# Patient Record
Sex: MALE | Race: WHITE | Employment: UNEMPLOYED | URBAN - METROPOLITAN AREA
[De-identification: names, ages, dates, MRNs, and addresses within clinical notes are randomized per-mention and may not be internally consistent; named-entity substitution may affect disease eponyms.]

---

## 2018-11-29 ENCOUNTER — APPOINTMENT (OUTPATIENT)
Dept: GENERAL RADIOLOGY | Age: 53
End: 2018-11-29
Payer: COMMERCIAL

## 2018-11-29 ENCOUNTER — HOSPITAL ENCOUNTER (EMERGENCY)
Age: 53
Discharge: HOME | End: 2018-11-29
Attending: EMERGENCY MEDICINE
Payer: COMMERCIAL

## 2018-11-29 VITALS
OXYGEN SATURATION: 97 % | WEIGHT: 175 LBS | DIASTOLIC BLOOD PRESSURE: 79 MMHG | BODY MASS INDEX: 27.47 KG/M2 | TEMPERATURE: 98 F | SYSTOLIC BLOOD PRESSURE: 108 MMHG | HEIGHT: 67 IN | HEART RATE: 59 BPM | RESPIRATION RATE: 18 BRPM

## 2018-11-29 DIAGNOSIS — L03.114 LEFT ARM CELLULITIS: Primary | ICD-10-CM

## 2018-11-29 LAB
A/G RATIO: 1.3 (ref 1.1–2.2)
ALBUMIN SERPL-MCNC: 4.2 G/DL (ref 3.4–5)
ALP BLD-CCNC: 78 U/L (ref 40–129)
ALT SERPL-CCNC: 98 U/L (ref 10–40)
ANION GAP SERPL CALCULATED.3IONS-SCNC: 12 MMOL/L (ref 3–16)
AST SERPL-CCNC: 60 U/L (ref 15–37)
BASOPHILS ABSOLUTE: 0.1 K/UL (ref 0–0.2)
BASOPHILS RELATIVE PERCENT: 1.2 %
BILIRUB SERPL-MCNC: 0.6 MG/DL (ref 0–1)
BUN BLDV-MCNC: 20 MG/DL (ref 7–20)
CALCIUM SERPL-MCNC: 9.4 MG/DL (ref 8.3–10.6)
CHLORIDE BLD-SCNC: 97 MMOL/L (ref 99–110)
CO2: 28 MMOL/L (ref 21–32)
CREAT SERPL-MCNC: 1.5 MG/DL (ref 0.9–1.3)
EOSINOPHILS ABSOLUTE: 0.5 K/UL (ref 0–0.6)
EOSINOPHILS RELATIVE PERCENT: 5 %
GFR AFRICAN AMERICAN: 60
GFR NON-AFRICAN AMERICAN: 49
GLOBULIN: 3.2 G/DL
GLUCOSE BLD-MCNC: 118 MG/DL (ref 70–99)
HCT VFR BLD CALC: 40 % (ref 40.5–52.5)
HEMOGLOBIN: 13.8 G/DL (ref 13.5–17.5)
LYMPHOCYTES ABSOLUTE: 2.5 K/UL (ref 1–5.1)
LYMPHOCYTES RELATIVE PERCENT: 24.7 %
MCH RBC QN AUTO: 33.3 PG (ref 26–34)
MCHC RBC AUTO-ENTMCNC: 34.6 G/DL (ref 31–36)
MCV RBC AUTO: 96.3 FL (ref 80–100)
MONOCYTES ABSOLUTE: 0.8 K/UL (ref 0–1.3)
MONOCYTES RELATIVE PERCENT: 7.7 %
NEUTROPHILS ABSOLUTE: 6.3 K/UL (ref 1.7–7.7)
NEUTROPHILS RELATIVE PERCENT: 61.4 %
PDW BLD-RTO: 14.8 % (ref 12.4–15.4)
PLATELET # BLD: 335 K/UL (ref 135–450)
PMV BLD AUTO: 7.9 FL (ref 5–10.5)
POTASSIUM SERPL-SCNC: 4.1 MMOL/L (ref 3.5–5.1)
RBC # BLD: 4.15 M/UL (ref 4.2–5.9)
SODIUM BLD-SCNC: 137 MMOL/L (ref 136–145)
TOTAL PROTEIN: 7.4 G/DL (ref 6.4–8.2)
WBC # BLD: 10.2 K/UL (ref 4–11)

## 2018-11-29 PROCEDURE — 73080 X-RAY EXAM OF ELBOW: CPT

## 2018-11-29 PROCEDURE — 6370000000 HC RX 637 (ALT 250 FOR IP): Performed by: NURSE PRACTITIONER

## 2018-11-29 PROCEDURE — 80053 COMPREHEN METABOLIC PANEL: CPT

## 2018-11-29 PROCEDURE — 73090 X-RAY EXAM OF FOREARM: CPT

## 2018-11-29 PROCEDURE — 85025 COMPLETE CBC W/AUTO DIFF WBC: CPT

## 2018-11-29 PROCEDURE — 99283 EMERGENCY DEPT VISIT LOW MDM: CPT

## 2018-11-29 RX ORDER — TRAZODONE HYDROCHLORIDE 50 MG/1
1-3 TABLET ORAL NIGHTLY PRN
COMMUNITY
Start: 2018-11-06

## 2018-11-29 RX ORDER — CLONAZEPAM 0.5 MG/1
1 TABLET ORAL PRN
COMMUNITY
Start: 2018-11-06

## 2018-11-29 RX ORDER — RANOLAZINE 500 MG/1
1 TABLET, FILM COATED, EXTENDED RELEASE ORAL 2 TIMES DAILY
COMMUNITY
Start: 2018-11-14

## 2018-11-29 RX ORDER — SERTRALINE HYDROCHLORIDE 100 MG/1
1 TABLET, FILM COATED ORAL 2 TIMES DAILY PRN
COMMUNITY
Start: 2018-11-06

## 2018-11-29 RX ORDER — PRASUGREL 10 MG/1
10 TABLET, FILM COATED ORAL DAILY
COMMUNITY
Start: 2018-07-20

## 2018-11-29 RX ORDER — ATORVASTATIN CALCIUM 80 MG/1
40 TABLET, FILM COATED ORAL DAILY
COMMUNITY
Start: 2018-09-21

## 2018-11-29 RX ORDER — CEPHALEXIN 250 MG/1
500 CAPSULE ORAL ONCE
Status: COMPLETED | OUTPATIENT
Start: 2018-11-29 | End: 2018-11-29

## 2018-11-29 RX ORDER — CEPHALEXIN 500 MG/1
500 CAPSULE ORAL 3 TIMES DAILY
Qty: 30 CAPSULE | Refills: 0 | Status: SHIPPED | OUTPATIENT
Start: 2018-11-29 | End: 2018-12-09

## 2018-11-29 RX ORDER — SACUBITRIL AND VALSARTAN 24; 26 MG/1; MG/1
1 TABLET, FILM COATED ORAL 2 TIMES DAILY
COMMUNITY
Start: 2018-11-19

## 2018-11-29 RX ORDER — BISOPROLOL FUMARATE 5 MG/1
2.5 TABLET ORAL DAILY
COMMUNITY
Start: 2018-11-14

## 2018-11-29 RX ORDER — TORSEMIDE 20 MG/1
1 TABLET ORAL DAILY
COMMUNITY
Start: 2018-11-09

## 2018-11-29 RX ORDER — NITROGLYCERIN 0.4 MG/1
0.4 TABLET SUBLINGUAL
COMMUNITY
Start: 2018-07-20

## 2018-11-29 RX ORDER — EZETIMIBE 10 MG/1
1 TABLET ORAL DAILY
COMMUNITY
Start: 2018-11-14

## 2018-11-29 RX ORDER — SULFAMETHOXAZOLE AND TRIMETHOPRIM 800; 160 MG/1; MG/1
1 TABLET ORAL ONCE
Status: COMPLETED | OUTPATIENT
Start: 2018-11-29 | End: 2018-11-29

## 2018-11-29 RX ORDER — HYDROCODONE BITARTRATE AND ACETAMINOPHEN 5; 325 MG/1; MG/1
2 TABLET ORAL ONCE
Status: COMPLETED | OUTPATIENT
Start: 2018-11-29 | End: 2018-11-29

## 2018-11-29 RX ORDER — HYDROCODONE BITARTRATE AND ACETAMINOPHEN 5; 325 MG/1; MG/1
1 TABLET ORAL EVERY 6 HOURS PRN
Qty: 15 TABLET | Refills: 0 | Status: SHIPPED | OUTPATIENT
Start: 2018-11-29 | End: 2018-12-06

## 2018-11-29 RX ORDER — SULFAMETHOXAZOLE AND TRIMETHOPRIM 800; 160 MG/1; MG/1
1 TABLET ORAL 2 TIMES DAILY
Qty: 20 TABLET | Refills: 0 | Status: SHIPPED | OUTPATIENT
Start: 2018-11-29 | End: 2018-12-09

## 2018-11-29 RX ORDER — AMIODARONE HYDROCHLORIDE 200 MG/1
300 TABLET ORAL DAILY
COMMUNITY
Start: 2018-11-06

## 2018-11-29 RX ADMIN — CEPHALEXIN 500 MG: 250 CAPSULE ORAL at 19:24

## 2018-11-29 RX ADMIN — HYDROCODONE BITARTRATE AND ACETAMINOPHEN 2 TABLET: 5; 325 TABLET ORAL at 19:24

## 2018-11-29 RX ADMIN — SULFAMETHOXAZOLE AND TRIMETHOPRIM 1 TABLET: 800; 160 TABLET ORAL at 19:24

## 2018-11-29 ASSESSMENT — PAIN DESCRIPTION - PAIN TYPE: TYPE: ACUTE PAIN

## 2018-11-29 ASSESSMENT — PAIN SCALES - GENERAL
PAINLEVEL_OUTOF10: 9
PAINLEVEL_OUTOF10: 9

## 2018-11-29 ASSESSMENT — PAIN DESCRIPTION - ORIENTATION: ORIENTATION: LEFT

## 2018-11-29 ASSESSMENT — PAIN DESCRIPTION - LOCATION: LOCATION: ARM

## 2018-11-30 NOTE — ED PROVIDER NOTES
MG TABLET    Take 300 mg by mouth daily    ATORVASTATIN (LIPITOR) 80 MG TABLET    Take 40 mg by mouth daily    BISOPROLOL (ZEBETA) 5 MG TABLET    Take 2.5 mg by mouth daily    CLONAZEPAM (KLONOPIN) 0.5 MG TABLET    Take 1 tablet by mouth as needed. Laurena Rad COENZYME Q10 (CO Q 10) 100 MG CAPS    Take 100 mg by mouth daily    ENTRESTO 24-26 MG PER TABLET    Take 1 tablet by mouth 2 times daily    EZETIMIBE (ZETIA) 10 MG TABLET    Take 1 tablet by mouth daily    NITROGLYCERIN (NITROSTAT) 0.4 MG SL TABLET    Place 0.4 mg under the tongue    PRASUGREL (EFFIENT) 10 MG TABS    Take 10 mg by mouth daily    RANEXA 500 MG EXTENDED RELEASE TABLET    Take 1 tablet by mouth 2 times daily    SERTRALINE (ZOLOFT) 100 MG TABLET    Take 1 tablet by mouth 2 times daily as needed    TORSEMIDE (DEMADEX) 20 MG TABLET    Take 1 tablet by mouth daily    TRAZODONE (DESYREL) 50 MG TABLET    Take 1-3 tablets by mouth nightly as needed         ALLERGIES:    Patient has no known allergies. FAMILY HISTORY:     History reviewed. No pertinent family history. SOCIAL HISTORY:       Social History     Social History    Marital status:      Spouse name: N/A    Number of children: N/A    Years of education: N/A     Social History Main Topics    Smoking status: Former Smoker     Types: Cigarettes     Start date: 8/1/2018    Smokeless tobacco: Never Used    Alcohol use No    Drug use: No    Sexual activity: Not Asked     Other Topics Concern    None     Social History Narrative    None       SCREENINGS:    @FLOW([id number])@        PHYSICAL EXAM:       ED Triage Vitals [11/29/18 1700]   BP Temp Temp Source Pulse Resp SpO2 Height Weight   105/65 98 °F (36.7 °C) Oral 63 17 99 % 5' 7\" (1.702 m) 175 lb (79.4 kg)       Physical Exam    CONSTITUTIONAL: Awake and alert. Cooperative. Well-developed. Well-nourished. Non-toxic.    Vitals:    11/29/18 1700   BP: 105/65   Pulse: 63   Resp: 17   Temp: 98 °F (36.7 °C)   TempSrc: Oral   SpO2: 99% Weight: 175 lb (79.4 kg)   Height: 5' 7\" (1.702 m)     HENT: Normocephalic. Atraumatic. External ears normal, without discharge. TMs clear bilaterally. No nasal discharge. Oropharynx clear, no erythema. Mucous membranes moist.  EYES: Conjunctiva non-injected, no lid abnormalities noted. No scleral icterus. PERRL. EOM's grossly intact. Anterior chambers clear. NECK: Supple. Normal ROM. No meningismus. No thyroid tenderness or swelling noted. CARDIOVASCULAR: RRR. No Murmer. Intact distal pulses with no edema. No carotid bruits. PULMONARY/CHEST WALL: Effort normal. No tachypnea. Lungs clear to ausculation. ABDOMEN: Normal BS. Soft. Nondistended. No tenderness to palpate. No guarding. No hernias noted. No splenomegaly. Back: Spine is midline. No ecchymosis. No crepitus on palpation. No obvious subluxation of vertebral column. No saddle anesthesia or evidence of cauda equina. /ANORECTAL: Not assessed  MUSKULOSKELETAL: Limited range of motion of left arm secondary to pain. Patient has no significant tenderness around the elbow although he is having pain with flexion and extension. Patient pain is in the mid forearm with some surrounding erythema. It is not circumferential.  There is no vesicular lesions. It is blanchable. Other extremities are atraumatic. SKIN: Warm and dry. Area of erythema and warmth of the left forearm. There is no induration or fluctuance. NEUROLOGICAL:  GCS 15. CN II-XII grossly intact. Strength is 5/5 in allextremities and sensation is intact. PSYCHIATRIC: Normal affect, normal insight and judgement. Alert andoriented x 3.         DIAGNOSTIC RESULTS:     LABS:    Results for orders placed or performed during the hospital encounter of 11/29/18   CBC Auto Differential   Result Value Ref Range    WBC 10.2 4.0 - 11.0 K/uL    RBC 4.15 (L) 4.20 - 5.90 M/uL    Hemoglobin 13.8 13.5 - 17.5 g/dL    Hematocrit 40.0 (L) 40.5 - 52.5 %    MCV 96.3 80.0 - 100.0 fL    MCH 33.3 26.0 - 34.0 pg

## 2019-04-10 ENCOUNTER — HOSPITAL ENCOUNTER (EMERGENCY)
Age: 54
Discharge: HOME | End: 2019-04-10
Payer: MEDICARE

## 2019-04-10 VITALS
TEMPERATURE: 98.4 F | HEART RATE: 60 BPM | DIASTOLIC BLOOD PRESSURE: 68 MMHG | OXYGEN SATURATION: 98 % | SYSTOLIC BLOOD PRESSURE: 98 MMHG | BODY MASS INDEX: 28.41 KG/M2 | WEIGHT: 181 LBS | RESPIRATION RATE: 16 BRPM | HEIGHT: 67 IN

## 2019-04-10 DIAGNOSIS — T82.9XXA DISORDER OF CARDIAC PACEMAKER SYSTEM, INITIAL ENCOUNTER: Primary | ICD-10-CM

## 2019-04-10 PROCEDURE — 93005 ELECTROCARDIOGRAM TRACING: CPT | Performed by: EMERGENCY MEDICINE

## 2019-04-10 PROCEDURE — 99283 EMERGENCY DEPT VISIT LOW MDM: CPT

## 2019-04-10 NOTE — ED PROVIDER NOTES
Inability: Not on file    Transportation needs:     Medical: Not on file     Non-medical: Not on file   Tobacco Use    Smoking status: Former Smoker     Types: Cigarettes     Start date: 8/1/2018    Smokeless tobacco: Never Used   Substance and Sexual Activity    Alcohol use: No    Drug use: No    Sexual activity: Not on file   Lifestyle    Physical activity:     Days per week: Not on file     Minutes per session: Not on file    Stress: Not on file   Relationships    Social connections:     Talks on phone: Not on file     Gets together: Not on file     Attends [employer] service: Not on file     Active member of club or organization: Not on file     Attends meetings of clubs or organizations: Not on file     Relationship status: Not on file    Intimate partner violence:     Fear of current or ex partner: Not on file     Emotionally abused: Not on file     Physically abused: Not on file     Forced sexual activity: Not on file   Other Topics Concern    Not on file   Social History Narrative    Not on file     No current facility-administered medications for this encounter. Current Outpatient Medications   Medication Sig Dispense Refill    amiodarone (CORDARONE) 200 MG tablet Take 300 mg by mouth daily      bisoprolol (ZEBETA) 5 MG tablet Take 2.5 mg by mouth daily      clonazePAM (KLONOPIN) 0.5 MG tablet Take 1 tablet by mouth as needed. Shannan [place] ezetimibe (ZETIA) 10 MG tablet Take 1 tablet by mouth daily      RANEXA 500 MG extended release tablet Take 1 tablet by mouth 2 times daily      ENTRESTO 24-26 MG per tablet Take 1 tablet by mouth 2 times daily      sertraline (ZOLOFT) 100 MG tablet Take 1 tablet by mouth 2 times daily as needed      torsemide (DEMADEX) 20 MG tablet Take 1 tablet by mouth daily      traZODone (DESYREL) 50 MG tablet Take 1-3 tablets by mouth nightly as needed      atorvastatin (LIPITOR) 80 MG tablet Take 40 mg by mouth daily      prasugrel (EFFIENT) 10 MG TABS Take 10 mg by mouth daily      Coenzyme Q10 (CO Q 10) 100 MG CAPS Take 100 mg by mouth daily      nitroGLYCERIN (NITROSTAT) 0.4 MG SL tablet Place 0.4 mg under the tongue       No Known Allergies    REVIEW OF SYSTEMS  10 systems reviewed, pertinent positives per HPI otherwise noted to be negative    PHYSICAL EXAM  /75   Pulse 60   Temp 98.4 °F (36.9 °C) (Oral)   Resp 17   Ht 5' 7\" (1.702 m)   Wt 181 lb (82.1 kg)   SpO2 96%   BMI 28.35 kg/m²   GENERAL APPEARANCE: Awake and alert. Cooperative. No acute distress. HEAD: Normocephalic. Atraumatic. EYES: PERRL. EOM's grossly intact. ENT: Mucous membranes are moist.   NECK: Supple. HEART: RRR. No murmurs. LUNGS: Respirations unlabored. CTAB. Good air exchange. Speaking comfortably in full sentences. ABDOMEN: Soft. Non-distended. Non-tender. No guarding or rebound. EXTREMITIES: No peripheral edema. Moves all extremities equally. All extremities neurovascularly intact. SKIN: Warm and dry. No acute rashes. NEUROLOGICAL: Alert and oriented. CN's 2-12 intact. No gross facial drooping. Strength 5/5, sensation intact. PSYCHIATRIC: Normal mood and affect. ED COURSE   I have evaluated this patient. Attending physician was available for consultation. Pain control was not required here in the emergency department. Triage vital stable. Defibrillator/pacemaker was interrogated by representative from [facility]. There were no issues detected. Patient will be discharged home with return precautions and recommendations for follow-up. He is in agreement and comfortable with discharge. A discussion was had with Lyudmila Rozina Bourne regarding concerns with defibrillator/pacemaker, ED findings, recommendations for follow-up. All questions were answered. Patient will follow up with  cardiology within 2-3 days as needed for further evaluation/treatment. Patient will return to ED for new/worsening symptoms.     Patient was informed to continue home medications

## 2019-04-11 LAB
EKG ATRIAL RATE: 60 BPM
EKG DIAGNOSIS: NORMAL
EKG P AXIS: 31 DEGREES
EKG P-R INTERVAL: 162 MS
EKG Q-T INTERVAL: 490 MS
EKG QRS DURATION: 164 MS
EKG QTC CALCULATION (BAZETT): 490 MS
EKG R AXIS: 79 DEGREES
EKG T AXIS: 76 DEGREES
EKG VENTRICULAR RATE: 60 BPM

## 2019-04-11 PROCEDURE — 93010 ELECTROCARDIOGRAM REPORT: CPT | Performed by: INTERNAL MEDICINE

## 2019-04-11 NOTE — ED NOTES
--Patient provided with discharge instructions. --Instructions and follow-up appointments reviewed with patient/family. No further questions or needs at this time. --Vital signs and patient stable upon discharge. --Patient ambulatory to [facility].         Ben Nino RN  04/10/19 1206

## 2024-05-22 ENCOUNTER — APPOINTMENT (OUTPATIENT)
Dept: CARDIOLOGY | Facility: HOSPITAL | Age: 59
DRG: 286 | End: 2024-05-22
Payer: COMMERCIAL

## 2024-05-22 ENCOUNTER — HOSPITAL ENCOUNTER (INPATIENT)
Facility: HOSPITAL | Age: 59
LOS: 5 days | Discharge: ANOTHER HOSPITAL | DRG: 286 | End: 2024-05-28
Attending: STUDENT IN AN ORGANIZED HEALTH CARE EDUCATION/TRAINING PROGRAM | Admitting: HOSPITALIST
Payer: COMMERCIAL

## 2024-05-22 ENCOUNTER — APPOINTMENT (OUTPATIENT)
Dept: RADIOLOGY | Facility: HOSPITAL | Age: 59
DRG: 286 | End: 2024-05-22
Payer: COMMERCIAL

## 2024-05-22 DIAGNOSIS — I50.23 ACUTE ON CHRONIC SYSTOLIC CONGESTIVE HEART FAILURE (MULTI): ICD-10-CM

## 2024-05-22 DIAGNOSIS — R00.0 TACHYCARDIA: ICD-10-CM

## 2024-05-22 DIAGNOSIS — Z95.810 PRESENCE OF AUTOMATIC (IMPLANTABLE) CARDIAC DEFIBRILLATOR: ICD-10-CM

## 2024-05-22 DIAGNOSIS — I50.9 CONGESTIVE HEART FAILURE, UNSPECIFIED HF CHRONICITY, UNSPECIFIED HEART FAILURE TYPE (MULTI): ICD-10-CM

## 2024-05-22 DIAGNOSIS — I47.20 VENTRICULAR TACHYCARDIA (MULTI): ICD-10-CM

## 2024-05-22 DIAGNOSIS — Z45.02 AICD DISCHARGE: Primary | ICD-10-CM

## 2024-05-22 DIAGNOSIS — I25.5 ISCHEMIC CARDIOMYOPATHY: ICD-10-CM

## 2024-05-22 DIAGNOSIS — I50.20 HFREF (HEART FAILURE WITH REDUCED EJECTION FRACTION) (MULTI): ICD-10-CM

## 2024-05-22 DIAGNOSIS — I50.22 CHRONIC SYSTOLIC (CONGESTIVE) HEART FAILURE (MULTI): ICD-10-CM

## 2024-05-22 DIAGNOSIS — R79.89 ELEVATED TROPONIN: ICD-10-CM

## 2024-05-22 DIAGNOSIS — I50.82 BIVENTRICULAR CONGESTIVE HEART FAILURE (MULTI): ICD-10-CM

## 2024-05-22 LAB
ALBUMIN SERPL BCP-MCNC: 4.2 G/DL (ref 3.4–5)
ALP SERPL-CCNC: 72 U/L (ref 33–120)
ALT SERPL W P-5'-P-CCNC: 26 U/L (ref 10–52)
ANION GAP SERPL CALC-SCNC: 15 MMOL/L (ref 10–20)
APPEARANCE UR: CLEAR
APTT PPP: 26 SECONDS (ref 27–38)
AST SERPL W P-5'-P-CCNC: 15 U/L (ref 9–39)
BASOPHILS # BLD AUTO: 0.16 X10*3/UL (ref 0–0.1)
BASOPHILS NFR BLD AUTO: 0.9 %
BILIRUB SERPL-MCNC: 1.1 MG/DL (ref 0–1.2)
BILIRUB UR STRIP.AUTO-MCNC: NEGATIVE MG/DL
BNP SERPL-MCNC: 2144 PG/ML (ref 0–99)
BUN SERPL-MCNC: 17 MG/DL (ref 6–23)
CALCIUM SERPL-MCNC: 9 MG/DL (ref 8.6–10.3)
CARDIAC TROPONIN I PNL SERPL HS: 31 NG/L (ref 0–20)
CHLORIDE SERPL-SCNC: 101 MMOL/L (ref 98–107)
CO2 SERPL-SCNC: 25 MMOL/L (ref 21–32)
COLOR UR: ABNORMAL
CREAT SERPL-MCNC: 1.29 MG/DL (ref 0.5–1.3)
EGFRCR SERPLBLD CKD-EPI 2021: 65 ML/MIN/1.73M*2
EOSINOPHIL # BLD AUTO: 0.3 X10*3/UL (ref 0–0.7)
EOSINOPHIL NFR BLD AUTO: 1.7 %
ERYTHROCYTE [DISTWIDTH] IN BLOOD BY AUTOMATED COUNT: 13.5 % (ref 11.5–14.5)
GLUCOSE SERPL-MCNC: 127 MG/DL (ref 74–99)
GLUCOSE UR STRIP.AUTO-MCNC: ABNORMAL MG/DL
HCT VFR BLD AUTO: 40.8 % (ref 41–52)
HGB BLD-MCNC: 14.7 G/DL (ref 13.5–17.5)
HYALINE CASTS #/AREA URNS AUTO: ABNORMAL /LPF
IMM GRANULOCYTES # BLD AUTO: 0.16 X10*3/UL (ref 0–0.7)
IMM GRANULOCYTES NFR BLD AUTO: 0.9 % (ref 0–0.9)
INR PPP: 1.1 (ref 0.9–1.1)
KETONES UR STRIP.AUTO-MCNC: NEGATIVE MG/DL
LACTATE SERPL-SCNC: 1.2 MMOL/L (ref 0.4–2)
LACTATE SERPL-SCNC: 2.5 MMOL/L (ref 0.4–2)
LEUKOCYTE ESTERASE UR QL STRIP.AUTO: ABNORMAL
LIPASE SERPL-CCNC: 19 U/L (ref 9–82)
LYMPHOCYTES # BLD AUTO: 4.46 X10*3/UL (ref 1.2–4.8)
LYMPHOCYTES NFR BLD AUTO: 24.9 %
MAGNESIUM SERPL-MCNC: 1.87 MG/DL (ref 1.6–2.4)
MCH RBC QN AUTO: 33.7 PG (ref 26–34)
MCHC RBC AUTO-ENTMCNC: 36 G/DL (ref 32–36)
MCV RBC AUTO: 94 FL (ref 80–100)
MONOCYTES # BLD AUTO: 1.33 X10*3/UL (ref 0.1–1)
MONOCYTES NFR BLD AUTO: 7.4 %
MUCOUS THREADS #/AREA URNS AUTO: ABNORMAL /LPF
NEUTROPHILS # BLD AUTO: 11.47 X10*3/UL (ref 1.2–7.7)
NEUTROPHILS NFR BLD AUTO: 64.2 %
NITRITE UR QL STRIP.AUTO: NEGATIVE
NRBC BLD-RTO: 0 /100 WBCS (ref 0–0)
PH UR STRIP.AUTO: 7 [PH]
PLATELET # BLD AUTO: 256 X10*3/UL (ref 150–450)
POTASSIUM SERPL-SCNC: 3.3 MMOL/L (ref 3.5–5.3)
PROT SERPL-MCNC: 6.6 G/DL (ref 6.4–8.2)
PROT UR STRIP.AUTO-MCNC: NEGATIVE MG/DL
PROTHROMBIN TIME: 12.4 SECONDS (ref 9.8–12.8)
RBC # BLD AUTO: 4.36 X10*6/UL (ref 4.5–5.9)
RBC # UR STRIP.AUTO: ABNORMAL /UL
RBC #/AREA URNS AUTO: >20 /HPF
SARS-COV-2 RNA RESP QL NAA+PROBE: NOT DETECTED
SODIUM SERPL-SCNC: 138 MMOL/L (ref 136–145)
SP GR UR STRIP.AUTO: 1
UROBILINOGEN UR STRIP.AUTO-MCNC: <2 MG/DL
WBC # BLD AUTO: 17.9 X10*3/UL (ref 4.4–11.3)
WBC #/AREA URNS AUTO: ABNORMAL /HPF

## 2024-05-22 PROCEDURE — 96361 HYDRATE IV INFUSION ADD-ON: CPT

## 2024-05-22 PROCEDURE — 99291 CRITICAL CARE FIRST HOUR: CPT | Mod: CS | Performed by: STUDENT IN AN ORGANIZED HEALTH CARE EDUCATION/TRAINING PROGRAM

## 2024-05-22 PROCEDURE — 85025 COMPLETE CBC W/AUTO DIFF WBC: CPT | Performed by: STUDENT IN AN ORGANIZED HEALTH CARE EDUCATION/TRAINING PROGRAM

## 2024-05-22 PROCEDURE — 84484 ASSAY OF TROPONIN QUANT: CPT | Performed by: STUDENT IN AN ORGANIZED HEALTH CARE EDUCATION/TRAINING PROGRAM

## 2024-05-22 PROCEDURE — 81001 URINALYSIS AUTO W/SCOPE: CPT | Performed by: STUDENT IN AN ORGANIZED HEALTH CARE EDUCATION/TRAINING PROGRAM

## 2024-05-22 PROCEDURE — 83880 ASSAY OF NATRIURETIC PEPTIDE: CPT | Performed by: STUDENT IN AN ORGANIZED HEALTH CARE EDUCATION/TRAINING PROGRAM

## 2024-05-22 PROCEDURE — 80307 DRUG TEST PRSMV CHEM ANLYZR: CPT | Performed by: HOSPITALIST

## 2024-05-22 PROCEDURE — 85730 THROMBOPLASTIN TIME PARTIAL: CPT | Performed by: STUDENT IN AN ORGANIZED HEALTH CARE EDUCATION/TRAINING PROGRAM

## 2024-05-22 PROCEDURE — 96366 THER/PROPH/DIAG IV INF ADDON: CPT

## 2024-05-22 PROCEDURE — 71045 X-RAY EXAM CHEST 1 VIEW: CPT | Mod: FOREIGN READ | Performed by: RADIOLOGY

## 2024-05-22 PROCEDURE — 2500000001 HC RX 250 WO HCPCS SELF ADMINISTERED DRUGS (ALT 637 FOR MEDICARE OP): Performed by: STUDENT IN AN ORGANIZED HEALTH CARE EDUCATION/TRAINING PROGRAM

## 2024-05-22 PROCEDURE — 96365 THER/PROPH/DIAG IV INF INIT: CPT

## 2024-05-22 PROCEDURE — 36415 COLL VENOUS BLD VENIPUNCTURE: CPT | Performed by: STUDENT IN AN ORGANIZED HEALTH CARE EDUCATION/TRAINING PROGRAM

## 2024-05-22 PROCEDURE — 84075 ASSAY ALKALINE PHOSPHATASE: CPT | Performed by: STUDENT IN AN ORGANIZED HEALTH CARE EDUCATION/TRAINING PROGRAM

## 2024-05-22 PROCEDURE — 83735 ASSAY OF MAGNESIUM: CPT | Performed by: STUDENT IN AN ORGANIZED HEALTH CARE EDUCATION/TRAINING PROGRAM

## 2024-05-22 PROCEDURE — 2500000004 HC RX 250 GENERAL PHARMACY W/ HCPCS (ALT 636 FOR OP/ED): Performed by: STUDENT IN AN ORGANIZED HEALTH CARE EDUCATION/TRAINING PROGRAM

## 2024-05-22 PROCEDURE — 87635 SARS-COV-2 COVID-19 AMP PRB: CPT | Performed by: STUDENT IN AN ORGANIZED HEALTH CARE EDUCATION/TRAINING PROGRAM

## 2024-05-22 PROCEDURE — 96367 TX/PROPH/DG ADDL SEQ IV INF: CPT

## 2024-05-22 PROCEDURE — 83605 ASSAY OF LACTIC ACID: CPT | Mod: 91 | Performed by: STUDENT IN AN ORGANIZED HEALTH CARE EDUCATION/TRAINING PROGRAM

## 2024-05-22 PROCEDURE — 83690 ASSAY OF LIPASE: CPT | Performed by: STUDENT IN AN ORGANIZED HEALTH CARE EDUCATION/TRAINING PROGRAM

## 2024-05-22 PROCEDURE — 93005 ELECTROCARDIOGRAM TRACING: CPT

## 2024-05-22 PROCEDURE — 96375 TX/PRO/DX INJ NEW DRUG ADDON: CPT

## 2024-05-22 PROCEDURE — 71045 X-RAY EXAM CHEST 1 VIEW: CPT

## 2024-05-22 PROCEDURE — 85610 PROTHROMBIN TIME: CPT | Performed by: STUDENT IN AN ORGANIZED HEALTH CARE EDUCATION/TRAINING PROGRAM

## 2024-05-22 PROCEDURE — 87086 URINE CULTURE/COLONY COUNT: CPT | Mod: ELYLAB | Performed by: STUDENT IN AN ORGANIZED HEALTH CARE EDUCATION/TRAINING PROGRAM

## 2024-05-22 RX ORDER — ASPIRIN 325 MG
325 TABLET ORAL ONCE
Status: COMPLETED | OUTPATIENT
Start: 2024-05-22 | End: 2024-05-22

## 2024-05-22 RX ORDER — POTASSIUM CHLORIDE 14.9 MG/ML
20 INJECTION INTRAVENOUS
Status: COMPLETED | OUTPATIENT
Start: 2024-05-22 | End: 2024-05-23

## 2024-05-22 RX ORDER — HYDROMORPHONE HYDROCHLORIDE 1 MG/ML
1 INJECTION, SOLUTION INTRAMUSCULAR; INTRAVENOUS; SUBCUTANEOUS ONCE
Status: COMPLETED | OUTPATIENT
Start: 2024-05-22 | End: 2024-05-22

## 2024-05-22 RX ORDER — KETOROLAC TROMETHAMINE 30 MG/ML
15 INJECTION, SOLUTION INTRAMUSCULAR; INTRAVENOUS ONCE
Status: COMPLETED | OUTPATIENT
Start: 2024-05-22 | End: 2024-05-22

## 2024-05-22 RX ORDER — MAGNESIUM SULFATE 1 G/100ML
1 INJECTION INTRAVENOUS ONCE
Status: COMPLETED | OUTPATIENT
Start: 2024-05-22 | End: 2024-05-23

## 2024-05-22 RX ADMIN — HYDROMORPHONE HYDROCHLORIDE 1 MG: 1 INJECTION, SOLUTION INTRAMUSCULAR; INTRAVENOUS; SUBCUTANEOUS at 21:38

## 2024-05-22 RX ADMIN — ASPIRIN 325 MG: 325 TABLET ORAL at 22:59

## 2024-05-22 RX ADMIN — KETOROLAC TROMETHAMINE 15 MG: 30 INJECTION, SOLUTION INTRAMUSCULAR at 22:59

## 2024-05-22 RX ADMIN — SODIUM CHLORIDE 1000 ML: 9 INJECTION, SOLUTION INTRAVENOUS at 21:33

## 2024-05-22 RX ADMIN — POTASSIUM CHLORIDE 20 MEQ: 14.9 INJECTION, SOLUTION INTRAVENOUS at 22:48

## 2024-05-22 ASSESSMENT — PAIN SCALES - GENERAL: PAINLEVEL_OUTOF10: 10 - WORST POSSIBLE PAIN

## 2024-05-22 ASSESSMENT — COLUMBIA-SUICIDE SEVERITY RATING SCALE - C-SSRS
1. IN THE PAST MONTH, HAVE YOU WISHED YOU WERE DEAD OR WISHED YOU COULD GO TO SLEEP AND NOT WAKE UP?: NO
6. HAVE YOU EVER DONE ANYTHING, STARTED TO DO ANYTHING, OR PREPARED TO DO ANYTHING TO END YOUR LIFE?: NO
2. HAVE YOU ACTUALLY HAD ANY THOUGHTS OF KILLING YOURSELF?: NO

## 2024-05-22 ASSESSMENT — LIFESTYLE VARIABLES
EVER FELT BAD OR GUILTY ABOUT YOUR DRINKING: NO
TOTAL SCORE: 0
EVER HAD A DRINK FIRST THING IN THE MORNING TO STEADY YOUR NERVES TO GET RID OF A HANGOVER: NO
HAVE PEOPLE ANNOYED YOU BY CRITICIZING YOUR DRINKING: NO
HAVE YOU EVER FELT YOU SHOULD CUT DOWN ON YOUR DRINKING: NO

## 2024-05-22 ASSESSMENT — PAIN - FUNCTIONAL ASSESSMENT: PAIN_FUNCTIONAL_ASSESSMENT: 0-10

## 2024-05-22 ASSESSMENT — PAIN DESCRIPTION - LOCATION: LOCATION: CHEST

## 2024-05-23 ENCOUNTER — APPOINTMENT (OUTPATIENT)
Dept: CARDIOLOGY | Facility: HOSPITAL | Age: 59
DRG: 286 | End: 2024-05-23
Payer: COMMERCIAL

## 2024-05-23 ENCOUNTER — ANESTHESIA (OUTPATIENT)
Dept: CARDIOLOGY | Facility: HOSPITAL | Age: 59
DRG: 286 | End: 2024-05-23
Payer: COMMERCIAL

## 2024-05-23 ENCOUNTER — HOSPITAL ENCOUNTER (INPATIENT)
Dept: CARDIOLOGY | Facility: HOSPITAL | Age: 59
Discharge: HOME | DRG: 286 | End: 2024-05-23
Payer: COMMERCIAL

## 2024-05-23 ENCOUNTER — ANESTHESIA EVENT (OUTPATIENT)
Dept: CARDIOLOGY | Facility: HOSPITAL | Age: 59
DRG: 286 | End: 2024-05-23
Payer: COMMERCIAL

## 2024-05-23 PROBLEM — I25.5 ISCHEMIC CARDIOMYOPATHY: Status: ACTIVE | Noted: 2024-05-22

## 2024-05-23 PROBLEM — R79.89 ELEVATED TROPONIN: Status: ACTIVE | Noted: 2024-05-22

## 2024-05-23 PROBLEM — Z45.02 AICD DISCHARGE: Status: ACTIVE | Noted: 2024-05-23

## 2024-05-23 PROBLEM — I47.20 VENTRICULAR TACHYCARDIA (MULTI): Status: ACTIVE | Noted: 2024-05-22

## 2024-05-23 PROBLEM — I50.9 CHF (CONGESTIVE HEART FAILURE) (MULTI): Status: ACTIVE | Noted: 2024-05-23

## 2024-05-23 PROBLEM — I50.20 HFREF (HEART FAILURE WITH REDUCED EJECTION FRACTION) (MULTI): Status: ACTIVE | Noted: 2024-05-22

## 2024-05-23 LAB
AMPHETAMINES UR QL SCN: NORMAL
ANION GAP SERPL CALC-SCNC: 12 MMOL/L (ref 10–20)
AORTIC VALVE MEAN GRADIENT: 2 MMHG
AORTIC VALVE PEAK VELOCITY: 0.95 M/S
ATRIAL RATE: 126 BPM
AV PEAK GRADIENT: 3.6 MMHG
AVA (PEAK VEL): 2.16 CM2
AVA (VTI): 2.06 CM2
BARBITURATES UR QL SCN: NORMAL
BASOPHILS # BLD AUTO: 0.1 X10*3/UL (ref 0–0.1)
BASOPHILS NFR BLD AUTO: 0.8 %
BENZODIAZ UR QL SCN: NORMAL
BODY SURFACE AREA: 1.93 M2
BUN SERPL-MCNC: 19 MG/DL (ref 6–23)
BZE UR QL SCN: NORMAL
CALCIUM SERPL-MCNC: 8 MG/DL (ref 8.6–10.3)
CANNABINOIDS UR QL SCN: NORMAL
CARDIAC TROPONIN I PNL SERPL HS: 160 NG/L (ref 0–20)
CARDIAC TROPONIN I PNL SERPL HS: 305 NG/L (ref 0–20)
CARDIAC TROPONIN I PNL SERPL HS: 342 NG/L (ref 0–20)
CHLORIDE SERPL-SCNC: 106 MMOL/L (ref 98–107)
CO2 SERPL-SCNC: 23 MMOL/L (ref 21–32)
CREAT SERPL-MCNC: 1.3 MG/DL (ref 0.5–1.3)
EGFRCR SERPLBLD CKD-EPI 2021: 64 ML/MIN/1.73M*2
EJECTION FRACTION APICAL 4 CHAMBER: 8.5
EOSINOPHIL # BLD AUTO: 0.25 X10*3/UL (ref 0–0.7)
EOSINOPHIL NFR BLD AUTO: 2.1 %
ERYTHROCYTE [DISTWIDTH] IN BLOOD BY AUTOMATED COUNT: 13.9 % (ref 11.5–14.5)
FENTANYL+NORFENTANYL UR QL SCN: NORMAL
GLUCOSE SERPL-MCNC: 129 MG/DL (ref 74–99)
HCT VFR BLD AUTO: 37.7 % (ref 41–52)
HGB BLD-MCNC: 13.2 G/DL (ref 13.5–17.5)
HOLD SPECIMEN: NORMAL
IMM GRANULOCYTES # BLD AUTO: 0.09 X10*3/UL (ref 0–0.7)
IMM GRANULOCYTES NFR BLD AUTO: 0.8 % (ref 0–0.9)
LEFT ATRIUM VOLUME AREA LENGTH INDEX BSA: 43.5 ML/M2
LEFT VENTRICLE INTERNAL DIMENSION DIASTOLE: 6.55 CM (ref 3.5–6)
LEFT VENTRICULAR OUTFLOW TRACT DIAMETER: 2.06 CM
LV EJECTION FRACTION BIPLANE: 9 %
LYMPHOCYTES # BLD AUTO: 2.66 X10*3/UL (ref 1.2–4.8)
LYMPHOCYTES NFR BLD AUTO: 22.4 %
MAGNESIUM SERPL-MCNC: 2.7 MG/DL (ref 1.6–2.4)
MCH RBC QN AUTO: 33.2 PG (ref 26–34)
MCHC RBC AUTO-ENTMCNC: 35 G/DL (ref 32–36)
MCV RBC AUTO: 95 FL (ref 80–100)
METHADONE UR QL SCN: NORMAL
MONOCYTES # BLD AUTO: 1 X10*3/UL (ref 0.1–1)
MONOCYTES NFR BLD AUTO: 8.4 %
NEUTROPHILS # BLD AUTO: 7.8 X10*3/UL (ref 1.2–7.7)
NEUTROPHILS NFR BLD AUTO: 65.5 %
NRBC BLD-RTO: 0 /100 WBCS (ref 0–0)
OPIATES UR QL SCN: NORMAL
OXYCODONE+OXYMORPHONE UR QL SCN: NORMAL
PCP UR QL SCN: NORMAL
PHOSPHATE SERPL-MCNC: 3.8 MG/DL (ref 2.5–4.9)
PLATELET # BLD AUTO: 175 X10*3/UL (ref 150–450)
POTASSIUM SERPL-SCNC: 4.2 MMOL/L (ref 3.5–5.3)
PROCALCITONIN SERPL-MCNC: 0.06 NG/ML
Q ONSET: 210 MS
QRS COUNT: 21 BEATS
QRS DURATION: 180 MS
QT INTERVAL: 410 MS
QTC CALCULATION(BAZETT): 593 MS
QTC FREDERICIA: 525 MS
R AXIS: 250 DEGREES
RBC # BLD AUTO: 3.98 X10*6/UL (ref 4.5–5.9)
RIGHT VENTRICLE FREE WALL PEAK S': 10.8 CM/S
RIGHT VENTRICLE PEAK SYSTOLIC PRESSURE: 51.7 MMHG
SODIUM SERPL-SCNC: 137 MMOL/L (ref 136–145)
T AXIS: -31 DEGREES
T OFFSET: 415 MS
T4 FREE SERPL-MCNC: 0.94 NG/DL (ref 0.61–1.12)
TRICUSPID ANNULAR PLANE SYSTOLIC EXCURSION: 1.5 CM
TSH SERPL-ACNC: 5.55 MIU/L (ref 0.44–3.98)
VENTRICULAR RATE: 126 BPM
WBC # BLD AUTO: 11.9 X10*3/UL (ref 4.4–11.3)

## 2024-05-23 PROCEDURE — 2500000002 HC RX 250 W HCPCS SELF ADMINISTERED DRUGS (ALT 637 FOR MEDICARE OP, ALT 636 FOR OP/ED)

## 2024-05-23 PROCEDURE — 93284 PRGRMG EVAL IMPLANTABLE DFB: CPT | Performed by: INTERNAL MEDICINE

## 2024-05-23 PROCEDURE — 96367 TX/PROPH/DG ADDL SEQ IV INF: CPT | Mod: 59

## 2024-05-23 PROCEDURE — 93287 PERI-PX DEVICE EVAL & PRGR: CPT | Performed by: INTERNAL MEDICINE

## 2024-05-23 PROCEDURE — 96365 THER/PROPH/DIAG IV INF INIT: CPT | Mod: 59

## 2024-05-23 PROCEDURE — 2500000005 HC RX 250 GENERAL PHARMACY W/O HCPCS

## 2024-05-23 PROCEDURE — 92960 CARDIOVERSION ELECTRIC EXT: CPT | Performed by: INTERNAL MEDICINE

## 2024-05-23 PROCEDURE — 2500000001 HC RX 250 WO HCPCS SELF ADMINISTERED DRUGS (ALT 637 FOR MEDICARE OP)

## 2024-05-23 PROCEDURE — 99291 CRITICAL CARE FIRST HOUR: CPT | Performed by: HOSPITALIST

## 2024-05-23 PROCEDURE — 2500000002 HC RX 250 W HCPCS SELF ADMINISTERED DRUGS (ALT 637 FOR MEDICARE OP, ALT 636 FOR OP/ED): Mod: MUE | Performed by: HOSPITALIST

## 2024-05-23 PROCEDURE — 84484 ASSAY OF TROPONIN QUANT: CPT | Performed by: STUDENT IN AN ORGANIZED HEALTH CARE EDUCATION/TRAINING PROGRAM

## 2024-05-23 PROCEDURE — 84145 PROCALCITONIN (PCT): CPT | Mod: ELYLAB | Performed by: HOSPITALIST

## 2024-05-23 PROCEDURE — 94640 AIRWAY INHALATION TREATMENT: CPT

## 2024-05-23 PROCEDURE — 96375 TX/PRO/DX INJ NEW DRUG ADDON: CPT | Mod: 59

## 2024-05-23 PROCEDURE — 93010 ELECTROCARDIOGRAM REPORT: CPT | Performed by: INTERNAL MEDICINE

## 2024-05-23 PROCEDURE — 36415 COLL VENOUS BLD VENIPUNCTURE: CPT | Performed by: STUDENT IN AN ORGANIZED HEALTH CARE EDUCATION/TRAINING PROGRAM

## 2024-05-23 PROCEDURE — C1760 CLOSURE DEV, VASC: HCPCS | Performed by: INTERNAL MEDICINE

## 2024-05-23 PROCEDURE — 4A023N7 MEASUREMENT OF CARDIAC SAMPLING AND PRESSURE, LEFT HEART, PERCUTANEOUS APPROACH: ICD-10-PCS | Performed by: INTERNAL MEDICINE

## 2024-05-23 PROCEDURE — 87040 BLOOD CULTURE FOR BACTERIA: CPT | Mod: 91,ELYLAB | Performed by: STUDENT IN AN ORGANIZED HEALTH CARE EDUCATION/TRAINING PROGRAM

## 2024-05-23 PROCEDURE — 2500000005 HC RX 250 GENERAL PHARMACY W/O HCPCS: Performed by: INTERNAL MEDICINE

## 2024-05-23 PROCEDURE — 96368 THER/DIAG CONCURRENT INF: CPT | Mod: 59

## 2024-05-23 PROCEDURE — 99152 MOD SED SAME PHYS/QHP 5/>YRS: CPT | Performed by: INTERNAL MEDICINE

## 2024-05-23 PROCEDURE — 99153 MOD SED SAME PHYS/QHP EA: CPT | Performed by: INTERNAL MEDICINE

## 2024-05-23 PROCEDURE — 93306 TTE W/DOPPLER COMPLETE: CPT | Performed by: INTERNAL MEDICINE

## 2024-05-23 PROCEDURE — 93005 ELECTROCARDIOGRAM TRACING: CPT

## 2024-05-23 PROCEDURE — 93284 PRGRMG EVAL IMPLANTABLE DFB: CPT

## 2024-05-23 PROCEDURE — 84443 ASSAY THYROID STIM HORMONE: CPT | Performed by: HOSPITALIST

## 2024-05-23 PROCEDURE — 93454 CORONARY ARTERY ANGIO S&I: CPT | Performed by: INTERNAL MEDICINE

## 2024-05-23 PROCEDURE — 93290 INTERROG DEV EVAL ICPMS IP: CPT | Performed by: INTERNAL MEDICINE

## 2024-05-23 PROCEDURE — 83735 ASSAY OF MAGNESIUM: CPT | Performed by: HOSPITALIST

## 2024-05-23 PROCEDURE — 3700000001 HC GENERAL ANESTHESIA TIME - INITIAL BASE CHARGE: Performed by: INTERNAL MEDICINE

## 2024-05-23 PROCEDURE — 2500000004 HC RX 250 GENERAL PHARMACY W/ HCPCS (ALT 636 FOR OP/ED): Performed by: STUDENT IN AN ORGANIZED HEALTH CARE EDUCATION/TRAINING PROGRAM

## 2024-05-23 PROCEDURE — 82565 ASSAY OF CREATININE: CPT | Performed by: HOSPITALIST

## 2024-05-23 PROCEDURE — G0269 OCCLUSIVE DEVICE IN VEIN ART: HCPCS | Mod: TC,59 | Performed by: INTERNAL MEDICINE

## 2024-05-23 PROCEDURE — B2111ZZ FLUOROSCOPY OF MULTIPLE CORONARY ARTERIES USING LOW OSMOLAR CONTRAST: ICD-10-PCS | Performed by: INTERNAL MEDICINE

## 2024-05-23 PROCEDURE — S4991 NICOTINE PATCH NONLEGEND: HCPCS

## 2024-05-23 PROCEDURE — 2500000001 HC RX 250 WO HCPCS SELF ADMINISTERED DRUGS (ALT 637 FOR MEDICARE OP): Performed by: HOSPITALIST

## 2024-05-23 PROCEDURE — 5A2204Z RESTORATION OF CARDIAC RHYTHM, SINGLE: ICD-10-PCS | Performed by: INTERNAL MEDICINE

## 2024-05-23 PROCEDURE — 84100 ASSAY OF PHOSPHORUS: CPT | Performed by: HOSPITALIST

## 2024-05-23 PROCEDURE — 84439 ASSAY OF FREE THYROXINE: CPT | Performed by: HOSPITALIST

## 2024-05-23 PROCEDURE — 2550000001 HC RX 255 CONTRASTS: Performed by: INTERNAL MEDICINE

## 2024-05-23 PROCEDURE — 99223 1ST HOSP IP/OBS HIGH 75: CPT | Performed by: INTERNAL MEDICINE

## 2024-05-23 PROCEDURE — 3700000002 HC GENERAL ANESTHESIA TIME - EACH INCREMENTAL 1 MINUTE: Performed by: INTERNAL MEDICINE

## 2024-05-23 PROCEDURE — 2780000003 HC OR 278 NO HCPCS: Performed by: INTERNAL MEDICINE

## 2024-05-23 PROCEDURE — 93306 TTE W/DOPPLER COMPLETE: CPT

## 2024-05-23 PROCEDURE — 2500000006 HC RX 250 W HCPCS SELF ADMINISTERED DRUGS (ALT 637 FOR ALL PAYERS): Performed by: HOSPITALIST

## 2024-05-23 PROCEDURE — 2500000004 HC RX 250 GENERAL PHARMACY W/ HCPCS (ALT 636 FOR OP/ED): Performed by: INTERNAL MEDICINE

## 2024-05-23 PROCEDURE — 2500000004 HC RX 250 GENERAL PHARMACY W/ HCPCS (ALT 636 FOR OP/ED): Performed by: HOSPITALIST

## 2024-05-23 PROCEDURE — 2500000004 HC RX 250 GENERAL PHARMACY W/ HCPCS (ALT 636 FOR OP/ED)

## 2024-05-23 PROCEDURE — 2020000001 HC ICU ROOM DAILY

## 2024-05-23 PROCEDURE — 84484 ASSAY OF TROPONIN QUANT: CPT | Mod: 91 | Performed by: HOSPITALIST

## 2024-05-23 PROCEDURE — 96366 THER/PROPH/DIAG IV INF ADDON: CPT | Mod: 59

## 2024-05-23 PROCEDURE — 85025 COMPLETE CBC W/AUTO DIFF WBC: CPT | Performed by: HOSPITALIST

## 2024-05-23 RX ORDER — ACETAMINOPHEN 500 MG
5000 TABLET ORAL
Status: ON HOLD | COMMUNITY

## 2024-05-23 RX ORDER — ENOXAPARIN SODIUM 100 MG/ML
40 INJECTION SUBCUTANEOUS EVERY 24 HOURS
Status: DISCONTINUED | OUTPATIENT
Start: 2024-05-23 | End: 2024-05-28 | Stop reason: HOSPADM

## 2024-05-23 RX ORDER — LEVALBUTEROL INHALATION SOLUTION 0.63 MG/3ML
0.63 SOLUTION RESPIRATORY (INHALATION) EVERY 4 HOURS PRN
Status: DISCONTINUED | OUTPATIENT
Start: 2024-05-23 | End: 2024-05-28 | Stop reason: HOSPADM

## 2024-05-23 RX ORDER — LIDOCAINE 560 MG/1
2 PATCH PERCUTANEOUS; TOPICAL; TRANSDERMAL DAILY
Status: DISCONTINUED | OUTPATIENT
Start: 2024-05-23 | End: 2024-05-28 | Stop reason: HOSPADM

## 2024-05-23 RX ORDER — PAROXETINE HYDROCHLORIDE 20 MG/1
40 TABLET, FILM COATED ORAL EVERY MORNING
Status: DISCONTINUED | OUTPATIENT
Start: 2024-05-24 | End: 2024-05-23

## 2024-05-23 RX ORDER — LORAZEPAM 0.5 MG/1
0.5 TABLET ORAL NIGHTLY
Status: DISCONTINUED | OUTPATIENT
Start: 2024-05-23 | End: 2024-05-24

## 2024-05-23 RX ORDER — IBUPROFEN 200 MG
1 TABLET ORAL EVERY 24 HOURS
Status: DISCONTINUED | OUTPATIENT
Start: 2024-05-23 | End: 2024-05-28 | Stop reason: HOSPADM

## 2024-05-23 RX ORDER — ASPIRIN 325 MG
50 TABLET, DELAYED RELEASE (ENTERIC COATED) ORAL
Status: DISCONTINUED | OUTPATIENT
Start: 2024-05-26 | End: 2024-05-28 | Stop reason: HOSPADM

## 2024-05-23 RX ORDER — ASPIRIN 325 MG
50 TABLET, DELAYED RELEASE (ENTERIC COATED) ORAL
Status: ON HOLD | COMMUNITY

## 2024-05-23 RX ORDER — SACUBITRIL AND VALSARTAN 24; 26 MG/1; MG/1
1 TABLET, FILM COATED ORAL 2 TIMES DAILY
Status: ON HOLD | COMMUNITY

## 2024-05-23 RX ORDER — FENTANYL CITRATE 50 UG/ML
50 INJECTION, SOLUTION INTRAMUSCULAR; INTRAVENOUS ONCE
Status: DISCONTINUED | OUTPATIENT
Start: 2024-05-23 | End: 2024-05-23

## 2024-05-23 RX ORDER — EZETIMIBE 10 MG/1
10 TABLET ORAL DAILY
COMMUNITY
End: 2024-05-28 | Stop reason: HOSPADM

## 2024-05-23 RX ORDER — TRAZODONE HYDROCHLORIDE 50 MG/1
50 TABLET ORAL NIGHTLY
Status: ON HOLD | COMMUNITY

## 2024-05-23 RX ORDER — LANOLIN ALCOHOL/MO/W.PET/CERES
1000 CREAM (GRAM) TOPICAL DAILY
Status: ON HOLD | COMMUNITY

## 2024-05-23 RX ORDER — NAPROXEN SODIUM 220 MG/1
81 TABLET, FILM COATED ORAL DAILY
Status: DISCONTINUED | OUTPATIENT
Start: 2024-05-23 | End: 2024-05-28 | Stop reason: HOSPADM

## 2024-05-23 RX ORDER — BISOPROLOL FUMARATE 5 MG/1
TABLET, FILM COATED ORAL 2 TIMES DAILY
COMMUNITY
End: 2024-05-28 | Stop reason: HOSPADM

## 2024-05-23 RX ORDER — LORAZEPAM 2 MG/ML
1 INJECTION INTRAMUSCULAR ONCE
Status: DISCONTINUED | OUTPATIENT
Start: 2024-05-23 | End: 2024-05-23

## 2024-05-23 RX ORDER — ALIROCUMAB 75 MG/ML
75 INJECTION, SOLUTION SUBCUTANEOUS
COMMUNITY
End: 2024-05-28 | Stop reason: HOSPADM

## 2024-05-23 RX ORDER — POTASSIUM CHLORIDE 1.5 G/1.58G
20 POWDER, FOR SOLUTION ORAL DAILY
Status: ON HOLD | COMMUNITY

## 2024-05-23 RX ORDER — PRASUGREL 10 MG/1
10 TABLET, FILM COATED ORAL DAILY
Status: ON HOLD | COMMUNITY

## 2024-05-23 RX ORDER — MIDAZOLAM HYDROCHLORIDE 1 MG/ML
INJECTION, SOLUTION INTRAMUSCULAR; INTRAVENOUS AS NEEDED
Status: DISCONTINUED | OUTPATIENT
Start: 2024-05-23 | End: 2024-05-23 | Stop reason: HOSPADM

## 2024-05-23 RX ORDER — ACETAMINOPHEN 500 MG
650 TABLET ORAL EVERY 6 HOURS PRN
Status: ON HOLD | COMMUNITY

## 2024-05-23 RX ORDER — MAGNESIUM SULFATE HEPTAHYDRATE 40 MG/ML
2 INJECTION, SOLUTION INTRAVENOUS ONCE
Status: COMPLETED | OUTPATIENT
Start: 2024-05-23 | End: 2024-05-23

## 2024-05-23 RX ORDER — LANOLIN ALCOHOL/MO/W.PET/CERES
400 CREAM (GRAM) TOPICAL DAILY
Status: DISCONTINUED | OUTPATIENT
Start: 2024-05-23 | End: 2024-05-28 | Stop reason: HOSPADM

## 2024-05-23 RX ORDER — ACETAMINOPHEN 325 MG/1
650 TABLET ORAL EVERY 4 HOURS PRN
Status: DISCONTINUED | OUTPATIENT
Start: 2024-05-23 | End: 2024-05-28 | Stop reason: HOSPADM

## 2024-05-23 RX ORDER — CEFTRIAXONE 1 G/50ML
1 INJECTION, SOLUTION INTRAVENOUS ONCE
Status: COMPLETED | OUTPATIENT
Start: 2024-05-23 | End: 2024-05-23

## 2024-05-23 RX ORDER — ALBUTEROL SULFATE 90 UG/1
2 AEROSOL, METERED RESPIRATORY (INHALATION) EVERY 4 HOURS PRN
Status: DISCONTINUED | OUTPATIENT
Start: 2024-05-23 | End: 2024-05-28 | Stop reason: HOSPADM

## 2024-05-23 RX ORDER — TAMSULOSIN HYDROCHLORIDE 0.4 MG/1
0.4 CAPSULE ORAL DAILY
Status: DISCONTINUED | OUTPATIENT
Start: 2024-05-23 | End: 2024-05-28 | Stop reason: HOSPADM

## 2024-05-23 RX ORDER — LORAZEPAM 0.5 MG/1
0.5 TABLET ORAL NIGHTLY
Status: ON HOLD | COMMUNITY

## 2024-05-23 RX ORDER — LIDOCAINE 560 MG/1
1 PATCH PERCUTANEOUS; TOPICAL; TRANSDERMAL DAILY
Status: DISCONTINUED | OUTPATIENT
Start: 2024-05-23 | End: 2024-05-23

## 2024-05-23 RX ORDER — RANOLAZINE 500 MG/1
500 TABLET, EXTENDED RELEASE ORAL 2 TIMES DAILY
Status: ON HOLD | COMMUNITY

## 2024-05-23 RX ORDER — PAROXETINE HYDROCHLORIDE 40 MG/1
40 TABLET, FILM COATED ORAL EVERY MORNING
Status: ON HOLD | COMMUNITY

## 2024-05-23 RX ORDER — FENTANYL CITRATE 50 UG/ML
INJECTION, SOLUTION INTRAMUSCULAR; INTRAVENOUS AS NEEDED
Status: DISCONTINUED | OUTPATIENT
Start: 2024-05-23 | End: 2024-05-23 | Stop reason: HOSPADM

## 2024-05-23 RX ORDER — TORSEMIDE 20 MG/1
20 TABLET ORAL 2 TIMES DAILY
Status: ON HOLD | COMMUNITY

## 2024-05-23 RX ORDER — MIDAZOLAM HYDROCHLORIDE 1 MG/ML
INJECTION, SOLUTION INTRAMUSCULAR; INTRAVENOUS
Status: DISCONTINUED
Start: 2024-05-23 | End: 2024-05-23 | Stop reason: WASHOUT

## 2024-05-23 RX ORDER — ASPIRIN 81 MG/1
81 TABLET ORAL DAILY
Status: ON HOLD | COMMUNITY

## 2024-05-23 RX ORDER — UBIDECARENONE 75 MG
1000 CAPSULE ORAL DAILY
Status: DISCONTINUED | OUTPATIENT
Start: 2024-05-23 | End: 2024-05-28 | Stop reason: HOSPADM

## 2024-05-23 RX ORDER — RANOLAZINE 500 MG/1
500 TABLET, EXTENDED RELEASE ORAL 2 TIMES DAILY
Status: DISCONTINUED | OUTPATIENT
Start: 2024-05-23 | End: 2024-05-28 | Stop reason: HOSPADM

## 2024-05-23 RX ORDER — POTASSIUM CHLORIDE 1.5 G/1.58G
20 POWDER, FOR SOLUTION ORAL DAILY
Status: DISCONTINUED | OUTPATIENT
Start: 2024-05-23 | End: 2024-05-28 | Stop reason: HOSPADM

## 2024-05-23 RX ORDER — MAGNESIUM GLUCONATE 30 MG(550)
30 TABLET ORAL DAILY
COMMUNITY
End: 2024-05-28 | Stop reason: HOSPADM

## 2024-05-23 RX ORDER — PROPOFOL 10 MG/ML
INJECTION, EMULSION INTRAVENOUS
Status: DISCONTINUED
Start: 2024-05-23 | End: 2024-05-23 | Stop reason: WASHOUT

## 2024-05-23 RX ORDER — ROSUVASTATIN CALCIUM 20 MG/1
20 TABLET, COATED ORAL NIGHTLY
Status: DISCONTINUED | OUTPATIENT
Start: 2024-05-23 | End: 2024-05-28 | Stop reason: HOSPADM

## 2024-05-23 RX ORDER — TAMSULOSIN HYDROCHLORIDE 0.4 MG/1
0.4 CAPSULE ORAL DAILY
Status: ON HOLD | COMMUNITY

## 2024-05-23 RX ORDER — CHOLECALCIFEROL (VITAMIN D3) 25 MCG
5000 TABLET ORAL
Status: DISCONTINUED | OUTPATIENT
Start: 2024-05-23 | End: 2024-05-28 | Stop reason: HOSPADM

## 2024-05-23 RX ORDER — SIMETHICONE 80 MG
80 TABLET,CHEWABLE ORAL 3 TIMES DAILY PRN
Status: DISCONTINUED | OUTPATIENT
Start: 2024-05-23 | End: 2024-05-28 | Stop reason: HOSPADM

## 2024-05-23 RX ORDER — METOPROLOL TARTRATE 1 MG/ML
5 INJECTION, SOLUTION INTRAVENOUS
Status: DISCONTINUED | OUTPATIENT
Start: 2024-05-23 | End: 2024-05-23

## 2024-05-23 RX ORDER — ROSUVASTATIN CALCIUM 20 MG/1
20 TABLET, COATED ORAL NIGHTLY
Status: ON HOLD | COMMUNITY

## 2024-05-23 RX ORDER — ONDANSETRON HYDROCHLORIDE 2 MG/ML
4 INJECTION, SOLUTION INTRAVENOUS EVERY 8 HOURS PRN
Status: DISCONTINUED | OUTPATIENT
Start: 2024-05-23 | End: 2024-05-28 | Stop reason: HOSPADM

## 2024-05-23 RX ORDER — NOREPINEPHRINE BITARTRATE/D5W 8 MG/250ML
0-.2 PLASTIC BAG, INJECTION (ML) INTRAVENOUS CONTINUOUS
Status: DISCONTINUED | OUTPATIENT
Start: 2024-05-23 | End: 2024-05-23

## 2024-05-23 RX ORDER — FENTANYL CITRATE 50 UG/ML
INJECTION, SOLUTION INTRAMUSCULAR; INTRAVENOUS
Status: DISCONTINUED
Start: 2024-05-23 | End: 2024-05-23 | Stop reason: WASHOUT

## 2024-05-23 RX ORDER — NITROGLYCERIN 0.4 MG/1
0.4 TABLET SUBLINGUAL EVERY 5 MIN PRN
COMMUNITY
End: 2024-05-28 | Stop reason: HOSPADM

## 2024-05-23 RX ORDER — MAGNESIUM GLUCONATE 30 MG(550)
30 TABLET ORAL DAILY
Status: DISCONTINUED | OUTPATIENT
Start: 2024-05-23 | End: 2024-05-23 | Stop reason: ALTCHOICE

## 2024-05-23 RX ORDER — PROPOFOL 10 MG/ML
INJECTION, EMULSION INTRAVENOUS AS NEEDED
Status: DISCONTINUED | OUTPATIENT
Start: 2024-05-23 | End: 2024-05-23

## 2024-05-23 RX ORDER — LIDOCAINE HYDROCHLORIDE 20 MG/ML
INJECTION, SOLUTION INFILTRATION; PERINEURAL AS NEEDED
Status: DISCONTINUED | OUTPATIENT
Start: 2024-05-23 | End: 2024-05-23 | Stop reason: HOSPADM

## 2024-05-23 RX ORDER — MIDAZOLAM HYDROCHLORIDE 1 MG/ML
2 INJECTION, SOLUTION INTRAMUSCULAR; INTRAVENOUS ONCE
Status: DISCONTINUED | OUTPATIENT
Start: 2024-05-23 | End: 2024-05-23

## 2024-05-23 RX ORDER — PAROXETINE HYDROCHLORIDE 20 MG/1
40 TABLET, FILM COATED ORAL NIGHTLY
Status: DISCONTINUED | OUTPATIENT
Start: 2024-05-23 | End: 2024-05-28 | Stop reason: HOSPADM

## 2024-05-23 RX ORDER — TORSEMIDE 20 MG/1
20 TABLET ORAL
Status: DISCONTINUED | OUTPATIENT
Start: 2024-05-23 | End: 2024-05-25

## 2024-05-23 RX ORDER — POTASSIUM CHLORIDE 20 MEQ/1
40 TABLET, EXTENDED RELEASE ORAL ONCE
Status: COMPLETED | OUTPATIENT
Start: 2024-05-23 | End: 2024-05-23

## 2024-05-23 RX ORDER — METHOCARBAMOL 500 MG/1
500 TABLET, FILM COATED ORAL EVERY 6 HOURS
Status: DISCONTINUED | OUTPATIENT
Start: 2024-05-23 | End: 2024-05-24

## 2024-05-23 RX ORDER — SODIUM CHLORIDE, SODIUM LACTATE, POTASSIUM CHLORIDE, CALCIUM CHLORIDE 600; 310; 30; 20 MG/100ML; MG/100ML; MG/100ML; MG/100ML
INJECTION, SOLUTION INTRAVENOUS CONTINUOUS PRN
Status: DISCONTINUED | OUTPATIENT
Start: 2024-05-23 | End: 2024-05-23

## 2024-05-23 RX ORDER — TRAZODONE HYDROCHLORIDE 50 MG/1
50 TABLET ORAL NIGHTLY
Status: DISCONTINUED | OUTPATIENT
Start: 2024-05-23 | End: 2024-05-28 | Stop reason: HOSPADM

## 2024-05-23 RX ORDER — IBUPROFEN 200 MG
1 TABLET ORAL EVERY 24 HOURS
Status: ON HOLD | COMMUNITY

## 2024-05-23 RX ORDER — METHOCARBAMOL 500 MG/1
500 TABLET, FILM COATED ORAL EVERY 6 HOURS PRN
Status: DISCONTINUED | OUTPATIENT
Start: 2024-05-23 | End: 2024-05-28 | Stop reason: HOSPADM

## 2024-05-23 RX ADMIN — AMIODARONE HYDROCHLORIDE 150 MG: 1.5 INJECTION, SOLUTION INTRAVENOUS at 02:50

## 2024-05-23 RX ADMIN — CEFTRIAXONE SODIUM 1 G: 1 INJECTION, SOLUTION INTRAVENOUS at 00:18

## 2024-05-23 RX ADMIN — MAGNESIUM SULFATE HEPTAHYDRATE 2 G: 40 INJECTION, SOLUTION INTRAVENOUS at 02:44

## 2024-05-23 RX ADMIN — Medication 5000 UNITS: at 21:06

## 2024-05-23 RX ADMIN — AMIODARONE HYDROCHLORIDE 1 MG/MIN: 1.8 INJECTION, SOLUTION INTRAVENOUS at 14:40

## 2024-05-23 RX ADMIN — MAGNESIUM OXIDE 400 MG (241.3 MG MAGNESIUM) TABLET 400 MG: TABLET at 21:08

## 2024-05-23 RX ADMIN — POTASSIUM CHLORIDE 40 MEQ: 1500 TABLET, EXTENDED RELEASE ORAL at 02:47

## 2024-05-23 RX ADMIN — PROPOFOL 50 MG: 10 INJECTION, EMULSION INTRAVENOUS at 14:25

## 2024-05-23 RX ADMIN — AMIODARONE HYDROCHLORIDE 1 MG/MIN: 1.8 INJECTION, SOLUTION INTRAVENOUS at 04:40

## 2024-05-23 RX ADMIN — RANOLAZINE 500 MG: 500 TABLET, FILM COATED, EXTENDED RELEASE ORAL at 21:08

## 2024-05-23 RX ADMIN — TAMSULOSIN HYDROCHLORIDE 0.4 MG: 0.4 CAPSULE ORAL at 21:07

## 2024-05-23 RX ADMIN — CEFTRIAXONE SODIUM 1 G: 1 INJECTION, SOLUTION INTRAVENOUS at 02:04

## 2024-05-23 RX ADMIN — POTASSIUM CHLORIDE 20 MEQ: 1.5 POWDER, FOR SOLUTION ORAL at 21:08

## 2024-05-23 RX ADMIN — MAGNESIUM SULFATE HEPTAHYDRATE 1 G: 1 INJECTION, SOLUTION INTRAVENOUS at 00:07

## 2024-05-23 RX ADMIN — LIDOCAINE 2 PATCH: 4 PATCH TOPICAL at 18:53

## 2024-05-23 RX ADMIN — CYANOCOBALAMIN TAB 500 MCG 1000 MCG: 500 TAB at 21:07

## 2024-05-23 RX ADMIN — AMIODARONE HYDROCHLORIDE 1 MG/MIN: 1.8 INJECTION, SOLUTION INTRAVENOUS at 21:09

## 2024-05-23 RX ADMIN — POTASSIUM CHLORIDE 20 MEQ: 14.9 INJECTION, SOLUTION INTRAVENOUS at 02:04

## 2024-05-23 RX ADMIN — ROSUVASTATIN CALCIUM 20 MG: 20 TABLET, FILM COATED ORAL at 21:07

## 2024-05-23 RX ADMIN — AMIODARONE HYDROCHLORIDE 1 MG/MIN: 1.8 INJECTION, SOLUTION INTRAVENOUS at 03:25

## 2024-05-23 RX ADMIN — NICOTINE 1 PATCH: 21 PATCH, EXTENDED RELEASE TRANSDERMAL at 21:08

## 2024-05-23 RX ADMIN — SIMETHICONE 80 MG: 80 TABLET, CHEWABLE ORAL at 21:08

## 2024-05-23 RX ADMIN — TRAZODONE HYDROCHLORIDE 50 MG: 50 TABLET ORAL at 21:07

## 2024-05-23 RX ADMIN — TORSEMIDE 20 MG: 20 TABLET ORAL at 21:08

## 2024-05-23 RX ADMIN — SODIUM CHLORIDE, POTASSIUM CHLORIDE, SODIUM LACTATE AND CALCIUM CHLORIDE: 600; 310; 30; 20 INJECTION, SOLUTION INTRAVENOUS at 14:24

## 2024-05-23 RX ADMIN — AMIODARONE HYDROCHLORIDE 1 MG/MIN: 1.8 INJECTION, SOLUTION INTRAVENOUS at 10:30

## 2024-05-23 RX ADMIN — LORAZEPAM 0.5 MG: 0.5 TABLET ORAL at 21:08

## 2024-05-23 RX ADMIN — LEVALBUTEROL HYDROCHLORIDE 0.63 MG: 0.63 SOLUTION RESPIRATORY (INHALATION) at 21:08

## 2024-05-23 RX ADMIN — SODIUM CHLORIDE 1000 ML: 9 INJECTION, SOLUTION INTRAVENOUS at 02:07

## 2024-05-23 RX ADMIN — PAROXETINE HYDROCHLORIDE 40 MG: 20 TABLET, FILM COATED ORAL at 21:07

## 2024-05-23 RX ADMIN — METHOCARBAMOL TABLETS 500 MG: 500 TABLET, COATED ORAL at 18:53

## 2024-05-23 SDOH — ECONOMIC STABILITY: HOUSING INSECURITY
IN THE LAST 12 MONTHS, WAS THERE A TIME WHEN YOU DID NOT HAVE A STEADY PLACE TO SLEEP OR SLEPT IN A SHELTER (INCLUDING NOW)?: NO

## 2024-05-23 SDOH — SOCIAL STABILITY: SOCIAL INSECURITY: ARE YOU OR HAVE YOU BEEN THREATENED OR ABUSED PHYSICALLY, EMOTIONALLY, OR SEXUALLY BY ANYONE?: NO

## 2024-05-23 SDOH — ECONOMIC STABILITY: INCOME INSECURITY: IN THE LAST 12 MONTHS, WAS THERE A TIME WHEN YOU WERE NOT ABLE TO PAY THE MORTGAGE OR RENT ON TIME?: NO

## 2024-05-23 SDOH — SOCIAL STABILITY: SOCIAL INSECURITY: DO YOU FEEL ANYONE HAS EXPLOITED OR TAKEN ADVANTAGE OF YOU FINANCIALLY OR OF YOUR PERSONAL PROPERTY?: NO

## 2024-05-23 SDOH — SOCIAL STABILITY: SOCIAL INSECURITY: DO YOU FEEL UNSAFE GOING BACK TO THE PLACE WHERE YOU ARE LIVING?: NO

## 2024-05-23 SDOH — ECONOMIC STABILITY: INCOME INSECURITY: HOW HARD IS IT FOR YOU TO PAY FOR THE VERY BASICS LIKE FOOD, HOUSING, MEDICAL CARE, AND HEATING?: NOT HARD AT ALL

## 2024-05-23 SDOH — SOCIAL STABILITY: SOCIAL INSECURITY: ABUSE: ADULT

## 2024-05-23 SDOH — ECONOMIC STABILITY: HOUSING INSECURITY: IN THE LAST 12 MONTHS, HOW MANY PLACES HAVE YOU LIVED?: 1

## 2024-05-23 SDOH — ECONOMIC STABILITY: TRANSPORTATION INSECURITY
IN THE PAST 12 MONTHS, HAS LACK OF TRANSPORTATION KEPT YOU FROM MEETINGS, WORK, OR FROM GETTING THINGS NEEDED FOR DAILY LIVING?: NO

## 2024-05-23 SDOH — SOCIAL STABILITY: SOCIAL INSECURITY: HAVE YOU HAD THOUGHTS OF HARMING ANYONE ELSE?: NO

## 2024-05-23 SDOH — SOCIAL STABILITY: SOCIAL INSECURITY: DOES ANYONE TRY TO KEEP YOU FROM HAVING/CONTACTING OTHER FRIENDS OR DOING THINGS OUTSIDE YOUR HOME?: NO

## 2024-05-23 SDOH — SOCIAL STABILITY: SOCIAL INSECURITY: HAS ANYONE EVER THREATENED TO HURT YOUR FAMILY OR YOUR PETS?: NO

## 2024-05-23 SDOH — SOCIAL STABILITY: SOCIAL INSECURITY: ARE THERE ANY APPARENT SIGNS OF INJURIES/BEHAVIORS THAT COULD BE RELATED TO ABUSE/NEGLECT?: NO

## 2024-05-23 SDOH — SOCIAL STABILITY: SOCIAL INSECURITY: WERE YOU ABLE TO COMPLETE ALL THE BEHAVIORAL HEALTH SCREENINGS?: YES

## 2024-05-23 SDOH — SOCIAL STABILITY: SOCIAL INSECURITY: HAVE YOU HAD ANY THOUGHTS OF HARMING ANYONE ELSE?: NO

## 2024-05-23 SDOH — ECONOMIC STABILITY: TRANSPORTATION INSECURITY
IN THE PAST 12 MONTHS, HAS THE LACK OF TRANSPORTATION KEPT YOU FROM MEDICAL APPOINTMENTS OR FROM GETTING MEDICATIONS?: NO

## 2024-05-23 ASSESSMENT — COGNITIVE AND FUNCTIONAL STATUS - GENERAL
PATIENT BASELINE BEDBOUND: NO
MOBILITY SCORE: 24
DAILY ACTIVITIY SCORE: 24

## 2024-05-23 ASSESSMENT — LIFESTYLE VARIABLES
HOW OFTEN DO YOU HAVE A DRINK CONTAINING ALCOHOL: NEVER
HOW OFTEN DO YOU HAVE 6 OR MORE DRINKS ON ONE OCCASION: NEVER
SKIP TO QUESTIONS 9-10: 1
AUDIT-C TOTAL SCORE: 0
HOW MANY STANDARD DRINKS CONTAINING ALCOHOL DO YOU HAVE ON A TYPICAL DAY: PATIENT DOES NOT DRINK
AUDIT-C TOTAL SCORE: 0

## 2024-05-23 ASSESSMENT — ACTIVITIES OF DAILY LIVING (ADL)
ADEQUATE_TO_COMPLETE_ADL: YES
JUDGMENT_ADEQUATE_SAFELY_COMPLETE_DAILY_ACTIVITIES: YES
PATIENT'S MEMORY ADEQUATE TO SAFELY COMPLETE DAILY ACTIVITIES?: YES
HEARING - RIGHT EAR: FUNCTIONAL
ASSISTIVE_DEVICE: DENTURES LOWER;DENTURES UPPER
DRESSING YOURSELF: INDEPENDENT
GROOMING: INDEPENDENT
BATHING: INDEPENDENT
FEEDING YOURSELF: INDEPENDENT
HEARING - LEFT EAR: FUNCTIONAL
WALKS IN HOME: INDEPENDENT
TOILETING: INDEPENDENT

## 2024-05-23 ASSESSMENT — PATIENT HEALTH QUESTIONNAIRE - PHQ9
2. FEELING DOWN, DEPRESSED OR HOPELESS: NOT AT ALL
1. LITTLE INTEREST OR PLEASURE IN DOING THINGS: NOT AT ALL
SUM OF ALL RESPONSES TO PHQ9 QUESTIONS 1 & 2: 0

## 2024-05-23 ASSESSMENT — ENCOUNTER SYMPTOMS
WEAKNESS: 1
GASTROINTESTINAL NEGATIVE: 1
EYES NEGATIVE: 1
CHEST TIGHTNESS: 0
DIZZINESS: 1
PALPITATIONS: 1
SHORTNESS OF BREATH: 1
LIGHT-HEADEDNESS: 0
FATIGUE: 1
PSYCHIATRIC NEGATIVE: 1
ALLERGIC/IMMUNOLOGIC NEGATIVE: 1
ENDOCRINE NEGATIVE: 1

## 2024-05-23 ASSESSMENT — PAIN SCALES - GENERAL
PAIN_LEVEL: 0
PAINLEVEL_OUTOF10: 7
PAINLEVEL_OUTOF10: 9

## 2024-05-23 ASSESSMENT — PAIN - FUNCTIONAL ASSESSMENT: PAIN_FUNCTIONAL_ASSESSMENT: 0-10

## 2024-05-23 NOTE — CARE PLAN
The patient's goals for the shift include  maintain being  hemodynamically stable  The clinical goals for the shift include  decreasing heartrate

## 2024-05-23 NOTE — H&P (VIEW-ONLY)
Inpatient consult to Cardiology  Consult performed by: CLAUDIA Luu  Consult ordered by: CLAUDIA Chaudhry  Reason for consult: V-Tach/ICD Shocks  Assessment/Recommendations: Patient was seen, chart reviewed.    Patient was admitted for ICD firing due to ventricular tachycardia.  Device interrogation on arrival shows ventricular tachycardia rates between 125-130 bpm.  Some of the rate during ventricular tachycardia under detection.  Patient has been complaining of tiredness and fatigue lately.  Looking at his record he was evaluated in April 29, 2024 at [facility].  At time patient was complaining of back pain and he was found to be in wide-complex rhythm.  He received ATP therapy with successful restoration to sinus rhythm  Since then patient has not had any follow-ups with cardiology service.  Long conversation with patient and family members about plan to follow.  Cardiac catheterization performed during this admission showed severe triple-vessel coronary disease.  Right coronary artery and circumflex artery occluded and right coronary fills via collaterals.  LAD with 10 to 30% stenosis.  No lesion amenable for revascularization.    Patient underwent noninvasive primary stimulation and also cardioversion during this evaluation with successful restoration to sinus rhythm.  Device interrogation was performed with adequate sensing, capture and impedances of all leads.  Patient has a biventricular ICD system ([employer]).  The device was reprogrammed to DDD R  bpm.  AV delay was decreased to 150 ms.  Current rhythm a paced-ventricular paced rhythm.    Continue with amiodarone 1 mg/min until a.m.    Rest of plan per general cardiology service and primary team    EKG daily    Telemetry    Risk factor modification and lifestyle modification discussed with patient. Diet , exercise and hydration discussed with patient.    Please excuse any errors in grammar or  translation related to this dictation.  Voice recognition software was utilized to prepare this document.        Electrophysiology Consult Note  Patient: Cristian Sapp  Unit/Bed: 11/11-A  YOB: 1965  MRN: [id number]  Acct: [id number]   Admitting Diagnosis: Ischemic cardiomyopathy [I25.5]  AICD discharge [Z45.02]  HFrEF (heart failure with reduced ejection fraction) (Multi) [I50.20]  Date:  5/22/2024  Hospital Day: 0  Attending: Rio Hays MD    Rounding Cardiologist:  YARITZA Luu-KHURRAM, Sandeep Sparks MD     Complaint:  Chief Complaint   Patient presents with    Rapid Heart Rate     Defibrillator went off about 10-15 times, -200BPM. 50mcg fent in squad. Pt currently at 125BPM      History of Present Illness:  Patient is a 58-year-old male with past medical history significant for myocardial infarction (first in 2003), CAD, s/p remote multivessel PCI's, ischemic cardiomyopathy with an LVEF reported at 35% back in 2006 and again in 2008, most recent echocardiogram August, 2022 with LVEF 25 to 30%, chronic systolic heart failure, s/p biventricular ICD (11/6/2019), initial ICD implant in 2014, abdominal aortic aneurysm without rupture, HTN, HLD, PTSD, tobacco use, renal stones, s/p recent cystoscopy with urethral stent placement who presented to [facility] emergency department last evening after his ICD fired approximately 10 times around 8 PM.  The patient was sitting talking with his mother when this occurred.  Patient developed chest pain and shortness of breath at that time.  Patient has not felt well for several weeks with complaints of fatigue, dizziness, dyspnea.    Work-up in the ED revealed in ECH showing a WCT with RBBB at a rate of 125, Mg+ 1.87, K+ 3.3, Lactate 2.5, BNP 2144, troponins 31, 160, 305, 342, WBC 17.9.  Potassium and magnesium were repleted and EP service was contacted.  Dr. Sparks recommended initiating patient on an IV Amiodarone gtt.  He requested  LHC and echocardiogram be performed today with further recommendations to follow.    Allergies:  Allergies   Allergen Reactions    Chantix [Varenicline] Unknown      PMHx:  Past Medical History:   Diagnosis Date    Atherosclerosis of native artery of both lower extremities with intermittent claudication (CMS-HCC)     CHB (complete heart block) (Multi)     per device check    Chronic systolic CHF (congestive heart failure), NYHA class 3 (Multi)     COPD (chronic obstructive pulmonary disease) (Multi)     Coronary artery disease     History of tobacco abuse     HLD (hyperlipidemia)     Hypertension     Infrarenal abdominal aortic aneurysm (AAA) without rupture (CMS-[facility])     Ischemic cardiomyopathy with implantable cardioverter-defibrillator (ICD)     MI (myocardial infarction) (Multi)     multiple    Nephrolithiasis     PTSD (post-traumatic stress disorder)      PSHx:  Past Surgical History:   Procedure Laterality Date    CARDIAC DEFIBRILLATOR PLACEMENT      CORONARY ANGIOPLASTY WITH STENT PLACEMENT  2006    JIM to LAD    CORONARY ANGIOPLASTY WITH STENT PLACEMENT  04/2003    CORONARY ANGIOPLASTY WITH STENT PLACEMENT  06/2008    CYSTOSCOPY W/ URETERAL STENT PLACEMENT  04/01/2024    CYSTOSCOPY W/ URETERAL STENT PLACEMENT  04/30/2024    FEMORAL BYPASS      femoral artery    ILIAC ARTERY STENT Right     KNEE SURGERY       Social Hx:  Tobacco Use  Denies alcohol use  Denies drug use    Family Hx:  Family History   Problem Relation Name Age of Onset    Hypertension Mother      Diabetes Father      Hypertension Sister      Diabetes Sister      Colon cancer Maternal Grandmother      Hypertension Maternal Grandmother      Heart disease Maternal Grandfather      Hypertension Maternal Grandfather      Cancer Paternal Grandfather      Hypertension Paternal Grandfather       Review of Systems:   Review of Systems   Constitutional:  Positive for fatigue.   HENT: Negative.     Eyes: Negative.    Respiratory:  Positive for shortness  of breath. Negative for chest tightness.    Cardiovascular:  Positive for palpitations and leg swelling.   Gastrointestinal: Negative.    Endocrine: Negative.    Genitourinary: Negative.    Skin: Negative.    Allergic/Immunologic: Negative.    Neurological:  Positive for dizziness and weakness. Negative for light-headedness.   Psychiatric/Behavioral: Negative.     All other systems reviewed and are negative.    Physical Examination:    Visit Vitals  /83   Pulse (!) 120   Temp 35.7 °C (96.3 °F) (Temporal)   Resp 13      Physical Exam  Vitals reviewed.   Constitutional:       Appearance: Normal appearance.   HENT:      Head: Normocephalic and atraumatic.      Nose: Nose normal.      Mouth/Throat:      Mouth: Mucous membranes are moist.      Pharynx: Oropharynx is clear.   Eyes:      Pupils: Pupils are equal, round, and reactive to light.   Cardiovascular:      Rate and Rhythm: Regular rhythm. Tachycardia present.      Pulses: Normal pulses.      Heart sounds: Normal heart sounds.   Pulmonary:      Effort: Pulmonary effort is normal.      Breath sounds: Normal breath sounds.   Abdominal:      General: Bowel sounds are normal.      Palpations: Abdomen is soft.   Musculoskeletal:         General: Normal range of motion.      Cervical back: Normal range of motion and neck supple.   Skin:     General: Skin is warm and dry.   Neurological:      General: No focal deficit present.      Mental Status: He is alert and oriented to person, place, and time.   Psychiatric:         Mood and Affect: Mood normal.         Behavior: Behavior normal.       LABS:  CBC:   Results from last 7 days   Lab Units 05/23/24  0711 05/22/24  2128   WBC AUTO x10*3/uL 11.9* 17.9*   RBC AUTO x10*6/uL 3.98* 4.36*   HEMOGLOBIN g/dL 13.2* 14.7   HEMATOCRIT % 37.7* 40.8*   MCV fL 95 94   MCH pg 33.2 33.7   MCHC g/dL 35.0 36.0   RDW % 13.9 13.5   PLATELETS AUTO x10*3/uL 175 256     CMP:    Results from last 7 days   Lab Units 05/23/24  0711  05/22/24 2128   SODIUM mmol/L 137 138   POTASSIUM mmol/L 4.2 3.3*   CHLORIDE mmol/L 106 101   CO2 mmol/L 23 25   BUN mg/dL 19 17   CREATININE mg/dL 1.30 1.29   GLUCOSE mg/dL 129* 127*   PROTEIN TOTAL g/dL  --  6.6   CALCIUM mg/dL 8.0* 9.0   BILIRUBIN TOTAL mg/dL  --  1.1   ALK PHOS U/L  --  72   AST U/L  --  15   ALT U/L  --  26     Magnesium:  Results from last 7 days   Lab Units 05/23/24  0711 05/22/24 2128   MAGNESIUM mg/dL 2.70* 1.87     Troponin:    Results from last 7 days   Lab Units 05/23/24  0711 05/23/24  0139 05/22/24  2336   TROPHS ng/L 342* 305* 160*     BNP:   Results from last 7 days   Lab Units 05/22/24 2128   BNP pg/mL 2,144*     Current Medications:    Current Facility-Administered Medications:     acetaminophen (Tylenol) tablet 650 mg, 650 mg, oral, q4h PRN, CLAUDIA Francois    amiodarone (Nexterone) 360 mg in dextrose,iso-osm 200 mL (1.8 mg/mL) infusion (premix), 1 mg/min, intravenous, Continuous, CLAUDIA Chaudhry, Last Rate: 33.3 mL/hr at 05/23/24 0440, 1 mg/min at 05/23/24 0440    aspirin chewable tablet 81 mg, 81 mg, oral, Daily, CLAUDIA Chaudhry    [Held by provider] enoxaparin (Lovenox) syringe 40 mg, 40 mg, subcutaneous, q24h, CLAUDIA Chaudhry    ondansetron (Zofran) injection 4 mg, 4 mg, intravenous, q8h PRN, CLAUDIA Francois    perflutren lipid microspheres (Definity) injection 0.5-10 mL of dilution, 0.5-10 mL of dilution, intravenous, Once in imaging, CLAUDIA Chaudhry    Cardiac Catheterization Procedure  Result Date: 5/23/2024  [facility], Cath Lab        [address] [place] [address] Cardiovascular Catheterization Report CONCLUSIONS:  1. Distal Left Main: 10-30% stenosis.  2. Proximal and mid LAD Lesion: The percent stenosis is 10-30%.  3. Proximal CX Lesion: The percent stenosis is 100%.  4. Right Coronary Artery: fills via collaterals.  5. Proximal RCA Lesion: The percent stenosis is 100%.  6. The Left  Ventricular Ejection Fraction is 10%,by echo. ICD 10 Codes: Ventricular tachycardia, unspecified-I47.20  CPT Codes: Coronary Angiography S&I only ([facility])([facility])-34689; Moderate Sedation Services initial 15 minutes patient >5 years-[id number]  [id number] Babatunde Menon MD Performing Physician Electronically signed by [id number] Babatunde Menon MD on 5/23/2024 at 10:42:57 AM  ** Final (Updated) **     ECG 12 Lead  Result Date: 5/23/2024  Wide QRS tachycardia - possibly ventricular tachycardia Abnormal right superior axis deviation Abnormal ECG When compared with ECG of 22-MAY-2024 23:59, (unconfirmed) No significant change was found Confirmed by Clayton Angulo ([id number]) on 5/23/2024 10:40:02 AM    Transthoracic Echo (TTE) Complete  Result Date: 5/23/2024  [place] [id number]  Tel [phone] Fax [phone] TRANSTHORACIC ECHOCARDIOGRAM REPORT CONCLUSIONS:  1. Left ventricular systolic function is severely decreased with a 10% estimated ejection fraction.  2. There is no evidence of mitral valve stenosis.  3. Moderate to severe mitral valve regurgitation.  4. Moderate tricuspid regurgitation.  5. Aortic valve stenosis is not present.  6. Left ventricular cavity size is moderately dilated.  7. Moderate to severely elevated pulmonary artery pressure.  8. Pulmonary hypertension is present.  9. The inferior vena cava appears moderately dilated. 10. There is global hypokinesis of the left ventricle with minor regional variations. RECOMMENDATIONS: Technically suboptimal and limited study, therefore accuracy of above interpretation could be substantially diminished. Clinical correlation is advised. Consider additional imaging modalities if clinically indicated.      ECG 12 lead  Result Date: 5/23/2024  Sinus tachycardia Left axis deviation Right bundle branch block Inferior infarct (cited on or before 21-JAN-2002) T wave abnormality, consider lateral ischemia Abnormal ECG When compared with  ECG of 22-MAY-2024 21:02, (unconfirmed) Sinus rhythm has replaced Electronic atrial pacemaker See ED provider note for full interpretation and clinical correlation     Encounter Date: 05/22/24   ECG 12 Lead   Result Value    Ventricular Rate 126    Atrial Rate 126    QRS Duration 180    QT Interval 410    QTC Calculation(Bazett) 593    R Axis 250    T Axis -31    QRS Count 21    Q Onset 210    T Offset 415    QTC Fredericia 525    Narrative    Wide QRS tachycardia - possibly ventricular tachycardia  Abnormal right superior axis deviation  Abnormal ECG  When compared with ECG of 22-MAY-2024 23:59, (unconfirmed)  No significant change was found      Confirmed by Clayton Angulo ([id number]) on 5/23/2024 10:40:02 AM      Tele monitoring: Wide Complex Tachycardia/Slow VT with rates in 120's    Assessment/Plan:   1.    Wide-complex tachycardia/ventricular tachycardia/Slow VT/AICD shocks   -Device check ([employer] BiV ICD) revealed 15 ATP's and 14 high-voltage therapies delivered on 5/22/2024.  EGM showed VT with rates in the 200's with therapies occasionally slowing below the detection rate to the 120s.   -Patient is currently maintaining a slow VT with rates in the 120s   -Continue IV amiodarone drip   -LHC today revealed  of the proximal circumflex artery,  of the proximal RCA with distal collateral filling and mild disease of the left main and LAD.  Medical management is advised.   -Cardioversion today  2.    Ischemic cardiomyopathy/acute on chronic systolic heart failure   -LVEF 10% on echocardiogram 5/23/2024   -Moderately dilated left ventricle  3.    Valvular heart disease   -Moderate to severe MR/moderate TR   -Moderate to severely elevated pulmonary artery pressures  4.    Coronary artery disease/multiple remote myocardial infarctions   -Remote multivessel PCI's (last in 2008)   -See report above for LHC performed today  5.    Benign essential hypertension   -Currently hypotensive  6.     Hyperlipidemia   -On statin therapy  7.    Current tobacco use    Further recommendations pending clinical course.      Electronically signed by CLAUDIA Luu on 5/23/2024 at 11:31 AM

## 2024-05-23 NOTE — PROGRESS NOTES
[facility] Critical Care Medicine       Date:  5/23/2024  Patient:  Cristian Sapp  YOB: 1965  MRN:  [id number]   Admit Date:  5/22/2024  ========================================================================================================    Chief Complaint   Patient presents with    Rapid Heart Rate     Defibrillator went off about 10-15 times, -200BPM. 50mcg fent in squad. Pt currently at 125BPM         History of Present Illness:  Cristian Sapp is a 58 y.o. year old male patient with Past Medical History of  listed below including multiple prior MI's, CAD, multiple JIM's, ICM, HFrEF, infrarenal AAA s/p stent right iliac, nephrolithiasis w/ recent cystoscopy with ureteral stent placement, presented to ED via EMS for ICD firing. He states it fired 10-15 times.  When ICD firing occurred, patient was not doing anything strenuous. He developed anterior non-radiating chest pain from ICD firing. No true SOB or palpitations.   Patient denies cardiac symptoms prior to ICD firing.  He was following a cardiologist in [place] where he previously resided. He started following with [facility] cardiology in April. Last Echo on file 08/2022 showing EF 25-30%.     ICD interrogated in ED, evaluated by cardiology along with ECG, appeared to be a slow v-tach, ECG looks like wide complex tachycardia with RBBB. ICD did not fire in ED.  His K+ was 3.3, Mag was < 2, he was given some K+ and Mag.  Dr. Sparks from EP contacted, he agreed with amiodarone, and said possible heart cath in AM so NPO except sips with meds.     He had lactic elevation 2.5 which normalized, likely reactive to ICD firing. ED thought maybe UTI given recent cysto however UA not reflect UTI. He was given IV Ceftriaxone. CXR shows no acute process.  Patient never required cardioversion.  His blood pressures have been soft however he runs low according to records, MAP consistently > 60 mmHg, no evidence of hypoperfusion on exam.  Advised to hold off  on Vasopressor/Inotropic support.  No evidence of acute heart failure causing volume overload.        Interval ICU Events:  Admitted to ICU on IV amiodarone infusion, SBP 90's, patient normally runs on lower end, MAP consistently > 60 mmHg, no evidence of hypoperfusion. Lactic acidosis resolved, likely occurred insetting of ICD firing. A/O, only c/o anterior chest discomfort from ICD firing, chest pain is reproducible.     This morning patient was taken to heart cath, no interventions, EF 10%  EP cardioverted patient at bedside today, pacemaker settings changed per EP, now AV paced at 80.   Continue on amio drip.  Echo pending.      Medical History:  Past Medical History:   Diagnosis Date    Atherosclerosis of native artery of both lower extremities with intermittent claudication (CMS-[facility])     CHB (complete heart block) (Multi)     per device check    Chronic systolic CHF (congestive heart failure), NYHA class 3 (Multi)     COPD (chronic obstructive pulmonary disease) (Multi)     Coronary artery disease     History of tobacco abuse     HLD (hyperlipidemia)     Hypertension     Infrarenal abdominal aortic aneurysm (AAA) without rupture (CMS-[facility])     Ischemic cardiomyopathy with implantable cardioverter-defibrillator (ICD)     MI (myocardial infarction) (Multi)     multiple    Nephrolithiasis     PTSD (post-traumatic stress disorder)      Past Surgical History:   Procedure Laterality Date    CARDIAC DEFIBRILLATOR PLACEMENT      CORONARY ANGIOPLASTY WITH STENT PLACEMENT  2006    JIM to LAD    CORONARY ANGIOPLASTY WITH STENT PLACEMENT  04/2003    CORONARY ANGIOPLASTY WITH STENT PLACEMENT  06/2008    CYSTOSCOPY W/ URETERAL STENT PLACEMENT  04/01/2024    CYSTOSCOPY W/ URETERAL STENT PLACEMENT  04/30/2024    FEMORAL BYPASS      femoral artery    ILIAC ARTERY STENT Right     KNEE SURGERY       Medications Prior to Admission   Medication Sig Dispense Refill Last Dose    acetaminophen (Tylenol) 500 mg tablet Take 650 mg by  mouth every 6 hours if needed for mild pain (1 - 3).       albuterol sulfate (Proair Digihaler) 90 mcg/actuation aero powdr breath act w/sensor inhaler Inhale 2 puffs every 4 hours if needed for wheezing.       alirocumab (Praluent Pen) 75 mg/mL pen injector Inject 75 mg under the skin every 14 (fourteen) days.       aspirin 81 mg EC tablet Take 1 tablet (81 mg) by mouth once daily.       bisoprolol (Zebeta) 5 mg tablet Take by mouth 2 times a day.       cholecalciferol (Vitamin D-3) 5,000 Units tablet Take 1 tablet (5,000 Units) by mouth every 3 days.       co-enzyme Q-10 50 mg capsule Take 1 capsule (50 mg) by mouth 1 (one) time per week.       cyanocobalamin (Vitamin B-12) 1,000 mcg tablet Take 1 tablet (1,000 mcg) by mouth once daily.       ezetimibe (Zetia) 10 mg tablet Take 1 tablet (10 mg) by mouth once daily.       LORazepam (Ativan) 0.5 mg tablet Take 1 tablet (0.5 mg) by mouth once daily at bedtime.       magnesium gluconate 30 mg (550 mg) tablet Take 1 tablet (30 mg) by mouth once daily.       nicotine (Nicoderm CQ) 21 mg/24 hr patch Place 1 patch on the skin once every 24 hours.       nitroglycerin (Nitrostat) 0.4 mg SL tablet Place 1 tablet (0.4 mg) under the tongue every 5 minutes if needed for chest pain.       PARoxetine (Paxil) 40 mg tablet Take 1 tablet (40 mg) by mouth once daily in the morning.       potassium chloride (Klor-Con) 20 mEq packet Take 20 mEq by mouth once daily.       prasugrel (Effient) 10 mg tablet Take 1 tablet (10 mg) by mouth once daily.       ranolazine (Ranexa) 500 mg 12 hr tablet Take 1 tablet (500 mg) by mouth 2 times a day. Do not crush, chew, or split.       rosuvastatin (Crestor) 20 mg tablet Take 1 tablet (20 mg) by mouth once daily at bedtime.       sacubitriL-valsartan (Entresto) 24-26 mg tablet Take 1 tablet by mouth 2 times a day.       tamsulosin (Flomax) 0.4 mg 24 hr capsule Take 1 capsule (0.4 mg) by mouth once daily.       traZODone (Desyrel) 50 mg tablet Take 1  "tablet (50 mg) by mouth once daily at bedtime.        Chantix [varenicline]  Social History     Tobacco Use    Smoking status: Former     Types: Cigarettes   Vaping Use    Vaping status: Never Used   Substance Use Topics    Alcohol use: Yes     Comment: social    Drug use: Defer     Family History   Problem Relation Name Age of Onset    Hypertension Mother      Diabetes Father      Hypertension Sister      Diabetes Sister      Colon cancer Maternal Grandmother      Hypertension Maternal Grandmother      Heart disease Maternal Grandfather      Hypertension Maternal Grandfather      Cancer Paternal Grandfather      Hypertension Paternal Grandfather         Review of Systems:  14 point review of systems was completed and negative except for those specially mention in my HPI    Physical Exam:    Heart Rate:  []   Temp:  [35.7 °C (96.3 °F)-36.9 °C (98.4 °F)]   Resp:  [12-22]   BP: ()/(44-83)   Height:  [170.2 cm (5' 7\")]   Weight:  [77.1 kg (170 lb)-78.8 kg (173 lb 11.6 oz)]   SpO2:  [92 %-98 %]     Physical Exam  Vitals reviewed.   Constitutional:       General: He is not in acute distress.     Appearance: Normal appearance. He is normal weight. He is not ill-appearing, toxic-appearing or diaphoretic.   HENT:      Head: Normocephalic.      Mouth/Throat:      Mouth: Mucous membranes are moist.      Pharynx: Oropharynx is clear.   Eyes:      Extraocular Movements: Extraocular movements intact.      Conjunctiva/sclera: Conjunctivae normal.      Pupils: Pupils are equal, round, and reactive to light.   Cardiovascular:      Rate and Rhythm: Normal rate and regular rhythm.      Pulses: Normal pulses.      Heart sounds: Normal heart sounds.      Comments: paced  Pulmonary:      Effort: Pulmonary effort is normal.      Breath sounds: Normal breath sounds.   Abdominal:      General: Abdomen is flat. Bowel sounds are normal.      Palpations: Abdomen is soft.   Musculoskeletal:         General: Normal range of motion. "      Right lower leg: No edema.      Left lower leg: No edema.   Skin:     General: Skin is warm and dry.      Capillary Refill: Capillary refill takes less than 2 seconds.   Neurological:      General: No focal deficit present.      Mental Status: He is alert and oriented to person, place, and time. Mental status is at baseline.   Psychiatric:         Mood and Affect: Mood normal.         Behavior: Behavior normal.         Thought Content: Thought content normal.         Judgment: Judgment normal.         Objective:    I have reviewed all medications, laboratory results, and imaging pertinent for today's encounter    Assessment/Plan:    I am currently managing this critically ill patient for the following problems:    Chest pain  ICD Discharges  Slow ventricular tachycardia  Elevated troponin  ICM  Chronic Systolic CHF NYHA class III  CAD  Hx HTN  Hypokalemia  Leukocytosis  COPD        Neuro/Psych/Pain Ctrl/Sedation:  -Tylenol as needed for pain     Respiratory/ENT:  COPD  -Supplemental oxygen as needed to maintain SpO2 greater than 92%  -DuoNebs if patient develops wheezing     Cardiovascular:  Chest pain  ICD Discharges  Slow ventricular tachycardia  Elevated troponin  ICM  Chronic Systolic CHF NYHA class III  CAD  Hx HTN  Hypokalemia  -Keep K>4, Mg>2  -Cards and EP following, s/p LHC and Pacer interrigation with reprograming  -Previous echo August 2022 EF 25 to 30%,   EF 10% today in LHC  -Daily EKG  -Continue Amio gtt at 1mg  -Echo pending  -Blood pressure soft but MAP consistently > 60 mmHg, asymptomatic  -Continue aspirin and statin, hold antihypertensives for now until blood pressure improves  -Hold diuretics for now, will reevaluate     GI:  -Okay for PPI if patient request  -Cardiac diet after procedure     Renal/Volume Status (Intra & Extravascular):  -Strict intake and output  -monitor renal function and electrolytes    Endocrine  -TSH normal     Infectious Disease:  Leukocytosis  -Cystoscopy with  ureteral stent placement was 1 month ago, UA negative for infection  -Suspect leukocytosis is reactive from ICD firing  -Hold off on further antibiotics, check procalcitonin  -Monitor CBC/D     Heme/Onc:  -Monitor CBC     OBGYN/MSK:  -Activity as tolerated     Ethics/Code Status:  Full code     :  DVT Prophylaxis: Lovenox  GI Prophylaxis: N/A  Bowel Regimen: N/A  Diet: N.p.o. except sips with meds  CVC: N/A  Larissa: N/A  Wen: N/A  Restraints: N/A  Dispo: ICU    Critical Care Time:  30 minutes  Discussed with Dr. Lis Sam, APRN-CNP

## 2024-05-23 NOTE — POST-PROCEDURE NOTE
Physician Transition of Care Summary  Invasive Cardiovascular Lab    Procedure Date: 5/23/2024  Attending:    * Babatunde Tan - Primary  Resident/Fellow/Other Assistant: Surgeons and Role:  * No surgeons found with a matching role *    Pre Procedure Diagnosis:   CAD, remote myocardial infarction's, remote PCI, severe LV dysfunction, ventricular tachycardia     Post Procedure Diagnosis:   Double vessel CAD, 100% proximal circumflex coronary, 100% proximal right coronary with distal collateral filling, mild disease of LAD, mild disease of left main, medical management, severe left ventricular dysfunction, left ventricular ejection fraction 10% by echocardiogram    Complications:   None    Stents/Implants:   None    Anticoagulation/Antiplatelet Plan:   Aspirin 81 mg a day, no heparin    Estimated Blood Loss:   0 mL    Electronically signed by: Babatunde Tan MD, 5/23/2024 10:36 AM    Anesthesia: Moderate                            anesthesia Staff: None

## 2024-05-23 NOTE — ANESTHESIA PREPROCEDURE EVALUATION
Patient: Cristian Sapp    Procedure Information       Anesthesia Start Date/Time: 05/23/24 1424    Procedure: Cardioversion    Location: [facility] / [facility]    Providers: Zachary pSarks MD            Relevant Problems   Cardiac   (+) CHF (congestive heart failure) (Multi)   (+) Ventricular tachycardia (Multi)       Clinical information reviewed:   Tobacco  Allergies  Meds   Med Hx  Surg Hx   Fam Hx  Soc Hx        NPO Detail:  No data recorded     Physical Exam    Airway  Mallampati: II     Cardiovascular   Rhythm: regular  Rate: normal     Dental    Pulmonary - normal exam     Abdominal - normal exam             Anesthesia Plan    History of general anesthesia?: yes  History of complications of general anesthesia?: no    ASA 4     MAC     intravenous induction   Postoperative administration of opioids is intended.  Anesthetic plan and risks discussed with patient.

## 2024-05-23 NOTE — ED PROVIDER NOTES
HPI   Chief Complaint   Patient presents with    Rapid Heart Rate     Defibrillator went off about 10-15 times, -200BPM. 50mcg fent in squad. Pt currently at 125BPM       Patient is a 58-year-old male presenting to the emergency department complaints of chest pain.  Patient states that his defibrillator went off approximately 10-15 times this evening.  Patient states he was just sitting and talking to his mother when this happened.  He subsequently developed chest pain afterwards.  Patient states this did happen to him 1 time before approximately 10 years ago but has not had any incidences since.  Patient was recently treated for kidney stone and states he has not been generally feeling well but had no specific complaints throughout the day.  Patient currently denies any fevers, chills, difficulty breathing, productive cough, abdominal pain, nausea or vomiting.      History provided by:  Patient and medical records                      Berne Coma Scale Score: 15                     Patient History   Past Medical History:   Diagnosis Date    Atherosclerosis of native artery of both lower extremities with intermittent claudication (CMS-HCC)     CHB (complete heart block) (Multi)     per device check    Chronic systolic CHF (congestive heart failure), NYHA class 3 (Multi)     COPD (chronic obstructive pulmonary disease) (Multi)     Coronary artery disease     History of tobacco abuse     HLD (hyperlipidemia)     Hypertension     Infrarenal abdominal aortic aneurysm (AAA) without rupture (CMS-HCC)     Ischemic cardiomyopathy with implantable cardioverter-defibrillator (ICD)     MI (myocardial infarction) (Multi)     multiple    Nephrolithiasis     PTSD (post-traumatic stress disorder)      Past Surgical History:   Procedure Laterality Date    CARDIAC CATHETERIZATION N/A 5/23/2024    Procedure: Left Heart Cath;  Surgeon: Babatunde Tan MD;  Location: [facility];  Service: Cardiovascular;   Laterality: N/A;    CARDIAC DEFIBRILLATOR PLACEMENT      CARDIAC ELECTROPHYSIOLOGY PROCEDURE N/A 5/23/2024    Procedure: Cardioversion;  Surgeon: Zachary Sparks MD;  Location: [facility];  Service: Electrophysiology;  Laterality: N/A;    CORONARY ANGIOPLASTY WITH STENT PLACEMENT  2006    JIM to LAD    CORONARY ANGIOPLASTY WITH STENT PLACEMENT  04/2003    CORONARY ANGIOPLASTY WITH STENT PLACEMENT  06/2008    CYSTOSCOPY W/ URETERAL STENT PLACEMENT  04/01/2024    CYSTOSCOPY W/ URETERAL STENT PLACEMENT  04/30/2024    FEMORAL BYPASS      femoral artery    ILIAC ARTERY STENT Right     KNEE SURGERY       Family History   Problem Relation Name Age of Onset    Hypertension Mother      Diabetes Father      Hypertension Sister      Diabetes Sister      Colon cancer Maternal Grandmother      Hypertension Maternal Grandmother      Heart disease Maternal Grandfather      Hypertension Maternal Grandfather      Cancer Paternal Grandfather      Hypertension Paternal Grandfather       Social History     Tobacco Use    Smoking status: Former     Types: Cigarettes    Smokeless tobacco: Not on file   Vaping Use    Vaping status: Never Used   Substance Use Topics    Alcohol use: Yes     Comment: social    Drug use: Defer       Physical Exam   ED Triage Vitals [05/22/24 2104]   Temperature Heart Rate Respirations BP   36.9 °C (98.4 °F) (!) 125 20 98/73      Pulse Ox Temp Source Heart Rate Source Patient Position   98 % Temporal Monitor Lying      BP Location FiO2 (%)     Right arm --       Physical Exam  Vitals and nursing note reviewed.   Constitutional:       General: He is not in acute distress.     Appearance: Normal appearance. He is not ill-appearing, toxic-appearing or diaphoretic.   HENT:      Head: Normocephalic and atraumatic.      Nose: Nose normal.      Mouth/Throat:      Mouth: Mucous membranes are moist.      Pharynx: No oropharyngeal exudate or posterior oropharyngeal erythema.   Eyes:      General: No scleral  icterus.     Extraocular Movements: Extraocular movements intact.      Pupils: Pupils are equal, round, and reactive to light.   Cardiovascular:      Rate and Rhythm: Regular rhythm. Tachycardia present.      Pulses: Normal pulses.      Heart sounds: Normal heart sounds. No murmur heard.     No friction rub. No gallop.   Pulmonary:      Effort: Pulmonary effort is normal. No respiratory distress.      Breath sounds: Normal breath sounds. No stridor. No wheezing, rhonchi or rales.   Chest:      Chest wall: No tenderness.   Abdominal:      General: Abdomen is flat. There is no distension.      Palpations: Abdomen is soft. There is no mass.      Tenderness: There is no abdominal tenderness. There is no guarding.      Hernia: No hernia is present.   Musculoskeletal:         General: No swelling, tenderness, deformity or signs of injury. Normal range of motion.      Cervical back: Normal range of motion and neck supple. No rigidity.   Skin:     General: Skin is warm and dry.      Capillary Refill: Capillary refill takes less than 2 seconds.      Coloration: Skin is not jaundiced or pale.      Findings: No bruising, erythema, lesion or rash.   Neurological:      General: No focal deficit present.      Mental Status: He is alert and oriented to person, place, and time. Mental status is at baseline.   Psychiatric:         Mood and Affect: Mood normal.         Behavior: Behavior normal.         ED Course & MDM   ED Course as of 05/24/24 2133   Thu May 23, 2024   0130 BP: 98/73 [NW]      ED Course User Index  [NW] Joby Palacios DO         Diagnoses as of 05/24/24 2133   AICD discharge   Tachycardia   Elevated troponin       Medical Decision Making  Patient 58-year-old male presents emergency department complaints of chest pain having his defibrillator fire.  Patient been present in the ER for approximately 20 minutes and has not had any additional discharges of his defibrillator.  Cardiac evaluation was ordered.   Interrogation was ordered.  Patient will be given some IV fluids and Dilaudid to address his tachycardia/pain.    CBC returned with leukocytosis of 17.9.  No anemia or thrombocytopenia noted.  Given the patient's recent urological procedure with the leukocytosis and tachycardia infectious etiology was entertained and Rocephin was was ordered.  Metabolic panel had hypokalemia 3.3 and replacement was ordered.  Magnesium level was 1.8 given the patient's cardiac history magnesium was ordered.  Lactate level was up at 2.5 and IV fluids were ordered.  Patient's initial troponin was 31 up trended to 160.  Believe this is secondary to the tachycardia and AICD firing.  Patient's BNP was elevated at 2144.  No previous for comparison.  Patient does not appear to be active decompensated heart failure at this time.  Initial EKG was a wide-complex rhythm with atrial paced complexes.  With right bundle branch block and a rate of 125 to be wide complex sinus tachycardia with a right bundle branch block and underlying causes such as electrolyte abnormalities infectious etiology or investigated being treated.  Aspirin ordered for the elevated troponin. Repeat EKG showed sinus tachycardia with right bundle branch block with widened QRS.  Urinalysis with small amount of leukocyte esterase blood without any bacteria not particularly convincing for UTI. Patient's blood pressure did begin to get soft and vasopressors were ordered and ICU was contacted.  ICU came down and evaluated the patient and accepted the patient for admission.  They discontinued vasopressor therapy as the patient's blood pressure remained stable and maps greater than 65. Dr. Tan was contacted and the case discussed EKGs were reviewed.  He recommended continued supportive care and consultation with EP as well as following standard ACLS for any decompensation.  Call was placed for EP I spoke to Dr. Sparks who had recommended ensuring patient's electrolytes  were maximized, starting amiodarone drip, keep the patient n.p.o. for possible Cardioversion in the morning and to follow ACLS for any decompensation.  Specialist recommendations were passed on to the ICU with patient was admitted for further care.    Amount and/or Complexity of Data Reviewed  Labs: ordered. Decision-making details documented in ED Course.  Radiology: ordered. Decision-making details documented in ED Course.  ECG/medicine tests: independent interpretation performed.     Details: 2102 hrs.: Wide-complex rhythm with a ventricular rate of 125 bpm.  QRS in 182 ms.  QTc 560 ms.  Right bundle branch block present.  Left axis deviation noted.  Atrial paced complexes noted.  2359 hrs.: Wide-complex tachycardia with a ventricular rate of 141 bpm.  QRS is 170 ms.  QTc 484 ms.  Right bundle branch block pattern noted.  Left axis deviation present.      Labs Reviewed   CBC WITH AUTO DIFFERENTIAL - Abnormal       Result Value    WBC 17.9 (*)     nRBC 0.0      RBC 4.36 (*)     Hemoglobin 14.7      Hematocrit 40.8 (*)     MCV 94      MCH 33.7      MCHC 36.0      RDW 13.5      Platelets 256      Neutrophils % 64.2      Immature Granulocytes %, Automated 0.9      Lymphocytes % 24.9      Monocytes % 7.4      Eosinophils % 1.7      Basophils % 0.9      Neutrophils Absolute 11.47 (*)     Immature Granulocytes Absolute, Automated 0.16      Lymphocytes Absolute 4.46      Monocytes Absolute 1.33 (*)     Eosinophils Absolute 0.30      Basophils Absolute 0.16 (*)    COMPREHENSIVE METABOLIC PANEL - Abnormal    Glucose 127 (*)     Sodium 138      Potassium 3.3 (*)     Chloride 101      Bicarbonate 25      Anion Gap 15      Urea Nitrogen 17      Creatinine 1.29      eGFR 65      Calcium 9.0      Albumin 4.2      Alkaline Phosphatase 72      Total Protein 6.6      AST 15      Bilirubin, Total 1.1      ALT 26     LACTATE - Abnormal    Lactate 2.5 (*)     Narrative:     Venipuncture immediately after or during the administration  of Metamizole may lead to falsely low results. Testing should be performed immediately  prior to Metamizole dosing.   APTT - Abnormal    aPTT 26 (*)     Narrative:     The APTT is no longer used for monitoring Unfractionated Heparin Therapy. For monitoring Heparin Therapy, use the Heparin Assay.   B-TYPE NATRIURETIC PEPTIDE - Abnormal    BNP 2,144 (*)     Narrative:        <100 pg/mL - Heart failure unlikely  100-299 pg/mL - Intermediate probability of acute heart                  failure exacerbation. Correlate with clinical                  context and patient history.    >=300 pg/mL - Heart Failure likely. Correlate with clinical                  context and patient history.    BNP testing is performed using different testing methodology at [facility] than at other [facility]. Direct result comparisons should only be made within the same method.      SERIAL TROPONIN-INITIAL - Abnormal    Troponin I, High Sensitivity 31 (*)     Narrative:     Less than 99th percentile of normal range cutoff-  Female and children under 18 years old <14 ng/L; Male <21 ng/L: Negative  Repeat testing should be performed if clinically indicated.     Female and children under 18 years old 14-50 ng/L; Male 21-50 ng/L:  Consistent with possible cardiac damage and possible increased clinical   risk. Serial measurements may help to assess extent of myocardial damage.     >50 ng/L: Consistent with cardiac damage, increased clinical risk and  myocardial infarction. Serial measurements may help assess extent of   myocardial damage.      NOTE: Children less than 1 year old may have higher baseline troponin   levels and results should be interpreted in conjunction with the overall   clinical context.     NOTE: Troponin I testing is performed using a different   testing methodology at [facility] than at other   [facility]. Direct result comparisons should only   be made within the same method.   URINALYSIS  WITH REFLEX CULTURE AND MICROSCOPIC - Abnormal    Color, Urine Straw      Appearance, Urine Clear      Specific Gravity, Urine 1.003 (*)     pH, Urine 7.0      Protein, Urine NEGATIVE      Glucose, Urine >=500 (3+) (*)     Blood, Urine LARGE (3+) (*)     Ketones, Urine NEGATIVE      Bilirubin, Urine NEGATIVE      Urobilinogen, Urine <2.0      Nitrite, Urine NEGATIVE      Leukocyte Esterase, Urine SMALL (1+) (*)    SERIAL TROPONIN, 1 HOUR - Abnormal    Troponin I, High Sensitivity 160 (*)     Narrative:     Less than 99th percentile of normal range cutoff-  Female and children under 18 years old <14 ng/L; Male <21 ng/L: Negative  Repeat testing should be performed if clinically indicated.     Female and children under 18 years old 14-50 ng/L; Male 21-50 ng/L:  Consistent with possible cardiac damage and possible increased clinical   risk. Serial measurements may help to assess extent of myocardial damage.     >50 ng/L: Consistent with cardiac damage, increased clinical risk and  myocardial infarction. Serial measurements may help assess extent of   myocardial damage.      NOTE: Children less than 1 year old may have higher baseline troponin   levels and results should be interpreted in conjunction with the overall   clinical context.     NOTE: Troponin I testing is performed using a different   testing methodology at [facility] than at other   [facility]. Direct result comparisons should only   be made within the same method.   MICROSCOPIC ONLY, URINE - Abnormal    WBC, Urine 6-10 (*)     RBC, Urine >20 (*)     Mucus, Urine 1+      Hyaline Casts, Urine OCCASIONAL (*)    TROPONIN I, HIGH SENSITIVITY - Abnormal    Troponin I, High Sensitivity 305 (*)     Narrative:     Less than 99th percentile of normal range cutoff-  Female and children under 18 years old <14 ng/L; Male <21 ng/L: Negative  Repeat testing should be performed if clinically indicated.     Female and children under 18 years old 14-50  ng/L; Male 21-50 ng/L:  Consistent with possible cardiac damage and possible increased clinical   risk. Serial measurements may help to assess extent of myocardial damage.     >50 ng/L: Consistent with cardiac damage, increased clinical risk and  myocardial infarction. Serial measurements may help assess extent of   myocardial damage.      NOTE: Children less than 1 year old may have higher baseline troponin   levels and results should be interpreted in conjunction with the overall   clinical context.     NOTE: Troponin I testing is performed using a different   testing methodology at [facility] than at other   [facility]. Direct result comparisons should only   be made within the same method.   CBC WITH AUTO DIFFERENTIAL - Abnormal    WBC 11.9 (*)     nRBC 0.0      RBC 3.98 (*)     Hemoglobin 13.2 (*)     Hematocrit 37.7 (*)     MCV 95      MCH 33.2      MCHC 35.0      RDW 13.9      Platelets 175      Neutrophils % 65.5      Immature Granulocytes %, Automated 0.8      Lymphocytes % 22.4      Monocytes % 8.4      Eosinophils % 2.1      Basophils % 0.8      Neutrophils Absolute 7.80 (*)     Immature Granulocytes Absolute, Automated 0.09      Lymphocytes Absolute 2.66      Monocytes Absolute 1.00      Eosinophils Absolute 0.25      Basophils Absolute 0.10     MAGNESIUM - Abnormal    Magnesium 2.70 (*)    BASIC METABOLIC PANEL - Abnormal    Glucose 129 (*)     Sodium 137      Potassium 4.2      Chloride 106      Bicarbonate 23      Anion Gap 12      Urea Nitrogen 19      Creatinine 1.30      eGFR 64      Calcium 8.0 (*)    TROPONIN I, HIGH SENSITIVITY - Abnormal    Troponin I, High Sensitivity 342 (*)     Narrative:     Less than 99th percentile of normal range cutoff-  Female and children under 18 years old <14 ng/L; Male <21 ng/L: Negative  Repeat testing should be performed if clinically indicated.     Female and children under 18 years old 14-50 ng/L; Male 21-50 ng/L:  Consistent with possible  cardiac damage and possible increased clinical   risk. Serial measurements may help to assess extent of myocardial damage.     >50 ng/L: Consistent with cardiac damage, increased clinical risk and  myocardial infarction. Serial measurements may help assess extent of   myocardial damage.      NOTE: Children less than 1 year old may have higher baseline troponin   levels and results should be interpreted in conjunction with the overall   clinical context.     NOTE: Troponin I testing is performed using a different   testing methodology at [facility] than at other   [facility]. Direct result comparisons should only   be made within the same method.   TSH WITH REFLEX TO FREE T4 IF ABNORMAL - Abnormal    Thyroid Stimulating Hormone 5.55 (*)     Narrative:     TSH testing is performed using different testing methodology at [facility] than at other [facility]. Direct result comparisons should only be made within the same method.     CBC WITH AUTO DIFFERENTIAL - Abnormal    WBC 11.9 (*)     nRBC 0.2 (*)     RBC 4.35 (*)     Hemoglobin 14.5      Hematocrit 41.3      MCV 95      MCH 33.3      MCHC 35.1      RDW 13.8      Platelets 180      Neutrophils % 70.2      Immature Granulocytes %, Automated 0.8      Lymphocytes % 17.8      Monocytes % 7.2      Eosinophils % 3.2      Basophils % 0.8      Neutrophils Absolute 8.34 (*)     Immature Granulocytes Absolute, Automated 0.09      Lymphocytes Absolute 2.12      Monocytes Absolute 0.86      Eosinophils Absolute 0.38      Basophils Absolute 0.09     BASIC METABOLIC PANEL - Abnormal    Glucose 121 (*)     Sodium 140      Potassium 3.7      Chloride 104      Bicarbonate 25      Anion Gap 15      Urea Nitrogen 17      Creatinine 1.11      eGFR 78      Calcium 8.8     URINE CULTURE - Normal    Urine Culture No growth     BLOOD CULTURE - Normal    Blood Culture No growth at 1 day     BLOOD CULTURE - Normal    Blood Culture No growth at 1 day      MAGNESIUM - Normal    Magnesium 1.87     LIPASE - Normal    Lipase 19      Narrative:     Venipuncture immediately after or during the administration of Metamizole may lead to falsely low results. Testing should be performed immediately prior to Metamizole dosing.   PROTIME-INR - Normal    Protime 12.4      INR 1.1     SARS-COV-2 PCR - Normal    Coronavirus 2019, PCR Not Detected      Narrative:     This assay has received FDA Emergency Use Authorization (EUA) and is only authorized for the duration of time that circumstances exist to justify the authorization of the emergency use of in vitro diagnostic tests for the detection of SARS-CoV-2 virus and/or diagnosis of COVID-19 infection under section 564(b)(1) of the Act, 21 U.S.C. 360bbb-3(b)(1). This assay is an in vitro diagnostic nucleic acid amplification test for the qualitative detection of SARS-CoV-2 from nasopharyngeal specimens and has been validated for use at [facility]. Negative results do not preclude COVID-19 infections and should not be used as the sole basis for diagnosis, treatment, or other management decisions.     LACTATE - Normal    Lactate 1.2      Narrative:     Venipuncture immediately after or during the administration of Metamizole may lead to falsely low results. Testing should be performed immediately  prior to Metamizole dosing.   PROCALCITONIN - Normal    Procalcitonin 0.06      Narrative:     Procalcitonin (PCT) results measured serially can  aid in decision-making for antibiotic discontinuation in  patients with suspected or confirmed sepsis in conjunction  with additional clinical information. Antibiotic  discontinuation may be considered with a change in PCT of  >80% from the peak result or when PCT falls below 0.50 ng/mL.    Procalcitonin results should not be used in isolation but  should be interpreted in conjunction with additional clinical  and laboratory findings. Procalcitonin results should not  be  used to guide the initiation of antibiotic therapy.    Falsely low PCT values in the presence of bacterial infection  may occur in early infection, with atypical pathogens,  localized infections, and subacute infectious endocarditis.    Falsely elevated results outside of severe bacterial  infection/sepsis may be seen in patients with renal failure  or insufficiency, severe trauma or burns, recent major  abdominal/cardiac surgery, acute multi-organ failure, rarely  in patients with medullary thyroid carcinoma and rare  neuroendocrine tumors, and non-specific interfering antibodies  (heterophile antibodies, rheumatoid factor, human anti-mouse  antibodies (HAMA), etc).    Performance of the PCT test in pediatric patients (<19yo),  pregnant women, immunocompromised patients, and patients on  immunomodulatory medications has not been evaluated.   PHOSPHORUS - Normal    Phosphorus 3.8     DRUG SCREEN,URINE - Normal    Amphetamine Screen, Urine Presumptive Negative      Barbiturate Screen, Urine Presumptive Negative      Benzodiazepines Screen, Urine Presumptive Negative      Cannabinoid Screen, Urine Presumptive Negative      Cocaine Metabolite Screen, Urine Presumptive Negative      Fentanyl Screen, Urine Presumptive Negative      Opiate Screen, Urine Presumptive Negative      Oxycodone Screen, Urine Presumptive Negative      PCP Screen, Urine Presumptive Negative      Methadone Screen, Urine Presumptive Negative      Narrative:     Drug screen results are presumptive and should not be used to assess   compliance with prescribed medication. Contact the performing [facility] laboratory   to add-on definitive confirmatory testing if clinically indicated.    Toxicology screening results are reported qualitatively. The concentration must   be greater than or equal to the cutoff to be reported as positive. The concentration   at which the screening test can detect an individual drug or metabolite varies.   The absence of  expected drug(s) and/or drug metabolite(s) may indicate non-compliance,   inappropriate timing of specimen collection relative to drug administration, poor drug   absorption, diluted/adulterated urine, or limitations of testing. For medical purposes   only; not valid for forensic use.    Interpretive questions should be directed to the laboratory medical directors.   THYROXINE, FREE - Normal    Thyroxine, Free 0.94      Narrative:     Thyroxine Free testing is performed using different testing methodology at [facility] than at other [facility]. Direct result comparisons should only be made within the same method.    Biotin can cause falsely elevated free T4 results. Patients taking a Biotin dose of up to 10 mg/day should refrain from taking Biotin for 24 hours before sample collection. Patient taking a Biotin dose of >10 mg/day should consult with their physician or the laboratory before the blood draw.   MAGNESIUM - Normal    Magnesium 2.00     TROPONIN SERIES- (INITIAL, 1 HR)    Narrative:     The following orders were created for panel order Troponin I Series, High Sensitivity (0, 1 HR).  Procedure                               Abnormality         Status                     ---------                               -----------         ------                     Troponin I, High Sensiti...[[id number]]  Abnormal            Final result               Troponin, High Sensitivi...[[id number]]  Abnormal            Final result                 Please view results for these tests on the individual orders.   URINALYSIS WITH REFLEX CULTURE AND MICROSCOPIC    Narrative:     The following orders were created for panel order Urinalysis with Reflex Culture and Microscopic.  Procedure                               Abnormality         Status                     ---------                               -----------         ------                     Urinalysis with Reflex C...[[id number]]  Abnormal            Final  result               Extra Urine Gray Tube[[id number]]                            Final result                 Please view results for these tests on the individual orders.   EXTRA URINE GRAY TUBE    Extra Tube Hold for add-ons.     BASIC METABOLIC PANEL   CBC WITH AUTO DIFFERENTIAL   MAGNESIUM     XR abdomen 1 view   Final Result   No prior imaging of the urinary tract in the local PACS archive        Questionable tiny stones or stone fragments x2 adjacent to the   proximal pigtail coil of the ureteral stent in the left renal pelvis        No stone or stone fragment projects over stented left ureter        Tiny urinary bladder stone versus phlebolith projecting near the   distal end of the stent        Probability of right nephrolithiasis as detailed above        MACRO:   None        Signed by: Gage Lopez 5/24/2024 2:54 PM   Dictation workstation:   [id number]      Electrophysiology procedure   Final Result      Cardiac Catheterization Procedure   Final Result      Transthoracic Echo (TTE) Complete   Final Result      Cardiac device check - Inpatient   Final Result      XR chest 1 view   Final Result   No acute cardiopulmonary disease.   Signed by Rio Painting DO      CT abdomen pelvis wo IV contrast    (Results Pending)         Procedure  Critical Care    Performed by: Joby Palacios DO  Authorized by: Joby Palacios DO    Critical care provider statement:     Critical care time (minutes):  45    Critical care was necessary to treat or prevent imminent or life-threatening deterioration of the following conditions: Arrhythmia.    Critical care was time spent personally by me on the following activities:  Development of treatment plan with patient or surrogate, discussions with consultants, evaluation of patient's response to treatment, examination of patient, re-evaluation of patient's condition, pulse oximetry, ordering and review of radiographic studies and ordering and review of laboratory  studies    Care discussed with: admitting provider         Joby Palacios DO  05/23/24 2208       Joby Palacios DO  05/24/24 2790

## 2024-05-23 NOTE — H&P
[facility] Critical Care Medicine       Date:  5/23/2024  Patient:  Cristian Sapp  YOB: 1965  MRN:  [id number]   Admit Date:  5/22/2024  ========================================================================================================    Chief Complaint   Patient presents with    Rapid Heart Rate     Defibrillator went off about 10-15 times, -200BPM. 50mcg fent in squad. Pt currently at 125BPM         History of Present Illness:  Cristian Sapp is a 58 y.o. year old male patient with significant PMH listed below including multiple prior MI's, CAD, multiple JIM's, ICM, HFrEF, infrarenal AAA s/p stent right iliac, nephrolithiasis w/ recent cystoscopy with ureteral stent placement, presented to ED via EMS for ICD firing. He states it fired 10-15 times.  When ICD firing occurred, patient was not doing anything strenuous. He developed anterior non-radiating chest pain from ICD firing. No true SOB or palpitations.   Patient denies cardiac symptoms prior to ICD firing.  He was following a cardiologist in [place] where he previously resided. He started following with [facility] cardiology in April. Last Echo on file 08/2022 showing EF 25-30%.    ICD interrogated in ED, evaluated by cardiology along with ECG, appeared to be a slow v-tach, ECG looks like wide complex tachycardia with RBBB. ICD did not fire in ED.  His K+ was 3.3, Mag was < 2, he was given some K+ and Mag.  Dr. Sparks from EP contacted, he agreed with amiodarone, and said possible heart cath in AM so NPO except sips with meds.    He had lactic elevation 2.5 which normalized, likely reactive to ICD firing. ED thought maybe UTI given recent cysto however UA not reflect UTI. He was given IV Ceftriaxone. CXR shows no acute process.  Patient never required cardioversion.  His blood pressures have been soft however he runs low according to records, MAP consistently > 60 mmHg, no evidence of hypoperfusion on exam.  Advised to hold off on  Vasopressor/Inotropic support.  No evidence of acute heart failure causing volume overload.      Interval ICU Events:  Admitted to ICU on IV amiodarone infusion, SBP 90's, patient normally runs on lower end, MAP consistently > 60 mmHg, no evidence of hypoperfusion. Lactic acidosis resolved, likely occurred insetting of ICD firing. A/O, only c/o anterior chest discomfort from ICD firing, chest pain is reproducible.    Medical History:  Past Medical History:   Diagnosis Date    Atherosclerosis of native artery of both lower extremities with intermittent claudication (CMS-HCC)     CHB (complete heart block) (Multi)     per device check    Chronic systolic CHF (congestive heart failure), NYHA class 3 (Multi)     COPD (chronic obstructive pulmonary disease) (Multi)     Coronary artery disease     History of tobacco abuse     HLD (hyperlipidemia)     Hypertension     Infrarenal abdominal aortic aneurysm (AAA) without rupture (CMS-HCC)     Ischemic cardiomyopathy with implantable cardioverter-defibrillator (ICD)     MI (myocardial infarction) (Multi)     multiple    Nephrolithiasis     PTSD (post-traumatic stress disorder)      Past Surgical History:   Procedure Laterality Date    CARDIAC DEFIBRILLATOR PLACEMENT      CORONARY ANGIOPLASTY WITH STENT PLACEMENT  2006    JIM to LAD    CORONARY ANGIOPLASTY WITH STENT PLACEMENT  04/2003    CORONARY ANGIOPLASTY WITH STENT PLACEMENT  06/2008    CYSTOSCOPY W/ URETERAL STENT PLACEMENT  04/01/2024    CYSTOSCOPY W/ URETERAL STENT PLACEMENT  04/30/2024    FEMORAL BYPASS      femoral artery    ILIAC ARTERY STENT Right     KNEE SURGERY       (Not in a hospital admission)    Chantix [varenicline]  Social History     Tobacco Use    Smoking status: Former     Types: Cigarettes   Vaping Use    Vaping status: Never Used   Substance Use Topics    Alcohol use: Yes     Comment: social    Drug use: Defer     Family History   Problem Relation Name Age of Onset    Hypertension Mother      Diabetes  "Father      Hypertension Sister      Diabetes Sister      Colon cancer Maternal Grandmother      Hypertension Maternal Grandmother      Heart disease Maternal Grandfather      Hypertension Maternal Grandfather      Cancer Paternal Grandfather      Hypertension Paternal Grandfather         Hospital Medications:    amiodarone, 1 mg/min          Current Facility-Administered Medications:     amiodarone (Nexterone) 360 mg in dextrose,iso-osm 200 mL (1.8 mg/mL) infusion (premix), 1 mg/min, intravenous, Continuous, Joby Palacios DO    magnesium sulfate IV 2 g, 2 g, intravenous, Once, Etta E Melanyito, APRN-CNP, Last Rate: 25 mL/hr at 05/23/24 0244, 2 g at 05/23/24 0244    perflutren lipid microspheres (Definity) injection 0.5-10 mL of dilution, 0.5-10 mL of dilution, intravenous, Once in imaging, Etta E Pentito, APRN-CNP    potassium chloride 20 mEq in 100 mL IV premix, 20 mEq, intravenous, q2h, Joby Palacios DO, Last Rate: 50 mL/hr at 05/23/24 0204, 20 mEq at 05/23/24 0204  No current outpatient medications on file.    Review of Systems:  14 point review of systems was completed and negative except for those specially mention in my HPI    Physical Exam:    Heart Rate:  [125-140]   Temperature:  [36.9 °C (98.4 °F)]   Respirations:  [18-20]   BP: ()/(44-76)   Height:  [170.2 cm (5' 7\")]   Weight:  [77.1 kg (170 lb)]   Pulse Ox:  [94 %-98 %]     Physical Exam  Constitutional:       Appearance: He is obese.   HENT:      Head: Normocephalic.      Right Ear: External ear normal.      Left Ear: External ear normal.      Nose: Nose normal.      Mouth/Throat:      Mouth: Mucous membranes are dry.      Pharynx: Oropharynx is clear.   Eyes:      Pupils: Pupils are equal, round, and reactive to light.      Comments: Periorbital erythema and mild edema, no sign of infection   Cardiovascular:      Rate and Rhythm: Tachycardia present. Rhythm irregular.   Pulmonary:      Effort: Pulmonary effort is normal.      Breath " sounds: Normal breath sounds.   Abdominal:      General: Bowel sounds are normal.      Palpations: Abdomen is soft.   Musculoskeletal:         General: Normal range of motion.      Right lower leg: No edema.      Left lower leg: No edema.   Skin:     General: Skin is warm and dry.      Capillary Refill: Capillary refill takes less than 2 seconds.   Neurological:      General: No focal deficit present.      Mental Status: He is alert.   Psychiatric:         Thought Content: Thought content normal.         Judgment: Judgment normal.      Comments: Appears stressed         Objective:    I have reviewed all medications, laboratory results, and imaging pertinent for today's encounter.    Results for orders placed or performed during the hospital encounter of 05/22/24 (from the past 24 hour(s))   ECG 12 lead   Result Value Ref Range    Ventricular Rate 125 BPM    Atrial Rate 125 BPM    QRS Duration 182 ms    QT Interval 388 ms    QTC Calculation(Bazett) 560 ms    R Axis -80 degrees    T Axis -10 degrees    QRS Count 20 beats    Q Onset 217 ms    T Offset 411 ms    QTC Fredericia 495 ms   CBC and Auto Differential   Result Value Ref Range    WBC 17.9 (H) 4.4 - 11.3 x10*3/uL    nRBC 0.0 0.0 - 0.0 /100 WBCs    RBC 4.36 (L) 4.50 - 5.90 x10*6/uL    Hemoglobin 14.7 13.5 - 17.5 g/dL    Hematocrit 40.8 (L) 41.0 - 52.0 %    MCV 94 80 - 100 fL    MCH 33.7 26.0 - 34.0 pg    MCHC 36.0 32.0 - 36.0 g/dL    RDW 13.5 11.5 - 14.5 %    Platelets 256 150 - 450 x10*3/uL    Neutrophils % 64.2 40.0 - 80.0 %    Immature Granulocytes %, Automated 0.9 0.0 - 0.9 %    Lymphocytes % 24.9 13.0 - 44.0 %    Monocytes % 7.4 2.0 - 10.0 %    Eosinophils % 1.7 0.0 - 6.0 %    Basophils % 0.9 0.0 - 2.0 %    Neutrophils Absolute 11.47 (H) 1.20 - 7.70 x10*3/uL    Immature Granulocytes Absolute, Automated 0.16 0.00 - 0.70 x10*3/uL    Lymphocytes Absolute 4.46 1.20 - 4.80 x10*3/uL    Monocytes Absolute 1.33 (H) 0.10 - 1.00 x10*3/uL    Eosinophils Absolute 0.30  0.00 - 0.70 x10*3/uL    Basophils Absolute 0.16 (H) 0.00 - 0.10 x10*3/uL   Comprehensive Metabolic Panel   Result Value Ref Range    Glucose 127 (H) 74 - 99 mg/dL    Sodium 138 136 - 145 mmol/L    Potassium 3.3 (L) 3.5 - 5.3 mmol/L    Chloride 101 98 - 107 mmol/L    Bicarbonate 25 21 - 32 mmol/L    Anion Gap 15 10 - 20 mmol/L    Urea Nitrogen 17 6 - 23 mg/dL    Creatinine 1.29 0.50 - 1.30 mg/dL    eGFR 65 >60 mL/min/1.73m*2    Calcium 9.0 8.6 - 10.3 mg/dL    Albumin 4.2 3.4 - 5.0 g/dL    Alkaline Phosphatase 72 33 - 120 U/L    Total Protein 6.6 6.4 - 8.2 g/dL    AST 15 9 - 39 U/L    Bilirubin, Total 1.1 0.0 - 1.2 mg/dL    ALT 26 10 - 52 U/L   Magnesium   Result Value Ref Range    Magnesium 1.87 1.60 - 2.40 mg/dL   Lactate   Result Value Ref Range    Lactate 2.5 (H) 0.4 - 2.0 mmol/L   Lipase   Result Value Ref Range    Lipase 19 9 - 82 U/L   Protime-INR   Result Value Ref Range    Protime 12.4 9.8 - 12.8 seconds    INR 1.1 0.9 - 1.1   aPTT   Result Value Ref Range    aPTT 26 (L) 27 - 38 seconds   B-Type Natriuretic Peptide   Result Value Ref Range    BNP 2,144 (H) 0 - 99 pg/mL   Troponin I, High Sensitivity, Initial   Result Value Ref Range    Troponin I, High Sensitivity 31 (H) 0 - 20 ng/L   Sars-CoV-2 PCR   Result Value Ref Range    Coronavirus 2019, PCR Not Detected Not Detected   Urinalysis with Reflex Culture and Microscopic   Result Value Ref Range    Color, Urine Straw Straw, Yellow    Appearance, Urine Clear Clear    Specific Gravity, Urine 1.003 (N) 1.005 - 1.035    pH, Urine 7.0 5.0, 5.5, 6.0, 6.5, 7.0, 7.5, 8.0    Protein, Urine NEGATIVE NEGATIVE mg/dL    Glucose, Urine >=500 (3+) (A) NEGATIVE mg/dL    Blood, Urine LARGE (3+) (A) NEGATIVE    Ketones, Urine NEGATIVE NEGATIVE mg/dL    Bilirubin, Urine NEGATIVE NEGATIVE    Urobilinogen, Urine <2.0 <2.0 mg/dL    Nitrite, Urine NEGATIVE NEGATIVE    Leukocyte Esterase, Urine SMALL (1+) (A) NEGATIVE   Microscopic Only, Urine   Result Value Ref Range    WBC, Urine  6-10 (A) 1-5, NONE /HPF    RBC, Urine >20 (A) NONE, 1-2, 3-5 /HPF    Mucus, Urine 1+ Reference range not established. /LPF    Hyaline Casts, Urine OCCASIONAL (A) NONE /LPF   Drug Screen, Urine   Result Value Ref Range    Amphetamine Screen, Urine Presumptive Negative Presumptive Negative    Barbiturate Screen, Urine Presumptive Negative Presumptive Negative    Benzodiazepines Screen, Urine Presumptive Negative Presumptive Negative    Cannabinoid Screen, Urine Presumptive Negative Presumptive Negative    Cocaine Metabolite Screen, Urine Presumptive Negative Presumptive Negative    Fentanyl Screen, Urine Presumptive Negative Presumptive Negative    Opiate Screen, Urine Presumptive Negative Presumptive Negative    Oxycodone Screen, Urine Presumptive Negative Presumptive Negative    PCP Screen, Urine Presumptive Negative Presumptive Negative    Methadone Screen, Urine Presumptive Negative Presumptive Negative   Troponin, High Sensitivity, 1 Hour   Result Value Ref Range    Troponin I, High Sensitivity 160 (HH) 0 - 20 ng/L   Lactate   Result Value Ref Range    Lactate 1.2 0.4 - 2.0 mmol/L   ECG 12 lead   Result Value Ref Range    Ventricular Rate 141 BPM    Atrial Rate 141 BPM    MO Interval 126 ms    QRS Duration 170 ms    QT Interval 316 ms    QTC Calculation(Bazett) 484 ms    R Axis -79 degrees    T Axis -28 degrees    QRS Count 23 beats    Q Onset 212 ms    P Onset 149 ms    P Offset 204 ms    T Offset 370 ms    QTC Fredericia 420 ms   Troponin I, High Sensitivity   Result Value Ref Range    Troponin I, High Sensitivity 305 (HH) 0 - 20 ng/L      ECG 12 lead  Wide QRS tachycardia with frequent atrial-paced complexes  Left axis deviation  Right bundle branch block  Inferior infarct (cited on or before 21-JAN-2002)  Abnormal ECG  When compared with ECG of 09-JUL-2008 06:28,  Electronic atrial pacemaker has replaced Sinus rhythm  Vent. rate has increased BY  72 BPM  Right bundle branch block is now Present    See ED  provider note for full interpretation and clinical correlation  ECG 12 lead  Sinus tachycardia  Left axis deviation  Right bundle branch block  Inferior infarct (cited on or before 21-JAN-2002)  T wave abnormality, consider lateral ischemia  Abnormal ECG  When compared with ECG of 22-MAY-2024 21:02, (unconfirmed)  Sinus rhythm has replaced Electronic atrial pacemaker    See ED provider note for full interpretation and clinical correlation       No intake or output data in the 24 hours ending 05/23/24 5503      Assessment/Plan:    I am currently managing this critically ill patient for the following problems:    Chest pain  ICD Discharges  Slow ventricular tachycardia  Elevated troponin  ICM  Chronic Systolic CHF NYHA class III  CAD  Hx HTN  Hypokalemia  Leukocytosis  COPD      Neuro/Psych/Pain Ctrl/Sedation:  -Tylenol as needed for pain    Respiratory/ENT:  COPD  -No evidence of COPD exacerbation, currently on room air, history of tobacco use  -Supplemental oxygen as needed to maintain SpO2 greater than 92%  -DuoNebs if patient develops wheezing    Cardiovascular:  Chest pain  ICD Discharges  Slow ventricular tachycardia  Elevated troponin  ICM  Chronic Systolic CHF NYHA class III  CAD  Hx HTN  Hypokalemia  -Potassium low at 3.3, mag was less than 2  -Was following cardiology through [place], recently established at [facility] in April  -Last echocardiogram August 2022 EF 25 to 30%  -Multiple prior PCI with angioplasty and JIM  -Chest pain appears to be in setting of ICD firing  -EKG evaluated by cardiology and EP ena Johnson v-tach  -Cardiology consulted, possibly going to Cath Lab today  -EP consulted, received amiodarone bolus followed by infusion  -Electrolytes replaced, keep decigram for magnesium greater than 2  -Continuous cardiac monitoring in ICU  -Repeat troponin in a.m.  -ECG and echo today  -Blood pressure soft but MAP consistently > 60 mmHg, asymptomatic  -Continue aspirin and statin, hold  antihypertensives for now until blood pressure improves  -Hold diuretics for now, will reevaluate    GI:  -Okay for PPI patient request    Renal/Volume Status (Intra & Extravascular):  -Normal renal function, patient does not appear volume depleted  -Strict intake and output, monitor renal function and electrolytes    Endocrine  -Check TSH with reflex    Infectious Disease:  Leukocytosis  -Cystoscopy with ureteral stent placement was 1 month ago, UA negative for infection  -Chest x-ray negative for acute process  -Suspect leukocytosis is reactive from ICD firing  -Given ceftriaxone in ED  -Hold off on further antibiotics, check procalcitonin  -Monitor CBC/D  -Start broad-spectrum antibiotics if procalcitonin elevated    Heme/Onc:  -Monitor CBC    OBGYN/MSK:  -Activity as tolerated    Ethics/Code Status:  Full code    :  DVT Prophylaxis: Lovenox  GI Prophylaxis: N/A  Bowel Regimen: N/A  Diet: N.p.o. except sips with meds  CVC: N/A  Larissa: N/A  Wen: N/A  Restraints: N/A  Dispo: ICU    Critical Care Time:  40 minutes spent in preparing to see patient (I.e.labs,imaging, etc.), documentation, discussion plan of care with patient/family/caregiver, and/ or coordination of care with multidisciplinary team including the attending. Time does not include completion of procedure time.     Etta Carballo, YARITZA-CNP

## 2024-05-23 NOTE — CONSULTS
Inpatient consult to Cardiology  Consult performed by: Kd Rodrigues DO  Consult ordered by: YARITZA Chaudhry-CNP  Reason for consult: chest pain, cad , aicd shock                            Date:   5/23/2024  Patient name:  Cristian Sapp  Date of admission:  5/22/2024  8:55 PM  MRN:   [id number]  YOB: 1965  Time of Consult:  10:04 AM    Consulting Cardiologist: YARITZA Sweet, CNP  Primary Cardiologist:  [facility]  Referring Provider: Dr. Hays      Admission Diagnosis:     AICD discharge      History of Present Illness:      58-year-old male with past medical history of hyperlipidemia, ischemic cardiomyopathy, infrarenal AAA abdominal aortic aneurysm without rupture, PAD, hypertension, PTSD, tobacco abuse disorder, biventricular AICD, CAD status post percutaneous coronary angioplasty (Postsurgical percutaneous transluminal coronary angioplasty status 06/30/2008 , RCA: BMS at time of MI 2002; 2 JIM due to angina 4/2003 w/brachytherapy; 3 JIM 11/06 CX: JIM 4/2003 LAD: JIM 2006Cypher JIM to mid circumflex Cath w/35% EF, LM-10%; LAD-25%; RCA-100% w/excellent collaterals; CX-80%, Postsurgical percutaneous transluminal coronary angioplasty status 07/08/2008 , Cypher JIM to mid circumflex Cath w/35% EF, LM-10%; LAD-25%; RCA-100% w/excellent collaterals; CX-80%) , chronic systolic congestive heart failure with EF of 25 to 30% NYHA class III and recent cystoscopy with urethral stent placement who presented to [facility] emergency department after stating his AICD fired roughly 10 times last evening around 8 PM.  The patient was relaxing reading scripture when he noticed his defibrillator started firing.  He reports that for the past few months he has had some midsternal chest pressure however did not think much of it.  He denies any shortness of breath.  He does complain of some mild dizziness in the past.  He previously saw [facility]  cardiologist 10+ years ago and was recently following with [place] [facility] and transferred to [facility] cardiology in April of this year.  In the emergency department he was hypomagnesemic and hypokalemic.  Mag and potassium was replaced.  His device did not fire in the emergency department.  EP was contacted from emergency department and they started him on amiodarone infusion.  Other significant lab work showed a BNP of 2100, lactate 2.5 but has improved to 1.2.  Trivial troponin elevation with a max of 342.  Chest x-ray showed no acute process.  Initial EKG showed a wide QRS tachycardia with frequent atrial paced complexes and right bundle branch block.  The patient was admitted to the ICU and made NPO.  General cardiology and EP cardiology consulted for strong history of CAD, chest pain and AICD shocks.    Patient has extensive history of coronary artery disease with prior inferior wall myocardial infarction.  He had undergone a multilevel angioplasty at the border total of 13 stents.  In July 2008 he had angioplasty and drug-eluting stents of the mid circumflex.  He is known to have chronic total occlusion of the right coronary artery with collateral flow from the LAD.  Ejection fraction in the past was around 35%.  He did have a perfusion study in October 2008 that showed ejection fraction of 40%.  He underwent a cardiac MRI that showed a calculated ejection fraction of 43% again with extensive inferior inferolateral hypokinesis.  Last echocardiogram in April 2022 showed EF to be around 20 to 25%.  Moderate MR.     Allergies:     Allergies   Allergen Reactions    Chantix [Varenicline] Unknown         Past Medical History:     Past Medical History:   Diagnosis Date    Atherosclerosis of native artery of both lower extremities with intermittent claudication (CMS-[facility])     CHB (complete heart block) (Multi)     per device check    Chronic systolic CHF (congestive heart failure), NYHA class 3 (Multi)      COPD (chronic obstructive pulmonary disease) (Multi)     Coronary artery disease     History of tobacco abuse     HLD (hyperlipidemia)     Hypertension     Infrarenal abdominal aortic aneurysm (AAA) without rupture (CMS-HCC)     Ischemic cardiomyopathy with implantable cardioverter-defibrillator (ICD)     MI (myocardial infarction) (Multi)     multiple    Nephrolithiasis     PTSD (post-traumatic stress disorder)        Past Surgical History:     Past Surgical History:   Procedure Laterality Date    CARDIAC DEFIBRILLATOR PLACEMENT      CORONARY ANGIOPLASTY WITH STENT PLACEMENT  2006    JIM to LAD    CORONARY ANGIOPLASTY WITH STENT PLACEMENT  04/2003    CORONARY ANGIOPLASTY WITH STENT PLACEMENT  06/2008    CYSTOSCOPY W/ URETERAL STENT PLACEMENT  04/01/2024    CYSTOSCOPY W/ URETERAL STENT PLACEMENT  04/30/2024    FEMORAL BYPASS      femoral artery    ILIAC ARTERY STENT Right     KNEE SURGERY         Family History:     Family History   Problem Relation Name Age of Onset    Hypertension Mother      Diabetes Father      Hypertension Sister      Diabetes Sister      Colon cancer Maternal Grandmother      Hypertension Maternal Grandmother      Heart disease Maternal Grandfather      Hypertension Maternal Grandfather      Cancer Paternal Grandfather      Hypertension Paternal Grandfather         Social History:     Social History     Tobacco Use    Smoking status: Former     Types: Cigarettes   Vaping Use    Vaping status: Never Used   Substance Use Topics    Alcohol use: Yes     Comment: social    Drug use: Defer       CURRENT INPATIENT MEDICATIONS    aspirin, 81 mg, oral, Daily  [Held by provider] enoxaparin, 40 mg, subcutaneous, q24h  perflutren lipid microspheres, 0.5-10 mL of dilution, intravenous, Once in imaging      amiodarone, 1 mg/min, Last Rate: 1 mg/min (05/23/24 6810)      Current Outpatient Medications   Medication Instructions    albuterol sulfate (Proair Digihaler) 90 mcg/actuation aero powdr breath act  w/sensor inhaler 2 puffs, inhalation, Every 4 hours PRN    aspirin 81 mg, oral, Daily    bisoprolol (Zebeta) 5 mg tablet oral, 2 times daily    cholecalciferol (VITAMIN D-3) 5,000 Units, oral, Once Weekly    LORazepam (ATIVAN) 0.5 mg, oral, Nightly    tamsulosin (FLOMAX) 0.4 mg, oral, Daily        Review of Systems:      12 point review of systems was obtained in detail and is negative other than that detailed above.    Vital Signs:     Vitals:    05/23/24 0700 05/23/24 0800 05/23/24 0928 05/23/24 0929   BP: 78/64 109/62 112/83    BP Location:       Patient Position:       Pulse: (!) 136 (!) 124 (!) 120    Resp: 15 15 13    Temp:       TempSrc:       SpO2: 94% 94% 98% 98%   Weight:       Height:         No intake or output data in the 24 hours ending 05/23/24 1004    Wt Readings from Last 4 Encounters:   05/23/24 78.8 kg (173 lb 11.6 oz)       Physical Examination:     Physical Exam  Vitals and nursing note reviewed.   HENT:      Head: Normocephalic.      Mouth/Throat:      Mouth: Mucous membranes are moist.   Eyes:      Pupils: Pupils are equal, round, and reactive to light.   Cardiovascular:      Rate and Rhythm: Tachycardia present. Rhythm irregular.      Heart sounds: Normal heart sounds.   Pulmonary:      Effort: Pulmonary effort is normal.      Breath sounds: Decreased air movement present. Wheezing present.   Abdominal:      Palpations: Abdomen is soft.   Musculoskeletal:      Right lower leg: No edema.      Left lower leg: No edema.   Skin:     General: Skin is warm and dry.      Capillary Refill: Capillary refill takes less than 2 seconds.   Neurological:      General: No focal deficit present.      Mental Status: He is alert.   Psychiatric:         Mood and Affect: Mood is anxious.         Behavior: Behavior is cooperative.           Lab:     CBC:   Results from last 7 days   Lab Units 05/23/24  0711 05/22/24  2128   WBC AUTO x10*3/uL 11.9* 17.9*   RBC AUTO x10*6/uL 3.98* 4.36*   HEMOGLOBIN g/dL 13.2* 14.7    HEMATOCRIT % 37.7* 40.8*   MCV fL 95 94   MCH pg 33.2 33.7   MCHC g/dL 35.0 36.0   RDW % 13.9 13.5   PLATELETS AUTO x10*3/uL 175 256     CMP:    Results from last 7 days   Lab Units 05/23/24  0711 05/22/24 2128   SODIUM mmol/L 137 138   POTASSIUM mmol/L 4.2 3.3*   CHLORIDE mmol/L 106 101   CO2 mmol/L 23 25   BUN mg/dL 19 17   CREATININE mg/dL 1.30 1.29   GLUCOSE mg/dL 129* 127*   PROTEIN TOTAL g/dL  --  6.6   CALCIUM mg/dL 8.0* 9.0   BILIRUBIN TOTAL mg/dL  --  1.1   ALK PHOS U/L  --  72   AST U/L  --  15   ALT U/L  --  26     BMP:    Results from last 7 days   Lab Units 05/23/24  0711 05/22/24 2128   SODIUM mmol/L 137 138   POTASSIUM mmol/L 4.2 3.3*   CHLORIDE mmol/L 106 101   CO2 mmol/L 23 25   BUN mg/dL 19 17   CREATININE mg/dL 1.30 1.29   CALCIUM mg/dL 8.0* 9.0   GLUCOSE mg/dL 129* 127*     Magnesium:  Results from last 7 days   Lab Units 05/23/24  0711 05/22/24 2128   MAGNESIUM mg/dL 2.70* 1.87     Troponin:    Results from last 7 days   Lab Units 05/23/24  0711 05/23/24  0139 05/22/24  2336   TROPHS ng/L 342* 305* 160*     BNP:   Results from last 7 days   Lab Units 05/22/24 2128   BNP pg/mL 2,144*     Lipid Panel:         Diagnostic Studies:     8/2022  Echocardiogram:    Study Conclusions     - Left ventricle: The cavity size is moderately dilated. Wall   thickness is normal. Systolic function was severely reduced. The   calculated ejection fraction was in the range of 25% to 30%.   Severe diffuse hypokinesis with regional variations. Akinesis of   the anterolateral, inferolateral, and inferior myocardium.   Doppler parameters are consistent with abnormal left ventricular   relaxation (grade 1 diastolic dysfunction). The stroke volume is   50ml. The stroke index is 26ml/m^2. The global longitudinal   strain was -10%.   - Aortic valve: The peak systolic gradient is 6mm Hg.   - Mitral valve: There was moderate functional regurgitation,   directed eccentrically and posteriorly.   - Left atrium: The atrium  is mildly dilated.   - Right ventricle: Pacer wire noted in right ventricle.   - Inferior vena cava: The vessel was normal in size; the   respirophasic diameter changes were in the normal range (&gt;= 50%);   findings are consistent with normal central venous pressure.         ECG 12 Lead    Result Date: 5/23/2024  Wide QRS tachycardia Left axis deviation Right bundle branch block Inferior infarct , age undetermined Abnormal ECG When compared with ECG of 22-MAY-2024 23:59, (unconfirmed) Wide QRS tachycardia has replaced Sinus rhythm    ECG 12 lead    Result Date: 5/23/2024  Sinus tachycardia Left axis deviation Right bundle branch block Inferior infarct (cited on or before 21-JAN-2002) T wave abnormality, consider lateral ischemia Abnormal ECG When compared with ECG of 22-MAY-2024 21:02, (unconfirmed) Sinus rhythm has replaced Electronic atrial pacemaker See ED provider note for full interpretation and clinical correlation               [place] [id number]   Tel [phone] Fax [phone]     TRANSTHORACIC ECHOCARDIOGRAM REPORT        Patient Name:      FRANCIA Schultz Physician:    [id number] Kd Rodrigues DO  Study Date:        5/23/2024             Ordering Provider:    [id number] GLORIA SHIN  MRN/PID:           [id number]              Fellow:  Accession#:        [id number]          Nurse:  Date of Birth/Age: 1965 / 58 years Sonographer:          Cathy Sanders RDCS  Gender:            M                     Additional Staff:  Height:            170.18 cm             Admit Date:  Weight:            78.47 kg              Admission Status:     Inpatient -                                                                 Routine  BSA / BMI:         1.90 m2 / 27.10 kg/m2  Department Location:  [facility]  Blood Pressure: 97 /71 mmHg     Study Type:    TRANSTHORACIC ECHO (TTE) COMPLETE  Diagnosis/ICD: Presence of automatic (implantable) cardiac                 defibrillator-Z95.810; Chronic systolic (congestive) heart                 failure (CHF)-I50.22; Ischemic cardiomyopathy-I25.5  Indication:    Congestive Heart Failure, ICM, ICD Firing (multiple shocks)  CPT Codes:     Echo Complete w Full Doppler-26184     Patient History:  MI Location/Type:  Unknown MI  Pacer/Defib:       AICD  Pertinent History: CAD, CHF, Cardiomyopathy, HTN and ICD Firing.     Study Detail: The following Echo studies were performed: 2D, M-Mode, Doppler and                color flow. Technically challenging study due to patient lying in                supine position, prominent lung artifact and poor acoustic                windows. Patient has a defibrillator. The patient was asleep.        PHYSICIAN INTERPRETATION:  Left Ventricle: Left ventricular systolic function is severely decreased, with an estimated ejection fraction of 10%. There is global hypokinesis of the left ventricle with minor regional variations. The left ventricular cavity size is moderately dilated. Left ventricular diastolic filling was not assessed.  Left Atrium: The left atrium is mildly dilated.  Right Ventricle: The right ventricle is normal in size. There is normal right ventricular global systolic function. A device is visualized in the right ventricle.  Right Atrium: The right atrium is normal in size. There is a device visualized in the right atrium.  Aortic Valve: The aortic valve appears structurally normal. The aortic valve appears tricuspid. There is mild aortic valve cusp calcification. There is no evidence of aortic valve stenosis.  There is no evidence of aortic valve regurgitation. The peak instantaneous gradient of the aortic valve is 3.6 mmHg. The mean gradient of the aortic valve is 2.0 mmHg.  Mitral Valve: The mitral  valve is normal in structure. There is mild thickening of the anterior and posterior mitral valve leaflets. There is no evidence of mitral valve stenosis. There is normal mitral valve leaflet mobility. There is mild mitral annular calcification. There is moderate to severe mitral valve regurgitation.  Tricuspid Valve: The tricuspid valve is structurally normal. There is normal tricuspid valve leaflet mobility. There is moderate tricuspid regurgitation.  Pulmonic Valve: The pulmonic valve is structurally normal. There is trace pulmonic valve regurgitation.  Pericardium: There is no pericardial effusion noted.  Aorta: The aortic root is normal.  Pulmonary Artery: Pulmonary hypertension is present. The tricuspid regurgitant velocity is 3.03 m/s, and with an estimated right atrial pressure of 15 mmHg, the estimated pulmonary artery pressure is moderate to severely elevated with the RVSP at 51.7 mmHg.  Systemic Veins: The inferior vena cava appears moderately dilated.  In comparison to the previous echocardiogram(s): There are no prior studies on this patient for comparison purposes.        CONCLUSIONS:   1. Left ventricular systolic function is severely decreased with a 10% estimated ejection fraction.   2. There is no evidence of mitral valve stenosis.   3. Moderate to severe mitral valve regurgitation.   4. Moderate tricuspid regurgitation.   5. Aortic valve stenosis is not present.   6. Left ventricular cavity size is moderately dilated.   7. Moderate to severely elevated pulmonary artery pressure.   8. Pulmonary hypertension is present.   9. The inferior vena cava appears moderately dilated.  10. There is global hypokinesis of the left ventricle with minor regional variations.     RECOMMENDATIONS:  Technically suboptimal and limited study, therefore accuracy of above interpretation could be substantially diminished. Clinical correlation is advised. Consider additional imaging modalities if clinically  indicated.  Radiology:     Transthoracic Echo (TTE) Complete   Final Result      Cardiac device check - Inpatient         XR chest 1 view   Final Result   No acute cardiopulmonary disease.   Signed by Rio Painting DO      Cardiac Catheterization Procedure    (Results Pending)   Electrophysiology procedure    (Results Pending)       Problem List:     Patient Active Problem List   Diagnosis    AICD discharge    HFrEF (heart failure with reduced ejection fraction) (Multi)    Ischemic cardiomyopathy    Elevated troponin    Ventricular tachycardia (Multi)       Assessment:   58-year-old gentleman seen evaluate the bedside in the medical intensive care unit in conjunction with Samm Espinoza arm, CNP.    Chart reviewed in detail including lab, EKG, x-rays, echocardiogram and of that available discussed the patient staff and family.    Bedside examination evaluation and assessment were performed by me.    Impression:  AICD shock, multiple episodes, likely potential V. tach storm as he is experienced in the past  Ventricular tachycardia  Chest pain, chronic with some acute exacerbation  CAD functional class III, history of multivessel angioplasty and stenting  Past medical history of myocardial infarction  Chronic systolic congestive heart failure, NYHA class III with a EF of 25 to 30%  Ischemic cardiomyopathy, echo currently shows 10% ejection fraction with significant valve regurgitation  Hyperlipidemia  Hypertension  Former nicotine abuse  Cystoscopy with urethral stent placement      Plan:   Admitted to ICU.  Remains in wide-complex rhythm.  Rates 120-130.  No obvious ST wave abnormality.  Continue amiodarone.  Appreciate EP recommendations.  Presdo momentum X4 for CRT-D  [id number].  Device check shows at 2017 last evening patient had a ventricular fibrillation at 220 bpm, ATP x 1.  At 2027 last evening patient had ventricular tachycardia 214 bpm.  ATP x 1, 2-26J shocks, and then 41Jx 4.   Monitor  electrolytes, keep potassium greater than 4 and magnesium greater than 2  Trivial troponin elevation expected after AICD shock.  Keep n.p.o.  LHC today to evaluate for any ischemia.  Discussed with patient and family at bedside.  Agreeable to proceed.  Device adjustments per EP  Will slowly introduce GDMT therapy as BP and heart rates allow.  Will need to get home med list updated  Further recommendations post LHC, echocardiogram and EP recommendations  Will continue to follow along    Discussion:  This is a gentleman with end-stage cardiomyopathy due to ischemic heart disease.  Patient had what may have been of ventricular tachycardia storm type situation which he has experienced in the past.  According to his wife and himself he was shocked 10-12 times.  He is currently stable.  He is awaiting heart catheterization to see if there is any significant CAD that would be intervenable.  He had prior stents and infarction before.  Bedside echo today shows further reduction of ejection fraction down to about 10%.  He also has significant valve regurgitation.  Ultimately plans would be to maximize medical therapy.  Correct any ischemic substrate that can be fixed at this time.  EP will adjust medications and Can device accordingly as well.  Future plans would include advanced heart failure and structural heart failure assessment in terms of device upgrade is possible even LVAD and of course if necessary mitral valve clip.  Further testing will be done today.  Disposition will follow.  Long-term prognosis would have to be considered poor based on past and current findings and current events.    Kd Rodrigues DO,FACC       Samm Espinoza [facility]  Adult Gerontology Acute Care Nurse Practitioner  [facility] [place]   [facility]  [phone]      Of note, this documentation is completed using the Dragon Dictation system (voice recognition software). There may be  spelling and/or grammatical errors that were not corrected prior to final submission.      Electronically signed by Kd Rodrigues DO on 5/23/2024 at 10:04 AM

## 2024-05-23 NOTE — ANESTHESIA POSTPROCEDURE EVALUATION
Patient: Cristian Sapp    Procedure Summary       Date: 05/23/24 Room / Location: [facility] / Virtual [facility]    Anesthesia Start: 1424 Anesthesia Stop: 1434    Procedure: Cardioversion Diagnosis:       Ventricular tachycardia (Multi)      (Ventricular tachycardia (Multi) [I47.20])    Providers: Zachary Sparks MD Responsible Provider: Kd Marcelino DO    Anesthesia Type: MAC ASA Status: 4            Anesthesia Type: No value filed.    Vitals Value Taken Time   BP 96/58 05/23/24 1430   Pulse 82 05/23/24 1433   Resp 15 05/23/24 1433   SpO2 95 % 05/23/24 1433   Vitals shown include unfiled device data.    Anesthesia Post Evaluation    Patient location during evaluation: bedside  Patient participation: complete - patient participated  Level of consciousness: awake and alert  Pain score: 0  Pain management: adequate  Airway patency: patent  Cardiovascular status: acceptable  Respiratory status: acceptable  Hydration status: acceptable  Postoperative Nausea and Vomiting: none        There were no known notable events for this encounter.

## 2024-05-23 NOTE — PROGRESS NOTES
Patient is stable status post LHC under the care of Dr. Tan.  Discussed results of procedure with patient and multiple family members.  Pictures provided.  Findings of the LHC revealed 100% chronic total occlusion of the proximal circumflex artery, 100% chronic total occlusion of the proximal RCA with distal collateral filling, mild disease of the left main and LAD and severe left ventricular dysfunction with an LVEF of 10% per echocardiogram.  Aggressive medical management is advised.  Patient remains in a slow ventricular tachycardia.  Electrophysiology service on consult.  Continue to monitor closely in the MICU.  Further recommendations pending clinical course.  Multiple questions answered.  All verbalized understanding.

## 2024-05-23 NOTE — CONSULTS
Inpatient consult to Cardiology  Consult performed by: CLAUDIA Luu  Consult ordered by: CLAUDIA Chaudhry  Reason for consult: V-Tach/ICD Shocks  Assessment/Recommendations: Patient was seen, chart reviewed.    Patient was admitted for ICD firing due to ventricular tachycardia.  Device interrogation on arrival shows ventricular tachycardia rates between 125-130 bpm.  Some of the rate during ventricular tachycardia under detection.  Patient has been complaining of tiredness and fatigue lately.  Looking at his record he was evaluated in April 29, 2024 at [facility].  At time patient was complaining of back pain and he was found to be in wide-complex rhythm.  He received ATP therapy with successful restoration to sinus rhythm  Since then patient has not had any follow-ups with cardiology service.  Long conversation with patient and family members about plan to follow.  Cardiac catheterization performed during this admission showed severe triple-vessel coronary disease.  Right coronary artery and circumflex artery occluded and right coronary fills via collaterals.  LAD with 10 to 30% stenosis.  No lesion amenable for revascularization.    Patient underwent noninvasive primary stimulation and also cardioversion during this evaluation with successful restoration to sinus rhythm.  Device interrogation was performed with adequate sensing, capture and impedances of all leads.  Patient has a biventricular ICD system ([employer]).  The device was reprogrammed to DDD R  bpm.  AV delay was decreased to 150 ms.  Current rhythm a paced-ventricular paced rhythm.    Continue with amiodarone 1 mg/min until a.m.    Rest of plan per general cardiology service and primary team    EKG daily    Telemetry    Risk factor modification and lifestyle modification discussed with patient. Diet , exercise and hydration discussed with patient.    Please excuse any errors in grammar or  translation related to this dictation.  Voice recognition software was utilized to prepare this document.        Electrophysiology Consult Note  Patient: Cristian Sapp  Unit/Bed: 11/11-A  YOB: 1965  MRN: [id number]  Acct: [id number]   Admitting Diagnosis: Ischemic cardiomyopathy [I25.5]  AICD discharge [Z45.02]  HFrEF (heart failure with reduced ejection fraction) (Multi) [I50.20]  Date:  5/22/2024  Hospital Day: 0  Attending: Rio Hays MD    Rounding Cardiologist:  YARITZA Luu-KHURRAM, Sandeep Sparks MD     Complaint:  Chief Complaint   Patient presents with    Rapid Heart Rate     Defibrillator went off about 10-15 times, -200BPM. 50mcg fent in squad. Pt currently at 125BPM      History of Present Illness:  Patient is a 58-year-old male with past medical history significant for myocardial infarction (first in 2003), CAD, s/p remote multivessel PCI's, ischemic cardiomyopathy with an LVEF reported at 35% back in 2006 and again in 2008, most recent echocardiogram August, 2022 with LVEF 25 to 30%, chronic systolic heart failure, s/p biventricular ICD (11/6/2019), initial ICD implant in 2014, abdominal aortic aneurysm without rupture, HTN, HLD, PTSD, tobacco use, renal stones, s/p recent cystoscopy with urethral stent placement who presented to [facility]'s emergency department last evening after his ICD fired approximately 10 times around 8 PM.  The patient was sitting talking with his mother when this occurred.  Patient developed chest pain and shortness of breath at that time.  Patient has not felt well for several weeks with complaints of fatigue, dizziness, dyspnea.    Work-up in the ED revealed in ECH showing a WCT with RBBB at a rate of 125, Mg+ 1.87, K+ 3.3, Lactate 2.5, BNP 2144, troponins 31, 160, 305, 342, WBC 17.9.  Potassium and magnesium were repleted and EP service was contacted.  Dr. Sparks recommended initiating patient on an IV Amiodarone gtt.  He requested  LHC and echocardiogram be performed today with further recommendations to follow.    Allergies:  Allergies   Allergen Reactions    Chantix [Varenicline] Unknown      PMHx:  Past Medical History:   Diagnosis Date    Atherosclerosis of native artery of both lower extremities with intermittent claudication (CMS-HCC)     CHB (complete heart block) (Multi)     per device check    Chronic systolic CHF (congestive heart failure), NYHA class 3 (Multi)     COPD (chronic obstructive pulmonary disease) (Multi)     Coronary artery disease     History of tobacco abuse     HLD (hyperlipidemia)     Hypertension     Infrarenal abdominal aortic aneurysm (AAA) without rupture (CMS-[facility])     Ischemic cardiomyopathy with implantable cardioverter-defibrillator (ICD)     MI (myocardial infarction) (Multi)     multiple    Nephrolithiasis     PTSD (post-traumatic stress disorder)      PSHx:  Past Surgical History:   Procedure Laterality Date    CARDIAC DEFIBRILLATOR PLACEMENT      CORONARY ANGIOPLASTY WITH STENT PLACEMENT  2006    JIM to LAD    CORONARY ANGIOPLASTY WITH STENT PLACEMENT  04/2003    CORONARY ANGIOPLASTY WITH STENT PLACEMENT  06/2008    CYSTOSCOPY W/ URETERAL STENT PLACEMENT  04/01/2024    CYSTOSCOPY W/ URETERAL STENT PLACEMENT  04/30/2024    FEMORAL BYPASS      femoral artery    ILIAC ARTERY STENT Right     KNEE SURGERY       Social Hx:  Tobacco Use  Denies alcohol use  Denies drug use    Family Hx:  Family History   Problem Relation Name Age of Onset    Hypertension Mother      Diabetes Father      Hypertension Sister      Diabetes Sister      Colon cancer Maternal Grandmother      Hypertension Maternal Grandmother      Heart disease Maternal Grandfather      Hypertension Maternal Grandfather      Cancer Paternal Grandfather      Hypertension Paternal Grandfather       Review of Systems:   Review of Systems   Constitutional:  Positive for fatigue.   HENT: Negative.     Eyes: Negative.    Respiratory:  Positive for shortness  of breath. Negative for chest tightness.    Cardiovascular:  Positive for palpitations and leg swelling.   Gastrointestinal: Negative.    Endocrine: Negative.    Genitourinary: Negative.    Skin: Negative.    Allergic/Immunologic: Negative.    Neurological:  Positive for dizziness and weakness. Negative for light-headedness.   Psychiatric/Behavioral: Negative.     All other systems reviewed and are negative.    Physical Examination:    Visit Vitals  /83   Pulse (!) 120   Temp 35.7 °C (96.3 °F) (Temporal)   Resp 13      Physical Exam  Vitals reviewed.   Constitutional:       Appearance: Normal appearance.   HENT:      Head: Normocephalic and atraumatic.      Nose: Nose normal.      Mouth/Throat:      Mouth: Mucous membranes are moist.      Pharynx: Oropharynx is clear.   Eyes:      Pupils: Pupils are equal, round, and reactive to light.   Cardiovascular:      Rate and Rhythm: Regular rhythm. Tachycardia present.      Pulses: Normal pulses.      Heart sounds: Normal heart sounds.   Pulmonary:      Effort: Pulmonary effort is normal.      Breath sounds: Normal breath sounds.   Abdominal:      General: Bowel sounds are normal.      Palpations: Abdomen is soft.   Musculoskeletal:         General: Normal range of motion.      Cervical back: Normal range of motion and neck supple.   Skin:     General: Skin is warm and dry.   Neurological:      General: No focal deficit present.      Mental Status: He is alert and oriented to person, place, and time.   Psychiatric:         Mood and Affect: Mood normal.         Behavior: Behavior normal.       LABS:  CBC:   Results from last 7 days   Lab Units 05/23/24  0711 05/22/24  2128   WBC AUTO x10*3/uL 11.9* 17.9*   RBC AUTO x10*6/uL 3.98* 4.36*   HEMOGLOBIN g/dL 13.2* 14.7   HEMATOCRIT % 37.7* 40.8*   MCV fL 95 94   MCH pg 33.2 33.7   MCHC g/dL 35.0 36.0   RDW % 13.9 13.5   PLATELETS AUTO x10*3/uL 175 256     CMP:    Results from last 7 days   Lab Units 05/23/24  0711  05/22/24 2128   SODIUM mmol/L 137 138   POTASSIUM mmol/L 4.2 3.3*   CHLORIDE mmol/L 106 101   CO2 mmol/L 23 25   BUN mg/dL 19 17   CREATININE mg/dL 1.30 1.29   GLUCOSE mg/dL 129* 127*   PROTEIN TOTAL g/dL  --  6.6   CALCIUM mg/dL 8.0* 9.0   BILIRUBIN TOTAL mg/dL  --  1.1   ALK PHOS U/L  --  72   AST U/L  --  15   ALT U/L  --  26     Magnesium:  Results from last 7 days   Lab Units 05/23/24  0711 05/22/24 2128   MAGNESIUM mg/dL 2.70* 1.87     Troponin:    Results from last 7 days   Lab Units 05/23/24  0711 05/23/24  0139 05/22/24  2336   TROPHS ng/L 342* 305* 160*     BNP:   Results from last 7 days   Lab Units 05/22/24 2128   BNP pg/mL 2,144*     Current Medications:    Current Facility-Administered Medications:     acetaminophen (Tylenol) tablet 650 mg, 650 mg, oral, q4h PRN, CLAUDIA Francois    amiodarone (Nexterone) 360 mg in dextrose,iso-osm 200 mL (1.8 mg/mL) infusion (premix), 1 mg/min, intravenous, Continuous, CLAUDIA Chaudhry, Last Rate: 33.3 mL/hr at 05/23/24 0440, 1 mg/min at 05/23/24 0440    aspirin chewable tablet 81 mg, 81 mg, oral, Daily, CLAUDIA Chaudhry    [Held by provider] enoxaparin (Lovenox) syringe 40 mg, 40 mg, subcutaneous, q24h, CLAUDIA Chaudhry    ondansetron (Zofran) injection 4 mg, 4 mg, intravenous, q8h PRN, CLAUDIA Francois    perflutren lipid microspheres (Definity) injection 0.5-10 mL of dilution, 0.5-10 mL of dilution, intravenous, Once in imaging, CLAUDIA Chaudhry    Cardiac Catheterization Procedure  Result Date: 5/23/2024  [facility], Cath Lab        [address] [place] [address] Cardiovascular Catheterization Report CONCLUSIONS:  1. Distal Left Main: 10-30% stenosis.  2. Proximal and mid LAD Lesion: The percent stenosis is 10-30%.  3. Proximal CX Lesion: The percent stenosis is 100%.  4. Right Coronary Artery: fills via collaterals.  5. Proximal RCA Lesion: The percent stenosis is 100%.  6. The Left  Ventricular Ejection Fraction is 10%,by echo. ICD 10 Codes: Ventricular tachycardia, unspecified-I47.20  CPT Codes: Coronary Angiography S&I only ([facility])([facility])-18918; Moderate Sedation Services initial 15 minutes patient >5 years-70992  [id number] Babatunde Menon MD Performing Physician Electronically signed by [id number] Babatunde Menon MD on 5/23/2024 at 10:42:57 AM  ** Final (Updated) **     ECG 12 Lead  Result Date: 5/23/2024  Wide QRS tachycardia - possibly ventricular tachycardia Abnormal right superior axis deviation Abnormal ECG When compared with ECG of 22-MAY-2024 23:59, (unconfirmed) No significant change was found Confirmed by Clayton Angulo ([id number]) on 5/23/2024 10:40:02 AM    Transthoracic Echo (TTE) Complete  Result Date: 5/23/2024  [place] [id number]  Tel [phone] Fax [phone] TRANSTHORACIC ECHOCARDIOGRAM REPORT CONCLUSIONS:  1. Left ventricular systolic function is severely decreased with a 10% estimated ejection fraction.  2. There is no evidence of mitral valve stenosis.  3. Moderate to severe mitral valve regurgitation.  4. Moderate tricuspid regurgitation.  5. Aortic valve stenosis is not present.  6. Left ventricular cavity size is moderately dilated.  7. Moderate to severely elevated pulmonary artery pressure.  8. Pulmonary hypertension is present.  9. The inferior vena cava appears moderately dilated. 10. There is global hypokinesis of the left ventricle with minor regional variations. RECOMMENDATIONS: Technically suboptimal and limited study, therefore accuracy of above interpretation could be substantially diminished. Clinical correlation is advised. Consider additional imaging modalities if clinically indicated.      ECG 12 lead  Result Date: 5/23/2024  Sinus tachycardia Left axis deviation Right bundle branch block Inferior infarct (cited on or before 21-JAN-2002) T wave abnormality, consider lateral ischemia Abnormal ECG When compared with  ECG of 22-MAY-2024 21:02, (unconfirmed) Sinus rhythm has replaced Electronic atrial pacemaker See ED provider note for full interpretation and clinical correlation     Encounter Date: 05/22/24   ECG 12 Lead   Result Value    Ventricular Rate 126    Atrial Rate 126    QRS Duration 180    QT Interval 410    QTC Calculation(Bazett) 593    R Axis 250    T Axis -31    QRS Count 21    Q Onset 210    T Offset 415    QTC Fredericia 525    Narrative    Wide QRS tachycardia - possibly ventricular tachycardia  Abnormal right superior axis deviation  Abnormal ECG  When compared with ECG of 22-MAY-2024 23:59, (unconfirmed)  No significant change was found      Confirmed by Clayton Angulo ([id number]) on 5/23/2024 10:40:02 AM      Tele monitoring: Wide Complex Tachycardia/Slow VT with rates in 120's    Assessment/Plan:   1.    Wide-complex tachycardia/ventricular tachycardia/Slow VT/AICD shocks   -Device check ([employer] BiV ICD) revealed 15 ATP's and 14 high-voltage therapies delivered on 5/22/2024.  EGM showed VT with rates in the 200's with therapies occasionally slowing below the detection rate to the 120s.   -Patient is currently maintaining a slow VT with rates in the 120s   -Continue IV amiodarone drip   -LHC today revealed  of the proximal circumflex artery,  of the proximal RCA with distal collateral filling and mild disease of the left main and LAD.  Medical management is advised.   -Cardioversion today  2.    Ischemic cardiomyopathy/acute on chronic systolic heart failure   -LVEF 10% on echocardiogram 5/23/2024   -Moderately dilated left ventricle  3.    Valvular heart disease   -Moderate to severe MR/moderate TR   -Moderate to severely elevated pulmonary artery pressures  4.    Coronary artery disease/multiple remote myocardial infarctions   -Remote multivessel PCI's (last in 2008)   -See report above for LHC performed today  5.    Benign essential hypertension   -Currently hypotensive  6.     Hyperlipidemia   -On statin therapy  7.    Current tobacco use    Further recommendations pending clinical course.      Electronically signed by CLAUDIA Luu on 5/23/2024 at 11:31 AM

## 2024-05-24 ENCOUNTER — APPOINTMENT (OUTPATIENT)
Dept: RADIOLOGY | Facility: HOSPITAL | Age: 59
DRG: 286 | End: 2024-05-24
Payer: COMMERCIAL

## 2024-05-24 ENCOUNTER — APPOINTMENT (OUTPATIENT)
Dept: CARDIOLOGY | Facility: HOSPITAL | Age: 59
DRG: 286 | End: 2024-05-24
Payer: COMMERCIAL

## 2024-05-24 LAB
ANION GAP SERPL CALC-SCNC: 15 MMOL/L (ref 10–20)
ATRIAL RATE: 125 BPM
ATRIAL RATE: 141 BPM
ATRIAL RATE: 80 BPM
BACTERIA UR CULT: NO GROWTH
BASOPHILS # BLD AUTO: 0.09 X10*3/UL (ref 0–0.1)
BASOPHILS NFR BLD AUTO: 0.8 %
BUN SERPL-MCNC: 17 MG/DL (ref 6–23)
CALCIUM SERPL-MCNC: 8.8 MG/DL (ref 8.6–10.3)
CHLORIDE SERPL-SCNC: 104 MMOL/L (ref 98–107)
CO2 SERPL-SCNC: 25 MMOL/L (ref 21–32)
CREAT SERPL-MCNC: 1.11 MG/DL (ref 0.5–1.3)
EGFRCR SERPLBLD CKD-EPI 2021: 78 ML/MIN/1.73M*2
EOSINOPHIL # BLD AUTO: 0.38 X10*3/UL (ref 0–0.7)
EOSINOPHIL NFR BLD AUTO: 3.2 %
ERYTHROCYTE [DISTWIDTH] IN BLOOD BY AUTOMATED COUNT: 13.8 % (ref 11.5–14.5)
GLUCOSE SERPL-MCNC: 121 MG/DL (ref 74–99)
HCT VFR BLD AUTO: 41.3 % (ref 41–52)
HGB BLD-MCNC: 14.5 G/DL (ref 13.5–17.5)
IMM GRANULOCYTES # BLD AUTO: 0.09 X10*3/UL (ref 0–0.7)
IMM GRANULOCYTES NFR BLD AUTO: 0.8 % (ref 0–0.9)
LYMPHOCYTES # BLD AUTO: 2.12 X10*3/UL (ref 1.2–4.8)
LYMPHOCYTES NFR BLD AUTO: 17.8 %
MAGNESIUM SERPL-MCNC: 2 MG/DL (ref 1.6–2.4)
MCH RBC QN AUTO: 33.3 PG (ref 26–34)
MCHC RBC AUTO-ENTMCNC: 35.1 G/DL (ref 32–36)
MCV RBC AUTO: 95 FL (ref 80–100)
MONOCYTES # BLD AUTO: 0.86 X10*3/UL (ref 0.1–1)
MONOCYTES NFR BLD AUTO: 7.2 %
NEUTROPHILS # BLD AUTO: 8.34 X10*3/UL (ref 1.2–7.7)
NEUTROPHILS NFR BLD AUTO: 70.2 %
NRBC BLD-RTO: 0.2 /100 WBCS (ref 0–0)
P AXIS: 45 DEGREES
P OFFSET: 178 MS
P OFFSET: 204 MS
P ONSET: 140 MS
P ONSET: 149 MS
PLATELET # BLD AUTO: 180 X10*3/UL (ref 150–450)
POTASSIUM SERPL-SCNC: 3.7 MMOL/L (ref 3.5–5.3)
PR INTERVAL: 126 MS
PR INTERVAL: 156 MS
Q ONSET: 180 MS
Q ONSET: 212 MS
Q ONSET: 217 MS
QRS COUNT: 14 BEATS
QRS COUNT: 20 BEATS
QRS COUNT: 23 BEATS
QRS DURATION: 150 MS
QRS DURATION: 170 MS
QRS DURATION: 182 MS
QT INTERVAL: 316 MS
QT INTERVAL: 388 MS
QT INTERVAL: 480 MS
QTC CALCULATION(BAZETT): 484 MS
QTC CALCULATION(BAZETT): 553 MS
QTC CALCULATION(BAZETT): 560 MS
QTC FREDERICIA: 420 MS
QTC FREDERICIA: 495 MS
QTC FREDERICIA: 528 MS
R AXIS: -57 DEGREES
R AXIS: -79 DEGREES
R AXIS: -80 DEGREES
RBC # BLD AUTO: 4.35 X10*6/UL (ref 4.5–5.9)
SODIUM SERPL-SCNC: 140 MMOL/L (ref 136–145)
T AXIS: -10 DEGREES
T AXIS: -28 DEGREES
T AXIS: 100 DEGREES
T OFFSET: 370 MS
T OFFSET: 411 MS
T OFFSET: 420 MS
VENTRICULAR RATE: 125 BPM
VENTRICULAR RATE: 141 BPM
VENTRICULAR RATE: 80 BPM
WBC # BLD AUTO: 11.9 X10*3/UL (ref 4.4–11.3)

## 2024-05-24 PROCEDURE — 2500000001 HC RX 250 WO HCPCS SELF ADMINISTERED DRUGS (ALT 637 FOR MEDICARE OP): Performed by: HOSPITALIST

## 2024-05-24 PROCEDURE — 99291 CRITICAL CARE FIRST HOUR: CPT

## 2024-05-24 PROCEDURE — 93010 ELECTROCARDIOGRAM REPORT: CPT | Performed by: INTERNAL MEDICINE

## 2024-05-24 PROCEDURE — S4991 NICOTINE PATCH NONLEGEND: HCPCS

## 2024-05-24 PROCEDURE — 2500000002 HC RX 250 W HCPCS SELF ADMINISTERED DRUGS (ALT 637 FOR MEDICARE OP, ALT 636 FOR OP/ED): Mod: MUE | Performed by: HOSPITALIST

## 2024-05-24 PROCEDURE — 93005 ELECTROCARDIOGRAM TRACING: CPT

## 2024-05-24 PROCEDURE — 83735 ASSAY OF MAGNESIUM: CPT | Performed by: HOSPITALIST

## 2024-05-24 PROCEDURE — 99233 SBSQ HOSP IP/OBS HIGH 50: CPT | Performed by: INTERNAL MEDICINE

## 2024-05-24 PROCEDURE — 2500000004 HC RX 250 GENERAL PHARMACY W/ HCPCS (ALT 636 FOR OP/ED)

## 2024-05-24 PROCEDURE — 74176 CT ABD & PELVIS W/O CONTRAST: CPT

## 2024-05-24 PROCEDURE — 99233 SBSQ HOSP IP/OBS HIGH 50: CPT | Performed by: NURSE PRACTITIONER

## 2024-05-24 PROCEDURE — 2500000001 HC RX 250 WO HCPCS SELF ADMINISTERED DRUGS (ALT 637 FOR MEDICARE OP)

## 2024-05-24 PROCEDURE — 94640 AIRWAY INHALATION TREATMENT: CPT

## 2024-05-24 PROCEDURE — 36415 COLL VENOUS BLD VENIPUNCTURE: CPT | Performed by: HOSPITALIST

## 2024-05-24 PROCEDURE — 2500000005 HC RX 250 GENERAL PHARMACY W/O HCPCS

## 2024-05-24 PROCEDURE — 2500000002 HC RX 250 W HCPCS SELF ADMINISTERED DRUGS (ALT 637 FOR MEDICARE OP, ALT 636 FOR OP/ED)

## 2024-05-24 PROCEDURE — 82374 ASSAY BLOOD CARBON DIOXIDE: CPT | Performed by: HOSPITALIST

## 2024-05-24 PROCEDURE — 2500000004 HC RX 250 GENERAL PHARMACY W/ HCPCS (ALT 636 FOR OP/ED): Performed by: HOSPITALIST

## 2024-05-24 PROCEDURE — 2020000001 HC ICU ROOM DAILY

## 2024-05-24 PROCEDURE — 74018 RADEX ABDOMEN 1 VIEW: CPT

## 2024-05-24 PROCEDURE — 2500000004 HC RX 250 GENERAL PHARMACY W/ HCPCS (ALT 636 FOR OP/ED): Performed by: NURSE PRACTITIONER

## 2024-05-24 PROCEDURE — 85025 COMPLETE CBC W/AUTO DIFF WBC: CPT | Performed by: HOSPITALIST

## 2024-05-24 PROCEDURE — 74176 CT ABD & PELVIS W/O CONTRAST: CPT | Performed by: RADIOLOGY

## 2024-05-24 PROCEDURE — 74018 RADEX ABDOMEN 1 VIEW: CPT | Performed by: RADIOLOGY

## 2024-05-24 RX ORDER — MAGNESIUM SULFATE HEPTAHYDRATE 40 MG/ML
4 INJECTION, SOLUTION INTRAVENOUS ONCE
Status: COMPLETED | OUTPATIENT
Start: 2024-05-24 | End: 2024-05-24

## 2024-05-24 RX ORDER — DOCUSATE SODIUM 100 MG/1
100 CAPSULE, LIQUID FILLED ORAL 2 TIMES DAILY
Status: DISCONTINUED | OUTPATIENT
Start: 2024-05-24 | End: 2024-05-28 | Stop reason: HOSPADM

## 2024-05-24 RX ORDER — POTASSIUM CHLORIDE 14.9 MG/ML
20 INJECTION INTRAVENOUS ONCE
Status: COMPLETED | OUTPATIENT
Start: 2024-05-24 | End: 2024-05-24

## 2024-05-24 RX ORDER — LORAZEPAM 0.5 MG/1
0.5 TABLET ORAL DAILY PRN
Status: DISCONTINUED | OUTPATIENT
Start: 2024-05-24 | End: 2024-05-27

## 2024-05-24 RX ORDER — PRASUGREL 10 MG/1
10 TABLET, FILM COATED ORAL DAILY
Status: DISCONTINUED | OUTPATIENT
Start: 2024-05-24 | End: 2024-05-28 | Stop reason: HOSPADM

## 2024-05-24 RX ADMIN — AMIODARONE HYDROCHLORIDE 1 MG/MIN: 1.8 INJECTION, SOLUTION INTRAVENOUS at 16:23

## 2024-05-24 RX ADMIN — METHOCARBAMOL TABLETS 500 MG: 500 TABLET, COATED ORAL at 00:09

## 2024-05-24 RX ADMIN — METHOCARBAMOL TABLETS 500 MG: 500 TABLET, COATED ORAL at 09:50

## 2024-05-24 RX ADMIN — ONDANSETRON 4 MG: 2 INJECTION INTRAMUSCULAR; INTRAVENOUS at 04:11

## 2024-05-24 RX ADMIN — PRASUGREL 10 MG: 10 TABLET, FILM COATED ORAL at 13:01

## 2024-05-24 RX ADMIN — TRAZODONE HYDROCHLORIDE 50 MG: 50 TABLET ORAL at 21:46

## 2024-05-24 RX ADMIN — MAGNESIUM SULFATE HEPTAHYDRATE 4 G: 40 INJECTION, SOLUTION INTRAVENOUS at 04:11

## 2024-05-24 RX ADMIN — POTASSIUM CHLORIDE 20 MEQ: 14.9 INJECTION, SOLUTION INTRAVENOUS at 04:11

## 2024-05-24 RX ADMIN — RANOLAZINE 500 MG: 500 TABLET, FILM COATED, EXTENDED RELEASE ORAL at 09:48

## 2024-05-24 RX ADMIN — ENOXAPARIN SODIUM 40 MG: 100 INJECTION SUBCUTANEOUS at 09:47

## 2024-05-24 RX ADMIN — AMIODARONE HYDROCHLORIDE 1 MG/MIN: 1.8 INJECTION, SOLUTION INTRAVENOUS at 02:56

## 2024-05-24 RX ADMIN — ROSUVASTATIN CALCIUM 20 MG: 20 TABLET, FILM COATED ORAL at 21:46

## 2024-05-24 RX ADMIN — NICOTINE 1 PATCH: 21 PATCH, EXTENDED RELEASE TRANSDERMAL at 21:46

## 2024-05-24 RX ADMIN — LORAZEPAM 0.5 MG: 0.5 TABLET ORAL at 16:25

## 2024-05-24 RX ADMIN — TAMSULOSIN HYDROCHLORIDE 0.4 MG: 0.4 CAPSULE ORAL at 09:53

## 2024-05-24 RX ADMIN — AMIODARONE HYDROCHLORIDE 1 MG/MIN: 1.8 INJECTION, SOLUTION INTRAVENOUS at 22:04

## 2024-05-24 RX ADMIN — POTASSIUM CHLORIDE 20 MEQ: 1.5 POWDER, FOR SOLUTION ORAL at 09:48

## 2024-05-24 RX ADMIN — AMIODARONE HYDROCHLORIDE 1 MG/MIN: 1.8 INJECTION, SOLUTION INTRAVENOUS at 09:47

## 2024-05-24 RX ADMIN — ASPIRIN 81 MG: 81 TABLET, CHEWABLE ORAL at 09:48

## 2024-05-24 RX ADMIN — DOCUSATE SODIUM 100 MG: 100 CAPSULE, LIQUID FILLED ORAL at 21:46

## 2024-05-24 RX ADMIN — PAROXETINE HYDROCHLORIDE 40 MG: 20 TABLET, FILM COATED ORAL at 21:46

## 2024-05-24 RX ADMIN — CYANOCOBALAMIN TAB 500 MCG 1000 MCG: 500 TAB at 09:48

## 2024-05-24 RX ADMIN — LEVALBUTEROL HYDROCHLORIDE 0.63 MG: 0.63 SOLUTION RESPIRATORY (INHALATION) at 23:33

## 2024-05-24 RX ADMIN — LIDOCAINE 2 PATCH: 4 PATCH TOPICAL at 09:49

## 2024-05-24 RX ADMIN — RANOLAZINE 500 MG: 500 TABLET, FILM COATED, EXTENDED RELEASE ORAL at 21:46

## 2024-05-24 RX ADMIN — MAGNESIUM OXIDE 400 MG (241.3 MG MAGNESIUM) TABLET 400 MG: TABLET at 09:52

## 2024-05-24 ASSESSMENT — PAIN SCALES - GENERAL: PAINLEVEL_OUTOF10: 0 - NO PAIN

## 2024-05-24 NOTE — PROGRESS NOTES
05/24/24 1527   Discharge Planning   Living Arrangements Parent   Support Systems Family members   Type of Residence Private residence   Who is requesting discharge planning? Provider   Patient expects to be discharged to: Home with family   Does the patient need discharge transport arranged? No     Discussed pt on critical care rounds. Pt admitted after AICD discharged.Cardiology on consult. Met with pt who reports he has been living with his parents in [place] , from [place]. Has had AICD since 2014. Recent kidney stone & stent placement. Pt states he plans to FU with  physicians on discharge & cont to stay with his parents.

## 2024-05-24 NOTE — CONSULTS
UROLOGY CONSULT NOTE FOR FRIDAY, 5/24/2024,      SUBJECTIVE: Pleasant 58-year-old white male currently being referred urologically for follow-up of recent ureteroscopic lithotripsy procedures with ureteral double-J stent placements at [facility]  Patient was recently admitted 2 days ago through  our emergency room for incidental complaints of his defibrillating firing several times generally feeling malaise weakness and some chest pain.  Admitted to ICU with cardiology evaluation as well  Recently in the past 1 to 2 months patient underwent ureteroscopic lithotripsy of right-sided ureteral stones followed by similar procedures on the left side and currently he reports that he has an indwelling double-J stent in the left ureter and was scheduled to see urology at [facility] next week for cystoscopy with stent removal.  Currently has no flank pain voiding well urine clear no dysuria no hematuria.  Additional medical and cardiac comorbidities as listed and reviewed     OBJECTIVE:   On today's visit the patient is sitting at the bedside comfortable abdomen soft no flank tenderness  Serum creatinine normal at 1.11, hemoglobin 14.5 with slight leukocytosis at 11,900, was 17,800 on admission  Urinalysis positive for blood not suggestive of infection which would be consistent with indwelling double-J stent and stone treatment  Not on antibiotics currently, on Flomax 0.4 mg and recommend continuing    ASSESSMENT/PLAN  Urologic history of bilateral ureteral stones and ureteroscopy with lithotripsy currently reportedly with indwelling left double-J stent  Clinically stable and doing well  Patient scheduled for follow-up stent removal at [facility] next week  Here we would recommend a KUB and CT no contrast to check for position of stent and any residual stones and then advise further regarding follow-up    Thank you for allowing us to participate in the patient's care and management and we will  follow along urologically here    Additional medical and historical objective data reviewed and listed below      Current Facility-Administered Medications:     acetaminophen (Tylenol) tablet 650 mg, 650 mg, oral, q4h PRN, CLAUDIA Francois    albuterol 90 mcg/actuation inhaler 2 puff, 2 puff, inhalation, q4h PRN, CLAUDIA Menjivar    amiodarone (Nexterone) 360 mg in dextrose,iso-osm 200 mL (1.8 mg/mL) infusion (premix), 1 mg/min, intravenous, Continuous, CLAUDIA Chaudhry, Last Rate: 33.3 mL/hr at 05/24/24 0947, 1 mg/min at 05/24/24 0947    aspirin chewable tablet 81 mg, 81 mg, oral, Daily, CLAUDIA Chaudhry, 81 mg at 05/24/24 0948    cholecalciferol (Vitamin D-3) tablet 5,000 Units, 5,000 Units, oral, q3 days, CLAUDIA Menjivar, 5,000 Units at 05/23/24 2106    [Held by provider] co-enzyme Q-10 capsule 50 mg, 50 mg, oral, Every Sunday, CLAUDIA Menjivar    cyanocobalamin (Vitamin B-12) tablet 1,000 mcg, 1,000 mcg, oral, Daily, CLAUDIA Menjivar, 1,000 mcg at 05/24/24 0948    enoxaparin (Lovenox) syringe 40 mg, 40 mg, subcutaneous, q24h, CLAUDIA Menjivar, 40 mg at 05/24/24 0947    levalbuterol (Xopenex) 0.63 mg/3 mL nebulizer solution 0.63 mg, 0.63 mg, nebulization, q4h PRN, CLAUDIA Chaudhry, 0.63 mg at 05/23/24 2108    lidocaine 4 % patch 2 patch, 2 patch, transdermal, Daily, CLAUDIA Menjivar, 2 patch at 05/24/24 0949    LORazepam (Ativan) tablet 0.5 mg, 0.5 mg, oral, Nightly, CLAUDIA Menjivar, 0.5 mg at 05/23/24 2108    magnesium oxide (Mag-Ox) tablet 400 mg, 400 mg, oral, Daily, CLAUDIA Menjivar, 400 mg at 05/24/24 0952    methocarbamol (Robaxin) tablet 500 mg, 500 mg, oral, q6h PRN, CLAUDIA Menjivar, 500 mg at 05/24/24 0950    nicotine (Nicoderm CQ) 21 mg/24 hr patch 1 patch, 1 patch, transdermal, q24h, CLAUDIA Menjivar, 1 patch at 05/23/24 2108    ondansetron (Zofran)  injection 4 mg, 4 mg, intravenous, q8h PRN, Michelle Hendricks, YARITZA-CNP, 4 mg at 05/24/24 0411    PARoxetine (Paxil) tablet 40 mg, 40 mg, oral, Nightly, YARITZA Chaudhry-CNP, 40 mg at 05/23/24 2107    perflutren lipid microspheres (Definity) injection 0.5-10 mL of dilution, 0.5-10 mL of dilution, intravenous, Once in imaging, CLAUDIA Chaudhry    potassium chloride (Klor-Con) packet 20 mEq, 20 mEq, oral, Daily, YARITZA Menjivar-CNP, 20 mEq at 05/24/24 0948    prasugrel (Effient) tablet 10 mg, 10 mg, oral, Daily, YARITZA Menjivar-CNP, 10 mg at 05/24/24 1301    ranolazine (Ranexa) 12 hr tablet 500 mg, 500 mg, oral, BID, CLAUDIA Menjivar, 500 mg at 05/24/24 0948    rosuvastatin (Crestor) tablet 20 mg, 20 mg, oral, Nightly, YARITZA Menjivar-CNP, 20 mg at 05/23/24 2107    [Held by provider] sacubitriL-valsartan (Entresto) 24-26 mg per tablet 1 tablet, 1 tablet, oral, BID, CLAUDIA Menjivar    simethicone (Mylicon) chewable tablet 80 mg, 80 mg, oral, TID PRN, YARITZA Chaudhry-CNP, 80 mg at 05/23/24 2108    sodium chloride (Ocean) 0.65 % nasal spray 1 spray, 1 spray, Each Nostril, 4x daily PRN, CLAUDIA Chaudhry    tamsulosin (Flomax) 24 hr capsule 0.4 mg, 0.4 mg, oral, Daily, YARITZA Menjivar-CNP, 0.4 mg at 05/24/24 0953    [Held by provider] torsemide (Demadex) tablet 20 mg, 20 mg, oral, BID, YARITZA Chaudhry-CNP, 20 mg at 05/23/24 2108    traZODone (Desyrel) tablet 50 mg, 50 mg, oral, Nightly, YARITZA Menjivar-CNP, 50 mg at 05/23/24 2107   Results for orders placed or performed during the hospital encounter of 05/22/24 (from the past 96 hour(s))   ECG 12 lead   Result Value Ref Range    Ventricular Rate 125 BPM    Atrial Rate 125 BPM    QRS Duration 182 ms    QT Interval 388 ms    QTC Calculation(Bazett) 560 ms    R Axis -80 degrees    T Axis -10 degrees    QRS Count 20 beats    Q Onset 217 ms    T Offset 411 ms    QTC Fredericia 495 ms    CBC and Auto Differential   Result Value Ref Range    WBC 17.9 (H) 4.4 - 11.3 x10*3/uL    nRBC 0.0 0.0 - 0.0 /100 WBCs    RBC 4.36 (L) 4.50 - 5.90 x10*6/uL    Hemoglobin 14.7 13.5 - 17.5 g/dL    Hematocrit 40.8 (L) 41.0 - 52.0 %    MCV 94 80 - 100 fL    MCH 33.7 26.0 - 34.0 pg    MCHC 36.0 32.0 - 36.0 g/dL    RDW 13.5 11.5 - 14.5 %    Platelets 256 150 - 450 x10*3/uL    Neutrophils % 64.2 40.0 - 80.0 %    Immature Granulocytes %, Automated 0.9 0.0 - 0.9 %    Lymphocytes % 24.9 13.0 - 44.0 %    Monocytes % 7.4 2.0 - 10.0 %    Eosinophils % 1.7 0.0 - 6.0 %    Basophils % 0.9 0.0 - 2.0 %    Neutrophils Absolute 11.47 (H) 1.20 - 7.70 x10*3/uL    Immature Granulocytes Absolute, Automated 0.16 0.00 - 0.70 x10*3/uL    Lymphocytes Absolute 4.46 1.20 - 4.80 x10*3/uL    Monocytes Absolute 1.33 (H) 0.10 - 1.00 x10*3/uL    Eosinophils Absolute 0.30 0.00 - 0.70 x10*3/uL    Basophils Absolute 0.16 (H) 0.00 - 0.10 x10*3/uL   Comprehensive Metabolic Panel   Result Value Ref Range    Glucose 127 (H) 74 - 99 mg/dL    Sodium 138 136 - 145 mmol/L    Potassium 3.3 (L) 3.5 - 5.3 mmol/L    Chloride 101 98 - 107 mmol/L    Bicarbonate 25 21 - 32 mmol/L    Anion Gap 15 10 - 20 mmol/L    Urea Nitrogen 17 6 - 23 mg/dL    Creatinine 1.29 0.50 - 1.30 mg/dL    eGFR 65 >60 mL/min/1.73m*2    Calcium 9.0 8.6 - 10.3 mg/dL    Albumin 4.2 3.4 - 5.0 g/dL    Alkaline Phosphatase 72 33 - 120 U/L    Total Protein 6.6 6.4 - 8.2 g/dL    AST 15 9 - 39 U/L    Bilirubin, Total 1.1 0.0 - 1.2 mg/dL    ALT 26 10 - 52 U/L   Magnesium   Result Value Ref Range    Magnesium 1.87 1.60 - 2.40 mg/dL   Lactate   Result Value Ref Range    Lactate 2.5 (H) 0.4 - 2.0 mmol/L   Lipase   Result Value Ref Range    Lipase 19 9 - 82 U/L   Protime-INR   Result Value Ref Range    Protime 12.4 9.8 - 12.8 seconds    INR 1.1 0.9 - 1.1   aPTT   Result Value Ref Range    aPTT 26 (L) 27 - 38 seconds   B-Type Natriuretic Peptide   Result Value Ref Range    BNP 2,144 (H) 0 - 99 pg/mL    Troponin I, High Sensitivity, Initial   Result Value Ref Range    Troponin I, High Sensitivity 31 (H) 0 - 20 ng/L   Sars-CoV-2 PCR   Result Value Ref Range    Coronavirus 2019, PCR Not Detected Not Detected   Urinalysis with Reflex Culture and Microscopic   Result Value Ref Range    Color, Urine Straw Straw, Yellow    Appearance, Urine Clear Clear    Specific Gravity, Urine 1.003 (N) 1.005 - 1.035    pH, Urine 7.0 5.0, 5.5, 6.0, 6.5, 7.0, 7.5, 8.0    Protein, Urine NEGATIVE NEGATIVE mg/dL    Glucose, Urine >=500 (3+) (A) NEGATIVE mg/dL    Blood, Urine LARGE (3+) (A) NEGATIVE    Ketones, Urine NEGATIVE NEGATIVE mg/dL    Bilirubin, Urine NEGATIVE NEGATIVE    Urobilinogen, Urine <2.0 <2.0 mg/dL    Nitrite, Urine NEGATIVE NEGATIVE    Leukocyte Esterase, Urine SMALL (1+) (A) NEGATIVE   Extra Urine Gray Tube   Result Value Ref Range    Extra Tube Hold for add-ons.    Microscopic Only, Urine   Result Value Ref Range    WBC, Urine 6-10 (A) 1-5, NONE /HPF    RBC, Urine >20 (A) NONE, 1-2, 3-5 /HPF    Mucus, Urine 1+ Reference range not established. /LPF    Hyaline Casts, Urine OCCASIONAL (A) NONE /LPF   Urine Culture    Specimen: Clean Catch/Voided; Urine   Result Value Ref Range    Urine Culture No growth    Drug Screen, Urine   Result Value Ref Range    Amphetamine Screen, Urine Presumptive Negative Presumptive Negative    Barbiturate Screen, Urine Presumptive Negative Presumptive Negative    Benzodiazepines Screen, Urine Presumptive Negative Presumptive Negative    Cannabinoid Screen, Urine Presumptive Negative Presumptive Negative    Cocaine Metabolite Screen, Urine Presumptive Negative Presumptive Negative    Fentanyl Screen, Urine Presumptive Negative Presumptive Negative    Opiate Screen, Urine Presumptive Negative Presumptive Negative    Oxycodone Screen, Urine Presumptive Negative Presumptive Negative    PCP Screen, Urine Presumptive Negative Presumptive Negative    Methadone Screen, Urine Presumptive Negative  Presumptive Negative   Troponin, High Sensitivity, 1 Hour   Result Value Ref Range    Troponin I, High Sensitivity 160 (HH) 0 - 20 ng/L   Lactate   Result Value Ref Range    Lactate 1.2 0.4 - 2.0 mmol/L   ECG 12 lead   Result Value Ref Range    Ventricular Rate 141 BPM    Atrial Rate 141 BPM    SC Interval 126 ms    QRS Duration 170 ms    QT Interval 316 ms    QTC Calculation(Bazett) 484 ms    R Axis -79 degrees    T Axis -28 degrees    QRS Count 23 beats    Q Onset 212 ms    P Onset 149 ms    P Offset 204 ms    T Offset 370 ms    QTC Fredericia 420 ms   Blood Culture    Specimen: Peripheral Venipuncture; Blood culture   Result Value Ref Range    Blood Culture No growth at 1 day    Blood Culture    Specimen: Peripheral Venipuncture; Blood culture   Result Value Ref Range    Blood Culture No growth at 1 day    Troponin I, High Sensitivity   Result Value Ref Range    Troponin I, High Sensitivity 305 (HH) 0 - 20 ng/L   Procalcitonin   Result Value Ref Range    Procalcitonin 0.06 <=0.07 ng/mL   CBC and Auto Differential   Result Value Ref Range    WBC 11.9 (H) 4.4 - 11.3 x10*3/uL    nRBC 0.0 0.0 - 0.0 /100 WBCs    RBC 3.98 (L) 4.50 - 5.90 x10*6/uL    Hemoglobin 13.2 (L) 13.5 - 17.5 g/dL    Hematocrit 37.7 (L) 41.0 - 52.0 %    MCV 95 80 - 100 fL    MCH 33.2 26.0 - 34.0 pg    MCHC 35.0 32.0 - 36.0 g/dL    RDW 13.9 11.5 - 14.5 %    Platelets 175 150 - 450 x10*3/uL    Neutrophils % 65.5 40.0 - 80.0 %    Immature Granulocytes %, Automated 0.8 0.0 - 0.9 %    Lymphocytes % 22.4 13.0 - 44.0 %    Monocytes % 8.4 2.0 - 10.0 %    Eosinophils % 2.1 0.0 - 6.0 %    Basophils % 0.8 0.0 - 2.0 %    Neutrophils Absolute 7.80 (H) 1.20 - 7.70 x10*3/uL    Immature Granulocytes Absolute, Automated 0.09 0.00 - 0.70 x10*3/uL    Lymphocytes Absolute 2.66 1.20 - 4.80 x10*3/uL    Monocytes Absolute 1.00 0.10 - 1.00 x10*3/uL    Eosinophils Absolute 0.25 0.00 - 0.70 x10*3/uL    Basophils Absolute 0.10 0.00 - 0.10 x10*3/uL   Magnesium   Result Value  Ref Range    Magnesium 2.70 (H) 1.60 - 2.40 mg/dL   Phosphorus   Result Value Ref Range    Phosphorus 3.8 2.5 - 4.9 mg/dL   Basic metabolic panel   Result Value Ref Range    Glucose 129 (H) 74 - 99 mg/dL    Sodium 137 136 - 145 mmol/L    Potassium 4.2 3.5 - 5.3 mmol/L    Chloride 106 98 - 107 mmol/L    Bicarbonate 23 21 - 32 mmol/L    Anion Gap 12 10 - 20 mmol/L    Urea Nitrogen 19 6 - 23 mg/dL    Creatinine 1.30 0.50 - 1.30 mg/dL    eGFR 64 >60 mL/min/1.73m*2    Calcium 8.0 (L) 8.6 - 10.3 mg/dL   Troponin I, High Sensitivity   Result Value Ref Range    Troponin I, High Sensitivity 342 (HH) 0 - 20 ng/L   TSH with reflex to Free T4 if abnormal   Result Value Ref Range    Thyroid Stimulating Hormone 5.55 (H) 0.44 - 3.98 mIU/L   Thyroxine, Free   Result Value Ref Range    Thyroxine, Free 0.94 0.61 - 1.12 ng/dL   ECG 12 Lead   Result Value Ref Range    Ventricular Rate 126 BPM    Atrial Rate 126 BPM    QRS Duration 180 ms    QT Interval 410 ms    QTC Calculation(Bazett) 593 ms    R Axis 250 degrees    T Axis -31 degrees    QRS Count 21 beats    Q Onset 210 ms    T Offset 415 ms    QTC Fredericia 525 ms   Transthoracic Echo (TTE) Complete   Result Value Ref Range    LA vol index A/L 43.5 ml/m2    AV pk bong 0.95 m/s    AV mn grad 2.0 mmHg    LVOT diam 2.06 cm    LV Biplane EF 9 %    Tricuspid annular plane systolic excursion 1.5 cm    RV free wall pk S' 10.80 cm/s    RVSP 51.7 mmHg    LVIDd 6.55 cm    Aortic Valve Area by Continuity of VTI 2.06 cm2    Aortic Valve Area by Continuity of Peak Velocity 2.16 cm2    AV pk grad 3.6 mmHg    LV A4C EF 8.5     BSA 1.93 m2   CBC and Auto Differential   Result Value Ref Range    WBC 11.9 (H) 4.4 - 11.3 x10*3/uL    nRBC 0.2 (H) 0.0 - 0.0 /100 WBCs    RBC 4.35 (L) 4.50 - 5.90 x10*6/uL    Hemoglobin 14.5 13.5 - 17.5 g/dL    Hematocrit 41.3 41.0 - 52.0 %    MCV 95 80 - 100 fL    MCH 33.3 26.0 - 34.0 pg    MCHC 35.1 32.0 - 36.0 g/dL    RDW 13.8 11.5 - 14.5 %    Platelets 180 150 - 450  x10*3/uL    Neutrophils % 70.2 40.0 - 80.0 %    Immature Granulocytes %, Automated 0.8 0.0 - 0.9 %    Lymphocytes % 17.8 13.0 - 44.0 %    Monocytes % 7.2 2.0 - 10.0 %    Eosinophils % 3.2 0.0 - 6.0 %    Basophils % 0.8 0.0 - 2.0 %    Neutrophils Absolute 8.34 (H) 1.20 - 7.70 x10*3/uL    Immature Granulocytes Absolute, Automated 0.09 0.00 - 0.70 x10*3/uL    Lymphocytes Absolute 2.12 1.20 - 4.80 x10*3/uL    Monocytes Absolute 0.86 0.10 - 1.00 x10*3/uL    Eosinophils Absolute 0.38 0.00 - 0.70 x10*3/uL    Basophils Absolute 0.09 0.00 - 0.10 x10*3/uL   Magnesium   Result Value Ref Range    Magnesium 2.00 1.60 - 2.40 mg/dL   Basic metabolic panel   Result Value Ref Range    Glucose 121 (H) 74 - 99 mg/dL    Sodium 140 136 - 145 mmol/L    Potassium 3.7 3.5 - 5.3 mmol/L    Chloride 104 98 - 107 mmol/L    Bicarbonate 25 21 - 32 mmol/L    Anion Gap 15 10 - 20 mmol/L    Urea Nitrogen 17 6 - 23 mg/dL    Creatinine 1.11 0.50 - 1.30 mg/dL    eGFR 78 >60 mL/min/1.73m*2    Calcium 8.8 8.6 - 10.3 mg/dL   ECG 12 Lead   Result Value Ref Range    Ventricular Rate 80 BPM    Atrial Rate 80 BPM    IN Interval 156 ms    QRS Duration 150 ms    QT Interval 480 ms    QTC Calculation(Bazett) 553 ms    P Axis 45 degrees    R Axis -57 degrees    T Axis 100 degrees    QRS Count 14 beats    Q Onset 180 ms    P Onset 140 ms    P Offset 178 ms    T Offset 420 ms    QTC Fredericia 528 ms     ECG 12 Lead    Result Date: 5/24/2024  AV dual-paced rhythm Abnormal ECG When compared with ECG of 23-MAY-2024 08:05, Electronic ventricular pacemaker has replaced Wide QRS tachycardia Vent. rate has decreased BY  46 BPM Confirmed by Zachary Sparks ([id number]) on 5/24/2024 8:56:30 AM    ECG 12 lead    Result Date: 5/24/2024  Ventricular tachycardia Left axis deviation Right bundle branch block Inferior infarct (cited on or before 21-JAN-2002) T wave abnormality, consider lateral ischemia Abnormal ECG See ED provider note for full interpretation and clinical correlation  Confirmed by Zachary Sparks ([id number]) on 5/24/2024 8:56:24 AM    ECG 12 lead    Result Date: 5/24/2024  Wide QRS tachycardia with frequent atrial-paced complexes Left axis deviation Right bundle branch block Inferior infarct (cited on or before 21-JAN-2002) Abnormal ECG When compared with ECG of 09-JUL-2008 06:28, Electronic atrial pacemaker has replaced Sinus rhythm Vent. rate has increased BY  72 BPM Right bundle branch block is now Present See ED provider note for full interpretation and clinical correlation Confirmed by Zachary Sparks ([id number]) on 5/24/2024 8:55:57 AM    Electrophysiology procedure    Result Date: 5/23/2024  Table formatting from the original result was not included. Procedure Details: Procedure Details:  Cardioversion Summary: ·   External electric cardioversion of ventricular tachycardia to normal sinus rhythm was achieved using 200 J synchronized biphasic Antitachycardia pacing was delivered through the device at 450 ms, 300 ms, 250 ms with no termination of ventricular arrhythmias. . Recommendations: 1. A 12 lead ECG should be performed prior to discharge from the hospital. 2. The patient should continue with the present medications. Discharge: 1. The patient recovered uneventfully from the effects of conscious sedation. The patient left the EP laboratory hemodynamically stable and without neurological deficits. Follow up: 1. The patient will stay on telemetry intensive care unit for drug load with high risk medication, following bed rest and subsequent ambulation, provided the recovery parameters are appropriate. The patient should call the electrophysiologist immediately if symptoms recur, or for any problems. The patient has been instructed accordingly. ________________________________________ Procedures: Cardioversion.  Noninvasive primary stimulation through the device (biventricular ICD) ________________________________________ Patient history: Please refer to the detailed history and physical  on the patient's medical chart.  History of ventricular tachycardia status post ICD shocks.  Patient is scheduled for antitachycardia pacing-NIPS and cardioversion ________________________________________ Procedure narrative: The device was interrogated and reprogrammed prior to the procedure.  The risks, benefits, and alternatives to the procedure and sedation were explained to the patient, and informed consent was obtained. The patient was in the fasting state. A grounding pad was placed. Self-adhesive anterior-posterior defibrillation pads were applied. A ZOLL defibrillator was used for monitoring and the defibrillator waveform was set to biphasic. The patient was set up for continuous monitoring of surface 12 lead ECG and pulse oximetry. Blood pressure was monitored with automatic cuff measurements. The procedure was performed under IV conscious sedation performed by anesthesiology service. 1. External electric cardioversion of ventricular tachycardia to normal sinus rhythm was achieved using 200 J synchronized biphasic Antitachycardia pacing was delivered through the device at 450 ms, 300 ms, 250 ms with no termination of ventricular arrhythmias. The device was reprogrammed to DDDR  bpm.  AV delay was decreased to 150 ms. ________________________________________ Complications: The patient tolerated the procedure without any complications or incident. ________________________________________ Prepared and signed by Tolerance: good Complications: None     Cardiac Catheterization Procedure    Result Date: 5/23/2024   [facility], Cath Lab        [address] [place] [address] Cardiovascular Catheterization Report Patient Name:      FRANCIA PAULINO          Performing Physician:  [id number] Babatunde Menon MD Study Date:        5/23/2024             Verifying Physician:   Lakia Fine                                                                  Lynsey BROWN MRN/PID:           [id number]              Cardiologist/Co-Scrub: Accession#:        [id number]          Ordering Provider:     [id number] EVONNE WALTERS Date of Birth/Age: 1965 / 58 years Cardiologist: Gender:            M                     Fellow: Encounter#:        [id number]            Surgeon:  Study: Left Heart Cath  Indications: FRANCIA PAULINO is a 59 year old male who presents with dyslipidemia, hypertension, chronic pulmonary disease, prior myocardial infarction, prior percutaneous coronary intervention, smoker chf, coronary artery disease and an asymptomatic chest pain assessment. Left ventricular dysfunction, cardiomyopathy and cardiac arrhythmia.  Procedure Description: After infiltration with 2% Lidocaine, the right femoral artery was cannulated with a modified Seldinger technique. Subsequently a 5 Divehi sheath was placed in the right femoral artery. Selective coronary catheterization was performed using a 5 Fr catheter(s) exchanged over a guide wire to cannulate the coronary arteries. A JL 4 tip catheter was used for left coronary injections. A 3drc tip catheter was used for right coronary injections. Multiple injections of contrast were made into the left and right coronary arteries with angiograms recorded in multiple projections. After completion of the procedure, femoral artery angiography was performed. This demonstrated a common femoral artery puncture appropriate for closure. A Vascade 5F vascular closure Device was placed per protocol.  Coronary Angiography: The coronary circulation is right dominant.  Left Main Coronary Artery: There is 10-30% stenosis in the distal left main coronary artery.  Left Anterior Descending Coronary Artery Distribution: There is 10-30% stenosis in the proximal and mid left anterior descending artery.  Circumflex Coronary Artery Distribution: There is 100% stenosis  in the the proximal Circumflex artery.  Right Coronary Artery Distribution: Fills via collaterals. There is 100% stenosis in the the proximal Right Coronary Artery.  Left Ventriculography: The estimated left ventricular ejection fraction is abnormal at 10%.  Hemo Personnel: +---------------------------+---------+ Name                       Duty      +---------------------------+---------+ Babatunde Tan MD 1 +---------------------------+---------+  Hemodynamic Pressures:  +----+---------------------+----------+-------------+--------------+---------+ Site      Date Time      Phase NameSystolic mmHgDiastolic mmHgMean mmHg +----+---------------------+----------+-------------+--------------+---------+   AO5/23/2024 10:02:22 AM  AIR REST           70            57       64 +----+---------------------+----------+-------------+--------------+---------+   AO5/23/2024 10:03:03 AM  AIR REST           87            56       75 +----+---------------------+----------+-------------+--------------+---------+   AO5/23/2024 10:08:12 AM  AIR REST           98            59       73 +----+---------------------+----------+-------------+--------------+---------+  Cardiac Cath Post Procedure Notes: Post Procedure Diagnosis: Double vessel disease. Blood Loss:               Estimated blood loss during the procedure was 0 mls. Specimens Removed:        Number of specimen(s) removed: none. ____________________________________________________________________________________ CONCLUSIONS:  1. Distal Left Main: 10-30% stenosis.  2. Proximal and mid LAD Lesion: The percent stenosis is 10-30%.  3. Proximal CX Lesion: The percent stenosis is 100%.  4. Right Coronary Artery: fills via collaterals.  5. Proximal RCA Lesion: The percent stenosis is 100%.  6. The Left Ventricular Ejection Fraction is 10%,by echo. ICD 10 Codes: Ventricular tachycardia, unspecified-I47.20  CPT Codes: Coronary Angiography  S&I only ([facility])([facility])-[id number]; Moderate Sedation Services initial 15 minutes patient >5 years-[id number]  [id number] Babatunde Menon MD Performing Physician Electronically signed by [id number] Babatunde eMnon MD on 5/23/2024 at 10:42:57 AM  ** Final (Updated) **     ECG 12 Lead    Result Date: 5/23/2024  Wide QRS tachycardia - possibly ventricular tachycardia Abnormal right superior axis deviation Abnormal ECG When compared with ECG of 22-MAY-2024 23:59, (unconfirmed) No significant change was found Confirmed by Clayton Angulo ([id number]) on 5/23/2024 10:40:02 AM    Transthoracic Echo (TTE) Complete    Result Date: 5/23/2024          [place] [address]  Tel [phone] Fax [phone] TRANSTHORACIC ECHOCARDIOGRAM REPORT  Patient Name:      FRANCIA Schultz Physician:    [id number] Kd Rodrigues DO Study Date:        5/23/2024             Ordering Provider:    [id number] SHIN MRN/PID:           [id number]              Fellow: Accession#:        [id number]          Nurse: Date of Birth/Age: 1965 / 58 years Sonographer:          Cathy Sanders RDCS Gender:            M                     Additional Staff: Height:            170.18 cm             Admit Date: Weight:            78.47 kg              Admission Status:     Inpatient -                                                                Routine BSA / BMI:         1.90 m2 / 27.10 kg/m2 Department Location:  [facility] Blood Pressure: 97 /71 mmHg Study Type:    TRANSTHORACIC ECHO (TTE) COMPLETE Diagnosis/ICD: Presence of automatic (implantable) cardiac                defibrillator-Z95.810; Chronic systolic (congestive) heart                failure (CHF)-I50.22; Ischemic cardiomyopathy-I25.5 Indication:    Congestive Heart  Failure, ICM, ICD Firing (multiple shocks) CPT Codes:     Echo Complete w Full Doppler-03375 Patient History: MI Location/Type:  Unknown MI Pacer/Defib:       AICD Pertinent History: CAD, CHF, Cardiomyopathy, HTN and ICD Firing. Study Detail: The following Echo studies were performed: 2D, M-Mode, Doppler and               color flow. Technically challenging study due to patient lying in               supine position, prominent lung artifact and poor acoustic               windows. Patient has a defibrillator. The patient was asleep.  PHYSICIAN INTERPRETATION: Left Ventricle: Left ventricular systolic function is severely decreased, with an estimated ejection fraction of 10%. There is global hypokinesis of the left ventricle with minor regional variations. The left ventricular cavity size is moderately dilated. Left ventricular diastolic filling was not assessed. Left Atrium: The left atrium is mildly dilated. Right Ventricle: The right ventricle is normal in size. There is normal right ventricular global systolic function. A device is visualized in the right ventricle. Right Atrium: The right atrium is normal in size. There is a device visualized in the right atrium. Aortic Valve: The aortic valve appears structurally normal. The aortic valve appears tricuspid. There is mild aortic valve cusp calcification. There is no evidence of aortic valve stenosis. There is no evidence of aortic valve regurgitation. The peak instantaneous gradient of the aortic valve is 3.6 mmHg. The mean gradient of the aortic valve is 2.0 mmHg. Mitral Valve: The mitral valve is normal in structure. There is mild thickening of the anterior and posterior mitral valve leaflets. There is no evidence of mitral valve stenosis. There is normal mitral valve leaflet mobility. There is mild mitral annular calcification. There is moderate to severe mitral valve regurgitation. Tricuspid Valve: The tricuspid valve is structurally normal. There is normal  tricuspid valve leaflet mobility. There is moderate tricuspid regurgitation. Pulmonic Valve: The pulmonic valve is structurally normal. There is trace pulmonic valve regurgitation. Pericardium: There is no pericardial effusion noted. Aorta: The aortic root is normal. Pulmonary Artery: Pulmonary hypertension is present. The tricuspid regurgitant velocity is 3.03 m/s, and with an estimated right atrial pressure of 15 mmHg, the estimated pulmonary artery pressure is moderate to severely elevated with the RVSP at 51.7 mmHg. Systemic Veins: The inferior vena cava appears moderately dilated. In comparison to the previous echocardiogram(s): There are no prior studies on this patient for comparison purposes.  CONCLUSIONS:  1. Left ventricular systolic function is severely decreased with a 10% estimated ejection fraction.  2. There is no evidence of mitral valve stenosis.  3. Moderate to severe mitral valve regurgitation.  4. Moderate tricuspid regurgitation.  5. Aortic valve stenosis is not present.  6. Left ventricular cavity size is moderately dilated.  7. Moderate to severely elevated pulmonary artery pressure.  8. Pulmonary hypertension is present.  9. The inferior vena cava appears moderately dilated. 10. There is global hypokinesis of the left ventricle with minor regional variations. RECOMMENDATIONS: Technically suboptimal and limited study, therefore accuracy of above interpretation could be substantially diminished. Clinical correlation is advised. Consider additional imaging modalities if clinically indicated.  QUANTITATIVE DATA SUMMARY: 2D MEASUREMENTS:                           Normal Ranges: Ao Root d:     2.93 cm    (2.0-3.7cm) LAs:           4.06 cm    (2.7-4.0cm) IVSd:          1.07 cm    (0.6-1.1cm) LVPWd:         0.53 cm    (0.6-1.1cm) LVIDd:         6.55 cm    (3.9-5.9cm) LVIDs:         6.27 cm LV Mass Index: 114.6 g/m2 LV % FS        4.3 % LA VOLUME:                               Normal Ranges: LA Vol  A4C:        85.1 ml    (22+/-6mL/m2) LA Vol A2C:        79.1 ml LA Vol BP:         82.7 ml LA Vol Index A4C:  44.7ml/m2 LA Vol Index A2C:  41.6 ml/m2 LA Vol Index BP:   43.5 ml/m2 LA Area A4C:       25.7 cm2 LA Area A2C:       24.6 cm2 LA Major Axis A4C: 6.6 cm LA Major Axis A2C: 6.5 cm LA Volume Index:   41.2 ml/m2 LA Vol A4C:        80.4 ml LA Vol A2C:        75.8 ml RA VOLUME BY A/L METHOD:                               Normal Ranges: RA Vol A4C:        56.3 ml    (8.3-19.5ml) RA Vol Index A4C:  29.6 ml/m2 RA Area A4C:       18.2 cm2 RA Major Axis A4C: 5.0 cm LV SYSTOLIC FUNCTION BY 2D PLANIMETRY (MOD):                    Normal Ranges: EF-A4C View: 8.5 % (>=55%) EF-A2C View: 8.9 % EF-Biplane:  9.3 % MITRAL INSUFFICIENCY:                           Normal Ranges: PISA Radius:  1.1 cm MR VTI:       106.00 cm MR Vmax:      424.00 cm/s MR Alias Dayne: 40.9 cm/s MR Volume:    73.55 ml MR Flow Rt:   294.22 ml/s MR EROA:      0.69 cm2 AORTIC VALVE:                                   Normal Ranges: AoV Vmax:                0.95 m/s (<=1.7m/s) AoV Peak PG:             3.6 mmHg (<20mmHg) AoV Mean P.0 mmHg (1.7-11.5mmHg) LVOT Max Dayne:            0.62 m/s (<=1.1m/s) AoV VTI:                 12.90 cm (18-25cm) LVOT VTI:                7.99 cm LVOT Diameter:           2.06 cm  (1.8-2.4cm) AoV Area, VTI:           2.06 cm2 (2.5-5.5cm2) AoV Area,Vmax:           2.16 cm2 (2.5-4.5cm2) AoV Dimensionless Index: 0.62  RIGHT VENTRICLE: TAPSE: 15.3 mm RV s'  0.11 m/s TRICUSPID VALVE/RVSP:                             Normal Ranges: Peak TR Velocity: 3.03 m/s RV Syst Pressure: 51.7 mmHg (< 30mmHg) IVC Diam:         2.43 cm PULMONIC VALVE:                         Normal Ranges: PV Accel Time: 79 msec  (>120ms) PV Max Dayne:    0.7 m/s  (0.6-0.9m/s) PV Max P.9 mmHg PV Mean P.0 mmHg PV VTI:        10.90 cm  [id number] Kd Rodrigues DO Electronically signed on 2024 at 9:42:06 AM  ** Final **     XR chest 1  view    Result Date: 5/22/2024  STUDY: Chest Radiograph;  5/22/2024 9:52 PM INDICATION: Chest pain. COMPARISON: None Available ACCESSION NUMBER(S): [id number] ORDERING CLINICIAN: ALLI DENNISON TECHNIQUE:  Frontal chest was obtained at 21:52 hours. FINDINGS: CARDIOMEDIASTINAL SILHOUETTE: Cardiomediastinal silhouette is normal in size and configuration. Right subclavian pacer/ICD device is demonstrated.  LUNGS: Lungs are clear.  There is no pneumothorax or pleural effusion.  ABDOMEN: No remarkable upper abdominal findings.  BONES: No acute osseous changes.    No acute cardiopulmonary disease. Signed by Rio Painting, DO    CT chest wo IV contrast    Result Date: 5/6/2024  * * *Final Report* * * DATE OF EXAM: May  3 2024  2:48PM   [facility]   0541  -  CT CHEST WO IVCON  / ACCESSION #  [id number] PROCEDURE REASON: Lung nodules      * * * * Physician Interpretation * * * *  EXAMINATION:  CHEST CT WITHOUT CONTRAST CLINICAL HISTORY: Lung nodules Technique:  Spiral CT acquisition of the chest from the thoracic inlet to the upper abdomen without contrast. MQ:  [id number] CT Radiation dose: Integrated Dose-length product (DLP) for this visit =   278 mGy*cm CT Dose Reduction Employed: mAs-kVp adjusted based on patient size-age Comparison: Chest radiograph 04/14/2024 RESULT: Limitations:  None. Lines, tubes, and devices:  A right chest wall pacemaker/ICD is in place. Lung parenchyma and airways: There is unchanged bandlike scarring within the right lower lobe.  There is mild bibasilar juxtapleural subsegmental atelectasis.  No dense airspace consolidation.  The central tracheobronchial tree is patent.  There is upper lobe predominant centrilobular emphysema.  There is diffuse bronchial wall thickening, compatible with bronchitis. There are a few nonspecific noncalcified pulmonary nodules, with representative larger examples detailed as follows on series 6: -Image 110, right lower lobe, 0.4 cm -Image 118, left lower lobe  juxtapleural, 0.5 cm -Image 145, left lower lobe, 0.4 cm Pleural space:  No pleural effusion.  No pleural thickening. Lower neck, lymph nodes, and mediastinum:  The imaged thyroid is unremarkable.  No supraclavicular, axillary or hilar lymphadenopathy.   There is a borderline enlarged left lateral paratracheal lymph node measuring 1.1 cm in short axis (series 5, image 86). Heart, pericardium, and thoracic vessels:  The thoracic aorta and main pulmonary artery are normal in caliber. The cardiac chambers are normal in size.  Coronary artery atherosclerotic calcifications are noted, although the study is not optimized for coronary assessment. No pericardial effusion or thickening. Bones and soft tissues:  No destructive lytic or blastic osseous abnormality. Upper abdomen:  No abnormality in the imaged upper abdomen. Localizer images: No additional findings.    IMPRESSION: 1.  Bandlike right basilar scarring/atelectasis, corresponding to the queried observation from the chest radiograph of 04/14/2024. 2.  Nonspecific noncalcified pulmonary nodules measuring up to 0.5 cm.   Consider follow-up chest CT in approximately 1 year, to ensure stability. 3.  Upper lobe predominant centrilobular emphysema. Incidental Finding:  Follow-up Acuity: Incidental Finding: Solid: <6 mm (solitary or multiple) Routing Code:  N/A Recommendation: No imaging follow-up is recommended Time Frame:  N/A Comments:  If there are risk factors for lung malignancy, a follow-up chest CT exam could be obtained in 12 months --END OF FINDING-- : FAISAL   Transcribe Date/Time: May  6 2024 10:46A Dictated by : ALLIE CHENEY MD This examination was interpreted and the report reviewed and electronically signed by: ALLIE CHENEY MD on May  6 2024 11:14AM  EST

## 2024-05-24 NOTE — CONSULTS
Nutrition Diet Education:   Provider consult order     Education Documentation  Nutrition Care Manual, taught by Erin Willoughby RD, LD at 5/24/2024  3:05 PM.  Learner: Family, Patient  Readiness: Acceptance  Method: Explanation, Handout  Response: Verbalizes Understanding  Comment: Pt requesting RDN consult. States he has been making dietary changes, requesting further information. Provided and reviewed [facility] MNT handout. Pt interested in outpatient education, referral requested from NP. RDN contact info provided.        Time Spent/Follow-up Reminder:   Time Spent (min): 30 minutes  Last Date of Nutrition Visit: 05/24/24  Nutrition Follow-Up Needed?: Dietitian to reassess per policy  Follow up Comment: cardiac edu 5/24

## 2024-05-24 NOTE — PROGRESS NOTES
Electrophysiology Progress Note  Patient: Cristian Sapp  Unit/Bed: 11/11-A  YOB: 1965  MRN: [id number]  Acct: [id number]   Admitting Diagnosis: Ischemic cardiomyopathy [I25.5]  AICD discharge [Z45.02]  HFrEF (heart failure with reduced ejection fraction) (Multi) [I50.20]  Date:  5/22/2024  Hospital Day: 1  Attending: Rio Hays MD   Rounding TREY/Cardiologist:  YARITZA Luu-CNP    Complaint:  Chief Complaint   Patient presents with    Rapid Heart Rate     Defibrillator went off about 10-15 times, -200BPM. 50mcg fent in squad. Pt currently at 125BPM        SUBJECTIVE    Patient is resting comfortably in bed with no current complaints.  He denies complaints of lightheadedness, dizziness, chest pain, shortness of breath or palpitations.  He is anxious about his current medical status.  He is currently in an AV paced rhythm at a rate of 80 with occasional PVCs/couplets.  He is maintained on an IV amiodarone drip.  No further sustained VT noted overnight    VITALS   Visit Vitals  BP 87/61 (BP Location: Right arm, Patient Position: Lying)   Pulse 80   Temp 36.3 °C (97.3 °F) (Temporal)   Resp 17      I/O:    Intake/Output Summary (Last 24 hours) at 5/24/2024 1150  Last data filed at 5/24/2024 0600  Gross per 24 hour   Intake 2122 ml   Output 4650 ml   Net -2528 ml      Allergies:  Allergies   Allergen Reactions    Chantix [Varenicline] Unknown      PHYSICAL EXAM   Physical Exam  Vitals reviewed.   Constitutional:       Appearance: Normal appearance.   HENT:      Head: Normocephalic and atraumatic.      Nose: Nose normal.      Mouth/Throat:      Mouth: Mucous membranes are moist.      Pharynx: Oropharynx is clear.   Eyes:      Pupils: Pupils are equal, round, and reactive to light.   Cardiovascular:      Rate and Rhythm: Normal rate and regular rhythm.      Pulses: Normal pulses.      Heart sounds: Normal heart sounds.   Pulmonary:      Effort: Pulmonary effort is normal.      Breath  sounds: Normal breath sounds.   Abdominal:      General: Bowel sounds are normal.      Palpations: Abdomen is soft.   Musculoskeletal:         General: Normal range of motion.      Cervical back: Normal range of motion and neck supple.   Skin:     General: Skin is warm and dry.   Neurological:      General: No focal deficit present.      Mental Status: He is alert and oriented to person, place, and time.   Psychiatric:         Mood and Affect: Mood normal.         Behavior: Behavior normal.       LABS   CBC:   Results from last 7 days   Lab Units 05/24/24  0353 05/23/24  0711 05/22/24 2128   WBC AUTO x10*3/uL 11.9* 11.9* 17.9*   RBC AUTO x10*6/uL 4.35* 3.98* 4.36*   HEMOGLOBIN g/dL 14.5 13.2* 14.7   HEMATOCRIT % 41.3 37.7* 40.8*   MCV fL 95 95 94   MCH pg 33.3 33.2 33.7   MCHC g/dL 35.1 35.0 36.0   RDW % 13.8 13.9 13.5   PLATELETS AUTO x10*3/uL 180 175 256     CMP:    Results from last 7 days   Lab Units 05/24/24  0353 05/23/24  0711 05/22/24 2128   SODIUM mmol/L 140 137 138   POTASSIUM mmol/L 3.7 4.2 3.3*   CHLORIDE mmol/L 104 106 101   CO2 mmol/L 25 23 25   BUN mg/dL 17 19 17   CREATININE mg/dL 1.11 1.30 1.29   GLUCOSE mg/dL 121* 129* 127*   PROTEIN TOTAL g/dL  --   --  6.6   CALCIUM mg/dL 8.8 8.0* 9.0   BILIRUBIN TOTAL mg/dL  --   --  1.1   ALK PHOS U/L  --   --  72   AST U/L  --   --  15   ALT U/L  --   --  26     Magnesium:  Results from last 7 days   Lab Units 05/24/24  0353 05/23/24  0711 05/22/24 2128   MAGNESIUM mg/dL 2.00 2.70* 1.87     Troponin:    Results from last 7 days   Lab Units 05/23/24  0711 05/23/24  0139 05/22/24  2336   TROPHS ng/L 342* 305* 160*     BNP:   Results from last 7 days   Lab Units 05/22/24  2128   BNP pg/mL 2,144*       Current Facility-Administered Medications:     acetaminophen (Tylenol) tablet 650 mg, 650 mg, oral, q4h PRN, Michelle Hendricks, APRN-CNP    albuterol 90 mcg/actuation inhaler 2 puff, 2 puff, inhalation, q4h PRN, Bladimir Sam, APRN-CNP    amiodarone (Nexterone) 360  mg in dextrose,iso-osm 200 mL (1.8 mg/mL) infusion (premix), 1 mg/min, intravenous, Continuous, YARITZA Chaudhry-CNP, Last Rate: 33.3 mL/hr at 05/24/24 0947, 1 mg/min at 05/24/24 0947    aspirin chewable tablet 81 mg, 81 mg, oral, Daily, Etta Carballo APRN-CNP, 81 mg at 05/24/24 0948    cholecalciferol (Vitamin D-3) tablet 5,000 Units, 5,000 Units, oral, q3 days, YARITZA Menjivar-CNP, 5,000 Units at 05/23/24 2106    [Held by provider] co-enzyme Q-10 capsule 50 mg, 50 mg, oral, Every Sunday, CLAUDIA Menjivar    cyanocobalamin (Vitamin B-12) tablet 1,000 mcg, 1,000 mcg, oral, Daily, YARITZA Menjivar-CNP, 1,000 mcg at 05/24/24 0948    enoxaparin (Lovenox) syringe 40 mg, 40 mg, subcutaneous, q24h, YARITZA Menjivar-CNP, 40 mg at 05/24/24 0947    levalbuterol (Xopenex) 0.63 mg/3 mL nebulizer solution 0.63 mg, 0.63 mg, nebulization, q4h PRN, YARITZA Chaudhry-CNP, 0.63 mg at 05/23/24 2108    lidocaine 4 % patch 2 patch, 2 patch, transdermal, Daily, YARITZA Menjivar-CNP, 2 patch at 05/24/24 0949    LORazepam (Ativan) tablet 0.5 mg, 0.5 mg, oral, Nightly, YARITZA Menjivar-CNP, 0.5 mg at 05/23/24 2108    magnesium oxide (Mag-Ox) tablet 400 mg, 400 mg, oral, Daily, YARITZA Menjivar-CNP, 400 mg at 05/24/24 0952    methocarbamol (Robaxin) tablet 500 mg, 500 mg, oral, q6h PRN, YARITZA Menjivar-CNP, 500 mg at 05/24/24 0950    nicotine (Nicoderm CQ) 21 mg/24 hr patch 1 patch, 1 patch, transdermal, q24h, Bladimir Sam, APRN-CNP, 1 patch at 05/23/24 2108    ondansetron (Zofran) injection 4 mg, 4 mg, intravenous, q8h PRN, Michelle Hendricks, APRN-CNP, 4 mg at 05/24/24 0411    PARoxetine (Paxil) tablet 40 mg, 40 mg, oral, Nightly, YARITZA Chaudhry-CNP, 40 mg at 05/23/24 2107    perflutren lipid microspheres (Definity) injection 0.5-10 mL of dilution, 0.5-10 mL of dilution, intravenous, Once in imaging, YARITZA Chaudhry-CNP    potassium chloride  (Klor-Con) packet 20 mEq, 20 mEq, oral, Daily, CLAUDIA Menjivar, 20 mEq at 05/24/24 0948    prasugrel (Effient) tablet 10 mg, 10 mg, oral, Daily, CLAUDIA Menjivar    ranolazine (Ranexa) 12 hr tablet 500 mg, 500 mg, oral, BID, YARITZA Menjivar-CNP, 500 mg at 05/24/24 0948    rosuvastatin (Crestor) tablet 20 mg, 20 mg, oral, Nightly, YARITZA Menjivar-CNP, 20 mg at 05/23/24 2107    [Held by provider] sacubitriL-valsartan (Entresto) 24-26 mg per tablet 1 tablet, 1 tablet, oral, BID, CLAUDIA Menjivar    simethicone (Mylicon) chewable tablet 80 mg, 80 mg, oral, TID PRN, YARITZA Chaudhry-CNP, 80 mg at 05/23/24 2108    sodium chloride (Ocean) 0.65 % nasal spray 1 spray, 1 spray, Each Nostril, 4x daily PRN, YARITZA Chaudhry-CNP    tamsulosin (Flomax) 24 hr capsule 0.4 mg, 0.4 mg, oral, Daily, YARITZA Menjivar-CNP, 0.4 mg at 05/24/24 0953    [Held by provider] torsemide (Demadex) tablet 20 mg, 20 mg, oral, BID, YARITZA Chaudhry-CNP, 20 mg at 05/23/24 2108    traZODone (Desyrel) tablet 50 mg, 50 mg, oral, Nightly, YARITZA Menjivar-CNP, 50 mg at 05/23/24 2107    ECG 12 Lead  Result Date: 5/24/2024  AV dual-paced rhythm Abnormal ECG When compared with ECG of 23-MAY-2024 08:05, Electronic ventricular pacemaker has replaced Wide QRS tachycardia Vent. rate has decreased BY  46 BPM Confirmed by Zachary Sparks ([id number]) on 5/24/2024 8:56:30 AM    ECG 12 lead  Result Date: 5/24/2024  Ventricular tachycardia Left axis deviation Right bundle branch block Inferior infarct (cited on or before 21-JAN-2002) T wave abnormality, consider lateral ischemia Abnormal ECG See ED provider note for full interpretation and clinical correlation Confirmed by Zachary Sparks ([id number]) on 5/24/2024 8:56:24 AM    Cardiac Catheterization Procedure  Result Date: 5/23/2024  [facility], Cath Lab        [address] [place] [address] Cardiovascular Catheterization  Report CONCLUSIONS:  1. Distal Left Main: 10-30% stenosis.  2. Proximal and mid LAD Lesion: The percent stenosis is 10-30%.  3. Proximal CX Lesion: The percent stenosis is 100%.  4. Right Coronary Artery: fills via collaterals.  5. Proximal RCA Lesion: The percent stenosis is 100%.  6. The Left Ventricular Ejection Fraction is 10%,by echo. ICD 10 Codes: Ventricular tachycardia, unspecified-I47.20  CPT Codes: Coronary Angiography S&I only ([facility])([facility])-10534; Moderate Sedation Services initial 15 minutes patient >5 years-68400  [id number] Babatunde Menon MD Performing Physician Electronically signed by [id number] Babatunde Menon MD on 5/23/2024 at 10:42:57 AM  ** Final (Updated) **     ECG 12 Lead  Result Date: 5/23/2024  Wide QRS tachycardia - possibly ventricular tachycardia Abnormal right superior axis deviation Abnormal ECG When compared with ECG of 22-MAY-2024 23:59, (unconfirmed) No significant change was found Confirmed by Clayton Angulo ([id number]) on 5/23/2024 10:40:02 AM    Transthoracic Echo (TTE) Complete  Result Date: 5/23/2024  [address] [address]  Tel [phone] Fax [phone] TRANSTHORACIC ECHOCARDIOGRAM REPORT CONCLUSIONS:  1. Left ventricular systolic function is severely decreased with a 10% estimated ejection fraction.  2. There is no evidence of mitral valve stenosis.  3. Moderate to severe mitral valve regurgitation.  4. Moderate tricuspid regurgitation.  5. Aortic valve stenosis is not present.  6. Left ventricular cavity size is moderately dilated.  7. Moderate to severely elevated pulmonary artery pressure.  8. Pulmonary hypertension is present.  9. The inferior vena cava appears moderately dilated. 10. There is global hypokinesis of the left ventricle with minor regional variations. RECOMMENDATIONS: Technically suboptimal and limited study, therefore accuracy of above interpretation could be substantially diminished. Clinical correlation is advised.  Consider additional imaging modalities if clinically indicated.      Tele Monitoring: AV-Paced 80 (Occasional PVC's/couplets)    Assessment/Plan:   1.    Wide-complex tachycardia/ventricular tachycardia/Slow VT/AICD shocks             -Patient is currently maintaining an AV paced rhythm at a rate of 80 with occasional PVCs/couplets.             -Continue IV amiodarone drip at current dose.             -[facility] 5/23 revealed  of the proximal circumflex artery,  of the proximal RCA with distal collateral filling and mild disease of the left main and LAD.  Medical management is advised.  2.    Ischemic cardiomyopathy/acute on chronic systolic heart failure             -LVEF 10% on echocardiogram 5/23/2024             -Moderately dilated left ventricle   -Cardiology plans to repeat echocardiogram early next week  3.    Valvular heart disease             -Moderate to severe MR/moderate TR             -Moderate to severely elevated pulmonary artery pressures  4.    Coronary artery disease/multiple remote myocardial infarctions             -Remote multivessel PCI's (last in 2008)             -See report above for [facility] 5/23  5.    Benign essential hypertension             -Stable  6.    Hyperlipidemia             -On statin therapy  7.    Current tobacco use   -Smoking cessation strongly advised     Further recommendations pending clinical course.       Electronically signed by CLAUDIA Luu on 5/24/2024 at 11:50 AM    Patient was seen, chart reviewed.    Patient is doing well.  Had a brief burst of ventricular tachycardia in the last 24 hours.  Currently a paced V paced.    Long conversation with patient and family members about plan to follow.  Will continue with amiodarone therapy.    Looking at his records and also talking to patient he recalls had a VT storm back in 2014 for which she was evaluated at [facility].  Records are not available during this evaluation.  He was placed on amiodarone.  Apparently he  was taken to the EP laboratory to do a ventricular tachycardia ablation.  He also had discontinuation of amiodarone due to this medication was not working in the past (?).  Will try to obtain records from [facility]admission from 2014.    Meantime we will continue with amiodarone 1 mg/min.    We will try to discontinue IV amiodarone and changing to oral amiodarone in a.m. tomorrow.    Zachary Sparks MD

## 2024-05-24 NOTE — PROGRESS NOTES
"                                                                                     [facility] Progress Note      Cardiologist:  Kd Rodrigues DO MD   Date:  5/24/2024  Patient:  Cristian Sapp  YOB: 1965  MRN:  [id number]   Admit Date:  5/22/2024      Subjective:  He denies any chest pain, shortness of breath, palpitations, or chest pain  No major overnight issues.  Currently in sinus rhythm.      Objective:   BP 87/61 (BP Location: Right arm, Patient Position: Lying)   Pulse 80   Temp 36.3 °C (97.3 °F) (Temporal)   Resp 17   Ht 1.702 m (5' 7\")   Wt 77.7 kg (171 lb 4.8 oz)   SpO2 96%   BMI 26.83 kg/m²      @Vitalsmultipe@    Intake/Output Summary (Last 24 hours) at 5/24/2024 1007  Last data filed at 5/24/2024 0600  Gross per 24 hour   Intake 2122 ml   Output 5300 ml   Net -3178 ml       [unfilled]    Physical Exam:  GENERAL:  Well developed, well nourished, in no acute distress.  CHEST:  Symmetric and non-tender.  NEURO/PSYCH:  Alert and oriented times three with appropriate behavior and responses.  NECK:  Supple, no JVD, no bruit.  LUNGS:  Clear to auscultation bilaterally, normal respiratory effort.  HEART:  Rate and rhythm regular with no evident murmur, no gallop appreciated.        There are no rubs, clicks or heaves.  EXTREMITIES:  Warm with good color, no clubbing or cyanosis.  There is no edema noted.  PERIPHERAL VASCULAR:  Pulses present and equally palpable; 2+ throughout.      Medications:   aspirin, 81 mg, oral, Daily  cholecalciferol, 5,000 Units, oral, q3 days  [Held by provider] co-enzyme Q-10, 50 mg, oral, Every Sunday  cyanocobalamin, 1,000 mcg, oral, Daily  enoxaparin, 40 mg, subcutaneous, q24h  lidocaine, 2 patch, transdermal, Daily  LORazepam, 0.5 mg, oral, Nightly  magnesium oxide, 400 mg, oral, Daily  methocarbamol, 500 mg, oral, q6h  nicotine, 1 patch, transdermal, q24h  PARoxetine, 40 mg, oral, Nightly  perflutren lipid microspheres, 0.5-10 mL of dilution, intravenous, " Once in imaging  potassium chloride, 20 mEq, oral, Daily  prasugrel, 10 mg, oral, Daily  ranolazine, 500 mg, oral, BID  rosuvastatin, 20 mg, oral, Nightly  [Held by provider] sacubitriL-valsartan, 1 tablet, oral, BID  tamsulosin, 0.4 mg, oral, Daily  [Held by provider] torsemide, 20 mg, oral, BID  traZODone, 50 mg, oral, Nightly      amiodarone, 1 mg/min, Last Rate: 1 mg/min (05/24/24 0947)        Medications:     Current Facility-Administered Medications   Medication Dose Route Frequency Provider Last Rate Last Admin    acetaminophen (Tylenol) tablet 650 mg  650 mg oral q4h PRN CLAUDIA Francois        albuterol 90 mcg/actuation inhaler 2 puff  2 puff inhalation q4h PRN CLAUDIA Menjivar        amiodarone (Nexterone) 360 mg in dextrose,iso-osm 200 mL (1.8 mg/mL) infusion (premix)  1 mg/min intravenous Continuous CLAUDIA Chaudhry 33.3 mL/hr at 05/24/24 0947 1 mg/min at 05/24/24 0947    aspirin chewable tablet 81 mg  81 mg oral Daily CLAUDIA Chaudhry   81 mg at 05/24/24 0948    cholecalciferol (Vitamin D-3) tablet 5,000 Units  5,000 Units oral q3 days CLAUDIA Menjivar   5,000 Units at 05/23/24 2106    [Held by provider] co-enzyme Q-10 capsule 50 mg  50 mg oral Every Sunday CLAUDIA Menjivar        cyanocobalamin (Vitamin B-12) tablet 1,000 mcg  1,000 mcg oral Daily CLAUDIA Menjivar   1,000 mcg at 05/24/24 0948    enoxaparin (Lovenox) syringe 40 mg  40 mg subcutaneous q24h CLAUDIA Menjivar   40 mg at 05/24/24 0947    levalbuterol (Xopenex) 0.63 mg/3 mL nebulizer solution 0.63 mg  0.63 mg nebulization q4h PRN CLAUDIA Chaudhry   0.63 mg at 05/23/24 2108    lidocaine 4 % patch 2 patch  2 patch transdermal Daily CLAUDIA Menjivar   2 patch at 05/24/24 0949    LORazepam (Ativan) tablet 0.5 mg  0.5 mg oral Nightly NICCI MenjivarCNP   0.5 mg at 05/23/24 2108    magnesium oxide (Mag-Ox) tablet 400 mg  400 mg oral Daily  CLAUDIA Menjivar   400 mg at 05/24/24 0952    methocarbamol (Robaxin) tablet 500 mg  500 mg oral q6h PRN CLAUDIA Menjivar   500 mg at 05/24/24 0950    methocarbamol (Robaxin) tablet 500 mg  500 mg oral q6h CLAUDIA Menjivar   500 mg at 05/24/24 0009    nicotine (Nicoderm CQ) 21 mg/24 hr patch 1 patch  1 patch transdermal q24h CLAUDIA Menjivar   1 patch at 05/23/24 2108    ondansetron (Zofran) injection 4 mg  4 mg intravenous q8h PRN CLAUDIA Francois   4 mg at 05/24/24 0411    PARoxetine (Paxil) tablet 40 mg  40 mg oral Nightly CLAUDIA Chaudhry   40 mg at 05/23/24 2107    perflutren lipid microspheres (Definity) injection 0.5-10 mL of dilution  0.5-10 mL of dilution intravenous Once in imaging CLAUDIA Chaudhry        potassium chloride (Klor-Con) packet 20 mEq  20 mEq oral Daily CLAUDIA Menjivar   20 mEq at 05/24/24 0948    prasugrel (Effient) tablet 10 mg  10 mg oral Daily CLAUDIA Menjivar        ranolazine (Ranexa) 12 hr tablet 500 mg  500 mg oral BID CLAUDIA Menjivar   500 mg at 05/24/24 0948    rosuvastatin (Crestor) tablet 20 mg  20 mg oral Nightly CLAUDIA Menjivar   20 mg at 05/23/24 2107    [Held by provider] sacubitriL-valsartan (Entresto) 24-26 mg per tablet 1 tablet  1 tablet oral BID CLAUDIA Menjivar        simethicone (Mylicon) chewable tablet 80 mg  80 mg oral TID PRN NICCI ChaudhryCNP   80 mg at 05/23/24 2108    sodium chloride (Ocean) 0.65 % nasal spray 1 spray  1 spray Each Nostril 4x daily PRN CLAUDIA Chaudhry        tamsulosin (Flomax) 24 hr capsule 0.4 mg  0.4 mg oral Daily CLAUDIA Menjivar   0.4 mg at 05/24/24 0953    [Held by provider] torsemide (Demadex) tablet 20 mg  20 mg oral BID NICCI ChaudhryCNP   20 mg at 05/23/24 2108    traZODone (Desyrel) tablet 50 mg  50 mg oral Nightly CLAUDIA Menjivar   50 mg at 05/23/24 2107        Outpatient Medications:     Current Outpatient Medications   Medication Instructions    acetaminophen (TYLENOL) 650 mg, oral, Every 6 hours PRN    albuterol sulfate (Proair Digihaler) 90 mcg/actuation aero powdr breath act w/sensor inhaler 2 puffs, inhalation, Every 4 hours PRN    aspirin 81 mg, oral, Daily    bisoprolol (Zebeta) 5 mg tablet oral, 2 times daily    cholecalciferol (VITAMIN D-3) 5,000 Units, oral, Every 3 days    co-enzyme Q-10 50 mg, oral, Once Weekly    cyanocobalamin (VITAMIN B-12) 1,000 mcg, oral, Daily    DAPAGLIFLOZIN PROPANEDIOL ORAL 10 mg, oral, Daily    ezetimibe (ZETIA) 10 mg, oral, Daily    LORazepam (ATIVAN) 0.5 mg, oral, Nightly    magnesium gluconate 30 mg, oral, Daily    nicotine (Nicoderm CQ) 21 mg/24 hr patch 1 patch, transdermal, Every 24 hours    nitroglycerin (NITROSTAT) 0.4 mg, sublingual, Every 5 min PRN    PARoxetine (PAXIL) 40 mg, oral, Every morning    potassium chloride (Klor-Con) 20 mEq packet 20 mEq, oral, Daily    Praluent Pen 75 mg, subcutaneous, Every 14 days    prasugrel (EFFIENT) 10 mg, oral, Daily    ranolazine (RANEXA) 500 mg, oral, 2 times daily, Do not crush, chew, or split.    rosuvastatin (CRESTOR) 20 mg, oral, Nightly    sacubitriL-valsartan (Entresto) 24-26 mg tablet 1 tablet, oral, 2 times daily    tamsulosin (FLOMAX) 0.4 mg, oral, Daily    torsemide (DEMADEX) 20 mg, oral, 2 times daily    traZODone (DESYREL) 50 mg, oral, Nightly        Diagnostics:    ECG 12 Lead    Result Date: 5/24/2024  AV dual-paced rhythm Abnormal ECG When compared with ECG of 23-MAY-2024 08:05, Electronic ventricular pacemaker has replaced Wide QRS tachycardia Vent. rate has decreased BY  46 BPM Confirmed by Zachary Sparks ([id number]) on 5/24/2024 8:56:30 AM    ECG 12 lead    Result Date: 5/24/2024  Ventricular tachycardia Left axis deviation Right bundle branch block Inferior infarct (cited on or before 21-JAN-2002) T wave abnormality, consider lateral ischemia Abnormal ECG See ED  provider note for full interpretation and clinical correlation Confirmed by Zachary Sparks ([id number]) on 5/24/2024 8:56:24 AM    Procedure Details:      Procedure Details:    Cardioversion  Summary:  ·            External electric cardioversion of ventricular tachycardia to normal sinus rhythm was achieved using 200 J synchronized biphasic  Antitachycardia pacing was delivered through the device at 450 ms, 300 ms, 250 ms with no termination of ventricular arrhythmias.  .   Recommendations:  1.          A 12 lead ECG should be performed prior to discharge from the hospital.   2.          The patient should continue with the present medications.   Discharge:   1.          The patient recovered uneventfully from the effects of conscious sedation. The patient left the EP laboratory hemodynamically stable and without neurological deficits.   Follow up:   1.          The patient will stay on telemetry intensive care unit for drug load with high risk medication, following bed rest and subsequent ambulation, provided the recovery parameters are appropriate. The patient should call the electrophysiologist immediately if symptoms recur, or for any problems. The patient has been instructed accordingly.   ________________________________________  Procedures:  Cardioversion.  Noninvasive primary stimulation through the device (biventricular ICD)  ________________________________________  Patient history:  Please refer to the detailed history and physical on the patient's medical chart.  History of ventricular tachycardia status post ICD shocks.  Patient is scheduled for antitachycardia pacing-NIPS and cardioversion  ________________________________________  Procedure narrative:  The device was interrogated and reprogrammed prior to the procedure.  The risks, benefits, and alternatives to the procedure and sedation were explained to the patient, and informed consent was obtained. The patient was in the fasting state. A grounding pad  was placed. Self-adhesive anterior-posterior defibrillation pads were applied. A ZOLL defibrillator was used for monitoring and the defibrillator waveform was set to biphasic. The patient was set up for continuous monitoring of surface 12 lead ECG and pulse oximetry. Blood pressure was monitored with automatic cuff measurements. The procedure was performed under IV conscious sedation performed by anesthesiology service.   1.          External electric cardioversion of ventricular tachycardia to normal sinus rhythm was achieved using 200 J synchronized biphasic  Antitachycardia pacing was delivered through the device at 450 ms, 300 ms, 250 ms with no termination of ventricular arrhythmias.     The device was reprogrammed to DDDR  bpm.  AV delay was decreased to 150 ms.  ________________________________________  Complications:  The patient tolerated the procedure without any complications or incident.   ________________________________________  Prepared and signed by     Tolerance: good   Complications: None           ECG 12 Lead    Result Date: 5/23/2024  Wide QRS tachycardia - possibly ventricular tachycardia Abnormal right superior axis deviation Abnormal ECG When compared with ECG of 22-MAY-2024 23:59, (unconfirmed) No significant change was found Confirmed by Clayton Angulo ([id number]) on 5/23/2024 10:40:02 AM             [place] [address]   Tel [phone] Fax [phone]     TRANSTHORACIC ECHOCARDIOGRAM REPORT        Patient Name:      FRANCIA Schultz Physician:    [id number] dK Rodrigues DO  Study Date:        5/23/2024             Ordering Provider:    [id number] GLORIA SHIN  MRN/PID:           [id number]              Fellow:  Accession#:        [id number]          Nurse:  Date of Birth/Age: 1965 / 58 years  Sonographer:          Cathy Sanders                                                                 CS  Gender:            M                     Additional Staff:  Height:            170.18 cm             Admit Date:  Weight:            78.47 kg              Admission Status:     Inpatient -                                                                 Routine  BSA / BMI:         1.90 m2 / 27.10 kg/m2 Department Location:  [facility]  Blood Pressure: 97 /71 mmHg     Study Type:    TRANSTHORACIC ECHO (TTE) COMPLETE  Diagnosis/ICD: Presence of automatic (implantable) cardiac                 defibrillator-Z95.810; Chronic systolic (congestive) heart                 failure (CHF)-I50.22; Ischemic cardiomyopathy-I25.5  Indication:    Congestive Heart Failure, ICM, ICD Firing (multiple shocks)  CPT Codes:     Echo Complete w Full Doppler-27910     Patient History:  MI Location/Type:  Unknown MI  Pacer/Defib:       AICD  Pertinent History: CAD, CHF, Cardiomyopathy, HTN and ICD Firing.     Study Detail: The following Echo studies were performed: 2D, M-Mode, Doppler and                color flow. Technically challenging study due to patient lying in                supine position, prominent lung artifact and poor acoustic                windows. Patient has a defibrillator. The patient was asleep.        PHYSICIAN INTERPRETATION:  Left Ventricle: Left ventricular systolic function is severely decreased, with an estimated ejection fraction of 10%. There is global hypokinesis of the left ventricle with minor regional variations. The left ventricular cavity size is moderately dilated. Left ventricular diastolic filling was not assessed.  Left Atrium: The left atrium is mildly dilated.  Right Ventricle: The right ventricle is normal in size. There is normal right ventricular global systolic function. A device is visualized in the right ventricle.  Right Atrium: The right atrium is normal in size. There is a device  visualized in the right atrium.  Aortic Valve: The aortic valve appears structurally normal. The aortic valve appears tricuspid. There is mild aortic valve cusp calcification. There is no evidence of aortic valve stenosis.  There is no evidence of aortic valve regurgitation. The peak instantaneous gradient of the aortic valve is 3.6 mmHg. The mean gradient of the aortic valve is 2.0 mmHg.  Mitral Valve: The mitral valve is normal in structure. There is mild thickening of the anterior and posterior mitral valve leaflets. There is no evidence of mitral valve stenosis. There is normal mitral valve leaflet mobility. There is mild mitral annular calcification. There is moderate to severe mitral valve regurgitation.  Tricuspid Valve: The tricuspid valve is structurally normal. There is normal tricuspid valve leaflet mobility. There is moderate tricuspid regurgitation.  Pulmonic Valve: The pulmonic valve is structurally normal. There is trace pulmonic valve regurgitation.  Pericardium: There is no pericardial effusion noted.  Aorta: The aortic root is normal.  Pulmonary Artery: Pulmonary hypertension is present. The tricuspid regurgitant velocity is 3.03 m/s, and with an estimated right atrial pressure of 15 mmHg, the estimated pulmonary artery pressure is moderate to severely elevated with the RVSP at 51.7 mmHg.  Systemic Veins: The inferior vena cava appears moderately dilated.  In comparison to the previous echocardiogram(s): There are no prior studies on this patient for comparison purposes.        CONCLUSIONS:   1. Left ventricular systolic function is severely decreased with a 10% estimated ejection fraction.   2. There is no evidence of mitral valve stenosis.   3. Moderate to severe mitral valve regurgitation.   4. Moderate tricuspid regurgitation.   5. Aortic valve stenosis is not present.   6. Left ventricular cavity size is moderately dilated.   7. Moderate to severely elevated pulmonary artery pressure.   8.  Pulmonary hypertension is present.   9. The inferior vena cava appears moderately dilated.  10. There is global hypokinesis of the left ventricle with minor regional variations.     RECOMMENDATIONS:  Technically suboptimal and limited study, therefore accuracy of above interpretation could be substantially diminished. Clinical correlation is advised. Consider additional imaging modalities if clinically indicated.       Cardiac Cath Post Procedure Notes:  Post Procedure Diagnosis: Double vessel disease.  Blood Loss:               Estimated blood loss during the procedure was 0 mls.  Specimens Removed:        Number of specimen(s) removed: none.     ____________________________________________________________________________________  CONCLUSIONS:   1. Distal Left Main: 10-30% stenosis.   2. Proximal and mid LAD Lesion: The percent stenosis is 10-30%.   3. Proximal CX Lesion: The percent stenosis is 100%.   4. Right Coronary Artery: fills via collaterals.   5. Proximal RCA Lesion: The percent stenosis is 100%.   6. The Left Ventricular Ejection Fraction is 10%,by echo.     ICD 10 Codes:  Ventricular tachycardia, unspecified-I47.20     CPT Codes:  Coronary Angiography S&I only ([facility])([facility])-08533; Moderate Sedation Services initial 15 minutes patient >5 years-36072     [id number] Babatunde Menon MD  Performing Physician  Electronically signed by [id number] Babatunde Menon MD on 5/23/2024 at 10:42:57  AM         EKG:   Encounter Date: 05/22/24   ECG 12 Lead   Result Value    Ventricular Rate 80    Atrial Rate 80    SC Interval 156    QRS Duration 150    QT Interval 480    QTC Calculation(Bazett) 553    P Axis 45    R Axis -57    T Axis 100    QRS Count 14    Q Onset 180    P Onset 140    P Offset 178    T Offset 420    QTC Fredericia 528    Narrative    AV dual-paced rhythm  Abnormal ECG  When compared with ECG of 23-MAY-2024 08:05,  Electronic ventricular pacemaker has replaced Wide QRS tachycardia  Vent. rate has  "decreased BY  46 BPM  Confirmed by Zachary Sparks ([id number]) on 5/24/2024 8:56:30 AM                            Lab Data:    BMP:  Lab Results   Component Value Date     05/24/2024     05/23/2024     05/22/2024    K 3.7 05/24/2024    K 4.2 05/23/2024    K 3.3 (L) 05/22/2024     05/24/2024     05/23/2024     05/22/2024    CO2 25 05/24/2024    CO2 23 05/23/2024    CO2 25 05/22/2024    BUN 17 05/24/2024    BUN 19 05/23/2024    BUN 17 05/22/2024    CREATININE 1.11 05/24/2024    CREATININE 1.30 05/23/2024    CREATININE 1.29 05/22/2024           TSH:  Lab Results   Component Value Date    TSH 5.55 (H) 05/23/2024       Lipid Profile:  No results found for: \"CHLPL\", \"TRIG\", \"HDL\", \"LDLCALC\", \"LDLDIRECT\"    HgbA1C:  No components found for: \"LABA1C\"          CBC:   Lab Results   Component Value Date    WBC 11.9 (H) 05/24/2024    RBC 4.35 (L) 05/24/2024    HGB 14.5 05/24/2024    HCT 41.3 05/24/2024     05/24/2024       CMP:    Lab Results   Component Value Date     05/24/2024    K 3.7 05/24/2024     05/24/2024    CO2 25 05/24/2024    BUN 17 05/24/2024    CREATININE 1.11 05/24/2024    GLUCOSE 121 (H) 05/24/2024    CALCIUM 8.8 05/24/2024         RADIOLOGY:   Electrophysiology procedure   Final Result      Cardiac Catheterization Procedure   Final Result      Transthoracic Echo (TTE) Complete   Final Result      Cardiac device check - Inpatient   Final Result      XR chest 1 view   Final Result   No acute cardiopulmonary disease.   Signed by Rio Painting DO          [unfilled]        Assessment:  Principal Problem:    AICD discharge  Active Problems:    CHF (congestive heart failure) (Multi)    HFrEF (heart failure with reduced ejection fraction) (Multi)    Ischemic cardiomyopathy    Elevated troponin    Ventricular tachycardia (Multi)        Patient Active Problem List   Diagnosis    AICD discharge    HFrEF (heart failure with reduced ejection fraction) (Multi)    Ischemic " cardiomyopathy    Elevated troponin    Ventricular tachycardia (Multi)    CHF (congestive heart failure) (Multi)       Plan:      Please do not hesitate to call with questions.   Electronically signed by Kd Rodrigues [facility] on 5/24/2024 at 10:07 AM    Assessment/Plan:         Patient Active Problem List   Diagnosis    AICD discharge    HFrEF (heart failure with reduced ejection fraction) (Multi)    Ischemic cardiomyopathy    Elevated troponin    Ventricular tachycardia (Multi)    CHF (congestive heart failure) (Multi)       ASSESSMENT   58-year-old gentleman seen evaluate the bedside in the medical intensive care unit.    Bedside examination evaluation interview were performed by me.    Chart was reviewed in detail discussed the patient including catheterization results, electrophysiology and cardioversion results and all other data and in great detail and also discussed with the intensive care staff.    Impression:  Principal Problem:    AICD discharge  Active Problems:    CHF (congestive heart failure) (Multi)    HFrEF (heart failure with reduced ejection fraction) (Multi)    Ischemic cardiomyopathy    Elevated troponin    Ventricular tachycardia (Multi)      PLAN   Recommendation:  Patient will have aggressive treatment with medical therapy for his cardiomyopathy and coronary disease  EP will follow for arrhythmia management  Will recheck echo in 2 to 3 days to see if initial presentation of 10% ejection fraction was related to multiple cardioversions by his ICD causing stunned myocardium versus further deterioration of overall LV function  Eventual referral to the advanced heart failure clinic for potential additional device therapy such as LVAD or even consideration for transplant assessment  Maintain proper electrolyte balance and fluid balance  Remain in the ICU over the next 2 to 3 days  Will continue to follow

## 2024-05-24 NOTE — NURSING NOTE
CHF Clinical Nurse Navigator Documentation  Congestive Heart Failure disease education was performed by the Clinical Nurse Navigator with a good understanding: yes  CHF signs and symptoms discussed and when to call cardiologist?  yes  Living With Heart Failure Education booklet?  no  Controlling Heart Failure at Home Education? yes  Home medication usage?  yes  Nutrition Education? Yes-low sodium   Fluid Restriction Education? yes  Daily Weight Education? Yes-daily weight log provided   Follow-up with Cardiologist after discharge education? yes  Comments: Met with with patient and his parents at bedside for heart failure education. All verbalized understanding. Patient is currently living in a home with his parents. He just moved up here from [place] about a month ago. While in [place] patient said he did see a heart failure doctor. He has been in the process of getting set up with doctors here. He said he was getting set up with doctors at [facility] but will more than likely follow up with the cardiologists here. Patient is a smoker. He smokes 1-2 ppd. Advised smoking cessation. Patient requesting to speak with a dietician. Referral placed. Spoke with patient regarding Healthy at Home program. Patient is agreeable. Referral placed. Answered all questions. Provided my contact information should any other questions or concerns arise.

## 2024-05-24 NOTE — PROGRESS NOTES
[facility] Critical Care Medicine       Date:  5/24/2024  Patient:  Cristian Sapp  YOB: 1965  MRN:  [id number]   Admit Date:  5/22/2024  ========================================================================================================    Chief Complaint   Patient presents with    Rapid Heart Rate     Defibrillator went off about 10-15 times, -200BPM. 50mcg fent in squad. Pt currently at 125BPM         History of Present Illness:  Cristian Sapp is a 58 y.o. year old male patient with Past Medical History of  listed below including multiple prior MI's, CAD, multiple JIM's, ICM, HFrEF, infrarenal AAA s/p stent right iliac, nephrolithiasis w/ recent cystoscopy with ureteral stent placement, presented to ED via EMS for ICD firing. He states it fired 10-15 times.  When ICD firing occurred, patient was not doing anything strenuous. He developed anterior non-radiating chest pain from ICD firing. No true SOB or palpitations.   Patient denies cardiac symptoms prior to ICD firing.  He was following a cardiologist in [place] where he previously resided. He started following with [facility] cardiology in April. Last Echo on file 08/2022 showing EF 25-30%.     ICD interrogated in ED, evaluated by cardiology along with ECG, appeared to be a slow v-tach, ECG looks like wide complex tachycardia with RBBB. ICD did not fire in ED.  His K+ was 3.3, Mag was < 2, he was given some K+ and Mag.  Dr. Sparks from EP contacted, he agreed with amiodarone, and said possible heart cath in AM so NPO except sips with meds.     He had lactic elevation 2.5 which normalized, likely reactive to ICD firing. ED thought maybe UTI given recent cysto however UA not reflect UTI. He was given IV Ceftriaxone. CXR shows no acute process.  Patient never required cardioversion.  His blood pressures have been soft however he runs low according to records, MAP consistently > 60 mmHg, no evidence of hypoperfusion on exam.  Advised to hold off  on Vasopressor/Inotropic support.  No evidence of acute heart failure causing volume overload.        Interval ICU Events:  Admitted to ICU on IV amiodarone infusion, SBP 90's, patient normally runs on lower end, MAP consistently > 60 mmHg, no evidence of hypoperfusion. Lactic acidosis resolved, likely occurred insetting of ICD firing. A/O, only c/o anterior chest discomfort from ICD firing, chest pain is reproducible.     This morning patient was taken to heart cath, no interventions, EF 10%  EP cardioverted patient at bedside today, pacemaker settings changed per EP, now AV paced at 80.   Continue on amio drip.  Echo pending.    5/24: Overnight received duonebs and torsemide. Great UOP -5.3L net -3.2L.  Continued on amnio drip at 1 Mg today per EP.  AV paced throughout the night and today.  Restart cardiac meds per cardiology.  Continue on dual antiplatelet.  Plan for echo in 2 to 3 days.         Medical History:  Past Medical History:   Diagnosis Date    Atherosclerosis of native artery of both lower extremities with intermittent claudication (CMS-[facility])     CHB (complete heart block) (Multi)     per device check    Chronic systolic CHF (congestive heart failure), NYHA class 3 (Multi)     COPD (chronic obstructive pulmonary disease) (Multi)     Coronary artery disease     History of tobacco abuse     HLD (hyperlipidemia)     Hypertension     Infrarenal abdominal aortic aneurysm (AAA) without rupture (CMS-[facility])     Ischemic cardiomyopathy with implantable cardioverter-defibrillator (ICD)     MI (myocardial infarction) (Multi)     multiple    Nephrolithiasis     PTSD (post-traumatic stress disorder)      Past Surgical History:   Procedure Laterality Date    CARDIAC DEFIBRILLATOR PLACEMENT      CORONARY ANGIOPLASTY WITH STENT PLACEMENT  2006    JIM to LAD    CORONARY ANGIOPLASTY WITH STENT PLACEMENT  04/2003    CORONARY ANGIOPLASTY WITH STENT PLACEMENT  06/2008    CYSTOSCOPY W/ URETERAL STENT PLACEMENT  04/01/2024     CYSTOSCOPY W/ URETERAL STENT PLACEMENT  04/30/2024    FEMORAL BYPASS      femoral artery    ILIAC ARTERY STENT Right     KNEE SURGERY       Medications Prior to Admission   Medication Sig Dispense Refill Last Dose    albuterol sulfate (Proair Digihaler) 90 mcg/actuation aero powdr breath act w/sensor inhaler Inhale 2 puffs every 4 hours if needed for wheezing.   5/21/2024    aspirin 81 mg EC tablet Take 1 tablet (81 mg) by mouth once daily.   5/22/2024    bisoprolol (Zebeta) 5 mg tablet Take by mouth 2 times a day.   5/22/2024 at 0800    cholecalciferol (Vitamin D-3) 5,000 Units tablet Take 1 tablet (5,000 Units) by mouth every 3 days.   Past Week    co-enzyme Q-10 50 mg capsule Take 1 capsule (50 mg) by mouth 1 (one) time per week.   Past Week    cyanocobalamin (Vitamin B-12) 1,000 mcg tablet Take 1 tablet (1,000 mcg) by mouth once daily.   Past Week    ezetimibe (Zetia) 10 mg tablet Take 1 tablet (10 mg) by mouth once daily.   5/22/2024 at 0800    LORazepam (Ativan) 0.5 mg tablet Take 1 tablet (0.5 mg) by mouth once daily at bedtime.   Past Week at 2200    magnesium gluconate 30 mg (550 mg) tablet Take 1 tablet (30 mg) by mouth once daily.   5/22/2024 at 0800    nicotine (Nicoderm CQ) 21 mg/24 hr patch Place 1 patch on the skin once every 24 hours.   5/22/2024 at 0800    PARoxetine (Paxil) 40 mg tablet Take 1 tablet (40 mg) by mouth once daily in the morning.   5/22/2024 at 0800    potassium chloride (Klor-Con) 20 mEq packet Take 20 mEq by mouth once daily.   5/22/2024 at 0800    prasugrel (Effient) 10 mg tablet Take 1 tablet (10 mg) by mouth once daily.   5/22/2024 at 0800    ranolazine (Ranexa) 500 mg 12 hr tablet Take 1 tablet (500 mg) by mouth 2 times a day. Do not crush, chew, or split.   5/22/2024 at 0800    rosuvastatin (Crestor) 20 mg tablet Take 1 tablet (20 mg) by mouth once daily at bedtime.   Past Week at 2200    sacubitriL-valsartan (Entresto) 24-26 mg tablet Take 1 tablet by mouth 2 times a day.    5/22/2024 at 0800    tamsulosin (Flomax) 0.4 mg 24 hr capsule Take 1 capsule (0.4 mg) by mouth once daily.   5/22/2024 at 0800    traZODone (Desyrel) 50 mg tablet Take 1 tablet (50 mg) by mouth once daily at bedtime.   Past Week at 2000    acetaminophen (Tylenol) 500 mg tablet Take 650 mg by mouth every 6 hours if needed for mild pain (1 - 3).   More than a month    alirocumab (Praluent Pen) 75 mg/mL pen injector Inject 75 mg under the skin every 14 (fourteen) days.   More than a month    DAPAGLIFLOZIN PROPANEDIOL ORAL Take 10 mg by mouth once daily.       nitroglycerin (Nitrostat) 0.4 mg SL tablet Place 1 tablet (0.4 mg) under the tongue every 5 minutes if needed for chest pain.   More than a month    torsemide (Demadex) 20 mg tablet Take 1 tablet (20 mg) by mouth 2 times a day.        Chantix [varenicline]  Social History     Tobacco Use    Smoking status: Former     Types: Cigarettes   Vaping Use    Vaping status: Never Used   Substance Use Topics    Alcohol use: Yes     Comment: social    Drug use: Defer     Family History   Problem Relation Name Age of Onset    Hypertension Mother      Diabetes Father      Hypertension Sister      Diabetes Sister      Colon cancer Maternal Grandmother      Hypertension Maternal Grandmother      Heart disease Maternal Grandfather      Hypertension Maternal Grandfather      Cancer Paternal Grandfather      Hypertension Paternal Grandfather         Review of Systems:  14 point review of systems was completed and negative except for those specially mention in my HPI    Physical Exam:    Heart Rate:  []   Temp:  [36.3 °C (97.3 °F)-36.5 °C (97.7 °F)]   Resp:  [12-26]   BP: ()/(61-83)   Weight:  [77.7 kg (171 lb 4.8 oz)]   SpO2:  [89 %-98 %]     Physical Exam  Vitals reviewed.   Constitutional:       General: He is not in acute distress.     Appearance: Normal appearance. He is normal weight. He is not ill-appearing, toxic-appearing or diaphoretic.   HENT:      Head:  Normocephalic.      Mouth/Throat:      Mouth: Mucous membranes are moist.      Pharynx: Oropharynx is clear.   Eyes:      Extraocular Movements: Extraocular movements intact.      Conjunctiva/sclera: Conjunctivae normal.      Pupils: Pupils are equal, round, and reactive to light.   Cardiovascular:      Rate and Rhythm: Normal rate and regular rhythm.      Pulses: Normal pulses.      Heart sounds: Normal heart sounds.      Comments: paced  Pulmonary:      Effort: Pulmonary effort is normal.      Breath sounds: Normal breath sounds.   Abdominal:      General: Abdomen is flat. Bowel sounds are normal.      Palpations: Abdomen is soft.   Musculoskeletal:         General: Normal range of motion.      Right lower leg: No edema.      Left lower leg: No edema.   Skin:     General: Skin is warm and dry.      Capillary Refill: Capillary refill takes less than 2 seconds.   Neurological:      General: No focal deficit present.      Mental Status: He is alert and oriented to person, place, and time. Mental status is at baseline.   Psychiatric:         Mood and Affect: Mood normal.         Behavior: Behavior normal.         Thought Content: Thought content normal.         Judgment: Judgment normal.         Objective:    I have reviewed all medications, laboratory results, and imaging pertinent for today's encounter    Assessment/Plan:    I am currently managing this critically ill patient for the following problems:    Chest pain  ICD Discharges  Slow ventricular tachycardia  Elevated troponin  ICM  Chronic Systolic CHF NYHA class III  CAD  Hx HTN  Hypokalemia  Leukocytosis  COPD        Neuro/Psych/Pain Ctrl/Sedation:  # Anxiety  -Pain: Tylenol, lido patch and robaxin for sternal pain  [facility]     Respiratory/ENT:  COPD  Current tobacco use  -Supplemental oxygen as needed to maintain SpO2 greater than 92%, on room air  -DuoNebs as needed  -Nicotine patch     Cardiovascular:  Chest pain  ICD Discharges with Slow ventricular  tachycardia s/p cardioversion  Elevated troponin  Acute on chronic HFrEF  hx ICM, CAD, HTN, MI, moderate to severe MR, mod TR, moderate to severe PAP previous echo August 2022 EF 25 to 30%, EF 10% 5/23 [facility]  Hypokalemia  -Keep K>4, Mg>2  -Cards and EP following  --Repeat echo in 2 to 3 days  --Keep amnio drip at 1 mg  -Daily EKG  -Blood pressure soft but MAP consistently > 60 mmHg, asymptomatic  -Continue aspirin and statin, torsemid, prasugrel  -holding antihypertensives for now until blood pressure improves    GI:  -Cardiac diet      Renal/Volume Status (Intra & Extravascular):  #s/p recent cystoscopy with ureteral stent placement was 1 month ago  -Urology consult  -Strict intake and output  -monitor renal function and electrolytes    Endocrine  -TSH normal     Infectious Disease:  Leukocytosis, Suspect reactive from ICD firing  -Monitor off antibiotics, procalcitonin negative  -Monitor CBC/D     Heme/Onc:  -Monitor CBC     OBGYN/MSK:  -Activity as tolerated     Ethics/Code Status:  Full code     :  DVT Prophylaxis: Lovenox  GI Prophylaxis: N/A  Bowel Regimen: Colace  Diet: Cardiac diet  CVC: N/A  [place]: N/A  Wen: N/A  Restraints: N/A  Dispo: ICU    Critical Care Time:  45 minutes  Discussed with Dr. Lis Sam, APRN-CNP

## 2024-05-25 ENCOUNTER — APPOINTMENT (OUTPATIENT)
Dept: CARDIOLOGY | Facility: HOSPITAL | Age: 59
DRG: 286 | End: 2024-05-25
Payer: COMMERCIAL

## 2024-05-25 LAB
ANION GAP SERPL CALC-SCNC: 13 MMOL/L (ref 10–20)
ATRIAL RATE: 81 BPM
BASOPHILS # BLD AUTO: 0.07 X10*3/UL (ref 0–0.1)
BASOPHILS NFR BLD AUTO: 0.7 %
BUN SERPL-MCNC: 16 MG/DL (ref 6–23)
CALCIUM SERPL-MCNC: 8.4 MG/DL (ref 8.6–10.3)
CHLORIDE SERPL-SCNC: 104 MMOL/L (ref 98–107)
CO2 SERPL-SCNC: 25 MMOL/L (ref 21–32)
CREAT SERPL-MCNC: 0.96 MG/DL (ref 0.5–1.3)
EGFRCR SERPLBLD CKD-EPI 2021: >90 ML/MIN/1.73M*2
EOSINOPHIL # BLD AUTO: 0.31 X10*3/UL (ref 0–0.7)
EOSINOPHIL NFR BLD AUTO: 2.9 %
ERYTHROCYTE [DISTWIDTH] IN BLOOD BY AUTOMATED COUNT: 13.9 % (ref 11.5–14.5)
GLUCOSE SERPL-MCNC: 113 MG/DL (ref 74–99)
HCT VFR BLD AUTO: 36.4 % (ref 41–52)
HGB BLD-MCNC: 12.9 G/DL (ref 13.5–17.5)
IMM GRANULOCYTES # BLD AUTO: 0.08 X10*3/UL (ref 0–0.7)
IMM GRANULOCYTES NFR BLD AUTO: 0.8 % (ref 0–0.9)
LYMPHOCYTES # BLD AUTO: 1.42 X10*3/UL (ref 1.2–4.8)
LYMPHOCYTES NFR BLD AUTO: 13.4 %
MAGNESIUM SERPL-MCNC: 2.16 MG/DL (ref 1.6–2.4)
MCH RBC QN AUTO: 33.2 PG (ref 26–34)
MCHC RBC AUTO-ENTMCNC: 35.4 G/DL (ref 32–36)
MCV RBC AUTO: 94 FL (ref 80–100)
MONOCYTES # BLD AUTO: 0.99 X10*3/UL (ref 0.1–1)
MONOCYTES NFR BLD AUTO: 9.4 %
NEUTROPHILS # BLD AUTO: 7.7 X10*3/UL (ref 1.2–7.7)
NEUTROPHILS NFR BLD AUTO: 72.8 %
NRBC BLD-RTO: 0 /100 WBCS (ref 0–0)
P AXIS: 80 DEGREES
P OFFSET: 155 MS
P ONSET: 123 MS
PLATELET # BLD AUTO: 171 X10*3/UL (ref 150–450)
POTASSIUM SERPL-SCNC: 4 MMOL/L (ref 3.5–5.3)
PR INTERVAL: 158 MS
Q ONSET: 163 MS
QRS COUNT: 14 BEATS
QRS DURATION: 172 MS
QT INTERVAL: 476 MS
QTC CALCULATION(BAZETT): 552 MS
QTC FREDERICIA: 525 MS
R AXIS: -57 DEGREES
RBC # BLD AUTO: 3.88 X10*6/UL (ref 4.5–5.9)
SODIUM SERPL-SCNC: 138 MMOL/L (ref 136–145)
T AXIS: 56 DEGREES
T OFFSET: 401 MS
VENTRICULAR RATE: 81 BPM
WBC # BLD AUTO: 10.6 X10*3/UL (ref 4.4–11.3)

## 2024-05-25 PROCEDURE — 2500000005 HC RX 250 GENERAL PHARMACY W/O HCPCS

## 2024-05-25 PROCEDURE — 2500000002 HC RX 250 W HCPCS SELF ADMINISTERED DRUGS (ALT 637 FOR MEDICARE OP, ALT 636 FOR OP/ED)

## 2024-05-25 PROCEDURE — 2500000004 HC RX 250 GENERAL PHARMACY W/ HCPCS (ALT 636 FOR OP/ED): Performed by: HOSPITALIST

## 2024-05-25 PROCEDURE — 2500000001 HC RX 250 WO HCPCS SELF ADMINISTERED DRUGS (ALT 637 FOR MEDICARE OP): Performed by: HOSPITALIST

## 2024-05-25 PROCEDURE — 2500000001 HC RX 250 WO HCPCS SELF ADMINISTERED DRUGS (ALT 637 FOR MEDICARE OP)

## 2024-05-25 PROCEDURE — 85025 COMPLETE CBC W/AUTO DIFF WBC: CPT | Performed by: SURGERY

## 2024-05-25 PROCEDURE — 93005 ELECTROCARDIOGRAM TRACING: CPT

## 2024-05-25 PROCEDURE — 99291 CRITICAL CARE FIRST HOUR: CPT

## 2024-05-25 PROCEDURE — 2020000001 HC ICU ROOM DAILY

## 2024-05-25 PROCEDURE — 36415 COLL VENOUS BLD VENIPUNCTURE: CPT | Performed by: SURGERY

## 2024-05-25 PROCEDURE — 99233 SBSQ HOSP IP/OBS HIGH 50: CPT | Performed by: INTERNAL MEDICINE

## 2024-05-25 PROCEDURE — 2500000004 HC RX 250 GENERAL PHARMACY W/ HCPCS (ALT 636 FOR OP/ED)

## 2024-05-25 PROCEDURE — 83735 ASSAY OF MAGNESIUM: CPT | Performed by: SURGERY

## 2024-05-25 PROCEDURE — S4991 NICOTINE PATCH NONLEGEND: HCPCS

## 2024-05-25 PROCEDURE — 2500000002 HC RX 250 W HCPCS SELF ADMINISTERED DRUGS (ALT 637 FOR MEDICARE OP, ALT 636 FOR OP/ED): Performed by: HOSPITALIST

## 2024-05-25 PROCEDURE — 93010 ELECTROCARDIOGRAM REPORT: CPT | Performed by: INTERNAL MEDICINE

## 2024-05-25 PROCEDURE — 2500000001 HC RX 250 WO HCPCS SELF ADMINISTERED DRUGS (ALT 637 FOR MEDICARE OP): Performed by: INTERNAL MEDICINE

## 2024-05-25 PROCEDURE — 80048 BASIC METABOLIC PNL TOTAL CA: CPT | Performed by: SURGERY

## 2024-05-25 RX ORDER — TORSEMIDE 20 MG/1
20 TABLET ORAL ONCE
Status: COMPLETED | OUTPATIENT
Start: 2024-05-25 | End: 2024-05-25

## 2024-05-25 RX ORDER — TORSEMIDE 20 MG/1
20 TABLET ORAL DAILY
Status: DISCONTINUED | OUTPATIENT
Start: 2024-05-26 | End: 2024-05-26

## 2024-05-25 RX ADMIN — SACUBITRIL AND VALSARTAN 1 TABLET: 24; 26 TABLET, FILM COATED ORAL at 20:06

## 2024-05-25 RX ADMIN — LIDOCAINE 2 PATCH: 4 PATCH TOPICAL at 10:54

## 2024-05-25 RX ADMIN — TRAZODONE HYDROCHLORIDE 50 MG: 50 TABLET ORAL at 20:06

## 2024-05-25 RX ADMIN — RANOLAZINE 500 MG: 500 TABLET, FILM COATED, EXTENDED RELEASE ORAL at 10:54

## 2024-05-25 RX ADMIN — ENOXAPARIN SODIUM 40 MG: 100 INJECTION SUBCUTANEOUS at 10:55

## 2024-05-25 RX ADMIN — METHOCARBAMOL TABLETS 500 MG: 500 TABLET, COATED ORAL at 20:08

## 2024-05-25 RX ADMIN — MAGNESIUM OXIDE 400 MG (241.3 MG MAGNESIUM) TABLET 400 MG: TABLET at 10:52

## 2024-05-25 RX ADMIN — RANOLAZINE 500 MG: 500 TABLET, FILM COATED, EXTENDED RELEASE ORAL at 20:06

## 2024-05-25 RX ADMIN — AMIODARONE HYDROCHLORIDE 1 MG/MIN: 1.8 INJECTION, SOLUTION INTRAVENOUS at 17:12

## 2024-05-25 RX ADMIN — DOCUSATE SODIUM 100 MG: 100 CAPSULE, LIQUID FILLED ORAL at 20:06

## 2024-05-25 RX ADMIN — AMIODARONE HYDROCHLORIDE 1 MG/MIN: 1.8 INJECTION, SOLUTION INTRAVENOUS at 04:51

## 2024-05-25 RX ADMIN — CYANOCOBALAMIN TAB 500 MCG 1000 MCG: 500 TAB at 10:52

## 2024-05-25 RX ADMIN — SACUBITRIL AND VALSARTAN 1 TABLET: 24; 26 TABLET, FILM COATED ORAL at 13:59

## 2024-05-25 RX ADMIN — LORAZEPAM 0.5 MG: 0.5 TABLET ORAL at 23:57

## 2024-05-25 RX ADMIN — DOCUSATE SODIUM 100 MG: 100 CAPSULE, LIQUID FILLED ORAL at 10:54

## 2024-05-25 RX ADMIN — TAMSULOSIN HYDROCHLORIDE 0.4 MG: 0.4 CAPSULE ORAL at 10:53

## 2024-05-25 RX ADMIN — ASPIRIN 81 MG: 81 TABLET, CHEWABLE ORAL at 10:54

## 2024-05-25 RX ADMIN — TORSEMIDE 20 MG: 20 TABLET ORAL at 10:53

## 2024-05-25 RX ADMIN — NICOTINE 1 PATCH: 21 PATCH, EXTENDED RELEASE TRANSDERMAL at 17:14

## 2024-05-25 RX ADMIN — PRASUGREL 10 MG: 10 TABLET, FILM COATED ORAL at 10:46

## 2024-05-25 RX ADMIN — PAROXETINE HYDROCHLORIDE 40 MG: 20 TABLET, FILM COATED ORAL at 20:06

## 2024-05-25 RX ADMIN — ROSUVASTATIN CALCIUM 20 MG: 20 TABLET, FILM COATED ORAL at 20:06

## 2024-05-25 RX ADMIN — AMIODARONE HYDROCHLORIDE 1 MG/MIN: 1.8 INJECTION, SOLUTION INTRAVENOUS at 10:50

## 2024-05-25 RX ADMIN — POTASSIUM CHLORIDE 20 MEQ: 1.5 POWDER, FOR SOLUTION ORAL at 10:53

## 2024-05-25 RX ADMIN — AMIODARONE HYDROCHLORIDE 1 MG/MIN: 1.8 INJECTION, SOLUTION INTRAVENOUS at 23:57

## 2024-05-25 ASSESSMENT — PAIN SCALES - GENERAL
PAINLEVEL_OUTOF10: 0 - NO PAIN
PAINLEVEL_OUTOF10: 0 - NO PAIN

## 2024-05-25 ASSESSMENT — PAIN - FUNCTIONAL ASSESSMENT: PAIN_FUNCTIONAL_ASSESSMENT: 0-10

## 2024-05-25 NOTE — PROGRESS NOTES
[facility] Critical Care Medicine       Date:  5/25/2024  Patient:  Cristian Sapp  YOB: 1965  MRN:  [id number]   Admit Date:  5/22/2024  ========================================================================================================    Chief Complaint   Patient presents with    Rapid Heart Rate     Defibrillator went off about 10-15 times, -200BPM. 50mcg fent in squad. Pt currently at 125BPM         History of Present Illness:  Cristian Sapp is a 58 y.o. year old male patient with Past Medical History of  listed below including multiple prior MI's, CAD, multiple JIM's, ICM, HFrEF, infrarenal AAA s/p stent right iliac, nephrolithiasis w/ recent cystoscopy with ureteral stent placement, presented to ED via EMS for ICD firing. He states it fired 10-15 times.  When ICD firing occurred, patient was not doing anything strenuous. He developed anterior non-radiating chest pain from ICD firing. No true SOB or palpitations.   Patient denies cardiac symptoms prior to ICD firing.  He was following a cardiologist in [place] where he previously resided. He started following with [facility] cardiology in April. Last Echo on file 08/2022 showing EF 25-30%.     ICD interrogated in ED, evaluated by cardiology along with ECG, appeared to be a slow v-tach, ECG looks like wide complex tachycardia with RBBB. ICD did not fire in ED.  His K+ was 3.3, Mag was < 2, he was given some K+ and Mag.  Dr. Sparks from EP contacted, he agreed with amiodarone, and said possible heart cath in AM so NPO except sips with meds.     He had lactic elevation 2.5 which normalized, likely reactive to ICD firing. ED thought maybe UTI given recent cysto however UA not reflect UTI. He was given IV Ceftriaxone. CXR shows no acute process.  Patient never required cardioversion.  His blood pressures have been soft however he runs low according to records, MAP consistently > 60 mmHg, no evidence of hypoperfusion on exam.  Advised to hold off  on Vasopressor/Inotropic support.  No evidence of acute heart failure causing volume overload.        Interval ICU Events:  Admitted to ICU on IV amiodarone infusion, SBP 90's, patient normally runs on lower end, MAP consistently > 60 mmHg, no evidence of hypoperfusion. Lactic acidosis resolved, likely occurred insetting of ICD firing. A/O, only c/o anterior chest discomfort from ICD firing, chest pain is reproducible.     This morning patient was taken to heart cath, no interventions, EF 10%  EP cardioverted patient at bedside today, pacemaker settings changed per EP, now AV paced at 80.   Continue on amio drip.  Echo pending.    5/24: Overnight received duonebs and torsemide. Great UOP -5.3L net -3.2L.  Continued on amnio drip at 1 Mg today per EP.  AV paced throughout the night and today.  Restart cardiac meds per cardiology.  Continue on dual antiplatelet.  Plan for echo in 2 to 3 days.     5/25: Continued on amnio drip at 1 mg.   Remains paced overnight.  Restart torsemide today.       Medical History:  Past Medical History:   Diagnosis Date    Atherosclerosis of native artery of both lower extremities with intermittent claudication (CMS-HCC)     CHB (complete heart block) (Multi)     per device check    Chronic systolic CHF (congestive heart failure), NYHA class 3 (Multi)     COPD (chronic obstructive pulmonary disease) (Multi)     Coronary artery disease     History of tobacco abuse     HLD (hyperlipidemia)     Hypertension     Infrarenal abdominal aortic aneurysm (AAA) without rupture (CMS-HCC)     Ischemic cardiomyopathy with implantable cardioverter-defibrillator (ICD)     MI (myocardial infarction) (Multi)     multiple    Nephrolithiasis     PTSD (post-traumatic stress disorder)      Past Surgical History:   Procedure Laterality Date    CARDIAC CATHETERIZATION N/A 5/23/2024    Procedure: Left Heart Cath;  Surgeon: Babatunde Tan MD;  Location: [facility];  Service: Cardiovascular;   Laterality: N/A;    CARDIAC DEFIBRILLATOR PLACEMENT      CARDIAC ELECTROPHYSIOLOGY PROCEDURE N/A 5/23/2024    Procedure: Cardioversion;  Surgeon: Zachary Sparks MD;  Location: [facility];  Service: Electrophysiology;  Laterality: N/A;    CORONARY ANGIOPLASTY WITH STENT PLACEMENT  2006    JIM to LAD    CORONARY ANGIOPLASTY WITH STENT PLACEMENT  04/2003    CORONARY ANGIOPLASTY WITH STENT PLACEMENT  06/2008    CYSTOSCOPY W/ URETERAL STENT PLACEMENT  04/01/2024    CYSTOSCOPY W/ URETERAL STENT PLACEMENT  04/30/2024    FEMORAL BYPASS      femoral artery    ILIAC ARTERY STENT Right     KNEE SURGERY       Medications Prior to Admission   Medication Sig Dispense Refill Last Dose    albuterol sulfate (Proair Digihaler) 90 mcg/actuation aero powdr breath act w/sensor inhaler Inhale 2 puffs every 4 hours if needed for wheezing.   5/21/2024    aspirin 81 mg EC tablet Take 1 tablet (81 mg) by mouth once daily.   5/22/2024    bisoprolol (Zebeta) 5 mg tablet Take by mouth 2 times a day.   5/22/2024 at 0800    cholecalciferol (Vitamin D-3) 5,000 Units tablet Take 1 tablet (5,000 Units) by mouth every 3 days.   Past Week    co-enzyme Q-10 50 mg capsule Take 1 capsule (50 mg) by mouth 1 (one) time per week.   Past Week    cyanocobalamin (Vitamin B-12) 1,000 mcg tablet Take 1 tablet (1,000 mcg) by mouth once daily.   Past Week    ezetimibe (Zetia) 10 mg tablet Take 1 tablet (10 mg) by mouth once daily.   5/22/2024 at 0800    LORazepam (Ativan) 0.5 mg tablet Take 1 tablet (0.5 mg) by mouth once daily at bedtime.   Past Week at 2200    magnesium gluconate 30 mg (550 mg) tablet Take 1 tablet (30 mg) by mouth once daily.   5/22/2024 at 0800    nicotine (Nicoderm CQ) 21 mg/24 hr patch Place 1 patch on the skin once every 24 hours.   5/22/2024 at 0800    PARoxetine (Paxil) 40 mg tablet Take 1 tablet (40 mg) by mouth once daily in the morning.   5/22/2024 at 0800    potassium chloride (Klor-Con) 20 mEq packet Take 20 mEq by mouth  once daily.   5/22/2024 at 0800    prasugrel (Effient) 10 mg tablet Take 1 tablet (10 mg) by mouth once daily.   5/22/2024 at 0800    ranolazine (Ranexa) 500 mg 12 hr tablet Take 1 tablet (500 mg) by mouth 2 times a day. Do not crush, chew, or split.   5/22/2024 at 0800    rosuvastatin (Crestor) 20 mg tablet Take 1 tablet (20 mg) by mouth once daily at bedtime.   Past Week at 2200    sacubitriL-valsartan (Entresto) 24-26 mg tablet Take 1 tablet by mouth 2 times a day.   5/22/2024 at 0800    tamsulosin (Flomax) 0.4 mg 24 hr capsule Take 1 capsule (0.4 mg) by mouth once daily.   5/22/2024 at 0800    traZODone (Desyrel) 50 mg tablet Take 1 tablet (50 mg) by mouth once daily at bedtime.   Past Week at 2000    acetaminophen (Tylenol) 500 mg tablet Take 650 mg by mouth every 6 hours if needed for mild pain (1 - 3).   More than a month    alirocumab (Praluent Pen) 75 mg/mL pen injector Inject 75 mg under the skin every 14 (fourteen) days.   More than a month    DAPAGLIFLOZIN PROPANEDIOL ORAL Take 10 mg by mouth once daily.       nitroglycerin (Nitrostat) 0.4 mg SL tablet Place 1 tablet (0.4 mg) under the tongue every 5 minutes if needed for chest pain.   More than a month    torsemide (Demadex) 20 mg tablet Take 1 tablet (20 mg) by mouth 2 times a day.        Chantix [varenicline]  Social History     Tobacco Use    Smoking status: Former     Types: Cigarettes   Vaping Use    Vaping status: Never Used   Substance Use Topics    Alcohol use: Yes     Comment: social    Drug use: Defer     Family History   Problem Relation Name Age of Onset    Hypertension Mother      Diabetes Father      Hypertension Sister      Diabetes Sister      Colon cancer Maternal Grandmother      Hypertension Maternal Grandmother      Heart disease Maternal Grandfather      Hypertension Maternal Grandfather      Cancer Paternal Grandfather      Hypertension Paternal Grandfather         Review of Systems:  14 point review of systems was completed and  negative except for those specially mention in my HPI    Physical Exam:    Heart Rate:  [80-92]   Temp:  [36.3 °C (97.3 °F)-36.5 °C (97.7 °F)]   Resp:  [17-36]   BP: ()/(57-82)   Weight:  [82.1 kg (181 lb)]   SpO2:  [87 %-96 %]     Physical Exam  Vitals reviewed.   Constitutional:       General: He is not in acute distress.     Appearance: Normal appearance. He is normal weight. He is not ill-appearing, toxic-appearing or diaphoretic.   HENT:      Head: Normocephalic.      Mouth/Throat:      Mouth: Mucous membranes are moist.      Pharynx: Oropharynx is clear.   Eyes:      Extraocular Movements: Extraocular movements intact.      Conjunctiva/sclera: Conjunctivae normal.      Pupils: Pupils are equal, round, and reactive to light.   Cardiovascular:      Rate and Rhythm: Normal rate and regular rhythm.      Pulses: Normal pulses.      Heart sounds: Normal heart sounds.      Comments: paced  Pulmonary:      Effort: Pulmonary effort is normal.      Breath sounds: Normal breath sounds.   Abdominal:      General: Abdomen is flat. Bowel sounds are normal.      Palpations: Abdomen is soft.   Musculoskeletal:         General: Normal range of motion.      Right lower leg: No edema.      Left lower leg: No edema.   Skin:     General: Skin is warm and dry.      Capillary Refill: Capillary refill takes less than 2 seconds.   Neurological:      General: No focal deficit present.      Mental Status: He is alert and oriented to person, place, and time. Mental status is at baseline.   Psychiatric:         Mood and Affect: Mood normal.         Behavior: Behavior normal.         Thought Content: Thought content normal.         Judgment: Judgment normal.         Objective:    I have reviewed all medications, laboratory results, and imaging pertinent for today's encounter    Assessment/Plan:    I am currently managing this critically ill patient for the following problems:    Chest pain  ICD Discharges  Slow ventricular  tachycardia  Elevated troponin  ICM  Chronic Systolic CHF NYHA class III  CAD  Hx HTN  Hypokalemia  Leukocytosis  COPD        Neuro/Psych/Pain Ctrl/Sedation:  # Anxiety  -Pain: Tylenol, lido patch and robaxin for sternal pain  -home ativan  -Trazodone at at bedtime     Respiratory/ENT:  COPD  Current tobacco use  -Supplemental oxygen as needed to maintain SpO2 greater than 92%  -4 LNC this morning  -DuoNebs as needed  -Nicotine patch     Cardiovascular:  Chest pain  ICD Discharges with Slow ventricular tachycardia s/p cardioversion  Elevated troponin  Acute on chronic HFrEF  hx ICM, CAD, HTN, MI, moderate to severe MR, mod TR, moderate to severe PAP previous echo August 2022 EF 25 to 30%, EF 10% 5/23 during [facility]  Hypokalemia  -Keep K>4, Mg>2  -Cards and EP following  --Repeat echo in 2 to 3 days  --Amio gtt and transition to PO  -Daily EKG  -entresto- holding for soft BP  -start torsemide daily  -Continue aspirin and statin, torsemid, prasugrel  -holding antihypertensives for now until blood pressure improves    GI:  -Cardiac diet      Renal/Volume Status (Intra & Extravascular):  #s/p recent cystoscopy with ureteral stent placement was 1 month ago  -Urology consult, CT abdomen pelvis without contrast pending  -Continue Flomax  -Strict intake and output  -monitor renal function and electrolytes    Endocrine  -TSH normal     Infectious Disease:  Leukocytosis, Suspect reactive from ICD firing  -Monitor off antibiotics, procalcitonin negative  -Monitor CBC/D     Heme/Onc:  -Monitor CBC     OBGYN/MSK:  -Activity as tolerated     Ethics/Code Status:  Full code  -Heart failure navigator consult, nutrition consult for outpatient follow-up placed     :  DVT Prophylaxis: Lovenox  GI Prophylaxis: N/A  Bowel Regimen: Colace  Diet: Cardiac diet  CVC: N/A  [place]: N/A  Wen: N/A  Restraints: N/A  Dispo: ICU    Critical Care Time:  45 minutes  Discussed with Dr. Lis Sam, APRN-CNP

## 2024-05-25 NOTE — PROGRESS NOTES
Urology follow-up progress note for Saturday, 5/25/2024  Sitting at the bedside, comfortable  Medical and cardiology issues noted  KUB and CT abdomen pelvis reviewed with the patient, left double-J stent in place, no evidence of any ureteral calculi or fragments  However on the CT scan he does have 2 or 3 small stones bilaterally, probably in the range of 2 or 3 mm no obstruction, these are not visible on the KUB film therefore would probably not yet be amenable to external shockwave lithotripsy  Reviewed with patient  As soon as discharge she will call [facility] where he had his ureteral lithotripsy and have his left stent removed outpatient  Reviewed with patient and family and they are agreeable  Otherwise continue supportive care    Additional medical and historical objective data reviewed and listed below    No current facility-administered medications on file prior to encounter.     Current Outpatient Medications on File Prior to Encounter   Medication Sig Dispense Refill    albuterol sulfate (Proair Digihaler) 90 mcg/actuation aero powdr breath act w/sensor inhaler Inhale 2 puffs every 4 hours if needed for wheezing.      aspirin 81 mg EC tablet Take 1 tablet (81 mg) by mouth once daily.      bisoprolol (Zebeta) 5 mg tablet Take by mouth 2 times a day.      cholecalciferol (Vitamin D-3) 5,000 Units tablet Take 1 tablet (5,000 Units) by mouth every 3 days.      co-enzyme Q-10 50 mg capsule Take 1 capsule (50 mg) by mouth 1 (one) time per week.      cyanocobalamin (Vitamin B-12) 1,000 mcg tablet Take 1 tablet (1,000 mcg) by mouth once daily.      ezetimibe (Zetia) 10 mg tablet Take 1 tablet (10 mg) by mouth once daily.      LORazepam (Ativan) 0.5 mg tablet Take 1 tablet (0.5 mg) by mouth once daily at bedtime.      magnesium gluconate 30 mg (550 mg) tablet Take 1 tablet (30 mg) by mouth once daily.      nicotine (Nicoderm CQ) 21 mg/24 hr patch Place 1 patch on the skin once every 24 hours.       PARoxetine (Paxil) 40 mg tablet Take 1 tablet (40 mg) by mouth once daily in the morning.      potassium chloride (Klor-Con) 20 mEq packet Take 20 mEq by mouth once daily.      prasugrel (Effient) 10 mg tablet Take 1 tablet (10 mg) by mouth once daily.      ranolazine (Ranexa) 500 mg 12 hr tablet Take 1 tablet (500 mg) by mouth 2 times a day. Do not crush, chew, or split.      rosuvastatin (Crestor) 20 mg tablet Take 1 tablet (20 mg) by mouth once daily at bedtime.      sacubitriL-valsartan (Entresto) 24-26 mg tablet Take 1 tablet by mouth 2 times a day.      tamsulosin (Flomax) 0.4 mg 24 hr capsule Take 1 capsule (0.4 mg) by mouth once daily.      traZODone (Desyrel) 50 mg tablet Take 1 tablet (50 mg) by mouth once daily at bedtime.      acetaminophen (Tylenol) 500 mg tablet Take 650 mg by mouth every 6 hours if needed for mild pain (1 - 3).      alirocumab (Praluent Pen) 75 mg/mL pen injector Inject 75 mg under the skin every 14 (fourteen) days.      DAPAGLIFLOZIN PROPANEDIOL ORAL Take 10 mg by mouth once daily.      nitroglycerin (Nitrostat) 0.4 mg SL tablet Place 1 tablet (0.4 mg) under the tongue every 5 minutes if needed for chest pain.      torsemide (Demadex) 20 mg tablet Take 1 tablet (20 mg) by mouth 2 times a day.       Results for orders placed or performed during the hospital encounter of 05/22/24 (from the past 96 hour(s))   ECG 12 lead   Result Value Ref Range    Ventricular Rate 125 BPM    Atrial Rate 125 BPM    QRS Duration 182 ms    QT Interval 388 ms    QTC Calculation(Bazett) 560 ms    R Axis -80 degrees    T Axis -10 degrees    QRS Count 20 beats    Q Onset 217 ms    T Offset 411 ms    QTC Fredericia 495 ms   CBC and Auto Differential   Result Value Ref Range    WBC 17.9 (H) 4.4 - 11.3 x10*3/uL    nRBC 0.0 0.0 - 0.0 /100 WBCs    RBC 4.36 (L) 4.50 - 5.90 x10*6/uL    Hemoglobin 14.7 13.5 - 17.5 g/dL    Hematocrit 40.8 (L) 41.0 - 52.0 %    MCV 94 80 - 100 fL    MCH 33.7 26.0 - 34.0 pg    MCHC 36.0  32.0 - 36.0 g/dL    RDW 13.5 11.5 - 14.5 %    Platelets 256 150 - 450 x10*3/uL    Neutrophils % 64.2 40.0 - 80.0 %    Immature Granulocytes %, Automated 0.9 0.0 - 0.9 %    Lymphocytes % 24.9 13.0 - 44.0 %    Monocytes % 7.4 2.0 - 10.0 %    Eosinophils % 1.7 0.0 - 6.0 %    Basophils % 0.9 0.0 - 2.0 %    Neutrophils Absolute 11.47 (H) 1.20 - 7.70 x10*3/uL    Immature Granulocytes Absolute, Automated 0.16 0.00 - 0.70 x10*3/uL    Lymphocytes Absolute 4.46 1.20 - 4.80 x10*3/uL    Monocytes Absolute 1.33 (H) 0.10 - 1.00 x10*3/uL    Eosinophils Absolute 0.30 0.00 - 0.70 x10*3/uL    Basophils Absolute 0.16 (H) 0.00 - 0.10 x10*3/uL   Comprehensive Metabolic Panel   Result Value Ref Range    Glucose 127 (H) 74 - 99 mg/dL    Sodium 138 136 - 145 mmol/L    Potassium 3.3 (L) 3.5 - 5.3 mmol/L    Chloride 101 98 - 107 mmol/L    Bicarbonate 25 21 - 32 mmol/L    Anion Gap 15 10 - 20 mmol/L    Urea Nitrogen 17 6 - 23 mg/dL    Creatinine 1.29 0.50 - 1.30 mg/dL    eGFR 65 >60 mL/min/1.73m*2    Calcium 9.0 8.6 - 10.3 mg/dL    Albumin 4.2 3.4 - 5.0 g/dL    Alkaline Phosphatase 72 33 - 120 U/L    Total Protein 6.6 6.4 - 8.2 g/dL    AST 15 9 - 39 U/L    Bilirubin, Total 1.1 0.0 - 1.2 mg/dL    ALT 26 10 - 52 U/L   Magnesium   Result Value Ref Range    Magnesium 1.87 1.60 - 2.40 mg/dL   Lactate   Result Value Ref Range    Lactate 2.5 (H) 0.4 - 2.0 mmol/L   Lipase   Result Value Ref Range    Lipase 19 9 - 82 U/L   Protime-INR   Result Value Ref Range    Protime 12.4 9.8 - 12.8 seconds    INR 1.1 0.9 - 1.1   aPTT   Result Value Ref Range    aPTT 26 (L) 27 - 38 seconds   B-Type Natriuretic Peptide   Result Value Ref Range    BNP 2,144 (H) 0 - 99 pg/mL   Troponin I, High Sensitivity, Initial   Result Value Ref Range    Troponin I, High Sensitivity 31 (H) 0 - 20 ng/L   Sars-CoV-2 PCR   Result Value Ref Range    Coronavirus 2019, PCR Not Detected Not Detected   Urinalysis with Reflex Culture and Microscopic   Result Value Ref Range    Color, Urine  Straw Straw, Yellow    Appearance, Urine Clear Clear    Specific Gravity, Urine 1.003 (N) 1.005 - 1.035    pH, Urine 7.0 5.0, 5.5, 6.0, 6.5, 7.0, 7.5, 8.0    Protein, Urine NEGATIVE NEGATIVE mg/dL    Glucose, Urine >=500 (3+) (A) NEGATIVE mg/dL    Blood, Urine LARGE (3+) (A) NEGATIVE    Ketones, Urine NEGATIVE NEGATIVE mg/dL    Bilirubin, Urine NEGATIVE NEGATIVE    Urobilinogen, Urine <2.0 <2.0 mg/dL    Nitrite, Urine NEGATIVE NEGATIVE    Leukocyte Esterase, Urine SMALL (1+) (A) NEGATIVE   Extra Urine Gray Tube   Result Value Ref Range    Extra Tube Hold for add-ons.    Microscopic Only, Urine   Result Value Ref Range    WBC, Urine 6-10 (A) 1-5, NONE /HPF    RBC, Urine >20 (A) NONE, 1-2, 3-5 /HPF    Mucus, Urine 1+ Reference range not established. /LPF    Hyaline Casts, Urine OCCASIONAL (A) NONE /LPF   Urine Culture    Specimen: Clean Catch/Voided; Urine   Result Value Ref Range    Urine Culture No growth    Drug Screen, Urine   Result Value Ref Range    Amphetamine Screen, Urine Presumptive Negative Presumptive Negative    Barbiturate Screen, Urine Presumptive Negative Presumptive Negative    Benzodiazepines Screen, Urine Presumptive Negative Presumptive Negative    Cannabinoid Screen, Urine Presumptive Negative Presumptive Negative    Cocaine Metabolite Screen, Urine Presumptive Negative Presumptive Negative    Fentanyl Screen, Urine Presumptive Negative Presumptive Negative    Opiate Screen, Urine Presumptive Negative Presumptive Negative    Oxycodone Screen, Urine Presumptive Negative Presumptive Negative    PCP Screen, Urine Presumptive Negative Presumptive Negative    Methadone Screen, Urine Presumptive Negative Presumptive Negative   Troponin, High Sensitivity, 1 Hour   Result Value Ref Range    Troponin I, High Sensitivity 160 (HH) 0 - 20 ng/L   Lactate   Result Value Ref Range    Lactate 1.2 0.4 - 2.0 mmol/L   ECG 12 lead   Result Value Ref Range    Ventricular Rate 141 BPM    Atrial Rate 141 BPM    WV  Interval 126 ms    QRS Duration 170 ms    QT Interval 316 ms    QTC Calculation(Bazett) 484 ms    R Axis -79 degrees    T Axis -28 degrees    QRS Count 23 beats    Q Onset 212 ms    P Onset 149 ms    P Offset 204 ms    T Offset 370 ms    QTC Fredericia 420 ms   Blood Culture    Specimen: Peripheral Venipuncture; Blood culture   Result Value Ref Range    Blood Culture No growth at 2 days    Blood Culture    Specimen: Peripheral Venipuncture; Blood culture   Result Value Ref Range    Blood Culture No growth at 2 days    Troponin I, High Sensitivity   Result Value Ref Range    Troponin I, High Sensitivity 305 (HH) 0 - 20 ng/L   Procalcitonin   Result Value Ref Range    Procalcitonin 0.06 <=0.07 ng/mL   CBC and Auto Differential   Result Value Ref Range    WBC 11.9 (H) 4.4 - 11.3 x10*3/uL    nRBC 0.0 0.0 - 0.0 /100 WBCs    RBC 3.98 (L) 4.50 - 5.90 x10*6/uL    Hemoglobin 13.2 (L) 13.5 - 17.5 g/dL    Hematocrit 37.7 (L) 41.0 - 52.0 %    MCV 95 80 - 100 fL    MCH 33.2 26.0 - 34.0 pg    MCHC 35.0 32.0 - 36.0 g/dL    RDW 13.9 11.5 - 14.5 %    Platelets 175 150 - 450 x10*3/uL    Neutrophils % 65.5 40.0 - 80.0 %    Immature Granulocytes %, Automated 0.8 0.0 - 0.9 %    Lymphocytes % 22.4 13.0 - 44.0 %    Monocytes % 8.4 2.0 - 10.0 %    Eosinophils % 2.1 0.0 - 6.0 %    Basophils % 0.8 0.0 - 2.0 %    Neutrophils Absolute 7.80 (H) 1.20 - 7.70 x10*3/uL    Immature Granulocytes Absolute, Automated 0.09 0.00 - 0.70 x10*3/uL    Lymphocytes Absolute 2.66 1.20 - 4.80 x10*3/uL    Monocytes Absolute 1.00 0.10 - 1.00 x10*3/uL    Eosinophils Absolute 0.25 0.00 - 0.70 x10*3/uL    Basophils Absolute 0.10 0.00 - 0.10 x10*3/uL   Magnesium   Result Value Ref Range    Magnesium 2.70 (H) 1.60 - 2.40 mg/dL   Phosphorus   Result Value Ref Range    Phosphorus 3.8 2.5 - 4.9 mg/dL   Basic metabolic panel   Result Value Ref Range    Glucose 129 (H) 74 - 99 mg/dL    Sodium 137 136 - 145 mmol/L    Potassium 4.2 3.5 - 5.3 mmol/L    Chloride 106 98 - 107  mmol/L    Bicarbonate 23 21 - 32 mmol/L    Anion Gap 12 10 - 20 mmol/L    Urea Nitrogen 19 6 - 23 mg/dL    Creatinine 1.30 0.50 - 1.30 mg/dL    eGFR 64 >60 mL/min/1.73m*2    Calcium 8.0 (L) 8.6 - 10.3 mg/dL   Troponin I, High Sensitivity   Result Value Ref Range    Troponin I, High Sensitivity 342 (HH) 0 - 20 ng/L   TSH with reflex to Free T4 if abnormal   Result Value Ref Range    Thyroid Stimulating Hormone 5.55 (H) 0.44 - 3.98 mIU/L   Thyroxine, Free   Result Value Ref Range    Thyroxine, Free 0.94 0.61 - 1.12 ng/dL   ECG 12 Lead   Result Value Ref Range    Ventricular Rate 126 BPM    Atrial Rate 126 BPM    QRS Duration 180 ms    QT Interval 410 ms    QTC Calculation(Bazett) 593 ms    R Axis 250 degrees    T Axis -31 degrees    QRS Count 21 beats    Q Onset 210 ms    T Offset 415 ms    QTC Fredericia 525 ms   Transthoracic Echo (TTE) Complete   Result Value Ref Range    LA vol index A/L 43.5 ml/m2    AV pk bong 0.95 m/s    AV mn grad 2.0 mmHg    LVOT diam 2.06 cm    LV Biplane EF 9 %    Tricuspid annular plane systolic excursion 1.5 cm    RV free wall pk S' 10.80 cm/s    RVSP 51.7 mmHg    LVIDd 6.55 cm    Aortic Valve Area by Continuity of VTI 2.06 cm2    Aortic Valve Area by Continuity of Peak Velocity 2.16 cm2    AV pk grad 3.6 mmHg    LV A4C EF 8.5     BSA 1.93 m2   CBC and Auto Differential   Result Value Ref Range    WBC 11.9 (H) 4.4 - 11.3 x10*3/uL    nRBC 0.2 (H) 0.0 - 0.0 /100 WBCs    RBC 4.35 (L) 4.50 - 5.90 x10*6/uL    Hemoglobin 14.5 13.5 - 17.5 g/dL    Hematocrit 41.3 41.0 - 52.0 %    MCV 95 80 - 100 fL    MCH 33.3 26.0 - 34.0 pg    MCHC 35.1 32.0 - 36.0 g/dL    RDW 13.8 11.5 - 14.5 %    Platelets 180 150 - 450 x10*3/uL    Neutrophils % 70.2 40.0 - 80.0 %    Immature Granulocytes %, Automated 0.8 0.0 - 0.9 %    Lymphocytes % 17.8 13.0 - 44.0 %    Monocytes % 7.2 2.0 - 10.0 %    Eosinophils % 3.2 0.0 - 6.0 %    Basophils % 0.8 0.0 - 2.0 %    Neutrophils Absolute 8.34 (H) 1.20 - 7.70 x10*3/uL    Immature  Granulocytes Absolute, Automated 0.09 0.00 - 0.70 x10*3/uL    Lymphocytes Absolute 2.12 1.20 - 4.80 x10*3/uL    Monocytes Absolute 0.86 0.10 - 1.00 x10*3/uL    Eosinophils Absolute 0.38 0.00 - 0.70 x10*3/uL    Basophils Absolute 0.09 0.00 - 0.10 x10*3/uL   Magnesium   Result Value Ref Range    Magnesium 2.00 1.60 - 2.40 mg/dL   Basic metabolic panel   Result Value Ref Range    Glucose 121 (H) 74 - 99 mg/dL    Sodium 140 136 - 145 mmol/L    Potassium 3.7 3.5 - 5.3 mmol/L    Chloride 104 98 - 107 mmol/L    Bicarbonate 25 21 - 32 mmol/L    Anion Gap 15 10 - 20 mmol/L    Urea Nitrogen 17 6 - 23 mg/dL    Creatinine 1.11 0.50 - 1.30 mg/dL    eGFR 78 >60 mL/min/1.73m*2    Calcium 8.8 8.6 - 10.3 mg/dL   ECG 12 Lead   Result Value Ref Range    Ventricular Rate 80 BPM    Atrial Rate 80 BPM    VT Interval 156 ms    QRS Duration 150 ms    QT Interval 480 ms    QTC Calculation(Bazett) 553 ms    P Axis 45 degrees    R Axis -57 degrees    T Axis 100 degrees    QRS Count 14 beats    Q Onset 180 ms    P Onset 140 ms    P Offset 178 ms    T Offset 420 ms    QTC Fredericia 528 ms   Basic metabolic panel   Result Value Ref Range    Glucose 113 (H) 74 - 99 mg/dL    Sodium 138 136 - 145 mmol/L    Potassium 4.0 3.5 - 5.3 mmol/L    Chloride 104 98 - 107 mmol/L    Bicarbonate 25 21 - 32 mmol/L    Anion Gap 13 10 - 20 mmol/L    Urea Nitrogen 16 6 - 23 mg/dL    Creatinine 0.96 0.50 - 1.30 mg/dL    eGFR >90 >60 mL/min/1.73m*2    Calcium 8.4 (L) 8.6 - 10.3 mg/dL   CBC and Auto Differential   Result Value Ref Range    WBC 10.6 4.4 - 11.3 x10*3/uL    nRBC 0.0 0.0 - 0.0 /100 WBCs    RBC 3.88 (L) 4.50 - 5.90 x10*6/uL    Hemoglobin 12.9 (L) 13.5 - 17.5 g/dL    Hematocrit 36.4 (L) 41.0 - 52.0 %    MCV 94 80 - 100 fL    MCH 33.2 26.0 - 34.0 pg    MCHC 35.4 32.0 - 36.0 g/dL    RDW 13.9 11.5 - 14.5 %    Platelets 171 150 - 450 x10*3/uL    Neutrophils % 72.8 40.0 - 80.0 %    Immature Granulocytes %, Automated 0.8 0.0 - 0.9 %    Lymphocytes % 13.4 13.0 -  44.0 %    Monocytes % 9.4 2.0 - 10.0 %    Eosinophils % 2.9 0.0 - 6.0 %    Basophils % 0.7 0.0 - 2.0 %    Neutrophils Absolute 7.70 1.20 - 7.70 x10*3/uL    Immature Granulocytes Absolute, Automated 0.08 0.00 - 0.70 x10*3/uL    Lymphocytes Absolute 1.42 1.20 - 4.80 x10*3/uL    Monocytes Absolute 0.99 0.10 - 1.00 x10*3/uL    Eosinophils Absolute 0.31 0.00 - 0.70 x10*3/uL    Basophils Absolute 0.07 0.00 - 0.10 x10*3/uL   Magnesium   Result Value Ref Range    Magnesium 2.16 1.60 - 2.40 mg/dL   ECG 12 Lead   Result Value Ref Range    Ventricular Rate 81 BPM    Atrial Rate 81 BPM    TN Interval 158 ms    QRS Duration 172 ms    QT Interval 476 ms    QTC Calculation(Bazett) 552 ms    P Axis 80 degrees    R Axis -57 degrees    T Axis 56 degrees    QRS Count 14 beats    Q Onset 163 ms    P Onset 123 ms    P Offset 155 ms    T Offset 401 ms    QTC Fredericia 525 ms     ECG 12 Lead    Result Date: 5/25/2024  AV dual-paced rhythm Abnormal ECG When compared with ECG of 24-MAY-2024 07:05, No significant change was found Confirmed by Zachary Sparks ([id number]) on 5/25/2024 10:19:40 AM    XR abdomen 1 view    Result Date: 5/24/2024  Interpreted By:  Gage Lopez, STUDY: XR ABDOMEN 1 VIEW;  5/24/2024 2:47 pm   INDICATION: Signs/Symptoms:Follow-up of bilateral ureteral lithotripsy with indwelling double-J stent on the left.   COMPARISON: Portable chest 22 May 2024   No imaging of the kidneys or other portions of the urinary tract are in the local PACS archive   ACCESSION NUMBER(S): [id number]   ORDERING CLINICIAN: LOUIS D'AMICO   TECHNIQUE: Frontal supine view of the abdomen   FINDINGS: A left ureteral stent is present   Urinary bladder stone versus a phlebolith projects just superior to the distal pigtail end of the stent   Projecting within the proximal pigtail end and just inferior to the pigtail end, question of two tiny stones or stone fragments in the left renal pelvis   No stone or stone fragment projects over the stented left  ureter   Few right upper quadrant calcifications of which some all may be right renal including one as large as 6 mm but which is thin and linear   No dilated gas-filled small bowel   Stool is in the right colon and gas in the left. Nondilated   The liver may be mildly enlarged       No prior imaging of the urinary tract in the local PACS archive   Questionable tiny stones or stone fragments x2 adjacent to the proximal pigtail coil of the ureteral stent in the left renal pelvis   No stone or stone fragment projects over stented left ureter   Tiny urinary bladder stone versus phlebolith projecting near the distal end of the stent   Probability of right nephrolithiasis as detailed above   MACRO: None   Signed by: Gage Lopez 5/24/2024 2:54 PM Dictation workstation:   [id number]    ECG 12 Lead    Result Date: 5/24/2024  AV dual-paced rhythm Abnormal ECG When compared with ECG of 23-MAY-2024 08:05, Electronic ventricular pacemaker has replaced Wide QRS tachycardia Vent. rate has decreased BY  46 BPM Confirmed by Zachary Sparks ([id number]) on 5/24/2024 8:56:30 AM    ECG 12 lead    Result Date: 5/24/2024  Ventricular tachycardia Left axis deviation Right bundle branch block Inferior infarct (cited on or before 21-JAN-2002) T wave abnormality, consider lateral ischemia Abnormal ECG See ED provider note for full interpretation and clinical correlation Confirmed by Zachary Sparks ([id number]) on 5/24/2024 8:56:24 AM    ECG 12 lead    Result Date: 5/24/2024  Wide QRS tachycardia with frequent atrial-paced complexes Left axis deviation Right bundle branch block Inferior infarct (cited on or before 21-JAN-2002) Abnormal ECG When compared with ECG of 09-JUL-2008 06:28, Electronic atrial pacemaker has replaced Sinus rhythm Vent. rate has increased BY  72 BPM Right bundle branch block is now Present See ED provider note for full interpretation and clinical correlation Confirmed by Zachary Sparks ([id number]) on 5/24/2024 8:55:57  AM    Electrophysiology procedure    Result Date: 5/23/2024  Table formatting from the original result was not included. Procedure Details: Procedure Details:  Cardioversion Summary: ·   External electric cardioversion of ventricular tachycardia to normal sinus rhythm was achieved using 200 J synchronized biphasic Antitachycardia pacing was delivered through the device at 450 ms, 300 ms, 250 ms with no termination of ventricular arrhythmias. . Recommendations: 1. A 12 lead ECG should be performed prior to discharge from the hospital. 2. The patient should continue with the present medications. Discharge: 1. The patient recovered uneventfully from the effects of conscious sedation. The patient left the EP laboratory hemodynamically stable and without neurological deficits. Follow up: 1. The patient will stay on telemetry intensive care unit for drug load with high risk medication, following bed rest and subsequent ambulation, provided the recovery parameters are appropriate. The patient should call the electrophysiologist immediately if symptoms recur, or for any problems. The patient has been instructed accordingly. ________________________________________ Procedures: Cardioversion.  Noninvasive primary stimulation through the device (biventricular ICD) ________________________________________ Patient history: Please refer to the detailed history and physical on the patient's medical chart.  History of ventricular tachycardia status post ICD shocks.  Patient is scheduled for antitachycardia pacing-NIPS and cardioversion ________________________________________ Procedure narrative: The device was interrogated and reprogrammed prior to the procedure.  The risks, benefits, and alternatives to the procedure and sedation were explained to the patient, and informed consent was obtained. The patient was in the fasting state. A grounding pad was placed. Self-adhesive anterior-posterior defibrillation pads were applied. A  ZOLL defibrillator was used for monitoring and the defibrillator waveform was set to biphasic. The patient was set up for continuous monitoring of surface 12 lead ECG and pulse oximetry. Blood pressure was monitored with automatic cuff measurements. The procedure was performed under IV conscious sedation performed by anesthesiology service. 1. External electric cardioversion of ventricular tachycardia to normal sinus rhythm was achieved using 200 J synchronized biphasic Antitachycardia pacing was delivered through the device at 450 ms, 300 ms, 250 ms with no termination of ventricular arrhythmias. The device was reprogrammed to DDDR  bpm.  AV delay was decreased to 150 ms. ________________________________________ Complications: The patient tolerated the procedure without any complications or incident. ________________________________________ Prepared and signed by Tolerance: good Complications: None     Cardiac Catheterization Procedure    Result Date: 5/23/2024   [facility], Cath Lab        [address] [place] [address] Cardiovascular Catheterization Report Patient Name:      FRANCIA PAULINO          Performing Physician:  [id number] Menon MD Study Date:        5/23/2024             Verifying Physician:   Lakia Menon MD MRN/PID:           [id number]              Cardiologist/Co-Scrub: Accession#:        [id number]          Ordering Provider:     [id number] EVONNE WALTERS Date of Birth/Age: 1965 / 58 years Cardiologist: Gender:            M                     Fellow: Encounter#:        [id number]            Surgeon:  Study: Left Heart Cath  Indications: FRANCIA PAULINO is a 59 year old male who presents with dyslipidemia, hypertension, chronic pulmonary disease, prior  myocardial infarction, prior percutaneous coronary intervention, smoker chf, coronary artery disease and an asymptomatic chest pain assessment. Left ventricular dysfunction, cardiomyopathy and cardiac arrhythmia.  Procedure Description: After infiltration with 2% Lidocaine, the right femoral artery was cannulated with a modified Seldinger technique. Subsequently a 5 English sheath was placed in the right femoral artery. Selective coronary catheterization was performed using a 5 Fr catheter(s) exchanged over a guide wire to cannulate the coronary arteries. A JL 4 tip catheter was used for left coronary injections. A 3drc tip catheter was used for right coronary injections. Multiple injections of contrast were made into the left and right coronary arteries with angiograms recorded in multiple projections. After completion of the procedure, femoral artery angiography was performed. This demonstrated a common femoral artery puncture appropriate for closure. A Vascade 5F vascular closure Device was placed per protocol.  Coronary Angiography: The coronary circulation is right dominant.  Left Main Coronary Artery: There is 10-30% stenosis in the distal left main coronary artery.  Left Anterior Descending Coronary Artery Distribution: There is 10-30% stenosis in the proximal and mid left anterior descending artery.  Circumflex Coronary Artery Distribution: There is 100% stenosis in the the proximal Circumflex artery.  Right Coronary Artery Distribution: Fills via collaterals. There is 100% stenosis in the the proximal Right Coronary Artery.  Left Ventriculography: The estimated left ventricular ejection fraction is abnormal at 10%.  Hemo Personnel: +---------------------------+---------+ Name                       Duty      +---------------------------+---------+ Babatunde Tan MD 1 +---------------------------+---------+  Hemodynamic Pressures:   +----+---------------------+----------+-------------+--------------+---------+ Site      Date Time      Phase NameSystolic mmHgDiastolic mmHgMean mmHg +----+---------------------+----------+-------------+--------------+---------+   AO5/23/2024 10:02:22 AM  AIR REST           70            57       64 +----+---------------------+----------+-------------+--------------+---------+   AO5/23/2024 10:03:03 AM  AIR REST           87            56       75 +----+---------------------+----------+-------------+--------------+---------+   AO5/23/2024 10:08:12 AM  AIR REST           98            59       73 +----+---------------------+----------+-------------+--------------+---------+  Cardiac Cath Post Procedure Notes: Post Procedure Diagnosis: Double vessel disease. Blood Loss:               Estimated blood loss during the procedure was 0 mls. Specimens Removed:        Number of specimen(s) removed: none. ____________________________________________________________________________________ CONCLUSIONS:  1. Distal Left Main: 10-30% stenosis.  2. Proximal and mid LAD Lesion: The percent stenosis is 10-30%.  3. Proximal CX Lesion: The percent stenosis is 100%.  4. Right Coronary Artery: fills via collaterals.  5. Proximal RCA Lesion: The percent stenosis is 100%.  6. The Left Ventricular Ejection Fraction is 10%,by echo. ICD 10 Codes: Ventricular tachycardia, unspecified-I47.20  CPT Codes: Coronary Angiography S&I only ([facility])([facility])-72511; Moderate Sedation Services initial 15 minutes patient >5 years-[id number]  [id number] Babatunde Menon MD Performing Physician Electronically signed by [id number] Babatunde Menon MD on 5/23/2024 at 10:42:57 AM  ** Final (Updated) **     ECG 12 Lead    Result Date: 5/23/2024  Wide QRS tachycardia - possibly ventricular tachycardia Abnormal right superior axis deviation Abnormal ECG When compared with ECG of 22-MAY-2024 23:59, (unconfirmed) No significant change was found  Confirmed by Clayton Angulo ([id number]) on 5/23/2024 10:40:02 AM    Transthoracic Echo (TTE) Complete    Result Date: 5/23/2024          [place] [id number]  Tel [phone] Fax [phone] TRANSTHORACIC ECHOCARDIOGRAM REPORT  Patient Name:      FRANCIA PAULINO          Antoine Physician:    [id number] Kd Rodrigues DO Study Date:        5/23/2024             Ordering Provider:    [id number] SHIN MRN/PID:           [id number]              Fellow: Accession#:        [id number]          Nurse: Date of Birth/Age: 1965 / 58 years Sonographer:          Cathy Sanders RDCS Gender:            M                     Additional Staff: Height:            170.18 cm             Admit Date: Weight:            78.47 kg              Admission Status:     Inpatient -                                                                Routine BSA / BMI:         1.90 m2 / 27.10 kg/m2 Department Location:  [facility] Blood Pressure: 97 /71 mmHg Study Type:    TRANSTHORACIC ECHO (TTE) COMPLETE Diagnosis/ICD: Presence of automatic (implantable) cardiac                defibrillator-Z95.810; Chronic systolic (congestive) heart                failure (CHF)-I50.22; Ischemic cardiomyopathy-I25.5 Indication:    Congestive Heart Failure, ICM, ICD Firing (multiple shocks) CPT Codes:     Echo Complete w Full Doppler-[id number] Patient History: MI Location/Type:  Unknown MI Pacer/Defib:       AICD Pertinent History: CAD, CHF, Cardiomyopathy, HTN and ICD Firing. Study Detail: The following Echo studies were performed: 2D, M-Mode, Doppler and               color flow. Technically challenging study due to patient lying in               supine position, prominent lung artifact and poor acoustic               windows. Patient has a  defibrillator. The patient was asleep.  PHYSICIAN INTERPRETATION: Left Ventricle: Left ventricular systolic function is severely decreased, with an estimated ejection fraction of 10%. There is global hypokinesis of the left ventricle with minor regional variations. The left ventricular cavity size is moderately dilated. Left ventricular diastolic filling was not assessed. Left Atrium: The left atrium is mildly dilated. Right Ventricle: The right ventricle is normal in size. There is normal right ventricular global systolic function. A device is visualized in the right ventricle. Right Atrium: The right atrium is normal in size. There is a device visualized in the right atrium. Aortic Valve: The aortic valve appears structurally normal. The aortic valve appears tricuspid. There is mild aortic valve cusp calcification. There is no evidence of aortic valve stenosis. There is no evidence of aortic valve regurgitation. The peak instantaneous gradient of the aortic valve is 3.6 mmHg. The mean gradient of the aortic valve is 2.0 mmHg. Mitral Valve: The mitral valve is normal in structure. There is mild thickening of the anterior and posterior mitral valve leaflets. There is no evidence of mitral valve stenosis. There is normal mitral valve leaflet mobility. There is mild mitral annular calcification. There is moderate to severe mitral valve regurgitation. Tricuspid Valve: The tricuspid valve is structurally normal. There is normal tricuspid valve leaflet mobility. There is moderate tricuspid regurgitation. Pulmonic Valve: The pulmonic valve is structurally normal. There is trace pulmonic valve regurgitation. Pericardium: There is no pericardial effusion noted. Aorta: The aortic root is normal. Pulmonary Artery: Pulmonary hypertension is present. The tricuspid regurgitant velocity is 3.03 m/s, and with an estimated right atrial pressure of 15 mmHg, the estimated pulmonary artery pressure is moderate to severely elevated  with the RVSP at 51.7 mmHg. Systemic Veins: The inferior vena cava appears moderately dilated. In comparison to the previous echocardiogram(s): There are no prior studies on this patient for comparison purposes.  CONCLUSIONS:  1. Left ventricular systolic function is severely decreased with a 10% estimated ejection fraction.  2. There is no evidence of mitral valve stenosis.  3. Moderate to severe mitral valve regurgitation.  4. Moderate tricuspid regurgitation.  5. Aortic valve stenosis is not present.  6. Left ventricular cavity size is moderately dilated.  7. Moderate to severely elevated pulmonary artery pressure.  8. Pulmonary hypertension is present.  9. The inferior vena cava appears moderately dilated. 10. There is global hypokinesis of the left ventricle with minor regional variations. RECOMMENDATIONS: Technically suboptimal and limited study, therefore accuracy of above interpretation could be substantially diminished. Clinical correlation is advised. Consider additional imaging modalities if clinically indicated.  QUANTITATIVE DATA SUMMARY: 2D MEASUREMENTS:                           Normal Ranges: Ao Root d:     2.93 cm    (2.0-3.7cm) LAs:           4.06 cm    (2.7-4.0cm) IVSd:          1.07 cm    (0.6-1.1cm) LVPWd:         0.53 cm    (0.6-1.1cm) LVIDd:         6.55 cm    (3.9-5.9cm) LVIDs:         6.27 cm LV Mass Index: 114.6 g/m2 LV % FS        4.3 % LA VOLUME:                               Normal Ranges: LA Vol A4C:        85.1 ml    (22+/-6mL/m2) LA Vol A2C:        79.1 ml LA Vol BP:         82.7 ml LA Vol Index A4C:  44.7ml/m2 LA Vol Index A2C:  41.6 ml/m2 LA Vol Index BP:   43.5 ml/m2 LA Area A4C:       25.7 cm2 LA Area A2C:       24.6 cm2 LA Major Axis A4C: 6.6 cm LA Major Axis A2C: 6.5 cm LA Volume Index:   41.2 ml/m2 LA Vol A4C:        80.4 ml LA Vol A2C:        75.8 ml RA VOLUME BY A/L METHOD:                               Normal Ranges: RA Vol A4C:        56.3 ml    (8.3-19.5ml) RA Vol Index  A4C:  29.6 ml/m2 RA Area A4C:       18.2 cm2 RA Major Axis A4C: 5.0 cm LV SYSTOLIC FUNCTION BY 2D PLANIMETRY (MOD):                    Normal Ranges: EF-A4C View: 8.5 % (>=55%) EF-A2C View: 8.9 % EF-Biplane:  9.3 % MITRAL INSUFFICIENCY:                           Normal Ranges: PISA Radius:  1.1 cm MR VTI:       106.00 cm MR Vmax:      424.00 cm/s MR Alias Dayne: 40.9 cm/s MR Volume:    73.55 ml MR Flow Rt:   294.22 ml/s MR EROA:      0.69 cm2 AORTIC VALVE:                                   Normal Ranges: AoV Vmax:                0.95 m/s (<=1.7m/s) AoV Peak PG:             3.6 mmHg (<20mmHg) AoV Mean P.0 mmHg (1.7-11.5mmHg) LVOT Max Dayne:            0.62 m/s (<=1.1m/s) AoV VTI:                 12.90 cm (18-25cm) LVOT VTI:                7.99 cm LVOT Diameter:           2.06 cm  (1.8-2.4cm) AoV Area, VTI:           2.06 cm2 (2.5-5.5cm2) AoV Area,Vmax:           2.16 cm2 (2.5-4.5cm2) AoV Dimensionless Index: 0.62  RIGHT VENTRICLE: TAPSE: 15.3 mm RV s'  0.11 m/s TRICUSPID VALVE/RVSP:                             Normal Ranges: Peak TR Velocity: 3.03 m/s RV Syst Pressure: 51.7 mmHg (< 30mmHg) IVC Diam:         2.43 cm PULMONIC VALVE:                         Normal Ranges: PV Accel Time: 79 msec  (>120ms) PV Max Dayne:    0.7 m/s  (0.6-0.9m/s) PV Max P.9 mmHg PV Mean P.0 mmHg PV VTI:        10.90 cm  [id number] Kd Rodrigues DO Electronically signed on 2024 at 9:42:06 AM  ** Final **     XR chest 1 view    Result Date: 2024  STUDY: Chest Radiograph;  2024 9:52 PM INDICATION: Chest pain. COMPARISON: None Available ACCESSION NUMBER(S): [id number] ORDERING CLINICIAN: ALLI DENNISON TECHNIQUE:  Frontal chest was obtained at 21:52 hours. FINDINGS: CARDIOMEDIASTINAL SILHOUETTE: Cardiomediastinal silhouette is normal in size and configuration. Right subclavian pacer/ICD device is demonstrated.  LUNGS: Lungs are clear.  There is no pneumothorax or pleural effusion.  ABDOMEN: No  remarkable upper abdominal findings.  BONES: No acute osseous changes.    No acute cardiopulmonary disease. Signed by Rio Painting, DO    CT chest wo IV contrast    Result Date: 5/6/2024  * * *Final Report* * * DATE OF EXAM: May  3 2024  2:48PM   [facility]   [id number]  -  CT CHEST WO IVCON  / ACCESSION #  [id number] PROCEDURE REASON: Lung nodules      * * * * Physician Interpretation * * * *  EXAMINATION:  CHEST CT WITHOUT CONTRAST CLINICAL HISTORY: Lung nodules Technique:  Spiral CT acquisition of the chest from the thoracic inlet to the upper abdomen without contrast. MQ:  [id number] CT Radiation dose: Integrated Dose-length product (DLP) for this visit =   278 mGy*cm CT Dose Reduction Employed: mAs-kVp adjusted based on patient size-age Comparison: Chest radiograph 04/14/2024 RESULT: Limitations:  None. Lines, tubes, and devices:  A right chest wall pacemaker/ICD is in place. Lung parenchyma and airways: There is unchanged bandlike scarring within the right lower lobe.  There is mild bibasilar juxtapleural subsegmental atelectasis.  No dense airspace consolidation.  The central tracheobronchial tree is patent.  There is upper lobe predominant centrilobular emphysema.  There is diffuse bronchial wall thickening, compatible with bronchitis. There are a few nonspecific noncalcified pulmonary nodules, with representative larger examples detailed as follows on series 6: -Image 110, right lower lobe, 0.4 cm -Image 118, left lower lobe juxtapleural, 0.5 cm -Image 145, left lower lobe, 0.4 cm Pleural space:  No pleural effusion.  No pleural thickening. Lower neck, lymph nodes, and mediastinum:  The imaged thyroid is unremarkable.  No supraclavicular, axillary or hilar lymphadenopathy.   There is a borderline enlarged left lateral paratracheal lymph node measuring 1.1 cm in short axis (series 5, image 86). Heart, pericardium, and thoracic vessels:  The thoracic aorta and main pulmonary artery are normal in caliber. The cardiac  chambers are normal in size.  Coronary artery atherosclerotic calcifications are noted, although the study is not optimized for coronary assessment. No pericardial effusion or thickening. Bones and soft tissues:  No destructive lytic or blastic osseous abnormality. Upper abdomen:  No abnormality in the imaged upper abdomen. Localizer images: No additional findings.    IMPRESSION: 1.  Bandlike right basilar scarring/atelectasis, corresponding to the queried observation from the chest radiograph of 04/14/2024. 2.  Nonspecific noncalcified pulmonary nodules measuring up to 0.5 cm.   Consider follow-up chest CT in approximately 1 year, to ensure stability. 3.  Upper lobe predominant centrilobular emphysema. Incidental Finding:  Follow-up Acuity: Incidental Finding: Solid: <6 mm (solitary or multiple) Routing Code:  N/A Recommendation: No imaging follow-up is recommended Time Frame:  N/A Comments:  If there are risk factors for lung malignancy, a follow-up chest CT exam could be obtained in 12 months --END OF FINDING-- : FAISAL   Transcribe Date/Time: May  6 2024 10:46A Dictated by : ALLIE CHENEY MD This examination was interpreted and the report reviewed and electronically signed by: ALLIE CHENEY MD on May  6 2024 11:14AM  EST

## 2024-05-25 NOTE — PROGRESS NOTES
"                                                                                     [facility] Progress Note      Cardiologist:  Kd Rodrigues DO MD   Date:  5/25/2024  Patient:  Cristian Sapp  YOB: 1965  MRN:  [id number]   Admit Date:  5/22/2024      Subjective:  He denies any chest pain, shortness of breath, palpitations, or chest pain  No major overnight issues.  Currently in sinus rhythm.      Objective:   /72   Pulse 80   Temp 36.5 °C (97.7 °F) (Temporal)   Resp 24   Ht 1.702 m (5' 7\")   Wt 82.1 kg (181 lb)   SpO2 92%   BMI 28.35 kg/m²      @Vitalsmultipe@    Intake/Output Summary (Last 24 hours) at 5/25/2024 1024  Last data filed at 5/25/2024 0600  Gross per 24 hour   Intake 1582 ml   Output 2050 ml   Net -468 ml       [unfilled]    Physical Exam:  GENERAL:  Well developed, well nourished, in no acute distress.  CHEST:  Symmetric and non-tender.  NEURO/PSYCH:  Alert and oriented times three with appropriate behavior and responses.  NECK:  Supple, no JVD, no bruit.  LUNGS: Occasional scattered rhonchi, normal respiratory effort, no wheezing, patient is a bit short of breath  HEART: Regular, S1 and S2 muffled, PMI laterally displaced, soft systolic murmur at the left sternal border and apex, no diastolic murmurs clearly audible today  EXTREMITIES:  Warm with good color, no clubbing or cyanosis.  There is no edema noted.  PERIPHERAL VASCULAR:  Pulses present and equally palpable; 2+ throughout.      Medications:   aspirin, 81 mg, oral, Daily  cholecalciferol, 5,000 Units, oral, q3 days  [Held by provider] co-enzyme Q-10, 50 mg, oral, Every Sunday  cyanocobalamin, 1,000 mcg, oral, Daily  docusate sodium, 100 mg, oral, BID  enoxaparin, 40 mg, subcutaneous, q24h  lidocaine, 2 patch, transdermal, Daily  magnesium oxide, 400 mg, oral, Daily  nicotine, 1 patch, transdermal, q24h  PARoxetine, 40 mg, oral, Nightly  perflutren lipid microspheres, 0.5-10 mL of dilution, intravenous, Once in " imaging  potassium chloride, 20 mEq, oral, Daily  prasugrel, 10 mg, oral, Daily  ranolazine, 500 mg, oral, BID  rosuvastatin, 20 mg, oral, Nightly  [Held by provider] sacubitriL-valsartan, 1 tablet, oral, BID  tamsulosin, 0.4 mg, oral, Daily  [Held by provider] torsemide, 20 mg, oral, BID  traZODone, 50 mg, oral, Nightly      amiodarone, 1 mg/min, Last Rate: 1 mg/min (05/25/24 0600)        Medications:     Current Facility-Administered Medications   Medication Dose Route Frequency Provider Last Rate Last Admin    acetaminophen (Tylenol) tablet 650 mg  650 mg oral q4h PRN CLAUDIA Francois        albuterol 90 mcg/actuation inhaler 2 puff  2 puff inhalation q4h PRN CLAUDIA Menjivar        amiodarone (Nexterone) 360 mg in dextrose,iso-osm 200 mL (1.8 mg/mL) infusion (premix)  1 mg/min intravenous Continuous EttaCLAUDIA Elam 33.3 mL/hr at 05/25/24 0600 1 mg/min at 05/25/24 0600    aspirin chewable tablet 81 mg  81 mg oral Daily EttaCLAUDIA Elam   81 mg at 05/24/24 0948    cholecalciferol (Vitamin D-3) tablet 5,000 Units  5,000 Units oral q3 days CLAUDIA Menjivar   5,000 Units at 05/23/24 2106    [Held by provider] co-enzyme Q-10 capsule 50 mg  50 mg oral Every Sunday CLAUDIA Menjivar        cyanocobalamin (Vitamin B-12) tablet 1,000 mcg  1,000 mcg oral Daily CLAUDIA Menjivar   1,000 mcg at 05/24/24 0948    docusate sodium (Colace) capsule 100 mg  100 mg oral BID CLAUDIA Menjivar   100 mg at 05/24/24 2146    enoxaparin (Lovenox) syringe 40 mg  40 mg subcutaneous q24h CLAUDIA Menjivar   40 mg at 05/24/24 0947    levalbuterol (Xopenex) 0.63 mg/3 mL nebulizer solution 0.63 mg  0.63 mg nebulization q4h PRN CLAUDIA Chaudhry   0.63 mg at 05/24/24 2333    lidocaine 4 % patch 2 patch  2 patch transdermal Daily CLAUDIA Menjivar   2 patch at 05/24/24 0984    LORazepam (Ativan) tablet 0.5 mg  0.5 mg oral Daily PRN Bladimir  D Winner, APRN-CNP   0.5 mg at 05/24/24 1625    magnesium oxide (Mag-Ox) tablet 400 mg  400 mg oral Daily CLAUDIA Menjivar   400 mg at 05/24/24 0952    methocarbamol (Robaxin) tablet 500 mg  500 mg oral q6h PRN CLAUDIA Menjivar   500 mg at 05/24/24 0950    nicotine (Nicoderm CQ) 21 mg/24 hr patch 1 patch  1 patch transdermal q24h CLAUDIA Menjivar   1 patch at 05/24/24 2146    ondansetron (Zofran) injection 4 mg  4 mg intravenous q8h PRN CLAUDIA Francois   4 mg at 05/24/24 0411    PARoxetine (Paxil) tablet 40 mg  40 mg oral Nightly CLAUDIA Chaudhry   40 mg at 05/24/24 2146    perflutren lipid microspheres (Definity) injection 0.5-10 mL of dilution  0.5-10 mL of dilution intravenous Once in imaging CLAUDIA Chaudhry        potassium chloride (Klor-Con) packet 20 mEq  20 mEq oral Daily CLAUDIA Menjivar   20 mEq at 05/24/24 0948    prasugrel (Effient) tablet 10 mg  10 mg oral Daily CLAUDIA Menjivar   10 mg at 05/24/24 1301    ranolazine (Ranexa) 12 hr tablet 500 mg  500 mg oral BID CLAUDIA Menjivar   500 mg at 05/24/24 2146    rosuvastatin (Crestor) tablet 20 mg  20 mg oral Nightly CLAUDIA Menjivar   20 mg at 05/24/24 2146    [Held by provider] sacubitriL-valsartan (Entresto) 24-26 mg per tablet 1 tablet  1 tablet oral BID CLAUDIA Menjivar        simethicone (Mylicon) chewable tablet 80 mg  80 mg oral TID PRN NICCI ChaudhryCNP   80 mg at 05/23/24 2108    sodium chloride (Ocean) 0.65 % nasal spray 1 spray  1 spray Each Nostril 4x daily PRN Etta E Pentito, APRN-CNP        tamsulosin (Flomax) 24 hr capsule 0.4 mg  0.4 mg oral Daily YARITZA Menjivar-CNP   0.4 mg at 05/24/24 0953    [Held by provider] torsemide (Demadex) tablet 20 mg  20 mg oral BID Etta Carballo APRN-CNP   20 mg at 05/23/24 2108    traZODone (Desyrel) tablet 50 mg  50 mg oral Nightly YARITZA Menjivar-CNP   50 mg at  05/24/24 2026       Outpatient Medications:     Current Outpatient Medications   Medication Instructions    acetaminophen (TYLENOL) 650 mg, oral, Every 6 hours PRN    albuterol sulfate (Proair Digihaler) 90 mcg/actuation aero powdr breath act w/sensor inhaler 2 puffs, inhalation, Every 4 hours PRN    aspirin 81 mg, oral, Daily    bisoprolol (Zebeta) 5 mg tablet oral, 2 times daily    cholecalciferol (VITAMIN D-3) 5,000 Units, oral, Every 3 days    co-enzyme Q-10 50 mg, oral, Once Weekly    cyanocobalamin (VITAMIN B-12) 1,000 mcg, oral, Daily    DAPAGLIFLOZIN PROPANEDIOL ORAL 10 mg, oral, Daily    ezetimibe (ZETIA) 10 mg, oral, Daily    LORazepam (ATIVAN) 0.5 mg, oral, Nightly    magnesium gluconate 30 mg, oral, Daily    nicotine (Nicoderm CQ) 21 mg/24 hr patch 1 patch, transdermal, Every 24 hours    nitroglycerin (NITROSTAT) 0.4 mg, sublingual, Every 5 min PRN    PARoxetine (PAXIL) 40 mg, oral, Every morning    potassium chloride (Klor-Con) 20 mEq packet 20 mEq, oral, Daily    Praluent Pen 75 mg, subcutaneous, Every 14 days    prasugrel (EFFIENT) 10 mg, oral, Daily    ranolazine (RANEXA) 500 mg, oral, 2 times daily, Do not crush, chew, or split.    rosuvastatin (CRESTOR) 20 mg, oral, Nightly    sacubitriL-valsartan (Entresto) 24-26 mg tablet 1 tablet, oral, 2 times daily    tamsulosin (FLOMAX) 0.4 mg, oral, Daily    torsemide (DEMADEX) 20 mg, oral, 2 times daily    traZODone (DESYREL) 50 mg, oral, Nightly        Diagnostics:    ECG 12 Lead    Result Date: 5/24/2024  AV dual-paced rhythm Abnormal ECG When compared with ECG of 23-MAY-2024 08:05, Electronic ventricular pacemaker has replaced Wide QRS tachycardia Vent. rate has decreased BY  46 BPM Confirmed by Zachary Sparks ([id number]) on 5/24/2024 8:56:30 AM    ECG 12 lead    Result Date: 5/24/2024  Ventricular tachycardia Left axis deviation Right bundle branch block Inferior infarct (cited on or before 21-JAN-2002) T wave abnormality, consider lateral ischemia Abnormal  ECG See ED provider note for full interpretation and clinical correlation Confirmed by Zachary Sparks ([id number]) on 5/24/2024 8:56:24 AM    Procedure Details:      Procedure Details:    Cardioversion  Summary:  ·            External electric cardioversion of ventricular tachycardia to normal sinus rhythm was achieved using 200 J synchronized biphasic  Antitachycardia pacing was delivered through the device at 450 ms, 300 ms, 250 ms with no termination of ventricular arrhythmias.  .   Recommendations:  1.          A 12 lead ECG should be performed prior to discharge from the hospital.   2.          The patient should continue with the present medications.   Discharge:   1.          The patient recovered uneventfully from the effects of conscious sedation. The patient left the EP laboratory hemodynamically stable and without neurological deficits.   Follow up:   1.          The patient will stay on telemetry intensive care unit for drug load with high risk medication, following bed rest and subsequent ambulation, provided the recovery parameters are appropriate. The patient should call the electrophysiologist immediately if symptoms recur, or for any problems. The patient has been instructed accordingly.   ________________________________________  Procedures:  Cardioversion.  Noninvasive primary stimulation through the device (biventricular ICD)  ________________________________________  Patient history:  Please refer to the detailed history and physical on the patient's medical chart.  History of ventricular tachycardia status post ICD shocks.  Patient is scheduled for antitachycardia pacing-NIPS and cardioversion  ________________________________________  Procedure narrative:  The device was interrogated and reprogrammed prior to the procedure.  The risks, benefits, and alternatives to the procedure and sedation were explained to the patient, and informed consent was obtained. The patient was in the fasting state. A  grounding pad was placed. Self-adhesive anterior-posterior defibrillation pads were applied. A ZOLL defibrillator was used for monitoring and the defibrillator waveform was set to biphasic. The patient was set up for continuous monitoring of surface 12 lead ECG and pulse oximetry. Blood pressure was monitored with automatic cuff measurements. The procedure was performed under IV conscious sedation performed by anesthesiology service.   1.          External electric cardioversion of ventricular tachycardia to normal sinus rhythm was achieved using 200 J synchronized biphasic  Antitachycardia pacing was delivered through the device at 450 ms, 300 ms, 250 ms with no termination of ventricular arrhythmias.     The device was reprogrammed to DDDR  bpm.  AV delay was decreased to 150 ms.  ________________________________________  Complications:  The patient tolerated the procedure without any complications or incident.   ________________________________________  Prepared and signed by     Tolerance: good   Complications: None           ECG 12 Lead    Result Date: 5/23/2024  Wide QRS tachycardia - possibly ventricular tachycardia Abnormal right superior axis deviation Abnormal ECG When compared with ECG of 22-MAY-2024 23:59, (unconfirmed) No significant change was found Confirmed by Clayton Angulo ([id number]) on 5/23/2024 10:40:02 AM             [place] [id number]   Tel [phone] Fax [phone]     TRANSTHORACIC ECHOCARDIOGRAM REPORT        Patient Name:      FRANCIA Schultz Physician:    [id number] Kd Rodrigues DO  Study Date:        5/23/2024             Ordering Provider:    [id number] GLORIA SHIN  MRN/PID:           [id number]              Fellow:  Accession#:        [id number]          Nurse:  Date of Birth/Age: 1965 / 58  years Sonographer:          Cathy Sanders                                                                 [facility]  Gender:            M                     Additional Staff:  Height:            170.18 cm             Admit Date:  Weight:            78.47 kg              Admission Status:     Inpatient -                                                                 Routine  BSA / BMI:         1.90 m2 / 27.10 kg/m2 Department Location:  [facility]  Blood Pressure: 97 /71 mmHg     Study Type:    TRANSTHORACIC ECHO (TTE) COMPLETE  Diagnosis/ICD: Presence of automatic (implantable) cardiac                 defibrillator-Z95.810; Chronic systolic (congestive) heart                 failure (CHF)-I50.22; Ischemic cardiomyopathy-I25.5  Indication:    Congestive Heart Failure, ICM, ICD Firing (multiple shocks)  CPT Codes:     Echo Complete w Full Doppler-83803     Patient History:  MI Location/Type:  Unknown MI  Pacer/Defib:       AICD  Pertinent History: CAD, CHF, Cardiomyopathy, HTN and ICD Firing.     Study Detail: The following Echo studies were performed: 2D, M-Mode, Doppler and                color flow. Technically challenging study due to patient lying in                supine position, prominent lung artifact and poor acoustic                windows. Patient has a defibrillator. The patient was asleep.        PHYSICIAN INTERPRETATION:  Left Ventricle: Left ventricular systolic function is severely decreased, with an estimated ejection fraction of 10%. There is global hypokinesis of the left ventricle with minor regional variations. The left ventricular cavity size is moderately dilated. Left ventricular diastolic filling was not assessed.  Left Atrium: The left atrium is mildly dilated.  Right Ventricle: The right ventricle is normal in size. There is normal right ventricular global systolic function. A device is visualized in the right ventricle.  Right Atrium: The right atrium is normal in size. There is a device  visualized in the right atrium.  Aortic Valve: The aortic valve appears structurally normal. The aortic valve appears tricuspid. There is mild aortic valve cusp calcification. There is no evidence of aortic valve stenosis.  There is no evidence of aortic valve regurgitation. The peak instantaneous gradient of the aortic valve is 3.6 mmHg. The mean gradient of the aortic valve is 2.0 mmHg.  Mitral Valve: The mitral valve is normal in structure. There is mild thickening of the anterior and posterior mitral valve leaflets. There is no evidence of mitral valve stenosis. There is normal mitral valve leaflet mobility. There is mild mitral annular calcification. There is moderate to severe mitral valve regurgitation.  Tricuspid Valve: The tricuspid valve is structurally normal. There is normal tricuspid valve leaflet mobility. There is moderate tricuspid regurgitation.  Pulmonic Valve: The pulmonic valve is structurally normal. There is trace pulmonic valve regurgitation.  Pericardium: There is no pericardial effusion noted.  Aorta: The aortic root is normal.  Pulmonary Artery: Pulmonary hypertension is present. The tricuspid regurgitant velocity is 3.03 m/s, and with an estimated right atrial pressure of 15 mmHg, the estimated pulmonary artery pressure is moderate to severely elevated with the RVSP at 51.7 mmHg.  Systemic Veins: The inferior vena cava appears moderately dilated.  In comparison to the previous echocardiogram(s): There are no prior studies on this patient for comparison purposes.        CONCLUSIONS:   1. Left ventricular systolic function is severely decreased with a 10% estimated ejection fraction.   2. There is no evidence of mitral valve stenosis.   3. Moderate to severe mitral valve regurgitation.   4. Moderate tricuspid regurgitation.   5. Aortic valve stenosis is not present.   6. Left ventricular cavity size is moderately dilated.   7. Moderate to severely elevated pulmonary artery pressure.   8.  Pulmonary hypertension is present.   9. The inferior vena cava appears moderately dilated.  10. There is global hypokinesis of the left ventricle with minor regional variations.     RECOMMENDATIONS:  Technically suboptimal and limited study, therefore accuracy of above interpretation could be substantially diminished. Clinical correlation is advised. Consider additional imaging modalities if clinically indicated.       Cardiac Cath Post Procedure Notes:  Post Procedure Diagnosis: Double vessel disease.  Blood Loss:               Estimated blood loss during the procedure was 0 mls.  Specimens Removed:        Number of specimen(s) removed: none.     ____________________________________________________________________________________  CONCLUSIONS:   1. Distal Left Main: 10-30% stenosis.   2. Proximal and mid LAD Lesion: The percent stenosis is 10-30%.   3. Proximal CX Lesion: The percent stenosis is 100%.   4. Right Coronary Artery: fills via collaterals.   5. Proximal RCA Lesion: The percent stenosis is 100%.   6. The Left Ventricular Ejection Fraction is 10%,by echo.     ICD 10 Codes:  Ventricular tachycardia, unspecified-I47.20     CPT Codes:  Coronary Angiography S&I only ([facility])([facility])-60660; Moderate Sedation Services initial 15 minutes patient >5 years-69140     [id number] Babatunde Menon MD  Performing Physician  Electronically signed by [id number] Babatunde Menon MD on 5/23/2024 at 10:42:57  AM         EKG:   Encounter Date: 05/22/24   ECG 12 Lead   Result Value    Ventricular Rate 81    Atrial Rate 81    NJ Interval 158    QRS Duration 172    QT Interval 476    QTC Calculation(Bazett) 552    P Axis 80    R Axis -57    T Axis 56    QRS Count 14    Q Onset 163    P Onset 123    P Offset 155    T Offset 401    QTC Fredericia 525    Narrative    AV dual-paced rhythm  Abnormal ECG  When compared with ECG of 24-MAY-2024 07:05,  No significant change was found  Confirmed by Zachary Sparks ([id number]) on 5/25/2024 10:19:40 AM  "                           Lab Data:    BMP:  Lab Results   Component Value Date     05/25/2024     05/24/2024     05/23/2024    K 4.0 05/25/2024    K 3.7 05/24/2024    K 4.2 05/23/2024     05/25/2024     05/24/2024     05/23/2024    CO2 25 05/25/2024    CO2 25 05/24/2024    CO2 23 05/23/2024    BUN 16 05/25/2024    BUN 17 05/24/2024    BUN 19 05/23/2024    CREATININE 0.96 05/25/2024    CREATININE 1.11 05/24/2024    CREATININE 1.30 05/23/2024           TSH:  Lab Results   Component Value Date    TSH 5.55 (H) 05/23/2024       Lipid Profile:  No results found for: \"CHLPL\", \"TRIG\", \"HDL\", \"LDLCALC\", \"LDLDIRECT\"    HgbA1C:  No components found for: \"LABA1C\"          CBC:   Lab Results   Component Value Date    WBC 10.6 05/25/2024    RBC 3.88 (L) 05/25/2024    HGB 12.9 (L) 05/25/2024    HCT 36.4 (L) 05/25/2024     05/25/2024       CMP:    Lab Results   Component Value Date     05/25/2024    K 4.0 05/25/2024     05/25/2024    CO2 25 05/25/2024    BUN 16 05/25/2024    CREATININE 0.96 05/25/2024    GLUCOSE 113 (H) 05/25/2024    CALCIUM 8.4 (L) 05/25/2024         RADIOLOGY:   XR abdomen 1 view   Final Result   No prior imaging of the urinary tract in the local PACS archive        Questionable tiny stones or stone fragments x2 adjacent to the   proximal pigtail coil of the ureteral stent in the left renal pelvis        No stone or stone fragment projects over stented left ureter        Tiny urinary bladder stone versus phlebolith projecting near the   distal end of the stent        Probability of right nephrolithiasis as detailed above        MACRO:   None        Signed by: Gage Lopez 5/24/2024 2:54 PM   Dictation workstation:   [id number]      Electrophysiology procedure   Final Result      Cardiac Catheterization Procedure   Final Result      Transthoracic Echo (TTE) Complete   Final Result      Cardiac device check - Inpatient   Final Result      XR chest 1 view "   Final Result   No acute cardiopulmonary disease.   Signed by Rio Painting DO      CT abdomen pelvis wo IV contrast    (Results Pending)       [unfilled]        Assessment:  Principal Problem:    AICD discharge  Active Problems:    CHF (congestive heart failure) (Multi)    HFrEF (heart failure with reduced ejection fraction) (Multi)    Ischemic cardiomyopathy    Elevated troponin    Ventricular tachycardia (Multi)        Patient Active Problem List   Diagnosis    AICD discharge    HFrEF (heart failure with reduced ejection fraction) (Multi)    Ischemic cardiomyopathy    Elevated troponin    Ventricular tachycardia (Multi)    CHF (congestive heart failure) (Multi)       Plan:      Please do not hesitate to call with questions.   Electronically signed by Kd Rodrigues DO, [facility] on 5/25/2024 at 10:24 AM    Assessment/Plan:         Patient Active Problem List   Diagnosis    AICD discharge    HFrEF (heart failure with reduced ejection fraction) (Multi)    Ischemic cardiomyopathy    Elevated troponin    Ventricular tachycardia (Multi)    CHF (congestive heart failure) (Multi)       ASSESSMENT   58-year-old gentleman seen evaluate the bedside in the medical intensive care unit.    Bedside examination evaluation interview were performed by me.    Chart was reviewed in detail discussed the patient including catheterization results, electrophysiology and cardioversion results and all other data and in great detail and also discussed with the intensive care staff.    Impression:  Principal Problem:    AICD discharge  Active Problems:    CHF (congestive heart failure) (Multi)    HFrEF (heart failure with reduced ejection fraction) (Multi)    Ischemic cardiomyopathy    Elevated troponin    Ventricular tachycardia (Multi)      PLAN   Recommendation:  Patient will have aggressive treatment with medical therapy for his cardiomyopathy and coronary disease, resume Entresto and diuretic  EP will follow for arrhythmia  management  Will recheck echo in 2 to 3 days to see if initial presentation of 10% ejection fraction was related to multiple cardioversions by his ICD causing stunned myocardium versus further deterioration of overall LV function  Eventual referral to the advanced heart failure clinic for potential additional device therapy such as LVAD or even consideration for transplant assessment  Maintain proper electrolyte balance and fluid balance  Remain in the ICU over the next 2 to 3 days  Will continue to follow  Discussed with staff and Dr. Pino

## 2024-05-25 NOTE — PROGRESS NOTES
[facility] Emergency Room Progress Note               Rounding Cardiologist:  Zachary Sparks MD, MD   Primary Cardiologist: Dr. Zachary Sparks    Date:  5/25/2024  Patient:  Cristian Sapp  YOB: 1965  MRN:  [id number]   Admit Date:  5/22/2024      SUBJECTIVE    Cristian Sapp was seen and examined today at bedside.  Patient is doing well  He has been diuresed today  Telemetry shows atrial-ventricular paced rhythm  On amiodarone therapy    EKG performed today shows atrial ventricular paced rhythm at a rate of 81 bpm QRS ration 172 ms QT corrected 550 ms.  Rhythm strip shows the same pattern.    Laboratory today shows sodium 138 potassium 4.0 BUN 16 creatinine 0.96 GFR more than 90 WBC 10.6 hemoglobin 12.9 hematocrit 36.4 platelet count 171.    .      VITALS     Vitals:    05/25/24 0500 05/25/24 0600 05/25/24 0700 05/25/24 0800   BP: 114/75 98/67 101/73 103/72   BP Location:  Right leg     Patient Position:  Lying     Pulse: 80 80 80 80   Resp:       Temp:  36.5 °C (97.7 °F)     TempSrc:  Temporal     SpO2: (!) 87% 94% 94% 92%   Weight: 82.1 kg (181 lb)      Height:           Intake/Output Summary (Last 24 hours) at 5/25/2024 1018  Last data filed at 5/25/2024 0600  Gross per 24 hour   Intake 1582 ml   Output 2050 ml   Net -468 ml       [unfilled]    PHYSICAL EXAM   Physical Exam  Vitals reviewed.   Constitutional:       Appearance: Normal appearance.   HENT:      Head: Normocephalic and atraumatic.      Nose: Nose normal.      Mouth/Throat:      Mouth: Mucous membranes are moist.      Pharynx: Oropharynx is clear.   Eyes:      Pupils: Pupils are equal, round, and reactive to light.   Cardiovascular:      Rate and Rhythm: Normal rate and regular rhythm.      Pulses: Normal pulses.      Heart sounds: Normal heart sounds.   Pulmonary:      Effort: Pulmonary effort is normal.      Breath sounds: Normal breath sounds.   Abdominal:      General: Bowel sounds are normal.      Palpations: Abdomen is soft.   Musculoskeletal:        "  General: Normal range of motion.      Cervical back: Normal range of motion and neck supple.   Skin:     General: Skin is warm and dry.   Neurological:      General: No focal deficit present.      Mental Status: He is alert and oriented to person, place, and time.   Psychiatric:         Mood and Affect: Mood normal.         Behavior: Behavior normal.       DIAGNOSTIC RESULTS   EKG:     Telemetry: Atrial and ventricular paced rhythm.      LAB DATA   BMP:  @LABRCNT(Na:3,K:3,CL:3,CO2:3,Bun:3,Creatinine:3,Glu:3, CA:3,LABGLOM)@    Cardiac Enzymes:  @LABRCNT(CKTOTAL:3,CKMB:3,CKMBINDEX:3,TROPONINI:3)@    CBC:   Lab Results   Component Value Date    WBC 10.6 05/25/2024    RBC 3.88 (L) 05/25/2024    HGB 12.9 (L) 05/25/2024    HCT 36.4 (L) 05/25/2024     05/25/2024       CMP:    Lab Results   Component Value Date     05/25/2024    K 4.0 05/25/2024     05/25/2024    CO2 25 05/25/2024    BUN 16 05/25/2024    CREATININE 0.96 05/25/2024    GLUCOSE 113 (H) 05/25/2024    CALCIUM 8.4 (L) 05/25/2024       Hepatic Function Panel:    Lab Results   Component Value Date    ALKPHOS 72 05/22/2024    ALT 26 05/22/2024    AST 15 05/22/2024    PROT 6.6 05/22/2024    BILITOT 1.1 05/22/2024       Magnesium:    Lab Results   Component Value Date    MG 2.16 05/25/2024       PT/INR:    Lab Results   Component Value Date    PROTIME 12.4 05/22/2024    INR 1.1 05/22/2024     @LABRCNT(inr:3)@     HgBA1c:  No components found for: \"LABA1C\"    Lipid Profile:  No results found for: \"CHLPL\", \"TRIG\", \"HDL\", \"LDLCALC\", \"LDLDIRECT\"    TSH:    Lab Results   Component Value Date    TSH 5.55 (H) 05/23/2024       ABG:  No results found for: \"PH\"    PRO-BNP: No results found for: \"PROBNP\"       RADIOLOGY     XR abdomen 1 view   Final Result   No prior imaging of the urinary tract in the local PACS archive        Questionable tiny stones or stone fragments x2 adjacent to the   proximal pigtail coil of the ureteral stent in the left renal " pelvis        No stone or stone fragment projects over stented left ureter        Tiny urinary bladder stone versus phlebolith projecting near the   distal end of the stent        Probability of right nephrolithiasis as detailed above        MACRO:   None        Signed by: Gage Lopez 5/24/2024 2:54 PM   Dictation workstation:   [id number]      Electrophysiology procedure   Final Result      Cardiac Catheterization Procedure   Final Result      Transthoracic Echo (TTE) Complete   Final Result      Cardiac device check - Inpatient   Final Result      XR chest 1 view   Final Result   No acute cardiopulmonary disease.   Signed by [employer],       CT abdomen pelvis wo IV contrast    (Results Pending)       [unfilled]      CURRENT MEDICATIONS    aspirin, 81 mg, oral, Daily  cholecalciferol, 5,000 Units, oral, q3 days  [Held by provider] co-enzyme Q-10, 50 mg, oral, Every Sunday  cyanocobalamin, 1,000 mcg, oral, Daily  docusate sodium, 100 mg, oral, BID  enoxaparin, 40 mg, subcutaneous, q24h  lidocaine, 2 patch, transdermal, Daily  magnesium oxide, 400 mg, oral, Daily  nicotine, 1 patch, transdermal, q24h  PARoxetine, 40 mg, oral, Nightly  perflutren lipid microspheres, 0.5-10 mL of dilution, intravenous, Once in imaging  potassium chloride, 20 mEq, oral, Daily  prasugrel, 10 mg, oral, Daily  ranolazine, 500 mg, oral, BID  rosuvastatin, 20 mg, oral, Nightly  [Held by provider] sacubitriL-valsartan, 1 tablet, oral, BID  tamsulosin, 0.4 mg, oral, Daily  [Held by provider] torsemide, 20 mg, oral, BID  traZODone, 50 mg, oral, Nightly      amiodarone, 1 mg/min, Last Rate: 1 mg/min (05/25/24 0600)          ASSESSMENT   Principal Problem:    AICD discharge  Active Problems:    CHF (congestive heart failure) (Multi)    HFrEF (heart failure with reduced ejection fraction) (Multi)    Ischemic cardiomyopathy    Elevated troponin    Ventricular tachycardia (Multi)        Patient Active Problem List   Diagnosis    AICD  discharge    HFrEF (heart failure with reduced ejection fraction) (Multi)    Ischemic cardiomyopathy    Elevated troponin    Ventricular tachycardia (Multi)    CHF (congestive heart failure) (Multi)   1.    Wide-complex tachycardia/ventricular tachycardia/Slow VT/AICD shocks             -Patient is currently maintaining an AV paced rhythm at a rate of 80 with occasional PVCs/couplets.             -Continue IV amiodarone drip at current dose.             -[facility] 5/23 revealed  of the proximal circumflex artery,  of the proximal RCA with distal collateral filling and mild disease of the left main and LAD.  Medical management is advised.  2.    Ischemic cardiomyopathy/acute on chronic systolic heart failure             -LVEF 10% on echocardiogram 5/23/2024             -Moderately dilated left ventricle              -Cardiology plans to repeat echocardiogram early next week  3.    Valvular heart disease             -Moderate to severe MR/moderate TR             -Moderate to severely elevated pulmonary artery pressures  4.    Coronary artery disease/multiple remote myocardial infarctions             -Remote multivessel PCI's (last in 2008)             -See report above for [facility] 5/23  5.    Benign essential hypertension             -Stable  6.    Hyperlipidemia             -On statin therapy  7.    Current tobacco use              -Smoking cessation strongly advised      PLAN     Continue with amiodarone IV.  We will change him to oral amiodarone once patient is moved out of the unit  Diuresis per cardiology team  We will keep same rates of the device as it is.  Device will be read primary at the time of the discharge from the hospital  Keep potassium equal more than 4.0, magnesium equal more than 2.0.    Zachary Sparks MD        Please do not hesitate to call with questions.  Electronically signed by Zachary Sparks MD, [facility] on 5/25/2024 at 10:18 AM

## 2024-05-25 NOTE — CARE PLAN
The patient's goals for the shift include      The clinical goals for the shift include Patient will be hemodynamically stable throughout the shift    Over the shift, the patient did not make progress toward the following goals. Barriers to progression include respiratory status due to fluid overload. Recommendations to address these barriers include addressing restarting diuretics .

## 2024-05-26 ENCOUNTER — APPOINTMENT (OUTPATIENT)
Dept: CARDIOLOGY | Facility: HOSPITAL | Age: 59
DRG: 286 | End: 2024-05-26
Payer: COMMERCIAL

## 2024-05-26 LAB
ANION GAP SERPL CALC-SCNC: 13 MMOL/L (ref 10–20)
ANION GAP SERPL CALC-SCNC: 9 MMOL/L (ref 10–20)
BASOPHILS # BLD AUTO: 0.07 X10*3/UL (ref 0–0.1)
BASOPHILS NFR BLD AUTO: 0.8 %
BUN SERPL-MCNC: 14 MG/DL (ref 6–23)
BUN SERPL-MCNC: 16 MG/DL (ref 6–23)
CALCIUM SERPL-MCNC: 8.7 MG/DL (ref 8.6–10.3)
CALCIUM SERPL-MCNC: 9.3 MG/DL (ref 8.6–10.3)
CHLORIDE SERPL-SCNC: 103 MMOL/L (ref 98–107)
CHLORIDE SERPL-SCNC: 99 MMOL/L (ref 98–107)
CO2 SERPL-SCNC: 28 MMOL/L (ref 21–32)
CO2 SERPL-SCNC: 30 MMOL/L (ref 21–32)
CREAT SERPL-MCNC: 1.12 MG/DL (ref 0.5–1.3)
CREAT SERPL-MCNC: 1.24 MG/DL (ref 0.5–1.3)
EGFRCR SERPLBLD CKD-EPI 2021: 68 ML/MIN/1.73M*2
EGFRCR SERPLBLD CKD-EPI 2021: 77 ML/MIN/1.73M*2
EOSINOPHIL # BLD AUTO: 0.28 X10*3/UL (ref 0–0.7)
EOSINOPHIL NFR BLD AUTO: 3.1 %
ERYTHROCYTE [DISTWIDTH] IN BLOOD BY AUTOMATED COUNT: 13.8 % (ref 11.5–14.5)
GLUCOSE SERPL-MCNC: 118 MG/DL (ref 74–99)
GLUCOSE SERPL-MCNC: 99 MG/DL (ref 74–99)
HCT VFR BLD AUTO: 37.3 % (ref 41–52)
HGB BLD-MCNC: 13.2 G/DL (ref 13.5–17.5)
IMM GRANULOCYTES # BLD AUTO: 0.06 X10*3/UL (ref 0–0.7)
IMM GRANULOCYTES NFR BLD AUTO: 0.7 % (ref 0–0.9)
LYMPHOCYTES # BLD AUTO: 1.82 X10*3/UL (ref 1.2–4.8)
LYMPHOCYTES NFR BLD AUTO: 20.4 %
MAGNESIUM SERPL-MCNC: 2.01 MG/DL (ref 1.6–2.4)
MCH RBC QN AUTO: 33.4 PG (ref 26–34)
MCHC RBC AUTO-ENTMCNC: 35.4 G/DL (ref 32–36)
MCV RBC AUTO: 94 FL (ref 80–100)
MONOCYTES # BLD AUTO: 0.96 X10*3/UL (ref 0.1–1)
MONOCYTES NFR BLD AUTO: 10.7 %
NEUTROPHILS # BLD AUTO: 5.75 X10*3/UL (ref 1.2–7.7)
NEUTROPHILS NFR BLD AUTO: 64.3 %
NRBC BLD-RTO: 0 /100 WBCS (ref 0–0)
PLATELET # BLD AUTO: 174 X10*3/UL (ref 150–450)
POTASSIUM SERPL-SCNC: 3.6 MMOL/L (ref 3.5–5.3)
POTASSIUM SERPL-SCNC: 4 MMOL/L (ref 3.5–5.3)
PSA SERPL-MCNC: 0.45 NG/ML
RBC # BLD AUTO: 3.95 X10*6/UL (ref 4.5–5.9)
SODIUM SERPL-SCNC: 136 MMOL/L (ref 136–145)
SODIUM SERPL-SCNC: 138 MMOL/L (ref 136–145)
WBC # BLD AUTO: 8.9 X10*3/UL (ref 4.4–11.3)

## 2024-05-26 PROCEDURE — 2020000001 HC ICU ROOM DAILY

## 2024-05-26 PROCEDURE — 36415 COLL VENOUS BLD VENIPUNCTURE: CPT | Performed by: SURGERY

## 2024-05-26 PROCEDURE — 80048 BASIC METABOLIC PNL TOTAL CA: CPT | Mod: 91 | Performed by: NURSE PRACTITIONER

## 2024-05-26 PROCEDURE — 93005 ELECTROCARDIOGRAM TRACING: CPT

## 2024-05-26 PROCEDURE — 2500000002 HC RX 250 W HCPCS SELF ADMINISTERED DRUGS (ALT 637 FOR MEDICARE OP, ALT 636 FOR OP/ED)

## 2024-05-26 PROCEDURE — 85025 COMPLETE CBC W/AUTO DIFF WBC: CPT | Performed by: SURGERY

## 2024-05-26 PROCEDURE — 2500000004 HC RX 250 GENERAL PHARMACY W/ HCPCS (ALT 636 FOR OP/ED): Performed by: NURSE PRACTITIONER

## 2024-05-26 PROCEDURE — 2500000001 HC RX 250 WO HCPCS SELF ADMINISTERED DRUGS (ALT 637 FOR MEDICARE OP)

## 2024-05-26 PROCEDURE — 2500000002 HC RX 250 W HCPCS SELF ADMINISTERED DRUGS (ALT 637 FOR MEDICARE OP, ALT 636 FOR OP/ED): Performed by: HOSPITALIST

## 2024-05-26 PROCEDURE — 83735 ASSAY OF MAGNESIUM: CPT | Performed by: SURGERY

## 2024-05-26 PROCEDURE — 83735 ASSAY OF MAGNESIUM: CPT | Mod: 91 | Performed by: NURSE PRACTITIONER

## 2024-05-26 PROCEDURE — 2500000004 HC RX 250 GENERAL PHARMACY W/ HCPCS (ALT 636 FOR OP/ED)

## 2024-05-26 PROCEDURE — S4991 NICOTINE PATCH NONLEGEND: HCPCS

## 2024-05-26 PROCEDURE — 80048 BASIC METABOLIC PNL TOTAL CA: CPT | Performed by: SURGERY

## 2024-05-26 PROCEDURE — 2500000004 HC RX 250 GENERAL PHARMACY W/ HCPCS (ALT 636 FOR OP/ED): Performed by: HOSPITALIST

## 2024-05-26 PROCEDURE — 99233 SBSQ HOSP IP/OBS HIGH 50: CPT | Performed by: INTERNAL MEDICINE

## 2024-05-26 PROCEDURE — 99291 CRITICAL CARE FIRST HOUR: CPT

## 2024-05-26 PROCEDURE — 2500000002 HC RX 250 W HCPCS SELF ADMINISTERED DRUGS (ALT 637 FOR MEDICARE OP, ALT 636 FOR OP/ED): Performed by: NURSE PRACTITIONER

## 2024-05-26 PROCEDURE — 93010 ELECTROCARDIOGRAM REPORT: CPT | Performed by: INTERNAL MEDICINE

## 2024-05-26 PROCEDURE — 2500000001 HC RX 250 WO HCPCS SELF ADMINISTERED DRUGS (ALT 637 FOR MEDICARE OP): Performed by: INTERNAL MEDICINE

## 2024-05-26 PROCEDURE — 84153 ASSAY OF PSA TOTAL: CPT | Performed by: UROLOGY

## 2024-05-26 PROCEDURE — 2500000001 HC RX 250 WO HCPCS SELF ADMINISTERED DRUGS (ALT 637 FOR MEDICARE OP): Performed by: HOSPITALIST

## 2024-05-26 PROCEDURE — 36415 COLL VENOUS BLD VENIPUNCTURE: CPT | Performed by: UROLOGY

## 2024-05-26 RX ORDER — SENNOSIDES 8.6 MG/1
1 TABLET ORAL NIGHTLY
Status: DISCONTINUED | OUTPATIENT
Start: 2024-05-26 | End: 2024-05-28 | Stop reason: HOSPADM

## 2024-05-26 RX ORDER — POTASSIUM CHLORIDE 20 MEQ/1
40 TABLET, EXTENDED RELEASE ORAL ONCE
Status: COMPLETED | OUTPATIENT
Start: 2024-05-26 | End: 2024-05-26

## 2024-05-26 RX ORDER — TORSEMIDE 20 MG/1
20 TABLET ORAL DAILY
Status: DISCONTINUED | OUTPATIENT
Start: 2024-05-26 | End: 2024-05-28 | Stop reason: HOSPADM

## 2024-05-26 RX ORDER — TORSEMIDE 20 MG/1
20 TABLET ORAL DAILY
Status: DISCONTINUED | OUTPATIENT
Start: 2024-05-26 | End: 2024-05-26

## 2024-05-26 RX ORDER — POLYETHYLENE GLYCOL 3350 17 G/17G
17 POWDER, FOR SOLUTION ORAL DAILY PRN
Status: DISCONTINUED | OUTPATIENT
Start: 2024-05-26 | End: 2024-05-28 | Stop reason: HOSPADM

## 2024-05-26 RX ORDER — MAGNESIUM SULFATE HEPTAHYDRATE 40 MG/ML
2 INJECTION, SOLUTION INTRAVENOUS ONCE
Status: COMPLETED | OUTPATIENT
Start: 2024-05-26 | End: 2024-05-26

## 2024-05-26 RX ADMIN — AMIODARONE HYDROCHLORIDE 1 MG/MIN: 1.8 INJECTION, SOLUTION INTRAVENOUS at 06:11

## 2024-05-26 RX ADMIN — STANDARDIZED SENNA CONCENTRATE 8.6 MG: 8.6 TABLET ORAL at 20:47

## 2024-05-26 RX ADMIN — TAMSULOSIN HYDROCHLORIDE 0.4 MG: 0.4 CAPSULE ORAL at 09:18

## 2024-05-26 RX ADMIN — Medication 5000 UNITS: at 20:47

## 2024-05-26 RX ADMIN — RANOLAZINE 500 MG: 500 TABLET, FILM COATED, EXTENDED RELEASE ORAL at 20:47

## 2024-05-26 RX ADMIN — ENOXAPARIN SODIUM 40 MG: 100 INJECTION SUBCUTANEOUS at 09:18

## 2024-05-26 RX ADMIN — ASPIRIN 81 MG: 81 TABLET, CHEWABLE ORAL at 09:18

## 2024-05-26 RX ADMIN — AMIODARONE HYDROCHLORIDE 1 MG/MIN: 1.8 INJECTION, SOLUTION INTRAVENOUS at 12:05

## 2024-05-26 RX ADMIN — DOCUSATE SODIUM 100 MG: 100 CAPSULE, LIQUID FILLED ORAL at 20:48

## 2024-05-26 RX ADMIN — MAGNESIUM OXIDE 400 MG (241.3 MG MAGNESIUM) TABLET 400 MG: TABLET at 09:19

## 2024-05-26 RX ADMIN — POTASSIUM CHLORIDE 20 MEQ: 1.5 POWDER, FOR SOLUTION ORAL at 09:19

## 2024-05-26 RX ADMIN — TORSEMIDE 20 MG: 20 TABLET ORAL at 17:10

## 2024-05-26 RX ADMIN — SACUBITRIL AND VALSARTAN 1 TABLET: 24; 26 TABLET, FILM COATED ORAL at 12:08

## 2024-05-26 RX ADMIN — ROSUVASTATIN CALCIUM 20 MG: 20 TABLET, FILM COATED ORAL at 20:47

## 2024-05-26 RX ADMIN — TRAZODONE HYDROCHLORIDE 50 MG: 50 TABLET ORAL at 20:47

## 2024-05-26 RX ADMIN — POTASSIUM CHLORIDE 40 MEQ: 1500 TABLET, EXTENDED RELEASE ORAL at 09:19

## 2024-05-26 RX ADMIN — AMIODARONE HYDROCHLORIDE 1 MG/MIN: 1.8 INJECTION, SOLUTION INTRAVENOUS at 17:20

## 2024-05-26 RX ADMIN — PAROXETINE HYDROCHLORIDE 40 MG: 20 TABLET, FILM COATED ORAL at 20:47

## 2024-05-26 RX ADMIN — MAGNESIUM SULFATE HEPTAHYDRATE 2 G: 40 INJECTION, SOLUTION INTRAVENOUS at 20:58

## 2024-05-26 RX ADMIN — SACUBITRIL AND VALSARTAN 1 TABLET: 24; 26 TABLET, FILM COATED ORAL at 20:47

## 2024-05-26 RX ADMIN — METHOCARBAMOL TABLETS 500 MG: 500 TABLET, COATED ORAL at 20:47

## 2024-05-26 RX ADMIN — DOCUSATE SODIUM 100 MG: 100 CAPSULE, LIQUID FILLED ORAL at 09:19

## 2024-05-26 RX ADMIN — NICOTINE 1 PATCH: 21 PATCH, EXTENDED RELEASE TRANSDERMAL at 17:09

## 2024-05-26 RX ADMIN — CYANOCOBALAMIN TAB 500 MCG 1000 MCG: 500 TAB at 09:19

## 2024-05-26 RX ADMIN — RANOLAZINE 500 MG: 500 TABLET, FILM COATED, EXTENDED RELEASE ORAL at 09:19

## 2024-05-26 RX ADMIN — LORAZEPAM 0.5 MG: 0.5 TABLET ORAL at 20:45

## 2024-05-26 ASSESSMENT — COGNITIVE AND FUNCTIONAL STATUS - GENERAL
MOBILITY SCORE: 24
DAILY ACTIVITIY SCORE: 24

## 2024-05-26 ASSESSMENT — PAIN SCALES - GENERAL
PAINLEVEL_OUTOF10: 0 - NO PAIN
PAINLEVEL_OUTOF10: 0 - NO PAIN

## 2024-05-26 ASSESSMENT — PAIN - FUNCTIONAL ASSESSMENT
PAIN_FUNCTIONAL_ASSESSMENT: 0-10
PAIN_FUNCTIONAL_ASSESSMENT: 0-10

## 2024-05-26 NOTE — PROGRESS NOTES
[facility] Progress Note               Rounding Cardiologist:  Zachary Sparks MD, MD   Primary Cardiologist: Dr. Zachary Sparks    Date:  5/26/2024  Patient:  Cristian Sapp  YOB: 1965  MRN:  [id number]   Admit Date:  5/22/2024      SUBJECTIVE    Cristian Sapp was seen and examined today at bedside.  No new events  Telemetry shows atrial paced-ventricular paced rhythm    Laboratory today shows sodium 130 potassium 2.6 chloride 103 BUN 14 creatinine 1.12 magnesium 2.0 WBC 8.9 hemoglobin 13.3 hematocrit 37.3 platelet count 174      VITALS     Vitals:    05/26/24 0100 05/26/24 0200 05/26/24 0300 05/26/24 0400   BP: 94/60 85/57 78/60 84/56   Pulse: 80 80 80 80   Resp:       Temp:       TempSrc:       SpO2: 92% 95% 93% 93%   Weight:       Height:           Intake/Output Summary (Last 24 hours) at 5/26/2024 0534  Last data filed at 5/26/2024 0400  Gross per 24 hour   Intake 2901.72 ml   Output 2875 ml   Net 26.72 ml       [unfilled]    PHYSICAL EXAM   PHYSICAL EXAMINATION:  GENERAL:  Well developed, well nourished, in no acute distress.  CHEST:  Symmetric and nontender.  NEURO/PSYCH:  Alert and oriented times three with approppriate behavior and responses.  NECK:  Supple, no JVD, no bruit.  LUNGS:  Clear to auscultation bilaterally, normal respiratory effort.  HEART:  Rate and rhythm regular with no evident murmur, no gallop appreciated.        There are no rubs, clicks or heaves.  EXTREMITIES:  Warm with good color, no clubbing or cyanosis.  There is no edema noted.  PERIPHERAL VASCULAR:  Pulses present and equally palpable; 2+ throughout.      DIAGNOSTIC RESULTS   EKG:     Telemetry: Atrial paced-ventricular paced rhythm.      LAB DATA   BMP:  @LABRCNT(Na:3,K:3,CL:3,CO2:3,Bun:3,Creatinine:3,Glu:3, CA:3,LABGLOM)@    Cardiac Enzymes:  @LABRCNT(CKTOTAL:3,CKMB:3,CKMBINDEX:3,TROPONINI:3)@    CBC:   Lab Results   Component Value Date    WBC 8.9 05/26/2024    RBC 3.95 (L) 05/26/2024    HGB 13.2 (L) 05/26/2024    HCT  "37.3 (L) 05/26/2024     05/26/2024       CMP:    Lab Results   Component Value Date     05/26/2024    K 3.6 05/26/2024     05/26/2024    CO2 30 05/26/2024    BUN 14 05/26/2024    CREATININE 1.12 05/26/2024    GLUCOSE 118 (H) 05/26/2024    CALCIUM 8.7 05/26/2024       Hepatic Function Panel:  No results found for: \"ALKPHOS\", \"ALT\", \"AST\", \"PROT\", \"BILITOT\", \"BILIDIR\"    Magnesium:    Lab Results   Component Value Date    MG 2.01 05/26/2024       PT/INR:  No results found for: \"PROTIME\", \"INR\"  @LABRCNT(inr:3)@     HgBA1c:  No components found for: \"LABA1C\"    Lipid Profile:  No results found for: \"CHLPL\", \"TRIG\", \"HDL\", \"LDLCALC\", \"LDLDIRECT\"    TSH:    Lab Results   Component Value Date    TSH 5.55 (H) 05/23/2024       ABG:  No results found for: \"PH\"    PRO-BNP: No results found for: \"PROBNP\"       RADIOLOGY     XR abdomen 1 view   Final Result   No prior imaging of the urinary tract in the local PACS archive        Questionable tiny stones or stone fragments x2 adjacent to the   proximal pigtail coil of the ureteral stent in the left renal pelvis        No stone or stone fragment projects over stented left ureter        Tiny urinary bladder stone versus phlebolith projecting near the   distal end of the stent        Probability of right nephrolithiasis as detailed above        MACRO:   None        Signed by: Gage Lopez 5/24/2024 2:54 PM   Dictation workstation:   [id number]      Electrophysiology procedure   Final Result      Cardiac Catheterization Procedure   Final Result      Transthoracic Echo (TTE) Complete   Final Result      Cardiac device check - Inpatient   Final Result      XR chest 1 view   Final Result   No acute cardiopulmonary disease.   Signed by [employer],       CT abdomen pelvis wo IV contrast    (Results Pending)       [unfilled]      CURRENT MEDICATIONS    aspirin, 81 mg, oral, Daily  cholecalciferol, 5,000 Units, oral, q3 days  [Held by provider] co-enzyme Q-10, " 50 mg, oral, Every Sunday  cyanocobalamin, 1,000 mcg, oral, Daily  docusate sodium, 100 mg, oral, BID  enoxaparin, 40 mg, subcutaneous, q24h  lidocaine, 2 patch, transdermal, Daily  magnesium oxide, 400 mg, oral, Daily  nicotine, 1 patch, transdermal, q24h  PARoxetine, 40 mg, oral, Nightly  perflutren lipid microspheres, 0.5-10 mL of dilution, intravenous, Once in imaging  potassium chloride, 20 mEq, oral, Daily  prasugrel, 10 mg, oral, Daily  ranolazine, 500 mg, oral, BID  rosuvastatin, 20 mg, oral, Nightly  sacubitriL-valsartan, 1 tablet, oral, BID  tamsulosin, 0.4 mg, oral, Daily  [Held by provider] torsemide, 20 mg, oral, Daily  traZODone, 50 mg, oral, Nightly      amiodarone, 1 mg/min, Last Rate: 1 mg/min (05/26/24 0400)          ASSESSMENT   Principal Problem:    AICD discharge  Active Problems:    CHF (congestive heart failure) (Multi)    HFrEF (heart failure with reduced ejection fraction) (Multi)    Ischemic cardiomyopathy    Elevated troponin    Ventricular tachycardia (Multi)        Patient Active Problem List   Diagnosis    AICD discharge    HFrEF (heart failure with reduced ejection fraction) (Multi)    Ischemic cardiomyopathy    Elevated troponin    Ventricular tachycardia (Multi)    CHF (congestive heart failure) (Multi)     1.    Wide-complex tachycardia/ventricular tachycardia/Slow VT/AICD shocks             -Patient is currently maintaining an AV paced rhythm at a rate of 80 with occasional PVCs/couplets.             -Continue IV amiodarone drip at current dose.             -[facility] 5/23 revealed  of the proximal circumflex artery,  of the proximal RCA with distal collateral filling and mild disease of the left main and LAD.  Medical management is advised.  2.    Ischemic cardiomyopathy/acute on chronic systolic heart failure             -LVEF 10% on echocardiogram 5/23/2024             -Moderately dilated left ventricle              -  3.    Valvular heart disease             -Moderate to severe  MR/moderate TR             -Moderate to severely elevated pulmonary artery pressures  4.    Coronary artery disease/multiple remote myocardial infarctions             -Remote multivessel PCI's (last in 2008)             -See report above for [facility] 5/23  5.    Benign essential hypertension             -Stable  6.    Hyperlipidemia             -On statin therapy  7.    Current tobacco use              -Smoking cessation strongly advised    PLAN     Continue with IV amiodarone.  Transition to oral amiodarone 200 mg twice a day when he is moved out of the intensive care unit.  Continue telemetry  EKG daily  Diuresis per cardiology team    Zachary Sparks MD      Please do not hesitate to call with questions.  Electronically signed by Zachary Sparks MD, [facility] on 5/26/2024 at 5:34 AM

## 2024-05-26 NOTE — PROGRESS NOTES
[facility] Critical Care Medicine       Date:  5/26/2024  Patient:  Cristian Sapp  YOB: 1965  MRN:  [id number]   Admit Date:  5/22/2024  ========================================================================================================    Chief Complaint   Patient presents with    Rapid Heart Rate     Defibrillator went off about 10-15 times, -200BPM. 50mcg fent in squad. Pt currently at 125BPM         History of Present Illness:  Cristian Sapp is a 58 y.o. year old male patient with Past Medical History of  listed below including multiple prior MI's, CAD, multiple JIM's, ICM, HFrEF, infrarenal AAA s/p stent right iliac, nephrolithiasis w/ recent cystoscopy with ureteral stent placement, presented to ED via EMS for ICD firing. He states it fired 10-15 times.  When ICD firing occurred, patient was not doing anything strenuous. He developed anterior non-radiating chest pain from ICD firing. No true SOB or palpitations.   Patient denies cardiac symptoms prior to ICD firing.  He was following a cardiologist in [place] where he previously resided. He started following with [facility] cardiology in April. Last Echo on file 08/2022 showing EF 25-30%.     ICD interrogated in ED, evaluated by cardiology along with ECG, appeared to be a slow v-tach, ECG looks like wide complex tachycardia with RBBB. ICD did not fire in ED.  His K+ was 3.3, Mag was < 2, he was given some K+ and Mag.  Dr. Sparks from EP contacted, he agreed with amiodarone, and said possible heart cath in AM so NPO except sips with meds.     He had lactic elevation 2.5 which normalized, likely reactive to ICD firing. ED thought maybe UTI given recent cysto however UA not reflect UTI. He was given IV Ceftriaxone. CXR shows no acute process.  Patient never required cardioversion.  His blood pressures have been soft however he runs low according to records, MAP consistently > 60 mmHg, no evidence of hypoperfusion on exam.  Advised to hold off  on Vasopressor/Inotropic support.  No evidence of acute heart failure causing volume overload.        Interval ICU Events:  Admitted to ICU on IV amiodarone infusion, SBP 90's, patient normally runs on lower end, MAP consistently > 60 mmHg, no evidence of hypoperfusion. Lactic acidosis resolved, likely occurred insetting of ICD firing. A/O, only c/o anterior chest discomfort from ICD firing, chest pain is reproducible.     This morning patient was taken to heart cath, no interventions, EF 10%  EP cardioverted patient at bedside today, pacemaker settings changed per EP, now AV paced at 80.   Continue on amio drip.  Echo pending.    5/24: Overnight received duonebs and torsemide. Great UOP -5.3L net -3.2L.  Continued on amnio drip at 1 Mg today per EP.  AV paced throughout the night and today.  Restart cardiac meds per cardiology.  Continue on dual antiplatelet.  Plan for echo in 2 to 3 days.     5/25: Continued on amnio drip at 1 mg.   Remains paced overnight.  Restart torsemide today.     5/26: Continue amio and torsemide. Increased bowel reg.  On RA this afternoon.       Medical History:  Past Medical History:   Diagnosis Date    Atherosclerosis of native artery of both lower extremities with intermittent claudication (CMS-HCC)     CHB (complete heart block) (Multi)     per device check    Chronic systolic CHF (congestive heart failure), NYHA class 3 (Multi)     COPD (chronic obstructive pulmonary disease) (Multi)     Coronary artery disease     History of tobacco abuse     HLD (hyperlipidemia)     Hypertension     Infrarenal abdominal aortic aneurysm (AAA) without rupture (CMS-HCC)     Ischemic cardiomyopathy with implantable cardioverter-defibrillator (ICD)     MI (myocardial infarction) (Multi)     multiple    Nephrolithiasis     PTSD (post-traumatic stress disorder)      Past Surgical History:   Procedure Laterality Date    CARDIAC CATHETERIZATION N/A 5/23/2024    Procedure: Left Heart Cath;  Surgeon: Babatunde CHAMBERLAIN  MD Britney;  Location: [facility];  Service: Cardiovascular;  Laterality: N/A;    CARDIAC DEFIBRILLATOR PLACEMENT      CARDIAC ELECTROPHYSIOLOGY PROCEDURE N/A 5/23/2024    Procedure: Cardioversion;  Surgeon: Zachary Sparks MD;  Location: [facility];  Service: Electrophysiology;  Laterality: N/A;    CORONARY ANGIOPLASTY WITH STENT PLACEMENT  2006    JIM to LAD    CORONARY ANGIOPLASTY WITH STENT PLACEMENT  04/2003    CORONARY ANGIOPLASTY WITH STENT PLACEMENT  06/2008    CYSTOSCOPY W/ URETERAL STENT PLACEMENT  04/01/2024    CYSTOSCOPY W/ URETERAL STENT PLACEMENT  04/30/2024    FEMORAL BYPASS      femoral artery    ILIAC ARTERY STENT Right     KNEE SURGERY       Medications Prior to Admission   Medication Sig Dispense Refill Last Dose    albuterol sulfate (Proair Digihaler) 90 mcg/actuation aero powdr breath act w/sensor inhaler Inhale 2 puffs every 4 hours if needed for wheezing.   5/21/2024    aspirin 81 mg EC tablet Take 1 tablet (81 mg) by mouth once daily.   5/22/2024    bisoprolol (Zebeta) 5 mg tablet Take by mouth 2 times a day.   5/22/2024 at 0800    cholecalciferol (Vitamin D-3) 5,000 Units tablet Take 1 tablet (5,000 Units) by mouth every 3 days.   Past Week    co-enzyme Q-10 50 mg capsule Take 1 capsule (50 mg) by mouth 1 (one) time per week.   Past Week    cyanocobalamin (Vitamin B-12) 1,000 mcg tablet Take 1 tablet (1,000 mcg) by mouth once daily.   Past Week    ezetimibe (Zetia) 10 mg tablet Take 1 tablet (10 mg) by mouth once daily.   5/22/2024 at 0800    LORazepam (Ativan) 0.5 mg tablet Take 1 tablet (0.5 mg) by mouth once daily at bedtime.   Past Week at 2200    magnesium gluconate 30 mg (550 mg) tablet Take 1 tablet (30 mg) by mouth once daily.   5/22/2024 at 0800    nicotine (Nicoderm CQ) 21 mg/24 hr patch Place 1 patch on the skin once every 24 hours.   5/22/2024 at 0800    PARoxetine (Paxil) 40 mg tablet Take 1 tablet (40 mg) by mouth once daily in the morning.    5/22/2024 at 0800    potassium chloride (Klor-Con) 20 mEq packet Take 20 mEq by mouth once daily.   5/22/2024 at 0800    prasugrel (Effient) 10 mg tablet Take 1 tablet (10 mg) by mouth once daily.   5/22/2024 at 0800    ranolazine (Ranexa) 500 mg 12 hr tablet Take 1 tablet (500 mg) by mouth 2 times a day. Do not crush, chew, or split.   5/22/2024 at 0800    rosuvastatin (Crestor) 20 mg tablet Take 1 tablet (20 mg) by mouth once daily at bedtime.   Past Week at 2200    sacubitriL-valsartan (Entresto) 24-26 mg tablet Take 1 tablet by mouth 2 times a day.   5/22/2024 at 0800    tamsulosin (Flomax) 0.4 mg 24 hr capsule Take 1 capsule (0.4 mg) by mouth once daily.   5/22/2024 at 0800    traZODone (Desyrel) 50 mg tablet Take 1 tablet (50 mg) by mouth once daily at bedtime.   Past Week at 2000    acetaminophen (Tylenol) 500 mg tablet Take 650 mg by mouth every 6 hours if needed for mild pain (1 - 3).   More than a month    alirocumab (Praluent Pen) 75 mg/mL pen injector Inject 75 mg under the skin every 14 (fourteen) days.   More than a month    DAPAGLIFLOZIN PROPANEDIOL ORAL Take 10 mg by mouth once daily.       nitroglycerin (Nitrostat) 0.4 mg SL tablet Place 1 tablet (0.4 mg) under the tongue every 5 minutes if needed for chest pain.   More than a month    torsemide (Demadex) 20 mg tablet Take 1 tablet (20 mg) by mouth 2 times a day.        Chantix [varenicline]  Social History     Tobacco Use    Smoking status: Former     Types: Cigarettes   Vaping Use    Vaping status: Never Used   Substance Use Topics    Alcohol use: Yes     Comment: social    Drug use: Defer     Family History   Problem Relation Name Age of Onset    Hypertension Mother      Diabetes Father      Hypertension Sister      Diabetes Sister      Colon cancer Maternal Grandmother      Hypertension Maternal Grandmother      Heart disease Maternal Grandfather      Hypertension Maternal Grandfather      Cancer Paternal Grandfather      Hypertension Paternal  Grandfather         Review of Systems:  14 point review of systems was completed and negative except for those specially mention in my HPI    Physical Exam:    Heart Rate:  [80-88]   Temp:  [36.5 °C (97.7 °F)-36.6 °C (97.9 °F)]   BP: ()/(56-79)   Weight:  [82.3 kg (181 lb 7 oz)]   SpO2:  [91 %-95 %]     Physical Exam  Vitals reviewed.   Constitutional:       General: He is not in acute distress.     Appearance: Normal appearance. He is normal weight. He is not ill-appearing, toxic-appearing or diaphoretic.   HENT:      Head: Normocephalic.      Mouth/Throat:      Mouth: Mucous membranes are moist.      Pharynx: Oropharynx is clear.   Eyes:      Extraocular Movements: Extraocular movements intact.      Conjunctiva/sclera: Conjunctivae normal.      Pupils: Pupils are equal, round, and reactive to light.   Cardiovascular:      Rate and Rhythm: Normal rate and regular rhythm.      Pulses: Normal pulses.      Heart sounds: Normal heart sounds.      Comments: paced  Pulmonary:      Effort: Pulmonary effort is normal.      Comments: Diminished  Abdominal:      General: Abdomen is flat. Bowel sounds are normal.      Palpations: Abdomen is soft.   Musculoskeletal:         General: Normal range of motion.      Right lower leg: No edema.      Left lower leg: No edema.   Skin:     General: Skin is warm and dry.      Capillary Refill: Capillary refill takes less than 2 seconds.   Neurological:      General: No focal deficit present.      Mental Status: He is alert and oriented to person, place, and time. Mental status is at baseline.   Psychiatric:         Mood and Affect: Mood normal.         Behavior: Behavior normal.         Thought Content: Thought content normal.         Judgment: Judgment normal.         Objective:    I have reviewed all medications, laboratory results, and imaging pertinent for today's encounter    Assessment/Plan:    I am currently managing this critically ill patient for the following  problems:    Chest pain  ICD Discharges  Slow ventricular tachycardia  Elevated troponin  ICM  Chronic Systolic CHF NYHA class III  CAD  Hx HTN  Hypokalemia  Leukocytosis  COPD        Neuro/Psych/Pain Ctrl/Sedation:  # Anxiety  -Pain: Tylenol, lido patch and robaxin for sternal pain  -home ativan  -Trazodone at bedtime     Respiratory/ENT:  COPD  Current tobacco use  -Supplemental oxygen as needed to maintain SpO2 greater than 92%  -RA this morning  -DuoNebs as needed  -Nicotine patch     Cardiovascular:  Chest pain  ICD Discharges with Slow ventricular tachycardia s/p cardioversion  Elevated troponin  Acute on chronic HFrEF  hx ICM, CAD, HTN, MI, moderate to severe MR, mod TR, moderate to severe PAP previous echo August 2022 EF 25 to 30%, EF 10% 5/23 during [facility]  Hypokalemia  -Keep K>4, Mg>2  -Cards and EP following  --Repeat echo in 2 to 3 days  --Amio gtt and transition to PO  -Daily EKG  -Continue aspirin and statin, torsemide, prasugrel, entresto  -holding antihypertensives for now until blood pressure improves    GI:  -Cardiac diet      Renal/Volume Status (Intra & Extravascular):  #s/p recent cystoscopy with ureteral stent placement was 1 month ago  -Urology consult, CT abdomen pelvis without contrast pending  -Continue Flomax  -Strict intake and output  -monitor renal function and electrolytes    Endocrine  -TSH normal     Infectious Disease:  Leukocytosis, improved, Suspect reactive from ICD firing  -Monitor off antibiotics, procalcitonin negative  -Monitor CBC/D     Heme/Onc:  -Monitor CBC     OBGYN/MSK:  -Activity as tolerated     Ethics/Code Status:  Full code  -Heart failure navigator consult, nutrition consult for outpatient follow-up placed     :  DVT Prophylaxis: Lovenox  GI Prophylaxis: N/A  Bowel Regimen: Colace, senna, miralax  Diet: Cardiac diet  CVC: N/A  [place]: N/A  [place]: N/A  Restraints: N/A  Dispo: ICU    Critical Care Time:  45 minutes  Discussed with Dr. iLs CHAMBERLAIN  Cecy, YARITZA-CNP

## 2024-05-26 NOTE — CARE PLAN
The patient's goals for the shift include      The clinical goals for the shift include Patient will remain hemodynamically stable througout shift

## 2024-05-26 NOTE — PROGRESS NOTES
"                                                                                     [facility] Progress Note      Cardiologist:  Kd Rodrigues DO MD   Date:  5/26/2024  Patient:  Cristian Sapp  YOB: 1965  MRN:  [id number]   Admit Date:  5/22/2024      Subjective:  He denies any chest pain, shortness of breath, palpitations, or chest pain  No major overnight issues.  Currently AV paced      Objective:   BP 91/56   Pulse 80   Temp 36.6 °C (97.9 °F) (Temporal)   Resp 24   Ht 1.702 m (5' 7\")   Wt 82.3 kg (181 lb 7 oz)   SpO2 92%   BMI 28.42 kg/m²      @Vitalsmultipe@    Intake/Output Summary (Last 24 hours) at 5/26/2024 1010  Last data filed at 5/26/2024 0800  Gross per 24 hour   Intake 2457.32 ml   Output 3475 ml   Net -1017.68 ml       [unfilled]    Physical Exam:  GENERAL:  Well developed, well nourished, in no acute distress.  CHEST:  Symmetric and non-tender.  NEURO/PSYCH:  Alert and oriented times three with appropriate behavior and responses.  NECK:  Supple, no JVD, no bruit.  LUNGS: Occasional scattered rhonchi, normal respiratory effort, no wheezing, patient is a bit short of breath  HEART: Regular, S1 and S2 muffled, PMI laterally displaced, soft systolic murmur at the left sternal border and apex, no diastolic murmurs clearly audible today  EXTREMITIES:  Warm with good color, no clubbing or cyanosis.  There is no edema noted.  PERIPHERAL VASCULAR:  Pulses present and equally palpable; 2+ throughout.      Medications:   aspirin, 81 mg, oral, Daily  cholecalciferol, 5,000 Units, oral, q3 days  [Held by provider] co-enzyme Q-10, 50 mg, oral, Every Sunday  cyanocobalamin, 1,000 mcg, oral, Daily  docusate sodium, 100 mg, oral, BID  enoxaparin, 40 mg, subcutaneous, q24h  lidocaine, 2 patch, transdermal, Daily  magnesium oxide, 400 mg, oral, Daily  nicotine, 1 patch, transdermal, q24h  PARoxetine, 40 mg, oral, Nightly  perflutren lipid microspheres, 0.5-10 mL of dilution, intravenous, Once in " imaging  potassium chloride, 20 mEq, oral, Daily  prasugrel, 10 mg, oral, Daily  ranolazine, 500 mg, oral, BID  rosuvastatin, 20 mg, oral, Nightly  sacubitriL-valsartan, 1 tablet, oral, BID  tamsulosin, 0.4 mg, oral, Daily  [Held by provider] torsemide, 20 mg, oral, Daily  traZODone, 50 mg, oral, Nightly      amiodarone, 1 mg/min, Last Rate: 1 mg/min (05/26/24 0611)        Medications:     Current Facility-Administered Medications   Medication Dose Route Frequency Provider Last Rate Last Admin    acetaminophen (Tylenol) tablet 650 mg  650 mg oral q4h PRN CLAUDIA Francois        albuterol 90 mcg/actuation inhaler 2 puff  2 puff inhalation q4h PRN CLAUDIA Menjivar        amiodarone (Nexterone) 360 mg in dextrose,iso-osm 200 mL (1.8 mg/mL) infusion (premix)  1 mg/min intravenous Continuous EttaCLAUDIA Elam 33.3 mL/hr at 05/26/24 0611 1 mg/min at 05/26/24 0611    aspirin chewable tablet 81 mg  81 mg oral Daily EttaCLAUDIA Elam   81 mg at 05/26/24 0918    cholecalciferol (Vitamin D-3) tablet 5,000 Units  5,000 Units oral q3 days CLAUDIA Menjivar   5,000 Units at 05/23/24 2106    [Held by provider] co-enzyme Q-10 capsule 50 mg  50 mg oral Every Sunday CLAUDIA Menjivar        cyanocobalamin (Vitamin B-12) tablet 1,000 mcg  1,000 mcg oral Daily CLAUDIA Menjivar   1,000 mcg at 05/26/24 0919    docusate sodium (Colace) capsule 100 mg  100 mg oral BID CLAUDIA Menjivar   100 mg at 05/26/24 0919    enoxaparin (Lovenox) syringe 40 mg  40 mg subcutaneous q24h CLAUDIA Menjivar   40 mg at 05/26/24 0918    levalbuterol (Xopenex) 0.63 mg/3 mL nebulizer solution 0.63 mg  0.63 mg nebulization q4h PRN CLAUDIA Chaudhry   0.63 mg at 05/24/24 3583    lidocaine 4 % patch 2 patch  2 patch transdermal Daily CLAUDIA Menjivar   2 patch at 05/25/24 1054    LORazepam (Ativan) tablet 0.5 mg  0.5 mg oral Daily PRN Bladimir D Winner,  YARITZA-CNP   0.5 mg at 05/25/24 2357    magnesium oxide (Mag-Ox) tablet 400 mg  400 mg oral Daily CLAUDIA Menjivar   400 mg at 05/26/24 0919    methocarbamol (Robaxin) tablet 500 mg  500 mg oral q6h PRN YARITZA Menjivar-CNP   500 mg at 05/25/24 2008    nicotine (Nicoderm CQ) 21 mg/24 hr patch 1 patch  1 patch transdermal q24h CLAUDIA Menjivar   1 patch at 05/25/24 1714    ondansetron (Zofran) injection 4 mg  4 mg intravenous q8h PRN YARITZA Francois-CNP   4 mg at 05/24/24 0411    PARoxetine (Paxil) tablet 40 mg  40 mg oral Nightly NICCI ChaudhryCNP   40 mg at 05/25/24 2006    perflutren lipid microspheres (Definity) injection 0.5-10 mL of dilution  0.5-10 mL of dilution intravenous Once in imaging CLAUDIA Chaudhry        potassium chloride (Klor-Con) packet 20 mEq  20 mEq oral Daily CLAUDIA Menjivar   20 mEq at 05/26/24 0919    prasugrel (Effient) tablet 10 mg  10 mg oral Daily CLAUDIA Menjivar   10 mg at 05/25/24 1046    ranolazine (Ranexa) 12 hr tablet 500 mg  500 mg oral BID CLAUDIA Menjivar   500 mg at 05/26/24 0919    rosuvastatin (Crestor) tablet 20 mg  20 mg oral Nightly CLAUDIA Menjivar   20 mg at 05/25/24 2006    sacubitriL-valsartan (Entresto) 24-26 mg per tablet 1 tablet  1 tablet oral BID Kd Rodrigues DO   1 tablet at 05/25/24 2006    simethicone (Mylicon) chewable tablet 80 mg  80 mg oral TID PRN NICCI ChaudhryCNP   80 mg at 05/23/24 2108    sodium chloride (Ocean) 0.65 % nasal spray 1 spray  1 spray Each Nostril 4x daily PRN YARITZA Chaudhry-CNP        tamsulosin (Flomax) 24 hr capsule 0.4 mg  0.4 mg oral Daily CLAUDIA Menjivar   0.4 mg at 05/26/24 0918    [Held by provider] torsemide (Demadex) tablet 20 mg  20 mg oral Daily Kd Rodrigues DO        traZODone (Desyrel) tablet 50 mg  50 mg oral Nightly CLAUDIA Menjivar   50 mg at 05/25/24 2006       Outpatient Medications:      Current Outpatient Medications   Medication Instructions    acetaminophen (TYLENOL) 650 mg, oral, Every 6 hours PRN    albuterol sulfate (Proair Digihaler) 90 mcg/actuation aero powdr breath act w/sensor inhaler 2 puffs, inhalation, Every 4 hours PRN    aspirin 81 mg, oral, Daily    bisoprolol (Zebeta) 5 mg tablet oral, 2 times daily    cholecalciferol (VITAMIN D-3) 5,000 Units, oral, Every 3 days    co-enzyme Q-10 50 mg, oral, Once Weekly    cyanocobalamin (VITAMIN B-12) 1,000 mcg, oral, Daily    DAPAGLIFLOZIN PROPANEDIOL ORAL 10 mg, oral, Daily    ezetimibe (ZETIA) 10 mg, oral, Daily    LORazepam (ATIVAN) 0.5 mg, oral, Nightly    magnesium gluconate 30 mg, oral, Daily    nicotine (Nicoderm CQ) 21 mg/24 hr patch 1 patch, transdermal, Every 24 hours    nitroglycerin (NITROSTAT) 0.4 mg, sublingual, Every 5 min PRN    PARoxetine (PAXIL) 40 mg, oral, Every morning    potassium chloride (Klor-Con) 20 mEq packet 20 mEq, oral, Daily    Praluent Pen 75 mg, subcutaneous, Every 14 days    prasugrel (EFFIENT) 10 mg, oral, Daily    ranolazine (RANEXA) 500 mg, oral, 2 times daily, Do not crush, chew, or split.    rosuvastatin (CRESTOR) 20 mg, oral, Nightly    sacubitriL-valsartan (Entresto) 24-26 mg tablet 1 tablet, oral, 2 times daily    tamsulosin (FLOMAX) 0.4 mg, oral, Daily    torsemide (DEMADEX) 20 mg, oral, 2 times daily    traZODone (DESYREL) 50 mg, oral, Nightly        Diagnostics:    ECG 12 Lead    Result Date: 5/24/2024  AV dual-paced rhythm Abnormal ECG When compared with ECG of 23-MAY-2024 08:05, Electronic ventricular pacemaker has replaced Wide QRS tachycardia Vent. rate has decreased BY  46 BPM Confirmed by Zachary Sparks ([id number]) on 5/24/2024 8:56:30 AM    ECG 12 lead    Result Date: 5/24/2024  Ventricular tachycardia Left axis deviation Right bundle branch block Inferior infarct (cited on or before 21-FATUMA-2002) T wave abnormality, consider lateral ischemia Abnormal ECG See ED provider note for full  interpretation and clinical correlation Confirmed by Zachary Sparks ([id number]) on 5/24/2024 8:56:24 AM    Procedure Details:      Procedure Details:    Cardioversion  Summary:  ·            External electric cardioversion of ventricular tachycardia to normal sinus rhythm was achieved using 200 J synchronized biphasic  Antitachycardia pacing was delivered through the device at 450 ms, 300 ms, 250 ms with no termination of ventricular arrhythmias.  .   Recommendations:  1.          A 12 lead ECG should be performed prior to discharge from the hospital.   2.          The patient should continue with the present medications.   Discharge:   1.          The patient recovered uneventfully from the effects of conscious sedation. The patient left the EP laboratory hemodynamically stable and without neurological deficits.   Follow up:   1.          The patient will stay on telemetry intensive care unit for drug load with high risk medication, following bed rest and subsequent ambulation, provided the recovery parameters are appropriate. The patient should call the electrophysiologist immediately if symptoms recur, or for any problems. The patient has been instructed accordingly.   ________________________________________  Procedures:  Cardioversion.  Noninvasive primary stimulation through the device (biventricular ICD)  ________________________________________  Patient history:  Please refer to the detailed history and physical on the patient's medical chart.  History of ventricular tachycardia status post ICD shocks.  Patient is scheduled for antitachycardia pacing-NIPS and cardioversion  ________________________________________  Procedure narrative:  The device was interrogated and reprogrammed prior to the procedure.  The risks, benefits, and alternatives to the procedure and sedation were explained to the patient, and informed consent was obtained. The patient was in the fasting state. A grounding pad was placed.  Self-adhesive anterior-posterior defibrillation pads were applied. A ZOLL defibrillator was used for monitoring and the defibrillator waveform was set to biphasic. The patient was set up for continuous monitoring of surface 12 lead ECG and pulse oximetry. Blood pressure was monitored with automatic cuff measurements. The procedure was performed under IV conscious sedation performed by anesthesiology service.   1.          External electric cardioversion of ventricular tachycardia to normal sinus rhythm was achieved using 200 J synchronized biphasic  Antitachycardia pacing was delivered through the device at 450 ms, 300 ms, 250 ms with no termination of ventricular arrhythmias.     The device was reprogrammed to DDDR  bpm.  AV delay was decreased to 150 ms.  ________________________________________  Complications:  The patient tolerated the procedure without any complications or incident.   ________________________________________  Prepared and signed by     Tolerance: good   Complications: None           ECG 12 Lead    Result Date: 5/23/2024  Wide QRS tachycardia - possibly ventricular tachycardia Abnormal right superior axis deviation Abnormal ECG When compared with ECG of 22-MAY-2024 23:59, (unconfirmed) No significant change was found Confirmed by Clatyon Angulo ([id number]) on 5/23/2024 10:40:02 AM             [place] [address]   Tel [phone] Fax [phone]     TRANSTHORACIC ECHOCARDIOGRAM REPORT        Patient Name:      FRANCIA Schultz Physician:    [id number] Kd Rodrigues DO  Study Date:        5/23/2024             Ordering Provider:    [id number] GLORIA SHIN  MRN/PID:           [id number]              Fellow:  Accession#:        [id number]          Nurse:  Date of Birth/Age: 1965 / 58 years Sonographer:           Cathy Dziak                                                                 [facility]  Gender:            M                     Additional Staff:  Height:            170.18 cm             Admit Date:  Weight:            78.47 kg              Admission Status:     Inpatient -                                                                 Routine  BSA / BMI:         1.90 m2 / 27.10 kg/m2 Department Location:  [facility]  Blood Pressure: 97 /71 mmHg     Study Type:    TRANSTHORACIC ECHO (TTE) COMPLETE  Diagnosis/ICD: Presence of automatic (implantable) cardiac                 defibrillator-Z95.810; Chronic systolic (congestive) heart                 failure (CHF)-I50.22; Ischemic cardiomyopathy-I25.5  Indication:    Congestive Heart Failure, ICM, ICD Firing (multiple shocks)  CPT Codes:     Echo Complete w Full Doppler-85978     Patient History:  MI Location/Type:  Unknown MI  Pacer/Defib:       AICD  Pertinent History: CAD, CHF, Cardiomyopathy, HTN and ICD Firing.     Study Detail: The following Echo studies were performed: 2D, M-Mode, Doppler and                color flow. Technically challenging study due to patient lying in                supine position, prominent lung artifact and poor acoustic                windows. Patient has a defibrillator. The patient was asleep.        PHYSICIAN INTERPRETATION:  Left Ventricle: Left ventricular systolic function is severely decreased, with an estimated ejection fraction of 10%. There is global hypokinesis of the left ventricle with minor regional variations. The left ventricular cavity size is moderately dilated. Left ventricular diastolic filling was not assessed.  Left Atrium: The left atrium is mildly dilated.  Right Ventricle: The right ventricle is normal in size. There is normal right ventricular global systolic function. A device is visualized in the right ventricle.  Right Atrium: The right atrium is normal in size. There is a device visualized in the right  atrium.  Aortic Valve: The aortic valve appears structurally normal. The aortic valve appears tricuspid. There is mild aortic valve cusp calcification. There is no evidence of aortic valve stenosis.  There is no evidence of aortic valve regurgitation. The peak instantaneous gradient of the aortic valve is 3.6 mmHg. The mean gradient of the aortic valve is 2.0 mmHg.  Mitral Valve: The mitral valve is normal in structure. There is mild thickening of the anterior and posterior mitral valve leaflets. There is no evidence of mitral valve stenosis. There is normal mitral valve leaflet mobility. There is mild mitral annular calcification. There is moderate to severe mitral valve regurgitation.  Tricuspid Valve: The tricuspid valve is structurally normal. There is normal tricuspid valve leaflet mobility. There is moderate tricuspid regurgitation.  Pulmonic Valve: The pulmonic valve is structurally normal. There is trace pulmonic valve regurgitation.  Pericardium: There is no pericardial effusion noted.  Aorta: The aortic root is normal.  Pulmonary Artery: Pulmonary hypertension is present. The tricuspid regurgitant velocity is 3.03 m/s, and with an estimated right atrial pressure of 15 mmHg, the estimated pulmonary artery pressure is moderate to severely elevated with the RVSP at 51.7 mmHg.  Systemic Veins: The inferior vena cava appears moderately dilated.  In comparison to the previous echocardiogram(s): There are no prior studies on this patient for comparison purposes.        CONCLUSIONS:   1. Left ventricular systolic function is severely decreased with a 10% estimated ejection fraction.   2. There is no evidence of mitral valve stenosis.   3. Moderate to severe mitral valve regurgitation.   4. Moderate tricuspid regurgitation.   5. Aortic valve stenosis is not present.   6. Left ventricular cavity size is moderately dilated.   7. Moderate to severely elevated pulmonary artery pressure.   8. Pulmonary hypertension is  present.   9. The inferior vena cava appears moderately dilated.  10. There is global hypokinesis of the left ventricle with minor regional variations.     RECOMMENDATIONS:  Technically suboptimal and limited study, therefore accuracy of above interpretation could be substantially diminished. Clinical correlation is advised. Consider additional imaging modalities if clinically indicated.       Cardiac Cath Post Procedure Notes:  Post Procedure Diagnosis: Double vessel disease.  Blood Loss:               Estimated blood loss during the procedure was 0 mls.  Specimens Removed:        Number of specimen(s) removed: none.     ____________________________________________________________________________________  CONCLUSIONS:   1. Distal Left Main: 10-30% stenosis.   2. Proximal and mid LAD Lesion: The percent stenosis is 10-30%.   3. Proximal CX Lesion: The percent stenosis is 100%.   4. Right Coronary Artery: fills via collaterals.   5. Proximal RCA Lesion: The percent stenosis is 100%.   6. The Left Ventricular Ejection Fraction is 10%,by echo.     ICD 10 Codes:  Ventricular tachycardia, unspecified-I47.20     CPT Codes:  Coronary Angiography S&I only ([facility])([facility])-04975; Moderate Sedation Services initial 15 minutes patient >5 years-[id number]     [id number] Babatunde Menon MD  Performing Physician  Electronically signed by [id number] Babatunde Menon MD on 5/23/2024 at 10:42:57  AM         EKG:   Encounter Date: 05/22/24   ECG 12 Lead   Result Value    Ventricular Rate 80    Atrial Rate 80    MA Interval 152    QRS Duration 172    QT Interval 492    QTC Calculation(Bazett) 567    P Axis 85    R Axis -71    T Axis 161    QRS Count 13    Q Onset 195    P Onset 155    P Offset 193    T Offset 441    QTC Fredericia 542    Narrative    AV dual-paced rhythm  Abnormal ECG  When compared with ECG of 25-MAY-2024 08:21,  No significant change was found                            Lab Data:    BMP:  Lab Results   Component Value Date     " 05/26/2024     05/25/2024     05/24/2024    K 3.6 05/26/2024    K 4.0 05/25/2024    K 3.7 05/24/2024     05/26/2024     05/25/2024     05/24/2024    CO2 30 05/26/2024    CO2 25 05/25/2024    CO2 25 05/24/2024    BUN 14 05/26/2024    BUN 16 05/25/2024    BUN 17 05/24/2024    CREATININE 1.12 05/26/2024    CREATININE 0.96 05/25/2024    CREATININE 1.11 05/24/2024           TSH:  No results found for: \"TSH\"      Lipid Profile:  No results found for: \"CHLPL\", \"TRIG\", \"HDL\", \"LDLCALC\", \"LDLDIRECT\"    HgbA1C:  No components found for: \"LABA1C\"          CBC:   Lab Results   Component Value Date    WBC 8.9 05/26/2024    RBC 3.95 (L) 05/26/2024    HGB 13.2 (L) 05/26/2024    HCT 37.3 (L) 05/26/2024     05/26/2024       CMP:    Lab Results   Component Value Date     05/26/2024    K 3.6 05/26/2024     05/26/2024    CO2 30 05/26/2024    BUN 14 05/26/2024    CREATININE 1.12 05/26/2024    GLUCOSE 118 (H) 05/26/2024    CALCIUM 8.7 05/26/2024         RADIOLOGY:   XR abdomen 1 view   Final Result   No prior imaging of the urinary tract in the local PACS archive        Questionable tiny stones or stone fragments x2 adjacent to the   proximal pigtail coil of the ureteral stent in the left renal pelvis        No stone or stone fragment projects over stented left ureter        Tiny urinary bladder stone versus phlebolith projecting near the   distal end of the stent        Probability of right nephrolithiasis as detailed above        MACRO:   None        Signed by: Gage Lopez 5/24/2024 2:54 PM   Dictation workstation:   [id number]      Electrophysiology procedure   Final Result      Cardiac Catheterization Procedure   Final Result      Transthoracic Echo (TTE) Complete   Final Result      Cardiac device check - Inpatient   Final Result      XR chest 1 view   Final Result   No acute cardiopulmonary disease.   Signed by Rio Painting, DO      CT abdomen pelvis wo IV contrast    " (Results Pending)       [unfilled]        Assessment:  Principal Problem:    AICD discharge  Active Problems:    CHF (congestive heart failure) (Multi)    HFrEF (heart failure with reduced ejection fraction) (Multi)    Ischemic cardiomyopathy    Elevated troponin    Ventricular tachycardia (Multi)        Patient Active Problem List   Diagnosis    AICD discharge    HFrEF (heart failure with reduced ejection fraction) (Multi)    Ischemic cardiomyopathy    Elevated troponin    Ventricular tachycardia (Multi)    CHF (congestive heart failure) (Multi)       Plan:      Please do not hesitate to call with questions.   Electronically signed by Kd Rodrigues DO [facility] on 5/26/2024 at 10:10 AM    Assessment/Plan:         Patient Active Problem List   Diagnosis    AICD discharge    HFrEF (heart failure with reduced ejection fraction) (Multi)    Ischemic cardiomyopathy    Elevated troponin    Ventricular tachycardia (Multi)    CHF (congestive heart failure) (Multi)       ASSESSMENT   58-year-old gentleman seen evaluate the bedside in the medical intensive care unit.    Bedside examination evaluation interview were performed by me.    Chart was reviewed in detail discussed the patient including catheterization results, electrophysiology and cardioversion results and all other data and in great detail and also discussed with the intensive care staff.    Impression:  Principal Problem:    AICD discharge  Active Problems:    CHF (congestive heart failure) (Multi)    HFrEF (heart failure with reduced ejection fraction) (Multi)    Ischemic cardiomyopathy    Elevated troponin    Ventricular tachycardia (Multi)      PLAN   Recommendation:  Patient will have aggressive treatment with medical therapy for his cardiomyopathy and coronary disease, resume Entresto and diuretic  EP will follow for arrhythmia management  Will recheck echo in 2 to 3 days to see if initial presentation of 10% ejection fraction was related to multiple  cardioversions by his ICD causing stunned myocardium versus further deterioration of overall LV function  Eventual referral to the advanced heart failure clinic for potential additional device therapy such as LVAD or even consideration for transplant assessment  Maintain proper electrolyte balance and fluid balance  Remain in the ICU over the next 2 to 3 days  Will continue to follow  Discussed with staff and Dr. Pino    Discussion:  Patient is clinically stable.  Patient does run low blood pressure but this is an acceptable feature of the patient's severe cardiomyopathy ejection fraction 10% low cardiac reserve and output.  As long as he is not severely symptomatic in any way we will continue low-dose diuretic and maintain GDMT.  Plans are for advanced heart failure evaluation and possible future device therapy once stabilized.      Kd RodriguesDO,FACC

## 2024-05-27 ENCOUNTER — APPOINTMENT (OUTPATIENT)
Dept: CARDIOLOGY | Facility: HOSPITAL | Age: 59
DRG: 286 | End: 2024-05-27
Payer: COMMERCIAL

## 2024-05-27 LAB
ANION GAP SERPL CALC-SCNC: 13 MMOL/L (ref 10–20)
ATRIAL RATE: 59 BPM
ATRIAL RATE: 80 BPM
BACTERIA BLD CULT: NORMAL
BACTERIA BLD CULT: NORMAL
BASOPHILS # BLD AUTO: 0.07 X10*3/UL (ref 0–0.1)
BASOPHILS NFR BLD AUTO: 0.7 %
BUN SERPL-MCNC: 13 MG/DL (ref 6–23)
CALCIUM SERPL-MCNC: 8.9 MG/DL (ref 8.6–10.3)
CHLORIDE SERPL-SCNC: 100 MMOL/L (ref 98–107)
CO2 SERPL-SCNC: 28 MMOL/L (ref 21–32)
CREAT SERPL-MCNC: 1.08 MG/DL (ref 0.5–1.3)
EGFRCR SERPLBLD CKD-EPI 2021: 81 ML/MIN/1.73M*2
EOSINOPHIL # BLD AUTO: 0.31 X10*3/UL (ref 0–0.7)
EOSINOPHIL NFR BLD AUTO: 3.2 %
ERYTHROCYTE [DISTWIDTH] IN BLOOD BY AUTOMATED COUNT: 13.5 % (ref 11.5–14.5)
GLUCOSE SERPL-MCNC: 125 MG/DL (ref 74–99)
HCT VFR BLD AUTO: 38.3 % (ref 41–52)
HGB BLD-MCNC: 13.6 G/DL (ref 13.5–17.5)
IMM GRANULOCYTES # BLD AUTO: 0.08 X10*3/UL (ref 0–0.7)
IMM GRANULOCYTES NFR BLD AUTO: 0.8 % (ref 0–0.9)
LYMPHOCYTES # BLD AUTO: 1.46 X10*3/UL (ref 1.2–4.8)
LYMPHOCYTES NFR BLD AUTO: 14.9 %
MAGNESIUM SERPL-MCNC: 1.97 MG/DL (ref 1.6–2.4)
MAGNESIUM SERPL-MCNC: 2.22 MG/DL (ref 1.6–2.4)
MCH RBC QN AUTO: 33.2 PG (ref 26–34)
MCHC RBC AUTO-ENTMCNC: 35.5 G/DL (ref 32–36)
MCV RBC AUTO: 93 FL (ref 80–100)
MONOCYTES # BLD AUTO: 1.21 X10*3/UL (ref 0.1–1)
MONOCYTES NFR BLD AUTO: 12.3 %
NEUTROPHILS # BLD AUTO: 6.69 X10*3/UL (ref 1.2–7.7)
NEUTROPHILS NFR BLD AUTO: 68.1 %
NRBC BLD-RTO: 0 /100 WBCS (ref 0–0)
P AXIS: 85 DEGREES
P OFFSET: 193 MS
P ONSET: 155 MS
PLATELET # BLD AUTO: 193 X10*3/UL (ref 150–450)
POTASSIUM SERPL-SCNC: 3.6 MMOL/L (ref 3.5–5.3)
PR INTERVAL: 152 MS
Q ONSET: 195 MS
Q ONSET: 202 MS
QRS COUNT: 13 BEATS
QRS COUNT: 17 BEATS
QRS DURATION: 172 MS
QRS DURATION: 178 MS
QT INTERVAL: 454 MS
QT INTERVAL: 492 MS
QTC CALCULATION(BAZETT): 567 MS
QTC CALCULATION(BAZETT): 591 MS
QTC FREDERICIA: 541 MS
QTC FREDERICIA: 542 MS
R AXIS: -71 DEGREES
R AXIS: -84 DEGREES
RBC # BLD AUTO: 4.1 X10*6/UL (ref 4.5–5.9)
SODIUM SERPL-SCNC: 137 MMOL/L (ref 136–145)
T AXIS: 161 DEGREES
T AXIS: 68 DEGREES
T OFFSET: 429 MS
T OFFSET: 441 MS
VENTRICULAR RATE: 102 BPM
VENTRICULAR RATE: 80 BPM
WBC # BLD AUTO: 9.8 X10*3/UL (ref 4.4–11.3)

## 2024-05-27 PROCEDURE — 80048 BASIC METABOLIC PNL TOTAL CA: CPT | Performed by: NURSE PRACTITIONER

## 2024-05-27 PROCEDURE — 2500000001 HC RX 250 WO HCPCS SELF ADMINISTERED DRUGS (ALT 637 FOR MEDICARE OP): Performed by: HOSPITALIST

## 2024-05-27 PROCEDURE — 2020000001 HC ICU ROOM DAILY

## 2024-05-27 PROCEDURE — 2500000002 HC RX 250 W HCPCS SELF ADMINISTERED DRUGS (ALT 637 FOR MEDICARE OP, ALT 636 FOR OP/ED): Performed by: NURSE PRACTITIONER

## 2024-05-27 PROCEDURE — 99233 SBSQ HOSP IP/OBS HIGH 50: CPT | Performed by: INTERNAL MEDICINE

## 2024-05-27 PROCEDURE — 2500000004 HC RX 250 GENERAL PHARMACY W/ HCPCS (ALT 636 FOR OP/ED)

## 2024-05-27 PROCEDURE — 93010 ELECTROCARDIOGRAM REPORT: CPT | Performed by: INTERNAL MEDICINE

## 2024-05-27 PROCEDURE — 93284 PRGRMG EVAL IMPLANTABLE DFB: CPT | Performed by: INTERNAL MEDICINE

## 2024-05-27 PROCEDURE — 99291 CRITICAL CARE FIRST HOUR: CPT | Performed by: STUDENT IN AN ORGANIZED HEALTH CARE EDUCATION/TRAINING PROGRAM

## 2024-05-27 PROCEDURE — S4991 NICOTINE PATCH NONLEGEND: HCPCS

## 2024-05-27 PROCEDURE — 2500000002 HC RX 250 W HCPCS SELF ADMINISTERED DRUGS (ALT 637 FOR MEDICARE OP, ALT 636 FOR OP/ED)

## 2024-05-27 PROCEDURE — 85025 COMPLETE CBC W/AUTO DIFF WBC: CPT | Performed by: NURSE PRACTITIONER

## 2024-05-27 PROCEDURE — 2500000004 HC RX 250 GENERAL PHARMACY W/ HCPCS (ALT 636 FOR OP/ED): Performed by: NURSE PRACTITIONER

## 2024-05-27 PROCEDURE — 36415 COLL VENOUS BLD VENIPUNCTURE: CPT | Performed by: NURSE PRACTITIONER

## 2024-05-27 PROCEDURE — 93005 ELECTROCARDIOGRAM TRACING: CPT

## 2024-05-27 PROCEDURE — 2500000002 HC RX 250 W HCPCS SELF ADMINISTERED DRUGS (ALT 637 FOR MEDICARE OP, ALT 636 FOR OP/ED): Performed by: HOSPITALIST

## 2024-05-27 PROCEDURE — 2500000001 HC RX 250 WO HCPCS SELF ADMINISTERED DRUGS (ALT 637 FOR MEDICARE OP): Performed by: NURSE PRACTITIONER

## 2024-05-27 PROCEDURE — 2500000001 HC RX 250 WO HCPCS SELF ADMINISTERED DRUGS (ALT 637 FOR MEDICARE OP)

## 2024-05-27 PROCEDURE — 83735 ASSAY OF MAGNESIUM: CPT | Performed by: NURSE PRACTITIONER

## 2024-05-27 PROCEDURE — 2500000001 HC RX 250 WO HCPCS SELF ADMINISTERED DRUGS (ALT 637 FOR MEDICARE OP): Performed by: INTERNAL MEDICINE

## 2024-05-27 PROCEDURE — 2500000001 HC RX 250 WO HCPCS SELF ADMINISTERED DRUGS (ALT 637 FOR MEDICARE OP): Performed by: STUDENT IN AN ORGANIZED HEALTH CARE EDUCATION/TRAINING PROGRAM

## 2024-05-27 PROCEDURE — 2500000004 HC RX 250 GENERAL PHARMACY W/ HCPCS (ALT 636 FOR OP/ED): Performed by: HOSPITALIST

## 2024-05-27 PROCEDURE — 2500000002 HC RX 250 W HCPCS SELF ADMINISTERED DRUGS (ALT 637 FOR MEDICARE OP, ALT 636 FOR OP/ED): Mod: MUE | Performed by: INTERNAL MEDICINE

## 2024-05-27 RX ORDER — AMIODARONE HYDROCHLORIDE 200 MG/1
400 TABLET ORAL 3 TIMES DAILY
Status: DISCONTINUED | OUTPATIENT
Start: 2024-05-27 | End: 2024-05-28 | Stop reason: HOSPADM

## 2024-05-27 RX ORDER — LORAZEPAM 0.5 MG/1
0.5 TABLET ORAL EVERY 12 HOURS PRN
Status: DISCONTINUED | OUTPATIENT
Start: 2024-05-27 | End: 2024-05-28 | Stop reason: HOSPADM

## 2024-05-27 RX ORDER — AMIODARONE HYDROCHLORIDE 200 MG/1
400 TABLET ORAL DAILY
Status: DISCONTINUED | OUTPATIENT
Start: 2024-05-27 | End: 2024-05-27

## 2024-05-27 RX ORDER — POTASSIUM CHLORIDE 1.5 G/1.58G
20 POWDER, FOR SOLUTION ORAL ONCE
Status: COMPLETED | OUTPATIENT
Start: 2024-05-27 | End: 2024-05-27

## 2024-05-27 RX ORDER — ALPRAZOLAM 0.5 MG/1
0.5 TABLET ORAL ONCE
Status: COMPLETED | OUTPATIENT
Start: 2024-05-27 | End: 2024-05-27

## 2024-05-27 RX ADMIN — LORAZEPAM 0.5 MG: 0.5 TABLET ORAL at 10:01

## 2024-05-27 RX ADMIN — CYANOCOBALAMIN TAB 500 MCG 1000 MCG: 500 TAB at 09:35

## 2024-05-27 RX ADMIN — ASPIRIN 81 MG: 81 TABLET, CHEWABLE ORAL at 09:34

## 2024-05-27 RX ADMIN — POTASSIUM CHLORIDE 20 MEQ: 1.5 POWDER, FOR SOLUTION ORAL at 09:32

## 2024-05-27 RX ADMIN — SACUBITRIL AND VALSARTAN 1 TABLET: 24; 26 TABLET, FILM COATED ORAL at 20:53

## 2024-05-27 RX ADMIN — TAMSULOSIN HYDROCHLORIDE 0.4 MG: 0.4 CAPSULE ORAL at 09:35

## 2024-05-27 RX ADMIN — PRASUGREL 10 MG: 10 TABLET, FILM COATED ORAL at 09:35

## 2024-05-27 RX ADMIN — STANDARDIZED SENNA CONCENTRATE 8.6 MG: 8.6 TABLET ORAL at 20:53

## 2024-05-27 RX ADMIN — ONDANSETRON 4 MG: 2 INJECTION INTRAMUSCULAR; INTRAVENOUS at 03:11

## 2024-05-27 RX ADMIN — PAROXETINE HYDROCHLORIDE 40 MG: 20 TABLET, FILM COATED ORAL at 20:53

## 2024-05-27 RX ADMIN — NICOTINE 1 PATCH: 21 PATCH, EXTENDED RELEASE TRANSDERMAL at 17:24

## 2024-05-27 RX ADMIN — ROSUVASTATIN CALCIUM 20 MG: 20 TABLET, FILM COATED ORAL at 20:53

## 2024-05-27 RX ADMIN — POLYETHYLENE GLYCOL 3350 17 G: 17 POWDER, FOR SOLUTION ORAL at 10:02

## 2024-05-27 RX ADMIN — AMIODARONE HYDROCHLORIDE 1 MG/MIN: 1.8 INJECTION, SOLUTION INTRAVENOUS at 01:00

## 2024-05-27 RX ADMIN — DOCUSATE SODIUM 100 MG: 100 CAPSULE, LIQUID FILLED ORAL at 20:53

## 2024-05-27 RX ADMIN — RANOLAZINE 500 MG: 500 TABLET, FILM COATED, EXTENDED RELEASE ORAL at 20:53

## 2024-05-27 RX ADMIN — LORAZEPAM 0.5 MG: 0.5 TABLET ORAL at 19:46

## 2024-05-27 RX ADMIN — DOCUSATE SODIUM 100 MG: 100 CAPSULE, LIQUID FILLED ORAL at 09:35

## 2024-05-27 RX ADMIN — RANOLAZINE 500 MG: 500 TABLET, FILM COATED, EXTENDED RELEASE ORAL at 09:35

## 2024-05-27 RX ADMIN — TORSEMIDE 20 MG: 20 TABLET ORAL at 09:35

## 2024-05-27 RX ADMIN — MAGNESIUM OXIDE 400 MG (241.3 MG MAGNESIUM) TABLET 400 MG: TABLET at 09:35

## 2024-05-27 RX ADMIN — AMIODARONE HYDROCHLORIDE 1 MG/MIN: 1.8 INJECTION, SOLUTION INTRAVENOUS at 07:04

## 2024-05-27 RX ADMIN — AMIODARONE HYDROCHLORIDE 400 MG: 200 TABLET ORAL at 09:34

## 2024-05-27 RX ADMIN — ENOXAPARIN SODIUM 40 MG: 100 INJECTION SUBCUTANEOUS at 09:00

## 2024-05-27 RX ADMIN — TRAZODONE HYDROCHLORIDE 50 MG: 50 TABLET ORAL at 20:53

## 2024-05-27 RX ADMIN — ALPRAZOLAM 0.5 MG: 0.5 TABLET ORAL at 03:35

## 2024-05-27 RX ADMIN — AMIODARONE HYDROCHLORIDE 400 MG: 200 TABLET ORAL at 20:53

## 2024-05-27 ASSESSMENT — PAIN SCALES - GENERAL
PAINLEVEL_OUTOF10: 0 - NO PAIN

## 2024-05-27 ASSESSMENT — PAIN - FUNCTIONAL ASSESSMENT
PAIN_FUNCTIONAL_ASSESSMENT: 0-10

## 2024-05-27 NOTE — PROGRESS NOTES
"                                                                                     [facility] Progress Note      Cardiologist:  Kd Rodrigues DO MD   Date:  5/27/2024  Patient:  Cristian Sapp  YOB: 1965  MRN:  [id number]   Admit Date:  5/22/2024      Subjective:  He denies any chest pain, shortness of breath,   Some variations in arrhythmia overnight  Currently may be in a accelerated idioventricular rhythm versus sinus rhythm with first-degree AV block heart rate about 102      Objective:   BP 75/64   Pulse 100   Temp 36 °C (96.8 °F) (Temporal)   Resp 18   Ht 1.702 m (5' 7\")   Wt 78.8 kg (173 lb 11.6 oz)   SpO2 92%   BMI 27.21 kg/m²      @Vitalsmultipe@    Intake/Output Summary (Last 24 hours) at 5/27/2024 1007  Last data filed at 5/27/2024 0600  Gross per 24 hour   Intake 1929.2 ml   Output 3625 ml   Net -1695.8 ml       [unfilled]    Physical Exam:  GENERAL:  Well developed, well nourished, in no acute distress.  CHEST:  Symmetric and non-tender.  NEURO/PSYCH:  Alert and oriented times three with appropriate behavior and responses.  NECK:  Supple, no JVD, no bruit.  LUNGS: Occasional scattered rhonchi, normal respiratory effort, no wheezing, patient is a bit short of breath  HEART: Regular, S1 and S2 muffled, PMI laterally displaced, soft systolic murmur at the left sternal border and apex, no diastolic murmurs clearly audible today  EXTREMITIES:  Warm with good color, no clubbing or cyanosis.  There is no edema noted.  PERIPHERAL VASCULAR:  Pulses present and equally palpable; 2+ throughout.      Medications:   amiodarone, 400 mg, oral, Daily  aspirin, 81 mg, oral, Daily  cholecalciferol, 5,000 Units, oral, q3 days  [Held by provider] co-enzyme Q-10, 50 mg, oral, Every Sunday  cyanocobalamin, 1,000 mcg, oral, Daily  docusate sodium, 100 mg, oral, BID  enoxaparin, 40 mg, subcutaneous, q24h  lidocaine, 2 patch, transdermal, Daily  magnesium oxide, 400 mg, oral, Daily  nicotine, 1 patch, " transdermal, q24h  PARoxetine, 40 mg, oral, Nightly  perflutren lipid microspheres, 0.5-10 mL of dilution, intravenous, Once in imaging  potassium chloride, 20 mEq, oral, Daily  prasugrel, 10 mg, oral, Daily  ranolazine, 500 mg, oral, BID  rosuvastatin, 20 mg, oral, Nightly  sacubitriL-valsartan, 1 tablet, oral, BID  sennosides, 1 tablet, oral, Nightly  tamsulosin, 0.4 mg, oral, Daily  torsemide, 20 mg, oral, Daily  traZODone, 50 mg, oral, Nightly      amiodarone, 1 mg/min, Last Rate: 1 mg/min (05/27/24 0704)        Medications:     Current Facility-Administered Medications   Medication Dose Route Frequency Provider Last Rate Last Admin    acetaminophen (Tylenol) tablet 650 mg  650 mg oral q4h PRN CLAUDIA Francois        albuterol 90 mcg/actuation inhaler 2 puff  2 puff inhalation q4h PRN CLAUDIA Menjivar        amiodarone (Nexterone) 360 mg in dextrose,iso-osm 200 mL (1.8 mg/mL) infusion (premix)  1 mg/min intravenous Continuous CLAUDIA Esteban 33.3 mL/hr at 05/27/24 0704 1 mg/min at 05/27/24 0704    amiodarone (Pacerone) tablet 400 mg  400 mg oral Daily CLAUDIA Esteban   400 mg at 05/27/24 0934    aspirin chewable tablet 81 mg  81 mg oral Daily CLAUDIA Chaudhry   81 mg at 05/27/24 0934    cholecalciferol (Vitamin D-3) tablet 5,000 Units  5,000 Units oral q3 days CLAUDIA Menjivar   5,000 Units at 05/26/24 2047    [Held by provider] co-enzyme Q-10 capsule 50 mg  50 mg oral Every Sunday CLAUDIA Menjivar        cyanocobalamin (Vitamin B-12) tablet 1,000 mcg  1,000 mcg oral Daily CLAUDIA Menjivar   1,000 mcg at 05/27/24 0935    docusate sodium (Colace) capsule 100 mg  100 mg oral BID CLAUDIA Menjivar   100 mg at 05/27/24 0935    enoxaparin (Lovenox) syringe 40 mg  40 mg subcutaneous q24h CLAUDIA Menjivar   40 mg at 05/26/24 0918    levalbuterol (Xopenex) 0.63 mg/3 mL nebulizer solution 0.63 mg  0.63 mg  nebulization q4h PRN YARITZA Chaudhry-CNP   0.63 mg at 05/24/24 2333    lidocaine 4 % patch 2 patch  2 patch transdermal Daily YARITZA Menjivar-CNP   2 patch at 05/25/24 1054    LORazepam (Ativan) tablet 0.5 mg  0.5 mg oral Daily PRN YARITZA Menjivar-CNP   0.5 mg at 05/26/24 2045    magnesium oxide (Mag-Ox) tablet 400 mg  400 mg oral Daily YARITZA Menjivar-CNP   400 mg at 05/27/24 0935    methocarbamol (Robaxin) tablet 500 mg  500 mg oral q6h PRN YARITZA Menjivar-CNP   500 mg at 05/26/24 2047    nicotine (Nicoderm CQ) 21 mg/24 hr patch 1 patch  1 patch transdermal q24h NICCI MenjivarCNP   1 patch at 05/26/24 1709    ondansetron (Zofran) injection 4 mg  4 mg intravenous q8h PRN YARITZA Francois-CNP   4 mg at 05/27/24 0311    PARoxetine (Paxil) tablet 40 mg  40 mg oral Nightly YARITZA Chaudhry-CNP   40 mg at 05/26/24 2047    perflutren lipid microspheres (Definity) injection 0.5-10 mL of dilution  0.5-10 mL of dilution intravenous Once in imaging YARITZA Esteban-CNP        polyethylene glycol (Glycolax, Miralax) packet 17 g  17 g oral Daily PRN Bladimir Sam APRN-CNP        potassium chloride (Klor-Con) packet 20 mEq  20 mEq oral Daily YARITZA Menjivar-CNP   20 mEq at 05/27/24 0932    prasugrel (Effient) tablet 10 mg  10 mg oral Daily YARITZA Menjivar-CNP   10 mg at 05/27/24 0935    ranolazine (Ranexa) 12 hr tablet 500 mg  500 mg oral BID YARITZA Menjivar-CNP   500 mg at 05/27/24 0935    rosuvastatin (Crestor) tablet 20 mg  20 mg oral Nightly YARITZA Menjivar-CNP   20 mg at 05/26/24 2047    sacubitriL-valsartan (Entresto) 24-26 mg per tablet 1 tablet  1 tablet oral BID Kd Rodrigues DO   1 tablet at 05/26/24 2047    sennosides (Senokot) tablet 8.6 mg  1 tablet oral Nightly YARITZA Menjivar-CNP   8.6 mg at 05/26/24 2047    simethicone (Mylicon) chewable tablet 80 mg  80 mg oral TID PRN Etta Carballo, APRN-CNP   80  mg at 05/23/24 2108    sodium chloride (Ocean) 0.65 % nasal spray 1 spray  1 spray Each Nostril 4x daily PRN YARITZA Chaudhry-CNP        tamsulosin (Flomax) 24 hr capsule 0.4 mg  0.4 mg oral Daily NICCI MenjivarCNP   0.4 mg at 05/27/24 0935    torsemide (Demadex) tablet 20 mg  20 mg oral Daily NICCI MenjivarCNP   20 mg at 05/27/24 0935    traZODone (Desyrel) tablet 50 mg  50 mg oral Nightly NICCI MenjivarCNP   50 mg at 05/26/24 2047       Outpatient Medications:     Current Outpatient Medications   Medication Instructions    acetaminophen (TYLENOL) 650 mg, oral, Every 6 hours PRN    albuterol sulfate (Proair Digihaler) 90 mcg/actuation aero powdr breath act w/sensor inhaler 2 puffs, inhalation, Every 4 hours PRN    aspirin 81 mg, oral, Daily    bisoprolol (Zebeta) 5 mg tablet oral, 2 times daily    cholecalciferol (VITAMIN D-3) 5,000 Units, oral, Every 3 days    co-enzyme Q-10 50 mg, oral, Once Weekly    cyanocobalamin (VITAMIN B-12) 1,000 mcg, oral, Daily    DAPAGLIFLOZIN PROPANEDIOL ORAL 10 mg, oral, Daily    ezetimibe (ZETIA) 10 mg, oral, Daily    LORazepam (ATIVAN) 0.5 mg, oral, Nightly    magnesium gluconate 30 mg, oral, Daily    nicotine (Nicoderm CQ) 21 mg/24 hr patch 1 patch, transdermal, Every 24 hours    nitroglycerin (NITROSTAT) 0.4 mg, sublingual, Every 5 min PRN    PARoxetine (PAXIL) 40 mg, oral, Every morning    potassium chloride (Klor-Con) 20 mEq packet 20 mEq, oral, Daily    Praluent Pen 75 mg, subcutaneous, Every 14 days    prasugrel (EFFIENT) 10 mg, oral, Daily    ranolazine (RANEXA) 500 mg, oral, 2 times daily, Do not crush, chew, or split.    rosuvastatin (CRESTOR) 20 mg, oral, Nightly    sacubitriL-valsartan (Entresto) 24-26 mg tablet 1 tablet, oral, 2 times daily    tamsulosin (FLOMAX) 0.4 mg, oral, Daily    torsemide (DEMADEX) 20 mg, oral, 2 times daily    traZODone (DESYREL) 50 mg, oral, Nightly        Diagnostics:    ECG 12 Lead    Result Date:  5/24/2024  AV dual-paced rhythm Abnormal ECG When compared with ECG of 23-MAY-2024 08:05, Electronic ventricular pacemaker has replaced Wide QRS tachycardia Vent. rate has decreased BY  46 BPM Confirmed by Zachary Sparks ([id number]) on 5/24/2024 8:56:30 AM    ECG 12 lead    Result Date: 5/24/2024  Ventricular tachycardia Left axis deviation Right bundle branch block Inferior infarct (cited on or before 21-JAN-2002) T wave abnormality, consider lateral ischemia Abnormal ECG See ED provider note for full interpretation and clinical correlation Confirmed by Zachary Sparks ([id number]) on 5/24/2024 8:56:24 AM    Procedure Details:      Procedure Details:    Cardioversion  Summary:  ·            External electric cardioversion of ventricular tachycardia to normal sinus rhythm was achieved using 200 J synchronized biphasic  Antitachycardia pacing was delivered through the device at 450 ms, 300 ms, 250 ms with no termination of ventricular arrhythmias.  .   Recommendations:  1.          A 12 lead ECG should be performed prior to discharge from the hospital.   2.          The patient should continue with the present medications.   Discharge:   1.          The patient recovered uneventfully from the effects of conscious sedation. The patient left the EP laboratory hemodynamically stable and without neurological deficits.   Follow up:   1.          The patient will stay on telemetry intensive care unit for drug load with high risk medication, following bed rest and subsequent ambulation, provided the recovery parameters are appropriate. The patient should call the electrophysiologist immediately if symptoms recur, or for any problems. The patient has been instructed accordingly.   ________________________________________  Procedures:  Cardioversion.  Noninvasive primary stimulation through the device (biventricular ICD)  ________________________________________  Patient history:  Please refer to the detailed history and physical on the  patient's medical chart.  History of ventricular tachycardia status post ICD shocks.  Patient is scheduled for antitachycardia pacing-NIPS and cardioversion  ________________________________________  Procedure narrative:  The device was interrogated and reprogrammed prior to the procedure.  The risks, benefits, and alternatives to the procedure and sedation were explained to the patient, and informed consent was obtained. The patient was in the fasting state. A grounding pad was placed. Self-adhesive anterior-posterior defibrillation pads were applied. A ZOLL defibrillator was used for monitoring and the defibrillator waveform was set to biphasic. The patient was set up for continuous monitoring of surface 12 lead ECG and pulse oximetry. Blood pressure was monitored with automatic cuff measurements. The procedure was performed under IV conscious sedation performed by anesthesiology service.   1.          External electric cardioversion of ventricular tachycardia to normal sinus rhythm was achieved using 200 J synchronized biphasic  Antitachycardia pacing was delivered through the device at 450 ms, 300 ms, 250 ms with no termination of ventricular arrhythmias.     The device was reprogrammed to DDDR  bpm.  AV delay was decreased to 150 ms.  ________________________________________  Complications:  The patient tolerated the procedure without any complications or incident.   ________________________________________  Prepared and signed by     Tolerance: good   Complications: None           ECG 12 Lead    Result Date: 5/23/2024  Wide QRS tachycardia - possibly ventricular tachycardia Abnormal right superior axis deviation Abnormal ECG When compared with ECG of 22-MAY-2024 23:59, (unconfirmed) No significant change was found Confirmed by Clayton Angulo ([id number]) on 5/23/2024 10:40:02 AM             [place] [address]   Tel [phone] Fax [phone]     TRANSTHORACIC  ECHOCARDIOGRAM REPORT        Patient Name:      FRANCIA PAULINO          Antoine Physician:    [id number] Kd Rodrigues DO  Study Date:        5/23/2024             Ordering Provider:    [id number] GLORIA SHIN  MRN/PID:           [id number]              Fellow:  Accession#:        [id number]          Nurse:  Date of Birth/Age: 1965 / 58 years Sonographer:          Cathy Sanders RDCS  Gender:            M                     Additional Staff:  Height:            170.18 cm             Admit Date:  Weight:            78.47 kg              Admission Status:     Inpatient -                                                                 Routine  BSA / BMI:         1.90 m2 / 27.10 kg/m2 Department Location:  [facility]  Blood Pressure: 97 /71 mmHg     Study Type:    TRANSTHORACIC ECHO (TTE) COMPLETE  Diagnosis/ICD: Presence of automatic (implantable) cardiac                 defibrillator-Z95.810; Chronic systolic (congestive) heart                 failure (CHF)-I50.22; Ischemic cardiomyopathy-I25.5  Indication:    Congestive Heart Failure, ICM, ICD Firing (multiple shocks)  CPT Codes:     Echo Complete w Full Doppler-10954     Patient History:  MI Location/Type:  Unknown MI  Pacer/Defib:       AICD  Pertinent History: CAD, CHF, Cardiomyopathy, HTN and ICD Firing.     Study Detail: The following Echo studies were performed: 2D, M-Mode, Doppler and                color flow. Technically challenging study due to patient lying in                supine position, prominent lung artifact and poor acoustic                windows. Patient has a defibrillator. The patient was asleep.        PHYSICIAN INTERPRETATION:  Left Ventricle: Left ventricular systolic function is severely decreased, with an estimated ejection fraction of 10%. There is  global hypokinesis of the left ventricle with minor regional variations. The left ventricular cavity size is moderately dilated. Left ventricular diastolic filling was not assessed.  Left Atrium: The left atrium is mildly dilated.  Right Ventricle: The right ventricle is normal in size. There is normal right ventricular global systolic function. A device is visualized in the right ventricle.  Right Atrium: The right atrium is normal in size. There is a device visualized in the right atrium.  Aortic Valve: The aortic valve appears structurally normal. The aortic valve appears tricuspid. There is mild aortic valve cusp calcification. There is no evidence of aortic valve stenosis.  There is no evidence of aortic valve regurgitation. The peak instantaneous gradient of the aortic valve is 3.6 mmHg. The mean gradient of the aortic valve is 2.0 mmHg.  Mitral Valve: The mitral valve is normal in structure. There is mild thickening of the anterior and posterior mitral valve leaflets. There is no evidence of mitral valve stenosis. There is normal mitral valve leaflet mobility. There is mild mitral annular calcification. There is moderate to severe mitral valve regurgitation.  Tricuspid Valve: The tricuspid valve is structurally normal. There is normal tricuspid valve leaflet mobility. There is moderate tricuspid regurgitation.  Pulmonic Valve: The pulmonic valve is structurally normal. There is trace pulmonic valve regurgitation.  Pericardium: There is no pericardial effusion noted.  Aorta: The aortic root is normal.  Pulmonary Artery: Pulmonary hypertension is present. The tricuspid regurgitant velocity is 3.03 m/s, and with an estimated right atrial pressure of 15 mmHg, the estimated pulmonary artery pressure is moderate to severely elevated with the RVSP at 51.7 mmHg.  Systemic Veins: The inferior vena cava appears moderately dilated.  In comparison to the previous echocardiogram(s): There are no prior studies on this  patient for comparison purposes.        CONCLUSIONS:   1. Left ventricular systolic function is severely decreased with a 10% estimated ejection fraction.   2. There is no evidence of mitral valve stenosis.   3. Moderate to severe mitral valve regurgitation.   4. Moderate tricuspid regurgitation.   5. Aortic valve stenosis is not present.   6. Left ventricular cavity size is moderately dilated.   7. Moderate to severely elevated pulmonary artery pressure.   8. Pulmonary hypertension is present.   9. The inferior vena cava appears moderately dilated.  10. There is global hypokinesis of the left ventricle with minor regional variations.     RECOMMENDATIONS:  Technically suboptimal and limited study, therefore accuracy of above interpretation could be substantially diminished. Clinical correlation is advised. Consider additional imaging modalities if clinically indicated.       Cardiac Cath Post Procedure Notes:  Post Procedure Diagnosis: Double vessel disease.  Blood Loss:               Estimated blood loss during the procedure was 0 mls.  Specimens Removed:        Number of specimen(s) removed: none.     ____________________________________________________________________________________  CONCLUSIONS:   1. Distal Left Main: 10-30% stenosis.   2. Proximal and mid LAD Lesion: The percent stenosis is 10-30%.   3. Proximal CX Lesion: The percent stenosis is 100%.   4. Right Coronary Artery: fills via collaterals.   5. Proximal RCA Lesion: The percent stenosis is 100%.   6. The Left Ventricular Ejection Fraction is 10%,by echo.     ICD 10 Codes:  Ventricular tachycardia, unspecified-I47.20     CPT Codes:  Coronary Angiography S&I only ([facility])([facility])-06773; Moderate Sedation Services initial 15 minutes patient >5 years-81776     [id number] Babatunde Menon MD  Performing Physician  Electronically signed by [id number] Babatunde Menon MD on 5/23/2024 at 10:42:57  AM         BMP:  Lab Results   Component Value Date      "05/27/2024     05/26/2024     05/26/2024    K 3.6 05/27/2024    K 4.0 05/26/2024    K 3.6 05/26/2024     05/27/2024    CL 99 05/26/2024     05/26/2024    CO2 28 05/27/2024    CO2 28 05/26/2024    CO2 30 05/26/2024    BUN 13 05/27/2024    BUN 16 05/26/2024    BUN 14 05/26/2024    CREATININE 1.08 05/27/2024    CREATININE 1.24 05/26/2024    CREATININE 1.12 05/26/2024           TSH:  No results found for: \"TSH\"      Lipid Profile:  No results found for: \"CHLPL\", \"TRIG\", \"HDL\", \"LDLCALC\", \"LDLDIRECT\"    HgbA1C:  No components found for: \"LABA1C\"          CBC:   Lab Results   Component Value Date    WBC 9.8 05/27/2024    RBC 4.10 (L) 05/27/2024    HGB 13.6 05/27/2024    HCT 38.3 (L) 05/27/2024     05/27/2024       CMP:    Lab Results   Component Value Date     05/27/2024    K 3.6 05/27/2024     05/27/2024    CO2 28 05/27/2024    BUN 13 05/27/2024    CREATININE 1.08 05/27/2024    GLUCOSE 125 (H) 05/27/2024    CALCIUM 8.9 05/27/2024         RADIOLOGY:   XR abdomen 1 view   Final Result   No prior imaging of the urinary tract in the local PACS archive        Questionable tiny stones or stone fragments x2 adjacent to the   proximal pigtail coil of the ureteral stent in the left renal pelvis        No stone or stone fragment projects over stented left ureter        Tiny urinary bladder stone versus phlebolith projecting near the   distal end of the stent        Probability of right nephrolithiasis as detailed above        MACRO:   None        Signed by: Gage Lopez 5/24/2024 2:54 PM   Dictation workstation:   [id number]      Electrophysiology procedure   Final Result      Cardiac Catheterization Procedure   Final Result      Transthoracic Echo (TTE) Complete   Final Result      Cardiac device check - Inpatient   Final Result      XR chest 1 view   Final Result   No acute cardiopulmonary disease.   Signed by [employer], DO      CT abdomen pelvis wo IV contrast    (Results " Pending)   Cardiac device check - Inpatient    (Results Pending)   Transthoracic Echo (TTE) Limited    (Results Pending)       [unfilled]        Assessment:  Principal Problem:    AICD discharge  Active Problems:    CHF (congestive heart failure) (Multi)    HFrEF (heart failure with reduced ejection fraction) (Multi)    Ischemic cardiomyopathy    Elevated troponin    Ventricular tachycardia (Multi)        Patient Active Problem List   Diagnosis    AICD discharge    HFrEF (heart failure with reduced ejection fraction) (Multi)    Ischemic cardiomyopathy    Elevated troponin    Ventricular tachycardia (Multi)    CHF (congestive heart failure) (Multi)       Plan:      Please do not hesitate to call with questions.   Electronically signed by Kd Rodrigues DO [facility] on 5/27/2024 at 10:07 AM    Assessment/Plan:         Patient Active Problem List   Diagnosis    AICD discharge    HFrEF (heart failure with reduced ejection fraction) (Multi)    Ischemic cardiomyopathy    Elevated troponin    Ventricular tachycardia (Multi)    CHF (congestive heart failure) (Multi)       ASSESSMENT   58-year-old gentleman seen evaluate the bedside in the medical intensive care unit.    Bedside examination evaluation interview were performed by me.    Chart was reviewed in detail discussed the patient including catheterization results, electrophysiology and cardioversion results and all other data and in great detail and also discussed with the intensive care staff.    Impression:  Principal Problem:    AICD discharge  Active Problems:    CHF (congestive heart failure) (Multi)    HFrEF (heart failure with reduced ejection fraction) (Multi)    Ischemic cardiomyopathy    Elevated troponin    Ventricular tachycardia (Multi)      PLAN   Recommendation:  Patient will have aggressive treatment with medical therapy for his cardiomyopathy and coronary disease, resume Entresto and diuretic  EP will follow for arrhythmia management  Will recheck echo  tomorrow to see if initial presentation of 10% ejection fraction was related to multiple cardioversions by his ICD causing stunned myocardium versus further deterioration of overall LV function  Eventual referral to the advanced heart failure clinic for potential additional device therapy such as LVAD or even consideration for transplant assessment  Maintain proper electrolyte balance and fluid balance  Remain in the ICU over the next 2 to 3 days  Will continue to follow  Discussed with EP as well as intensive care team    Difficult to distinguish on EKG whether this is a accelerated idioventricular rhythm versus sinus with first degree AV block with P waves buried in the end of the T wave.  Surprisingly patient is maintaining reasonably adequate blood pressure in the 90s at the present time.  Discussed with Dr. Sparks.  Potentially will recheck device to see if intracardiac electrograms are available to determine rhythm.  Have started oral amiodarone and will discontinue IV later.  Patient to remain in the ICU.  Repeat echo is pending tomorrow.          Kd Rodrigues DO,FACC     No pertinent history of history

## 2024-05-27 NOTE — PROGRESS NOTES
[facility] Progress Note               Rounding Cardiologist:  Zachary Sparks MD, MD   Primary Cardiologist: Dr. Zachary Sparks    Date:  5/27/2024  Patient:  Cristian Sapp  YOB: 1965  MRN:  [id number]   Admit Date:  5/22/2024      SUBJECTIVE    Cristian Sapp was seen and examined today at bedside.  Patient went back into ventricular tachycardia around 2 in the morning.  Rates during  bpm.  Device interrogated.  Also requiring pressors.  Blood pressure systolic in the morning between 70 to 90 mmHg.    EKG performed today shows wide-complex rhythm rate of 102 bpm QRS ration 170 ms QT corrected 590 ms.  Laboratory data today shows sodium 137 potassium 3.6 chloride 100 BUN 13 creatinine 1.08 magnesium 2.2 WBC 9.8 hemoglobin 13.6 hematocrit 30.3 platelet count 193.  VITALS     Vitals:    05/27/24 0500 05/27/24 0600 05/27/24 0700 05/27/24 1000   BP: 78/65 86/57 75/64    BP Location:       Patient Position:       Pulse: 100 100 100    Resp: 18 19 18    Temp:  36 °C (96.8 °F)  36 °C (96.8 °F)   TempSrc:  Temporal  Temporal   SpO2: 92% 93% 92%    Weight:  78.8 kg (173 lb 11.6 oz)     Height:           Intake/Output Summary (Last 24 hours) at 5/27/2024 1555  Last data filed at 5/27/2024 0600  Gross per 24 hour   Intake 942.8 ml   Output 3625 ml   Net -2682.2 ml       [unfilled]    PHYSICAL EXAM   Constitutional:       General: He is not in acute distress.     Appearance: Normal appearance. He is not toxic-appearing.   HENT:      Mouth/Throat:      Mouth: Mucous membranes are moist.   Eyes:      General: No scleral icterus.     Extraocular Movements: Extraocular movements intact.      Pupils: Pupils are equal, round, and reactive to light.   Cardiovascular:      Rate and Rhythm: Regular rhythm. Tachycardia present.      Heart sounds: No murmur heard.  Pulmonary:      Effort: Pulmonary effort is normal. No respiratory distress.      Breath sounds: Normal breath sounds. No wheezing or rales.   Abdominal:       "General: Abdomen is flat. Bowel sounds are normal. There is no distension.      Palpations: Abdomen is soft.      Tenderness: There is no abdominal tenderness.   Musculoskeletal:      Right lower leg: No edema.      Left lower leg: No edema.   Skin:     General: Skin is warm and dry.      Capillary Refill: Capillary refill takes less than 2 seconds.      Findings: No rash.   Neurological:      General: No focal deficit present.      Mental Status: He is alert and oriented to person, place, and time.      Cranial Nerves: No cranial nerve deficit.      Motor: No weakness.   Psychiatric:         Mood and Affect: Mood normal.         Behavior: Behavior normal.        DIAGNOSTIC RESULTS   EKG:     Telemetry: Ventricular tachycardia since 2 AM.  Rate of 100 bpm.      LAB DATA   BMP:  @LABRCNT(Na:3,K:3,CL:3,CO2:3,Bun:3,Creatinine:3,Glu:3, CA:3,LABGLOM)@    Cardiac Enzymes:  @LABRCNT(CKTOTAL:3,CKMB:3,CKMBINDEX:3,TROPONINI:3)@    CBC:   Lab Results   Component Value Date    WBC 9.8 05/27/2024    RBC 4.10 (L) 05/27/2024    HGB 13.6 05/27/2024    HCT 38.3 (L) 05/27/2024     05/27/2024       CMP:    Lab Results   Component Value Date     05/27/2024    K 3.6 05/27/2024     05/27/2024    CO2 28 05/27/2024    BUN 13 05/27/2024    CREATININE 1.08 05/27/2024    GLUCOSE 125 (H) 05/27/2024    CALCIUM 8.9 05/27/2024       Hepatic Function Panel:  No results found for: \"ALKPHOS\", \"ALT\", \"AST\", \"PROT\", \"BILITOT\", \"BILIDIR\"    Magnesium:    Lab Results   Component Value Date    MG 2.22 05/27/2024       PT/INR:  No results found for: \"PROTIME\", \"INR\"  @LABRCNT(inr:3)@     HgBA1c:  No components found for: \"LABA1C\"    Lipid Profile:  No results found for: \"CHLPL\", \"TRIG\", \"HDL\", \"LDLCALC\", \"LDLDIRECT\"    TSH:  No results found for: \"TSH\"    ABG:  No results found for: \"PH\"    PRO-BNP: No results found for: \"PROBNP\"       RADIOLOGY     XR abdomen 1 view   Final Result   No prior imaging of the urinary tract in the local " PACS archive        Questionable tiny stones or stone fragments x2 adjacent to the   proximal pigtail coil of the ureteral stent in the left renal pelvis        No stone or stone fragment projects over stented left ureter        Tiny urinary bladder stone versus phlebolith projecting near the   distal end of the stent        Probability of right nephrolithiasis as detailed above        MACRO:   None        Signed by: Gage Lopez 5/24/2024 2:54 PM   Dictation workstation:   [id number]      Electrophysiology procedure   Final Result      Cardiac Catheterization Procedure   Final Result      Transthoracic Echo (TTE) Complete   Final Result      Cardiac device check - Inpatient   Final Result      XR chest 1 view   Final Result   No acute cardiopulmonary disease.   Signed by [employer],       CT abdomen pelvis wo IV contrast    (Results Pending)   Cardiac device check - Inpatient    (Results Pending)   Transthoracic Echo (TTE) Complete    (Results Pending)       [unfilled]      CURRENT MEDICATIONS    amiodarone, 400 mg, oral, Daily  aspirin, 81 mg, oral, Daily  cholecalciferol, 5,000 Units, oral, q3 days  [Held by provider] co-enzyme Q-10, 50 mg, oral, Every Sunday  cyanocobalamin, 1,000 mcg, oral, Daily  docusate sodium, 100 mg, oral, BID  enoxaparin, 40 mg, subcutaneous, q24h  lidocaine, 2 patch, transdermal, Daily  magnesium oxide, 400 mg, oral, Daily  nicotine, 1 patch, transdermal, q24h  PARoxetine, 40 mg, oral, Nightly  perflutren lipid microspheres, 0.5-10 mL of dilution, intravenous, Once in imaging  potassium chloride, 20 mEq, oral, Daily  prasugrel, 10 mg, oral, Daily  ranolazine, 500 mg, oral, BID  rosuvastatin, 20 mg, oral, Nightly  sacubitriL-valsartan, 1 tablet, oral, BID  sennosides, 1 tablet, oral, Nightly  tamsulosin, 0.4 mg, oral, Daily  torsemide, 20 mg, oral, Daily  traZODone, 50 mg, oral, Nightly      amiodarone, 1 mg/min, Last Rate: 1 mg/min (05/27/24 0704)          ASSESSMENT   Principal  Problem:    AICD discharge  Active Problems:    CHF (congestive heart failure) (Multi)    HFrEF (heart failure with reduced ejection fraction) (Multi)    Ischemic cardiomyopathy    Elevated troponin    Ventricular tachycardia (Multi)        Patient Active Problem List   Diagnosis    AICD discharge    HFrEF (heart failure with reduced ejection fraction) (Multi)    Ischemic cardiomyopathy    Elevated troponin    Ventricular tachycardia (Multi)    CHF (congestive heart failure) (Multi)       1.    Wide-complex tachycardia/ventricular tachycardia/Slow VT/AICD shocks             -Patient is currently maintaining an AV paced rhythm at a rate of 80 with occasional PVCs/couplets.             -Continue IV amiodarone drip at current dose.             -[facility] 5/23 revealed  of the proximal circumflex artery,  of the proximal RCA with distal collateral filling and mild disease of the left main and LAD.  Medical management is advised.  2.    Ischemic cardiomyopathy/acute on chronic systolic heart failure             -LVEF 10% on echocardiogram 5/23/2024             -Moderately dilated left ventricle              -  3.    Valvular heart disease             -Moderate to severe MR/moderate TR             -Moderate to severely elevated pulmonary artery pressures  4.    Coronary artery disease/multiple remote myocardial infarctions             -Remote multivessel PCI's (last in 2008)             -See report above for [facility] 5/23  5.    Benign essential hypertension             -Stable  6.    Hyperlipidemia             -On statin therapy  7.    Current tobacco use              -Smoking cessation strongly advised    PLAN     I had a lengthy discussion with patient and family members regarding plan to follow for management of ventricular tachycardia.  We will discontinue IV amiodarone.  Patient is hypotensive.  We will start loading with oral 400 mg amiodarone 3 times a day  N.p.o. after midnight  Patient is in ventricular tachycardia  currently by device interrogation  We will try to terminate the rhythm with ATP-cardioversion in a.m.  Possibility of adding mexiletine.  Also discussed the option of VT ablation.    Zachary Sparks MD      Please do not hesitate to call with questions.  Electronically signed by Zachary Sparks MD, [facility] on 5/27/2024 at 3:55 PM

## 2024-05-27 NOTE — NURSING NOTE
[employer] called and info given to interrogate pacer, they will contact the rep.

## 2024-05-27 NOTE — PROGRESS NOTES
[facility] Critical Care Medicine Progress Noted      Date:  5/27/2024  Patient:  Cristian Sapp  YOB: 1965  MRN:  [id number]   Admit Date:  5/22/2024  ========================================================================================================    Chief Complaint   Patient presents with    Rapid Heart Rate     Defibrillator went off about 10-15 times, -200BPM. 50mcg fent in squad. Pt currently at 125BPM     Interval ICU Events:  Pt over night was afebrile and with low blood pressures overnight while sleeping but without confusion or evidence of end organ damage. Pt woek up at 2-3am and was anxious and felt like he had the feeling like when his AICD was going to fire. Pt was tachycardic and was noted on tele and 12 lead to have intermittent pacer spikes that did not seem to capture and were after QRS sporadically after QRS at few times (caught on 12 lead). Pt with good UOP of 4.1L in 24 hours and has been net negative 1.8L in 24 hours and net negative 6.9L since admission with a Cr of 1.08 which has been stable. All other electrolytes and labs stable at this time.    Medical History:  Past Medical History:   Diagnosis Date    Atherosclerosis of native artery of both lower extremities with intermittent claudication (CMS-HCC)     CHB (complete heart block) (Multi)     per device check    Chronic systolic CHF (congestive heart failure), NYHA class 3 (Multi)     COPD (chronic obstructive pulmonary disease) (Multi)     Coronary artery disease     History of tobacco abuse     HLD (hyperlipidemia)     Hypertension     Infrarenal abdominal aortic aneurysm (AAA) without rupture (CMS-HCC)     Ischemic cardiomyopathy with implantable cardioverter-defibrillator (ICD)     MI (myocardial infarction) (Multi)     multiple    Nephrolithiasis     PTSD (post-traumatic stress disorder)      Past Surgical History:   Procedure Laterality Date    CARDIAC CATHETERIZATION N/A 5/23/2024    Procedure: Left Heart  Cath;  Surgeon: Babatunde Tan MD;  Location: [facility];  Service: Cardiovascular;  Laterality: N/A;    CARDIAC DEFIBRILLATOR PLACEMENT      CARDIAC ELECTROPHYSIOLOGY PROCEDURE N/A 5/23/2024    Procedure: Cardioversion;  Surgeon: Zachary Sparks MD;  Location: [facility];  Service: Electrophysiology;  Laterality: N/A;    CORONARY ANGIOPLASTY WITH STENT PLACEMENT  2006    JIM to LAD    CORONARY ANGIOPLASTY WITH STENT PLACEMENT  04/2003    CORONARY ANGIOPLASTY WITH STENT PLACEMENT  06/2008    CYSTOSCOPY W/ URETERAL STENT PLACEMENT  04/01/2024    CYSTOSCOPY W/ URETERAL STENT PLACEMENT  04/30/2024    FEMORAL BYPASS      femoral artery    ILIAC ARTERY STENT Right     KNEE SURGERY       Medications Prior to Admission   Medication Sig Dispense Refill Last Dose    albuterol sulfate (Proair Digihaler) 90 mcg/actuation aero powdr breath act w/sensor inhaler Inhale 2 puffs every 4 hours if needed for wheezing.   5/21/2024    aspirin 81 mg EC tablet Take 1 tablet (81 mg) by mouth once daily.   5/22/2024    bisoprolol (Zebeta) 5 mg tablet Take by mouth 2 times a day.   5/22/2024 at 0800    cholecalciferol (Vitamin D-3) 5,000 Units tablet Take 1 tablet (5,000 Units) by mouth every 3 days.   Past Week    co-enzyme Q-10 50 mg capsule Take 1 capsule (50 mg) by mouth 1 (one) time per week.   Past Week    cyanocobalamin (Vitamin B-12) 1,000 mcg tablet Take 1 tablet (1,000 mcg) by mouth once daily.   Past Week    ezetimibe (Zetia) 10 mg tablet Take 1 tablet (10 mg) by mouth once daily.   5/22/2024 at 0800    LORazepam (Ativan) 0.5 mg tablet Take 1 tablet (0.5 mg) by mouth once daily at bedtime.   Past Week at 2200    magnesium gluconate 30 mg (550 mg) tablet Take 1 tablet (30 mg) by mouth once daily.   5/22/2024 at 0800    nicotine (Nicoderm CQ) 21 mg/24 hr patch Place 1 patch on the skin once every 24 hours.   5/22/2024 at 0800    PARoxetine (Paxil) 40 mg tablet Take 1 tablet (40 mg) by mouth once daily  in the morning.   5/22/2024 at 0800    potassium chloride (Klor-Con) 20 mEq packet Take 20 mEq by mouth once daily.   5/22/2024 at 0800    prasugrel (Effient) 10 mg tablet Take 1 tablet (10 mg) by mouth once daily.   5/22/2024 at 0800    ranolazine (Ranexa) 500 mg 12 hr tablet Take 1 tablet (500 mg) by mouth 2 times a day. Do not crush, chew, or split.   5/22/2024 at 0800    rosuvastatin (Crestor) 20 mg tablet Take 1 tablet (20 mg) by mouth once daily at bedtime.   Past Week at 2200    sacubitriL-valsartan (Entresto) 24-26 mg tablet Take 1 tablet by mouth 2 times a day.   5/22/2024 at 0800    tamsulosin (Flomax) 0.4 mg 24 hr capsule Take 1 capsule (0.4 mg) by mouth once daily.   5/22/2024 at 0800    traZODone (Desyrel) 50 mg tablet Take 1 tablet (50 mg) by mouth once daily at bedtime.   Past Week at 2000    acetaminophen (Tylenol) 500 mg tablet Take 650 mg by mouth every 6 hours if needed for mild pain (1 - 3).   More than a month    alirocumab (Praluent Pen) 75 mg/mL pen injector Inject 75 mg under the skin every 14 (fourteen) days.   More than a month    DAPAGLIFLOZIN PROPANEDIOL ORAL Take 10 mg by mouth once daily.       nitroglycerin (Nitrostat) 0.4 mg SL tablet Place 1 tablet (0.4 mg) under the tongue every 5 minutes if needed for chest pain.   More than a month    torsemide (Demadex) 20 mg tablet Take 1 tablet (20 mg) by mouth 2 times a day.        Chantix [varenicline]  Social History     Tobacco Use    Smoking status: Former     Types: Cigarettes   Vaping Use    Vaping status: Never Used   Substance Use Topics    Alcohol use: Yes     Comment: social    Drug use: Defer     Family History   Problem Relation Name Age of Onset    Hypertension Mother      Diabetes Father      Hypertension Sister      Diabetes Sister      Colon cancer Maternal Grandmother      Hypertension Maternal Grandmother      Heart disease Maternal Grandfather      Hypertension Maternal Grandfather      Cancer Paternal Grandfather       Hypertension Paternal Grandfather         Review of Systems:  14 point review of systems was completed and negative except for those specially mention in my HPI    Physical Exam:    Heart Rate:  []   Temp:  [35.9 °C (96.6 °F)-36.5 °C (97.7 °F)]   Resp:  [12-25]   BP: ()/(57-73)   Weight:  [78.8 kg (173 lb 11.6 oz)]   SpO2:  [90 %-96 %]     Physical Exam  Constitutional:       General: He is not in acute distress.     Appearance: Normal appearance. He is not toxic-appearing.   HENT:      Mouth/Throat:      Mouth: Mucous membranes are moist.   Eyes:      General: No scleral icterus.     Extraocular Movements: Extraocular movements intact.      Pupils: Pupils are equal, round, and reactive to light.   Cardiovascular:      Rate and Rhythm: Regular rhythm. Tachycardia present.      Heart sounds: No murmur heard.  Pulmonary:      Effort: Pulmonary effort is normal. No respiratory distress.      Breath sounds: Normal breath sounds. No wheezing or rales.   Abdominal:      General: Abdomen is flat. Bowel sounds are normal. There is no distension.      Palpations: Abdomen is soft.      Tenderness: There is no abdominal tenderness.   Musculoskeletal:      Right lower leg: No edema.      Left lower leg: No edema.   Skin:     General: Skin is warm and dry.      Capillary Refill: Capillary refill takes less than 2 seconds.      Findings: No rash.   Neurological:      General: No focal deficit present.      Mental Status: He is alert and oriented to person, place, and time.      Cranial Nerves: No cranial nerve deficit.      Motor: No weakness.   Psychiatric:         Mood and Affect: Mood normal.         Behavior: Behavior normal.         Objective:    I have reviewed all medications, laboratory results, and imaging pertinent for today's encounter    Assessment/Plan:  Pt is a 59 y/o M w/ a PMHx of multiple prior MI's, CAD, multiple JIM's, ICM, HFrEF, infrarenal AAA s/p stent right iliac, nephrolithiasis w/ recent  cystoscopy with ureteral stent placement who presented s/p AICD firing found to have acute decompensated HFrEF with worsened EF to 10% currently s/p diuresis and doing well but with possible inappropriate capture of PPM spikes today.      Neuro/Psych/Pain Ctrl/Sedation:  - No current active issues  - Pain control as needed with PRN Tylenol and PRN opioids as needed  - CAM-ICU & Delirium Precautions  - PT/OT as tolerated    Respiratory/ENT:  -No current active issues  -Supplemental Oxygen as needed to maintain an SpO2>90%    Cardiovascular:  #HFrEF with worsening EF  #Ventricular Tachycardia  #S/P AICD discharge  #CAD s/p stents  #History of Myocardial Infarctions  - Will continue with amio gtt this AM and convert to PO today per cards  - C/W diuresis as tolerated  - C/W GDMT with ASA, Statin, Entresto, Ranolazine, BB  - EP to eval and if needed will get device interrogation today  - Will get TTE tomorrow per cards and if EF not improved with optimization of GDMT will consider transfer to [facility] for LVAD eval    Renal/Volume Status (Intra & Extravascular):  #History of ureteral stenting  - No current active issues  - Will follow-up official CT read and will need out pt urology follow-up  - C/W Torsemide 20mg daily now with goal net even to negative in 24 hours  - Monitor UOP Q1H and Cr daily  - Avoid nephrotoxic drugs and renally dose all medications    GI:  - No active issues  - Cardiac Diet  - No indication for PPI or GI Ppx at this time  - C/W bowel regimen    Infectious Disease:  - No current active infectious issues  - Will continue to monitor off Abx at this time and culture and start broad spectrum as clinically indicated    Heme/Onc:  - No current active issues  - Monitor Hgb Daily and transfuse for Hgb<7 or bleeding with hemodynamic instability  - Lovenox for VTE PPx    Endocrine  - No current active issues  - Check Finger sticks AC/at bedtime  - Add ISS or dextrose in fluids as needed  - Finger sticks goals  100-180    Ethics/Code Status:  - Full code    :  DVT Prophylaxis: Lovenox  GI Prophylaxis: None indicated  Bowel Regimen: Yes  Diet: Cardiac  CVC: None  Larissa: None  Wen: None  Restraints: None  Dispo: ICU    Critical Care Time:  35 minutes  Critical Care Activities: Management and titration of antiarrythmic drips, coordination of consultation services involved within the patient's care, and time spent with patient explaining current diagnosis and treatment plan and answering any questions.    Giorgi Heaton MD

## 2024-05-27 NOTE — CARE PLAN
The patient's goals for the shift include      The clinical goals for the shift include Patient will remain hemodynamically stable througout shift  .

## 2024-05-28 ENCOUNTER — APPOINTMENT (OUTPATIENT)
Dept: CARDIOLOGY | Facility: HOSPITAL | Age: 59
DRG: 270 | End: 2024-05-28
Payer: MEDICARE

## 2024-05-28 ENCOUNTER — HOSPITAL ENCOUNTER (INPATIENT)
Facility: HOSPITAL | Age: 59
DRG: 270 | End: 2024-05-28
Attending: SPECIALIST | Admitting: SPECIALIST
Payer: MEDICARE

## 2024-05-28 ENCOUNTER — APPOINTMENT (OUTPATIENT)
Dept: CARDIOLOGY | Facility: HOSPITAL | Age: 59
DRG: 286 | End: 2024-05-28
Payer: COMMERCIAL

## 2024-05-28 ENCOUNTER — ANESTHESIA EVENT (OUTPATIENT)
Dept: CARDIOLOGY | Facility: HOSPITAL | Age: 59
DRG: 286 | End: 2024-05-28
Payer: COMMERCIAL

## 2024-05-28 ENCOUNTER — APPOINTMENT (OUTPATIENT)
Dept: RADIOLOGY | Facility: HOSPITAL | Age: 59
DRG: 270 | End: 2024-05-28
Payer: MEDICARE

## 2024-05-28 ENCOUNTER — ANESTHESIA (OUTPATIENT)
Dept: CARDIOLOGY | Facility: HOSPITAL | Age: 59
DRG: 286 | End: 2024-05-28
Payer: COMMERCIAL

## 2024-05-28 VITALS
HEART RATE: 100 BPM | SYSTOLIC BLOOD PRESSURE: 86 MMHG | OXYGEN SATURATION: 94 % | TEMPERATURE: 97.7 F | HEIGHT: 67 IN | RESPIRATION RATE: 29 BRPM | DIASTOLIC BLOOD PRESSURE: 52 MMHG | WEIGHT: 173.72 LBS | BODY MASS INDEX: 27.27 KG/M2

## 2024-05-28 DIAGNOSIS — Z45.02 AICD DISCHARGE: ICD-10-CM

## 2024-05-28 DIAGNOSIS — Z95.810 AICD (AUTOMATIC CARDIOVERTER/DEFIBRILLATOR) PRESENT: ICD-10-CM

## 2024-05-28 DIAGNOSIS — I50.20 HFREF (HEART FAILURE WITH REDUCED EJECTION FRACTION) (MULTI): ICD-10-CM

## 2024-05-28 DIAGNOSIS — I50.9 ACUTE HEART FAILURE, UNSPECIFIED HEART FAILURE TYPE (MULTI): Primary | ICD-10-CM

## 2024-05-28 DIAGNOSIS — I79.8 OTHER DISORDERS OF ARTERIES, ARTERIOLES AND CAPILLARIES IN DISEASES CLASSIFIED ELSEWHERE (CMS-HCC): ICD-10-CM

## 2024-05-28 DIAGNOSIS — I47.20 VENTRICULAR TACHYCARDIA (MULTI): ICD-10-CM

## 2024-05-28 DIAGNOSIS — I25.5 ISCHEMIC CARDIOMYOPATHY: ICD-10-CM

## 2024-05-28 DIAGNOSIS — N39.9 URINARY TRACT DISEASE: ICD-10-CM

## 2024-05-28 DIAGNOSIS — I10 ESSENTIAL (PRIMARY) HYPERTENSION: ICD-10-CM

## 2024-05-28 LAB
ALBUMIN SERPL BCP-MCNC: 3.7 G/DL (ref 3.4–5)
ALBUMIN SERPL BCP-MCNC: 3.8 G/DL (ref 3.4–5)
ALP SERPL-CCNC: 72 U/L (ref 33–120)
ALT SERPL W P-5'-P-CCNC: 14 U/L (ref 10–52)
ANION GAP BLDV CALCULATED.4IONS-SCNC: 9 MMOL/L (ref 10–25)
ANION GAP SERPL CALC-SCNC: 14 MMOL/L (ref 10–20)
ANION GAP SERPL CALC-SCNC: 20 MMOL/L (ref 10–20)
ANION GAP SERPL CALC-SCNC: 9 MMOL/L (ref 10–20)
APTT PPP: 30 SECONDS (ref 27–38)
AST SERPL W P-5'-P-CCNC: 19 U/L (ref 9–39)
ATRIAL RATE: 60 BPM
ATRIAL RATE: 81 BPM
BASE EXCESS BLDV CALC-SCNC: 5.3 MMOL/L (ref -2–3)
BASOPHILS # BLD AUTO: 0.07 X10*3/UL (ref 0–0.1)
BASOPHILS # BLD AUTO: 0.07 X10*3/UL (ref 0–0.1)
BASOPHILS NFR BLD AUTO: 0.7 %
BASOPHILS NFR BLD AUTO: 0.7 %
BILIRUB SERPL-MCNC: 1.3 MG/DL (ref 0–1.2)
BNP SERPL-MCNC: 320 PG/ML (ref 0–99)
BODY TEMPERATURE: 37 DEGREES CELSIUS
BUN SERPL-MCNC: 15 MG/DL (ref 6–23)
BUN SERPL-MCNC: 15 MG/DL (ref 6–23)
BUN SERPL-MCNC: 16 MG/DL (ref 6–23)
CA-I BLDV-SCNC: 1.11 MMOL/L (ref 1.1–1.33)
CALCIUM SERPL-MCNC: 8.7 MG/DL (ref 8.6–10.3)
CALCIUM SERPL-MCNC: 9.1 MG/DL (ref 8.6–10.6)
CALCIUM SERPL-MCNC: 9.2 MG/DL (ref 8.6–10.6)
CHLORIDE BLDV-SCNC: 96 MMOL/L (ref 98–107)
CHLORIDE SERPL-SCNC: 100 MMOL/L (ref 98–107)
CHLORIDE SERPL-SCNC: 101 MMOL/L (ref 98–107)
CHLORIDE SERPL-SCNC: 99 MMOL/L (ref 98–107)
CO2 SERPL-SCNC: 25 MMOL/L (ref 21–32)
CO2 SERPL-SCNC: 29 MMOL/L (ref 21–32)
CO2 SERPL-SCNC: 30 MMOL/L (ref 21–32)
CREAT SERPL-MCNC: 1.2 MG/DL (ref 0.5–1.3)
CREAT SERPL-MCNC: 1.24 MG/DL (ref 0.5–1.3)
CREAT SERPL-MCNC: 1.25 MG/DL (ref 0.5–1.3)
EGFRCR SERPLBLD CKD-EPI 2021: 68 ML/MIN/1.73M*2
EGFRCR SERPLBLD CKD-EPI 2021: 68 ML/MIN/1.73M*2
EGFRCR SERPLBLD CKD-EPI 2021: 71 ML/MIN/1.73M*2
EJECTION FRACTION APICAL 4 CHAMBER: 24
EOSINOPHIL # BLD AUTO: 0.38 X10*3/UL (ref 0–0.7)
EOSINOPHIL # BLD AUTO: 0.44 X10*3/UL (ref 0–0.7)
EOSINOPHIL NFR BLD AUTO: 3.6 %
EOSINOPHIL NFR BLD AUTO: 4.4 %
ERYTHROCYTE [DISTWIDTH] IN BLOOD BY AUTOMATED COUNT: 13.7 % (ref 11.5–14.5)
ERYTHROCYTE [DISTWIDTH] IN BLOOD BY AUTOMATED COUNT: 13.8 % (ref 11.5–14.5)
GLUCOSE BLDV-MCNC: 210 MG/DL (ref 74–99)
GLUCOSE SERPL-MCNC: 142 MG/DL (ref 74–99)
GLUCOSE SERPL-MCNC: 95 MG/DL (ref 74–99)
GLUCOSE SERPL-MCNC: 96 MG/DL (ref 74–99)
HCO3 BLDV-SCNC: 30.5 MMOL/L (ref 22–26)
HCT VFR BLD AUTO: 38.2 % (ref 41–52)
HCT VFR BLD AUTO: 38.7 % (ref 41–52)
HCT VFR BLD EST: 39 % (ref 41–52)
HGB BLD-MCNC: 13.3 G/DL (ref 13.5–17.5)
HGB BLD-MCNC: 13.8 G/DL (ref 13.5–17.5)
HGB BLDV-MCNC: 13 G/DL (ref 13.5–17.5)
IMM GRANULOCYTES # BLD AUTO: 0.07 X10*3/UL (ref 0–0.7)
IMM GRANULOCYTES # BLD AUTO: 0.07 X10*3/UL (ref 0–0.7)
IMM GRANULOCYTES NFR BLD AUTO: 0.7 % (ref 0–0.9)
IMM GRANULOCYTES NFR BLD AUTO: 0.7 % (ref 0–0.9)
INHALED O2 CONCENTRATION: 8 %
INR PPP: 1.2 (ref 0.9–1.1)
LACTATE BLDV-SCNC: 0.7 MMOL/L (ref 0.4–2)
LEFT VENTRICLE INTERNAL DIMENSION DIASTOLE: 6.32 CM (ref 3.5–6)
LEFT VENTRICULAR OUTFLOW TRACT DIAMETER: 2.1 CM
LV EJECTION FRACTION BIPLANE: 20 %
LYMPHOCYTES # BLD AUTO: 1.49 X10*3/UL (ref 1.2–4.8)
LYMPHOCYTES # BLD AUTO: 1.5 X10*3/UL (ref 1.2–4.8)
LYMPHOCYTES NFR BLD AUTO: 14.1 %
LYMPHOCYTES NFR BLD AUTO: 15.1 %
MAGNESIUM SERPL-MCNC: 2.19 MG/DL (ref 1.6–2.4)
MAGNESIUM SERPL-MCNC: 2.33 MG/DL (ref 1.6–2.4)
MCH RBC QN AUTO: 32.7 PG (ref 26–34)
MCH RBC QN AUTO: 32.9 PG (ref 26–34)
MCHC RBC AUTO-ENTMCNC: 34.8 G/DL (ref 32–36)
MCHC RBC AUTO-ENTMCNC: 35.7 G/DL (ref 32–36)
MCV RBC AUTO: 92 FL (ref 80–100)
MCV RBC AUTO: 95 FL (ref 80–100)
MONOCYTES # BLD AUTO: 1.29 X10*3/UL (ref 0.1–1)
MONOCYTES # BLD AUTO: 1.38 X10*3/UL (ref 0.1–1)
MONOCYTES NFR BLD AUTO: 13 %
MONOCYTES NFR BLD AUTO: 13.1 %
NEUTROPHILS # BLD AUTO: 6.55 X10*3/UL (ref 1.2–7.7)
NEUTROPHILS # BLD AUTO: 7.16 X10*3/UL (ref 1.2–7.7)
NEUTROPHILS NFR BLD AUTO: 66.1 %
NEUTROPHILS NFR BLD AUTO: 67.8 %
NRBC BLD-RTO: 0 /100 WBCS (ref 0–0)
NRBC BLD-RTO: 0 /100 WBCS (ref 0–0)
OXYHGB MFR BLDV: 75.1 % (ref 45–75)
P AXIS: 39 DEGREES
P OFFSET: 199 MS
P ONSET: 161 MS
PCO2 BLDV: 46 MM HG (ref 41–51)
PH BLDV: 7.43 PH (ref 7.33–7.43)
PHOSPHATE SERPL-MCNC: 3.6 MG/DL (ref 2.5–4.9)
PLATELET # BLD AUTO: 197 X10*3/UL (ref 150–450)
PLATELET # BLD AUTO: 234 X10*3/UL (ref 150–450)
PO2 BLDV: 47 MM HG (ref 35–45)
POTASSIUM BLDV-SCNC: 3.7 MMOL/L (ref 3.5–5.3)
POTASSIUM SERPL-SCNC: 3.6 MMOL/L (ref 3.5–5.3)
POTASSIUM SERPL-SCNC: 5 MMOL/L (ref 3.5–5.3)
POTASSIUM SERPL-SCNC: 5.1 MMOL/L (ref 3.5–5.3)
PR INTERVAL: 156 MS
PROT SERPL-MCNC: 6.3 G/DL (ref 6.4–8.2)
PROTHROMBIN TIME: 13.6 SECONDS (ref 9.8–12.8)
Q ONSET: 201 MS
Q ONSET: 201 MS
QRS COUNT: 13 BEATS
QRS COUNT: 16 BEATS
QRS DURATION: 156 MS
QRS DURATION: 178 MS
QT INTERVAL: 458 MS
QT INTERVAL: 458 MS
QTC CALCULATION(BAZETT): 532 MS
QTC CALCULATION(BAZETT): 590 MS
QTC FREDERICIA: 506 MS
QTC FREDERICIA: 543 MS
R AXIS: -56 DEGREES
R AXIS: -86 DEGREES
RBC # BLD AUTO: 4.04 X10*6/UL (ref 4.5–5.9)
RBC # BLD AUTO: 4.22 X10*6/UL (ref 4.5–5.9)
RIGHT VENTRICLE PEAK SYSTOLIC PRESSURE: 32.2 MMHG
SAO2 % BLDV: 77 % (ref 45–75)
SODIUM BLDV-SCNC: 132 MMOL/L (ref 136–145)
SODIUM SERPL-SCNC: 136 MMOL/L (ref 136–145)
SODIUM SERPL-SCNC: 138 MMOL/L (ref 136–145)
SODIUM SERPL-SCNC: 139 MMOL/L (ref 136–145)
T AXIS: 100 DEGREES
T AXIS: 73 DEGREES
T OFFSET: 430 MS
T OFFSET: 430 MS
VENTRICULAR RATE: 100 BPM
VENTRICULAR RATE: 81 BPM
WBC # BLD AUTO: 10.6 X10*3/UL (ref 4.4–11.3)
WBC # BLD AUTO: 9.9 X10*3/UL (ref 4.4–11.3)

## 2024-05-28 PROCEDURE — 2500000004 HC RX 250 GENERAL PHARMACY W/ HCPCS (ALT 636 FOR OP/ED): Performed by: INTERNAL MEDICINE

## 2024-05-28 PROCEDURE — 71045 X-RAY EXAM CHEST 1 VIEW: CPT | Performed by: RADIOLOGY

## 2024-05-28 PROCEDURE — 85025 COMPLETE CBC W/AUTO DIFF WBC: CPT | Mod: 91

## 2024-05-28 PROCEDURE — 2500000001 HC RX 250 WO HCPCS SELF ADMINISTERED DRUGS (ALT 637 FOR MEDICARE OP): Performed by: HOSPITALIST

## 2024-05-28 PROCEDURE — 2500000004 HC RX 250 GENERAL PHARMACY W/ HCPCS (ALT 636 FOR OP/ED)

## 2024-05-28 PROCEDURE — 85025 COMPLETE CBC W/AUTO DIFF WBC: CPT | Performed by: NURSE PRACTITIONER

## 2024-05-28 PROCEDURE — 93005 ELECTROCARDIOGRAM TRACING: CPT

## 2024-05-28 PROCEDURE — 3700000001 HC GENERAL ANESTHESIA TIME - INITIAL BASE CHARGE: Performed by: INTERNAL MEDICINE

## 2024-05-28 PROCEDURE — 93642 EP EVL 1/2CHMB TRNSVNS CVDFB: CPT | Performed by: INTERNAL MEDICINE

## 2024-05-28 PROCEDURE — 36415 COLL VENOUS BLD VENIPUNCTURE: CPT

## 2024-05-28 PROCEDURE — 83735 ASSAY OF MAGNESIUM: CPT | Mod: 91

## 2024-05-28 PROCEDURE — 93308 TTE F-UP OR LMTD: CPT

## 2024-05-28 PROCEDURE — 1100000001 HC PRIVATE ROOM DAILY

## 2024-05-28 PROCEDURE — 93010 ELECTROCARDIOGRAM REPORT: CPT | Performed by: INTERNAL MEDICINE

## 2024-05-28 PROCEDURE — 83880 ASSAY OF NATRIURETIC PEPTIDE: CPT

## 2024-05-28 PROCEDURE — 99239 HOSP IP/OBS DSCHRG MGMT >30: CPT

## 2024-05-28 PROCEDURE — 84132 ASSAY OF SERUM POTASSIUM: CPT | Mod: 91 | Performed by: STUDENT IN AN ORGANIZED HEALTH CARE EDUCATION/TRAINING PROGRAM

## 2024-05-28 PROCEDURE — 80069 RENAL FUNCTION PANEL: CPT

## 2024-05-28 PROCEDURE — 2500000004 HC RX 250 GENERAL PHARMACY W/ HCPCS (ALT 636 FOR OP/ED): Performed by: NURSE PRACTITIONER

## 2024-05-28 PROCEDURE — 83735 ASSAY OF MAGNESIUM: CPT | Performed by: NURSE PRACTITIONER

## 2024-05-28 PROCEDURE — 2500000005 HC RX 250 GENERAL PHARMACY W/O HCPCS: Performed by: NURSE ANESTHETIST, CERTIFIED REGISTERED

## 2024-05-28 PROCEDURE — 93325 DOPPLER ECHO COLOR FLOW MAPG: CPT | Performed by: INTERNAL MEDICINE

## 2024-05-28 PROCEDURE — 3700000002 HC GENERAL ANESTHESIA TIME - EACH INCREMENTAL 1 MINUTE: Performed by: INTERNAL MEDICINE

## 2024-05-28 PROCEDURE — 71045 X-RAY EXAM CHEST 1 VIEW: CPT

## 2024-05-28 PROCEDURE — 2500000001 HC RX 250 WO HCPCS SELF ADMINISTERED DRUGS (ALT 637 FOR MEDICARE OP)

## 2024-05-28 PROCEDURE — 93308 TTE F-UP OR LMTD: CPT | Performed by: INTERNAL MEDICINE

## 2024-05-28 PROCEDURE — 99223 1ST HOSP IP/OBS HIGH 75: CPT

## 2024-05-28 PROCEDURE — 93284 PRGRMG EVAL IMPLANTABLE DFB: CPT | Performed by: STUDENT IN AN ORGANIZED HEALTH CARE EDUCATION/TRAINING PROGRAM

## 2024-05-28 PROCEDURE — C9113 INJ PANTOPRAZOLE SODIUM, VIA: HCPCS

## 2024-05-28 PROCEDURE — 99291 CRITICAL CARE FIRST HOUR: CPT | Performed by: STUDENT IN AN ORGANIZED HEALTH CARE EDUCATION/TRAINING PROGRAM

## 2024-05-28 PROCEDURE — 2500000001 HC RX 250 WO HCPCS SELF ADMINISTERED DRUGS (ALT 637 FOR MEDICARE OP): Performed by: INTERNAL MEDICINE

## 2024-05-28 PROCEDURE — 85610 PROTHROMBIN TIME: CPT

## 2024-05-28 PROCEDURE — 99291 CRITICAL CARE FIRST HOUR: CPT

## 2024-05-28 PROCEDURE — 2500000004 HC RX 250 GENERAL PHARMACY W/ HCPCS (ALT 636 FOR OP/ED): Performed by: HOSPITALIST

## 2024-05-28 PROCEDURE — 93287 PERI-PX DEVICE EVAL & PRGR: CPT | Performed by: INTERNAL MEDICINE

## 2024-05-28 PROCEDURE — 93290 INTERROG DEV EVAL ICPMS IP: CPT | Performed by: INTERNAL MEDICINE

## 2024-05-28 PROCEDURE — 93321 DOPPLER ECHO F-UP/LMTD STD: CPT | Performed by: INTERNAL MEDICINE

## 2024-05-28 PROCEDURE — 93284 PRGRMG EVAL IMPLANTABLE DFB: CPT

## 2024-05-28 PROCEDURE — 99233 SBSQ HOSP IP/OBS HIGH 50: CPT | Performed by: INTERNAL MEDICINE

## 2024-05-28 PROCEDURE — 2500000002 HC RX 250 W HCPCS SELF ADMINISTERED DRUGS (ALT 637 FOR MEDICARE OP, ALT 636 FOR OP/ED): Mod: MUE | Performed by: INTERNAL MEDICINE

## 2024-05-28 PROCEDURE — 36415 COLL VENOUS BLD VENIPUNCTURE: CPT | Performed by: NURSE PRACTITIONER

## 2024-05-28 PROCEDURE — 2500000002 HC RX 250 W HCPCS SELF ADMINISTERED DRUGS (ALT 637 FOR MEDICARE OP, ALT 636 FOR OP/ED)

## 2024-05-28 PROCEDURE — 80069 RENAL FUNCTION PANEL: CPT | Performed by: NURSE PRACTITIONER

## 2024-05-28 PROCEDURE — 93290 INTERROG DEV EVAL ICPMS IP: CPT | Performed by: STUDENT IN AN ORGANIZED HEALTH CARE EDUCATION/TRAINING PROGRAM

## 2024-05-28 PROCEDURE — 93284 PRGRMG EVAL IMPLANTABLE DFB: CPT | Performed by: INTERNAL MEDICINE

## 2024-05-28 RX ORDER — LORAZEPAM 2 MG/ML
INJECTION INTRAMUSCULAR
Status: COMPLETED
Start: 2024-05-28 | End: 2024-05-28

## 2024-05-28 RX ORDER — BUMETANIDE 0.25 MG/ML
2 INJECTION INTRAMUSCULAR; INTRAVENOUS ONCE
Status: COMPLETED | OUTPATIENT
Start: 2024-05-28 | End: 2024-05-28

## 2024-05-28 RX ORDER — LORAZEPAM 2 MG/ML
0.5 INJECTION INTRAMUSCULAR EVERY 8 HOURS PRN
Status: DISCONTINUED | OUTPATIENT
Start: 2024-05-28 | End: 2024-05-29

## 2024-05-28 RX ORDER — POTASSIUM CHLORIDE 14.9 MG/ML
20 INJECTION INTRAVENOUS
Status: COMPLETED | OUTPATIENT
Start: 2024-05-28 | End: 2024-05-28

## 2024-05-28 RX ORDER — LORAZEPAM 2 MG/ML
0.5 INJECTION INTRAMUSCULAR ONCE
Status: COMPLETED | OUTPATIENT
Start: 2024-05-28 | End: 2024-05-28

## 2024-05-28 RX ORDER — PANTOPRAZOLE SODIUM 40 MG/10ML
40 INJECTION, POWDER, LYOPHILIZED, FOR SOLUTION INTRAVENOUS DAILY
Status: DISCONTINUED | OUTPATIENT
Start: 2024-05-28 | End: 2024-05-30

## 2024-05-28 RX ORDER — TRAZODONE HYDROCHLORIDE 50 MG/1
50 TABLET ORAL NIGHTLY
Status: DISCONTINUED | OUTPATIENT
Start: 2024-05-28 | End: 2024-06-06

## 2024-05-28 RX ORDER — RANOLAZINE 500 MG/1
500 TABLET, EXTENDED RELEASE ORAL 2 TIMES DAILY
Status: DISCONTINUED | OUTPATIENT
Start: 2024-05-28 | End: 2024-06-19 | Stop reason: HOSPADM

## 2024-05-28 RX ORDER — MAGNESIUM SULFATE HEPTAHYDRATE 40 MG/ML
2 INJECTION, SOLUTION INTRAVENOUS ONCE
Status: COMPLETED | OUTPATIENT
Start: 2024-05-28 | End: 2024-05-29

## 2024-05-28 RX ORDER — ATROPINE SULFATE 0.1 MG/ML
INJECTION INTRAVENOUS
Status: DISCONTINUED
Start: 2024-05-28 | End: 2024-05-29 | Stop reason: WASHOUT

## 2024-05-28 RX ORDER — LIDOCAINE 560 MG/1
2 PATCH PERCUTANEOUS; TOPICAL; TRANSDERMAL DAILY
Status: DISPENSED | OUTPATIENT
Start: 2024-05-29 | End: 2024-06-04

## 2024-05-28 RX ORDER — LIDOCAINE HYDROCHLORIDE ANHYDROUS AND DEXTROSE MONOHYDRATE .8; 5 G/100ML; G/100ML
1 INJECTION, SOLUTION INTRAVENOUS CONTINUOUS
Status: DISCONTINUED | OUTPATIENT
Start: 2024-05-28 | End: 2024-05-30

## 2024-05-28 RX ORDER — MAGNESIUM SULFATE HEPTAHYDRATE 40 MG/ML
INJECTION, SOLUTION INTRAVENOUS
Status: COMPLETED
Start: 2024-05-28 | End: 2024-05-29

## 2024-05-28 RX ORDER — ROSUVASTATIN CALCIUM 20 MG/1
20 TABLET, COATED ORAL NIGHTLY
Status: DISCONTINUED | OUTPATIENT
Start: 2024-05-28 | End: 2024-06-06

## 2024-05-28 RX ORDER — PRASUGREL 10 MG/1
10 TABLET, FILM COATED ORAL DAILY
Status: DISCONTINUED | OUTPATIENT
Start: 2024-05-29 | End: 2024-06-03

## 2024-05-28 RX ORDER — ETOMIDATE 2 MG/ML
INJECTION INTRAVENOUS AS NEEDED
Status: DISCONTINUED | OUTPATIENT
Start: 2024-05-28 | End: 2024-05-28

## 2024-05-28 RX ORDER — SODIUM CHLORIDE 9 MG/ML
10 INJECTION, SOLUTION INTRAVENOUS CONTINUOUS
Status: DISCONTINUED | OUTPATIENT
Start: 2024-05-28 | End: 2024-05-28 | Stop reason: HOSPADM

## 2024-05-28 RX ORDER — PANTOPRAZOLE SODIUM 40 MG/10ML
INJECTION, POWDER, LYOPHILIZED, FOR SOLUTION INTRAVENOUS
Status: COMPLETED
Start: 2024-05-28 | End: 2024-05-28

## 2024-05-28 RX ORDER — NAPROXEN SODIUM 220 MG/1
81 TABLET, FILM COATED ORAL DAILY
Status: DISCONTINUED | OUTPATIENT
Start: 2024-05-29 | End: 2024-06-06

## 2024-05-28 RX ADMIN — LIDOCAINE HYDROCHLORIDE 1 MG/MIN: 8 INJECTION, SOLUTION INTRAVENOUS at 20:45

## 2024-05-28 RX ADMIN — POTASSIUM CHLORIDE 20 MEQ: 14.9 INJECTION, SOLUTION INTRAVENOUS at 08:27

## 2024-05-28 RX ADMIN — AMIODARONE HYDROCHLORIDE 1 MG/MIN: 1.8 INJECTION, SOLUTION INTRAVENOUS at 20:52

## 2024-05-28 RX ADMIN — LORAZEPAM 0.5 MG: 2 INJECTION INTRAMUSCULAR at 16:17

## 2024-05-28 RX ADMIN — POTASSIUM CHLORIDE 20 MEQ: 1.5 POWDER, FOR SOLUTION ORAL at 08:26

## 2024-05-28 RX ADMIN — AMIODARONE HYDROCHLORIDE 1 MG/MIN: 1.8 INJECTION, SOLUTION INTRAVENOUS at 16:23

## 2024-05-28 RX ADMIN — ROSUVASTATIN 20 MG: 20 TABLET, FILM COATED ORAL at 20:46

## 2024-05-28 RX ADMIN — MAGNESIUM SULFATE HEPTAHYDRATE 2 G: 40 INJECTION, SOLUTION INTRAVENOUS at 22:14

## 2024-05-28 RX ADMIN — LORAZEPAM 0.5 MG: 2 INJECTION INTRAMUSCULAR; INTRAVENOUS at 17:20

## 2024-05-28 RX ADMIN — BUMETANIDE 2 MG: 0.25 INJECTION INTRAMUSCULAR; INTRAVENOUS at 18:24

## 2024-05-28 RX ADMIN — LORAZEPAM 0.5 MG: 2 INJECTION INTRAMUSCULAR; INTRAVENOUS at 16:17

## 2024-05-28 RX ADMIN — AMIODARONE HYDROCHLORIDE 400 MG: 200 TABLET ORAL at 08:26

## 2024-05-28 RX ADMIN — SACUBITRIL AND VALSARTAN 1 TABLET: 24; 26 TABLET, FILM COATED ORAL at 10:22

## 2024-05-28 RX ADMIN — ENOXAPARIN SODIUM 40 MG: 100 INJECTION SUBCUTANEOUS at 08:28

## 2024-05-28 RX ADMIN — SODIUM CHLORIDE: 9 INJECTION, SOLUTION INTRAVENOUS at 09:51

## 2024-05-28 RX ADMIN — TAMSULOSIN HYDROCHLORIDE 0.4 MG: 0.4 CAPSULE ORAL at 08:26

## 2024-05-28 RX ADMIN — MAGNESIUM OXIDE 400 MG (241.3 MG MAGNESIUM) TABLET 400 MG: TABLET at 08:26

## 2024-05-28 RX ADMIN — ASPIRIN 81 MG: 81 TABLET, CHEWABLE ORAL at 08:26

## 2024-05-28 RX ADMIN — ETOMIDATE 4 MG: 2 INJECTION, SOLUTION INTRAVENOUS at 09:59

## 2024-05-28 RX ADMIN — DOCUSATE SODIUM 100 MG: 100 CAPSULE, LIQUID FILLED ORAL at 08:26

## 2024-05-28 RX ADMIN — RANOLAZINE 500 MG: 500 TABLET, FILM COATED, EXTENDED RELEASE ORAL at 08:26

## 2024-05-28 RX ADMIN — TRAZODONE HYDROCHLORIDE 50 MG: 50 TABLET ORAL at 20:46

## 2024-05-28 RX ADMIN — PERFLUTREN 2 ML OF DILUTION: 6.52 INJECTION, SUSPENSION INTRAVENOUS at 09:23

## 2024-05-28 RX ADMIN — AMIODARONE HYDROCHLORIDE 1 MG/MIN: 1.8 INJECTION, SOLUTION INTRAVENOUS at 22:15

## 2024-05-28 RX ADMIN — PANTOPRAZOLE SODIUM 40 MG: 40 INJECTION, POWDER, FOR SOLUTION INTRAVENOUS at 18:27

## 2024-05-28 RX ADMIN — TORSEMIDE 20 MG: 20 TABLET ORAL at 10:22

## 2024-05-28 RX ADMIN — RANOLAZINE 500 MG: 500 TABLET, EXTENDED RELEASE ORAL at 20:47

## 2024-05-28 RX ADMIN — POTASSIUM CHLORIDE 20 MEQ: 14.9 INJECTION, SOLUTION INTRAVENOUS at 10:24

## 2024-05-28 RX ADMIN — CYANOCOBALAMIN TAB 500 MCG 1000 MCG: 500 TAB at 08:26

## 2024-05-28 SDOH — SOCIAL STABILITY: SOCIAL INSECURITY: WERE YOU ABLE TO COMPLETE ALL THE BEHAVIORAL HEALTH SCREENINGS?: YES

## 2024-05-28 SDOH — SOCIAL STABILITY: SOCIAL INSECURITY: HAVE YOU HAD THOUGHTS OF HARMING ANYONE ELSE?: NO

## 2024-05-28 SDOH — SOCIAL STABILITY: SOCIAL INSECURITY: DO YOU FEEL ANYONE HAS EXPLOITED OR TAKEN ADVANTAGE OF YOU FINANCIALLY OR OF YOUR PERSONAL PROPERTY?: NO

## 2024-05-28 SDOH — SOCIAL STABILITY: SOCIAL INSECURITY: ARE YOU OR HAVE YOU BEEN THREATENED OR ABUSED PHYSICALLY, EMOTIONALLY, OR SEXUALLY BY ANYONE?: NO

## 2024-05-28 SDOH — ECONOMIC STABILITY: INCOME INSECURITY: HOW HARD IS IT FOR YOU TO PAY FOR THE VERY BASICS LIKE FOOD, HOUSING, MEDICAL CARE, AND HEATING?: SOMEWHAT HARD

## 2024-05-28 SDOH — SOCIAL STABILITY: SOCIAL INSECURITY: DOES ANYONE TRY TO KEEP YOU FROM HAVING/CONTACTING OTHER FRIENDS OR DOING THINGS OUTSIDE YOUR HOME?: NO

## 2024-05-28 SDOH — SOCIAL STABILITY: SOCIAL INSECURITY: ARE THERE ANY APPARENT SIGNS OF INJURIES/BEHAVIORS THAT COULD BE RELATED TO ABUSE/NEGLECT?: NO

## 2024-05-28 SDOH — ECONOMIC STABILITY: INCOME INSECURITY: IN THE LAST 12 MONTHS, WAS THERE A TIME WHEN YOU WERE NOT ABLE TO PAY THE MORTGAGE OR RENT ON TIME?: NO

## 2024-05-28 SDOH — HEALTH STABILITY: MENTAL HEALTH: CURRENT SMOKER: 0

## 2024-05-28 SDOH — ECONOMIC STABILITY: HOUSING INSECURITY: IN THE LAST 12 MONTHS, HOW MANY PLACES HAVE YOU LIVED?: 1

## 2024-05-28 SDOH — SOCIAL STABILITY: SOCIAL INSECURITY: HAVE YOU HAD ANY THOUGHTS OF HARMING ANYONE ELSE?: NO

## 2024-05-28 SDOH — SOCIAL STABILITY: SOCIAL INSECURITY: DO YOU FEEL UNSAFE GOING BACK TO THE PLACE WHERE YOU ARE LIVING?: NO

## 2024-05-28 SDOH — SOCIAL STABILITY: SOCIAL INSECURITY: ABUSE: ADULT

## 2024-05-28 SDOH — SOCIAL STABILITY: SOCIAL INSECURITY: HAS ANYONE EVER THREATENED TO HURT YOUR FAMILY OR YOUR PETS?: NO

## 2024-05-28 ASSESSMENT — PAIN DESCRIPTION - DESCRIPTORS: DESCRIPTORS: TENDER

## 2024-05-28 ASSESSMENT — PATIENT HEALTH QUESTIONNAIRE - PHQ9
SUM OF ALL RESPONSES TO PHQ9 QUESTIONS 1 & 2: 1
1. LITTLE INTEREST OR PLEASURE IN DOING THINGS: NOT AT ALL
2. FEELING DOWN, DEPRESSED OR HOPELESS: SEVERAL DAYS

## 2024-05-28 ASSESSMENT — ACTIVITIES OF DAILY LIVING (ADL)
DRESSING YOURSELF: INDEPENDENT
HEARING - LEFT EAR: FUNCTIONAL
ADEQUATE_TO_COMPLETE_ADL: YES
DRESSING YOURSELF: INDEPENDENT
BATHING: INDEPENDENT
HEARING - RIGHT EAR: FUNCTIONAL
TOILETING: INDEPENDENT
WALKS IN HOME: INDEPENDENT
FEEDING YOURSELF: INDEPENDENT
GROOMING: INDEPENDENT
JUDGMENT_ADEQUATE_SAFELY_COMPLETE_DAILY_ACTIVITIES: YES
GROOMING: INDEPENDENT
TOILETING: INDEPENDENT
WALKS IN HOME: INDEPENDENT
ADEQUATE_TO_COMPLETE_ADL: YES
HEARING - LEFT EAR: FUNCTIONAL
FEEDING YOURSELF: INDEPENDENT
JUDGMENT_ADEQUATE_SAFELY_COMPLETE_DAILY_ACTIVITIES: YES
HEARING - RIGHT EAR: FUNCTIONAL
PATIENT'S MEMORY ADEQUATE TO SAFELY COMPLETE DAILY ACTIVITIES?: YES
PATIENT'S MEMORY ADEQUATE TO SAFELY COMPLETE DAILY ACTIVITIES?: YES

## 2024-05-28 ASSESSMENT — COGNITIVE AND FUNCTIONAL STATUS - GENERAL
PATIENT BASELINE BEDBOUND: NO
MOBILITY SCORE: 22
CLIMB 3 TO 5 STEPS WITH RAILING: A LITTLE
WALKING IN HOSPITAL ROOM: A LITTLE
DAILY ACTIVITIY SCORE: 24
MOBILITY SCORE: 24
DRESSING REGULAR LOWER BODY CLOTHING: A LITTLE
DAILY ACTIVITIY SCORE: 23

## 2024-05-28 ASSESSMENT — PAIN SCALES - GENERAL
PAINLEVEL_OUTOF10: 0 - NO PAIN
PAINLEVEL_OUTOF10: 8
PAINLEVEL_OUTOF10: 0 - NO PAIN
PAINLEVEL_OUTOF10: 8
PAINLEVEL_OUTOF10: 2
PAINLEVEL_OUTOF10: 0 - NO PAIN

## 2024-05-28 ASSESSMENT — PAIN - FUNCTIONAL ASSESSMENT
PAIN_FUNCTIONAL_ASSESSMENT: 0-10

## 2024-05-28 ASSESSMENT — ENCOUNTER SYMPTOMS
ACTIVITY CHANGE: 0
PALPITATIONS: 1
APNEA: 0
DIFFICULTY URINATING: 0
COUGH: 0
SHORTNESS OF BREATH: 0
DYSURIA: 0
APPETITE CHANGE: 0
CHEST TIGHTNESS: 0
EYE ITCHING: 0
EYE DISCHARGE: 0
CHOKING: 0

## 2024-05-28 ASSESSMENT — LIFESTYLE VARIABLES
AUDIT-C TOTAL SCORE: 0
HOW OFTEN DO YOU HAVE A DRINK CONTAINING ALCOHOL: NEVER
SKIP TO QUESTIONS 9-10: 1
HOW OFTEN DO YOU HAVE 6 OR MORE DRINKS ON ONE OCCASION: NEVER
AUDIT-C TOTAL SCORE: 0
HOW MANY STANDARD DRINKS CONTAINING ALCOHOL DO YOU HAVE ON A TYPICAL DAY: PATIENT DOES NOT DRINK

## 2024-05-28 ASSESSMENT — COLUMBIA-SUICIDE SEVERITY RATING SCALE - C-SSRS
6. HAVE YOU EVER DONE ANYTHING, STARTED TO DO ANYTHING, OR PREPARED TO DO ANYTHING TO END YOUR LIFE?: NO
2. HAVE YOU ACTUALLY HAD ANY THOUGHTS OF KILLING YOURSELF?: NO
1. IN THE PAST MONTH, HAVE YOU WISHED YOU WERE DEAD OR WISHED YOU COULD GO TO SLEEP AND NOT WAKE UP?: NO

## 2024-05-28 NOTE — NURSING NOTE
"Dr Rodrigues and ENRICO Lane NP at bedside to assess patient and discharge patient's rhythm. Assessed EKG. Reviewed meds. Orders received. Dr Rodrigues verbalized \"ok to give po amiodarone now and continue the rest of IV amiodarone. Once the bag is done discontinue'.   "

## 2024-05-28 NOTE — ANESTHESIA POSTPROCEDURE EVALUATION
Patient: Cristian Sapp    Procedure Summary       Date: 05/28/24 Room / Location: [facility] / [facility]    Anesthesia Start: 0956 Anesthesia Stop: 1006    Procedure: NIPS (NONINVASIVE PROGRAMMED STIMULATION AND DEFIBRILLATOR THRESHOLD TESTING) Diagnosis:       Ventricular tachycardia (Multi)      (Ventricular tachycardia (Multi) [I47.20])    Providers: Zachary Sparks MD Responsible Provider: Brandon Carter MD    Anesthesia Type: MAC ASA Status: 4            Anesthesia Type: MAC      Anesthesia Post Evaluation    Patient location during evaluation: bedside  Patient participation: complete - patient participated  Level of consciousness: awake and alert  Pain management: adequate  Airway patency: patent  Cardiovascular status: acceptable  Respiratory status: acceptable  Hydration status: acceptable  Postoperative Nausea and Vomiting: none      No notable events documented.

## 2024-05-28 NOTE — DISCHARGE SUMMARY
Discharge Diagnosis  AICD discharge    Issues Requiring Follow-Up  [place] [facility] [place] work up and urology follow-up.    Test Results Pending At Discharge  Pending Labs       No current pending labs.            Hospital Course   Cristian Sapp is a 58 y.o. year old male patient with Past Medical History of  listed below including multiple prior MI's, CAD, multiple JIM's, ICM, HFrEF, infrarenal AAA s/p stent right iliac, nephrolithiasis w/ recent cystoscopy with ureteral stent placement, presented to ED via EMS for ICD firing. He states it fired 10-15 times.  When ICD firing occurred, patient was not doing anything strenuous. He developed anterior non-radiating chest pain from ICD firing. No true SOB or palpitations.   Patient denies cardiac symptoms prior to ICD firing.  He was following a cardiologist in [place] where he previously resided. He started following with [facility] cardiology in April. Last Echo on file 08/2022 showing EF 25-30%.     ICD interrogated in ED, evaluated by cardiology along with ECG, appeared to be a slow v-tach, ECG looks like wide complex tachycardia with RBBB. ICD did not fire in ED.  His K+ was 3.3, Mag was < 2, he was given some K+ and Mag.  Dr. Sparks from EP contacted, he agreed with amiodarone, and said possible heart cath in AM so NPO except sips with meds.     He had lactic elevation 2.5 which normalized, likely reactive to ICD firing. ED thought maybe UTI given recent cysto however UA not reflect UTI. He was given IV Ceftriaxone. CXR shows no acute process.  Patient never required cardioversion.  His blood pressures have been soft however he runs low according to records, MAP consistently > 60 mmHg, no evidence of hypoperfusion on exam.  Advised to hold off on Vasopressor/Inotropic support.  No evidence of acute heart failure causing volume overload.        Interval ICU Events:  Admitted to ICU on IV amiodarone infusion, SBP 90's, patient normally runs on lower  end, MAP consistently > 60 mmHg, no evidence of hypoperfusion. Lactic acidosis resolved, likely occurred insetting of ICD firing. A/O, only c/o anterior chest discomfort from ICD firing, chest pain is reproducible.     This morning patient was taken to heart cath, no interventions, EF 10%  EP cardioverted patient at bedside today, pacemaker settings changed per EP, now AV paced at 80.   Continue on amio drip.  Echo pending.     5/24: Overnight received duonebs and torsemide. Great UOP -5.3L net -3.2L.  Continued on amnio drip at 1 Mg today per EP.  AV paced throughout the night and today.  Restart cardiac meds per cardiology.  Continue on dual antiplatelet.  Plan for echo in 2 to 3 days.      5/25: Continued on amnio drip at 1 mg.   Remains paced overnight.  Restart torsemide today.      5/26: Continue amio and torsemide. Increased bowel reg.  On RA this afternoon.      5/27:  Went back in to Vtach around 2am with rates around 100. Device interrogates. On Levo.      5/28:  NPO at [facility]. Plan for cardioversion vs ablation today.  Cards discussed [place] [facility] [place]idate, patient will be transferred and admitted under Dr. Brown and consult with Dr. Law. Patient will also need urology consult for CT results showing right renal intraparenchymal hemorrhage.            Neuro/Psych/Pain Ctrl/Sedation:  # Anxiety  -Pain: Tylenol, lido patch and robaxin for sternal pain  -ativan BID PRN  -Trazodone at bedtime     Respiratory/ENT:  COPD  Current tobacco use  -Supplemental oxygen as needed to maintain SpO2 greater than 92%  -DuoNebs as needed  -Nicotine patch     Cardiovascular:  Chest pain  ICD Discharges with Slow ventricular tachycardia s/p cardioversion  Elevated troponin  Acute on chronic HFrEF  hx ICM, CAD, HTN, MI, moderate to severe MR, mod TR, moderate to severe PAP previous echo August 2022 EF 25 to 30%, EF 10% 5/23 during [facility]  Hypokalemia, improved  -Keep K>4, Mg>2  -Cards and EP  following  --successful termination of Vtach using ATP from [employer] today  --Echo today- 15% EF  --[place] [facility] [place] work up  --Amio PO TID  -Daily EKG  -Continue aspirin and statin, torsemide, prasugrel, entresto  -holding ATC in the setting of renal bleed     GI:  -Cardiac diet      Renal/Volume Status (Intra & Extravascular):  #s/p recent cystoscopy with ureteral stent placement was 1 month ago  -Urology consult, CT abdomen pelvis-> right renal intraparenchymal hemorrhage  ----[place] c[place] [facility]   -Continue Flomax  -Strict intake and output  -monitor renal function and electrolytes    Endocrine  -monitor for s/s hypo and hyperglycemia     Infectious Disease:  Leukocytosis, improved, Suspect reactive from ICD firing  -Monitor CBC/D     Heme/Onc:  -Monitor CBC     OBGYN/MSK:  -Activity as tolerated     Ethics/Code Status:  Full code  -Heart failure navigator consult, nutrition consult for outpatient follow-up placed     :  DVT Prophylaxis: Lovenox-holding  GI Prophylaxis: N/A  Bowel Regimen: Colace, senna, miralax  Diet: Cardiac diet  CVC: N/A  Larissa: N/A  Wen: N/A  Restraints: N/A  Dispo: ICU, [place] [facility]          Pertinent Physical Exam At Time of Discharge  Physical Exam  Vitals reviewed.   Constitutional:       General: He is not in acute distress.     Appearance: Normal appearance. He is normal weight. He is not ill-appearing, toxic-appearing or diaphoretic.   HENT:      Head: Normocephalic.      Mouth/Throat:      Mouth: Mucous membranes are moist.      Pharynx: Oropharynx is clear.   Eyes:      Extraocular Movements: Extraocular movements intact.      Conjunctiva/sclera: Conjunctivae normal.      Pupils: Pupils are equal, round, and reactive to light.   Cardiovascular:      Rate and Rhythm: Normal rate and regular rhythm.      Pulses: Normal pulses.      Heart sounds: Normal heart sounds.      Comments: paced  Pulmonary:      Effort: Pulmonary effort is  normal.      Breath sounds: Normal breath sounds.      Comments: Diminished bases  Abdominal:      General: Abdomen is flat. Bowel sounds are normal.      Palpations: Abdomen is soft.   Musculoskeletal:         General: No swelling. Normal range of motion.   Skin:     General: Skin is warm and dry.      Capillary Refill: Capillary refill takes less than 2 seconds.   Neurological:      General: No focal deficit present.      Mental Status: He is alert and oriented to person, place, and time. Mental status is at baseline.   Psychiatric:         Mood and Affect: Mood normal.         Behavior: Behavior normal.         Thought Content: Thought content normal.         Judgment: Judgment normal.         Home Medications     Medication List      STOP taking these medications     bisoprolol 5 mg tablet; Commonly known as: Zebeta   DAPAGLIFLOZIN PROPANEDIOL ORAL   ezetimibe 10 mg tablet; Commonly known as: Zetia   magnesium gluconate 30 mg (550 mg) tablet   nitroglycerin 0.4 mg SL tablet; Commonly known as: Nitrostat   Praluent Pen 75 mg/mL pen injector; Generic drug: alirocumab     ASK your doctor about these medications     acetaminophen 500 mg tablet; Commonly known as: Tylenol   albuterol sulfate 90 mcg/actuation aero powdr breath act w/sensor   inhaler; Commonly known as: Proair Digihaler   aspirin 81 mg EC tablet   cholecalciferol 5,000 Units tablet; Commonly known as: Vitamin D-3   co-enzyme Q-10 50 mg capsule   cyanocobalamin 1,000 mcg tablet; Commonly known as: Vitamin B-12   Entresto 24-26 mg tablet; Generic drug: sacubitriL-valsartan   LORazepam 0.5 mg tablet; Commonly known as: Ativan   nicotine 21 mg/24 hr patch; Commonly known as: Nicoderm CQ   PARoxetine 40 mg tablet; Commonly known as: Paxil   potassium chloride 20 mEq packet; Commonly known as: Klor-Con   prasugrel 10 mg tablet; Commonly known as: Effient   ranolazine 500 mg 12 hr tablet; Commonly known as: Ranexa   rosuvastatin 20 mg tablet; Commonly known  as: Crestor   tamsulosin 0.4 mg 24 hr capsule; Commonly known as: Flomax   torsemide 20 mg tablet; Commonly known as: Demadex   traZODone 50 mg tablet; Commonly known as: Desyrel       Outpatient Follow-Up  Future Appointments   Date Time Provider Department Center   6/14/2024  9:00 AM Zachary Sparks MD [id number]     Discussed with Dr. Lis Sam, APRN-CNP

## 2024-05-28 NOTE — ANESTHESIA PREPROCEDURE EVALUATION
Patient: Cristian Sapp    Procedure Information       Anesthesia Start Date/Time: 05/28/24 0956    Procedure: NIPS (NONINVASIVE PROGRAMMED STIMULATION AND DEFIBRILLATOR THRESHOLD TESTING)    Location: [facility]    Providers: Zachary Sparks MD            Relevant Problems   Cardiac   (+) CHF (congestive heart failure) (Multi)   (+) Ventricular tachycardia (Multi)       Clinical information reviewed:   Tobacco  Allergies  Meds   Med Hx  Surg Hx   Fam Hx  Soc Hx        NPO Detail:  No data recorded     Physical Exam    Airway  Mallampati: II  TM distance: >3 FB     Cardiovascular    Dental    Pulmonary    Abdominal        Anesthesia Plan    History of general anesthesia?: yes  History of complications of general anesthesia?: no    ASA 4     MAC     The patient is not a current smoker.    intravenous induction   Anesthetic plan and risks discussed with patient.    Plan discussed with CRNA.

## 2024-05-28 NOTE — PROGRESS NOTES
"                                                                                     [facility] Progress Note      Cardiologist:  YARITZA Ibrahim-KHURRAM /Kd Rodrigues MD  Date:  5/28/2024  Patient:  Cristian Sapp  YOB: 1965  MRN:  [id number]   Admit Date:  5/22/2024      Subjective:  He denies any chest pain, shortness of breath,   Some variations in arrhythmia overnight  Currently may be in a accelerated idioventricular rhythm versus sinus rhythm with first-degree AV block heart rate about 102  EP following      Objective:   BP 79/66   Pulse 102   Temp 36.4 °C (97.5 °F)   Resp 20   Ht 1.702 m (5' 7\")   Wt 78.8 kg (173 lb 11.6 oz)   SpO2 94%   BMI 27.21 kg/m²          Intake/Output Summary (Last 24 hours) at 5/28/2024 0943  Last data filed at 5/28/2024 0600  Gross per 24 hour   Intake 720 ml   Output 3400 ml   Net -2680 ml       [unfilled]    Physical Exam:  GENERAL:  Well developed, well nourished, in no acute distress.  CHEST:  Symmetric and non-tender.  NEURO/PSYCH:  Alert and oriented times three with appropriate behavior and responses.  NECK:  Supple, no JVD, no bruit.  LUNGS: Occasional scattered rhonchi, normal respiratory effort, no wheezing, patient is a bit short of breath  HEART: Regular, S1 and S2 muffled, PMI laterally displaced, soft systolic murmur at the left sternal border and apex, no diastolic murmurs clearly audible today  EXTREMITIES:  Warm with good color, no clubbing or cyanosis.  There is no edema noted.  PERIPHERAL VASCULAR:  Pulses present and equally palpable; 2+ throughout.      Medications:   amiodarone, 400 mg, oral, TID  aspirin, 81 mg, oral, Daily  cholecalciferol, 5,000 Units, oral, q3 days  [Held by provider] co-enzyme Q-10, 50 mg, oral, Every Sunday  cyanocobalamin, 1,000 mcg, oral, Daily  docusate sodium, 100 mg, oral, BID  enoxaparin, 40 mg, subcutaneous, q24h  lidocaine, 2 patch, transdermal, Daily  magnesium oxide, 400 mg, oral, Daily  nicotine, 1 " patch, transdermal, q24h  PARoxetine, 40 mg, oral, Nightly  perflutren lipid microspheres, 0.5-10 mL of dilution, intravenous, Once in imaging  perflutren protein A microsphere, 0.5 mL, intravenous, Once in imaging  potassium chloride, 20 mEq, oral, Daily  potassium chloride, 20 mEq, intravenous, q2h  prasugrel, 10 mg, oral, Daily  ranolazine, 500 mg, oral, BID  rosuvastatin, 20 mg, oral, Nightly  sacubitriL-valsartan, 1 tablet, oral, BID  sennosides, 1 tablet, oral, Nightly  tamsulosin, 0.4 mg, oral, Daily  torsemide, 20 mg, oral, Daily  traZODone, 50 mg, oral, Nightly      sodium chloride 0.9%, 10 mL/hr        Medications:     Current Facility-Administered Medications   Medication Dose Route Frequency Provider Last Rate Last Admin    acetaminophen (Tylenol) tablet 650 mg  650 mg oral q4h PRN CLAUDIA Francois        albuterol 90 mcg/actuation inhaler 2 puff  2 puff inhalation q4h PRN CLAUDIA Menjivar        amiodarone (Pacerone) tablet 400 mg  400 mg oral TID Zachary Sparks MD   400 mg at 05/28/24 0826    aspirin chewable tablet 81 mg  81 mg oral Daily CLAUDIA Chaudhry   81 mg at 05/28/24 0826    cholecalciferol (Vitamin D-3) tablet 5,000 Units  5,000 Units oral q3 days CLAUDIA Menjivar   5,000 Units at 05/26/24 2047    [Held by provider] co-enzyme Q-10 capsule 50 mg  50 mg oral Every Sunday CLAUDIA Menjivar        cyanocobalamin (Vitamin B-12) tablet 1,000 mcg  1,000 mcg oral Daily CLAUDIA Menjivar   1,000 mcg at 05/28/24 0826    docusate sodium (Colace) capsule 100 mg  100 mg oral BID CLAUDIA Menjivar   100 mg at 05/28/24 0826    enoxaparin (Lovenox) syringe 40 mg  40 mg subcutaneous q24h CLAUDIA Menjivar   40 mg at 05/28/24 0828    levalbuterol (Xopenex) 0.63 mg/3 mL nebulizer solution 0.63 mg  0.63 mg nebulization q4h PRN CLAUDIA Chaudhry   0.63 mg at 05/24/24 4575    lidocaine 4 % patch 2 patch  2 patch transdermal Daily  CLAUDIA Menjivar   2 patch at 05/25/24 1054    LORazepam (Ativan) tablet 0.5 mg  0.5 mg oral q12h PRN Giorgi Heaton MD   0.5 mg at 05/27/24 1946    magnesium oxide (Mag-Ox) tablet 400 mg  400 mg oral Daily CLAUDIA Menjivar   400 mg at 05/28/24 0826    methocarbamol (Robaxin) tablet 500 mg  500 mg oral q6h PRN CLAUDIA Menjivar   500 mg at 05/26/24 2047    nicotine (Nicoderm CQ) 21 mg/24 hr patch 1 patch  1 patch transdermal q24h CLAUDIA Menjivar   1 patch at 05/27/24 1724    ondansetron (Zofran) injection 4 mg  4 mg intravenous q8h PRN CLAUDIA Francois   4 mg at 05/27/24 0311    PARoxetine (Paxil) tablet 40 mg  40 mg oral Nightly NICCI ChaudhryCNP   40 mg at 05/27/24 2053    perflutren lipid microspheres (Definity) injection 0.5-10 mL of dilution  0.5-10 mL of dilution intravenous Once in imaging CLAUDIA Esteban        perflutren protein A microsphere (Optison) injection 0.5 mL  0.5 mL intravenous Once in imaging Kd Rodrigues DO        polyethylene glycol (Glycolax, Miralax) packet 17 g  17 g oral Daily PRN CLAUDIA Menjivar   17 g at 05/27/24 1002    potassium chloride (Klor-Con) packet 20 mEq  20 mEq oral Daily CLAUDIA Menjivar   20 mEq at 05/28/24 0826    potassium chloride 20 mEq in 100 mL IV premix  20 mEq intravenous q2h NICCI ChaudhryCNP 50 mL/hr at 05/28/24 0827 20 mEq at 05/28/24 0827    prasugrel (Effient) tablet 10 mg  10 mg oral Daily CLAUDIA Menjivar   10 mg at 05/27/24 0935    ranolazine (Ranexa) 12 hr tablet 500 mg  500 mg oral BID CLAUDIA Menjivar   500 mg at 05/28/24 0826    rosuvastatin (Crestor) tablet 20 mg  20 mg oral Nightly YARITZA Menjivar-CNP   20 mg at 05/27/24 2053    sacubitriL-valsartan (Entresto) 24-26 mg per tablet 1 tablet  1 tablet oral BID Kd Rodrigues DO   1 tablet at 05/27/24 2053    sennosides (Senokot) tablet 8.6 mg  1 tablet oral  Nightly YARITZA Menjivar-CNP   8.6 mg at 05/27/24 2053    simethicone (Mylicon) chewable tablet 80 mg  80 mg oral TID PRN YARITZA Chaudhry-CNP   80 mg at 05/23/24 2108    sodium chloride (Ocean) 0.65 % nasal spray 1 spray  1 spray Each Nostril 4x daily PRN YARITZA Chaudhry-CNP        sodium chloride 0.9% infusion  10 mL/hr intravenous Continuous CLAUDIA Francois        tamsulosin (Flomax) 24 hr capsule 0.4 mg  0.4 mg oral Daily YARITZA Menjivar-CNP   0.4 mg at 05/28/24 0826    torsemide (Demadex) tablet 20 mg  20 mg oral Daily YARITZA Menjivar-CNP   20 mg at 05/27/24 0935    traZODone (Desyrel) tablet 50 mg  50 mg oral Nightly YARITZA Menjivar-CNP   50 mg at 05/27/24 2053       Outpatient Medications:     Current Outpatient Medications   Medication Instructions    acetaminophen (TYLENOL) 650 mg, oral, Every 6 hours PRN    albuterol sulfate (Proair Digihaler) 90 mcg/actuation aero powdr breath act w/sensor inhaler 2 puffs, inhalation, Every 4 hours PRN    aspirin 81 mg, oral, Daily    bisoprolol (Zebeta) 5 mg tablet oral, 2 times daily    cholecalciferol (VITAMIN D-3) 5,000 Units, oral, Every 3 days    co-enzyme Q-10 50 mg, oral, Once Weekly    cyanocobalamin (VITAMIN B-12) 1,000 mcg, oral, Daily    DAPAGLIFLOZIN PROPANEDIOL ORAL 10 mg, oral, Daily    ezetimibe (ZETIA) 10 mg, oral, Daily    LORazepam (ATIVAN) 0.5 mg, oral, Nightly    magnesium gluconate 30 mg, oral, Daily    nicotine (Nicoderm CQ) 21 mg/24 hr patch 1 patch, transdermal, Every 24 hours    nitroglycerin (NITROSTAT) 0.4 mg, sublingual, Every 5 min PRN    PARoxetine (PAXIL) 40 mg, oral, Every morning    potassium chloride (Klor-Con) 20 mEq packet 20 mEq, oral, Daily    Praluent Pen 75 mg, subcutaneous, Every 14 days    prasugrel (EFFIENT) 10 mg, oral, Daily    ranolazine (RANEXA) 500 mg, oral, 2 times daily, Do not crush, chew, or split.    rosuvastatin (CRESTOR) 20 mg, oral, Nightly    sacubitriL-valsartan  (Entresto) 24-26 mg tablet 1 tablet, oral, 2 times daily    tamsulosin (FLOMAX) 0.4 mg, oral, Daily    torsemide (DEMADEX) 20 mg, oral, 2 times daily    traZODone (DESYREL) 50 mg, oral, Nightly        Diagnostics:    ECG 12 Lead    Result Date: 5/24/2024  AV dual-paced rhythm Abnormal ECG When compared with ECG of 23-MAY-2024 08:05, Electronic ventricular pacemaker has replaced Wide QRS tachycardia Vent. rate has decreased BY  46 BPM Confirmed by Zachary Sparks ([id number]) on 5/24/2024 8:56:30 AM    ECG 12 lead    Result Date: 5/24/2024  Ventricular tachycardia Left axis deviation Right bundle branch block Inferior infarct (cited on or before 21-JAN-2002) T wave abnormality, consider lateral ischemia Abnormal ECG See ED provider note for full interpretation and clinical correlation Confirmed by Zachary Sparks ([id number]) on 5/24/2024 8:56:24 AM    Procedure Details:      Procedure Details:    Cardioversion  Summary:  ·            External electric cardioversion of ventricular tachycardia to normal sinus rhythm was achieved using 200 J synchronized biphasic  Antitachycardia pacing was delivered through the device at 450 ms, 300 ms, 250 ms with no termination of ventricular arrhythmias.  .   Recommendations:  1.          A 12 lead ECG should be performed prior to discharge from the hospital.   2.          The patient should continue with the present medications.   Discharge:   1.          The patient recovered uneventfully from the effects of conscious sedation. The patient left the EP laboratory hemodynamically stable and without neurological deficits.   Follow up:   1.          The patient will stay on telemetry intensive care unit for drug load with high risk medication, following bed rest and subsequent ambulation, provided the recovery parameters are appropriate. The patient should call the electrophysiologist immediately if symptoms recur, or for any problems. The patient has been instructed accordingly.    ________________________________________  Procedures:  Cardioversion.  Noninvasive primary stimulation through the device (biventricular ICD)  ________________________________________  Patient history:  Please refer to the detailed history and physical on the patient's medical chart.  History of ventricular tachycardia status post ICD shocks.  Patient is scheduled for antitachycardia pacing-NIPS and cardioversion  ________________________________________  Procedure narrative:  The device was interrogated and reprogrammed prior to the procedure.  The risks, benefits, and alternatives to the procedure and sedation were explained to the patient, and informed consent was obtained. The patient was in the fasting state. A grounding pad was placed. Self-adhesive anterior-posterior defibrillation pads were applied. A ZOLL defibrillator was used for monitoring and the defibrillator waveform was set to biphasic. The patient was set up for continuous monitoring of surface 12 lead ECG and pulse oximetry. Blood pressure was monitored with automatic cuff measurements. The procedure was performed under IV conscious sedation performed by anesthesiology service.   1.          External electric cardioversion of ventricular tachycardia to normal sinus rhythm was achieved using 200 J synchronized biphasic  Antitachycardia pacing was delivered through the device at 450 ms, 300 ms, 250 ms with no termination of ventricular arrhythmias.     The device was reprogrammed to DDDR  bpm.  AV delay was decreased to 150 ms.  ________________________________________  Complications:  The patient tolerated the procedure without any complications or incident.   ________________________________________  Prepared and signed by     Tolerance: good   Complications: None           ECG 12 Lead    Result Date: 5/23/2024  Wide QRS tachycardia - possibly ventricular tachycardia Abnormal right superior axis deviation Abnormal ECG When compared with ECG  of 22-MAY-2024 23:59, (unconfirmed) No significant change was found Confirmed by Clayton Angulo ([id number]) on 5/23/2024 10:40:02 AM             [place] [address]   Tel [phone] Fax [phone]     TRANSTHORACIC ECHOCARDIOGRAM REPORT        Patient Name:      FRANCIA PAULINO          Antoine Physician:    [id number] Kd Rodrigues DO  Study Date:        5/23/2024             Ordering Provider:    [id number] SHIN  MRN/PID:           [id number]              Fellow:  Accession#:        [id number]          Nurse:  Date of Birth/Age: 1965 / 58 years Sonographer:          Cathy Sanders RDCS  Gender:            M                     Additional Staff:  Height:            170.18 cm             Admit Date:  Weight:            78.47 kg              Admission Status:     Inpatient -                                                                 Routine  BSA / BMI:         1.90 m2 / 27.10 kg/m2 Department Location:  [facility]  Blood Pressure: 97 /71 mmHg     Study Type:    TRANSTHORACIC ECHO (TTE) COMPLETE  Diagnosis/ICD: Presence of automatic (implantable) cardiac                 defibrillator-Z95.810; Chronic systolic (congestive) heart                 failure (CHF)-I50.22; Ischemic cardiomyopathy-I25.5  Indication:    Congestive Heart Failure, ICM, ICD Firing (multiple shocks)  CPT Codes:     Echo Complete w Full Doppler-73179     Patient History:  MI Location/Type:  Unknown MI  Pacer/Defib:       AICD  Pertinent History: CAD, CHF, Cardiomyopathy, HTN and ICD Firing.     Study Detail: The following Echo studies were performed: 2D, M-Mode, Doppler and                color flow. Technically challenging study due to patient lying in                supine position, prominent lung artifact  and poor acoustic                windows. Patient has a defibrillator. The patient was asleep.        PHYSICIAN INTERPRETATION:  Left Ventricle: Left ventricular systolic function is severely decreased, with an estimated ejection fraction of 10%. There is global hypokinesis of the left ventricle with minor regional variations. The left ventricular cavity size is moderately dilated. Left ventricular diastolic filling was not assessed.  Left Atrium: The left atrium is mildly dilated.  Right Ventricle: The right ventricle is normal in size. There is normal right ventricular global systolic function. A device is visualized in the right ventricle.  Right Atrium: The right atrium is normal in size. There is a device visualized in the right atrium.  Aortic Valve: The aortic valve appears structurally normal. The aortic valve appears tricuspid. There is mild aortic valve cusp calcification. There is no evidence of aortic valve stenosis.  There is no evidence of aortic valve regurgitation. The peak instantaneous gradient of the aortic valve is 3.6 mmHg. The mean gradient of the aortic valve is 2.0 mmHg.  Mitral Valve: The mitral valve is normal in structure. There is mild thickening of the anterior and posterior mitral valve leaflets. There is no evidence of mitral valve stenosis. There is normal mitral valve leaflet mobility. There is mild mitral annular calcification. There is moderate to severe mitral valve regurgitation.  Tricuspid Valve: The tricuspid valve is structurally normal. There is normal tricuspid valve leaflet mobility. There is moderate tricuspid regurgitation.  Pulmonic Valve: The pulmonic valve is structurally normal. There is trace pulmonic valve regurgitation.  Pericardium: There is no pericardial effusion noted.  Aorta: The aortic root is normal.  Pulmonary Artery: Pulmonary hypertension is present. The tricuspid regurgitant velocity is 3.03 m/s, and with an estimated right atrial pressure of 15 mmHg,  the estimated pulmonary artery pressure is moderate to severely elevated with the RVSP at 51.7 mmHg.  Systemic Veins: The inferior vena cava appears moderately dilated.  In comparison to the previous echocardiogram(s): There are no prior studies on this patient for comparison purposes.        CONCLUSIONS:   1. Left ventricular systolic function is severely decreased with a 10% estimated ejection fraction.   2. There is no evidence of mitral valve stenosis.   3. Moderate to severe mitral valve regurgitation.   4. Moderate tricuspid regurgitation.   5. Aortic valve stenosis is not present.   6. Left ventricular cavity size is moderately dilated.   7. Moderate to severely elevated pulmonary artery pressure.   8. Pulmonary hypertension is present.   9. The inferior vena cava appears moderately dilated.  10. There is global hypokinesis of the left ventricle with minor regional variations.     RECOMMENDATIONS:  Technically suboptimal and limited study, therefore accuracy of above interpretation could be substantially diminished. Clinical correlation is advised. Consider additional imaging modalities if clinically indicated.       Cardiac Cath Post Procedure Notes:  Post Procedure Diagnosis: Double vessel disease.  Blood Loss:               Estimated blood loss during the procedure was 0 mls.  Specimens Removed:        Number of specimen(s) removed: none.     ____________________________________________________________________________________  CONCLUSIONS:   1. Distal Left Main: 10-30% stenosis.   2. Proximal and mid LAD Lesion: The percent stenosis is 10-30%.   3. Proximal CX Lesion: The percent stenosis is 100%.   4. Right Coronary Artery: fills via collaterals.   5. Proximal RCA Lesion: The percent stenosis is 100%.   6. The Left Ventricular Ejection Fraction is 10%,by echo.     ICD 10 Codes:  Ventricular tachycardia, unspecified-I47.20     CPT Codes:  Coronary Angiography S&I only ([facility])([facility])-31758; Moderate Sedation  "Services initial 15 minutes patient >5 years-[id number]     [id number] Babatunde Menon MD  Performing Physician  Electronically signed by [id number] Babatunde Menon MD on 5/23/2024 at 10:42:57  AM         BMP:  Lab Results   Component Value Date     05/28/2024     05/27/2024     05/26/2024    K 3.6 05/28/2024    K 3.6 05/27/2024    K 4.0 05/26/2024     05/28/2024     05/27/2024    CL 99 05/26/2024    CO2 30 05/28/2024    CO2 28 05/27/2024    CO2 28 05/26/2024    BUN 16 05/28/2024    BUN 13 05/27/2024    BUN 16 05/26/2024    CREATININE 1.20 05/28/2024    CREATININE 1.08 05/27/2024    CREATININE 1.24 05/26/2024           TSH:  No results found for: \"TSH\"      Lipid Profile:  No results found for: \"CHLPL\", \"TRIG\", \"HDL\", \"LDLCALC\", \"LDLDIRECT\"    HgbA1C:  No components found for: \"LABA1C\"          CBC:   Lab Results   Component Value Date    WBC 9.9 05/28/2024    RBC 4.04 (L) 05/28/2024    HGB 13.3 (L) 05/28/2024    HCT 38.2 (L) 05/28/2024     05/28/2024       CMP:    Lab Results   Component Value Date     05/28/2024    K 3.6 05/28/2024     05/28/2024    CO2 30 05/28/2024    BUN 16 05/28/2024    CREATININE 1.20 05/28/2024    GLUCOSE 142 (H) 05/28/2024    CALCIUM 8.7 05/28/2024         RADIOLOGY:   Cardiac device check - Inpatient         XR abdomen 1 view   Final Result   No prior imaging of the urinary tract in the local PACS archive        Questionable tiny stones or stone fragments x2 adjacent to the   proximal pigtail coil of the ureteral stent in the left renal pelvis        No stone or stone fragment projects over stented left ureter        Tiny urinary bladder stone versus phlebolith projecting near the   distal end of the stent        Probability of right nephrolithiasis as detailed above        MACRO:   None        Signed by: Gage Lopez 5/24/2024 2:54 PM   Dictation workstation:   [id number]      Electrophysiology procedure   Final Result      Cardiac " Catheterization Procedure   Final Result      Transthoracic Echo (TTE) Complete   Final Result      Cardiac device check - Inpatient   Final Result      XR chest 1 view   Final Result   No acute cardiopulmonary disease.   Signed by Rio Painting DO      CT abdomen pelvis wo IV contrast    (Results Pending)   Transthoracic Echo (TTE) Complete    (Results Pending)   Electrophysiology procedure    (Results Pending)       [unfilled]        Assessment:  Principal Problem:    AICD discharge  Active Problems:    HFrEF (heart failure with reduced ejection fraction) (Multi)    Ischemic cardiomyopathy    Elevated troponin    CHF (congestive heart failure) (Multi)    Ventricular tachycardia (Multi)        Patient Active Problem List   Diagnosis    AICD discharge    HFrEF (heart failure with reduced ejection fraction) (Multi)    Ischemic cardiomyopathy    Elevated troponin    Ventricular tachycardia (Multi)    CHF (congestive heart failure) (Multi)       Plan:      Please do not hesitate to call with questions.   Electronically signed by YARITZA Ibrahim-KHURRAM, [facility] on 5/28/2024 at 9:43 AM    Assessment/Plan:         Patient Active Problem List   Diagnosis    AICD discharge    HFrEF (heart failure with reduced ejection fraction) (Multi)    Ischemic cardiomyopathy    Elevated troponin    Ventricular tachycardia (Multi)    CHF (congestive heart failure) (Multi)       ASSESSMENT   58-year-old gentleman seen evaluate the bedside in the medical intensive care unit.    Bedside examination evaluation interview were performed by me.    Chart was reviewed in detail discussed the patient including catheterization results, electrophysiology and cardioversion results and all other data and in great detail and also discussed with the intensive care staff.    Impression:  Principal Problem:    AICD discharge  Active Problems:    HFrEF (heart failure with reduced ejection fraction) (Multi)    Ischemic cardiomyopathy    Elevated  troponin    CHF (congestive heart failure) (Multi)    Ventricular tachycardia (Multi)      PLAN   Recommendation:  Patient will have aggressive treatment with medical therapy for his cardiomyopathy and coronary disease, resume Entresto and diuretic  EP will follow for arrhythmia management  Echo  repeated today to see if initial presentation of 10% ejection fraction was related to multiple cardioversions by his ICD causing stunned myocardium versus further deterioration of overall LV function, basically is unchanged  Eventual referral to the advanced heart failure clinic for potential additional device therapy such as LVAD or even consideration for transplant assessment, discussed with Dr. Kd Law)  Monitor electrolytes.  Keep potassium greater than 4 and magnesium greater than 2  Strict I's and O's and daily weights  Keep in ICU over the next day or 2  Will continue to follow  Discussed with EP as well as intensive care team    Discussion:  Patient underwent his cardioversion back to intrinsic rhythm which currently is paced.  Repeat echo did show ejection fraction of 10 to 15%.  Patient best suited for transfer to [facility].  I discussed this with Dr. Law who will be the accepting physician.  Assessment there will be for potential VT ablation along with advanced heart failure treatments and devices.  Patient will also need urology follow-up since he is having some bleeding in the site of his kidney stones and kidney stent which was placed a week or 2 ago.  Reviewed this with the patient and the staff in the ICU.  All are agreeable to proceed in this fashion.      Kd Rodrigues DO,FACC      [facility]  Adult Gerontology Acute Care Nurse Practitioner  [facility] [place]   [facility]  [phone]

## 2024-05-28 NOTE — H&P (VIEW-ONLY)
Consults  Inpatient Electrophysiology Consult Note  Reason for consult: VT     HPI:   Cristian Sapp 58M PMHx CAD s/p multiple PCI, ICM/HFrEF (EF 15% 5/24) s/p CRT-D, VT storm s/p VT ablation, infrarenal AAA s/p R iliac stent, nephrolithiasis s/p recent ureteral stent. Presented to OSH with multiple ICD shocks for VT. Now in slow VT for which EP is consulted.     Pt presented with VT, started on IV then PO amio, required DCCV and then got cor angio showing lcx/rca  which is known from prior, TTE showing severely reduced EF (known), mod-severe MR. He is continued on medical management for the CAD and remained stable however went back into VT again, this time at a slower rate - he remained HDS but was transferred to [facility] for further eval/workup.     All system reviewed and negative unless mentioned above.     Cardiac studies:   EKG 5/28/24 - VT (slow), RBBB morphology.     Device: [employer] CRT-D  Implanting MD: Dr. Gaston ([facility])  Last Device Interrogation: 5/24    TTE 5/27/24:   1. Left ventricular systolic function is severely decreased with a 15% estimated ejection fraction.   2. There is no evidence of mitral valve stenosis.   3. Moderate to severe mitral valve regurgitation.   4. Mild tricuspid regurgitation is visualized.   5. Aortic valve stenosis is not present.   6. Left ventricular cavity size is moderately dilated.   7. The pulmonary artery is not well visualized.   8. Current study appears to be remarkably similar to the May 23, 2024 study done here.   9. There is global hypokinesis of the left ventricle with minor regional variations.    Cor Angio 5/23/24:   1. Distal Left Main: 10-30% stenosis.   2. Proximal and mid LAD Lesion: The percent stenosis is 10-30%.   3. Proximal CX Lesion: The percent stenosis is 100%.   4. Right Coronary Artery: fills via collaterals.   5. Proximal RCA Lesion: The percent stenosis is 100%.   6. The Left Ventricular Ejection Fraction is 10%,by  echo.      Current Facility-Administered Medications:     amiodarone (Nexterone) 150 mg in dextrose,iso-osm 100 mL (1.5 mg/mL) IV (premix), 150 mg, intravenous, Once, Sujata Nunez MD, Restarted at 05/28/24 1630    amiodarone (Nexterone) 360 mg in dextrose,iso-osm 200 mL (1.8 mg/mL) infusion (premix), 0.5-1 mg/min, intravenous, Continuous, Sujata Nunez MD, Last Rate: 33.3 mL/hr at 05/28/24 1721, 1 mg/min at 05/28/24 1721    LORazepam (Ativan) injection 0.5 mg, 0.5 mg, intravenous, q8h PRN, Sujata Nunez MD    Objective:    Vitals:    05/28/24 1630   BP: 117/84   Pulse: 91   Resp: 16   Temp:    SpO2: 96%       Exam:  General - well appearing   Neck -  JVD   Chest - CTAB  Cardiac - S1, S2, no murmur heard   Lower ext - No LE edema     Labs/Imaging:     Results from last 72 hours   Lab Units 05/28/24  1503   SODIUM mmol/L 138   POTASSIUM mmol/L 5.0   CO2 mmol/L 29   BUN mg/dL 15   CREATININE mg/dL 1.25   MAGNESIUM mg/dL 2.33   PHOSPHORUS mg/dL 3.6      Results from last 72 hours   Lab Units 05/28/24  1503   WBC AUTO x10*3/uL 10.6   HEMOGLOBIN g/dL 13.8   PLATELETS AUTO x10*3/uL 234        Assessment and Plan:   58M PMHx CAD s/p multiple PCI, ICM/HFrEF (EF 15% 5/24) s/p CRT-D, VT storm s/p VT ablation, infrarenal AAA s/p R iliac stent, nephrolithiasis s/p recent ureteral stent. Presented to OSH with multiple ICD shocks for VT. Now in slow VT for which EP is consulted.     #ICM/HFrEF (EF 15%) s/p CRT-D  #Prior VT storm s/p VT ablation  #Incessant VT s/p ICD shock  Patient admitted with VT (RBBB with transition at V3, RS in lead I and superior axis highly suggestive of Basal Inferior/Inferior septal Exit) Remains in slow VT but hemodynamically stable, likely scar mediated iso known ischemic heart disease and low EF. Device programming reviewed and pt's VT zones were set to 115bpm since VT in 90-100s. ATP sequences briefly terminated the VT however VT re-initiates, highly suggestive of slow stable re-entrant VT  related to scar (from ICM). Ablation of the exit is very feasible and pt is relatively stable for procedure however there are a few concerns. 1. Pt has non-revascularized CAD and since no intervention is possible, if pt has faster arrhythmias during ablation higher chance of hemodynamic instability.  2. Even with good ablation outcomes pt has CM with no reversible etiology (non revascularize CM, on GDMT of HF) thus in long term higher risk of recurrence of VT (different scar etc..) and HF decompensation. Since pt is young it would reasonable to consider if pt is candidate for advanced HF therapies. This was discussed with HF and CICU attending Dr. Brown who agrees for evaluation pt for advanced therapies. Pt is currently hemodynamically stable, with no signs of end-organ damage from his incessant VT. If pt not a candidate for advanced therapies vs shows sings of hemodynamic decline vs change/increase in VT rate and therapy burden then plan for VT RF ablation    - Increase VT zone to 142bpm, with aggressive ATP, no ICD shocks. VF zone with ICD therapy - to avoid multiple shocks. External pads in place.   - Start Lidocaine and c/w IV amiodarone to keep pt stable   - Re-program pacing to  to override VT  - No plan for VT tomorrow, but please keep pt NPO in case VT worsens or pt need more ICD shocks overnight.   - Please obtain CXR PA/lateral   - Please keep NPO at midnight and hold all AC and DVT ppx tonight  - Please ensure pt has active type and screen  - Please draw CBC, BMP, PT/INR with AM labs   - Please attempt to find records of prior ablation done by Dr. Gaston in [place] and place these in media tab     Discussed with Dr. Flores. EP will follow.     Melissa Gutierrez  Cardiology Fellow   PGY4    I saw and evaluated the patient. I personally obtained the key and critical portions of the history and physical exam or was physically present for key and critical portions performed by the resident/fellow. I  reviewed the resident/fellow's documentation and discussed the patient with the resident/fellow. I agree with the resident/fellow's medical decision making as documented in the note, and my edits (bold and italic) have been made to the document as needed.     I attest that this critical care service meets the definition of critical care as supported by my documentation. The reason for critical care for this patient is electrical storm/incessant VT, and we are treating with medications, diagnostic testing and planning for procedures. Please see above delineated assessment and plan for details. My full attention was spent providing critical care to this patient for 57 minutes. The critical care service I provided was above and beyond any resident/fellow teaching and any separately billable procedures are not included into the time calculation.    Sonny Florse MD [facility]  Cardiac Electrophysiology    **Disclaimer: This note was dictated by speech recognition, and every effort has been made to prevent any error in transcription, however minor errors may be present**

## 2024-05-28 NOTE — PROGRESS NOTES
[facility] Critical Care Medicine       Date:  5/28/2024  Patient:  Cristian Sapp  YOB: 1965  MRN:  [id number]   Admit Date:  5/22/2024  ========================================================================================================    Chief Complaint   Patient presents with    Rapid Heart Rate     Defibrillator went off about 10-15 times, -200BPM. 50mcg fent in squad. Pt currently at 125BPM         History of Present Illness:  Cristian Sapp is a 58 y.o. year old male patient with Past Medical History of  listed below including multiple prior MI's, CAD, multiple JIM's, ICM, HFrEF, infrarenal AAA s/p stent right iliac, nephrolithiasis w/ recent cystoscopy with ureteral stent placement, presented to ED via EMS for ICD firing. He states it fired 10-15 times.  When ICD firing occurred, patient was not doing anything strenuous. He developed anterior non-radiating chest pain from ICD firing. No true SOB or palpitations.   Patient denies cardiac symptoms prior to ICD firing.  He was following a cardiologist in [place] where he previously resided. He started following with [facility] cardiology in April. Last Echo on file 08/2022 showing EF 25-30%.     ICD interrogated in ED, evaluated by cardiology along with ECG, appeared to be a slow v-tach, ECG looks like wide complex tachycardia with RBBB. ICD did not fire in ED.  His K+ was 3.3, Mag was < 2, he was given some K+ and Mag.  Dr. Sparks from EP contacted, he agreed with amiodarone, and said possible heart cath in AM so NPO except sips with meds.     He had lactic elevation 2.5 which normalized, likely reactive to ICD firing. ED thought maybe UTI given recent cysto however UA not reflect UTI. He was given IV Ceftriaxone. CXR shows no acute process.  Patient never required cardioversion.  His blood pressures have been soft however he runs low according to records, MAP consistently > 60 mmHg, no evidence of hypoperfusion on exam.  Advised to hold off  on Vasopressor/Inotropic support.  No evidence of acute heart failure causing volume overload.        Interval ICU Events:  Admitted to ICU on IV amiodarone infusion, SBP 90's, patient normally runs on lower end, MAP consistently > 60 mmHg, no evidence of hypoperfusion. Lactic acidosis resolved, likely occurred insetting of ICD firing. A/O, only c/o anterior chest discomfort from ICD firing, chest pain is reproducible.     This morning patient was taken to heart cath, no interventions, EF 10%  EP cardioverted patient at bedside today, pacemaker settings changed per EP, now AV paced at 80.   Continue on amio drip.  Echo pending.    5/24: Overnight received duonebs and torsemide. Great UOP -5.3L net -3.2L.  Continued on amnio drip at 1 Mg today per EP.  AV paced throughout the night and today.  Restart cardiac meds per cardiology.  Continue on dual antiplatelet.  Plan for echo in 2 to 3 days.     5/25: Continued on amnio drip at 1 mg.   Remains paced overnight.  Restart torsemide today.     5/26: Continue amio and torsemide. Increased bowel reg.  On RA this afternoon.     5/27:  Went back in to Vtach around 2am with rates around 100. Device interrogates. On Levo.     5/28:  NPO at MN. Plan for cardioversion vs ablation today.  Cards discussed case with [facility] for possible LVAD canidate, patient will be transferred and admitted under Dr. Brown and consult with Dr. Law. Patient will also need urology consult for CT results showing right renal intraparenchymal hemorrhage.          Medical History:  Past Medical History:   Diagnosis Date    Atherosclerosis of native artery of both lower extremities with intermittent claudication (CMS-HCC)     CHB (complete heart block) (Multi)     per device check    Chronic systolic CHF (congestive heart failure), NYHA class 3 (Multi)     COPD (chronic obstructive pulmonary disease) (Multi)     Coronary artery disease     History of tobacco abuse     HLD (hyperlipidemia)      Hypertension     Infrarenal abdominal aortic aneurysm (AAA) without rupture (CMS-HCC)     Ischemic cardiomyopathy with implantable cardioverter-defibrillator (ICD)     MI (myocardial infarction) (Multi)     multiple    Nephrolithiasis     PTSD (post-traumatic stress disorder)      Past Surgical History:   Procedure Laterality Date    CARDIAC CATHETERIZATION N/A 5/23/2024    Procedure: Left Heart Cath;  Surgeon: Babatunde Tan MD;  Location: [facility] Cardiac [facility];  Service: Cardiovascular;  Laterality: N/A;    CARDIAC DEFIBRILLATOR PLACEMENT      CARDIAC ELECTROPHYSIOLOGY PROCEDURE N/A 5/23/2024    Procedure: Cardioversion;  Surgeon: Zachary Sparks MD;  Location: [facility]iac [facility];  Service: Electrophysiology;  Laterality: N/A;    CORONARY ANGIOPLASTY WITH STENT PLACEMENT  2006    JIM to LAD    CORONARY ANGIOPLASTY WITH STENT PLACEMENT  04/2003    CORONARY ANGIOPLASTY WITH STENT PLACEMENT  06/2008    CYSTOSCOPY W/ URETERAL STENT PLACEMENT  04/01/2024    CYSTOSCOPY W/ URETERAL STENT PLACEMENT  04/30/2024    FEMORAL BYPASS      femoral artery    ILIAC ARTERY STENT Right     KNEE SURGERY       Medications Prior to Admission   Medication Sig Dispense Refill Last Dose    albuterol sulfate (Proair Digihaler) 90 mcg/actuation aero powdr breath act w/sensor inhaler Inhale 2 puffs every 4 hours if needed for wheezing.   5/21/2024    aspirin 81 mg EC tablet Take 1 tablet (81 mg) by mouth once daily.   5/22/2024    bisoprolol (Zebeta) 5 mg tablet Take by mouth 2 times a day.   5/22/2024 at 0800    cholecalciferol (Vitamin D-3) 5,000 Units tablet Take 1 tablet (5,000 Units) by mouth every 3 days.   Past Week    co-enzyme Q-10 50 mg capsule Take 1 capsule (50 mg) by mouth 1 (one) time per week.   Past Week    cyanocobalamin (Vitamin B-12) 1,000 mcg tablet Take 1 tablet (1,000 mcg) by mouth once daily.   Past Week    ezetimibe (Zetia) 10 mg tablet Take 1 tablet (10 mg) by mouth once daily.   5/22/2024 at 0800     LORazepam (Ativan) 0.5 mg tablet Take 1 tablet (0.5 mg) by mouth once daily at bedtime.   Past Week at 2200    magnesium gluconate 30 mg (550 mg) tablet Take 1 tablet (30 mg) by mouth once daily.   5/22/2024 at 0800    nicotine (Nicoderm CQ) 21 mg/24 hr patch Place 1 patch on the skin once every 24 hours.   5/22/2024 at 0800    PARoxetine (Paxil) 40 mg tablet Take 1 tablet (40 mg) by mouth once daily in the morning.   5/22/2024 at 0800    potassium chloride (Klor-Con) 20 mEq packet Take 20 mEq by mouth once daily.   5/22/2024 at 0800    prasugrel (Effient) 10 mg tablet Take 1 tablet (10 mg) by mouth once daily.   5/22/2024 at 0800    ranolazine (Ranexa) 500 mg 12 hr tablet Take 1 tablet (500 mg) by mouth 2 times a day. Do not crush, chew, or split.   5/22/2024 at 0800    rosuvastatin (Crestor) 20 mg tablet Take 1 tablet (20 mg) by mouth once daily at bedtime.   Past Week at 2200    sacubitriL-valsartan (Entresto) 24-26 mg tablet Take 1 tablet by mouth 2 times a day.   5/22/2024 at 0800    tamsulosin (Flomax) 0.4 mg 24 hr capsule Take 1 capsule (0.4 mg) by mouth once daily.   5/22/2024 at 0800    traZODone (Desyrel) 50 mg tablet Take 1 tablet (50 mg) by mouth once daily at bedtime.   Past Week at 2000    acetaminophen (Tylenol) 500 mg tablet Take 650 mg by mouth every 6 hours if needed for mild pain (1 - 3).   More than a month    alirocumab (Praluent Pen) 75 mg/mL pen injector Inject 75 mg under the skin every 14 (fourteen) days.   More than a month    DAPAGLIFLOZIN PROPANEDIOL ORAL Take 10 mg by mouth once daily.       nitroglycerin (Nitrostat) 0.4 mg SL tablet Place 1 tablet (0.4 mg) under the tongue every 5 minutes if needed for chest pain.   More than a month    torsemide (Demadex) 20 mg tablet Take 1 tablet (20 mg) by mouth 2 times a day.        Chantix [varenicline]  Social History     Tobacco Use    Smoking status: Former     Types: Cigarettes   Vaping Use    Vaping status: Never Used   Substance Use Topics     Alcohol use: Yes     Comment: social    Drug use: Defer     Family History   Problem Relation Name Age of Onset    Hypertension Mother      Diabetes Father      Hypertension Sister      Diabetes Sister      Colon cancer Maternal Grandmother      Hypertension Maternal Grandmother      Heart disease Maternal Grandfather      Hypertension Maternal Grandfather      Cancer Paternal Grandfather      Hypertension Paternal Grandfather         Review of Systems:  14 point review of systems was completed and negative except for those specially mention in my HPI    Physical Exam:    Heart Rate:  []   Temp:  [36 °C (96.8 °F)-36.4 °C (97.5 °F)]   Resp:  [14-36]   BP: (61-97)/(39-71)   SpO2:  [90 %-96 %]     Physical Exam  Vitals reviewed.   Constitutional:       General: He is not in acute distress.     Appearance: Normal appearance. He is normal weight. He is not ill-appearing, toxic-appearing or diaphoretic.   HENT:      Head: Normocephalic.      Mouth/Throat:      Mouth: Mucous membranes are moist.      Pharynx: Oropharynx is clear.   Eyes:      Extraocular Movements: Extraocular movements intact.      Conjunctiva/sclera: Conjunctivae normal.      Pupils: Pupils are equal, round, and reactive to light.   Cardiovascular:      Rate and Rhythm: Normal rate and regular rhythm.      Pulses: Normal pulses.      Heart sounds: Normal heart sounds.      Comments: paced  Pulmonary:      Effort: Pulmonary effort is normal.      Comments: Diminished  Abdominal:      General: Abdomen is flat. Bowel sounds are normal.      Palpations: Abdomen is soft.   Musculoskeletal:         General: Normal range of motion.      Right lower leg: No edema.      Left lower leg: No edema.   Skin:     General: Skin is warm and dry.      Capillary Refill: Capillary refill takes less than 2 seconds.   Neurological:      General: No focal deficit present.      Mental Status: He is alert and oriented to person, place, and time. Mental status is at  baseline.   Psychiatric:         Mood and Affect: Mood normal.         Behavior: Behavior normal.         Thought Content: Thought content normal.         Judgment: Judgment normal.         Objective:    I have reviewed all medications, laboratory results, and imaging pertinent for today's encounter    Assessment/Plan:    I am currently managing this critically ill patient for the following problems:    Chest pain  ICD Discharges  Slow ventricular tachycardia  Elevated troponin  ICM  Chronic Systolic CHF NYHA class III  CAD  Hx HTN  Hypokalemia  Leukocytosis  COPD        Neuro/Psych/Pain Ctrl/Sedation:  # Anxiety  -Pain: Tylenol, lido patch and robaxin for sternal pain  -ativan BID PRN  -Trazodone at bedtime     Respiratory/ENT:  COPD  Current tobacco use  -Supplemental oxygen as needed to maintain SpO2 greater than 92%  -DuoNebs as needed  -Nicotine patch     Cardiovascular:  Chest pain  ICD Discharges with Slow ventricular tachycardia s/p cardioversion  Elevated troponin  Acute on chronic HFrEF  hx ICM, CAD, HTN, MI, moderate to severe MR, mod TR, moderate to severe PAP previous echo August 2022 EF 25 to 30%, EF 10% 5/23 during [facility]  Hypokalemia, improved  -Keep K>4, Mg>2  -Cards and EP following  --successful termination of Vtach using ATP from [employer] today  --Echo today- 15% EF  --Transfer to [facility] for LVAD and/or VT ablation work up  --Amio PO TID  -Daily EKG  -Continue aspirin and statin, torsemide, prasugrel, entresto  -holding ATC in the setting of renal bleed    GI:  -Cardiac diet      Renal/Volume Status (Intra & Extravascular):  #s/p recent cystoscopy with ureteral stent placement was 1 month ago  -Urology consult, CT abdomen pelvis-> right renal intraparenchymal hemorrhage  ----Will need urology c/s at [facility]   -Continue Flomax  -Strict intake and output  -monitor renal function and electrolytes    Endocrine  -monitor for s/s hypo and hyperglycemia     Infectious Disease:  Leukocytosis, improved, Suspect  reactive from ICD firing  -Monitor CBC/D     Heme/Onc:  -Monitor CBC     OBGYN/MSK:  -Activity as tolerated     Ethics/Code Status:  Full code  -Heart failure navigator consult, nutrition consult for outpatient follow-up placed     :  DVT Prophylaxis: Lovenox-holding  GI Prophylaxis: N/A  Bowel Regimen: Colace, senna, miralax  Diet: Cardiac diet  CVC: N/A  [place]: N/A  Wen: N/A  Restraints: N/A  Dispo: ICU, transfer to [facility] [place]    Critical Care Time:  45 minutes  Discussed with Dr. Lis Sam, APRN-CNP

## 2024-05-28 NOTE — H&P
History Of Present Illness  Cristian Sapp is a 58 y.o. male with PMHx CAD s/p multiple PCI (2002, 2003, 2004, 2005, 2006, 2007, 2008), ICM/HFrEF (EF 15% 5/24) s/p CRT-D, VT storm s/p VT ablation in [place], infrarenal AAA s/p R iliac stent, nephrolithiasis s/p recent ureteral stent. Presented to OSH with multiple ICD shocks for VT. He was started on IV then PO amio, required DCCV and then got cor angio showing lcx/rca , TTE showing severely reduced EF (known), mod-severe MR. States that he has not been feeling well lately and he is super anxious. Had no chest pains or SOB. While being evaluated in the CICU had a ICD shock for VT, device interrogation ordered. EP consulted as patient has been in slow VT and was bloused with amio and started on amio drip.  Cardiac studies:     EKG 5/28/24 - VT (slow), RBBB morphology.      Device: [employer] CRT-D  Implanting MD: Dr. Gaston ([facility])  Last Device Interrogation: 5/24     TTE 5/27/24:   1. Left ventricular systolic function is severely decreased with a 15% estimated ejection fraction.   2. There is no evidence of mitral valve stenosis.   3. Moderate to severe mitral valve regurgitation.   4. Mild tricuspid regurgitation is visualized.   5. Aortic valve stenosis is not present.   6. Left ventricular cavity size is moderately dilated.   7. The pulmonary artery is not well visualized.   8. Current study appears to be remarkably similar to the May 23, 2024 study done here.   9. There is global hypokinesis of the left ventricle with minor regional variations.   --------------------------------------------------------------------  Cor Angio 5/23/24:   1. Distal Left Main: 10-30% stenosis.   2. Proximal and mid LAD Lesion: The percent stenosis is 10-30%.   3. Proximal CX Lesion: The percent stenosis is 100%.   4. Right Coronary Artery: fills via collaterals.   5. Proximal RCA Lesion: The percent stenosis is 100%.   6. The Left Ventricular Ejection Fraction is  10%,by echo.  ---------------------------------------------------------------------  Cath 2022:  Left Circumflex  Prox Cx to Mid Cx lesion is 70% stenosed. The lesion is chronically occluded. The lesion was previouslytreatedusing a stent of unknown type. Ultrasound (IVUS) was performed. Second Obtuse Marginal Branch  2nd Mrg lesion is 100% stenosed. The lesion is chronically occluded. The lesion was previously treatedusinga stent of unknown type. Ultrasound (IVUS) was performed  Prox Cx to Mid Cx lesion  Angioplasty  The balloon used was a BLLN TAKERU 1.5X15 MM. Initial inflation pressure: 12 roberto. 2nd inflationpressure: 12atm. 3rd inflation pressure: 12 roberto. 4th inflation pressure: 12 roberto. 5th inflation pressure: 12 roberto. 6thinflationpressure: 12 roberto. 7th inflation pressure: 12 roberto. Supplies used: BLLN TAKERU 1.5X15 MM  Angioplasty  The balloon used was a BLLN TAKERU 1.5X15 MM. Initial inflation pressure: 12 roberto. Supplies used: BLLN TAKERU 1.5X15 MM  Angioplasty  The balloon used was a BLLN APEX PUSH MONO 1.5 X 8. Initial inflation pressure: 10 roberto. 2nd inflationpressure: 12 roberto. 3rd inflation pressure: 12 roberto. Supplies used: BLLN APEX PUSH MONO 1.5 X 8  Atherectomy  The lesion was treated with laser atherectomy. Supplies used: ATHRCTMY ELCA VITESSE 0.9  Angioplasty  The lesion was treated with angioplasty prior to stent deployment using a standard balloon. The balloonusedwasa BLLN NC QUANTUM 2.5X30. Initial inflation pressure: 18 roberto. 2nd inflation pressure: 12 roberto. Supplies used: BLLN NC QUANTUM 2.5X30  Angioplasty  The lesion was treated with angioplasty prior to stent deployment using a standard balloon. The balloonusedwasa BLLN NC QUANTUM 4X20.. Initial inflation pressure: 12 roberto. 2nd inflation pressure: 16 roberto. 3rdinflationpressure: 16 roberto. 4th inflation pressure: 16 roberto. 5th inflation pressure: 18 roberto. Supplies used: BLLN NC QUANTUM 4X20. Atherectomy  The lesion was treated with laser atherectomy. Laser  fluency (1st rep): 80. Laser fluency (2nd rep): 80Laserfluency (3rd rep): 80  Supplies used: ATHRCTMY ELCA VITESSE 0.9  Angioplasty  The lesion was treated with angioplasty prior to stent deployment using a standard balloon. The balloonusedwasa BLLN NC QUANTUM 4X20.. Initial inflation pressure: 18 roberto. 2nd inflation pressure: 18 roberto. Supplies used: BLLN NC QUANTUM 4X20. Angioplasty  The lesion was treated with angioplasty prior to stent deployment using a standard balloon. The balloonusedwasa CATH DEV TRAPPER. Supplies used: CATH DEV TRAPPER  Angioplasty  The lesion was treated with angioplasty prior to stent deployment using a standard balloon. The balloonusedwasa BLLN APEX MONO 2.0 X 20.. Initial inflation pressure: 4 roberto. 2nd inflation pressure: 4 roberto. Supplies used: BLLN APEX MONO 2.0 X 20. Angioplasty  The lesion was treated with angioplasty using a drug-coated balloon. The balloon used was a BLLNRNGRDCBOTW 4.0X40 135C. Initial inflation pressure: 4 roberto. 2nd inflation pressure: 4 roberto. Supplies used: BLLN RNGR DCB OTW 4.0X40 135C  Post-Intervention Lesion Assessment  There is a 30% residual stenosis post intervention. 2nd Mrg lesion  Angioplasty  The lesion was treated with angioplasty prior to stent deployment using a standard balloon. The balloonusedwasa BLLN APEX MONO 2.50 X 20.. Initial inflation pressure: 6 roberto. 2nd inflation pressure: 8 roberto. Supplies used: BLLN APEX MONO 2.50 X 20. Angioplasty  The lesion was treated with angioplasty prior to stent deployment using a standard balloon. The balloonusedwasa BLLN APEX MONO 3.50 X 20.. Initial inflation pressure: 8 roberto. Supplies used: BLLN APEX MONO 3.50 X 20. Angioplasty  The lesion was treated with angioplasty prior to stent deployment using a standard balloon. The balloonusedwasa CATH DEV TRAPPER. Supplies used: CATH DEV TRAPPER  Post-Intervention Lesion Assessment  There is a 0% residual stenosis post intervention  ------------------------  echo  8/22  Cardiac Anatomy  Left ventricle:  - The cavity size is moderately dilated. Wall thickness is normal.  Systolic function was severely reduced. The calculated ejection  fraction was in the range of 25% to 30%. Severe diffuse  hypokinesis with regional variations. The global longitudinal  strain was -10%.  Regional wall motion abnormalities:  - Akinesis of the anterolateral, inferolateral, and inferior  myocardium.  - Doppler parameters are consistent with abnormal left ventricular  relaxation (grade 1 diastolic dysfunction).  Aortic valve:  - The valve was structurally normal. Cusp separation was normal.  Doppler:  - Transvalvular velocity is within the normal range. There is no  stenosis. There was no significant regurgitation.  - The ratio of LVOT to aortic valve peak velocity is 0.79. The  valve area by the peak velocity method is 2.5cm^2. The valve area  index by the peak velocity method is 1.29cm^2/m^2.  - The peak systolic gradient is 6mm Hg.  AORTA: Aortic root: The aortic root is not dilated.  Mitral valve:  - The leaflets are mildly thickened. Leaflet separation was normal.  Doppler:  - Transvalvular velocity is within the normal range. There is no  evidence for stenosis. There was moderate functional  regurgitation, directed eccentrically and posteriorly.  Left atrium:  - The atrium is mildly dilated.  Right ventricle:  - The cavity size is normal. Wall thickness is normal. Pacer wire  noted in right ventricle. Systolic function is normal.  Pulmonic valve:  - The valve was structurally normal.  Doppler:  - Transvalvular velocity is within the normal range. There was  trivial regurgitation.  Tricuspid valve:  - The valve was structurally normal. Leaflet separation was normal.  Doppler:  - Transvalvular velocity is within the normal range. There is no  evidence for stenosis. There was mild regurgitation.  Pulmonary artery:  - Not well visualized. Systolic pressure was within the normal  range.  Right  atrium:  - The atrium is normal in size.  Pericardium:  - The pericardium was normal in appearance. There is no pericardial  effusion.  Systemic veins:  Inferior vena cava:  - The vessel was normal in size; the respirophasic diameter changes  were in the normal range (&gt;= 50%); findings are consistent with  normal central venous pressure.  - The internal diameter is 1.7cm.  ----------------------------------------------  Cath 5/2020  Two vessel coronary artery disease with known chronic total occlusion of  the right coronary artery supplied by the LAD, and successful percutaneous  coronary invention of the culprit OM2 in stent occlusion with balloon  angioplasty 2.0 mm up to 3.0 mm balloons, and balloon angioplasty of the  terminal branches and OM1.  · LV diastolic pressure 17 mm Hg  · No aortic valve gradient  · 1st Mrg reduced to 10% stenosed.  · The lesion was treated with a standard balloon.  · 2nd Mrg reduced to 10% stenosed.  · The lesion was treated with a standard balloon.  · The lesion was treated with a standard balloon.  · The lesion was treated with a standard balloon.  · Lat 2nd Mrg reduced to 10% stenosed.  · The lesion was treated with a standard balloon.    The mechanism of the stent occlusion is likely related to InStent  restenosis. This is proving to be a difficult lesion to treat having been  treated previously with multiple stents and brachytherapy. Further  management may include adjustment of anti-platelet therapy with or without  the addition of anticoagulation. He does have a history of right lower  extremity bypass graft occlusion requiring catheter directed thrombolysis.  This may informed decision making about his long-term management.  Two vessel coronary artery disease with known chronic total occlusion of the right coronary artery supplied by the LAD, and successful percutaneous coronary invention of the culprit OM2 in stent occlusion with balloon angioplasty 2.0 mm up to 3.0 mm  balloons, and balloon angioplasty of the terminal branches and OM1.  LV diastolic pressure 17 mm Hg  No aortic valve gradient     The mechanism of the stent occlusion is likely related to InStent restenosis.  This is proving to be a difficult lesion to treat having been treated previously with multiple stents and brachytherapy.  Further management may include adjustment of anti-platelet therapy with or without the addition of anticoagulation or cilostazol.  He reports that in the past he had recurrent ischemic events on clopidogrel and so had been changed to prasugrel many years ago.  ----------------------------------------------------------------------------  Past Medical History  Past Medical History:   Diagnosis Date    Atherosclerosis of native artery of both lower extremities with intermittent claudication (CMS-HCC)     CHB (complete heart block) (Multi)     per device check    Chronic systolic CHF (congestive heart failure), NYHA class 3 (Multi)     COPD (chronic obstructive pulmonary disease) (Multi)     Coronary artery disease     History of tobacco abuse     HLD (hyperlipidemia)     Hypertension     Infrarenal abdominal aortic aneurysm (AAA) without rupture (CMS-HCC)     Ischemic cardiomyopathy with implantable cardioverter-defibrillator (ICD)     MI (myocardial infarction) (Multi)     multiple    Nephrolithiasis     PTSD (post-traumatic stress disorder)        Surgical History  Past Surgical History:   Procedure Laterality Date    CARDIAC CATHETERIZATION N/A 5/23/2024    Procedure: Left Heart Cath;  Surgeon: Babatunde Tan MD;  Location: [facility];  Service: Cardiovascular;  Laterality: N/A;    CARDIAC DEFIBRILLATOR PLACEMENT      CARDIAC ELECTROPHYSIOLOGY PROCEDURE N/A 5/23/2024    Procedure: Cardioversion;  Surgeon: Zachary Sparks MD;  Location: [facility];  Service: Electrophysiology;  Laterality: N/A;    CORONARY ANGIOPLASTY WITH STENT PLACEMENT  2006    JIM to LAD     CORONARY ANGIOPLASTY WITH STENT PLACEMENT  04/2003    CORONARY ANGIOPLASTY WITH STENT PLACEMENT  06/2008    CYSTOSCOPY W/ URETERAL STENT PLACEMENT  04/01/2024    CYSTOSCOPY W/ URETERAL STENT PLACEMENT  04/30/2024    FEMORAL BYPASS      femoral artery    ILIAC ARTERY STENT Right     KNEE SURGERY          Social History  He reports that he has quit smoking. His smoking use included cigarettes. He does not have any smokeless tobacco history on file. He reports current alcohol use. Drug use questions deferred to the physician.    Family History  Family History   Problem Relation Name Age of Onset    Hypertension Mother      Diabetes Father      Hypertension Sister      Diabetes Sister      Colon cancer Maternal Grandmother      Hypertension Maternal Grandmother      Heart disease Maternal Grandfather      Hypertension Maternal Grandfather      Cancer Paternal Grandfather      Hypertension Paternal Grandfather          Allergies  Chantix [varenicline]    Review of Systems   Constitutional:  Negative for activity change and appetite change.   Eyes:  Negative for discharge and itching.   Respiratory:  Negative for apnea, cough, choking, chest tightness and shortness of breath.    Cardiovascular:  Positive for palpitations and leg swelling. Negative for chest pain.   Endocrine: Positive for heat intolerance. Negative for cold intolerance.   Genitourinary:  Negative for difficulty urinating and dysuria.        Physical Exam  Constitutional:       General: He is not in acute distress.     Appearance: Normal appearance. He is not ill-appearing.   HENT:      Head: Normocephalic and atraumatic.   Cardiovascular:      Rate and Rhythm: Normal rate. Rhythm irregular.      Heart sounds: No murmur heard.     No gallop.   Pulmonary:      Effort: No respiratory distress.      Breath sounds: No stridor. No wheezing.   Musculoskeletal:         General: Swelling present. No tenderness.   Skin:     Coloration: Skin is not jaundiced or  "pale.   Neurological:      Mental Status: He is alert.          Last Recorded Vitals  Blood pressure 81/63, pulse 92, temperature 36.4 °C (97.5 °F), temperature source Temporal, resp. rate 25, height 1.702 m (5' 7\"), weight 79.9 kg (176 lb 2.4 oz), SpO2 97%.    Current Facility-Administered Medications on File Prior to Encounter   Medication Dose Route Frequency Provider Last Rate Last Admin    [COMPLETED] perflutren lipid microspheres (Definity) injection 0.5-10 mL of dilution  0.5-10 mL of dilution intravenous Once in imaging Kd Rodrigues, DO   2 mL of dilution at 05/28/24 0923    [COMPLETED] potassium chloride 20 mEq in 100 mL IV premix  20 mEq intravenous q2h CLAUDIA Chaudhry   Stopped at 05/28/24 1224    [DISCONTINUED] acetaminophen (Tylenol) tablet 650 mg  650 mg oral q4h PRN CLAUDIA Francois        [DISCONTINUED] albuterol 90 mcg/actuation inhaler 2 puff  2 puff inhalation q4h PRN CLAUDIA Menjivar        [DISCONTINUED] amiodarone (Pacerone) tablet 400 mg  400 mg oral TID Zachary Sparks MD   400 mg at 05/28/24 0826    [DISCONTINUED] aspirin chewable tablet 81 mg  81 mg oral Daily CLAUDIA Chaudhry   81 mg at 05/28/24 0826    [DISCONTINUED] cholecalciferol (Vitamin D-3) tablet 5,000 Units  5,000 Units oral q3 days CLAUDIA Menjivar   5,000 Units at 05/26/24 2047    [DISCONTINUED] co-enzyme Q-10 capsule 50 mg  50 mg oral Every Sunday CLAUDIA Menjivar        [DISCONTINUED] cyanocobalamin (Vitamin B-12) tablet 1,000 mcg  1,000 mcg oral Daily CLAUDIA Menjivar   1,000 mcg at 05/28/24 0826    [DISCONTINUED] docusate sodium (Colace) capsule 100 mg  100 mg oral BID CLAUDIA Menjivar   100 mg at 05/28/24 0826    [DISCONTINUED] enoxaparin (Lovenox) syringe 40 mg  40 mg subcutaneous q24h CLAUDIA Menjivar   40 mg at 05/28/24 0828    [DISCONTINUED] etomidate (Amidate) injection   intravenous PRN Karlos Wheeler, YESENIA   4 mg " at 05/28/24 0959    [DISCONTINUED] levalbuterol (Xopenex) 0.63 mg/3 mL nebulizer solution 0.63 mg  0.63 mg nebulization q4h PRN NICCI ChaudhryCNP   0.63 mg at 05/24/24 2333    [DISCONTINUED] lidocaine 4 % patch 2 patch  2 patch transdermal Daily CLAUDIA Menjivar   2 patch at 05/25/24 1054    [DISCONTINUED] LORazepam (Ativan) tablet 0.5 mg  0.5 mg oral q12h PRN Giorgi Heaton MD   0.5 mg at 05/27/24 1946    [DISCONTINUED] magnesium oxide (Mag-Ox) tablet 400 mg  400 mg oral Daily CLAUDIA Menjivar   400 mg at 05/28/24 0826    [DISCONTINUED] methocarbamol (Robaxin) tablet 500 mg  500 mg oral q6h PRN CLAUDIA Menjivar   500 mg at 05/26/24 2047    [DISCONTINUED] nicotine (Nicoderm CQ) 21 mg/24 hr patch 1 patch  1 patch transdermal q24h CLAUDIA Menjivar   1 patch at 05/27/24 1724    [DISCONTINUED] ondansetron (Zofran) injection 4 mg  4 mg intravenous q8h PRN CLAUDIA Francois   4 mg at 05/27/24 0311    [DISCONTINUED] PARoxetine (Paxil) tablet 40 mg  40 mg oral Nightly YARITZA Chaudhry-CNP   40 mg at 05/27/24 2053    [DISCONTINUED] perflutren lipid microspheres (Definity) injection 0.5-10 mL of dilution  0.5-10 mL of dilution intravenous Once in imaging CLAUDIA Esteban        [DISCONTINUED] perflutren protein A microsphere (Optison) injection 0.5 mL  0.5 mL intravenous Once in imaging Kd Rodirgues DO        [DISCONTINUED] polyethylene glycol (Glycolax, Miralax) packet 17 g  17 g oral Daily PRN CLAUDIA Menjivar   17 g at 05/27/24 1002    [DISCONTINUED] potassium chloride (Klor-Con) packet 20 mEq  20 mEq oral Daily CLAUDIA Menjivar   20 mEq at 05/28/24 0826    [DISCONTINUED] prasugrel (Effient) tablet 10 mg  10 mg oral Daily CLAUDIA Menjivar   10 mg at 05/27/24 0935    [DISCONTINUED] ranolazine (Ranexa) 12 hr tablet 500 mg  500 mg oral BID CLAUDIA Menjivar   500 mg at 05/28/24 0862     [DISCONTINUED] rosuvastatin (Crestor) tablet 20 mg  20 mg oral Nightly CLAUDIA Menjivar   20 mg at 05/27/24 2053    [DISCONTINUED] sacubitriL-valsartan (Entresto) 24-26 mg per tablet 1 tablet  1 tablet oral BID Kd Rodrigues    1 tablet at 05/28/24 1022    [DISCONTINUED] sennosides (Senokot) tablet 8.6 mg  1 tablet oral Nightly CLAUDIA Menjivar   8.6 mg at 05/27/24 2053    [DISCONTINUED] simethicone (Mylicon) chewable tablet 80 mg  80 mg oral TID PRN CLAUDIA Chaudhry   80 mg at 05/23/24 2108    [DISCONTINUED] sodium chloride (Ocean) 0.65 % nasal spray 1 spray  1 spray Each Nostril 4x daily PRN YARITZA Chaudhry-CNP        [DISCONTINUED] sodium chloride 0.9% infusion  10 mL/hr intravenous Continuous NICCI FrancoisCNP 50 mL/hr at 05/28/24 1256 50 mL/hr at 05/28/24 1256    [DISCONTINUED] tamsulosin (Flomax) 24 hr capsule 0.4 mg  0.4 mg oral Daily CLAUDIA Menjivar   0.4 mg at 05/28/24 0826    [DISCONTINUED] torsemide (Demadex) tablet 20 mg  20 mg oral Daily CLAUDIA Menjivar   20 mg at 05/28/24 1022    [DISCONTINUED] traZODone (Desyrel) tablet 50 mg  50 mg oral Nightly CLAUDIA Menjivar   50 mg at 05/27/24 2053     Current Outpatient Medications on File Prior to Encounter   Medication Sig Dispense Refill    acetaminophen (Tylenol) 500 mg tablet Take 650 mg by mouth every 6 hours if needed for mild pain (1 - 3).      albuterol sulfate (Proair Digihaler) 90 mcg/actuation aero powdr breath act w/sensor inhaler Inhale 2 puffs every 4 hours if needed for wheezing.      aspirin 81 mg EC tablet Take 1 tablet (81 mg) by mouth once daily.      cholecalciferol (Vitamin D-3) 5,000 Units tablet Take 1 tablet (5,000 Units) by mouth every 3 days.      co-enzyme Q-10 50 mg capsule Take 1 capsule (50 mg) by mouth 1 (one) time per week.      cyanocobalamin (Vitamin B-12) 1,000 mcg tablet Take 1 tablet (1,000 mcg) by mouth once daily.      LORazepam (Ativan) 0.5  mg tablet Take 1 tablet (0.5 mg) by mouth once daily at bedtime.      nicotine (Nicoderm CQ) 21 mg/24 hr patch Place 1 patch on the skin once every 24 hours.      PARoxetine (Paxil) 40 mg tablet Take 1 tablet (40 mg) by mouth once daily in the morning.      potassium chloride (Klor-Con) 20 mEq packet Take 20 mEq by mouth once daily.      prasugrel (Effient) 10 mg tablet Take 1 tablet (10 mg) by mouth once daily.      ranolazine (Ranexa) 500 mg 12 hr tablet Take 1 tablet (500 mg) by mouth 2 times a day. Do not crush, chew, or split.      rosuvastatin (Crestor) 20 mg tablet Take 1 tablet (20 mg) by mouth once daily at bedtime.      sacubitriL-valsartan (Entresto) 24-26 mg tablet Take 1 tablet by mouth 2 times a day.      tamsulosin (Flomax) 0.4 mg 24 hr capsule Take 1 capsule (0.4 mg) by mouth once daily.      torsemide (Demadex) 20 mg tablet Take 1 tablet (20 mg) by mouth 2 times a day.      traZODone (Desyrel) 50 mg tablet Take 1 tablet (50 mg) by mouth once daily at bedtime.      [DISCONTINUED] alirocumab (Praluent Pen) 75 mg/mL pen injector Inject 75 mg under the skin every 14 (fourteen) days.      [DISCONTINUED] bisoprolol (Zebeta) 5 mg tablet Take by mouth 2 times a day.      [DISCONTINUED] DAPAGLIFLOZIN PROPANEDIOL ORAL Take 10 mg by mouth once daily.      [DISCONTINUED] ezetimibe (Zetia) 10 mg tablet Take 1 tablet (10 mg) by mouth once daily.      [DISCONTINUED] magnesium gluconate 30 mg (550 mg) tablet Take 1 tablet (30 mg) by mouth once daily.      [DISCONTINUED] nitroglycerin (Nitrostat) 0.4 mg SL tablet Place 1 tablet (0.4 mg) under the tongue every 5 minutes if needed for chest pain.          Assessment/Plan   Principal Problem:    Acute heart failure, unspecified heart failure type (Multi)    Patient is a 58/ male, with recurrent ICD shocks 2/2 VT episodes, presenting from OSH, was shocked in the CICU bu his ICD, on amio drip and is in slow VT. Patient is planned for a second ablation tomorrow. Diuresing  for volume overload as needed.     Neuro  -Anxiety due to ICD firing, on Ativan PRN and home   -Holding Paroxetine for MDD given arrhythmias     Cardio  #ICM/HFrEF (EF 15%) s/p CRT-D  #Prior VT storm s/p VT ablation  #VT s/p ICD shock  #VT with slow rate now   -Bolused and continued on an amio drip  -Plan for ablation tomorrow, NPO after MN  -Ordered device interrogation  -Was being loaded on Amiodarone 400mg TID po- held as he is on a drip  -Diuresing with one dose bumex now  -C/W ASA, prasugrel   -C/W Ranolazine   -C/W Rosuvastatin   -Holding Entresto   -Holding Torsemide, diuresing with PRN meds    Renal   -No active issues     GI  -PPI for ppx    Endo  -No active issues     Heme  -No active issues     ID  -No active issues     DVT ppx: on hold as he is planned for a procedure tomorrow   Full code confirmed   Decision maker: mother Michelle [phone]     Sujata Nunez MD

## 2024-05-28 NOTE — PROGRESS NOTES
Per review the plan is for  Transfer to [facility] for LVAD and/or VT ablation work up and urology follow-up.   Care transitions team will continue to follow upon transfer to tertiary care center for dc planning needs.     Araceli Reyes RN

## 2024-05-28 NOTE — CONSULTS
Consults  Inpatient Electrophysiology Consult Note  Reason for consult: VT     HPI:   Cristian Sapp 58M PMHx CAD s/p multiple PCI, ICM/HFrEF (EF 15% 5/24) s/p CRT-D, VT storm s/p VT ablation, infrarenal AAA s/p R iliac stent, nephrolithiasis s/p recent ureteral stent. Presented to OSH with multiple ICD shocks for VT. Now in slow VT for which EP is consulted.     Pt presented with VT, started on IV then PO amio, required DCCV and then got cor angio showing lcx/rca  which is known from prior, TTE showing severely reduced EF (known), mod-severe MR. He is continued on medical management for the CAD and remained stable however went back into VT again, this time at a slower rate - he remained HDS but was transferred to [facility] for further eval/workup.     All system reviewed and negative unless mentioned above.     Cardiac studies:   EKG 5/28/24 - VT (slow), RBBB morphology.     Device: [employer] CRT-D  Implanting MD: Dr. Gaston ([facility])  Last Device Interrogation: 5/24    TTE 5/27/24:   1. Left ventricular systolic function is severely decreased with a 15% estimated ejection fraction.   2. There is no evidence of mitral valve stenosis.   3. Moderate to severe mitral valve regurgitation.   4. Mild tricuspid regurgitation is visualized.   5. Aortic valve stenosis is not present.   6. Left ventricular cavity size is moderately dilated.   7. The pulmonary artery is not well visualized.   8. Current study appears to be remarkably similar to the May 23, 2024 study done here.   9. There is global hypokinesis of the left ventricle with minor regional variations.    Cor Angio 5/23/24:   1. Distal Left Main: 10-30% stenosis.   2. Proximal and mid LAD Lesion: The percent stenosis is 10-30%.   3. Proximal CX Lesion: The percent stenosis is 100%.   4. Right Coronary Artery: fills via collaterals.   5. Proximal RCA Lesion: The percent stenosis is 100%.   6. The Left Ventricular Ejection Fraction is 10%,by  echo.      Current Facility-Administered Medications:     amiodarone (Nexterone) 150 mg in dextrose,iso-osm 100 mL (1.5 mg/mL) IV (premix), 150 mg, intravenous, Once, Sujata Nunez MD, Restarted at 05/28/24 1630    amiodarone (Nexterone) 360 mg in dextrose,iso-osm 200 mL (1.8 mg/mL) infusion (premix), 0.5-1 mg/min, intravenous, Continuous, Sujata Nunez MD, Last Rate: 33.3 mL/hr at 05/28/24 1721, 1 mg/min at 05/28/24 1721    LORazepam (Ativan) injection 0.5 mg, 0.5 mg, intravenous, q8h PRN, Sujata Nunez MD    Objective:    Vitals:    05/28/24 1630   BP: 117/84   Pulse: 91   Resp: 16   Temp:    SpO2: 96%       Exam:  General - well appearing   Neck -  JVD   Chest - CTAB  Cardiac - S1, S2, no murmur heard   Lower ext - No LE edema     Labs/Imaging:     Results from last 72 hours   Lab Units 05/28/24  1503   SODIUM mmol/L 138   POTASSIUM mmol/L 5.0   CO2 mmol/L 29   BUN mg/dL 15   CREATININE mg/dL 1.25   MAGNESIUM mg/dL 2.33   PHOSPHORUS mg/dL 3.6      Results from last 72 hours   Lab Units 05/28/24  1503   WBC AUTO x10*3/uL 10.6   HEMOGLOBIN g/dL 13.8   PLATELETS AUTO x10*3/uL 234        Assessment and Plan:   58M PMHx CAD s/p multiple PCI, ICM/HFrEF (EF 15% 5/24) s/p CRT-D, VT storm s/p VT ablation, infrarenal AAA s/p R iliac stent, nephrolithiasis s/p recent ureteral stent. Presented to OSH with multiple ICD shocks for VT. Now in slow VT for which EP is consulted.     #ICM/HFrEF (EF 15%) s/p CRT-D  #Prior VT storm s/p VT ablation  #Incessant VT s/p ICD shock  Patient admitted with VT (RBBB with transition at V3, RS in lead I and superior axis highly suggestive of Basal Inferior/Inferior septal Exit) Remains in slow VT but hemodynamically stable, likely scar mediated iso known ischemic heart disease and low EF. Device programming reviewed and pt's VT zones were set to 115bpm since VT in 90-100s. ATP sequences briefly terminated the VT however VT re-initiates, highly suggestive of slow stable re-entrant VT  related to scar (from ICM). Ablation of the exit is very feasible and pt is relatively stable for procedure however there are a few concerns. 1. Pt has non-revascularized CAD and since no intervention is possible, if pt has faster arrhythmias during ablation higher chance of hemodynamic instability.  2. Even with good ablation outcomes pt has CM with no reversible etiology (non revascularize CM, on GDMT of HF) thus in long term higher risk of recurrence of VT (different scar etc..) and HF decompensation. Since pt is young it would reasonable to consider if pt is candidate for advanced HF therapies. This was discussed with HF and CICU attending Dr. Brown who agrees for evaluation pt for advanced therapies. Pt is currently hemodynamically stable, with no signs of end-organ damage from his incessant VT. If pt not a candidate for advanced therapies vs shows sings of hemodynamic decline vs change/increase in VT rate and therapy burden then plan for VT RF ablation    - Increase VT zone to 142bpm, with aggressive ATP, no ICD shocks. VF zone with ICD therapy - to avoid multiple shocks. External pads in place.   - Start Lidocaine and c/w IV amiodarone to keep pt stable   - Re-program pacing to  to override VT  - No plan for VT tomorrow, but please keep pt NPO in case VT worsens or pt need more ICD shocks overnight.   - Please obtain CXR PA/lateral   - Please keep NPO at midnight and hold all AC and DVT ppx tonight  - Please ensure pt has active type and screen  - Please draw CBC, BMP, PT/INR with AM labs   - Please attempt to find records of prior ablation done by Dr. Gaston in [place] and place these in media tab     Discussed with Dr. Flores. EP will follow.     Melissa Gutierrez  Cardiology Fellow   PGY4    I saw and evaluated the patient. I personally obtained the key and critical portions of the history and physical exam or was physically present for key and critical portions performed by the resident/fellow. I  reviewed the resident/fellow's documentation and discussed the patient with the resident/fellow. I agree with the resident/fellow's medical decision making as documented in the note, and my edits (bold and italic) have been made to the document as needed.     I attest that this critical care service meets the definition of critical care as supported by my documentation. The reason for critical care for this patient is electrical storm/incessant VT, and we are treating with medications, diagnostic testing and planning for procedures. Please see above delineated assessment and plan for details. My full attention was spent providing critical care to this patient for 57 minutes. The critical care service I provided was above and beyond any resident/fellow teaching and any separately billable procedures are not included into the time calculation.    Sonny Flores MD [facility]  Cardiac Electrophysiology    **Disclaimer: This note was dictated by speech recognition, and every effort has been made to prevent any error in transcription, however minor errors may be present**

## 2024-05-29 ENCOUNTER — APPOINTMENT (OUTPATIENT)
Dept: CARDIOLOGY | Facility: HOSPITAL | Age: 59
DRG: 270 | End: 2024-05-29
Payer: MEDICARE

## 2024-05-29 LAB
ABO GROUP (TYPE) IN BLOOD: NORMAL
ALBUMIN SERPL BCP-MCNC: 3.5 G/DL (ref 3.4–5)
ALP SERPL-CCNC: 67 U/L (ref 33–120)
ALT SERPL W P-5'-P-CCNC: 13 U/L (ref 10–52)
ANION GAP SERPL CALC-SCNC: 13 MMOL/L (ref 10–20)
ANTIBODY SCREEN: NORMAL
APTT PPP: 29 SECONDS (ref 27–38)
AST SERPL W P-5'-P-CCNC: 9 U/L (ref 9–39)
BASOPHILS # BLD AUTO: 0.08 X10*3/UL (ref 0–0.1)
BASOPHILS NFR BLD AUTO: 0.8 %
BILIRUB SERPL-MCNC: 0.6 MG/DL (ref 0–1.2)
BUN SERPL-MCNC: 14 MG/DL (ref 6–23)
CALCIUM SERPL-MCNC: 8.8 MG/DL (ref 8.6–10.6)
CHLORIDE SERPL-SCNC: 99 MMOL/L (ref 98–107)
CO2 SERPL-SCNC: 30 MMOL/L (ref 21–32)
CREAT SERPL-MCNC: 1.13 MG/DL (ref 0.5–1.3)
EGFRCR SERPLBLD CKD-EPI 2021: 76 ML/MIN/1.73M*2
EOSINOPHIL # BLD AUTO: 0.44 X10*3/UL (ref 0–0.7)
EOSINOPHIL NFR BLD AUTO: 4.4 %
ERYTHROCYTE [DISTWIDTH] IN BLOOD BY AUTOMATED COUNT: 13.3 % (ref 11.5–14.5)
GLUCOSE SERPL-MCNC: 116 MG/DL (ref 74–99)
HCT VFR BLD AUTO: 37.5 % (ref 41–52)
HGB BLD-MCNC: 13.4 G/DL (ref 13.5–17.5)
IMM GRANULOCYTES # BLD AUTO: 0.07 X10*3/UL (ref 0–0.7)
IMM GRANULOCYTES NFR BLD AUTO: 0.7 % (ref 0–0.9)
INR PPP: 1.1 (ref 0.9–1.1)
LIDOCAIN SERPL-MCNC: 3.8 UG/ML (ref 1–5)
LIDOCAIN SERPL-MCNC: 8.4 UG/ML (ref 1–5)
LYMPHOCYTES # BLD AUTO: 2.08 X10*3/UL (ref 1.2–4.8)
LYMPHOCYTES NFR BLD AUTO: 20.9 %
MAGNESIUM SERPL-MCNC: 2.49 MG/DL (ref 1.6–2.4)
MCH RBC QN AUTO: 32.5 PG (ref 26–34)
MCHC RBC AUTO-ENTMCNC: 35.7 G/DL (ref 32–36)
MCV RBC AUTO: 91 FL (ref 80–100)
MONOCYTES # BLD AUTO: 1.34 X10*3/UL (ref 0.1–1)
MONOCYTES NFR BLD AUTO: 13.5 %
NEUTROPHILS # BLD AUTO: 5.95 X10*3/UL (ref 1.2–7.7)
NEUTROPHILS NFR BLD AUTO: 59.7 %
NRBC BLD-RTO: 0 /100 WBCS (ref 0–0)
PLATELET # BLD AUTO: 214 X10*3/UL (ref 150–450)
POTASSIUM SERPL-SCNC: 4.3 MMOL/L (ref 3.5–5.3)
PROT SERPL-MCNC: 5.9 G/DL (ref 6.4–8.2)
PROTHROMBIN TIME: 12.8 SECONDS (ref 9.8–12.8)
RBC # BLD AUTO: 4.12 X10*6/UL (ref 4.5–5.9)
RH FACTOR (ANTIGEN D): NORMAL
SODIUM SERPL-SCNC: 138 MMOL/L (ref 136–145)
WBC # BLD AUTO: 10 X10*3/UL (ref 4.4–11.3)

## 2024-05-29 PROCEDURE — 2500000001 HC RX 250 WO HCPCS SELF ADMINISTERED DRUGS (ALT 637 FOR MEDICARE OP)

## 2024-05-29 PROCEDURE — 36415 COLL VENOUS BLD VENIPUNCTURE: CPT

## 2024-05-29 PROCEDURE — 2500000004 HC RX 250 GENERAL PHARMACY W/ HCPCS (ALT 636 FOR OP/ED): Performed by: STUDENT IN AN ORGANIZED HEALTH CARE EDUCATION/TRAINING PROGRAM

## 2024-05-29 PROCEDURE — 86901 BLOOD TYPING SEROLOGIC RH(D): CPT

## 2024-05-29 PROCEDURE — 80176 ASSAY OF LIDOCAINE: CPT

## 2024-05-29 PROCEDURE — 93005 ELECTROCARDIOGRAM TRACING: CPT

## 2024-05-29 PROCEDURE — 2500000001 HC RX 250 WO HCPCS SELF ADMINISTERED DRUGS (ALT 637 FOR MEDICARE OP): Performed by: STUDENT IN AN ORGANIZED HEALTH CARE EDUCATION/TRAINING PROGRAM

## 2024-05-29 PROCEDURE — 99291 CRITICAL CARE FIRST HOUR: CPT | Performed by: STUDENT IN AN ORGANIZED HEALTH CARE EDUCATION/TRAINING PROGRAM

## 2024-05-29 PROCEDURE — 85025 COMPLETE CBC W/AUTO DIFF WBC: CPT

## 2024-05-29 PROCEDURE — 1100000001 HC PRIVATE ROOM DAILY

## 2024-05-29 PROCEDURE — 2500000004 HC RX 250 GENERAL PHARMACY W/ HCPCS (ALT 636 FOR OP/ED)

## 2024-05-29 PROCEDURE — 80176 ASSAY OF LIDOCAINE: CPT | Mod: 91

## 2024-05-29 PROCEDURE — 99291 CRITICAL CARE FIRST HOUR: CPT

## 2024-05-29 PROCEDURE — C9113 INJ PANTOPRAZOLE SODIUM, VIA: HCPCS

## 2024-05-29 PROCEDURE — 2500000005 HC RX 250 GENERAL PHARMACY W/O HCPCS: Performed by: STUDENT IN AN ORGANIZED HEALTH CARE EDUCATION/TRAINING PROGRAM

## 2024-05-29 PROCEDURE — 85610 PROTHROMBIN TIME: CPT

## 2024-05-29 PROCEDURE — 2500000005 HC RX 250 GENERAL PHARMACY W/O HCPCS

## 2024-05-29 PROCEDURE — 84075 ASSAY ALKALINE PHOSPHATASE: CPT

## 2024-05-29 PROCEDURE — 93010 ELECTROCARDIOGRAM REPORT: CPT | Performed by: INTERNAL MEDICINE

## 2024-05-29 PROCEDURE — 83735 ASSAY OF MAGNESIUM: CPT

## 2024-05-29 PROCEDURE — 2500000002 HC RX 250 W HCPCS SELF ADMINISTERED DRUGS (ALT 637 FOR MEDICARE OP, ALT 636 FOR OP/ED)

## 2024-05-29 RX ORDER — ACETAMINOPHEN 160 MG/5ML
650 SOLUTION ORAL EVERY 4 HOURS PRN
Status: DISCONTINUED | OUTPATIENT
Start: 2024-05-29 | End: 2024-06-06

## 2024-05-29 RX ORDER — LORAZEPAM 2 MG/ML
0.25 INJECTION INTRAMUSCULAR ONCE
Status: COMPLETED | OUTPATIENT
Start: 2024-05-29 | End: 2024-05-29

## 2024-05-29 RX ORDER — ACETAMINOPHEN 650 MG/1
650 SUPPOSITORY RECTAL EVERY 4 HOURS PRN
Status: DISCONTINUED | OUTPATIENT
Start: 2024-05-29 | End: 2024-06-06

## 2024-05-29 RX ORDER — ACETAMINOPHEN 325 MG/1
650 TABLET ORAL EVERY 4 HOURS PRN
Status: DISCONTINUED | OUTPATIENT
Start: 2024-05-29 | End: 2024-06-06

## 2024-05-29 RX ORDER — PHENYLEPHRINE HCL IN 0.9% NACL 0.4MG/10ML
SYRINGE (ML) INTRAVENOUS
Status: DISPENSED
Start: 2024-05-29 | End: 2024-05-29

## 2024-05-29 RX ORDER — MULTIVITAMIN/IRON/FOLIC ACID 18MG-0.4MG
1 TABLET ORAL DAILY
COMMUNITY
End: 2024-07-23 | Stop reason: WASHOUT

## 2024-05-29 RX ORDER — METHOCARBAMOL 500 MG/1
500 TABLET, FILM COATED ORAL EVERY 6 HOURS PRN
Status: DISCONTINUED | OUTPATIENT
Start: 2024-05-29 | End: 2024-06-06

## 2024-05-29 RX ORDER — LIDOCAINE HCL/PF 100 MG/5ML
80 SYRINGE (ML) INTRAVENOUS ONCE
Status: COMPLETED | OUTPATIENT
Start: 2024-05-29 | End: 2024-05-29

## 2024-05-29 RX ORDER — LORAZEPAM 2 MG/ML
0.25 INJECTION INTRAMUSCULAR EVERY 8 HOURS PRN
Status: DISCONTINUED | OUTPATIENT
Start: 2024-05-29 | End: 2024-05-31

## 2024-05-29 RX ORDER — HYDROXYZINE HYDROCHLORIDE 25 MG/1
25 TABLET, FILM COATED ORAL ONCE
Status: COMPLETED | OUTPATIENT
Start: 2024-05-29 | End: 2024-05-29

## 2024-05-29 RX ORDER — BISOPROLOL FUMARATE 5 MG/1
5 TABLET, FILM COATED ORAL 2 TIMES DAILY
Status: ON HOLD | COMMUNITY
End: 2024-05-29 | Stop reason: ALTCHOICE

## 2024-05-29 RX ADMIN — LIDOCAINE HYDROCHLORIDE 80 MG: 20 INJECTION, SOLUTION INTRAVENOUS at 00:02

## 2024-05-29 RX ADMIN — ASPIRIN 81 MG CHEWABLE TABLET 81 MG: 81 TABLET CHEWABLE at 08:04

## 2024-05-29 RX ADMIN — TRAZODONE HYDROCHLORIDE 50 MG: 50 TABLET ORAL at 20:53

## 2024-05-29 RX ADMIN — AMIODARONE HYDROCHLORIDE 1 MG/MIN: 1.8 INJECTION, SOLUTION INTRAVENOUS at 10:50

## 2024-05-29 RX ADMIN — SODIUM CHLORIDE, POTASSIUM CHLORIDE, SODIUM LACTATE AND CALCIUM CHLORIDE 250 ML: 600; 310; 30; 20 INJECTION, SOLUTION INTRAVENOUS at 09:01

## 2024-05-29 RX ADMIN — METHOCARBAMOL 500 MG: 500 TABLET ORAL at 16:16

## 2024-05-29 RX ADMIN — LORAZEPAM 0.25 MG: 2 INJECTION INTRAMUSCULAR; INTRAVENOUS at 09:33

## 2024-05-29 RX ADMIN — ACETAMINOPHEN 650 MG: 325 TABLET ORAL at 20:53

## 2024-05-29 RX ADMIN — AMIODARONE HYDROCHLORIDE 150 MG: 1.5 INJECTION, SOLUTION INTRAVENOUS at 09:15

## 2024-05-29 RX ADMIN — HYDROXYZINE HYDROCHLORIDE 25 MG: 25 TABLET, FILM COATED ORAL at 06:06

## 2024-05-29 RX ADMIN — LORAZEPAM 0.25 MG: 2 INJECTION INTRAMUSCULAR; INTRAVENOUS at 20:53

## 2024-05-29 RX ADMIN — PANTOPRAZOLE SODIUM 40 MG: 40 INJECTION, POWDER, FOR SOLUTION INTRAVENOUS at 08:04

## 2024-05-29 RX ADMIN — LIDOCAINE 2 PATCH: 4 PATCH TOPICAL at 08:04

## 2024-05-29 RX ADMIN — TRIMETHOBENZAMIDE HYDROCHLORIDE 200 MG: 100 INJECTION INTRAMUSCULAR at 09:07

## 2024-05-29 RX ADMIN — ROSUVASTATIN 20 MG: 20 TABLET, FILM COATED ORAL at 20:53

## 2024-05-29 RX ADMIN — AMIODARONE HYDROCHLORIDE 1 MG/MIN: 1.8 INJECTION, SOLUTION INTRAVENOUS at 23:51

## 2024-05-29 RX ADMIN — AMIODARONE HYDROCHLORIDE 1 MG/MIN: 1.8 INJECTION, SOLUTION INTRAVENOUS at 06:02

## 2024-05-29 RX ADMIN — AMIODARONE HYDROCHLORIDE 1 MG/MIN: 1.8 INJECTION, SOLUTION INTRAVENOUS at 17:29

## 2024-05-29 RX ADMIN — METHOCARBAMOL 500 MG: 500 TABLET ORAL at 22:19

## 2024-05-29 RX ADMIN — RANOLAZINE 500 MG: 500 TABLET, EXTENDED RELEASE ORAL at 08:04

## 2024-05-29 RX ADMIN — RANOLAZINE 500 MG: 500 TABLET, EXTENDED RELEASE ORAL at 22:19

## 2024-05-29 ASSESSMENT — PAIN SCALES - GENERAL
PAINLEVEL_OUTOF10: 6
PAINLEVEL_OUTOF10: 0 - NO PAIN

## 2024-05-29 ASSESSMENT — PAIN - FUNCTIONAL ASSESSMENT
PAIN_FUNCTIONAL_ASSESSMENT: 0-10
PAIN_FUNCTIONAL_ASSESSMENT: UNABLE TO SELF-REPORT
PAIN_FUNCTIONAL_ASSESSMENT: 0-10

## 2024-05-29 ASSESSMENT — PAIN DESCRIPTION - LOCATION: LOCATION: CHEST

## 2024-05-29 NOTE — PROGRESS NOTES
"Cristian Sapp is a 58 y.o. male on day 1 of admission presenting with Acute heart failure, unspecified heart failure type (Multi).    Subjective   Patients reports anxiety regarding his device shocking him. He is lying comfortably in bed with family at bedside. Overnight, patient had some runs of VT between 10pm and midnight so was started on lidocaine gtt and bolused with 80 of lidocaine and 150 of amiodarone and given 2g of magnesium. During rounds in the morning, patient was having VT unable to be paced out of it, was given another 150mg amiodarone and small fluid bolus and small dose of ativan for anxiety.       Objective     Physical Exam  General: lying in bed, slightly anxious appearing  HEENT: EOMI, normal conjunctiva  Neuro: alert, oriented, normal speech, conversant   CV: tachycardic rates in 1teens, irregular rhythm   Pulm: CTAB, breathing comfortably on room air  GI: soft, nontender, nondistended   Skin: warm, dry   Ext: no edema     Last Recorded Vitals  Blood pressure 96/82, pulse (!) 117, temperature 36.4 °C (97.5 °F), resp. rate 21, height 1.702 m (5' 7\"), weight 78.8 kg (173 lb 11.6 oz), SpO2 95%.  Intake/Output last 3 Shifts:  I/O last 3 completed shifts:  In: 762.8 (9.7 mL/kg) [P.O.:240; I.V.:522.8 (6.6 mL/kg)]  Out: 1800 (22.8 mL/kg) [Urine:1800 (0.6 mL/kg/hr)]  Weight: 78.8 kg     Relevant Results  Results for orders placed or performed during the hospital encounter of 05/28/24 (from the past 24 hour(s))   CBC and Auto Differential   Result Value Ref Range    WBC 10.6 4.4 - 11.3 x10*3/uL    nRBC 0.0 0.0 - 0.0 /100 WBCs    RBC 4.22 (L) 4.50 - 5.90 x10*6/uL    Hemoglobin 13.8 13.5 - 17.5 g/dL    Hematocrit 38.7 (L) 41.0 - 52.0 %    MCV 92 80 - 100 fL    MCH 32.7 26.0 - 34.0 pg    MCHC 35.7 32.0 - 36.0 g/dL    RDW 13.7 11.5 - 14.5 %    Platelets 234 150 - 450 x10*3/uL    Neutrophils % 67.8 40.0 - 80.0 %    Immature Granulocytes %, Automated 0.7 0.0 - 0.9 %    Lymphocytes % 14.1 13.0 - 44.0 %    " Monocytes % 13.1 2.0 - 10.0 %    Eosinophils % 3.6 0.0 - 6.0 %    Basophils % 0.7 0.0 - 2.0 %    Neutrophils Absolute 7.16 1.20 - 7.70 x10*3/uL    Immature Granulocytes Absolute, Automated 0.07 0.00 - 0.70 x10*3/uL    Lymphocytes Absolute 1.49 1.20 - 4.80 x10*3/uL    Monocytes Absolute 1.38 (H) 0.10 - 1.00 x10*3/uL    Eosinophils Absolute 0.38 0.00 - 0.70 x10*3/uL    Basophils Absolute 0.07 0.00 - 0.10 x10*3/uL   Renal function panel   Result Value Ref Range    Glucose 96 74 - 99 mg/dL    Sodium 138 136 - 145 mmol/L    Potassium 5.0 3.5 - 5.3 mmol/L    Chloride 100 98 - 107 mmol/L    Bicarbonate 29 21 - 32 mmol/L    Anion Gap 14 10 - 20 mmol/L    Urea Nitrogen 15 6 - 23 mg/dL    Creatinine 1.25 0.50 - 1.30 mg/dL    eGFR 68 >60 mL/min/1.73m*2    Calcium 9.2 8.6 - 10.6 mg/dL    Phosphorus 3.6 2.5 - 4.9 mg/dL    Albumin 3.8 3.4 - 5.0 g/dL   Magnesium   Result Value Ref Range    Magnesium 2.33 1.60 - 2.40 mg/dL   Coagulation Screen   Result Value Ref Range    Protime 13.6 (H) 9.8 - 12.8 seconds    INR 1.2 (H) 0.9 - 1.1    aPTT 30 27 - 38 seconds   B-type natriuretic peptide   Result Value Ref Range     (H) 0 - 99 pg/mL   Comprehensive metabolic panel   Result Value Ref Range    Glucose 95 74 - 99 mg/dL    Sodium 139 136 - 145 mmol/L    Potassium 5.1 3.5 - 5.3 mmol/L    Chloride 99 98 - 107 mmol/L    Bicarbonate 25 21 - 32 mmol/L    Anion Gap 20 10 - 20 mmol/L    Urea Nitrogen 15 6 - 23 mg/dL    Creatinine 1.24 0.50 - 1.30 mg/dL    eGFR 68 >60 mL/min/1.73m*2    Calcium 9.1 8.6 - 10.6 mg/dL    Albumin 3.7 3.4 - 5.0 g/dL    Alkaline Phosphatase 72 33 - 120 U/L    Total Protein 6.3 (L) 6.4 - 8.2 g/dL    AST 19 9 - 39 U/L    Bilirubin, Total 1.3 (H) 0.0 - 1.2 mg/dL    ALT 14 10 - 52 U/L   Electrocardiogram, 12-lead PRN ACS symptoms   Result Value Ref Range    Ventricular Rate 80 BPM    Atrial Rate 80 BPM    GA Interval 112 ms    QRS Duration 198 ms    QT Interval 510 ms    QTC Calculation(Bazett) 588 ms    P Axis 213  degrees    R Axis 228 degrees    T Axis 191 degrees    QRS Count 13 beats    Q Onset 161 ms    P Onset 78 ms    P Offset 120 ms    T Offset 416 ms    QTC Fredericia 561 ms   Electrocardiogram, 12-lead PRN ACS symptoms   Result Value Ref Range    Ventricular Rate 91 BPM    Atrial Rate 91 BPM    QRS Duration 178 ms    QT Interval 454 ms    QTC Calculation(Bazett) 558 ms    R Axis -86 degrees    T Axis 74 degrees    QRS Count 15 beats    Q Onset 223 ms    T Offset 450 ms    QTC Fredericia 522 ms   Electrocardiogram, 12-lead PRN ACS symptoms   Result Value Ref Range    Ventricular Rate 121 BPM    Atrial Rate 72 BPM    QRS Duration 162 ms    QT Interval 424 ms    QTC Calculation(Bazett) 602 ms    R Axis 120 degrees    T Axis -50 degrees    QRS Count 20 beats    Q Onset 216 ms    T Offset 428 ms    QTC Fredericia 535 ms   BLOOD GAS VENOUS FULL PANEL   Result Value Ref Range    POCT pH, Venous 7.43 7.33 - 7.43 pH    POCT pCO2, Venous 46 41 - 51 mm Hg    POCT pO2, Venous 47 (H) 35 - 45 mm Hg    POCT SO2, Venous 77 (H) 45 - 75 %    POCT Oxy Hemoglobin, Venous 75.1 (H) 45.0 - 75.0 %    POCT Hematocrit Calculated, Venous 39.0 (L) 41.0 - 52.0 %    POCT Sodium, Venous 132 (L) 136 - 145 mmol/L    POCT Potassium, Venous 3.7 3.5 - 5.3 mmol/L    POCT Chloride, Venous 96 (L) 98 - 107 mmol/L    POCT Ionized Calicum, Venous 1.11 1.10 - 1.33 mmol/L    POCT Glucose, Venous 210 (H) 74 - 99 mg/dL    POCT Lactate, Venous 0.7 0.4 - 2.0 mmol/L    POCT Base Excess, Venous 5.3 (H) -2.0 - 3.0 mmol/L    POCT HCO3 Calculated, Venous 30.5 (H) 22.0 - 26.0 mmol/L    POCT Hemoglobin, Venous 13.0 (L) 13.5 - 17.5 g/dL    POCT Anion Gap, Venous 9.0 (L) 10.0 - 25.0 mmol/L    Patient Temperature 37.0 degrees Celsius    FiO2 8 %   Electrocardiogram, 12-lead PRN ACS symptoms   Result Value Ref Range    Ventricular Rate 120 BPM    Atrial Rate 98 BPM    QRS Duration 168 ms    QT Interval 424 ms    QTC Calculation(Bazett) 599 ms    R Axis -63 degrees    T Axis  137 degrees    QRS Count 20 beats    Q Onset 191 ms    T Offset 403 ms    QTC Fredericia 534 ms   CBC and Auto Differential   Result Value Ref Range    WBC 10.0 4.4 - 11.3 x10*3/uL    nRBC 0.0 0.0 - 0.0 /100 WBCs    RBC 4.12 (L) 4.50 - 5.90 x10*6/uL    Hemoglobin 13.4 (L) 13.5 - 17.5 g/dL    Hematocrit 37.5 (L) 41.0 - 52.0 %    MCV 91 80 - 100 fL    MCH 32.5 26.0 - 34.0 pg    MCHC 35.7 32.0 - 36.0 g/dL    RDW 13.3 11.5 - 14.5 %    Platelets 214 150 - 450 x10*3/uL    Neutrophils % 59.7 40.0 - 80.0 %    Immature Granulocytes %, Automated 0.7 0.0 - 0.9 %    Lymphocytes % 20.9 13.0 - 44.0 %    Monocytes % 13.5 2.0 - 10.0 %    Eosinophils % 4.4 0.0 - 6.0 %    Basophils % 0.8 0.0 - 2.0 %    Neutrophils Absolute 5.95 1.20 - 7.70 x10*3/uL    Immature Granulocytes Absolute, Automated 0.07 0.00 - 0.70 x10*3/uL    Lymphocytes Absolute 2.08 1.20 - 4.80 x10*3/uL    Monocytes Absolute 1.34 (H) 0.10 - 1.00 x10*3/uL    Eosinophils Absolute 0.44 0.00 - 0.70 x10*3/uL    Basophils Absolute 0.08 0.00 - 0.10 x10*3/uL   Comprehensive metabolic panel   Result Value Ref Range    Glucose 116 (H) 74 - 99 mg/dL    Sodium 138 136 - 145 mmol/L    Potassium 4.3 3.5 - 5.3 mmol/L    Chloride 99 98 - 107 mmol/L    Bicarbonate 30 21 - 32 mmol/L    Anion Gap 13 10 - 20 mmol/L    Urea Nitrogen 14 6 - 23 mg/dL    Creatinine 1.13 0.50 - 1.30 mg/dL    eGFR 76 >60 mL/min/1.73m*2    Calcium 8.8 8.6 - 10.6 mg/dL    Albumin 3.5 3.4 - 5.0 g/dL    Alkaline Phosphatase 67 33 - 120 U/L    Total Protein 5.9 (L) 6.4 - 8.2 g/dL    AST 9 9 - 39 U/L    Bilirubin, Total 0.6 0.0 - 1.2 mg/dL    ALT 13 10 - 52 U/L   Magnesium   Result Value Ref Range    Magnesium 2.49 (H) 1.60 - 2.40 mg/dL   Coagulation Screen   Result Value Ref Range    Protime 12.8 9.8 - 12.8 seconds    INR 1.1 0.9 - 1.1    aPTT 29 27 - 38 seconds   Type And Screen   Result Value Ref Range    ABO TYPE A     Rh TYPE POS     ANTIBODY SCREEN NEG    Lidocaine   Result Value Ref Range    Lidocaine  3.8 1.0  - 5.0 ug/mL   Electrocardiogram, 12-lead PRN ACS symptoms   Result Value Ref Range    Ventricular Rate 120 BPM    Atrial Rate 59 BPM    QRS Duration 192 ms    QT Interval 430 ms    QTC Calculation(Bazett) 607 ms    R Axis 131 degrees    T Axis -59 degrees    QRS Count 20 beats    Q Onset 216 ms    T Offset 431 ms    QTC Fredericia 541 ms        Assessment/Plan   Principal Problem:    Acute heart failure, unspecified heart failure type (Multi)  Active Problems:    AICD discharge    HFrEF (heart failure with reduced ejection fraction) (Multi)    Ischemic cardiomyopathy    Ventricular tachycardia (Multi)    Patient is a 58/ male, with recurrent ICD shocks 2/2 VT episodes, presenting from OSH, was shocked in the CICU by his ICD, on amio drip and is in slow VT. going for a second ablation today given his persistent VT with higher rates.      Neuro  -Anxiety due to ICD firing, on Ativan PRN and home   -Holding Paroxetine for MDD given arrhythmias      Cardio  #ICM/HFrEF (EF 15%) s/p CRT-D  #Prior VT storm s/p VT ablation  #VT s/p ICD shock  #VT with slow rate now   -Bolused and continued on an amio drip  -Plan for ablation today   -Ordered device interrogation  -Was being loaded on Amiodarone 400mg TID po- held as he is on a drip  -S/p bumex x1 5/28 for diuresis   -C/W ASA  -Hold prasugrel   -C/W Ranolazine   -C/W Rosuvastatin   -Holding Entresto   -Holding Torsemide, diuresing with PRN meds     Renal   -No active issues      GI  -PPI for ppx     Endo  -No active issues      Heme  -No active issues      ID  -No active issues      DVT ppx: on hold as he is planned for a procedure   Full code confirmed   Decision maker: mother Michelle [phone]               Mae Price MD

## 2024-05-29 NOTE — PROGRESS NOTES
Pharmacy Medication History Review    Cristian Sapp is a 58 y.o. male admitted for Acute heart failure, unspecified heart failure type (Multi). Pharmacy reviewed the patient's [id number] medications and allergies for accuracy.    The list below reflects the updated PTA list. Comments regarding how patient may be taking medications differently can be found in the Admit Orders Activity  Prior to Admission Medications   Prescriptions Informant Patient Reported?   LORazepam (Ativan) 0.5 mg tablet Self Yes   Sig: Take 1 tablet (0.5 mg) by mouth once daily at bedtime.   PARoxetine (Paxil) 40 mg tablet Self Yes   Sig: Take 1 tablet (40 mg) by mouth once daily in the morning.   VITAMIN A ORAL Self Yes   Sig: Take 1 capsule by mouth every other day.   acetaminophen (Tylenol) 500 mg tablet Self Yes   Sig: Take 650 mg by mouth every 6 hours if needed for mild pain (1 - 3).   albuterol sulfate (Proair Digihaler) 90 mcg/actuation aero powdr breath act w/sensor inhaler Self Yes   Sig: Inhale 2 puffs every 4 hours if needed for wheezing.   aspirin 81 mg EC tablet Self Yes   Sig: Take 1 tablet (81 mg) by mouth once daily.   b complex 0.4 mg tablet Self Yes   Sig: Take 1 tablet by mouth once daily.   calcium polycarbophiL (Fiber, calcium polycarbophil,) 625 mg tablet Self Yes   Sig: Take 1 tablet (625 mg) by mouth once daily.   cholecalciferol (Vitamin D-3) 5,000 Units tablet Self Yes   Sig: Take 1 tablet (5,000 Units) by mouth every 3 days.   co-enzyme Q-10 50 mg capsule Self Yes   Sig: Take 1 capsule (50 mg) by mouth 1 (one) time per week.   cyanocobalamin (Vitamin B-12) 1,000 mcg tablet Self Yes   Sig: Take 1 tablet (1,000 mcg) by mouth once daily.   nicotine (Nicoderm CQ) 21 mg/24 hr patch Self Yes   Sig: Place 1 patch on the skin once every 24 hours.   potassium chloride (Klor-Con) 20 mEq packet Self Yes   Sig: Take 20 mEq by mouth once daily.   prasugrel (Effient) 10 mg tablet Self Yes   Sig: Take 1 tablet (10 mg) by  mouth once daily.   ranolazine (Ranexa) 500 mg 12 hr tablet Self Yes   Sig: Take 1 tablet (500 mg) by mouth 2 times a day. Do not crush, chew, or split.   rosuvastatin (Crestor) 20 mg tablet Self Yes   Sig: Take 1 tablet (20 mg) by mouth once daily at bedtime.   sacubitriL-valsartan (Entresto) 24-26 mg tablet Self Yes   Sig: Take 1 tablet by mouth 2 times a day.   tamsulosin (Flomax) 0.4 mg 24 hr capsule Self Yes   Sig: Take 1 capsule (0.4 mg) by mouth once daily.   torsemide (Demadex) 20 mg tablet Self Yes   Sig: Take 1 tablet (20 mg) by mouth 2 times a day.   traZODone (Desyrel) 50 mg tablet Self Yes   Sig: Take 1 tablet (50 mg) by mouth once daily at bedtime.   vitamin E acetate (VITAMIN E ORAL) Self Yes   Sig: Take 1 capsule by mouth once daily.      Facility-Administered Medications: None        The list below reflects the updated allergy list. Please review each documented allergy for additional clarification and justification.  Allergies  Reviewed by Tati Winston, PharmD on 5/29/2024        Severity Reactions Comments    Chantix [varenicline] Not Specified Unknown             Patient accepts M2B at discharge. Pharmacy has been updated to [facility].    Sources used: Pharmacy dispense history, OARRs, patient interview (ok historian- knew medication names and some frequency; relied on [facility] and provided list from phone application), and [facility] discharge summary    Below are additional concerns with the patient's PTA list.  Discontinued from [facility] admission, but patient confirmed he was taking at home prior to admission:    bisoprolol 5 mg tablet; Commonly known as: Zebeta   DAPAGLIFLOZIN PROPANEDIOL ORAL (pt was unable to afford, so stopped taking)   ezetimibe 10 mg tablet; Commonly known as: Zetia   magnesium gluconate 30 mg (550 mg) tablet   nitroglycerin 0.4 mg SL tablet; Commonly known as: Nitrostat   Praluent Pen 75 mg/mL pen injector; Generic drug: alirocumab (pt was unable to afford,  so stopped taking)    Tati Winston, PharmD, [facility]  Transitions of Care Pharmacist   M[facility] Ambulatory and Retail Services  Please reach out via Secure Chat for questions, or if no response call [phone] or [facility]

## 2024-05-29 NOTE — PROGRESS NOTES
Pharmacy Admission Order Reconciliation Review    Cristian Sapp is a 58 y.o. male admitted for Acute heart failure, unspecified heart failure type (Multi). Pharmacy reviewed the patient's unreconciled admission medications.    Prior to admission medications that were reviewed and acted on by the pharmacist include:  Acetaminophen  Aspirin  Effient  Ranolazine  Crestor  Entresto  Torsemide  Trazodone  Nicotine patch  Vitamin B12  Co-enzyme Q10  Vitamin D3  Fiber tablet  B Complex supplement  Vitamin A  Vitamin E  Paxil    These medications have been reconciled.     Any other unreconcilied medications have been addressed and will be ordered or held by the patient's medical team. Medications addressed by the pharmacist may be added or changed by the patient's medical team at any time.    Tati Winston, PharmD  Transitions of Care Pharmacist  [facility] Ambulatory and Retail Services  Please reach out via Secure Chat for questions

## 2024-05-29 NOTE — PROGRESS NOTES
Electrophysiology Progress Note  Updates:  Remains in slow VT HR 120s. Asymptomatic and vitals stable.   Attempted ATP multiple times including with RV pacing only, briefly able to get patient out of VT but after a few beats goes back into it.     Patient was feeling nauseated this AM as we attempted to pace terminate, otherwise looked comfortably sleeping when we first went to see him. MAP >70.     All system reviewed and negative unless mentioned above.       Current Facility-Administered Medications:     amiodarone (Nexterone) 150 mg in dextrose,iso-osm 100 mL (1.5 mg/mL) IV (premix), 150 mg, intravenous, Once, Sujata Nunez MD, Stopped at 05/28/24 2224    amiodarone (Nexterone) 360 mg in dextrose,iso-osm 200 mL (1.8 mg/mL) infusion (premix), 0.5-1 mg/min, intravenous, Continuous, Sujata Nunez MD, Last Rate: 33.3 mL/hr at 05/29/24 1050, 1 mg/min at 05/29/24 1050    aspirin chewable tablet 81 mg, 81 mg, oral, Daily, Sujata Nunez MD, 81 mg at 05/29/24 0804    lidocaine 4 % patch 2 patch, 2 patch, transdermal, Daily, Sujata Nunez MD, 2 patch at 05/29/24 0804    lidocaine PF 2000 mg in dextrose 5% 250 mL (8 mg/mL) infusion (premix), 1 mg/min, intravenous, Continuous, Turner Shi MD, Last Rate: 7.5 mL/hr at 05/29/24 0800, 1 mg/min at 05/29/24 0800    LORazepam (Ativan) injection 0.5 mg, 0.5 mg, intravenous, q8h PRN, Sujata Nunez MD    pantoprazole (ProtoNix) injection 40 mg, 40 mg, intravenous, Daily, Sujata Nunez MD, 40 mg at 05/29/24 0804    phenylephrine HCl in 0.9% NaCl syringe  - Omnicell Override Pull, , , ,     [Held by provider] prasugrel (Effient) tablet 10 mg, 10 mg, oral, Daily, Sujata Nunez MD    ranolazine (Ranexa) 12 hr tablet 500 mg, 500 mg, oral, BID, Sujata Nunez MD, 500 mg at 05/29/24 0804    rosuvastatin (Crestor) tablet 20 mg, 20 mg, oral, Nightly, Sujata Nunez MD, 20 mg at 05/28/24 2046    traZODone (Desyrel) tablet 50 mg, 50 mg, oral, Nightly, Sujata Nunez MD, 50  mg at 05/28/24 2046    trimethobenzamide (Tigan) injection 200 mg, 200 mg, intramuscular, q6h PRN, Leah Suero MD, 200 mg at 05/29/24 0907    Objective:    Vitals:    05/29/24 0800   BP:    Pulse:    Resp:    Temp: 36 °C (96.8 °F)   SpO2:        Exam:  General - well appearing   Neck -  JVD   Chest - CTAB  Cardiac - S1, S2, no murmur heard   Lower ext - No LE edema     Labs/Imaging:     Results from last 72 hours   Lab Units 05/29/24  0518 05/28/24  1503   SODIUM mmol/L 138 139  138   POTASSIUM mmol/L 4.3 5.1  5.0   CO2 mmol/L 30 25  29   BUN mg/dL 14 15  15   CREATININE mg/dL 1.13 1.24  1.25   MAGNESIUM mg/dL 2.49* 2.33   PHOSPHORUS mg/dL  --  3.6      Results from last 72 hours   Lab Units 05/29/24  0518   WBC AUTO x10*3/uL 10.0   HEMOGLOBIN g/dL 13.4*   PLATELETS AUTO x10*3/uL 214        Assessment and Plan:   58M PMHx CAD s/p multiple PCI, ICM/HFrEF (EF 15% 5/24) s/p CRT-D, VT storm s/p VT ablation, infrarenal AAA s/p R iliac stent, nephrolithiasis s/p recent ureteral stent. Presented to OSH with multiple ICD shocks for VT. Now in incessant, slow VT for which EP is consulted.     #ICM/HFrEF (EF 15%) s/p CRT-D  #Prior VT storm s/p VT ablation  #Incessant VT s/p ICD shock  Patient has remained in slow, hemodynamically stable VT, likely scar mediated iso known ICM and unrevascularized CAD. Given inability to pace terminate this, we will plan for an ablation at this time whilst patient is also undergoing advanced therapies workup as long term with un-revascularized CAD and such low EF he likely has high scar burden and high risk of recurrence. Pt overnight had increase in VT despite being on Amiodarone and addition of IV lidocaine. Similar to before bedside ATP attempts were made but pt has transient termination with resumption of VT. RV pacing alone also did not allow for termination of VT (r/o LV pacing induced VT)  - Continue amiodarone and lidocaine IV for stability  - Plan for VT ablation today with   Mark given persistence of VT at higher rates tomorrow so please keep NPO midnight if it is not done tonight   - Please check lidocaine level tonight, if level is low can increase lido gtt to 1.5mg/min  - Continue to hold AC/DVT ppx   - If patient becomes HDUS then do urgent DCCV, otherwise if he it tolerating the slow VT, continue plan as above     Recommendations are preliminary until note is co-signed by an attending.     Melissa Gutierrez  Cardiology Fellow   PGY4    I saw and evaluated the patient. I personally obtained the key and critical portions of the history and physical exam or was physically present for key and critical portions performed by the resident/fellow. I reviewed the resident/fellow's documentation and discussed the patient with the resident/fellow. I agree with the resident/fellow's medical decision making as documented in the note, and my edits (bold and italic) have been made to the document as needed.     I attest that this critical care service meets the definition of critical care as supported by my documentation. The reason for critical care for this patient is Incessant ventricular tachycardia, and we are treating with medications, diagnostic testing and planning for procedures. Please see above delineated assessment and plan for details. My full attention was spent providing critical care to this patient for 42 minutes. The critical care service I provided was above and beyond any resident/fellow teaching and any separately billable procedures are not included into the time calculation.    Sonny Flores MD [facility]  Cardiac Electrophysiology    **Disclaimer: This note was dictated by speech recognition, and every effort has been made to prevent any error in transcription, however minor errors may be present**

## 2024-05-29 NOTE — DISCHARGE INSTRUCTIONS
Mr Sapp,  Thank you for choosing [facility] for your care. You were admitted for runs of abnormal heart beats called ventricular tachycardia that were managed with shocks from your intracardiac defibrillator. During your stay your heart was treated with multiple antiarrhythmics that changed your rhythm back to normal. You also had nerve blocks in the ICU to try and control your heart rhythm. You then had a worsening of your heart failure and were transferred to the heart failure ICU. You were not eligible for a heart transplant, and it was decided with you that you did not want a left ventricular assist device. You then had to be intubated due to worsening vital signs and low heart function. You then were started on oral antiarrythmic drugs to control your heart rhythm and you were transferred out of the ICU and monitored for more episodes of ventricular tachycardia which did not occur. You were stable out of the ICU and are now ready to be discharged. You had a high white cell count and your urine was checked which showed possible infection so we are treating you with oral antibiotics.       Start taking the following medications:  For heart failure:  - entresto 12-13 mg twice daily    - spironolactone 25 mg daily   - dapagliflozin 10 mg daily   - torsemide 20 mg twice daily     For Ventricular tachycardia (VT)  - ranexa 500 mg twice daily   - Eliquis 5 mg BID twice daily for one month  - amiodarone 400 mg daily   - Bisoprolol 2.5 mg daily     For possible urine infection:  - augmentin twice daily for 14 days     Continue your other medications as prescribed     Followup appointments:  Thursday Aug 8, 2024 2:00 PM with cardiology    Contact your Zachary Sparks for followup for your heart failure and irregular rhythm, his number is [phone]       If your ICD goes off, you feel palpitations, chest pain, shortness of breath, lower leg swelling, fever, chills, pain with urination, foul smelling urine, you pass out,  please contact your provider immediately.

## 2024-05-30 ENCOUNTER — APPOINTMENT (OUTPATIENT)
Dept: CARDIOLOGY | Facility: HOSPITAL | Age: 59
DRG: 270 | End: 2024-05-30
Payer: MEDICARE

## 2024-05-30 ENCOUNTER — ANESTHESIA EVENT (OUTPATIENT)
Dept: CARDIOLOGY | Facility: HOSPITAL | Age: 59
End: 2024-05-30
Payer: MEDICARE

## 2024-05-30 ENCOUNTER — ANESTHESIA (OUTPATIENT)
Dept: CARDIOLOGY | Facility: HOSPITAL | Age: 59
End: 2024-05-30
Payer: MEDICARE

## 2024-05-30 LAB
ACT BLD: 167 SEC (ref 82–174)
ACT BLD: 285 SEC (ref 82–174)
ACT BLD: 299 SEC (ref 82–174)
ACT BLD: 329 SEC (ref 82–174)
ACT BLD: 361 SEC (ref 82–174)
ACT BLD: 364 SEC (ref 82–174)
ACT BLD: 382 SEC (ref 82–174)
ALBUMIN SERPL BCP-MCNC: 3.4 G/DL (ref 3.4–5)
ANION GAP SERPL CALC-SCNC: 16 MMOL/L (ref 10–20)
BASOPHILS # BLD AUTO: 0.06 X10*3/UL (ref 0–0.1)
BASOPHILS NFR BLD AUTO: 0.7 %
BUN SERPL-MCNC: 13 MG/DL (ref 6–23)
CALCIUM SERPL-MCNC: 8.4 MG/DL (ref 8.6–10.6)
CHLORIDE SERPL-SCNC: 97 MMOL/L (ref 98–107)
CO2 SERPL-SCNC: 29 MMOL/L (ref 21–32)
CREAT SERPL-MCNC: 1.12 MG/DL (ref 0.5–1.3)
EGFRCR SERPLBLD CKD-EPI 2021: 77 ML/MIN/1.73M*2
EOSINOPHIL # BLD AUTO: 0.37 X10*3/UL (ref 0–0.7)
EOSINOPHIL NFR BLD AUTO: 4.4 %
ERYTHROCYTE [DISTWIDTH] IN BLOOD BY AUTOMATED COUNT: 13.1 % (ref 11.5–14.5)
GLUCOSE SERPL-MCNC: 209 MG/DL (ref 74–99)
HCT VFR BLD AUTO: 35.7 % (ref 41–52)
HGB BLD-MCNC: 13 G/DL (ref 13.5–17.5)
IMM GRANULOCYTES # BLD AUTO: 0.07 X10*3/UL (ref 0–0.7)
IMM GRANULOCYTES NFR BLD AUTO: 0.8 % (ref 0–0.9)
LIDOCAIN SERPL-MCNC: 1.8 UG/ML (ref 1–5)
LYMPHOCYTES # BLD AUTO: 1.4 X10*3/UL (ref 1.2–4.8)
LYMPHOCYTES NFR BLD AUTO: 16.5 %
MAGNESIUM SERPL-MCNC: 2.2 MG/DL (ref 1.6–2.4)
MCH RBC QN AUTO: 32.4 PG (ref 26–34)
MCHC RBC AUTO-ENTMCNC: 36.4 G/DL (ref 32–36)
MCV RBC AUTO: 89 FL (ref 80–100)
MONOCYTES # BLD AUTO: 1.03 X10*3/UL (ref 0.1–1)
MONOCYTES NFR BLD AUTO: 12.1 %
NEUTROPHILS # BLD AUTO: 5.56 X10*3/UL (ref 1.2–7.7)
NEUTROPHILS NFR BLD AUTO: 65.5 %
NRBC BLD-RTO: 0 /100 WBCS (ref 0–0)
PHOSPHATE SERPL-MCNC: 4.5 MG/DL (ref 2.5–4.9)
PLATELET # BLD AUTO: 211 X10*3/UL (ref 150–450)
POTASSIUM SERPL-SCNC: 4.1 MMOL/L (ref 3.5–5.3)
RBC # BLD AUTO: 4.01 X10*6/UL (ref 4.5–5.9)
SODIUM SERPL-SCNC: 138 MMOL/L (ref 136–145)
WBC # BLD AUTO: 8.5 X10*3/UL (ref 4.4–11.3)

## 2024-05-30 PROCEDURE — 2500000004 HC RX 250 GENERAL PHARMACY W/ HCPCS (ALT 636 FOR OP/ED)

## 2024-05-30 PROCEDURE — 2500000001 HC RX 250 WO HCPCS SELF ADMINISTERED DRUGS (ALT 637 FOR MEDICARE OP)

## 2024-05-30 PROCEDURE — 99291 CRITICAL CARE FIRST HOUR: CPT

## 2024-05-30 PROCEDURE — 93662 INTRACARDIAC ECG (ICE): CPT | Performed by: STUDENT IN AN ORGANIZED HEALTH CARE EDUCATION/TRAINING PROGRAM

## 2024-05-30 PROCEDURE — 85347 COAGULATION TIME ACTIVATED: CPT | Mod: 91

## 2024-05-30 PROCEDURE — 3700000001 HC GENERAL ANESTHESIA TIME - INITIAL BASE CHARGE: Performed by: STUDENT IN AN ORGANIZED HEALTH CARE EDUCATION/TRAINING PROGRAM

## 2024-05-30 PROCEDURE — 93287 PERI-PX DEVICE EVAL & PRGR: CPT

## 2024-05-30 PROCEDURE — 93654 COMPRE EP EVAL TX VT: CPT | Performed by: STUDENT IN AN ORGANIZED HEALTH CARE EDUCATION/TRAINING PROGRAM

## 2024-05-30 PROCEDURE — 82565 ASSAY OF CREATININE: CPT | Mod: 91

## 2024-05-30 PROCEDURE — 4A0234Z MEASUREMENT OF CARDIAC ELECTRICAL ACTIVITY, PERCUTANEOUS APPROACH: ICD-10-PCS | Performed by: STUDENT IN AN ORGANIZED HEALTH CARE EDUCATION/TRAINING PROGRAM

## 2024-05-30 PROCEDURE — 93462 L HRT CATH TRNSPTL PUNCTURE: CPT | Performed by: STUDENT IN AN ORGANIZED HEALTH CARE EDUCATION/TRAINING PROGRAM

## 2024-05-30 PROCEDURE — 02K83ZZ MAP CONDUCTION MECHANISM, PERCUTANEOUS APPROACH: ICD-10-PCS | Performed by: STUDENT IN AN ORGANIZED HEALTH CARE EDUCATION/TRAINING PROGRAM

## 2024-05-30 PROCEDURE — 2500000004 HC RX 250 GENERAL PHARMACY W/ HCPCS (ALT 636 FOR OP/ED): Performed by: STUDENT IN AN ORGANIZED HEALTH CARE EDUCATION/TRAINING PROGRAM

## 2024-05-30 PROCEDURE — G0269 OCCLUSIVE DEVICE IN VEIN ART: HCPCS | Mod: TC | Performed by: STUDENT IN AN ORGANIZED HEALTH CARE EDUCATION/TRAINING PROGRAM

## 2024-05-30 PROCEDURE — 80176 ASSAY OF LIDOCAINE: CPT | Performed by: STUDENT IN AN ORGANIZED HEALTH CARE EDUCATION/TRAINING PROGRAM

## 2024-05-30 PROCEDURE — 83735 ASSAY OF MAGNESIUM: CPT

## 2024-05-30 PROCEDURE — 2500000005 HC RX 250 GENERAL PHARMACY W/O HCPCS

## 2024-05-30 PROCEDURE — C1730 CATH, EP, 19 OR FEW ELECT: HCPCS | Performed by: STUDENT IN AN ORGANIZED HEALTH CARE EDUCATION/TRAINING PROGRAM

## 2024-05-30 PROCEDURE — 2720000007 HC OR 272 NO HCPCS: Performed by: STUDENT IN AN ORGANIZED HEALTH CARE EDUCATION/TRAINING PROGRAM

## 2024-05-30 PROCEDURE — 2500000004 HC RX 250 GENERAL PHARMACY W/ HCPCS (ALT 636 FOR OP/ED): Performed by: NURSE ANESTHETIST, CERTIFIED REGISTERED

## 2024-05-30 PROCEDURE — 36415 COLL VENOUS BLD VENIPUNCTURE: CPT

## 2024-05-30 PROCEDURE — 2500000005 HC RX 250 GENERAL PHARMACY W/O HCPCS: Performed by: STUDENT IN AN ORGANIZED HEALTH CARE EDUCATION/TRAINING PROGRAM

## 2024-05-30 PROCEDURE — C1732 CATH, EP, DIAG/ABL, 3D/VECT: HCPCS | Performed by: STUDENT IN AN ORGANIZED HEALTH CARE EDUCATION/TRAINING PROGRAM

## 2024-05-30 PROCEDURE — 93287 PERI-PX DEVICE EVAL & PRGR: CPT | Performed by: STUDENT IN AN ORGANIZED HEALTH CARE EDUCATION/TRAINING PROGRAM

## 2024-05-30 PROCEDURE — 02583ZZ DESTRUCTION OF CONDUCTION MECHANISM, PERCUTANEOUS APPROACH: ICD-10-PCS | Performed by: STUDENT IN AN ORGANIZED HEALTH CARE EDUCATION/TRAINING PROGRAM

## 2024-05-30 PROCEDURE — C1760 CLOSURE DEV, VASC: HCPCS | Performed by: STUDENT IN AN ORGANIZED HEALTH CARE EDUCATION/TRAINING PROGRAM

## 2024-05-30 PROCEDURE — 76937 US GUIDE VASCULAR ACCESS: CPT | Performed by: STUDENT IN AN ORGANIZED HEALTH CARE EDUCATION/TRAINING PROGRAM

## 2024-05-30 PROCEDURE — 2780000003 HC OR 278 NO HCPCS: Performed by: STUDENT IN AN ORGANIZED HEALTH CARE EDUCATION/TRAINING PROGRAM

## 2024-05-30 PROCEDURE — C9113 INJ PANTOPRAZOLE SODIUM, VIA: HCPCS

## 2024-05-30 PROCEDURE — C1766 INTRO/SHEATH,STRBLE,NON-PEEL: HCPCS | Performed by: STUDENT IN AN ORGANIZED HEALTH CARE EDUCATION/TRAINING PROGRAM

## 2024-05-30 PROCEDURE — 3700000002 HC GENERAL ANESTHESIA TIME - EACH INCREMENTAL 1 MINUTE: Performed by: STUDENT IN AN ORGANIZED HEALTH CARE EDUCATION/TRAINING PROGRAM

## 2024-05-30 PROCEDURE — 85347 COAGULATION TIME ACTIVATED: CPT | Performed by: STUDENT IN AN ORGANIZED HEALTH CARE EDUCATION/TRAINING PROGRAM

## 2024-05-30 PROCEDURE — C1759 CATH, INTRA ECHOCARDIOGRAPHY: HCPCS | Performed by: STUDENT IN AN ORGANIZED HEALTH CARE EDUCATION/TRAINING PROGRAM

## 2024-05-30 PROCEDURE — 85025 COMPLETE CBC W/AUTO DIFF WBC: CPT

## 2024-05-30 PROCEDURE — 4A023FZ MEASUREMENT OF CARDIAC RHYTHM, PERCUTANEOUS APPROACH: ICD-10-PCS | Performed by: STUDENT IN AN ORGANIZED HEALTH CARE EDUCATION/TRAINING PROGRAM

## 2024-05-30 PROCEDURE — 2020000001 HC ICU ROOM DAILY

## 2024-05-30 PROCEDURE — 2500000002 HC RX 250 W HCPCS SELF ADMINISTERED DRUGS (ALT 637 FOR MEDICARE OP, ALT 636 FOR OP/ED)

## 2024-05-30 RX ORDER — PROPOFOL 10 MG/ML
INJECTION, EMULSION INTRAVENOUS AS NEEDED
Status: DISCONTINUED | OUTPATIENT
Start: 2024-05-30 | End: 2024-05-30

## 2024-05-30 RX ORDER — PHENYLEPHRINE HCL IN 0.9% NACL 1 MG/10 ML
SYRINGE (ML) INTRAVENOUS AS NEEDED
Status: DISCONTINUED | OUTPATIENT
Start: 2024-05-30 | End: 2024-05-30

## 2024-05-30 RX ORDER — CEFAZOLIN 1 G/1
INJECTION, POWDER, FOR SOLUTION INTRAVENOUS AS NEEDED
Status: DISCONTINUED | OUTPATIENT
Start: 2024-05-30 | End: 2024-05-30

## 2024-05-30 RX ORDER — BUPIVACAINE HYDROCHLORIDE AND EPINEPHRINE 5; 5 MG/ML; UG/ML
INJECTION, SOLUTION EPIDURAL; INTRACAUDAL; PERINEURAL AS NEEDED
Status: DISCONTINUED | OUTPATIENT
Start: 2024-05-30 | End: 2024-05-30 | Stop reason: HOSPADM

## 2024-05-30 RX ORDER — HEPARIN SODIUM 1000 [USP'U]/ML
INJECTION, SOLUTION INTRAVENOUS; SUBCUTANEOUS AS NEEDED
Status: DISCONTINUED | OUTPATIENT
Start: 2024-05-30 | End: 2024-05-30 | Stop reason: HOSPADM

## 2024-05-30 RX ORDER — HEPARIN SODIUM 10000 [USP'U]/100ML
INJECTION, SOLUTION INTRAVENOUS CONTINUOUS PRN
Status: DISCONTINUED | OUTPATIENT
Start: 2024-05-30 | End: 2024-05-30 | Stop reason: HOSPADM

## 2024-05-30 RX ORDER — FENTANYL CITRATE 50 UG/ML
INJECTION, SOLUTION INTRAMUSCULAR; INTRAVENOUS AS NEEDED
Status: DISCONTINUED | OUTPATIENT
Start: 2024-05-30 | End: 2024-05-30

## 2024-05-30 RX ORDER — LIDOCAINE HYDROCHLORIDE ANHYDROUS AND DEXTROSE MONOHYDRATE .8; 5 G/100ML; G/100ML
1 INJECTION, SOLUTION INTRAVENOUS CONTINUOUS
Status: DISCONTINUED | OUTPATIENT
Start: 2024-05-30 | End: 2024-05-31

## 2024-05-30 RX ORDER — ONDANSETRON HYDROCHLORIDE 2 MG/ML
INJECTION, SOLUTION INTRAVENOUS AS NEEDED
Status: DISCONTINUED | OUTPATIENT
Start: 2024-05-30 | End: 2024-05-30

## 2024-05-30 RX ORDER — PROTAMINE SULFATE 10 MG/ML
INJECTION, SOLUTION INTRAVENOUS CONTINUOUS PRN
Status: COMPLETED | OUTPATIENT
Start: 2024-05-30 | End: 2024-05-30

## 2024-05-30 RX ORDER — NICOTINE 7MG/24HR
1 PATCH, TRANSDERMAL 24 HOURS TRANSDERMAL DAILY
Status: DISCONTINUED | OUTPATIENT
Start: 2024-07-13 | End: 2024-06-19 | Stop reason: HOSPADM

## 2024-05-30 RX ORDER — PANTOPRAZOLE SODIUM 40 MG/1
40 TABLET, DELAYED RELEASE ORAL
Status: DISCONTINUED | OUTPATIENT
Start: 2024-05-31 | End: 2024-06-06

## 2024-05-30 RX ORDER — MIDAZOLAM HYDROCHLORIDE 1 MG/ML
INJECTION INTRAMUSCULAR; INTRAVENOUS AS NEEDED
Status: DISCONTINUED | OUTPATIENT
Start: 2024-05-30 | End: 2024-05-30

## 2024-05-30 RX ORDER — IBUPROFEN 200 MG
1 TABLET ORAL DAILY
Status: DISCONTINUED | OUTPATIENT
Start: 2024-05-31 | End: 2024-06-19 | Stop reason: HOSPADM

## 2024-05-30 RX ORDER — LIDOCAINE HYDROCHLORIDE ANHYDROUS AND DEXTROSE MONOHYDRATE .8; 5 G/100ML; G/100ML
1 INJECTION, SOLUTION INTRAVENOUS CONTINUOUS
Status: DISCONTINUED | OUTPATIENT
Start: 2024-05-30 | End: 2024-05-30

## 2024-05-30 RX ADMIN — PROPOFOL 20 MG: 10 INJECTION, EMULSION INTRAVENOUS at 13:25

## 2024-05-30 RX ADMIN — PROPOFOL 30 MG: 10 INJECTION, EMULSION INTRAVENOUS at 17:10

## 2024-05-30 RX ADMIN — ONDANSETRON 4 MG: 2 INJECTION, SOLUTION INTRAMUSCULAR; INTRAVENOUS at 12:34

## 2024-05-30 RX ADMIN — PROPOFOL 10 MG: 10 INJECTION, EMULSION INTRAVENOUS at 15:57

## 2024-05-30 RX ADMIN — RANOLAZINE 500 MG: 500 TABLET, EXTENDED RELEASE ORAL at 08:14

## 2024-05-30 RX ADMIN — Medication 80 MCG: at 12:41

## 2024-05-30 RX ADMIN — AMIODARONE HYDROCHLORIDE 1 MG/MIN: 1.8 INJECTION, SOLUTION INTRAVENOUS at 01:49

## 2024-05-30 RX ADMIN — Medication 120 MCG: at 17:10

## 2024-05-30 RX ADMIN — FENTANYL CITRATE 25 MCG: 50 INJECTION, SOLUTION INTRAMUSCULAR; INTRAVENOUS at 13:49

## 2024-05-30 RX ADMIN — LIDOCAINE HYDROCHLORIDE 1 MG/MIN: 8 INJECTION, SOLUTION INTRAVENOUS at 20:08

## 2024-05-30 RX ADMIN — METHOCARBAMOL 500 MG: 500 TABLET ORAL at 21:16

## 2024-05-30 RX ADMIN — MIDAZOLAM HYDROCHLORIDE 1 MG: 1 INJECTION, SOLUTION INTRAMUSCULAR; INTRAVENOUS at 14:33

## 2024-05-30 RX ADMIN — SODIUM CHLORIDE, SODIUM LACTATE, POTASSIUM CHLORIDE, AND CALCIUM CHLORIDE: 600; 310; 30; 20 INJECTION, SOLUTION INTRAVENOUS at 11:54

## 2024-05-30 RX ADMIN — MIDAZOLAM HYDROCHLORIDE 1 MG: 1 INJECTION, SOLUTION INTRAMUSCULAR; INTRAVENOUS at 15:00

## 2024-05-30 RX ADMIN — LORAZEPAM 0.25 MG: 2 INJECTION INTRAMUSCULAR; INTRAVENOUS at 21:16

## 2024-05-30 RX ADMIN — PROPOFOL 10 MG: 10 INJECTION, EMULSION INTRAVENOUS at 16:52

## 2024-05-30 RX ADMIN — FENTANYL CITRATE 50 MCG: 50 INJECTION, SOLUTION INTRAMUSCULAR; INTRAVENOUS at 17:09

## 2024-05-30 RX ADMIN — CEFAZOLIN 2 G: 1 INJECTION, POWDER, FOR SOLUTION INTRAMUSCULAR; INTRAVENOUS at 12:55

## 2024-05-30 RX ADMIN — TRAZODONE HYDROCHLORIDE 50 MG: 50 TABLET ORAL at 22:07

## 2024-05-30 RX ADMIN — RANOLAZINE 500 MG: 500 TABLET, EXTENDED RELEASE ORAL at 21:17

## 2024-05-30 RX ADMIN — LIDOCAINE 2 PATCH: 4 PATCH TOPICAL at 08:14

## 2024-05-30 RX ADMIN — FENTANYL CITRATE 25 MCG: 50 INJECTION, SOLUTION INTRAMUSCULAR; INTRAVENOUS at 15:57

## 2024-05-30 RX ADMIN — Medication 120 MCG: at 12:38

## 2024-05-30 RX ADMIN — FENTANYL CITRATE 25 MCG: 50 INJECTION, SOLUTION INTRAMUSCULAR; INTRAVENOUS at 12:47

## 2024-05-30 RX ADMIN — AMIODARONE HYDROCHLORIDE 0.5 MG/MIN: 1.8 INJECTION, SOLUTION INTRAVENOUS at 21:17

## 2024-05-30 RX ADMIN — ASPIRIN 81 MG CHEWABLE TABLET 81 MG: 81 TABLET CHEWABLE at 08:14

## 2024-05-30 RX ADMIN — FENTANYL CITRATE 25 MCG: 50 INJECTION, SOLUTION INTRAMUSCULAR; INTRAVENOUS at 16:51

## 2024-05-30 RX ADMIN — MIDAZOLAM HYDROCHLORIDE 1 MG: 1 INJECTION, SOLUTION INTRAMUSCULAR; INTRAVENOUS at 17:09

## 2024-05-30 RX ADMIN — ROSUVASTATIN 20 MG: 20 TABLET, FILM COATED ORAL at 21:17

## 2024-05-30 RX ADMIN — FENTANYL CITRATE 25 MCG: 50 INJECTION, SOLUTION INTRAMUSCULAR; INTRAVENOUS at 12:36

## 2024-05-30 RX ADMIN — Medication 120 MCG: at 12:35

## 2024-05-30 RX ADMIN — MIDAZOLAM HYDROCHLORIDE 2 MG: 1 INJECTION, SOLUTION INTRAMUSCULAR; INTRAVENOUS at 12:15

## 2024-05-30 RX ADMIN — FENTANYL CITRATE 25 MCG: 50 INJECTION, SOLUTION INTRAMUSCULAR; INTRAVENOUS at 13:24

## 2024-05-30 RX ADMIN — PANTOPRAZOLE SODIUM 40 MG: 40 INJECTION, POWDER, FOR SOLUTION INTRAVENOUS at 08:14

## 2024-05-30 SDOH — ECONOMIC STABILITY: INCOME INSECURITY: HOW HARD IS IT FOR YOU TO PAY FOR THE VERY BASICS LIKE FOOD, HOUSING, MEDICAL CARE, AND HEATING?: SOMEWHAT HARD

## 2024-05-30 SDOH — SOCIAL STABILITY: SOCIAL INSECURITY: WITHIN THE LAST YEAR, HAVE YOU BEEN HUMILIATED OR EMOTIONALLY ABUSED IN OTHER WAYS BY YOUR PARTNER OR EX-PARTNER?: NO

## 2024-05-30 SDOH — SOCIAL STABILITY: SOCIAL NETWORK: IN A TYPICAL WEEK, HOW MANY TIMES DO YOU TALK ON THE PHONE WITH FAMILY, FRIENDS, OR NEIGHBORS?: THREE TIMES A WEEK

## 2024-05-30 SDOH — HEALTH STABILITY: MENTAL HEALTH: HOW OFTEN DO YOU HAVE A DRINK CONTAINING ALCOHOL?: NEVER

## 2024-05-30 SDOH — ECONOMIC STABILITY: INCOME INSECURITY: IN THE PAST 12 MONTHS, HAS THE ELECTRIC, GAS, OIL, OR WATER COMPANY THREATENED TO SHUT OFF SERVICE IN YOUR HOME?: NO

## 2024-05-30 SDOH — SOCIAL STABILITY: SOCIAL NETWORK: ARE YOU MARRIED, WIDOWED, DIVORCED, SEPARATED, NEVER MARRIED, OR LIVING WITH A PARTNER?: SEPARATED

## 2024-05-30 SDOH — SOCIAL STABILITY: SOCIAL INSECURITY
WITHIN THE LAST YEAR, HAVE TO BEEN RAPED OR FORCED TO HAVE ANY KIND OF SEXUAL ACTIVITY BY YOUR PARTNER OR EX-PARTNER?: NO

## 2024-05-30 SDOH — HEALTH STABILITY: PHYSICAL HEALTH: ON AVERAGE, HOW MANY DAYS PER WEEK DO YOU ENGAGE IN MODERATE TO STRENUOUS EXERCISE (LIKE A BRISK WALK)?: 0 DAYS

## 2024-05-30 SDOH — HEALTH STABILITY: MENTAL HEALTH
STRESS IS WHEN SOMEONE FEELS TENSE, NERVOUS, ANXIOUS, OR CAN'T SLEEP AT NIGHT BECAUSE THEIR MIND IS TROUBLED. HOW STRESSED ARE YOU?: TO SOME EXTENT

## 2024-05-30 SDOH — ECONOMIC STABILITY: FOOD INSECURITY: WITHIN THE PAST 12 MONTHS, YOU WORRIED THAT YOUR FOOD WOULD RUN OUT BEFORE YOU GOT MONEY TO BUY MORE.: NEVER TRUE

## 2024-05-30 SDOH — SOCIAL STABILITY: SOCIAL INSECURITY
WITHIN THE LAST YEAR, HAVE YOU BEEN KICKED, HIT, SLAPPED, OR OTHERWISE PHYSICALLY HURT BY YOUR PARTNER OR EX-PARTNER?: NO

## 2024-05-30 SDOH — HEALTH STABILITY: MENTAL HEALTH
HOW OFTEN DO YOU NEED TO HAVE SOMEONE HELP YOU WHEN YOU READ INSTRUCTIONS, PAMPHLETS, OR OTHER WRITTEN MATERIAL FROM YOUR DOCTOR OR PHARMACY?: RARELY

## 2024-05-30 SDOH — HEALTH STABILITY: MENTAL HEALTH: HOW OFTEN DO YOU HAVE 6 OR MORE DRINKS ON ONE OCCASION?: NEVER

## 2024-05-30 SDOH — ECONOMIC STABILITY: FOOD INSECURITY: WITHIN THE PAST 12 MONTHS, THE FOOD YOU BOUGHT JUST DIDN'T LAST AND YOU DIDN'T HAVE MONEY TO GET MORE.: NEVER TRUE

## 2024-05-30 SDOH — SOCIAL STABILITY: SOCIAL INSECURITY: WITHIN THE LAST YEAR, HAVE YOU BEEN AFRAID OF YOUR PARTNER OR EX-PARTNER?: NO

## 2024-05-30 SDOH — SOCIAL STABILITY: SOCIAL NETWORK
DO YOU BELONG TO ANY CLUBS OR ORGANIZATIONS SUCH AS CHURCH GROUPS UNIONS, FRATERNAL OR ATHLETIC GROUPS, OR SCHOOL GROUPS?: NO

## 2024-05-30 SDOH — HEALTH STABILITY: PHYSICAL HEALTH: ON AVERAGE, HOW MANY MINUTES DO YOU ENGAGE IN EXERCISE AT THIS LEVEL?: 0 MIN

## 2024-05-30 SDOH — SOCIAL STABILITY: SOCIAL NETWORK: HOW OFTEN DO YOU GET TOGETHER WITH FRIENDS OR RELATIVES?: THREE TIMES A WEEK

## 2024-05-30 SDOH — HEALTH STABILITY: MENTAL HEALTH: HOW MANY STANDARD DRINKS CONTAINING ALCOHOL DO YOU HAVE ON A TYPICAL DAY?: PATIENT DOES NOT DRINK

## 2024-05-30 SDOH — SOCIAL STABILITY: SOCIAL NETWORK: HOW OFTEN DO YOU ATTEND CHURCH OR RELIGIOUS SERVICES?: MORE THAN 4 TIMES PER YEAR

## 2024-05-30 ASSESSMENT — PAIN - FUNCTIONAL ASSESSMENT
PAIN_FUNCTIONAL_ASSESSMENT: UNABLE TO SELF-REPORT
PAIN_FUNCTIONAL_ASSESSMENT: 0-10

## 2024-05-30 ASSESSMENT — PAIN SCALES - GENERAL
PAINLEVEL_OUTOF10: 0 - NO PAIN
PAINLEVEL_OUTOF10: 0 - NO PAIN
PAINLEVEL_OUTOF10: 4
PAINLEVEL_OUTOF10: 0 - NO PAIN

## 2024-05-30 ASSESSMENT — LIFESTYLE VARIABLES
AUDIT-C TOTAL SCORE: 0
SKIP TO QUESTIONS 9-10: 1

## 2024-05-30 NOTE — ANESTHESIA PREPROCEDURE EVALUATION
Patient: Cristian Sapp    Procedure Information       Date/Time: 05/30/24 1200    Procedure: Ablation VT Endocardial (54350) - CARTO, 2PM    Location: [facility] / Virtual [facility] Cardiac Cath Lab    Providers: Sonny Flores MD            Relevant Problems   Cardiac   (+) CHF (congestive heart failure) (Multi)   (+) Ventricular tachycardia (Multi)      Circulatory   (+) AICD discharge   (+) HFrEF (heart failure with reduced ejection fraction) (Multi)   (+) Ischemic cardiomyopathy   Principal Problem:    Acute heart failure, unspecified heart failure type (Multi)  Active Problems:    AICD discharge    HFrEF (heart failure with reduced ejection fraction) (Multi)    Ischemic cardiomyopathy    Ventricular tachycardia (Multi)     Patient is a 58/ male, with recurrent ICD shocks 2/2 VT episodes, presenting from OSH, was shocked in the CICU by his ICD, on amio drip and is in slow VT. going for a second ablation today given his persistent VT with higher rates.      Neuro  -Anxiety due to ICD firing, on Ativan PRN and home   -Holding Paroxetine for MDD given arrhythmias      Cardio  #ICM/HFrEF (EF 15%) s/p CRT-D  #Prior VT storm s/p VT ablation  #VT s/p ICD shock  #VT with slow rate now   -Bolused and continued on an amio drip  -Plan for ablation today   -Ordered device interrogation  -Was being loaded on Amiodarone 400mg TID po- held as he is on a drip  -S/p bumex x1 5/28 for diuresis   -C/W ASA  -Hold prasugrel   -C/W Ranolazine   -C/W Rosuvastatin   -Holding Entresto   -Holding Torsemide, diuresing with PRN meds     Renal   -No active issues      GI  -PPI for ppx     Endo  -No active issues      Heme  -No active issues      ID  -No active issues      DVT ppx: on hold as he is planned for a procedure   Full code confirmed     Clinical information reviewed:    Allergies  Meds               NPO Detail:  No data recorded     Physical Exam    Airway  Mallampati: I  TM distance: >3 FB  Neck ROM: full     Cardiovascular    Rhythm: irregular     Dental   (+) upper dentures, lower dentures     Pulmonary    Abdominal - normal exam             Anesthesia Plan    History of general anesthesia?: yes  History of complications of general anesthesia?: no    ASA 4     MAC     intravenous induction   Anesthetic plan and risks discussed with patient.    Plan discussed with CRNA.

## 2024-05-30 NOTE — PROGRESS NOTES
Physical Therapy                 Therapy Communication Note    Patient Name: Cristian Sapp  MRN: [id number]  Today's Date: 5/30/2024     Discipline: Physical Therapy    Missed Visit Reason: Missed Visit Reason: Patient placed on medical hold (1101. Per medical team, pt. pending ablation today 2/2 persistent VT. Pt. not appropriate for PT at this time.)    Missed Time: Attempt    Comment:

## 2024-05-30 NOTE — HOSPITAL COURSE
59 y/o M with PMHx of CAD s/p multiple PCI, ICM/HFrEF (EF 15% 5/24) s/p CRT-D, VT storm s/p VT ablation (2019), infrarenal AAA s/p R iliac stent, nephrolithiasis s/p recent ureteral stent. Presented to OSH on 5/23 with multiple ICD shocks for VT. Transferred to CICU at [facility] 5/28 for continued management. EP was consulted on arrival and patient was in slow VT, he was started on amiodarone gtt. Underwent VT ablation 5/30, but continued to have intermittent slow VT. On 6/3 VT was noted again with rates ~118 and patient was cardioverted. Maintained on amiodarone + lidocaine gtt, and is s/p stellate ganglion block 6/4. He was transferred to HFICU 6/3 after he signed consent for LVAD/OHT evaluation. After further discussions and being a current smoker, it was decided that OHT would not be an option, and the patient does not want an LVAD at this time, so advanced therapies evaluation was stopped 6/5. Palliative medicine saw the patient 6/5 and he decided to change his code status to DNR-DNI. Redo endocardial ablation with Dr. Wooten 6/6 c/b lactic acidosis requiring intubation and low CO requiring IABP support. He was extubated 6/7 and IABP was removed on 6/8. SGC removed 6/9 with closing numbers: /57 (71), CVP 5, PA 44/26 (34), PCWP 11, Malcolm CO/CI:  5.5/2.9, Thermo: 5.3/2.8, , SVO2 67% on dapa 10 mg daily, brigido 25 mg daily. 6/11: Ganglion block bedside per EP + decrease 1/2 Amiodarone and lidocaine gtt. 6/12: Amio / Lido gtts discontinued. Low dose bisoprolol and Mexiletine initiated. 6/13: Palliative care goals of care discussions occurring. Low dose Entresto (12/13 mg) restarted. Mexiletine increased to 250 mg Q 8 hours. 6/14: Decreasing furosemide to 20 mg daily given hypotensive episodes. 6/15: No episodes of VTACH > 24 hours day before transfer out of ICU to general cardiology service and without further VT on the floor. Transferred to general medicine on 6/17 on tele. Pt remained stable with no episodes  of VT. Had slight white count to 13, UA showed positive leukocyte esterase and he was started on 14 day course of augmentin for potential infection. Cultures taken to be followed up on.

## 2024-05-30 NOTE — PROGRESS NOTES
"Cristian Sapp is a 58 y.o. male on day 2 of admission presenting with Acute heart failure, unspecified heart failure type (Multi).    Subjective   Patient states he slept some overnight, reports his mouth is dry this morning from being NPO for ablation procedure and some back pain from lying in bed. Family at bedside. Lidocaine level elevated overnight so drip held. Reduced amio gtt to 0.5 on rounds.       Objective     Last Recorded Vitals  Blood pressure 103/77, pulse 96, temperature 36.2 °C (97.2 °F), resp. rate 21, height 1.702 m (5' 7\"), weight 78.1 kg (172 lb 2.9 oz), SpO2 97%.  Intake/Output last 3 Shifts:  I/O last 3 completed shifts:  In: 1765.4 (22.6 mL/kg) [I.V.:1515.4 (19.4 mL/kg); IV Piggyback:250]  Out: 3275 (41.9 mL/kg) [Urine:3275 (1.2 mL/kg/hr)]  Weight: 78.1 kg     Relevant Results  24 Hour Vitals  Temp:  [35.4 °C (95.7 °F)-36.6 °C (97.9 °F)] 36.2 °C (97.2 °F)  Heart Rate:  [] 96  Resp:  [13-21] 21  BP: ()/(64-84) 103/77  FiO2 (%):  [32 %] 32 %    Temp (24hrs), Av.2 °C (97.1 °F), Min:35.4 °C (95.7 °F), Max:36.6 °C (97.9 °F)     24 hour Intake/Output    Intake/Output Summary (Last 24 hours) at 2024 1114  Last data filed at 2024 0900  Gross per 24 hour   Intake 842.35 ml   Output 1925 ml   Net -1082.65 ml        Exam:  General: lying in bed comfortably   HEENT: EOMI, normal conjunctiva, sclera anicteric   Neuro: alert, oriented, normal speech, conversant   CV: paced  Pulm: CTAB, breathing comfortably on 2L   GI: soft, nontender, nondistended   Skin: warm, dry   Ext: no edema, palpable pulses     Labs  CBC  Results from last 72 hours   Lab Units 24  0506 24  0518 24  1503   WBC AUTO x10*3/uL 8.5 10.0 10.6   HEMOGLOBIN g/dL 13.0* 13.4* 13.8   HEMATOCRIT % 35.7* 37.5* 38.7*   PLATELETS AUTO x10*3/uL 211 214 234        BMP  Results from last 72 hours   Lab Units 24  0506 24  0518 24  1503   SODIUM mmol/L 138 138 139  138   POTASSIUM mmol/L 4.1 " 4.3 5.1  5.0   CHLORIDE mmol/L 97* 99 99  100   BUN mg/dL 13 14 15  15   CREATININE mg/dL 1.12 1.13 1.24  1.25   MAGNESIUM mg/dL 2.20 2.49* 2.33   PHOSPHORUS mg/dL 4.5  --  3.6       Medications   Scheduled Medications  amiodarone, 150 mg, intravenous, Once  aspirin, 81 mg, oral, Daily  lidocaine, 2 patch, transdermal, Daily  [START ON 5/31/2024] pantoprazole, 40 mg, oral, Daily before breakfast  [Held by provider] prasugrel, 10 mg, oral, Daily  ranolazine, 500 mg, oral, BID  rosuvastatin, 20 mg, oral, Nightly  traZODone, 50 mg, oral, Nightly     Continuous Medications  amiodarone, 0.5-1 mg/min, Last Rate: 0.5 mg/min (05/30/24 0945)     PRN Medications  PRN medications: acetaminophen **OR** acetaminophen **OR** acetaminophen, LORazepam, methocarbamol, trimethobenzamide         Assessment/Plan   Principal Problem:    Acute heart failure, unspecified heart failure type (Multi)  Active Problems:    AICD discharge    HFrEF (heart failure with reduced ejection fraction) (Multi)    Ischemic cardiomyopathy    Ventricular tachycardia (Multi)    Patient is a 58/ male, with recurrent ICD shocks 2/2 VT episodes, presenting from OSH, was shocked in the CICU by his ICD, on amio drip and is in slow VT going for a second ablation with EP given his persistent VT with higher rates.      Neuro  -Anxiety due to ICD firing, on Ativan PRN and home   -Holding Paroxetine for MDD given arrhythmias      Cardio  #ICM/HFrEF (EF 15%) s/p CRT-D  #Prior VT storm s/p VT ablation  #VT s/p ICD shock  #VT with slow rate now   -Bolused and continued on an amio drip  -Plan for ablation 5/29, pushed from yesterday due to scheduling   -Device interrogation  -Was being loaded on Amiodarone 400mg TID po- held as he is on a drip  -C/W ASA  -Hold prasugrel   -C/W Ranolazine   -C/W Rosuvastatin   -Holding Entresto   -Holding Torsemide, diuresing with PRN meds  -Lidocaine level elevated overnight so gtt was held  -Heart failure consult for transplant eval       Renal   -No active issues      GI  -PPI for ppx     Endo  -No active issues      Heme  -No active issues      ID  -No active issues      DVT ppx: on hold as he is planned for a procedure   Code Status: Full code   Decision maker: mother Michelle [phone]               Mae Price MD

## 2024-05-30 NOTE — ANESTHESIA PROCEDURE NOTES
Arterial Line:    Date/Time: 5/30/2024 12:56 PM    Staffing  Performed: attending   Authorized by: Alina Hernandez MD    Performed by: YESENIA Berman    An arterial line was placed. Procedure performed using ultrasound guidance.in the OR for the following indication(s): continuous blood pressure monitoring and blood sampling needed.    A 20 gauge (size), 1 and 3/4 inch (length), Angiocath (type) catheter was placed into the Right brachial artery, secured by Tegaderm,   Seldinger technique not used.  Events:  greater than 3 attempts, patient tolerated procedure well with no complications and US tried with left radial by CRNA and MD and right radial by MD, able to get blood return but not able to thread wire and/or catheter.      Additional notes:  Awake susan

## 2024-05-30 NOTE — PROGRESS NOTES
Discussed the fact that corneal ulcers can not only be vision threatening but can be eyeball threatening. Updated / Completed / revised DC/SDOH assessments with patient and his mother.  Pt. Initially From: [place] up until March and moved here to [place] in with parents d/t separation from wife of 27 y.  Pt. With 2 adult children with wife and 1 adult child from previous relationship. Two children in [place]. Pt. wants Adv Dir (HPOA) & Living Will completed naming mother and sister as agents. Updated info to contacts. Emailed [facility] to do Adv Directives and see for any Rx assistance. Pt. notes Rx cost hardship for last 2 months with Medicare Advantage plan & patient paying some nearly $200 +/- oop resulting into cutting medication or going without.  Pt. on disability and will most likely require meds to beds with cost of any new meds. Verified Address & PCP in [facility] - [place] Willis MD.     Alyse Marx (LSW, MSW)

## 2024-05-31 ENCOUNTER — APPOINTMENT (OUTPATIENT)
Dept: RADIOLOGY | Facility: HOSPITAL | Age: 59
DRG: 270 | End: 2024-05-31
Payer: MEDICARE

## 2024-05-31 ENCOUNTER — APPOINTMENT (OUTPATIENT)
Dept: CARDIOLOGY | Facility: HOSPITAL | Age: 59
DRG: 270 | End: 2024-05-31
Payer: MEDICARE

## 2024-05-31 LAB
ALBUMIN SERPL BCP-MCNC: 3.4 G/DL (ref 3.4–5)
ALBUMIN SERPL BCP-MCNC: 3.6 G/DL (ref 3.4–5)
ANION GAP SERPL CALC-SCNC: 12 MMOL/L (ref 10–20)
ANION GAP SERPL CALC-SCNC: 14 MMOL/L (ref 10–20)
BASOPHILS # BLD AUTO: 0.11 X10*3/UL (ref 0–0.1)
BASOPHILS NFR BLD AUTO: 1.3 %
BUN SERPL-MCNC: 15 MG/DL (ref 6–23)
BUN SERPL-MCNC: 16 MG/DL (ref 6–23)
CALCIUM SERPL-MCNC: 8.7 MG/DL (ref 8.6–10.6)
CALCIUM SERPL-MCNC: 9 MG/DL (ref 8.6–10.6)
CARDIAC TROPONIN I PNL SERPL HS: 2261 NG/L (ref 0–53)
CARDIAC TROPONIN I PNL SERPL HS: 2395 NG/L (ref 0–53)
CHLORIDE SERPL-SCNC: 100 MMOL/L (ref 98–107)
CHLORIDE SERPL-SCNC: 97 MMOL/L (ref 98–107)
CO2 SERPL-SCNC: 29 MMOL/L (ref 21–32)
CO2 SERPL-SCNC: 29 MMOL/L (ref 21–32)
CREAT SERPL-MCNC: 1.05 MG/DL (ref 0.5–1.3)
CREAT SERPL-MCNC: 1.15 MG/DL (ref 0.5–1.3)
EGFRCR SERPLBLD CKD-EPI 2021: 75 ML/MIN/1.73M*2
EGFRCR SERPLBLD CKD-EPI 2021: 83 ML/MIN/1.73M*2
EOSINOPHIL # BLD AUTO: 0.37 X10*3/UL (ref 0–0.7)
EOSINOPHIL NFR BLD AUTO: 4.5 %
ERYTHROCYTE [DISTWIDTH] IN BLOOD BY AUTOMATED COUNT: 13.2 % (ref 11.5–14.5)
GLUCOSE SERPL-MCNC: 119 MG/DL (ref 74–99)
GLUCOSE SERPL-MCNC: 132 MG/DL (ref 74–99)
HCT VFR BLD AUTO: 37.2 % (ref 41–52)
HGB BLD-MCNC: 13.5 G/DL (ref 13.5–17.5)
IMM GRANULOCYTES # BLD AUTO: 0.06 X10*3/UL (ref 0–0.7)
IMM GRANULOCYTES NFR BLD AUTO: 0.7 % (ref 0–0.9)
LIDOCAIN SERPL-MCNC: 2.7 UG/ML (ref 1–5)
LYMPHOCYTES # BLD AUTO: 1.57 X10*3/UL (ref 1.2–4.8)
LYMPHOCYTES NFR BLD AUTO: 19.1 %
MAGNESIUM SERPL-MCNC: 1.99 MG/DL (ref 1.6–2.4)
MAGNESIUM SERPL-MCNC: 2.12 MG/DL (ref 1.6–2.4)
MCH RBC QN AUTO: 33.3 PG (ref 26–34)
MCHC RBC AUTO-ENTMCNC: 36.3 G/DL (ref 32–36)
MCV RBC AUTO: 92 FL (ref 80–100)
MONOCYTES # BLD AUTO: 0.98 X10*3/UL (ref 0.1–1)
MONOCYTES NFR BLD AUTO: 11.9 %
NEUTROPHILS # BLD AUTO: 5.12 X10*3/UL (ref 1.2–7.7)
NEUTROPHILS NFR BLD AUTO: 62.5 %
NRBC BLD-RTO: 0 /100 WBCS (ref 0–0)
PHOSPHATE SERPL-MCNC: 3.2 MG/DL (ref 2.5–4.9)
PHOSPHATE SERPL-MCNC: 4.3 MG/DL (ref 2.5–4.9)
PLATELET # BLD AUTO: 214 X10*3/UL (ref 150–450)
POTASSIUM SERPL-SCNC: 4.3 MMOL/L (ref 3.5–5.3)
POTASSIUM SERPL-SCNC: 4.5 MMOL/L (ref 3.5–5.3)
RBC # BLD AUTO: 4.05 X10*6/UL (ref 4.5–5.9)
SODIUM SERPL-SCNC: 136 MMOL/L (ref 136–145)
SODIUM SERPL-SCNC: 136 MMOL/L (ref 136–145)
UFH PPP CHRO-ACNC: 0.4 IU/ML
UFH PPP CHRO-ACNC: 0.5 IU/ML
WBC # BLD AUTO: 8.2 X10*3/UL (ref 4.4–11.3)

## 2024-05-31 PROCEDURE — S4991 NICOTINE PATCH NONLEGEND: HCPCS

## 2024-05-31 PROCEDURE — 71045 X-RAY EXAM CHEST 1 VIEW: CPT | Performed by: RADIOLOGY

## 2024-05-31 PROCEDURE — 97161 PT EVAL LOW COMPLEX 20 MIN: CPT | Mod: GP

## 2024-05-31 PROCEDURE — 74018 RADEX ABDOMEN 1 VIEW: CPT

## 2024-05-31 PROCEDURE — 71045 X-RAY EXAM CHEST 1 VIEW: CPT

## 2024-05-31 PROCEDURE — 2020000001 HC ICU ROOM DAILY

## 2024-05-31 PROCEDURE — 83735 ASSAY OF MAGNESIUM: CPT

## 2024-05-31 PROCEDURE — 2500000001 HC RX 250 WO HCPCS SELF ADMINISTERED DRUGS (ALT 637 FOR MEDICARE OP)

## 2024-05-31 PROCEDURE — 2500000004 HC RX 250 GENERAL PHARMACY W/ HCPCS (ALT 636 FOR OP/ED)

## 2024-05-31 PROCEDURE — 80069 RENAL FUNCTION PANEL: CPT | Mod: 91 | Performed by: STUDENT IN AN ORGANIZED HEALTH CARE EDUCATION/TRAINING PROGRAM

## 2024-05-31 PROCEDURE — 2500000002 HC RX 250 W HCPCS SELF ADMINISTERED DRUGS (ALT 637 FOR MEDICARE OP, ALT 636 FOR OP/ED)

## 2024-05-31 PROCEDURE — 83735 ASSAY OF MAGNESIUM: CPT | Mod: 91 | Performed by: STUDENT IN AN ORGANIZED HEALTH CARE EDUCATION/TRAINING PROGRAM

## 2024-05-31 PROCEDURE — 84484 ASSAY OF TROPONIN QUANT: CPT | Performed by: STUDENT IN AN ORGANIZED HEALTH CARE EDUCATION/TRAINING PROGRAM

## 2024-05-31 PROCEDURE — 85520 HEPARIN ASSAY: CPT | Performed by: STUDENT IN AN ORGANIZED HEALTH CARE EDUCATION/TRAINING PROGRAM

## 2024-05-31 PROCEDURE — 99291 CRITICAL CARE FIRST HOUR: CPT | Performed by: STUDENT IN AN ORGANIZED HEALTH CARE EDUCATION/TRAINING PROGRAM

## 2024-05-31 PROCEDURE — 2500000002 HC RX 250 W HCPCS SELF ADMINISTERED DRUGS (ALT 637 FOR MEDICARE OP, ALT 636 FOR OP/ED): Performed by: STUDENT IN AN ORGANIZED HEALTH CARE EDUCATION/TRAINING PROGRAM

## 2024-05-31 PROCEDURE — 74018 RADEX ABDOMEN 1 VIEW: CPT | Performed by: RADIOLOGY

## 2024-05-31 PROCEDURE — 97165 OT EVAL LOW COMPLEX 30 MIN: CPT | Mod: GO

## 2024-05-31 PROCEDURE — 2500000001 HC RX 250 WO HCPCS SELF ADMINISTERED DRUGS (ALT 637 FOR MEDICARE OP): Performed by: STUDENT IN AN ORGANIZED HEALTH CARE EDUCATION/TRAINING PROGRAM

## 2024-05-31 PROCEDURE — 85520 HEPARIN ASSAY: CPT | Mod: 91 | Performed by: STUDENT IN AN ORGANIZED HEALTH CARE EDUCATION/TRAINING PROGRAM

## 2024-05-31 PROCEDURE — 80176 ASSAY OF LIDOCAINE: CPT | Performed by: STUDENT IN AN ORGANIZED HEALTH CARE EDUCATION/TRAINING PROGRAM

## 2024-05-31 PROCEDURE — 36415 COLL VENOUS BLD VENIPUNCTURE: CPT | Performed by: STUDENT IN AN ORGANIZED HEALTH CARE EDUCATION/TRAINING PROGRAM

## 2024-05-31 PROCEDURE — 80069 RENAL FUNCTION PANEL: CPT

## 2024-05-31 PROCEDURE — 93005 ELECTROCARDIOGRAM TRACING: CPT

## 2024-05-31 PROCEDURE — 84484 ASSAY OF TROPONIN QUANT: CPT | Mod: 91 | Performed by: STUDENT IN AN ORGANIZED HEALTH CARE EDUCATION/TRAINING PROGRAM

## 2024-05-31 PROCEDURE — 2500000005 HC RX 250 GENERAL PHARMACY W/O HCPCS

## 2024-05-31 PROCEDURE — 85025 COMPLETE CBC W/AUTO DIFF WBC: CPT

## 2024-05-31 PROCEDURE — 36415 COLL VENOUS BLD VENIPUNCTURE: CPT

## 2024-05-31 PROCEDURE — 2500000004 HC RX 250 GENERAL PHARMACY W/ HCPCS (ALT 636 FOR OP/ED): Performed by: STUDENT IN AN ORGANIZED HEALTH CARE EDUCATION/TRAINING PROGRAM

## 2024-05-31 RX ORDER — LORAZEPAM 0.5 MG/1
0.5 TABLET ORAL EVERY 8 HOURS PRN
Status: DISCONTINUED | OUTPATIENT
Start: 2024-05-31 | End: 2024-06-02

## 2024-05-31 RX ORDER — POLYETHYLENE GLYCOL 3350 17 G/17G
17 POWDER, FOR SOLUTION ORAL 2 TIMES DAILY
Status: DISCONTINUED | OUTPATIENT
Start: 2024-05-31 | End: 2024-06-06

## 2024-05-31 RX ORDER — MEXILETINE HYDROCHLORIDE 150 MG/1
150 CAPSULE ORAL EVERY 8 HOURS SCHEDULED
Status: DISCONTINUED | OUTPATIENT
Start: 2024-05-31 | End: 2024-06-03

## 2024-05-31 RX ORDER — BISACODYL 10 MG/1
10 SUPPOSITORY RECTAL ONCE
Status: COMPLETED | OUTPATIENT
Start: 2024-05-31 | End: 2024-05-31

## 2024-05-31 RX ORDER — IBUPROFEN 200 MG
TABLET ORAL
Status: COMPLETED
Start: 2024-05-31 | End: 2024-06-01

## 2024-05-31 RX ORDER — FUROSEMIDE 10 MG/ML
INJECTION INTRAMUSCULAR; INTRAVENOUS
Status: COMPLETED
Start: 2024-05-31 | End: 2024-05-31

## 2024-05-31 RX ORDER — AMOXICILLIN 250 MG
2 CAPSULE ORAL 2 TIMES DAILY
Status: DISCONTINUED | OUTPATIENT
Start: 2024-05-31 | End: 2024-06-06

## 2024-05-31 RX ORDER — AMIODARONE HYDROCHLORIDE 200 MG/1
400 TABLET ORAL 2 TIMES DAILY
Status: DISCONTINUED | OUTPATIENT
Start: 2024-05-31 | End: 2024-06-03

## 2024-05-31 RX ORDER — FUROSEMIDE 10 MG/ML
40 INJECTION INTRAMUSCULAR; INTRAVENOUS ONCE
Status: COMPLETED | OUTPATIENT
Start: 2024-05-31 | End: 2024-05-31

## 2024-05-31 RX ORDER — HEPARIN SODIUM 10000 [USP'U]/100ML
0-4000 INJECTION, SOLUTION INTRAVENOUS CONTINUOUS
Status: DISCONTINUED | OUTPATIENT
Start: 2024-05-31 | End: 2024-06-06

## 2024-05-31 RX ADMIN — POLYETHYLENE GLYCOL 3350 17 G: 17 POWDER, FOR SOLUTION ORAL at 21:04

## 2024-05-31 RX ADMIN — BISACODYL 10 MG: 10 SUPPOSITORY RECTAL at 21:30

## 2024-05-31 RX ADMIN — AMIODARONE HYDROCHLORIDE 0.5 MG/MIN: 1.8 INJECTION, SOLUTION INTRAVENOUS at 09:10

## 2024-05-31 RX ADMIN — Medication: at 05:40

## 2024-05-31 RX ADMIN — RANOLAZINE 500 MG: 500 TABLET, EXTENDED RELEASE ORAL at 09:10

## 2024-05-31 RX ADMIN — SENNOSIDES AND DOCUSATE SODIUM 2 TABLET: 8.6; 5 TABLET ORAL at 21:04

## 2024-05-31 RX ADMIN — LORAZEPAM 0.5 MG: 0.5 TABLET ORAL at 23:52

## 2024-05-31 RX ADMIN — RANOLAZINE 500 MG: 500 TABLET, EXTENDED RELEASE ORAL at 21:05

## 2024-05-31 RX ADMIN — ROSUVASTATIN 20 MG: 20 TABLET, FILM COATED ORAL at 21:00

## 2024-05-31 RX ADMIN — POLYETHYLENE GLYCOL 3350 17 G: 17 POWDER, FOR SOLUTION ORAL at 09:25

## 2024-05-31 RX ADMIN — HEPARIN SODIUM 900 UNITS/HR: 10000 INJECTION, SOLUTION INTRAVENOUS at 09:11

## 2024-05-31 RX ADMIN — LORAZEPAM 0.25 MG: 2 INJECTION INTRAMUSCULAR; INTRAVENOUS at 08:39

## 2024-05-31 RX ADMIN — AMIODARONE HYDROCHLORIDE 400 MG: 200 TABLET ORAL at 15:12

## 2024-05-31 RX ADMIN — FUROSEMIDE 40 MG: 10 INJECTION, SOLUTION INTRAMUSCULAR; INTRAVENOUS at 09:12

## 2024-05-31 RX ADMIN — MEXILETINE HYDROCHLORIDE 150 MG: 150 CAPSULE ORAL at 21:08

## 2024-05-31 RX ADMIN — MEXILETINE HYDROCHLORIDE 150 MG: 150 CAPSULE ORAL at 15:17

## 2024-05-31 RX ADMIN — AMIODARONE HYDROCHLORIDE 400 MG: 200 TABLET ORAL at 21:04

## 2024-05-31 RX ADMIN — LORAZEPAM 0.5 MG: 0.5 TABLET ORAL at 15:16

## 2024-05-31 RX ADMIN — SENNOSIDES AND DOCUSATE SODIUM 2 TABLET: 8.6; 5 TABLET ORAL at 09:09

## 2024-05-31 RX ADMIN — LIDOCAINE 2 PATCH: 4 PATCH TOPICAL at 09:10

## 2024-05-31 RX ADMIN — TRAZODONE HYDROCHLORIDE 50 MG: 50 TABLET ORAL at 21:04

## 2024-05-31 RX ADMIN — FUROSEMIDE 40 MG: 10 INJECTION INTRAMUSCULAR; INTRAVENOUS at 09:12

## 2024-05-31 RX ADMIN — HEPARIN SODIUM 900 UNITS/HR: 10000 INJECTION, SOLUTION INTRAVENOUS at 09:25

## 2024-05-31 RX ADMIN — ASPIRIN 81 MG CHEWABLE TABLET 81 MG: 81 TABLET CHEWABLE at 09:10

## 2024-05-31 ASSESSMENT — COGNITIVE AND FUNCTIONAL STATUS - GENERAL
DAILY ACTIVITIY SCORE: 21
TOILETING: A LITTLE
CLIMB 3 TO 5 STEPS WITH RAILING: A LITTLE
MOVING TO AND FROM BED TO CHAIR: A LITTLE
DRESSING REGULAR LOWER BODY CLOTHING: A LITTLE
MOBILITY SCORE: 20
HELP NEEDED FOR BATHING: A LITTLE
WALKING IN HOSPITAL ROOM: A LITTLE
STANDING UP FROM CHAIR USING ARMS: A LITTLE

## 2024-05-31 ASSESSMENT — PAIN SCALES - GENERAL
PAINLEVEL_OUTOF10: 4
PAINLEVEL_OUTOF10: 0 - NO PAIN
PAINLEVEL_OUTOF10: 3
PAINLEVEL_OUTOF10: 0 - NO PAIN

## 2024-05-31 ASSESSMENT — PAIN - FUNCTIONAL ASSESSMENT
PAIN_FUNCTIONAL_ASSESSMENT: 0-10

## 2024-05-31 ASSESSMENT — ACTIVITIES OF DAILY LIVING (ADL)
BATHING_ASSISTANCE: STAND BY
ADL_ASSISTANCE: INDEPENDENT
ADL_ASSISTANCE: INDEPENDENT

## 2024-05-31 ASSESSMENT — PAIN DESCRIPTION - DESCRIPTORS: DESCRIPTORS: TENDER;SORE

## 2024-05-31 NOTE — PROGRESS NOTES
"Cristian Sapp is a 58 y.o. male on day 3 of admission presenting with Acute heart failure, unspecified heart failure type (Multi).    Subjective   Had successful VT ablation  yesterday 24. Reports some chest discomfort this morning but states he slept well overnight however has been more SOB and feels like he can't catch his breath this morning which is different from the past few days.     Nursing did report significant desaturations down to 78% overnight and suspects has undiagnosed GENARO is on 5L NC but does not wear O2 at home. EP reports received over 1L in procedure. No BM in over 1 week.       Objective     Last Recorded Vitals  Blood pressure 107/83, pulse 80, temperature 36 °C (96.8 °F), resp. rate 12, height 1.702 m (5' 7\"), weight 78.1 kg (172 lb 2.9 oz), SpO2 (!) 89%.  Intake/Output last 3 Shifts:  I/O last 3 completed shifts:  In: 1243.3 (15.9 mL/kg) [I.V.:993.3 (12.7 mL/kg); IV Piggyback:250]  Out: 1905 (24.4 mL/kg) [Urine:1900 (0.7 mL/kg/hr); Blood:5]  Weight: 78.1 kg     Relevant Results  24 Hour Vitals  Temp:  [35.7 °C (96.3 °F)-36.2 °C (97.2 °F)] 36 °C (96.8 °F)  Heart Rate:  [80-96] 80  Resp:  [12-27] 12  BP: ()/(57-87) 107/83  Arterial Line BP 1: (109-124)/(64-71) 124/71  FiO2 (%):  [32 %] 32 %    Temp (24hrs), Av.1 °C (96.9 °F), Min:35.7 °C (96.3 °F), Max:36.2 °C (97.2 °F)     24 hour Intake/Output    Intake/Output Summary (Last 24 hours) at 2024 0749  Last data filed at 2024 0600  Gross per 24 hour   Intake 753.14 ml   Output 705 ml   Net 48.14 ml        Exam:  General: lying in bed comfortably   HEENT: EOMI, normal conjunctiva, sclera anicteric   Neuro: alert, oriented, normal speech, conversant   CV: paced  Pulm: CTAB, breathing comfortably on 5L   GI: soft, nontender, nondistended   Skin: warm, dry   Ext: no edema, palpable pulses     Labs  CBC  Results from last 72 hours   Lab Units 24  0534 24  0506 24  0518   WBC AUTO x10*3/uL 8.2 8.5 10.0 "   HEMOGLOBIN g/dL 13.5 13.0* 13.4*   HEMATOCRIT % 37.2* 35.7* 37.5*   PLATELETS AUTO x10*3/uL 214 211 214        BMP  Results from last 72 hours   Lab Units 05/31/24  0534 05/30/24  0506 05/29/24  0518 05/28/24  1503   SODIUM mmol/L 136 138 138 139  138   POTASSIUM mmol/L 4.5 4.1 4.3 5.1  5.0   CHLORIDE mmol/L 100 97* 99 99  100   BUN mg/dL 15 13 14 15  15   CREATININE mg/dL 1.05 1.12 1.13 1.24  1.25   MAGNESIUM mg/dL 2.12 2.20 2.49* 2.33   PHOSPHORUS mg/dL 4.3 4.5  --  3.6       Medications   Scheduled Medications  amiodarone, 150 mg, intravenous, Once  aspirin, 81 mg, oral, Daily  lidocaine, 2 patch, transdermal, Daily  nicotine, 1 patch, transdermal, Daily   Followed by  [START ON 7/13/2024] nicotine, 1 patch, transdermal, Daily  pantoprazole, 40 mg, oral, Daily before breakfast  [Held by provider] prasugrel, 10 mg, oral, Daily  ranolazine, 500 mg, oral, BID  rosuvastatin, 20 mg, oral, Nightly  traZODone, 50 mg, oral, Nightly     Continuous Medications  amiodarone, 0.5-1 mg/min, Last Rate: 0.5 mg/min (05/30/24 2117)  lidocaine, 1 mg/min, Last Rate: 1 mg/min (05/30/24 2008)     PRN Medications  PRN medications: acetaminophen **OR** acetaminophen **OR** acetaminophen, LORazepam, methocarbamol, trimethobenzamide         Assessment/Plan   Principal Problem:    Acute heart failure, unspecified heart failure type (Multi)  Active Problems:    AICD discharge    HFrEF (heart failure with reduced ejection fraction) (Multi)    Ischemic cardiomyopathy    Ventricular tachycardia (Multi)    Patient is a 58/ male, with recurrent ICD shocks 2/2 VT episodes, presenting from OSH, was shocked in the CICU by his ICD, on amio drip and is in slow VT going for a second ablation with EP given his persistent VT with higher rates. S/p successful VT ablation 5/30 now 100% paced.     Update 05/31/24  -CXR ordered for SOB   -Scheduled bowel regimen   -Starting heparin ggt given ablation 5/30/24 possible RHC in future will hold off on  DOAC   -Lasix 40 for diuresis   -consult urology for ureteral stent removal placed at [facility] was planned to come out this week  -Plan to reduce paced rate later this afternoon      Neuro  -Anxiety due to ICD firing, on Ativan PRN and home   -Holding Paroxetine for MDD given arrhythmias      Cardio  #ICM/HFrEF (EF 15%) s/p CRT-D  #Prior VT storm s/p VT ablation  #VT s/p ICD shock  #VT with slow rate now   ::S/P Successfuly VT ablation 5/30/24  -Bolused and continued on an amio drip  -Plan for ablation 5/29, pushed from yesterday due to scheduling   -Device interrogation  -Was being loaded on Amiodarone 400mg TID po- held as he is on a drip  -C/W ASA  -Hold prasugrel   -C/W Ranolazine   -C/W Rosuvastatin   -Holding Entresto   -Holding Torsemide, diuresing with PRN meds  -Lidocaine level elevated overnight so gtt was held  -Heart failure consult for transplant eval   -Starting heparin ggt given ablation 5/30/24 possible RHC in future will hold off on DOAC   -Plan to reduce paced rate later this afternoon    Pulmonary   #SOB  -will obtain CXR   -Lasix 40  -likely needs outpatient sleep study for suspected GENARO      Renal   #Recurrent kidney stones  -has ureteral stent in Left consult urology to discuss removal      GI  -PPI for ppx    #Constipation  -BID miralax  -BID doc-senna      Endo  -No active issues      Heme  -No active issues      ID  -No active issues      DVT ppx: on hold as he is planned for a procedure   Code Status: Full code   Decision maker: mother Michelle [phone]           Sobia Milton MD

## 2024-05-31 NOTE — POST-PROCEDURE NOTE
Physician Transition of Care Summary  Invasive Cardiovascular Lab    Procedure Date: 5/30/2024  Attending:    * Sonny Flores - Primary  Resident/Fellow/Other Assistant: Surgeons and Role:     * Antione Roamn MD - Fellow    Indications:   Pre-op Diagnosis     * Acute heart failure, unspecified heart failure type (Multi) [I50.9]     * Ventricular tachycardia (Multi) [I47.20]     * Ischemic cardiomyopathy [I25.5]     * HFrEF (heart failure with reduced ejection fraction) (Multi) [I50.20]     * AICD discharge [Z45.02]    Post-procedure diagnosis:   Post-op Diagnosis     * Acute heart failure, unspecified heart failure type (Multi) [I50.9]     * Ventricular tachycardia (Multi) [I47.20]     * Ischemic cardiomyopathy [I25.5]     * HFrEF (heart failure with reduced ejection fraction) (Multi) [I50.20]     * AICD discharge [Z45.02]    Procedure(s):     * Ablation VT Endocardial (50414)    Summary:  Successful RFA for clinical VT 1 with a critical isthmus involving the mid inferior / infero - septal left ventricular segment  Successful RFA for clinical VT 2 pace mapped to the mid lateral left ventriclar segment     Recommendation:  Transfer to CICU  Continue current AADT - Amiodarone and IV Lidocine  Device was reprogrammed to DDD 80, please have the device formally interrogated. Would recommend to slowly transition to DDD 60 over the next 48 hours  In case of recurrent VT, more extensive substrate modification under GA may be considered      Complications:   None    Stents/Implants:   Implants       No implant documentation for this case.            Anticoagulation/Antiplatelet Plan:   Heparin gtt to be started 4-6 huors post ablation, no Bolus    Estimated Blood Loss:   5 mL    Anesthesia: Monitor Anesthesia Care Anesthesia Staff: Anesthesiologist: Alina Hernandez MD; Lila Albarran MD; Donavan Anna MD  CRNA: YARITZA Berman-CRNA  C-AA: REGINE Howe    Any Specimen(s) Removed:   No specimens collected  during this procedure.    [facility]:   [facility]    Electronically signed by: Sonny Flores MD, 5/30/2024 8:04 PM

## 2024-05-31 NOTE — SIGNIFICANT EVENT
Patient is a 59 yo male with history of kidney stones status post ureteroscopy, laser lithotripsy and stent placement by [facility] on 5/20, with recurrent ICD shocks 2/2 VT episodes, presenting from OSH, was shocked in the CICU by his ICD, on amio drip and is in slow VT going for a second ablation with EP hospitalization requiring ICU level of care his persistent VT with higher rates. S/p successful VT ablation 5/30 now 100% paced.     Patient noted having kidney stone treatment through [facility] left-sided ureteral stent was left after ureteroscopy, laser lithotripsy on 5/20.  Patient noted being scheduled for stent removal this week.  Patient denies changes in urination, hematuria, urinary frequency and urgency, and dysuria.  Discussed with patient that hospitalization requiring ICU level of care would make bedside stent removal unnecessary given that ureteral stents can be kept in for up to 6 months. Will schedule outpatient follow up for stent removal, further urologic management.    Plan:  -Outpatient follow up with stent removal (Dr. Louis scheduled 6/13)  -rest of care per primary    Maricruz Valiente MD   [facility]  Adult Urology Pager: [phone]  Pediatric Urology Pager: [phone]

## 2024-05-31 NOTE — ANESTHESIA POSTPROCEDURE EVALUATION
Patient: Cristian Sapp    Procedure Summary       Date: 05/30/24 Room / Location: [facility] STEREO / Virtual [facility] MAT 3529 Cardiac Cath Lab    Anesthesia Start: 1154 Anesthesia Stop: 1800    Procedure: Ablation VT Endocardial ([id number]) (Right: Groin) Diagnosis:       Acute heart failure, unspecified heart failure type (Multi)      Ventricular tachycardia (Multi)      Ischemic cardiomyopathy      HFrEF (heart failure with reduced ejection fraction) (Multi)      AICD discharge      (Acute heart failure, unspecified heart failure type (Multi) [I50.9]Ventricular tachycardia (Multi) [I47.20]Ischemic cardiomyopathy [I25.5]HFrEF (heart failure with reduced ejection fraction) (Multi) [I50.20]AICD discharge [Z45.02])    Providers: Sonny Flores MD Responsible Provider: YESENIA Berman    Anesthesia Type: MAC ASA Status: 4            Anesthesia Type: MAC    Vitals Value Taken Time   /79 05/31/24 1601   Temp 36 °C (96.8 °F) 05/31/24 1100   Pulse 70 05/31/24 1602   Resp 23 05/31/24 1602   SpO2 94 % 05/31/24 1602   Vitals shown include unfiled device data.    Anesthesia Post Evaluation    Patient location during evaluation: PACU  Patient participation: complete - patient participated  Level of consciousness: awake  Pain management: adequate  Airway patency: patent  Cardiovascular status: acceptable  Respiratory status: acceptable  Hydration status: acceptable  Postoperative Nausea and Vomiting: none        There were no known notable events for this encounter.

## 2024-05-31 NOTE — PROGRESS NOTES
Occupational Therapy    Evaluation    Patient Name: Cristian Sapp  MRN: [id number]  Today's Date: 5/31/2024  Room: 10/10  Time Calculation  Start Time: 1405  Stop Time: 1427  Time Calculation (min): 22 min    Assessment  IP OT Assessment  OT Assessment: Overall decreased strength and endurance limiting full Indep with functional task completion.  Prognosis: Excellent  Barriers to Discharge: None  Evaluation/Treatment Tolerance: Patient tolerated treatment well  Medical Staff Made Aware: Yes  End of Session Communication: Bedside nurse  End of Session Patient Position: Up in chair, Alarm off, not on at start of session  Plan:  Treatment Interventions: ADL retraining, Functional transfer training, UE strengthening/ROM, Neuromuscular reeducation  OT Frequency: 1 time per week  OT Discharge Recommendations: No OT needed after discharge  Equipment Recommended upon Discharge:  (None)  OT Recommended Transfer Status:  (CGA)  OT - OK to Discharge: Yes    Subjective   Current Problem:  1. Acute heart failure, unspecified heart failure type (Multi)  Case Request EP Lab: Ablation VT Endocardial ([id number])    Case Request EP Lab: Ablation VT Endocardial ([id number])    Electrophysiology procedure    Electrophysiology procedure      2. Ventricular tachycardia (Multi)  Cardiac device check - Inpatient    Cardiac device check - Inpatient    Case Request EP Lab: Ablation VT Endocardial ([id number])    Case Request EP Lab: Ablation VT Endocardial ([id number])    Case Request Cath Lab: Right Heart Cath    Case Request Cath Lab: Right Heart Cath    Cardiac Catheterization Procedure    Cardiac Catheterization Procedure    Case Request EP Lab: Ablation VT    Case Request EP Lab: Ablation VT    Electrophysiology procedure    Electrophysiology procedure    Cardiac Device Check - Surgery    Cardiac Device Check - Surgery    Cardiac Device Check - Surgery    Cardiac Device Check - Surgery    Electrophysiology procedure    Electrophysiology procedure      3.  Ischemic cardiomyopathy  Cardiac device check - Inpatient    Cardiac device check - Inpatient    Case Request EP Lab: Ablation VT Endocardial ([id number])    Case Request EP Lab: Ablation VT Endocardial ([id number])    Case Request EP Lab: Ablation VT    Case Request EP Lab: Ablation VT    Electrophysiology procedure    Electrophysiology procedure    Cardiac Device Check - Surgery    Cardiac Device Check - Surgery    Electrophysiology procedure    Electrophysiology procedure      4. HFrEF (heart failure with reduced ejection fraction) (Multi)  Cardiac device check - Inpatient    Cardiac device check - Inpatient    Case Request EP Lab: Ablation VT Endocardial ([id number])    Case Request EP Lab: Ablation VT Endocardial ([id number])    Electrophysiology procedure    Electrophysiology procedure      5. AICD discharge  Cardiac device check - Inpatient    Cardiac device check - Inpatient    Case Request EP Lab: Ablation VT Endocardial ([id number])    Case Request EP Lab: Ablation VT Endocardial ([id number])    Case Request EP Lab: Ablation VT    Case Request EP Lab: Ablation VT    Electrophysiology procedure    Electrophysiology procedure    Electrophysiology procedure    Electrophysiology procedure      6. AICD (automatic cardioverter/defibrillator) present  Cardiac Device Check - Surgery    Cardiac Device Check - Surgery        General:  Reason for Referral: Pt initially presented to [facility] for multiple ICD shocks, found to have VT, underwent DCCV, electrolytes were replaced, started on amiodarone infusion. He underwent coronary angiogram showing previously known  of Lcx and RCA, TTE showed reduced EF and moderate to severe MR. He was continued on medical management for known CAD, developed VT again at a slower rate but remained hemodynamically stable and was subsequently transferred to [facility] for further evaluation and management.  Past Medical History Relevant to Rehab: Stage C NYHA III HFrEF EF 15%, chronic ischemic cardiomyopathy, CAD s/p  multiple PCIs, VT storm s/p VT ablation (08/02/2019) with subsequent upgrade of dual chamber ICD to a multi-lead atrio-biventricular ICD on 11/06/2019, infrarenal AAA s/p right iliac stent, nephrolithiasis s/p recent ureteral stent, tobacco use disorder (1ppd x40 years, attempted cessation 3 weeks prior to admission, relapsed on the day of admission)  Co-Treatment: PT  Co-Treatment Reason: To maximize pt. safety and therapuetic potential, to facilitate timely discharge  Prior to Session Communication: Bedside nurse  Patient Position Received: Bed, 3 rail up, Alarm off, not on at start of session  Family/Caregiver Present: Yes  Caregiver Feedback: Parents present at begining of session  General Comment: Pt is pleasant and cooperative. Reports a little anxiewty with mobility as he has been in bed for a few days. He put forth good effort towards task completion. (Dependently paced; PPM to 70bpm shortly before session, per RN. Lido 1, Amio .05)   Precautions:  Medical Precautions: Fall precautions, Cardiac precautions, Oxygen therapy device and L/min (3LO2)  Vital Signs:  Heart Rate: 70 (70)  Resp: 21 (13)  SpO2: 98 %  BP: 106/76 (141/74)  MAP (mmHg): 86 (89)  BP Location: Left arm  BP Method: Automatic  Pain:  Pain Assessment  Pain Score: 0 - No pain  Lines/Tubes/Drains:  External Urinary Catheter Male (Active)   Number of days: 4         Objective   Cognition:  Overall Cognitive Status: Within Functional Limits  Orientation Level: Oriented X4 (Inititally was uncertain of current year. He needed cues to recall 2024.)           Home Living:  Type of Home: House  Lives With: Parent(s)  Home Adaptive Equipment: None  Home Layout: One level  Home Access: Level entry  Bathroom Shower/Tub: Tub/shower unit  Bathroom Equipment: None   Prior Function:  Level of [place]: Independent with ADLs and functional transfers, Independent with homemaking with ambulation  Receives Help From: Parent(s)  ADL Assistance:  Independent  Homemaking Assistance: Independent  Ambulatory Assistance: Independent  Vocational: On disability  Leisure: Gardening, doing yard work  Prior Function Comments: (+drives, denies falls  IADL History:     ADL:  Grooming Assistance: Stand by  Grooming Deficit: Setup, Increased time to complete  Bathing Assistance: Stand by  Bathing Deficit: Setup, Increased time to complete   UE Dressing Assistance: Independent  LE Dressing Assistance: Stand by  LE Dressing Deficit: Setup, Increased time to complete, Don/doff R sock, Don/doff L sock, Don/doff R shoe, Don/doff L shoe  Toileting Assistance with Device: Minimal  Activity Tolerance:  Endurance: Decreased tolerance for upright activites  Early Mobility/Exercise Safety Screen: Proceed with mobilization - No exclusion criteria met  Balance:  Static Sitting Balance  Static Sitting-Level of Assistance: Distant supervision  Static Standing Balance  Static Standing-Level of Assistance: Contact guard  Bed Mobility/Transfers: Bed Mobility  Bed Mobility: Yes  Bed Mobility 1  Bed Mobility 1: Supine to sitting  Level of Assistance 1: Close supervision   and Transfers  Transfer: Yes  Transfer 1  Transfer From 1: Sit to  Transfer to 1: Stand  Technique 1: Sit to stand, Stand to sit  Transfer Level of Assistance 1: Close supervision  Transfers 2  Transfer From 2: Bed to  Transfer to 2: Chair with arms  Technique 2: Stand pivot  Transfer Level of Assistance 2: Contact guard  IADL's:      Vision:     and Vision - Complex Assessment  Ocular Range of Motion: Within Functional Limits  Sensation:  Light Touch: No apparent deficits  Strength:  Strength Comments: BUE strength WFL  Perception:  Inattention/Neglect: Appears intact  Coordination:  Movements are Fluid and Coordinated: Yes   Hand Function:  Hand Function  Gross Grasp: Functional  Coordination: Functional  Extremities:  ,  ,  , and      Outcome Measures: [facility] Daily Activity  Putting on and taking off regular lower body  clothing: A little  Bathing (including washing, rinsing, drying): A little  Putting on and taking off regular upper body clothing: None  Toileting, which includes using toilet, bedpan or urinal: A little  Taking care of personal grooming such as brushing teeth: None  Eating Meals: None  Daily Activity - Total Score: 21        ICU Mobility Screen  Early Mobility/Exercise Safety Screen: Proceed with mobilization - No exclusion criteria met,          Education Documentation  Body Mechanics, taught by Pina Paul OT at 5/31/2024  3:28 PM.  Learner: Patient  Readiness: Acceptance  Method: Explanation  Response: Verbalizes Understanding    ADL Training, taught by Pian Paul OT at 5/31/2024  3:28 PM.  Learner: Patient  Readiness: Acceptance  Method: Explanation  Response: Verbalizes Understanding    Education Comments  No comments found.        Goals:     Encounter Problems       Encounter Problems (Active)       ADLs       Patient will complete toileting, including clothing management and hygiene, with Sup in order to maximize functional Indep with task completion.        Start:  05/31/24    Expected End:  06/14/24               COGNITION/SAFETY       Pt will identify and incorporate 3 EC/WS strategies into daily routines for increased safety and Indep.        Start:  05/31/24    Expected End:  06/14/24               EXERCISE/STRENGTHENING       Pt will increase endurance to tolerate 15-20min of activity with no more than 1 rest break in order to increase ability to engage in ADL completion.        Start:  05/31/24    Expected End:  06/14/24               MOBILITY       Pt will demo increased functional mobility to tolerate tasks necessary to complete ADL routine with Sup.       Start:  05/31/24    Expected End:  06/14/24 05/31/24 at 3:29 PM   Pina Paul OT   Rehab Office: [phone]

## 2024-05-31 NOTE — CARE PLAN
Problem: Fall/Injury  Goal: Not fall by end of shift  Outcome: Progressing  Goal: Be free from injury by end of the shift  Outcome: Progressing  Goal: Verbalize understanding of personal risk factors for fall in the hospital  Outcome: Progressing  Goal: Verbalize understanding of risk factor reduction measures to prevent injury from fall in the home  Outcome: Progressing  Goal: Use assistive devices by end of the shift  Outcome: Progressing  Goal: Pace activities to prevent fatigue by end of the shift  Outcome: Progressing   The patient's goals for the shift include  remain safe    The clinical goals for the shift include pt will remain hemodynamically stable throughout shift

## 2024-05-31 NOTE — CONSULTS
[facility]  ADVANCED HEART FAILURE AND TRANSPLANT CARDIOLOGY   Cristian Sapp/[id number]    Inpatient consult to Heart Failure  Consult performed by: Bud Valles MD  Consult ordered by: Arya Brown MD        Admit Date: 5/28/2024  Hospital Length of Stay: 3   ICU Length of Stay: 2d 17h   Primary Service: CICU  Primary HF Cardiologist:   Referring: Dr. Arya Brown      History Of Present Illness:    Cristian Sapp is a 58-year-old man who is a medically disabled  (last employed in 2016) with history of Stage C NYHA III HFrEF EF 15%, chronic ischemic cardiomyopathy, CAD status post multiple PCIs (see cardiac history summary below), VT storm s/p VT ablation (08/02/2019) with subsequent upgrade of dual chamber ICD to a multi-lead atrio-biventricular ICD on 11/06/2019, infrarenal AAA status post right iliac stent, nephrolithiasis status post recent ureteral stent, tobacco use disorder (1ppd x40 years, attempted cessation 3 weeks prior to admission, relapsed on the day of admission) who presented to [facility] for multiple ICD shocks, reported as 10-15 shocks. He was found to have VT, underwent DCCV, electrolytes were replaced, started on amiodarone infusion. He underwent coronary angiogram showing previously known  of Lcx and RCA, TTE showed reduced EF and moderate to severe MR. He was continued on medical management for known CAD, developed VT again at a slower rate but remained hemodynamically stable and was subsequently transferred to [facility] for further evaluation and management.     Upon arrival, EP was consulted, VT zones were changed to 115 bpm since VT in 90s-100s, ATP briefly terminated VT, however VT recurred, consistent with slow stable re-entrant VT related to scar from ICM. VT ablation recommended, however in the setting of non-revascularized CAD and no interventions possible, then patient has higher chance of developing faster arrhythmias during ablation  leading to hemodynamic instability. Even if patient were successfully ablated, EP recommends consideration for advanced heart failure therapies given patient's age and no reversible etiology in the setting of un-revascularized CAD.     5/31 - Status post successful RFA for clinical VT1 with critical isthmus involving the mid inferior/infero-septal LV segment. Continued on amiodarone and lidocaine infusions, device reprogrammed to DDD 80. Per EP if VT recurs, may need more extensive substrate modification under GA.     This morning, device nurse interrogated patient's device and while reprogramming the device patient's device lost capture leading to near-syncopal event, thus device setting was reset to DDD 80.       Per discussion with the patient and his parents at bedside, he endorses long-standing history of cardiomyopathy. The last time he experienced VT and ICD shocks was in 2019 without recurrence after VT ablation at that time. On the day he presented to [facility] with 10-15 ICD shocks at home, he states he was feeling well, did not have any chest pain, significant shortness of breath, significant leg swelling, fatigue prior to feeling the shocks. He does endorse occasional dizzy spells at home 2-3 times per week with activity, shortness of breath with activity, intermittent leg swelling, chronic 2-pillow orthopnea. He is able to play with his dogs, trim his yard, drive his car, go grocery shopping, climb a flight of stairs with rest breaks. He denies PND, syncope, palpitations, diaphoresis.    Home medications: Entresto 24-26mg BID, aspirin 81mg daily, rosuvastatin 20mg daily, ranolazine 500mg BID, prasugrel 10mg daily. Takes torsemide 20mg as needed, usually required 2x/week for leg swelling/weight gain.     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 He recently moved from  "[facility] to live with his parents after separation from his wife and three children. He was closely followed at [facility] in [place] by his heart failure cardiologist, electrophysiologist, and general cardiologist. Endorses adherence to his home medications.    Tobacco: 1ppd for 40 years, attempted cessation 3 weeks prior to admission, relapsed on the day of admission  Alcohol: Denies alcohol use.   Substance Use: Denies illicit substance use.     Family History:   Father: Diabetes mellitus  Sisters: Diabetes mellitus  Paternal grandmother: Lung cancer  Paternal grandfather: Pancreatic cancer  Maternal grandmother: CAD status post CABG, diabetes mellitus  Maternal grandfather: Pancreatic cancer      Summary of Cardiac History     PCI History   PCI 10/12/2022 with technical success into the OM2, but with limited flow due to no runoff. The proximal old stents that were underexpanded were treated with laser atherectomy \"laser bomb\" and drug balloon angioplasty. The final result was improved but still with some residual stenosis. In follow up actually noticed less angina and breathing better on exertion. No complications of the procedure.   08/31/2022  RCA occlusion with an ambiguous proximal cap, well upstream from old stents which are occluded in the middle vessel.  Then there was a short gap and then there is a distal stent which is also occluded.  The distal cap appears to be at the ostium of the PDA just at the distal edge of the last stent for slightly after it.  The stents appear undersized.  There is severe diffuse InStent restenosis in the circumflex from the mid vessel after a small OM branch down to the bifurcation of the major OM branch.  At the bifurcation there is an occlusion of the lower limb with evidence of an old stent in the middle of the occlusion.  Right heart catheterization shows thermodilution cardiac index 2.9, Malcolm cardiac index 1.9 with elevated wedge pressure and " LVEDP, evaluation for Accucinch trial  08/31/2022: Echo LVEF 25-30%, akinesis of anterolateral inferolateral and inferior myocardium  11/10/2021: Proximal near ostial RCA occlusion.  There may be a tiny beak indicating the proximal cap in the AP cranial view.  There are septal collaterals from the LAD to the PDA.  Fifty echocardiogram shows severely reduced LV systolic function with what appears to be dilation thinning and akinesis of the inferior wall.  New circumflex occlusion compared to the prior angiogram.  The diffuse disease in the upper branch had adequate flow reserve.  An unsuccessful attempt was made at rewiring the occlusion of the lower subdivision of the obtuse marginal branch  05/09/2020:  Occlusion of the lower subdivision of the 2nd obtuse marginal branch inside the old stents, and areas of previously treated with multiple stents and brachytherapy  07/02/2019:  Lower subdivision of OM2 patent with adequate flow reserve  04/08/2019:  PCI circumflex into the lower subdivision of OM2 with balloon angioplasty for InStent restenosis  12/05/2018: PET scan: Large fixed defects perfusion and metabolism of the inferior and lateral wall  12/04/2018:  Proximal circumflex severe diffuse InStent restenosis (preop evaluation prior to lower extremity vascular surgery)  08/20/2018:  Diffuse InStent restenosis of the circumflex with adequate flow reserve  07/13/2018:  Very late occlusion of the lower branch of the  obtuse marginal branch.  IVUS reportedly showed “excellent stent deployment without evidence of significant InStent stenosis”, treated with an additional drug-eluting stent distally 2.0 mm post dilated 2.5 mm  11/03/2017:  OM2 lower branch edge restenosis treated with an additional drug-eluting stent  04/21/2017: Proximal circumflex InStent restenosis treated with an additional layer of drug-eluting stent, and stenting of the lower subdivision of the obtuse marginal branch  01/27/2017: [facility]:  Diagnostic  01/10/2017:  Acute occlusion of the proximal circumflex in the old stent, with occlusion of the lower subdivision of the 2nd obtuse marginal branch, all treated with balloon angioplasty  07/07/2016:  Proximal circumflex InStent restenosis treated with AngioSculpt balloon angioplasty, possibly nondilated will lesion of the ostium  07/30/2015:  Circumflex InStent restenosis recommended to medical therapy  03/03/2014:  Circumflex restenosis recommended to medical therapy not available for review  03/29/2013: Circumflex restenoses recommended medical therapy not available for review  12/27/2012: Circumflex restenosis treated with balloon angioplasty not available for review  06/13/2012:  Obtuse marginal branch stenosis balloon angioplasty not available for review history notes coronary disease with a history of inferior MI in 2002 treated 2 times RCA stents with ISR 3 months later DVT and LV see stent recurrent symptoms with recurrent intervention 2004 and 2008 most recent with Cypher stent to circumflex  He had inferior MI in 2002 treated with BMS of the RCA. In-stent restenosis (ISR) was treated with XRT and JIM. He had recurrent PCI in 2004 and 2008 (LCx Cypher JIM).     PVD History  06/10/2013 · PTA right external iliac artery (6 x 38 Atrium iCAST covered stent)  · PTA mid superior femoral artery (5 x 40 VascuTrak)  09/03/2014 · PTA right external iliac artery occlusion thrombectomy and balloon angioplasty (series of 4 mm, 5 mm, 6 mm balloons)  · PTA right hypogastric artery ostial lesion (3 mm and 4/2 mm balloon.)  · PTA right external iliac artery occlusion (7 mm x 60 mm self-expanding stent and a 7 mm x 17 mm balloon expandable stent in the segments proximal to the previous stent occlusion)  01/28/2015 · PTA right iliac artery (series of 4 mm, 5 mm and 6 mm balloons)  · Thrombectomy Right Iliac Artery ISR and Right Superficial Femoral Artery  · PTA right common femoral artery  07/13/2015 · PTCA right  external iliac artery and common femoral artery (3 mm balloon)  · Thrombectomy right external iliac artery and common femoral artery  · PTCA of the right external iliac artery (5 and 6 mm balloon)  07/15/2015 · Balloon angioplasty and stenting of the right common iliac artery (8 mm x 17 mm Visipro balloon expandable stent)  03/09/2017 · Balloon angioplasty Right external iliac artery ISR (6 mm x 10 mm long balloon and 7 mm x 80 mm long drug-eluting balloon)  · Balloon angioplasty Right superficial femoral artery (4 mm x 10 mm balloon)  · Infusion catheter with initiation of thrombolytics with TPA RSFA  08/30/2017 · Attempted arterial access distal right superficial femoral artery  · Attempted percutaneous intervention of the right superficial femoral artery (unable to advance a guidewire in the true lumen within the previously deployed stents in the right superficial femoral artery (chronic total occlusion and 100% occlusion of previously deployed stents).  09/26/2017 · Balloon angioplasty mid right superficial femoral artery (5 x 40 NanoCross balloon)  · Balloon angioplasty distal right superficial femoral artery/proximal right popliteal artery (5 x 40 EverCross balloon)  12/12/2017 · Attempted percutaneous intervention of the right superficial femoral artery (failed attempt, unable to access true lumen within the previously deployed stents).      EKG 5/28/24 - VT (slow), RBBB morphology.      Device: [employer] CRT-D  Implanting MD: Dr. Gaston ([facility])  Last Device Interrogation: 5/31     TTE 5/27/24:   1. Left ventricular systolic function is severely decreased with a 15% estimated ejection fraction.   2. There is no evidence of mitral valve stenosis.   3. Moderate to severe mitral valve regurgitation.   4. Mild tricuspid regurgitation is visualized.   5. Aortic valve stenosis is not present.   6. Left ventricular cavity size is moderately dilated.   7. The pulmonary artery is not well visualized.   8.  Current study appears to be remarkably similar to the May 23, 2024 study done here.   9. There is global hypokinesis of the left ventricle with minor regional variations.     Coronary Angio 5/23/24:   1. Distal Left Main: 10-30% stenosis.   2. Proximal and mid LAD Lesion: The percent stenosis is 10-30%.   3. Proximal CX Lesion: The percent stenosis is 100%.   4. Right Coronary Artery: fills via collaterals.   5. Proximal RCA Lesion: The percent stenosis is 100%.   6. The Left Ventricular Ejection Fraction is 10%,by echo.      Last Recorded Vitals:  Vitals:    05/31/24 0400 05/31/24 0500 05/31/24 0600 05/31/24 0700   BP: 129/87 102/67 107/83    Pulse: 80 80 80 80   Resp: 19 15 16 12   Temp: 36 °C (96.8 °F)      TempSrc:       SpO2: 93% 90% 94% (!) 89%   Weight:       Height:           Last Labs:  CBC - 5/31/2024:  5:34 AM  8.2 13.5 214    37.2      CMP - 5/31/2024:  5:34 AM  8.7 5.9 9 --- 0.6   4.3 3.4 13 67      PTT - 5/29/2024:  5:18 AM  1.1   12.8 29     Troponin I, High Sensitivity   Date/Time Value Ref Range Status   05/23/2024 07:11  (HH) 0 - 20 ng/L Final     Comment:     Previous result verified on 5/23/2024 0008 on specimen/case [id number] called with component TRPHS for procedure Troponin, High Sensitivity, 1 Hour with value 160 ng/L.   05/23/2024 01:39  (HH) 0 - 20 ng/L Final     Comment:     Previous result verified on 5/23/2024 0008 on specimen/case [id number] called with component TRPHS for procedure Troponin, High Sensitivity, 1 Hour with value 160 ng/L.   05/22/2024 11:36  (HH) 0 - 20 ng/L Final     BNP   Date/Time Value Ref Range Status   05/28/2024 03:03  (H) 0 - 99 pg/mL Final   05/22/2024 09:28 PM 2,144 (H) 0 - 99 pg/mL Final      Last I/O:  I/O last 3 completed shifts:  In: 1243.3 (15.9 mL/kg) [I.V.:993.3 (12.7 mL/kg); IV Piggyback:250]  Out: 1905 (24.4 mL/kg) [Urine:1900 (0.7 mL/kg/hr); Blood:5]  Weight: 78.1 kg     Past Cardiology Tests (Last 3  Years):  EKG:      Echo:  Transthoracic Echo (TTE) Complete 05/28/2024  CONCLUSIONS:   1. Left ventricular systolic function is severely decreased with a 15% estimated ejection fraction.   2. There is no evidence of mitral valve stenosis.   3. Moderate to severe mitral valve regurgitation.   4. Mild tricuspid regurgitation is visualized.   5. Aortic valve stenosis is not present.   6. Left ventricular cavity size is moderately dilated.   7. The pulmonary artery is not well visualized.   8. Current study appears to be remarkably similar to the May 23, 2024 study done here.   9. There is global hypokinesis of the left ventricle with minor regional variations.    Transthoracic Echo (TTE) Complete 05/23/2024  CONCLUSIONS:   1. Left ventricular systolic function is severely decreased with a 10% estimated ejection fraction.   2. There is no evidence of mitral valve stenosis.   3. Moderate to severe mitral valve regurgitation.   4. Moderate tricuspid regurgitation.   5. Aortic valve stenosis is not present.   6. Left ventricular cavity size is moderately dilated.   7. Moderate to severely elevated pulmonary artery pressure.   8. Pulmonary hypertension is present.   9. The inferior vena cava appears moderately dilated.  10. There is global hypokinesis of the left ventricle with minor regional variations.      Cath:  Cardiac Catheterization Procedure 05/23/2024  [facility], Cath Lab         [address] [place] [address]     Cardiovascular Catheterization Report     Patient Name:      FRANCIA PAULINO          Performing Physician:  Lakia Menon MD  Study Date:        5/23/2024             Verifying Physician:   Lakia Menon MD  MRN/PID:           [id number]              Cardiologist/Co-Scrub:  Accession#:        [id number]          Ordering Provider:      [id number] EVONNE WALTERS  Date of Birth/Age: 1965 / 58 years Cardiologist:  Gender:            M                     Fellow:  Encounter#:        [id number]            Surgeon:        Study: Left Heart Cath        Indications:  FRANCIA PAULINO is a 59 year old male who presents with dyslipidemia, hypertension, chronic pulmonary disease, prior myocardial infarction, prior percutaneous coronary intervention, smoker chf, coronary artery disease and an asymptomatic chest pain assessment. Left ventricular dysfunction, cardiomyopathy and cardiac arrhythmia.     Procedure Description:  After infiltration with 2% Lidocaine, the right femoral artery was cannulated with a modified Seldinger technique. Subsequently a 5 Belgian sheath was placed in the right femoral artery. Selective coronary catheterization was performed using a 5 Fr catheter(s) exchanged over a guide wire to cannulate the coronary arteries. A JL 4 tip catheter was used for left coronary injections. A 3drc tip catheter was used for right coronary injections.  Multiple injections of contrast were made into the left and right coronary arteries with angiograms recorded in multiple projections. After completion of the procedure, femoral artery angiography was performed. This demonstrated a common femoral artery puncture appropriate for closure. A Vascade 5F vascular closure Device was placed per protocol.     Coronary Angiography:  The coronary circulation is right dominant.     Left Main Coronary Artery:  There is 10-30% stenosis in the distal left main coronary artery.     Left Anterior Descending Coronary Artery Distribution:  There is 10-30% stenosis in the proximal and mid left anterior descending artery.     Circumflex Coronary Artery Distribution:  There is 100% stenosis in the the proximal Circumflex artery.     Right Coronary Artery Distribution:     Fills via collaterals. There is 100%  stenosis in the the proximal Right Coronary Artery.        Left Ventriculography:  The estimated left ventricular ejection fraction is abnormal at 10%.     Hemo Personnel:  +---------------------------+---------+  Name                       Duty       +---------------------------+---------+  Babatunde Tan MD 1  +---------------------------+---------+        Hemodynamic Pressures:     +----+---------------------+----------+-------------+--------------+---------+  Site      Date Time      Phase NameSystolic mmHgDiastolic mmHgMean mmHg  +----+---------------------+----------+-------------+--------------+---------+    AO5/23/2024 10:02:22 AM  AIR REST           70            57       64  +----+---------------------+----------+-------------+--------------+---------+    AO5/23/2024 10:03:03 AM  AIR REST           87            56       75  +----+---------------------+----------+-------------+--------------+---------+    AO5/23/2024 10:08:12 AM  AIR REST           98            59       73  +----+---------------------+----------+-------------+--------------+---------+        Cardiac Cath Post Procedure Notes:  Post Procedure Diagnosis: Double vessel disease.  Blood Loss:               Estimated blood loss during the procedure was 0 mls.  Specimens Removed:        Number of specimen(s) removed: none.     ____________________________________________________________________________________  CONCLUSIONS:   1. Distal Left Main: 10-30% stenosis.   2. Proximal and mid LAD Lesion: The percent stenosis is 10-30%.   3. Proximal CX Lesion: The percent stenosis is 100%.   4. Right Coronary Artery: fills via collaterals.   5. Proximal RCA Lesion: The percent stenosis is 100%.   6. The Left Ventricular Ejection Fraction is 10%,by echo.      Stress Test:  No results found for this or any previous visit from the past 1095 days.    Cardiac Imaging:  No results found for this or any  previous visit from the past 1095 days.      Past Medical History:  He has a past medical history of Atherosclerosis of native artery of both lower extremities with intermittent claudication (CMS-[facility]), CHB (complete heart block) (Multi), Chronic systolic CHF (congestive heart failure), NYHA class 3 (Multi), COPD (chronic obstructive pulmonary disease) (Multi), Coronary artery disease, History of tobacco abuse, HLD (hyperlipidemia), Hypertension, Infrarenal abdominal aortic aneurysm (AAA) without rupture (CMS-[facility]), Ischemic cardiomyopathy with implantable cardioverter-defibrillator (ICD), MI (myocardial infarction) (Multi), Nephrolithiasis, and PTSD (post-traumatic stress disorder).    Past Surgical History:  He has a past surgical history that includes Cardiac defibrillator placement; Coronary angioplasty with stent (2006); Coronary angioplasty with stent (04/2003); Coronary angioplasty with stent (06/2008); Cystoscopy w/ ureteral stent placement (04/01/2024); Cystoscopy w/ ureteral stent placement (04/30/2024); Iliac artery stent (Right); Knee surgery; Femoral bypass; Cardiac electrophysiology procedure (N/A, 5/23/2024); Cardiac catheterization (N/A, 5/23/2024); and Cardiac electrophysiology procedure (N/A, 5/28/2024).      Social History:  He reports that he has quit smoking. His smoking use included cigarettes. He does not have any smokeless tobacco history on file. He reports current alcohol use. Drug use questions deferred to the physician.    Family History:  Family History   Problem Relation Name Age of Onset    Hypertension Mother      Diabetes Father      Hypertension Sister      Diabetes Sister      Colon cancer Maternal Grandmother      Hypertension Maternal Grandmother      Heart disease Maternal Grandfather      Hypertension Maternal Grandfather      Cancer Paternal Grandfather      Hypertension Paternal Grandfather          Allergies:  Chantix [varenicline]    Inpatient Medications:  Scheduled  medications   Medication Dose Route Frequency    amiodarone  150 mg intravenous Once    aspirin  81 mg oral Daily    lidocaine  2 patch transdermal Daily    nicotine  1 patch transdermal Daily    Followed by    [START ON 7/13/2024] nicotine  1 patch transdermal Daily    pantoprazole  40 mg oral Daily before breakfast    [Held by provider] prasugrel  10 mg oral Daily    ranolazine  500 mg oral BID    rosuvastatin  20 mg oral Nightly    traZODone  50 mg oral Nightly     PRN medications   Medication    acetaminophen    Or    acetaminophen    Or    acetaminophen    LORazepam    methocarbamol    trimethobenzamide     Continuous Medications   Medication Dose Last Rate    amiodarone  0.5-1 mg/min 0.5 mg/min (05/30/24 2117)    lidocaine  1 mg/min 1 mg/min (05/30/24 2008)     Outpatient Medications:  Current Outpatient Medications   Medication Instructions    acetaminophen (TYLENOL) 650 mg, oral, Every 6 hours PRN    albuterol sulfate (Proair Digihaler) 90 mcg/actuation aero powdr breath act w/sensor inhaler 2 puffs, inhalation, Every 4 hours PRN    aspirin 81 mg, oral, Daily    b complex 0.4 mg tablet 1 tablet, oral, Daily    calcium polycarbophiL (FIBER (CALCIUM POLYCARBOPHIL)) 625 mg, oral, Daily    cholecalciferol (VITAMIN D-3) 5,000 Units, oral, Every 3 days    co-enzyme Q-10 50 mg, oral, Once Weekly    cyanocobalamin (VITAMIN B-12) 1,000 mcg, oral, Daily    LORazepam (ATIVAN) 0.5 mg, oral, Nightly    nicotine (Nicoderm CQ) 21 mg/24 hr patch 1 patch, transdermal, Every 24 hours    PARoxetine (PAXIL) 40 mg, oral, Every morning    potassium chloride (Klor-Con) 20 mEq packet 20 mEq, oral, Daily    prasugrel (EFFIENT) 10 mg, oral, Daily    ranolazine (RANEXA) 500 mg, oral, 2 times daily, Do not crush, chew, or split.    rosuvastatin (CRESTOR) 20 mg, oral, Nightly    sacubitriL-valsartan (Entresto) 24-26 mg tablet 1 tablet, oral, 2 times daily    tamsulosin (FLOMAX) 0.4 mg, oral, Daily    torsemide (DEMADEX) 20 mg, oral, 2  times daily    traZODone (DESYREL) 50 mg, oral, Nightly    VITAMIN A ORAL 1 capsule, oral, Every other day    vitamin E acetate (VITAMIN E ORAL) 1 capsule, oral, Daily       Physical Exam:  General: Middle-aged man, chronically ill-appearing, no acute distress, lying in bed comfortably  HEENT: Normocephalic, atraumatic, no scleral icterus  Neck: No JVD, no HJR.  Cardiac: Normal S1/S2, paced rhythm with HR 80.  Pulmonary: Diminished breath sounds bilaterally, no crackles or wheezes. Breathing comfortably and speaking complete sentences on 2L supplemental oxygen.   Abdomen: Soft, non-tender, non-distended  Skin: Warm and dry  Extremities: No pitting edema bilateral lower extremities, well-perfused  Neuro: AAOx3, CN 2-12 grossly intact, no focal neurological deficits            Assessment/Plan   #Stage C NYHA III HFrEF EF 15%  #Chronic ischemic cardiomyopathy  #Recurrent VT status post RFA ablation (5/30/2024 and in 2019)  #CAD status post multiple PCIs  #Moderate to severe mitral regurgitation  #Infrarenal AAA s/p right iliac stent  #Peripheral vascular disease  #Nephrolithiasis status post recent bilateral ureteral stent placement  #Tobacco use disorder      Recommendations:  - Will initiate advanced heart failure therapies evaluation given high risk of recurrence of VT in the setting of high scar burden and un-revascularized CAD  - Regarding advanced therapies options:  - LVAD implantation would not improve recurrent arrhythmias  - Potential barriers to heart transplantation include long-standing and recent tobacco smoking and peripheral vascular disease with infrarenal AAA status post right iliac artery stent placement  - Heart Transplant/LVAD team notified via email regarding initiation of advanced therapies evaluation on 5/31      Patient was examined and discussed with Advanced Heart Failure and Transplant Cardiology attending physician, Dr. Meliza Wade.      Signed,  Bud Valles MD  Heart Failure  Fellow PGY4

## 2024-05-31 NOTE — PROGRESS NOTES
Subjective Data:  No complaints of worsening chest pain / shortness of breath / access site pain or tenderness     Overnight Events:    No pertinent overnight events      Objective Data:  Last Recorded Vitals:  Vitals:    05/31/24 0900 05/31/24 1000 05/31/24 1100 05/31/24 1200   BP:  113/76 113/68 96/64   Pulse: 80 80 80 80   Resp: 19 15 17 19   Temp:   36 °C (96.8 °F)    TempSrc:       SpO2: 95% 95% 95% 96%   Weight:       Height:           Last Labs:  CBC - 5/31/2024:  5:34 AM  8.2 13.5 214    37.2      CMP - 5/31/2024:  5:34 AM  8.7 5.9 9 --- 0.6   4.3 3.4 13 67      PTT - 5/29/2024:  5:18 AM  1.1   12.8 29     TROPHS   Date/Time Value Ref Range Status   05/23/2024 07:11  0 - 20 ng/L Final     Comment:     Previous result verified on 5/23/2024 0008 on specimen/case [id number] called with component TRPHS for procedure Troponin, High Sensitivity, 1 Hour with value 160 ng/L.   05/23/2024 01:39  0 - 20 ng/L Final     Comment:     Previous result verified on 5/23/2024 0008 on specimen/case [id number] called with component TRPHS for procedure Troponin, High Sensitivity, 1 Hour with value 160 ng/L.   05/22/2024 11:36  0 - 20 ng/L Final     BNP   Date/Time Value Ref Range Status   05/28/2024 03:03  0 - 99 pg/mL Final   05/22/2024 09:28 PM 2,144 0 - 99 pg/mL Final      Last I/O:  I/O last 3 completed shifts:  In: 1243.3 (14.6 mL/kg) [I.V.:993.3 (11.7 mL/kg); IV Piggyback:250]  Out: 1905 (22.4 mL/kg) [Urine:1900 (0.6 mL/kg/hr); Blood:5]  Weight: 85 kg     Past Cardiology Tests (Last 3 Years):  EKG:  Electrocardiogram, 12-lead PRN ACS symptoms 05/29/2024 (Preliminary)      Electrocardiogram, 12-lead PRN ACS symptoms 05/29/2024 (Preliminary)      Electrocardiogram, 12-lead PRN ACS symptoms 05/29/2024 (Preliminary)      Electrocardiogram, 12-lead PRN ACS symptoms 05/29/2024 (Preliminary)      Electrocardiogram, 12-lead PRN ACS symptoms 05/28/2024 (Preliminary)      Electrocardiogram, 12-lead  "PRN ACS symptoms 05/28/2024 (Preliminary)      Electrocardiogram, 12-lead PRN ACS symptoms 05/28/2024 (Preliminary)      ECG 12 lead 05/28/2024      ECG 12 Lead 05/28/2024      ECG 12 Lead 05/27/2024      ECG 12 Lead 05/26/2024      ECG 12 Lead 05/25/2024      ECG 12 Lead 05/24/2024      ECG 12 Lead 05/23/2024      ECG 12 lead 05/23/2024      ECG 12 lead 05/22/2024    Echo:  Transthoracic Echo (TTE) Complete 05/28/2024      Transthoracic Echo (TTE) Complete 05/23/2024    Ejection Fractions:  No results found for: \"EF\"  Cath:  Cardiac Catheterization Procedure 05/23/2024    Stress Test:  No results found for this or any previous visit from the past 1095 days.    Cardiac Imaging:  No results found for this or any previous visit from the past 1095 days.      Inpatient Medications:  Scheduled medications   Medication Dose Route Frequency    amiodarone  150 mg intravenous Once    aspirin  81 mg oral Daily    lidocaine  2 patch transdermal Daily    nicotine  1 patch transdermal Daily    Followed by    [START ON 7/13/2024] nicotine  1 patch transdermal Daily    pantoprazole  40 mg oral Daily before breakfast    polyethylene glycol  17 g oral BID    [Held by provider] prasugrel  10 mg oral Daily    ranolazine  500 mg oral BID    rosuvastatin  20 mg oral Nightly    sennosides-docusate sodium  2 tablet oral BID    traZODone  50 mg oral Nightly     PRN medications   Medication    acetaminophen    Or    acetaminophen    Or    acetaminophen    heparin    LORazepam    methocarbamol    trimethobenzamide     Continuous Medications   Medication Dose Last Rate    amiodarone  0.5-1 mg/min 0.5 mg/min (05/31/24 1100)    heparin  0-4,000 Units/hr 900 Units/hr (05/31/24 1100)    lidocaine  1 mg/min 1 mg/min (05/31/24 1100)       Physical Exam:  AO x 3   NFND  On supplemental oxygen   Soft and non tender abdomen   Warm extremities   RFV access site examined : normal with no erythema / tenderness / hematoma     Telemetry reviewed with AP - "  at 80 bpm and no sustained ventricular arrhythmias        Assessment/Plan   58M PMHx CAD s/p multiple PCI, ICM/HFrEF (EF 15% 5/24) s/p CRT-D, VT storm s/p VT ablation, infrarenal AAA s/p R iliac stent, nephrolithiasis s/p recent ureteral stent. Presented to OSH with multiple ICD shocks for VT. Now in incessant, slow VT for which EP is consulted.      #ICM/HFrEF (EF 15%) s/p CRT-D  #Prior VT storm s/p VT ablation  #Incessant VT s/p ICD shock  Patient has remained in slow, hemodynamically stable VT, likely scar mediated iso known ICM and unrevascularized CAD. Given inability to pace terminate this, we will plan for an ablation at this time whilst patient is also undergoing advanced therapies workup as long term with un-revascularized CAD and such low EF he likely has high scar burden and high risk of recurrence. Pt overnight had increase in VT despite being on Amiodarone and addition of IV lidocaine. Similar to before bedside ATP attempts were made but pt has transient termination with resumption of VT. RV pacing alone also did not allow for termination of VT (r/o LV pacing induced VT).     The patient underwent VT RFA with  5/30/24:   Successful RFA for clinical VT 1 with a critical isthmus involving the mid inferior / infero - septal left ventricular segment  Successful RFA for clinical VT 2 pace mapped to the mid lateral left ventriclar segment     Recommendations:   Formal device interrogation, turn down pacing to DDD 70 bpm ( I have informed the device clinic to perform this change), this needs to be weaned down to DDD 60 bpm over the next 24 - 48 hours  Switch to oral AADT : amiodarone 400 mg BID + mexiletine 150 mg TID  Continue work up for advanced HF therapies   Continue heparin gtt in anticipation for invasive procedures ( will need to be on anticoagulation for at least one month given extensive ablation in the LV)     The EP team will continue to follow along and help with decision making          Peripheral IV 05/28/24 20 G Proximal;Right;Anterior Forearm (Active)   Site Assessment Clean;Dry;Intact 05/31/24 0800   Dressing Type Transparent 05/31/24 0800   Line Status Infusing 05/31/24 0800   Dressing Status Clean;Dry;Occlusive 05/31/24 0800   Number of days: 3       Peripheral IV 05/28/24 Distal;Right;Anterior Forearm (Active)   Site Assessment Clean;Dry;Intact 05/31/24 0800   Dressing Type Transparent 05/31/24 0800   Line Status Infusing 05/31/24 0800   Dressing Status Clean;Dry;Occlusive 05/31/24 0800   Number of days: 3       Peripheral IV 05/28/24 20 G Right Antecubital (Active)   Site Assessment Clean;Dry;Intact 05/31/24 0800   Dressing Type Transparent 05/31/24 0800   Line Status Blood return noted;Flushed;Saline locked 05/31/24 0800   Dressing Status Clean;Dry;Occlusive 05/31/24 0800   Number of days: 3       Code Status:  Full Code    I spent 30 minutes in the professional and overall care of this patient.        Antione Roman MD    I saw and evaluated the patient. I personally obtained the key and critical portions of the history and physical exam or was physically present for key and critical portions performed by the resident/fellow. I reviewed the resident/fellow's documentation and discussed the patient with the resident/fellow. I agree with the resident/fellow's medical decision making as documented in the note, and my edits (bold and italic) have been made to the document as needed.     Sonny Flores MD [facility]  Cardiac Electrophysiology    **Disclaimer: This note was dictated by speech recognition, and every effort has been made to prevent any error in transcription, however minor errors may be present**

## 2024-05-31 NOTE — PROGRESS NOTES
"2/2 CICU  CNS (Alton Méndez) met with patient and family re: meds and as a support role.  She obtained details of specific meds patient going without or not taking (see 5/30 Care Transitions note).  Pt. Underwent ablation 5/30.  Also in review of today Cardiology Crit Care (Resident note) \"Nursing did report significant desaturations down to 78% overnight and suspects has undiagnosed GENARO is on 5L NC but does not wear O2 at home. EP reports received over 1L in procedure. No BM in over 1 week.\" Awaiting for PT/OT recs for dispo.  Proactively met with patient / mother / daughter and her fiance to review loc for AR / SNF / HHC contingent upon PT/OT recs and insurance auth.  Explained dc planning process.  AR/SNF/HHC lists given.  If AR =  [facility] / If SNF, [facility] (mother worked there as an aide in past) / If home,  HHC.  Pt. Notes having never had home O2 in past.      Alyse Marx (LSW, MSW)     "

## 2024-05-31 NOTE — NURSING NOTE
Transplant/LVAD Education Consent:   Topic: Pre Advanced Therapy Patient Education   Attendees: Patient, transplant coordinator     Patient given the [facility] Pre Advanced Therapy Education binder. Patient received education regarding the following topics for their pre Heart Transplant and pre Left Ventricular Assist Device evaluation:    The evaluation process including advanced therapy team members and roles, required testing and consults, selection criteria and suitability for OHT and LVAD, OHT listing process and organ offer, hands on LVAD equipment review, psychological and financial considerations for a successful outcome with advanced therapies, and patient responsibilities including adherence to a strict medical regimen.    An overview of the surgical procedure for OHT and LVAD.    The potential for certain risk factors including infection, pneumonia, blood clot formation, organ rejection, failure, and possibility of re transplantation, lifetime immunosuppression and associated risks with OHT, arrhythmias and cardiovascular collapse, multi-organ system failure, depression, post- traumatic stress disorder, anxiety, issues of dependence or feelings of guilt, right ventricular failure with LVAD, and death.   National and Transplant [place] outcomes for one year patient and graft survival from the most recent SRTR program specific report.    Donor risk factors that could affect the success of the transplant and health of the patient including donor age, donor medical and social history, condition of the organ, and the risk of jace cancer, HIV, Hepatitis B or C, and/or malaria if the infection is not detectable at the time of donation.    Transplants not performed in a Medicare-approved transplant center could affect the patient's ability to have immunosuppression medication paid for under Medicare part B.    STS Intermacs and  LVAD implant outcomes for one year patient survival.    The  medical necessity of electricity and a working telephone with implantation of the LVAD.    Available alternatives to OHT and LVAD.    The patient's right to withdraw consent for the evaluations at any time during the process.      Patient was given the opportunity to have questions answered.   Consent signed for Heart Transplant and LVAD? : NO   If no, which consent was signed? : NONE  Current barriers: Active tobacco use, social support  Consent obtained by: NOT OBTAINED    Patient declined to sign consents for heart transplant or LVAD evaluation at this time. He would like to consider education and re-discuss on Monday.

## 2024-05-31 NOTE — PROGRESS NOTES
Physical Therapy    Physical Therapy Evaluation    Patient Name: Cristian Sapp  MRN: [id number]  Today's Date: 5/31/2024   Time Calculation  Start Time: 1405  Stop Time: 1427  Time Calculation (min): 22 min    Assessment/Plan   PT Assessment  PT Assessment Results: Decreased strength, Decreased endurance, Impaired balance, Decreased mobility  Rehab Prognosis: Good  Barriers to Discharge: none  Evaluation/Treatment Tolerance: Patient tolerated treatment well  Medical Staff Made Aware: Yes  Strengths: Ability to acquire knowledge, Attitude of self, Housing layout, Premorbid level of function, Support and attitude of living partners, Capable of completing ADLs semi/independent  Barriers to Participation: Comorbidities  End of Session Communication: Bedside nurse    Assessment Comment: Pt. is a 58 yom that presents with impairments including decreased functional strength, decreased activity tolerance/endurance, mildly impaired balance, and increased difficulty with functional mobility compared to baseline level of function. Pt. will benefit from skilled PT intervention while inpatient to address the above deficits and maximize return to PLOF. Anticipate pt will require no PT needs upon discharge.    End of Session Patient Position: Up in chair, Alarm off, not on at start of session (Call light in reach)    IP OR SWING BED PT PLAN  Inpatient or Swing Bed: Inpatient  PT Plan  Treatment/Interventions: Bed mobility, Transfer training, Gait training, Stair training, Balance training, Strengthening, Endurance training, Therapeutic exercise, Therapeutic activity, Home exercise program, Postural re-education  PT Plan: Skilled PT  PT Frequency: 3 times per week  PT Discharge Recommendations: No PT needed after discharge  PT Recommended Transfer Status: Contact guard  PT - OK to Discharge: Yes (Eval complete, refer to dispo)      Subjective   General Visit Information:  General  Reason for Referral: Pt initially presented to   [facility] for multiple ICD shocks, found to have VT, underwent DCCV, electrolytes were replaced, started on amiodarone infusion. He underwent coronary angiogram showing previously known  of Lcx and RCA, TTE showed reduced EF and moderate to severe MR. He was continued on medical management for known CAD, developed VT again at a slower rate but remained hemodynamically stable and was subsequently transferred to [facility] for further evaluation and management.  Past Medical History Relevant to Rehab: Stage C NYHA III HFrEF EF 15%, chronic ischemic cardiomyopathy, CAD s/p multiple PCIs, VT storm s/p VT ablation (08/02/2019) with subsequent upgrade of dual chamber ICD to a multi-lead atrio-biventricular ICD on 11/06/2019, infrarenal AAA s/p right iliac stent, nephrolithiasis s/p recent ureteral stent, tobacco use disorder (1ppd x40 years, attempted cessation 3 weeks prior to admission, relapsed on the day of admission)  Family/Caregiver Present: Yes  Caregiver Feedback: Pt.'s parents present at start of session though stepped out prior to mobilizing  Co-Treatment: OT  Co-Treatment Reason: To maximize pt. safety and therapuetic potential, to facilitate timely discharge  Prior to Session Communication: Bedside nurse  Patient Position Received: Bed, 3 rail up, Alarm off, not on at start of session  Preferred Learning Style: auditory, verbal  General Comment: Pt. received supine in bed, reports feeling anxious re: mobilizing 2/2 recent ICD shocks. Pt. was agreeable to participate in session. (Pt. is dependently paced (PPM at 70 bpm), lidocaine 1 mg, amio 0.5 mg, IV heparin, 3L O2 NC, tele, external catheter)    Home Living:  Home Living  Type of Home: House  Lives With: Parent(s)  Home Adaptive Equipment: None  Home Layout: One level  Home Access: Level entry  Bathroom Shower/Tub: Tub/shower unit  Bathroom Equipment: None    Prior Level of Function:  Prior Function Per Pt/Caregiver Report  Level of [place]: Independent  "with ADLs and functional transfers, Independent with homemaking with ambulation  ADL Assistance: Independent  Homemaking Assistance: Independent  Ambulatory Assistance: Independent (no AD, denied falls)  Vocational: On disability  Leisure: Gardening, doing yard work    Precautions:  Precautions  Hearing/Visual Limitations: +glasses  Medical Precautions: Fall precautions, Cardiac precautions  Precautions Comment: Pt. is dependently paced by PPM (70 bpm)    Vital Signs:  Vital Signs  Heart Rate: 70 (post: 70)  Heart Rate Source: Monitor  Resp: 19 (post: 13)  SpO2: 98 %  BP: 106/76 (post: 141/74)  MAP (mmHg): 86 (post: 89)  BP Location: Left arm  BP Method: Automatic  Patient Position: Lying    Objective   Pain:  Pain Assessment  Pain Assessment: 0-10  Pain Score: 0 - No pain    Cognition:  Cognition  Overall Cognitive Status: Within Functional Limits  Arousal/Alertness: Appropriate responses to stimuli  Orientation Level: Oriented X4 (Pt. reporting \"2008\" at start of session, though with increased arousal/alertness appropriately identified year at end of session.)  Following Commands: Follows all commands and directions without difficulty    General Assessments:  Activity Tolerance  Endurance: Decreased tolerance for upright activites  Early Mobility/Exercise Safety Screen: Proceed with mobilization - No exclusion criteria met    Sensation  Light Touch: No apparent deficits  Sensation Comment: Pt. denied N/T    Postural Control  Postural Control: Within Functional Limits    Static Sitting Balance  Static Sitting-Comment/Number of Minutes: Sup  Dynamic Sitting Balance  Dynamic Sitting-Comments: Sup    Static Standing Balance  Static Standing-Comment/Number of Minutes: SBA  Dynamic Standing Balance  Dynamic Standing-Comments: CGA    Functional Assessments:  Bed Mobility  Bed Mobility: Yes  Bed Mobility 1  Bed Mobility 1: Supine to sitting  Level of Assistance 1: Close supervision  Bed Mobility Comments 1: HOB elevated " slightly    Transfers  Transfer: Yes  Transfer 1  Transfer From 1: Bed to  Transfer to 1: Stand  Technique 1: Sit to stand  Transfer Device 1:  (none)  Transfer Level of Assistance 1: Close supervision  Transfers 2  Transfer From 2: Bed to  Transfer to 2: Chair with arms  Technique 2: Stand pivot, To left  Transfer Device 2:  (none)  Transfer Level of Assistance 2: Contact guard  Trials/Comments 2: Pt. completed short steps from bed>chair  Transfers 3  Transfer From 3: Stand to  Transfer to 3: Chair with arms  Technique 3: Stand to sit  Transfer Device 3:  (none)  Transfer Level of Assistance 3: Close supervision    Ambulation/Gait Training  Ambulation/Gait Training Performed: Yes (See transfer section)    Stairs  Stairs: No    Extremity/Trunk Assessments:  RLE   RLE : Within Functional Limits  LLE   LLE : Within Functional Limits    Outcome Measures:  [facility] Basic Mobility  Turning from your back to your side while in a flat bed without using bedrails: None  Moving from lying on your back to sitting on the side of a flat bed without using bedrails: None  Moving to and from bed to chair (including a wheelchair): A little  Standing up from a chair using your arms (e.g. wheelchair or bedside chair): A little  To walk in hospital room: A little  Climbing 3-5 steps with railing: A little  Basic Mobility - Total Score: 20    Confusion Assessment Method-ICU (CAM-ICU)  Feature 1: Acute Onset or Fluctuating Course: Negative  Overall CAM-ICU: Negative    FSS-ICU  Ambulation: Walks <50 feet with any assistance x1 or walks any distance with assistance x2 people  Rolling: Supervision or set-up only  Sitting: Supervision or set-up only  Transfer Sit-to-Stand: Minimal assistance (performs 75% or more of task)  Transfer Supine-to-Sit: Supervision or set-up only  Total Score: 20      ICU Mobility Screen  Early Mobility/Exercise Safety Screen: Proceed with mobilization - No exclusion criteria met  ICU Mobility Scale: Transferring bed  to chair  E = Exercise and Early Mobility  Early Mobility/Exercise Safety Screen: Proceed with mobilization - No exclusion criteria met  ICU Mobility Scale: Transferring bed to chair         Encounter Problems       Encounter Problems (Active)       Balance       Patient to demonstrate Leila static and dynamic standing balance without LOB with change of directions, no hesitancy, appropriate ANEL and sequencing to complete functional task.        Start:  05/31/24    Expected End:  06/14/24            Pt will score >= 24/28 on Tinetti balance assessment to indicate low falls risk       Start:  05/31/24    Expected End:  06/14/24                   Mobility       STG - Patient will ambulate >/= 200 ft Leila with LRAD       Start:  05/31/24    Expected End:  06/14/24            Patient will tolerate >/=30 minutes continuous activity with stable vital signs, RPE </=13/20, RPD </=3/10        Start:  05/31/24    Expected End:  06/14/24               PT Transfers       STG - Patient will perform bed mobility IND with HOB flat and no rails       Start:  05/31/24    Expected End:  06/14/24            STG - Patient will transfer sit to and from stand Leila with LRAD       Start:  05/31/24    Expected End:  06/14/24                   Education Documentation  Precautions, taught by Radha Gonzalez, PT at 5/31/2024  3:17 PM.  Learner: Patient  Readiness: Acceptance  Method: Explanation  Response: Verbalizes Understanding    Body Mechanics, taught by Radha Gonzalez PT at 5/31/2024  3:17 PM.  Learner: Patient  Readiness: Acceptance  Method: Explanation  Response: Verbalizes Understanding    Mobility Training, taught by Radha Gonzalez PT at 5/31/2024  3:17 PM.  Learner: Patient  Readiness: Acceptance  Method: Explanation  Response: Verbalizes Understanding    Education Comments  No comments found.          05/31/24 at 3:19 PM - Radha Gonzalez PT

## 2024-06-01 LAB
ALBUMIN SERPL BCP-MCNC: 3.3 G/DL (ref 3.4–5)
ANION GAP SERPL CALC-SCNC: 10 MMOL/L (ref 10–20)
BASOPHILS # BLD AUTO: 0.06 X10*3/UL (ref 0–0.1)
BASOPHILS NFR BLD AUTO: 0.7 %
BUN SERPL-MCNC: 15 MG/DL (ref 6–23)
CALCIUM SERPL-MCNC: 8.7 MG/DL (ref 8.6–10.6)
CHLORIDE SERPL-SCNC: 101 MMOL/L (ref 98–107)
CO2 SERPL-SCNC: 31 MMOL/L (ref 21–32)
CREAT SERPL-MCNC: 1.11 MG/DL (ref 0.5–1.3)
EGFRCR SERPLBLD CKD-EPI 2021: 78 ML/MIN/1.73M*2
EOSINOPHIL # BLD AUTO: 0.23 X10*3/UL (ref 0–0.7)
EOSINOPHIL NFR BLD AUTO: 2.6 %
ERYTHROCYTE [DISTWIDTH] IN BLOOD BY AUTOMATED COUNT: 13.5 % (ref 11.5–14.5)
GLUCOSE SERPL-MCNC: 108 MG/DL (ref 74–99)
HCT VFR BLD AUTO: 33.4 % (ref 41–52)
HGB BLD-MCNC: 11.4 G/DL (ref 13.5–17.5)
IMM GRANULOCYTES # BLD AUTO: 0.05 X10*3/UL (ref 0–0.7)
IMM GRANULOCYTES NFR BLD AUTO: 0.6 % (ref 0–0.9)
LYMPHOCYTES # BLD AUTO: 1.66 X10*3/UL (ref 1.2–4.8)
LYMPHOCYTES NFR BLD AUTO: 18.8 %
MAGNESIUM SERPL-MCNC: 2.03 MG/DL (ref 1.6–2.4)
MCH RBC QN AUTO: 32.5 PG (ref 26–34)
MCHC RBC AUTO-ENTMCNC: 34.1 G/DL (ref 32–36)
MCV RBC AUTO: 95 FL (ref 80–100)
MONOCYTES # BLD AUTO: 0.86 X10*3/UL (ref 0.1–1)
MONOCYTES NFR BLD AUTO: 9.7 %
NEUTROPHILS # BLD AUTO: 5.98 X10*3/UL (ref 1.2–7.7)
NEUTROPHILS NFR BLD AUTO: 67.6 %
NRBC BLD-RTO: 0 /100 WBCS (ref 0–0)
PHOSPHATE SERPL-MCNC: 3.4 MG/DL (ref 2.5–4.9)
PLATELET # BLD AUTO: 235 X10*3/UL (ref 150–450)
POTASSIUM SERPL-SCNC: 4.2 MMOL/L (ref 3.5–5.3)
RBC # BLD AUTO: 3.51 X10*6/UL (ref 4.5–5.9)
SODIUM SERPL-SCNC: 138 MMOL/L (ref 136–145)
UFH PPP CHRO-ACNC: 0.1 IU/ML
WBC # BLD AUTO: 8.8 X10*3/UL (ref 4.4–11.3)

## 2024-06-01 PROCEDURE — S4991 NICOTINE PATCH NONLEGEND: HCPCS

## 2024-06-01 PROCEDURE — 2500000001 HC RX 250 WO HCPCS SELF ADMINISTERED DRUGS (ALT 637 FOR MEDICARE OP)

## 2024-06-01 PROCEDURE — 85520 HEPARIN ASSAY: CPT | Performed by: STUDENT IN AN ORGANIZED HEALTH CARE EDUCATION/TRAINING PROGRAM

## 2024-06-01 PROCEDURE — 80069 RENAL FUNCTION PANEL: CPT | Performed by: STUDENT IN AN ORGANIZED HEALTH CARE EDUCATION/TRAINING PROGRAM

## 2024-06-01 PROCEDURE — 2020000001 HC ICU ROOM DAILY

## 2024-06-01 PROCEDURE — 2500000004 HC RX 250 GENERAL PHARMACY W/ HCPCS (ALT 636 FOR OP/ED): Performed by: STUDENT IN AN ORGANIZED HEALTH CARE EDUCATION/TRAINING PROGRAM

## 2024-06-01 PROCEDURE — 2500000002 HC RX 250 W HCPCS SELF ADMINISTERED DRUGS (ALT 637 FOR MEDICARE OP, ALT 636 FOR OP/ED)

## 2024-06-01 PROCEDURE — 2500000001 HC RX 250 WO HCPCS SELF ADMINISTERED DRUGS (ALT 637 FOR MEDICARE OP): Performed by: STUDENT IN AN ORGANIZED HEALTH CARE EDUCATION/TRAINING PROGRAM

## 2024-06-01 PROCEDURE — 83735 ASSAY OF MAGNESIUM: CPT | Performed by: STUDENT IN AN ORGANIZED HEALTH CARE EDUCATION/TRAINING PROGRAM

## 2024-06-01 PROCEDURE — 36415 COLL VENOUS BLD VENIPUNCTURE: CPT | Performed by: STUDENT IN AN ORGANIZED HEALTH CARE EDUCATION/TRAINING PROGRAM

## 2024-06-01 PROCEDURE — 2500000002 HC RX 250 W HCPCS SELF ADMINISTERED DRUGS (ALT 637 FOR MEDICARE OP, ALT 636 FOR OP/ED): Performed by: STUDENT IN AN ORGANIZED HEALTH CARE EDUCATION/TRAINING PROGRAM

## 2024-06-01 PROCEDURE — 99291 CRITICAL CARE FIRST HOUR: CPT

## 2024-06-01 PROCEDURE — 85025 COMPLETE CBC W/AUTO DIFF WBC: CPT | Performed by: STUDENT IN AN ORGANIZED HEALTH CARE EDUCATION/TRAINING PROGRAM

## 2024-06-01 RX ORDER — HYDROXYZINE HYDROCHLORIDE 10 MG/1
10 TABLET, FILM COATED ORAL EVERY 6 HOURS PRN
Status: DISCONTINUED | OUTPATIENT
Start: 2024-06-01 | End: 2024-06-04

## 2024-06-01 RX ADMIN — LORAZEPAM 0.5 MG: 0.5 TABLET ORAL at 09:01

## 2024-06-01 RX ADMIN — SENNOSIDES AND DOCUSATE SODIUM 2 TABLET: 8.6; 5 TABLET ORAL at 20:53

## 2024-06-01 RX ADMIN — POLYETHYLENE GLYCOL 3350 17 G: 17 POWDER, FOR SOLUTION ORAL at 09:00

## 2024-06-01 RX ADMIN — RANOLAZINE 500 MG: 500 TABLET, EXTENDED RELEASE ORAL at 21:19

## 2024-06-01 RX ADMIN — AMIODARONE HYDROCHLORIDE 400 MG: 200 TABLET ORAL at 20:54

## 2024-06-01 RX ADMIN — ROSUVASTATIN 20 MG: 20 TABLET, FILM COATED ORAL at 21:19

## 2024-06-01 RX ADMIN — MEXILETINE HYDROCHLORIDE 150 MG: 150 CAPSULE ORAL at 13:34

## 2024-06-01 RX ADMIN — HYDROXYZINE HYDROCHLORIDE 10 MG: 10 TABLET ORAL at 20:54

## 2024-06-01 RX ADMIN — Medication 1 PATCH: at 06:02

## 2024-06-01 RX ADMIN — PANTOPRAZOLE SODIUM 40 MG: 40 TABLET, DELAYED RELEASE ORAL at 06:02

## 2024-06-01 RX ADMIN — AMIODARONE HYDROCHLORIDE 400 MG: 200 TABLET ORAL at 09:01

## 2024-06-01 RX ADMIN — MEXILETINE HYDROCHLORIDE 150 MG: 150 CAPSULE ORAL at 21:19

## 2024-06-01 RX ADMIN — LORAZEPAM 0.5 MG: 0.5 TABLET ORAL at 20:39

## 2024-06-01 RX ADMIN — ASPIRIN 81 MG CHEWABLE TABLET 81 MG: 81 TABLET CHEWABLE at 09:01

## 2024-06-01 RX ADMIN — MEXILETINE HYDROCHLORIDE 150 MG: 150 CAPSULE ORAL at 06:02

## 2024-06-01 RX ADMIN — ACETAMINOPHEN 650 MG: 325 TABLET ORAL at 18:11

## 2024-06-01 RX ADMIN — POLYETHYLENE GLYCOL 3350 17 G: 17 POWDER, FOR SOLUTION ORAL at 20:53

## 2024-06-01 RX ADMIN — TRAZODONE HYDROCHLORIDE 50 MG: 50 TABLET ORAL at 21:00

## 2024-06-01 RX ADMIN — RANOLAZINE 500 MG: 500 TABLET, EXTENDED RELEASE ORAL at 09:01

## 2024-06-01 RX ADMIN — SENNOSIDES AND DOCUSATE SODIUM 2 TABLET: 8.6; 5 TABLET ORAL at 09:02

## 2024-06-01 RX ADMIN — HEPARIN SODIUM 900 UNITS/HR: 10000 INJECTION, SOLUTION INTRAVENOUS at 12:24

## 2024-06-01 ASSESSMENT — PAIN SCALES - GENERAL
PAINLEVEL_OUTOF10: 0 - NO PAIN
PAINLEVEL_OUTOF10: 4
PAINLEVEL_OUTOF10: 0 - NO PAIN
PAINLEVEL_OUTOF10: 0 - NO PAIN

## 2024-06-01 ASSESSMENT — PAIN DESCRIPTION - LOCATION: LOCATION: HEAD

## 2024-06-01 ASSESSMENT — PAIN - FUNCTIONAL ASSESSMENT
PAIN_FUNCTIONAL_ASSESSMENT: 0-10

## 2024-06-01 NOTE — PROGRESS NOTES
"Cristian Sapp is a 58 y.o. male on day 4 of admission presenting with Acute heart failure, unspecified heart failure type (Multi).    Subjective   No acute events overnight. No VT on Tele. Excited this morning he finally had a bowel movement (last BM () He has no complaints this AM. He denies any chest pain, SOB, fever, chills, n/v.     Objective     Last Recorded Vitals  Blood pressure (!) 90/45, pulse 70, temperature 36.5 °C (97.7 °F), temperature source Temporal, resp. rate 15, height 1.702 m (5' 7\"), weight 82.5 kg (181 lb 14.1 oz), SpO2 94%.  Intake/Output last 3 Shifts:  I/O last 3 completed shifts:  In: 873 (10.6 mL/kg) [I.V.:873 (10.6 mL/kg)]  Out: 3876 (47 mL/kg) [Urine:3875 (1.3 mL/kg/hr); Stool:1]  Weight: 82.5 kg     Relevant Results  24 Hour Vitals  Temp:  [36 °C (96.8 °F)-36.7 °C (98.1 °F)] 36.5 °C (97.7 °F)  Heart Rate:  [70-80] 70  Resp:  [14-25] 15  BP: ()/(45-85) 90/45    Temp (24hrs), Av.4 °C (97.5 °F), Min:36 °C (96.8 °F), Max:36.7 °C (98.1 °F)     24 hour Intake/Output    Intake/Output Summary (Last 24 hours) at 2024 0732  Last data filed at 2024 0400  Gross per 24 hour   Intake 436.43 ml   Output 3176 ml   Net -2739.57 ml        Exam:  General: lying in bed comfortably   HEENT: EOMI, normal conjunctiva, sclera anicteric   Neuro: alert, oriented, normal speech, conversant   CV: paced  Pulm: CTAB, breathing comfortably on 5L   GI: soft, nontender, nondistended   Skin: warm, dry   Ext: no edema, palpable pulses     Labs  CBC  Results from last 72 hours   Lab Units 24  0534 24  0506   WBC AUTO x10*3/uL 8.2 8.5   HEMOGLOBIN g/dL 13.5 13.0*   HEMATOCRIT % 37.2* 35.7*   PLATELETS AUTO x10*3/uL 214 211        BMP  Results from last 72 hours   Lab Units 24  0612 24  1742 24  0534   SODIUM mmol/L 138 136 136   POTASSIUM mmol/L 4.2 4.3 4.5   CHLORIDE mmol/L 101 97* 100   BUN mg/dL 15 16 15   CREATININE mg/dL 1.11 1.15 1.05   MAGNESIUM mg/dL 2.03 1.99 2.12 "   PHOSPHORUS mg/dL 3.4 3.2 4.3       Medications   Scheduled Medications  amiodarone, 150 mg, intravenous, Once  amiodarone, 400 mg, oral, BID  aspirin, 81 mg, oral, Daily  lidocaine, 2 patch, transdermal, Daily  mexiletine, 150 mg, oral, q8h ANNIKA  nicotine, 1 patch, transdermal, Daily   Followed by  [START ON 7/13/2024] nicotine, 1 patch, transdermal, Daily  pantoprazole, 40 mg, oral, Daily before breakfast  polyethylene glycol, 17 g, oral, BID  [Held by provider] prasugrel, 10 mg, oral, Daily  ranolazine, 500 mg, oral, BID  rosuvastatin, 20 mg, oral, Nightly  sennosides-docusate sodium, 2 tablet, oral, BID  traZODone, 50 mg, oral, Nightly     Continuous Medications  heparin, 0-4,000 Units/hr, Last Rate: 900 Units/hr (06/01/24 0400)     PRN Medications  PRN medications: acetaminophen **OR** acetaminophen **OR** acetaminophen, heparin, LORazepam, methocarbamol, trimethobenzamide     Assessment/Plan   Principal Problem:    Acute heart failure, unspecified heart failure type (Multi)  Active Problems:    AICD discharge    HFrEF (heart failure with reduced ejection fraction) (Multi)    Ischemic cardiomyopathy    Ventricular tachycardia (Multi)    Patient is a 58/ male, with recurrent ICD shocks 2/2 VT episodes, presenting from OSH, due to ICD shocks s/p DCCV 5/30, now in slow VT with plan for a second ablation with EP given his persistent VT with higher rates. S/p successful VT ablation 5/30 now 100% paced.     Update 06/01/24  - s/p successful VT ablation 5/30/24.  - continue  heparin ggt given ablation 5/30/24 possible RHC in future will hold off on DOAC   - Holding off on diuresis for today  - Encourage patient to ambulate  - Continue to hold home Paroxetine for now  - Can consider transfer to the floor tomorrow     Neuro  -Anxiety due to ICD firing, on Ativan PRN and home   -Holding Paroxetine for MDD given arrhythmias      Cardio  #ICM/HFrEF (EF 15%) s/p CRT-D  #Prior VT storm s/p VT ablation  #VT s/p ICD shock  #VT  with slow rate now   ::S/P Successfuly VT ablation 5/30/24  -s/p Bolused and amio drip   -Device interrogation done 5/31 and pace rate reduced  Plan  - continue PO amio 400mg BID  -C/W ASA  -Hold prasugrel   -C/W Ranolazine   -C/W Rosuvastatin   -Holding Entresto   -Holding Torsemide, diuresing with PRN meds  -Heart failure consulted for transplant eval and following   - Patient declined consent for transplant eval/LVAD, h wants to consider education and re-discuss on Monday 6/3  - Continue heparin ggt given ablation 5/30/24 possible RHC in future will hold off on DOAC   - Encourage patient to ambulate    Pulmonary   #SOB  -will obtain CXR   -s/p Lasix 40 5/31  - Holding off on diuresis for today  -likely needs outpatient sleep study for suspected GENARO      Renal   #Recurrent kidney stones  -has ureteral stent in Left and consulted urology 5/31 to discuss removal   - Per urology, they will follow outpatient for STENT removal   GI  -PPI for ppx    #Constipation  -BID miralax  -BID doc-senna      Endo  -No active issues      Heme  -No active issues      ID  -No active issues     F: PRN  E: K>4, Mg >2  N: cardiac diet  A: PIV  DVT ppx: Heparin gtt  Code Status: Full code   Decision maker: mother Michelle [phone]     Willard duque MD MPH

## 2024-06-01 NOTE — CARE PLAN
Problem: Fall/Injury  Goal: Not fall by end of shift  Outcome: Progressing  Goal: Be free from injury by end of the shift  Outcome: Progressing  Goal: Verbalize understanding of personal risk factors for fall in the hospital  Outcome: Progressing  Goal: Verbalize understanding of risk factor reduction measures to prevent injury from fall in the home  Outcome: Progressing  Goal: Use assistive devices by end of the shift  Outcome: Progressing  Goal: Pace activities to prevent fatigue by end of the shift  Outcome: Progressing     Problem: Pain  Goal: My pain/discomfort is manageable  Outcome: Progressing     Problem: Daily Care  Goal: Daily care needs are met  Outcome: Progressing     Problem: Psychosocial Needs  Goal: Demonstrates ability to cope with hospitalization/illness  Outcome: Progressing  Goal: Collaborate with me, my family, and caregiver to identify my specific goals  Outcome: Progressing

## 2024-06-02 ENCOUNTER — APPOINTMENT (OUTPATIENT)
Dept: RADIOLOGY | Facility: HOSPITAL | Age: 59
DRG: 270 | End: 2024-06-02
Payer: MEDICARE

## 2024-06-02 ENCOUNTER — APPOINTMENT (OUTPATIENT)
Dept: CARDIOLOGY | Facility: HOSPITAL | Age: 59
DRG: 270 | End: 2024-06-02
Payer: MEDICARE

## 2024-06-02 LAB
ALBUMIN SERPL BCP-MCNC: 3.6 G/DL (ref 3.4–5)
ALP SERPL-CCNC: 71 U/L (ref 33–120)
ALT SERPL W P-5'-P-CCNC: 13 U/L (ref 10–52)
ANION GAP BLDA CALCULATED.4IONS-SCNC: 8 MMO/L (ref 10–25)
ANION GAP SERPL CALC-SCNC: 12 MMOL/L (ref 10–20)
AST SERPL W P-5'-P-CCNC: 16 U/L (ref 9–39)
ATRIAL RATE: 80 BPM
BASE EXCESS BLDA CALC-SCNC: 7.4 MMOL/L (ref -2–3)
BASOPHILS # BLD AUTO: 0.09 X10*3/UL (ref 0–0.1)
BASOPHILS NFR BLD AUTO: 0.9 %
BILIRUB SERPL-MCNC: 1 MG/DL (ref 0–1.2)
BODY TEMPERATURE: 37 DEGREES CELSIUS
BUN SERPL-MCNC: 13 MG/DL (ref 6–23)
CA-I BLDA-SCNC: 1.17 MMOL/L (ref 1.1–1.33)
CALCIUM SERPL-MCNC: 9.2 MG/DL (ref 8.6–10.6)
CHLORIDE BLDA-SCNC: 98 MMOL/L (ref 98–107)
CHLORIDE SERPL-SCNC: 99 MMOL/L (ref 98–107)
CO2 SERPL-SCNC: 29 MMOL/L (ref 21–32)
CREAT SERPL-MCNC: 0.99 MG/DL (ref 0.5–1.3)
EGFRCR SERPLBLD CKD-EPI 2021: 89 ML/MIN/1.73M*2
EOSINOPHIL # BLD AUTO: 0.26 X10*3/UL (ref 0–0.7)
EOSINOPHIL NFR BLD AUTO: 2.7 %
ERYTHROCYTE [DISTWIDTH] IN BLOOD BY AUTOMATED COUNT: 13.4 % (ref 11.5–14.5)
GLUCOSE BLDA-MCNC: 165 MG/DL (ref 74–99)
GLUCOSE SERPL-MCNC: 104 MG/DL (ref 74–99)
HCO3 BLDA-SCNC: 32 MMOL/L (ref 22–26)
HCT VFR BLD AUTO: 35.4 % (ref 41–52)
HCT VFR BLD EST: 39 % (ref 41–52)
HGB BLD-MCNC: 12.1 G/DL (ref 13.5–17.5)
HGB BLDA-MCNC: 13 G/DL (ref 13.5–17.5)
IMM GRANULOCYTES # BLD AUTO: 0.06 X10*3/UL (ref 0–0.7)
IMM GRANULOCYTES NFR BLD AUTO: 0.6 % (ref 0–0.9)
INHALED O2 CONCENTRATION: 68 %
LACTATE BLDA-SCNC: 0.9 MMOL/L (ref 0.4–2)
LYMPHOCYTES # BLD AUTO: 2.26 X10*3/UL (ref 1.2–4.8)
LYMPHOCYTES NFR BLD AUTO: 23.5 %
MAGNESIUM SERPL-MCNC: 1.98 MG/DL (ref 1.6–2.4)
MCH RBC QN AUTO: 32.2 PG (ref 26–34)
MCHC RBC AUTO-ENTMCNC: 34.2 G/DL (ref 32–36)
MCV RBC AUTO: 94 FL (ref 80–100)
MONOCYTES # BLD AUTO: 0.89 X10*3/UL (ref 0.1–1)
MONOCYTES NFR BLD AUTO: 9.3 %
NEUTROPHILS # BLD AUTO: 6.06 X10*3/UL (ref 1.2–7.7)
NEUTROPHILS NFR BLD AUTO: 63 %
NRBC BLD-RTO: 0 /100 WBCS (ref 0–0)
OXYHGB MFR BLDA: 95.7 % (ref 94–98)
P AXIS: 213 DEGREES
P OFFSET: 120 MS
P ONSET: 78 MS
PCO2 BLDA: 44 MM HG (ref 38–42)
PH BLDA: 7.47 PH (ref 7.38–7.42)
PLATELET # BLD AUTO: 233 X10*3/UL (ref 150–450)
PO2 BLDA: 80 MM HG (ref 85–95)
POTASSIUM BLDA-SCNC: 3.8 MMOL/L (ref 3.5–5.3)
POTASSIUM SERPL-SCNC: 4.1 MMOL/L (ref 3.5–5.3)
PR INTERVAL: 112 MS
PROT SERPL-MCNC: 5.9 G/DL (ref 6.4–8.2)
Q ONSET: 161 MS
QRS COUNT: 13 BEATS
QRS DURATION: 198 MS
QT INTERVAL: 510 MS
QTC CALCULATION(BAZETT): 588 MS
QTC FREDERICIA: 561 MS
R AXIS: 228 DEGREES
RBC # BLD AUTO: 3.76 X10*6/UL (ref 4.5–5.9)
SAO2 % BLDA: 98 % (ref 94–100)
SODIUM BLDA-SCNC: 134 MMOL/L (ref 136–145)
SODIUM SERPL-SCNC: 136 MMOL/L (ref 136–145)
T AXIS: 191 DEGREES
T OFFSET: 416 MS
UFH PPP CHRO-ACNC: 0.1 IU/ML
VENTRICULAR RATE: 80 BPM
WBC # BLD AUTO: 9.6 X10*3/UL (ref 4.4–11.3)

## 2024-06-02 PROCEDURE — 2500000001 HC RX 250 WO HCPCS SELF ADMINISTERED DRUGS (ALT 637 FOR MEDICARE OP)

## 2024-06-02 PROCEDURE — 85025 COMPLETE CBC W/AUTO DIFF WBC: CPT

## 2024-06-02 PROCEDURE — 83735 ASSAY OF MAGNESIUM: CPT

## 2024-06-02 PROCEDURE — 71045 X-RAY EXAM CHEST 1 VIEW: CPT

## 2024-06-02 PROCEDURE — S4991 NICOTINE PATCH NONLEGEND: HCPCS

## 2024-06-02 PROCEDURE — 85520 HEPARIN ASSAY: CPT | Performed by: STUDENT IN AN ORGANIZED HEALTH CARE EDUCATION/TRAINING PROGRAM

## 2024-06-02 PROCEDURE — 2500000001 HC RX 250 WO HCPCS SELF ADMINISTERED DRUGS (ALT 637 FOR MEDICARE OP): Performed by: STUDENT IN AN ORGANIZED HEALTH CARE EDUCATION/TRAINING PROGRAM

## 2024-06-02 PROCEDURE — 2500000005 HC RX 250 GENERAL PHARMACY W/O HCPCS

## 2024-06-02 PROCEDURE — 71045 X-RAY EXAM CHEST 1 VIEW: CPT | Performed by: RADIOLOGY

## 2024-06-02 PROCEDURE — 2020000001 HC ICU ROOM DAILY

## 2024-06-02 PROCEDURE — 80053 COMPREHEN METABOLIC PANEL: CPT

## 2024-06-02 PROCEDURE — 2500000002 HC RX 250 W HCPCS SELF ADMINISTERED DRUGS (ALT 637 FOR MEDICARE OP, ALT 636 FOR OP/ED)

## 2024-06-02 PROCEDURE — 93005 ELECTROCARDIOGRAM TRACING: CPT

## 2024-06-02 PROCEDURE — 36415 COLL VENOUS BLD VENIPUNCTURE: CPT

## 2024-06-02 PROCEDURE — 2500000004 HC RX 250 GENERAL PHARMACY W/ HCPCS (ALT 636 FOR OP/ED)

## 2024-06-02 PROCEDURE — 93010 ELECTROCARDIOGRAM REPORT: CPT | Performed by: INTERNAL MEDICINE

## 2024-06-02 PROCEDURE — 2500000004 HC RX 250 GENERAL PHARMACY W/ HCPCS (ALT 636 FOR OP/ED): Performed by: STUDENT IN AN ORGANIZED HEALTH CARE EDUCATION/TRAINING PROGRAM

## 2024-06-02 PROCEDURE — 84132 ASSAY OF SERUM POTASSIUM: CPT

## 2024-06-02 PROCEDURE — 99291 CRITICAL CARE FIRST HOUR: CPT

## 2024-06-02 RX ORDER — LIDOCAINE HYDROCHLORIDE 10 MG/ML
10 INJECTION, SOLUTION EPIDURAL; INFILTRATION; INTRACAUDAL; PERINEURAL ONCE
Status: COMPLETED | OUTPATIENT
Start: 2024-06-02 | End: 2024-06-02

## 2024-06-02 RX ORDER — LIDOCAINE HYDROCHLORIDE ANHYDROUS AND DEXTROSE MONOHYDRATE .4; 5 G/100ML; G/100ML
1 INJECTION, SOLUTION INTRAVENOUS ONCE
Status: DISCONTINUED | OUTPATIENT
Start: 2024-06-02 | End: 2024-06-02

## 2024-06-02 RX ORDER — LORAZEPAM 2 MG/ML
1 INJECTION INTRAMUSCULAR ONCE
Status: COMPLETED | OUTPATIENT
Start: 2024-06-02 | End: 2024-06-02

## 2024-06-02 RX ORDER — LIDOCAINE HYDROCHLORIDE ANHYDROUS AND DEXTROSE MONOHYDRATE .4; 5 G/100ML; G/100ML
1 INJECTION, SOLUTION INTRAVENOUS ONCE
Status: COMPLETED | OUTPATIENT
Start: 2024-06-02 | End: 2024-06-02

## 2024-06-02 RX ORDER — LORAZEPAM 2 MG/ML
1 INJECTION INTRAMUSCULAR EVERY 6 HOURS
Status: COMPLETED | OUTPATIENT
Start: 2024-06-03 | End: 2024-06-03

## 2024-06-02 RX ORDER — MAGNESIUM SULFATE HEPTAHYDRATE 40 MG/ML
2 INJECTION, SOLUTION INTRAVENOUS ONCE
Status: COMPLETED | OUTPATIENT
Start: 2024-06-02 | End: 2024-06-03

## 2024-06-02 RX ORDER — LIDOCAINE HYDROCHLORIDE ANHYDROUS AND DEXTROSE MONOHYDRATE .8; 5 G/100ML; G/100ML
1 INJECTION, SOLUTION INTRAVENOUS CONTINUOUS
Status: DISCONTINUED | OUTPATIENT
Start: 2024-06-02 | End: 2024-06-06

## 2024-06-02 RX ORDER — FENTANYL CITRATE 50 UG/ML
25 INJECTION, SOLUTION INTRAMUSCULAR; INTRAVENOUS ONCE
Status: COMPLETED | OUTPATIENT
Start: 2024-06-02 | End: 2024-06-02

## 2024-06-02 RX ORDER — MIDAZOLAM HYDROCHLORIDE 1 MG/ML
2 INJECTION INTRAMUSCULAR; INTRAVENOUS ONCE
Status: COMPLETED | OUTPATIENT
Start: 2024-06-02 | End: 2024-06-02

## 2024-06-02 RX ORDER — LORAZEPAM 2 MG/ML
INJECTION INTRAMUSCULAR
Status: COMPLETED
Start: 2024-06-02 | End: 2024-06-02

## 2024-06-02 RX ORDER — LIDOCAINE HYDROCHLORIDE ANHYDROUS AND DEXTROSE MONOHYDRATE .4; 5 G/100ML; G/100ML
100 INJECTION, SOLUTION INTRAVENOUS ONCE
Status: DISCONTINUED | OUTPATIENT
Start: 2024-06-02 | End: 2024-06-02 | Stop reason: CLARIF

## 2024-06-02 RX ORDER — FUROSEMIDE 10 MG/ML
40 INJECTION INTRAMUSCULAR; INTRAVENOUS ONCE
Status: COMPLETED | OUTPATIENT
Start: 2024-06-02 | End: 2024-06-02

## 2024-06-02 RX ORDER — FENTANYL CITRATE 50 UG/ML
INJECTION, SOLUTION INTRAMUSCULAR; INTRAVENOUS
Status: COMPLETED
Start: 2024-06-02 | End: 2024-06-02

## 2024-06-02 RX ORDER — VANCOMYCIN HYDROCHLORIDE 1 G/20ML
INJECTION, POWDER, LYOPHILIZED, FOR SOLUTION INTRAVENOUS DAILY PRN
Status: DISCONTINUED | OUTPATIENT
Start: 2024-06-02 | End: 2024-06-03

## 2024-06-02 RX ORDER — LIDOCAINE HCL/PF 100 MG/5ML
SYRINGE (ML) INTRAVENOUS
Status: COMPLETED
Start: 2024-06-02 | End: 2024-06-02

## 2024-06-02 RX ORDER — MIDAZOLAM HYDROCHLORIDE 1 MG/ML
INJECTION INTRAMUSCULAR; INTRAVENOUS
Status: COMPLETED
Start: 2024-06-02 | End: 2024-06-02

## 2024-06-02 RX ORDER — VANCOMYCIN HYDROCHLORIDE 1 G/200ML
1000 INJECTION, SOLUTION INTRAVENOUS EVERY 12 HOURS
Status: DISCONTINUED | OUTPATIENT
Start: 2024-06-03 | End: 2024-06-03

## 2024-06-02 RX ORDER — MIDAZOLAM HYDROCHLORIDE 1 MG/ML
1 INJECTION INTRAMUSCULAR; INTRAVENOUS ONCE
Status: COMPLETED | OUTPATIENT
Start: 2024-06-02 | End: 2024-06-02

## 2024-06-02 RX ADMIN — TRAZODONE HYDROCHLORIDE 50 MG: 50 TABLET ORAL at 20:10

## 2024-06-02 RX ADMIN — MIDAZOLAM HYDROCHLORIDE 2 MG: 1 INJECTION INTRAMUSCULAR; INTRAVENOUS at 21:57

## 2024-06-02 RX ADMIN — LORAZEPAM 0.5 MG: 0.5 TABLET ORAL at 12:12

## 2024-06-02 RX ADMIN — HYDROXYZINE HYDROCHLORIDE 10 MG: 10 TABLET ORAL at 02:28

## 2024-06-02 RX ADMIN — AMIODARONE HYDROCHLORIDE 1 MG/MIN: 1.8 INJECTION, SOLUTION INTRAVENOUS at 21:54

## 2024-06-02 RX ADMIN — LIDOCAINE HYDROCHLORIDE 1 MG/MIN: 8 INJECTION, SOLUTION INTRAVENOUS at 08:55

## 2024-06-02 RX ADMIN — LIDOCAINE HYDROCHLORIDE 100 MG: 10 INJECTION, SOLUTION EPIDURAL; INFILTRATION; INTRACAUDAL; PERINEURAL at 23:00

## 2024-06-02 RX ADMIN — ASPIRIN 81 MG CHEWABLE TABLET 81 MG: 81 TABLET CHEWABLE at 09:06

## 2024-06-02 RX ADMIN — AMIODARONE HYDROCHLORIDE 150 MG: 1.5 INJECTION, SOLUTION INTRAVENOUS at 22:15

## 2024-06-02 RX ADMIN — LIDOCAINE HYDROCHLORIDE: 20 INJECTION, SOLUTION INTRAVENOUS at 21:15

## 2024-06-02 RX ADMIN — HEPARIN SODIUM 900 UNITS/HR: 10000 INJECTION, SOLUTION INTRAVENOUS at 22:23

## 2024-06-02 RX ADMIN — RANOLAZINE 500 MG: 500 TABLET, EXTENDED RELEASE ORAL at 20:10

## 2024-06-02 RX ADMIN — ROSUVASTATIN 20 MG: 20 TABLET, FILM COATED ORAL at 20:10

## 2024-06-02 RX ADMIN — AMIODARONE HYDROCHLORIDE 1 MG/MIN: 1.8 INJECTION, SOLUTION INTRAVENOUS at 19:15

## 2024-06-02 RX ADMIN — POLYETHYLENE GLYCOL 3350 17 G: 17 POWDER, FOR SOLUTION ORAL at 09:05

## 2024-06-02 RX ADMIN — FENTANYL CITRATE 25 MCG: 50 INJECTION, SOLUTION INTRAMUSCULAR; INTRAVENOUS at 21:57

## 2024-06-02 RX ADMIN — LORAZEPAM 1 MG: 2 INJECTION INTRAMUSCULAR; INTRAVENOUS at 21:58

## 2024-06-02 RX ADMIN — Medication 12 L/MIN: at 21:40

## 2024-06-02 RX ADMIN — FUROSEMIDE 40 MG: 10 INJECTION, SOLUTION INTRAMUSCULAR; INTRAVENOUS at 22:05

## 2024-06-02 RX ADMIN — MEXILETINE HYDROCHLORIDE 150 MG: 150 CAPSULE ORAL at 05:57

## 2024-06-02 RX ADMIN — HYDROXYZINE HYDROCHLORIDE 10 MG: 10 TABLET ORAL at 09:06

## 2024-06-02 RX ADMIN — TRIMETHOBENZAMIDE HYDROCHLORIDE 200 MG: 100 INJECTION INTRAMUSCULAR at 08:39

## 2024-06-02 RX ADMIN — AMIODARONE HYDROCHLORIDE 150 MG: 1.5 INJECTION, SOLUTION INTRAVENOUS at 22:00

## 2024-06-02 RX ADMIN — LORAZEPAM 1 MG: 2 INJECTION INTRAMUSCULAR at 21:58

## 2024-06-02 RX ADMIN — SENNOSIDES AND DOCUSATE SODIUM 2 TABLET: 8.6; 5 TABLET ORAL at 09:05

## 2024-06-02 RX ADMIN — RANOLAZINE 500 MG: 500 TABLET, EXTENDED RELEASE ORAL at 09:06

## 2024-06-02 RX ADMIN — METHOCARBAMOL 500 MG: 500 TABLET ORAL at 20:21

## 2024-06-02 RX ADMIN — MIDAZOLAM HYDROCHLORIDE 2 MG: 1 INJECTION, SOLUTION INTRAMUSCULAR; INTRAVENOUS at 21:57

## 2024-06-02 RX ADMIN — LIDOCAINE HYDROCHLORIDE ANHYDROUS AND DEXTROSE MONOHYDRATE 84 MG: .4; 5 INJECTION, SOLUTION INTRAVENOUS at 09:00

## 2024-06-02 RX ADMIN — AMIODARONE HYDROCHLORIDE 1 MG/MIN: 1.8 INJECTION, SOLUTION INTRAVENOUS at 07:27

## 2024-06-02 RX ADMIN — MIDAZOLAM HYDROCHLORIDE 1 MG: 1 INJECTION, SOLUTION INTRAMUSCULAR; INTRAVENOUS at 10:32

## 2024-06-02 RX ADMIN — PANTOPRAZOLE SODIUM 40 MG: 40 TABLET, DELAYED RELEASE ORAL at 07:00

## 2024-06-02 RX ADMIN — HYDROXYZINE HYDROCHLORIDE 10 MG: 10 TABLET ORAL at 14:08

## 2024-06-02 RX ADMIN — AMIODARONE HYDROCHLORIDE 1 MG/MIN: 1.8 INJECTION, SOLUTION INTRAVENOUS at 01:54

## 2024-06-02 RX ADMIN — Medication 1 PATCH: at 05:57

## 2024-06-02 RX ADMIN — AMIODARONE HYDROCHLORIDE 1 MG/MIN: 1.8 INJECTION, SOLUTION INTRAVENOUS at 14:06

## 2024-06-02 RX ADMIN — LORAZEPAM 0.5 MG: 0.5 TABLET ORAL at 04:35

## 2024-06-02 RX ADMIN — MAGNESIUM SULFATE HEPTAHYDRATE 2 G: 40 INJECTION, SOLUTION INTRAVENOUS at 22:05

## 2024-06-02 RX ADMIN — LORAZEPAM 0.5 MG: 0.5 TABLET ORAL at 19:44

## 2024-06-02 ASSESSMENT — PAIN SCALES - GENERAL
PAINLEVEL_OUTOF10: 0 - NO PAIN
PAINLEVEL_OUTOF10: 5 - MODERATE PAIN
PAINLEVEL_OUTOF10: 0 - NO PAIN

## 2024-06-02 ASSESSMENT — PAIN - FUNCTIONAL ASSESSMENT
PAIN_FUNCTIONAL_ASSESSMENT: 0-10

## 2024-06-02 NOTE — PROGRESS NOTES
Cardiology - Electrophysiology Service  Daily Progress Note    Subjective Data:  Slow vt 90-110bpm noted overnight. HDS, symptomatic. On amio and lido gtts.     Overnight Events:    See above. Remains HDS.      Objective Data:  Last Recorded Vitals:  Vitals:    06/02/24 0900 06/02/24 1000 06/02/24 1100 06/02/24 1153   BP: 108/88 111/82 96/79    Pulse: 97 105 92    Resp: 15 16 15    Temp:    35.6 °C (96.1 °F)   TempSrc:    Temporal   SpO2: 94% 95% 94%    Weight:       Height:           Last Labs:  CBC - 6/2/2024:  2:24 AM  9.6 12.1 233    35.4      CMP - 6/2/2024:  2:24 AM  9.2 5.9 16 --- 1.0   3.4 3.6 13 71      PTT - 5/29/2024:  5:18 AM  1.1   12.8 29     TROPHS   Date/Time Value Ref Range Status   05/31/2024 05:42 PM 2,261 0 - 53 ng/L Final     Comment:     Previous result verified on 5/31/2024 1634 on specimen/case [id number] called with component TRPHS for procedure Troponin I, High Sensitivity with value 2,395 ng/L.   05/31/2024 03:23 PM 2,395 0 - 53 ng/L Final   05/23/2024 07:11  0 - 20 ng/L Final     Comment:     Previous result verified on 5/23/2024 0008 on specimen/case [id number] called with component TRPHS for procedure Troponin, High Sensitivity, 1 Hour with value 160 ng/L.     BNP   Date/Time Value Ref Range Status   05/28/2024 03:03  0 - 99 pg/mL Final   05/22/2024 09:28 PM 2,144 0 - 99 pg/mL Final      Last I/O:  I/O last 3 completed shifts:  In: 541.9 (6.5 mL/kg) [P.O.:400; I.V.:141.9 (1.7 mL/kg)]  Out: 2801 (33.7 mL/kg) [Urine:2800 (0.9 mL/kg/hr); Stool:1]  Weight: 83.2 kg     Past Cardiology Tests (Last 3 Years):  EKG:  Electrocardiogram, 12-lead PRN ACS symptoms 05/29/2024 (Preliminary)      Electrocardiogram, 12-lead PRN ACS symptoms 05/29/2024 (Preliminary)      Electrocardiogram, 12-lead PRN ACS symptoms 05/29/2024 (Preliminary)      Electrocardiogram, 12-lead PRN ACS symptoms 05/29/2024 (Preliminary)      Electrocardiogram, 12-lead PRN ACS symptoms 05/28/2024  "(Preliminary)      Electrocardiogram, 12-lead PRN ACS symptoms 05/28/2024 (Preliminary)      Electrocardiogram, 12-lead PRN ACS symptoms 05/28/2024 (Preliminary)      ECG 12 lead 05/28/2024      ECG 12 Lead 05/28/2024      ECG 12 Lead 05/27/2024      ECG 12 Lead 05/26/2024      ECG 12 Lead 05/25/2024      ECG 12 Lead 05/24/2024      ECG 12 Lead 05/23/2024      ECG 12 lead 05/23/2024      ECG 12 lead 05/22/2024    Echo:  Transthoracic Echo (TTE) Complete 05/28/2024      Transthoracic Echo (TTE) Complete 05/23/2024    Ejection Fractions:  No results found for: \"EF\"  Cath:  Cardiac Catheterization Procedure 05/23/2024    Stress Test:  No results found for this or any previous visit from the past 1095 days.    Cardiac Imaging:  No results found for this or any previous visit from the past 1095 days.      Inpatient Medications:  Scheduled medications   Medication Dose Route Frequency    amiodarone  150 mg intravenous Once    [Held by provider] amiodarone  400 mg oral BID    aspirin  81 mg oral Daily    lidocaine  2 patch transdermal Daily    [Held by provider] mexiletine  150 mg oral q8h ANNIKA    nicotine  1 patch transdermal Daily    Followed by    [START ON 7/13/2024] nicotine  1 patch transdermal Daily    pantoprazole  40 mg oral Daily before breakfast    polyethylene glycol  17 g oral BID    [Held by provider] prasugrel  10 mg oral Daily    ranolazine  500 mg oral BID    rosuvastatin  20 mg oral Nightly    sennosides-docusate sodium  2 tablet oral BID    traZODone  50 mg oral Nightly     PRN medications   Medication    acetaminophen    Or    acetaminophen    Or    acetaminophen    heparin    hydrOXYzine HCL    LORazepam    methocarbamol    trimethobenzamide     Continuous Medications   Medication Dose Last Rate    amiodarone  1 mg/min 1 mg/min (06/02/24 1100)    heparin  0-4,000 Units/hr 900 Units/hr (06/02/24 1100)    lidocaine  1 mg/min 1 mg/min (06/02/24 1100)       Physical Exam:  AO x 3   NFND  On supplemental " oxygen   Soft and non tender abdomen   Warm extremities   RFV access site examined : normal with no erythema / tenderness / hematoma     Telemetry reviewed with AP -  at 80 bpm and no sustained ventricular arrhythmias        Assessment/Plan   58M PMHx CAD s/p multiple PCI, ICM/HFrEF (EF 15% 5/24) s/p CRT-D, VT storm s/p VT ablation, infrarenal AAA s/p R iliac stent, nephrolithiasis s/p recent ureteral stent. Presented to OSH with multiple ICD shocks for VT. Now in incessant, slow VT for which EP is consulted.      #ICM/HFrEF (EF 15%) s/p CRT-D  #Prior VT storm s/p VT ablation  #Incessant VT s/p ICD shock  Patient has remained in slow, hemodynamically stable VT, likely scar mediated iso known ICM and unrevascularized CAD. Given inability to pace terminate this, we will plan for an ablation at this time whilst patient is also undergoing advanced therapies workup as long term with un-revascularized CAD and such low EF he likely has high scar burden and high risk of recurrence. Pt overnight had increase in VT despite being on Amiodarone and addition of IV lidocaine. Similar to before bedside ATP attempts were made but pt has transient termination with resumption of VT. RV pacing alone also did not allow for termination of VT (r/o LV pacing induced VT).     The patient underwent VT RFA with  5/30/24:   Successful RFA for clinical VT 1 with a critical isthmus involving the mid inferior / infero - septal left ventricular segment  Successful RFA for clinical VT 2 pace mapped to the mid lateral left ventriclar segment     Recommendations:   Device tracking rate increased from 70 to 95 bpm. ***  Formal device interrogation, turn down pacing to DDD 70 bpm ( I have informed the device clinic to perform this change), this needs to be weaned down to DDD 60 bpm over the next 24 - 48 hours  Switch to oral AADT : amiodarone 400 mg BID + mexiletine 150 mg TID  Continue work up for advanced HF therapies   Continue heparin gtt  in anticipation for invasive procedures ( will need to be on anticoagulation for at least one month given extensive ablation in the LV)     The EP team will continue to follow along and help with decision making         Peripheral IV 05/28/24 20 G Proximal;Right;Anterior Forearm (Active)   Site Assessment Clean;Dry;Intact 05/31/24 0800   Dressing Type Transparent 05/31/24 0800   Line Status Infusing 05/31/24 0800   Dressing Status Clean;Dry;Occlusive 05/31/24 0800   Number of days: 3       Peripheral IV 05/28/24 Distal;Right;Anterior Forearm (Active)   Site Assessment Clean;Dry;Intact 05/31/24 0800   Dressing Type Transparent 05/31/24 0800   Line Status Infusing 05/31/24 0800   Dressing Status Clean;Dry;Occlusive 05/31/24 0800   Number of days: 3       Peripheral IV 05/28/24 20 G Right Antecubital (Active)   Site Assessment Clean;Dry;Intact 05/31/24 0800   Dressing Type Transparent 05/31/24 0800   Line Status Blood return noted;Flushed;Saline locked 05/31/24 0800   Dressing Status Clean;Dry;Occlusive 05/31/24 0800   Number of days: 3       Code Status:  Full Code    I spent 30 minutes in the professional and overall care of this patient.        Maximiliano Bellamy MD    I saw and evaluated the patient. I personally obtained the key and critical portions of the history and physical exam or was physically present for key and critical portions performed by the resident/fellow. I reviewed the resident/fellow's documentation and discussed the patient with the resident/fellow. I agree with the resident/fellow's medical decision making as documented in the note, and my edits (bold and italic) have been made to the document as needed.     Sonny Flores MD [facility]  Cardiac Electrophysiology    **Disclaimer: This note was dictated by speech recognition, and every effort has been made to prevent any error in transcription, however minor errors may be present**

## 2024-06-02 NOTE — PROGRESS NOTES
"Cristian Sapp is a 58 y.o. male on day 5 of admission presenting with Acute heart failure, unspecified heart failure type (Multi).    Subjective   Patient with further NSVT overnight so was restarted on amio drip at 1. This morning, patient also having VT so lidocaine drip restarted.     Objective     Last Recorded Vitals  Blood pressure 99/87, pulse 98, temperature 35.8 °C (96.4 °F), temperature source Temporal, resp. rate 18, height 1.702 m (5' 7\"), weight 83.2 kg (183 lb 6.8 oz), SpO2 95%.  Intake/Output last 3 Shifts:  I/O last 3 completed shifts:  In: 541.9 (6.5 mL/kg) [P.O.:400; I.V.:141.9 (1.7 mL/kg)]  Out: 2801 (33.7 mL/kg) [Urine:2800 (0.9 mL/kg/hr); Stool:1]  Weight: 83.2 kg     Relevant Results  24 Hour Vitals  Temp:  [35.6 °C (96.1 °F)-36.2 °C (97.2 °F)] 35.8 °C (96.4 °F)  Heart Rate:  [] 98  Resp:  [12-23] 18  BP: ()/(61-95) 99/87    Temp (24hrs), Av.9 °C (96.7 °F), Min:35.6 °C (96.1 °F), Max:36.2 °C (97.2 °F)     24 hour Intake/Output    Intake/Output Summary (Last 24 hours) at 2024 0849  Last data filed at 2024 0400  Gross per 24 hour   Intake 451.9 ml   Output 1600 ml   Net -1148.1 ml        Exam:  General: lying in bed asleep   HEENT: EOMI, normal conjunctiva, sclera anicteric   Neuro: alert, oriented, normal speech, conversant   CV: paced, regular rate, intermittent slow VT  Pulm: CTAB, breathing comfortably on 4L  GI: soft, nontender, nondistended   Skin: warm, dry   Ext: no edema, palpable pulses     Labs  CBC  Results from last 72 hours   Lab Units 0624  0534   WBC AUTO x10*3/uL 9.6 8.8 8.2   HEMOGLOBIN g/dL 12.1* 11.4* 13.5   HEMATOCRIT % 35.4* 33.4* 37.2*   PLATELETS AUTO x10*3/uL 233 235 214        BMP  Results from last 72 hours   Lab Units 24  0624  1742 24  0534   SODIUM mmol/L 136 138 136 136   POTASSIUM mmol/L 4.1 4.2 4.3 4.5   CHLORIDE mmol/L 99 101 97* 100   BUN mg/dL 13 15 16 15   CREATININE " mg/dL 0.99 1.11 1.15 1.05   MAGNESIUM mg/dL 1.98 2.03 1.99 2.12   PHOSPHORUS mg/dL  --  3.4 3.2 4.3       Medications   Scheduled Medications  amiodarone, 150 mg, intravenous, Once  [Held by provider] amiodarone, 400 mg, oral, BID  aspirin, 81 mg, oral, Daily  lidocaine, 2 patch, transdermal, Daily  lidocaine, 1 mg/kg, intravenous, Once  [Held by provider] mexiletine, 150 mg, oral, q8h ANNIKA  nicotine, 1 patch, transdermal, Daily   Followed by  [START ON 7/13/2024] nicotine, 1 patch, transdermal, Daily  pantoprazole, 40 mg, oral, Daily before breakfast  polyethylene glycol, 17 g, oral, BID  [Held by provider] prasugrel, 10 mg, oral, Daily  ranolazine, 500 mg, oral, BID  rosuvastatin, 20 mg, oral, Nightly  sennosides-docusate sodium, 2 tablet, oral, BID  traZODone, 50 mg, oral, Nightly     Continuous Medications  amiodarone, 1 mg/min, Last Rate: 1 mg/min (06/02/24 0727)  heparin, 0-4,000 Units/hr, Last Rate: 900 Units/hr (06/01/24 1224)  lidocaine, 1 mg/min     PRN Medications  PRN medications: acetaminophen **OR** acetaminophen **OR** acetaminophen, heparin, hydrOXYzine HCL, LORazepam, methocarbamol, trimethobenzamide     Assessment/Plan   Principal Problem:    Acute heart failure, unspecified heart failure type (Multi)  Active Problems:    AICD discharge    HFrEF (heart failure with reduced ejection fraction) (Multi)    Ischemic cardiomyopathy    Ventricular tachycardia (Multi)    Patient is a 58/ male, with recurrent ICD shocks 2/2 VT episodes, presenting from OSH, due to ICD shocks s/p DCCV 5/30, S/p successful VT ablation 5/30, continues to have intermittent slow VT. Undergoing cardiac transplant, advanced heart failure therapy evaluation.      Neuro  -Anxiety due to ICD firing, on Ativan PRN and home   -Holding Paroxetine for MDD given arrhythmias   -PRN hydroxyzine     Cardio  #ICM/HFrEF (EF 15%) s/p CRT-D  #Prior VT storm s/p VT ablation  #VT s/p ICD shock  #VT with slow rate now   ::S/P Successfuly VT ablation  5/30/24  -s/p Bolused and amio drip   -Device interrogation done 5/31 and pace rate reduced  Plan  - hold PO amio 400mg BID, restarted amio drip  - hold mexilitine, restarted lidocaine drip   -C/W ASA  -Hold prasugrel   -C/W Ranolazine   -C/W Rosuvastatin   -Holding Entresto   -Holding Torsemide, diuresing with PRN meds  -Heart failure consulted for transplant eval and following   - Patient declined consent for transplant eval/LVAD, he wants to consider education and re-discuss on Monday 6/3  - Continue heparin ggt given ablation 5/30/24 possible RHC in future will hold off on DOAC   - Encourage patient to ambulate    Pulmonary   #SOB  -will obtain CXR with trace effusion and atelectasis   -s/p Lasix 40 5/31  - Holding off on diuresis for today  -likely needs outpatient sleep study for suspected GENARO      Renal   #Recurrent kidney stones  -has ureteral stent in Left and consulted urology 5/31 to discuss removal   - Per urology, they will follow outpatient for STENT removal     GI  -PPI for ppx    #Constipation  -BID miralax  -BID doc-senna      Endo  -No active issues      Heme  -No active issues      ID  -No active issues     F: PRN  E: K>4, Mg >2  N: cardiac diet  A: PIV  DVT ppx: Heparin gtt  Code Status: Full code   Decision maker: mother Michelle [phone]       Mae Price MD

## 2024-06-03 ENCOUNTER — DOCUMENTATION (OUTPATIENT)
Dept: TRANSPLANT | Facility: HOSPITAL | Age: 59
End: 2024-06-03
Payer: COMMERCIAL

## 2024-06-03 ENCOUNTER — APPOINTMENT (OUTPATIENT)
Dept: CARDIOLOGY | Facility: HOSPITAL | Age: 59
DRG: 270 | End: 2024-06-03
Payer: MEDICARE

## 2024-06-03 ENCOUNTER — HOSPITAL ENCOUNTER (INPATIENT)
Age: 59
End: 2024-06-03
Attending: THORACIC SURGERY (CARDIOTHORACIC VASCULAR SURGERY) | Admitting: THORACIC SURGERY (CARDIOTHORACIC VASCULAR SURGERY)
Payer: COMMERCIAL

## 2024-06-03 LAB
ALBUMIN SERPL BCP-MCNC: 3.5 G/DL (ref 3.4–5)
ALBUMIN SERPL BCP-MCNC: 3.8 G/DL (ref 3.4–5)
ALP SERPL-CCNC: 70 U/L (ref 33–120)
ALT SERPL W P-5'-P-CCNC: 12 U/L (ref 10–52)
ANION GAP SERPL CALC-SCNC: 16 MMOL/L (ref 10–20)
ANION GAP SERPL CALC-SCNC: 17 MMOL/L (ref 10–20)
AST SERPL W P-5'-P-CCNC: 14 U/L (ref 9–39)
ATRIAL RATE: 24 BPM
ATRIAL RATE: 70 BPM
BASOPHILS # BLD AUTO: 0.06 X10*3/UL (ref 0–0.1)
BASOPHILS NFR BLD AUTO: 0.5 %
BILIRUB SERPL-MCNC: 0.6 MG/DL (ref 0–1.2)
BNP SERPL-MCNC: 655 PG/ML (ref 0–99)
BODY SURFACE AREA: 2 M2
BUN SERPL-MCNC: 12 MG/DL (ref 6–23)
BUN SERPL-MCNC: 13 MG/DL (ref 6–23)
CALCIUM SERPL-MCNC: 8.5 MG/DL (ref 8.6–10.6)
CALCIUM SERPL-MCNC: 8.8 MG/DL (ref 8.6–10.6)
CHLORIDE SERPL-SCNC: 97 MMOL/L (ref 98–107)
CHLORIDE SERPL-SCNC: 98 MMOL/L (ref 98–107)
CO2 SERPL-SCNC: 28 MMOL/L (ref 21–32)
CO2 SERPL-SCNC: 28 MMOL/L (ref 21–32)
CREAT SERPL-MCNC: 1.13 MG/DL (ref 0.5–1.3)
CREAT SERPL-MCNC: 1.2 MG/DL (ref 0.5–1.3)
EGFRCR SERPLBLD CKD-EPI 2021: 71 ML/MIN/1.73M*2
EGFRCR SERPLBLD CKD-EPI 2021: 76 ML/MIN/1.73M*2
EOSINOPHIL # BLD AUTO: 0.26 X10*3/UL (ref 0–0.7)
EOSINOPHIL NFR BLD AUTO: 2.4 %
ERYTHROCYTE [DISTWIDTH] IN BLOOD BY AUTOMATED COUNT: 13.6 % (ref 11.5–14.5)
ERYTHROCYTE [DISTWIDTH] IN BLOOD BY AUTOMATED COUNT: 13.6 % (ref 11.5–14.5)
GLUCOSE SERPL-MCNC: 111 MG/DL (ref 74–99)
GLUCOSE SERPL-MCNC: 116 MG/DL (ref 74–99)
HCT VFR BLD AUTO: 33.4 % (ref 41–52)
HCT VFR BLD AUTO: 37 % (ref 41–52)
HGB BLD-MCNC: 11.8 G/DL (ref 13.5–17.5)
HGB BLD-MCNC: 13.1 G/DL (ref 13.5–17.5)
IMM GRANULOCYTES # BLD AUTO: 0.1 X10*3/UL (ref 0–0.7)
IMM GRANULOCYTES NFR BLD AUTO: 0.9 % (ref 0–0.9)
LACTATE SERPL-SCNC: 1.1 MMOL/L (ref 0.4–2)
LIDOCAIN SERPL-MCNC: 4.1 UG/ML (ref 1–5)
LYMPHOCYTES # BLD AUTO: 1.56 X10*3/UL (ref 1.2–4.8)
LYMPHOCYTES NFR BLD AUTO: 14.2 %
MAGNESIUM SERPL-MCNC: 2.17 MG/DL (ref 1.6–2.4)
MAGNESIUM SERPL-MCNC: 2.34 MG/DL (ref 1.6–2.4)
MCH RBC QN AUTO: 32.5 PG (ref 26–34)
MCH RBC QN AUTO: 32.6 PG (ref 26–34)
MCHC RBC AUTO-ENTMCNC: 35.3 G/DL (ref 32–36)
MCHC RBC AUTO-ENTMCNC: 35.4 G/DL (ref 32–36)
MCV RBC AUTO: 92 FL (ref 80–100)
MCV RBC AUTO: 92 FL (ref 80–100)
MONOCYTES # BLD AUTO: 1.24 X10*3/UL (ref 0.1–1)
MONOCYTES NFR BLD AUTO: 11.3 %
MRSA DNA SPEC QL NAA+PROBE: NOT DETECTED
NEUTROPHILS # BLD AUTO: 7.76 X10*3/UL (ref 1.2–7.7)
NEUTROPHILS NFR BLD AUTO: 70.7 %
NRBC BLD-RTO: 0 /100 WBCS (ref 0–0)
NRBC BLD-RTO: 0 /100 WBCS (ref 0–0)
PHOSPHATE SERPL-MCNC: 3.5 MG/DL (ref 2.5–4.9)
PHOSPHATE SERPL-MCNC: 3.6 MG/DL (ref 2.5–4.9)
PLATELET # BLD AUTO: 257 X10*3/UL (ref 150–450)
PLATELET # BLD AUTO: 303 X10*3/UL (ref 150–450)
POTASSIUM SERPL-SCNC: 3.8 MMOL/L (ref 3.5–5.3)
POTASSIUM SERPL-SCNC: 3.8 MMOL/L (ref 3.5–5.3)
PROCALCITONIN SERPL-MCNC: 0.06 NG/ML
PROT SERPL-MCNC: 5.7 G/DL (ref 6.4–8.2)
Q ONSET: 183 MS
Q ONSET: 206 MS
QRS COUNT: 18 BEATS
QRS COUNT: 19 BEATS
QRS DURATION: 174 MS
QRS DURATION: 186 MS
QT INTERVAL: 442 MS
QT INTERVAL: 450 MS
QTC CALCULATION(BAZETT): 601 MS
QTC CALCULATION(BAZETT): 622 MS
QTC FREDERICIA: 542 MS
QTC FREDERICIA: 558 MS
R AXIS: 122 DEGREES
R AXIS: 124 DEGREES
RBC # BLD AUTO: 3.63 X10*6/UL (ref 4.5–5.9)
RBC # BLD AUTO: 4.02 X10*6/UL (ref 4.5–5.9)
SODIUM SERPL-SCNC: 138 MMOL/L (ref 136–145)
SODIUM SERPL-SCNC: 138 MMOL/L (ref 136–145)
T AXIS: -46 DEGREES
T AXIS: -59 DEGREES
T OFFSET: 404 MS
T OFFSET: 431 MS
UFH PPP CHRO-ACNC: 0.1 IU/ML
UFH PPP CHRO-ACNC: 0.2 IU/ML
UFH PPP CHRO-ACNC: 0.3 IU/ML
UFH PPP CHRO-ACNC: 0.3 IU/ML
VENTRICULAR RATE: 111 BPM
VENTRICULAR RATE: 115 BPM
WBC # BLD AUTO: 11 X10*3/UL (ref 4.4–11.3)
WBC # BLD AUTO: 12.7 X10*3/UL (ref 4.4–11.3)

## 2024-06-03 PROCEDURE — 2500000004 HC RX 250 GENERAL PHARMACY W/ HCPCS (ALT 636 FOR OP/ED): Performed by: STUDENT IN AN ORGANIZED HEALTH CARE EDUCATION/TRAINING PROGRAM

## 2024-06-03 PROCEDURE — 99291 CRITICAL CARE FIRST HOUR: CPT

## 2024-06-03 PROCEDURE — 84100 ASSAY OF PHOSPHORUS: CPT

## 2024-06-03 PROCEDURE — 83735 ASSAY OF MAGNESIUM: CPT | Performed by: STUDENT IN AN ORGANIZED HEALTH CARE EDUCATION/TRAINING PROGRAM

## 2024-06-03 PROCEDURE — 80053 COMPREHEN METABOLIC PANEL: CPT | Performed by: STUDENT IN AN ORGANIZED HEALTH CARE EDUCATION/TRAINING PROGRAM

## 2024-06-03 PROCEDURE — 2500000004 HC RX 250 GENERAL PHARMACY W/ HCPCS (ALT 636 FOR OP/ED)

## 2024-06-03 PROCEDURE — 80176 ASSAY OF LIDOCAINE: CPT | Performed by: STUDENT IN AN ORGANIZED HEALTH CARE EDUCATION/TRAINING PROGRAM

## 2024-06-03 PROCEDURE — 2500000002 HC RX 250 W HCPCS SELF ADMINISTERED DRUGS (ALT 637 FOR MEDICARE OP, ALT 636 FOR OP/ED)

## 2024-06-03 PROCEDURE — 36415 COLL VENOUS BLD VENIPUNCTURE: CPT

## 2024-06-03 PROCEDURE — 93289 INTERROG DEVICE EVAL HEART: CPT

## 2024-06-03 PROCEDURE — 2500000001 HC RX 250 WO HCPCS SELF ADMINISTERED DRUGS (ALT 637 FOR MEDICARE OP): Performed by: STUDENT IN AN ORGANIZED HEALTH CARE EDUCATION/TRAINING PROGRAM

## 2024-06-03 PROCEDURE — 2500000001 HC RX 250 WO HCPCS SELF ADMINISTERED DRUGS (ALT 637 FOR MEDICARE OP)

## 2024-06-03 PROCEDURE — 80069 RENAL FUNCTION PANEL: CPT | Mod: CCI | Performed by: NURSE PRACTITIONER

## 2024-06-03 PROCEDURE — 2500000002 HC RX 250 W HCPCS SELF ADMINISTERED DRUGS (ALT 637 FOR MEDICARE OP, ALT 636 FOR OP/ED): Performed by: STUDENT IN AN ORGANIZED HEALTH CARE EDUCATION/TRAINING PROGRAM

## 2024-06-03 PROCEDURE — 83735 ASSAY OF MAGNESIUM: CPT | Mod: 91 | Performed by: NURSE PRACTITIONER

## 2024-06-03 PROCEDURE — 85025 COMPLETE CBC W/AUTO DIFF WBC: CPT | Performed by: STUDENT IN AN ORGANIZED HEALTH CARE EDUCATION/TRAINING PROGRAM

## 2024-06-03 PROCEDURE — 2020000001 HC ICU ROOM DAILY

## 2024-06-03 PROCEDURE — 99233 SBSQ HOSP IP/OBS HIGH 50: CPT | Performed by: STUDENT IN AN ORGANIZED HEALTH CARE EDUCATION/TRAINING PROGRAM

## 2024-06-03 PROCEDURE — 83880 ASSAY OF NATRIURETIC PEPTIDE: CPT | Performed by: NURSE PRACTITIONER

## 2024-06-03 PROCEDURE — 84145 PROCALCITONIN (PCT): CPT | Performed by: STUDENT IN AN ORGANIZED HEALTH CARE EDUCATION/TRAINING PROGRAM

## 2024-06-03 PROCEDURE — 37799 UNLISTED PX VASCULAR SURGERY: CPT | Performed by: STUDENT IN AN ORGANIZED HEALTH CARE EDUCATION/TRAINING PROGRAM

## 2024-06-03 PROCEDURE — 85520 HEPARIN ASSAY: CPT | Mod: 91 | Performed by: STUDENT IN AN ORGANIZED HEALTH CARE EDUCATION/TRAINING PROGRAM

## 2024-06-03 PROCEDURE — 2500000005 HC RX 250 GENERAL PHARMACY W/O HCPCS

## 2024-06-03 PROCEDURE — 85027 COMPLETE CBC AUTOMATED: CPT | Performed by: STUDENT IN AN ORGANIZED HEALTH CARE EDUCATION/TRAINING PROGRAM

## 2024-06-03 PROCEDURE — 2500000004 HC RX 250 GENERAL PHARMACY W/ HCPCS (ALT 636 FOR OP/ED): Performed by: NURSE PRACTITIONER

## 2024-06-03 PROCEDURE — 87040 BLOOD CULTURE FOR BACTERIA: CPT

## 2024-06-03 PROCEDURE — 83605 ASSAY OF LACTIC ACID: CPT | Performed by: NURSE PRACTITIONER

## 2024-06-03 PROCEDURE — 87641 MR-STAPH DNA AMP PROBE: CPT

## 2024-06-03 PROCEDURE — 5A2204Z RESTORATION OF CARDIAC RHYTHM, SINGLE: ICD-10-PCS | Performed by: INTERNAL MEDICINE

## 2024-06-03 PROCEDURE — S4991 NICOTINE PATCH NONLEGEND: HCPCS

## 2024-06-03 PROCEDURE — 93010 ELECTROCARDIOGRAM REPORT: CPT | Performed by: INTERNAL MEDICINE

## 2024-06-03 PROCEDURE — 99232 SBSQ HOSP IP/OBS MODERATE 35: CPT | Performed by: STUDENT IN AN ORGANIZED HEALTH CARE EDUCATION/TRAINING PROGRAM

## 2024-06-03 RX ORDER — VANCOMYCIN HYDROCHLORIDE 1 G/200ML
1000 INJECTION, SOLUTION INTRAVENOUS ONCE
Status: COMPLETED | OUTPATIENT
Start: 2024-06-03 | End: 2024-06-03

## 2024-06-03 RX ORDER — POTASSIUM CHLORIDE 20 MEQ/1
20 TABLET, EXTENDED RELEASE ORAL ONCE
Status: COMPLETED | OUTPATIENT
Start: 2024-06-03 | End: 2024-06-03

## 2024-06-03 RX ORDER — POTASSIUM CHLORIDE 20 MEQ/1
20 TABLET, EXTENDED RELEASE ORAL ONCE
Status: DISCONTINUED | OUTPATIENT
Start: 2024-06-03 | End: 2024-06-03

## 2024-06-03 RX ORDER — POTASSIUM CHLORIDE 20 MEQ/1
40 TABLET, EXTENDED RELEASE ORAL ONCE
Status: COMPLETED | OUTPATIENT
Start: 2024-06-03 | End: 2024-06-03

## 2024-06-03 RX ORDER — MAGNESIUM SULFATE HEPTAHYDRATE 40 MG/ML
2 INJECTION, SOLUTION INTRAVENOUS ONCE
Status: COMPLETED | OUTPATIENT
Start: 2024-06-03 | End: 2024-06-03

## 2024-06-03 RX ORDER — POTASSIUM CHLORIDE 20 MEQ/1
40 TABLET, EXTENDED RELEASE ORAL ONCE
Status: DISCONTINUED | OUTPATIENT
Start: 2024-06-03 | End: 2024-06-03

## 2024-06-03 RX ORDER — FUROSEMIDE 10 MG/ML
80 INJECTION INTRAMUSCULAR; INTRAVENOUS ONCE
Status: DISCONTINUED | OUTPATIENT
Start: 2024-06-03 | End: 2024-06-03

## 2024-06-03 RX ORDER — FUROSEMIDE 10 MG/ML
40 INJECTION INTRAMUSCULAR; INTRAVENOUS ONCE
Status: COMPLETED | OUTPATIENT
Start: 2024-06-03 | End: 2024-06-03

## 2024-06-03 RX ORDER — POTASSIUM CHLORIDE 14.9 MG/ML
20 INJECTION INTRAVENOUS ONCE
Status: DISCONTINUED | OUTPATIENT
Start: 2024-06-03 | End: 2024-06-03

## 2024-06-03 RX ADMIN — Medication 10 L/MIN: at 06:07

## 2024-06-03 RX ADMIN — VANCOMYCIN HYDROCHLORIDE 1000 MG: 1 INJECTION, SOLUTION INTRAVENOUS at 02:18

## 2024-06-03 RX ADMIN — METHOCARBAMOL 500 MG: 500 TABLET ORAL at 10:17

## 2024-06-03 RX ADMIN — LIDOCAINE HYDROCHLORIDE 1.5 MG/MIN: 8 INJECTION, SOLUTION INTRAVENOUS at 10:17

## 2024-06-03 RX ADMIN — LIDOCAINE HYDROCHLORIDE 1.5 MG/MIN: 8 INJECTION, SOLUTION INTRAVENOUS at 18:12

## 2024-06-03 RX ADMIN — SENNOSIDES AND DOCUSATE SODIUM 2 TABLET: 8.6; 5 TABLET ORAL at 09:16

## 2024-06-03 RX ADMIN — POLYETHYLENE GLYCOL 3350 17 G: 17 POWDER, FOR SOLUTION ORAL at 09:16

## 2024-06-03 RX ADMIN — LORAZEPAM 1 MG: 2 INJECTION INTRAMUSCULAR; INTRAVENOUS at 16:31

## 2024-06-03 RX ADMIN — AMIODARONE HYDROCHLORIDE 1 MG/MIN: 1.8 INJECTION, SOLUTION INTRAVENOUS at 14:51

## 2024-06-03 RX ADMIN — PIPERACILLIN SODIUM AND TAZOBACTAM SODIUM 4.5 G: 4; .5 INJECTION, SOLUTION INTRAVENOUS at 00:24

## 2024-06-03 RX ADMIN — RANOLAZINE 500 MG: 500 TABLET, EXTENDED RELEASE ORAL at 22:49

## 2024-06-03 RX ADMIN — LORAZEPAM 1 MG: 2 INJECTION INTRAMUSCULAR; INTRAVENOUS at 20:15

## 2024-06-03 RX ADMIN — PIPERACILLIN SODIUM AND TAZOBACTAM SODIUM 4.5 G: 4; .5 INJECTION, SOLUTION INTRAVENOUS at 13:10

## 2024-06-03 RX ADMIN — ROSUVASTATIN 20 MG: 20 TABLET, FILM COATED ORAL at 20:30

## 2024-06-03 RX ADMIN — PIPERACILLIN SODIUM AND TAZOBACTAM SODIUM 4.5 G: 4; .5 INJECTION, SOLUTION INTRAVENOUS at 05:56

## 2024-06-03 RX ADMIN — TRIMETHOBENZAMIDE HYDROCHLORIDE 200 MG: 100 INJECTION INTRAMUSCULAR at 02:17

## 2024-06-03 RX ADMIN — MAGNESIUM SULFATE HEPTAHYDRATE 2 G: 40 INJECTION, SOLUTION INTRAVENOUS at 02:18

## 2024-06-03 RX ADMIN — RANOLAZINE 500 MG: 500 TABLET, EXTENDED RELEASE ORAL at 09:16

## 2024-06-03 RX ADMIN — FUROSEMIDE 40 MG: 10 INJECTION, SOLUTION INTRAMUSCULAR; INTRAVENOUS at 09:17

## 2024-06-03 RX ADMIN — HEPARIN SODIUM 1100 UNITS/HR: 10000 INJECTION, SOLUTION INTRAVENOUS at 18:12

## 2024-06-03 RX ADMIN — AMIODARONE HYDROCHLORIDE 1 MG/MIN: 1.8 INJECTION, SOLUTION INTRAVENOUS at 09:18

## 2024-06-03 RX ADMIN — POTASSIUM CHLORIDE 20 MEQ: 1500 TABLET, EXTENDED RELEASE ORAL at 06:00

## 2024-06-03 RX ADMIN — LORAZEPAM 1 MG: 2 INJECTION INTRAMUSCULAR; INTRAVENOUS at 02:18

## 2024-06-03 RX ADMIN — AMIODARONE HYDROCHLORIDE 1 MG/MIN: 1.8 INJECTION, SOLUTION INTRAVENOUS at 03:12

## 2024-06-03 RX ADMIN — AMIODARONE HYDROCHLORIDE 1 MG/MIN: 1.8 INJECTION, SOLUTION INTRAVENOUS at 18:11

## 2024-06-03 RX ADMIN — ASPIRIN 81 MG CHEWABLE TABLET 81 MG: 81 TABLET CHEWABLE at 09:17

## 2024-06-03 RX ADMIN — VANCOMYCIN HYDROCHLORIDE 1000 MG: 1 INJECTION, SOLUTION INTRAVENOUS at 03:12

## 2024-06-03 RX ADMIN — PIPERACILLIN SODIUM AND TAZOBACTAM SODIUM 4.5 G: 4; .5 INJECTION, SOLUTION INTRAVENOUS at 22:47

## 2024-06-03 RX ADMIN — POTASSIUM CHLORIDE 20 MEQ: 1500 TABLET, EXTENDED RELEASE ORAL at 02:27

## 2024-06-03 RX ADMIN — SENNOSIDES AND DOCUSATE SODIUM 2 TABLET: 8.6; 5 TABLET ORAL at 20:29

## 2024-06-03 RX ADMIN — TRAZODONE HYDROCHLORIDE 50 MG: 50 TABLET ORAL at 20:29

## 2024-06-03 RX ADMIN — AMIODARONE HYDROCHLORIDE 1 MG/MIN: 1.8 INJECTION, SOLUTION INTRAVENOUS at 21:16

## 2024-06-03 RX ADMIN — PANTOPRAZOLE SODIUM 40 MG: 40 TABLET, DELAYED RELEASE ORAL at 06:01

## 2024-06-03 RX ADMIN — LIDOCAINE 2 PATCH: 4 PATCH TOPICAL at 09:16

## 2024-06-03 RX ADMIN — POTASSIUM CHLORIDE 40 MEQ: 1500 TABLET, EXTENDED RELEASE ORAL at 21:08

## 2024-06-03 RX ADMIN — LORAZEPAM 1 MG: 2 INJECTION INTRAMUSCULAR; INTRAVENOUS at 09:16

## 2024-06-03 RX ADMIN — FUROSEMIDE 40 MG: 10 INJECTION, SOLUTION INTRAMUSCULAR; INTRAVENOUS at 16:34

## 2024-06-03 RX ADMIN — HYDROXYZINE HYDROCHLORIDE 10 MG: 10 TABLET ORAL at 13:36

## 2024-06-03 RX ADMIN — Medication 1 PATCH: at 06:01

## 2024-06-03 RX ADMIN — PIPERACILLIN SODIUM AND TAZOBACTAM SODIUM 4.5 G: 4; .5 INJECTION, SOLUTION INTRAVENOUS at 18:11

## 2024-06-03 ASSESSMENT — COGNITIVE AND FUNCTIONAL STATUS - GENERAL
TOILETING: A LOT
DAILY ACTIVITIY SCORE: 14
EATING MEALS: A LITTLE
CLIMB 3 TO 5 STEPS WITH RAILING: A LOT
STANDING UP FROM CHAIR USING ARMS: A LOT
TURNING FROM BACK TO SIDE WHILE IN FLAT BAD: A LOT
MOBILITY SCORE: 12
HELP NEEDED FOR BATHING: A LOT
WALKING IN HOSPITAL ROOM: A LOT
DRESSING REGULAR LOWER BODY CLOTHING: A LOT
MOVING FROM LYING ON BACK TO SITTING ON SIDE OF FLAT BED WITH BEDRAILS: A LOT
MOVING TO AND FROM BED TO CHAIR: A LOT
PERSONAL GROOMING: A LITTLE
DRESSING REGULAR UPPER BODY CLOTHING: A LOT

## 2024-06-03 ASSESSMENT — PAIN SCALES - GENERAL
PAINLEVEL_OUTOF10: 5 - MODERATE PAIN
PAINLEVEL_OUTOF10: 8
PAINLEVEL_OUTOF10: 5 - MODERATE PAIN
PAINLEVEL_OUTOF10: 0 - NO PAIN

## 2024-06-03 ASSESSMENT — PAIN - FUNCTIONAL ASSESSMENT
PAIN_FUNCTIONAL_ASSESSMENT: 0-10

## 2024-06-03 ASSESSMENT — PAIN DESCRIPTION - LOCATION: LOCATION: BACK

## 2024-06-03 NOTE — PROGRESS NOTES
[facility]  ADVANCED HEART FAILURE AND TRANSPLANT CARDIOLOGY   Cristian Sapp/[id number]    Admit Date: 5/28/2024  Hospital Length of Stay: 6   ICU Length of Stay: 5d 22h   Primary Service: CICU  Primary HF Cardiologist: LUKE  Referring: Dr. Arya Brown    INTERVAL EVENTS / PERTINENT ROS:    Overnight Mr. Sapp developed VT again, requiring IV pushes of amiodarone, lidocaine, and synchronized DCCV x1 with return to paced rhythm. Amiodarone and lidocaine infusions were started, he remains in V-paced rhythm in 110s.     He underwent VT ablation with Dr. Flores on 5/30.     This morning he complains of shortness of breath, is requiring 2L of oxygen via nasal cannula. He is also agreeable to advanced therapies evaluation for heart transplant, however he is hesitant for LVAD evaluation. He is also aware of the option of hospice.        Objective Data:  Last Recorded Vitals:  Vitals:    06/03/24 1000 06/03/24 1100 06/03/24 1200 06/03/24 1202   BP:       BP Location:       Patient Position:       Pulse: (!) 111 110 (!) 112    Resp: 25 24 22    Temp:    35.7 °C (96.3 °F)   TempSrc:    Temporal   SpO2:  94% 95%    Weight:       Height:           Last Labs:  CBC - 6/3/2024:  2:05 AM  11.0 11.8 257    33.4      CMP - 6/3/2024:  2:05 AM  8.5 5.7 14 --- 0.6   3.6 3.5 12 70      PTT - 5/29/2024:  5:18 AM  1.1   12.8 29     TROPHS   Date/Time Value Ref Range Status   05/31/2024 05:42 PM 2,261 0 - 53 ng/L Final     Comment:     Previous result verified on 5/31/2024 1634 on specimen/case [id number] called with component TRPHS for procedure Troponin I, High Sensitivity with value 2,395 ng/L.   05/31/2024 03:23 PM 2,395 0 - 53 ng/L Final   05/23/2024 07:11  0 - 20 ng/L Final     Comment:     Previous result verified on 5/23/2024 0008 on specimen/case [id number] called with component TRPHS for procedure Troponin, High Sensitivity, 1 Hour with value 160 ng/L.     BNP   Date/Time Value Ref Range  Status   05/28/2024 03:03  0 - 99 pg/mL Final   05/22/2024 09:28 PM 2,144 0 - 99 pg/mL Final      Last I/O:  I/O last 3 completed shifts:  In: 2884.5 (33.9 mL/kg) [P.O.:450; I.V.:1834.5 (21.6 mL/kg); IV Piggyback:600]  Out: 4700 (55.3 mL/kg) [Urine:4700 (1.5 mL/kg/hr)]  Weight: 85 kg     Past Cardiology Tests (Last 3 Years):  EKG 5/28/24 - VT (slow), RBBB morphology.      Device: Wandera CRT-D  Implanting MD: Dr. Gaston ([facility])  Last Device Interrogation: 5/31     TTE 5/27/24:   1. Left ventricular systolic function is severely decreased with a 15% estimated ejection fraction.   2. There is no evidence of mitral valve stenosis.   3. Moderate to severe mitral valve regurgitation.   4. Mild tricuspid regurgitation is visualized.   5. Aortic valve stenosis is not present.   6. Left ventricular cavity size is moderately dilated.   7. The pulmonary artery is not well visualized.   8. Current study appears to be remarkably similar to the May 23, 2024 study done here.   9. There is global hypokinesis of the left ventricle with minor regional variations.     Coronary Angio 5/23/24:   1. Distal Left Main: 10-30% stenosis.   2. Proximal and mid LAD Lesion: The percent stenosis is 10-30%.   3. Proximal CX Lesion: The percent stenosis is 100%.   4. Right Coronary Artery: fills via collaterals.   5. Proximal RCA Lesion: The percent stenosis is 100%.   6. The Left Ventricular Ejection Fraction is 10%,by echo.      Inpatient Medications:  Scheduled medications   Medication Dose Route Frequency    amiodarone  150 mg intravenous Once    [Held by provider] amiodarone  400 mg oral BID    aspirin  81 mg oral Daily    lidocaine  2 patch transdermal Daily    LORazepam  1 mg intravenous q6h    [Held by provider] mexiletine  150 mg oral q8h [facility]    nicotine  1 patch transdermal Daily    Followed by    [START ON 7/13/2024] nicotine  1 patch transdermal Daily    pantoprazole  40 mg oral Daily before breakfast     piperacillin-tazobactam  4.5 g intravenous q6h    polyethylene glycol  17 g oral BID    [Held by provider] prasugrel  10 mg oral Daily    ranolazine  500 mg oral BID    rosuvastatin  20 mg oral Nightly    sennosides-docusate sodium  2 tablet oral BID    traZODone  50 mg oral Nightly     PRN medications   Medication    acetaminophen    Or    acetaminophen    Or    acetaminophen    heparin    hydrOXYzine HCL    methocarbamol    oxygen    trimethobenzamide     Continuous Medications   Medication Dose Last Rate    amiodarone  1 mg/min 1 mg/min (06/03/24 0918)    heparin  0-4,000 Units/hr 1,100 Units/hr (06/03/24 0800)    lidocaine  1.5 mg/min 1.5 mg/min (06/03/24 1017)       Physical Exam:  General: Middle-aged man, chronically ill-appearing, no acute distress, lying in bed comfortably  HEENT: Normocephalic, atraumatic, no scleral icterus  Cardiac: Normal S1/S2, paced rhythm with .  Pulmonary: Diminished breath sounds bilaterally, no crackles or wheezes. Breathing comfortably and speaking complete sentences on 2L supplemental oxygen.   Neuro: AAOx3, CN 2-12 grossly intact, no focal neurological deficits            Assessment/Plan   Cristian Sapp is a 58-year-old man with history of Stage C NYHA IV chronic ICM/HFrEF EF 15%, CAD status post multiple PCIs, severe mitral regurgitation, recurrent VT status post RFA ablation (2019), infrarenal AAA status post right iliac stent, PVD, nephrolithiasis status post recent bilateral ureteral stent placement, tobacco use disorder, who is admitted for ADHF and recurrent VT with multiple ICD shocks. He underwent VT ablation with Dr. Flores on 5/30, with recurrence of VT requiring DCCV on 6/2. Per EP, patient can undergo further ablation however he remains at high-risk for VT recurrence given high burden of scarring and CAD that cannot be revascularized.     The Advanced Heart Failure and Transplant Cardiology service has been consulted to consider the patient's candidacy for  advanced heart failure therapies. He was educated by the Heart Transplant/VAD coordinators team on 5/31, however he wanted to contemplate these options further with his family over the weekend, and thus did not sign consent for initiating advanced heart failure therapies evaluation.     Today he is agreeable for advanced heart failure therapies evaluation, but expresses more interest in heart transplantation rather than LVAD implantation. Transplant Coordinator, Krista Deshpande (RN) at bedside to re-educate patient.     We will transfer him to the HFICU today for optimization via invasive hemodynamics using a pulmonary artery catheter and possible advanced therapies evaluation.       #Acute decompensated heart failure  #Stage C NYHA III HFrEF EF 15%  #Chronic ischemic cardiomyopathy  #Recurrent VT status post RFA ablation (5/30/2024 and in 2019)  #CAD status post multiple PCIs  #Severe mitral regurgitation  #Infrarenal AAA s/p right iliac stent  #Peripheral vascular disease  #Nephrolithiasis status post recent bilateral ureteral stent placement  #Tobacco use disorder        Recommendations:  - Transfer to HFICU today for pulmonary artery catheter insertion and optimization of ADHF guided by invasive hemodynamics  - If patient provides consent, will initiate advanced heart failure therapies evaluation given high risk of recurrence of VT in the setting of high scar burden and CAD that cannot be revascularized.        Patient was examined and discussed with Advanced Heart Failure and Transplant Cardiology attending physician, Dr. Alexandro Doyle.        Signed,  Bud Valles MD  Heart Failure Fellow PGY4

## 2024-06-03 NOTE — SIGNIFICANT EVENT
Pt called RN to room requesting to sit at side of bed and endorsing difficulty breathing, O2 sat on monitor in the 90's. Upon getting pt to side of bed monitor showed VT, Aracelis Gutierrez MD notified and at bedside. Pt put back into bed in a supine position with HOB>30. Defibrillator connected to patient. Per MD verbal order 25mcg fentanyl, 2mg versed, 1mg ativan, 100mg lidocaine pushed and pt shocked with 200J at 2116 by MD. Pt remained pulsatile during entire event and hemodynamically stable post shock. 1mg amio bolus administered per MD order.

## 2024-06-03 NOTE — PROGRESS NOTES
Transplant/LVAD Education Consent:   Topic: Pre Advanced Therapy Patient Education      Patient given the [facility] Pre Advanced Therapy Education binder. Patient received education regarding the following topics for their pre Heart Transplant and pre Left Ventricular Assist Device evaluation:    The evaluation process including advanced therapy team members and roles, required testing and consults, selection criteria and suitability for OHT and LVAD, OHT listing process and organ offer, hands on LVAD equipment review, psychological and financial considerations for a successful outcome with advanced therapies, and patient responsibilities including adherence to a strict medical regimen.    An overview of the surgical procedure for OHT and LVAD.    The potential for certain risk factors including infection, pneumonia, blood clot formation, organ rejection, failure, and possibility of re transplantation, lifetime immunosuppression and associated risks with OHT, arrhythmias and cardiovascular collapse, multi-organ system failure, depression, post- traumatic stress disorder, anxiety, issues of dependence or feelings of guilt, right ventricular failure with LVAD, and death.   National and Transplant [place] outcomes for one year patient and graft survival from the most recent SRTR program specific report.    Donor risk factors that could affect the success of the transplant and health of the patient including donor age, donor medical and social history, condition of the organ, and the risk of jace cancer, HIV, Hepatitis B or C, and/or malaria if the infection is not detectable at the time of donation.    Transplants not performed in a Medicare-approved transplant center could affect the patient's ability to have immunosuppression medication paid for under Medicare part B.    [facility] Intermacs and  LVAD implant outcomes for one year patient survival.    The medical necessity of electricity and a working  telephone with implantation of the LVAD.    Available alternatives to OHT and LVAD.    The patient's right to withdraw consent for the evaluations at any time during the process.      Patient was given the opportunity to have questions answered.   Consent signed for Heart Transplant and LVAD? : Yes  If no, which consent was signed? : Both  Current barriers: Tobacco use, psychosocial support  Consent obtained by: Krista Deshpande RN

## 2024-06-03 NOTE — PROGRESS NOTES
Vancomycin Dosing by Pharmacy- INITIAL    Cristian Sapp is a 58 y.o. year old male who Pharmacy has been consulted for vancomycin dosing for pneumonia. Based on the patient's indication and renal status this patient will be dosed based on a goal AUC of 400-600.     Renal function is currently stable.    Visit Vitals  BP (!) 134/107   Pulse 95   Temp 36.3 °C (97.3 °F) (Temporal)   Resp 16        Lab Results   Component Value Date    CREATININE 0.99 2024    CREATININE 1.11 2024    CREATININE 1.15 2024    CREATININE 1.05 2024        Patient weight is as follows:   Vitals:    24 0600   Weight: 83.2 kg (183 lb 6.8 oz)       Cultures:  No results found for the encounter in last 14 days.        I/O last 3 completed shifts:  In: 701.9 (8.4 mL/kg) [P.O.:650; I.V.:51.9 (0.6 mL/kg)]  Out: 2900 (34.9 mL/kg) [Urine:2900 (1 mL/kg/hr)]  Weight: 83.2 kg   I/O during current shift:  I/O this shift:  In: 1762.1 [I.V.:1462.1; IV Piggyback:300]  Out: 1000 [Urine:1000]    Temp (24hrs), Av.9 °C (96.6 °F), Min:35.6 °C (96.1 °F), Max:36.3 °C (97.3 °F)         Assessment/Plan     Patient will be given a loading dose of 2000 mg.  Will initiate vancomycin maintenance,  1000 mg every 12 hours.    This dosing regimen is predicted by [employer] to result in the following pharmacokinetic parameters:  Loading dose: N/A  Regimen: 1000 mg IV every 12 hours.  Start time: 14:18 on 2024  Exposure target: AUC24 (range)400-600 mg/L.hr   AUC24,ss: 511 mg/L.hr  Probability of AUC24 > 400: 75 %  Ctrough,ss: 16.3 mg/L  Probability of Ctrough,ss > 20: 32 %  Probability of nephrotoxicity (Lodise RAHUL ): 12 %      Follow-up level will be ordered on  at 0500 AM unless clinically indicated sooner.  Will continue to monitor renal function daily while on vancomycin and order serum creatinine at least every 48 hours if not already ordered.  Follow for continued vancomycin needs, clinical response, and signs/symptoms of  toxicity.       Nader Baum, PharmD

## 2024-06-03 NOTE — PROGRESS NOTES
Vancomycin Dosing by Pharmacy- Cessation of Therapy    Consult to pharmacy for vancomycin dosing has been discontinued by the prescriber, pharmacy will sign off at this time.    Please call pharmacy if there are further questions or re-enter a consult if vancomycin is resumed.     Shweta Khanna, EugenioD

## 2024-06-03 NOTE — PROGRESS NOTES
"Cristian Sapp is a 58 y.o. male on day 6 of admission presenting with Acute heart failure, unspecified heart failure type (Multi).    Subjective   Patient had some respiratory distress requiring increase in his oxygen needs to 10L NC. VT overnight with rate 118 so was bolused 150mg amio x2 and 100mg lidocaine x1, remains on both drips. He had DCCV 200J early evening with improved symptoms after and paced rhythm at 95. Patient also started on vanc/zosyn.    Objective     Last Recorded Vitals  Blood pressure (!) 134/107, pulse 95, temperature 35 °C (95 °F), temperature source Temporal, resp. rate 14, height 1.702 m (5' 7\"), weight 85 kg (187 lb 6.3 oz), SpO2 98%.  Intake/Output last 3 Shifts:  I/O last 3 completed shifts:  In: 2884.5 (33.9 mL/kg) [P.O.:450; I.V.:1834.5 (21.6 mL/kg); IV Piggyback:600]  Out: 4700 (55.3 mL/kg) [Urine:4700 (1.5 mL/kg/hr)]  Weight: 85 kg     Relevant Results  24 Hour Vitals  Temp:  [35 °C (95 °F)-36.3 °C (97.3 °F)] 35 °C (95 °F)  Heart Rate:  [] 95  Resp:  [14-33] 14  BP: ()/() 134/107  Arterial Line BP 1: ()/(59-83) 108/69    Temp (24hrs), Av.8 °C (96.4 °F), Min:35 °C (95 °F), Max:36.3 °C (97.3 °F)     24 hour Intake/Output    Intake/Output Summary (Last 24 hours) at 6/3/2024 0937  Last data filed at 6/3/2024 0600  Gross per 24 hour   Intake 2684.46 ml   Output 2900 ml   Net -215.54 ml        Exam:  General: lying in bed in NAD, tired appearing   HEENT: EOMI, normal conjunctiva, sclera anicteric   Neuro: alert, oriented, normal speech, conversant   CV: paced rhythm 95  Pulm: diminished lung sounds throughout, 10L NC  GI: soft, nontender, nondistended   Skin: warm, dry   Ext: no edema, palpable pulses     Labs  CBC  Results from last 72 hours   Lab Units 24  0205 24  0224 24  0612   WBC AUTO x10*3/uL 11.0 9.6 8.8   HEMOGLOBIN g/dL 11.8* 12.1* 11.4*   HEMATOCRIT % 33.4* 35.4* 33.4*   PLATELETS AUTO x10*3/uL 257 233 235        BMP  Results from " last 72 hours   Lab Units 06/03/24  0205 06/02/24  0224 06/01/24  0612 05/31/24  1742   SODIUM mmol/L 138 136 138 136   POTASSIUM mmol/L 3.8 4.1 4.2 4.3   CHLORIDE mmol/L 98 99 101 97*   BUN mg/dL 13 13 15 16   CREATININE mg/dL 1.13 0.99 1.11 1.15   MAGNESIUM mg/dL 2.34 1.98 2.03 1.99   PHOSPHORUS mg/dL 3.6  --  3.4 3.2       Medications   Scheduled Medications  amiodarone, 150 mg, intravenous, Once  [Held by provider] amiodarone, 400 mg, oral, BID  aspirin, 81 mg, oral, Daily  lidocaine, 2 patch, transdermal, Daily  LORazepam, 1 mg, intravenous, q6h  [Held by provider] mexiletine, 150 mg, oral, q8h ANNIKA  nicotine, 1 patch, transdermal, Daily   Followed by  [START ON 7/13/2024] nicotine, 1 patch, transdermal, Daily  pantoprazole, 40 mg, oral, Daily before breakfast  piperacillin-tazobactam, 4.5 g, intravenous, q6h  polyethylene glycol, 17 g, oral, BID  [Held by provider] prasugrel, 10 mg, oral, Daily  ranolazine, 500 mg, oral, BID  rosuvastatin, 20 mg, oral, Nightly  sennosides-docusate sodium, 2 tablet, oral, BID  traZODone, 50 mg, oral, Nightly     Continuous Medications  amiodarone, 1 mg/min, Last Rate: 1 mg/min (06/03/24 0918)  heparin, 0-4,000 Units/hr, Last Rate: 1,100 Units/hr (06/03/24 0600)  lidocaine, 1.5 mg/min, Last Rate: 1.5 mg/min (06/03/24 0600)     PRN Medications  PRN medications: acetaminophen **OR** acetaminophen **OR** acetaminophen, heparin, hydrOXYzine HCL, methocarbamol, oxygen, trimethobenzamide     Assessment/Plan   Principal Problem:    Acute heart failure, unspecified heart failure type (Multi)  Active Problems:    AICD discharge    HFrEF (heart failure with reduced ejection fraction) (Multi)    Ischemic cardiomyopathy    Ventricular tachycardia (Multi)    Patient is a 58/ male, with recurrent ICD shocks 2/2 VT episodes, presenting from OSH, due to ICD shocks s/p DCCV 5/30, S/p successful VT ablation 5/30, continues to have intermittent slow VT. Undergoing cardiac transplant, advanced heart  failure therapy evaluation which patient is agreeable to.      Neuro  #Anxiety due to ICD firing, on Ativan PRN and home   -Holding Paroxetine for MDD given arrhythmias   -PRN hydroxyzine  -Trazodone for sleep aid      Cardio  #ICM/HFrEF (EF 15%) s/p CRT-D  #Prior VT storm s/p VT ablation  #VT s/p ICD shock  #VT with slow rate now   ::S/P Successfuly VT ablation 5/30/24  -s/p Bolused and amio drip   -Device interrogation done 5/31 and pace rate reduced  Plan  - hold PO amio 400mg BID, continue amio drip  - hold mexilitine, continue lidocaine drip   -C/W ASA  -Hold prasugrel   -C/W Ranolazine   -C/W Rosuvastatin   -Holding Entresto   -Holding Torsemide, diuresing with PRN IV lasix as needed   -Heart failure consulted for transplant eval and following   - Patient wanted to think about and is now agreeable to pursue cardiac transplant evaluation  - Continue heparin ggt given ablation 5/30/24 possible RHC in future will hold off on DOAC   - Encourage patient to ambulate    Pulmonary   #SOB  #Possible pneumonia versus pulmonary vascular congestion   -CXR overnight with vascular congestion with signs of volume overload   -PRN IV lasix, give 40mg IV this morning   -likely needs outpatient sleep study for suspected GENARO   -Started on vanc/zosyn overnight  -Discontinue vancomycin this morning  -Continue zosyn (6/2 -      Renal   #Recurrent kidney stones  -has ureteral stent in Left and consulted urology 5/31 to discuss removal   - Per urology, they will follow outpatient for STENT removal     GI  -PPI for ppx    #Constipation  -BID miralax  -BID doc-senna      Endo  -No active issues      Heme  -No active issues      ID  #Possible pneumonia  -per pulm section    F: PRN  E: K>4, Mg >2  N: cardiac diet  A: PIV  DVT ppx: Heparin gtt  Code Status: Full code   Decision maker: mother Michelle [phone]       Mae Price MD

## 2024-06-03 NOTE — PROGRESS NOTES
"Cristian Sapp is a 58 y.o. male on day 6 of admission presenting with Acute heart failure, unspecified heart failure type (Multi).    Subjective   Overnight, patient had an episode of VT ~ 116-118. At the time, patient developed tachypnea/respiratory distress. BP remained stable, IV amio bolus 150mg x1 given, followed by 100mg of lidocaine x1 with no change in rhythm. Versed and fentanyl were pulled and administered and decision made to do synchronized DCCV for the patient at 200J. Patient then went back to be paced at 110bpm    Objective     Physical Exam  Gen: ill appearing  Neuro: AAOx3, CN 2-12 intact, no FND  HEENT: PEERL, EOMI, sclera anicteric, no conjunctival injection, MMM, no oropharyngeal lesions  Neck: no elevated JVD  Resp: CTAB, normal breath sounds, no wheezing/crackles/ rales  CV: RRR, normal S1/S2, no murmurs/ rubs/gallops  GI: non-tender, non-distended, normal BSs in all 4 quadrants  MSK: warm and well perfused, no edema  Skin: warm and dry, no lesions, no rashes     Last Recorded Vitals  Blood pressure (!) 134/107, pulse 107, temperature 35.7 °C (96.3 °F), temperature source Temporal, resp. rate 19, height 1.702 m (5' 7\"), weight 85 kg (187 lb 6.3 oz), SpO2 94%.  Intake/Output last 3 Shifts:  I/O last 3 completed shifts:  In: 2884.5 (33.9 mL/kg) [P.O.:450; I.V.:1834.5 (21.6 mL/kg); IV Piggyback:600]  Out: 4700 (55.3 mL/kg) [Urine:4700 (1.5 mL/kg/hr)]  Weight: 85 kg     Relevant Results  LABS:  CMP:  Results from last 7 days   Lab Units 06/03/24  0205 06/02/24  0224 06/01/24  0612 05/31/24  1742 05/31/24  0534 05/30/24  0506 05/29/24  0518 05/28/24  1503 05/28/24  0337   SODIUM mmol/L 138 136 138 136 136 138 138 139  138 136   POTASSIUM mmol/L 3.8 4.1 4.2 4.3 4.5 4.1 4.3 5.1  5.0 3.6   CHLORIDE mmol/L 98 99 101 97* 100 97* 99 99  100 101   CO2 mmol/L 28 29 31 29 29 29 30 25  29 30   ANION GAP mmol/L 16 12 10 14 12 16 13 20  14 9*   BUN mg/dL 13 13 15 16 15 13 14 15  15 16   CREATININE mg/dL " "1.13 0.99 1.11 1.15 1.05 1.12 1.13 1.24  1.25 1.20   EGFR mL/min/1.73m*2 76 89 78 75 83 77 76 68  68 71   MAGNESIUM mg/dL 2.34 1.98 2.03 1.99 2.12 2.20 2.49* 2.33 2.19   ALBUMIN g/dL 3.5 3.6 3.3* 3.6 3.4 3.4 3.5 3.7  3.8  --    ALT U/L 12 13  --   --   --   --  13 14  --    AST U/L 14 16  --   --   --   --  9 19  --    BILIRUBIN TOTAL mg/dL 0.6 1.0  --   --   --   --  0.6 1.3*  --      CBC:  Results from last 7 days   Lab Units 06/03/24  0205 06/02/24  0224 06/01/24  0612 05/31/24  0534 05/30/24  0506 05/29/24  0518 05/28/24  1503 05/28/24  0337   WBC AUTO x10*3/uL 11.0 9.6 8.8 8.2 8.5 10.0 10.6 9.9   HEMOGLOBIN g/dL 11.8* 12.1* 11.4* 13.5 13.0* 13.4* 13.8 13.3*   HEMATOCRIT % 33.4* 35.4* 33.4* 37.2* 35.7* 37.5* 38.7* 38.2*   PLATELETS AUTO x10*3/uL 257 233 235 214 211 214 234 197   MCV fL 92 94 95 92 89 91 92 95     COAG:   Results from last 7 days   Lab Units 05/29/24  0518 05/28/24  1503   INR  1.1 1.2*     ABO: No results found for: \"ABO\"  HEME/ENDO:     CARDIAC:   Results from last 7 days   Lab Units 05/31/24  1742 05/31/24  1523 05/28/24  1503   TROPHS ng/L 2,261* 2,395*  --    BNP pg/mL  --   --  320*   No results for input(s): \"CHOL\", \"LDLF\", \"LDL\", \"LDLCALC\", \"HDL\", \"TRIG\" in the last 02190 hours.     Scheduled medications  amiodarone, 150 mg, intravenous, Once  [Held by provider] amiodarone, 400 mg, oral, BID  aspirin, 81 mg, oral, Daily  lidocaine, 2 patch, transdermal, Daily  LORazepam, 1 mg, intravenous, q6h  [Held by provider] mexiletine, 150 mg, oral, q8h ANNIKA  nicotine, 1 patch, transdermal, Daily   Followed by  [START ON 7/13/2024] nicotine, 1 patch, transdermal, Daily  pantoprazole, 40 mg, oral, Daily before breakfast  piperacillin-tazobactam, 4.5 g, intravenous, q6h  polyethylene glycol, 17 g, oral, BID  [Held by provider] prasugrel, 10 mg, oral, Daily  ranolazine, 500 mg, oral, BID  rosuvastatin, 20 mg, oral, Nightly  sennosides-docusate sodium, 2 tablet, oral, BID  traZODone, 50 mg, oral, " Nightly      Continuous medications  amiodarone, 1 mg/min, Last Rate: 1 mg/min (06/03/24 0918)  heparin, 0-4,000 Units/hr, Last Rate: 1,100 Units/hr (06/03/24 0800)  lidocaine, 1.5 mg/min, Last Rate: 1.5 mg/min (06/03/24 1017)      PRN medications  PRN medications: acetaminophen **OR** acetaminophen **OR** acetaminophen, heparin, hydrOXYzine HCL, methocarbamol, oxygen, trimethobenzamide    Assessment/Plan   Principal Problem:    Acute heart failure, unspecified heart failure type (Multi)  Active Problems:    AICD discharge    HFrEF (heart failure with reduced ejection fraction) (Multi)    Ischemic cardiomyopathy    Ventricular tachycardia (Multi)    58M PMHx CAD s/p multiple PCI, ICM/HFrEF (EF 15% 5/24) s/p CRT-D, VT storm s/p VT ablation, infrarenal AAA s/p R iliac stent, nephrolithiasis s/p recent ureteral stent. Presented to OSH with multiple ICD shocks for VT. Now in incessant, slow VT for which EP is following.     Patient remained in slow, hemodynamically stable VT, likely scar mediated iso known ICM and unrevascularized CAD. Patient was started on amiodarone and lidocaine. Given inability to pace terminate this, patient underwent VT RFA with Dr. Flores on 5/30.    Successful RFA for clinical VT 1 with a critical isthmus involving the mid inferior / infero - septal left ventricular segment  Successful RFA for clinical VT 2 pace mapped to the mid lateral left ventriclar segment     Despite this, on 6/2, patient was noted to have slow VT with rates ~ 90-110s. Tracking rate was increased to 110bpm but patient had an episode of VT ~ 118 last night requiring cardioversion.         #ICM/HFrEF (EF 15%) s/p CRT-D  #Prior VT storm s/p VT ablation 5/30  #Incessant VT s/p ICD shock  -Device tracking rate increased from 70 to 95 bpm.   -Cont amiodarone and lidocaine gtt  -Continue work up for advanced HF therapies   -Continue heparin gtt in anticipation for invasive procedures ( will need to be on anticoagulation for at  least one month given extensive ablation in the LV)   -Will discuss redo ablation with Dr. Mark WHALEY spent 45 minutes in the professional and overall care of this patient.      Concepción Padilla MD

## 2024-06-03 NOTE — CARE PLAN
ASSESSMENT AND PLAN:   Patient is a 58/ male, with recurrent ICD shocks 2/2 VT episodes, presenting from OSH, due to ICD shocks s/p DCCV 5/30, S/p successful VT ablation 5/30, continues to have intermittent slow VT  requiring IV pushes of amiodarone, lidocaine, and synchronized DCCV x1 with return to paced rhythm. Amiodarone and lidocaine infusions were started, he remains in V-paced rhythm in 110s. He is  agreeable to advanced therapies evaluation for heart transplant, however he is hesitant for LVAD evaluation. He is also aware of hospice as a potential option.   Transpant and VAD education was completed by coordinators on 5/31 but did not sign consent to initiate advanced heart failure therapies evaluation because he wanted to discuss with family.  Today he is agreeable for advanced heart failure therapies evaluation, but expresses more interest in heart transplantation rather than LVAD implantation.  He is being transferred to HFICU from CICU for swan guided optimization and possible advanced therapies evaluation.       Neuro:  # Anxiety due to ICD firing, on Ativan PRN and home   - Serial neuro and pain assessments   - PO Tylenol PRN for pain  - Holding Paroxetine for MDD given arrhythmias    -PRN hydroxyzine  - c/w scheduled ativan   - Trazodone for sleep aid   - PT/OT Consult, OOB to chair  - CAM ICU score every shift  - Sleep/wake cycle normalization    # Physical Status  -Overweight: BMI 29.35  -encourage ambulation     #Substance abuse  -Alcohol abuse/Alcohol dependence  -Tobacco use: active smoker --> nicotine patches ordered     Cardiovascular:  # Chronic ICM, acute decompensated systolic heart failure stage C NYHA III, HfrEF 15% s/p CRT-D (11/2019)  - TTE (5/28/2024): severely decreased LV systolic function, EF 15%, LVIDd 6.32. Moderate-severe MVR, mild TR    - admit weight:  85kg  - admit BNP (5/28): 320  -Lactate pending   - Holding Entresto   - Holding Torsemide, diuresing with PRN IV lasix as needed    - schedule for swan placement in cath lab tomorrow  - Daily standing weights, 2gm sodium diet, 2L fluid restriction, strict I&Os    #CAD s/p multiple PCI   -LHC (5/23/24): Distal Left Main: 10-30% stenosis; Proximal and mid LAD Lesion: The percent stenosis is 10-30%; Proximal CX Lesion: The percent stenosis is 100%; Right Coronary Artery: fills via collaterals; Proximal RCA Lesion: The percent stenosis is 100%; The Left Ventricular Ejection Fraction is 10%,by echo.  -c/w ASA and Statin     #Recurrent VT status post RFA ablation (5/30/2024 and in 2019   #PVD  -Device interrogation 6/3: pending   -c/w amiodarone gtt  -c/w Lidocaine gtt  - C/w heparin gtt  - c/w Renexa  - EP consulted--> Recs pending       Pulmonary:   #Possible pneumonia versus pulmonary vascular congestion   -CXR overnight with vascular congestion with signs of volume overload   -PRN IV lasix, give 40mg IV this morning   -Now on 10L NC   - 40mg IV lasix  - consider NIPPV if O2 requirement increases  -likely needs outpatient sleep study for suspected GENARO   -Started on vanc/zosyn overnight---> Vanc discontinued this morning (MRSA negative), c/w zosyn  -Monitor and maintain SpO2 > 92%      GI:  #Constipation   - Agnieszka-colace BID  - Miralax  BID  - PPI       :  #Nephrolithiasis status post recent bilateral ureteral stent placement  to left ureter   - consulted urology 5/31 to discuss removal--> will follow outpatient for STENT removal   -Baseline BUN/Cr: 13-17/ 0.96-1.3  - trend RFP daily   - I/Os  - avoid hypotension and nephrotoxic agents    Heme:  #Anemia in the setting of chronic dz vs iron deficiency   - H&H (6/3/24): 11.8/33.4  - consider sending iron studies once cleared from an infection standpoint        Endo:  No concerns for DM   - Euglycemic  - hgbA1c pending     # hypothyroid   -TSH (5/23/24)-->5.55 Free T4--> 0.94       ID:  Concerns for infection--> PNA?   -started on Vanc Zosyn overnight, now just on Zosyn  - Blood Cx pending   -trend  temps q4h      PHYSICAL AND OCCUPATIONAL THERAPY: ordered    LINES:  PIVs   Left radial A-line-: 6/2     DVT: heparin gtt   VAP BUNDLE: NA  CENTRAL LINE BUNDLE: NA  ULCER PPX: PPI  GLYCEMIC CONTROL: NA  BOWEL CARE: scheduled facundo-colace and Miralax  INDWELLING CATHETER: NA  NUTRITION: Adult diet Regular, Cardiac; 70 gm fat; 2 - 3 grams Sodium      EMERGENCY CONTACT: Extended Emergency Contact Information  Primary Emergency Contact: Judie Sapp  Address: [address] [place] [address] [facility]  Home Phone: [phone]  Mobile Phone: [phone]  Relation: Mother  Preferred language: English   needed? No  Secondary Emergency Contact: JcKeiry  Address: [address] [place], [place]  Mobile Phone: [phone]  Relation: Sibling  Preferred language: English   needed? No  FAMILY UPDATE:  CODE STATUS: Full Code  DISPO: remain in HFICU     Dr. Doyle aware of admission     I personally spent 60 minutes of critical care time directly and personally managing the patient exclusive of separately billable procedures   _________________________________________________  CLAUDIA Graham

## 2024-06-03 NOTE — SIGNIFICANT EVENT
Patient acutely developed tachypnea/respiratory distress, on review of tele appeared he was in VT at HR of 118. BP remained stable, IV amio bolus 150mg x1 given, followed by 100mg of lidocaine x1 with no change in rhythm. Versed and fentanyl were pulled and administered and decision made to do synchronized DCCV for the patient at 200J. After the shock patient back in paced rhythm in the 90s. Administered another 150 amio bolus and 2g of magnesium. Patient's symptoms improved after cardioversion, kept him with pads attached and also attached him to device interrogator to keep close eye on rhythm.

## 2024-06-03 NOTE — PROCEDURES
The patient was prepped and draped in the usual sterile manner using chlorhexidine scrub. 1% lidocaine was used to numb the region. The L radial artery was palpated and successfully cannulated on the first pass under ultrasound guidance. Pulsatile, arterial blood was visualized and the artery was then threaded using the Seldinger technique and a catheter was then sutured into place. Good wave-form was obtained. The patient tolerated the procedure well without any immediate complications. The area was cleaned and Tegaderm was applied.

## 2024-06-04 ENCOUNTER — APPOINTMENT (OUTPATIENT)
Dept: RADIOLOGY | Facility: HOSPITAL | Age: 59
DRG: 270 | End: 2024-06-04
Payer: MEDICARE

## 2024-06-04 ENCOUNTER — APPOINTMENT (OUTPATIENT)
Dept: CARDIOLOGY | Facility: HOSPITAL | Age: 59
DRG: 270 | End: 2024-06-04
Payer: MEDICARE

## 2024-06-04 PROBLEM — I50.20 HFREF (HEART FAILURE WITH REDUCED EJECTION FRACTION) (MULTI): Status: RESOLVED | Noted: 2024-05-22 | Resolved: 2024-06-04

## 2024-06-04 PROBLEM — I50.23 ACUTE ON CHRONIC SYSTOLIC (CONGESTIVE) HEART FAILURE (MULTI): Status: ACTIVE | Noted: 2024-05-28

## 2024-06-04 LAB
ABO GROUP (TYPE) IN BLOOD: NORMAL
ALBUMIN SERPL BCP-MCNC: 3.4 G/DL (ref 3.4–5)
ALBUMIN SERPL BCP-MCNC: 3.6 G/DL (ref 3.4–5)
ALP SERPL-CCNC: 71 U/L (ref 33–120)
ALP SERPL-CCNC: 74 U/L (ref 33–120)
ALT SERPL W P-5'-P-CCNC: 11 U/L (ref 10–52)
ALT SERPL W P-5'-P-CCNC: 9 U/L (ref 10–52)
ANION GAP SERPL CALC-SCNC: 14 MMOL/L (ref 10–20)
ANION GAP SERPL CALC-SCNC: 15 MMOL/L (ref 10–20)
APTT PPP: 28 SECONDS (ref 27–38)
AST SERPL W P-5'-P-CCNC: 11 U/L (ref 9–39)
AST SERPL W P-5'-P-CCNC: 13 U/L (ref 9–39)
BASE EXCESS BLDA CALC-SCNC: 4.2 MMOL/L (ref -2–3)
BASOPHILS # BLD AUTO: 0.13 X10*3/UL (ref 0–0.1)
BASOPHILS NFR BLD AUTO: 1.2 %
BILIRUB SERPL-MCNC: 0.7 MG/DL (ref 0–1.2)
BILIRUB SERPL-MCNC: 0.7 MG/DL (ref 0–1.2)
BNP SERPL-MCNC: 674 PG/ML (ref 0–99)
BODY TEMPERATURE: 37 DEGREES CELSIUS
BUN SERPL-MCNC: 14 MG/DL (ref 6–23)
BUN SERPL-MCNC: 16 MG/DL (ref 6–23)
CALCIUM SERPL-MCNC: 8.7 MG/DL (ref 8.6–10.6)
CALCIUM SERPL-MCNC: 9 MG/DL (ref 8.6–10.6)
CARDIOLIPIN IGA SERPL-ACNC: 2 APL U/ML
CARDIOLIPIN IGG SER IA-ACNC: <1.6 GPL U/ML
CARDIOLIPIN IGM SER IA-ACNC: 5.8 MPL U/ML
CHLORIDE SERPL-SCNC: 100 MMOL/L (ref 98–107)
CHLORIDE SERPL-SCNC: 97 MMOL/L (ref 98–107)
CO2 SERPL-SCNC: 26 MMOL/L (ref 21–32)
CO2 SERPL-SCNC: 27 MMOL/L (ref 21–32)
CREAT SERPL-MCNC: 1.31 MG/DL (ref 0.5–1.3)
CREAT SERPL-MCNC: 1.32 MG/DL (ref 0.5–1.3)
EGFRCR SERPLBLD CKD-EPI 2021: 63 ML/MIN/1.73M*2
EGFRCR SERPLBLD CKD-EPI 2021: 64 ML/MIN/1.73M*2
EOSINOPHIL # BLD AUTO: 0.33 X10*3/UL (ref 0–0.7)
EOSINOPHIL NFR BLD AUTO: 2.9 %
ERYTHROCYTE [DISTWIDTH] IN BLOOD BY AUTOMATED COUNT: 13.5 % (ref 11.5–14.5)
ERYTHROCYTE [DISTWIDTH] IN BLOOD BY AUTOMATED COUNT: 13.9 % (ref 11.5–14.5)
EST. AVERAGE GLUCOSE BLD GHB EST-MCNC: 126 MG/DL
GLUCOSE SERPL-MCNC: 101 MG/DL (ref 74–99)
GLUCOSE SERPL-MCNC: 104 MG/DL (ref 74–99)
HAV AB SER QL IA: REACTIVE
HBA1C MFR BLD: 6 %
HBV CORE AB SER QL: NONREACTIVE
HBV SURFACE AB SER-ACNC: 81.6 MIU/ML
HBV SURFACE AG SERPL QL IA: NONREACTIVE
HCO3 BLDA-SCNC: 27.3 MMOL/L (ref 22–26)
HCT VFR BLD AUTO: 33 % (ref 41–52)
HCT VFR BLD AUTO: 36.1 % (ref 41–52)
HCV AB SER QL: NONREACTIVE
HGB BLD-MCNC: 11.6 G/DL (ref 13.5–17.5)
HGB BLD-MCNC: 12.4 G/DL (ref 13.5–17.5)
HIV 1+2 AB+HIV1 P24 AG SERPL QL IA: NONREACTIVE
IMM GRANULOCYTES # BLD AUTO: 0.12 X10*3/UL (ref 0–0.7)
IMM GRANULOCYTES NFR BLD AUTO: 1.1 % (ref 0–0.9)
INHALED O2 CONCENTRATION: 49 %
INR PPP: 1.3 (ref 0.9–1.1)
LDH SERPL L TO P-CCNC: 145 U/L (ref 84–246)
LIDOCAIN SERPL-MCNC: 5.5 UG/ML (ref 1–5)
LYMPHOCYTES # BLD AUTO: 1.86 X10*3/UL (ref 1.2–4.8)
LYMPHOCYTES NFR BLD AUTO: 16.6 %
MAGNESIUM SERPL-MCNC: 1.98 MG/DL (ref 1.6–2.4)
MAGNESIUM SERPL-MCNC: 2.07 MG/DL (ref 1.6–2.4)
MCH RBC QN AUTO: 32.2 PG (ref 26–34)
MCH RBC QN AUTO: 32.3 PG (ref 26–34)
MCHC RBC AUTO-ENTMCNC: 34.3 G/DL (ref 32–36)
MCHC RBC AUTO-ENTMCNC: 35.2 G/DL (ref 32–36)
MCV RBC AUTO: 92 FL (ref 80–100)
MCV RBC AUTO: 94 FL (ref 80–100)
MONOCYTES # BLD AUTO: 1.35 X10*3/UL (ref 0.1–1)
MONOCYTES NFR BLD AUTO: 12 %
NEUTROPHILS # BLD AUTO: 7.42 X10*3/UL (ref 1.2–7.7)
NEUTROPHILS NFR BLD AUTO: 66.2 %
NRBC BLD-RTO: 0 /100 WBCS (ref 0–0)
NRBC BLD-RTO: 0 /100 WBCS (ref 0–0)
OXYHGB MFR BLDA: 94.7 % (ref 94–98)
PCO2 BLDA: 35 MM HG (ref 38–42)
PH BLDA: 7.5 PH (ref 7.38–7.42)
PHOSPHATE SERPL-MCNC: 3.9 MG/DL (ref 2.5–4.9)
PLATELET # BLD AUTO: 273 X10*3/UL (ref 150–450)
PLATELET # BLD AUTO: 283 X10*3/UL (ref 150–450)
PO2 BLDA: 75 MM HG (ref 85–95)
POTASSIUM SERPL-SCNC: 3.8 MMOL/L (ref 3.5–5.3)
POTASSIUM SERPL-SCNC: 3.9 MMOL/L (ref 3.5–5.3)
PREALB SERPL-MCNC: 20 MG/DL (ref 18–40)
PROT SERPL-MCNC: 6.3 G/DL (ref 6.4–8.2)
PROT SERPL-MCNC: 6.3 G/DL (ref 6.4–8.2)
PROTHROMBIN TIME: 14.1 SECONDS (ref 9.8–12.8)
PSA SERPL-MCNC: 0.44 NG/ML
RBC # BLD AUTO: 3.6 X10*6/UL (ref 4.5–5.9)
RBC # BLD AUTO: 3.84 X10*6/UL (ref 4.5–5.9)
RH FACTOR (ANTIGEN D): NORMAL
SAO2 % BLDA: 96 % (ref 94–100)
SODIUM SERPL-SCNC: 134 MMOL/L (ref 136–145)
SODIUM SERPL-SCNC: 137 MMOL/L (ref 136–145)
T3 SERPL-MCNC: 65 NG/DL (ref 60–200)
T4 SERPL-MCNC: 8.3 UG/DL (ref 4.5–11.1)
TSH SERPL-ACNC: 10.35 MIU/L (ref 0.44–3.98)
UFH PPP CHRO-ACNC: 0.3 IU/ML
UFH PPP CHRO-ACNC: 0.4 IU/ML
WBC # BLD AUTO: 10.2 X10*3/UL (ref 4.4–11.3)
WBC # BLD AUTO: 11.2 X10*3/UL (ref 4.4–11.3)

## 2024-06-04 PROCEDURE — 80307 DRUG TEST PRSMV CHEM ANLYZR: CPT

## 2024-06-04 PROCEDURE — 86704 HEP B CORE ANTIBODY TOTAL: CPT

## 2024-06-04 PROCEDURE — 87340 HEPATITIS B SURFACE AG IA: CPT

## 2024-06-04 PROCEDURE — 93005 ELECTROCARDIOGRAM TRACING: CPT

## 2024-06-04 PROCEDURE — 99291 CRITICAL CARE FIRST HOUR: CPT | Performed by: INTERNAL MEDICINE

## 2024-06-04 PROCEDURE — 86147 CARDIOLIPIN ANTIBODY EA IG: CPT

## 2024-06-04 PROCEDURE — 80053 COMPREHEN METABOLIC PANEL: CPT | Performed by: STUDENT IN AN ORGANIZED HEALTH CARE EDUCATION/TRAINING PROGRAM

## 2024-06-04 PROCEDURE — 85305 CLOT INHIBIT PROT S TOTAL: CPT

## 2024-06-04 PROCEDURE — 82607 VITAMIN B-12: CPT | Performed by: STUDENT IN AN ORGANIZED HEALTH CARE EDUCATION/TRAINING PROGRAM

## 2024-06-04 PROCEDURE — 84436 ASSAY OF TOTAL THYROXINE: CPT

## 2024-06-04 PROCEDURE — 2500000004 HC RX 250 GENERAL PHARMACY W/ HCPCS (ALT 636 FOR OP/ED): Performed by: INTERNAL MEDICINE

## 2024-06-04 PROCEDURE — 71250 CT THORAX DX C-: CPT | Performed by: RADIOLOGY

## 2024-06-04 PROCEDURE — 84134 ASSAY OF PREALBUMIN: CPT

## 2024-06-04 PROCEDURE — 80053 COMPREHEN METABOLIC PANEL: CPT | Mod: 91

## 2024-06-04 PROCEDURE — 76942 ECHO GUIDE FOR BIOPSY: CPT | Performed by: STUDENT IN AN ORGANIZED HEALTH CARE EDUCATION/TRAINING PROGRAM

## 2024-06-04 PROCEDURE — 2500000002 HC RX 250 W HCPCS SELF ADMINISTERED DRUGS (ALT 637 FOR MEDICARE OP, ALT 636 FOR OP/ED)

## 2024-06-04 PROCEDURE — 82805 BLOOD GASES W/O2 SATURATION: CPT

## 2024-06-04 PROCEDURE — 85025 COMPLETE CBC W/AUTO DIFF WBC: CPT | Performed by: STUDENT IN AN ORGANIZED HEALTH CARE EDUCATION/TRAINING PROGRAM

## 2024-06-04 PROCEDURE — 71250 CT THORAX DX C-: CPT

## 2024-06-04 PROCEDURE — 85520 HEPARIN ASSAY: CPT | Mod: 91 | Performed by: STUDENT IN AN ORGANIZED HEALTH CARE EDUCATION/TRAINING PROGRAM

## 2024-06-04 PROCEDURE — 84153 ASSAY OF PSA TOTAL: CPT

## 2024-06-04 PROCEDURE — 2500000004 HC RX 250 GENERAL PHARMACY W/ HCPCS (ALT 636 FOR OP/ED)

## 2024-06-04 PROCEDURE — G0452 MOLECULAR PATHOLOGY INTERPR: HCPCS | Performed by: STUDENT IN AN ORGANIZED HEALTH CARE EDUCATION/TRAINING PROGRAM

## 2024-06-04 PROCEDURE — 99291 CRITICAL CARE FIRST HOUR: CPT

## 2024-06-04 PROCEDURE — 87522 HEPATITIS C REVRS TRNSCRPJ: CPT

## 2024-06-04 PROCEDURE — 85027 COMPLETE CBC AUTOMATED: CPT | Performed by: STUDENT IN AN ORGANIZED HEALTH CARE EDUCATION/TRAINING PROGRAM

## 2024-06-04 PROCEDURE — 80176 ASSAY OF LIDOCAINE: CPT | Performed by: NURSE PRACTITIONER

## 2024-06-04 PROCEDURE — 81241 F5 GENE: CPT

## 2024-06-04 PROCEDURE — 2500000001 HC RX 250 WO HCPCS SELF ADMINISTERED DRUGS (ALT 637 FOR MEDICARE OP): Performed by: STUDENT IN AN ORGANIZED HEALTH CARE EDUCATION/TRAINING PROGRAM

## 2024-06-04 PROCEDURE — 2500000001 HC RX 250 WO HCPCS SELF ADMINISTERED DRUGS (ALT 637 FOR MEDICARE OP): Performed by: INTERNAL MEDICINE

## 2024-06-04 PROCEDURE — 93284 PRGRMG EVAL IMPLANTABLE DFB: CPT

## 2024-06-04 PROCEDURE — 2500000001 HC RX 250 WO HCPCS SELF ADMINISTERED DRUGS (ALT 637 FOR MEDICARE OP)

## 2024-06-04 PROCEDURE — 2500000004 HC RX 250 GENERAL PHARMACY W/ HCPCS (ALT 636 FOR OP/ED): Performed by: STUDENT IN AN ORGANIZED HEALTH CARE EDUCATION/TRAINING PROGRAM

## 2024-06-04 PROCEDURE — 93290 INTERROG DEV EVAL ICPMS IP: CPT | Performed by: INTERNAL MEDICINE

## 2024-06-04 PROCEDURE — 86803 HEPATITIS C AB TEST: CPT

## 2024-06-04 PROCEDURE — 82746 ASSAY OF FOLIC ACID SERUM: CPT | Performed by: STUDENT IN AN ORGANIZED HEALTH CARE EDUCATION/TRAINING PROGRAM

## 2024-06-04 PROCEDURE — 83735 ASSAY OF MAGNESIUM: CPT | Mod: 91

## 2024-06-04 PROCEDURE — 86708 HEPATITIS A ANTIBODY: CPT

## 2024-06-04 PROCEDURE — 85610 PROTHROMBIN TIME: CPT

## 2024-06-04 PROCEDURE — 2020000001 HC ICU ROOM DAILY

## 2024-06-04 PROCEDURE — 83880 ASSAY OF NATRIURETIC PEPTIDE: CPT

## 2024-06-04 PROCEDURE — 86901 BLOOD TYPING SEROLOGIC RH(D): CPT

## 2024-06-04 PROCEDURE — 93284 PRGRMG EVAL IMPLANTABLE DFB: CPT | Performed by: INTERNAL MEDICINE

## 2024-06-04 PROCEDURE — 84443 ASSAY THYROID STIM HORMONE: CPT

## 2024-06-04 PROCEDURE — 84100 ASSAY OF PHOSPHORUS: CPT

## 2024-06-04 PROCEDURE — 85302 CLOT INHIBIT PROT C ANTIGEN: CPT

## 2024-06-04 PROCEDURE — 83735 ASSAY OF MAGNESIUM: CPT | Performed by: STUDENT IN AN ORGANIZED HEALTH CARE EDUCATION/TRAINING PROGRAM

## 2024-06-04 PROCEDURE — 99232 SBSQ HOSP IP/OBS MODERATE 35: CPT | Performed by: STUDENT IN AN ORGANIZED HEALTH CARE EDUCATION/TRAINING PROGRAM

## 2024-06-04 PROCEDURE — S4991 NICOTINE PATCH NONLEGEND: HCPCS

## 2024-06-04 PROCEDURE — 83615 LACTATE (LD) (LDH) ENZYME: CPT

## 2024-06-04 PROCEDURE — 83036 HEMOGLOBIN GLYCOSYLATED A1C: CPT | Performed by: NURSE PRACTITIONER

## 2024-06-04 PROCEDURE — 87389 HIV-1 AG W/HIV-1&-2 AB AG IA: CPT

## 2024-06-04 PROCEDURE — 84480 ASSAY TRIIODOTHYRONINE (T3): CPT

## 2024-06-04 PROCEDURE — 86706 HEP B SURFACE ANTIBODY: CPT

## 2024-06-04 PROCEDURE — 37799 UNLISTED PX VASCULAR SURGERY: CPT | Performed by: STUDENT IN AN ORGANIZED HEALTH CARE EDUCATION/TRAINING PROGRAM

## 2024-06-04 RX ORDER — POTASSIUM CHLORIDE 20 MEQ/1
20 TABLET, EXTENDED RELEASE ORAL ONCE
Status: COMPLETED | OUTPATIENT
Start: 2024-06-04 | End: 2024-06-04

## 2024-06-04 RX ORDER — HYDROXYZINE HYDROCHLORIDE 25 MG/1
25 TABLET, FILM COATED ORAL EVERY 8 HOURS PRN
Status: DISCONTINUED | OUTPATIENT
Start: 2024-06-04 | End: 2024-06-06

## 2024-06-04 RX ORDER — LORAZEPAM 1 MG/1
1 TABLET ORAL EVERY 6 HOURS PRN
Status: DISCONTINUED | OUTPATIENT
Start: 2024-06-04 | End: 2024-06-06

## 2024-06-04 RX ORDER — FUROSEMIDE 10 MG/ML
40 INJECTION INTRAMUSCULAR; INTRAVENOUS ONCE
Status: COMPLETED | OUTPATIENT
Start: 2024-06-04 | End: 2024-06-04

## 2024-06-04 RX ADMIN — AMIODARONE HYDROCHLORIDE 1 MG/MIN: 1.8 INJECTION, SOLUTION INTRAVENOUS at 14:30

## 2024-06-04 RX ADMIN — HYDROXYZINE HYDROCHLORIDE 25 MG: 25 TABLET ORAL at 14:30

## 2024-06-04 RX ADMIN — LORAZEPAM 1 MG: 1 TABLET ORAL at 16:06

## 2024-06-04 RX ADMIN — SENNOSIDES AND DOCUSATE SODIUM 2 TABLET: 8.6; 5 TABLET ORAL at 09:08

## 2024-06-04 RX ADMIN — RANOLAZINE 500 MG: 500 TABLET, EXTENDED RELEASE ORAL at 09:09

## 2024-06-04 RX ADMIN — PIPERACILLIN SODIUM AND TAZOBACTAM SODIUM 4.5 G: 4; .5 INJECTION, SOLUTION INTRAVENOUS at 22:48

## 2024-06-04 RX ADMIN — POTASSIUM CHLORIDE 20 MEQ: 1500 TABLET, EXTENDED RELEASE ORAL at 05:13

## 2024-06-04 RX ADMIN — ROSUVASTATIN 20 MG: 20 TABLET, FILM COATED ORAL at 20:40

## 2024-06-04 RX ADMIN — TRAZODONE HYDROCHLORIDE 50 MG: 50 TABLET ORAL at 20:40

## 2024-06-04 RX ADMIN — Medication 1 PATCH: at 06:02

## 2024-06-04 RX ADMIN — RANOLAZINE 500 MG: 500 TABLET, EXTENDED RELEASE ORAL at 21:00

## 2024-06-04 RX ADMIN — LORAZEPAM 1 MG: 1 TABLET ORAL at 09:32

## 2024-06-04 RX ADMIN — LORAZEPAM 1 MG: 1 TABLET ORAL at 22:48

## 2024-06-04 RX ADMIN — AMIODARONE HYDROCHLORIDE 1 MG/MIN: 1.8 INJECTION, SOLUTION INTRAVENOUS at 08:10

## 2024-06-04 RX ADMIN — POLYETHYLENE GLYCOL 3350 17 G: 17 POWDER, FOR SOLUTION ORAL at 09:09

## 2024-06-04 RX ADMIN — AMIODARONE HYDROCHLORIDE 1 MG/MIN: 1.8 INJECTION, SOLUTION INTRAVENOUS at 02:22

## 2024-06-04 RX ADMIN — PIPERACILLIN SODIUM AND TAZOBACTAM SODIUM 4.5 G: 4; .5 INJECTION, SOLUTION INTRAVENOUS at 11:40

## 2024-06-04 RX ADMIN — HEPARIN SODIUM 1300 UNITS/HR: 10000 INJECTION, SOLUTION INTRAVENOUS at 17:06

## 2024-06-04 RX ADMIN — HYDROXYZINE HYDROCHLORIDE 10 MG: 10 TABLET ORAL at 03:52

## 2024-06-04 RX ADMIN — PANTOPRAZOLE SODIUM 40 MG: 40 TABLET, DELAYED RELEASE ORAL at 06:02

## 2024-06-04 RX ADMIN — SENNOSIDES AND DOCUSATE SODIUM 2 TABLET: 8.6; 5 TABLET ORAL at 20:40

## 2024-06-04 RX ADMIN — ASPIRIN 81 MG CHEWABLE TABLET 81 MG: 81 TABLET CHEWABLE at 09:15

## 2024-06-04 RX ADMIN — AMIODARONE HYDROCHLORIDE 1 MG/MIN: 1.8 INJECTION, SOLUTION INTRAVENOUS at 20:34

## 2024-06-04 RX ADMIN — LIDOCAINE HYDROCHLORIDE 1 MG/MIN: 8 INJECTION, SOLUTION INTRAVENOUS at 14:30

## 2024-06-04 RX ADMIN — PIPERACILLIN SODIUM AND TAZOBACTAM SODIUM 4.5 G: 4; .5 INJECTION, SOLUTION INTRAVENOUS at 05:13

## 2024-06-04 RX ADMIN — PIPERACILLIN SODIUM AND TAZOBACTAM SODIUM 4.5 G: 4; .5 INJECTION, SOLUTION INTRAVENOUS at 18:14

## 2024-06-04 RX ADMIN — FUROSEMIDE 40 MG: 10 INJECTION, SOLUTION INTRAMUSCULAR; INTRAVENOUS at 16:18

## 2024-06-04 ASSESSMENT — PAIN SCALES - GENERAL
PAINLEVEL_OUTOF10: 0 - NO PAIN
PAINLEVEL_OUTOF10: 0 - NO PAIN

## 2024-06-04 ASSESSMENT — PAIN - FUNCTIONAL ASSESSMENT
PAIN_FUNCTIONAL_ASSESSMENT: 0-10
PAIN_FUNCTIONAL_ASSESSMENT: 0-10

## 2024-06-04 NOTE — PROGRESS NOTES
"[facility]  HFICU PROGRESS NOTE    Cristian Sapp/[id number]    Admit Date: 5/28/2024  Hospital Length of Stay: 7   ICU Length of Stay: 20h   Primary Service: HFICU  Referring: Dr Brown     INTERVAL EVENTS / PERTINENT ROS:     No acute events overnight, patient around 8 am went into a slow VT rate around 101. EP was called and overrode paced him back to NSR. IV heparin was held at 11 for a stellate ganglion block.      Plan:  - Send off labs/eval for LVAD workup not transplant per Dr Doyel as there are too many barriers to transplant   - Possible RHC in cath lab today pending lab schedule unable to do bedside because of recurrent VT   - EP to do stellate ganglion block this afternoon     MEDICATIONS  Infusions:  amiodarone, Last Rate: 1 mg/min (06/04/24 0810)  heparin, Last Rate: 1,300 Units/hr (06/03/24 1838)  lidocaine, Last Rate: 1 mg/min (06/04/24 0800)      Scheduled:  amiodarone, 150 mg, Once  aspirin, 81 mg, Daily  nicotine, 1 patch, Daily   Followed by  [START ON 7/13/2024] nicotine, 1 patch, Daily  pantoprazole, 40 mg, Daily before breakfast  piperacillin-tazobactam, 4.5 g, q6h  polyethylene glycol, 17 g, BID  ranolazine, 500 mg, BID  rosuvastatin, 20 mg, Nightly  sennosides-docusate sodium, 2 tablet, BID  traZODone, 50 mg, Nightly      PRN:  acetaminophen, 650 mg, q4h PRN   Or  acetaminophen, 650 mg, q4h PRN   Or  acetaminophen, 650 mg, q4h PRN  heparin, 2,000-4,000 Units, q4h PRN  LORazepam, 1 mg, q6h PRN  methocarbamol, 500 mg, q6h PRN  oxygen, , Continuous PRN - O2/gases  trimethobenzamide, 200 mg, q6h PRN        PHYSICAL EXAM:   Visit Vitals  BP (!) 134/107   Pulse 95   Temp 37.1 °C (98.8 °F)   Resp 18   Ht 1.702 m (5' 7\")   Wt 85 kg (187 lb 6.3 oz)   SpO2 93%   BMI 29.35 kg/m²   Smoking Status Former   BSA 2 m²       Wt Readings from Last 5 Encounters:   06/03/24 85 kg (187 lb 6.3 oz)   05/27/24 78.8 kg (173 lb 11.6 oz)       INTAKE/OUTPUT:  I/O last 3 completed " shifts:  In: 4190.4 (49.3 mL/kg) [I.V.:3190.4 (37.5 mL/kg); IV Piggyback:1000]  Out: 6250 (73.5 mL/kg) [Urine:6250 (2 mL/kg/hr)]  Weight: 85 kg      Physical Exam  Vitals reviewed.   Constitutional:       General: He is not in acute distress.     Appearance: He is ill-appearing.   HENT:      Mouth/Throat:      Mouth: Mucous membranes are moist.   Eyes:      Pupils: Pupils are equal, round, and reactive to light.   Cardiovascular:      Rate and Rhythm: Regular rhythm. Tachycardia present.      Pulses: Normal pulses.   Pulmonary:      Effort: Pulmonary effort is normal. No respiratory distress.      Breath sounds: Normal breath sounds.   Abdominal:      General: Abdomen is flat. Bowel sounds are normal. There is no distension.   Musculoskeletal:         General: Normal range of motion.      Right lower leg: Edema present.      Left lower leg: Edema present.   Skin:     General: Skin is warm.      Capillary Refill: Capillary refill takes less than 2 seconds.   Neurological:      General: No focal deficit present.      Mental Status: He is alert and oriented to person, place, and time.   Psychiatric:         Mood and Affect: Mood normal.         Behavior: Behavior normal.         DATA:  CMP:  Results from last 7 days   Lab Units 06/04/24  0307 06/03/24  1741 06/03/24  0205 06/02/24  0224 06/01/24  0612 05/31/24  1742 05/31/24  0534 05/30/24  0506 05/29/24  0518 05/28/24  1503   SODIUM mmol/L 137 138 138 136 138 136 136 138 138 139  138   POTASSIUM mmol/L 3.8 3.8 3.8 4.1 4.2 4.3 4.5 4.1 4.3 5.1  5.0   CHLORIDE mmol/L 100 97* 98 99 101 97* 100 97* 99 99  100   CO2 mmol/L 27 28 28 29 31 29 29 29 30 25  29   ANION GAP mmol/L 14 17 16 12 10 14 12 16 13 20  14   BUN mg/dL 14 12 13 13 15 16 15 13 14 15  15   CREATININE mg/dL 1.31* 1.20 1.13 0.99 1.11 1.15 1.05 1.12 1.13 1.24  1.25   EGFR mL/min/1.73m*2 64 71 76 89 78 75 83 77 76 68  68   MAGNESIUM mg/dL 2.07 2.17 2.34 1.98 2.03 1.99 2.12 2.20 2.49* 2.33   ALBUMIN g/dL  "3.6 3.8 3.5 3.6 3.3* 3.6 3.4 3.4 3.5 3.7  3.8   ALT U/L 9*  --  12 13  --   --   --   --  13 14   AST U/L 13  --  14 16  --   --   --   --  9 19   BILIRUBIN TOTAL mg/dL 0.7  --  0.6 1.0  --   --   --   --  0.6 1.3*     CBC:  Results from last 7 days   Lab Units 06/04/24  0307 06/04/24  0005 06/03/24  1741 06/03/24  0205 06/02/24  0224 06/01/24  0612 05/31/24  0534 05/30/24  0506   WBC AUTO x10*3/uL 11.2 10.2 12.7* 11.0 9.6 8.8 8.2 8.5   HEMOGLOBIN g/dL 12.4* 11.6* 13.1* 11.8* 12.1* 11.4* 13.5 13.0*   HEMATOCRIT % 36.1* 33.0* 37.0* 33.4* 35.4* 33.4* 37.2* 35.7*   PLATELETS AUTO x10*3/uL 283 273 303 257 233 235 214 211   MCV fL 94 92 92 92 94 95 92 89     COAG:   Results from last 7 days   Lab Units 05/29/24  0518 05/28/24  1503   INR  1.1 1.2*     ABO: No results found for: \"ABO\"  HEME/ENDO:  Results from last 7 days   Lab Units 06/04/24  0307   HEMOGLOBIN A1C % 6.0*      CARDIAC:   Results from last 7 days   Lab Units 06/03/24  1741 05/31/24  1742 05/31/24  1523 05/28/24  1503   TROPHS ng/L  --  2,261* 2,395*  --    BNP pg/mL 655*  --   --  320*       ASSESSMENT AND PLAN:   Patient is a 58/ male, with recurrent ICD shocks 2/2 VT episodes, presenting from OSH, due to ICD shocks s/p DCCV 5/30, S/p successful VT ablation 5/30, continues to have intermittent slow VT  requiring IV pushes of amiodarone, lidocaine, and synchronized DCCV x1 with return to paced rhythm. Amiodarone and lidocaine infusions were started, he remains in V-paced rhythm in 110s. He is  agreeable to advanced therapies evaluation for heart transplant, however he is hesitant for LVAD evaluation. He is also aware of hospice as a potential option.   Transpant and VAD education was completed by coordinators on 5/31 but did not sign consent to initiate advanced heart failure therapies evaluation because he wanted to discuss with family.  Today he is agreeable for advanced heart failure therapies evaluation, but expresses more interest in heart " transplantation rather than LVAD implantation.  He is being transferred to HFICU from CICU for swan guided optimization and possible advanced therapies evaluation. Had episode of slow VT with rate around 101 this AM 6/4 - EP able to pace him out of this rhythm. Plan for stellate ganglion block this afternoon with EP.      Neuro:  # Anxiety due to ICD firing, on Ativan PRN and home   - Serial neuro and pain assessments   - PO Tylenol PRN for pain  - Holding Paroxetine for MDD given arrhythmias   - PRN hydroxyzine discontinued , patient states it does not work   - C/w PRN ativan 0.5 mg Q6 hr for anxiety   - Trazodone for sleep aid   - PT/OT Consult, OOB to chair  - CAM ICU score every shift  - Sleep/wake cycle normalization     # Physical Status  - Overweight: BMI 29.35  - Encourage ambulation     #Substance abuse  - ?Alcohol abuse/Alcohol dependence  - Tobacco use: active smoker --> nicotine patches ordered      Cardiovascular:  # Chronic ICM, acute decompensated systolic heart failure stage C NYHA III, HfrEF 15% s/p CRT-D (11/2019)  - TTE (5/28/2024): severely decreased LV systolic function, EF 15%, LVIDd 6.32. Moderate-severe MVR, mild TR    - admit weight:  85kg  - admit BNP (5/28): 320  - Lactate 1.1   - Holding Entresto   - Holding Torsemide, diuresing with PRN IV lasix as needed   - Schedule for swan placement in cath lab -> ordered but waiting to do since patient is still going in and out of VT   - Daily standing weights, 2gm sodium diet, 2L fluid restriction, strict I&Os     #CAD s/p multiple PCI   -[facility] (5/23/24): Distal Left Main: 10-30% stenosis; Proximal and mid LAD Lesion: The percent stenosis is 10-30%; Proximal CX Lesion: The percent stenosis is 100%; Right Coronary Artery: fills via collaterals; Proximal RCA Lesion: The percent stenosis is 100%; The Left Ventricular Ejection Fraction is 10%,by echo.  -c/w ASA and Statin     #Recurrent VT status post RFA ablation (5/30/2024 and in 2019   #PVD  - Device  interrogation 6/3: pending   - c/w amiodarone gtt  - c/w Lidocaine gtt -> level 5.5 6/4 decreased rate from 1.5 to 1   - C/w heparin gtt  - C/w Renexa  - EP consulted planning to do stellate ganglion block this afternoon 6/4      Pulmonary:   #Possible pneumonia versus pulmonary vascular congestion   - CXR overnight with vascular congestion with signs of volume overload   - PRN IV lasix, give 40mg IV this morning   - Was on 10L NC -> continuing to come down with diuresis   - 40mg IV lasix x1 6/3  - consider NIPPV if O2 requirement increases  - Likely needs outpatient sleep study for suspected GENARO   - Started on vanc/zosyn overnight 6/2---> Vanc discontinued this morning 6/3 (MRSA negative), c/w zosyn  - Monitor and maintain SpO2 > 92%     GI:  #Constipation   - Facundo-colace BID  - Miralax  BID  - PPI    :  #Nephrolithiasis status post recent bilateral ureteral stent placement  to left ureter   - consulted urology 5/31 to discuss removal--> will follow outpatient for STENT removal   -Baseline BUN/Cr: 13-17/ 0.96-1.3  - trend RFP daily   - I/Os  - Diuresis as above   - Avoid hypotension and nephrotoxic agents     Heme:  #Anemia in the setting of chronic dz vs iron deficiency   - H&H (6/3/24): 11.8/33.4  - Consider sending iron studies once cleared from an infection standpoint     Endo:  No concerns for DM   - Euglycemic  - hgbA1c 6%      # hypothyroid   -TSH (5/23/24)-->5.55 Free T4--> 0.94        ID:  Concerns for infection--> PNA?   - Started on Vanc Zosyn overnight 6/2, now just on Zosyn 6/3  - Blood Cx from 6/3 NTD    - Trend temps q4h        PHYSICAL AND OCCUPATIONAL THERAPY: ordered and following      LINES:  PIVs   Left radial A-line: 6/2      DVT: heparin gtt   VAP BUNDLE: NA  CENTRAL LINE BUNDLE: NA  ULCER PPX: PPI  GLYCEMIC CONTROL: NA  BOWEL CARE: scheduled facundo-colace and Miralax  INDWELLING CATHETER: NA  NUTRITION: Adult diet Regular, Cardiac; 70 gm fat; 2 - 3 grams Sodium      EMERGENCY CONTACT:  Extended Emergency Contact Information  Primary Emergency Contact: Judie Sapp  Address: [address] [place] [address] [place]  Home Phone: [phone]  Mobile Phone: [phone]  Relation: Mother  Preferred language: English   needed? No  Secondary Emergency Contact: Keiry Greene  Address: [address] [place], [place]  Mobile Phone: [phone]  Relation: Sibling  Preferred language: English   needed? No  FAMILY UPDATE: given at bedside  CODE STATUS: Full Code  DISPO: remain in hficu     Patient seen and assessed with Dr. Vivek WHALEY personally spent 60 minutes of critical care time directly and personally managing the patient exclusive of separately billable procedures   _________________________________________________  YARITZA Ramirez-CNP

## 2024-06-04 NOTE — PROCEDURES
Peripheral Block    Patient location during procedure: pre-op  Start time: 6/4/2024 3:15 PM  End time: 6/4/2024 3:40 PM  Reason for block: at surgeon's request  Staffing  Performed: attending and resident   Authorized by: Alina Hernandez MD    Performed by: Puja Thayer MD  Preanesthetic Checklist  Completed: patient identified, IV checked, site marked, risks and benefits discussed, surgical consent, monitors and equipment checked, pre-op evaluation and timeout performed   Timeout performed at: 6/4/2024 3:15 PM  Peripheral Block  Patient position: laying flat  Prep: ChloraPrep  Patient monitoring: heart rate and continuous pulse ox  Block type: other (Stellate Ganglion)  Laterality: left  Injection technique: single-shot  Guidance: ultrasound guided  Local infiltration: ropivacaine  Needle  Needle type: Tuohy   Needle gauge: 26 G  Needle length: 5 cm  Needle localization: ultrasound guidance     image stored in chart  Assessment  Injection assessment: negative aspiration for heme, no paresthesia on injection, incremental injection and local visualized surrounding nerve on ultrasound  Paresthesia pain: none  Heart rate change: no  Slow fractionated injection: no  Additional Notes  Stellate ganglion nerve block: Informed consent obtained.  Risks and benefits discussed, including the risk of Vanesa syndrome.  ASA monitors placed, timeout performed.  Patient position, prepped with chlorhexidine.  Ultrasound guidance used to visualize the carotid artery, brachial plexus, and surrounding structures with visualization of the needle throughout duration of the procedure.  Aspiration was negative throughout.  9 cc 0.5% ropivacaine, 1cc of 1.0% lidocaine, with 30mcg of epinephrine and 2mg decadron injected incrementally.  Patient tolerated procedure well.      Timeout by ICU RN

## 2024-06-04 NOTE — CARE PLAN
Problem: Fall/Injury  Goal: Not fall by end of shift  Outcome: Progressing  Goal: Be free from injury by end of the shift  Outcome: Progressing  Goal: Verbalize understanding of personal risk factors for fall in the hospital  Outcome: Progressing  Goal: Verbalize understanding of risk factor reduction measures to prevent injury from fall in the home  Outcome: Progressing  Goal: Use assistive devices by end of the shift  Outcome: Progressing  Goal: Pace activities to prevent fatigue by end of the shift  Outcome: Progressing     Problem: Pain  Goal: My pain/discomfort is manageable  Outcome: Progressing     Problem: Daily Care  Goal: Daily care needs are met  Outcome: Progressing     Problem: Psychosocial Needs  Goal: Demonstrates ability to cope with hospitalization/illness  Outcome: Progressing  Goal: Collaborate with me, my family, and caregiver to identify my specific goals  Outcome: Progressing     Problem: Pain - Adult  Goal: Verbalizes/displays adequate comfort level or baseline comfort level  Outcome: Progressing     Problem: Safety - Adult  Goal: Free from fall injury  Outcome: Progressing     Problem: Discharge Planning  Goal: Discharge to home or other facility with appropriate resources  Outcome: Progressing     Problem: Chronic Conditions and Co-morbidities  Goal: Patient's chronic conditions and co-morbidity symptoms are monitored and maintained or improved  Outcome: Progressing     Problem: Skin  Goal: Decreased wound size/increased tissue granulation at next dressing change  Outcome: Progressing  Goal: Participates in plan/prevention/treatment measures  Outcome: Progressing  Goal: Prevent/manage excess moisture  Outcome: Progressing  Goal: Prevent/minimize sheer/friction injuries  Outcome: Progressing  Goal: Promote/optimize nutrition  Outcome: Progressing  Goal: Promote skin healing  Outcome: Progressing     Problem: Heart Failure  Goal: Improved gas exchange this shift  Outcome: Progressing  Goal:  Improved urinary output this shift  Outcome: Progressing  Goal: Reduction in peripheral edema within 24 hours  Outcome: Progressing  Goal: Report improvement of dyspnea/breathlessness this shift  Outcome: Progressing  Goal: Weight from fluid excess reduced over 2-3 days, then stabilize  Outcome: Progressing  Goal: Increase self care and/or family involvement in 24 hours  Outcome: Progressing   The patient's goals for the shift include      The clinical goals for the shift include pt will remain free of VT and hemodynamically stable    Over the shift, the patient did not make progress toward the following goals. Barriers to progression include . Recommendations to address these barriers include .

## 2024-06-04 NOTE — PROGRESS NOTES
"Cristian Sapp is a 58 y.o. male on day 7 of admission presenting with Ventricular tachycardia (Multi).    Subjective   No acute overnight events. This am, patient noted to go into slow VT at ~ 100bpm. Pt c/o chest pain at the time    Objective     Physical Exam  Gen: frail and uncomfortable  Neuro: AAOx3, CN 2-12 intact, no FND  HEENT: PEERL, EOMI, sclera anicteric, no conjunctival injection, MMM, no oropharyngeal lesions  Neck: no elevated JVD  Resp: CTAB, normal breath sounds, no wheezing/crackles/ rales  CV: RRR, normal S1/S2, no murmurs/ rubs/gallops  GI: non-tender, non-distended, normal BSs in all 4 quadrants  MSK: warm and well perfused, no edema  Skin: warm and dry, no lesions, no rashes     Last Recorded Vitals  Blood pressure (!) 134/107, pulse 95, temperature 37.1 °C (98.8 °F), resp. rate 14, height 1.702 m (5' 7\"), weight 85 kg (187 lb 6.3 oz), SpO2 96%.  Intake/Output last 3 Shifts:  I/O last 3 completed shifts:  In: 4190.4 (49.3 mL/kg) [I.V.:3190.4 (37.5 mL/kg); IV Piggyback:1000]  Out: 6250 (73.5 mL/kg) [Urine:6250 (2 mL/kg/hr)]  Weight: 85 kg     Relevant Results  LABS:  CMP:  Results from last 7 days   Lab Units 06/04/24  1255 06/04/24  0307 06/03/24  1741 06/03/24  0205 06/02/24  0224 06/01/24  0612 05/31/24  1742 05/31/24  0534 05/30/24  0506 05/29/24  0518   SODIUM mmol/L 134* 137 138 138 136 138 136 136 138 138   POTASSIUM mmol/L 3.9 3.8 3.8 3.8 4.1 4.2 4.3 4.5 4.1 4.3   CHLORIDE mmol/L 97* 100 97* 98 99 101 97* 100 97* 99   CO2 mmol/L 26 27 28 28 29 31 29 29 29 30   ANION GAP mmol/L 15 14 17 16 12 10 14 12 16 13   BUN mg/dL 16 14 12 13 13 15 16 15 13 14   CREATININE mg/dL 1.32* 1.31* 1.20 1.13 0.99 1.11 1.15 1.05 1.12 1.13   EGFR mL/min/1.73m*2 63 64 71 76 89 78 75 83 77 76   MAGNESIUM mg/dL 1.98 2.07 2.17 2.34 1.98 2.03 1.99 2.12 2.20 2.49*   ALBUMIN g/dL 3.4 3.6 3.8 3.5 3.6 3.3* 3.6 3.4 3.4 3.5   ALT U/L 11 9*  --  12 13  --   --   --   --  13   AST U/L 11 13  --  14 16  --   --   --   --  9 " "  BILIRUBIN TOTAL mg/dL 0.7 0.7  --  0.6 1.0  --   --   --   --  0.6     CBC:  Results from last 7 days   Lab Units 06/04/24  0307 06/04/24  0005 06/03/24  1741 06/03/24  0205 06/02/24  0224 06/01/24  0612 05/31/24  0534 05/30/24  0506   WBC AUTO x10*3/uL 11.2 10.2 12.7* 11.0 9.6 8.8 8.2 8.5   HEMOGLOBIN g/dL 12.4* 11.6* 13.1* 11.8* 12.1* 11.4* 13.5 13.0*   HEMATOCRIT % 36.1* 33.0* 37.0* 33.4* 35.4* 33.4* 37.2* 35.7*   PLATELETS AUTO x10*3/uL 283 273 303 257 233 235 214 211   MCV fL 94 92 92 92 94 95 92 89     COAG:   Results from last 7 days   Lab Units 06/04/24  1255 05/29/24  0518   INR  1.3* 1.1     ABO: No results found for: \"ABO\"  HEME/ENDO:  Results from last 7 days   Lab Units 06/04/24  1255 06/04/24  0307   TSH mIU/L 10.35*  --    HEMOGLOBIN A1C %  --  6.0*      CARDIAC:   Results from last 7 days   Lab Units 06/04/24  1255 06/03/24  1741 05/31/24  1742 05/31/24  1523   LD U/L 145  --   --   --    TROPHS ng/L  --   --  2,261* 2,395*   BNP pg/mL 674* 655*  --   --    No results for input(s): \"CHOL\", \"LDLF\", \"LDL\", \"LDLCALC\", \"HDL\", \"TRIG\" in the last 27861 hours.       Assessment/Plan   Principal Problem:    Ventricular tachycardia (Multi)  Active Problems:    AICD discharge    Acute on chronic systolic (congestive) heart failure (Multi)    Ischemic cardiomyopathy    58M PMHx CAD s/p multiple PCI, ICM/HFrEF (EF 15% 5/24) s/p CRT-D, VT storm s/p VT ablation, infrarenal AAA s/p R iliac stent, nephrolithiasis s/p recent ureteral stent. Presented to OSH on 5/23 with multiple ICD shocks for VT. Noted to be in incessant, slow VT for which EP is following.      Patient remained in slow, hemodynamically stable VT, likely scar mediated iso known ICM and unrevascularized CAD. Patient was started on amiodarone and lidocaine. Given inability to pace terminate this, patient underwent VT RFA with Dr. Flores on 5/30.     Successful RFA for clinical VT 1 with a critical isthmus involving the mid inferior / infero - septal " left ventricular segment  Successful RFA for clinical VT 2 pace mapped to the mid lateral left ventriclar segment      Despite this, on 6/2, patient was noted to have slow VT with rates ~ 90-110s. Tracking rate was increased to 95bpm but patient continued to have episodes of VT (rates ~ 118 on 6/3) requiring cardioversion. Noted to go into slow VT again on 6/4 s/p successful overdrive pacing. Morphology of VT is likely anterolateral wall/midseptum - this is recurrence of the same VT which was ablated on 5/30        #ICM/HFrEF (EF 15%) s/p CRT-D  #Prior VT storm s/p VT ablation 5/30  #Incessant VT s/p ICD shock  -Noted to be in slow VT (rate ~ 100bpm) this am -> s/p successful overdrive pacing. Now AV-paced at 95bpm (lower rate limit for ICD)  -Will attempt stellate ganglion block this afternoon to suppress VT   -Last time when ablation was attempted, patient had a lot of VT during the procedure and had to be shocked x 1. If patient is electrically a little quiet after the stellate block some arrhythmogenicity is suppressed, the chance of successful endocardial VT ablation may be higher  -Will plan for endocardial ablation with Dr. Wooten likely next week  -Of note, patient is at high risk for ablation given severely reduced EF  -Cont amiodarone and lidocaine gtt  -Check daily lidocaine levels  -Continue work up for advanced HF therapies   -Continue heparin gtt in anticipation for invasive procedures ( will need to be on anticoagulation for at least one month given extensive ablation in the LV)   -Cont GOC discussions      Thank you for the consult. EP will continue to follow. Staffed with Dr. Drea Padilla  PGY-V, Cardiology Fellow    General Cardiology Consult Pager: [phone] (weekday 7AM-6PM and weekend 7AM-2PM) and other: [phone]  EP Consult Pager: [phone] (weekday 7AM-6PM and weekend 7AM-2PM) and other: [phone]  EP Device Nurse Pager: [phone] (weekday 7AM-4PM)  Advanced Heart Failure Consult Pager: [phone]  anytime  CICU Fellow Pager: [phone] anytime  Endovascular/Limb Salvage Team Pager: [phone] for day coverage (8am-5pm)  Interventional Cardiology Fellow Pager: [phone] for night and weekend coverage  Structural Heart Team Pager: [phone] anytime  Night coverage: [facility] [id number]    I spent 45 minutes in the professional and overall care of this patient.        Concepción Padilla MD

## 2024-06-04 NOTE — PROGRESS NOTES
HFICU Attending Note    Principal Problem:    Ventricular tachycardia (Multi)  Active Problems:    AICD discharge    Acute on chronic systolic (congestive) heart failure (Multi)    Ischemic cardiomyopathy    He was transferred to HFICU yesterday evening. This morning he went into persistent VT once again. EP service helped terminate it with pacing, and they recommended proceeding with ganglion block today, and re-do VT ablation in the coming days.    Elevated BNP in this setting suggestive acutely decompensated heart failure.    Case discussed in detail with patient and his parents at bedside. Also I discussed treatment course with Dr. Shepard (EP).    This critically ill patient continues to be at-risk for clinically significant deterioration / failure due to the above mentioned dysfunctional, unstable organ systems.  I have personally identified and managed all complex critical care issues to prevent aforementioned clinical deterioration.  Critical care time is spent at bedside and/or the immediate area and has included, but is not limited to, the review of diagnostic tests, labs, radiographs, serial assessments of hemodynamics, respiratory status, ventilatory management, and family updates.  Time spent in procedures and teaching are reported separately.    Critical care time: __35__ minutes     Alexandro Doyle MD, MPH  Advanced Heart Failure and Transplant Cardiology  [facility]  [facility]

## 2024-06-04 NOTE — PROGRESS NOTES
Social Work Transitional Care Note   -ICU Treatment Plan: The patient is a transfer from [facility] ICU to [facility]. Cardiac transplant advanced HF therapy evaluation     - Additional information: None at this time   -Payer: [employer]    -Planned Disposition: Rehab eval no needs at this time. SW will continue to follow   - Barriers to discharge: None at this time. The patient is from home.   -ADOD: 6/07  DANIA Carter, LSW

## 2024-06-05 ENCOUNTER — APPOINTMENT (OUTPATIENT)
Dept: VASCULAR MEDICINE | Facility: HOSPITAL | Age: 59
DRG: 270 | End: 2024-06-05
Payer: MEDICARE

## 2024-06-05 LAB
ALBUMIN SERPL BCP-MCNC: 3.3 G/DL (ref 3.4–5)
ALBUMIN SERPL BCP-MCNC: 3.5 G/DL (ref 3.4–5)
ANION GAP SERPL CALC-SCNC: 13 MMOL/L (ref 10–20)
ANION GAP SERPL CALC-SCNC: 16 MMOL/L (ref 10–20)
APPEARANCE UR: CLEAR
BACTERIA #/AREA URNS AUTO: ABNORMAL /HPF
BASOPHILS # BLD AUTO: 0.07 X10*3/UL (ref 0–0.1)
BASOPHILS NFR BLD AUTO: 0.6 %
BILIRUB UR STRIP.AUTO-MCNC: NEGATIVE MG/DL
BUN SERPL-MCNC: 14 MG/DL (ref 6–23)
BUN SERPL-MCNC: 18 MG/DL (ref 6–23)
CALCIUM SERPL-MCNC: 8.8 MG/DL (ref 8.6–10.6)
CALCIUM SERPL-MCNC: 8.8 MG/DL (ref 8.6–10.6)
CHLORIDE SERPL-SCNC: 99 MMOL/L (ref 98–107)
CHLORIDE SERPL-SCNC: 99 MMOL/L (ref 98–107)
CO2 SERPL-SCNC: 27 MMOL/L (ref 21–32)
CO2 SERPL-SCNC: 28 MMOL/L (ref 21–32)
COLOR UR: ABNORMAL
CREAT SERPL-MCNC: 1.21 MG/DL (ref 0.5–1.3)
CREAT SERPL-MCNC: 1.43 MG/DL (ref 0.5–1.3)
EGFRCR SERPLBLD CKD-EPI 2021: 58 ML/MIN/1.73M*2
EGFRCR SERPLBLD CKD-EPI 2021: 70 ML/MIN/1.73M*2
EOSINOPHIL # BLD AUTO: 0.12 X10*3/UL (ref 0–0.7)
EOSINOPHIL NFR BLD AUTO: 1 %
ERYTHROCYTE [DISTWIDTH] IN BLOOD BY AUTOMATED COUNT: 13.7 % (ref 11.5–14.5)
FERRITIN SERPL-MCNC: 99 NG/ML (ref 20–300)
FOLATE SERPL-MCNC: 9.8 NG/ML
GLUCOSE SERPL-MCNC: 115 MG/DL (ref 74–99)
GLUCOSE SERPL-MCNC: 138 MG/DL (ref 74–99)
GLUCOSE UR STRIP.AUTO-MCNC: NORMAL MG/DL
HCT VFR BLD AUTO: 34 % (ref 41–52)
HCV RNA SERPL NAA+PROBE-ACNC: NOT DETECTED K[IU]/ML
HCV RNA SERPL NAA+PROBE-LOG IU: NORMAL {LOG_IU}/ML
HGB BLD-MCNC: 11.8 G/DL (ref 13.5–17.5)
HOLD SPECIMEN: NORMAL
IMM GRANULOCYTES # BLD AUTO: 0.1 X10*3/UL (ref 0–0.7)
IMM GRANULOCYTES NFR BLD AUTO: 0.9 % (ref 0–0.9)
IRON SATN MFR SERPL: 18 % (ref 25–45)
IRON SERPL-MCNC: 51 UG/DL (ref 35–150)
KETONES UR STRIP.AUTO-MCNC: NEGATIVE MG/DL
LEUKOCYTE ESTERASE UR QL STRIP.AUTO: ABNORMAL
LIDOCAIN SERPL-MCNC: 4.6 UG/ML (ref 1–5)
LYMPHOCYTES # BLD AUTO: 1.51 X10*3/UL (ref 1.2–4.8)
LYMPHOCYTES NFR BLD AUTO: 13 %
MAGNESIUM SERPL-MCNC: 1.89 MG/DL (ref 1.6–2.4)
MAGNESIUM SERPL-MCNC: 2.05 MG/DL (ref 1.6–2.4)
MCH RBC QN AUTO: 32.7 PG (ref 26–34)
MCHC RBC AUTO-ENTMCNC: 34.7 G/DL (ref 32–36)
MCV RBC AUTO: 94 FL (ref 80–100)
MONOCYTES # BLD AUTO: 1.43 X10*3/UL (ref 0.1–1)
MONOCYTES NFR BLD AUTO: 12.3 %
NEUTROPHILS # BLD AUTO: 8.42 X10*3/UL (ref 1.2–7.7)
NEUTROPHILS NFR BLD AUTO: 72.2 %
NITRITE UR QL STRIP.AUTO: NEGATIVE
NRBC BLD-RTO: 0 /100 WBCS (ref 0–0)
PH UR STRIP.AUTO: 7 [PH]
PHOSPHATE SERPL-MCNC: 3.4 MG/DL (ref 2.5–4.9)
PHOSPHATE SERPL-MCNC: 3.4 MG/DL (ref 2.5–4.9)
PLATELET # BLD AUTO: 287 X10*3/UL (ref 150–450)
POTASSIUM SERPL-SCNC: 3.5 MMOL/L (ref 3.5–5.3)
POTASSIUM SERPL-SCNC: 3.6 MMOL/L (ref 3.5–5.3)
PROCALCITONIN SERPL-MCNC: 0.06 NG/ML
PROT UR STRIP.AUTO-MCNC: ABNORMAL MG/DL
RBC # BLD AUTO: 3.61 X10*6/UL (ref 4.5–5.9)
RBC # UR STRIP.AUTO: ABNORMAL /UL
RBC #/AREA URNS AUTO: >20 /HPF
SODIUM SERPL-SCNC: 136 MMOL/L (ref 136–145)
SODIUM SERPL-SCNC: 138 MMOL/L (ref 136–145)
SP GR UR STRIP.AUTO: 1.01
TIBC SERPL-MCNC: 285 UG/DL (ref 240–445)
UFH PPP CHRO-ACNC: 0.3 IU/ML
UIBC SERPL-MCNC: 234 UG/DL (ref 110–370)
UROBILINOGEN UR STRIP.AUTO-MCNC: NORMAL MG/DL
VIT B12 SERPL-MCNC: 436 PG/ML (ref 211–911)
WBC # BLD AUTO: 11.7 X10*3/UL (ref 4.4–11.3)
WBC #/AREA URNS AUTO: ABNORMAL /HPF

## 2024-06-05 PROCEDURE — 37799 UNLISTED PX VASCULAR SURGERY: CPT

## 2024-06-05 PROCEDURE — 80069 RENAL FUNCTION PANEL: CPT

## 2024-06-05 PROCEDURE — 2500000004 HC RX 250 GENERAL PHARMACY W/ HCPCS (ALT 636 FOR OP/ED)

## 2024-06-05 PROCEDURE — 85025 COMPLETE CBC W/AUTO DIFF WBC: CPT

## 2024-06-05 PROCEDURE — 80069 RENAL FUNCTION PANEL: CPT | Mod: 91 | Performed by: STUDENT IN AN ORGANIZED HEALTH CARE EDUCATION/TRAINING PROGRAM

## 2024-06-05 PROCEDURE — 83735 ASSAY OF MAGNESIUM: CPT

## 2024-06-05 PROCEDURE — 84145 PROCALCITONIN (PCT): CPT | Performed by: STUDENT IN AN ORGANIZED HEALTH CARE EDUCATION/TRAINING PROGRAM

## 2024-06-05 PROCEDURE — 2500000002 HC RX 250 W HCPCS SELF ADMINISTERED DRUGS (ALT 637 FOR MEDICARE OP, ALT 636 FOR OP/ED)

## 2024-06-05 PROCEDURE — 99497 ADVNCD CARE PLAN 30 MIN: CPT

## 2024-06-05 PROCEDURE — 83540 ASSAY OF IRON: CPT | Performed by: STUDENT IN AN ORGANIZED HEALTH CARE EDUCATION/TRAINING PROGRAM

## 2024-06-05 PROCEDURE — 83735 ASSAY OF MAGNESIUM: CPT | Mod: 91 | Performed by: STUDENT IN AN ORGANIZED HEALTH CARE EDUCATION/TRAINING PROGRAM

## 2024-06-05 PROCEDURE — 87449 NOS EACH ORGANISM AG IA: CPT | Performed by: STUDENT IN AN ORGANIZED HEALTH CARE EDUCATION/TRAINING PROGRAM

## 2024-06-05 PROCEDURE — 99222 1ST HOSP IP/OBS MODERATE 55: CPT | Performed by: INTERNAL MEDICINE

## 2024-06-05 PROCEDURE — 99223 1ST HOSP IP/OBS HIGH 75: CPT

## 2024-06-05 PROCEDURE — 2500000001 HC RX 250 WO HCPCS SELF ADMINISTERED DRUGS (ALT 637 FOR MEDICARE OP)

## 2024-06-05 PROCEDURE — 99498 ADVNCD CARE PLAN ADDL 30 MIN: CPT

## 2024-06-05 PROCEDURE — 2500000004 HC RX 250 GENERAL PHARMACY W/ HCPCS (ALT 636 FOR OP/ED): Performed by: STUDENT IN AN ORGANIZED HEALTH CARE EDUCATION/TRAINING PROGRAM

## 2024-06-05 PROCEDURE — 2500000002 HC RX 250 W HCPCS SELF ADMINISTERED DRUGS (ALT 637 FOR MEDICARE OP, ALT 636 FOR OP/ED): Performed by: STUDENT IN AN ORGANIZED HEALTH CARE EDUCATION/TRAINING PROGRAM

## 2024-06-05 PROCEDURE — 37799 UNLISTED PX VASCULAR SURGERY: CPT | Performed by: STUDENT IN AN ORGANIZED HEALTH CARE EDUCATION/TRAINING PROGRAM

## 2024-06-05 PROCEDURE — 81001 URINALYSIS AUTO W/SCOPE: CPT

## 2024-06-05 PROCEDURE — 99291 CRITICAL CARE FIRST HOUR: CPT | Performed by: INTERNAL MEDICINE

## 2024-06-05 PROCEDURE — 2500000001 HC RX 250 WO HCPCS SELF ADMINISTERED DRUGS (ALT 637 FOR MEDICARE OP): Performed by: STUDENT IN AN ORGANIZED HEALTH CARE EDUCATION/TRAINING PROGRAM

## 2024-06-05 PROCEDURE — 85520 HEPARIN ASSAY: CPT | Performed by: STUDENT IN AN ORGANIZED HEALTH CARE EDUCATION/TRAINING PROGRAM

## 2024-06-05 PROCEDURE — 2500000001 HC RX 250 WO HCPCS SELF ADMINISTERED DRUGS (ALT 637 FOR MEDICARE OP): Performed by: INTERNAL MEDICINE

## 2024-06-05 PROCEDURE — 80176 ASSAY OF LIDOCAINE: CPT

## 2024-06-05 PROCEDURE — 99232 SBSQ HOSP IP/OBS MODERATE 35: CPT | Performed by: STUDENT IN AN ORGANIZED HEALTH CARE EDUCATION/TRAINING PROGRAM

## 2024-06-05 PROCEDURE — 2020000001 HC ICU ROOM DAILY

## 2024-06-05 PROCEDURE — S4991 NICOTINE PATCH NONLEGEND: HCPCS

## 2024-06-05 PROCEDURE — 87086 URINE CULTURE/COLONY COUNT: CPT

## 2024-06-05 PROCEDURE — 87899 AGENT NOS ASSAY W/OPTIC: CPT | Performed by: STUDENT IN AN ORGANIZED HEALTH CARE EDUCATION/TRAINING PROGRAM

## 2024-06-05 PROCEDURE — 99291 CRITICAL CARE FIRST HOUR: CPT | Performed by: STUDENT IN AN ORGANIZED HEALTH CARE EDUCATION/TRAINING PROGRAM

## 2024-06-05 PROCEDURE — 82728 ASSAY OF FERRITIN: CPT | Performed by: STUDENT IN AN ORGANIZED HEALTH CARE EDUCATION/TRAINING PROGRAM

## 2024-06-05 PROCEDURE — G0316 PR PROLONGED INPATIENT/OBSERVATION EM SVC EA 15 MIN: HCPCS

## 2024-06-05 RX ORDER — POTASSIUM CHLORIDE 20 MEQ/1
40 TABLET, EXTENDED RELEASE ORAL ONCE
Status: COMPLETED | OUTPATIENT
Start: 2024-06-05 | End: 2024-06-05

## 2024-06-05 RX ORDER — SPIRONOLACTONE 25 MG/1
12.5 TABLET ORAL DAILY
Status: DISCONTINUED | OUTPATIENT
Start: 2024-06-05 | End: 2024-06-06

## 2024-06-05 RX ORDER — FUROSEMIDE 10 MG/ML
40 INJECTION INTRAMUSCULAR; INTRAVENOUS ONCE
Status: COMPLETED | OUTPATIENT
Start: 2024-06-05 | End: 2024-06-05

## 2024-06-05 RX ORDER — MAGNESIUM SULFATE HEPTAHYDRATE 40 MG/ML
2 INJECTION, SOLUTION INTRAVENOUS ONCE
Status: COMPLETED | OUTPATIENT
Start: 2024-06-05 | End: 2024-06-05

## 2024-06-05 RX ADMIN — Medication 1 PATCH: at 06:05

## 2024-06-05 RX ADMIN — TRAZODONE HYDROCHLORIDE 50 MG: 50 TABLET ORAL at 20:05

## 2024-06-05 RX ADMIN — SENNOSIDES AND DOCUSATE SODIUM 2 TABLET: 8.6; 5 TABLET ORAL at 08:13

## 2024-06-05 RX ADMIN — RANOLAZINE 500 MG: 500 TABLET, EXTENDED RELEASE ORAL at 08:13

## 2024-06-05 RX ADMIN — ROSUVASTATIN 20 MG: 20 TABLET, FILM COATED ORAL at 20:05

## 2024-06-05 RX ADMIN — PIPERACILLIN SODIUM AND TAZOBACTAM SODIUM 4.5 G: 4; .5 INJECTION, SOLUTION INTRAVENOUS at 11:23

## 2024-06-05 RX ADMIN — AMIODARONE HYDROCHLORIDE 1 MG/MIN: 1.8 INJECTION, SOLUTION INTRAVENOUS at 19:33

## 2024-06-05 RX ADMIN — AMIODARONE HYDROCHLORIDE 1 MG/MIN: 1.8 INJECTION, SOLUTION INTRAVENOUS at 02:02

## 2024-06-05 RX ADMIN — POLYETHYLENE GLYCOL 3350 17 G: 17 POWDER, FOR SOLUTION ORAL at 08:13

## 2024-06-05 RX ADMIN — FUROSEMIDE 40 MG: 10 INJECTION, SOLUTION INTRAMUSCULAR; INTRAVENOUS at 15:18

## 2024-06-05 RX ADMIN — SPIRONOLACTONE 12.5 MG: 25 TABLET ORAL at 09:15

## 2024-06-05 RX ADMIN — HEPARIN SODIUM 1300 UNITS/HR: 10000 INJECTION, SOLUTION INTRAVENOUS at 14:13

## 2024-06-05 RX ADMIN — POTASSIUM CHLORIDE 40 MEQ: 1500 TABLET, EXTENDED RELEASE ORAL at 06:06

## 2024-06-05 RX ADMIN — PIPERACILLIN SODIUM AND TAZOBACTAM SODIUM 4.5 G: 4; .5 INJECTION, SOLUTION INTRAVENOUS at 06:11

## 2024-06-05 RX ADMIN — SACUBITRIL AND VALSARTAN 1 TABLET: 24; 26 TABLET, FILM COATED ORAL at 09:15

## 2024-06-05 RX ADMIN — RANOLAZINE 500 MG: 500 TABLET, EXTENDED RELEASE ORAL at 20:05

## 2024-06-05 RX ADMIN — PANTOPRAZOLE SODIUM 40 MG: 40 TABLET, DELAYED RELEASE ORAL at 06:06

## 2024-06-05 RX ADMIN — LIDOCAINE HYDROCHLORIDE 1 MG/MIN: 8 INJECTION, SOLUTION INTRAVENOUS at 22:03

## 2024-06-05 RX ADMIN — MAGNESIUM SULFATE HEPTAHYDRATE 2 G: 40 INJECTION, SOLUTION INTRAVENOUS at 18:46

## 2024-06-05 RX ADMIN — AMIODARONE HYDROCHLORIDE 1 MG/MIN: 1.8 INJECTION, SOLUTION INTRAVENOUS at 11:28

## 2024-06-05 RX ADMIN — ASPIRIN 81 MG CHEWABLE TABLET 81 MG: 81 TABLET CHEWABLE at 08:13

## 2024-06-05 RX ADMIN — AMIODARONE HYDROCHLORIDE 1 MG/MIN: 1.8 INJECTION, SOLUTION INTRAVENOUS at 06:42

## 2024-06-05 RX ADMIN — SACUBITRIL AND VALSARTAN 1 TABLET: 24; 26 TABLET, FILM COATED ORAL at 20:06

## 2024-06-05 RX ADMIN — POTASSIUM CHLORIDE 40 MEQ: 1500 TABLET, EXTENDED RELEASE ORAL at 18:46

## 2024-06-05 RX ADMIN — LORAZEPAM 1 MG: 1 TABLET ORAL at 16:15

## 2024-06-05 RX ADMIN — HYDROXYZINE HYDROCHLORIDE 25 MG: 25 TABLET ORAL at 07:56

## 2024-06-05 RX ADMIN — LORAZEPAM 1 MG: 1 TABLET ORAL at 06:15

## 2024-06-05 RX ADMIN — LORAZEPAM 1 MG: 1 TABLET ORAL at 22:31

## 2024-06-05 ASSESSMENT — COGNITIVE AND FUNCTIONAL STATUS - GENERAL
DRESSING REGULAR LOWER BODY CLOTHING: A LITTLE
TOILETING: A LOT
CLIMB 3 TO 5 STEPS WITH RAILING: TOTAL
HELP NEEDED FOR BATHING: A LITTLE
WALKING IN HOSPITAL ROOM: A LOT
MOVING TO AND FROM BED TO CHAIR: A LOT
DAILY ACTIVITIY SCORE: 19
STANDING UP FROM CHAIR USING ARMS: A LOT
DRESSING REGULAR UPPER BODY CLOTHING: A LITTLE
MOBILITY SCORE: 15

## 2024-06-05 ASSESSMENT — PAIN SCALES - GENERAL
PAINLEVEL_OUTOF10: 0 - NO PAIN

## 2024-06-05 ASSESSMENT — PAIN - FUNCTIONAL ASSESSMENT
PAIN_FUNCTIONAL_ASSESSMENT: 0-10

## 2024-06-05 ASSESSMENT — ENCOUNTER SYMPTOMS
FATIGUE: 1
SLEEP DISTURBANCE: 1
ACTIVITY CHANGE: 1

## 2024-06-05 NOTE — CONSULTS
Inpatient consult to Palliative Care  Consult performed by: Mae Vitale, APRN-CNP  Consult ordered by: Logan Moscoso PA-C          Reason For Consult  Reason for Consult: communication / medical decision making and patient/family support     History Of Present Illness  Cristian Sapp is a 58 y.o. male with past medical history of  CAD s/p multiple PCI, ICM/HFrEF (EF 15% 5/2024) s/p ICD, VT storm s/p ablation, infrarenal AAA s/p R iliac stent, nephrolithiasis s/p ureteral stent  is presenting from [place] for ICD firing; ongoing VT management requiring multiple DCCV and amiodarone. Transferred to [facility] on 5/28/24 for advanced therapies evaluation, VT ablation workup and urology follow up for ureteral stent removal. Course c/b ongoing VT requiring DCCV and amio and lido; EP consulted for setllate ganglion block for VT and possible repeat VT ablation. Pt is not an advanced therapies candidate r/t recent tobacco use per primary team, not an LVAD candidate per pt's preferences and r/t emphysema per primary team. Palliative care consulted for medical decision making.    Symptoms-  SOB: mild   Fatigue: moderate  Sleep: fractured r/t hospitalization     Serious Illness Conversation-   Information: pt prefers daily updates   Understanding: pt has a moderate understanding of his current condition  Prognosis: pt prefers disclosure of prognosis   Joys: dogs, yardwork, fishing, reading, trucks, going to [employer]  Goals: catching fish on his boat, write his own bible   Fears and worries: pt states he does not have any; states he is not scared to die  Minimal acceptable outcome: cognition, independence with toileting, feeding, ambulation   Family: pt's family aware of current hospitalization     Advance Care Planning:  Health Care Agent-pt's mother, Judie:[phone]  Advance directives-completed today with palliative SW     Social Hx: previously lived in a 2-story home with his wife and 2 children. Previously worked in  computer software, served as a     Spiritual Hx: Uatsdin       Information obtained from chart review, discussion with patient/family, and discussion with primary team.       Past Medical History  He has a past medical history of Atherosclerosis of native artery of both lower extremities with intermittent claudication (CMS-HCC), CHB (complete heart block) (Multi), Chronic systolic CHF (congestive heart failure), NYHA class 3 (Multi), COPD (chronic obstructive pulmonary disease) (Multi), Coronary artery disease, History of tobacco abuse, HLD (hyperlipidemia), Hypertension, Infrarenal abdominal aortic aneurysm (AAA) without rupture (CMS-HCC), Ischemic cardiomyopathy with implantable cardioverter-defibrillator (ICD), MI (myocardial infarction) (Multi), Nephrolithiasis, and PTSD (post-traumatic stress disorder).    Surgical History  He has a past surgical history that includes Cardiac defibrillator placement; Coronary angioplasty with stent (2006); Coronary angioplasty with stent (04/2003); Coronary angioplasty with stent (06/2008); Cystoscopy w/ ureteral stent placement (04/01/2024); Cystoscopy w/ ureteral stent placement (04/30/2024); Iliac artery stent (Right); Knee surgery; Femoral bypass; Cardiac electrophysiology procedure (N/A, 5/23/2024); Cardiac catheterization (N/A, 5/23/2024); Cardiac electrophysiology procedure (N/A, 5/28/2024); and Cardiac electrophysiology procedure (Right, 5/30/2024).     Family History  Family History   Problem Relation Name Age of Onset    Hypertension Mother      Diabetes Father      Hypertension Sister      Diabetes Sister      Colon cancer Maternal Grandmother      Hypertension Maternal Grandmother      Heart disease Maternal Grandfather      Hypertension Maternal Grandfather      Cancer Paternal Grandfather      Hypertension Paternal Grandfather       Allergies  Chantix [varenicline]    Review of Systems   Constitutional:  Positive for activity change and fatigue.  "  Psychiatric/Behavioral:  Positive for sleep disturbance.    All other systems reviewed and are negative.       Physical Exam  Vitals reviewed. Exam conducted with a chaperone present.   Constitutional:       General: He is not in acute distress.     Appearance: Normal appearance. He is obese. He is ill-appearing.      Interventions: Nasal cannula in place.   HENT:      Head: Normocephalic and atraumatic.      Nose: Nose normal.      Mouth/Throat:      Mouth: Mucous membranes are moist.   Eyes:      Extraocular Movements: Extraocular movements intact.      Pupils: Pupils are equal, round, and reactive to light.   Cardiovascular:      Rate and Rhythm: Normal rate and regular rhythm.   Pulmonary:      Effort: Pulmonary effort is normal.      Breath sounds: Examination of the right-lower field reveals decreased breath sounds. Examination of the left-lower field reveals decreased breath sounds. Decreased breath sounds present.   Abdominal:      General: There is distension.      Palpations: Abdomen is soft.   Skin:     General: Skin is warm and dry.   Neurological:      General: No focal deficit present.      Mental Status: He is alert and oriented to person, place, and time. Mental status is at baseline.   Psychiatric:         Mood and Affect: Mood normal. Affect is tearful.         Behavior: Behavior normal.         Thought Content: Thought content normal.         Judgment: Judgment normal.         Last Recorded Vitals  Blood pressure (!) 134/107, pulse 96, temperature 36.8 °C (98.2 °F), resp. rate 20, height 1.702 m (5' 7\"), weight 78.7 kg (173 lb 8 oz), SpO2 96%.    Relevant Results  Scheduled medications  amiodarone, 150 mg, intravenous, Once  aspirin, 81 mg, oral, Daily  nicotine, 1 patch, transdermal, Daily   Followed by  [START ON 7/13/2024] nicotine, 1 patch, transdermal, Daily  pantoprazole, 40 mg, oral, Daily before breakfast  piperacillin-tazobactam, 4.5 g, intravenous, q6h  polyethylene glycol, 17 g, oral, " BID  ranolazine, 500 mg, oral, BID  rosuvastatin, 20 mg, oral, Nightly  sacubitriL-valsartan, 1 tablet, oral, BID  sennosides-docusate sodium, 2 tablet, oral, BID  spironolactone, 12.5 mg, oral, Daily  traZODone, 50 mg, oral, Nightly      Continuous medications  amiodarone, 1 mg/min, Last Rate: 1 mg/min (06/05/24 1128)  heparin, 0-4,000 Units/hr, Last Rate: 1,300 Units/hr (06/05/24 1413)  lidocaine, 1 mg/min, Last Rate: 1 mg/min (06/04/24 1430)      PRN medications  PRN medications: acetaminophen **OR** acetaminophen **OR** acetaminophen, heparin, hydrOXYzine HCL, LORazepam, methocarbamol, oxygen, trimethobenzamide    Results for orders placed or performed during the hospital encounter of 05/28/24 (from the past 24 hour(s))   Heparin Assay, UFH   Result Value Ref Range    Heparin Unfractionated 0.3 See Comment Below for Therapeutic Ranges IU/mL   Heparin Assay, UFH   Result Value Ref Range    Heparin Unfractionated 0.3 See Comment Below for Therapeutic Ranges IU/mL   Renal Function Panel   Result Value Ref Range    Glucose 115 (H) 74 - 99 mg/dL    Sodium 136 136 - 145 mmol/L    Potassium 3.6 3.5 - 5.3 mmol/L    Chloride 99 98 - 107 mmol/L    Bicarbonate 28 21 - 32 mmol/L    Anion Gap 13 10 - 20 mmol/L    Urea Nitrogen 18 6 - 23 mg/dL    Creatinine 1.43 (H) 0.50 - 1.30 mg/dL    eGFR 58 (L) >60 mL/min/1.73m*2    Calcium 8.8 8.6 - 10.6 mg/dL    Phosphorus 3.4 2.5 - 4.9 mg/dL    Albumin 3.3 (L) 3.4 - 5.0 g/dL   Magnesium   Result Value Ref Range    Magnesium 2.05 1.60 - 2.40 mg/dL   CBC and Auto Differential   Result Value Ref Range    WBC 11.7 (H) 4.4 - 11.3 x10*3/uL    nRBC 0.0 0.0 - 0.0 /100 WBCs    RBC 3.61 (L) 4.50 - 5.90 x10*6/uL    Hemoglobin 11.8 (L) 13.5 - 17.5 g/dL    Hematocrit 34.0 (L) 41.0 - 52.0 %    MCV 94 80 - 100 fL    MCH 32.7 26.0 - 34.0 pg    MCHC 34.7 32.0 - 36.0 g/dL    RDW 13.7 11.5 - 14.5 %    Platelets 287 150 - 450 x10*3/uL    Neutrophils % 72.2 40.0 - 80.0 %    Immature Granulocytes %,  Automated 0.9 0.0 - 0.9 %    Lymphocytes % 13.0 13.0 - 44.0 %    Monocytes % 12.3 2.0 - 10.0 %    Eosinophils % 1.0 0.0 - 6.0 %    Basophils % 0.6 0.0 - 2.0 %    Neutrophils Absolute 8.42 (H) 1.20 - 7.70 x10*3/uL    Immature Granulocytes Absolute, Automated 0.10 0.00 - 0.70 x10*3/uL    Lymphocytes Absolute 1.51 1.20 - 4.80 x10*3/uL    Monocytes Absolute 1.43 (H) 0.10 - 1.00 x10*3/uL    Eosinophils Absolute 0.12 0.00 - 0.70 x10*3/uL    Basophils Absolute 0.07 0.00 - 0.10 x10*3/uL   Lidocaine   Result Value Ref Range    Lidocaine  4.6 1.0 - 5.0 ug/mL   Iron and TIBC   Result Value Ref Range    Iron 51 35 - 150 ug/dL    UIBC 234 110 - 370 ug/dL    TIBC 285 240 - 445 ug/dL    % Saturation 18 (L) 25 - 45 %   Ferritin   Result Value Ref Range    Ferritin 99 20 - 300 ng/mL   Urinalysis with Reflex Culture and Microscopic   Result Value Ref Range    Color, Urine Light-Yellow Light-Yellow, Yellow, Dark-Yellow    Appearance, Urine Clear Clear    Specific Gravity, Urine 1.012 1.005 - 1.035    pH, Urine 7.0 5.0, 5.5, 6.0, 6.5, 7.0, 7.5, 8.0    Protein, Urine 10 (TRACE) NEGATIVE, 10 (TRACE), 20 (TRACE) mg/dL    Glucose, Urine Normal Normal mg/dL    Blood, Urine OVER (3+) (A) NEGATIVE    Ketones, Urine NEGATIVE NEGATIVE mg/dL    Bilirubin, Urine NEGATIVE NEGATIVE    Urobilinogen, Urine Normal Normal mg/dL    Nitrite, Urine NEGATIVE NEGATIVE    Leukocyte Esterase, Urine 25 Ashlyn/µL (A) NEGATIVE   Microscopic Only, Urine   Result Value Ref Range    WBC, Urine 6-10 (A) 1-5, NONE /HPF    RBC, Urine >20 (A) NONE, 1-2, 3-5 /HPF    Bacteria, Urine 1+ (A) NONE SEEN /HPF   Procalcitonin   Result Value Ref Range    Procalcitonin 0.06 <=0.07 ng/mL     CT chest wo IV contrast    Result Date: 6/5/2024  Interpreted By:  Clayton Rodas and Calo Sean-Matthew STUDY: CT CHEST WO IV CONTRAST;  6/4/2024 1:34 pm   INDICATION: Signs/Symptoms:VAD workup. 58-year-old male with history of recurrent ICD shocks secondary to ventricular tachycardia status  post DC cardioversion 05/30/2024, VT ablation 05/03/2024 with continued intermittent VT. LVAD evaluation.   COMPARISON: CT abdomen pelvis 05/24/2024   ACCESSION NUMBER(S): [id number]   ORDERING CLINICIAN: SANA PHILLIPS   TECHNIQUE: Helical data acquisition of the chest was obtained  administration.. Images were reformatted in axial, coronal, and sagittal planes.   FINDINGS: LUNGS AND AIRWAYS: The trachea and central airways are patent. No endobronchial lesion.   Interval worsening now moderate right and small left pleural effusions with associated atelectasis. Diffuse severe bilateral upper lobe predominant centrilobular emphysematous changes are noted. Bilateral smooth interlobular septal thickening is noted. Posterior predominant ground-glass opacities are noted throughout the right lung. No pneumothorax.   No suspicious pulmonary nodule.   MEDIASTINUM AND MARIO, LOWER NECK AND AXILLA: The visualized thyroid gland is within normal limits.   No evidence of thoracic lymphadenopathy by CT criteria.   Esophagus appears within normal limits as seen.   HEART AND VESSELS: The thoracic aorta is of normal course and caliber with moderate vascular calcifications.   Main pulmonary artery and its branches are normal in caliber. Few tiny foci of gas are noted within the proximal left brachiocephalic vein and main pulmonary artery, likely related to recent intravenous injection.   Severe coronary artery calcifications status post stenting. The study is not optimized for evaluation of coronary arteries.   The cardiac chambers are not enlarged. A right subclavian approach pacemaker/AICD is noted with the leads within the right atrium, right ventricle, and coronary sinus.   No significant pericardial effusion.   UPPER ABDOMEN: Multiple nonobstructing calculi are noted within the right kidney measuring up to 5 mm in the upper pole. Additional hilar calcifications are favored to be vascular in nature. Layering hyperdense  material is noted within the gallbladder which may relate to biliary sludge.   CHEST WALL AND OSSEOUS STRUCTURES: There are no suspicious osseous lesions. Multilevel degenerative changes of the thoracic spine are noted.       1. Interval worsening now moderate right and small left pleural effusions with associated atelectasis. 2. Findings suggestive of mild pulmonary interstitial and alveolar edema. There is severe emphysematous changes. 3. There is severe atherosclerotic changes of the coronary arteries with coronary artery stent placement.   I personally reviewed the images/study and I agree with the findings as stated by Elise Osman MD. This study was interpreted at [place], [place].   MACRO: None   Signed by: Clayton Rodas 6/5/2024 9:27 AM Dictation workstation:   [id number]    Electrocardiogram, 12-lead PRN ACS symptoms    Result Date: 6/3/2024   Suspect unspecified pacemaker failure Ventricular-paced rhythm Abnormal ECG When compared with ECG of 29-MAY-2024 14:53, Vent. rate has decreased BY  20 BPM    XR chest 1 view    Result Date: 6/3/2024  Interpreted By:  Ashli Pena and Weaver Jakob STUDY: XR CHEST 1 VIEW; 6/2/2024 9:54 pm   INDICATION: Signs/Symptoms:sob.   COMPARISON: Chest radiograph 05/31/2024, CT 05/24/2024   ACCESSION NUMBER(S): [id number]   ORDERING CLINICIAN: DIRK DIETRICH   FINDINGS: Single AP radiograph of the chest was obtained.   Stable appearance of arightchest wall AICD/pacemakerwith leads projecting over the cardiac chambers.   CARDIOMEDIASTINAL SILHOUETTE: Cardiomediastinal silhouette is enlarged, stable in size and configuration.   LUNGS: Increasing hazy opacities in the right-greater-than-left bases with blunting of the right-greater-than-left costophrenic angles. Prominence of the perihilar vascular markings. No focal consolidation. No pneumothorax.   ABDOMEN: No remarkable upper abdominal findings.   BONES: No acute  osseous changes.       Prominent perihilar vascular markings, hazy right-greater-than-left bibasilar opacities, and small to moderate right-greater-than-left pleural effusions new/increased from prior radiograph. In the setting of cardiomegaly, findings may relate to volume overload. However an infectious or inflammatory process can not be excluded.   I personally reviewed the images/study and I agree with the findings as stated by Mino Resendez MD. This study was interpreted at [place], [place].   Signed by: Ashli Pena 6/3/2024 8:37 AM Dictation workstation:   [id number]    Electrocardiogram, 12-lead PRN ACS symptoms    Result Date: 6/3/2024  Wide QRS rhythm Right bundle branch block Left posterior fascicular block  Bifascicular block  Inferior infarct , age undetermined Abnormal ECG When compared with ECG of 02-JUN-2024 10:32, No significant change was found    Electrocardiogram, 12-lead PRN ACS symptoms    Result Date: 6/3/2024  Wide QRS rhythm with frequent Premature ventricular complexes Right bundle branch block Possible Lateral infarct , age undetermined Inferior infarct , age undetermined Abnormal ECG When compared with ECG of 02-JUN-2024 08:45, Premature ventricular complexes are now Present    Electrocardiogram, 12-lead PRN ACS symptoms    Result Date: 6/2/2024  AV dual-paced rhythm Abnormal ECG When compared with ECG of 28-MAY-2024 14:18, Significant changes have occurred Confirmed by Zach Ellington ([id number]) on 6/2/2024 8:56:37 AM    XR abdomen 1 view    Result Date: 5/31/2024  Interpreted By:  Milan Dewitt and Ohs Zachary STUDY: XR ABDOMEN 1 VIEW;  5/31/2024 3:49 pm   INDICATION: Signs/Symptoms:abdominal pain.   COMPARISON: Abdominal radiograph 05/24/2024, CT abdomen/pelvis 05/24/2024.   ACCESSION NUMBER(S): [id number]   ORDERING CLINICIAN: DIRK DIETRICH   FINDINGS: Left nephroureteral stent with superior tip overlying the expected location of the left  kidney and distal tip overlying the right lower pelvis. Multiple vascular stents overlying the right pelvis and right upper leg. Triple chamber AICD with leads in appropriate position. Radiopaque density overlying the expected location of the GE junction, not seen on prior abdominal radiograph 05/24/2024.   There is a nonobstructive bowel gas pattern. There is a  moderate amount of scattered retained stool throughout the colon and rectum. No extraluminal/portal venous gas.   Visualized soft tissues and osseous structures are unremarkable.   The lung bases are clear.       1. Nonobstructive bowel gas pattern. 2. Radiopaque density overlying the expected location of the GE junction, not seen on prior abdominal radiograph 05/24/2024.   I personally reviewed the images/study and I agree with the findings as stated by Dr. Hubert Peñaloza. This study was interpreted at [place], [place].   MACRO: none   Signed by: Milan Dewitt 5/31/2024 4:17 PM Dictation workstation:   [id number]    XR chest 1 view    Result Date: 5/31/2024  Interpreted By:  Milan Dewitt, STUDY: XR CHEST 1 VIEW;  5/31/2024 3:39 pm   INDICATION: Signs/Symptoms:CP/SOB.   COMPARISON: Chest radiograph dated 5/30/2024   ACCESSION NUMBER(S): [id number]   ORDERING CLINICIAN: DIRK DIETRICH   FINDINGS: AP radiograph of the chest The AICD electrodes appear in adequate position. The heart is enlarged. Atheromatous disease of the aortic arch is demonstrated. Interstitial opacities and peribronchial thickening is noted within right lower lobe. Small bilateral pleural effusions and subsegmental atelectasis is noted adjacent to the diaphragms greater within the right lower lobe than left.       1. Right lower lobe interstitial infiltrate peribronchial thickening and small right pleural effusion and subsegmental atelectasis 2. Trace left pleural effusion and subsegmental atelectasis adjacent to the left diaphragm laterally        Signed by: Milan Dewitt 5/31/2024 3:51 PM Dictation workstation:   [id number]    XR chest 1 view    Result Date: 5/31/2024  Interpreted By:  Milan Dewitt and Ohs Zachary STUDY: XR CHEST 1 VIEW;  5/31/2024 8:18 am   INDICATION: Signs/Symptoms:SOB.   COMPARISON: Chest radiographs since 10/02/2003, CT abdomen/pelvis 05/24/2024.   ACCESSION NUMBER(S): [id number]   ORDERING CLINICIAN: DIRK DIETRICH   FINDINGS: AP radiograph of the chest was provided.   MEDICAL DEVICES: Right chest wall triple chamber AICD.   CARDIOMEDIASTINAL SILHOUETTE: Cardiomediastinal silhouette is normal in size and configuration.   LUNGS: Hazy/streaky right lower lung opacity, new from prior. Mild pulmonary vascular congestion and prominent interstitial lung markings, similar to prior exam. No pneumothorax or pleural effusion.   ABDOMEN: No remarkable upper abdominal findings.   BONES: No acute osseous changes.       1.  New right lower lung consolidation and/or atelectasis. 2. Similar interstitial pulmonary edema.   I personally reviewed the images/study and I agree with the findings as stated by Dr. Hubert Peñaloza. This study was interpreted at [place], [place].   MACRO: None   Signed by: Milan Dewitt 5/31/2024 9:52 AM Dictation workstation:   [id number]    Electrophysiology procedure    Result Date: 5/30/2024  Images from the original result were not included. Ventricular Tachycardia Ablation Procedures VT Ablation (69138), 3D Mapping (61263), His Bundle Recording (75268), Ultrasound Guided vascular access (48481), Intracardiac Echocardiogram (34349), Transseptal Catheterization (76216) Patient history: As documented in EMR Procedure narrative: The risks, benefits, and alternatives to the procedure were explained to the patient and informed consent was obtained. After informed written consent was obtained the patient was transported to the electrophysiology laboratory in the post absorptive,  non-sedated state. The team verification process was completed prior to the start of the procedure. Written consent and a completed procedural sedation form were confirmed. Allergies/Adverse reactions were noted and patient teaching was performed. The patient was positioned on table and bilateral arm supports with soft cushions and all pressure points were padded. A large diameter grounding pad was applied to the patient's thigh. ECG electrodes and a set of hands-free defibrillator pads were applied to the patient. A MCL/12 lead ECG was continuously monitored throughout the procedure. Noninvasive blood pressure, oxygen saturation, and capnography were monitored throughout the procedure. Supplemental oxygen was delivered via nasal cannula or facemask. Monitored anesthesia care was administered throughout the procedure by our staff anesthesiology service.  Please see their notations for intubation and sedation.  Throughout the procedure the patient's comfort (pain scale) and level of sedation (sedation scale) were noted every 5 minutes. PROGRAMMING AND INTERROGATION OF ICD: The Pt's ICD was turned off and programmed to a nontracking mode before the procedure, then evaluated and reprogrammed post procedure. The patient was in clinical ventricular tachycardia at the start of the procedure. ( Morphology described below). VASCULAR ACCESS AND CATHETER PLACEMENT: After local anesthesia, venous access was achieved utilizing the Seldinger technique with a cook needle under ultrasound guidance. Two 8.5 FR sheaths in the right femoral vein and one 9Fr sheath was placed in the right femoral vein. Two 8Fr sheaths were placed in each the right and left femoral veins. Via a right femoral vein 9Fr short sheath a quadripolar electrode catheter was advanced under fluoroscopic guidance and advanced into the RV for pacing and recording. Via right femoral vein 8.5 Fr short sheath an ICE catheter was advanced into the mid RA for  continuous ultrasound guidance and into the RVOT to evaluate for an effusion.  After access was obtained, a bolus injection of heparin 9000 units was administered. The Activated Clotting Time maintained between 300-350 secs via the iSTAT machine with additional boluses q 30 minutes as necessary. DIAGNOSTIC EP STUDY Biventricular Pacing was present on arrival. Sustained Monomorphic VTs: VT1:  Cycle length 710 ms, Morphology RBBB with mid precordial transition + superior and left arriaga axis, spontaneous, Clinical, Hemodynamically tolerated, Terminated with overdrive pacing/ablation. Entrainment mapping suggestive of a mid inferior exit site adjacent the septal aspect of the LV VT2:  Cycle length 530 ms, Morphology RBBB with late precordial transition + inferior and rightward axis, Induced with programmed extra - stimulation, Clinical Hemodynamically tolerated, Termination with catheter manipulation. Pace mapping was suggestive of a mid lateral exit site.  TRANSSEPTAL ACCESS: Access to the left atrium and subsequently left ventricle was achieved using a transseptal approach. The location of the trans-septal needle in the fossa was confirmed radiographically using standard anatomic landmarks as well as with intracardiac echo. A long J-tip wire was passed through the superior right femoral vein 8 Fr sheath into the SVC, the 8.5Fr sheath was subsequently removed. A [place]iss[place] sheath was advanced over wire into the SVC. The wire was removed and the RF needle was advanced into the sheath. The assembly was dragged from the SVC along the septum until engagement of the Fossa Ovalis was confirmed by interatrial tenting seen under intracardiac ultrasound guidance. The RF needle was advanced into the left atrium  the needle position confirmed with ICE. The sheath with dilator were advanced carefully over the needle into the body of the left atrium. Once the sheath was confirmed in the left atrium via intracardiac ultrasound, the wire  and dilator were removed.  The sheath was attached to pressure tubing and a 25cc/hr drip of heparinized saline maintained. IMAGING: Using the mapping catheter and intra cardiac echo with the CARTO / CARTOsound mapping system, an anatomic shell of the right ventricle/left ventricle/Aortic root/Pulmonic valve was created.The Optrell catheter was advanced into the left atrium and then into the left ventricle. This was exchanged, when necessary, for the ablation catheter, which was advanced through the steerable sheath into the left ventricle. After septal puncture heparin was administered and ACTs were checked regularly to maintain an ACT of greater than 350 sec. The long LA sheath and Optrell  were continuously irrigated with heparinized saline at 20 - 25 ml/hr. MAPPING AND ABLATION:  Substrate mapping of the right ventricle endocardium was performed during Biventricular Pacing.  Areas of low voltage (less than 1.5 mv) were identified. Areas of late potentials in the scar were tagged. Pace-mapping was performed at selected sites and areas of long S-QRS delays and where pace-mapping matched QRS morphology of an induced VT were tagged. Areas of electrically unexcitable scar (pacing threshold greater than 10 mA at 2 ms pulse width) were tagged. Substrate mapping was performed which essentially showed a large region of scar involving the basal  - mid left ventricular segments with some sparing of the anterior wall. Though the patient was in clinical VT1, catheter manipulation caused frequent termination of the arrhythmia precluding a complete activation map. We proceeded to pace map from the border zones to identify exit sites for the clinical Vts. Pacing from the mid lateral site yielded good results for the VT1. RF ablation was performed in this region.  Lesions were monitored for contact force and impedance drop.  Lesions rendered each targeted region electrically unexcitable. As we were consolidating our lesions in  this segments, the patient was noted to go into clinical VT1. Entrainment mapping was suggestive of the mid inferior / infero - septal exit site. Again, the VT though incessant terminated with catheter manipulation and thus could not perform an activation map completely. We proceeded to apply RF lesions in this region, lesions were monitored for contact force and impedance drop.  Lesions rendered each targeted region electrically unexcitable. The clinical VT1 was no longer incessant. Programmed stimulation a this time with triple extra - stimuli induced pulseless VT requiring defibrillation. We then proceeded to further consolidate our lesions. At this time, given long procedure time, patient discomfort, good results with the RF ablation of clinical VT, and pulseless VT requiring defibrillation we decided to end the case.  POST-ABLATION PROVOCATION: Programmed stimulation performed at the end of the case showed no inducible sustained clinical VT.  Intracardiac ultrasound after ablation showed no pericardial effusion. POST-ABLATION: The ICE catheter was maneuvered into the RVOT. The epicardial space of the heart, including around the RV and LV, was evaluated and no effusion was visualized. Sheaths were removed and hemostasis was obtained at the right femoral venous access sites with Vascade MVP .Five minutes of manual compression was applied to maintain hemostasis. Summary: Successful RFA for clinical VT 1 with a critical isthmus involving the mid inferior / infero - septal left ventricular segment Successful RFA for clinical VT 2 pace mapped to the mid lateral left ventriclar segment Recommendation: Transfer to CICU Continue current AADT - Amiodarone and IV Lidocine Device was reprogrammed to DDD 80, please have the device formally interrogated. Would recommend to slowly transition to DDD 60 over the next 48 hours In case of recurrent VT, more extensive substrate modification under GA may be considered        Electrocardiogram, 12-lead PRN ACS symptoms    Result Date: 5/29/2024   Suspect unspecified pacemaker failure Undetermined rhythm Right bundle branch block Possible Lateral infarct , age undetermined Inferior infarct , age undetermined Abnormal ECG When compared with ECG of 29-MAY-2024 09:02, Current undetermined rhythm precludes rhythm comparison, needs review    Electrocardiogram, 12-lead PRN ACS symptoms    Result Date: 5/29/2024  AV dual-paced rhythm Abnormal ECG When compared with ECG of 29-MAY-2024 12:49, Previous ECG has undetermined rhythm, needs review    Electrophysiology procedure    Result Date: 5/29/2024  Table formatting from the original result was not included. Procedure Details: Procedure Details:  Cardioversion Summary: ·   Successful termination of ventricular tachycardia using ATP from biventricular ICD system Recommendations: 1. A 12 lead ECG should be performed prior to discharge from the hospital. 2. The patient should continue with the present medications. Discharge: 1. The patient recovered uneventfully from the effects of conscious sedation. The patient left the EP laboratory hemodynamically stable and without neurological deficits. Follow up: 1. The patient will return to telemetry-intensive care unit for drug load with high risk medication, following bed rest and subsequent ambulation, provided the recovery parameters are appropriate. The patient should call the electrophysiologist immediately if symptoms recur, or for any problems. The patient has been instructed accordingly. ________________________________________ Procedures: Noninvasive programmed stimulation.  Pre and post biventricular ICD interrogation reprogramming ________________________________________ Patient history: Please refer to the detailed history and physical on the patient's medical chart.  History of ventricular tachycardia.  Patient was loaded with amiodarone.  Patient had recurrence of ventricular arrhythmias  overnight.  Patient is a scheduled for NIPS and possible cardioversion ________________________________________ Procedure narrative: The device was interrogated and reprogrammed prior to the procedure.  The risks, benefits, and alternatives to the procedure and sedation were explained to the patient, and informed consent was obtained. The patient was in the fasting state. A grounding pad was placed. Self-adhesive anterior-posterior defibrillation pads were applied. A ZOLL defibrillator was used for monitoring and the defibrillator waveform was set to biphasic. The patient was set up for continuous monitoring of surface 12 lead ECG and pulse oximetry. Blood pressure was monitored with automatic cuff measurements. The procedure was performed under IV conscious sedation performed by anesthesiology service. 1. The device was interrogated.  Patient was in ventricular tachycardia at a rate of 100 bpm. Antitachycardia pacing was delivered from the right ventricular lead at 500 ms, 400 ms, 350 ms with finally successful termination of ventricular tachycardia into atrial paced-ventricular paced rhythm.  The ventricular tachycardia zone was very prominent as low was 125 bpm.  ATP burst and ICD therapies were performed.  The pacing mode was At DDD 80 bpm. ________________________________________ Complications: The patient tolerated the procedure without any complications or incident. ________________________________________ Prepared and signed by Tolerance: good Complications: None     Electrocardiogram, 12-lead PRN ACS symptoms    Result Date: 5/29/2024   Suspect unspecified pacemaker failure Sinus rhythm with AV dissociation and Wide QRS rhythm Left axis deviation Right bundle branch block Abnormal ECG When compared with ECG of 28-MAY-2024 16:11, Wide QRS rhythm has replaced Electronic ventricular pacemaker    Electrocardiogram, 12-lead PRN ACS symptoms    Result Date: 5/29/2024   Suspect unspecified pacemaker failure  Ventricular-paced rhythm Abnormal ECG When compared with ECG of 28-MAY-2024 23:40, No significant change was found    Electrocardiogram, 12-lead PRN ACS symptoms    Result Date: 5/29/2024  Wide QRS tachycardia Right bundle branch block Possible Lateral infarct , age undetermined Inferior infarct , age undetermined Abnormal ECG When compared with ECG of 28-MAY-2024 22:04, Wide QRS tachycardia has replaced Wide QRS rhythm    Electrocardiogram, 12-lead PRN ACS symptoms    Result Date: 5/29/2024  Wide QRS rhythm Right bundle branch block Possible Lateral infarct , age undetermined Inferior infarct , age undetermined Abnormal ECG When compared with ECG of 28-MAY-2024 23:57, Wide QRS rhythm has replaced Electronic ventricular pacemaker    XR chest 1 view    Result Date: 5/29/2024  Interpreted By:  Milan Dewitt and Ohs Zachary STUDY: XR CHEST 1 VIEW;  5/28/2024 5:38 pm   INDICATION: Signs/Symptoms:SOB.   COMPARISON: Chest radiograph 05/22/2024, CT abdomen/pelvis 05/24/2024.   ACCESSION NUMBER(S): [id number]   ORDERING CLINICIAN: DIRK DIETRICH   FINDINGS: AP radiograph of the chest was provided.   MEDICAL DEVICES: Right chest wall triple chamber AICD.   CARDIOMEDIASTINAL SILHOUETTE: Cardiomediastinal silhouette is normal in size and configuration.   LUNGS: No pneumothorax, pleural effusion or focal consolidation.   ABDOMEN: No remarkable upper abdominal findings.   BONES: No acute osseous changes.       1.  No evidence of acute cardiopulmonary process.   I personally reviewed the images/study and I agree with the findings as stated by Dr. Hubert Peñaloza. This study was interpreted at [place], [place].   MACRO: None   Signed by: Milan Dewitt 5/29/2024 7:16 AM Dictation workstation:   [id number]    ECG 12 Lead    Result Date: 5/28/2024  Ventricular tachycardia Confirmed by Zachary Sparks ([id number]) on 5/28/2024 3:18:52 PM    ECG 12 lead    Result Date: 5/28/2024  AV dual-paced rhythm  Abnormal ECG When compared with ECG of 28-MAY-2024 07:26, (unconfirmed) Electronic ventricular pacemaker has replaced Atrial fibrillation Confirmed by Zachary Sparks ([id number]) on 5/28/2024 3:18:28 PM    Transthoracic Echo (TTE) Complete    Result Date: 5/28/2024          [place] [id number]  Tel [phone] Fax [phone] TRANSTHORACIC ECHOCARDIOGRAM REPORT  Patient Name:      FRANCIA Schultz Physician:    Isaias Rodrigues DO Study Date:        5/28/2024             Ordering Provider:    Isaias RODRIGUES MRN/PID:           [id number]              Fellow: Accession#:        [id number]          Nurse: Date of Birth/Age: 1965 / 58 years Sonographer:          Kathya Stallings RDCS Gender:            M                     Additional Staff: Height:            170.18 cm             Admit Date:           5/22/2024 Weight:            73.48 kg              Admission Status:     Inpatient -                                                                Routine BSA / BMI:         1.85 m2 / 25.37 kg/m2 Department Location:  [facility] Blood Pressure: 85 /58 mmHg Study Type:    TRANSTHORACIC ECHO (TTE) COMPLETE Diagnosis/ICD: Unspecified systolic (congestive) heart failure (CHF)-I50.20 Indication:    Congestive Heart Failure CPT Codes:     Echo Limited-56956; Doppler Limited-51679; Color Doppler-07782 Patient History: Pacer/Defib:       AICD/Perment pacemaker Pertinent History: Chest Pain, CAD, Cardiomyopathy, HTN and Hyperlipidemia. Study Detail: The following Echo studies were performed: 2D, M-Mode, Doppler and               color flow. Technically challenging study due to patient lying in                supine position. Definity used as a contrast agent for endocardial               border definition. Total contrast used for this procedure was 2 mL               via IV push.  PHYSICIAN INTERPRETATION: Left Ventricle: Left ventricular systolic function is severely decreased, with an estimated ejection fraction of 15%. There is global hypokinesis of the left ventricle with minor regional variations. The left ventricular cavity size is moderately dilated. There is mild concentric left ventricular hypertrophy. Left ventricular diastolic filling was not assessed. Left Atrium: The left atrium is mildly dilated. Right Ventricle: The right ventricle is normal in size. There is normal right ventricular global systolic function. A device is visualized in the right ventricle. Right Atrium: The right atrium is normal in size. There is a device visualized in the right atrium. Aortic Valve: The aortic valve appears structurally normal. The aortic valve appears tricuspid. There is mild aortic valve cusp calcification. There is no evidence of aortic valve stenosis. There is no evidence of aortic valve regurgitation. Mitral Valve: The mitral valve is normal in structure. There is no evidence of mitral valve stenosis. There is normal mitral valve leaflet mobility. There is mild mitral annular calcification. There is moderate to severe mitral valve regurgitation. Tricuspid Valve: The tricuspid valve is structurally normal. There is normal tricuspid valve leaflet mobility. There is mild tricuspid regurgitation. Pulmonic Valve: The pulmonic valve is not well visualized. There is no indication of pulmonic valve regurgitation. Pericardium: There is no pericardial effusion noted. Aorta: The aortic root is normal. Pulmonary Artery: The pulmonary artery is not well visualized. The tricuspid regurgitant velocity is 2.70 m/s, and with an estimated right atrial pressure of 3 mmHg, the estimated pulmonary artery pressure is normal with the RVSP  at 32.2 mmHg. Systemic Veins: The inferior vena cava appears mildly dilated. In comparison to the previous echocardiogram(s): Prior examinations are available and were reviewed for comparison purposes. Current study appears to be remarkably similar to the May 23, 2024 study done here.  CONCLUSIONS:  1. Left ventricular systolic function is severely decreased with a 15% estimated ejection fraction.  2. There is no evidence of mitral valve stenosis.  3. Moderate to severe mitral valve regurgitation.  4. Mild tricuspid regurgitation is visualized.  5. Aortic valve stenosis is not present.  6. Left ventricular cavity size is moderately dilated.  7. The pulmonary artery is not well visualized.  8. Current study appears to be remarkably similar to the May 23, 2024 study done here.  9. There is global hypokinesis of the left ventricle with minor regional variations. RECOMMENDATIONS: Technically suboptimal and limited study, therefore accuracy of above interpretation could be substantially diminished. Clinical correlation is advised. Consider additional imaging modalities if clinically indicated.  QUANTITATIVE DATA SUMMARY: 2D MEASUREMENTS:                           Normal Ranges: LAs:           4.00 cm    (2.7-4.0cm) RVIDd:         3.00 cm    (0.9-3.6cm) IVSd:          1.15 cm    (0.6-1.1cm) LVPWd:         1.12 cm    (0.6-1.1cm) LVIDd:         6.32 cm    (3.9-5.9cm) LVIDs:         5.72 cm LV Mass Index: 172.0 g/m2 LV % FS        9.5 % LV SYSTOLIC FUNCTION BY 2D PLANIMETRY (MOD):                     Normal Ranges: EF-A4C View: 24.0 % (>=55%) EF-A2C View: 18.3 % EF-Biplane:  20.2 % AORTIC VALVE:                        Normal Ranges: LVOT Diameter: 2.10 cm (1.8-2.4cm) TRICUSPID VALVE/RVSP:                             Normal Ranges: Peak TR Velocity: 2.70 m/s RV Syst Pressure: 32.2 mmHg (< 30mmHg) IVC Diam:         2.06 cm  [id number] Rodrigues DO Electronically signed on 5/28/2024 at 10:05:34 AM  ** Final **     CT abdomen  pelvis wo IV contrast    Result Date: 5/28/2024  Interpreted By:  Gage Lopez, STUDY: CT ABDOMEN PELVIS WO IV CONTRAST;  5/24/2024 4:54 pm   INDICATION: Signs/Symptoms:Follow-up of bilateral ureteral lithotripsy with left-sided indwelling double-J stent, check for residual stones.   COMPARISON: KUB 24 May 2024; no prior ultrasound, CT or MRI of the abdomen/pelvis   ACCESSION NUMBER(S): [id number]   ORDERING CLINICIAN: LOUIS D'AMICO   TECHNIQUE: CT of the abdomen and pelvis from the lung bases through the symphysis pubis without oral or IV contrast   FINDINGS: LOWER CHEST: Interstitial edema. Trace right pleural effusion too small for thoracocentesis with adjacent atelectasis. Other atelectatic changes in both lung bases. At least one or two nodules in the middle lobe.   BONES: No acute skeletal findings.   RIGHT KIDNEY AND URETER: Radiodense stone: Five stones or stone fragments measuring 3-4 mm all too small to measure attenuation. Other calcifications close to the hilus I suspect are vascular none in the ureter Hydronephrosis: Negative Congenital variant anatomy: Negative. No duplicated ureter or other variant anatomy. Other: Amorphous high attenuation in the parenchyma between the poles posterolaterally. Perhaps minimal periureteric inflammatory changes proximally   LEFT KIDNEY AND URETER: Radiodense stone: Just one, less than 3 mm (too small to accurately measure attenuation) stone or stone fragment towards the lower pole. Other calcifications close to the hilus I suspect are vascular. No stone or stone fragment in the stented ureter. Hydronephrosis: Negative Congenital variant anatomy: Negative. No duplicated ureter or other variant anatomy. Other: Tiny region of high attenuation medial upper pole   URINARY BLADDER: Radiodense stone: Negative Wall thickening / other inflammatory change: Negative Diverticula: Negative Congenital variant anatomy: Negative. No variant anatomy such as urachal remnant. Other: n/a    LIVER: Normal. No enlargement or evidence of cirrhosis or fatty change. No gross evidence of a mass, noting low sensitivity and specificity without IV contrast.   SPLEEN: Normal. No enlargement.   PANCREAS: Normal. No CT evidence of acute or chronic pancreatitis. No obvious  duct dilation. No gross evidence of a mass, noting low sensitivity and specificity without IV contrast.   GALLBLADDER: Normal CT appearance. No dilation, calcified, or gas-containing stones.  Other types of gallstones could be occult on CT and detectable only by ultrasound.   BILE DUCTS: Normal. No biliary duct dilation.   ADRENAL GLANDS: Normal. No nodule or mass.   LYMPH NODES: No adenopathy, intraperitoneal, retroperitoneal, pelvic, inguinal or otherwise.   APPENDIX: Normal.  Not dilated, thick walled or in any other way inflamed in appearance.  No inflammatory change about the appendix.   COLON: Normal. No sign of acute diverticulitis or other colitis. No annular constricting mass.   SMALL BOWEL: Normal. No small bowel dilation or any other sign of small bowel obstruction.   STOMACH / DUODENUM: Grossly normal by CT which has limited sensitivity and specificity for the stomach and duodenum.   RETROPERITONEUM: Normal.  No acute hemorrhage or inflammatory change. Lymph nodes in a separate dedicated section.   OMENTUM, MESENTERY AND PERITONEAL SPACES: Free intraperitoneal air: Negative Free intraperitoneal fluid: Negative Abscess: Negative Other: n/a   PELVIS: Mild enlargement of the prostate. No pelvic mass, adenopathy or free fluid.   VASCULATURE: Aortic and iliac atherosclerotic calcifications without aneurysm or other acute finding.   ABDOMINAL WALL: Hernia: Negative Other: No acute or contributory abnormality.       Amorphous high attenuation within substantial volumes of the right kidney suggesting intraparenchymal right renal hemorrhage, but there is no subcapsular or perinephric or other retroperitoneal hemorrhage/hematoma about the  right kidney   Solitary, much smaller region of high attenuation at upper pole left kidney may be a small intraparenchymal hemorrhage of left kidney. As on the right, no subcapsular, perinephric or other left retroperitoneal hemorrhage/hematoma otherwise   No stone or stone fragment in either ureter   Nonobstructing stones or stone fragments in both kidneys as detailed above   The included lung bases are abnormal. See above   I communicated the findings regarding the probable bilateral right-greater-than-left renal hemorrhages with Dr. D'amico through the secure electronic medical records transition before signing off this report, 28 May 2024 at 0945 hours   MACRO: Gage Lopez discussed the significance and urgency of this critical finding by telephone with  LOUIS D'AMICO on 5/28/2024 at 9:46 am. (**-RCF-**) Findings:  See findings.   Signed by: Gage Lopez 5/28/2024 9:46 AM Dictation workstation:   [id number]    ECG 12 Lead    Result Date: 5/27/2024 Suspect unspecified pacemaker failure Wide QRS rhythm Left axis deviation Right bundle branch block Abnormal ECG When compared with ECG of 26-MAY-2024 07:13, (unconfirmed) Wide QRS rhythm has replaced Electronic ventricular pacemaker Confirmed by Zachary Sparks ([id number]) on 5/27/2024 3:57:18 PM    ECG 12 Lead    Result Date: 5/27/2024  AV dual-paced rhythm Abnormal ECG When compared with ECG of 25-MAY-2024 08:21, No significant change was found Confirmed by Clayton Angulo ([id number]) on 5/27/2024 8:03:31 AM    ECG 12 Lead    Result Date: 5/25/2024  AV dual-paced rhythm Abnormal ECG When compared with ECG of 24-MAY-2024 07:05, No significant change was found Confirmed by Zachary Sparks ([id number]) on 5/25/2024 10:19:40 AM    XR abdomen 1 view    Result Date: 5/24/2024  Interpreted By:  Gage Lopez, STUDY: XR ABDOMEN 1 VIEW;  5/24/2024 2:47 pm   INDICATION: Signs/Symptoms:Follow-up of bilateral ureteral lithotripsy with indwelling double-J stent on the left.   COMPARISON: Portable  chest 22 May 2024   No imaging of the kidneys or other portions of the urinary tract are in the local PACS archive   ACCESSION NUMBER(S): [id number]   ORDERING CLINICIAN: LOUIS D'AMICO   TECHNIQUE: Frontal supine view of the abdomen   FINDINGS: A left ureteral stent is present   Urinary bladder stone versus a phlebolith projects just superior to the distal pigtail end of the stent   Projecting within the proximal pigtail end and just inferior to the pigtail end, question of two tiny stones or stone fragments in the left renal pelvis   No stone or stone fragment projects over the stented left ureter   Few right upper quadrant calcifications of which some all may be right renal including one as large as 6 mm but which is thin and linear   No dilated gas-filled small bowel   Stool is in the right colon and gas in the left. Nondilated   The liver may be mildly enlarged       No prior imaging of the urinary tract in the local PACS archive   Questionable tiny stones or stone fragments x2 adjacent to the proximal pigtail coil of the ureteral stent in the left renal pelvis   No stone or stone fragment projects over stented left ureter   Tiny urinary bladder stone versus phlebolith projecting near the distal end of the stent   Probability of right nephrolithiasis as detailed above   MACRO: None   Signed by: Gage Lopez 5/24/2024 2:54 PM Dictation workstation:   [id number]    ECG 12 Lead    Result Date: 5/24/2024  AV dual-paced rhythm Abnormal ECG When compared with ECG of 23-MAY-2024 08:05, Electronic ventricular pacemaker has replaced Wide QRS tachycardia Vent. rate has decreased BY  46 BPM Confirmed by Zachary Sparks ([id number]) on 5/24/2024 8:56:30 AM    ECG 12 lead    Result Date: 5/24/2024  Ventricular tachycardia Left axis deviation Right bundle branch block Inferior infarct (cited on or before 21-JAN-2002) T wave abnormality, consider lateral ischemia Abnormal ECG See ED provider note for full interpretation and  clinical correlation Confirmed by Zachary Sparks ([id number]) on 5/24/2024 8:56:24 AM    ECG 12 lead    Result Date: 5/24/2024  Wide QRS tachycardia with frequent atrial-paced complexes Left axis deviation Right bundle branch block Inferior infarct (cited on or before 21-JAN-2002) Abnormal ECG When compared with ECG of 09-JUL-2008 06:28, Electronic atrial pacemaker has replaced Sinus rhythm Vent. rate has increased BY  72 BPM Right bundle branch block is now Present See ED provider note for full interpretation and clinical correlation Confirmed by Zachary Sparks ([id number]) on 5/24/2024 8:55:57 AM    Electrophysiology procedure    Result Date: 5/23/2024  Table formatting from the original result was not included. Procedure Details: Procedure Details:  Cardioversion Summary: ·   External electric cardioversion of ventricular tachycardia to normal sinus rhythm was achieved using 200 J synchronized biphasic Antitachycardia pacing was delivered through the device at 450 ms, 300 ms, 250 ms with no termination of ventricular arrhythmias. . Recommendations: 1. A 12 lead ECG should be performed prior to discharge from the hospital. 2. The patient should continue with the present medications. Discharge: 1. The patient recovered uneventfully from the effects of conscious sedation. The patient left the EP laboratory hemodynamically stable and without neurological deficits. Follow up: 1. The patient will stay on telemetry intensive care unit for drug load with high risk medication, following bed rest and subsequent ambulation, provided the recovery parameters are appropriate. The patient should call the electrophysiologist immediately if symptoms recur, or for any problems. The patient has been instructed accordingly. ________________________________________ Procedures: Cardioversion.  Noninvasive primary stimulation through the device (biventricular ICD) ________________________________________ Patient history: Please refer to the detailed  history and physical on the patient's medical chart.  History of ventricular tachycardia status post ICD shocks.  Patient is scheduled for antitachycardia pacing-NIPS and cardioversion ________________________________________ Procedure narrative: The device was interrogated and reprogrammed prior to the procedure.  The risks, benefits, and alternatives to the procedure and sedation were explained to the patient, and informed consent was obtained. The patient was in the fasting state. A grounding pad was placed. Self-adhesive anterior-posterior defibrillation pads were applied. A ZOLL defibrillator was used for monitoring and the defibrillator waveform was set to biphasic. The patient was set up for continuous monitoring of surface 12 lead ECG and pulse oximetry. Blood pressure was monitored with automatic cuff measurements. The procedure was performed under IV conscious sedation performed by anesthesiology service. 1. External electric cardioversion of ventricular tachycardia to normal sinus rhythm was achieved using 200 J synchronized biphasic Antitachycardia pacing was delivered through the device at 450 ms, 300 ms, 250 ms with no termination of ventricular arrhythmias. The device was reprogrammed to DDDR  bpm.  AV delay was decreased to 150 ms. ________________________________________ Complications: The patient tolerated the procedure without any complications or incident. ________________________________________ Prepared and signed by Tolerance: good Complications: None     Cardiac Catheterization Procedure    Result Date: 5/23/2024   [facility], Cath Lab        [address] [place] [address] Cardiovascular Catheterization Report Patient Name:      FRANCIA PAULINO          Performing Physician:  [id number] Menon MD Study Date:        5/23/2024             Verifying Physician:   Lakia Fine                                                                  Lynsey BROWN MRN/PID:           [id number]              Cardiologist/Co-Scrub: Accession#:        [id number]          Ordering Provider:     [id number] EVONNE WALTERS Date of Birth/Age: 1965 / 58 years Cardiologist: Gender:            M                     Fellow: Encounter#:        [id number]            Surgeon:  Study: Left Heart Cath  Indications: FRANCIA PAULINO is a 59 year old male who presents with dyslipidemia, hypertension, chronic pulmonary disease, prior myocardial infarction, prior percutaneous coronary intervention, smoker chf, coronary artery disease and an asymptomatic chest pain assessment. Left ventricular dysfunction, cardiomyopathy and cardiac arrhythmia.  Procedure Description: After infiltration with 2% Lidocaine, the right femoral artery was cannulated with a modified Seldinger technique. Subsequently a 5 Greek sheath was placed in the right femoral artery. Selective coronary catheterization was performed using a 5 Fr catheter(s) exchanged over a guide wire to cannulate the coronary arteries. A JL 4 tip catheter was used for left coronary injections. A 3drc tip catheter was used for right coronary injections. Multiple injections of contrast were made into the left and right coronary arteries with angiograms recorded in multiple projections. After completion of the procedure, femoral artery angiography was performed. This demonstrated a common femoral artery puncture appropriate for closure. A Vascade 5F vascular closure Device was placed per protocol.  Coronary Angiography: The coronary circulation is right dominant.  Left Main Coronary Artery: There is 10-30% stenosis in the distal left main coronary artery.  Left Anterior Descending Coronary Artery Distribution: There is 10-30% stenosis in the proximal and mid left anterior descending artery.  Circumflex Coronary Artery Distribution:  There is 100% stenosis in the the proximal Circumflex artery.  Right Coronary Artery Distribution: Fills via collaterals. There is 100% stenosis in the the proximal Right Coronary Artery.  Left Ventriculography: The estimated left ventricular ejection fraction is abnormal at 10%.  Hemo Personnel: +---------------------------+---------+ Name                       Duty      +---------------------------+---------+ Babatunde Tan MD 1 +---------------------------+---------+  Hemodynamic Pressures:  +----+---------------------+----------+-------------+--------------+---------+ Site      Date Time      Phase NameSystolic mmHgDiastolic mmHgMean mmHg +----+---------------------+----------+-------------+--------------+---------+   AO5/23/2024 10:02:22 AM  AIR REST           70            57       64 +----+---------------------+----------+-------------+--------------+---------+   AO5/23/2024 10:03:03 AM  AIR REST           87            56       75 +----+---------------------+----------+-------------+--------------+---------+   AO5/23/2024 10:08:12 AM  AIR REST           98            59       73 +----+---------------------+----------+-------------+--------------+---------+  Cardiac Cath Post Procedure Notes: Post Procedure Diagnosis: Double vessel disease. Blood Loss:               Estimated blood loss during the procedure was 0 mls. Specimens Removed:        Number of specimen(s) removed: none. ____________________________________________________________________________________ CONCLUSIONS:  1. Distal Left Main: 10-30% stenosis.  2. Proximal and mid LAD Lesion: The percent stenosis is 10-30%.  3. Proximal CX Lesion: The percent stenosis is 100%.  4. Right Coronary Artery: fills via collaterals.  5. Proximal RCA Lesion: The percent stenosis is 100%.  6. The Left Ventricular Ejection Fraction is 10%,by echo. ICD 10 Codes: Ventricular tachycardia, unspecified-I47.20  CPT  Codes: Coronary Angiography S&I only ([facility])([facility])-85449; Moderate Sedation Services initial 15 minutes patient >5 years-[id number]  [id number] Babatunde Menon MD Performing Physician Electronically signed by [id number] Babatunde Menon MD on 5/23/2024 at 10:42:57 AM  ** Final (Updated) **     ECG 12 Lead    Result Date: 5/23/2024  Wide QRS tachycardia - possibly ventricular tachycardia Abnormal right superior axis deviation Abnormal ECG When compared with ECG of 22-MAY-2024 23:59, (unconfirmed) No significant change was found Confirmed by Clayton Angulo ([id number]) on 5/23/2024 10:40:02 AM    Transthoracic Echo (TTE) Complete    Result Date: 5/23/2024          [place] [id number]  Tel [phone] Fax [phone] TRANSTHORACIC ECHOCARDIOGRAM REPORT  Patient Name:      FRANCIA Schultz Physician:    [id number] Kd Rodrigues DO Study Date:        5/23/2024             Ordering Provider:    [id number] SHIN MRN/PID:           [id number]              Fellow: Accession#:        [id number]          Nurse: Date of Birth/Age: 1965 / 58 years Sonographer:          Cathy Sanders RDCS Gender:            M                     Additional Staff: Height:            170.18 cm             Admit Date: Weight:            78.47 kg              Admission Status:     Inpatient -                                                                Routine BSA / BMI:         1.90 m2 / 27.10 kg/m2 Department Location:  [facility] Blood Pressure: 97 /71 mmHg Study Type:    TRANSTHORACIC ECHO (TTE) COMPLETE Diagnosis/ICD: Presence of automatic (implantable) cardiac                defibrillator-Z95.810; Chronic systolic (congestive) heart                failure (CHF)-I50.22; Ischemic cardiomyopathy-I25.5  Indication:    Congestive Heart Failure, ICM, ICD Firing (multiple shocks) CPT Codes:     Echo Complete w Full Doppler-42412 Patient History: MI Location/Type:  Unknown MI Pacer/Defib:       AICD Pertinent History: CAD, CHF, Cardiomyopathy, HTN and ICD Firing. Study Detail: The following Echo studies were performed: 2D, M-Mode, Doppler and               color flow. Technically challenging study due to patient lying in               supine position, prominent lung artifact and poor acoustic               windows. Patient has a defibrillator. The patient was asleep.  PHYSICIAN INTERPRETATION: Left Ventricle: Left ventricular systolic function is severely decreased, with an estimated ejection fraction of 10%. There is global hypokinesis of the left ventricle with minor regional variations. The left ventricular cavity size is moderately dilated. Left ventricular diastolic filling was not assessed. Left Atrium: The left atrium is mildly dilated. Right Ventricle: The right ventricle is normal in size. There is normal right ventricular global systolic function. A device is visualized in the right ventricle. Right Atrium: The right atrium is normal in size. There is a device visualized in the right atrium. Aortic Valve: The aortic valve appears structurally normal. The aortic valve appears tricuspid. There is mild aortic valve cusp calcification. There is no evidence of aortic valve stenosis. There is no evidence of aortic valve regurgitation. The peak instantaneous gradient of the aortic valve is 3.6 mmHg. The mean gradient of the aortic valve is 2.0 mmHg. Mitral Valve: The mitral valve is normal in structure. There is mild thickening of the anterior and posterior mitral valve leaflets. There is no evidence of mitral valve stenosis. There is normal mitral valve leaflet mobility. There is mild mitral annular calcification. There is moderate to severe mitral valve regurgitation. Tricuspid Valve: The tricuspid valve is  structurally normal. There is normal tricuspid valve leaflet mobility. There is moderate tricuspid regurgitation. Pulmonic Valve: The pulmonic valve is structurally normal. There is trace pulmonic valve regurgitation. Pericardium: There is no pericardial effusion noted. Aorta: The aortic root is normal. Pulmonary Artery: Pulmonary hypertension is present. The tricuspid regurgitant velocity is 3.03 m/s, and with an estimated right atrial pressure of 15 mmHg, the estimated pulmonary artery pressure is moderate to severely elevated with the RVSP at 51.7 mmHg. Systemic Veins: The inferior vena cava appears moderately dilated. In comparison to the previous echocardiogram(s): There are no prior studies on this patient for comparison purposes.  CONCLUSIONS:  1. Left ventricular systolic function is severely decreased with a 10% estimated ejection fraction.  2. There is no evidence of mitral valve stenosis.  3. Moderate to severe mitral valve regurgitation.  4. Moderate tricuspid regurgitation.  5. Aortic valve stenosis is not present.  6. Left ventricular cavity size is moderately dilated.  7. Moderate to severely elevated pulmonary artery pressure.  8. Pulmonary hypertension is present.  9. The inferior vena cava appears moderately dilated. 10. There is global hypokinesis of the left ventricle with minor regional variations. RECOMMENDATIONS: Technically suboptimal and limited study, therefore accuracy of above interpretation could be substantially diminished. Clinical correlation is advised. Consider additional imaging modalities if clinically indicated.  QUANTITATIVE DATA SUMMARY: 2D MEASUREMENTS:                           Normal Ranges: Ao Root d:     2.93 cm    (2.0-3.7cm) LAs:           4.06 cm    (2.7-4.0cm) IVSd:          1.07 cm    (0.6-1.1cm) LVPWd:         0.53 cm    (0.6-1.1cm) LVIDd:         6.55 cm    (3.9-5.9cm) LVIDs:         6.27 cm LV Mass Index: 114.6 g/m2 LV % FS        4.3 % LA VOLUME:                                Normal Ranges: LA Vol A4C:        85.1 ml    (22+/-6mL/m2) LA Vol A2C:        79.1 ml LA Vol BP:         82.7 ml LA Vol Index A4C:  44.7ml/m2 LA Vol Index A2C:  41.6 ml/m2 LA Vol Index BP:   43.5 ml/m2 LA Area A4C:       25.7 cm2 LA Area A2C:       24.6 cm2 LA Major Axis A4C: 6.6 cm LA Major Axis A2C: 6.5 cm LA Volume Index:   41.2 ml/m2 LA Vol A4C:        80.4 ml LA Vol A2C:        75.8 ml RA VOLUME BY A/L METHOD:                               Normal Ranges: RA Vol A4C:        56.3 ml    (8.3-19.5ml) RA Vol Index A4C:  29.6 ml/m2 RA Area A4C:       18.2 cm2 RA Major Axis A4C: 5.0 cm LV SYSTOLIC FUNCTION BY 2D PLANIMETRY (MOD):                    Normal Ranges: EF-A4C View: 8.5 % (>=55%) EF-A2C View: 8.9 % EF-Biplane:  9.3 % MITRAL INSUFFICIENCY:                           Normal Ranges: PISA Radius:  1.1 cm MR VTI:       106.00 cm MR Vmax:      424.00 cm/s MR Alias Dayne: 40.9 cm/s MR Volume:    73.55 ml MR Flow Rt:   294.22 ml/s MR EROA:      0.69 cm2 AORTIC VALVE:                                   Normal Ranges: AoV Vmax:                0.95 m/s (<=1.7m/s) AoV Peak PG:             3.6 mmHg (<20mmHg) AoV Mean P.0 mmHg (1.7-11.5mmHg) LVOT Max Dayne:            0.62 m/s (<=1.1m/s) AoV VTI:                 12.90 cm (18-25cm) LVOT VTI:                7.99 cm LVOT Diameter:           2.06 cm  (1.8-2.4cm) AoV Area, VTI:           2.06 cm2 (2.5-5.5cm2) AoV Area,Vmax:           2.16 cm2 (2.5-4.5cm2) AoV Dimensionless Index: 0.62  RIGHT VENTRICLE: TAPSE: 15.3 mm RV s'  0.11 m/s TRICUSPID VALVE/RVSP:                             Normal Ranges: Peak TR Velocity: 3.03 m/s RV Syst Pressure: 51.7 mmHg (< 30mmHg) IVC Diam:         2.43 cm PULMONIC VALVE:                         Normal Ranges: PV Accel Time: 79 msec  (>120ms) PV Max Dayne:    0.7 m/s  (0.6-0.9m/s) PV Max P.9 mmHg PV Mean P.0 mmHg PV VTI:        10.90 cm  [id number] Kd Rodrigues DO Electronically signed on 2024 at 9:42:06 AM   ** Final **     XR chest 1 view    Result Date: 5/22/2024  STUDY: Chest Radiograph;  5/22/2024 9:52 PM INDICATION: Chest pain. COMPARISON: None Available ACCESSION NUMBER(S): [id number] ORDERING CLINICIAN: ALLI DENNISON TECHNIQUE:  Frontal chest was obtained at 21:52 hours. FINDINGS: CARDIOMEDIASTINAL SILHOUETTE: Cardiomediastinal silhouette is normal in size and configuration. Right subclavian pacer/ICD device is demonstrated.  LUNGS: Lungs are clear.  There is no pneumothorax or pleural effusion.  ABDOMEN: No remarkable upper abdominal findings.  BONES: No acute osseous changes.    No acute cardiopulmonary disease. Signed by Rio Painting, DO        Assessment/Plan   Introduction to Palliative Care:  Spoke with pt, pt's mother, pt's father and pt's sister at bedside. Patient seemed to appreciate the extra layer of support.   Palliative care was introduced as a service for patients with serious illness to help with symptoms, assist with goals of care conversations, navigate complex decision making, improve quality of life for patients, and provide support both patients and families.    Supportive Interventions:  Music therapy as needed  Spiritual care    Medical decision making/ Goals of care:   Patient's current clinical condition, including diagnosis, prognosis, management plan, and goals of care were discussed.   Life limiting disease: heart failure, cardiogenic shock   Family: Supportive   Performance status: Major limitations due to fatigue and disease process  Joys/meaning/strength: Family, dogs, yardwork, fishing, reading, trucks, Religious  Understanding of health: Demonstrates good prognostic understanding of disease process  Information: Wants full disclosure of daily updates and prognosis   Goals: Symptom control, going fishing on his boat, writing a bible   Worries and fears now and future: pt states he does not have any at this time   Minimum acceptable outcome/QOL: cognition, independence with  toileting, feeding, ambulation   Code status discussion: completed today (6/5/24) with pt, pt's mother and pt's father; pt's ongoing hospitalization reviewed. Ongoing VT management per EP; pt being evaluated for possible interventions to assist. Per primary, pt is not an advanced therapies candidate given tobacco use and emphysema. Pt states he has been living with heart failure since 2002, and he acknowledges the progressive decline in functionality has he has become more symptomatic over the past several months. Pt states he does not want to pursue LVAD, stating he enjoys fishing and does not want to compromise his quality of life for this. Pt's tony is important to him, and he believes God will call him home when he is ready. Pt understands that if he does not pursue advanced therapies, that he will die soon, given the severity of his heart failure. Pt is agreeable to DNR/DNI at this time, and also is agreeable to ongoing EP evaluation for VT management. Primary notified.    Advance Care Planning:   Advance Directives on file.   HCPOA is: pt's motherJudie: [phone]  Code status: DNR/DNI    Disposition:  Plan pending hospital course    Case discussed with primary team.    Thank you for allowing us to participate in the care of this patient. Palliative Team will continue to follow as needed.  Please contact team with any questions or concerns.    YARITZA Horn-CNP   Palliative Care (weekdays - pager [phone])    I spent 120 minutes in the care of this patient which included chart review, interviewing patient/family, discussion with primary team, coordination of care, and documentation.    Medical Decision Making was high level due to high complexity of problems, extensive data review, and high risk of management/treatment.

## 2024-06-05 NOTE — CONSULTS
Inpatient consult to Infectious Diseases  Consult performed by: Norma Way MD  Consult ordered by: Logan Moscoso PA-C        Primary MD: Ewa Willis MD  Reason For Consult: abx need for ?PNA     History Of Present Illness  Cristian Sapp is a 58 y.o. male  with past medical history of multiple prior MIs, HFrEF, infrarenal AAA s/p ideally stent, CAD-multiple JIM, nephrolithiasis with recent cystoscopy with ureteral stent placement presented as a transfer from [place] on 5/28    Patient presented to [place] on 5/23 after his ICD fired 10-15 times which was interrogated and was found to have a slow V. tach.  ECG by wide-complex tachycardia with right bundle branch block.  Patient was transferred to ICU and was started on amio infusion, patient also had a cardiac cath did not undergo any interventions.  EF was found to be 10%, patient was cardioverted and pacemaker settings were also changed.  Patient was then transferred to [facility] on 5/28 for evaluation for an LVAD.  While patient was there he did have mild leukocytosis which improved without antibiotics.  Patient stayed completely afebrile throughout his hospitalization.  CXR was done on 5/22 which did not show any acute cardiopulmonary disease.  CT abdomen done on 5/24 for renal stones showed trace right pleural effusions in the lower chest with some atelectatic changes at the lung bases.  Otherwise there was no consolidation or infiltrates noted in the lower chest.      Course at [facility]:  Patient was transferred to [facility] on 5/28.  Was planned to undergo VT ablation on 5/30.  On 5/28 patient was also complaining of shortness of breath and a CXR did not show any acute cardiopulmonary process.  Patient was started on IV vancomycin and pip-tazo. Patient underwent ablation on 5/30. ID was consulted for abx recs.      Past Medical History  He has a past medical history of Atherosclerosis of native artery of both lower extremities with intermittent claudication  (CMS-[facility]), CHB (complete heart block) (Multi), Chronic systolic CHF (congestive heart failure), NYHA class 3 (Multi), COPD (chronic obstructive pulmonary disease) (Multi), Coronary artery disease, History of tobacco abuse, HLD (hyperlipidemia), Hypertension, Infrarenal abdominal aortic aneurysm (AAA) without rupture (CMS-HCC), Ischemic cardiomyopathy with implantable cardioverter-defibrillator (ICD), MI (myocardial infarction) (Multi), Nephrolithiasis, and PTSD (post-traumatic stress disorder).    Surgical History  He has a past surgical history that includes Cardiac defibrillator placement; Coronary angioplasty with stent (2006); Coronary angioplasty with stent (04/2003); Coronary angioplasty with stent (06/2008); Cystoscopy w/ ureteral stent placement (04/01/2024); Cystoscopy w/ ureteral stent placement (04/30/2024); Iliac artery stent (Right); Knee surgery; Femoral bypass; Cardiac electrophysiology procedure (N/A, 5/23/2024); Cardiac catheterization (N/A, 5/23/2024); Cardiac electrophysiology procedure (N/A, 5/28/2024); and Cardiac electrophysiology procedure (Right, 5/30/2024).     Social History     Occupational History    Not on file   Tobacco Use    Smoking status: Former     Types: Cigarettes    Smokeless tobacco: Not on file   Vaping Use    Vaping status: Never Used   Substance and Sexual Activity    Alcohol use: Yes     Comment: social    Drug use: Defer    Sexual activity: Defer     Travel History   Travel since 05/05/24    No documented travel since 05/05/24            Pets: 3 dogs    Family History  Family History   Problem Relation Name Age of Onset    Hypertension Mother      Diabetes Father      Hypertension Sister      Diabetes Sister      Colon cancer Maternal Grandmother      Hypertension Maternal Grandmother      Heart disease Maternal Grandfather      Hypertension Maternal Grandfather      Cancer Paternal Grandfather      Hypertension Paternal Grandfather       Allergies  Chantix [varenicline]      Immunization History   Administered Date(s) Administered    [facility] SARS-CoV-2 Vaccination 07/06/2021     Medications  Home medications:  Medications Prior to Admission   Medication Sig Dispense Refill Last Dose    acetaminophen (Tylenol) 500 mg tablet Take 650 mg by mouth every 6 hours if needed for mild pain (1 - 3).       albuterol sulfate (Proair Digihaler) 90 mcg/actuation aero powdr breath act w/sensor inhaler Inhale 2 puffs every 4 hours if needed for wheezing.       aspirin 81 mg EC tablet Take 1 tablet (81 mg) by mouth once daily.       b complex 0.4 mg tablet Take 1 tablet by mouth once daily.       calcium polycarbophiL (Fiber, calcium polycarbophil,) 625 mg tablet Take 1 tablet (625 mg) by mouth once daily.       cholecalciferol (Vitamin D-3) 5,000 Units tablet Take 1 tablet (5,000 Units) by mouth every 3 days.       co-enzyme Q-10 50 mg capsule Take 1 capsule (50 mg) by mouth 1 (one) time per week.       cyanocobalamin (Vitamin B-12) 1,000 mcg tablet Take 1 tablet (1,000 mcg) by mouth once daily.       LORazepam (Ativan) 0.5 mg tablet Take 1 tablet (0.5 mg) by mouth once daily at bedtime.       nicotine (Nicoderm CQ) 21 mg/24 hr patch Place 1 patch on the skin once every 24 hours.       PARoxetine (Paxil) 40 mg tablet Take 1 tablet (40 mg) by mouth once daily in the morning.       potassium chloride (Klor-Con) 20 mEq packet Take 20 mEq by mouth once daily.       prasugrel (Effient) 10 mg tablet Take 1 tablet (10 mg) by mouth once daily.       ranolazine (Ranexa) 500 mg 12 hr tablet Take 1 tablet (500 mg) by mouth 2 times a day. Do not crush, chew, or split.       rosuvastatin (Crestor) 20 mg tablet Take 1 tablet (20 mg) by mouth once daily at bedtime.       sacubitriL-valsartan (Entresto) 24-26 mg tablet Take 1 tablet by mouth 2 times a day.       tamsulosin (Flomax) 0.4 mg 24 hr capsule Take 1 capsule (0.4 mg) by mouth once daily.       torsemide (Demadex) 20 mg tablet Take 1 tablet (20 mg) by  mouth 2 times a day.       traZODone (Desyrel) 50 mg tablet Take 1 tablet (50 mg) by mouth once daily at bedtime.       VITAMIN A ORAL Take 1 capsule by mouth every other day.       vitamin E acetate (VITAMIN E ORAL) Take 1 capsule by mouth once daily.        Current medications:  Scheduled medications  amiodarone, 150 mg, intravenous, Once  aspirin, 81 mg, oral, Daily  nicotine, 1 patch, transdermal, Daily   Followed by  [START ON 7/13/2024] nicotine, 1 patch, transdermal, Daily  pantoprazole, 40 mg, oral, Daily before breakfast  piperacillin-tazobactam, 4.5 g, intravenous, q6h  polyethylene glycol, 17 g, oral, BID  ranolazine, 500 mg, oral, BID  rosuvastatin, 20 mg, oral, Nightly  sacubitriL-valsartan, 1 tablet, oral, BID  sennosides-docusate sodium, 2 tablet, oral, BID  spironolactone, 12.5 mg, oral, Daily  traZODone, 50 mg, oral, Nightly      Continuous medications  amiodarone, 1 mg/min, Last Rate: 1 mg/min (06/05/24 0642)  heparin, 0-4,000 Units/hr, Last Rate: 1,300 Units/hr (06/04/24 1706)  lidocaine, 1 mg/min, Last Rate: 1 mg/min (06/04/24 1430)      PRN medications  PRN medications: acetaminophen **OR** acetaminophen **OR** acetaminophen, heparin, hydrOXYzine HCL, LORazepam, methocarbamol, oxygen, trimethobenzamide    Review of Systems     Objective  Range of Vitals (last 24 hours)  Heart Rate:  [95]   Temp:  [35 °C (95 °F)-37.6 °C (99.7 °F)]   Resp:  [13-29]   Weight:  [78.7 kg (173 lb 8 oz)]   SpO2:  [92 %-99 %]   Daily Weight  06/05/24 : 78.7 kg (173 lb 8 oz)    Body mass index is 27.17 kg/m².     Physical Exam  General: Patient is laying in bed, in NAD   HEENT: Anicteric sclera, no conjunctival pallor. No oral sores/ulcers. No dental caries.   CVS: S1/S2 audible, no M/G/R.  Resp: Breathing comfortably on 2L oxygen. Normal vesicular breathing. No wheezing, crackles or rhonchi auscultated.   Abd: Non distended, non tender, no organomegaly was appreciated. +BS.  CNS: AAO x4. No gross focal deficits  "appreciated.  Skin: No rashes or wounds seen.     Relevant Results  Outside Hospital Results  Yes -   Labs  Results from last 72 hours   Lab Units 06/05/24 0223 06/04/24 0307 06/04/24  0005 06/03/24  1741 06/03/24  0205   WBC AUTO x10*3/uL 11.7* 11.2 10.2   < > 11.0   HEMOGLOBIN g/dL 11.8* 12.4* 11.6*   < > 11.8*   HEMATOCRIT % 34.0* 36.1* 33.0*   < > 33.4*   PLATELETS AUTO x10*3/uL 287 283 273   < > 257   NEUTROS PCT AUTO % 72.2 66.2  --   --  70.7   LYMPHS PCT AUTO % 13.0 16.6  --   --  14.2   MONOS PCT AUTO % 12.3 12.0  --   --  11.3   EOS PCT AUTO % 1.0 2.9  --   --  2.4    < > = values in this interval not displayed.     Results from last 72 hours   Lab Units 06/05/24 0223 06/04/24 1255 06/04/24 0307 06/03/24  1741   SODIUM mmol/L 136 134* 137 138   POTASSIUM mmol/L 3.6 3.9 3.8 3.8   CHLORIDE mmol/L 99 97* 100 97*   CO2 mmol/L 28 26 27 28   BUN mg/dL 18 16 14 12   CREATININE mg/dL 1.43* 1.32* 1.31* 1.20   GLUCOSE mg/dL 115* 101* 104* 111*   CALCIUM mg/dL 8.8 9.0 8.7 8.8   ANION GAP mmol/L 13 15 14 17   EGFR mL/min/1.73m*2 58* 63 64 71   PHOSPHORUS mg/dL 3.4  --  3.9 3.5     Results from last 72 hours   Lab Units 06/05/24 0223 06/04/24  1255 06/04/24 0307 06/03/24  1741 06/03/24  0205   ALK PHOS U/L  --  71 74  --  70   BILIRUBIN TOTAL mg/dL  --  0.7 0.7  --  0.6   PROTEIN TOTAL g/dL  --  6.3* 6.3*  --  5.7*   ALT U/L  --  11 9*  --  12   AST U/L  --  11 13  --  14   ALBUMIN g/dL 3.3* 3.4 3.6   < > 3.5    < > = values in this interval not displayed.     Estimated Creatinine Clearance: 53.9 mL/min (A) (by C-G formula based on SCr of 1.43 mg/dL (H)).  No results found for: \"CRP\", \"SEDRATE\"  HIV 1/2 Antigen/Antibody Screen with Reflex to Confirmation   Date Value Ref Range Status   06/04/2024 Nonreactive Nonreactive Final     Hepatitis C AB   Date Value Ref Range Status   06/04/2024 Nonreactive Nonreactive Final     Comment:     Results from patients taking biotin supplements or receiving high-dose biotin " therapy should be interpreted with caution due to possible interference with this test. Providers may contact their local laboratory for further information.     Imaging  CXR: 5/31/2024:  1. Right lower lobe interstitial infiltrate peribronchial thickening  and small right pleural effusion and subsegmental atelectasis  2. Trace left pleural effusion and subsegmental atelectasis adjacent  to the left diaphragm laterally    CT Chest: 6/2/2024:  Prominent perihilar vascular markings, hazy right-greater-than-left  bibasilar opacities, and small to moderate right-greater-than-left  pleural effusions new/increased from prior radiograph. In the setting  of cardiomegaly, findings may relate to volume overload. However an  infectious or inflammatory process can not be excluded.    MICRO:  5/22: Urine Cx: Negative  5/23: Blood Cx: Negative  6/3: MRSA Nares: Negative  6/3: Blood Cx: NGTD  6/5: Urine Cx: In process  6/5: Resp Cx: NGTD  6/5: Urine strep pneumo/legionella Ag: In process  6/5: UA: WBC (6-10), 1+ Bacteria  6/3: MRSA Nares: Not detected    Abx:  PipTazo: 6/2 - p  ----------------------------  Vancomycin: 6/2 one time    Assessment/Plan   58 y.o. male  with past medical history of multiple prior MIs, HFrEF, infrarenal AAA s/p ideally stent, CAD-multiple JIM, nephrolithiasis with recent cystoscopy with ureteral stent placement presented as a transfer from [place] on 5/28 for LVAD evaluation. Patient is s/p VT ablation on 5/30 and started having shortness of breath. For concerns of pneumonia ID was consulted.     Given that patient doesn't have any clinical signs of pneumonia, doesn't have leukocytosis, fever, and the fact that shortness of breath improved with diuresis its most likely cardiac in etiology, and will not recommend any antimicrobial coverage.  If anything changes clinically will re-evaluate at that time.     Clinical Impression: Shortness of breath, concern for pneumonia    Recommendations:  Since no  clinical signs of pneumonia would recommend to stop IV Piptazo.   For now can monitor off antibiotics.   If patients clinical picture changes, such as he spikes a fever, has worsening leukocytosis and dyspnea worsens despite diuresis, can reconsult ID.     Plan was discussed with ID attending Dr Roberts.  We will sign off.  Please feel free to reach out to us for further questions.    Norma Way MD  PGY4, ID Fellow.   Team B Pager: [phone]  For new consults, contact pager [phone].   EPIC chat preferred.        Norma Way MD

## 2024-06-05 NOTE — PROGRESS NOTES
HFICU Attending Note    Principal Problem:    Ventricular tachycardia (Multi)  Active Problems:    AICD discharge    Acute on chronic systolic (congestive) heart failure (Multi)    Ischemic cardiomyopathy    He underwent L-sided stellate ganglion block yesterday afternoon. Since then rhythm has been stable without further VT, with lidocaine and amiodarone infusions ongoing. Plan today is to reintroduce elements of GDMT for HFrEF, and continue antiarrhythmic therapy and close observation. If no further VT we may be able to start weaning anti-arrhythmics.     We have decided to stop evaluation for advanced heart failure therapies at this time. He expressed that he does not want to entertain LVAD. Due to ongoing smoking, emphsema, and PAD he is not a candidate for heart transplantation at this time.    Appreciate ongoing guidance by EP service.    This critically ill patient continues to be at-risk for clinically significant deterioration / failure due to the above mentioned dysfunctional, unstable organ systems.  I have personally identified and managed all complex critical care issues to prevent aforementioned clinical deterioration.  Critical care time is spent at bedside and/or the immediate area and has included, but is not limited to, the review of diagnostic tests, labs, radiographs, serial assessments of hemodynamics, respiratory status, ventilatory management, and family updates.  Time spent in procedures and teaching are reported separately.    Critical care time: __35__ minutes     Alexandro Doyle MD, MPH  Advanced Heart Failure and Transplant Cardiology  [facility]  [facility]

## 2024-06-05 NOTE — CARE PLAN
Problem: Fall/Injury  Goal: Not fall by end of shift  Outcome: Progressing  Goal: Be free from injury by end of the shift  Outcome: Progressing  Goal: Verbalize understanding of personal risk factors for fall in the hospital  Outcome: Progressing  Goal: Verbalize understanding of risk factor reduction measures to prevent injury from fall in the home  Outcome: Progressing  Goal: Use assistive devices by end of the shift  Outcome: Progressing  Goal: Pace activities to prevent fatigue by end of the shift  Outcome: Progressing     Problem: Pain  Goal: My pain/discomfort is manageable  Outcome: Progressing     Problem: Daily Care  Goal: Daily care needs are met  Outcome: Progressing     Problem: Psychosocial Needs  Goal: Demonstrates ability to cope with hospitalization/illness  Outcome: Progressing  Goal: Collaborate with me, my family, and caregiver to identify my specific goals  Outcome: Progressing     Problem: Pain - Adult  Goal: Verbalizes/displays adequate comfort level or baseline comfort level  Outcome: Progressing     Problem: Safety - Adult  Goal: Free from fall injury  Outcome: Progressing     Problem: Discharge Planning  Goal: Discharge to home or other facility with appropriate resources  Outcome: Progressing     Problem: Chronic Conditions and Co-morbidities  Goal: Patient's chronic conditions and co-morbidity symptoms are monitored and maintained or improved  Outcome: Progressing     Problem: Skin  Goal: Decreased wound size/increased tissue granulation at next dressing change  Outcome: Progressing  Goal: Participates in plan/prevention/treatment measures  Outcome: Progressing  Goal: Prevent/manage excess moisture  Outcome: Progressing  Goal: Prevent/minimize sheer/friction injuries  Outcome: Progressing  Goal: Promote/optimize nutrition  Outcome: Progressing  Goal: Promote skin healing  Outcome: Progressing     Problem: Heart Failure  Goal: Improved gas exchange this shift  Outcome: Progressing  Goal:  Improved urinary output this shift  Outcome: Progressing  Goal: Reduction in peripheral edema within 24 hours  Outcome: Progressing  Goal: Report improvement of dyspnea/breathlessness this shift  Outcome: Progressing  Goal: Weight from fluid excess reduced over 2-3 days, then stabilize  Outcome: Progressing  Goal: Increase self care and/or family involvement in 24 hours  Outcome: Progressing   The patient's goals for the shift include      The clinical goals for the shift include pt will remain of VT and remain hemodynamically stable    Over the shift, the patient did not make progress toward the following goals. Barriers to progression include . Recommendations to address these barriers include .

## 2024-06-05 NOTE — PROGRESS NOTES
"[facility]  HFICU PROGRESS NOTE    Cristian Sapp/[id number]    Admit Date: 5/28/2024  Hospital Length of Stay: 8   ICU Length of Stay: 2d   Primary Service: HFICU  Referring: Dr Brown     INTERVAL EVENTS / PERTINENT ROS:   No acute events O/N. Telemetry reviewed with no VT after stellate ganglion block. Remains HDS on amiodarone + lidocaine. Planning for redo ablation with Dr. Wooten 6/6.     Plan:  - Will stop LVAD/OHT workup given patient is not a OHT candidate given tobacco use/ is not interested in LVAD at this time   - C/w amiodarone gtt + lidocaine gtt  - NPO at MN for re-do ablation with Dr. Wooten 6/6   - Start entresto 24-26 mg BID   - Start spironolactone 12.5 mg daily   - Lasix 40 mg IV push x1   - Palliative care consult, code status changed to DNR-DNI   - ID consult for ?PNA     MEDICATIONS  Infusions:  amiodarone, Last Rate: 1 mg/min (06/05/24 1128)  heparin, Last Rate: 1,300 Units/hr (06/05/24 1413)  lidocaine, Last Rate: 1 mg/min (06/04/24 1430)      Scheduled:  amiodarone, 150 mg, Once  aspirin, 81 mg, Daily  furosemide, 40 mg, Once  nicotine, 1 patch, Daily   Followed by  [START ON 7/13/2024] nicotine, 1 patch, Daily  pantoprazole, 40 mg, Daily before breakfast  piperacillin-tazobactam, 4.5 g, q6h  polyethylene glycol, 17 g, BID  ranolazine, 500 mg, BID  rosuvastatin, 20 mg, Nightly  sacubitriL-valsartan, 1 tablet, BID  sennosides-docusate sodium, 2 tablet, BID  spironolactone, 12.5 mg, Daily  traZODone, 50 mg, Nightly      PRN:  acetaminophen, 650 mg, q4h PRN   Or  acetaminophen, 650 mg, q4h PRN   Or  acetaminophen, 650 mg, q4h PRN  heparin, 2,000-4,000 Units, q4h PRN  hydrOXYzine HCL, 25 mg, q8h PRN  LORazepam, 1 mg, q6h PRN  methocarbamol, 500 mg, q6h PRN  oxygen, , Continuous PRN - O2/gases  trimethobenzamide, 200 mg, q6h PRN        PHYSICAL EXAM:   Visit Vitals  BP (!) 134/107   Pulse 95   Temp 36 °C (96.8 °F)   Resp 17   Ht 1.702 m (5' 7\")   Wt 78.7 kg (173 lb 8 oz) "   SpO2 97%   BMI 27.17 kg/m²   Smoking Status Former   BSA 1.93 m²       Wt Readings from Last 5 Encounters:   24 78.7 kg (173 lb 8 oz)   24 78.8 kg (173 lb 11.6 oz)       INTAKE/OUTPUT:  I/O last 3 completed shifts:  In: 2786.5 (35.4 mL/kg) [I.V.:2186.5 (27.8 mL/kg); IV Piggyback:600]  Out: 4900 (62.3 mL/kg) [Urine:4900 (1.7 mL/kg/hr)]  Weight: 78.7 kg      Physical Exam  Vitals reviewed.   Constitutional:       General: He is not in acute distress.     Appearance: He is ill-appearing.   HENT:      Mouth/Throat:      Mouth: Mucous membranes are moist.   Eyes:      Extraocular Movements: Extraocular movements intact.      Pupils: Pupils are equal, round, and reactive to light.   Cardiovascular:      Rate and Rhythm: Regular rhythm. Tachycardia present.      Pulses: Normal pulses.      Heart sounds: Normal heart sounds.   Pulmonary:      Effort: Pulmonary effort is normal. No respiratory distress.      Breath sounds: Normal breath sounds. No rhonchi.   Abdominal:      General: Abdomen is flat. Bowel sounds are normal. There is no distension.      Tenderness: There is no abdominal tenderness.   Musculoskeletal:         General: Normal range of motion.      Cervical back: Full passive range of motion without pain.      Right lower le+ Pitting Edema present.      Left lower le+ Pitting Edema present.   Skin:     General: Skin is warm.      Capillary Refill: Capillary refill takes 2 to 3 seconds.   Neurological:      General: No focal deficit present.      Mental Status: He is alert and oriented to person, place, and time. Mental status is at baseline.   Psychiatric:         Mood and Affect: Mood normal.         Behavior: Behavior normal. Behavior is cooperative.       DATA:  CMP:  Results from last 7 days   Lab Units 24  0223 24  1255 24  0307 24  1741 24  0205 24  0224 24  0612 24  1742 24  0534 24  0506   SODIUM mmol/L 136 134* 137 138 138  136 138 136 136 138   POTASSIUM mmol/L 3.6 3.9 3.8 3.8 3.8 4.1 4.2 4.3 4.5 4.1   CHLORIDE mmol/L 99 97* 100 97* 98 99 101 97* 100 97*   CO2 mmol/L 28 26 27 28 28 29 31 29 29 29   ANION GAP mmol/L 13 15 14 17 16 12 10 14 12 16   BUN mg/dL 18 16 14 12 13 13 15 16 15 13   CREATININE mg/dL 1.43* 1.32* 1.31* 1.20 1.13 0.99 1.11 1.15 1.05 1.12   EGFR mL/min/1.73m*2 58* 63 64 71 76 89 78 75 83 77   MAGNESIUM mg/dL 2.05 1.98 2.07 2.17 2.34 1.98 2.03 1.99 2.12 2.20   ALBUMIN g/dL 3.3* 3.4 3.6 3.8 3.5 3.6 3.3* 3.6 3.4 3.4   ALT U/L  --  11 9*  --  12 13  --   --   --   --    AST U/L  --  11 13  --  14 16  --   --   --   --    BILIRUBIN TOTAL mg/dL  --  0.7 0.7  --  0.6 1.0  --   --   --   --      CBC:  Results from last 7 days   Lab Units 06/05/24  0223 06/04/24  0307 06/04/24  0005 06/03/24  1741 06/03/24  0205 06/02/24  0224 06/01/24  0612 05/31/24  0534   WBC AUTO x10*3/uL 11.7* 11.2 10.2 12.7* 11.0 9.6 8.8 8.2   HEMOGLOBIN g/dL 11.8* 12.4* 11.6* 13.1* 11.8* 12.1* 11.4* 13.5   HEMATOCRIT % 34.0* 36.1* 33.0* 37.0* 33.4* 35.4* 33.4* 37.2*   PLATELETS AUTO x10*3/uL 287 283 273 303 257 233 235 214   MCV fL 94 94 92 92 92 94 95 92     COAG:   Results from last 7 days   Lab Units 06/04/24  1255   INR  1.3*     ABO:   ABO TYPE   Date Value Ref Range Status   06/04/2024 A  Final     HEME/ENDO:  Results from last 7 days   Lab Units 06/04/24  1255 06/04/24  0307   TSH mIU/L 10.35*  --    HEMOGLOBIN A1C %  --  6.0*      CARDIAC:   Results from last 7 days   Lab Units 06/04/24  1255 06/03/24  1741 05/31/24  1742 05/31/24  1523   LD U/L 145  --   --   --    TROPHS ng/L  --   --  2,261* 2,395*   BNP pg/mL 674* 655*  --   --      ASSESSMENT AND PLAN:   Cristian Sapp is a 59 y/o M with PMHx of CAD s/p multiple PCI, ICM/HFrEF (EF 15% 5/24) s/p CRT-D, VT storm s/p VT ablation (2019), infrarenal AAA s/p R iliac stent, nephrolithiasis s/p recent ureteral stent. Presented to OSH on 5/23 with multiple ICD shocks for VT. Transferred to CICU at  [facility] 5/28 for continued management. EP was consulted on arrival and patient was in slow VT, he was started on amiodarone gtt. Underwent VT ablation 5/30, but continued to have intermittent slow VT. On 6/3 VT was noted again with rates ~118 and patient was cardioverted. Maintained on amiodarone + lidocaine gtt, and is s/p stellate ganglion block 6/4. He was transferred to HFICU 6/3 after he signed consent for LVAD/OHT evaluation. After further discussions and being a current smoker, it was decided that OHT would not be an option, and the patient does not want an LVAD at this time, so advanced therapies evaluation was stopped 6/5. Palliative medicine saw the patient 6/5 and he decided to change his code status to DNR-DNI. Planning for re-do endocardial ablation with Dr. Wooten 6/6.      Neuro:  #Anxiety  #Depression   #Insomnia   - C/w atarax 25 mg q8 PRN for anxiety   - Holding paroxetine given arrhythmias   - C/w trazodone 50 mg nightly   - C/w ativan 1 mg q6 PRN for anxiety   - Serial neuro and pain assessments   - PO Tylenol PRN for pain  - PT/OT Consult, OOB to chair  - CAM ICU score every shift  - Sleep/wake cycle normalization     #Substance abuse  - Tobacco use: active smoker  - C/w nicotine patches      Cardiovascular:  #Stage C/D HFrEF/ICM s/p CRT-D (11/2019)  #Acute on Chronic Decompensated HF  TTE (5/28/2024): severely decreased LV systolic function, EF 15%, LVIDd 6.32. Moderate-severe MVR, mild TR    Home medications: ASA, entresto 24-26 mg BID, rosuvastatin 20 mg daily, torsemide 20 mg daily  - Start entresto 24-26 mg BID  - Start spironolactone 12.5 mg daily   - Lasix 40 mg IV x1   - Will hold off on RHC at this time   - Introduce GDMT slowly   - Signed consent for OHT/LVAD workup, patient currently is not a candidate for OHT given active tobacco use, and he does not want an LVAD at this time, will stop workup for now per Dr. Doyle 6/5  - Daily standing weights, 2gm sodium diet, 2L fluid restriction,  strict I&Os     #CAD s/p multiple PCI   [facility] (5/23/24): Distal Left Main: 10-30% stenosis; Proximal and mid LAD Lesion: The percent stenosis is 10-30%; Proximal CX Lesion: The percent stenosis is 100%; Right Coronary Artery: fills via collaterals; Proximal RCA Lesion: The percent stenosis is 100%; The Left Ventricular Ejection Fraction is 10%,by echo.  - C/w ASA + rosuvastatin 20 mg daily as above      #Prior VT storm s/p VT ablation 5/30 and stellate ganglion block 6/4  #Incessant VT s/p ICD shock   Device interrogation (6/4): paced  for 102 min then stepping down, LR back to 95, patient converted to AP   - C/w amiodarone gtt  - C/w lidocaine gtt at 1 mg/min, last level (6/4): 4.6  - C/w heparin gtt, hold heparin 6/6 at 0600 prior to procedure  - C/w ranexa 500 mg BID  - EP following, appreciate assistance, planning for re-do ablation with Dr. Wooten 6/6      Pulmonary:   #Acute Hypoxic Respiratory Failure, 2/2  #?PNA   #Pulmonary Edema   #?COPD   CT chest (6/4): interval worsening now moderate right and small left pleural effusions with associated atelectasis, findings suggestive of mild pulmonary interstitial and alveolar edema  Blood cultures (6/3): NGTD   MRSA negative (6/3)  - Diuresis as above   - C/w zosyn for ? PNA (6/2-*)  - Send sputum cx + urine legionella + strep PNA   - ID consulted 6/5, follow recommendations   - Monitor and maintain SpO2 > 92%     GI:  #No active issues   - Bowel regimen: facundo-colace BID + miralax BID   - PPI daiy    :  #EITAN   #Nephrolithiasis s/p recent bilateral ureteral stent placement to left ureter   sCr (6/5): 1.43  - Consulted urology 5/31 to discuss removal--> will follow outpatient for STENT removal   - Trend RFP daily   - I/Os  - Diuresis as above   - Avoid hypotension and nephrotoxic agents     Heme:  #Anemia in the setting of Chronic Disease  - H&H (6/3/24): 11.8/33.4  - Send iron studies 6/5      Endo:  #No DM  - Euglycemic, HgbA1c 6%      #?Hypothyroid   - TSH  (6/4): 10.34  - T3 (6/4): 65  - T4 (6/4): 8.3  - Consult endocrine 6/4      ID:  #Leukocytosis 2/2 ?PNA versus stress induced   Blood cultures (6/3): NGTD   MRSA negative (6/3)  - ID consulted, appreciate assistance   - See pulmonary plan above  - C/w zosyn (6/2-*)  - Trend temps q4h     PHYSICAL AND OCCUPATIONAL THERAPY: ordered and following      LINES:  PIVs   Left radial A-line: 6/2      DVT: heparin gtt   VAP BUNDLE: NA  CENTRAL LINE BUNDLE: NA  ULCER PPX: PPI  GLYCEMIC CONTROL: NA  BOWEL CARE: scheduled facundo-colace BID and Miralax BID   INDWELLING CATHETER: NA  NUTRITION: Adult diet Regular  NPO Diet; Effective midnight      EMERGENCY CONTACT: Extended Emergency Contact Information  Primary Emergency Contact: Judie Sapp  Address: [address] [place] [address] [facility]  Home Phone: [phone]  Mobile Phone: [phone]  Relation: Mother  Preferred language: English   needed? No  Secondary Emergency Contact: Keiry Greene  Address: [address] [place], [place]  Mobile Phone: [phone]  Relation: Sibling  Preferred language: English   needed? No  FAMILY UPDATE: given at bedside 6/5  CODE STATUS: DNR and No Intubation  DISPO: Remain in HFICU     Patient seen and assessed with Dr. Doyle     I personally spent 65 minutes of critical care time directly and personally managing the patient exclusive of separately billable procedures   _________________________________________________  Logan Moscoso PA-C

## 2024-06-05 NOTE — PROGRESS NOTES
SW met with Cristian Sapp and his mother, sister and father, along with Amelia Vitale CNP today to discuss course of illness and GOC.  He lives in [place]. He is interested in speaking with someone to help him draw up paperwork for a trust.  He would also like to be an organ donor.  SW completed Advanced directives with pt. He designated his mother, Judie Sapp as his advocate, His sister, Keiry as his second and his brother in law as his third. His relationship with his wife is estranged. SW to follow and provide above information.   NATHANAEL Johnson

## 2024-06-05 NOTE — PROGRESS NOTES
"Cristian Sapp is a 58 y.o. male on day 8 of admission presenting with Ventricular tachycardia (Multi).    Subjective   No acute overnight events. Appears to be AV paced at 95 with no more episodes of VT     Objective     Physical Exam  Gen: frail and uncomfortable  Neuro: AAOx3, CN 2-12 intact, no FND  HEENT: PEERL, EOMI, sclera anicteric, no conjunctival injection, MMM, no oropharyngeal lesions  Neck: no elevated JVD  Resp: CTAB, normal breath sounds, no wheezing/crackles/ rales  CV: RRR, normal S1/S2, no murmurs/ rubs/gallops  GI: non-tender, non-distended, normal BSs in all 4 quadrants  MSK: warm and well perfused, no edema  Skin: warm and dry, no lesions, no rashes        Last Recorded Vitals  Blood pressure (!) 134/107, pulse 95, temperature 36 °C (96.8 °F), resp. rate 18, height 1.702 m (5' 7\"), weight 78.7 kg (173 lb 8 oz), SpO2 97%.  Intake/Output last 3 Shifts:  I/O last 3 completed shifts:  In: 2786.5 (35.4 mL/kg) [I.V.:2186.5 (27.8 mL/kg); IV Piggyback:600]  Out: 4900 (62.3 mL/kg) [Urine:4900 (1.7 mL/kg/hr)]  Weight: 78.7 kg     Relevant Results  LABS:  CMP:  Results from last 7 days   Lab Units 06/05/24  0223 06/04/24  1255 06/04/24  0307 06/03/24  1741 06/03/24  0205 06/02/24  0224 06/01/24  0612 05/31/24  1742 05/31/24  0534 05/30/24  0506   SODIUM mmol/L 136 134* 137 138 138 136 138 136 136 138   POTASSIUM mmol/L 3.6 3.9 3.8 3.8 3.8 4.1 4.2 4.3 4.5 4.1   CHLORIDE mmol/L 99 97* 100 97* 98 99 101 97* 100 97*   CO2 mmol/L 28 26 27 28 28 29 31 29 29 29   ANION GAP mmol/L 13 15 14 17 16 12 10 14 12 16   BUN mg/dL 18 16 14 12 13 13 15 16 15 13   CREATININE mg/dL 1.43* 1.32* 1.31* 1.20 1.13 0.99 1.11 1.15 1.05 1.12   EGFR mL/min/1.73m*2 58* 63 64 71 76 89 78 75 83 77   MAGNESIUM mg/dL 2.05 1.98 2.07 2.17 2.34 1.98 2.03 1.99 2.12 2.20   ALBUMIN g/dL 3.3* 3.4 3.6 3.8 3.5 3.6 3.3* 3.6 3.4 3.4   ALT U/L  --  11 9*  --  12 13  --   --   --   --    AST U/L  --  11 13  --  14 16  --   --   --   --    BILIRUBIN TOTAL " "mg/dL  --  0.7 0.7  --  0.6 1.0  --   --   --   --      CBC:  Results from last 7 days   Lab Units 06/05/24  0223 06/04/24  0307 06/04/24  0005 06/03/24  1741 06/03/24  0205 06/02/24  0224 06/01/24  0612 05/31/24  0534   WBC AUTO x10*3/uL 11.7* 11.2 10.2 12.7* 11.0 9.6 8.8 8.2   HEMOGLOBIN g/dL 11.8* 12.4* 11.6* 13.1* 11.8* 12.1* 11.4* 13.5   HEMATOCRIT % 34.0* 36.1* 33.0* 37.0* 33.4* 35.4* 33.4* 37.2*   PLATELETS AUTO x10*3/uL 287 283 273 303 257 233 235 214   MCV fL 94 94 92 92 92 94 95 92     COAG:   Results from last 7 days   Lab Units 06/04/24  1255   INR  1.3*     ABO:   ABO TYPE   Date Value Ref Range Status   06/04/2024 A  Final     HEME/ENDO:  Results from last 7 days   Lab Units 06/04/24  1255 06/04/24  0307   TSH mIU/L 10.35*  --    HEMOGLOBIN A1C %  --  6.0*      CARDIAC:   Results from last 7 days   Lab Units 06/04/24  1255 06/03/24  1741 05/31/24  1742 05/31/24  1523   LD U/L 145  --   --   --    TROPHS ng/L  --   --  2,261* 2,395*   BNP pg/mL 674* 655*  --   --    No results for input(s): \"CHOL\", \"LDLF\", \"LDL\", \"LDLCALC\", \"HDL\", \"TRIG\" in the last 11695 hours.     Assessment/Plan   Principal Problem:    Ventricular tachycardia (Multi)  Active Problems:    AICD discharge    Acute on chronic systolic (congestive) heart failure (Multi)    Ischemic cardiomyopathy    58M PMHx CAD s/p multiple PCI, ICM/HFrEF (EF 15% 5/24) s/p CRT-D, VT storm s/p VT ablation, infrarenal AAA s/p R iliac stent, nephrolithiasis s/p recent ureteral stent. Presented to OSH on 5/23 with multiple ICD shocks for VT. Noted to be in incessant, slow VT for which EP is following.      Patient remained in slow, hemodynamically stable VT, likely scar mediated iso known ICM and unrevascularized CAD. Patient was started on amiodarone and lidocaine. Given inability to pace terminate this, patient underwent VT RFA with Dr. Flores on 5/30.     Successful RFA for clinical VT 1 with a critical isthmus involving the mid inferior / infero - septal " left ventricular segment  Successful RFA for clinical VT 2 pace mapped to the mid lateral left ventriclar segment      Despite this, on 6/2, patient was noted to have slow VT with rates ~ 90-110s. Tracking rate was increased to 95bpm but patient continued to have episodes of VT (rates ~ 118 on 6/3) requiring cardioversion. Noted to go into slow VT again on 6/4 s/p successful overdrive pacing. Morphology of VT is likely anterolateral wall/midseptum - this is recurrence of the same VT which was ablated on 5/30        #ICM/HFrEF (EF 15%) s/p CRT-D  #Prior VT storm s/p VT ablation 5/30, stellate ganglion candy 6/4  #Incessant VT s/p ICD shock  -Noted to be in slow VT (rate ~ 100bpm) on 6/4 -> s/p successful overdrive pacing. Now AV-paced at 95bpm (lower rate limit for ICD)  -Pt underwent successful stellate ganglion block on 6/4  -Last time when ablation was attempted, patient had a lot of VT during the procedure and had to be shocked x 1. If patient is electrically a little quiet after the stellate block some arrhythmogenicity is suppressed, the chance of successful endocardial VT ablation may be higher  -If patient does not have any recurrence of VT by tomorrow, will decrease lower rate limit on ICD to 90bpm  -Will plan for re-do endocardial ablation with Dr. Wooten tomorrow 6/6/24  -Make patient NPO after MN   -Hold heparin at 6am on 6/6/24  -Of note, patient is at high risk for ablation given severely reduced EF  -Cont amiodarone and lidocaine gtt   -Check daily lidocaine levels  -Continue work up for advanced HF therapies   -Continue heparin gtt in anticipation for invasive procedures ( will need to be on anticoagulation for at least one month given extensive ablation in the LV)   -Cont GOC discussions      Thank you for the consult. EP will continue to follow. Staffed with Dr. Drea Padilla  PGY-V, Cardiology Fellow       I spent 45 minutes in the professional and overall care of this patient.      Concepción  IMELDA Padilla MD

## 2024-06-05 NOTE — CONSULTS
Nutrition Note:     Consulted to assess patient as part of VAD work-up.  Chart reviewed and events noted.  Appears that patient is not a transplant candidate and he does not want a VAD.  Will defer nutrition assessment at this time.  Please reconsult for nutrition assessment as needed.       Time Spent/Follow-up Reminder:   Time Spent (min): 15 minutes  Last Date of Nutrition Visit: 06/05/24  Nutrition Follow-Up Needed?: None  Follow up Comment: brief note: pt does not want VAD, asmt deferred

## 2024-06-05 NOTE — ACP (ADVANCE CARE PLANNING)
Confirming Previous Code Status:   Advance Care Planning Note     Discussion Date: 06/05/24   Discussion Participants: patient, mother, father, and sister    The patient wishes to discuss Advance Care Planning today and the following is a brief summary of our discussion.     Patient has capacity to make their own medical decisions: Yes  Health Care Agent/Surrogate Decision Maker documented in chart: Yes    Documents on file and valid:  Advance Directive/Living Will: Yes   Health Care Power of : Yes    Communication of Medical Status/Prognosis:   Code status discussion: completed today (6/5/24) with pt, pt's mother and pt's father; pt's ongoing hospitalization reviewed. Ongoing VT management per EP; pt being evaluated for possible interventions to assist. Per primary, pt is not an advanced therapies candidate given tobacco use and emphysema. Pt states he has been living with heart failure since 2002, and he acknowledges the progressive decline in functionality has he has become more symptomatic over the past several months.     Communication of Treatment Goals/Options:   Pt states he does not want to pursue LVAD, stating he enjoys fishing and does not want to compromise his quality of life for this. Pt's tony is important to him, and he believes God will call him home when he is ready. Pt understands that if he does not pursue advanced therapies, that he will die soon, given the severity of his heart failure. Pt is agreeable to DNR/DNI at this time, and also is agreeable to ongoing EP evaluation for VT management. Primary notified.    Treatment Decisions  Goals of Care: quality of life is prioritized; willing to accept low-burden treatments to achieve meaningful goals     Follow Up Plan  Primary to place code status orders    Team Members  Myself and palliative SW    Time Statement: Total face to face time spent on advance care planning was 80 minutes with 40 minutes spent in counseling, including the  explanation.    Mae Masterson, YARITZA-CNP  6/5/2024 4:46 PM

## 2024-06-06 ENCOUNTER — APPOINTMENT (OUTPATIENT)
Dept: CARDIOLOGY | Facility: HOSPITAL | Age: 59
DRG: 270 | End: 2024-06-06
Payer: MEDICARE

## 2024-06-06 ENCOUNTER — APPOINTMENT (OUTPATIENT)
Dept: RESPIRATORY THERAPY | Facility: HOSPITAL | Age: 59
DRG: 270 | End: 2024-06-06
Payer: MEDICARE

## 2024-06-06 ENCOUNTER — ANESTHESIA EVENT (OUTPATIENT)
Dept: CARDIOLOGY | Facility: HOSPITAL | Age: 59
End: 2024-06-06
Payer: MEDICARE

## 2024-06-06 ENCOUNTER — APPOINTMENT (OUTPATIENT)
Dept: RADIOLOGY | Facility: HOSPITAL | Age: 59
DRG: 270 | End: 2024-06-06
Payer: MEDICARE

## 2024-06-06 ENCOUNTER — DOCUMENTATION (OUTPATIENT)
Dept: CARDIOLOGY | Facility: HOSPITAL | Age: 59
End: 2024-06-06
Payer: COMMERCIAL

## 2024-06-06 ENCOUNTER — ANESTHESIA (OUTPATIENT)
Dept: CARDIOLOGY | Facility: HOSPITAL | Age: 59
End: 2024-06-06
Payer: MEDICARE

## 2024-06-06 LAB
ALBUMIN SERPL BCP-MCNC: 3.2 G/DL (ref 3.4–5)
ALBUMIN SERPL BCP-MCNC: 3.4 G/DL (ref 3.4–5)
ALP SERPL-CCNC: 72 U/L (ref 33–120)
ALT SERPL W P-5'-P-CCNC: 40 U/L (ref 10–52)
AMPHETAMINES SERPL QL SCN: NEGATIVE NG/ML
ANION GAP BLDA CALCULATED.4IONS-SCNC: 16 MMO/L (ref 10–25)
ANION GAP SERPL CALC-SCNC: 13 MMOL/L (ref 10–20)
ANION GAP SERPL CALC-SCNC: 17 MMOL/L (ref 10–20)
ANNOTATION COMMENT IMP: NORMAL
APTT PPP: >200 SECONDS (ref 27–38)
AST SERPL W P-5'-P-CCNC: 188 U/L (ref 9–39)
BACTERIA UR CULT: NO GROWTH
BARBITURATES SERPL QL SCN: NEGATIVE NG/ML
BASE EXCESS BLDA CALC-SCNC: -9.7 MMOL/L (ref -2–3)
BASOPHILS # BLD AUTO: 0.06 X10*3/UL (ref 0–0.1)
BASOPHILS # BLD AUTO: 0.15 X10*3/UL (ref 0–0.1)
BASOPHILS NFR BLD AUTO: 0.3 %
BASOPHILS NFR BLD AUTO: 1.1 %
BENZODIAZ SERPL QL SCN: NEGATIVE NG/ML
BILIRUB SERPL-MCNC: 0.9 MG/DL (ref 0–1.2)
BODY TEMPERATURE: 37 DEGREES CELSIUS
BUN SERPL-MCNC: 14 MG/DL (ref 6–23)
BUN SERPL-MCNC: 14 MG/DL (ref 6–23)
BUPRENORPHINE SERPL-MCNC: NEGATIVE NG/ML
CA-I BLDA-SCNC: 0.92 MMOL/L (ref 1.1–1.33)
CALCIUM SERPL-MCNC: 8.3 MG/DL (ref 8.6–10.6)
CALCIUM SERPL-MCNC: 8.5 MG/DL (ref 8.6–10.6)
CANNABINOIDS SERPL QL SCN: NEGATIVE NG/ML
CHLORIDE BLDA-SCNC: 101 MMOL/L (ref 98–107)
CHLORIDE SERPL-SCNC: 101 MMOL/L (ref 98–107)
CHLORIDE SERPL-SCNC: 98 MMOL/L (ref 98–107)
CO2 SERPL-SCNC: 25 MMOL/L (ref 21–32)
CO2 SERPL-SCNC: 27 MMOL/L (ref 21–32)
COCAINE SERPL QL SCN: NEGATIVE NG/ML
CREAT SERPL-MCNC: 0.9 MG/DL (ref 0.5–1.3)
CREAT SERPL-MCNC: 1.13 MG/DL (ref 0.5–1.3)
EGFRCR SERPLBLD CKD-EPI 2021: 76 ML/MIN/1.73M*2
EGFRCR SERPLBLD CKD-EPI 2021: >90 ML/MIN/1.73M*2
EOSINOPHIL # BLD AUTO: 0 X10*3/UL (ref 0–0.7)
EOSINOPHIL # BLD AUTO: 0.44 X10*3/UL (ref 0–0.7)
EOSINOPHIL NFR BLD AUTO: 0 %
EOSINOPHIL NFR BLD AUTO: 3.2 %
ERYTHROCYTE [DISTWIDTH] IN BLOOD BY AUTOMATED COUNT: 13.9 % (ref 11.5–14.5)
ERYTHROCYTE [DISTWIDTH] IN BLOOD BY AUTOMATED COUNT: 14.1 % (ref 11.5–14.5)
GLUCOSE BLD MANUAL STRIP-MCNC: 211 MG/DL (ref 74–99)
GLUCOSE BLDA-MCNC: 340 MG/DL (ref 74–99)
GLUCOSE SERPL-MCNC: 137 MG/DL (ref 74–99)
GLUCOSE SERPL-MCNC: 247 MG/DL (ref 74–99)
HCO3 BLDA-SCNC: 19.2 MMOL/L (ref 22–26)
HCT VFR BLD AUTO: 38 % (ref 41–52)
HCT VFR BLD AUTO: 38.6 % (ref 41–52)
HCT VFR BLD EST: 37 % (ref 41–52)
HGB BLD-MCNC: 12.8 G/DL (ref 13.5–17.5)
HGB BLD-MCNC: 12.9 G/DL (ref 13.5–17.5)
HGB BLDA-MCNC: 12.3 G/DL (ref 13.5–17.5)
IMM GRANULOCYTES # BLD AUTO: 0.15 X10*3/UL (ref 0–0.7)
IMM GRANULOCYTES # BLD AUTO: 0.38 X10*3/UL (ref 0–0.7)
IMM GRANULOCYTES NFR BLD AUTO: 1.1 % (ref 0–0.9)
IMM GRANULOCYTES NFR BLD AUTO: 2.2 % (ref 0–0.9)
INR PPP: 1.5 (ref 0.9–1.1)
LACTATE BLDA-SCNC: 2.6 MMOL/L (ref 0.4–2)
LDH SERPL L TO P-CCNC: 467 U/L (ref 84–246)
LEGIONELLA AG UR QL: NEGATIVE
LIDOCAIN SERPL-MCNC: 4.3 UG/ML (ref 1–5)
LYMPHOCYTES # BLD AUTO: 0.58 X10*3/UL (ref 1.2–4.8)
LYMPHOCYTES # BLD AUTO: 2.22 X10*3/UL (ref 1.2–4.8)
LYMPHOCYTES NFR BLD AUTO: 16.4 %
LYMPHOCYTES NFR BLD AUTO: 3.3 %
MAGNESIUM SERPL-MCNC: 1.75 MG/DL (ref 1.6–2.4)
MAGNESIUM SERPL-MCNC: 2.29 MG/DL (ref 1.6–2.4)
MCH RBC QN AUTO: 31.8 PG (ref 26–34)
MCH RBC QN AUTO: 31.9 PG (ref 26–34)
MCHC RBC AUTO-ENTMCNC: 33.2 G/DL (ref 32–36)
MCHC RBC AUTO-ENTMCNC: 33.9 G/DL (ref 32–36)
MCV RBC AUTO: 94 FL (ref 80–100)
MCV RBC AUTO: 96 FL (ref 80–100)
METHADONE SERPL QL SCN: NEGATIVE NG/ML
METHAMPHET SERPL QL: NEGATIVE NG/ML
MONOCYTES # BLD AUTO: 0.93 X10*3/UL (ref 0.1–1)
MONOCYTES # BLD AUTO: 1.36 X10*3/UL (ref 0.1–1)
MONOCYTES NFR BLD AUTO: 10 %
MONOCYTES NFR BLD AUTO: 5.3 %
NEUTROPHILS # BLD AUTO: 15.71 X10*3/UL (ref 1.2–7.7)
NEUTROPHILS # BLD AUTO: 9.24 X10*3/UL (ref 1.2–7.7)
NEUTROPHILS NFR BLD AUTO: 68.2 %
NEUTROPHILS NFR BLD AUTO: 88.9 %
NRBC BLD-RTO: 0 /100 WBCS (ref 0–0)
NRBC BLD-RTO: 0 /100 WBCS (ref 0–0)
OPIATES SERPL QL SCN: NEGATIVE NG/ML
OXYCODONE SERPL QL: NEGATIVE NG/ML
OXYHGB MFR BLDA: 92.5 % (ref 94–98)
PCO2 BLDA: 54 MM HG (ref 38–42)
PCP SERPL QL SCN: NEGATIVE NG/ML
PH BLDA: 7.16 PH (ref 7.38–7.42)
PHOSPHATE SERPL-MCNC: 2.7 MG/DL (ref 2.5–4.9)
PLATELET # BLD AUTO: 312 X10*3/UL (ref 150–450)
PLATELET # BLD AUTO: 318 X10*3/UL (ref 150–450)
PO2 BLDA: 76 MM HG (ref 85–95)
POTASSIUM BLDA-SCNC: 5.8 MMOL/L (ref 3.5–5.3)
POTASSIUM SERPL-SCNC: 3.6 MMOL/L (ref 3.5–5.3)
POTASSIUM SERPL-SCNC: 4.4 MMOL/L (ref 3.5–5.3)
PROT SERPL-MCNC: 5.8 G/DL (ref 6.4–8.2)
PROTHROMBIN TIME: 16.4 SECONDS (ref 9.8–12.8)
RBC # BLD AUTO: 4.01 X10*6/UL (ref 4.5–5.9)
RBC # BLD AUTO: 4.06 X10*6/UL (ref 4.5–5.9)
S PNEUM AG UR QL: NEGATIVE
SAO2 % BLDA: 94 % (ref 94–100)
SODIUM BLDA-SCNC: 130 MMOL/L (ref 136–145)
SODIUM SERPL-SCNC: 136 MMOL/L (ref 136–145)
SODIUM SERPL-SCNC: 137 MMOL/L (ref 136–145)
T4 FREE SERPL-MCNC: 1.39 NG/DL (ref 0.78–1.48)
UFH PPP CHRO-ACNC: 0.3 IU/ML
UFH PPP CHRO-ACNC: 1 IU/ML
WBC # BLD AUTO: 13.6 X10*3/UL (ref 4.4–11.3)
WBC # BLD AUTO: 17.7 X10*3/UL (ref 4.4–11.3)

## 2024-06-06 PROCEDURE — 85347 COAGULATION TIME ACTIVATED: CPT | Performed by: INTERNAL MEDICINE

## 2024-06-06 PROCEDURE — 2500000005 HC RX 250 GENERAL PHARMACY W/O HCPCS: Performed by: STUDENT IN AN ORGANIZED HEALTH CARE EDUCATION/TRAINING PROGRAM

## 2024-06-06 PROCEDURE — 80053 COMPREHEN METABOLIC PANEL: CPT | Performed by: STUDENT IN AN ORGANIZED HEALTH CARE EDUCATION/TRAINING PROGRAM

## 2024-06-06 PROCEDURE — 85520 HEPARIN ASSAY: CPT | Mod: 91 | Performed by: STUDENT IN AN ORGANIZED HEALTH CARE EDUCATION/TRAINING PROGRAM

## 2024-06-06 PROCEDURE — 2500000004 HC RX 250 GENERAL PHARMACY W/ HCPCS (ALT 636 FOR OP/ED): Performed by: STUDENT IN AN ORGANIZED HEALTH CARE EDUCATION/TRAINING PROGRAM

## 2024-06-06 PROCEDURE — 2500000004 HC RX 250 GENERAL PHARMACY W/ HCPCS (ALT 636 FOR OP/ED): Performed by: INTERNAL MEDICINE

## 2024-06-06 PROCEDURE — 84075 ASSAY ALKALINE PHOSPHATASE: CPT | Performed by: STUDENT IN AN ORGANIZED HEALTH CARE EDUCATION/TRAINING PROGRAM

## 2024-06-06 PROCEDURE — 84132 ASSAY OF SERUM POTASSIUM: CPT

## 2024-06-06 PROCEDURE — 3700000002 HC GENERAL ANESTHESIA TIME - EACH INCREMENTAL 1 MINUTE: Performed by: INTERNAL MEDICINE

## 2024-06-06 PROCEDURE — 76937 US GUIDE VASCULAR ACCESS: CPT | Performed by: INTERNAL MEDICINE

## 2024-06-06 PROCEDURE — 93462 L HRT CATH TRNSPTL PUNCTURE: CPT | Performed by: INTERNAL MEDICINE

## 2024-06-06 PROCEDURE — 2720000007 HC OR 272 NO HCPCS: Performed by: INTERNAL MEDICINE

## 2024-06-06 PROCEDURE — 84132 ASSAY OF SERUM POTASSIUM: CPT | Mod: 91

## 2024-06-06 PROCEDURE — 4A0234Z MEASUREMENT OF CARDIAC ELECTRICAL ACTIVITY, PERCUTANEOUS APPROACH: ICD-10-PCS | Performed by: INTERNAL MEDICINE

## 2024-06-06 PROCEDURE — 93287 PERI-PX DEVICE EVAL & PRGR: CPT

## 2024-06-06 PROCEDURE — C1725 CATH, TRANSLUMIN NON-LASER: HCPCS | Performed by: INTERNAL MEDICINE

## 2024-06-06 PROCEDURE — 3700000001 HC GENERAL ANESTHESIA TIME - INITIAL BASE CHARGE: Performed by: INTERNAL MEDICINE

## 2024-06-06 PROCEDURE — 85610 PROTHROMBIN TIME: CPT | Performed by: STUDENT IN AN ORGANIZED HEALTH CARE EDUCATION/TRAINING PROGRAM

## 2024-06-06 PROCEDURE — 99221 1ST HOSP IP/OBS SF/LOW 40: CPT

## 2024-06-06 PROCEDURE — 2500000001 HC RX 250 WO HCPCS SELF ADMINISTERED DRUGS (ALT 637 FOR MEDICARE OP)

## 2024-06-06 PROCEDURE — 85025 COMPLETE CBC W/AUTO DIFF WBC: CPT | Performed by: STUDENT IN AN ORGANIZED HEALTH CARE EDUCATION/TRAINING PROGRAM

## 2024-06-06 PROCEDURE — 2500000002 HC RX 250 W HCPCS SELF ADMINISTERED DRUGS (ALT 637 FOR MEDICARE OP, ALT 636 FOR OP/ED): Performed by: STUDENT IN AN ORGANIZED HEALTH CARE EDUCATION/TRAINING PROGRAM

## 2024-06-06 PROCEDURE — C1769 GUIDE WIRE: HCPCS | Performed by: INTERNAL MEDICINE

## 2024-06-06 PROCEDURE — 2500000001 HC RX 250 WO HCPCS SELF ADMINISTERED DRUGS (ALT 637 FOR MEDICARE OP): Performed by: STUDENT IN AN ORGANIZED HEALTH CARE EDUCATION/TRAINING PROGRAM

## 2024-06-06 PROCEDURE — 93010 ELECTROCARDIOGRAM REPORT: CPT | Performed by: INTERNAL MEDICINE

## 2024-06-06 PROCEDURE — 83735 ASSAY OF MAGNESIUM: CPT | Performed by: STUDENT IN AN ORGANIZED HEALTH CARE EDUCATION/TRAINING PROGRAM

## 2024-06-06 PROCEDURE — 82947 ASSAY GLUCOSE BLOOD QUANT: CPT

## 2024-06-06 PROCEDURE — 84132 ASSAY OF SERUM POTASSIUM: CPT | Mod: 91 | Performed by: STUDENT IN AN ORGANIZED HEALTH CARE EDUCATION/TRAINING PROGRAM

## 2024-06-06 PROCEDURE — C1766 INTRO/SHEATH,STRBLE,NON-PEEL: HCPCS | Performed by: INTERNAL MEDICINE

## 2024-06-06 PROCEDURE — 2500000005 HC RX 250 GENERAL PHARMACY W/O HCPCS

## 2024-06-06 PROCEDURE — C1760 CLOSURE DEV, VASC: HCPCS | Performed by: INTERNAL MEDICINE

## 2024-06-06 PROCEDURE — C1732 CATH, EP, DIAG/ABL, 3D/VECT: HCPCS | Performed by: INTERNAL MEDICINE

## 2024-06-06 PROCEDURE — 74018 RADEX ABDOMEN 1 VIEW: CPT

## 2024-06-06 PROCEDURE — 93662 INTRACARDIAC ECG (ICE): CPT | Performed by: INTERNAL MEDICINE

## 2024-06-06 PROCEDURE — 02583ZZ DESTRUCTION OF CONDUCTION MECHANISM, PERCUTANEOUS APPROACH: ICD-10-PCS | Performed by: INTERNAL MEDICINE

## 2024-06-06 PROCEDURE — C1894 INTRO/SHEATH, NON-LASER: HCPCS | Performed by: INTERNAL MEDICINE

## 2024-06-06 PROCEDURE — 83735 ASSAY OF MAGNESIUM: CPT | Mod: 91 | Performed by: STUDENT IN AN ORGANIZED HEALTH CARE EDUCATION/TRAINING PROGRAM

## 2024-06-06 PROCEDURE — G0269 OCCLUSIVE DEVICE IN VEIN ART: HCPCS | Mod: TC | Performed by: INTERNAL MEDICINE

## 2024-06-06 PROCEDURE — C1759 CATH, INTRA ECHOCARDIOGRAPHY: HCPCS | Performed by: INTERNAL MEDICINE

## 2024-06-06 PROCEDURE — 93287 PERI-PX DEVICE EVAL & PRGR: CPT | Performed by: INTERNAL MEDICINE

## 2024-06-06 PROCEDURE — 99291 CRITICAL CARE FIRST HOUR: CPT

## 2024-06-06 PROCEDURE — 2500000004 HC RX 250 GENERAL PHARMACY W/ HCPCS (ALT 636 FOR OP/ED)

## 2024-06-06 PROCEDURE — 93654 COMPRE EP EVAL TX VT: CPT | Performed by: INTERNAL MEDICINE

## 2024-06-06 PROCEDURE — P9047 ALBUMIN (HUMAN), 25%, 50ML: HCPCS | Mod: JZ | Performed by: ANESTHESIOLOGIST ASSISTANT

## 2024-06-06 PROCEDURE — 74018 RADEX ABDOMEN 1 VIEW: CPT | Performed by: RADIOLOGY

## 2024-06-06 PROCEDURE — 2500000005 HC RX 250 GENERAL PHARMACY W/O HCPCS: Performed by: ANESTHESIOLOGIST ASSISTANT

## 2024-06-06 PROCEDURE — 4A023FZ MEASUREMENT OF CARDIAC RHYTHM, PERCUTANEOUS APPROACH: ICD-10-PCS | Performed by: INTERNAL MEDICINE

## 2024-06-06 PROCEDURE — 71045 X-RAY EXAM CHEST 1 VIEW: CPT

## 2024-06-06 PROCEDURE — 2780000003 HC OR 278 NO HCPCS: Performed by: INTERNAL MEDICINE

## 2024-06-06 PROCEDURE — 71045 X-RAY EXAM CHEST 1 VIEW: CPT | Performed by: RADIOLOGY

## 2024-06-06 PROCEDURE — C1730 CATH, EP, 19 OR FEW ELECT: HCPCS | Performed by: INTERNAL MEDICINE

## 2024-06-06 PROCEDURE — 85520 HEPARIN ASSAY: CPT | Performed by: STUDENT IN AN ORGANIZED HEALTH CARE EDUCATION/TRAINING PROGRAM

## 2024-06-06 PROCEDURE — 37799 UNLISTED PX VASCULAR SURGERY: CPT | Performed by: STUDENT IN AN ORGANIZED HEALTH CARE EDUCATION/TRAINING PROGRAM

## 2024-06-06 PROCEDURE — 2020000001 HC ICU ROOM DAILY

## 2024-06-06 PROCEDURE — C1751 CATH, INF, PER/CENT/MIDLINE: HCPCS | Performed by: INTERNAL MEDICINE

## 2024-06-06 PROCEDURE — 85025 COMPLETE CBC W/AUTO DIFF WBC: CPT | Mod: 91 | Performed by: STUDENT IN AN ORGANIZED HEALTH CARE EDUCATION/TRAINING PROGRAM

## 2024-06-06 PROCEDURE — 2500000002 HC RX 250 W HCPCS SELF ADMINISTERED DRUGS (ALT 637 FOR MEDICARE OP, ALT 636 FOR OP/ED)

## 2024-06-06 PROCEDURE — 02K83ZZ MAP CONDUCTION MECHANISM, PERCUTANEOUS APPROACH: ICD-10-PCS | Performed by: INTERNAL MEDICINE

## 2024-06-06 PROCEDURE — 2500000004 HC RX 250 GENERAL PHARMACY W/ HCPCS (ALT 636 FOR OP/ED): Mod: JZ | Performed by: ANESTHESIOLOGIST ASSISTANT

## 2024-06-06 PROCEDURE — 93005 ELECTROCARDIOGRAM TRACING: CPT

## 2024-06-06 PROCEDURE — 80176 ASSAY OF LIDOCAINE: CPT | Performed by: STUDENT IN AN ORGANIZED HEALTH CARE EDUCATION/TRAINING PROGRAM

## 2024-06-06 PROCEDURE — 84439 ASSAY OF FREE THYROXINE: CPT | Performed by: STUDENT IN AN ORGANIZED HEALTH CARE EDUCATION/TRAINING PROGRAM

## 2024-06-06 PROCEDURE — 99291 CRITICAL CARE FIRST HOUR: CPT | Performed by: INTERNAL MEDICINE

## 2024-06-06 PROCEDURE — 83615 LACTATE (LD) (LDH) ENZYME: CPT | Performed by: STUDENT IN AN ORGANIZED HEALTH CARE EDUCATION/TRAINING PROGRAM

## 2024-06-06 PROCEDURE — 5A02210 ASSISTANCE WITH CARDIAC OUTPUT USING BALLOON PUMP, CONTINUOUS: ICD-10-PCS | Performed by: INTERNAL MEDICINE

## 2024-06-06 RX ORDER — ACETAMINOPHEN 160 MG/5ML
650 SOLUTION ORAL EVERY 4 HOURS PRN
Status: DISCONTINUED | OUTPATIENT
Start: 2024-06-06 | End: 2024-06-10

## 2024-06-06 RX ORDER — DEXTROSE 50 % IN WATER (D50W) INTRAVENOUS SYRINGE
12.5
Status: DISCONTINUED | OUTPATIENT
Start: 2024-06-06 | End: 2024-06-19 | Stop reason: HOSPADM

## 2024-06-06 RX ORDER — FENTANYL CITRATE-0.9 % NACL/PF 10 MCG/ML
25-200 PLASTIC BAG, INJECTION (ML) INTRAVENOUS CONTINUOUS
Status: DISCONTINUED | OUTPATIENT
Start: 2024-06-06 | End: 2024-06-07

## 2024-06-06 RX ORDER — EPINEPHRINE HCL IN 0.9 % NACL 4MG/250ML
PLASTIC BAG, INJECTION (ML) INTRAVENOUS CONTINUOUS PRN
Status: DISCONTINUED | OUTPATIENT
Start: 2024-06-06 | End: 2024-06-06

## 2024-06-06 RX ORDER — ROSUVASTATIN CALCIUM 20 MG/1
20 TABLET, COATED ORAL NIGHTLY
Status: DISCONTINUED | OUTPATIENT
Start: 2024-06-06 | End: 2024-06-10

## 2024-06-06 RX ORDER — NOREPINEPHRINE BITARTRATE 0.03 MG/ML
INJECTION, SOLUTION INTRAVENOUS CONTINUOUS PRN
Status: DISCONTINUED | OUTPATIENT
Start: 2024-06-06 | End: 2024-06-06

## 2024-06-06 RX ORDER — PROPOFOL 10 MG/ML
5-50 INJECTION, EMULSION INTRAVENOUS CONTINUOUS
Status: DISCONTINUED | OUTPATIENT
Start: 2024-06-06 | End: 2024-06-07

## 2024-06-06 RX ORDER — NOREPINEPHRINE BITARTRATE/D5W 8 MG/250ML
.01-.5 PLASTIC BAG, INJECTION (ML) INTRAVENOUS CONTINUOUS
Status: DISCONTINUED | OUTPATIENT
Start: 2024-06-07 | End: 2024-06-08

## 2024-06-06 RX ORDER — AMOXICILLIN 250 MG
2 CAPSULE ORAL 2 TIMES DAILY
Status: DISCONTINUED | OUTPATIENT
Start: 2024-06-07 | End: 2024-06-10

## 2024-06-06 RX ORDER — ACETAMINOPHEN 650 MG/1
650 SUPPOSITORY RECTAL EVERY 4 HOURS PRN
Status: DISCONTINUED | OUTPATIENT
Start: 2024-06-06 | End: 2024-06-10

## 2024-06-06 RX ORDER — LORAZEPAM 2 MG/ML
1 INJECTION INTRAMUSCULAR ONCE
Status: COMPLETED | OUTPATIENT
Start: 2024-06-06 | End: 2024-06-06

## 2024-06-06 RX ORDER — POLYETHYLENE GLYCOL 3350 17 G/17G
17 POWDER, FOR SOLUTION ORAL 2 TIMES DAILY
Status: DISCONTINUED | OUTPATIENT
Start: 2024-06-07 | End: 2024-06-10

## 2024-06-06 RX ORDER — HEPARIN SODIUM 10000 [USP'U]/100ML
0-4000 INJECTION, SOLUTION INTRAVENOUS CONTINUOUS
Status: DISCONTINUED | OUTPATIENT
Start: 2024-06-07 | End: 2024-06-11

## 2024-06-06 RX ORDER — LORAZEPAM 2 MG/ML
INJECTION INTRAMUSCULAR
Status: COMPLETED
Start: 2024-06-06 | End: 2024-06-06

## 2024-06-06 RX ORDER — PROPOFOL 10 MG/ML
INJECTION, EMULSION INTRAVENOUS AS NEEDED
Status: DISCONTINUED | OUTPATIENT
Start: 2024-06-06 | End: 2024-06-06

## 2024-06-06 RX ORDER — SODIUM BICARBONATE 42 MG/ML
INJECTION, SOLUTION INTRAVENOUS AS NEEDED
Status: DISCONTINUED | OUTPATIENT
Start: 2024-06-06 | End: 2024-06-06

## 2024-06-06 RX ORDER — VANCOMYCIN HYDROCHLORIDE 1 G/200ML
INJECTION, SOLUTION INTRAVENOUS AS NEEDED
Status: DISCONTINUED | OUTPATIENT
Start: 2024-06-06 | End: 2024-06-06

## 2024-06-06 RX ORDER — POTASSIUM CHLORIDE 20 MEQ/1
40 TABLET, EXTENDED RELEASE ORAL ONCE
Status: COMPLETED | OUTPATIENT
Start: 2024-06-06 | End: 2024-06-06

## 2024-06-06 RX ORDER — ALBUMIN HUMAN 250 G/1000ML
SOLUTION INTRAVENOUS CONTINUOUS PRN
Status: DISCONTINUED | OUTPATIENT
Start: 2024-06-06 | End: 2024-06-06

## 2024-06-06 RX ORDER — LIDOCAINE HYDROCHLORIDE 20 MG/ML
INJECTION, SOLUTION INFILTRATION; PERINEURAL AS NEEDED
Status: DISCONTINUED | OUTPATIENT
Start: 2024-06-06 | End: 2024-06-06

## 2024-06-06 RX ORDER — ACETAMINOPHEN 325 MG/1
650 TABLET ORAL EVERY 4 HOURS PRN
Status: DISCONTINUED | OUTPATIENT
Start: 2024-06-06 | End: 2024-06-19 | Stop reason: HOSPADM

## 2024-06-06 RX ORDER — PANTOPRAZOLE SODIUM 40 MG/10ML
40 INJECTION, POWDER, LYOPHILIZED, FOR SOLUTION INTRAVENOUS DAILY
Status: DISCONTINUED | OUTPATIENT
Start: 2024-06-07 | End: 2024-06-19 | Stop reason: HOSPADM

## 2024-06-06 RX ORDER — MAGNESIUM SULFATE HEPTAHYDRATE 40 MG/ML
2 INJECTION, SOLUTION INTRAVENOUS ONCE
Status: COMPLETED | OUTPATIENT
Start: 2024-06-07 | End: 2024-06-07

## 2024-06-06 RX ORDER — HEPARIN SODIUM 1000 [USP'U]/ML
INJECTION, SOLUTION INTRAVENOUS; SUBCUTANEOUS AS NEEDED
Status: DISCONTINUED | OUTPATIENT
Start: 2024-06-06 | End: 2024-06-06 | Stop reason: HOSPADM

## 2024-06-06 RX ORDER — ROCURONIUM BROMIDE 10 MG/ML
INJECTION, SOLUTION INTRAVENOUS AS NEEDED
Status: DISCONTINUED | OUTPATIENT
Start: 2024-06-06 | End: 2024-06-06

## 2024-06-06 RX ORDER — PHENYLEPHRINE HYDROCHLORIDE 10 MG/ML
INJECTION INTRAVENOUS
Status: DISPENSED
Start: 2024-06-06 | End: 2024-06-07

## 2024-06-06 RX ORDER — ALBUTEROL SULFATE 90 UG/1
AEROSOL, METERED RESPIRATORY (INHALATION) AS NEEDED
Status: DISCONTINUED | OUTPATIENT
Start: 2024-06-06 | End: 2024-06-06

## 2024-06-06 RX ORDER — INSULIN LISPRO 100 [IU]/ML
0-10 INJECTION, SOLUTION INTRAVENOUS; SUBCUTANEOUS EVERY 4 HOURS
Status: DISCONTINUED | OUTPATIENT
Start: 2024-06-06 | End: 2024-06-19 | Stop reason: HOSPADM

## 2024-06-06 RX ORDER — MIDAZOLAM HYDROCHLORIDE 1 MG/ML
INJECTION INTRAMUSCULAR; INTRAVENOUS AS NEEDED
Status: DISCONTINUED | OUTPATIENT
Start: 2024-06-06 | End: 2024-06-06

## 2024-06-06 RX ORDER — NOREPINEPHRINE BITARTRATE/D5W 8 MG/250ML
PLASTIC BAG, INJECTION (ML) INTRAVENOUS
Status: COMPLETED
Start: 2024-06-06 | End: 2024-06-06

## 2024-06-06 RX ORDER — PROTAMINE SULFATE 10 MG/ML
INJECTION, SOLUTION INTRAVENOUS AS NEEDED
Status: DISCONTINUED | OUTPATIENT
Start: 2024-06-06 | End: 2024-06-06

## 2024-06-06 RX ORDER — PROPOFOL 10 MG/ML
INJECTION, EMULSION INTRAVENOUS CONTINUOUS PRN
Status: DISCONTINUED | OUTPATIENT
Start: 2024-06-06 | End: 2024-06-06

## 2024-06-06 RX ORDER — FENTANYL CITRATE 50 UG/ML
INJECTION, SOLUTION INTRAMUSCULAR; INTRAVENOUS AS NEEDED
Status: DISCONTINUED | OUTPATIENT
Start: 2024-06-06 | End: 2024-06-06

## 2024-06-06 RX ORDER — CALCIUM CHLORIDE INJECTION 100 MG/ML
INJECTION, SOLUTION INTRAVENOUS AS NEEDED
Status: DISCONTINUED | OUTPATIENT
Start: 2024-06-06 | End: 2024-06-06

## 2024-06-06 RX ORDER — FUROSEMIDE 10 MG/ML
INJECTION INTRAMUSCULAR; INTRAVENOUS AS NEEDED
Status: DISCONTINUED | OUTPATIENT
Start: 2024-06-06 | End: 2024-06-06

## 2024-06-06 RX ORDER — SODIUM CHLORIDE, SODIUM LACTATE, POTASSIUM CHLORIDE, CALCIUM CHLORIDE 600; 310; 30; 20 MG/100ML; MG/100ML; MG/100ML; MG/100ML
10 INJECTION, SOLUTION INTRAVENOUS CONTINUOUS
Status: DISCONTINUED | OUTPATIENT
Start: 2024-06-06 | End: 2024-06-10

## 2024-06-06 RX ORDER — NAPROXEN SODIUM 220 MG/1
81 TABLET, FILM COATED ORAL DAILY
Status: DISCONTINUED | OUTPATIENT
Start: 2024-06-07 | End: 2024-06-10

## 2024-06-06 RX ORDER — DEXTROSE 50 % IN WATER (D50W) INTRAVENOUS SYRINGE
25
Status: DISCONTINUED | OUTPATIENT
Start: 2024-06-06 | End: 2024-06-06

## 2024-06-06 RX ORDER — CEFAZOLIN 1 G/1
INJECTION, POWDER, FOR SOLUTION INTRAVENOUS AS NEEDED
Status: DISCONTINUED | OUTPATIENT
Start: 2024-06-06 | End: 2024-06-06

## 2024-06-06 RX ADMIN — MIDAZOLAM HYDROCHLORIDE 1 MG: 1 INJECTION, SOLUTION INTRAMUSCULAR; INTRAVENOUS at 12:57

## 2024-06-06 RX ADMIN — ASPIRIN 81 MG CHEWABLE TABLET 81 MG: 81 TABLET CHEWABLE at 09:10

## 2024-06-06 RX ADMIN — PROPOFOL 70 MG: 10 INJECTION, EMULSION INTRAVENOUS at 12:58

## 2024-06-06 RX ADMIN — Medication 100 PERCENT: at 21:09

## 2024-06-06 RX ADMIN — POTASSIUM CHLORIDE 40 MEQ: 1500 TABLET, EXTENDED RELEASE ORAL at 06:04

## 2024-06-06 RX ADMIN — Medication 0.03 MCG/KG/MIN: at 14:13

## 2024-06-06 RX ADMIN — CALCIUM CHLORIDE 0.5 G: 100 INJECTION, SOLUTION INTRAVENOUS at 19:06

## 2024-06-06 RX ADMIN — PROPOFOL 30 MG: 10 INJECTION, EMULSION INTRAVENOUS at 13:01

## 2024-06-06 RX ADMIN — EPINEPHRINE 16 MCG: 1 INJECTION INTRAMUSCULAR; INTRAVENOUS; SUBCUTANEOUS at 18:29

## 2024-06-06 RX ADMIN — ROCURONIUM BROMIDE 20 MG: 10 INJECTION INTRAVENOUS at 18:13

## 2024-06-06 RX ADMIN — NOREPINEPHRINE BITARTRATE 8 MCG: 1 INJECTION, SOLUTION, CONCENTRATE INTRAVENOUS at 13:04

## 2024-06-06 RX ADMIN — EPINEPHRINE 16 MCG: 1 INJECTION INTRAMUSCULAR; INTRAVENOUS; SUBCUTANEOUS at 17:47

## 2024-06-06 RX ADMIN — PROPOFOL 25 MG: 10 INJECTION, EMULSION INTRAVENOUS at 12:57

## 2024-06-06 RX ADMIN — ROCURONIUM BROMIDE 50 MG: 10 INJECTION INTRAVENOUS at 13:02

## 2024-06-06 RX ADMIN — AMIODARONE HYDROCHLORIDE 1 MG/MIN: 1.8 INJECTION, SOLUTION INTRAVENOUS at 22:21

## 2024-06-06 RX ADMIN — NOREPINEPHRINE BITARTRATE 16 MCG: 1 INJECTION, SOLUTION, CONCENTRATE INTRAVENOUS at 13:01

## 2024-06-06 RX ADMIN — LORAZEPAM 1 MG: 1 TABLET ORAL at 09:12

## 2024-06-06 RX ADMIN — SODIUM CHLORIDE, POTASSIUM CHLORIDE, SODIUM LACTATE AND CALCIUM CHLORIDE 10 ML/HR: 600; 310; 30; 20 INJECTION, SOLUTION INTRAVENOUS at 22:32

## 2024-06-06 RX ADMIN — RANOLAZINE 500 MG: 500 TABLET, EXTENDED RELEASE ORAL at 09:25

## 2024-06-06 RX ADMIN — ALBUTEROL SULFATE 5 PUFF: 90 AEROSOL, METERED RESPIRATORY (INHALATION) at 18:57

## 2024-06-06 RX ADMIN — SODIUM CHLORIDE, SODIUM LACTATE, POTASSIUM CHLORIDE, AND CALCIUM CHLORIDE: 600; 310; 30; 20 INJECTION, SOLUTION INTRAVENOUS at 12:05

## 2024-06-06 RX ADMIN — Medication 25 MCG/HR: at 23:19

## 2024-06-06 RX ADMIN — Medication 10 MG: at 12:58

## 2024-06-06 RX ADMIN — LORAZEPAM 1 MG: 2 INJECTION INTRAMUSCULAR; INTRAVENOUS at 06:05

## 2024-06-06 RX ADMIN — ALBUTEROL SULFATE 5 PUFF: 90 AEROSOL, METERED RESPIRATORY (INHALATION) at 20:29

## 2024-06-06 RX ADMIN — INSULIN HUMAN 10 UNITS: 100 INJECTION, SOLUTION PARENTERAL at 19:06

## 2024-06-06 RX ADMIN — NOREPINEPHRINE BITARTRATE 16 MCG: 1 INJECTION, SOLUTION, CONCENTRATE INTRAVENOUS at 13:15

## 2024-06-06 RX ADMIN — AMIODARONE HYDROCHLORIDE 1 MG/MIN: 1.8 INJECTION, SOLUTION INTRAVENOUS at 06:18

## 2024-06-06 RX ADMIN — LIDOCAINE HYDROCHLORIDE 80 MG: 20 INJECTION, SOLUTION INFILTRATION; PERINEURAL at 12:57

## 2024-06-06 RX ADMIN — NOREPINEPHRINE BITARTRATE 8 MCG: 1 INJECTION, SOLUTION, CONCENTRATE INTRAVENOUS at 12:55

## 2024-06-06 RX ADMIN — ALBUMIN HUMAN: 0.25 SOLUTION INTRAVENOUS at 13:29

## 2024-06-06 RX ADMIN — INSULIN HUMAN 5 UNITS: 100 INJECTION, SOLUTION PARENTERAL at 20:10

## 2024-06-06 RX ADMIN — PROTAMINE SULFATE 40 MG: 10 INJECTION, SOLUTION INTRAVENOUS at 19:07

## 2024-06-06 RX ADMIN — SODIUM BICARBONATE 50 MEQ: 42 INJECTION, SOLUTION INTRAVENOUS at 20:10

## 2024-06-06 RX ADMIN — AMIODARONE HYDROCHLORIDE 1 MG/MIN: 1.8 INJECTION, SOLUTION INTRAVENOUS at 01:01

## 2024-06-06 RX ADMIN — AMIODARONE HYDROCHLORIDE 150 MG: 1.5 INJECTION, SOLUTION INTRAVENOUS at 06:06

## 2024-06-06 RX ADMIN — LORAZEPAM 1 MG: 2 INJECTION INTRAMUSCULAR at 06:05

## 2024-06-06 RX ADMIN — Medication 100 PERCENT: at 21:30

## 2024-06-06 RX ADMIN — DEXAMETHASONE SODIUM PHOSPHATE 10 MG: 4 INJECTION INTRA-ARTICULAR; INTRALESIONAL; INTRAMUSCULAR; INTRAVENOUS; SOFT TISSUE at 14:37

## 2024-06-06 RX ADMIN — ROCURONIUM BROMIDE 10 MG: 10 INJECTION INTRAVENOUS at 15:56

## 2024-06-06 RX ADMIN — NOREPINEPHRINE BITARTRATE 16 MCG: 1 INJECTION, SOLUTION, CONCENTRATE INTRAVENOUS at 13:27

## 2024-06-06 RX ADMIN — NOREPINEPHRINE BITARTRATE 0.02 MCG/KG/MIN: 8 INJECTION, SOLUTION INTRAVENOUS at 23:49

## 2024-06-06 RX ADMIN — EPINEPHRINE 8 MCG: 1 INJECTION INTRAMUSCULAR; INTRAVENOUS; SUBCUTANEOUS at 14:15

## 2024-06-06 RX ADMIN — ROCURONIUM BROMIDE 20 MG: 10 INJECTION INTRAVENOUS at 14:05

## 2024-06-06 RX ADMIN — ROCURONIUM BROMIDE 10 MG: 10 INJECTION INTRAVENOUS at 16:41

## 2024-06-06 RX ADMIN — PROPOFOL 50 MCG/KG/MIN: 10 INJECTION, EMULSION INTRAVENOUS at 20:18

## 2024-06-06 RX ADMIN — Medication 0.02 MCG/KG/MIN: at 23:49

## 2024-06-06 RX ADMIN — Medication 10 MG: at 13:29

## 2024-06-06 RX ADMIN — PROPOFOL 50 MCG/KG/MIN: 10 INJECTION, EMULSION INTRAVENOUS at 22:32

## 2024-06-06 RX ADMIN — NOREPINEPHRINE BITARTRATE 16 MCG: 1 INJECTION, SOLUTION, CONCENTRATE INTRAVENOUS at 12:58

## 2024-06-06 RX ADMIN — SPIRONOLACTONE 12.5 MG: 25 TABLET ORAL at 09:00

## 2024-06-06 RX ADMIN — Medication 10 MG: at 13:01

## 2024-06-06 RX ADMIN — AMIODARONE HYDROCHLORIDE 1 MG/MIN: 1.8 INJECTION, SOLUTION INTRAVENOUS at 11:55

## 2024-06-06 RX ADMIN — SODIUM BICARBONATE 50 MEQ: 42 INJECTION, SOLUTION INTRAVENOUS at 20:01

## 2024-06-06 RX ADMIN — METHOCARBAMOL 500 MG: 500 TABLET ORAL at 09:10

## 2024-06-06 RX ADMIN — NOREPINEPHRINE BITARTRATE 16 MCG: 1 INJECTION, SOLUTION, CONCENTRATE INTRAVENOUS at 13:38

## 2024-06-06 RX ADMIN — AMIODARONE HYDROCHLORIDE 150 MG: 1.5 INJECTION, SOLUTION INTRAVENOUS at 21:30

## 2024-06-06 RX ADMIN — NOREPINEPHRINE BITARTRATE 16 MCG: 1 INJECTION, SOLUTION, CONCENTRATE INTRAVENOUS at 13:08

## 2024-06-06 RX ADMIN — CALCIUM CHLORIDE 0.5 G: 100 INJECTION, SOLUTION INTRAVENOUS at 19:53

## 2024-06-06 RX ADMIN — CEFAZOLIN 1 G: 1 INJECTION, POWDER, FOR SOLUTION INTRAMUSCULAR; INTRAVENOUS at 20:02

## 2024-06-06 RX ADMIN — MIDAZOLAM HYDROCHLORIDE 1 MG: 1 INJECTION, SOLUTION INTRAMUSCULAR; INTRAVENOUS at 13:29

## 2024-06-06 RX ADMIN — NOREPINEPHRINE BITARTRATE 16 MCG: 1 INJECTION, SOLUTION, CONCENTRATE INTRAVENOUS at 13:46

## 2024-06-06 RX ADMIN — PANTOPRAZOLE SODIUM 40 MG: 40 TABLET, DELAYED RELEASE ORAL at 09:25

## 2024-06-06 RX ADMIN — FUROSEMIDE 20 MG: 10 INJECTION, SOLUTION INTRAMUSCULAR; INTRAVENOUS at 19:12

## 2024-06-06 RX ADMIN — VANCOMYCIN HYDROCHLORIDE 0.5 G: 1 INJECTION, SOLUTION INTRAVENOUS at 20:02

## 2024-06-06 RX ADMIN — Medication 0.03 MCG/KG/MIN: at 13:47

## 2024-06-06 RX ADMIN — FENTANYL CITRATE 25 MCG: 50 INJECTION, SOLUTION INTRAMUSCULAR; INTRAVENOUS at 12:58

## 2024-06-06 SDOH — HEALTH STABILITY: MENTAL HEALTH: CURRENT SMOKER: 1

## 2024-06-06 ASSESSMENT — PAIN - FUNCTIONAL ASSESSMENT
PAIN_FUNCTIONAL_ASSESSMENT: CPOT (CRITICAL CARE PAIN OBSERVATION TOOL)
PAIN_FUNCTIONAL_ASSESSMENT: 0-10

## 2024-06-06 ASSESSMENT — COGNITIVE AND FUNCTIONAL STATUS - GENERAL
TURNING FROM BACK TO SIDE WHILE IN FLAT BAD: A LOT
EATING MEALS: A LITTLE
HELP NEEDED FOR BATHING: A LOT
STANDING UP FROM CHAIR USING ARMS: A LOT
DAILY ACTIVITIY SCORE: 13
MOVING FROM LYING ON BACK TO SITTING ON SIDE OF FLAT BED WITH BEDRAILS: A LOT
PERSONAL GROOMING: A LOT
DRESSING REGULAR LOWER BODY CLOTHING: A LOT
DRESSING REGULAR UPPER BODY CLOTHING: A LOT
CLIMB 3 TO 5 STEPS WITH RAILING: TOTAL
MOVING TO AND FROM BED TO CHAIR: A LOT
MOBILITY SCORE: 11
WALKING IN HOSPITAL ROOM: A LOT
TOILETING: A LOT

## 2024-06-06 ASSESSMENT — PAIN SCALES - GENERAL: PAINLEVEL_OUTOF10: 0 - NO PAIN

## 2024-06-06 NOTE — SIGNIFICANT EVENT
Discussed with patient risks and benefits of ablation in detail, including but not limited to infection, blood vessel damage, heart injury or perforation necessitating emergency cardiac surgery, heart attack or stroke, phrenic nerve injury, even death.      I discussed in-depth with the patient the goals of the VT ablation and potential outcomes. I explained that we will be planning to do the procedure under general anesthesia, as the previous procedure was significantly limited by electrical instability. This however will increase his risk of remaining intubated after the procedure.     Additionally, there is a risk of acute hemodynamic decompensation during the procedure. If this occurs, as he is not a candidate for advanced heart failure therapies, there is not a clear exit strategy if he requires mechanical hemodynamic support. If he fails to wean off mechanical  support, this would likely be terminal.    Furthermore, we discussed possible outcomes of the ablation in terms of VT. The goal of this procedure is palliative, to allow him to leave the hospital and reduced ICD shocks/VT recurrence. Given the extent of his cardiomyopathy, and the fact that he has no further treatment options in terms of his advanced heart failure, it is likely the VT will recur. Whether this occurs in days, weeks, or months is difficult to predict and will depend on the success of the procedure and the progressions of his underlying disease.    He understands these risks and potential outcomes of the procedure. He wants to move forward with the ablation as his main focus is quality of life, and he hopes to leave the hospital.     Eliot Wooten MD  Clinical Cardiac Electrophysiologist, [facility]    of Medicine, [employer]  Director of Atrial Fibrillation Ablation, [facility]  Director of Ventricular Arrhythmias  Research, [facility]  Office Phone Number: [phone]

## 2024-06-06 NOTE — PROGRESS NOTES
Occupational Therapy                 Therapy Communication Note    Patient Name: Cristian Sapp  MRN: [id number]  Today's Date: 6/6/2024     Discipline: Occupational Therapy    Missed Visit Reason: Missed Visit Reason:  (Patient in/out of Vtach, defib at bedside, per RN plan for ablation today; will attempt OT next visit as appropriate.)    Missed Time: Attempt    Comment:  Marjorie Foster OTR/L  Inpatient Occupational Therapist   Rehab Office: [phone]

## 2024-06-06 NOTE — CONSULTS
Inpatient consult to Endocrinology  Consult performed by: Leticia Pompa MD  Consult ordered by: Logan Moscoso PA-C          Reason For Consult  Elevated TSH    History Of Present Illness  Cristian Sapp is a 58 y.o. male presenting with  past medical history of multiple prior MIs, CAD-multiple JIM, HFrEF (EF 15% 5/24) ), s/p CRT-D, VT storm s/p VT ablation , infrarenal AAA s/p right ileac stent, initially presented to OSH on 5/23 with multiple ICD shocks for V. tach.  When he was transferred to the ICU started amiodarone drip, had a heart cath no stents were placed, patient was cardioverted and pacemaker settings were changed.  He was also found to have renal stones with leukocytosis, status post recent cystoscopy with ureteral stent placement.  Patient was sent to the [facility] on 5/28 for evaluation for an LVAD.    Patient had recurrent VT after transfer, for which he received VT radiofrequency ablation on 5/31.  Patient is currently on amiodarone and lidocaine drip by EP was planned repeat VT ablation today due to recurrent DVT.    Patient was deemed not to be a candidate for LVAD due to recent smoking history and emphysema, and per patient's preferences.  Palliative team were consulted on 6/5 patient is currently DNR/DNI and prefers to prioritize quality of life and he is willing to accept low burden treatments to achieve meaningful goals.    Endocrinology consulted for elevated TSH.  Per chart review amiodarone was started on 5/23/2024.  No recent IV contrast use with CAT scans.  No known thyroid disease in the past    TSH:  5/23/2024: 5.55  6/4/2024: 10.35    Free thyroxine:  5/23/2024: 0.94  6/6/2024: 1.39    Free T3:  6/4/2024: 65    Patient was still in ablation during rounds.    Past Medical History  He has a past medical history of Atherosclerosis of native artery of both lower extremities with intermittent claudication (CMS-HCC), CHB (complete heart block) (Multi), Chronic systolic CHF (congestive heart  failure), NYHA class 3 (Multi), COPD (chronic obstructive pulmonary disease) (Multi), Coronary artery disease, History of tobacco abuse, HLD (hyperlipidemia), Hypertension, Infrarenal abdominal aortic aneurysm (AAA) without rupture (CMS-HCC), Ischemic cardiomyopathy with implantable cardioverter-defibrillator (ICD), MI (myocardial infarction) (Multi), Nephrolithiasis, and PTSD (post-traumatic stress disorder).    Surgical History  He has a past surgical history that includes Cardiac defibrillator placement; Coronary angioplasty with stent (2006); Coronary angioplasty with stent (04/2003); Coronary angioplasty with stent (06/2008); Cystoscopy w/ ureteral stent placement (04/01/2024); Cystoscopy w/ ureteral stent placement (04/30/2024); Iliac artery stent (Right); Knee surgery; Femoral bypass; Cardiac electrophysiology procedure (N/A, 5/23/2024); Cardiac catheterization (N/A, 5/23/2024); Cardiac electrophysiology procedure (N/A, 5/28/2024); and Cardiac electrophysiology procedure (Right, 5/30/2024).     Social History  He reports that he has quit smoking. His smoking use included cigarettes. He does not have any smokeless tobacco history on file. He reports current alcohol use. Drug use questions deferred to the physician.    Family History  Family History   Problem Relation Name Age of Onset    Hypertension Mother      Diabetes Father      Hypertension Sister      Diabetes Sister      Colon cancer Maternal Grandmother      Hypertension Maternal Grandmother      Heart disease Maternal Grandfather      Hypertension Maternal Grandfather      Cancer Paternal Grandfather      Hypertension Paternal Grandfather          Allergies  Chantix [varenicline]    Review of Systems   -  Physical Exam  Patient was not present during rounds, was in ablation  ROS, PMH, FH/SH, surgical history and allergies have been reviewed.    Last Recorded Vitals  Blood pressure (!) 134/107, pulse 95, temperature 36.2 °C (97.2 °F), temperature  "source Temporal, resp. rate 22, height 1.702 m (5' 7\"), weight 74.6 kg (164 lb 7.4 oz), SpO2 95%.    Relevant Results  Results from last 7 days   Lab Units 06/06/24  0305 06/05/24  1632 06/05/24  0223 06/04/24  1255 06/04/24  0307   GLUCOSE mg/dL 137* 138* 115* 101* 104*            Assessment/Plan   Principal Problem:    Ventricular tachycardia (Multi)  Active Problems:    AICD discharge    Acute on chronic systolic (congestive) heart failure (Multi)    Ischemic cardiomyopathy      Cristian Sapp is a 58 y.o. male presenting with  past medical history of multiple prior MIs, CAD-multiple JIM, HFrEF (EF 15% 5/24) ), s/p CRT-D, VT storm s/p VT ablation , infrarenal AAA s/p right ileac stent, initially presented to OSH on 5/23 with multiple ICD shocks for V. tach.  When he was transferred to the ICU started amiodarone drip, had a heart cath no stents were placed, patient was cardioverted and pacemaker settings were changed.  He was also found to have renal stones with leukocytosis, status post recent cystoscopy with ureteral stent placement.  Patient was sent to the [facility] on 5/28 for evaluation for an LVAD.    Patient had recurrent VT after transfer, for which he received VT radiofrequency ablation on 5/31.  Patient is currently on amiodarone and lidocaine drip ->  VT ablation 6/6 due to recurrent DVT.    Endocrinology consulted for elevated TSH, has normal free T4, borderline free T3  Per chart review, looks like the patient has transient elevated TSH few weeks after initiation of amiodarone.  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  -No need for levothyroxine for now.  Plan communicated to primary team in person.    Discussed with .      Leticia Pompa MD    "

## 2024-06-06 NOTE — PROGRESS NOTES
Physical Therapy                 Therapy Communication Note    Patient Name: Cristian Sapp  MRN: [id number]  Today's Date: 6/6/2024     Discipline: Physical Therapy    Missed Visit Reason:   (HOLD this AM as per RN. Pending ablation this morning. Will re attempt after ablation pending status.)    Missed Time: Attempt    Comment:

## 2024-06-06 NOTE — PROGRESS NOTES
Cristian Sapp is a 58 y.o. male on day 9 of admission presenting with Ventricular tachycardia (Multi).    Patient pending ablation. Palliative Care consulted and following for ongoing needs. SW will continue to follow for progress towards discharge planning needs.

## 2024-06-06 NOTE — ANESTHESIA PROCEDURE NOTES
Airway  Date/Time: 6/6/2024 1:05 PM  Urgency: elective    Airway not difficult    Staffing  Performed: ALYSSA   Authorized by: Aaliyah Waller MD    Performed by: REGINE Frost  Patient location during procedure: OR    Indications and Patient Condition  Indications for airway management: anesthesia and airway protection  Spontaneous Ventilation: absent  Sedation level: deep  Preoxygenated: yes  Patient position: sniffing  MILS not maintained throughout  Mask difficulty assessment: 2 - vent by mask + OA or adjuvant +/- NMBA  No planned trial extubation    Final Airway Details  Final airway type: endotracheal airway      Successful airway: ETT  Cuffed: yes   Successful intubation technique: direct laryngoscopy  Facilitating devices/methods: cricoid pressure  Blade: Yosi  Blade size: #4  ETT size (mm): 7.5  Cormack-Lehane Classification: grade I - full view of glottis  Placement verified by: capnometry   Inital cuff pressure (cm H2O): 22  Cuff volume (mL): 8  Measured from: lips  Number of attempts at approach: 1  Ventilation between attempts: none  Number of other approaches attempted: 0

## 2024-06-06 NOTE — CARE PLAN
Problem: Fall/Injury  Goal: Not fall by end of shift  Outcome: Progressing  Goal: Be free from injury by end of the shift  Outcome: Progressing  Goal: Verbalize understanding of personal risk factors for fall in the hospital  Outcome: Progressing  Goal: Verbalize understanding of risk factor reduction measures to prevent injury from fall in the home  Outcome: Progressing  Goal: Use assistive devices by end of the shift  Outcome: Progressing  Goal: Pace activities to prevent fatigue by end of the shift  Outcome: Progressing     Problem: Pain  Goal: My pain/discomfort is manageable  Outcome: Progressing     Problem: Daily Care  Goal: Daily care needs are met  Outcome: Progressing     Problem: Psychosocial Needs  Goal: Demonstrates ability to cope with hospitalization/illness  Outcome: Progressing  Goal: Collaborate with me, my family, and caregiver to identify my specific goals  Outcome: Progressing     Problem: Pain - Adult  Goal: Verbalizes/displays adequate comfort level or baseline comfort level  Outcome: Progressing     Problem: Safety - Adult  Goal: Free from fall injury  Outcome: Progressing     Problem: Discharge Planning  Goal: Discharge to home or other facility with appropriate resources  Outcome: Progressing     Problem: Chronic Conditions and Co-morbidities  Goal: Patient's chronic conditions and co-morbidity symptoms are monitored and maintained or improved  Outcome: Progressing     Problem: Skin  Goal: Decreased wound size/increased tissue granulation at next dressing change  Outcome: Progressing  Goal: Participates in plan/prevention/treatment measures  Outcome: Progressing  Goal: Prevent/manage excess moisture  Outcome: Progressing  Goal: Prevent/minimize sheer/friction injuries  Outcome: Progressing  Goal: Promote/optimize nutrition  Outcome: Progressing  Goal: Promote skin healing  Outcome: Progressing     Problem: Heart Failure  Goal: Improved gas exchange this shift  Outcome: Progressing  Goal:  Improved urinary output this shift  Outcome: Progressing  Goal: Reduction in peripheral edema within 24 hours  Outcome: Progressing  Goal: Report improvement of dyspnea/breathlessness this shift  Outcome: Progressing  Goal: Weight from fluid excess reduced over 2-3 days, then stabilize  Outcome: Progressing  Goal: Increase self care and/or family involvement in 24 hours  Outcome: Progressing   The patient's goals for the shift include      The clinical goals for the shift include pt will remain free of VT and remain hemodynamically stable    Over the shift, the patient did not make progress toward the following goals. Barriers to progression include . Recommendations to address these barriers include .

## 2024-06-06 NOTE — PROGRESS NOTES
HFICU Attending Note    Principal Problem:    Ventricular tachycardia (Multi)  Active Problems:    AICD discharge    Acute on chronic systolic (congestive) heart failure (Multi)    Ischemic cardiomyopathy    He had a bout of VT again this morning. It was terminated with device pacing.     He is agreeable to go to EP lab for ablation today. He decided to revoke his DNR/DNI status for this procedure.    He looks euvolemic on exam this morning.    This critically ill patient continues to be at-risk for clinically significant deterioration / failure due to the above mentioned dysfunctional, unstable organ systems.  I have personally identified and managed all complex critical care issues to prevent aforementioned clinical deterioration.  Critical care time is spent at bedside and/or the immediate area and has included, but is not limited to, the review of diagnostic tests, labs, radiographs, serial assessments of hemodynamics, respiratory status, ventilatory management, and family updates.  Time spent in procedures and teaching are reported separately.    Critical care time: __35__ minutes

## 2024-06-06 NOTE — ANESTHESIA PREPROCEDURE EVALUATION
Patient: Cristian Sapp    Procedure Information       Anesthesia Start Date/Time: 06/06/24 1205    Procedure: Ablation VT - endocardial vt ablation  carto V8    Location: [facility] STEREO / Virtual [facility] MAT 3529 Cardiac Cath Lab    Providers: Eliot Wooten MD            Relevant Problems   Cardiac  LVEF 15% on LIZ 5/2024, mod to severe MVR, AICD, AAA, complete heart block, CAD, s/p PCI, HTN   (+) CHF (congestive heart failure) (Multi)   (+) Ventricular tachycardia (Multi)      Neuro  PTSD       Clinical information reviewed:    Allergies  Meds               NPO Detail:  No data recorded     Physical Exam    Airway  Mallampati: II  TM distance: >3 FB  Neck ROM: limited  Comments: Can bite upper lip   Cardiovascular    Dental   (+) upper dentures, lower dentures     Pulmonary    Abdominal          Anesthesia Plan    History of general anesthesia?: yes  History of complications of general anesthesia?: no    ASA 4     general     The patient is a current smoker.    intravenous induction   Anesthetic plan and risks discussed with patient.  Use of blood products discussed with patient who consented to blood products.    Plan discussed with CAA and attending.

## 2024-06-06 NOTE — PROGRESS NOTES
"[facility]  HFICU PROGRESS NOTE    Cristian Sapp/[id number]    Admit Date: 5/28/2024  Hospital Length of Stay: 9   ICU Length of Stay: 2d 20h   Primary Service: HFICU  Referring: Dr Brown     INTERVAL EVENTS / PERTINENT ROS:     Patient again in slow VT this AM. Able to override pace out of the rhythm back to ventricular paced rhythm. Patient is NPO for ablation this AM. Patients code status changed to FULL CODE for 24 hours for ablation to be changed back to DNR/DNI afterward per patients request.     Plan:  - VT ablation today with Dr Wooten     MEDICATIONS  Infusions:  amiodarone, Last Rate: 1 mg/min (06/06/24 0900)  heparin, Last Rate: Stopped (06/06/24 0615)  lidocaine, Last Rate: 1 mg/min (06/05/24 2203)      Scheduled:  amiodarone, 150 mg, Once  aspirin, 81 mg, Daily  iron sucrose, 200 mg, Daily  nicotine, 1 patch, Daily   Followed by  [START ON 7/13/2024] nicotine, 1 patch, Daily  pantoprazole, 40 mg, Daily before breakfast  polyethylene glycol, 17 g, BID  ranolazine, 500 mg, BID  rosuvastatin, 20 mg, Nightly  sacubitriL-valsartan, 1 tablet, BID  sennosides-docusate sodium, 2 tablet, BID  spironolactone, 12.5 mg, Daily  traZODone, 50 mg, Nightly      PRN:  acetaminophen, 650 mg, q4h PRN   Or  acetaminophen, 650 mg, q4h PRN   Or  acetaminophen, 650 mg, q4h PRN  heparin, 2,000-4,000 Units, q4h PRN  hydrOXYzine HCL, 25 mg, q8h PRN  LORazepam, 1 mg, q6h PRN  methocarbamol, 500 mg, q6h PRN  oxygen, , Continuous PRN - O2/gases  trimethobenzamide, 200 mg, q6h PRN        PHYSICAL EXAM:   Visit Vitals  BP (!) 134/107   Pulse 95   Temp 36.2 °C (97.2 °F) (Temporal)   Resp 22   Ht 1.702 m (5' 7\")   Wt 74.6 kg (164 lb 7.4 oz)   SpO2 95%   BMI 25.76 kg/m²   Smoking Status Former   BSA 1.88 m²       Wt Readings from Last 5 Encounters:   06/06/24 74.6 kg (164 lb 7.4 oz)   05/27/24 78.8 kg (173 lb 11.6 oz)       INTAKE/OUTPUT:  I/O last 3 completed shifts:  In: 2611.5 (35 mL/kg) [P.O.:390; " I.V.:1921.5 (25.8 mL/kg); IV Piggyback:300]  Out: 6300 (84.5 mL/kg) [Urine:6300 (2.3 mL/kg/hr)]  Weight: 74.6 kg      Physical Exam  Vitals reviewed.   Constitutional:       General: He is not in acute distress.     Appearance: He is ill-appearing.   HENT:      Mouth/Throat:      Mouth: Mucous membranes are moist.   Eyes:      Extraocular Movements: Extraocular movements intact.      Pupils: Pupils are equal, round, and reactive to light.   Cardiovascular:      Rate and Rhythm: Regular rhythm. Tachycardia present.      Pulses: Normal pulses.      Heart sounds: Normal heart sounds.   Pulmonary:      Effort: Pulmonary effort is normal. No respiratory distress.      Breath sounds: Normal breath sounds. No rhonchi.   Abdominal:      General: Abdomen is flat. Bowel sounds are normal. There is no distension.      Tenderness: There is no abdominal tenderness.   Musculoskeletal:         General: Normal range of motion.      Cervical back: Full passive range of motion without pain.      Right lower le+ Pitting Edema present.      Left lower le+ Pitting Edema present.   Skin:     General: Skin is warm.      Capillary Refill: Capillary refill takes 2 to 3 seconds.   Neurological:      General: No focal deficit present.      Mental Status: He is alert and oriented to person, place, and time. Mental status is at baseline.   Psychiatric:         Mood and Affect: Mood normal.         Behavior: Behavior normal. Behavior is cooperative.       DATA:  CMP:  Results from last 7 days   Lab Units 24  0305 24  1632 24  0223 24  1255 24  0307 24  1741 24  0205 24  0224 24  0612 24  1742   SODIUM mmol/L 137 138 136 134* 137 138 138 136 138 136   POTASSIUM mmol/L 3.6 3.5 3.6 3.9 3.8 3.8 3.8 4.1 4.2 4.3   CHLORIDE mmol/L 101 99 99 97* 100 97* 98 99 101 97*   CO2 mmol/L 27 27 28 26 27 28 28 29 31 29   ANION GAP mmol/L 13 16 13 15 14 17 16 12 10 14   BUN mg/dL 14 14 18 16 14 12  13 13 15 16   CREATININE mg/dL 0.90 1.21 1.43* 1.32* 1.31* 1.20 1.13 0.99 1.11 1.15   EGFR mL/min/1.73m*2 >90 70 58* 63 64 71 76 89 78 75   MAGNESIUM mg/dL 2.29 1.89 2.05 1.98 2.07 2.17 2.34 1.98 2.03 1.99   ALBUMIN g/dL 3.2* 3.5 3.3* 3.4 3.6 3.8 3.5 3.6 3.3* 3.6   ALT U/L  --   --   --  11 9*  --  12 13  --   --    AST U/L  --   --   --  11 13  --  14 16  --   --    BILIRUBIN TOTAL mg/dL  --   --   --  0.7 0.7  --  0.6 1.0  --   --      CBC:  Results from last 7 days   Lab Units 06/06/24  0305 06/05/24  0223 06/04/24  0307 06/04/24  0005 06/03/24  1741 06/03/24  0205 06/02/24  0224 06/01/24  0612   WBC AUTO x10*3/uL 13.6* 11.7* 11.2 10.2 12.7* 11.0 9.6 8.8   HEMOGLOBIN g/dL 12.9* 11.8* 12.4* 11.6* 13.1* 11.8* 12.1* 11.4*   HEMATOCRIT % 38.0* 34.0* 36.1* 33.0* 37.0* 33.4* 35.4* 33.4*   PLATELETS AUTO x10*3/uL 318 287 283 273 303 257 233 235   MCV fL 94 94 94 92 92 92 94 95     COAG:   Results from last 7 days   Lab Units 06/04/24  1255   INR  1.3*     ABO:   ABO TYPE   Date Value Ref Range Status   06/04/2024 A  Final     HEME/ENDO:  Results from last 7 days   Lab Units 06/05/24  0223 06/04/24  1255 06/04/24  0307   FERRITIN ng/mL 99  --   --    IRON SATURATION % 18*  --   --    TSH mIU/L  --  10.35*  --    HEMOGLOBIN A1C %  --   --  6.0*      CARDIAC:   Results from last 7 days   Lab Units 06/04/24  1255 06/03/24  1741 05/31/24  1742 05/31/24  1523   LD U/L 145  --   --   --    TROPHS ng/L  --   --  2,261* 2,395*   BNP pg/mL 674* 655*  --   --      ASSESSMENT AND PLAN:   Cristian Sapp is a 57 y/o M with PMHx of CAD s/p multiple PCI, ICM/HFrEF (EF 15% 5/24) s/p CRT-D, VT storm s/p VT ablation (2019), infrarenal AAA s/p R iliac stent, nephrolithiasis s/p recent ureteral stent. Presented to OSH on 5/23 with multiple ICD shocks for VT. Transferred to CICU at [facility] 5/28 for continued management. EP was consulted on arrival and patient was in slow VT, he was started on amiodarone gtt. Underwent VT ablation 5/30, but  continued to have intermittent slow VT. On 6/3 VT was noted again with rates ~118 and patient was cardioverted. Maintained on amiodarone + lidocaine gtt, and is s/p stellate ganglion block 6/4. He was transferred to HFICU 6/3 after he signed consent for LVAD/OHT evaluation. After further discussions and being a current smoker, it was decided that OHT would not be an option, and the patient does not want an LVAD at this time, so advanced therapies evaluation was stopped 6/5. Palliative medicine saw the patient 6/5 and he decided to change his code status to DNR-DNI. Planning for re-do endocardial ablation with Dr. Wooten 6/6.      Neuro:  #Anxiety  #Depression   #Insomnia   - C/w atarax 25 mg q8 PRN for anxiety   - Holding paroxetine given arrhythmias   - C/w trazodone 50 mg nightly   - C/w ativan 1 mg q6 PRN for anxiety   - Serial neuro and pain assessments   - PO Tylenol PRN for pain  - PT/OT Consult, OOB to chair  - CAM ICU score every shift  - Sleep/wake cycle normalization     #Substance abuse  - Tobacco use: active smoker  - C/w nicotine patches      Cardiovascular:  #Stage C/D HFrEF/ICM s/p CRT-D (11/2019)  #Acute on Chronic Decompensated HF  TTE (5/28/2024): severely decreased LV systolic function, EF 15%, LVIDd 6.32. Moderate-severe MVR, mild TR    Home medications: ASA, entresto 24-26 mg BID, rosuvastatin 20 mg daily, torsemide 20 mg daily  - C/w entresto 24-26 mg BID  - C/w spironolactone 12.5 mg daily   - Lasix 40 mg IV x1   - Will hold off on RHC at this time   - Introduce GDMT slowly   - Signed consent for OHT/LVAD workup, patient currently is not a candidate for OHT given active tobacco use, and he does not want an LVAD at this time, will stop workup for now per Dr. Doyle 6/5  - Daily standing weights, 2gm sodium diet, 2L fluid restriction, strict I&Os     #CAD s/p multiple PCI   [facility] (5/23/24): Distal Left Main: 10-30% stenosis; Proximal and mid LAD Lesion: The percent stenosis is 10-30%; Proximal  CX Lesion: The percent stenosis is 100%; Right Coronary Artery: fills via collaterals; Proximal RCA Lesion: The percent stenosis is 100%; The Left Ventricular Ejection Fraction is 10%,by echo.  - C/w ASA + rosuvastatin 20 mg daily as above      #Prior VT storm s/p VT ablation 5/30 and stellate ganglion block 6/4  #Incessant VT s/p ICD shock   Device interrogation (6/4): paced  for 102 min then stepping down, LR back to 95, patient converted to AP   - C/w amiodarone gtt  - C/w lidocaine gtt at 1 mg/min, last level (6/4): 4.6  - C/w heparin gtt, hold heparin 6/6 at 0600 prior to procedure  - C/w ranexa 500 mg BID  - EP following, appreciate assistance, planning for re-do ablation with Dr. Wooten 6/6      Pulmonary:   #Acute Hypoxic Respiratory Failure, 2/2  #?PNA   #Pulmonary Edema   #?COPD   CT chest (6/4): interval worsening now moderate right and small left pleural effusions with associated atelectasis, findings suggestive of mild pulmonary interstitial and alveolar edema  Blood cultures (6/3): NGTD   MRSA negative (6/3)  - Diuresis as above   - C/w zosyn for ? PNA (6/2-*)  - Send sputum cx + urine legionella + strep PNA   - ID consulted 6/5, follow recommendations   - Monitor and maintain SpO2 > 92%     GI:  #No active issues   - Bowel regimen: facundo-colace BID + miralax BID   - PPI daiy    :  #EITAN   #Nephrolithiasis s/p recent bilateral ureteral stent placement to left ureter   sCr (6/5): 1.43  - Consulted urology 5/31 to discuss removal--> will follow outpatient for STENT removal   - Trend RFP daily   - I/Os  - Diuresis as above   - Avoid hypotension and nephrotoxic agents     Heme:  #Anemia in the setting of Chronic Disease  #Iron deficiency anemia   - H&H (6/3/24): 11.8/33.4  - Venofer x3 days 6/6      Endo:  #No DM  - Euglycemic, HgbA1c 6%      #?Hypothyroid   - TSH (6/4): 10.34  - T3 (6/4): 65  - T4 (6/4): 8.3  - Consult endocrine 6/5      ID:  #Leukocytosis 2/2 ?PNA versus stress induced    Blood  cultures (6/3): NGTD   MRSA negative (6/3)  - ID consulted, appreciate assistance -> signed off 6/5   - See pulmonary plan above  - Stopped zosyn (6/2-6/5)  - Trend temps q4h     PHYSICAL AND OCCUPATIONAL THERAPY: ordered and following      LINES:  PIVs   Left radial A-line: 6/2      DVT: heparin gtt   VAP BUNDLE: NA  CENTRAL LINE BUNDLE: NA  ULCER PPX: PPI  GLYCEMIC CONTROL: NA  BOWEL CARE: scheduled facundo-colace BID and Miralax BID   INDWELLING CATHETER: NA  NUTRITION: NPO Diet; Effective midnight      EMERGENCY CONTACT: Extended Emergency Contact Information  Primary Emergency Contact: Judie Sapp  Address: [address] [place] [address] [facility]  Home Phone: [phone]  Mobile Phone: [phone]  Relation: Mother  Preferred language: English   needed? No  Secondary Emergency Contact: Keiry Greene  Address: [address] [place], [place]  Mobile Phone: [phone]  Relation: Sibling  Preferred language: English   needed? No  FAMILY UPDATE: given at bedside 6/6  CODE STATUS: Full Code  DISPO: Remain in HFICU     Patient seen and assessed with Dr. Doyle     I personally spent 65 minutes of critical care time directly and personally managing the patient exclusive of separately billable procedures   _________________________________________________  YARITZA Ramirez-CNP

## 2024-06-07 ENCOUNTER — APPOINTMENT (OUTPATIENT)
Dept: CARDIOLOGY | Facility: HOSPITAL | Age: 59
DRG: 270 | End: 2024-06-07
Payer: MEDICARE

## 2024-06-07 ENCOUNTER — APPOINTMENT (OUTPATIENT)
Dept: RADIOLOGY | Facility: HOSPITAL | Age: 59
DRG: 270 | End: 2024-06-07
Payer: MEDICARE

## 2024-06-07 PROBLEM — R57.0 CARDIOGENIC SHOCK (MULTI): Status: ACTIVE | Noted: 2024-06-07

## 2024-06-07 LAB
ACT BLD: 120 SEC (ref 83–199)
ALBUMIN SERPL BCP-MCNC: 3.2 G/DL (ref 3.4–5)
ANION GAP BLDA CALCULATED.4IONS-SCNC: 11 MMO/L (ref 10–25)
ANION GAP BLDA CALCULATED.4IONS-SCNC: 16 MMO/L (ref 10–25)
ANION GAP BLDA CALCULATED.4IONS-SCNC: 7 MMO/L (ref 10–25)
ANION GAP BLDA CALCULATED.4IONS-SCNC: 9 MMO/L (ref 10–25)
ANION GAP BLDA CALCULATED.4IONS-SCNC: 9 MMO/L (ref 10–25)
ANION GAP BLDMV CALCULATED.4IONS-SCNC: 9 MMO/L (ref 10–25)
ANION GAP SERPL CALC-SCNC: 14 MMOL/L (ref 10–20)
APTT PPP: 27 SECONDS (ref 27–38)
BACTERIA BLD CULT: NORMAL
BASE EXCESS BLDA CALC-SCNC: -10.8 MMOL/L (ref -2–3)
BASE EXCESS BLDA CALC-SCNC: 0 MMOL/L (ref -2–3)
BASE EXCESS BLDA CALC-SCNC: 2.7 MMOL/L (ref -2–3)
BASE EXCESS BLDA CALC-SCNC: 3.3 MMOL/L (ref -2–3)
BASE EXCESS BLDA CALC-SCNC: 3.6 MMOL/L (ref -2–3)
BASE EXCESS BLDMV CALC-SCNC: 2.2 MMOL/L (ref -2–3)
BASE EXCESS BLDMV CALC-SCNC: 4.3 MMOL/L (ref -2–3)
BASOPHILS # BLD AUTO: 0.03 X10*3/UL (ref 0–0.1)
BASOPHILS NFR BLD AUTO: 0.2 %
BODY SURFACE AREA: 1.93 M2
BODY TEMPERATURE: 37 DEGREES CELSIUS
BUN SERPL-MCNC: 13 MG/DL (ref 6–23)
CA-I BLDA-SCNC: 0.82 MMOL/L (ref 1.1–1.33)
CA-I BLDA-SCNC: 1.13 MMOL/L (ref 1.1–1.33)
CA-I BLDA-SCNC: 1.14 MMOL/L (ref 1.1–1.33)
CA-I BLDA-SCNC: 1.15 MMOL/L (ref 1.1–1.33)
CA-I BLDA-SCNC: 1.16 MMOL/L (ref 1.1–1.33)
CA-I BLDMV-SCNC: 1.15 MMOL/L (ref 1.1–1.33)
CALCIUM SERPL-MCNC: 8.5 MG/DL (ref 8.6–10.6)
CHLORIDE BLD-SCNC: 102 MMOL/L (ref 98–107)
CHLORIDE BLDA-SCNC: 100 MMOL/L (ref 98–107)
CHLORIDE BLDA-SCNC: 101 MMOL/L (ref 98–107)
CHLORIDE BLDA-SCNC: 103 MMOL/L (ref 98–107)
CHLORIDE BLDA-SCNC: 103 MMOL/L (ref 98–107)
CHLORIDE BLDA-SCNC: 106 MMOL/L (ref 98–107)
CHLORIDE SERPL-SCNC: 101 MMOL/L (ref 98–107)
CO2 SERPL-SCNC: 27 MMOL/L (ref 21–32)
CREAT SERPL-MCNC: 0.91 MG/DL (ref 0.5–1.3)
EGFRCR SERPLBLD CKD-EPI 2021: >90 ML/MIN/1.73M*2
ELECTRONICALLY SIGNED BY: NORMAL
EOSINOPHIL # BLD AUTO: 0 X10*3/UL (ref 0–0.7)
EOSINOPHIL NFR BLD AUTO: 0 %
ERYTHROCYTE [DISTWIDTH] IN BLOOD BY AUTOMATED COUNT: 13.5 % (ref 11.5–14.5)
FACTOR V LEIDEN INTERPRETATION: NORMAL
FACTOR V LEIDEN RESULT: NORMAL
GLUCOSE BLD MANUAL STRIP-MCNC: 109 MG/DL (ref 74–99)
GLUCOSE BLD MANUAL STRIP-MCNC: 120 MG/DL (ref 74–99)
GLUCOSE BLD MANUAL STRIP-MCNC: 131 MG/DL (ref 74–99)
GLUCOSE BLD MANUAL STRIP-MCNC: 132 MG/DL (ref 74–99)
GLUCOSE BLD MANUAL STRIP-MCNC: 136 MG/DL (ref 74–99)
GLUCOSE BLD-MCNC: 108 MG/DL (ref 74–99)
GLUCOSE BLDA-MCNC: 108 MG/DL (ref 74–99)
GLUCOSE BLDA-MCNC: 109 MG/DL (ref 74–99)
GLUCOSE BLDA-MCNC: 145 MG/DL (ref 74–99)
GLUCOSE BLDA-MCNC: 202 MG/DL (ref 74–99)
GLUCOSE BLDA-MCNC: 268 MG/DL (ref 74–99)
GLUCOSE SERPL-MCNC: 114 MG/DL (ref 74–99)
HCO3 BLDA-SCNC: 18.1 MMOL/L (ref 22–26)
HCO3 BLDA-SCNC: 24.4 MMOL/L (ref 22–26)
HCO3 BLDA-SCNC: 24.8 MMOL/L (ref 22–26)
HCO3 BLDA-SCNC: 26.1 MMOL/L (ref 22–26)
HCO3 BLDA-SCNC: 26.5 MMOL/L (ref 22–26)
HCO3 BLDMV-SCNC: 26.5 MMOL/L (ref 22–26)
HCO3 BLDMV-SCNC: 27.3 MMOL/L (ref 22–26)
HCT VFR BLD AUTO: 33 % (ref 41–52)
HCT VFR BLD EST: 35 % (ref 41–52)
HCT VFR BLD EST: 35 % (ref 41–52)
HCT VFR BLD EST: 36 % (ref 41–52)
HCT VFR BLD EST: 37 % (ref 41–52)
HCT VFR BLD EST: 38 % (ref 41–52)
HCT VFR BLD EST: 44 % (ref 41–52)
HGB BLD-MCNC: 11.7 G/DL (ref 13.5–17.5)
HGB BLDA-MCNC: 11.5 G/DL (ref 13.5–17.5)
HGB BLDA-MCNC: 12 G/DL (ref 13.5–17.5)
HGB BLDA-MCNC: 12.3 G/DL (ref 13.5–17.5)
HGB BLDA-MCNC: 12.5 G/DL (ref 13.5–17.5)
HGB BLDA-MCNC: 14.8 G/DL (ref 13.5–17.5)
HGB BLDMV-MCNC: 11.6 G/DL (ref 13.5–17.5)
IMM GRANULOCYTES # BLD AUTO: 0.19 X10*3/UL (ref 0–0.7)
IMM GRANULOCYTES NFR BLD AUTO: 1.4 % (ref 0–0.9)
INHALED O2 CONCENTRATION: 100 %
INHALED O2 CONCENTRATION: 40 %
INHALED O2 CONCENTRATION: 50 %
INR PPP: 1.3 (ref 0.9–1.1)
LACTATE BLDA-SCNC: 1.7 MMOL/L (ref 0.4–2)
LACTATE BLDA-SCNC: 2 MMOL/L (ref 0.4–2)
LACTATE BLDA-SCNC: 3.2 MMOL/L (ref 0.4–2)
LACTATE BLDMV-SCNC: 2 MMOL/L (ref 0.4–2)
LDH SERPL L TO P-CCNC: 408 U/L (ref 84–246)
LIDOCAIN SERPL-MCNC: 1.7 UG/ML (ref 1–5)
LYMPHOCYTES # BLD AUTO: 1.26 X10*3/UL (ref 1.2–4.8)
LYMPHOCYTES NFR BLD AUTO: 9 %
MAGNESIUM SERPL-MCNC: 2.32 MG/DL (ref 1.6–2.4)
MCH RBC QN AUTO: 32.7 PG (ref 26–34)
MCHC RBC AUTO-ENTMCNC: 35.5 G/DL (ref 32–36)
MCV RBC AUTO: 92 FL (ref 80–100)
MONOCYTES # BLD AUTO: 1.6 X10*3/UL (ref 0.1–1)
MONOCYTES NFR BLD AUTO: 11.4 %
NEUTROPHILS # BLD AUTO: 10.91 X10*3/UL (ref 1.2–7.7)
NEUTROPHILS NFR BLD AUTO: 78 %
NRBC BLD-RTO: 0 /100 WBCS (ref 0–0)
OXYHGB MFR BLDA: 95.6 % (ref 94–98)
OXYHGB MFR BLDA: 97 % (ref 94–98)
OXYHGB MFR BLDA: 97.3 % (ref 94–98)
OXYHGB MFR BLDA: 97.6 % (ref 94–98)
OXYHGB MFR BLDA: 97.9 % (ref 94–98)
OXYHGB MFR BLDMV: 62 % (ref 45–75)
OXYHGB MFR BLDMV: 69.5 % (ref 45–75)
PCO2 BLDA: 26 MM HG (ref 38–42)
PCO2 BLDA: 34 MM HG (ref 38–42)
PCO2 BLDA: 35 MM HG (ref 38–42)
PCO2 BLDA: 40 MM HG (ref 38–42)
PCO2 BLDA: 52 MM HG (ref 38–42)
PCO2 BLDMV: 31 MM HG (ref 41–51)
PCO2 BLDMV: 43 MM HG (ref 41–51)
PH BLDA: 7.15 PH (ref 7.38–7.42)
PH BLDA: 7.4 PH (ref 7.38–7.42)
PH BLDA: 7.48 PH (ref 7.38–7.42)
PH BLDA: 7.5 PH (ref 7.38–7.42)
PH BLDA: 7.58 PH (ref 7.38–7.42)
PH BLDMV: 7.41 PH (ref 7.33–7.43)
PH BLDMV: 7.54 PH (ref 7.33–7.43)
PHOSPHATE SERPL-MCNC: 2.5 MG/DL (ref 2.5–4.9)
PLATELET # BLD AUTO: 293 X10*3/UL (ref 150–450)
PO2 BLDA: 116 MM HG (ref 85–95)
PO2 BLDA: 121 MM HG (ref 85–95)
PO2 BLDA: 238 MM HG (ref 85–95)
PO2 BLDA: 94 MM HG (ref 85–95)
PO2 BLDA: 97 MM HG (ref 85–95)
PO2 BLDMV: 35 MM HG (ref 35–45)
PO2 BLDMV: 42 MM HG (ref 35–45)
POTASSIUM BLDA-SCNC: 4.2 MMOL/L (ref 3.5–5.3)
POTASSIUM BLDA-SCNC: 4.3 MMOL/L (ref 3.5–5.3)
POTASSIUM BLDA-SCNC: 4.4 MMOL/L (ref 3.5–5.3)
POTASSIUM BLDMV-SCNC: 4.1 MMOL/L (ref 3.5–5.3)
POTASSIUM SERPL-SCNC: 4.5 MMOL/L (ref 3.5–5.3)
PROT C AG ACT/NOR PPP IA: 79 % (ref 63–153)
PROT S AG ACT/NOR PPP IA: 120 % (ref 84–134)
PROTHROMBIN TIME: 14.6 SECONDS (ref 9.8–12.8)
RBC # BLD AUTO: 3.58 X10*6/UL (ref 4.5–5.9)
SAO2 % BLDA: 97 % (ref 94–100)
SAO2 % BLDA: 98 % (ref 94–100)
SAO2 % BLDA: 99 % (ref 94–100)
SAO2 % BLDMV: 63 % (ref 45–75)
SAO2 % BLDMV: 71 % (ref 45–75)
SODIUM BLDA-SCNC: 131 MMOL/L (ref 136–145)
SODIUM BLDA-SCNC: 132 MMOL/L (ref 136–145)
SODIUM BLDA-SCNC: 136 MMOL/L (ref 136–145)
SODIUM BLDMV-SCNC: 133 MMOL/L (ref 136–145)
SODIUM SERPL-SCNC: 137 MMOL/L (ref 136–145)
UFH PPP CHRO-ACNC: 0.1 IU/ML
UFH PPP CHRO-ACNC: <0.1 IU/ML
WBC # BLD AUTO: 14 X10*3/UL (ref 4.4–11.3)

## 2024-06-07 PROCEDURE — 2500000004 HC RX 250 GENERAL PHARMACY W/ HCPCS (ALT 636 FOR OP/ED): Performed by: NURSE PRACTITIONER

## 2024-06-07 PROCEDURE — 36415 COLL VENOUS BLD VENIPUNCTURE: CPT | Performed by: NURSE PRACTITIONER

## 2024-06-07 PROCEDURE — 2500000005 HC RX 250 GENERAL PHARMACY W/O HCPCS: Performed by: STUDENT IN AN ORGANIZED HEALTH CARE EDUCATION/TRAINING PROGRAM

## 2024-06-07 PROCEDURE — 85347 COAGULATION TIME ACTIVATED: CPT

## 2024-06-07 PROCEDURE — 85025 COMPLETE CBC W/AUTO DIFF WBC: CPT | Performed by: STUDENT IN AN ORGANIZED HEALTH CARE EDUCATION/TRAINING PROGRAM

## 2024-06-07 PROCEDURE — 2500000001 HC RX 250 WO HCPCS SELF ADMINISTERED DRUGS (ALT 637 FOR MEDICARE OP): Performed by: STUDENT IN AN ORGANIZED HEALTH CARE EDUCATION/TRAINING PROGRAM

## 2024-06-07 PROCEDURE — 83615 LACTATE (LD) (LDH) ENZYME: CPT | Performed by: STUDENT IN AN ORGANIZED HEALTH CARE EDUCATION/TRAINING PROGRAM

## 2024-06-07 PROCEDURE — 2500000004 HC RX 250 GENERAL PHARMACY W/ HCPCS (ALT 636 FOR OP/ED)

## 2024-06-07 PROCEDURE — 82947 ASSAY GLUCOSE BLOOD QUANT: CPT | Mod: 91

## 2024-06-07 PROCEDURE — 83735 ASSAY OF MAGNESIUM: CPT | Performed by: STUDENT IN AN ORGANIZED HEALTH CARE EDUCATION/TRAINING PROGRAM

## 2024-06-07 PROCEDURE — 84132 ASSAY OF SERUM POTASSIUM: CPT | Mod: 91 | Performed by: STUDENT IN AN ORGANIZED HEALTH CARE EDUCATION/TRAINING PROGRAM

## 2024-06-07 PROCEDURE — 2500000001 HC RX 250 WO HCPCS SELF ADMINISTERED DRUGS (ALT 637 FOR MEDICARE OP): Performed by: NURSE PRACTITIONER

## 2024-06-07 PROCEDURE — 37799 UNLISTED PX VASCULAR SURGERY: CPT | Performed by: STUDENT IN AN ORGANIZED HEALTH CARE EDUCATION/TRAINING PROGRAM

## 2024-06-07 PROCEDURE — 71045 X-RAY EXAM CHEST 1 VIEW: CPT

## 2024-06-07 PROCEDURE — 87040 BLOOD CULTURE FOR BACTERIA: CPT | Performed by: NURSE PRACTITIONER

## 2024-06-07 PROCEDURE — 2500000002 HC RX 250 W HCPCS SELF ADMINISTERED DRUGS (ALT 637 FOR MEDICARE OP, ALT 636 FOR OP/ED): Performed by: STUDENT IN AN ORGANIZED HEALTH CARE EDUCATION/TRAINING PROGRAM

## 2024-06-07 PROCEDURE — 99291 CRITICAL CARE FIRST HOUR: CPT | Performed by: NURSE PRACTITIONER

## 2024-06-07 PROCEDURE — 93284 PRGRMG EVAL IMPLANTABLE DFB: CPT

## 2024-06-07 PROCEDURE — 85610 PROTHROMBIN TIME: CPT | Performed by: STUDENT IN AN ORGANIZED HEALTH CARE EDUCATION/TRAINING PROGRAM

## 2024-06-07 PROCEDURE — 2500000004 HC RX 250 GENERAL PHARMACY W/ HCPCS (ALT 636 FOR OP/ED): Performed by: INTERNAL MEDICINE

## 2024-06-07 PROCEDURE — A4217 STERILE WATER/SALINE, 500 ML: HCPCS | Performed by: NURSE PRACTITIONER

## 2024-06-07 PROCEDURE — 99291 CRITICAL CARE FIRST HOUR: CPT | Performed by: INTERNAL MEDICINE

## 2024-06-07 PROCEDURE — 80176 ASSAY OF LIDOCAINE: CPT | Performed by: STUDENT IN AN ORGANIZED HEALTH CARE EDUCATION/TRAINING PROGRAM

## 2024-06-07 PROCEDURE — 85520 HEPARIN ASSAY: CPT | Mod: 91 | Performed by: STUDENT IN AN ORGANIZED HEALTH CARE EDUCATION/TRAINING PROGRAM

## 2024-06-07 PROCEDURE — 85730 THROMBOPLASTIN TIME PARTIAL: CPT | Performed by: STUDENT IN AN ORGANIZED HEALTH CARE EDUCATION/TRAINING PROGRAM

## 2024-06-07 PROCEDURE — 71045 X-RAY EXAM CHEST 1 VIEW: CPT | Performed by: RADIOLOGY

## 2024-06-07 PROCEDURE — 2500000004 HC RX 250 GENERAL PHARMACY W/ HCPCS (ALT 636 FOR OP/ED): Performed by: STUDENT IN AN ORGANIZED HEALTH CARE EDUCATION/TRAINING PROGRAM

## 2024-06-07 PROCEDURE — C9113 INJ PANTOPRAZOLE SODIUM, VIA: HCPCS | Performed by: STUDENT IN AN ORGANIZED HEALTH CARE EDUCATION/TRAINING PROGRAM

## 2024-06-07 PROCEDURE — S4991 NICOTINE PATCH NONLEGEND: HCPCS | Performed by: STUDENT IN AN ORGANIZED HEALTH CARE EDUCATION/TRAINING PROGRAM

## 2024-06-07 PROCEDURE — 82805 BLOOD GASES W/O2 SATURATION: CPT | Mod: 91

## 2024-06-07 PROCEDURE — 2020000001 HC ICU ROOM DAILY

## 2024-06-07 RX ORDER — DEXMEDETOMIDINE HYDROCHLORIDE 4 UG/ML
INJECTION, SOLUTION INTRAVENOUS
Status: COMPLETED
Start: 2024-06-07 | End: 2024-06-07

## 2024-06-07 RX ORDER — LORAZEPAM 0.5 MG/1
0.5 TABLET ORAL NIGHTLY
Status: DISCONTINUED | OUTPATIENT
Start: 2024-06-07 | End: 2024-06-13

## 2024-06-07 RX ORDER — FUROSEMIDE 10 MG/ML
40 INJECTION INTRAMUSCULAR; INTRAVENOUS ONCE
Status: COMPLETED | OUTPATIENT
Start: 2024-06-07 | End: 2024-06-07

## 2024-06-07 RX ORDER — LORAZEPAM 2 MG/ML
0.5 INJECTION INTRAMUSCULAR ONCE
Status: COMPLETED | OUTPATIENT
Start: 2024-06-07 | End: 2024-06-07

## 2024-06-07 RX ORDER — LORAZEPAM 1 MG/1
1 TABLET ORAL EVERY 8 HOURS PRN
Status: DISCONTINUED | OUTPATIENT
Start: 2024-06-07 | End: 2024-06-10

## 2024-06-07 RX ORDER — VANCOMYCIN HYDROCHLORIDE 1 G/20ML
INJECTION, POWDER, LYOPHILIZED, FOR SOLUTION INTRAVENOUS DAILY PRN
Status: DISCONTINUED | OUTPATIENT
Start: 2024-06-07 | End: 2024-06-10

## 2024-06-07 RX ORDER — LORAZEPAM 2 MG/ML
INJECTION INTRAMUSCULAR
Status: DISPENSED
Start: 2024-06-07 | End: 2024-06-08

## 2024-06-07 RX ORDER — TRAZODONE HYDROCHLORIDE 50 MG/1
50 TABLET ORAL NIGHTLY PRN
Status: DISCONTINUED | OUTPATIENT
Start: 2024-06-07 | End: 2024-06-19 | Stop reason: HOSPADM

## 2024-06-07 RX ORDER — VANCOMYCIN HYDROCHLORIDE 750 MG/150ML
750 INJECTION, SOLUTION INTRAVENOUS EVERY 8 HOURS
Status: DISCONTINUED | OUTPATIENT
Start: 2024-06-08 | End: 2024-06-10

## 2024-06-07 RX ORDER — LORAZEPAM 2 MG/ML
1 INJECTION INTRAMUSCULAR ONCE
Status: DISCONTINUED | OUTPATIENT
Start: 2024-06-07 | End: 2024-06-10

## 2024-06-07 RX ORDER — HYDROXYZINE HYDROCHLORIDE 25 MG/1
25 TABLET, FILM COATED ORAL EVERY 6 HOURS PRN
Status: DISCONTINUED | OUTPATIENT
Start: 2024-06-07 | End: 2024-06-19 | Stop reason: HOSPADM

## 2024-06-07 RX ORDER — FUROSEMIDE 10 MG/ML
INJECTION INTRAMUSCULAR; INTRAVENOUS
Status: COMPLETED
Start: 2024-06-07 | End: 2024-06-07

## 2024-06-07 RX ORDER — FENTANYL CITRATE 50 UG/ML
INJECTION, SOLUTION INTRAMUSCULAR; INTRAVENOUS
Status: DISPENSED
Start: 2024-06-07 | End: 2024-06-08

## 2024-06-07 RX ORDER — DEXMEDETOMIDINE HYDROCHLORIDE 4 UG/ML
.1-1.5 INJECTION, SOLUTION INTRAVENOUS CONTINUOUS
Status: DISCONTINUED | OUTPATIENT
Start: 2024-06-07 | End: 2024-06-07

## 2024-06-07 RX ADMIN — ROSUVASTATIN CALCIUM 20 MG: 20 TABLET, FILM COATED ORAL at 21:38

## 2024-06-07 RX ADMIN — MAGNESIUM SULFATE HEPTAHYDRATE 2 G: 40 INJECTION, SOLUTION INTRAVENOUS at 00:36

## 2024-06-07 RX ADMIN — Medication 4 L/MIN: at 20:00

## 2024-06-07 RX ADMIN — Medication 1 PATCH: at 05:32

## 2024-06-07 RX ADMIN — LORAZEPAM 0.5 MG: 0.5 TABLET ORAL at 21:38

## 2024-06-07 RX ADMIN — FUROSEMIDE 40 MG: 10 INJECTION INTRAMUSCULAR; INTRAVENOUS at 17:24

## 2024-06-07 RX ADMIN — Medication 4 L/MIN: at 09:28

## 2024-06-07 RX ADMIN — IRON SUCROSE 200 MG: 20 INJECTION, SOLUTION INTRAVENOUS at 05:33

## 2024-06-07 RX ADMIN — VANCOMYCIN HYDROCHLORIDE 1750 MG: 5 INJECTION, POWDER, LYOPHILIZED, FOR SOLUTION INTRAVENOUS at 23:07

## 2024-06-07 RX ADMIN — DEXMEDETOMIDINE HYDROCHLORIDE 0.6 MCG/KG/HR: 4 INJECTION, SOLUTION INTRAVENOUS at 07:45

## 2024-06-07 RX ADMIN — DEXMEDETOMIDINE HYDROCHLORIDE 0.6 MCG/KG/HR: 4 INJECTION, SOLUTION INTRAVENOUS at 14:06

## 2024-06-07 RX ADMIN — PROPOFOL 30 MCG/KG/MIN: 10 INJECTION, EMULSION INTRAVENOUS at 02:02

## 2024-06-07 RX ADMIN — DEXMEDETOMIDINE HYDROCHLORIDE IN SODIUM CHLORIDE 0.6 MCG/KG/HR: 4 INJECTION INTRAVENOUS at 07:45

## 2024-06-07 RX ADMIN — AMIODARONE HYDROCHLORIDE 1 MG/MIN: 1.8 INJECTION, SOLUTION INTRAVENOUS at 06:26

## 2024-06-07 RX ADMIN — AMIODARONE HYDROCHLORIDE 1 MG/MIN: 1.8 INJECTION, SOLUTION INTRAVENOUS at 01:15

## 2024-06-07 RX ADMIN — FUROSEMIDE 40 MG: 10 INJECTION, SOLUTION INTRAMUSCULAR; INTRAVENOUS at 17:24

## 2024-06-07 RX ADMIN — INSULIN LISPRO 4 UNITS: 100 INJECTION, SOLUTION INTRAVENOUS; SUBCUTANEOUS at 00:03

## 2024-06-07 RX ADMIN — PIPERACILLIN SODIUM AND TAZOBACTAM SODIUM 3.38 G: 3; .375 INJECTION, SOLUTION INTRAVENOUS at 20:47

## 2024-06-07 RX ADMIN — HYDROXYZINE HYDROCHLORIDE 25 MG: 25 TABLET ORAL at 23:07

## 2024-06-07 RX ADMIN — HEPARIN SODIUM 900 UNITS/HR: 10000 INJECTION, SOLUTION INTRAVENOUS at 06:05

## 2024-06-07 RX ADMIN — TRAZODONE HYDROCHLORIDE 50 MG: 50 TABLET ORAL at 23:07

## 2024-06-07 RX ADMIN — AMIODARONE HYDROCHLORIDE 1 MG/MIN: 1.8 INJECTION, SOLUTION INTRAVENOUS at 18:36

## 2024-06-07 RX ADMIN — AMIODARONE HYDROCHLORIDE 1 MG/MIN: 1.8 INJECTION, SOLUTION INTRAVENOUS at 14:07

## 2024-06-07 RX ADMIN — LORAZEPAM 1 MG: 1 TABLET ORAL at 17:13

## 2024-06-07 RX ADMIN — PANTOPRAZOLE SODIUM 40 MG: 40 INJECTION, POWDER, FOR SOLUTION INTRAVENOUS at 10:27

## 2024-06-07 RX ADMIN — ASPIRIN 81 MG 81 MG: 81 TABLET ORAL at 17:13

## 2024-06-07 RX ADMIN — Medication 0.02 MCG/KG/MIN: at 07:50

## 2024-06-07 RX ADMIN — Medication 0.02 MCG/KG/MIN: at 18:30

## 2024-06-07 RX ADMIN — Medication 40 PERCENT: at 08:07

## 2024-06-07 RX ADMIN — LORAZEPAM 0.5 MG: 2 INJECTION INTRAMUSCULAR; INTRAVENOUS at 11:22

## 2024-06-07 ASSESSMENT — PAIN SCALES - GENERAL
PAINLEVEL_OUTOF10: 0 - NO PAIN
PAINLEVEL_OUTOF10: 0 - NO PAIN

## 2024-06-07 ASSESSMENT — PAIN - FUNCTIONAL ASSESSMENT: PAIN_FUNCTIONAL_ASSESSMENT: 0-10

## 2024-06-07 NOTE — PROGRESS NOTES
Vancomycin Dosing by Pharmacy- INITIAL    Cristian Sapp is a 58 y.o. year old male who Pharmacy has been consulted for vancomycin dosing for other sepsis unknown etiology . Based on the patient's indication and renal status this patient will be dosed based on a goal AUC of 500-600.     Renal function is currently stable.    Visit Vitals  BP (!) 134/107   Pulse 95   Temp 36.7 °C (98.1 °F) (Temporal)   Resp 16        Lab Results   Component Value Date    CREATININE 0.91 2024    CREATININE 1.13 2024    CREATININE 0.90 2024    CREATININE 1.21 2024        Patient weight is as follows:   Vitals:    24 0800   Weight: 79.1 kg (174 lb 6.1 oz)       Cultures:  No results found for the encounter in last 14 days.        I/O last 3 completed shifts:  In: 2908 (36.8 mL/kg) [I.V.:2258 (28.5 mL/kg); IV Piggyback:650]  Out: 2625 (33.2 mL/kg) [Urine:2615 (0.9 mL/kg/hr); Blood:10]  Weight: 79.1 kg   I/O during current shift:  No intake/output data recorded.    Temp (24hrs), Av.4 °C (97.5 °F), Min:35.8 °C (96.4 °F), Max:37 °C (98.6 °F)         Assessment/Plan     Patient will be given a loading dose of 1750 mg.  Will initiate vancomycin maintenance,  750 mg every 8 hours.    This dosing regimen is predicted by [employer] to result in the following pharmacokinetic parameters:  Loading dose: N/A  Regimen: 750 mg IV every 8 hours.  Start time: 20:02 on 2024  Exposure target: AUC24 (range)400-600 mg/L.hr   AUC24,ss: 556 mg/L.hr  Probability of AUC24 > 400: 82 %  Ctrough,ss: 19 mg/L  Probability of Ctrough,ss > 20: 45 %  Probability of nephrotoxicity (Lodise RAHUL ): 16 %    Follow-up level will be ordered on  at 0500 unless clinically indicated sooner.  Will continue to monitor renal function daily while on vancomycin and order serum creatinine at least every 48 hours if not already ordered.  Follow for continued vancomycin needs, clinical response, and signs/symptoms of toxicity.       Jamila LOPEZ  Kassandra, PharmD

## 2024-06-07 NOTE — PROGRESS NOTES
[facility]  HFICU PROGRESS NOTE    Cristian Sapp/[id number]    Admit Date: 5/28/2024  Hospital Length of Stay: 10   ICU Length of Stay: 4d   Primary Service: HFICU  Referring: Dr Brown     INTERVAL EVENTS / PERTINENT ROS:     Patient Intubated d/t lactic acidosis and on 1:1 IABP support for reduced CO during VT ablation on 6/6.  He is hemodynamically stable this morning with no recurrence of VT.  Supported by IABP, levophed gtt and amio gtt.  Currently on minimal vent setting. Light sedation, with high anxiety.  Able to follow commaneds and showing signs of readiness to extubate.      Plan:  - levo off  - add precedex for ease of extubation d/t anxiety   - propofol off   - extubated today   - c/w amio gtt  - Heparin gtt on hold for possible IABP removal   - R fem IABP ---> coordinate removal w/ Interventional cards   - reinstate DNR/DNI tonight once IABP removed   -Wen removed (leaking)    MEDICATIONS  Infusions:  amiodarone, Last Rate: 1 mg/min (06/07/24 1407)  dexmedeTOMIDine, Last Rate: 0.6 mcg/kg/hr (06/07/24 1406)  [Held by provider] heparin, Last Rate: Stopped (06/07/24 1024)  lactated Ringer's, Last Rate: Stopped (06/07/24 0603)  norepinephrine, Last Rate: Stopped (06/07/24 0758)      Scheduled:  aspirin, 81 mg, Daily  insulin lispro, 0-10 Units, q4h  iron sucrose, 200 mg, Daily  nicotine, 1 patch, Daily   Followed by  [START ON 7/13/2024] nicotine, 1 patch, Daily  pantoprazole, 40 mg, Daily  polyethylene glycol, 17 g, BID  [Held by provider] ranolazine, 500 mg, BID  rosuvastatin, 20 mg, Nightly  [Held by provider] sacubitriL-valsartan, 1 tablet, BID  sennosides-docusate sodium, 2 tablet, BID      PRN:  acetaminophen, 650 mg, q4h PRN   Or  acetaminophen, 650 mg, q4h PRN   Or  acetaminophen, 650 mg, q4h PRN  alteplase, 2 mg, PRN  dextrose, 12.5 g, q15 min PRN  glucagon, 1 mg, q15 min PRN  heparin, 2,000-4,000 Units, q4h PRN  oxygen, , Continuous PRN - O2/gases  [Held by provider]  "trimethobenzamide, 200 mg, q6h PRN        PHYSICAL EXAM:   Visit Vitals  BP (!) 134/107   Pulse 95   Temp 36.4 °C (97.5 °F) (Temporal)   Resp 17   Ht 1.702 m (5' 7\")   Wt 79.1 kg (174 lb 6.1 oz)   SpO2 96%   BMI 27.31 kg/m²   Smoking Status Former   BSA 1.93 m²       Wt Readings from Last 5 Encounters:   06/07/24 79.1 kg (174 lb 6.1 oz)   05/27/24 78.8 kg (173 lb 11.6 oz)       INTAKE/OUTPUT:  I/O last 3 completed shifts:  In: 2778.8 (35.1 mL/kg) [I.V.:2128.8 (26.9 mL/kg); IV Piggyback:650]  Out: 2715 (34.3 mL/kg) [Urine:2705 (0.9 mL/kg/hr); Blood:10]  Weight: 79.1 kg      Physical Exam  Vitals reviewed.   Constitutional:       General: He is not in acute distress.     Appearance: He is ill-appearing.   HENT:      Mouth/Throat:      Mouth: Mucous membranes are moist.   Eyes:      Extraocular Movements: Extraocular movements intact.      Pupils: Pupils are equal, round, and reactive to light.   Cardiovascular:      Rate and Rhythm: Normal rate and regular rhythm.      Pulses: Normal pulses.      Heart sounds: Normal heart sounds.   Pulmonary:      Effort: Pulmonary effort is normal. No respiratory distress.      Breath sounds: Normal breath sounds. No rhonchi.   Abdominal:      General: Abdomen is flat. Bowel sounds are normal. There is no distension.      Tenderness: There is no abdominal tenderness.   Musculoskeletal:         General: Normal range of motion.      Cervical back: Full passive range of motion without pain.      Right lower leg: No edema.      Left lower leg: No edema.      Comments: IABP and swan to right groin.  No hematoma, dressing c/d/I.  Lower extremity warm with strong palpable pulses.     Skin:     General: Skin is warm.      Capillary Refill: Capillary refill takes 2 to 3 seconds.   Neurological:      General: No focal deficit present.      Mental Status: He is alert and oriented to person, place, and time. Mental status is at baseline.   Psychiatric:         Mood and Affect: Mood normal.    " "     Behavior: Behavior normal. Behavior is cooperative.       DATA:  CMP:  Results from last 7 days   Lab Units 06/07/24  0412 06/06/24  2220 06/06/24  0305 06/05/24  1632 06/05/24  0223 06/04/24  1255 06/04/24  0307 06/03/24  1741 06/03/24  0205 06/02/24  0224   SODIUM mmol/L 137 136 137 138 136 134* 137 138 138 136   POTASSIUM mmol/L 4.5 4.4 3.6 3.5 3.6 3.9 3.8 3.8 3.8 4.1   CHLORIDE mmol/L 101 98 101 99 99 97* 100 97* 98 99   CO2 mmol/L 27 25 27 27 28 26 27 28 28 29   ANION GAP mmol/L 14 17 13 16 13 15 14 17 16 12   BUN mg/dL 13 14 14 14 18 16 14 12 13 13   CREATININE mg/dL 0.91 1.13 0.90 1.21 1.43* 1.32* 1.31* 1.20 1.13 0.99   EGFR mL/min/1.73m*2 >90 76 >90 70 58* 63 64 71 76 89   MAGNESIUM mg/dL 2.32 1.75 2.29 1.89 2.05 1.98 2.07 2.17 2.34 1.98   ALBUMIN g/dL 3.2* 3.4 3.2* 3.5 3.3* 3.4 3.6 3.8 3.5 3.6   ALT U/L  --  40  --   --   --  11 9*  --  12 13   AST U/L  --  188*  --   --   --  11 13  --  14 16   BILIRUBIN TOTAL mg/dL  --  0.9  --   --   --  0.7 0.7  --  0.6 1.0     CBC:  Results from last 7 days   Lab Units 06/07/24 0412 06/06/24 2220 06/06/24  0305 06/05/24  0223 06/04/24  0307 06/04/24  0005 06/03/24  1741 06/03/24  0205   WBC AUTO x10*3/uL 14.0* 17.7* 13.6* 11.7* 11.2 10.2 12.7* 11.0   HEMOGLOBIN g/dL 11.7* 12.8* 12.9* 11.8* 12.4* 11.6* 13.1* 11.8*   HEMATOCRIT % 33.0* 38.6* 38.0* 34.0* 36.1* 33.0* 37.0* 33.4*   PLATELETS AUTO x10*3/uL 293 312 318 287 283 273 303 257   MCV fL 92 96 94 94 94 92 92 92     COAG:   Results from last 7 days   Lab Units 06/07/24  0918 06/06/24 2220 06/04/24  1255   INR  1.3* 1.5* 1.3*     ABO:   No results found for: \"ABO\"    HEME/ENDO:  Results from last 7 days   Lab Units 06/05/24  0223 06/04/24  1255 06/04/24  0307   FERRITIN ng/mL 99  --   --    IRON SATURATION % 18*  --   --    TSH mIU/L  --  10.35*  --    HEMOGLOBIN A1C %  --   --  6.0*      CARDIAC:   Results from last 7 days   Lab Units 06/07/24  0412 06/06/24  2220 06/04/24  1255 06/03/24  1741 05/31/24  1742 " 05/31/24  1523   LD U/L 408* 467* 145  --   --   --    TROPHS ng/L  --   --   --   --  2,261* 2,395*   BNP pg/mL  --   --  674* 655*  --   --      ASSESSMENT AND PLAN:   Cristian Sapp is a 59 y/o M with PMHx of CAD s/p multiple PCI, ICM/HFrEF (EF 15% 5/24) s/p CRT-D, VT storm s/p VT ablation (2019), infrarenal AAA s/p R iliac stent, nephrolithiasis s/p recent ureteral stent. Presented to OSH on 5/23 with multiple ICD shocks for VT. Transferred to CICU at [facility] 5/28 for continued management. EP was consulted on arrival and patient was in slow VT, he was started on amiodarone gtt. Underwent VT ablation 5/30, but continued to have intermittent slow VT. On 6/3 VT was noted again with rates ~118 and patient was cardioverted. Maintained on amiodarone + lidocaine gtt, and is s/p stellate ganglion block 6/4. He was transferred to HFICU 6/3 after he signed consent for LVAD/OHT evaluation. After further discussions and being a current smoker, it was decided that OHT would not be an option, and the patient does not want an LVAD at this time, so advanced therapies evaluation was stopped 6/5. Palliative medicine saw the patient 6/5 and he decided to change his code status to DNR-DNI. Redo endocardial ablation with Dr. Wooten 6/6 c/b lactic acidosis requiring intubation and low CO requiring IABP support.      Neuro:  #Anxiety  #Depression   #Insomnia   - C/w atarax 25 mg q8 PRN for anxiety   - Holding paroxetine given arrhythmias   - C/w trazodone 50 mg nightly   - C/w ativan 1 mg q6 PRN for anxiety   - Serial neuro and pain assessments   - PO Tylenol PRN for pain  - PT/OT Consult, OOB to chair  - CAM ICU score every shift  - Sleep/wake cycle normalization     #Substance abuse  - Tobacco use: active smoker  - C/w nicotine patches      Cardiovascular:  #Stage C/D HFrEF/ICM s/p CRT-D (11/2019)  #Acute on Chronic Decompensated HF  TTE (5/28/2024): severely decreased LV systolic function, EF 15%, LVIDd 6.32. Moderate-severe MVR, mild TR     Home medications: ASA, entresto 24-26 mg BID, rosuvastatin 20 mg daily, torsemide 20 mg daily  -  entresto 24-26 mg BID on hold  - spironolactone 12.5 mg daily on hold  - Will hold off on RHC at this time   - Introduce GDMT slowly   - Signed consent for OHT/LVAD workup, patient currently is not a candidate for OHT given active tobacco use, and he does not want an LVAD at this time, will stop workup for now per Dr. Doyle 6/5  - am katrinaan kristin 6/7: BP 82/52 (83), CVP 5, PAP 33/17 (23), CO/CI 5.22/2.81, SVR 1195, SVO2 71% on IABP 1:1 and Levophed 0.02mcg/kg/min   - levo now weaned off in the morning and restarted in the evening   - no diuretics today--> reassess daily   - Unable to remove IABP d/t soft BP and lightheadedness, will reassess ability to remove IABP daily.    - LDH- 408  - Daily standing weights, 2gm sodium diet, 2L fluid restriction, strict I&Os     #CAD s/p multiple PCI   [facility] (5/23/24): Distal Left Main: 10-30% stenosis; Proximal and mid LAD Lesion: The percent stenosis is 10-30%; Proximal CX Lesion: The percent stenosis is 100%; Right Coronary Artery: fills via collaterals; Proximal RCA Lesion: The percent stenosis is 100%; The Left Ventricular Ejection Fraction is 10%,by echo.  - C/w ASA + rosuvastatin 20 mg daily as above      #Prior VT storm s/p VT ablation 5/30 and stellate ganglion block 6/4  #Incessant VT s/p ICD shock   Device interrogation (6/4): paced  for 102 min then stepping down, LR back to 95, patient converted to AP   - C/w lidocaine gtt at 1 mg/min, last level (6/4): 4.6  - heparin gtt on hold 6/7 at 0930 in preparation for IABP removal-- restarted at 1540.  Please hold for a min of 4hrs prior to IABP removal    - ranexa 500 mg BID on hold  - Re-do ablation with Dr. Wooten 6/6--> No recurrence of VT    - post ablation device interrogation ordered 6/7.    - C/w amiodarone gtt     Pulmonary:   #Acute Hypoxic Respiratory Failure 2/2 lactic acidosis requiring intubation --->  Extubated    -Vent settings this am 20,450, 40%, 10   -AB.58/26/97/24.4,BE 3.3  -  patient passed SBT  - remains on Precedex for IABP removal      #?PNA   #Pulmonary Edema   #?COPD   CT chest (): interval worsening now moderate right and small left pleural effusions with associated atelectasis, findings suggestive of mild pulmonary interstitial and alveolar edema  Blood cultures (6/3): NGTD   MRSA negative (6/3)  - C/w zosyn for ? PNA (-)  - Send sputum cx + urine legionella + strep PNA   - ID consulted ,rec'd to stop zosyn  - Monitor and maintain SpO2 > 92%     GI:  #No active issues   - Bowel regimen: facundo-colace BID + miralax BID   - PPI daiy    :  #EITAN   #Nephrolithiasis s/p recent bilateral ureteral stent placement to left ureter   sCr (): 1.43  - Consulted urology  to discuss removal--> will follow outpatient for STENT removal   - Trend RFP daily   - I/Os  - Diuresis as above   - Avoid hypotension and nephrotoxic agents     Heme:  #Anemia in the setting of Chronic Disease  #Iron deficiency anemia   - H&H (6/3/24): 11.8/33.4  - Venofer x3 days       Endo:  #No DM  - Euglycemic, HgbA1c 6%      #?Hypothyroid   - TSH (): 10.34  - T3 (): 65  - T4 (): 8.3  - Consult endocrine --> 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  No need for synthroid now.  repeating TSH and free T4 after 2 to 4 weeks       ID:  #Leukocytosis / ?PNA versus stress induced    Blood cultures (6/3): NGTD   MRSA negative (6/3)  - ID consulted, appreciate assistance -> signed off    - See pulmonary plan above  - Stopped zosyn (-)  - Trend temps q4h     PHYSICAL AND OCCUPATIONAL THERAPY: ordered and following)      LINES:  PIVs   Left radial A-line:       DVT: heparin gtt   VAP BUNDLE: NA  CENTRAL LINE BUNDLE: ordered   ULCER PPX: PPI  GLYCEMIC CONTROL: NA  BOWEL CARE: scheduled facundo-colace BID and Miralax BID    INDWELLING CATHETER: d/c'd 6/7  NUTRITION: No diet orders on file      EMERGENCY CONTACT: Extended Emergency Contact Information  Primary Emergency Contact: Judie Sapp  Address: [address] [place] [address] [facility]  Home Phone: [phone]  Mobile Phone: [phone]  Relation: Mother  Preferred language: English   needed? No  Secondary Emergency Contact: JcKeiry  Address: [address] [place], [place]  Mobile Phone: [phone]  Relation: Sibling  Preferred language: English   needed? No  FAMILY UPDATE: given at bedside 6/7  CODE STATUS: Full Code  DISPO: Remain in HFICU     Patient seen and assessed with Dr. Doyle     I personally spent 65 minutes of critical care time directly and personally managing the patient exclusive of separately billable procedures   _________________________________________________  YARITZA Graham-CNP

## 2024-06-07 NOTE — PROGRESS NOTES
58-year-old gentleman who was getting VT ablation in the EP lab.  After the procedure, patient was found to have lactic acidosis and reduced cardiac output.  Patient has known reduced EF of 15%.  We were consulted to place intra-aortic balloon pump.    Right CFA stick was confirmed using angiography.  Thereafter, over supra core wire 5 Japanese sheath was exchanged for 8 Japanese sheath.  Subsequently, following the appropriate steps, intra-aortic balloon pump was advanced.  Optimal position confirmed.  Patient was started on 1:1 and the balloon pump was secured.    No complications were noted in the procedure.

## 2024-06-07 NOTE — PROGRESS NOTES
HFICU Attending Note    Principal Problem:    Ventricular tachycardia (Multi)  Active Problems:    AICD discharge    Acute on chronic systolic (congestive) heart failure (Multi)    Cardiogenic shock (Multi)    Ischemic cardiomyopathy    Patient underwent redo VT ablation yesterday evening. At the end of the case he had findings consistent with cardiogenic shock and an IABP was placed. He was returned to HFICU intubated/sedated and supported by IABP. Overnight he was stable, and inotropes were weaned off.    This morning he was alert with good respiratory metrics, and he was extubated successfully.    He has had no further VT today.    Plan is to evalute suitability for IABP withdrawal later today.    This critically ill patient continues to be at-risk for clinically significant deterioration / failure due to the above mentioned dysfunctional, unstable organ systems.  I have personally identified and managed all complex critical care issues to prevent aforementioned clinical deterioration.  Critical care time is spent at bedside and/or the immediate area and has included, but is not limited to, the review of diagnostic tests, labs, radiographs, serial assessments of hemodynamics, respiratory status, ventilatory management, and family updates.  Time spent in procedures and teaching are reported separately.    Critical care time: __35__ minutes     Alexandro Doyle MD, MPH  Advanced Heart Failure and Transplant Cardiology  [facility]

## 2024-06-07 NOTE — ANESTHESIA POSTPROCEDURE EVALUATION
Patient: Cristian Sapp    Procedure Summary       Date: 06/06/24 [place] [facility] [place] [facility] Cardiac Cath Lab    Anesthesia Start: 1205 Anesthesia Stop: 2103    Procedure: Ablation VT Diagnosis:       Ventricular tachycardia (Multi)      Ischemic cardiomyopathy      AICD discharge      (Ventricular tachycardia (Multi) [I47.20])      (Ischemic cardiomyopathy [I25.5])      (AICD discharge [Z45.02])    Providers: Eliot Wooten MD Responsible Provider: REGINE Frost    Anesthesia Type: general ASA Status: 4            Anesthesia Type: general    Vitals Value Taken Time   /78 06/06/24 2103   Temp 37 06/06/24 2103   Pulse 95 06/06/24 2103   Resp 16 06/06/24 2103   SpO2 95 06/06/24 2103       Anesthesia Post Evaluation    Patient location during evaluation: ICU  Patient participation: complete - patient cannot participate  Level of consciousness: sedated  Pain management: adequate  Airway patency: patent  Cardiovascular status: acceptable  Respiratory status: ETT and intubated  Hydration status: acceptable  Postoperative Nausea and Vomiting: none        No notable events documented.

## 2024-06-07 NOTE — POST-PROCEDURE NOTE
Physician Transition of Care Summary  Invasive Cardiovascular Lab    Procedure Date: 6/6/2024  Attending:    Ji Wooten - Primary  Resident/Fellow/Other Assistant: Surgeons and Role:  * No surgeons found with a matching role *    Indications:   Pre-op Diagnosis     * Ventricular tachycardia (Multi) [I47.20]     * Ischemic cardiomyopathy [I25.5]     * AICD discharge [Z45.02]    Post-procedure diagnosis:   Post-op Diagnosis     * Ventricular tachycardia (Multi) [I47.20]     * Ischemic cardiomyopathy [I25.5]     * AICD discharge [Z45.02]    Procedure(s):     * Ablation VT      Procedure Findings:   Large scar in the left ventricle encompassing the majority of the left ventricle, excluding the apex  VT originating from lateral scar as well as septal scar  Extensive ablation performed of septal, anteroseptal, and lateral scar  Patient developed cardiogenic shock, requiring Kimmell placement (by EP) and IABP placement (by interventional cardiology)    Description of the Procedure:   RFV accessx2, LFV access x 2,RFA access  Transseptal access to the LV  Voltage mapping of the LV performed and extensive scar noted encompassing the majority of the left ventricle  Extensive scar modification perform  Multiple Vts were induced, coming from the lateral scar as well as the septal scar  These areas were extensively ablated  After ablation VT was intermittently still inducible, so further ablation was performed  Further stimulation then lead to a very wide VT, suggestive of metabolic derangements. pH was noted to be 7.15 and patient was hyperkalemic with metabolic and respiratory acidosis  Given his worsening metabolic status, we decided to terminate the procedure at this point. 80mg of IV Lasix was given, and a swan was placed through the RFV. This showed a CVP of 14 and a mixed venous 02 sat of 66, suggestive of cardiogenic shock  Discussed with Dr. Doyle, who recommended IABP. We then asked interventional cardiology (  Amandeep who placed IABP through previous RFA access    Complications:   Acute hemodynamic decompensation/Cardiogenic shock requiring IABP placement    Stents/Implants:   Implants       No implant documentation for this case.            Anticoagulation/Antiplatelet Plan:   Start heparin drip in 4 hours    Estimated Blood Loss:   * No values recorded between 6/6/2024 12:07 PM and 6/6/2024  8:14 PM *    Anesthesia: General Anesthesia Staff: Anesthesiologist: Aaliyah Waller MD; Taylor Avendano MD; Thomas Lemus MD  CRNA: YARITZA Hobson-CRNA  C-AA: REGINE Frost  Anesthesia Resident: Gloria Marx, DO    Any Specimen(s) Removed:   Order Name Source Comment Collection Info Order Time   BLOOD GAS ARTERIAL FULL PANEL Blood, Arterial   6/6/2024  6:53 PM     Release result to [facility]   Immediate            Disposition:  - Resume IV amiodarone  - Restart IV lidocaine if recurrent VT  - Start heparin drip in 4 hours   - Back to HFICU      Electronically signed by: Eliot Wooten MD, 6/6/2024 8:14 PM

## 2024-06-07 NOTE — CARE PLAN
The patient's goals for the shift include      The clinical goals for the shift include pt will remain free of VT and remain hemodynamically stable    Over the shift, the patient did not make progress toward the following goals. Barriers to progression include . Recommendations to address these barriers include .    Problem: Fall/Injury  Goal: Not fall by end of shift  Outcome: Not Progressing  Goal: Be free from injury by end of the shift  Outcome: Not Progressing  Goal: Verbalize understanding of personal risk factors for fall in the hospital  Outcome: Not Progressing  Goal: Verbalize understanding of risk factor reduction measures to prevent injury from fall in the home  Outcome: Not Progressing  Goal: Use assistive devices by end of the shift  Outcome: Not Progressing  Goal: Pace activities to prevent fatigue by end of the shift  Outcome: Not Progressing     Problem: Pain  Goal: My pain/discomfort is manageable  Outcome: Not Progressing     Problem: Daily Care  Goal: Daily care needs are met  Outcome: Not Progressing     Problem: Psychosocial Needs  Goal: Demonstrates ability to cope with hospitalization/illness  Outcome: Not Progressing  Goal: Collaborate with me, my family, and caregiver to identify my specific goals  Outcome: Not Progressing     Problem: Pain - Adult  Goal: Verbalizes/displays adequate comfort level or baseline comfort level  Outcome: Not Progressing     Problem: Safety - Adult  Goal: Free from fall injury  Outcome: Not Progressing     Problem: Discharge Planning  Goal: Discharge to home or other facility with appropriate resources  Outcome: Not Progressing     Problem: Chronic Conditions and Co-morbidities  Goal: Patient's chronic conditions and co-morbidity symptoms are monitored and maintained or improved  Outcome: Not Progressing     Problem: Skin  Goal: Decreased wound size/increased tissue granulation at next dressing change  Outcome: Not Progressing  Goal: Participates in  plan/prevention/treatment measures  Outcome: Not Progressing  Goal: Prevent/manage excess moisture  Outcome: Not Progressing  Goal: Prevent/minimize sheer/friction injuries  Outcome: Not Progressing  Goal: Promote/optimize nutrition  Outcome: Not Progressing  Goal: Promote skin healing  Outcome: Not Progressing     Problem: Heart Failure  Goal: Improved gas exchange this shift  Outcome: Not Progressing  Goal: Improved urinary output this shift  Outcome: Not Progressing  Goal: Reduction in peripheral edema within 24 hours  Outcome: Not Progressing  Goal: Report improvement of dyspnea/breathlessness this shift  Outcome: Not Progressing  Goal: Weight from fluid excess reduced over 2-3 days, then stabilize  Outcome: Not Progressing  Goal: Increase self care and/or family involvement in 24 hours  Outcome: Not Progressing     Problem: Safety - Medical Restraint  Goal: Remains free of injury from restraints (Restraint for Interference with Medical Device)  Outcome: Not Progressing  Goal: Free from restraint(s) (Restraint for Interference with Medical Device)  Outcome: Not Progressing     Problem: Knowledge Deficit  Goal: Patient/family/caregiver demonstrates understanding of disease process, treatment plan, medications, and discharge instructions  Outcome: Not Progressing     Problem: Mechanical Ventilation  Goal: Patient Will Maintain Patent Airway  Outcome: Not Progressing  Goal: Oral health is maintained or improved  Outcome: Not Progressing  Goal: Tracheostomy will be managed safely  Outcome: Not Progressing  Goal: ET tube will be managed safely  Outcome: Not Progressing  Goal: Ability to express needs and understand communication  Outcome: Not Progressing  Goal: Mobility/activity is maintained at optimum level for patient  Outcome: Not Progressing

## 2024-06-07 NOTE — SIGNIFICANT EVENT
06/07/24 0807   Daily Screen   Total RSBI 32   Weaning Parameters   Weaning Start Time 0807   Weaning Vital Capacity 1875 mL   Negative Inspiratory Force (NIF) -65   Spontaneous Minute Volume (MV) 8.1   Weaning Tidal Volume 990 mL   Respiratory Depth/Rhythm Regular   Respiratory Effort Unlabored   Weaning Tolerance Excellent     CPAP of 5/5 40%  Patient alert and oriented, able to follow weaning instructions.  No dyspnea.

## 2024-06-07 NOTE — PROGRESS NOTES
"Cristian Sapp is a 58 y.o. male on day 10 of admission presenting with Ventricular tachycardia (Multi).    Subjective   S/p VT ablation on 6/6. Course c/b lactic acidosis requiring IABP placement. Currently on 1:1       Objective     Physical Exam  Gen: ill appearing  Neuro: AAOx3, CN 2-12 intact, no FND  HEENT: PEERL, EOMI, sclera anicteric, no conjunctival injection, MMM, no oropharyngeal lesions  Neck: no elevated JVD  Resp: CTAB, normal breath sounds, no wheezing/crackles/ rales  CV: RRR, normal S1/S2, no murmurs/ rubs/gallops  GI: non-tender, non-distended, normal BSs in all 4 quadrants  MSK: warm and well perfused, no edema  Skin: pale and warm tot touch    Last Recorded Vitals  Blood pressure (!) 134/107, pulse 95, temperature 36.7 °C (98.1 °F), temperature source Temporal, resp. rate 17, height 1.702 m (5' 7\"), weight 79.1 kg (174 lb 6.1 oz), SpO2 96%.  Intake/Output last 3 Shifts:  I/O last 3 completed shifts:  In: 2778.8 (35.1 mL/kg) [I.V.:2128.8 (26.9 mL/kg); IV Piggyback:650]  Out: 2715 (34.3 mL/kg) [Urine:2705 (0.9 mL/kg/hr); Blood:10]  Weight: 79.1 kg     Relevant Results  LABS:  CMP:  Results from last 7 days   Lab Units 06/07/24  0412 06/06/24  2220 06/06/24  0305 06/05/24  1632 06/05/24  0223 06/04/24  1255 06/04/24  0307 06/03/24  1741 06/03/24  0205 06/02/24  0224   SODIUM mmol/L 137 136 137 138 136 134* 137 138 138 136   POTASSIUM mmol/L 4.5 4.4 3.6 3.5 3.6 3.9 3.8 3.8 3.8 4.1   CHLORIDE mmol/L 101 98 101 99 99 97* 100 97* 98 99   CO2 mmol/L 27 25 27 27 28 26 27 28 28 29   ANION GAP mmol/L 14 17 13 16 13 15 14 17 16 12   BUN mg/dL 13 14 14 14 18 16 14 12 13 13   CREATININE mg/dL 0.91 1.13 0.90 1.21 1.43* 1.32* 1.31* 1.20 1.13 0.99   EGFR mL/min/1.73m*2 >90 76 >90 70 58* 63 64 71 76 89   MAGNESIUM mg/dL 2.32 1.75 2.29 1.89 2.05 1.98 2.07 2.17 2.34 1.98   ALBUMIN g/dL 3.2* 3.4 3.2* 3.5 3.3* 3.4 3.6 3.8 3.5 3.6   ALT U/L  --  40  --   --   --  11 9*  --  12 13   AST U/L  --  188*  --   --   --  11 13 " " --  14 16   BILIRUBIN TOTAL mg/dL  --  0.9  --   --   --  0.7 0.7  --  0.6 1.0     CBC:  Results from last 7 days   Lab Units 06/07/24 0412 06/06/24 2220 06/06/24  0305 06/05/24 0223 06/04/24  0307 06/04/24  0005 06/03/24  1741 06/03/24  0205   WBC AUTO x10*3/uL 14.0* 17.7* 13.6* 11.7* 11.2 10.2 12.7* 11.0   HEMOGLOBIN g/dL 11.7* 12.8* 12.9* 11.8* 12.4* 11.6* 13.1* 11.8*   HEMATOCRIT % 33.0* 38.6* 38.0* 34.0* 36.1* 33.0* 37.0* 33.4*   PLATELETS AUTO x10*3/uL 293 312 318 287 283 273 303 257   MCV fL 92 96 94 94 94 92 92 92     COAG:   Results from last 7 days   Lab Units 06/07/24  0918 06/06/24 2220 06/04/24  1255   INR  1.3* 1.5* 1.3*     ABO: No results found for: \"ABO\"  HEME/ENDO:  Results from last 7 days   Lab Units 06/05/24 0223 06/04/24  1255 06/04/24  0307   FERRITIN ng/mL 99  --   --    IRON SATURATION % 18*  --   --    TSH mIU/L  --  10.35*  --    HEMOGLOBIN A1C %  --   --  6.0*      CARDIAC:   Results from last 7 days   Lab Units 06/07/24 0412 06/06/24 2220 06/04/24  1255 06/03/24  1741 05/31/24  1742   LD U/L 408* 467* 145  --   --    TROPHS ng/L  --   --   --   --  2,261*   BNP pg/mL  --   --  674* 655*  --    No results for input(s): \"CHOL\", \"LDLF\", \"LDL\", \"LDLCALC\", \"HDL\", \"TRIG\" in the last 17700 hours.       Assessment/Plan   Principal Problem:    Ventricular tachycardia (Multi)  Active Problems:    AICD discharge    Acute on chronic systolic (congestive) heart failure (Multi)    Ischemic cardiomyopathy    58M PMHx CAD s/p multiple PCI, ICM/HFrEF (EF 15% 5/24) s/p CRT-D, VT storm s/p VT ablation, infrarenal AAA s/p R iliac stent, nephrolithiasis s/p recent ureteral stent. Presented to OSH on 5/23 with multiple ICD shocks for VT. Noted to be in incessant, slow VT for which EP is following.   Patient remained in slow, hemodynamically stable VT, likely scar mediated iso known ICM and unrevascularized CAD. Patient was started on amiodarone and lidocaine. Given inability to pace terminate this, " patient underwent VT RFA (VT 1-critical isthmus involving the mid inferior / infero - septal left ventricular segment, VT 2 - mid lateral left ventriclar segment) with Dr. Flores on 5/30.  Despite this, on 6/2, patient was noted to have slow VT with rates ~ 90-110s. Tracking rate was increased to 95bpm but patient continued to have episodes of VT (rates ~ 118 on 6/3) requiring cardioversion. Noted to go into slow VT again on 6/4 s/p successful overdrive pacing. Morphology of VT ws similar to prior VT  which was ablated on 5/30.  Patient underwent a 2nd VT ablation with Dr. Wooten on 6/6 - extensive scar modification was done. Multiple Vts were induced, coming from the lateral scar as well as the septal scar s/p extensive ablation. Patient then went into a different wide VT suggestive of metabolic derangements. pH was noted to be 7.15. Patient subsequently underwent IABP placement.        #ICM/HFrEF (EF 15%) s/p CRT-D  #Prior VT storm s/p VT ablation 5/30, stellate ganglion candy 6/4, s/p repeat ablation 6/6  #Incessant VT s/p multiple ICD shocks  -Cont amiodarone gtt   -Lidocaine gtt on hold  -Currently being V-paced at 95bpm -> if he does not have more VT, can consider decreasing lower limit   -IABP management per primary team  -Continue work up for advanced HF therapies   -Continue heparin gtt in anticipation for invasive procedures ( will need to be on anticoagulation for at least one month given extensive ablation in the LV)      Thank you for the consult. EP will continue to follow. Staffed with Dr. Drea Padilla  PGY-V, Cardiology Fellow        I spent 45 minutes in the professional and overall care of this patient.      Concepción Padilla MD

## 2024-06-07 NOTE — SIGNIFICANT EVENT
Opening [facility] #s    /57 (89), CVP 8, PAP 48/25 (33), fuad CO/CI 7.4/4, , SVO2 81% on IABP 1:1     Logan Moscoso PA-C

## 2024-06-08 ENCOUNTER — APPOINTMENT (OUTPATIENT)
Dept: RADIOLOGY | Facility: HOSPITAL | Age: 59
DRG: 270 | End: 2024-06-08
Payer: MEDICARE

## 2024-06-08 LAB
ACT BLD: 121 SEC (ref 83–199)
ALBUMIN SERPL BCP-MCNC: 3 G/DL (ref 3.4–5)
ANION GAP BLDA CALCULATED.4IONS-SCNC: 11 MMO/L (ref 10–25)
ANION GAP BLDA CALCULATED.4IONS-SCNC: 12 MMO/L (ref 10–25)
ANION GAP BLDMV CALCULATED.4IONS-SCNC: 10 MMO/L (ref 10–25)
ANION GAP BLDMV CALCULATED.4IONS-SCNC: 7 MMO/L (ref 10–25)
ANION GAP SERPL CALC-SCNC: 16 MMOL/L (ref 10–20)
BASE EXCESS BLDA CALC-SCNC: 0.1 MMOL/L (ref -2–3)
BASE EXCESS BLDA CALC-SCNC: 2.3 MMOL/L (ref -2–3)
BASE EXCESS BLDMV CALC-SCNC: 0.7 MMOL/L (ref -2–3)
BASE EXCESS BLDMV CALC-SCNC: 1.9 MMOL/L (ref -2–3)
BASOPHILS # BLD AUTO: 0.09 X10*3/UL (ref 0–0.1)
BASOPHILS # BLD AUTO: 0.13 X10*3/UL (ref 0–0.1)
BASOPHILS NFR BLD AUTO: 0.5 %
BASOPHILS NFR BLD AUTO: 0.8 %
BODY TEMPERATURE: 37 DEGREES CELSIUS
BUN SERPL-MCNC: 16 MG/DL (ref 6–23)
CA-I BLDA-SCNC: 1.05 MMOL/L (ref 1.1–1.33)
CA-I BLDA-SCNC: 1.15 MMOL/L (ref 1.1–1.33)
CA-I BLDMV-SCNC: 1.09 MMOL/L (ref 1.1–1.33)
CA-I BLDMV-SCNC: 1.11 MMOL/L (ref 1.1–1.33)
CALCIUM SERPL-MCNC: 8.1 MG/DL (ref 8.6–10.6)
CHLORIDE BLD-SCNC: 104 MMOL/L (ref 98–107)
CHLORIDE BLD-SCNC: 108 MMOL/L (ref 98–107)
CHLORIDE BLDA-SCNC: 100 MMOL/L (ref 98–107)
CHLORIDE BLDA-SCNC: 103 MMOL/L (ref 98–107)
CHLORIDE SERPL-SCNC: 106 MMOL/L (ref 98–107)
CO2 SERPL-SCNC: 24 MMOL/L (ref 21–32)
CREAT SERPL-MCNC: 1 MG/DL (ref 0.5–1.3)
EGFRCR SERPLBLD CKD-EPI 2021: 88 ML/MIN/1.73M*2
EOSINOPHIL # BLD AUTO: 0.16 X10*3/UL (ref 0–0.7)
EOSINOPHIL # BLD AUTO: 0.28 X10*3/UL (ref 0–0.7)
EOSINOPHIL NFR BLD AUTO: 0.9 %
EOSINOPHIL NFR BLD AUTO: 1.8 %
EPAP CMH2O: 5 CM H2O
ERYTHROCYTE [DISTWIDTH] IN BLOOD BY AUTOMATED COUNT: 13.9 % (ref 11.5–14.5)
ERYTHROCYTE [DISTWIDTH] IN BLOOD BY AUTOMATED COUNT: 14 % (ref 11.5–14.5)
GLUCOSE BLD MANUAL STRIP-MCNC: 115 MG/DL (ref 74–99)
GLUCOSE BLD MANUAL STRIP-MCNC: 126 MG/DL (ref 74–99)
GLUCOSE BLD MANUAL STRIP-MCNC: 131 MG/DL (ref 74–99)
GLUCOSE BLD MANUAL STRIP-MCNC: 135 MG/DL (ref 74–99)
GLUCOSE BLD MANUAL STRIP-MCNC: 231 MG/DL (ref 74–99)
GLUCOSE BLD-MCNC: 135 MG/DL (ref 74–99)
GLUCOSE BLD-MCNC: 98 MG/DL (ref 74–99)
GLUCOSE BLDA-MCNC: 145 MG/DL (ref 74–99)
GLUCOSE BLDA-MCNC: 280 MG/DL (ref 74–99)
GLUCOSE SERPL-MCNC: 102 MG/DL (ref 74–99)
HCO3 BLDA-SCNC: 21.4 MMOL/L (ref 22–26)
HCO3 BLDA-SCNC: 26.5 MMOL/L (ref 22–26)
HCO3 BLDMV-SCNC: 25.4 MMOL/L (ref 22–26)
HCO3 BLDMV-SCNC: 26.6 MMOL/L (ref 22–26)
HCT VFR BLD AUTO: 32.6 % (ref 41–52)
HCT VFR BLD AUTO: 33 % (ref 41–52)
HCT VFR BLD EST: 33 % (ref 41–52)
HCT VFR BLD EST: 35 % (ref 41–52)
HCT VFR BLD EST: 36 % (ref 41–52)
HCT VFR BLD EST: 56 % (ref 41–52)
HGB BLD-MCNC: 11.1 G/DL (ref 13.5–17.5)
HGB BLD-MCNC: 11.4 G/DL (ref 13.5–17.5)
HGB BLDA-MCNC: 12.1 G/DL (ref 13.5–17.5)
HGB BLDA-MCNC: 18.5 G/DL (ref 13.5–17.5)
HGB BLDMV-MCNC: 11.1 G/DL (ref 13.5–17.5)
HGB BLDMV-MCNC: 11.8 G/DL (ref 13.5–17.5)
IMM GRANULOCYTES # BLD AUTO: 0.17 X10*3/UL (ref 0–0.7)
IMM GRANULOCYTES # BLD AUTO: 0.32 X10*3/UL (ref 0–0.7)
IMM GRANULOCYTES NFR BLD AUTO: 1.1 % (ref 0–0.9)
IMM GRANULOCYTES NFR BLD AUTO: 1.9 % (ref 0–0.9)
INHALED O2 CONCENTRATION: 28 %
INHALED O2 CONCENTRATION: 36 %
INHALED O2 CONCENTRATION: 36 %
INHALED O2 CONCENTRATION: 50 %
IPAP CMH2O: 12 CM H2O
LACTATE BLDA-SCNC: 1.1 MMOL/L (ref 0.4–2)
LACTATE BLDA-SCNC: 3.1 MMOL/L (ref 0.4–2)
LACTATE BLDMV-SCNC: 0.6 MMOL/L (ref 0.4–2)
LACTATE BLDMV-SCNC: 0.8 MMOL/L (ref 0.4–2)
LYMPHOCYTES # BLD AUTO: 0.93 X10*3/UL (ref 1.2–4.8)
LYMPHOCYTES # BLD AUTO: 2.81 X10*3/UL (ref 1.2–4.8)
LYMPHOCYTES NFR BLD AUTO: 18.1 %
LYMPHOCYTES NFR BLD AUTO: 5.4 %
MAGNESIUM SERPL-MCNC: 2 MG/DL (ref 1.6–2.4)
MCH RBC QN AUTO: 32.1 PG (ref 26–34)
MCH RBC QN AUTO: 32.1 PG (ref 26–34)
MCHC RBC AUTO-ENTMCNC: 34 G/DL (ref 32–36)
MCHC RBC AUTO-ENTMCNC: 34.5 G/DL (ref 32–36)
MCV RBC AUTO: 93 FL (ref 80–100)
MCV RBC AUTO: 94 FL (ref 80–100)
MONOCYTES # BLD AUTO: 1.24 X10*3/UL (ref 0.1–1)
MONOCYTES # BLD AUTO: 1.3 X10*3/UL (ref 0.1–1)
MONOCYTES NFR BLD AUTO: 7.2 %
MONOCYTES NFR BLD AUTO: 8.4 %
NEUTROPHILS # BLD AUTO: 10.82 X10*3/UL (ref 1.2–7.7)
NEUTROPHILS # BLD AUTO: 14.5 X10*3/UL (ref 1.2–7.7)
NEUTROPHILS NFR BLD AUTO: 69.8 %
NEUTROPHILS NFR BLD AUTO: 84.1 %
NRBC BLD-RTO: 0 /100 WBCS (ref 0–0)
NRBC BLD-RTO: 0 /100 WBCS (ref 0–0)
OXYHGB MFR BLDA: 64.2 % (ref 94–98)
OXYHGB MFR BLDA: 97.3 % (ref 94–98)
OXYHGB MFR BLDMV: 52.7 % (ref 45–75)
OXYHGB MFR BLDMV: 66.8 % (ref 45–75)
PCO2 BLDA: 25 MM HG (ref 38–42)
PCO2 BLDA: 39 MM HG (ref 38–42)
PCO2 BLDMV: 40 MM HG (ref 41–51)
PCO2 BLDMV: 41 MM HG (ref 41–51)
PH BLDA: 7.44 PH (ref 7.38–7.42)
PH BLDA: 7.54 PH (ref 7.38–7.42)
PH BLDMV: 7.41 PH (ref 7.33–7.43)
PH BLDMV: 7.42 PH (ref 7.33–7.43)
PHOSPHATE SERPL-MCNC: 3.2 MG/DL (ref 2.5–4.9)
PLATELET # BLD AUTO: 198 X10*3/UL (ref 150–450)
PLATELET # BLD AUTO: 242 X10*3/UL (ref 150–450)
PO2 BLDA: 116 MM HG (ref 85–95)
PO2 BLDA: 39 MM HG (ref 85–95)
PO2 BLDMV: 36 MM HG (ref 35–45)
PO2 BLDMV: 43 MM HG (ref 35–45)
POTASSIUM BLDA-SCNC: 3.6 MMOL/L (ref 3.5–5.3)
POTASSIUM BLDA-SCNC: 3.6 MMOL/L (ref 3.5–5.3)
POTASSIUM BLDMV-SCNC: 3.7 MMOL/L (ref 3.5–5.3)
POTASSIUM BLDMV-SCNC: 3.8 MMOL/L (ref 3.5–5.3)
POTASSIUM SERPL-SCNC: 3.9 MMOL/L (ref 3.5–5.3)
RBC # BLD AUTO: 3.46 X10*6/UL (ref 4.5–5.9)
RBC # BLD AUTO: 3.55 X10*6/UL (ref 4.5–5.9)
SAO2 % BLDA: 65 % (ref 94–100)
SAO2 % BLDA: 99 % (ref 94–100)
SAO2 % BLDMV: 53 % (ref 45–75)
SAO2 % BLDMV: 68 % (ref 45–75)
SODIUM BLDA-SCNC: 133 MMOL/L (ref 136–145)
SODIUM BLDA-SCNC: 134 MMOL/L (ref 136–145)
SODIUM BLDMV-SCNC: 136 MMOL/L (ref 136–145)
SODIUM BLDMV-SCNC: 138 MMOL/L (ref 136–145)
SODIUM SERPL-SCNC: 142 MMOL/L (ref 136–145)
UFH PPP CHRO-ACNC: 0.1 IU/ML
UFH PPP CHRO-ACNC: 0.2 IU/ML
UFH PPP CHRO-ACNC: 0.4 IU/ML
VANCOMYCIN SERPL-MCNC: 14.3 UG/ML (ref 5–20)
WBC # BLD AUTO: 15.5 X10*3/UL (ref 4.4–11.3)
WBC # BLD AUTO: 17.2 X10*3/UL (ref 4.4–11.3)

## 2024-06-08 PROCEDURE — 85025 COMPLETE CBC W/AUTO DIFF WBC: CPT | Mod: 91 | Performed by: INTERNAL MEDICINE

## 2024-06-08 PROCEDURE — 82947 ASSAY GLUCOSE BLOOD QUANT: CPT | Mod: 91

## 2024-06-08 PROCEDURE — 71045 X-RAY EXAM CHEST 1 VIEW: CPT

## 2024-06-08 PROCEDURE — 37799 UNLISTED PX VASCULAR SURGERY: CPT | Performed by: STUDENT IN AN ORGANIZED HEALTH CARE EDUCATION/TRAINING PROGRAM

## 2024-06-08 PROCEDURE — 85347 COAGULATION TIME ACTIVATED: CPT

## 2024-06-08 PROCEDURE — 2500000004 HC RX 250 GENERAL PHARMACY W/ HCPCS (ALT 636 FOR OP/ED): Performed by: NURSE PRACTITIONER

## 2024-06-08 PROCEDURE — 99291 CRITICAL CARE FIRST HOUR: CPT | Performed by: INTERNAL MEDICINE

## 2024-06-08 PROCEDURE — 84132 ASSAY OF SERUM POTASSIUM: CPT | Performed by: STUDENT IN AN ORGANIZED HEALTH CARE EDUCATION/TRAINING PROGRAM

## 2024-06-08 PROCEDURE — 2020000001 HC ICU ROOM DAILY

## 2024-06-08 PROCEDURE — 2500000001 HC RX 250 WO HCPCS SELF ADMINISTERED DRUGS (ALT 637 FOR MEDICARE OP): Performed by: NURSE PRACTITIONER

## 2024-06-08 PROCEDURE — 2500000004 HC RX 250 GENERAL PHARMACY W/ HCPCS (ALT 636 FOR OP/ED): Performed by: STUDENT IN AN ORGANIZED HEALTH CARE EDUCATION/TRAINING PROGRAM

## 2024-06-08 PROCEDURE — 85520 HEPARIN ASSAY: CPT | Performed by: STUDENT IN AN ORGANIZED HEALTH CARE EDUCATION/TRAINING PROGRAM

## 2024-06-08 PROCEDURE — 85520 HEPARIN ASSAY: CPT | Mod: 91 | Performed by: STUDENT IN AN ORGANIZED HEALTH CARE EDUCATION/TRAINING PROGRAM

## 2024-06-08 PROCEDURE — 2500000001 HC RX 250 WO HCPCS SELF ADMINISTERED DRUGS (ALT 637 FOR MEDICARE OP): Performed by: STUDENT IN AN ORGANIZED HEALTH CARE EDUCATION/TRAINING PROGRAM

## 2024-06-08 PROCEDURE — 84132 ASSAY OF SERUM POTASSIUM: CPT | Mod: 91 | Performed by: STUDENT IN AN ORGANIZED HEALTH CARE EDUCATION/TRAINING PROGRAM

## 2024-06-08 PROCEDURE — 2500000004 HC RX 250 GENERAL PHARMACY W/ HCPCS (ALT 636 FOR OP/ED)

## 2024-06-08 PROCEDURE — 2500000002 HC RX 250 W HCPCS SELF ADMINISTERED DRUGS (ALT 637 FOR MEDICARE OP, ALT 636 FOR OP/ED): Performed by: STUDENT IN AN ORGANIZED HEALTH CARE EDUCATION/TRAINING PROGRAM

## 2024-06-08 PROCEDURE — 2500000002 HC RX 250 W HCPCS SELF ADMINISTERED DRUGS (ALT 637 FOR MEDICARE OP, ALT 636 FOR OP/ED): Performed by: NURSE PRACTITIONER

## 2024-06-08 PROCEDURE — 71045 X-RAY EXAM CHEST 1 VIEW: CPT | Performed by: RADIOLOGY

## 2024-06-08 PROCEDURE — C9113 INJ PANTOPRAZOLE SODIUM, VIA: HCPCS | Performed by: STUDENT IN AN ORGANIZED HEALTH CARE EDUCATION/TRAINING PROGRAM

## 2024-06-08 PROCEDURE — 80202 ASSAY OF VANCOMYCIN: CPT | Performed by: NURSE PRACTITIONER

## 2024-06-08 PROCEDURE — 85025 COMPLETE CBC W/AUTO DIFF WBC: CPT | Performed by: STUDENT IN AN ORGANIZED HEALTH CARE EDUCATION/TRAINING PROGRAM

## 2024-06-08 PROCEDURE — 83735 ASSAY OF MAGNESIUM: CPT | Performed by: STUDENT IN AN ORGANIZED HEALTH CARE EDUCATION/TRAINING PROGRAM

## 2024-06-08 PROCEDURE — 99232 SBSQ HOSP IP/OBS MODERATE 35: CPT | Performed by: STUDENT IN AN ORGANIZED HEALTH CARE EDUCATION/TRAINING PROGRAM

## 2024-06-08 PROCEDURE — S4991 NICOTINE PATCH NONLEGEND: HCPCS | Performed by: STUDENT IN AN ORGANIZED HEALTH CARE EDUCATION/TRAINING PROGRAM

## 2024-06-08 PROCEDURE — 84132 ASSAY OF SERUM POTASSIUM: CPT | Mod: 91 | Performed by: INTERNAL MEDICINE

## 2024-06-08 PROCEDURE — 2500000005 HC RX 250 GENERAL PHARMACY W/O HCPCS: Performed by: STUDENT IN AN ORGANIZED HEALTH CARE EDUCATION/TRAINING PROGRAM

## 2024-06-08 RX ORDER — MAGNESIUM SULFATE HEPTAHYDRATE 40 MG/ML
2 INJECTION, SOLUTION INTRAVENOUS ONCE
Status: COMPLETED | OUTPATIENT
Start: 2024-06-08 | End: 2024-06-08

## 2024-06-08 RX ORDER — HYDRALAZINE HYDROCHLORIDE 20 MG/ML
INJECTION INTRAMUSCULAR; INTRAVENOUS
Status: COMPLETED
Start: 2024-06-08 | End: 2024-06-08

## 2024-06-08 RX ORDER — POTASSIUM CHLORIDE 14.9 MG/ML
20 INJECTION INTRAVENOUS ONCE
Status: COMPLETED | OUTPATIENT
Start: 2024-06-08 | End: 2024-06-08

## 2024-06-08 RX ORDER — ONDANSETRON HYDROCHLORIDE 2 MG/ML
4 INJECTION, SOLUTION INTRAVENOUS ONCE
Status: DISCONTINUED | OUTPATIENT
Start: 2024-06-08 | End: 2024-06-08

## 2024-06-08 RX ORDER — HYDRALAZINE HYDROCHLORIDE 25 MG/1
25 TABLET, FILM COATED ORAL EVERY 8 HOURS SCHEDULED
Status: DISCONTINUED | OUTPATIENT
Start: 2024-06-08 | End: 2024-06-08

## 2024-06-08 RX ORDER — FUROSEMIDE 10 MG/ML
INJECTION INTRAMUSCULAR; INTRAVENOUS
Status: COMPLETED
Start: 2024-06-08 | End: 2024-06-08

## 2024-06-08 RX ORDER — LIDOCAINE HYDROCHLORIDE ANHYDROUS AND DEXTROSE MONOHYDRATE .8; 5 G/100ML; G/100ML
0.5 INJECTION, SOLUTION INTRAVENOUS CONTINUOUS
Status: DISCONTINUED | OUTPATIENT
Start: 2024-06-08 | End: 2024-06-12

## 2024-06-08 RX ORDER — FUROSEMIDE 10 MG/ML
INJECTION INTRAMUSCULAR; INTRAVENOUS
Status: DISPENSED
Start: 2024-06-08 | End: 2024-06-09

## 2024-06-08 RX ORDER — MORPHINE SULFATE 4 MG/ML
1 INJECTION INTRAVENOUS ONCE
Status: COMPLETED | OUTPATIENT
Start: 2024-06-08 | End: 2024-06-08

## 2024-06-08 RX ORDER — FENTANYL CITRATE 50 UG/ML
25 INJECTION, SOLUTION INTRAMUSCULAR; INTRAVENOUS ONCE AS NEEDED
Status: DISCONTINUED | OUTPATIENT
Start: 2024-06-08 | End: 2024-06-10

## 2024-06-08 RX ORDER — POTASSIUM CHLORIDE 14.9 MG/ML
INJECTION INTRAVENOUS
Status: COMPLETED
Start: 2024-06-08 | End: 2024-06-08

## 2024-06-08 RX ORDER — POTASSIUM CHLORIDE 20 MEQ/1
20 TABLET, EXTENDED RELEASE ORAL ONCE
Status: COMPLETED | OUTPATIENT
Start: 2024-06-08 | End: 2024-06-08

## 2024-06-08 RX ORDER — HYDRALAZINE HYDROCHLORIDE 20 MG/ML
10 INJECTION INTRAMUSCULAR; INTRAVENOUS EVERY 6 HOURS PRN
Status: DISCONTINUED | OUTPATIENT
Start: 2024-06-08 | End: 2024-06-08

## 2024-06-08 RX ORDER — HYDRALAZINE HYDROCHLORIDE 20 MG/ML
10 INJECTION INTRAMUSCULAR; INTRAVENOUS EVERY 6 HOURS PRN
Status: DISCONTINUED | OUTPATIENT
Start: 2024-06-08 | End: 2024-06-09

## 2024-06-08 RX ORDER — NITROGLYCERIN 20 MG/100ML
5-200 INJECTION INTRAVENOUS CONTINUOUS
Status: DISCONTINUED | OUTPATIENT
Start: 2024-06-08 | End: 2024-06-09

## 2024-06-08 RX ORDER — MAGNESIUM SULFATE HEPTAHYDRATE 40 MG/ML
2 INJECTION, SOLUTION INTRAVENOUS ONCE
Status: COMPLETED | OUTPATIENT
Start: 2024-06-08 | End: 2024-06-09

## 2024-06-08 RX ORDER — HYDRALAZINE HYDROCHLORIDE 10 MG/1
10 TABLET, FILM COATED ORAL EVERY 8 HOURS SCHEDULED
Status: DISCONTINUED | OUTPATIENT
Start: 2024-06-08 | End: 2024-06-09

## 2024-06-08 RX ORDER — FUROSEMIDE 10 MG/ML
80 INJECTION INTRAMUSCULAR; INTRAVENOUS ONCE
Status: COMPLETED | OUTPATIENT
Start: 2024-06-08 | End: 2024-06-08

## 2024-06-08 RX ORDER — HYDROXYZINE HYDROCHLORIDE 25 MG/1
25 TABLET, FILM COATED ORAL ONCE
Status: DISCONTINUED | OUTPATIENT
Start: 2024-06-08 | End: 2024-06-10

## 2024-06-08 RX ORDER — NITROGLYCERIN 20 MG/100ML
INJECTION INTRAVENOUS
Status: COMPLETED
Start: 2024-06-08 | End: 2024-06-08

## 2024-06-08 RX ADMIN — MAGNESIUM SULFATE HEPTAHYDRATE 2 G: 2 INJECTION, SOLUTION INTRAVENOUS at 09:30

## 2024-06-08 RX ADMIN — POLYETHYLENE GLYCOL 3350 17 G: 17 POWDER, FOR SOLUTION ORAL at 09:03

## 2024-06-08 RX ADMIN — VANCOMYCIN HYDROCHLORIDE 750 MG: 750 INJECTION, SOLUTION INTRAVENOUS at 07:00

## 2024-06-08 RX ADMIN — MORPHINE SULFATE 1 MG: 4 INJECTION INTRAVENOUS at 20:06

## 2024-06-08 RX ADMIN — AMIODARONE HYDROCHLORIDE 1 MG/MIN: 1.8 INJECTION, SOLUTION INTRAVENOUS at 20:56

## 2024-06-08 RX ADMIN — LORAZEPAM 0.5 MG: 0.5 TABLET ORAL at 22:21

## 2024-06-08 RX ADMIN — PIPERACILLIN SODIUM AND TAZOBACTAM SODIUM 3.38 G: 3; .375 INJECTION, SOLUTION INTRAVENOUS at 08:00

## 2024-06-08 RX ADMIN — Medication 1 PATCH: at 05:16

## 2024-06-08 RX ADMIN — SENNOSIDES AND DOCUSATE SODIUM 2 TABLET: 8.6; 5 TABLET ORAL at 09:03

## 2024-06-08 RX ADMIN — POTASSIUM CHLORIDE 20 MEQ: 1500 TABLET, EXTENDED RELEASE ORAL at 22:21

## 2024-06-08 RX ADMIN — NITROGLYCERIN 40 MCG/MIN: 20 INJECTION INTRAVENOUS at 20:30

## 2024-06-08 RX ADMIN — TRAZODONE HYDROCHLORIDE 50 MG: 50 TABLET ORAL at 19:41

## 2024-06-08 RX ADMIN — ASPIRIN 81 MG 81 MG: 81 TABLET ORAL at 09:03

## 2024-06-08 RX ADMIN — POTASSIUM CHLORIDE 20 MEQ: 14.9 INJECTION, SOLUTION INTRAVENOUS at 05:20

## 2024-06-08 RX ADMIN — PIPERACILLIN SODIUM AND TAZOBACTAM SODIUM 3.38 G: 3; .375 INJECTION, SOLUTION INTRAVENOUS at 20:55

## 2024-06-08 RX ADMIN — FUROSEMIDE 80 MG: 10 INJECTION INTRAMUSCULAR; INTRAVENOUS at 20:30

## 2024-06-08 RX ADMIN — LORAZEPAM 1 MG: 1 TABLET ORAL at 04:49

## 2024-06-08 RX ADMIN — HYDROXYZINE HYDROCHLORIDE 25 MG: 25 TABLET ORAL at 22:22

## 2024-06-08 RX ADMIN — HYDRALAZINE HYDROCHLORIDE 10 MG: 20 INJECTION INTRAMUSCULAR; INTRAVENOUS at 20:00

## 2024-06-08 RX ADMIN — VANCOMYCIN HYDROCHLORIDE 750 MG: 750 INJECTION, SOLUTION INTRAVENOUS at 15:00

## 2024-06-08 RX ADMIN — AMIODARONE HYDROCHLORIDE 1 MG/MIN: 1.8 INJECTION, SOLUTION INTRAVENOUS at 06:19

## 2024-06-08 RX ADMIN — AMIODARONE HYDROCHLORIDE 1 MG/MIN: 1.8 INJECTION, SOLUTION INTRAVENOUS at 00:18

## 2024-06-08 RX ADMIN — MAGNESIUM SULFATE HEPTAHYDRATE 2 G: 40 INJECTION, SOLUTION INTRAVENOUS at 05:11

## 2024-06-08 RX ADMIN — POTASSIUM CHLORIDE 20 MEQ: 14.9 INJECTION INTRAVENOUS at 05:20

## 2024-06-08 RX ADMIN — PANTOPRAZOLE SODIUM 40 MG: 40 INJECTION, POWDER, FOR SOLUTION INTRAVENOUS at 09:03

## 2024-06-08 RX ADMIN — HYDROXYZINE HYDROCHLORIDE 25 MG: 25 TABLET ORAL at 05:16

## 2024-06-08 RX ADMIN — PIPERACILLIN SODIUM AND TAZOBACTAM SODIUM 3.38 G: 3; .375 INJECTION, SOLUTION INTRAVENOUS at 02:00

## 2024-06-08 RX ADMIN — PIPERACILLIN SODIUM AND TAZOBACTAM SODIUM 3.38 G: 3; .375 INJECTION, SOLUTION INTRAVENOUS at 13:30

## 2024-06-08 RX ADMIN — TRIMETHOBENZAMIDE HYDROCHLORIDE 200 MG: 100 INJECTION INTRAMUSCULAR at 11:47

## 2024-06-08 RX ADMIN — AMIODARONE HYDROCHLORIDE 1 MG/MIN: 1.8 INJECTION, SOLUTION INTRAVENOUS at 13:43

## 2024-06-08 RX ADMIN — LIDOCAINE HYDROCHLORIDE 1 MG/MIN: 8 INJECTION, SOLUTION INTRAVENOUS at 05:44

## 2024-06-08 RX ADMIN — MORPHINE SULFATE 1 MG: 4 INJECTION INTRAVENOUS at 20:30

## 2024-06-08 RX ADMIN — MAGNESIUM SULFATE HEPTAHYDRATE 2 G: 40 INJECTION, SOLUTION INTRAVENOUS at 22:22

## 2024-06-08 RX ADMIN — HEPARIN SODIUM 12 UNITS/HR: 10000 INJECTION, SOLUTION INTRAVENOUS at 20:53

## 2024-06-08 RX ADMIN — SENNOSIDES AND DOCUSATE SODIUM 2 TABLET: 8.6; 5 TABLET ORAL at 22:21

## 2024-06-08 RX ADMIN — SACUBITRIL AND VALSARTAN 1 TABLET: 24; 26 TABLET, FILM COATED ORAL at 19:40

## 2024-06-08 RX ADMIN — VANCOMYCIN HYDROCHLORIDE 750 MG: 750 INJECTION, SOLUTION INTRAVENOUS at 22:22

## 2024-06-08 RX ADMIN — Medication 4 L/MIN: at 00:00

## 2024-06-08 RX ADMIN — IRON SUCROSE 200 MG: 20 INJECTION, SOLUTION INTRAVENOUS at 05:16

## 2024-06-08 ASSESSMENT — PAIN SCALES - GENERAL
PAINLEVEL_OUTOF10: 0 - NO PAIN
PAINLEVEL_OUTOF10: 0 - NO PAIN

## 2024-06-08 ASSESSMENT — PAIN - FUNCTIONAL ASSESSMENT
PAIN_FUNCTIONAL_ASSESSMENT: 0-10
PAIN_FUNCTIONAL_ASSESSMENT: 0-10

## 2024-06-08 NOTE — PROGRESS NOTES
[facility]  HFICU PROGRESS NOTE    Cristian Sapp/[id number]    Admit Date: 5/28/2024  Hospital Length of Stay: 11   ICU Length of Stay: 4d 21h   Primary Service: HFICU  Referring: Dr Brown     INTERVAL EVENTS / PERTINENT ROS:     The patient was extubated, attempted to wean off entirely (removed with on 6/7 however the patient had soft blood pressures and required levofed overnight until 5 AM.  As for other overnight events, the patient had multiple episodes of wide-complex tachycardia consistent with VT and lidocaine was restarted.      Plan:  - levo off  - Heparin gtt on hold for possible IABP removal   - R fem IABP ---> coordinate removal w/ Interventional cards   - Currently DNR/DNI, still wants advised to have VT therapies on.    MEDICATIONS  Infusions:  amiodarone, Last Rate: 1 mg/min (06/08/24 0619)  heparin, Last Rate: Stopped (06/08/24 1000)  lactated Ringer's, Last Rate: Stopped (06/07/24 0603)  lidocaine, Last Rate: 1 mg/min (06/08/24 0544)  norepinephrine, Last Rate: Stopped (06/08/24 0540)      Scheduled:  aspirin, 81 mg, Daily  hydrOXYzine HCL, 25 mg, Once  insulin lispro, 0-10 Units, q4h  iron sucrose, 200 mg, Daily  LORazepam, 1 mg, Once  LORazepam, 0.5 mg, Nightly  nicotine, 1 patch, Daily   Followed by  [START ON 7/13/2024] nicotine, 1 patch, Daily  pantoprazole, 40 mg, Daily  piperacillin-tazobactam, 3.375 g, q6h  polyethylene glycol, 17 g, BID  [Held by provider] ranolazine, 500 mg, BID  rosuvastatin, 20 mg, Nightly  [Held by provider] sacubitriL-valsartan, 1 tablet, BID  sennosides-docusate sodium, 2 tablet, BID  vancomycin, 750 mg, q8h      PRN:  acetaminophen, 650 mg, q4h PRN   Or  acetaminophen, 650 mg, q4h PRN   Or  acetaminophen, 650 mg, q4h PRN  alteplase, 2 mg, PRN  dextrose, 12.5 g, q15 min PRN  glucagon, 1 mg, q15 min PRN  heparin, 2,000-4,000 Units, q4h PRN  hydrOXYzine HCL, 25 mg, q6h PRN  LORazepam, 1 mg, q8h PRN  oxygen, , Continuous PRN -  "O2/gases  traZODone, 50 mg, Nightly PRN  trimethobenzamide, 200 mg, q6h PRN  vancomycin, , Daily PRN        PHYSICAL EXAM:   Visit Vitals  BP (!) 134/107   Pulse 95   Temp 36.9 °C (98.4 °F) (Temporal)   Resp 18   Ht 1.702 m (5' 7\")   Wt 79.1 kg (174 lb 6.1 oz)   SpO2 99%   BMI 27.31 kg/m²   Smoking Status Former   BSA 1.93 m²       Wt Readings from Last 5 Encounters:   06/07/24 79.1 kg (174 lb 6.1 oz)   05/27/24 78.8 kg (173 lb 11.6 oz)       INTAKE/OUTPUT:  I/O last 3 completed shifts:  In: 3421.2 (43.3 mL/kg) [I.V.:2821.2 (35.7 mL/kg); IV Piggyback:600]  Out: 5155 (65.2 mL/kg) [Urine:5145 (1.8 mL/kg/hr); Blood:10]  Weight: 79.1 kg      Physical Exam  Vitals reviewed.   Constitutional:       General: He is not in acute distress.     Appearance: He is ill-appearing.   HENT:      Mouth/Throat:      Mouth: Mucous membranes are moist.   Eyes:      Extraocular Movements: Extraocular movements intact.      Pupils: Pupils are equal, round, and reactive to light.   Cardiovascular:      Rate and Rhythm: Normal rate and regular rhythm.      Pulses: Normal pulses.      Heart sounds: Normal heart sounds.   Pulmonary:      Effort: Pulmonary effort is normal. No respiratory distress.      Breath sounds: Normal breath sounds. No rhonchi.   Abdominal:      General: Abdomen is flat. Bowel sounds are normal. There is no distension.      Tenderness: There is no abdominal tenderness.   Musculoskeletal:         General: Normal range of motion.      Cervical back: Full passive range of motion without pain.      Right lower leg: No edema.      Left lower leg: No edema.      Comments: IABP and swan to right groin.  No hematoma, dressing c/d/I.  Lower extremity warm with strong palpable pulses.     Skin:     General: Skin is warm.      Capillary Refill: Capillary refill takes 2 to 3 seconds.   Neurological:      General: No focal deficit present.      Mental Status: He is alert and oriented to person, place, and time. Mental status is at " "baseline.   Psychiatric:         Mood and Affect: Mood normal.         Behavior: Behavior normal. Behavior is cooperative.       DATA:  CMP:  Results from last 7 days   Lab Units 06/08/24  0508 06/07/24  0412 06/06/24 2220 06/06/24  0305 06/05/24  1632 06/05/24  0223 06/04/24  1255 06/04/24  0307 06/03/24  1741 06/03/24  0205 06/02/24  0224   SODIUM mmol/L 142 137 136 137 138 136 134* 137 138 138 136   POTASSIUM mmol/L 3.9 4.5 4.4 3.6 3.5 3.6 3.9 3.8 3.8 3.8 4.1   CHLORIDE mmol/L 106 101 98 101 99 99 97* 100 97* 98 99   CO2 mmol/L 24 27 25 27 27 28 26 27 28 28 29   ANION GAP mmol/L 16 14 17 13 16 13 15 14 17 16 12   BUN mg/dL 16 13 14 14 14 18 16 14 12 13 13   CREATININE mg/dL 1.00 0.91 1.13 0.90 1.21 1.43* 1.32* 1.31* 1.20 1.13 0.99   EGFR mL/min/1.73m*2 88 >90 76 >90 70 58* 63 64 71 76 89   MAGNESIUM mg/dL 2.00 2.32 1.75 2.29 1.89 2.05 1.98 2.07 2.17 2.34 1.98   ALBUMIN g/dL 3.0* 3.2* 3.4 3.2* 3.5 3.3* 3.4 3.6 3.8 3.5 3.6   ALT U/L  --   --  40  --   --   --  11 9*  --  12 13   AST U/L  --   --  188*  --   --   --  11 13  --  14 16   BILIRUBIN TOTAL mg/dL  --   --  0.9  --   --   --  0.7 0.7  --  0.6 1.0     CBC:  Results from last 7 days   Lab Units 06/08/24  0508 06/07/24  0412 06/06/24  2220 06/06/24  0305 06/05/24  0223 06/04/24  0307 06/04/24  0005 06/03/24  1741   WBC AUTO x10*3/uL 15.5* 14.0* 17.7* 13.6* 11.7* 11.2 10.2 12.7*   HEMOGLOBIN g/dL 11.1* 11.7* 12.8* 12.9* 11.8* 12.4* 11.6* 13.1*   HEMATOCRIT % 32.6* 33.0* 38.6* 38.0* 34.0* 36.1* 33.0* 37.0*   PLATELETS AUTO x10*3/uL 242 293 312 318 287 283 273 303   MCV fL 94 92 96 94 94 94 92 92     COAG:   Results from last 7 days   Lab Units 06/07/24  0918 06/06/24  2220 06/04/24  1255   INR  1.3* 1.5* 1.3*     ABO:   No results found for: \"ABO\"    HEME/ENDO:  Results from last 7 days   Lab Units 06/05/24  0223 06/04/24  1255 06/04/24  0307   FERRITIN ng/mL 99  --   --    IRON SATURATION % 18*  --   --    TSH mIU/L  --  10.35*  --    HEMOGLOBIN A1C %  --   " --  6.0*      CARDIAC:   Results from last 7 days   Lab Units 06/07/24  0412 06/06/24  2220 06/04/24  1255 06/03/24  1741   LD U/L 408* 467* 145  --    BNP pg/mL  --   --  674* 655*     ASSESSMENT AND PLAN:   Cristian Sapp is a 59 y/o M with PMHx of CAD s/p multiple PCI, ICM/HFrEF (EF 15% 5/24) s/p CRT-D, VT storm s/p VT ablation (2019), infrarenal AAA s/p R iliac stent, nephrolithiasis s/p recent ureteral stent. Presented to OSH on 5/23 with multiple ICD shocks for VT. Transferred to CICU at [facility] 5/28 for continued management. EP was consulted on arrival and patient was in slow VT, he was started on amiodarone gtt. Underwent VT ablation 5/30, but continued to have intermittent slow VT. On 6/3 VT was noted again with rates ~118 and patient was cardioverted. Maintained on amiodarone + lidocaine gtt, and is s/p stellate ganglion block 6/4. He was transferred to HFICU 6/3 after he signed consent for LVAD/OHT evaluation. After further discussions and being a current smoker, it was decided that OHT would not be an option, and the patient does not want an LVAD at this time, so advanced therapies evaluation was stopped 6/5. Palliative medicine saw the patient 6/5 and he decided to change his code status to DNR-DNI. Redo endocardial ablation with Dr. Wooten 6/6 c/b lactic acidosis requiring intubation and low CO requiring IABP support. Currently extubated and off levofed with plans to remove IABP today.     Neuro:  #Anxiety  #Depression   #Insomnia   - C/w atarax 25 mg q8 PRN for anxiety   - Holding paroxetine given arrhythmias   - C/w trazodone 50 mg nightly   - C/w ativan 1 mg q6 PRN for anxiety   - Serial neuro and pain assessments   - PO Tylenol PRN for pain  - PT/OT Consult, OOB to chair  - CAM ICU score every shift  - Sleep/wake cycle normalization     #Substance abuse  - Tobacco use: active smoker  - C/w nicotine patches      Cardiovascular:  #Stage C/D HFrEF/ICM s/p CRT-D (11/2019)  #Acute on Chronic Decompensated  HF  TTE (5/28/2024): severely decreased LV systolic function, EF 15%, LVIDd 6.32. Moderate-severe MVR, mild TR    Home medications: ASA, entresto 24-26 mg BID, rosuvastatin 20 mg daily, torsemide 20 mg daily  -  entresto 24-26 mg BID on hold  - spironolactone 12.5 mg daily on hold  - Will hold off on RHC at this time   - Introduce GDMT slowly   - Signed consent for OHT/LVAD workup, patient currently is not a candidate for OHT given active tobacco use, and he does not want an LVAD at this time, will stop workup for now per Dr. Doyle 6/5  - am swan numbers 6/8: BP 79/56 (64), CVP 6,  PA 32/23 (26), no wedge, mixed venous 68, CI (Malcolm 3, CO 5.8), Thermo CI 2.7, CO 5.2  - levo now weaned off in the morning.  - no diuretics today--> reassess daily   - Will attempt to remove IABP  - LDH- 408  - Daily standing weights, 2gm sodium diet, 2L fluid restriction, strict I&Os     #CAD s/p multiple PCI   LHC (5/23/24): Distal Left Main: 10-30% stenosis; Proximal and mid LAD Lesion: The percent stenosis is 10-30%; Proximal CX Lesion: The percent stenosis is 100%; Right Coronary Artery: fills via collaterals; Proximal RCA Lesion: The percent stenosis is 100%; The Left Ventricular Ejection Fraction is 10%,by echo.  - C/w ASA + rosuvastatin 20 mg daily as above      #Prior VT storm s/p VT ablation 5/30 and stellate ganglion block 6/4  #Incessant VT s/p ICD shock   Device interrogation (6/4): paced  for 102 min then stepping down, LR back to 95, patient converted to AP   - C/w lidocaine gtt at 1 mg/min, last level (6/4): 4.6  - heparin gtt on hold 6/8 at 0900 in preparation for IABP removal  - ranexa 500 mg BID on hold  - Re-do ablation with Dr. Wooten 6/6--> Recurrence of VT (likely related to edema from recent VT ablation as patient is self-terminating without therapy) , EP is following, considering repeat ganglion block on monday  - C/w amiodarone gtt     Pulmonary:   #Acute Hypoxic Respiratory Failure 2/2 lactic  acidosis requiring intubation ---> Extubated    -Vent settings this am 20,450, 40%, 10   -AB.58/26/97/24.4,BE 3.3  -  patient passed SBT  - remains on Precedex for IABP removal      #?PNA   #Pulmonary Edema   #?COPD   CT chest (): interval worsening now moderate right and small left pleural effusions with associated atelectasis, findings suggestive of mild pulmonary interstitial and alveolar edema  Blood cultures (6/3): NGTD   MRSA negative (6/3)  - C/w zosyn for ? PNA (-)  - Send sputum cx + urine legionella + strep PNA   - ID consulted ,rec'd to stop zosyn  - Monitor and maintain SpO2 > 92%     GI:  #No active issues   - Bowel regimen: facundo-colace BID + miralax BID   - PPI daiy    :  #EITAN   #Nephrolithiasis s/p recent bilateral ureteral stent placement to left ureter   sCr (): 1.43  - Consulted urology  to discuss removal--> will follow outpatient for STENT removal   - Trend RFP daily   - I/Os  - Diuresis as above   - Avoid hypotension and nephrotoxic agents     Heme:  #Anemia in the setting of Chronic Disease  #Iron deficiency anemia   - H&H (6/3/24): 11.8/33.4  - Venofer x3 days       Endo:  #No DM  - Euglycemic, HgbA1c 6%      #?Hypothyroid   - TSH (): 10.34  - T3 (): 65  - T4 (): 8.3  - Consult endocrine --> 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  No need for synthroid now.  repeating TSH and free T4 after 2 to 4 weeks       ID:  #Leukocytosis /2 ?PNA versus stress induced    Blood cultures (6/3): NGTD   MRSA negative (6/3)  - ID consulted, appreciate assistance -> signed off    - See pulmonary plan above  - Stopped zosyn (-)  - Trend temps q4h     PHYSICAL AND OCCUPATIONAL THERAPY: ordered and following)      LINES:  PIVs   Left radial A-line: 6/2      DVT: heparin gtt   VAP BUNDLE: NA  CENTRAL LINE BUNDLE: ordered   ULCER PPX: PPI  GLYCEMIC CONTROL: NA  BOWEL CARE:  scheduled facundo-colace BID and Miralax BID   INDWELLING CATHETER: d/c'd 6/7  NUTRITION: Adult diet Regular      EMERGENCY CONTACT: Extended Emergency Contact Information  Primary Emergency Contact: Judie Sapp  Address: [address] [place] [address] [facility]  Home Phone: [phone]  Mobile Phone: [phone]  Relation: Mother  Preferred language: English   needed? No  Secondary Emergency Contact: Keiry Greene  Address: [address] [place], [place]  Mobile Phone: [phone]  Relation: Sibling  Preferred language: English   needed? No  FAMILY UPDATE: given at bedside 6/7  CODE STATUS: DNR and No Intubation  DISPO: Remain in HFICU     Patient seen and assessed with Dr. Doyle     I personally spent 65 minutes of critical care time directly and personally managing the patient exclusive of separately billable procedures.  _________________________________________________  Che Shipley MD  Cardiology Fellow PGY4

## 2024-06-08 NOTE — PROGRESS NOTES
"Cristian Sapp is a 58 y.o. male on day 11 of admission presenting with Ventricular tachycardia (Multi).    Subjective   Around 5am this morning, patient had episodes of NSVT with rates ~ 118. No therapies delivered. 12 point ROS reviewed and -ve         Objective     Physical Exam    Gen: ill appearing  Neuro: AAOx3, CN 2-12 intact, no FND  HEENT: PEERL, EOMI, sclera anicteric, no conjunctival injection, MMM, no oropharyngeal lesions  Neck: no elevated JVD  Resp: CTAB, normal breath sounds, no wheezing/crackles/ rales  CV: RRR, normal S1/S2, no murmurs/ rubs/gallops  GI: non-tender, non-distended, normal BSs in all 4 quadrants  MSK: warm and well perfused, no edema  Skin: pale and warm to touch    Last Recorded Vitals  Blood pressure (!) 134/107, pulse 95, temperature 36.9 °C (98.4 °F), temperature source Temporal, resp. rate 18, height 1.702 m (5' 7\"), weight 79.1 kg (174 lb 6.1 oz), SpO2 99%.  Intake/Output last 3 Shifts:  I/O last 3 completed shifts:  In: 3421.2 (43.3 mL/kg) [I.V.:2821.2 (35.7 mL/kg); IV Piggyback:600]  Out: 5155 (65.2 mL/kg) [Urine:5145 (1.8 mL/kg/hr); Blood:10]  Weight: 79.1 kg     Relevant Results  LABS:  CMP:  Results from last 7 days   Lab Units 06/08/24  0508 06/07/24  0412 06/06/24  2220 06/06/24  0305 06/05/24  1632 06/05/24  0223 06/04/24  1255 06/04/24  0307 06/03/24  1741 06/03/24  0205 06/02/24  0224   SODIUM mmol/L 142 137 136 137 138 136 134* 137 138 138 136   POTASSIUM mmol/L 3.9 4.5 4.4 3.6 3.5 3.6 3.9 3.8 3.8 3.8 4.1   CHLORIDE mmol/L 106 101 98 101 99 99 97* 100 97* 98 99   CO2 mmol/L 24 27 25 27 27 28 26 27 28 28 29   ANION GAP mmol/L 16 14 17 13 16 13 15 14 17 16 12   BUN mg/dL 16 13 14 14 14 18 16 14 12 13 13   CREATININE mg/dL 1.00 0.91 1.13 0.90 1.21 1.43* 1.32* 1.31* 1.20 1.13 0.99   EGFR mL/min/1.73m*2 88 >90 76 >90 70 58* 63 64 71 76 89   MAGNESIUM mg/dL 2.00 2.32 1.75 2.29 1.89 2.05 1.98 2.07 2.17 2.34 1.98   ALBUMIN g/dL 3.0* 3.2* 3.4 3.2* 3.5 3.3* 3.4 3.6 3.8 3.5 3.6 " "  ALT U/L  --   --  40  --   --   --  11 9*  --  12 13   AST U/L  --   --  188*  --   --   --  11 13  --  14 16   BILIRUBIN TOTAL mg/dL  --   --  0.9  --   --   --  0.7 0.7  --  0.6 1.0     CBC:  Results from last 7 days   Lab Units 06/08/24  0508 06/07/24 0412 06/06/24 2220 06/06/24  0305 06/05/24 0223 06/04/24  0307 06/04/24  0005 06/03/24  1741   WBC AUTO x10*3/uL 15.5* 14.0* 17.7* 13.6* 11.7* 11.2 10.2 12.7*   HEMOGLOBIN g/dL 11.1* 11.7* 12.8* 12.9* 11.8* 12.4* 11.6* 13.1*   HEMATOCRIT % 32.6* 33.0* 38.6* 38.0* 34.0* 36.1* 33.0* 37.0*   PLATELETS AUTO x10*3/uL 242 293 312 318 287 283 273 303   MCV fL 94 92 96 94 94 94 92 92     COAG:   Results from last 7 days   Lab Units 06/07/24  0918 06/06/24 2220 06/04/24  1255   INR  1.3* 1.5* 1.3*     ABO: No results found for: \"ABO\"  HEME/ENDO:  Results from last 7 days   Lab Units 06/05/24 0223 06/04/24  1255 06/04/24  0307   FERRITIN ng/mL 99  --   --    IRON SATURATION % 18*  --   --    TSH mIU/L  --  10.35*  --    HEMOGLOBIN A1C %  --   --  6.0*      CARDIAC:   Results from last 7 days   Lab Units 06/07/24 0412 06/06/24 2220 06/04/24  1255 06/03/24  1741   LD U/L 408* 467* 145  --    BNP pg/mL  --   --  674* 655*   No results for input(s): \"CHOL\", \"LDLF\", \"LDL\", \"LDLCALC\", \"HDL\", \"TRIG\" in the last 13944 hours.     Assessment/Plan   Principal Problem:    Ventricular tachycardia (Multi)  Active Problems:    AICD discharge    Acute on chronic systolic (congestive) heart failure (Multi)    Cardiogenic shock (Multi)    Ischemic cardiomyopathy    58M PMHx CAD s/p multiple PCI, ICM/HFrEF (EF 15% 5/24) s/p CRT-D, VT storm s/p VT ablation, infrarenal AAA s/p R iliac stent, nephrolithiasis s/p recent ureteral stent. Presented to OSH on 5/23 with multiple ICD shocks for VT. Noted to be in incessant, slow VT for which EP is following.   Patient remained in slow, hemodynamically stable VT, likely scar mediated iso known ICM and unrevascularized CAD. Patient was started on " amiodarone and lidocaine. Given inability to pace terminate this, patient underwent VT RFA (VT 1-critical isthmus involving the mid inferior / infero - septal left ventricular segment, VT 2 - mid lateral left ventriclar segment) with Dr. Flores on 5/30.  Despite this, on 6/2, patient was noted to have slow VT with rates ~ 90-110s. Tracking rate was increased to 95bpm but patient continued to have episodes of VT (rates ~ 118 on 6/3) requiring cardioversion. Noted to go into slow VT again on 6/4 s/p successful overdrive pacing. Morphology of VT ws similar to prior VT  which was ablated on 5/30.  Patient underwent a 2nd VT ablation with Dr. Wooten on 6/6 - extensive scar modification was done. Multiple Vts were induced, coming from the lateral scar as well as the septal scar s/p extensive ablation. Patient then went into a different wide VT suggestive of metabolic derangements. pH was noted to be 7.15. Patient subsequently underwent IABP placement.   Noted to have a few episodes of slow VT (rates 118) on tele - self terminated without any treatments delivered         #ICM/HFrEF (EF 15%) s/p CRT-D  #Prior VT storm s/p VT ablation 5/30, stellate ganglion candy 6/4, s/p repeat ablation 6/6  #Incessant VT s/p multiple ICD shocks  -Suspect recurrent VT this am is related to edema from recent VT ablation as patient is self-terminating without therapy   -Cont amiodarone gtt   -Cont lidocaine gtt  -Recommend aggressive electrolyte repletion: K>4 and Mg>2  -If patient continues to have more episodes of VT, can consider repeat stellate ganglion block on Mon  -Currently being V-paced at 95bpm -> if he does not have more VT, can consider decreasing lower limit   -IABP management per primary team  -Continue work up for advanced HF therapies   -Continue heparin gtt in anticipation for invasive procedures ( will need to be on anticoagulation for at least one month given extensive ablation in the LV)      Thank you for the consult. DEVEN  will continue to follow. Staffed with Dr. Drea Padilla  PGY-V, Cardiology Fellow        I spent 45 minutes in the professional and overall care of this patient.      Concepción Padilla MD

## 2024-06-08 NOTE — CARE PLAN
The clinical goals for the shift include pt will remain HDS during this shift          Problem: Fall/Injury  Goal: Not fall by end of shift  Outcome: Progressing  Goal: Be free from injury by end of the shift  Outcome: Progressing  Goal: Verbalize understanding of personal risk factors for fall in the hospital  Outcome: Progressing  Goal: Verbalize understanding of risk factor reduction measures to prevent injury from fall in the home  Outcome: Progressing  Goal: Use assistive devices by end of the shift  Outcome: Progressing  Goal: Pace activities to prevent fatigue by end of the shift  Outcome: Progressing     Problem: Pain  Goal: My pain/discomfort is manageable  Outcome: Progressing     Problem: Daily Care  Goal: Daily care needs are met  Outcome: Progressing     Problem: Psychosocial Needs  Goal: Demonstrates ability to cope with hospitalization/illness  Outcome: Progressing  Goal: Collaborate with me, my family, and caregiver to identify my specific goals  Outcome: Progressing     Problem: Pain - Adult  Goal: Verbalizes/displays adequate comfort level or baseline comfort level  Outcome: Progressing     Problem: Safety - Adult  Goal: Free from fall injury  Outcome: Progressing     Problem: Discharge Planning  Goal: Discharge to home or other facility with appropriate resources  Outcome: Progressing     Problem: Chronic Conditions and Co-morbidities  Goal: Patient's chronic conditions and co-morbidity symptoms are monitored and maintained or improved  Outcome: Progressing     Problem: Skin  Goal: Decreased wound size/increased tissue granulation at next dressing change  Outcome: Progressing  Goal: Participates in plan/prevention/treatment measures  Outcome: Progressing  Goal: Prevent/manage excess moisture  Outcome: Progressing  Goal: Prevent/minimize sheer/friction injuries  Outcome: Progressing  Goal: Promote/optimize nutrition  Outcome: Progressing  Goal: Promote skin healing  Outcome: Progressing      Problem: Heart Failure  Goal: Improved gas exchange this shift  Outcome: Progressing  Goal: Improved urinary output this shift  Outcome: Progressing  Goal: Reduction in peripheral edema within 24 hours  Outcome: Progressing  Goal: Report improvement of dyspnea/breathlessness this shift  Outcome: Progressing  Goal: Weight from fluid excess reduced over 2-3 days, then stabilize  Outcome: Progressing  Goal: Increase self care and/or family involvement in 24 hours  Outcome: Progressing     Problem: Safety - Medical Restraint  Goal: Remains free of injury from restraints (Restraint for Interference with Medical Device)  Outcome: Progressing  Goal: Free from restraint(s) (Restraint for Interference with Medical Device)  Outcome: Progressing     Problem: Knowledge Deficit  Goal: Patient/family/caregiver demonstrates understanding of disease process, treatment plan, medications, and discharge instructions  Outcome: Progressing     Problem: Mechanical Ventilation  Goal: Patient Will Maintain Patent Airway  Outcome: Progressing  Goal: Oral health is maintained or improved  Outcome: Progressing  Goal: Tracheostomy will be managed safely  Outcome: Progressing  Goal: ET tube will be managed safely  Outcome: Progressing  Goal: Ability to express needs and understand communication  Outcome: Progressing  Goal: Mobility/activity is maintained at optimum level for patient  Outcome: Progressing

## 2024-06-08 NOTE — SIGNIFICANT EVENT
"Significant Event Note    Called to bedside around 4.30 AM, pt has frequent episodes of SVTs with rate 120-150 and ICD attempts to pacing out. NO ICD shock that time. Pt is on 1:1 IABP support since the 2rd VT ablation on 6/6 and amiodarone gtt 1mg/min.     Patient is alert, oriented x3, moves all extremities.  Patient denies chest pain or chest discomfort.  Patient denies palpation or dizziness. Denies SOB. Denies abdominal pain or abdominal discomfort. Denies N/V/D. However, pt c/o anxiousness with the alarm and restless periodically.     Had discussion about the code status with patient and family. Pt wishes to resume DNR and DNI now.     Plan:  - Replace electrolytes  - Initiate Lidocaine gtt  - c/w amio gtt  - DNR and DNI  - May need to further discuss about deactivating ICD in a near future with Dr. De La Rosa and medical team    BP (!) 134/107   Pulse 95   Temp 36.6 °C (97.9 °F) (Temporal)   Resp 16   Ht 1.702 m (5' 7\")   Wt 79.1 kg (174 lb 6.1 oz)   SpO2 99%   BMI 27.31 kg/m²     Scheduled medications    amiodarone, 1 mg/min, Last Rate: 1 mg/min (06/08/24 0619)  heparin, 0-4,000 Units/hr, Last Rate: 1,300 Units/hr (06/08/24 0400)  lactated Ringer's, 10 mL/hr, Last Rate: Stopped (06/07/24 0603)  lidocaine, 1 mg/min, Last Rate: 1 mg/min (06/08/24 0544)  norepinephrine, 0.01-0.5 mcg/kg/min, Last Rate: Stopped (06/08/24 0540)    aspirin, 81 mg, orogastric tube, Daily  hydrOXYzine HCL, 25 mg, oral, Once  insulin lispro, 0-10 Units, subcutaneous, q4h  iron sucrose, 200 mg, intravenous, Daily  LORazepam, 1 mg, intravenous, Once  LORazepam, 0.5 mg, oral, Nightly  magnesium sulfate, 2 g, intravenous, Once  nicotine, 1 patch, transdermal, Daily   Followed by  [START ON 7/13/2024] nicotine, 1 patch, transdermal, Daily  pantoprazole, 40 mg, intravenous, Daily  piperacillin-tazobactam, 3.375 g, intravenous, q6h  polyethylene glycol, 17 g, orogastric tube, BID  potassium chloride, 20 mEq, intravenous, Once  [Held " by provider] ranolazine, 500 mg, oral, BID  rosuvastatin, 20 mg, orogastric tube, Nightly  [Held by provider] sacubitriL-valsartan, 1 tablet, oral, BID  sennosides-docusate sodium, 2 tablet, orogastric tube, BID  vancomycin, 750 mg, intravenous, q8h

## 2024-06-08 NOTE — PROGRESS NOTES
Vancomycin Dosing by Pharmacy  FOLLOW UP    Cristian Sapp is a 58 y.o. male who Pharmacy is consulted to dose vancomycin for sepsis.     Based on the patient's indication and renal status, this patient is being dosed based on a goal vancomycin AUC of 500-600.     Current vancomycin dose: 750 mg every 8 hours    Estimated vancomycin AUC on current dose: 559 mg/L.hr    Renal function is currently stable.    Estimated Creatinine Clearance: 77.1 mL/min (by C-G formula based on SCr of 1 mg/dL).    Vancomycin   Date Value Ref Range Status   06/08/2024 14.3 5.0 - 20.0 ug/mL Final       Results from last 7 days   Lab Units 06/08/24  0508 06/07/24  0412 06/06/24  2220 06/06/24  0305   CREATININE mg/dL 1.00 0.91 1.13 0.90   BUN mg/dL 16 13 14 14   WBC AUTO x10*3/uL 15.5* 14.0* 17.7* 13.6*        Visit Vitals  BP (!) 134/107   Pulse 95   Temp 36.6 °C (97.9 °F) (Temporal)   Resp 16       Urine Culture   Date/Time Value Ref Range Status   06/05/2024 06:00 AM No growth  Final     Blood Culture   Date/Time Value Ref Range Status   06/07/2024 08:23 PM Loaded on Instrument - Culture in progress  Preliminary        Assessment/Plan    AUC is within goal range. Continue current vancomycin regimen. This dosing regimen is predicted by [employer] to result in the following pharmacokinetic parameters:   Regimen: 750 mg IV every 8 hours.  Start time: 23:07 on 06/08/2024  Exposure target: AUC24 (range)400-600 mg/L.hr   AUC24,ss: 559 mg/L.hr  Probability of AUC24 > 400: 94 %  Ctrough,ss: 20.1 mg/L  Probability of Ctrough,ss > 20: 51 %  Probability of nephrotoxicity (Lodise RAHUL 2009): 17 %    The next vancomycin level will be ordered for 6/10 at 0500, unless clinically indicated sooner.  Will continue to monitor renal function daily while on vancomycin and order serum creatinine at least every 48 hours if not already ordered.  Will follow for continued vancomycin needs, clinical response, and signs/symptoms of toxicity.       Manisha Roe,  PharmD

## 2024-06-08 NOTE — PROGRESS NOTES
HFICU Attending Note    Principal Problem:    Ventricular tachycardia (Multi)  Active Problems:    AICD discharge    Acute on chronic systolic (congestive) heart failure (Multi)    Cardiogenic shock (Multi)    Ischemic cardiomyopathy    He was extubated yesterday and has been stable from respiratory perspective. He remains supported by IABP, with stable hemodynamics by swan today (MAP 64, CVP 6, PA 32/23, mixed venous 68%). Pressors were weaned off overnight. He had a prolonged bout of VT overnight, and again this morning.    Plan is to remove IABP later today. Continue anti-arrhythmic therapy. Appreciate EP consult!!    This critically ill patient continues to be at-risk for clinically significant deterioration / failure due to the above mentioned dysfunctional, unstable organ systems.  I have personally identified and managed all complex critical care issues to prevent aforementioned clinical deterioration.  Critical care time is spent at bedside and/or the immediate area and has included, but is not limited to, the review of diagnostic tests, labs, radiographs, serial assessments of hemodynamics, respiratory status, ventilatory management, and family updates.  Time spent in procedures and teaching are reported separately.    Critical care time: __35__ minutes     Alexandro Doyle MD, MPH  Advanced Heart Failure and Transplant Cardiology  [facility]  [facility]

## 2024-06-09 ENCOUNTER — APPOINTMENT (OUTPATIENT)
Dept: RADIOLOGY | Facility: HOSPITAL | Age: 59
DRG: 270 | End: 2024-06-09
Payer: MEDICARE

## 2024-06-09 PROBLEM — R57.0 CARDIOGENIC SHOCK (MULTI): Status: RESOLVED | Noted: 2024-06-07 | Resolved: 2024-06-09

## 2024-06-09 PROBLEM — Z45.02 AICD DISCHARGE: Status: RESOLVED | Noted: 2024-05-23 | Resolved: 2024-06-09

## 2024-06-09 PROBLEM — J81.0 FLASH PULMONARY EDEMA (MULTI): Status: ACTIVE | Noted: 2024-06-09

## 2024-06-09 LAB
ALBUMIN SERPL BCP-MCNC: 3.4 G/DL (ref 3.4–5)
ALBUMIN SERPL BCP-MCNC: 3.6 G/DL (ref 3.4–5)
ALP SERPL-CCNC: 67 U/L (ref 33–120)
ALT SERPL W P-5'-P-CCNC: 77 U/L (ref 10–52)
ANION GAP BLDMV CALCULATED.4IONS-SCNC: 6 MMO/L (ref 10–25)
ANION GAP SERPL CALC-SCNC: 14 MMOL/L (ref 10–20)
ANION GAP SERPL CALC-SCNC: 20 MMOL/L (ref 10–20)
AST SERPL W P-5'-P-CCNC: 57 U/L (ref 9–39)
BASE EXCESS BLDMV CALC-SCNC: 1.6 MMOL/L (ref -2–3)
BASOPHILS # BLD AUTO: 0.12 X10*3/UL (ref 0–0.1)
BASOPHILS NFR BLD AUTO: 0.8 %
BILIRUB SERPL-MCNC: 0.6 MG/DL (ref 0–1.2)
BODY TEMPERATURE: 37 DEGREES CELSIUS
BUN SERPL-MCNC: 12 MG/DL (ref 6–23)
BUN SERPL-MCNC: 14 MG/DL (ref 6–23)
CA-I BLDMV-SCNC: 1.11 MMOL/L (ref 1.1–1.33)
CALCIUM SERPL-MCNC: 8.1 MG/DL (ref 8.6–10.6)
CALCIUM SERPL-MCNC: 8.2 MG/DL (ref 8.6–10.6)
CHLORIDE BLD-SCNC: 110 MMOL/L (ref 98–107)
CHLORIDE SERPL-SCNC: 100 MMOL/L (ref 98–107)
CHLORIDE SERPL-SCNC: 105 MMOL/L (ref 98–107)
CO2 SERPL-SCNC: 21 MMOL/L (ref 21–32)
CO2 SERPL-SCNC: 25 MMOL/L (ref 21–32)
CREAT SERPL-MCNC: 1.11 MG/DL (ref 0.5–1.3)
CREAT SERPL-MCNC: 1.28 MG/DL (ref 0.5–1.3)
EGFRCR SERPLBLD CKD-EPI 2021: 66 ML/MIN/1.73M*2
EGFRCR SERPLBLD CKD-EPI 2021: 78 ML/MIN/1.73M*2
EOSINOPHIL # BLD AUTO: 0.25 X10*3/UL (ref 0–0.7)
EOSINOPHIL NFR BLD AUTO: 1.7 %
ERYTHROCYTE [DISTWIDTH] IN BLOOD BY AUTOMATED COUNT: 13.9 % (ref 11.5–14.5)
GLUCOSE BLD MANUAL STRIP-MCNC: 125 MG/DL (ref 74–99)
GLUCOSE BLD MANUAL STRIP-MCNC: 128 MG/DL (ref 74–99)
GLUCOSE BLD MANUAL STRIP-MCNC: 130 MG/DL (ref 74–99)
GLUCOSE BLD MANUAL STRIP-MCNC: 137 MG/DL (ref 74–99)
GLUCOSE BLD MANUAL STRIP-MCNC: 141 MG/DL (ref 74–99)
GLUCOSE BLD MANUAL STRIP-MCNC: 160 MG/DL (ref 74–99)
GLUCOSE BLD-MCNC: 109 MG/DL (ref 74–99)
GLUCOSE SERPL-MCNC: 107 MG/DL (ref 74–99)
GLUCOSE SERPL-MCNC: 254 MG/DL (ref 74–99)
HCO3 BLDMV-SCNC: 25.8 MMOL/L (ref 22–26)
HCT VFR BLD AUTO: 30.3 % (ref 41–52)
HCT VFR BLD EST: 34 % (ref 41–52)
HGB BLD-MCNC: 10.6 G/DL (ref 13.5–17.5)
HGB BLDMV-MCNC: 11.2 G/DL (ref 13.5–17.5)
IMM GRANULOCYTES # BLD AUTO: 0.23 X10*3/UL (ref 0–0.7)
IMM GRANULOCYTES NFR BLD AUTO: 1.6 % (ref 0–0.9)
INHALED O2 CONCENTRATION: 28 %
LACTATE BLDMV-SCNC: 0.7 MMOL/L (ref 0.4–2)
LACTATE BLDV-SCNC: 1 MMOL/L (ref 0.4–2)
LYMPHOCYTES # BLD AUTO: 1.93 X10*3/UL (ref 1.2–4.8)
LYMPHOCYTES NFR BLD AUTO: 13 %
MAGNESIUM SERPL-MCNC: 2.47 MG/DL (ref 1.6–2.4)
MCH RBC QN AUTO: 32.1 PG (ref 26–34)
MCHC RBC AUTO-ENTMCNC: 35 G/DL (ref 32–36)
MCV RBC AUTO: 92 FL (ref 80–100)
MONOCYTES # BLD AUTO: 1.44 X10*3/UL (ref 0.1–1)
MONOCYTES NFR BLD AUTO: 9.7 %
NEUTROPHILS # BLD AUTO: 10.83 X10*3/UL (ref 1.2–7.7)
NEUTROPHILS NFR BLD AUTO: 73.2 %
NRBC BLD-RTO: 0 /100 WBCS (ref 0–0)
OXYHGB MFR BLDMV: 66.1 % (ref 45–75)
PCO2 BLDMV: 38 MM HG (ref 41–51)
PH BLDMV: 7.44 PH (ref 7.33–7.43)
PHOSPHATE SERPL-MCNC: 2.7 MG/DL (ref 2.5–4.9)
PLATELET # BLD AUTO: 212 X10*3/UL (ref 150–450)
PO2 BLDMV: 41 MM HG (ref 35–45)
POTASSIUM BLDMV-SCNC: 4 MMOL/L (ref 3.5–5.3)
POTASSIUM SERPL-SCNC: 3.5 MMOL/L (ref 3.5–5.3)
POTASSIUM SERPL-SCNC: 3.9 MMOL/L (ref 3.5–5.3)
PROT SERPL-MCNC: 6.3 G/DL (ref 6.4–8.2)
RBC # BLD AUTO: 3.3 X10*6/UL (ref 4.5–5.9)
SAO2 % BLDMV: 67 % (ref 45–75)
SODIUM BLDMV-SCNC: 138 MMOL/L (ref 136–145)
SODIUM SERPL-SCNC: 137 MMOL/L (ref 136–145)
SODIUM SERPL-SCNC: 140 MMOL/L (ref 136–145)
UFH PPP CHRO-ACNC: 0.3 IU/ML
UFH PPP CHRO-ACNC: 0.3 IU/ML
WBC # BLD AUTO: 14.8 X10*3/UL (ref 4.4–11.3)

## 2024-06-09 PROCEDURE — 2500000002 HC RX 250 W HCPCS SELF ADMINISTERED DRUGS (ALT 637 FOR MEDICARE OP, ALT 636 FOR OP/ED): Performed by: STUDENT IN AN ORGANIZED HEALTH CARE EDUCATION/TRAINING PROGRAM

## 2024-06-09 PROCEDURE — 2500000001 HC RX 250 WO HCPCS SELF ADMINISTERED DRUGS (ALT 637 FOR MEDICARE OP): Performed by: NURSE PRACTITIONER

## 2024-06-09 PROCEDURE — 2500000004 HC RX 250 GENERAL PHARMACY W/ HCPCS (ALT 636 FOR OP/ED): Performed by: NURSE PRACTITIONER

## 2024-06-09 PROCEDURE — 85025 COMPLETE CBC W/AUTO DIFF WBC: CPT | Performed by: STUDENT IN AN ORGANIZED HEALTH CARE EDUCATION/TRAINING PROGRAM

## 2024-06-09 PROCEDURE — 2500000004 HC RX 250 GENERAL PHARMACY W/ HCPCS (ALT 636 FOR OP/ED): Performed by: STUDENT IN AN ORGANIZED HEALTH CARE EDUCATION/TRAINING PROGRAM

## 2024-06-09 PROCEDURE — 84132 ASSAY OF SERUM POTASSIUM: CPT | Performed by: STUDENT IN AN ORGANIZED HEALTH CARE EDUCATION/TRAINING PROGRAM

## 2024-06-09 PROCEDURE — 37799 UNLISTED PX VASCULAR SURGERY: CPT | Performed by: STUDENT IN AN ORGANIZED HEALTH CARE EDUCATION/TRAINING PROGRAM

## 2024-06-09 PROCEDURE — 99291 CRITICAL CARE FIRST HOUR: CPT | Performed by: INTERNAL MEDICINE

## 2024-06-09 PROCEDURE — S4991 NICOTINE PATCH NONLEGEND: HCPCS | Performed by: STUDENT IN AN ORGANIZED HEALTH CARE EDUCATION/TRAINING PROGRAM

## 2024-06-09 PROCEDURE — 2020000001 HC ICU ROOM DAILY

## 2024-06-09 PROCEDURE — 99232 SBSQ HOSP IP/OBS MODERATE 35: CPT | Performed by: STUDENT IN AN ORGANIZED HEALTH CARE EDUCATION/TRAINING PROGRAM

## 2024-06-09 PROCEDURE — 2780000003 HC OR 278 NO HCPCS

## 2024-06-09 PROCEDURE — 82947 ASSAY GLUCOSE BLOOD QUANT: CPT | Mod: 91

## 2024-06-09 PROCEDURE — 84100 ASSAY OF PHOSPHORUS: CPT | Performed by: STUDENT IN AN ORGANIZED HEALTH CARE EDUCATION/TRAINING PROGRAM

## 2024-06-09 PROCEDURE — 80176 ASSAY OF LIDOCAINE: CPT

## 2024-06-09 PROCEDURE — 83605 ASSAY OF LACTIC ACID: CPT | Performed by: STUDENT IN AN ORGANIZED HEALTH CARE EDUCATION/TRAINING PROGRAM

## 2024-06-09 PROCEDURE — 36410 VNPNXR 3YR/> PHY/QHP DX/THER: CPT

## 2024-06-09 PROCEDURE — 83735 ASSAY OF MAGNESIUM: CPT | Performed by: STUDENT IN AN ORGANIZED HEALTH CARE EDUCATION/TRAINING PROGRAM

## 2024-06-09 PROCEDURE — 2500000005 HC RX 250 GENERAL PHARMACY W/O HCPCS: Performed by: STUDENT IN AN ORGANIZED HEALTH CARE EDUCATION/TRAINING PROGRAM

## 2024-06-09 PROCEDURE — 85520 HEPARIN ASSAY: CPT | Performed by: STUDENT IN AN ORGANIZED HEALTH CARE EDUCATION/TRAINING PROGRAM

## 2024-06-09 PROCEDURE — C9113 INJ PANTOPRAZOLE SODIUM, VIA: HCPCS | Performed by: STUDENT IN AN ORGANIZED HEALTH CARE EDUCATION/TRAINING PROGRAM

## 2024-06-09 PROCEDURE — 2500000002 HC RX 250 W HCPCS SELF ADMINISTERED DRUGS (ALT 637 FOR MEDICARE OP, ALT 636 FOR OP/ED): Performed by: NURSE PRACTITIONER

## 2024-06-09 PROCEDURE — 2500000005 HC RX 250 GENERAL PHARMACY W/O HCPCS: Performed by: NURSE PRACTITIONER

## 2024-06-09 PROCEDURE — C1751 CATH, INF, PER/CENT/MIDLINE: HCPCS

## 2024-06-09 PROCEDURE — 2500000001 HC RX 250 WO HCPCS SELF ADMINISTERED DRUGS (ALT 637 FOR MEDICARE OP): Performed by: STUDENT IN AN ORGANIZED HEALTH CARE EDUCATION/TRAINING PROGRAM

## 2024-06-09 RX ORDER — DAPAGLIFLOZIN 10 MG/1
10 TABLET, FILM COATED ORAL DAILY
Status: DISCONTINUED | OUTPATIENT
Start: 2024-06-09 | End: 2024-06-19 | Stop reason: HOSPADM

## 2024-06-09 RX ORDER — POTASSIUM CHLORIDE 14.9 MG/ML
20 INJECTION INTRAVENOUS ONCE
Status: COMPLETED | OUTPATIENT
Start: 2024-06-09 | End: 2024-06-09

## 2024-06-09 RX ORDER — LIDOCAINE HYDROCHLORIDE 10 MG/ML
5 INJECTION, SOLUTION EPIDURAL; INFILTRATION; INTRACAUDAL; PERINEURAL ONCE
Status: DISCONTINUED | OUTPATIENT
Start: 2024-06-09 | End: 2024-06-10

## 2024-06-09 RX ORDER — MAGNESIUM SULFATE HEPTAHYDRATE 40 MG/ML
2 INJECTION, SOLUTION INTRAVENOUS ONCE
Status: COMPLETED | OUTPATIENT
Start: 2024-06-09 | End: 2024-06-09

## 2024-06-09 RX ORDER — SPIRONOLACTONE 25 MG/1
25 TABLET ORAL DAILY
Status: DISCONTINUED | OUTPATIENT
Start: 2024-06-09 | End: 2024-06-19 | Stop reason: HOSPADM

## 2024-06-09 RX ORDER — POTASSIUM CHLORIDE 1.5 G/1.58G
40 POWDER, FOR SOLUTION ORAL ONCE
Status: COMPLETED | OUTPATIENT
Start: 2024-06-09 | End: 2024-06-09

## 2024-06-09 RX ORDER — SIMETHICONE 80 MG
40 TABLET,CHEWABLE ORAL 4 TIMES DAILY PRN
Status: DISCONTINUED | OUTPATIENT
Start: 2024-06-09 | End: 2024-06-19 | Stop reason: HOSPADM

## 2024-06-09 RX ORDER — NOREPINEPHRINE BITARTRATE/D5W 8 MG/250ML
PLASTIC BAG, INJECTION (ML) INTRAVENOUS
Status: DISPENSED
Start: 2024-06-09 | End: 2024-06-09

## 2024-06-09 RX ORDER — NOREPINEPHRINE BITARTRATE/D5W 8 MG/250ML
.01-.5 PLASTIC BAG, INJECTION (ML) INTRAVENOUS CONTINUOUS
Status: DISCONTINUED | OUTPATIENT
Start: 2024-06-09 | End: 2024-06-10

## 2024-06-09 RX ORDER — BISACODYL 10 MG/1
10 SUPPOSITORY RECTAL ONCE
Status: DISCONTINUED | OUTPATIENT
Start: 2024-06-09 | End: 2024-06-10

## 2024-06-09 RX ORDER — FUROSEMIDE 10 MG/ML
40 INJECTION INTRAMUSCULAR; INTRAVENOUS ONCE
Status: COMPLETED | OUTPATIENT
Start: 2024-06-09 | End: 2024-06-09

## 2024-06-09 RX ADMIN — POLYETHYLENE GLYCOL 3350 17 G: 17 POWDER, FOR SOLUTION ORAL at 21:00

## 2024-06-09 RX ADMIN — Medication 0.03 MCG/KG/MIN: at 04:00

## 2024-06-09 RX ADMIN — VANCOMYCIN HYDROCHLORIDE 750 MG: 750 INJECTION, SOLUTION INTRAVENOUS at 11:06

## 2024-06-09 RX ADMIN — LORAZEPAM 0.5 MG: 0.5 TABLET ORAL at 20:51

## 2024-06-09 RX ADMIN — AMIODARONE HYDROCHLORIDE 1 MG/MIN: 1.8 INJECTION, SOLUTION INTRAVENOUS at 09:45

## 2024-06-09 RX ADMIN — TRAZODONE HYDROCHLORIDE 50 MG: 50 TABLET ORAL at 21:54

## 2024-06-09 RX ADMIN — DAPAGLIFLOZIN 10 MG: 10 TABLET, FILM COATED ORAL at 14:20

## 2024-06-09 RX ADMIN — POTASSIUM CHLORIDE 20 MEQ: 14.9 INJECTION, SOLUTION INTRAVENOUS at 07:18

## 2024-06-09 RX ADMIN — ASPIRIN 81 MG 81 MG: 81 TABLET ORAL at 08:35

## 2024-06-09 RX ADMIN — TRAZODONE HYDROCHLORIDE 50 MG: 50 TABLET ORAL at 01:12

## 2024-06-09 RX ADMIN — LIDOCAINE HYDROCHLORIDE 1 MG/MIN: 8 INJECTION, SOLUTION INTRAVENOUS at 14:19

## 2024-06-09 RX ADMIN — HEPARIN SODIUM 1200 UNITS/HR: 10000 INJECTION, SOLUTION INTRAVENOUS at 19:57

## 2024-06-09 RX ADMIN — FUROSEMIDE 40 MG: 10 INJECTION, SOLUTION INTRAMUSCULAR; INTRAVENOUS at 14:09

## 2024-06-09 RX ADMIN — SPIRONOLACTONE 25 MG: 25 TABLET ORAL at 09:52

## 2024-06-09 RX ADMIN — POTASSIUM CHLORIDE 40 MEQ: 1.5 POWDER, FOR SOLUTION ORAL at 14:34

## 2024-06-09 RX ADMIN — SENNOSIDES AND DOCUSATE SODIUM 2 TABLET: 8.6; 5 TABLET ORAL at 08:34

## 2024-06-09 RX ADMIN — LORAZEPAM 1 MG: 1 TABLET ORAL at 01:18

## 2024-06-09 RX ADMIN — Medication 1 PATCH: at 09:00

## 2024-06-09 RX ADMIN — AMIODARONE HYDROCHLORIDE 1 MG/MIN: 1.8 INJECTION, SOLUTION INTRAVENOUS at 14:34

## 2024-06-09 RX ADMIN — AMIODARONE HYDROCHLORIDE 1 MG/MIN: 1.8 INJECTION, SOLUTION INTRAVENOUS at 19:08

## 2024-06-09 RX ADMIN — PANTOPRAZOLE SODIUM 40 MG: 40 INJECTION, POWDER, FOR SOLUTION INTRAVENOUS at 08:34

## 2024-06-09 RX ADMIN — VANCOMYCIN HYDROCHLORIDE 750 MG: 750 INJECTION, SOLUTION INTRAVENOUS at 18:15

## 2024-06-09 RX ADMIN — PIPERACILLIN SODIUM AND TAZOBACTAM SODIUM 3.38 G: 3; .375 INJECTION, SOLUTION INTRAVENOUS at 14:35

## 2024-06-09 RX ADMIN — LORAZEPAM 1 MG: 1 TABLET ORAL at 12:09

## 2024-06-09 RX ADMIN — PIPERACILLIN SODIUM AND TAZOBACTAM SODIUM 3.38 G: 3; .375 INJECTION, SOLUTION INTRAVENOUS at 19:30

## 2024-06-09 RX ADMIN — SODIUM CHLORIDE, POTASSIUM CHLORIDE, SODIUM LACTATE AND CALCIUM CHLORIDE 250 ML: 600; 310; 30; 20 INJECTION, SOLUTION INTRAVENOUS at 03:30

## 2024-06-09 RX ADMIN — MAGNESIUM SULFATE HEPTAHYDRATE 2 G: 40 INJECTION, SOLUTION INTRAVENOUS at 14:10

## 2024-06-09 RX ADMIN — INSULIN LISPRO 2 UNITS: 100 INJECTION, SOLUTION INTRAVENOUS; SUBCUTANEOUS at 16:00

## 2024-06-09 RX ADMIN — AMIODARONE HYDROCHLORIDE 1 MG/MIN: 1.8 INJECTION, SOLUTION INTRAVENOUS at 03:23

## 2024-06-09 RX ADMIN — PIPERACILLIN SODIUM AND TAZOBACTAM SODIUM 3.38 G: 3; .375 INJECTION, SOLUTION INTRAVENOUS at 01:11

## 2024-06-09 RX ADMIN — ROSUVASTATIN CALCIUM 20 MG: 20 TABLET, FILM COATED ORAL at 20:51

## 2024-06-09 RX ADMIN — SODIUM CHLORIDE, POTASSIUM CHLORIDE, SODIUM LACTATE AND CALCIUM CHLORIDE 10 ML/HR: 600; 310; 30; 20 INJECTION, SOLUTION INTRAVENOUS at 00:00

## 2024-06-09 RX ADMIN — PIPERACILLIN SODIUM AND TAZOBACTAM SODIUM 3.38 G: 3; .375 INJECTION, SOLUTION INTRAVENOUS at 08:35

## 2024-06-09 ASSESSMENT — PAIN SCALES - GENERAL
PAINLEVEL_OUTOF10: 0 - NO PAIN
PAINLEVEL_OUTOF10: 3
PAINLEVEL_OUTOF10: 0 - NO PAIN

## 2024-06-09 ASSESSMENT — PAIN - FUNCTIONAL ASSESSMENT
PAIN_FUNCTIONAL_ASSESSMENT: 0-10

## 2024-06-09 ASSESSMENT — PAIN DESCRIPTION - DESCRIPTORS: DESCRIPTORS: SORE;SHARP

## 2024-06-09 NOTE — PROGRESS NOTES
HFICU Attending Note    Principal Problem:    Acute on chronic systolic (congestive) heart failure (Multi)  Active Problems:    Flash pulmonary edema (Multi)    Ischemic cardiomyopathy    Ventricular tachycardia (Multi)    Overnight events included hypertensive urgency and flash pulmonary edema earlier in the evening, and then hypotension later at night. He was supported medically through these events. This morning He is awake alert appropriate and not in distress. [place]: /57 (71), CVP 5, PA 44/26 (34), W 11, skilled nursing 2.9 and TCI 2.7. Levophed is off.    Plan is to continue optimizing GDMT for HFrEF. Will also plan to remove swan today, which should allow him to get OOB to chair. Management of anti-arrhythmic infusions per EP service recommendations.    Case discussed in detail with patient, and his parents at bedside.    This critically ill patient continues to be at-risk for clinically significant deterioration / failure due to the above mentioned dysfunctional, unstable organ systems.  I have personally identified and managed all complex critical care issues to prevent aforementioned clinical deterioration.  Critical care time is spent at bedside and/or the immediate area and has included, but is not limited to, the review of diagnostic tests, labs, radiographs, serial assessments of hemodynamics, respiratory status, ventilatory management, and family updates.  Time spent in procedures and teaching are reported separately.    Critical care time: __35__ minutes     Alexandro Doyle MD, MPH  Advanced Heart Failure and Transplant Cardiology  [facility] [employer]  [facility]

## 2024-06-09 NOTE — PROGRESS NOTES
"Cristian Sapp is a 58 y.o. male on day 12 of admission presenting with Acute on chronic systolic (congestive) heart failure (Multi).    Subjective   IABP removed yesterday. Pt had an episode of VT last night from 19.42-20.58pm. Patient flashed at the same time requiring initiation of jayden gtt and diuresis with Iv lasix     Objective     Physical Exam  Gen: ill appearing  Neuro: AAOx3, CN 2-12 intact, no FND  HEENT: PEERL, EOMI, sclera anicteric, no conjunctival injection, MMM, no oropharyngeal lesions  Neck: no elevated JVD  Resp: CTAB, normal breath sounds, no wheezing/crackles/ rales  CV: RRR, normal S1/S2, no murmurs/ rubs/gallops  GI: non-tender, non-distended, normal BSs in all 4 quadrants  MSK: warm and well perfused, no edema  Skin: pale and warm to touch      Last Recorded Vitals  Blood pressure 97/69, pulse 95, temperature 37.5 °C (99.5 °F), temperature source Temporal, resp. rate 21, height 1.702 m (5' 7\"), weight 80.1 kg (176 lb 9.4 oz), SpO2 97%.  Intake/Output last 3 Shifts:  I/O last 3 completed shifts:  In: 4288.5 (53.5 mL/kg) [P.O.:1375; I.V.:1713.5 (21.4 mL/kg); IV Piggyback:1200]  Out: 7600 (94.9 mL/kg) [Urine:7600 (2.6 mL/kg/hr)]  Weight: 80.1 kg     Relevant Results  LABS:  CMP:  Results from last 7 days   Lab Units 06/09/24  0324 06/08/24  2123 06/08/24  0508 06/07/24  0412 06/06/24  2220 06/06/24  0305 06/05/24  1632 06/05/24  0223 06/04/24  1255 06/04/24  0307 06/03/24  1741 06/03/24  0205   SODIUM mmol/L 140 137 142 137 136 137 138 136 134* 137 138 138   POTASSIUM mmol/L 3.9 3.5 3.9 4.5 4.4 3.6 3.5 3.6 3.9 3.8 3.8 3.8   CHLORIDE mmol/L 105 100 106 101 98 101 99 99 97* 100 97* 98   CO2 mmol/L 25 21 24 27 25 27 27 28 26 27 28 28   ANION GAP mmol/L 14 20 16 14 17 13 16 13 15 14 17 16   BUN mg/dL 12 14 16 13 14 14 14 18 16 14 12 13   CREATININE mg/dL 1.28 1.11 1.00 0.91 1.13 0.90 1.21 1.43* 1.32* 1.31* 1.20 1.13   EGFR mL/min/1.73m*2 66 78 88 >90 76 >90 70 58* 63 64 71 76   MAGNESIUM mg/dL 2.47*  " "--  2.00 2.32 1.75 2.29 1.89 2.05 1.98 2.07 2.17 2.34   ALBUMIN g/dL 3.4 3.6 3.0* 3.2* 3.4 3.2* 3.5 3.3* 3.4 3.6 3.8 3.5   ALT U/L  --  77*  --   --  40  --   --   --  11 9*  --  12   AST U/L  --  57*  --   --  188*  --   --   --  11 13  --  14   BILIRUBIN TOTAL mg/dL  --  0.6  --   --  0.9  --   --   --  0.7 0.7  --  0.6     CBC:  Results from last 7 days   Lab Units 06/09/24 0324 06/08/24 2123 06/08/24 0508 06/07/24 0412 06/06/24 2220 06/06/24 0305 06/05/24 0223 06/04/24  0307   WBC AUTO x10*3/uL 14.8* 17.2* 15.5* 14.0* 17.7* 13.6* 11.7* 11.2   HEMOGLOBIN g/dL 10.6* 11.4* 11.1* 11.7* 12.8* 12.9* 11.8* 12.4*   HEMATOCRIT % 30.3* 33.0* 32.6* 33.0* 38.6* 38.0* 34.0* 36.1*   PLATELETS AUTO x10*3/uL 212 198 242 293 312 318 287 283   MCV fL 92 93 94 92 96 94 94 94     COAG:   Results from last 7 days   Lab Units 06/07/24  0918 06/06/24 2220 06/04/24  1255   INR  1.3* 1.5* 1.3*     ABO: No results found for: \"ABO\"  HEME/ENDO:  Results from last 7 days   Lab Units 06/05/24 0223 06/04/24  1255 06/04/24  0307   FERRITIN ng/mL 99  --   --    IRON SATURATION % 18*  --   --    TSH mIU/L  --  10.35*  --    HEMOGLOBIN A1C %  --   --  6.0*      CARDIAC:   Results from last 7 days   Lab Units 06/07/24 0412 06/06/24  2220 06/04/24  1255 06/03/24  1741   LD U/L 408* 467* 145  --    BNP pg/mL  --   --  674* 655*   No results for input(s): \"CHOL\", \"LDLF\", \"LDL\", \"LDLCALC\", \"HDL\", \"TRIG\" in the last 43226 hours.       Assessment/Plan   Principal Problem:    Acute on chronic systolic (congestive) heart failure (Multi)  Active Problems:    Flash pulmonary edema (Multi)    Ischemic cardiomyopathy    Ventricular tachycardia (Multi)    58M PMHx CAD s/p multiple PCI, ICM/HFrEF (EF 15% 5/24) s/p CRT-D, VT storm s/p VT ablation, infrarenal AAA s/p R iliac stent, nephrolithiasis s/p recent ureteral stent. Presented to OSH on 5/23 with multiple ICD shocks for VT. Noted to be in incessant, slow VT for which EP is following.   Patient " remained in slow, hemodynamically stable VT, likely scar mediated iso known ICM and unrevascularized CAD. Patient was started on amiodarone and lidocaine. Given inability to pace terminate this, patient underwent VT RFA (VT 1-critical isthmus involving the mid inferior / infero - septal left ventricular segment, VT 2 - mid lateral left ventriclar segment) with Dr. Flores on 5/30.  Despite this, on 6/2, patient was noted to have slow VT with rates ~ 90-110s. Tracking rate was increased to 95bpm but patient continued to have episodes of VT (rates ~ 118 on 6/3) requiring cardioversion. Noted to go into slow VT again on 6/4 s/p successful overdrive pacing. Morphology of VT ws similar to prior VT  which was ablated on 5/30.  Patient underwent a 2nd VT ablation with Dr. Wooten on 6/6 - extensive scar modification was done. Multiple Vts were induced, coming from the lateral scar as well as the septal scar s/p extensive ablation. Patient then went into a different wide VT suggestive of metabolic derangements. pH was noted to be 7.15. Patient subsequently underwent IABP placement.   IABP removed on 6/8. Had a 70min run of VT (rates 130s) on 6/8 that self-terminated. Patient had an episode of flash pulm edema at the same which was the likely trigger for the VT        #ICM/HFrEF (EF 15%) s/p CRT-D  #Prior VT storm s/p VT ablation 5/30, stellate ganglion candy 6/4, s/p repeat ablation 6/6  #Incessant VT s/p multiple ICD shocks  -Had VT on 6/8 evening when patient had flash pulm edema - the VT episode is likely a consequence of ADHF in the setting of a severely reduced EF  -Patient still fluid overloaded - recommend diuresis with IV lasix   -Cont amiodarone gtt   -Cont lidocaine gtt  -Recommend aggressive electrolyte repletion: K>4 and Mg>2  -If patient continues to have more episodes of VT, can consider repeat stellate ganglion block on Mon  -Currently being V-paced at 95bpm -> if he does not have more VT, can consider decreasing  lower limit   -Continue work up for advanced HF therapies   -Continue heparin gtt ( will need to be on anticoagulation for at least one month given extensive ablation in the LV)      Thank you for the consult. EP will continue to follow. Staffed with Dr. Drea Padilla  PGY-V, Cardiology Fellow     I spent 45 minutes in the professional and overall care of this patient.    Concepción Padilla MD

## 2024-06-09 NOTE — SIGNIFICANT EVENT
Significant Event Note    Cristian Sapp is a 59 y/o M with PMHx of CAD s/p multiple PCI, ICM/HFrEF (EF 15% 5/24) s/p CRT-D, VT storm s/p VT ablation (2019), infrarenal AAA s/p R iliac stent, nephrolithiasis s/p recent ureteral stent. Presented to OSH on 5/23 with multiple ICD shocks for VT. [place] [facility] [place] continued management. EP was consulted on arrival and patient was in slow VT, he was started on amiodarone gtt. Underwent VT ablation 5/30, but continued to have intermittent slow VT. On 6/3 VT was noted again with rates ~118 and patient was cardioverted. Maintained on amiodarone + lidocaine gtt, and is s/p stellate ganglion block 6/4. He was transferred to HFICU 6/3 after he signed consent for LVAD/OHT evaluation. After further discussions and being a current smoker, it was decided that OHT would not be an option, and the patient does not want an LVAD at this time, so advanced therapies evaluation was stopped 6/5. Palliative medicine saw the patient 6/5 and he decided to change his code status to DNR-DNI. Redo endocardial ablation with Dr. Wooten 6/6 c/b lactic acidosis requiring intubation and low CO requiring IABP support. Currently extubated and off levo gtt off and IABP removed today.     Called to bedside around 7.45 pm tonight and pt was in respiratory distress, labored breathing, diaphoretics, RR 30-40 breath/min,   s, SBP >150-170 mm Hg,   Sat >92%.      Patient is alert, oriented x3, very anxious, denies chest pain or chest discomfort. Patient denies palpation or dizziness, but shortness breath, Rales bilaterally.      Acute respiratory distress most likely due to flush pulmonary edema  with elevated BP and decompensated HF.     Plan:  - lasix 80 mg IVP  - Nitroglycerin gtt  - Morphine 1 mg IVP X 2  - Stat chest Xray done and reviewed  - CPAP stat  - Called Dr. Doyle and updated   - PRN Hydralazine 10 mg IV q 6 hrs   - Closely monitoring  - Updated pt and family     BP  "130/87   Pulse 96   Temp 36.9 °C (98.4 °F) (Temporal)   Resp 22   Ht 1.702 m (5' 7\")   Wt 79.1 kg (174 lb 6.1 oz)   SpO2 92%   BMI 27.31 kg/m²     Continuous medications    Amiodarone, 1 mg/min, Last Rate: 1 mg/min (06/08/24 1343)  heparin, 0-4,000 Units/hr, Last Rate: Stopped (06/08/24 1000)  lactated Ringer's, 10 mL/hr, Last Rate: Stopped (06/07/24 0603)  lidocaine, 1 mg/min, Last Rate: 1 mg/min (06/08/24 0544)  nitroglycerin, 5-200 mcg/min    Scheduled medications  aspirin, 81 mg, orogastric tube, Daily  furosemide, , ,   furosemide, , ,   furosemide, 80 mg, intravenous, Once  hydrALAZINE, , ,   hydrOXYzine HCL, 25 mg, oral, Once  insulin lispro, 0-10 Units, subcutaneous, q4h  iron sucrose, 200 mg, intravenous, Daily  LORazepam, 1 mg, intravenous, Once  LORazepam, 0.5 mg, oral, Nightly  morphine, 1 mg, intravenous, Once  nicotine, 1 patch, transdermal, Daily   Followed by  [START ON 7/13/2024] nicotine, 1 patch, transdermal, Daily  nitroglycerin in 5 % dextrose, , ,   pantoprazole, 40 mg, intravenous, Daily  piperacillin-tazobactam, 3.375 g, intravenous, q6h  polyethylene glycol, 17 g, orogastric tube, BID  [Held by provider] ranolazine, 500 mg, oral, BID  rosuvastatin, 20 mg, orogastric tube, Nightly  sacubitriL-valsartan, 1 tablet, oral, BID  sennosides-docusate sodium, 2 tablet, orogastric tube, BID  vancomycin, 750 mg, intravenous, q8h        "

## 2024-06-09 NOTE — SIGNIFICANT EVENT
"Significant Event Note  Called to bedside around 2.40 Am and BP is soft while being off Nitroglycerin gtt since 12 midnight. Pt has also received with ativan 1 mg that time.     Patient is sleeping, but opens eyes to name, answers simple questions, and looks comfortable, on 2-4 NC.     BP 88/56   Pulse 95   Temp 36.5 °C (97.7 °F) (Temporal)   Resp 15   Ht 1.702 m (5' 7\")   Wt 79.1 kg (174 lb 6.1 oz)   SpO2 96%   BMI 27.31 kg/m² , CVP 3    Plan:  -  ml bolus x2   - Levo gtt restarted to keep MBP>65.   - Stop all anti-hypertensive meds  - Make sure that all central flushed  - Closely monitoring      Meds  amiodarone, 1 mg/min, Last Rate: 1 mg/min (06/09/24 0000)  heparin, 0-4,000 Units/hr, Last Rate: 1,200 Units/hr (06/09/24 0000)  lactated Ringer's, 10 mL/hr, Last Rate: 10 mL/hr (06/09/24 0200)  lidocaine, 1 mg/min, Last Rate: 1 mg/min (06/09/24 0000)  norepinephrine, 0.01-0.5 mcg/kg/min    Scheduled medications  aspirin, 81 mg, orogastric tube, Daily  furosemide, , ,   hydrOXYzine HCL, 25 mg, oral, Once  insulin lispro, 0-10 Units, subcutaneous, q4h  iron sucrose, 200 mg, intravenous, Daily  lactated Ringer's, 250 mL, intravenous, Once  LORazepam, 1 mg, intravenous, Once  LORazepam, 0.5 mg, oral, Nightly  nicotine, 1 patch, transdermal, Daily   Followed by  [START ON 7/13/2024] nicotine, 1 patch, transdermal, Daily  norepinephrine in dextrose 5 %, , ,   pantoprazole, 40 mg, intravenous, Daily  piperacillin-tazobactam, 3.375 g, intravenous, q6h  polyethylene glycol, 17 g, orogastric tube, BID  [Held by provider] ranolazine, 500 mg, oral, BID  rosuvastatin, 20 mg, orogastric tube, Nightly  sacubitriL-valsartan, 1 tablet, oral, BID  sennosides-docusate sodium, 2 tablet, orogastric tube, BID  vancomycin, 750 mg, intravenous, q8h      "

## 2024-06-09 NOTE — SIGNIFICANT EVENT
The closing SG # (6/9):     /57 (71), CVP 5, PA 44/26 (34), PCWP 11, Malcolm CO/CI:  5.5/2.9, Thermo: 5.3/2.8, , SVO2 67% on Dapa 10 mg daily, [place] 25 mg daily, Entresto on hold currently due to soft Bp earlier.

## 2024-06-09 NOTE — PROGRESS NOTES
[facility]  HFICU PROGRESS NOTE    Cristian Sapp/[id number]    Admit Date: 5/28/2024  Hospital Length of Stay: 12   ICU Length of Stay: 5d 21h   Primary Service: HFICU  Referring: Dr Brown     INTERVAL EVENTS / PERTINENT ROS:     The patient had IABP taken out on 6/8 in the afternoon, his BP initially was increased at 170/100   along with tachypnea, there was concern for flash pulmonary edema on CXR. He was started on Nitroglycerin, lasix 80 IV and was placed CPAP. Overnight he was having soft pressures and levofed was restarted.    Plan:  - levo off  - Heparin gtt given extensive LV ablation burden, EP recommended 1 month of AC.  - Started Spironolactone, Dapagliflozin and spot dosing lasix as needed.  - Currently DNR/DNI, still wants advised to have VT therapies on.    MEDICATIONS  Infusions:  amiodarone, Last Rate: 1 mg/min (06/09/24 0945)  heparin, Last Rate: 1,200 Units/hr (06/09/24 0400)  lactated Ringer's, Last Rate: 10 mL/hr (06/09/24 0700)  lidocaine, Last Rate: 1 mg/min (06/09/24 0400)  norepinephrine, Last Rate: Stopped (06/09/24 0739)      Scheduled:  aspirin, 81 mg, Daily  dapagliflozin propanediol, 10 mg, Daily  furosemide, 40 mg, Once  hydrOXYzine HCL, 25 mg, Once  insulin lispro, 0-10 Units, q4h  LORazepam, 1 mg, Once  LORazepam, 0.5 mg, Nightly  magnesium sulfate, 2 g, Once  nicotine, 1 patch, Daily   Followed by  [START ON 7/13/2024] nicotine, 1 patch, Daily  pantoprazole, 40 mg, Daily  piperacillin-tazobactam, 3.375 g, q6h  polyethylene glycol, 17 g, BID  potassium chloride, 40 mEq, Once  [Held by provider] ranolazine, 500 mg, BID  rosuvastatin, 20 mg, Nightly  [Held by provider] sacubitriL-valsartan, 1 tablet, BID  sennosides-docusate sodium, 2 tablet, BID  spironolactone, 25 mg, Daily  vancomycin, 750 mg, q8h      PRN:  acetaminophen, 650 mg, q4h PRN   Or  acetaminophen, 650 mg, q4h PRN   Or  acetaminophen, 650 mg, q4h PRN  alteplase, 2 mg, PRN  dextrose, 12.5 g,  "q15 min PRN  fentaNYL, 25 mcg, Once PRN  glucagon, 1 mg, q15 min PRN  heparin, 2,000-4,000 Units, q4h PRN  hydrOXYzine HCL, 25 mg, q6h PRN  LORazepam, 1 mg, q8h PRN  oxygen, , Continuous PRN - O2/gases  simethicone, 40 mg, 4x daily PRN  traZODone, 50 mg, Nightly PRN  trimethobenzamide, 200 mg, q6h PRN  vancomycin, , Daily PRN        PHYSICAL EXAM:   Visit Vitals  BP 97/69   Pulse 95   Temp 37.5 °C (99.5 °F) (Temporal)   Resp 21   Ht 1.702 m (5' 7\")   Wt 80.1 kg (176 lb 9.4 oz)   SpO2 97%   BMI 27.66 kg/m²   Smoking Status Former   BSA 1.95 m²       Wt Readings from Last 5 Encounters:   06/09/24 80.1 kg (176 lb 9.4 oz)   05/27/24 78.8 kg (173 lb 11.6 oz)       INTAKE/OUTPUT:  I/O last 3 completed shifts:  In: 4288.5 (53.5 mL/kg) [P.O.:1375; I.V.:1713.5 (21.4 mL/kg); IV Piggyback:1200]  Out: 7600 (94.9 mL/kg) [Urine:7600 (2.6 mL/kg/hr)]  Weight: 80.1 kg      Physical Exam  Vitals reviewed.   Constitutional:       General: He is not in acute distress.     Appearance: He is ill-appearing.   HENT:      Mouth/Throat:      Mouth: Mucous membranes are moist.   Eyes:      Extraocular Movements: Extraocular movements intact.      Pupils: Pupils are equal, round, and reactive to light.   Cardiovascular:      Rate and Rhythm: Normal rate and regular rhythm.      Pulses: Normal pulses.      Heart sounds: Normal heart sounds.   Pulmonary:      Effort: Pulmonary effort is normal. No respiratory distress.      Breath sounds: Normal breath sounds. No rhonchi.   Abdominal:      General: Abdomen is flat. Bowel sounds are normal. There is no distension.      Tenderness: There is no abdominal tenderness.   Musculoskeletal:         General: Normal range of motion.      Cervical back: Full passive range of motion without pain.      Right lower leg: No edema.      Left lower leg: No edema.      Comments: IABP and swan to right groin.  No hematoma, dressing c/d/I.  Lower extremity warm with strong palpable pulses.     Skin:     General: " Skin is warm.      Capillary Refill: Capillary refill takes 2 to 3 seconds.   Neurological:      General: No focal deficit present.      Mental Status: He is alert and oriented to person, place, and time. Mental status is at baseline.   Psychiatric:         Mood and Affect: Mood normal.         Behavior: Behavior normal. Behavior is cooperative.       DATA:  CMP:  Results from last 7 days   Lab Units 06/09/24  0324 06/08/24  2123 06/08/24  0508 06/07/24  0412 06/06/24  2220 06/06/24  0305 06/05/24  1632 06/05/24  0223 06/04/24  1255 06/04/24  0307 06/03/24  1741 06/03/24  0205   SODIUM mmol/L 140 137 142 137 136 137 138 136 134* 137 138 138   POTASSIUM mmol/L 3.9 3.5 3.9 4.5 4.4 3.6 3.5 3.6 3.9 3.8 3.8 3.8   CHLORIDE mmol/L 105 100 106 101 98 101 99 99 97* 100 97* 98   CO2 mmol/L 25 21 24 27 25 27 27 28 26 27 28 28   ANION GAP mmol/L 14 20 16 14 17 13 16 13 15 14 17 16   BUN mg/dL 12 14 16 13 14 14 14 18 16 14 12 13   CREATININE mg/dL 1.28 1.11 1.00 0.91 1.13 0.90 1.21 1.43* 1.32* 1.31* 1.20 1.13   EGFR mL/min/1.73m*2 66 78 88 >90 76 >90 70 58* 63 64 71 76   MAGNESIUM mg/dL 2.47*  --  2.00 2.32 1.75 2.29 1.89 2.05 1.98 2.07 2.17 2.34   ALBUMIN g/dL 3.4 3.6 3.0* 3.2* 3.4 3.2* 3.5 3.3* 3.4 3.6 3.8 3.5   ALT U/L  --  77*  --   --  40  --   --   --  11 9*  --  12   AST U/L  --  57*  --   --  188*  --   --   --  11 13  --  14   BILIRUBIN TOTAL mg/dL  --  0.6  --   --  0.9  --   --   --  0.7 0.7  --  0.6     CBC:  Results from last 7 days   Lab Units 06/09/24  0324 06/08/24  2123 06/08/24  0508 06/07/24  0412 06/06/24  2220 06/06/24  0305 06/05/24  0223 06/04/24  0307   WBC AUTO x10*3/uL 14.8* 17.2* 15.5* 14.0* 17.7* 13.6* 11.7* 11.2   HEMOGLOBIN g/dL 10.6* 11.4* 11.1* 11.7* 12.8* 12.9* 11.8* 12.4*   HEMATOCRIT % 30.3* 33.0* 32.6* 33.0* 38.6* 38.0* 34.0* 36.1*   PLATELETS AUTO x10*3/uL 212 198 242 293 312 318 287 283   MCV fL 92 93 94 92 96 94 94 94     COAG:   Results from last 7 days   Lab Units 06/07/24  0918  "06/06/24  2220 06/04/24  1255   INR  1.3* 1.5* 1.3*     ABO:   No results found for: \"ABO\"    HEME/ENDO:  Results from last 7 days   Lab Units 06/05/24  0223 06/04/24  1255 06/04/24  0307   FERRITIN ng/mL 99  --   --    IRON SATURATION % 18*  --   --    TSH mIU/L  --  10.35*  --    HEMOGLOBIN A1C %  --   --  6.0*      CARDIAC:   Results from last 7 days   Lab Units 06/07/24  0412 06/06/24  2220 06/04/24  1255 06/03/24  1741   LD U/L 408* 467* 145  --    BNP pg/mL  --   --  674* 655*     ASSESSMENT AND PLAN:   Cristian Sapp is a 57 y/o M with PMHx of CAD s/p multiple PCI, ICM/HFrEF (EF 15% 5/24) s/p CRT-D, VT storm s/p VT ablation (2019), infrarenal AAA s/p R iliac stent, nephrolithiasis s/p recent ureteral stent. Presented to OSH on 5/23 with multiple ICD shocks for VT. [place] [facility] [place]. EP was consulted on arrival and patient was in slow VT, he was started on amiodarone gtt. Underwent VT ablation 5/30, but continued to have intermittent slow VT. On 6/3 VT was noted again with rates ~118 and patient was cardioverted. Maintained on amiodarone + lidocaine gtt, and is s/p stellate ganglion block 6/4. He was transferred to HFICU 6/3 after he signed consent for LVAD/OHT evaluation. After further discussions and being a current smoker, it was decided that OHT would not be an option, and the patient does not want an LVAD at this time, so advanced therapies evaluation was stopped 6/5. Palliative medicine saw the patient 6/5 and he decided to change his code status to DNR-DNI. Redo endocardial ablation with Dr. Wooten 6/6 c/b lactic acidosis requiring intubation and low CO requiring IABP support. Currently extubated and off levofed and IABP was removed on 6/8.     Neuro:  #Anxiety  #Depression   #Insomnia   - C/w atarax 25 mg q8 PRN for anxiety   - Holding paroxetine given arrhythmias   - C/w trazodone 50 mg nightly   - C/w ativan 1 mg q6 PRN for anxiety   - Serial neuro and pain " assessments   - PO Tylenol PRN for pain  - PT/OT Consult, OOB to chair  - CAM ICU score every shift  - Sleep/wake cycle normalization     #Substance abuse  - Tobacco use: active smoker  - C/w nicotine patches      Cardiovascular:  #Stage C/D HFrEF/ICM s/p CRT-D (11/2019)  #Acute on Chronic Decompensated HF  TTE (5/28/2024): severely decreased LV systolic function, EF 15%, LVIDd 6.32. Moderate-severe MVR, mild TR    Home medications: ASA, entresto 24-26 mg BID, rosuvastatin 20 mg daily, torsemide 20 mg daily  -  entresto 24-26 mg BID on hold  - spironolactone 25 mg daily restarted   - started Dapagliflozin 10 mg.  - Introduce GDMT slowly as above.  - Signed consent for OHT/LVAD workup, patient currently is not a candidate for OHT given active tobacco use, and he does not want an LVAD at this time, will stop workup for now per Dr. Doyle 6/5  - am katrniaan numbers 6/8: BP 79/56 (64), CVP 6,  PA 32/23 (26), no wedge, mixed venous 68, CI (Malcolm 3, CO 5.8), Thermo CI 2.7, CO 5.2  - levo now weaned off in the morning.  - Lasix PRN.  - Daily standing weights, 2gm sodium diet, 2L fluid restriction, strict I&Os     #CAD s/p multiple PCI   [facility] (5/23/24): Distal Left Main: 10-30% stenosis; Proximal and mid LAD Lesion: The percent stenosis is 10-30%; Proximal CX Lesion: The percent stenosis is 100%; Right Coronary Artery: fills via collaterals; Proximal RCA Lesion: The percent stenosis is 100%; The Left Ventricular Ejection Fraction is 10%,by echo.  - C/w ASA + rosuvastatin 20 mg daily as above      #Prior VT storm s/p VT ablation 5/30 and stellate ganglion block 6/4  #Incessant VT s/p ICD shock   Device interrogation (6/4): paced  for 102 min then stepping down, LR back to 95, patient converted to AP   - C/w lidocaine gtt at 1 mg/min, last level (6/4): 4.6  - heparin gtt given extensive LV ablation burden, EP recommended 1 month of AC.  - ranexa 500 mg BID on hold  - Re-do ablation with Dr. Wooten 6/6--> Recurrence of  VT (likely related to edema from recent VT ablation as patient is self-terminating without therapy) , EP is following.  - C/w amiodarone gtt     Pulmonary:   #Acute Hypoxic Respiratory Failure 2/2 lactic acidosis requiring intubation ---> Extubated 6/7     #?PNA   #Pulmonary Edema   #?COPD   CT chest (6/4): interval worsening now moderate right and small left pleural effusions with associated atelectasis, findings suggestive of mild pulmonary interstitial and alveolar edema  Blood cultures (6/3): NGTD   MRSA negative (6/3)  - C/w zosyn for ? PNA (6/2-6/5)  - Send sputum cx + urine legionella + strep PNA   - ID consulted 6/5,rec'd to stop zosyn  - Monitor and maintain SpO2 > 92%     GI:  #No active issues   - Bowel regimen: facundo-colace BID + miralax BID   - PPI daiy    :  #EITAN   #Nephrolithiasis s/p recent bilateral ureteral stent placement to left ureter   sCr (6/5): 1.43  - Consulted urology 5/31 to discuss removal--> will follow outpatient for STENT removal   - Trend RFP daily   - I/Os  - Diuresis as above   - Avoid hypotension and nephrotoxic agents     Heme:  #Anemia in the setting of Chronic Disease  #Iron deficiency anemia   - H&H (6/3/24): 11.8/33.4  - Venofer x3 days 6/6      Endo:  #No DM  - Euglycemic, HgbA1c 6%      #?Hypothyroid   - TSH (6/4): 10.34  - T3 (6/4): 65  - T4 (6/4): 8.3  - Consult endocrine 6/5--> 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  No need for synthroid now.  repeating TSH and free T4 after 2 to 4 weeks       ID:  #Leukocytosis 2/2 ?PNA versus stress induced    Blood cultures (6/3): NGTD   MRSA negative (6/3)  - ID consulted, appreciate assistance -> signed off 6/5   - See pulmonary plan above  - Stopped zosyn (6/2-6/5), it was restarted on 6/7 given hypotensive episode with worsening leukocytosis along with Vancomycin. (Will need to re-engage ID on Monday)  - Trend temps q4h     PHYSICAL AND  OCCUPATIONAL THERAPY: ordered and following)      LINES:  PIVs   Left radial A-line: 6/2      DVT: heparin gtt   VAP BUNDLE: NA  CENTRAL LINE BUNDLE: ordered   ULCER PPX: PPI  GLYCEMIC CONTROL: NA  BOWEL CARE: scheduled facundo-colace BID and Miralax BID   INDWELLING CATHETER: d/c'd 6/7  NUTRITION: Adult diet Regular      EMERGENCY CONTACT: Extended Emergency Contact Information  Primary Emergency Contact: SenaitJudie  Address: [address] [place] [address] [facility]  Home Phone: [phone]  Mobile Phone: [phone]  Relation: Mother  Preferred language: English   needed? No  Secondary Emergency Contact: Keiry Greene  Address: [address] [place], [place]  Mobile Phone: [phone]  Relation: Sibling  Preferred language: English   needed? No  FAMILY UPDATE: given at bedside 6/7  CODE STATUS: DNR and No Intubation  DISPO: Remain in HFICU     Patient seen and assessed with Dr. Doyle     I personally spent 65 minutes of critical care time directly and personally managing the patient exclusive of separately billable procedures.  _________________________________________________  Che Shipley MD  Cardiology Fellow PGY4

## 2024-06-10 LAB
ALBUMIN SERPL BCP-MCNC: 3.4 G/DL (ref 3.4–5)
ALBUMIN SERPL BCP-MCNC: 4 G/DL (ref 3.4–5)
ANION GAP SERPL CALC-SCNC: 13 MMOL/L (ref 10–20)
ANION GAP SERPL CALC-SCNC: 16 MMOL/L (ref 10–20)
ATRIAL RATE: 90 BPM
ATRIAL RATE: 98 BPM
BASOPHILS # BLD AUTO: 0.14 X10*3/UL (ref 0–0.1)
BASOPHILS NFR BLD AUTO: 1 %
BUN SERPL-MCNC: 12 MG/DL (ref 6–23)
BUN SERPL-MCNC: 14 MG/DL (ref 6–23)
CALCIUM SERPL-MCNC: 8.7 MG/DL (ref 8.6–10.6)
CALCIUM SERPL-MCNC: 9.5 MG/DL (ref 8.6–10.6)
CHLORIDE SERPL-SCNC: 106 MMOL/L (ref 98–107)
CHLORIDE SERPL-SCNC: 99 MMOL/L (ref 98–107)
CO2 SERPL-SCNC: 24 MMOL/L (ref 21–32)
CO2 SERPL-SCNC: 25 MMOL/L (ref 21–32)
CREAT SERPL-MCNC: 1.07 MG/DL (ref 0.5–1.3)
CREAT SERPL-MCNC: 1.13 MG/DL (ref 0.5–1.3)
EGFRCR SERPLBLD CKD-EPI 2021: 76 ML/MIN/1.73M*2
EGFRCR SERPLBLD CKD-EPI 2021: 81 ML/MIN/1.73M*2
EOSINOPHIL # BLD AUTO: 0.56 X10*3/UL (ref 0–0.7)
EOSINOPHIL NFR BLD AUTO: 3.9 %
ERYTHROCYTE [DISTWIDTH] IN BLOOD BY AUTOMATED COUNT: 14.1 % (ref 11.5–14.5)
GLUCOSE BLD MANUAL STRIP-MCNC: 108 MG/DL (ref 74–99)
GLUCOSE BLD MANUAL STRIP-MCNC: 115 MG/DL (ref 74–99)
GLUCOSE BLD MANUAL STRIP-MCNC: 139 MG/DL (ref 74–99)
GLUCOSE BLD MANUAL STRIP-MCNC: 146 MG/DL (ref 74–99)
GLUCOSE BLD MANUAL STRIP-MCNC: 154 MG/DL (ref 74–99)
GLUCOSE BLD MANUAL STRIP-MCNC: 184 MG/DL (ref 74–99)
GLUCOSE SERPL-MCNC: 126 MG/DL (ref 74–99)
GLUCOSE SERPL-MCNC: 154 MG/DL (ref 74–99)
HCT VFR BLD AUTO: 33.9 % (ref 41–52)
HGB BLD-MCNC: 11.5 G/DL (ref 13.5–17.5)
IMM GRANULOCYTES # BLD AUTO: 0.31 X10*3/UL (ref 0–0.7)
IMM GRANULOCYTES NFR BLD AUTO: 2.2 % (ref 0–0.9)
LIDOCAIN SERPL-MCNC: 4 UG/ML (ref 1–5)
LIDOCAIN SERPL-MCNC: 4.7 UG/ML (ref 1–5)
LYMPHOCYTES # BLD AUTO: 2.07 X10*3/UL (ref 1.2–4.8)
LYMPHOCYTES NFR BLD AUTO: 14.5 %
MAGNESIUM SERPL-MCNC: 2.27 MG/DL (ref 1.6–2.4)
MCH RBC QN AUTO: 32 PG (ref 26–34)
MCHC RBC AUTO-ENTMCNC: 33.9 G/DL (ref 32–36)
MCV RBC AUTO: 94 FL (ref 80–100)
MONOCYTES # BLD AUTO: 1.44 X10*3/UL (ref 0.1–1)
MONOCYTES NFR BLD AUTO: 10.1 %
NEUTROPHILS # BLD AUTO: 9.74 X10*3/UL (ref 1.2–7.7)
NEUTROPHILS NFR BLD AUTO: 68.3 %
NRBC BLD-RTO: 0 /100 WBCS (ref 0–0)
P AXIS: 94 DEGREES
PHOSPHATE SERPL-MCNC: 3.7 MG/DL (ref 2.5–4.9)
PHOSPHATE SERPL-MCNC: 4.2 MG/DL (ref 2.5–4.9)
PLATELET # BLD AUTO: 229 X10*3/UL (ref 150–450)
POTASSIUM SERPL-SCNC: 4.2 MMOL/L (ref 3.5–5.3)
POTASSIUM SERPL-SCNC: 4.3 MMOL/L (ref 3.5–5.3)
PR INTERVAL: 152 MS
Q ONSET: 172 MS
Q ONSET: 191 MS
QRS COUNT: 15 BEATS
QRS COUNT: 20 BEATS
QRS DURATION: 160 MS
QRS DURATION: 168 MS
QT INTERVAL: 424 MS
QT INTERVAL: 458 MS
QTC CALCULATION(BAZETT): 560 MS
QTC CALCULATION(BAZETT): 599 MS
QTC FREDERICIA: 524 MS
QTC FREDERICIA: 534 MS
R AXIS: -63 DEGREES
R AXIS: -68 DEGREES
RBC # BLD AUTO: 3.59 X10*6/UL (ref 4.5–5.9)
SODIUM SERPL-SCNC: 136 MMOL/L (ref 136–145)
SODIUM SERPL-SCNC: 139 MMOL/L (ref 136–145)
T AXIS: 137 DEGREES
T AXIS: 146 DEGREES
T OFFSET: 401 MS
T OFFSET: 403 MS
UFH PPP CHRO-ACNC: 0.3 IU/ML
VANCOMYCIN SERPL-MCNC: 13.8 UG/ML (ref 5–20)
VENTRICULAR RATE: 120 BPM
VENTRICULAR RATE: 90 BPM
WBC # BLD AUTO: 14.3 X10*3/UL (ref 4.4–11.3)

## 2024-06-10 PROCEDURE — 2500000001 HC RX 250 WO HCPCS SELF ADMINISTERED DRUGS (ALT 637 FOR MEDICARE OP): Performed by: STUDENT IN AN ORGANIZED HEALTH CARE EDUCATION/TRAINING PROGRAM

## 2024-06-10 PROCEDURE — 2500000001 HC RX 250 WO HCPCS SELF ADMINISTERED DRUGS (ALT 637 FOR MEDICARE OP)

## 2024-06-10 PROCEDURE — 2500000004 HC RX 250 GENERAL PHARMACY W/ HCPCS (ALT 636 FOR OP/ED): Performed by: STUDENT IN AN ORGANIZED HEALTH CARE EDUCATION/TRAINING PROGRAM

## 2024-06-10 PROCEDURE — 99232 SBSQ HOSP IP/OBS MODERATE 35: CPT | Performed by: STUDENT IN AN ORGANIZED HEALTH CARE EDUCATION/TRAINING PROGRAM

## 2024-06-10 PROCEDURE — 82947 ASSAY GLUCOSE BLOOD QUANT: CPT | Mod: 91

## 2024-06-10 PROCEDURE — 2500000002 HC RX 250 W HCPCS SELF ADMINISTERED DRUGS (ALT 637 FOR MEDICARE OP, ALT 636 FOR OP/ED): Performed by: STUDENT IN AN ORGANIZED HEALTH CARE EDUCATION/TRAINING PROGRAM

## 2024-06-10 PROCEDURE — 37799 UNLISTED PX VASCULAR SURGERY: CPT | Performed by: STUDENT IN AN ORGANIZED HEALTH CARE EDUCATION/TRAINING PROGRAM

## 2024-06-10 PROCEDURE — 2500000001 HC RX 250 WO HCPCS SELF ADMINISTERED DRUGS (ALT 637 FOR MEDICARE OP): Performed by: NURSE PRACTITIONER

## 2024-06-10 PROCEDURE — 2500000002 HC RX 250 W HCPCS SELF ADMINISTERED DRUGS (ALT 637 FOR MEDICARE OP, ALT 636 FOR OP/ED): Performed by: NURSE PRACTITIONER

## 2024-06-10 PROCEDURE — 85520 HEPARIN ASSAY: CPT | Performed by: STUDENT IN AN ORGANIZED HEALTH CARE EDUCATION/TRAINING PROGRAM

## 2024-06-10 PROCEDURE — 99233 SBSQ HOSP IP/OBS HIGH 50: CPT | Performed by: STUDENT IN AN ORGANIZED HEALTH CARE EDUCATION/TRAINING PROGRAM

## 2024-06-10 PROCEDURE — 2500000004 HC RX 250 GENERAL PHARMACY W/ HCPCS (ALT 636 FOR OP/ED)

## 2024-06-10 PROCEDURE — 05H833Z INSERTION OF INFUSION DEVICE INTO LEFT AXILLARY VEIN, PERCUTANEOUS APPROACH: ICD-10-PCS | Performed by: INTERNAL MEDICINE

## 2024-06-10 PROCEDURE — 84132 ASSAY OF SERUM POTASSIUM: CPT | Performed by: STUDENT IN AN ORGANIZED HEALTH CARE EDUCATION/TRAINING PROGRAM

## 2024-06-10 PROCEDURE — 80202 ASSAY OF VANCOMYCIN: CPT | Performed by: NURSE PRACTITIONER

## 2024-06-10 PROCEDURE — 2500000004 HC RX 250 GENERAL PHARMACY W/ HCPCS (ALT 636 FOR OP/ED): Performed by: NURSE PRACTITIONER

## 2024-06-10 PROCEDURE — 99291 CRITICAL CARE FIRST HOUR: CPT | Performed by: STUDENT IN AN ORGANIZED HEALTH CARE EDUCATION/TRAINING PROGRAM

## 2024-06-10 PROCEDURE — S4991 NICOTINE PATCH NONLEGEND: HCPCS | Performed by: STUDENT IN AN ORGANIZED HEALTH CARE EDUCATION/TRAINING PROGRAM

## 2024-06-10 PROCEDURE — 80176 ASSAY OF LIDOCAINE: CPT | Performed by: STUDENT IN AN ORGANIZED HEALTH CARE EDUCATION/TRAINING PROGRAM

## 2024-06-10 PROCEDURE — C9113 INJ PANTOPRAZOLE SODIUM, VIA: HCPCS | Performed by: STUDENT IN AN ORGANIZED HEALTH CARE EDUCATION/TRAINING PROGRAM

## 2024-06-10 PROCEDURE — 84100 ASSAY OF PHOSPHORUS: CPT | Performed by: STUDENT IN AN ORGANIZED HEALTH CARE EDUCATION/TRAINING PROGRAM

## 2024-06-10 PROCEDURE — 99291 CRITICAL CARE FIRST HOUR: CPT | Performed by: INTERNAL MEDICINE

## 2024-06-10 PROCEDURE — 83735 ASSAY OF MAGNESIUM: CPT | Performed by: STUDENT IN AN ORGANIZED HEALTH CARE EDUCATION/TRAINING PROGRAM

## 2024-06-10 PROCEDURE — 85025 COMPLETE CBC W/AUTO DIFF WBC: CPT | Performed by: STUDENT IN AN ORGANIZED HEALTH CARE EDUCATION/TRAINING PROGRAM

## 2024-06-10 PROCEDURE — 2020000001 HC ICU ROOM DAILY

## 2024-06-10 RX ORDER — FUROSEMIDE 10 MG/ML
40 INJECTION INTRAMUSCULAR; INTRAVENOUS ONCE
Status: COMPLETED | OUTPATIENT
Start: 2024-06-10 | End: 2024-06-10

## 2024-06-10 RX ORDER — AMOXICILLIN 250 MG
2 CAPSULE ORAL 2 TIMES DAILY
Status: DISCONTINUED | OUTPATIENT
Start: 2024-06-10 | End: 2024-06-19 | Stop reason: HOSPADM

## 2024-06-10 RX ORDER — LOSARTAN POTASSIUM 25 MG/1
12.5 TABLET ORAL DAILY
Status: DISCONTINUED | OUTPATIENT
Start: 2024-06-10 | End: 2024-06-12

## 2024-06-10 RX ORDER — NAPROXEN SODIUM 220 MG/1
81 TABLET, FILM COATED ORAL DAILY
Status: DISCONTINUED | OUTPATIENT
Start: 2024-06-10 | End: 2024-06-19 | Stop reason: HOSPADM

## 2024-06-10 RX ORDER — LORAZEPAM 0.5 MG/1
0.5 TABLET ORAL EVERY 8 HOURS PRN
Status: DISCONTINUED | OUTPATIENT
Start: 2024-06-10 | End: 2024-06-19 | Stop reason: HOSPADM

## 2024-06-10 RX ORDER — ROSUVASTATIN CALCIUM 40 MG/1
20 TABLET, COATED ORAL NIGHTLY
Status: DISCONTINUED | OUTPATIENT
Start: 2024-06-10 | End: 2024-06-19 | Stop reason: HOSPADM

## 2024-06-10 RX ORDER — POLYETHYLENE GLYCOL 3350 17 G/17G
17 POWDER, FOR SOLUTION ORAL 2 TIMES DAILY
Status: DISCONTINUED | OUTPATIENT
Start: 2024-06-10 | End: 2024-06-19 | Stop reason: HOSPADM

## 2024-06-10 RX ORDER — OXYCODONE HYDROCHLORIDE 5 MG/1
5 TABLET ORAL ONCE
Status: COMPLETED | OUTPATIENT
Start: 2024-06-10 | End: 2024-06-10

## 2024-06-10 RX ADMIN — INSULIN LISPRO 2 UNITS: 100 INJECTION, SOLUTION INTRAVENOUS; SUBCUTANEOUS at 20:23

## 2024-06-10 RX ADMIN — VANCOMYCIN HYDROCHLORIDE 750 MG: 750 INJECTION, SOLUTION INTRAVENOUS at 03:06

## 2024-06-10 RX ADMIN — ASPIRIN 81 MG CHEWABLE TABLET 81 MG: 81 TABLET CHEWABLE at 08:38

## 2024-06-10 RX ADMIN — AMIODARONE HYDROCHLORIDE 1 MG/MIN: 1.8 INJECTION, SOLUTION INTRAVENOUS at 06:41

## 2024-06-10 RX ADMIN — LORAZEPAM 0.5 MG: 0.5 TABLET ORAL at 14:36

## 2024-06-10 RX ADMIN — FUROSEMIDE 40 MG: 10 INJECTION, SOLUTION INTRAMUSCULAR; INTRAVENOUS at 14:36

## 2024-06-10 RX ADMIN — LORAZEPAM 0.5 MG: 0.5 TABLET ORAL at 20:25

## 2024-06-10 RX ADMIN — AMIODARONE HYDROCHLORIDE 1 MG/MIN: 1.8 INJECTION, SOLUTION INTRAVENOUS at 13:03

## 2024-06-10 RX ADMIN — SENNOSIDES AND DOCUSATE SODIUM 2 TABLET: 8.6; 5 TABLET ORAL at 20:26

## 2024-06-10 RX ADMIN — PIPERACILLIN SODIUM AND TAZOBACTAM SODIUM 3.38 G: 3; .375 INJECTION, SOLUTION INTRAVENOUS at 01:18

## 2024-06-10 RX ADMIN — SPIRONOLACTONE 25 MG: 25 TABLET ORAL at 08:39

## 2024-06-10 RX ADMIN — LORAZEPAM 1 MG: 1 TABLET ORAL at 04:07

## 2024-06-10 RX ADMIN — ROSUVASTATIN 20 MG: 40 TABLET, FILM COATED ORAL at 20:27

## 2024-06-10 RX ADMIN — OXYCODONE HYDROCHLORIDE 5 MG: 5 TABLET ORAL at 20:25

## 2024-06-10 RX ADMIN — Medication 1 PATCH: at 05:13

## 2024-06-10 RX ADMIN — AMIODARONE HYDROCHLORIDE 1 MG/MIN: 1.8 INJECTION, SOLUTION INTRAVENOUS at 01:21

## 2024-06-10 RX ADMIN — AMIODARONE HYDROCHLORIDE 1 MG/MIN: 1.8 INJECTION, SOLUTION INTRAVENOUS at 19:25

## 2024-06-10 RX ADMIN — HEPARIN SODIUM 1200 UNITS/HR: 10000 INJECTION, SOLUTION INTRAVENOUS at 20:00

## 2024-06-10 RX ADMIN — PIPERACILLIN SODIUM AND TAZOBACTAM SODIUM 3.38 G: 3; .375 INJECTION, SOLUTION INTRAVENOUS at 06:31

## 2024-06-10 RX ADMIN — DAPAGLIFLOZIN 10 MG: 10 TABLET, FILM COATED ORAL at 08:38

## 2024-06-10 RX ADMIN — PANTOPRAZOLE SODIUM 40 MG: 40 INJECTION, POWDER, FOR SOLUTION INTRAVENOUS at 08:38

## 2024-06-10 RX ADMIN — RANOLAZINE 500 MG: 500 TABLET, EXTENDED RELEASE ORAL at 08:38

## 2024-06-10 RX ADMIN — TRAZODONE HYDROCHLORIDE 50 MG: 50 TABLET ORAL at 20:33

## 2024-06-10 RX ADMIN — AMIODARONE HYDROCHLORIDE 150 MG: 1.5 INJECTION, SOLUTION INTRAVENOUS at 04:22

## 2024-06-10 RX ADMIN — RANOLAZINE 500 MG: 500 TABLET, EXTENDED RELEASE ORAL at 20:27

## 2024-06-10 RX ADMIN — LOSARTAN POTASSIUM 12.5 MG: 25 TABLET, FILM COATED ORAL at 18:42

## 2024-06-10 ASSESSMENT — PAIN SCALES - GENERAL
PAINLEVEL_OUTOF10: 0 - NO PAIN
PAINLEVEL_OUTOF10: 7

## 2024-06-10 ASSESSMENT — COGNITIVE AND FUNCTIONAL STATUS - GENERAL
MOVING TO AND FROM BED TO CHAIR: TOTAL
STANDING UP FROM CHAIR USING ARMS: TOTAL
TOILETING: A LOT
DRESSING REGULAR LOWER BODY CLOTHING: A LOT
PERSONAL GROOMING: A LOT
MOBILITY SCORE: 7
HELP NEEDED FOR BATHING: A LOT
DAILY ACTIVITIY SCORE: 14
DAILY ACTIVITIY SCORE: 14
DRESSING REGULAR UPPER BODY CLOTHING: A LOT
DRESSING REGULAR LOWER BODY CLOTHING: A LOT
TURNING FROM BACK TO SIDE WHILE IN FLAT BAD: TOTAL
CLIMB 3 TO 5 STEPS WITH RAILING: TOTAL
MOVING FROM LYING ON BACK TO SITTING ON SIDE OF FLAT BED WITH BEDRAILS: A LOT
PERSONAL GROOMING: A LOT
TOILETING: A LOT
HELP NEEDED FOR BATHING: A LOT
DRESSING REGULAR UPPER BODY CLOTHING: A LOT
WALKING IN HOSPITAL ROOM: TOTAL

## 2024-06-10 ASSESSMENT — PAIN - FUNCTIONAL ASSESSMENT
PAIN_FUNCTIONAL_ASSESSMENT: 0-10

## 2024-06-10 NOTE — PROGRESS NOTES
Vancomycin Dosing by Pharmacy- FOLLOW UP    Cristian Sapp is a 58 y.o. year old male who Pharmacy has been consulted for vancomycin dosing for other sepsis . Based on the patient's indication and renal status this patient is being dosed based on a goal AUC of 400-600.     Renal function is currently stable.    Current vancomycin dose: 750 mg given every 8 hours    Estimated vancomycin AUC on current dose: 464 mg/L.hr     Visit Vitals  BP 99/70 (BP Location: Right arm, Patient Position: Lying)   Pulse (!) 119   Temp 36.3 °C (97.3 °F) (Temporal)   Resp 19        Lab Results   Component Value Date    CREATININE 1.07 06/10/2024    CREATININE 1.28 2024    CREATININE 1.11 2024    CREATININE 1.00 2024        Patient weight is as follows:   Vitals:    06/10/24 0600   Weight: 76.6 kg (168 lb 14 oz)       Cultures:  No results found for the encounter in last 14 days.       I/O last 3 completed shifts:  In: 5437 (71 mL/kg) [P.O.:925; I.V.:3212 (41.9 mL/kg); IV Piggyback:1300]  Out: 50766 (131.2 mL/kg) [Urine:08198 (3.6 mL/kg/hr)]  Weight: 76.6 kg   I/O during current shift:  No intake/output data recorded.    Temp (24hrs), Av.9 °C (98.4 °F), Min:36.3 °C (97.3 °F), Max:37.5 °C (99.5 °F)      Assessment/Plan    Within goal AUC range. Continue current vancomycin regimen.    This dosing regimen is predicted by [employer] to result in the following pharmacokinetic parameters:  Loading dose: N/A  Regimen: 750 mg IV every 8 hours.  Start time: 11:06 on 06/10/2024  Exposure target: AUC24 (range)400-600 mg/L.hr   AUC24,ss: 464 mg/L.hr  Probability of AUC24 > 400: 86 %  Ctrough,ss: 15 mg/L  Probability of Ctrough,ss > 20: 8 %  Probability of nephrotoxicity (Lodise RAHUL ): 10 %      The next level will be obtained on  at am draw. May be obtained sooner if clinically indicated.   Will continue to monitor renal function daily while on vancomycin and order serum creatinine at least every 48 hours if not already  ordered.  Follow for continued vancomycin needs, clinical response, and signs/symptoms of toxicity.       Tyra Hooks, PharmD

## 2024-06-10 NOTE — PROGRESS NOTES
"Cristian Sapp is a 58 y.o. male on day 13 of admission presenting with Acute on chronic systolic (congestive) heart failure (Multi).    Subjective   No acute overnight events.  12 point ROS reviewed and -ve    Tele: 1 episode of VT (rates ~ 110s) from 4am to 8am that self terminated    Objective     Physical Exam  Gen: ill appearing  Neuro: AAOx3, CN 2-12 intact, no FND  HEENT: PEERL, EOMI, sclera anicteric, no conjunctival injection, MMM, no oropharyngeal lesions  Neck: no elevated JVD  Resp: CTAB, normal breath sounds, no wheezing/crackles/ rales  CV: RRR, normal S1/S2, no murmurs/ rubs/gallops  GI: non-tender, non-distended, normal BSs in all 4 quadrants  MSK: warm and well perfused, no edema  Skin: pale and warm to touch    Last Recorded Vitals  Blood pressure 99/70, pulse 95, temperature 35.7 °C (96.3 °F), temperature source Temporal, resp. rate 24, height 1.702 m (5' 7\"), weight 76.6 kg (168 lb 14 oz), SpO2 98%.  Intake/Output last 3 Shifts:  I/O last 3 completed shifts:  In: 5437 (71 mL/kg) [P.O.:925; I.V.:3212 (41.9 mL/kg); IV Piggyback:1300]  Out: 68354 (131.2 mL/kg) [Urine:91043 (3.6 mL/kg/hr)]  Weight: 76.6 kg     Relevant Results  LABS:  CMP:  Results from last 7 days   Lab Units 06/10/24  0312 06/09/24  0324 06/08/24  2123 06/08/24  0508 06/07/24  0412 06/06/24  2220 06/06/24  0305 06/05/24  1632 06/05/24  0223 06/04/24  1255 06/04/24  0307   SODIUM mmol/L 139 140 137 142 137 136 137 138 136 134* 137   POTASSIUM mmol/L 4.2 3.9 3.5 3.9 4.5 4.4 3.6 3.5 3.6 3.9 3.8   CHLORIDE mmol/L 106 105 100 106 101 98 101 99 99 97* 100   CO2 mmol/L 24 25 21 24 27 25 27 27 28 26 27   ANION GAP mmol/L 13 14 20 16 14 17 13 16 13 15 14   BUN mg/dL 12 12 14 16 13 14 14 14 18 16 14   CREATININE mg/dL 1.07 1.28 1.11 1.00 0.91 1.13 0.90 1.21 1.43* 1.32* 1.31*   EGFR mL/min/1.73m*2 81 66 78 88 >90 76 >90 70 58* 63 64   MAGNESIUM mg/dL 2.27 2.47*  --  2.00 2.32 1.75 2.29 1.89 2.05 1.98 2.07   ALBUMIN g/dL 3.4 3.4 3.6 3.0* 3.2* " "3.4 3.2* 3.5 3.3* 3.4 3.6   ALT U/L  --   --  77*  --   --  40  --   --   --  11 9*   AST U/L  --   --  57*  --   --  188*  --   --   --  11 13   BILIRUBIN TOTAL mg/dL  --   --  0.6  --   --  0.9  --   --   --  0.7 0.7     CBC:  Results from last 7 days   Lab Units 06/10/24  0312 06/09/24  0324 06/08/24  2123 06/08/24  0508 06/07/24 0412 06/06/24 2220 06/06/24  0305 06/05/24  0223   WBC AUTO x10*3/uL 14.3* 14.8* 17.2* 15.5* 14.0* 17.7* 13.6* 11.7*   HEMOGLOBIN g/dL 11.5* 10.6* 11.4* 11.1* 11.7* 12.8* 12.9* 11.8*   HEMATOCRIT % 33.9* 30.3* 33.0* 32.6* 33.0* 38.6* 38.0* 34.0*   PLATELETS AUTO x10*3/uL 229 212 198 242 293 312 318 287   MCV fL 94 92 93 94 92 96 94 94     COAG:   Results from last 7 days   Lab Units 06/07/24 0918 06/06/24 2220 06/04/24  1255   INR  1.3* 1.5* 1.3*     ABO: No results found for: \"ABO\"  HEME/ENDO:  Results from last 7 days   Lab Units 06/05/24 0223 06/04/24 1255 06/04/24  0307   FERRITIN ng/mL 99  --   --    IRON SATURATION % 18*  --   --    TSH mIU/L  --  10.35*  --    HEMOGLOBIN A1C %  --   --  6.0*      CARDIAC:   Results from last 7 days   Lab Units 06/07/24 0412 06/06/24 2220 06/04/24  1255 06/03/24  1741   LD U/L 408* 467* 145  --    BNP pg/mL  --   --  674* 655*   No results for input(s): \"CHOL\", \"LDLF\", \"LDL\", \"LDLCALC\", \"HDL\", \"TRIG\" in the last 05620 hours.      Assessment/Plan   Principal Problem:    Acute on chronic systolic (congestive) heart failure (Multi)  Active Problems:    Flash pulmonary edema (Multi)    Ischemic cardiomyopathy    Ventricular tachycardia (Multi)    58M PMHx CAD s/p multiple PCI, ICM/HFrEF (EF 15% 5/24) s/p CRT-D, VT storm s/p VT ablation, infrarenal AAA s/p R iliac stent, nephrolithiasis s/p recent ureteral stent. Presented to OSH on 5/23 with multiple ICD shocks for VT. Noted to be in incessant, slow VT for which EP is following.   Patient remained in slow, hemodynamically stable VT, likely scar mediated iso known ICM and unrevascularized CAD. " Patient was started on amiodarone and lidocaine. Given inability to pace terminate this, patient underwent VT RFA (VT 1-critical isthmus involving the mid inferior / infero - septal left ventricular segment, VT 2 - mid lateral left ventriclar segment) with Dr. Flores on 5/30.  Despite this, on 6/2, patient was noted to have slow VT with rates ~ 90-110s. Tracking rate was increased to 95bpm but patient continued to have episodes of VT (rates ~ 118 on 6/3) requiring cardioversion. Noted to go into slow VT again on 6/4 s/p successful overdrive pacing. Morphology of VT ws similar to prior VT  which was ablated on 5/30.  Patient underwent a 2nd VT ablation with Dr. Wooten on 6/6 - extensive scar modification was done. Multiple Vts were induced, coming from the lateral scar as well as the septal scar s/p extensive ablation. Patient then went into a different wide VT suggestive of metabolic derangements. pH was noted to be 7.15. Patient subsequently underwent IABP placement (removed 6/8).   Has been having multiple runs of VT (rates 110s-130s) since VT ablation that are self-terminated without any therapies delivered.         #ICM/HFrEF (EF 15%) s/p CRT-D  #Prior VT storm s/p VT ablation 5/30, stellate ganglion candy 6/4, s/p repeat ablation 6/6  #Incessant VT s/p multiple ICD shocks  -Has been having multiple runs of VT (rates 110s-130s) since VT ablation that are self-terminated without any therapies delivered  -Cont amiodarone gtt   -Cont lidocaine gtt. Check daily lidocaine levels  -Plan for repeat stellate ganglion block tomorrow  -Patient is not a candidate for sympathectomy  -Recommend aggressive electrolyte repletion: K>4 and Mg>2  -Currently being V-paced at 95bpm -> if he does not have more VT, can consider decreasing lower limit   -Continue work up for advanced HF therapies   -Continue heparin gtt ( will need to be on anticoagulation for at least one month given extensive ablation in the LV)      Thank you for the  consult. EP will continue to follow. Staffed with Dr. Je WHALEY spent 45 minutes in the professional and overall care of this patient.     Concepción Padilla MD

## 2024-06-10 NOTE — PROGRESS NOTES
"[facility]  HFICU PROGRESS NOTE    Cristian Sapp/[id number]    Admit Date: 5/28/2024  Hospital Length of Stay: 13   ICU Length of Stay: 6d 23h   Primary Service: HFICU  Referring: Dr. Brown     INTERVAL EVENTS / PERTINENT ROS:   Patient with slow VT O/N and bolused with amiodarone. Patient is AV paced in the 90s this AM. He remains HDS with MAPs in the 70s. Has no acute complaints. Family present at bedside.     Plan:  - EP planning for ganglion block again today 6/11, heparin will need to be held 4 hours prior   - Discontinue zosyn + vancomycin   - C/w lidocaine gtt @1 mg/min + amiodarone gtt @ 1 mg/min   - Remove femoral sheath + arterial line   - Midline placement     MEDICATIONS  Infusions:  amiodarone, Last Rate: 1 mg/min (06/10/24 1303)  [Held by provider] heparin, Last Rate: Stopped (06/10/24 1216)  lidocaine, Last Rate: 1 mg/min (06/10/24 0800)      Scheduled:  aspirin, 81 mg, Daily  dapagliflozin propanediol, 10 mg, Daily  insulin lispro, 0-10 Units, q4h  LORazepam, 0.5 mg, Nightly  nicotine, 1 patch, Daily   Followed by  [START ON 7/13/2024] nicotine, 1 patch, Daily  pantoprazole, 40 mg, Daily  polyethylene glycol, 17 g, BID  ranolazine, 500 mg, BID  rosuvastatin, 20 mg, Nightly  [Held by provider] sacubitriL-valsartan, 1 tablet, BID  sennosides-docusate sodium, 2 tablet, BID  spironolactone, 25 mg, Daily      PRN:  acetaminophen, 650 mg, q4h PRN   Or  acetaminophen, 650 mg, q4h PRN   Or  acetaminophen, 650 mg, q4h PRN  alteplase, 2 mg, PRN  dextrose, 12.5 g, q15 min PRN  fentaNYL, 25 mcg, Once PRN  glucagon, 1 mg, q15 min PRN  heparin, 2,000-4,000 Units, q4h PRN  hydrOXYzine HCL, 25 mg, q6h PRN  LORazepam, 1 mg, q8h PRN  oxygen, , Continuous PRN - O2/gases  simethicone, 40 mg, 4x daily PRN  traZODone, 50 mg, Nightly PRN  trimethobenzamide, 200 mg, q6h PRN      PHYSICAL EXAM:   Visit Vitals  /76   Pulse 95   Temp 36.5 °C (97.7 °F) (Temporal)   Resp 14   Ht 1.702 m (5' 7\") "   Wt 76.6 kg (168 lb 14 oz)   SpO2 95%   BMI 26.45 kg/m²   Smoking Status Former   BSA 1.9 m²     Wt Readings from Last 5 Encounters:   06/10/24 76.6 kg (168 lb 14 oz)   05/27/24 78.8 kg (173 lb 11.6 oz)     INTAKE/OUTPUT:  I/O last 3 completed shifts:  In: 5437 (71 mL/kg) [P.O.:925; I.V.:3212 (41.9 mL/kg); IV Piggyback:1300]  Out: 61814 (131.2 mL/kg) [Urine:73557 (3.6 mL/kg/hr)]  Weight: 76.6 kg      Physical Exam  Vitals reviewed.   Constitutional:       General: He is not in acute distress.     Appearance: He is ill-appearing.   HENT:      Mouth/Throat:      Mouth: Mucous membranes are moist.   Eyes:      Extraocular Movements: Extraocular movements intact.      Pupils: Pupils are equal, round, and reactive to light.   Neck:      Vascular: No JVD.   Cardiovascular:      Rate and Rhythm: Normal rate and regular rhythm.      Pulses: Normal pulses.      Heart sounds: Normal heart sounds.   Pulmonary:      Effort: Pulmonary effort is normal.      Breath sounds: Normal breath sounds. No wheezing or rhonchi.   Abdominal:      General: Abdomen is flat. Bowel sounds are normal. There is no distension.      Tenderness: There is no abdominal tenderness.   Musculoskeletal:         General: Normal range of motion.      Cervical back: Full passive range of motion without pain.      Right lower leg: No edema.      Left lower leg: No edema.   Skin:     General: Skin is warm.      Capillary Refill: Capillary refill takes 2 to 3 seconds.   Neurological:      General: No focal deficit present.      Mental Status: He is alert and oriented to person, place, and time. Mental status is at baseline.   Psychiatric:         Mood and Affect: Mood normal.         Behavior: Behavior normal. Behavior is cooperative.       DATA:  CMP:  Results from last 7 days   Lab Units 06/10/24  0312 06/09/24  0324 06/08/24  2123 06/08/24  0508 06/07/24  0412 06/06/24  2220 06/06/24  0305 06/05/24  1632 06/05/24  0223 06/04/24  1255 06/04/24  0307   SODIUM  "mmol/L 139 140 137 142 137 136 137 138 136 134* 137   POTASSIUM mmol/L 4.2 3.9 3.5 3.9 4.5 4.4 3.6 3.5 3.6 3.9 3.8   CHLORIDE mmol/L 106 105 100 106 101 98 101 99 99 97* 100   CO2 mmol/L 24 25 21 24 27 25 27 27 28 26 27   ANION GAP mmol/L 13 14 20 16 14 17 13 16 13 15 14   BUN mg/dL 12 12 14 16 13 14 14 14 18 16 14   CREATININE mg/dL 1.07 1.28 1.11 1.00 0.91 1.13 0.90 1.21 1.43* 1.32* 1.31*   EGFR mL/min/1.73m*2 81 66 78 88 >90 76 >90 70 58* 63 64   MAGNESIUM mg/dL 2.27 2.47*  --  2.00 2.32 1.75 2.29 1.89 2.05 1.98 2.07   ALBUMIN g/dL 3.4 3.4 3.6 3.0* 3.2* 3.4 3.2* 3.5 3.3* 3.4 3.6   ALT U/L  --   --  77*  --   --  40  --   --   --  11 9*   AST U/L  --   --  57*  --   --  188*  --   --   --  11 13   BILIRUBIN TOTAL mg/dL  --   --  0.6  --   --  0.9  --   --   --  0.7 0.7     CBC:  Results from last 7 days   Lab Units 06/10/24  0312 06/09/24  0324 06/08/24  2123 06/08/24  0508 06/07/24  0412 06/06/24  2220 06/06/24  0305 06/05/24  0223   WBC AUTO x10*3/uL 14.3* 14.8* 17.2* 15.5* 14.0* 17.7* 13.6* 11.7*   HEMOGLOBIN g/dL 11.5* 10.6* 11.4* 11.1* 11.7* 12.8* 12.9* 11.8*   HEMATOCRIT % 33.9* 30.3* 33.0* 32.6* 33.0* 38.6* 38.0* 34.0*   PLATELETS AUTO x10*3/uL 229 212 198 242 293 312 318 287   MCV fL 94 92 93 94 92 96 94 94     COAG:   Results from last 7 days   Lab Units 06/07/24  0918 06/06/24 2220 06/04/24  1255   INR  1.3* 1.5* 1.3*     ABO:   No results found for: \"ABO\"    HEME/ENDO:  Results from last 7 days   Lab Units 06/05/24  0223 06/04/24  1255 06/04/24  0307   FERRITIN ng/mL 99  --   --    IRON SATURATION % 18*  --   --    TSH mIU/L  --  10.35*  --    HEMOGLOBIN A1C %  --   --  6.0*      CARDIAC:   Results from last 7 days   Lab Units 06/07/24  0412 06/06/24 2220 06/04/24  1255 06/03/24  1741   LD U/L 408* 467* 145  --    BNP pg/mL  --   --  674* 655*     ASSESSMENT AND PLAN:   Cristian Sapp is a 59 y/o M with PMHx of CAD s/p multiple PCI, ICM/HFrEF (EF 15% 5/24) s/p CRT-D, VT storm s/p VT ablation (2019), " infrarenal AAA s/p R iliac stent, nephrolithiasis s/p recent ureteral stent. Presented to OSH on 5/23 with multiple ICD shocks for VT. [place] [facility] [place] continued management. EP was consulted on arrival and patient was in slow VT, he was started on amiodarone gtt. Underwent VT ablation 5/30, but continued to have intermittent slow VT. On 6/3 VT was noted again with rates ~118 and patient was cardioverted. Maintained on amiodarone + lidocaine gtt, and is s/p stellate ganglion block 6/4. He was transferred to HFICU 6/3 after he signed consent for LVAD/OHT evaluation. After further discussions and being a current smoker, it was decided that OHT would not be an option, and the patient does not want an LVAD at this time, so advanced therapies evaluation was stopped 6/5. Palliative medicine saw the patient 6/5 and he decided to change his code status to DNR-DNI. Redo endocardial ablation with Dr. Wooten 6/6 c/b lactic acidosis requiring intubation and low CO requiring IABP support. He was extubated 6/7 and IABP was removed on 6/8. SGC removed 6/9 with closing numbers: /57 (71), CVP 5, PA 44/26 (34), PCWP 11, Malcolm CO/CI:  5.5/2.9, Thermo: 5.3/2.8, , SVO2 67% on dapa 10 mg daily, brigido 25 mg daily. Planning for ganglion block with EP again 6/10.      Neuro:  #Anxiety  #Depression   #Insomnia   - C/w atarax 25 mg q6 PRN for anxiety   - Holding paroxetine given arrhythmias   - C/w trazodone 50 mg nightly   - C/w ativan 1 mg q8 PRN for anxiety + 0.5 mg ativan nightly   - Serial neuro and pain assessments   - PO Tylenol PRN for pain  - PT/OT Consult, OOB to chair  - CAM ICU score every shift  - Sleep/wake cycle normalization     #Substance abuse  - Tobacco use: active smoker  - C/w nicotine patches      Cardiovascular:  #Stage C/D HFrEF/ICM s/p CRT-D (11/2019)  #Acute on Chronic Decompensated HF  #Cardiogenic shock s/p IABP 6/6, removed 6/8 (resolved)   TTE (5/28/2024): severely decreased LV  systolic function, EF 15%, LVIDd 6.32. Moderate-severe MVR, mild TR    Home medications: ASA, entresto 24-26 mg BID, rosuvastatin 20 mg daily, torsemide 20 mg daily  Opening SGC #s (6/7): /57 (89), CVP 8, PAP 48/25 (33), fuad CO/CI 7.4/4, , SVO2 81% on IABP 1:1   Closing SGC #s (6/9): /57 (71), CVP 5, PA 44/26 (34), PCWP 11, Fuad CO/CI:  5.5/2.9, Thermo: 5.3/2.8, , SVO2 67% on dapa 10 mg daily, brigido 25 mg daily  - Holding entresto 24-26 mg BID 2/2 hypotension   - C/w spironolactone 25 mg daily   - C/w dapagliflozin 10 mg daily  - Signed consent for OHT/LVAD workup, patient currently is not a candidate for OHT given active tobacco use, and he does not want an LVAD at this time, will stop workup for now per Dr. Doyle 6/5  - Diuresis as needed   - Daily standing weights, 2gm sodium diet, 2L fluid restriction, strict I&Os     #CAD s/p multiple PCI   [facility] (5/23/24): Distal Left Main: 10-30% stenosis; Proximal and mid LAD Lesion: The percent stenosis is 10-30%; Proximal CX Lesion: The percent stenosis is 100%; Right Coronary Artery: fills via collaterals; Proximal RCA Lesion: The percent stenosis is 100%; The Left Ventricular Ejection Fraction is 10%,by echo.  - C/w ASA + rosuvastatin 20 mg daily      #Prior VT storm s/p VT ablation 5/30, stellate ganglion block 6/4, VT ablation 6/6  #Incessant VT s/p ICD shock   Device interrogation (6/4): paced  for 102 min then stepping down, LR back to 95, patient converted to AP   - C/w lidocaine gtt at 1 mg/min, last level (6/10): pending   - C/w amiodarone gtt at 1 mg/min   - C/w heparin gtt   - Stellate ganglion block planned for 6/11  - C/w ranexa 500 mg BID   - EP following, appreciate assistance      Pulmonary:   #Acute Hypoxic Respiratory Failure (following VT ablation 6/6) (resolved)   #?PNA   #Pulmonary Edema   #?COPD   CT chest (6/4): interval worsening now moderate right and small left pleural effusions with associated atelectasis,  findings suggestive of mild pulmonary interstitial and alveolar edema  Blood cultures (6/3): NGTD   Blood cultures (6/7): NGTD  MRSA (6/3): negative   Strep PNA, legionella negative (6/5)   S/p zosyn + vancomycin (6/2-6/5)   - Stop zosyn + vancomycin (6/7-6/10)  - ID recommended stopping antibiotics 6/5, were temporarily restarted 6/7 given leukocytosis/ hypotension   - Monitor and maintain SpO2 > 92%     GI:  #No active issues   - Bowel regimen: facundo-colace BID + miralax BID   - PPI daiy    :  #EITAN (resolved)  #Nephrolithiasis s/p recent bilateral ureteral stent placement to left ureter   sCr (6/10): 1.07  - Consulted urology 5/31 to discuss removal--> will follow outpatient for STENT removal   - Trend RFP daily   - I/Os  - Diuresis as above   - Avoid hypotension and nephrotoxic agents     Heme:  #Anemia in the setting of Chronic Disease  #Iron Deficiency Anemia   Labs (6/5): iron 51, TIBC 285, %sat 18, folate 9.8, ferritin 99  - S/p venofer x3 days (6/6-6/8)     Endo:  #No DM  - Euglycemic, HgbA1c 6%      #?Hypothyroid   - TSH (6/4): 10.34  - T3 (6/4): 65  - T4 (6/4): 8.3  - Consulted endocrine 6/5--> 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 No need for synthroid now. Repeating TSH and free T4 after 2 to 4 weeks     ID:  #Leukocytosis 2/2 ?PNA versus stress induced    - See pulmonary plan above  - Trend temps q4h     PHYSICAL AND OCCUPATIONAL THERAPY: ordered and following)      LINES:  PIVs   Left radial a-line 6/2-6/10  Femoral cordis 6/6-6/10  Femoral IABP 6/6-6/8  Midline 6/10     DVT: heparin gtt   VAP BUNDLE: NA  CENTRAL LINE BUNDLE: NA  ULCER PPX: PPI  GLYCEMIC CONTROL: NA  BOWEL CARE: scheduled facundo-colace BID and Miralax BID   INDWELLING CATHETER: NA  NUTRITION: Adult diet Regular      EMERGENCY CONTACT: Extended Emergency Contact Information  Primary Emergency Contact: Judie Sapp  Address: [address] [place] [address] [facility] of  [facility] Phone: [phone]  Mobile Phone: [phone]  Relation: Mother  Preferred language: English   needed? No  Secondary Emergency Contact: Keiry Greene  Address: [address] [place], [place]  Mobile Phone: [phone]  Relation: Sibling  Preferred language: English   needed? No  FAMILY UPDATE: Given at bedside 6/10  CODE STATUS: DNR and No Intubation  DISPO: Remain in HFICU     Patient seen and assessed with Dr. Gunderson     I personally spent 65 minutes of critical care time directly and personally managing the patient exclusive of separately billable procedures.  _________________________________________________  Logan Moscoso PA-C

## 2024-06-10 NOTE — PROCEDURES
Midline Insertion  Pre-Procedure Checklist:  Emergent Line Insertion: No  Type of Line to be Placed: Midline  Consent Obtained: Yes  Emergency Medication Necessary: No  Patient Identified with 2 Independent Identifiers: Yes  Review of Allergies, Anticoagulation, Relevant Labs, ECG/Telemetry: Yes  Risks/Benefits/Alternatives Discussed with Patient/POA/Legal Representative: Yes  Stop Sign on Door: Yes  Time Out Performed: Yes  Catheter Exchange: No    Positioning Checklist:  All People, Including Patient, in the Room with Cap and Mask: Yes  Fluoroscopy Used to Identify Vessel and Guide Insertion: No   Sterile Cover Used: Yes  Full Barrier Precautions Followed (Mask, Cap, Gown, Gloves): Yes  Hands Washed: Yes  Monitors Attached with Sound Alarms On: No  Full Body Sterile Drape (Head-to-Toe) Used to Cover Patient: N/A AST  Trendelenburg Position (For IJ and Subclavian): N/A  CHG Skin Prep Used and Allowed to Air Dry to Skin Procedure: Yes    Procedure Checklist:  Blood Aspirated From All Lumens, All Ports Subsequently Flushed: Yes  Catheter Caps Placed on All Lumens; Lumens Clamped: Yes  Maintain Guidewire Control Throughout, Ensuring Guidewire Removal: Yes  Maintain Sterile Field Throughout Insertion: Yes  Catheter Secured: Yes  Confirmatory Test of Venous Placement: Non-Pulsatile Blood    Post Procedure Checklist:  Date and Time Written on Dressing: Yes  Sharp and Wire Count and Safe Disposal of all Sharps/Wires: Yes  Sterile Dressing Applied Per Protocol: Yes  X-ray Ordered or ECG Image: N/A    Insertion Details:  Size (Fr): 20 gauge  Lumen Type: Single  Catheter to Vein Ratio Less Than 50%: Yes  Total Length (cm): 10  External Length (cm): 0  Orientation: Left  Location: Basilic Vein  Site Prep: Chlorohexidine; Usual sterile procedure followed  Local Anesthetic: Injectable/Subcutaneous  Indication: Infusion  Insertion Team Members in the Room: Nurse, LPN  Initial Extremity Circumference (cm): 28  Insertion Attempts:  1  Patient Tolerance: Tolerated Well, Age Appropriate  Comfort Measures: Subcutaneous anesthetic; Verbal  Procedure Location: Bedside  Safety Measures: Patient specific safety measures addressed with RN  Estimated Blood Loss (mL): 1  Vessel Fully Compressible Proximally and Distally to Insertion Site: Yes  Brisk Blood Return Obtained and Line Draws Easily: Yes  Tip Location: Left axilla  Line Confirmation: Venous return  Lot #: [id number]  : Bard  Line Exp Date: 05/31/2025  Securement: Stat Lock  Post Procedure Checklist: Handoff with RN; Obtain all new IV tubing prior to use; Bed at lowest level and wheels locked; Line discharge information at bedside.  Additional Details: Line was inserted using Accelerated Seldinger's Technique.   Placed by: Demarcus Harper RN

## 2024-06-10 NOTE — PROGRESS NOTES
Vancomycin Dosing by Pharmacy- Cessation of Therapy    Consult to pharmacy for vancomycin dosing has been discontinued by the prescriber, pharmacy will sign off at this time.    Please call pharmacy if there are further questions or re-enter a consult if vancomycin is resumed.     Tyra Hooks, PharmD

## 2024-06-10 NOTE — CARE PLAN
Problem: Fall/Injury  Goal: Not fall by end of shift  6/10/2024 0159 by Carlin Monzon RN  Outcome: Progressing  6/10/2024 0159 by Carlin Monzon RN  Outcome: Progressing  Goal: Be free from injury by end of the shift  6/10/2024 0159 by Carlin Monzon RN  Outcome: Progressing  6/10/2024 0159 by Carlin Monzon RN  Outcome: Progressing  Goal: Verbalize understanding of personal risk factors for fall in the hospital  6/10/2024 0159 by Carlin Monzon RN  Outcome: Progressing  6/10/2024 0159 by Carlin Monzon RN  Outcome: Progressing  Goal: Verbalize understanding of risk factor reduction measures to prevent injury from fall in the home  6/10/2024 0159 by Carlin Monzon RN  Outcome: Progressing  6/10/2024 0159 by Carlin Monzon RN  Outcome: Progressing  Goal: Use assistive devices by end of the shift  6/10/2024 0159 by Carlin Monzon RN  Outcome: Progressing  6/10/2024 0159 by Carlin Monzon RN  Outcome: Progressing  Goal: Pace activities to prevent fatigue by end of the shift  6/10/2024 0159 by Carlin Monzon RN  Outcome: Progressing  6/10/2024 0159 by Carlin Monzon RN  Outcome: Progressing     Problem: Pain  Goal: My pain/discomfort is manageable  6/10/2024 0159 by Carlin Monzon RN  Outcome: Progressing  6/10/2024 0159 by Carlin Monzon RN  Outcome: Progressing     Problem: Daily Care  Goal: Daily care needs are met  6/10/2024 0159 by Carlin Monzon RN  Outcome: Progressing  6/10/2024 0159 by Carlin Monzon RN  Outcome: Progressing     Problem: Psychosocial Needs  Goal: Demonstrates ability to cope with hospitalization/illness  6/10/2024 0159 by Carlin Monzon RN  Outcome: Progressing  6/10/2024 0159 by Carlin Monzon RN  Outcome: Progressing  Goal: Collaborate with me, my family, and caregiver to identify my specific goals  6/10/2024 0159 by Carlin Monzon RN  Outcome: Progressing  6/10/2024 0159 by Carlin Monzon RN  Outcome: Progressing     Problem: Pain - Adult  Goal: Verbalizes/displays adequate comfort level or baseline comfort  level  6/10/2024 0159 by Carlin Monzon RN  Outcome: Progressing  6/10/2024 0159 by Carlin Monzon RN  Outcome: Progressing     Problem: Safety - Adult  Goal: Free from fall injury  6/10/2024 0159 by Carlin Monzon RN  Outcome: Progressing  6/10/2024 0159 by Carlin Monzon RN  Outcome: Progressing     Problem: Discharge Planning  Goal: Discharge to home or other facility with appropriate resources  6/10/2024 0159 by Carlin Monzon RN  Outcome: Progressing  6/10/2024 0159 by Carlin Monzon RN  Outcome: Progressing     Problem: Chronic Conditions and Co-morbidities  Goal: Patient's chronic conditions and co-morbidity symptoms are monitored and maintained or improved  6/10/2024 0159 by Carlin Monzon RN  Outcome: Progressing  6/10/2024 0159 by Carlin Monzon RN  Outcome: Progressing     Problem: Skin  Goal: Decreased wound size/increased tissue granulation at next dressing change  6/10/2024 0159 by Carlin Monzon RN  Outcome: Progressing  6/10/2024 0159 by Carlin Monzon RN  Outcome: Progressing  Goal: Participates in plan/prevention/treatment measures  6/10/2024 0159 by Carlin Monzon RN  Outcome: Progressing  6/10/2024 0159 by Carlin Monzon RN  Outcome: Progressing  Goal: Prevent/manage excess moisture  6/10/2024 0159 by Carlin Monzon RN  Outcome: Progressing  6/10/2024 0159 by Carlin Monzon RN  Outcome: Progressing  Goal: Prevent/minimize sheer/friction injuries  6/10/2024 0159 by Carlin Monzon RN  Outcome: Progressing  6/10/2024 0159 by Carlin Monzon RN  Outcome: Progressing  Goal: Promote/optimize nutrition  6/10/2024 0159 by Carlin Monzon RN  Outcome: Progressing  6/10/2024 0159 by Carlin Monzon RN  Outcome: Progressing  Goal: Promote skin healing  6/10/2024 0159 by Carlin Monzon RN  Outcome: Progressing  6/10/2024 0159 by Carlin Monzon RN  Outcome: Progressing     Problem: Heart Failure  Goal: Improved gas exchange this shift  6/10/2024 0159 by Carlin Monzon RN  Outcome: Progressing  6/10/2024 0159 by Carlin Monzon  RN  Outcome: Progressing  Goal: Improved urinary output this shift  6/10/2024 0159 by Carlin Monzon RN  Outcome: Progressing  6/10/2024 0159 by Carlin Monzon RN  Outcome: Progressing  Goal: Reduction in peripheral edema within 24 hours  6/10/2024 0159 by Carlin Monzon RN  Outcome: Progressing  6/10/2024 0159 by Carlin Monzon RN  Outcome: Progressing  Goal: Report improvement of dyspnea/breathlessness this shift  6/10/2024 0159 by Carlin Monzon RN  Outcome: Progressing  6/10/2024 0159 by Carlin oMnzon RN  Outcome: Progressing  Goal: Weight from fluid excess reduced over 2-3 days, then stabilize  6/10/2024 0159 by Carlin Monzon RN  Outcome: Progressing  6/10/2024 0159 by Carlin Monzon RN  Outcome: Progressing  Goal: Increase self care and/or family involvement in 24 hours  6/10/2024 0159 by Carlin Monzon RN  Outcome: Progressing  6/10/2024 0159 by Carlin Monzon RN  Outcome: Progressing     Problem: Safety - Medical Restraint  Goal: Remains free of injury from restraints (Restraint for Interference with Medical Device)  6/10/2024 0159 by Carlin Monzon RN  Outcome: Progressing  6/10/2024 0159 by Carlin Monzon RN  Outcome: Progressing  Goal: Free from restraint(s) (Restraint for Interference with Medical Device)  6/10/2024 0159 by Carlin Monzon RN  Outcome: Progressing  6/10/2024 0159 by Carlin Monzon RN  Outcome: Progressing     Problem: Knowledge Deficit  Goal: Patient/family/caregiver demonstrates understanding of disease process, treatment plan, medications, and discharge instructions  6/10/2024 0159 by Carlin Monzon RN  Outcome: Progressing  6/10/2024 0159 by Carlin Monzon RN  Outcome: Progressing     Problem: Mechanical Ventilation  Goal: Patient Will Maintain Patent Airway  6/10/2024 0159 by Carlin Monzon RN  Outcome: Progressing  6/10/2024 0159 by Carlin Monzon RN  Outcome: Progressing  Goal: Oral health is maintained or improved  6/10/2024 0159 by Carlin Monzon RN  Outcome: Progressing  6/10/2024 0159 by Carlin  MAIN Monzon  Outcome: Progressing  Goal: Tracheostomy will be managed safely  6/10/2024 0159 by Carlin Monzon RN  Outcome: Progressing  6/10/2024 0159 by Carlin Monzon RN  Outcome: Progressing  Goal: ET tube will be managed safely  6/10/2024 0159 by Carlin Monzon RN  Outcome: Progressing  6/10/2024 0159 by Carlin Monzon RN  Outcome: Progressing  Goal: Ability to express needs and understand communication  6/10/2024 0159 by Carlin Monzon RN  Outcome: Progressing  6/10/2024 0159 by Carlin Monzon RN  Outcome: Progressing  Goal: Mobility/activity is maintained at optimum level for patient  6/10/2024 0159 by Carlin Monzon RN  Outcome: Progressing  6/10/2024 0159 by Carlin Monzon RN  Outcome: Progressing   The patient's goals for the shift include      The clinical goals for the shift include pt will remain HDS throughout shift    Over the shift, the patient did not make progress toward the following goals. Barriers to progression include . Recommendations to address these barriers include .

## 2024-06-10 NOTE — CARE PLAN
The patient's goals for the shift include      The clinical goals for the shift include pt will remain HDS throughout shift    Over the shift, the patient did not make progress toward the following goals. Barriers to progression include . Recommendations to address these barriers include .    Problem: Fall/Injury  Goal: Not fall by end of shift  Outcome: Not Progressing  Goal: Be free from injury by end of the shift  Outcome: Not Progressing  Goal: Verbalize understanding of personal risk factors for fall in the hospital  Outcome: Not Progressing  Goal: Verbalize understanding of risk factor reduction measures to prevent injury from fall in the home  Outcome: Not Progressing  Goal: Use assistive devices by end of the shift  Outcome: Not Progressing  Goal: Pace activities to prevent fatigue by end of the shift  Outcome: Not Progressing     Problem: Pain  Goal: My pain/discomfort is manageable  Outcome: Not Progressing     Problem: Daily Care  Goal: Daily care needs are met  Outcome: Not Progressing     Problem: Psychosocial Needs  Goal: Demonstrates ability to cope with hospitalization/illness  Outcome: Not Progressing  Goal: Collaborate with me, my family, and caregiver to identify my specific goals  Outcome: Not Progressing     Problem: Pain - Adult  Goal: Verbalizes/displays adequate comfort level or baseline comfort level  Outcome: Not Progressing     Problem: Safety - Adult  Goal: Free from fall injury  Outcome: Not Progressing     Problem: Discharge Planning  Goal: Discharge to home or other facility with appropriate resources  Outcome: Not Progressing     Problem: Chronic Conditions and Co-morbidities  Goal: Patient's chronic conditions and co-morbidity symptoms are monitored and maintained or improved  Outcome: Not Progressing     Problem: Skin  Goal: Decreased wound size/increased tissue granulation at next dressing change  Outcome: Not Progressing  Goal: Participates in plan/prevention/treatment  measures  Outcome: Not Progressing  Goal: Prevent/manage excess moisture  Outcome: Not Progressing  Goal: Prevent/minimize sheer/friction injuries  Outcome: Not Progressing  Goal: Promote/optimize nutrition  Outcome: Not Progressing  Goal: Promote skin healing  Outcome: Not Progressing     Problem: Heart Failure  Goal: Improved gas exchange this shift  Outcome: Not Progressing  Goal: Improved urinary output this shift  Outcome: Not Progressing  Goal: Reduction in peripheral edema within 24 hours  Outcome: Not Progressing  Goal: Report improvement of dyspnea/breathlessness this shift  Outcome: Not Progressing  Goal: Weight from fluid excess reduced over 2-3 days, then stabilize  Outcome: Not Progressing  Goal: Increase self care and/or family involvement in 24 hours  Outcome: Not Progressing     Problem: Safety - Medical Restraint  Goal: Remains free of injury from restraints (Restraint for Interference with Medical Device)  Outcome: Not Progressing  Goal: Free from restraint(s) (Restraint for Interference with Medical Device)  Outcome: Not Progressing     Problem: Knowledge Deficit  Goal: Patient/family/caregiver demonstrates understanding of disease process, treatment plan, medications, and discharge instructions  Outcome: Not Progressing     Problem: Mechanical Ventilation  Goal: Patient Will Maintain Patent Airway  Outcome: Not Progressing  Goal: Oral health is maintained or improved  Outcome: Not Progressing  Goal: Tracheostomy will be managed safely  Outcome: Not Progressing  Goal: ET tube will be managed safely  Outcome: Not Progressing  Goal: Ability to express needs and understand communication  Outcome: Not Progressing  Goal: Mobility/activity is maintained at optimum level for patient  Outcome: Not Progressing

## 2024-06-10 NOTE — PROGRESS NOTES
Physical Therapy                 Therapy Communication Note    Patient Name: Cristian Sapp  MRN: [id number]  Today's Date: 6/10/2024     Discipline: Physical Therapy    Missed Visit Reason: Missed Visit Reason:  (Pt on bedrest 2/2 pt just having femoral sheath removed and having re-bleed after sneezing. Will plan for PT tomorrow as pt is appropriate.)    Missed Time: Attempt    Comment:

## 2024-06-11 ENCOUNTER — COMMITTEE REVIEW (OUTPATIENT)
Dept: TRANSPLANT | Facility: HOSPITAL | Age: 59
End: 2024-06-11
Payer: COMMERCIAL

## 2024-06-11 LAB
ALBUMIN SERPL BCP-MCNC: 3.5 G/DL (ref 3.4–5)
ANION GAP SERPL CALC-SCNC: 15 MMOL/L (ref 10–20)
BASOPHILS # BLD AUTO: 0.16 X10*3/UL (ref 0–0.1)
BASOPHILS NFR BLD AUTO: 1.1 %
BUN SERPL-MCNC: 16 MG/DL (ref 6–23)
CALCIUM SERPL-MCNC: 9.2 MG/DL (ref 8.6–10.6)
CHLORIDE SERPL-SCNC: 100 MMOL/L (ref 98–107)
CO2 SERPL-SCNC: 25 MMOL/L (ref 21–32)
CREAT SERPL-MCNC: 1.15 MG/DL (ref 0.5–1.3)
EGFRCR SERPLBLD CKD-EPI 2021: 75 ML/MIN/1.73M*2
EOSINOPHIL # BLD AUTO: 0.5 X10*3/UL (ref 0–0.7)
EOSINOPHIL NFR BLD AUTO: 3.3 %
ERYTHROCYTE [DISTWIDTH] IN BLOOD BY AUTOMATED COUNT: 14 % (ref 11.5–14.5)
GLUCOSE BLD MANUAL STRIP-MCNC: 106 MG/DL (ref 74–99)
GLUCOSE BLD MANUAL STRIP-MCNC: 115 MG/DL (ref 74–99)
GLUCOSE BLD MANUAL STRIP-MCNC: 125 MG/DL (ref 74–99)
GLUCOSE BLD MANUAL STRIP-MCNC: 134 MG/DL (ref 74–99)
GLUCOSE BLD MANUAL STRIP-MCNC: 144 MG/DL (ref 74–99)
GLUCOSE BLD MANUAL STRIP-MCNC: 178 MG/DL (ref 74–99)
GLUCOSE SERPL-MCNC: 139 MG/DL (ref 74–99)
HCT VFR BLD AUTO: 33.7 % (ref 41–52)
HGB BLD-MCNC: 11.6 G/DL (ref 13.5–17.5)
IMM GRANULOCYTES # BLD AUTO: 0.46 X10*3/UL (ref 0–0.7)
IMM GRANULOCYTES NFR BLD AUTO: 3 % (ref 0–0.9)
LIDOCAIN SERPL-MCNC: 4.8 UG/ML (ref 1–5)
LYMPHOCYTES # BLD AUTO: 2.46 X10*3/UL (ref 1.2–4.8)
LYMPHOCYTES NFR BLD AUTO: 16.2 %
MAGNESIUM SERPL-MCNC: 2.14 MG/DL (ref 1.6–2.4)
MCH RBC QN AUTO: 32.1 PG (ref 26–34)
MCHC RBC AUTO-ENTMCNC: 34.4 G/DL (ref 32–36)
MCV RBC AUTO: 93 FL (ref 80–100)
MONOCYTES # BLD AUTO: 1.63 X10*3/UL (ref 0.1–1)
MONOCYTES NFR BLD AUTO: 10.7 %
NEUTROPHILS # BLD AUTO: 9.96 X10*3/UL (ref 1.2–7.7)
NEUTROPHILS NFR BLD AUTO: 65.7 %
NRBC BLD-RTO: 0 /100 WBCS (ref 0–0)
PHOSPHATE SERPL-MCNC: 4 MG/DL (ref 2.5–4.9)
PLATELET # BLD AUTO: 245 X10*3/UL (ref 150–450)
POTASSIUM SERPL-SCNC: 4 MMOL/L (ref 3.5–5.3)
RBC # BLD AUTO: 3.61 X10*6/UL (ref 4.5–5.9)
SODIUM SERPL-SCNC: 136 MMOL/L (ref 136–145)
WBC # BLD AUTO: 15.2 X10*3/UL (ref 4.4–11.3)

## 2024-06-11 PROCEDURE — 2500000004 HC RX 250 GENERAL PHARMACY W/ HCPCS (ALT 636 FOR OP/ED): Performed by: STUDENT IN AN ORGANIZED HEALTH CARE EDUCATION/TRAINING PROGRAM

## 2024-06-11 PROCEDURE — 2500000002 HC RX 250 W HCPCS SELF ADMINISTERED DRUGS (ALT 637 FOR MEDICARE OP, ALT 636 FOR OP/ED): Performed by: NURSE PRACTITIONER

## 2024-06-11 PROCEDURE — 2500000002 HC RX 250 W HCPCS SELF ADMINISTERED DRUGS (ALT 637 FOR MEDICARE OP, ALT 636 FOR OP/ED): Performed by: STUDENT IN AN ORGANIZED HEALTH CARE EDUCATION/TRAINING PROGRAM

## 2024-06-11 PROCEDURE — 97530 THERAPEUTIC ACTIVITIES: CPT | Mod: GP

## 2024-06-11 PROCEDURE — 2500000004 HC RX 250 GENERAL PHARMACY W/ HCPCS (ALT 636 FOR OP/ED)

## 2024-06-11 PROCEDURE — 99291 CRITICAL CARE FIRST HOUR: CPT | Performed by: NURSE PRACTITIONER

## 2024-06-11 PROCEDURE — 2500000001 HC RX 250 WO HCPCS SELF ADMINISTERED DRUGS (ALT 637 FOR MEDICARE OP): Performed by: NURSE PRACTITIONER

## 2024-06-11 PROCEDURE — 2020000001 HC ICU ROOM DAILY

## 2024-06-11 PROCEDURE — 82947 ASSAY GLUCOSE BLOOD QUANT: CPT | Mod: 91

## 2024-06-11 PROCEDURE — 2500000004 HC RX 250 GENERAL PHARMACY W/ HCPCS (ALT 636 FOR OP/ED): Performed by: NURSE PRACTITIONER

## 2024-06-11 PROCEDURE — 2500000001 HC RX 250 WO HCPCS SELF ADMINISTERED DRUGS (ALT 637 FOR MEDICARE OP): Performed by: STUDENT IN AN ORGANIZED HEALTH CARE EDUCATION/TRAINING PROGRAM

## 2024-06-11 PROCEDURE — 99291 CRITICAL CARE FIRST HOUR: CPT | Performed by: INTERNAL MEDICINE

## 2024-06-11 PROCEDURE — 76942 ECHO GUIDE FOR BIOPSY: CPT

## 2024-06-11 PROCEDURE — 97164 PT RE-EVAL EST PLAN CARE: CPT | Mod: GP

## 2024-06-11 PROCEDURE — 37799 UNLISTED PX VASCULAR SURGERY: CPT | Performed by: STUDENT IN AN ORGANIZED HEALTH CARE EDUCATION/TRAINING PROGRAM

## 2024-06-11 PROCEDURE — 97168 OT RE-EVAL EST PLAN CARE: CPT | Mod: GO

## 2024-06-11 PROCEDURE — 85025 COMPLETE CBC W/AUTO DIFF WBC: CPT | Performed by: STUDENT IN AN ORGANIZED HEALTH CARE EDUCATION/TRAINING PROGRAM

## 2024-06-11 PROCEDURE — 80176 ASSAY OF LIDOCAINE: CPT | Performed by: STUDENT IN AN ORGANIZED HEALTH CARE EDUCATION/TRAINING PROGRAM

## 2024-06-11 PROCEDURE — 80069 RENAL FUNCTION PANEL: CPT | Performed by: STUDENT IN AN ORGANIZED HEALTH CARE EDUCATION/TRAINING PROGRAM

## 2024-06-11 PROCEDURE — S4991 NICOTINE PATCH NONLEGEND: HCPCS | Performed by: STUDENT IN AN ORGANIZED HEALTH CARE EDUCATION/TRAINING PROGRAM

## 2024-06-11 PROCEDURE — 97535 SELF CARE MNGMENT TRAINING: CPT | Mod: GO

## 2024-06-11 PROCEDURE — 83735 ASSAY OF MAGNESIUM: CPT | Performed by: STUDENT IN AN ORGANIZED HEALTH CARE EDUCATION/TRAINING PROGRAM

## 2024-06-11 PROCEDURE — 99223 1ST HOSP IP/OBS HIGH 75: CPT

## 2024-06-11 PROCEDURE — C9113 INJ PANTOPRAZOLE SODIUM, VIA: HCPCS | Performed by: STUDENT IN AN ORGANIZED HEALTH CARE EDUCATION/TRAINING PROGRAM

## 2024-06-11 RX ORDER — FUROSEMIDE 10 MG/ML
40 INJECTION INTRAMUSCULAR; INTRAVENOUS ONCE
Status: DISCONTINUED | OUTPATIENT
Start: 2024-06-11 | End: 2024-06-11

## 2024-06-11 RX ORDER — MIDAZOLAM HYDROCHLORIDE 1 MG/ML
2 INJECTION INTRAMUSCULAR; INTRAVENOUS ONCE
Status: COMPLETED | OUTPATIENT
Start: 2024-06-11 | End: 2024-06-11

## 2024-06-11 RX ORDER — FUROSEMIDE 10 MG/ML
40 INJECTION INTRAMUSCULAR; INTRAVENOUS ONCE
Status: COMPLETED | OUTPATIENT
Start: 2024-06-11 | End: 2024-06-11

## 2024-06-11 RX ORDER — MIDAZOLAM HYDROCHLORIDE 1 MG/ML
INJECTION INTRAMUSCULAR; INTRAVENOUS
Status: COMPLETED
Start: 2024-06-11 | End: 2024-06-11

## 2024-06-11 RX ORDER — AMIODARONE HYDROCHLORIDE 200 MG/1
400 TABLET ORAL DAILY
Status: DISCONTINUED | OUTPATIENT
Start: 2024-06-12 | End: 2024-06-11

## 2024-06-11 RX ADMIN — SIMETHICONE 40 MG: 80 TABLET, CHEWABLE ORAL at 22:18

## 2024-06-11 RX ADMIN — FUROSEMIDE 40 MG: 10 INJECTION, SOLUTION INTRAMUSCULAR; INTRAVENOUS at 17:14

## 2024-06-11 RX ADMIN — AMIODARONE HYDROCHLORIDE 1 MG/MIN: 1.8 INJECTION, SOLUTION INTRAVENOUS at 06:21

## 2024-06-11 RX ADMIN — INSULIN LISPRO 2 UNITS: 100 INJECTION, SOLUTION INTRAVENOUS; SUBCUTANEOUS at 20:43

## 2024-06-11 RX ADMIN — MIDAZOLAM HYDROCHLORIDE 2 MG: 1 INJECTION INTRAMUSCULAR; INTRAVENOUS at 12:39

## 2024-06-11 RX ADMIN — LORAZEPAM 0.5 MG: 0.5 TABLET ORAL at 21:00

## 2024-06-11 RX ADMIN — ROSUVASTATIN 20 MG: 40 TABLET, FILM COATED ORAL at 21:00

## 2024-06-11 RX ADMIN — LIDOCAINE HYDROCHLORIDE 1 MG/MIN: 8 INJECTION, SOLUTION INTRAVENOUS at 05:19

## 2024-06-11 RX ADMIN — RANOLAZINE 500 MG: 500 TABLET, EXTENDED RELEASE ORAL at 08:45

## 2024-06-11 RX ADMIN — Medication 1 PATCH: at 05:29

## 2024-06-11 RX ADMIN — POLYETHYLENE GLYCOL 3350 17 G: 17 POWDER, FOR SOLUTION ORAL at 20:19

## 2024-06-11 RX ADMIN — ASPIRIN 81 MG CHEWABLE TABLET 81 MG: 81 TABLET CHEWABLE at 08:43

## 2024-06-11 RX ADMIN — SENNOSIDES AND DOCUSATE SODIUM 2 TABLET: 8.6; 5 TABLET ORAL at 20:19

## 2024-06-11 RX ADMIN — AMIODARONE HYDROCHLORIDE 0.5 MG/MIN: 1.8 INJECTION, SOLUTION INTRAVENOUS at 15:48

## 2024-06-11 RX ADMIN — LORAZEPAM 0.5 MG: 0.5 TABLET ORAL at 08:42

## 2024-06-11 RX ADMIN — LOSARTAN POTASSIUM 12.5 MG: 25 TABLET, FILM COATED ORAL at 08:45

## 2024-06-11 RX ADMIN — AMIODARONE HYDROCHLORIDE 1 MG/MIN: 1.8 INJECTION, SOLUTION INTRAVENOUS at 00:36

## 2024-06-11 RX ADMIN — MIDAZOLAM HYDROCHLORIDE 2 MG: 1 INJECTION, SOLUTION INTRAMUSCULAR; INTRAVENOUS at 12:39

## 2024-06-11 RX ADMIN — APIXABAN 5 MG: 5 TABLET, FILM COATED ORAL at 20:20

## 2024-06-11 RX ADMIN — SPIRONOLACTONE 25 MG: 25 TABLET ORAL at 08:43

## 2024-06-11 RX ADMIN — TRAZODONE HYDROCHLORIDE 50 MG: 50 TABLET ORAL at 22:20

## 2024-06-11 RX ADMIN — DAPAGLIFLOZIN 10 MG: 10 TABLET, FILM COATED ORAL at 08:43

## 2024-06-11 RX ADMIN — ACETAMINOPHEN 650 MG: 325 TABLET ORAL at 22:20

## 2024-06-11 RX ADMIN — SENNOSIDES AND DOCUSATE SODIUM 2 TABLET: 8.6; 5 TABLET ORAL at 08:42

## 2024-06-11 RX ADMIN — RANOLAZINE 500 MG: 500 TABLET, EXTENDED RELEASE ORAL at 21:00

## 2024-06-11 RX ADMIN — PANTOPRAZOLE SODIUM 40 MG: 40 INJECTION, POWDER, FOR SOLUTION INTRAVENOUS at 08:43

## 2024-06-11 ASSESSMENT — COGNITIVE AND FUNCTIONAL STATUS - GENERAL
WALKING IN HOSPITAL ROOM: A LITTLE
MOBILITY SCORE: 15
DAILY ACTIVITIY SCORE: 19
STANDING UP FROM CHAIR USING ARMS: A LITTLE
HELP NEEDED FOR BATHING: A LITTLE
TURNING FROM BACK TO SIDE WHILE IN FLAT BAD: A LITTLE
MOVING FROM LYING ON BACK TO SITTING ON SIDE OF FLAT BED WITH BEDRAILS: A LITTLE
PERSONAL GROOMING: A LITTLE
CLIMB 3 TO 5 STEPS WITH RAILING: TOTAL
TOILETING: A LITTLE
DRESSING REGULAR LOWER BODY CLOTHING: A LITTLE
MOVING TO AND FROM BED TO CHAIR: A LOT
DRESSING REGULAR UPPER BODY CLOTHING: A LITTLE

## 2024-06-11 ASSESSMENT — PAIN SCALES - GENERAL
PAINLEVEL_OUTOF10: 0 - NO PAIN
PAINLEVEL_OUTOF10: 0 - NO PAIN
PAINLEVEL_OUTOF10: 7
PAINLEVEL_OUTOF10: 8
PAINLEVEL_OUTOF10: 5 - MODERATE PAIN
PAINLEVEL_OUTOF10: 0 - NO PAIN
PAINLEVEL_OUTOF10: 0 - NO PAIN

## 2024-06-11 ASSESSMENT — ACTIVITIES OF DAILY LIVING (ADL)
ADL_ASSISTANCE: INDEPENDENT
ADL_ASSISTANCE: INDEPENDENT
HOME_MANAGEMENT_TIME_ENTRY: 15
BATHING_ASSISTANCE: MINIMAL

## 2024-06-11 ASSESSMENT — PAIN - FUNCTIONAL ASSESSMENT
PAIN_FUNCTIONAL_ASSESSMENT: 0-10

## 2024-06-11 NOTE — LETTER
June 11, 2024    Cristian Sapp  [address] [place] [address]      Dear Mr. Sapp:    Our multi-disciplinary transplant team completed a review of your medical records on ***.  ***    Our transplant program consists of surgeons and medical doctors who provide coverage 365 days a year, 24 hours a day.     If you have any questions or concerns regarding your insurance coverage or billing issues, a  is available to speak with you.     It is important to keep us updated of any major changes in your medical condition, contact information and health insurance coverage.     Please don't hesitate to contact us at Dept: [phone] with any questions or concerns. We look forward to working with you through this process.      Sincerely,      Alexandro Doyle MD MPH          The [facility] Toll-free Patient Services Line:  Your Resource for Organ Transplant Information    If you have a question regarding your own medical care, you always should call your transplant hospital first. However, for general organ transplant-related information, you should call the [employer] ([employer]) toll-free patient services line at 1-593.856.7359.  Anyone, including potential transplant candidates, candidates, recipients, family members, friends, living donors, and donor family members, can call this number to:    Talk about organ donation, living donation, the transplant process, the donation process, and transplant policies.  Get a free patient information kit with helpful booklets, waiting list and transplant information, and a list of all transplant hospitals.  Ask questions about the Organ Procurement and Transplantation Network (OPTN) web site (http://optn.transplant.hrsa.gov/), the [employer] Web site (http://unos.org/), or the UNOS web site for living donors and transplant recipients. (http://www.transplantliving.org/).  Learn how [employer] and the OPTN can help you.  Talk about any concerns that you may  have with a transplant hospital.    [facility] is a not-for-profit organization that provides the administrative services for the national OPTN under federal contract to the Health Resources and Services Administration (HRSA), an agency under the [place]. Department of Health and Human Services (HHS).    [facility] and the OPTN are responsible for:    Providing educational material for patients, the public, and professionals.  Raising awareness of the need for donated organs and tissue.  Writing organ transplant policy with help from transplant professionals, transplant patients, transplant candidates, donor families, living donors, and the public.  Coordinating organ procurement, matching, and placement.  Collecting information about every organ transplant and donation that occurs in the [place].    Remember, you should contact your transplant hospital directly if you have questions or concerns about your own medical care including medical records, work-up progress, and test results.    [facility] is not your transplant hospital, and staff at [facility] will not be able to transfer you to your transplant hospital, so keep your transplant hospital’s phone number handy.    However, while you research your transplant needs and learn as much as you can about transplantation and donation, we welcome your call to our toll-free patient services line at [phone].      [facility] PIL Final Rev 1-

## 2024-06-11 NOTE — PROGRESS NOTES
[facility] Attending Note     He continues to have long runs of ventricular tachycardia eventually breaking out of them.  Discussed with EP will do another ganglion block prior to discharge but will need to eventually transition to oral antiarrhythmics with a plan to discharge home.  Plan to discuss further with palliative care once we have more flushed out plan.     This critically ill patient continues to be at-risk for clinically significant deterioration / failure due to the above mentioned dysfunctional, unstable organ systems.  I have personally identified and managed all complex critical care issues to prevent aforementioned clinical deterioration.  Critical care time is spent at bedside and/or the immediate area and has included, but is not limited to, the review of diagnostic tests, labs, radiographs, serial assessments of hemodynamics, respiratory status, ventilatory management, and family updates.  Time spent in procedures and teaching are reported separately.     Critical care time: __44__ minutes

## 2024-06-11 NOTE — PROGRESS NOTES
[facility]  HFICU PROGRESS NOTE    Cristian Sapp/[id number]    Admit Date: 5/28/2024  Hospital Length of Stay: 14   ICU Length of Stay: 7d 16h   Primary Service: HFICU  Referring: Dr. Brown     INTERVAL EVENTS / PERTINENT ROS:   No acute event overnight. No episode of NSVT or frequent PVCs per Tele review. pt maintains on amiodarone gtt at 1 mg/min and lidocaine gtt at 1 mg/min over night. Patient is AV paced in the 90s and remains HDS with MAPs in the 70s this AM.  Has no acute complaints and in good spirit. Family present at bedside.     Patient is alert, oriented x3, moves all extremities, c/o general weakness, and tolerated PT/OT.  Patient denies chest pain or chest discomfort. Patient denies palpation or dizziness. Denies SOB. Denies abdominal pain or abdominal discomfort. Denies N/V/D.   Plan:  - EP planning for ganglion block again today 6/11, heparin was held at 6 Am.  - Decrease lidocaine gtt to 0.5 mg/min + amiodarone gtt to 0.5 mg/min   - Resumed Heparin gtt at 2 pm this afternoon  - Pt remains DNR and DNI  - No Amiodarone or Lidocaine bolus during Vts per Dr. Blood  - Spot Diuresis  MEDICATIONS  Infusions:  amiodarone, Last Rate: 0.5 mg/min (06/11/24 1200)  heparin, Last Rate: Stopped (06/11/24 0604)  lidocaine, Last Rate: 0.5 mg/min (06/11/24 1200)      Scheduled:  aspirin, 81 mg, Daily  dapagliflozin propanediol, 10 mg, Daily  furosemide, 40 mg, Once  insulin lispro, 0-10 Units, q4h  LORazepam, 0.5 mg, Nightly  losartan, 12.5 mg, Daily  nicotine, 1 patch, Daily   Followed by  [START ON 7/13/2024] nicotine, 1 patch, Daily  pantoprazole, 40 mg, Daily  polyethylene glycol, 17 g, BID  ranolazine, 500 mg, BID  rosuvastatin, 20 mg, Nightly  [Held by provider] sacubitriL-valsartan, 1 tablet, BID  sennosides-docusate sodium, 2 tablet, BID  spironolactone, 25 mg, Daily      PRN:  acetaminophen, 650 mg, q4h PRN  dextrose, 12.5 g, q15 min PRN  glucagon, 1 mg, q15 min PRN  heparin,  "2,000-4,000 Units, q4h PRN  hydrOXYzine HCL, 25 mg, q6h PRN  LORazepam, 0.5 mg, q8h PRN  oxygen, , Continuous PRN - O2/gases  simethicone, 40 mg, 4x daily PRN  traZODone, 50 mg, Nightly PRN  trimethobenzamide, 200 mg, q6h PRN      PHYSICAL EXAM:   Visit Vitals  /75 (BP Location: Left leg, Patient Position: Lying)   Pulse 95   Temp 36.3 °C (97.3 °F) (Temporal)   Resp 20   Ht 1.702 m (5' 7\")   Wt 72.6 kg (160 lb 0.9 oz)   SpO2 97%   BMI 25.07 kg/m²   Smoking Status Former   BSA 1.85 m²     Wt Readings from Last 5 Encounters:   06/11/24 72.6 kg (160 lb 0.9 oz)   05/27/24 78.8 kg (173 lb 11.6 oz)     INTAKE/OUTPUT:  I/O last 3 completed shifts:  In: 3370.3 (46.4 mL/kg) [P.O.:720; I.V.:2250.3 (31 mL/kg); IV Piggyback:400]  Out: 8250 (113.6 mL/kg) [Urine:8250 (3.2 mL/kg/hr)]  Weight: 72.6 kg      Physical Exam  Vitals reviewed.   Constitutional:       General: He is not in acute distress.     Appearance: He is ill-appearing.   HENT:      Mouth/Throat:      Mouth: Mucous membranes are moist.   Eyes:      Extraocular Movements: Extraocular movements intact.      Pupils: Pupils are equal, round, and reactive to light.   Neck:      Vascular: No JVD.   Cardiovascular:      Rate and Rhythm: Normal rate and regular rhythm.      Pulses: Normal pulses.      Heart sounds: Normal heart sounds.   Pulmonary:      Effort: Pulmonary effort is normal.      Breath sounds: Normal breath sounds. No wheezing or rhonchi.   Abdominal:      General: Abdomen is flat. Bowel sounds are normal. There is no distension.      Tenderness: There is no abdominal tenderness.   Musculoskeletal:         General: Normal range of motion.      Cervical back: Full passive range of motion without pain.      Right lower leg: No edema.      Left lower leg: No edema.   Skin:     General: Skin is warm.      Capillary Refill: Capillary refill takes 2 to 3 seconds.   Neurological:      General: No focal deficit present.      Mental Status: He is alert and " "oriented to person, place, and time. Mental status is at baseline.   Psychiatric:         Mood and Affect: Mood normal.         Behavior: Behavior normal. Behavior is cooperative.       DATA:  CMP:  Results from last 7 days   Lab Units 06/11/24  0133 06/10/24  1755 06/10/24  0312 06/09/24  0324 06/08/24  2123 06/08/24  0508 06/07/24  0412 06/06/24  2220 06/06/24  0305 06/05/24  1632 06/05/24  0223 06/04/24  1255   SODIUM mmol/L 136 136 139 140 137 142 137 136 137 138 136 134*   POTASSIUM mmol/L 4.0 4.3 4.2 3.9 3.5 3.9 4.5 4.4 3.6 3.5 3.6 3.9   CHLORIDE mmol/L 100 99 106 105 100 106 101 98 101 99 99 97*   CO2 mmol/L 25 25 24 25 21 24 27 25 27 27 28 26   ANION GAP mmol/L 15 16 13 14 20 16 14 17 13 16 13 15   BUN mg/dL 16 14 12 12 14 16 13 14 14 14 18 16   CREATININE mg/dL 1.15 1.13 1.07 1.28 1.11 1.00 0.91 1.13 0.90 1.21 1.43* 1.32*   EGFR mL/min/1.73m*2 75 76 81 66 78 88 >90 76 >90 70 58* 63   MAGNESIUM mg/dL 2.14  --  2.27 2.47*  --  2.00 2.32 1.75 2.29 1.89 2.05 1.98   ALBUMIN g/dL 3.5 4.0 3.4 3.4 3.6 3.0* 3.2* 3.4 3.2* 3.5 3.3* 3.4   ALT U/L  --   --   --   --  77*  --   --  40  --   --   --  11   AST U/L  --   --   --   --  57*  --   --  188*  --   --   --  11   BILIRUBIN TOTAL mg/dL  --   --   --   --  0.6  --   --  0.9  --   --   --  0.7     CBC:  Results from last 7 days   Lab Units 06/11/24  0133 06/10/24  0312 06/09/24  0324 06/08/24  2123 06/08/24  0508 06/07/24  0412 06/06/24  2220 06/06/24  0305   WBC AUTO x10*3/uL 15.2* 14.3* 14.8* 17.2* 15.5* 14.0* 17.7* 13.6*   HEMOGLOBIN g/dL 11.6* 11.5* 10.6* 11.4* 11.1* 11.7* 12.8* 12.9*   HEMATOCRIT % 33.7* 33.9* 30.3* 33.0* 32.6* 33.0* 38.6* 38.0*   PLATELETS AUTO x10*3/uL 245 229 212 198 242 293 312 318   MCV fL 93 94 92 93 94 92 96 94     COAG:   Results from last 7 days   Lab Units 06/07/24  0918 06/06/24  2220 06/04/24  1255   INR  1.3* 1.5* 1.3*     ABO:   No results found for: \"ABO\"    HEME/ENDO:  Results from last 7 days   Lab Units 06/05/24  0223 " 06/04/24  1255   FERRITIN ng/mL 99  --    IRON SATURATION % 18*  --    TSH mIU/L  --  10.35*      CARDIAC:   Results from last 7 days   Lab Units 06/07/24  0412 06/06/24  2220 06/04/24  1255   LD U/L 408* 467* 145   BNP pg/mL  --   --  674*     ASSESSMENT AND PLAN:   Cristian Sapp is a 57 y/o M with PMHx of CAD s/p multiple PCI, ICM/HFrEF (EF 15% 5/24) s/p CRT-D, VT storm s/p VT ablation (2019), infrarenal AAA s/p R iliac stent, nephrolithiasis s/p recent ureteral stent. Presented to OSH on 5/23 with multiple ICD shocks for VT. Transferred to CICU at [facility] 5/28 for continued management. EP was consulted on arrival and patient was in slow VT, he was started on amiodarone gtt. Underwent VT ablation 5/30, but continued to have intermittent slow VT. On 6/3 VT was noted again with rates ~118 and patient was cardioverted. Maintained on amiodarone + lidocaine gtt, and is s/p stellate ganglion block 6/4. He was transferred to HFICU 6/3 after he signed consent for LVAD/OHT evaluation. After further discussions and being a current smoker, it was decided that OHT would not be an option, and the patient does not want an LVAD at this time, so advanced therapies evaluation was stopped 6/5. Palliative medicine saw the patient 6/5 and he decided to change his code status to DNR-DNI. Redo endocardial ablation with Dr. Wooten 6/6 c/b lactic acidosis requiring intubation and low CO requiring IABP support. He was extubated 6/7 and IABP was removed on 6/8. SGC removed 6/9 with closing numbers: /57 (71), CVP 5, PA 44/26 (34), PCWP 11, Malcolm CO/CI:  5.5/2.9, Thermo: 5.3/2.8, , SVO2 67% on dapa 10 mg daily, brigido 25 mg daily. Planning for ganglion block with EP again 6/10.      Neuro:  #Anxiety  #Depression   #Insomnia   - C/w atarax 25 mg q6 PRN for anxiety   - Holding paroxetine given arrhythmias   - C/w trazodone 50 mg nightly   - C/w ativan 1 mg q8 PRN for anxiety + 0.5 mg ativan nightly   - Serial neuro and pain assessments    - PO Tylenol PRN for pain  - PT/OT Consult, OOB to chair  - CAM ICU score every shift  - Sleep/wake cycle normalization     #Substance abuse  - Tobacco use: active smoker  - C/w nicotine patches      Cardiovascular:  #Stage C/D HFrEF/ICM s/p CRT-D (11/2019)  #Acute on Chronic Decompensated HF  #Cardiogenic shock s/p IABP 6/6, removed 6/8 (resolved)   TTE (5/28/2024): severely decreased LV systolic function, EF 15%, LVIDd 6.32. Moderate-severe MVR, mild TR    Home medications: ASA, entresto 24-26 mg BID, rosuvastatin 20 mg daily, torsemide 20 mg daily  Opening SGC #s (6/7): /57 (89), CVP 8, PAP 48/25 (33), fuad CO/CI 7.4/4, , SVO2 81% on IABP 1:1   Closing SGC #s (6/9): /57 (71), CVP 5, PA 44/26 (34), PCWP 11, Fuad CO/CI:  5.5/2.9, Thermo: 5.3/2.8, , SVO2 67% on dapa 10 mg daily, brigido 25 mg daily  - Holding entresto 24-26 mg BID 2/2 hypotension (6/9)   - C/w spironolactone 25 mg daily   - C/w dapagliflozin 10 mg daily  - Signed consent for OHT/LVAD workup, patient currently is not a candidate for OHT given active tobacco use, and he does not want an LVAD at this time, will stop workup for now per Dr. Doyle 6/5  - Diuresis as needed   - Daily standing weights, 2gm sodium diet, 2L fluid restriction, strict I&Os     #CAD s/p multiple PCI   [facility] (5/23/24): Distal Left Main: 10-30% stenosis; Proximal and mid LAD Lesion: The percent stenosis is 10-30%; Proximal CX Lesion: The percent stenosis is 100%; Right Coronary Artery: fills via collaterals; Proximal RCA Lesion: The percent stenosis is 100%; The Left Ventricular Ejection Fraction is 10%,by echo.  - C/w ASA + rosuvastatin 20 mg daily      #Prior VT storm s/p VT ablation 5/30, stellate ganglion block 6/4, VT ablation 6/6  #Incessant VT s/p ICD shock   Device interrogation (6/4): paced  for 102 min then stepping down, LR back to 95, patient converted to AP   - C/w ranexa 500 mg BID   - Decrease lidocaine gtt to 0.5  mg/min  +amiodarone gtt 0.5 mg/min (6/11)  - Lidocaine Level every Thursday and Sunday   - last level (6/10): 4.8  - NO Lidocaine or Amiodarone bolus during the Vts per Dr. Blood (6/11)  - S/P second Stellate ganglion block (6/11)  - Restarted heparin gtt at 2 pm after stellate ganglion block (HOLD at 6 AM)  (6/11)  - EP following, appreciate assistance      Pulmonary:   #Acute Hypoxic Respiratory Failure (following VT ablation 6/6) (resolved)   #?PNA   #Pulmonary Edema   #?COPD   CT chest (6/4): interval worsening now moderate right and small left pleural effusions with associated atelectasis, findings suggestive of mild pulmonary interstitial and alveolar edema  Blood cultures (6/3): NGTD   Blood cultures (6/7): NGTD  MRSA (6/3): negative   Strep PNA, legionella negative (6/5)   S/p zosyn + vancomycin (6/2-6/5)   - Stop zosyn + vancomycin (6/7-6/10)  - ID recommended stopping antibiotics 6/5, were temporarily restarted 6/7 given leukocytosis/ hypotension   - Monitor and maintain SpO2 > 92%     GI:  #No active issues   - Bowel regimen: facundo-colace BID + miralax BID   - PPI simethicone, 40 mg, 4x daily PRN    :  #EITAN (resolved)  #Nephrolithiasis s/p recent bilateral ureteral stent placement to left ureter   sCr (6/10): 1.07  - Consulted urology 5/31 to discuss removal--> will follow outpatient for STENT removal   - Trend RFP daily   - I/Os  - Spot Diuresis as above   - Avoid hypotension and nephrotoxic agents     Heme:  #Anemia in the setting of Chronic Disease  #Iron Deficiency Anemia   Labs (6/5): iron 51, TIBC 285, %sat 18, folate 9.8, ferritin 99  - S/p venofer x3 days (6/6-6/8)     Endo:  #No DM  - Euglycemic, HgbA1c 6%      #?Hypothyroid   - TSH (6/4): 10.34  - T3 (6/4): 65  - T4 (6/4): 8.3  - Consulted endocrine 6/5--> 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 No need for synthroid now. Repeating TSH and free T4 after 2 to 4 weeks     ID:  #Leukocytosis 2/2 ?PNA versus  stress induced    - See pulmonary plan above  - Trend temps q4h  - Afebrile, nontoxic, no s/s infx  - Closely monitoring     PHYSICAL AND OCCUPATIONAL THERAPY: ordered and following)      LINES:  PIVs   Left radial a-line 6/2-6/10  Femoral cordis 6/6-6/10  Femoral IABP 6/6-6/8  Midline 6/10--currently      DVT: heparin gtt   VAP BUNDLE: NA  CENTRAL LINE BUNDLE: NA  ULCER PPX: PPI  GLYCEMIC CONTROL: NA  BOWEL CARE: scheduled facundo-colace BID and Miralax BID   INDWELLING CATHETER: NA  NUTRITION: Adult diet Cardiac; 70 gm fat; 2 - 3 grams Sodium; 1800 mL fluid      EMERGENCY CONTACT: Extended Emergency Contact Information  Primary Emergency Contact: Judie Sapp  Address: [address] [facility] [place] [address] [facility]  Home Phone: [phone]  Mobile Phone: [phone]  Relation: Mother  Preferred language: English   needed? No  Secondary Emergency Contact: Keiry Greene  Address: [address] [place], [place]  Mobile Phone: [phone]  Relation: Sibling  Preferred language: English   needed? No  FAMILY UPDATE: Given at bedside 6/10  CODE STATUS: DNR and No Intubation  DISPO: Remain in HFICU     Patient seen and assessed with Dr. Gunderson     I personally spent 60 minutes of critical care time directly and personally managing the patient exclusive of separately billable procedures.  _________________________________________________  YARITZA Varma-CNP

## 2024-06-11 NOTE — CONSULTS
58M PMHx CAD s/p multiple PCI, ICM/HFrEF (EF 15% 5/24) s/p CRT-D, VT storm s/p VT ablation, infrarenal AAA s/p R iliac stent, nephrolithiasis s/p recent ureteral stent. Presented to OSH on 5/23 with multiple ICD shocks for VT  Acute pain consulted for stellate  ganglion block for Vtach     Past Medical History:   Diagnosis Date    Atherosclerosis of native artery of both lower extremities with intermittent claudication (CMS-[facility])     CHB (complete heart block) (Multi)     per device check    Chronic systolic CHF (congestive heart failure), NYHA class 3 (Multi)     COPD (chronic obstructive pulmonary disease) (Multi)     Coronary artery disease     History of tobacco abuse     HLD (hyperlipidemia)     Hypertension     Infrarenal abdominal aortic aneurysm (AAA) without rupture (CMS-[facility])     Ischemic cardiomyopathy with implantable cardioverter-defibrillator (ICD)     MI (myocardial infarction) (Multi)     multiple    Nephrolithiasis     PTSD (post-traumatic stress disorder)         Past Surgical History:   Procedure Laterality Date    CARDIAC CATHETERIZATION N/A 5/23/2024    Procedure: Left Heart Cath;  Surgeon: Babatunde Tan MD;  Location: [facility];  Service: Cardiovascular;  Laterality: N/A;    CARDIAC DEFIBRILLATOR PLACEMENT      CARDIAC ELECTROPHYSIOLOGY PROCEDURE N/A 5/23/2024    Procedure: Cardioversion;  Surgeon: Zachary Sparks MD;  Location: [facility];  Service: Electrophysiology;  Laterality: N/A;    CARDIAC ELECTROPHYSIOLOGY PROCEDURE N/A 5/28/2024    Procedure: NIPS (NONINVASIVE PROGRAMMED STIMULATION AND DEFIBRILLATOR THRESHOLD TESTING);  Surgeon: Zachary Sparks MD;  Location: [facility];  Service: Electrophysiology;  Laterality: N/A;    CARDIAC ELECTROPHYSIOLOGY PROCEDURE Right 5/30/2024    Procedure: Ablation VT Endocardial (12819);  Surgeon: Sonny Flores MD;  Location: [place] [address] Cardiac Cath Lab;  Service: Electrophysiology;  Laterality: Right;  CARTO,  2PM    CARDIAC ELECTROPHYSIOLOGY PROCEDURE N/A 6/6/2024    Procedure: Ablation VT;  Surgeon: Eliot Wooten MD;  Location: [place] [address] Cardiac Cath Lab;  Service: Electrophysiology;  Laterality: N/A;  endocardial vt ablation  carto V8    CORONARY ANGIOPLASTY WITH STENT PLACEMENT  2006    JIM to LAD    CORONARY ANGIOPLASTY WITH STENT PLACEMENT  04/2003    CORONARY ANGIOPLASTY WITH STENT PLACEMENT  06/2008    CYSTOSCOPY W/ URETERAL STENT PLACEMENT  04/01/2024    CYSTOSCOPY W/ URETERAL STENT PLACEMENT  04/30/2024    FEMORAL BYPASS      femoral artery    ILIAC ARTERY STENT Right     KNEE SURGERY          Family History   Problem Relation Name Age of Onset    Hypertension Mother      Diabetes Father      Hypertension Sister      Diabetes Sister      Colon cancer Maternal Grandmother      Hypertension Maternal Grandmother      Heart disease Maternal Grandfather      Hypertension Maternal Grandfather      Cancer Paternal Grandfather      Hypertension Paternal Grandfather          Social History     Socioeconomic History    Marital status:      Spouse name: Not on file    Number of children: Not on file    Years of education: Not on file    Highest education level: Not on file   Occupational History    Not on file   Tobacco Use    Smoking status: Former     Types: Cigarettes    Smokeless tobacco: Not on file   Vaping Use    Vaping status: Never Used   Substance and Sexual Activity    Alcohol use: Yes     Comment: social    Drug use: Defer    Sexual activity: Defer   Other Topics Concern    Not on file   Social History Narrative    Not on file     Social Determinants of Health     Financial Resource Strain: Medium Risk (5/30/2024)    Overall Financial Resource Strain (CARDIA)     Difficulty of Paying Living Expenses: Somewhat hard   Food Insecurity: No Food Insecurity (5/30/2024)    Hunger Vital Sign     Worried About Running Out of Food in the Last Year: Never true     Ran Out of Food in the Last Year: Never true    Transportation Needs: No Transportation Needs (5/30/2024)    PRAPARE - Transportation     Lack of Transportation (Medical): No     Lack of Transportation (Non-Medical): No   Physical Activity: Inactive (5/30/2024)    Exercise Vital Sign     Days of Exercise per Week: 0 days     Minutes of Exercise per Session: 0 min   Stress: Stress Concern Present (5/30/2024)    [employer] [place] [employer] - Occupational Stress Questionnaire     Feeling of Stress : To some extent   Social Connections: Moderately Isolated (5/30/2024)    Social Connection and Isolation Panel [NHANES]     Frequency of Communication with Friends and Family: Three times a week     Frequency of Social Gatherings with Friends and Family: Three times a week     Attends Synagogue Services: More than 4 times per year     Active Member of Clubs or Organizations: No     Attends Club or Organization Meetings: Not on file     Marital Status:    Intimate Partner Violence: Not At Risk (5/30/2024)    Humiliation, Afraid, Rape, and Kick questionnaire     Fear of Current or Ex-Partner: No     Emotionally Abused: No     Physically Abused: No     Sexually Abused: No   Housing Stability: Low Risk  (5/28/2024)    Housing Stability Vital Sign     Unable to Pay for Housing in the Last Year: No     Number of Places Lived in the Last Year: 1     Unstable Housing in the Last Year: No        Allergies   Allergen Reactions    Chantix [Varenicline] Unknown        Review of Systems  Gen: No fatigue, anorexia, insomnia, fever.   Eyes: No vision loss, double vision, drainage, eye pain.   ENT: No pharyngitis, dry mouth, no hearing changes or ear discharge  Cardiac: No chest pain, palpitations, syncope, near syncope.   Pulmonary: No shortness of breath, cough, hemoptysis.   Heme/lymph: No swollen glands, fever, bleeding.   GI: No abdominal pain, change in bowel habits, melena, hematemesis, hematochezia, nausea, vomiting, diarrhea.   : No discharge, dysuria,  frequency, urgency, hematuria.  Endo: No polyuria or weight loss.   Musculoskeletal: Negative for any pain or loss of ROM/weakness  Skin: No rashes or lesions  Neuro: Normal speech, no numbness or weakness. No gait difficulties  Review of systems is otherwise negative unless stated above or in history of present illness.    Physical Exam:  Constitutional:  no distress, alert and cooperative  Eyes: clear sclera  Head/Neck: No apparent injury, trachea midline  Respiratory/Thorax: Patent airways, thorax symmetric, breathing comfortably  Cardiovascular: no pitting edema  Gastrointestinal: Nondistended  Musculoskeletal: ROM intact  Extremities: no clubbing  Neurological: alert, lopez x4  Psychological: Appropriate affect    Results for orders placed or performed during the hospital encounter of 05/28/24 (from the past 24 hour(s))   POCT GLUCOSE   Result Value Ref Range    POCT Glucose 154 (H) 74 - 99 mg/dL   POCT GLUCOSE   Result Value Ref Range    POCT Glucose 108 (H) 74 - 99 mg/dL   Renal function panel   Result Value Ref Range    Glucose 126 (H) 74 - 99 mg/dL    Sodium 136 136 - 145 mmol/L    Potassium 4.3 3.5 - 5.3 mmol/L    Chloride 99 98 - 107 mmol/L    Bicarbonate 25 21 - 32 mmol/L    Anion Gap 16 10 - 20 mmol/L    Urea Nitrogen 14 6 - 23 mg/dL    Creatinine 1.13 0.50 - 1.30 mg/dL    eGFR 76 >60 mL/min/1.73m*2    Calcium 9.5 8.6 - 10.6 mg/dL    Phosphorus 4.2 2.5 - 4.9 mg/dL    Albumin 4.0 3.4 - 5.0 g/dL   POCT GLUCOSE   Result Value Ref Range    POCT Glucose 184 (H) 74 - 99 mg/dL   POCT GLUCOSE   Result Value Ref Range    POCT Glucose 115 (H) 74 - 99 mg/dL   CBC and Auto Differential   Result Value Ref Range    WBC 15.2 (H) 4.4 - 11.3 x10*3/uL    nRBC 0.0 0.0 - 0.0 /100 WBCs    RBC 3.61 (L) 4.50 - 5.90 x10*6/uL    Hemoglobin 11.6 (L) 13.5 - 17.5 g/dL    Hematocrit 33.7 (L) 41.0 - 52.0 %    MCV 93 80 - 100 fL    MCH 32.1 26.0 - 34.0 pg    MCHC 34.4 32.0 - 36.0 g/dL    RDW 14.0 11.5 - 14.5 %    Platelets 245 150 -  450 x10*3/uL    Neutrophils % 65.7 40.0 - 80.0 %    Immature Granulocytes %, Automated 3.0 (H) 0.0 - 0.9 %    Lymphocytes % 16.2 13.0 - 44.0 %    Monocytes % 10.7 2.0 - 10.0 %    Eosinophils % 3.3 0.0 - 6.0 %    Basophils % 1.1 0.0 - 2.0 %    Neutrophils Absolute 9.96 (H) 1.20 - 7.70 x10*3/uL    Immature Granulocytes Absolute, Automated 0.46 0.00 - 0.70 x10*3/uL    Lymphocytes Absolute 2.46 1.20 - 4.80 x10*3/uL    Monocytes Absolute 1.63 (H) 0.10 - 1.00 x10*3/uL    Eosinophils Absolute 0.50 0.00 - 0.70 x10*3/uL    Basophils Absolute 0.16 (H) 0.00 - 0.10 x10*3/uL   Renal Function Panel   Result Value Ref Range    Glucose 139 (H) 74 - 99 mg/dL    Sodium 136 136 - 145 mmol/L    Potassium 4.0 3.5 - 5.3 mmol/L    Chloride 100 98 - 107 mmol/L    Bicarbonate 25 21 - 32 mmol/L    Anion Gap 15 10 - 20 mmol/L    Urea Nitrogen 16 6 - 23 mg/dL    Creatinine 1.15 0.50 - 1.30 mg/dL    eGFR 75 >60 mL/min/1.73m*2    Calcium 9.2 8.6 - 10.6 mg/dL    Phosphorus 4.0 2.5 - 4.9 mg/dL    Albumin 3.5 3.4 - 5.0 g/dL   Magnesium   Result Value Ref Range    Magnesium 2.14 1.60 - 2.40 mg/dL   Lidocaine   Result Value Ref Range    Lidocaine  4.8 1.0 - 5.0 ug/mL   POCT GLUCOSE   Result Value Ref Range    POCT Glucose 125 (H) 74 - 99 mg/dL   POCT GLUCOSE   Result Value Ref Range    POCT Glucose 115 (H) 74 - 99 mg/dL   POCT GLUCOSE   Result Value Ref Range    POCT Glucose 106 (H) 74 - 99 mg/dL        58M PMHx CAD s/p multiple PCI, ICM/HFrEF (EF 15% 5/24) s/p CRT-D, VT storm s/p VT ablation, infrarenal AAA s/p R iliac stent, nephrolithiasis s/p recent ureteral stent. Presented to OSH on 5/23 with multiple ICD shocks for VT  Acute pain consulted for stellate  ganglion block for Vtach       - L stellate ganglion block done in HFICU on 6/11   - 10 ml of 0.5 % ropivacaine with 2 mg dexamethazone  , 50 mcg of epinephrine and 2 ml of 2% lidocaine injected   - ok to resume heparin 1 hour after the block   - Normal to expect L sided chucky syndrome as a  side effect from the block   -patient tolerated the block well   -Acute pain team with follow up tomorrow   - Rest of  management per primary team     Acute Pain Resident  pg [phone] ph [phone]

## 2024-06-11 NOTE — COMMITTEE REVIEW
Patient Discussion Note      Organ being evaluated for: Heart/VAD    Transplant Coordinator: Katy Caballero     Referring Physician:  Dr. Arya Brown    Additional Discussion Notes and Findings:   Patient discussed as an additional topic during Advanced Heart Failure Therapeutics Committee Meeting. Patient does not meet criteria to pursue transplant but does not wish to pursue an LVAD. He discussed his options extensively with inpatient team and would like to pursue medical therapies at this time. Evaluation closed.

## 2024-06-11 NOTE — CARE PLAN
Problem: Fall/Injury  Goal: Not fall by end of shift  Outcome: Progressing  Goal: Be free from injury by end of the shift  Outcome: Progressing  Goal: Verbalize understanding of personal risk factors for fall in the hospital  Outcome: Progressing  Goal: Verbalize understanding of risk factor reduction measures to prevent injury from fall in the home  Outcome: Progressing  Goal: Use assistive devices by end of the shift  Outcome: Progressing  Goal: Pace activities to prevent fatigue by end of the shift  Outcome: Progressing     Problem: Pain  Goal: My pain/discomfort is manageable  Outcome: Progressing     Problem: Daily Care  Goal: Daily care needs are met  Outcome: Progressing     Problem: Psychosocial Needs  Goal: Demonstrates ability to cope with hospitalization/illness  Outcome: Progressing  Goal: Collaborate with me, my family, and caregiver to identify my specific goals  Outcome: Progressing     Problem: Pain - Adult  Goal: Verbalizes/displays adequate comfort level or baseline comfort level  Outcome: Progressing     Problem: Safety - Adult  Goal: Free from fall injury  Outcome: Progressing     Problem: Discharge Planning  Goal: Discharge to home or other facility with appropriate resources  Outcome: Progressing     Problem: Chronic Conditions and Co-morbidities  Goal: Patient's chronic conditions and co-morbidity symptoms are monitored and maintained or improved  Outcome: Progressing     Problem: Skin  Goal: Decreased wound size/increased tissue granulation at next dressing change  Outcome: Progressing  Goal: Participates in plan/prevention/treatment measures  Outcome: Progressing  Goal: Prevent/manage excess moisture  Outcome: Progressing  Goal: Prevent/minimize sheer/friction injuries  Outcome: Progressing  Goal: Promote/optimize nutrition  Outcome: Progressing  Goal: Promote skin healing  Outcome: Progressing     Problem: Heart Failure  Goal: Improved gas exchange this shift  Outcome: Progressing  Goal:  Improved urinary output this shift  Outcome: Progressing  Goal: Reduction in peripheral edema within 24 hours  Outcome: Progressing  Goal: Report improvement of dyspnea/breathlessness this shift  Outcome: Progressing  Goal: Weight from fluid excess reduced over 2-3 days, then stabilize  Outcome: Progressing  Goal: Increase self care and/or family involvement in 24 hours  Outcome: Progressing     Problem: Safety - Medical Restraint  Goal: Remains free of injury from restraints (Restraint for Interference with Medical Device)  Outcome: Progressing  Goal: Free from restraint(s) (Restraint for Interference with Medical Device)  Outcome: Progressing     Problem: Knowledge Deficit  Goal: Patient/family/caregiver demonstrates understanding of disease process, treatment plan, medications, and discharge instructions  Outcome: Progressing     Problem: Mechanical Ventilation  Goal: Patient Will Maintain Patent Airway  Outcome: Progressing  Goal: Oral health is maintained or improved  Outcome: Progressing  Goal: Tracheostomy will be managed safely  Outcome: Progressing  Goal: ET tube will be managed safely  Outcome: Progressing  Goal: Ability to express needs and understand communication  Outcome: Progressing  Goal: Mobility/activity is maintained at optimum level for patient  Outcome: Progressing   The patient's goals for the shift include      The clinical goals for the shift include pt will remain HDS    Over the shift, the patient did not make progress toward the following goals. Barriers to progression include . Recommendations to address these barriers include .

## 2024-06-11 NOTE — PROGRESS NOTES
This Sw met with patient and his family for supportive visit today.  He is in good spirits, we discussed his interests such as reading the Bible, family and current events.  He has a procedure today.  He asked for SW to stop back in again later this week.  He had not other needs at this time.   SW to follow.       GABRIELA MARINELLI

## 2024-06-11 NOTE — PROGRESS NOTES
"Cristian Sapp is a 58 y.o. male on day 14 of admission presenting with Acute on chronic systolic (congestive) heart failure (Multi).    Subjective   Tele with VVI pacing at 95. No VT        Objective     Physical Exam  Physical Exam  Gen: ill appearing  Neuro: AAOx3, CN 2-12 intact, no FND  HEENT: PEERL, EOMI, sclera anicteric, no conjunctival injection, MMM, no oropharyngeal lesions  Neck: no elevated JVD  Resp: CTAB, normal breath sounds, no wheezing/crackles/ rales  CV: RRR, normal S1/S2, no murmurs/ rubs/gallops  GI: non-tender, non-distended, normal BSs in all 4 quadrants  MSK: warm and well perfused, no edema  Skin: pale and warm to touch    Last Recorded Vitals  Blood pressure 92/70, pulse 95, temperature 35.7 °C (96.3 °F), temperature source Temporal, resp. rate 14, height 1.702 m (5' 7\"), weight 72.6 kg (160 lb 0.9 oz), SpO2 96%.  Intake/Output last 3 Shifts:  I/O last 3 completed shifts:  In: 3370.3 (46.4 mL/kg) [P.O.:720; I.V.:2250.3 (31 mL/kg); IV Piggyback:400]  Out: 8250 (113.6 mL/kg) [Urine:8250 (3.2 mL/kg/hr)]  Weight: 72.6 kg     Relevant Results  LABS:  CMP:  Results from last 7 days   Lab Units 06/11/24  0133 06/10/24  1755 06/10/24  0312 06/09/24  0324 06/08/24  2123 06/08/24  0508 06/07/24  0412 06/06/24  2220 06/06/24  0305 06/05/24  1632 06/05/24  0223 06/04/24  1255   SODIUM mmol/L 136 136 139 140 137 142 137 136 137 138 136 134*   POTASSIUM mmol/L 4.0 4.3 4.2 3.9 3.5 3.9 4.5 4.4 3.6 3.5 3.6 3.9   CHLORIDE mmol/L 100 99 106 105 100 106 101 98 101 99 99 97*   CO2 mmol/L 25 25 24 25 21 24 27 25 27 27 28 26   ANION GAP mmol/L 15 16 13 14 20 16 14 17 13 16 13 15   BUN mg/dL 16 14 12 12 14 16 13 14 14 14 18 16   CREATININE mg/dL 1.15 1.13 1.07 1.28 1.11 1.00 0.91 1.13 0.90 1.21 1.43* 1.32*   EGFR mL/min/1.73m*2 75 76 81 66 78 88 >90 76 >90 70 58* 63   MAGNESIUM mg/dL 2.14  --  2.27 2.47*  --  2.00 2.32 1.75 2.29 1.89 2.05 1.98   ALBUMIN g/dL 3.5 4.0 3.4 3.4 3.6 3.0* 3.2* 3.4 3.2* 3.5 3.3* 3.4   ALT " "U/L  --   --   --   --  77*  --   --  40  --   --   --  11   AST U/L  --   --   --   --  57*  --   --  188*  --   --   --  11   BILIRUBIN TOTAL mg/dL  --   --   --   --  0.6  --   --  0.9  --   --   --  0.7     CBC:  Results from last 7 days   Lab Units 06/11/24  0133 06/10/24  0312 06/09/24  0324 06/08/24 2123 06/08/24  0508 06/07/24 0412 06/06/24 2220 06/06/24  0305   WBC AUTO x10*3/uL 15.2* 14.3* 14.8* 17.2* 15.5* 14.0* 17.7* 13.6*   HEMOGLOBIN g/dL 11.6* 11.5* 10.6* 11.4* 11.1* 11.7* 12.8* 12.9*   HEMATOCRIT % 33.7* 33.9* 30.3* 33.0* 32.6* 33.0* 38.6* 38.0*   PLATELETS AUTO x10*3/uL 245 229 212 198 242 293 312 318   MCV fL 93 94 92 93 94 92 96 94     COAG:   Results from last 7 days   Lab Units 06/07/24  0918 06/06/24 2220 06/04/24  1255   INR  1.3* 1.5* 1.3*     ABO: No results found for: \"ABO\"  HEME/ENDO:  Results from last 7 days   Lab Units 06/05/24  0223 06/04/24  1255   FERRITIN ng/mL 99  --    IRON SATURATION % 18*  --    TSH mIU/L  --  10.35*      CARDIAC:   Results from last 7 days   Lab Units 06/07/24  0412 06/06/24 2220 06/04/24  1255   LD U/L 408* 467* 145   BNP pg/mL  --   --  674*   No results for input(s): \"CHOL\", \"LDLF\", \"LDL\", \"LDLCALC\", \"HDL\", \"TRIG\" in the last 66223 hours.   Assessment/Plan   Principal Problem:    Acute on chronic systolic (congestive) heart failure (Multi)  Active Problems:    Flash pulmonary edema (Multi)    Ischemic cardiomyopathy    Ventricular tachycardia (Multi)    58M PMHx CAD s/p multiple PCI, ICM/HFrEF (EF 15% 5/24) s/p CRT-D, VT storm s/p VT ablation, infrarenal AAA s/p R iliac stent, nephrolithiasis s/p recent ureteral stent. Presented to OSH on 5/23 with multiple ICD shocks for VT. Noted to be in incessant, slow VT for which EP is following.   Patient remained in slow, hemodynamically stable VT, likely scar mediated iso known ICM and unrevascularized CAD. Patient was started on amiodarone and lidocaine. Given inability to pace terminate this, patient " underwent VT RFA (VT 1-critical isthmus involving the mid inferior / infero - septal left ventricular segment, VT 2 - mid lateral left ventriclar segment) with Dr. Flores on 5/30.  Despite this, on 6/2, patient was noted to have slow VT with rates ~ 90-110s. Tracking rate was increased to 95bpm but patient continued to have episodes of VT (rates ~ 118 on 6/3) requiring cardioversion. Noted to go into slow VT again on 6/4 s/p successful overdrive pacing. Morphology of VT ws similar to prior VT  which was ablated on 5/30.  Patient underwent a 2nd VT ablation with Dr. Wooten on 6/6 - extensive scar modification was done. Multiple Vts were induced, coming from the lateral scar as well as the septal scar s/p extensive ablation. Patient then went into a different wide VT suggestive of metabolic derangements. pH was noted to be 7.15. Patient subsequently underwent IABP placement (removed 6/8).   Has been having multiple runs of VT (rates 110s-130s) since VT ablation that are self-terminated without any therapies delivered.         #ICM/HFrEF (EF 15%) s/p CRT-D  #Prior VT storm s/p VT ablation 5/30, stellate ganglion candy 6/4, s/p repeat ablation 6/6  #Incessant VT s/p multiple ICD shocks  -Cont amiodarone gtt   -Cont lidocaine gtt. Check daily lidocaine levels  -Plan for repeat stellate ganglion block today  -Patient is not a candidate for sympathectomy  -Recommend aggressive electrolyte repletion: K>4 and Mg>2  -Currently being V-paced at 95bpm -> if he does not have more VT, can consider decreasing lower limit   -Continue work up for advanced HF therapies   -Continue heparin gtt ( will need to be on anticoagulation for at least one month given extensive ablation in the LV)     Thank you for the consult. Recommendations are preliminary unless signed by attending      I spent 35 minutes in the professional and overall care of this patient.      Concepción Padilla MD

## 2024-06-11 NOTE — PROGRESS NOTES
Social Work Progress note    -ICU treatment plan: Ventricular tachycardia, EP consulted to do ganglion block and Pall care consulted   - Additional Information: None at this time  - Support system: The patient's spouse is Michelle, Mother Judie and sister Keiry are listed as NOK  Payer Source:  [employer] and Medicare   Discharge disposition: Rehab evaluation - Low intensity rehab   ADOD: 6/16  DANIA Carter, LSW

## 2024-06-11 NOTE — PROCEDURES
Peripheral Block    Patient location during procedure: pre-op  Start time: 6/11/2024 12:39 PM  End time: 6/11/2024 1:00 PM  Reason for block: at surgeon's request and post-op pain management  Staffing  Performed: resident   Authorized by: Connor Ott MD    Performed by: Connor Ott MD  Preanesthetic Checklist  Completed: patient identified, IV checked, site marked, risks and benefits discussed, surgical consent, monitors and equipment checked, pre-op evaluation and timeout performed   Timeout performed at: 6/11/2024 12:35 PM  Peripheral Block  Patient position: laying flat  Prep: ChloraPrep  Patient monitoring: heart rate, continuous pulse ox and cardiac monitor  Block type: other (Stellate Ganglion block)  Laterality: left  Injection technique: single-shot  Guidance: ultrasound guided  Local infiltration: ropivacaine  Infiltration strength: 0.5 %  Dose: 10 mL  Needle  Needle type: Tuohy   Needle gauge: 26 G  Needle length: 5 cm  Needle localization: ultrasound guidance     image stored in chart  Assessment  Injection assessment: negative aspiration for heme, no paresthesia on injection, incremental injection and local visualized surrounding nerve on ultrasound  Paresthesia pain: none  Additional Notes  Stellate Ganglion  block: Informed consent obtained.  Risk, benefits, alternatives discussed.  ASA monitors placed and timeout performed.  Patient positioned, prepped with chlorhexidine, and draped with sterile towels.  Ultrasound guidance used to visualize carotid artery and C7 cervical body , surrounding structures with visualization of the needle throughout duration of the procedure.  Aspiration was negative.  10 cc of 0.5% ropivacaine , epinephrine 50 mcg , 2 mg dexamethazone and 2 ml of 2 % lidocaine  injected.  Patient tolerated procedure well.    Timeout by Deon

## 2024-06-11 NOTE — PROGRESS NOTES
Physical Therapy    Physical Therapy Re-Evaluation & Treatment    Patient Name: Cristian Sapp  MRN: [id number]  Today's Date: 6/11/2024   Time Calculation  Start Time: 1429  Stop Time: 1456  (Was in room from 12:21 to 12:29 but interrupted for procedure returned for above times. So total time was 35 minutes)  Time Calculation (min): 27 min    Assessment/Plan   PT Assessment  End of Session Communication: Bedside nurse  End of Session Patient Position: Up in chair, Alarm off, not on at start of session   IP OR SWING BED PT PLAN  Inpatient or Swing Bed: Inpatient  PT Plan  Treatment/Interventions: Bed mobility, Transfer training, Gait training, Strengthening, Endurance training, Range of motion, Therapeutic exercise, Therapeutic activity  PT Plan: Skilled PT  PT Frequency: 4 times per week  PT Discharge Recommendations: High intensity level of continued care  Equipment Recommended upon Discharge: Wheeled walker  PT Recommended Transfer Status: Assist x2  PT - OK to Discharge: Yes    Subjective     General Visit Information:  General  Reason for Referral: PT re-eval as patient underwent stellate ganglion block on 6/4; s/p redo endocardial ablation on 6/6 c/b lactic acidosis requiring intubation and low CO requiring IABP support. He was extubated 6/7 and IABP was removed on 6/8. S/p repeat stellate ganglion block 6/11.  Past Medical History Relevant to Rehab: Stage C NYHA III HFrEF EF 15%, chronic ischemic cardiomyopathy, CAD s/p multiple PCIs, VT storm s/p VT ablation (08/02/2019) with subsequent upgrade of dual chamber ICD to a multi-lead atrio-biventricular ICD on 11/06/2019, infrarenal AAA s/p right iliac stent, nephrolithiasis s/p recent ureteral stent, tobacco use disorder (1ppd x40 years, attempted cessation 3 weeks prior to admission, relapsed on the day of admission)  Family/Caregiver Present: Yes  Caregiver Feedback: family left room at beginning of session  Co-Treatment: OT  Co-Treatment Reason: to maximize  patient safety and mobility  Prior to Session Communication: Bedside nurse  Patient Position Received: Bed, 3 rail up, Alarm off, not on at start of session  General Comment: patient pleasant and agreeable to PT, motivated to get OOB; tele, 2L O2 NC, 0.5 mg Amio, Lidocaine .5 mg. R groin site checked prior to mobility and dressing changed by RN- site looked good throughout with no signs of bleeding.    Home Living:  Home Living  Type of Home: House  Lives With: Parent(s)  Home Adaptive Equipment: None  Home Layout: One level  Home Access: Level entry  Bathroom Shower/Tub: Tub/shower unit    Prior Level of Function:  Prior Function Per Pt/Caregiver Report  Level of [place]: Independent with ADLs and functional transfers, Independent with homemaking with ambulation  Receives Help From: Parent(s)  ADL Assistance: Independent  Homemaking Assistance: Independent  Ambulatory Assistance: Independent  Vocational: On disability  Leisure: Gardening, doing yard work  Prior Function Comments: (+) drives    Precautions:  Precautions  Medical Precautions: Cardiac precautions, Fall precautions, Oxygen therapy device and L/min    Vital Signs:  Vital Signs  Heart Rate:  (PRE: 95 DURIN POST: 95)  Resp:  (PRE: 19)  SpO2:  (PRE: 96 POST: 100)  BP:  (PRE: 99/78)    Objective     Pain:  Pain Assessment  Pain Assessment: 0-10  Pain Score: 0 - No pain    Cognition:  Cognition  Overall Cognitive Status: Within Functional Limits  Arousal/Alertness: Appropriate responses to stimuli  Orientation Level: Oriented X4  Following Commands: Follows one step commands without difficulty    Activity Tolerance  Early Mobility/Exercise Safety Screen: Proceed with mobilization - No exclusion criteria met    Sensation  Light Touch: No apparent deficits    Strength  Strength Comments: BLE 4+/5  Strength  Strength Comments: BLE 4+/5    Static Sitting Balance  Static Sitting-Balance Support: Bilateral upper extremity supported, Feet  supported  Static Sitting-Level of Assistance: Contact guard  Dynamic Sitting Balance  Dynamic Sitting-Balance Support: Feet supported, Left upper extremity supported  Dynamic Sitting-Comments: CGA    Static Standing Balance  Static Standing-Balance Support: Bilateral upper extremity supported  Static Standing-Level of Assistance: Minimum assistance (x1)  Dynamic Standing Balance  Dynamic Standing-Balance Support: Bilateral upper extremity supported  Dynamic Standing-Comments: Vero x1-2    Functional Assessments:  Bed Mobility  Bed Mobility: Yes  Bed Mobility 1  Bed Mobility 1: Supine to sitting  Level of Assistance 1: Minimum assistance (x1)  Bed Mobility Comments 1: HOB elevated    Transfers  Transfer: Yes  Transfer 1  Technique 1: Sit to stand, Stand to sit  Transfer Level of Assistance 1: Minimum assistance (x2)    Ambulation/Gait Training  Ambulation/Gait Training Performed: Yes  Ambulation/Gait Training 1  Surface 1: Level tile  Assistance 1: Minimum assistance (x2)  Quality of Gait 1:  (short step length and width, forward flexed posture; pt needing increased focus and increased processing time to take each step)  Comments/Distance (ft) 1: 3 (Bed to recliner)    Extremity/Trunk Assessments:  RLE   RLE : Within Functional Limits  LLE   LLE : Within Functional Limits    Treatments:  Therapeutic Activity  Therapeutic Activity Performed: Yes  Therapeutic Activity 1: Pt ambulated 15 ft x 2 trials with standing activity in between. Ambulation completed with Vero x2. Required increased focus for each step (pt stating left and right as stepping).  Therapeutic Activity 2: Patient sat EOB for 6 minutes with CGA  Therapeutic Activity 3: Pt performed dynamic standing activty with BUE movement. Pt had hip support against counter to lean on for assistance. CGA from therapists.  Therapeutic Activity 4: Pt performed sit<->stand transfer. Vero x1, verbal cues given for hand placement.    Bed Mobility  Bed Mobility: Yes  Bed  Mobility 1  Bed Mobility 1: Supine to sitting  Level of Assistance 1: Minimum assistance (x1)  Bed Mobility Comments 1: HOB elevated    Ambulation/Gait Training  Ambulation/Gait Training Performed: Yes  Ambulation/Gait Training 1  Surface 1: Level tile  Assistance 1: Minimum assistance (x2)  Quality of Gait 1:  (short step length and width, forward flexed posture; pt needing increased focus and increased processing time to take each step)  Comments/Distance (ft) 1: 3 (Bed to recliner)  Transfers  Transfer: Yes  Transfer 1  Technique 1: Sit to stand, Stand to sit  Transfer Level of Assistance 1: Minimum assistance (x2)    Outcome Measures:  [facility] Basic Mobility  Turning from your back to your side while in a flat bed without using bedrails: A little  Moving from lying on your back to sitting on the side of a flat bed without using bedrails: A little  Moving to and from bed to chair (including a wheelchair): A lot  Standing up from a chair using your arms (e.g. wheelchair or bedside chair): A little  To walk in hospital room: A little  Climbing 3-5 steps with railing: Total  Basic Mobility - Total Score: 15    Confusion Assessment Method-ICU (CAM-ICU)  Feature 1: Acute Onset or Fluctuating Course: Negative  Overall CAM-ICU: Negative    FSS-ICU  Ambulation: Walks <50 feet with any assistance x1 or walks any distance with assistance x2 people  Rolling: Unable to perform  Sitting: Minimal assistance (performs 75% or more of task)  Transfer Sit-to-Stand: Total assistance (performs 25% or requires another person)  Transfer Supine-to-Sit: Total assistance (performs 25% or requires another person)  Total Score: 7    ICU Mobility Screen  Early Mobility/Exercise Safety Screen: Proceed with mobilization - No exclusion criteria met  ICU Mobility Scale: Walking with assistance of 2 or more people  E = Exercise and Early Mobility  Early Mobility/Exercise Safety Screen: Proceed with mobilization - No exclusion criteria met  ICU  Mobility Scale: Walking with assistance of 2 or more people    Encounter Problems       Encounter Problems (Active)       Balance       Patient to demonstrate Leila static and dynamic standing balance without LOB with change of directions, no hesitancy, appropriate ANEL and sequencing to complete functional task.  (Progressing)       Start:  05/31/24    Expected End:  06/25/24            Pt will score >= 24/28 on Tinetti balance assessment to indicate low falls risk (Progressing)       Start:  05/31/24    Expected End:  06/25/24                   Mobility       STG - Patient will ambulate >/= 200 ft Leila with LRAD (Progressing)       Start:  05/31/24    Expected End:  06/25/24            Patient will tolerate >/=30 minutes continuous activity with stable vital signs, RPE </=13/20, RPD </=3/10  (Progressing)       Start:  05/31/24    Expected End:  06/25/24               PT Transfers       STG - Patient will perform bed mobility IND with HOB flat and no rails (Progressing)       Start:  05/31/24    Expected End:  06/25/24            STG - Patient will transfer sit to and from stand Leila with LRAD (Progressing)       Start:  05/31/24    Expected End:  06/25/24               Pain - Adult            Education Documentation  Mobility Training, taught by LEN HowardPT at 6/11/2024  5:58 PM.  Learner: Patient  Readiness: Acceptance  Method: Explanation  Response: Verbalizes Understanding    Education Comments  No comments found.

## 2024-06-11 NOTE — PROGRESS NOTES
Occupational Therapy    Re-Evaluation/Treatment    Patient Name: Cristian Sapp  MRN: [id number]  : 1965  Today's Date: 24  Time Calculation  Start Time: 1429  Stop Time: 1505  Time Calculation (min): 36 min     Assessment:  OT Assessment: impaired ADLs/transfers  Prognosis: Excellent  End of Session Communication: Bedside nurse  End of Session Patient Position: Up in chair, Alarm off, not on at start of session  OT Assessment Results: Decreased ADL status, Decreased endurance, Decreased functional mobility, Decreased IADLs  Prognosis: Excellent  Plan:  Treatment Interventions: ADL retraining, Functional transfer training, Endurance training, Patient/family training, Equipment evaluation/education, Neuromuscular reeducation, Compensatory technique education  OT Frequency: 4 times per week  OT Discharge Recommendations: High intensity level of continued care  Equipment Recommended upon Discharge:  (None)  OT Recommended Transfer Status:  (CGA)  OT - OK to Discharge: Yes  Treatment Interventions: ADL retraining, Functional transfer training, Endurance training, Patient/family training, Equipment evaluation/education, Neuromuscular reeducation, Compensatory technique education    Subjective   General:   OT Received On: 24  General  Reason for Referral: OT re-eval as patient s/p redo endocardial ablation with Dr. Wooten  c/b lactic acidosis requiring intubation and low CO requiring IABP support. He was extubated  and IABP was removed on . S/p repeat stellate ganglion block .  Past Medical History Relevant to Rehab: Stage C NYHA III HFrEF EF 15%, chronic ischemic cardiomyopathy, CAD s/p multiple PCIs, VT storm s/p VT ablation (2019) with subsequent upgrade of dual chamber ICD to a multi-lead atrio-biventricular ICD on 2019, infrarenal AAA s/p right iliac stent, nephrolithiasis s/p recent ureteral stent, tobacco use disorder (1ppd x40 years, attempted cessation 3 weeks prior to  "admission, relapsed on the day of admission)  Family/Caregiver Present: Yes  Caregiver Feedback: family left room at beginning of session  Co-Treatment: PT  Co-Treatment Reason: to maximize patient safety and activity tolerance as patient was on bedrest due to femoral IABP and requiring x2 assist when OOB with RN staff prior to IABP  Prior to Session Communication: Bedside nurse  Patient Position Received: Bed, 3 rail up, Alarm off, not on at start of session  General Comment: patient pleasant and agreeable to OT, motivated to get OOB and to sink; tele, external catheter, IV, 2L NC, 0.5 Amio, Lidocaine gtt  Precautions:  Hearing/Visual Limitations: hearing is WFL  Medical Precautions: Cardiac precautions, Fall precautions, Oxygen therapy device and L/min  Vital Signs:  Heart Rate:  (pre 95, post 95)  SpO2:  (pre 96%, post 98%)  BP:  (pre 99/78)  Pain:  Pain Assessment  Pain Assessment: 0-10  Pain Score: 0 - No pain    Objective   Cognition:  Overall Cognitive Status: Within Functional Limits  Arousal/Alertness: Appropriate responses to stimuli  Orientation Level: Oriented X4  Following Commands: Follows all commands and directions without difficulty  Cognition Comments: patient reports feeling \"off\" and \"like I don't know if what I'm seeing is actually there\"     Home Living:  Type of Home: House  Lives With: Parent(s)  Home Adaptive Equipment: None  Home Layout: One level  Home Access: Level entry  Bathroom Shower/Tub: Tub/shower unit  Bathroom Equipment: None  Prior Function:  Level of [place]: Independent with ADLs and functional transfers, Independent with homemaking with ambulation  Receives Help From: Parent(s)  ADL Assistance: Independent  Homemaking Assistance: Independent  Ambulatory Assistance: Independent  Vocational: On disability  Leisure: Gardening, doing yard work  Prior Function Comments: (+) drives     ADL:  Eating Assistance: Independent  Eating Deficit: Setup  Grooming Assistance: " Independent  Grooming Deficit:  (in sitting)  Bathing Assistance: Minimal  Bathing Deficit:  (anticipated)  UE Dressing Assistance: Stand by  LE Dressing Assistance: Minimal  Toileting Assistance with Device:  (CGA)  Activities of Daily Living: Grooming  Grooming Comments: TREATMENT: patient completed hand washing standing at sink in room with CGA; functional mobility to/from sink in room with min A x2 (B) hand hold assist    LE Dressing  LE Dressing: Yes  LE Dressing Comments: TREATMENT: patient donned boots sitting EOB with set-up and min A    Toileting  Toileting Comments: TREATMENT: patient completed hygiene in standing with set-up and CGA; min A for additional STS from chair  Activity Tolerance:  Early Mobility/Exercise Safety Screen: Proceed with mobilization - No exclusion criteria met     Bed Mobility/Transfers: Bed Mobility  Bed Mobility: Yes  Bed Mobility 1  Bed Mobility 1: Supine to sitting  Level of Assistance 1: Minimum assistance  Bed Mobility Comments 1: HOB elevated    Transfers  Transfer: Yes  Transfer 1  Transfer From 1: Sit to, Stand to  Transfer to 1: Sit, Stand  Technique 1: Sit to stand, Stand to sit  Transfer Level of Assistance 1: Minimum assistance, Minimal verbal cues, Minimal tactile cues, Arm in arm assistance  Transfers 2  Transfer From 2: Bed to  Transfer to 2: Chair with arms  Technique 2:  (via taking steps)  Transfer Level of Assistance 2: Minimum assistance, +2, Minimal verbal cues, Arm in arm assistance    Sitting Balance:  Static Sitting Balance  Static Sitting-Level of Assistance: Distant supervision  Dynamic Sitting Balance  Dynamic Sitting-Comments: SBA  Standing Balance:  Static Standing Balance  Static Standing-Comment/Number of Minutes: CGA  Dynamic Standing Balance  Dynamic Standing-Comments: CGA-min A     Vision:Vision - Basic Assessment  Current Vision: Wears glasses only for reading  Sensation:  Light Touch: No apparent deficits  Strength:  Strength Comments: (B)   WFL, (B) shoulders/elbows >/= 3/5     Extremities: RUE   RUE :  (AROM WFL) and LUE   LUE:  (AROM WFL)    Outcome Measures: [facility] Daily Activity  Putting on and taking off regular lower body clothing: A little  Bathing (including washing, rinsing, drying): A little  Putting on and taking off regular upper body clothing: A little  Toileting, which includes using toilet, bedpan or urinal: A little  Taking care of personal grooming such as brushing teeth: A little  Eating Meals: None  Daily Activity - Total Score: 19    , Confusion Assessment Method-ICU (CAM-ICU)  Feature 1: Acute Onset or Fluctuating Course: Negative  Overall CAM-ICU: Negative  , and E = Exercise and Early Mobility  Early Mobility/Exercise Safety Screen: Proceed with mobilization - No exclusion criteria met  ICU Mobility Scale: Walking with assistance of 2 or more people    Education Documentation  Body Mechanics, taught by Marjorie Foster OT at 6/11/2024  3:29 PM.  Learner: Patient  Readiness: Acceptance  Method: Explanation  Response: Verbalizes Understanding, Needs Reinforcement    ADL Training, taught by Marjorie Foster OT at 6/11/2024  3:29 PM.  Learner: Patient  Readiness: Acceptance  Method: Explanation  Response: Verbalizes Understanding, Needs Reinforcement    Education Comments  No comments found.         Goals:  Encounter Problems       Encounter Problems (Active)       ADLs       Patient with complete upper body dressing with independent level of assistance donning and doffing all UE clothes with no adaptive equipment. (Progressing)       Start:  06/11/24    Expected End:  06/25/24            Patient with complete lower body dressing with independent level of assistance donning and doffing all LE clothes  with PRN adaptive equipment. (Progressing)       Start:  06/11/24    Expected End:  06/25/24            Patient will complete daily grooming tasks with independent level of assistance and PRN adaptive equipment while standing.  (Progressing)       Start:  06/11/24    Expected End:  06/25/24            Patient will complete toileting including hygiene clothing management/hygiene with independent level of assistance. (Progressing)       Start:  06/11/24    Expected End:  06/25/24            Patient will perform basic IADLs/simulated household tasks with mod (I). (Progressing)       Start:  06/11/24    Expected End:  06/25/24               BALANCE       Pt will maintain dynamic standing balance during ADL task with modified independent level of assistance in order to demonstrate decreased risk of falling and improved postural control. (Progressing)       Start:  06/11/24    Expected End:  06/25/24               EXERCISE/STRENGTHENING       Patient will participate in >10 minutes of activity with <2 rest breaks utilizing EC/WS principles as needed. (Progressing)       Start:  06/11/24    Expected End:  06/25/24               MOBILITY       Patient will perform Functional mobility Household distances/Community Distances with modified independent level of assistance and least restrictive device in order to improve safety and functional mobility. (Progressing)       Start:  06/11/24    Expected End:  06/25/24               TRANSFERS       Patient will perform bed mobility independent level of assistance in order to improve safety and independence with mobility (Progressing)       Start:  06/11/24    Expected End:  06/25/24            Patient will complete functional transfers with least restrictive device with modified independent level of assistance. (Progressing)       Start:  06/11/24    Expected End:  06/25/24               Marjorie Foster OTR/L  Inpatient Occupational Therapist   Rehab Office: [phone]

## 2024-06-12 LAB
ALBUMIN SERPL BCP-MCNC: 4 G/DL (ref 3.4–5)
ANION GAP SERPL CALC-SCNC: 16 MMOL/L (ref 10–20)
BACTERIA BLD CULT: NORMAL
BASOPHILS # BLD AUTO: 0.12 X10*3/UL (ref 0–0.1)
BASOPHILS NFR BLD AUTO: 0.7 %
BUN SERPL-MCNC: 22 MG/DL (ref 6–23)
CALCIUM SERPL-MCNC: 9.8 MG/DL (ref 8.6–10.6)
CHLORIDE SERPL-SCNC: 97 MMOL/L (ref 98–107)
CO2 SERPL-SCNC: 25 MMOL/L (ref 21–32)
CREAT SERPL-MCNC: 1.16 MG/DL (ref 0.5–1.3)
EGFRCR SERPLBLD CKD-EPI 2021: 74 ML/MIN/1.73M*2
EOSINOPHIL # BLD AUTO: 0.22 X10*3/UL (ref 0–0.7)
EOSINOPHIL NFR BLD AUTO: 1.2 %
ERYTHROCYTE [DISTWIDTH] IN BLOOD BY AUTOMATED COUNT: 13.9 % (ref 11.5–14.5)
GLUCOSE BLD MANUAL STRIP-MCNC: 103 MG/DL (ref 74–99)
GLUCOSE BLD MANUAL STRIP-MCNC: 108 MG/DL (ref 74–99)
GLUCOSE BLD MANUAL STRIP-MCNC: 114 MG/DL (ref 74–99)
GLUCOSE BLD MANUAL STRIP-MCNC: 145 MG/DL (ref 74–99)
GLUCOSE BLD MANUAL STRIP-MCNC: 156 MG/DL (ref 74–99)
GLUCOSE BLD MANUAL STRIP-MCNC: 158 MG/DL (ref 74–99)
GLUCOSE SERPL-MCNC: 121 MG/DL (ref 74–99)
HCT VFR BLD AUTO: 36.7 % (ref 41–52)
HGB BLD-MCNC: 12.8 G/DL (ref 13.5–17.5)
IMM GRANULOCYTES # BLD AUTO: 0.46 X10*3/UL (ref 0–0.7)
IMM GRANULOCYTES NFR BLD AUTO: 2.6 % (ref 0–0.9)
LIDOCAIN SERPL-MCNC: 3.2 UG/ML (ref 1–5)
LYMPHOCYTES # BLD AUTO: 1.98 X10*3/UL (ref 1.2–4.8)
LYMPHOCYTES NFR BLD AUTO: 11.2 %
MAGNESIUM SERPL-MCNC: 2.31 MG/DL (ref 1.6–2.4)
MCH RBC QN AUTO: 32.2 PG (ref 26–34)
MCHC RBC AUTO-ENTMCNC: 34.9 G/DL (ref 32–36)
MCV RBC AUTO: 92 FL (ref 80–100)
MONOCYTES # BLD AUTO: 1.74 X10*3/UL (ref 0.1–1)
MONOCYTES NFR BLD AUTO: 9.8 %
NEUTROPHILS # BLD AUTO: 13.2 X10*3/UL (ref 1.2–7.7)
NEUTROPHILS NFR BLD AUTO: 74.5 %
NRBC BLD-RTO: 0 /100 WBCS (ref 0–0)
PHOSPHATE SERPL-MCNC: 4.3 MG/DL (ref 2.5–4.9)
PLATELET # BLD AUTO: 309 X10*3/UL (ref 150–450)
POTASSIUM SERPL-SCNC: 4.3 MMOL/L (ref 3.5–5.3)
RBC # BLD AUTO: 3.97 X10*6/UL (ref 4.5–5.9)
SODIUM SERPL-SCNC: 134 MMOL/L (ref 136–145)
WBC # BLD AUTO: 17.7 X10*3/UL (ref 4.4–11.3)

## 2024-06-12 PROCEDURE — 84100 ASSAY OF PHOSPHORUS: CPT | Performed by: STUDENT IN AN ORGANIZED HEALTH CARE EDUCATION/TRAINING PROGRAM

## 2024-06-12 PROCEDURE — 93010 ELECTROCARDIOGRAM REPORT: CPT | Performed by: INTERNAL MEDICINE

## 2024-06-12 PROCEDURE — S4991 NICOTINE PATCH NONLEGEND: HCPCS | Performed by: STUDENT IN AN ORGANIZED HEALTH CARE EDUCATION/TRAINING PROGRAM

## 2024-06-12 PROCEDURE — 37799 UNLISTED PX VASCULAR SURGERY: CPT | Performed by: STUDENT IN AN ORGANIZED HEALTH CARE EDUCATION/TRAINING PROGRAM

## 2024-06-12 PROCEDURE — 2500000002 HC RX 250 W HCPCS SELF ADMINISTERED DRUGS (ALT 637 FOR MEDICARE OP, ALT 636 FOR OP/ED): Performed by: NURSE PRACTITIONER

## 2024-06-12 PROCEDURE — 2500000001 HC RX 250 WO HCPCS SELF ADMINISTERED DRUGS (ALT 637 FOR MEDICARE OP)

## 2024-06-12 PROCEDURE — 99291 CRITICAL CARE FIRST HOUR: CPT | Performed by: INTERNAL MEDICINE

## 2024-06-12 PROCEDURE — 2500000001 HC RX 250 WO HCPCS SELF ADMINISTERED DRUGS (ALT 637 FOR MEDICARE OP): Performed by: NURSE PRACTITIONER

## 2024-06-12 PROCEDURE — 2500000002 HC RX 250 W HCPCS SELF ADMINISTERED DRUGS (ALT 637 FOR MEDICARE OP, ALT 636 FOR OP/ED): Performed by: STUDENT IN AN ORGANIZED HEALTH CARE EDUCATION/TRAINING PROGRAM

## 2024-06-12 PROCEDURE — 2500000004 HC RX 250 GENERAL PHARMACY W/ HCPCS (ALT 636 FOR OP/ED)

## 2024-06-12 PROCEDURE — 97530 THERAPEUTIC ACTIVITIES: CPT | Mod: GP

## 2024-06-12 PROCEDURE — 2500000001 HC RX 250 WO HCPCS SELF ADMINISTERED DRUGS (ALT 637 FOR MEDICARE OP): Performed by: STUDENT IN AN ORGANIZED HEALTH CARE EDUCATION/TRAINING PROGRAM

## 2024-06-12 PROCEDURE — 99291 CRITICAL CARE FIRST HOUR: CPT | Performed by: NURSE PRACTITIONER

## 2024-06-12 PROCEDURE — 2500000004 HC RX 250 GENERAL PHARMACY W/ HCPCS (ALT 636 FOR OP/ED): Performed by: STUDENT IN AN ORGANIZED HEALTH CARE EDUCATION/TRAINING PROGRAM

## 2024-06-12 PROCEDURE — 80176 ASSAY OF LIDOCAINE: CPT | Performed by: STUDENT IN AN ORGANIZED HEALTH CARE EDUCATION/TRAINING PROGRAM

## 2024-06-12 PROCEDURE — 97116 GAIT TRAINING THERAPY: CPT | Mod: GP

## 2024-06-12 PROCEDURE — 85025 COMPLETE CBC W/AUTO DIFF WBC: CPT | Performed by: STUDENT IN AN ORGANIZED HEALTH CARE EDUCATION/TRAINING PROGRAM

## 2024-06-12 PROCEDURE — C9113 INJ PANTOPRAZOLE SODIUM, VIA: HCPCS | Performed by: STUDENT IN AN ORGANIZED HEALTH CARE EDUCATION/TRAINING PROGRAM

## 2024-06-12 PROCEDURE — 83735 ASSAY OF MAGNESIUM: CPT | Performed by: STUDENT IN AN ORGANIZED HEALTH CARE EDUCATION/TRAINING PROGRAM

## 2024-06-12 PROCEDURE — 2020000001 HC ICU ROOM DAILY

## 2024-06-12 PROCEDURE — 99231 SBSQ HOSP IP/OBS SF/LOW 25: CPT

## 2024-06-12 PROCEDURE — 97110 THERAPEUTIC EXERCISES: CPT | Mod: GP

## 2024-06-12 PROCEDURE — 82947 ASSAY GLUCOSE BLOOD QUANT: CPT | Mod: 91

## 2024-06-12 PROCEDURE — 2500000002 HC RX 250 W HCPCS SELF ADMINISTERED DRUGS (ALT 637 FOR MEDICARE OP, ALT 636 FOR OP/ED)

## 2024-06-12 RX ORDER — MEXILETINE HYDROCHLORIDE 150 MG/1
150 CAPSULE ORAL EVERY 8 HOURS SCHEDULED
Status: DISCONTINUED | OUTPATIENT
Start: 2024-06-12 | End: 2024-06-13

## 2024-06-12 RX ORDER — LOSARTAN POTASSIUM 25 MG/1
25 TABLET ORAL DAILY
Status: DISCONTINUED | OUTPATIENT
Start: 2024-06-12 | End: 2024-06-12

## 2024-06-12 RX ORDER — FUROSEMIDE 40 MG/1
40 TABLET ORAL DAILY
Status: DISCONTINUED | OUTPATIENT
Start: 2024-06-12 | End: 2024-06-14

## 2024-06-12 RX ORDER — AMIODARONE HYDROCHLORIDE 200 MG/1
400 TABLET ORAL DAILY
Status: DISCONTINUED | OUTPATIENT
Start: 2024-06-12 | End: 2024-06-19 | Stop reason: HOSPADM

## 2024-06-12 RX ORDER — BISOPROLOL FUMARATE 5 MG/1
2.5 TABLET, FILM COATED ORAL DAILY
Status: DISCONTINUED | OUTPATIENT
Start: 2024-06-12 | End: 2024-06-19 | Stop reason: HOSPADM

## 2024-06-12 RX ORDER — ONDANSETRON HYDROCHLORIDE 2 MG/ML
INJECTION, SOLUTION INTRAVENOUS
Status: COMPLETED
Start: 2024-06-12 | End: 2024-06-12

## 2024-06-12 RX ORDER — ONDANSETRON HYDROCHLORIDE 2 MG/ML
4 INJECTION, SOLUTION INTRAVENOUS ONCE
Status: COMPLETED | OUTPATIENT
Start: 2024-06-12 | End: 2024-06-12

## 2024-06-12 RX ADMIN — Medication 1 PATCH: at 05:07

## 2024-06-12 RX ADMIN — DAPAGLIFLOZIN 10 MG: 10 TABLET, FILM COATED ORAL at 08:15

## 2024-06-12 RX ADMIN — TRAZODONE HYDROCHLORIDE 50 MG: 50 TABLET ORAL at 20:20

## 2024-06-12 RX ADMIN — POLYETHYLENE GLYCOL 3350 17 G: 17 POWDER, FOR SOLUTION ORAL at 08:15

## 2024-06-12 RX ADMIN — ROSUVASTATIN 20 MG: 40 TABLET, FILM COATED ORAL at 20:20

## 2024-06-12 RX ADMIN — AMIODARONE HYDROCHLORIDE 400 MG: 200 TABLET ORAL at 10:23

## 2024-06-12 RX ADMIN — ONDANSETRON HYDROCHLORIDE 4 MG: 2 INJECTION, SOLUTION INTRAVENOUS at 20:14

## 2024-06-12 RX ADMIN — PANTOPRAZOLE SODIUM 40 MG: 40 INJECTION, POWDER, FOR SOLUTION INTRAVENOUS at 08:15

## 2024-06-12 RX ADMIN — FUROSEMIDE 40 MG: 40 TABLET ORAL at 10:23

## 2024-06-12 RX ADMIN — RANOLAZINE 500 MG: 500 TABLET, EXTENDED RELEASE ORAL at 08:15

## 2024-06-12 RX ADMIN — INSULIN LISPRO 2 UNITS: 100 INJECTION, SOLUTION INTRAVENOUS; SUBCUTANEOUS at 20:23

## 2024-06-12 RX ADMIN — RANOLAZINE 500 MG: 500 TABLET, EXTENDED RELEASE ORAL at 20:20

## 2024-06-12 RX ADMIN — APIXABAN 5 MG: 5 TABLET, FILM COATED ORAL at 20:20

## 2024-06-12 RX ADMIN — ONDANSETRON 4 MG: 2 INJECTION INTRAMUSCULAR; INTRAVENOUS at 20:14

## 2024-06-12 RX ADMIN — LOSARTAN POTASSIUM 25 MG: 25 TABLET, FILM COATED ORAL at 08:15

## 2024-06-12 RX ADMIN — ASPIRIN 81 MG CHEWABLE TABLET 81 MG: 81 TABLET CHEWABLE at 08:15

## 2024-06-12 RX ADMIN — BISOPROLOL FUMARATE 2.5 MG: 5 TABLET ORAL at 10:23

## 2024-06-12 RX ADMIN — LORAZEPAM 0.5 MG: 0.5 TABLET ORAL at 12:47

## 2024-06-12 RX ADMIN — SPIRONOLACTONE 25 MG: 25 TABLET ORAL at 08:15

## 2024-06-12 RX ADMIN — APIXABAN 5 MG: 5 TABLET, FILM COATED ORAL at 08:15

## 2024-06-12 RX ADMIN — HYDROXYZINE HYDROCHLORIDE 25 MG: 25 TABLET ORAL at 19:15

## 2024-06-12 RX ADMIN — LORAZEPAM 0.5 MG: 0.5 TABLET ORAL at 20:13

## 2024-06-12 RX ADMIN — MEXILETINE HYDROCHLORIDE 150 MG: 150 CAPSULE ORAL at 20:20

## 2024-06-12 RX ADMIN — SENNOSIDES AND DOCUSATE SODIUM 2 TABLET: 8.6; 5 TABLET ORAL at 08:15

## 2024-06-12 RX ADMIN — AMIODARONE HYDROCHLORIDE 0.5 MG/MIN: 1.8 INJECTION, SOLUTION INTRAVENOUS at 02:05

## 2024-06-12 ASSESSMENT — COGNITIVE AND FUNCTIONAL STATUS - GENERAL
CLIMB 3 TO 5 STEPS WITH RAILING: A LOT
MOBILITY SCORE: 17
STANDING UP FROM CHAIR USING ARMS: A LITTLE
WALKING IN HOSPITAL ROOM: A LITTLE
MOVING TO AND FROM BED TO CHAIR: A LITTLE
TURNING FROM BACK TO SIDE WHILE IN FLAT BAD: A LITTLE
MOVING FROM LYING ON BACK TO SITTING ON SIDE OF FLAT BED WITH BEDRAILS: A LITTLE

## 2024-06-12 ASSESSMENT — PAIN SCALES - GENERAL
PAINLEVEL_OUTOF10: 7
PAINLEVEL_OUTOF10: 7
PAINLEVEL_OUTOF10: 0 - NO PAIN
PAINLEVEL_OUTOF10: 7
PAINLEVEL_OUTOF10: 0 - NO PAIN

## 2024-06-12 ASSESSMENT — PAIN - FUNCTIONAL ASSESSMENT
PAIN_FUNCTIONAL_ASSESSMENT: 0-10

## 2024-06-12 NOTE — PROGRESS NOTES
"Physical Therapy    Physical Therapy Treatment    Patient Name: Cristian Sapp  MRN: [id number]  Today's Date: 2024  Time Calculation  Start Time: 1524  Stop Time: 1618  Time Calculation (min): 54 min    Assessment/Plan   PT Assessment  End of Session Communication: Bedside nurse  End of Session Patient Position: Up in chair, Alarm off, not on at start of session     PT Plan  Treatment/Interventions: Bed mobility, Transfer training, Gait training, Strengthening, Endurance training, Range of motion, Therapeutic exercise, Therapeutic activity  PT Plan: Skilled PT  PT Frequency: 4 times per week  PT Discharge Recommendations: High intensity level of continued care  Equipment Recommended upon Discharge: Wheeled walker  PT Recommended Transfer Status: Assist x2  PT - OK to Discharge: Yes    General Visit Information:   PT  Visit  PT Received On: 24  General  Family/Caregiver Present: Yes (Wife)  Prior to Session Communication: Bedside nurse  Patient Position Received: Up in chair, Alarm off, not on at start of session  General Comment: Patient ready for therapy. Tele, pt donning and doffing O2 as needed per RN \"for pt comfort\"    Subjective     Precautions:  Precautions  Medical Precautions: Cardiac precautions, Fall precautions, Oxygen therapy device and L/min    Vital Signs:  Vital Signs  Heart Rate:  (PRE: 95 DURIN POST: 95)  Resp:  (PRE: 27 DURING 20 POST: 17)  SpO2:  (PRE: 94 DURIN POST: 98)  BP:  (PRE: 103/88 POST: 102/76)    Objective     Pain:  Pain Assessment  Pain Assessment: 0-10  Pain Score: 0 - No pain    Cognition:  Cognition  Overall Cognitive Status: Within Functional Limits    Activity Tolerance:  Activity Tolerance  Early Mobility/Exercise Safety Screen: Proceed with mobilization - No exclusion criteria met  Rate of Perceived Dyspnea (RPD): 2.5-3/10 at most during session  Rate of Perceived Exertion (RPE): 14/20 at most during session    Treatments:  Therapeutic Exercise  Therapeutic " Exercise Performed: Yes  Therapeutic Exercise Activity 1: Pt performed BLE LAQ with 2lb ankle weight for 12 reps each leg    Therapeutic Activity  Therapeutic Activity Performed: Yes  Therapeutic Activity 1: Pt performed dynamic standing  ~8 minutes with BUE movement and forward bend. Support was provided by counter and CGA from therapist.    Ambulation/Gait Training  Ambulation/Gait Training Performed: Yes  Ambulation/Gait Training 1  Surface 1: Level tile  Device 1: Rolling walker  Assistance 1: Contact guard  Quality of Gait 1:  (Patient walked with slow nelda with equal step length and width but pauses between strides.)  Comments/Distance (ft) 1: 80 (Verbal cues for hand plancement and body positioning)  Transfers  Transfer: Yes  Transfer 1  Technique 1: Sit to stand, Stand to sit  Transfer Device 1: Walker  Transfer Level of Assistance 1: Contact guard (Verbal cues for hand placement)  Trials/Comments 1: x2 trials    Outcome Measures:  [facility] Basic Mobility  Turning from your back to your side while in a flat bed without using bedrails: A little  Moving from lying on your back to sitting on the side of a flat bed without using bedrails: A little  Moving to and from bed to chair (including a wheelchair): A little  Standing up from a chair using your arms (e.g. wheelchair or bedside chair): A little  To walk in hospital room: A little  Climbing 3-5 steps with railing: A lot  Basic Mobility - Total Score: 17    Confusion Assessment Method-ICU (CAM-ICU)  Feature 1: Acute Onset or Fluctuating Course: Negative  Overall CAM-ICU: Negative    FSS-ICU  Ambulation: Walks >/ or equal to 50 feet with any assistance x1  Rolling: Unable to perform  Sitting: Complete independence  Transfer Sit-to-Stand: Minimal assistance (performs 75% or more of task)  Transfer Supine-to-Sit: Unable to perform  Total Score: 13    ICU Mobility Screen  Early Mobility/Exercise Safety Screen: Proceed with mobilization - No exclusion criteria  met  ICU Mobility Scale: Walking with assistance of 1 person  E = Exercise and Early Mobility  Early Mobility/Exercise Safety Screen: Proceed with mobilization - No exclusion criteria met  ICU Mobility Scale: Walking with assistance of 1 person    Encounter Problems       Encounter Problems (Active)       Balance       Patient to demonstrate Leila static and dynamic standing balance without LOB with change of directions, no hesitancy, appropriate ANEL and sequencing to complete functional task.  (Progressing)       Start:  05/31/24    Expected End:  06/25/24            Pt will score >= 24/28 on Tinetti balance assessment to indicate low falls risk (Progressing)       Start:  05/31/24    Expected End:  06/25/24                   Mobility       STG - Patient will ambulate >/= 200 ft Leila with LRAD (Progressing)       Start:  05/31/24    Expected End:  06/25/24            Patient will tolerate >/=30 minutes continuous activity with stable vital signs, RPE </=13/20, RPD </=3/10  (Progressing)       Start:  05/31/24    Expected End:  06/25/24               PT Transfers       STG - Patient will perform bed mobility IND with HOB flat and no rails (Progressing)       Start:  05/31/24    Expected End:  06/25/24            STG - Patient will transfer sit to and from stand Leila with LRAD (Progressing)       Start:  05/31/24    Expected End:  06/25/24               Pain - Adult

## 2024-06-12 NOTE — PROGRESS NOTES
[facility]  HFICU PROGRESS NOTE    Cristian Sapp/[id number]    Admit Date: 5/28/2024  Hospital Length of Stay: 15   ICU Length of Stay: 8d 16h   Primary Service: HFICU  Referring: Dr. Brown     INTERVAL EVENTS / PERTINENT ROS:   No acute event overnight. No episode of NSVT or PVCs per Tele review. pt maintains on amiodarone gtt at 0.5 mg/min and lidocaine gtt at 0.5 mg/min over night. Patient is AV paced in the 90s and remains HDS with MAPs 60-70s.  Has no acute complaints and in good spirit with family present at bedside this AM.     Patient is alert, oriented x3, moves all extremities, c/o general weakness, and tolerated PT/OT.  Patient denies chest pain or chest discomfort. Patient denies palpation or dizziness. Denies SOB. Denies abdominal pain or abdominal discomfort. Denies N/V/D.   Plan:  - Heparin gtt off and Eliquis 5 mg BID for one month per EP  - D/C lidocaine gtt to 0.5 mg/min + amiodarone gtt to 0.5 mg/min   - No Amiodarone or Lidocaine bolus during Vts per Dr. Blood  - Resume Amiodarone 400 mg po daily  - Initiated Mexiletine 150 mg q 8 hrs  - Resumed home Bisoprolol 2.5 mg PO daily (6/12)  - Lasix 40 mg PO daily   - D/C Losartan to 25 mg po daily    - Re-introduce Entresto 0.5 table BID tomorrow  MEDICATIONS  Infusions:       Scheduled:  amiodarone, 400 mg, Daily  apixaban, 5 mg, q12h  aspirin, 81 mg, Daily  bisoprolol, 2.5 mg, Daily  dapagliflozin propanediol, 10 mg, Daily  furosemide, 40 mg, Daily  insulin lispro, 0-10 Units, q4h  LORazepam, 0.5 mg, Nightly  mexiletine, 150 mg, q8h ANNIKA  nicotine, 1 patch, Daily   Followed by  [START ON 7/13/2024] nicotine, 1 patch, Daily  pantoprazole, 40 mg, Daily  polyethylene glycol, 17 g, BID  ranolazine, 500 mg, BID  rosuvastatin, 20 mg, Nightly  [START ON 6/13/2024] sacubitriL-valsartan, 0.5 tablet, BID  sennosides-docusate sodium, 2 tablet, BID  spironolactone, 25 mg, Daily      PRN:  acetaminophen, 650 mg, q4h PRN  dextrose, 12.5  "g, q15 min PRN  glucagon, 1 mg, q15 min PRN  hydrOXYzine HCL, 25 mg, q6h PRN  LORazepam, 0.5 mg, q8h PRN  oxygen, , Continuous PRN - O2/gases  simethicone, 40 mg, 4x daily PRN  traZODone, 50 mg, Nightly PRN  trimethobenzamide, 200 mg, q6h PRN      PHYSICAL EXAM:   Visit Vitals  BP 95/73   Pulse 95   Temp 35.8 °C (96.4 °F) (Temporal)   Resp 13   Ht 1.702 m (5' 7\")   Wt 72 kg (158 lb 11.7 oz)   SpO2 97%   BMI 24.86 kg/m²   Smoking Status Former   BSA 1.84 m²     Wt Readings from Last 5 Encounters:   06/12/24 72 kg (158 lb 11.7 oz)   05/27/24 78.8 kg (173 lb 11.6 oz)     INTAKE/OUTPUT:  I/O last 3 completed shifts:  In: 2362.2 (32.8 mL/kg) [P.O.:900; I.V.:1462.2 (20.3 mL/kg)]  Out: 3350 (46.5 mL/kg) [Urine:3350 (1.3 mL/kg/hr)]  Weight: 72 kg      Physical Exam  Vitals reviewed.   Constitutional:       General: He is not in acute distress.     Appearance: He is ill-appearing.   HENT:      Mouth/Throat:      Mouth: Mucous membranes are moist.   Eyes:      Extraocular Movements: Extraocular movements intact.      Pupils: Pupils are equal, round, and reactive to light.   Neck:      Vascular: No JVD.   Cardiovascular:      Rate and Rhythm: Normal rate and regular rhythm.      Pulses: Normal pulses.      Heart sounds: Normal heart sounds.   Pulmonary:      Effort: Pulmonary effort is normal.      Breath sounds: Normal breath sounds. No wheezing or rhonchi.   Abdominal:      General: Abdomen is flat. Bowel sounds are normal. There is no distension.      Tenderness: There is no abdominal tenderness.   Musculoskeletal:         General: Normal range of motion.      Cervical back: Full passive range of motion without pain.      Right lower leg: No edema.      Left lower leg: No edema.   Skin:     General: Skin is warm.      Capillary Refill: Capillary refill takes 2 to 3 seconds.   Neurological:      General: No focal deficit present.      Mental Status: He is alert and oriented to person, place, and time. Mental status is at " "baseline.   Psychiatric:         Mood and Affect: Mood normal.         Behavior: Behavior normal. Behavior is cooperative.       DATA:  CMP:  Results from last 7 days   Lab Units 06/12/24  0211 06/11/24  0133 06/10/24  1755 06/10/24  0312 06/09/24  0324 06/08/24  2123 06/08/24  0508 06/07/24  0412 06/06/24  2220 06/06/24  0305 06/05/24  1632   SODIUM mmol/L 134* 136 136 139 140 137 142 137 136 137 138   POTASSIUM mmol/L 4.3 4.0 4.3 4.2 3.9 3.5 3.9 4.5 4.4 3.6 3.5   CHLORIDE mmol/L 97* 100 99 106 105 100 106 101 98 101 99   CO2 mmol/L 25 25 25 24 25 21 24 27 25 27 27   ANION GAP mmol/L 16 15 16 13 14 20 16 14 17 13 16   BUN mg/dL 22 16 14 12 12 14 16 13 14 14 14   CREATININE mg/dL 1.16 1.15 1.13 1.07 1.28 1.11 1.00 0.91 1.13 0.90 1.21   EGFR mL/min/1.73m*2 74 75 76 81 66 78 88 >90 76 >90 70   MAGNESIUM mg/dL 2.31 2.14  --  2.27 2.47*  --  2.00 2.32 1.75 2.29 1.89   ALBUMIN g/dL 4.0 3.5 4.0 3.4 3.4 3.6 3.0* 3.2* 3.4 3.2* 3.5   ALT U/L  --   --   --   --   --  77*  --   --  40  --   --    AST U/L  --   --   --   --   --  57*  --   --  188*  --   --    BILIRUBIN TOTAL mg/dL  --   --   --   --   --  0.6  --   --  0.9  --   --      CBC:  Results from last 7 days   Lab Units 06/12/24  0211 06/11/24  0133 06/10/24  0312 06/09/24  0324 06/08/24  2123 06/08/24  0508 06/07/24  0412 06/06/24  2220   WBC AUTO x10*3/uL 17.7* 15.2* 14.3* 14.8* 17.2* 15.5* 14.0* 17.7*   HEMOGLOBIN g/dL 12.8* 11.6* 11.5* 10.6* 11.4* 11.1* 11.7* 12.8*   HEMATOCRIT % 36.7* 33.7* 33.9* 30.3* 33.0* 32.6* 33.0* 38.6*   PLATELETS AUTO x10*3/uL 309 245 229 212 198 242 293 312   MCV fL 92 93 94 92 93 94 92 96     COAG:   Results from last 7 days   Lab Units 06/07/24  0918 06/06/24  2220   INR  1.3* 1.5*     ABO:   No results found for: \"ABO\"    HEME/ENDO:         CARDIAC:   Results from last 7 days   Lab Units 06/07/24  0412 06/06/24  2220   LD U/L 408* 467*     ASSESSMENT AND PLAN:   Cristian Sapp is a 57 y/o M with PMHx of CAD s/p multiple PCI, ICM/HFrEF " (EF 15% 5/24) s/p CRT-D, VT storm s/p VT ablation (2019), infrarenal AAA s/p R iliac stent, nephrolithiasis s/p recent ureteral stent. Presented to OSH on 5/23 with multiple ICD shocks for VT. Transferred to CICU at [facility] 5/28 for continued management. EP was consulted on arrival and patient was in slow VT, he was started on amiodarone gtt. Underwent VT ablation 5/30, but continued to have intermittent slow VT. On 6/3 VT was noted again with rates ~118 and patient was cardioverted. Maintained on amiodarone + lidocaine gtt, and is s/p stellate ganglion block 6/4. He was transferred to HFICU 6/3 after he signed consent for LVAD/OHT evaluation. After further discussions and being a current smoker, it was decided that OHT would not be an option, and the patient does not want an LVAD at this time, so advanced therapies evaluation was stopped 6/5. Palliative medicine saw the patient 6/5 and he decided to change his code status to DNR-DNI. Redo endocardial ablation with Dr. Wooten 6/6 c/b lactic acidosis requiring intubation and low CO requiring IABP support. He was extubated 6/7 and IABP was removed on 6/8. SGC removed 6/9 with closing numbers: /57 (71), CVP 5, PA 44/26 (34), PCWP 11, Malcolm CO/CI:  5.5/2.9, Thermo: 5.3/2.8, , SVO2 67% on dapa 10 mg daily, brigido 25 mg daily.     6/11: Ganglion block bedside per EP + decrease 1/2 Amiodarone and lidocaine gtt  6/11:       Neuro:  #Anxiety  #Depression   #Insomnia   - C/w atarax 25 mg q6 PRN for anxiety   - Holding paroxetine given arrhythmias   - C/w trazodone 50 mg nightly   - C/w ativan 1 mg q8 PRN for anxiety + 0.5 mg ativan nightly   - Serial neuro and pain assessments   - PO Tylenol PRN for pain  - PT/OT Consult, OOB to chair  - CAM ICU score every shift  - Sleep/wake cycle normalization     #Substance abuse  - Tobacco use: active smoker  - C/w nicotine patches      Cardiovascular:  #Stage C/D HFrEF/ICM s/p CRT-D (11/2019)  #Acute on Chronic Decompensated  HF  #Cardiogenic shock s/p IABP 6/6, removed 6/8 (resolved)   TTE (5/28/2024): severely decreased LV systolic function, EF 15%, LVIDd 6.32. Moderate-severe MVR, mild TR    Home medications: ASA, entresto 24-26 mg BID, rosuvastatin 20 mg daily, torsemide 20 mg daily  Opening SGC #s (6/7): /57 (89), CVP 8, PAP 48/25 (33), fuad CO/CI 7.4/4, , SVO2 81% on IABP 1:1   Closing SGC #s (6/9): /57 (71), CVP 5, PA 44/26 (34), PCWP 11, Fuad CO/CI:  5.5/2.9, Thermo: 5.3/2.8, , SVO2 67% on dapa 10 mg daily, brigido 25 mg daily  - Holding entresto 24-26 mg BID 2/2 hypotension (6/9)   - C/w spironolactone 25 mg daily   - C/w dapagliflozin 10 mg daily  - Increased losartan to 25 mg po daily by night shift(6/11)  - Signed consent for OHT/LVAD workup, patient currently is not a candidate for OHT given active tobacco use, and he does not want an LVAD at this time, will stop workup for now per Dr. Doyle 6/5  - Diuresis as needed   - Daily standing weights, 2gm sodium diet, 2L fluid restriction, strict I&Os     #CAD s/p multiple PCI   [facility] (5/23/24): Distal Left Main: 10-30% stenosis; Proximal and mid LAD Lesion: The percent stenosis is 10-30%; Proximal CX Lesion: The percent stenosis is 100%; Right Coronary Artery: fills via collaterals; Proximal RCA Lesion: The percent stenosis is 100%; The Left Ventricular Ejection Fraction is 10%,by echo.  - C/w ASA + rosuvastatin 20 mg daily      #Prior VT storm s/p VT ablation 5/30, stellate ganglion block 6/4, VT ablation 6/6, stellate ganglion block 6/11  #Incessant VT s/p ICD shock   Device interrogation (6/4): paced  for 102 min then stepping down, LR back to 95, patient converted to AP   - C/w ranexa 500 mg BID   - S/p VT ablation 5/30 and 6/6   - S/p stellate ganglion block 6/4 and 6/11  - Heparin gtt  for extensive ventricular ablation per EP (5/31)---> D/C heparin gtt, then switch to Eliquis to 5 mg BID for one month per EP Dr. Shepard  - Decrease  lidocaine gtt to 0.5  mg/min +amiodarone gtt 0.5 mg/min (6/11)--->D/C (6/12)  - NO Lidocaine or Amiodarone bolus during the Vts per Dr. Blood (6/11)  - Resumed PO amiodarone 400 mg po daily (6/12)  - Resumed home Bisoprolol 2.5 mg PO daily (6/12)  - EP following, appreciate assistance      Pulmonary:   #Acute Hypoxic Respiratory Failure (following VT ablation 6/6) (resolved)   #?PNA   #Pulmonary Edema   #?COPD   - Active smoker  - CT chest (6/4): interval worsening now moderate right and small left pleural effusions with associated atelectasis, findings suggestive of mild pulmonary interstitial and alveolar edema  Blood cultures (6/3): NGTD   Blood cultures (6/7): NGTD  MRSA (6/3): negative   Strep PNA, legionella negative (6/5)   - S/p zosyn + vancomycin (6/2-6/5)   - Stop zosyn + vancomycin (6/7-6/10)  - ID recommended stopping antibiotics 6/5, were temporarily restarted 6/7 given leukocytosis/ hypotension   - Monitor and maintain SpO2 > 92%     GI:  #No active issues   - Bowel regimen: facundo-colace BID + miralax BID   - PPI simethicone, 40 mg, 4x daily PRN    :  #EITAN (resolved)  #Nephrolithiasis s/p recent bilateral ureteral stent placement to left ureter   sCr (6/10): 1.07  - Consulted urology 5/31 to discuss removal--> will follow outpatient for STENT removal   - Trend RFP daily   - I/Os  - Spot Diuresis as above   - Avoid hypotension and nephrotoxic agents     Heme:  #Anemia in the setting of Chronic Disease  #Iron Deficiency Anemia   Labs (6/5): iron 51, TIBC 285, %sat 18, folate 9.8, ferritin 99  - S/p venofer x3 days (6/6-6/8)     Endo:  #No DM  - Euglycemic, HgbA1c 6%      #Hypothyroidism with unclear etiology  - TSH (6/4): 10.34  - T3 (6/4): 65  - T4 (6/4): 8.3  - Consulted endocrine 6/5--> 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 No need for synthroid now. Repeating TSH and free T4 after 2 to 4 weeks     ID:  #Leukocytosis 2/2 ?PNA versus stress induced    -  See pulmonary plan above  - Trend temps q4h  - Afebrile, nontoxic, no s/s infx  - Closely monitoring  - Daily CBC     PHYSICAL AND OCCUPATIONAL THERAPY: ordered and following)      LINES:  PIVs   Left radial a-line 6/2-6/10  Femoral cordis 6/6-6/10  Femoral IABP 6/6-6/8  Midline 6/10--currently      DVT: heparin gtt--> Eliquis PO  VAP BUNDLE: NA  CENTRAL LINE BUNDLE: NA  ULCER PPX: PPI  GLYCEMIC CONTROL: NA  BOWEL CARE: scheduled facundo-colace BID and Miralax BID   INDWELLING CATHETER: NA  NUTRITION: Adult diet Cardiac; 70 gm fat; 2 - 3 grams Sodium; 1800 mL fluid      EMERGENCY CONTACT: Extended Emergency Contact Information  Primary Emergency Contact: Judie Sapp  Address: [address] [place] [address] [facility]  Home Phone: [phone]  Mobile Phone: [phone]  Relation: Mother  Preferred language: English   needed? No  Secondary Emergency Contact: Keiry Greene  Address: [address] [place], [place]  Mobile Phone: [phone]  Relation: Sibling  Preferred language: English   needed? No  FAMILY UPDATE: Given at bedside daily   CODE STATUS: DNR and No Intubation  DISPO: Remain in HFICU     Patient seen and assessed with Dr. Gunderson     I personally spent 60 minutes of critical care time directly and personally managing the patient exclusive of separately billable procedures.  _________________________________________________  YARITZA Varma-CNP

## 2024-06-12 NOTE — PROGRESS NOTES
"Cristian Sapp is a 58 y.o. male on day 15 of admission presenting with Acute on chronic systolic (congestive) heart failure (Multi).    Subjective   Tele: no episodes of VT in the last 24h  12 point ROS reviewed and -ve         Objective     Physical Exam  Gen: well appearing  Neuro: AAOx3, CN 2-12 intact, no FND  HEENT: PEERL, EOMI, sclera anicteric, no conjunctival injection, MMM, no oropharyngeal lesions  Neck: no elevated JVD  Resp: CTAB, normal breath sounds, no wheezing/crackles/ rales  CV: RRR, normal S1/S2, no murmurs/ rubs/gallops  GI: non-tender, non-distended, normal BSs in all 4 quadrants  MSK: warm and well perfused, no edema  Skin: warm and dry, no lesions, no rashes     Last Recorded Vitals  Blood pressure 97/73, pulse 95, temperature 36.1 °C (97 °F), resp. rate 23, height 1.702 m (5' 7\"), weight 72 kg (158 lb 11.7 oz), SpO2 97%.  Intake/Output last 3 Shifts:  I/O last 3 completed shifts:  In: 2362.2 (32.8 mL/kg) [P.O.:900; I.V.:1462.2 (20.3 mL/kg)]  Out: 3350 (46.5 mL/kg) [Urine:3350 (1.3 mL/kg/hr)]  Weight: 72 kg     Relevant Results  LABS:  CMP:  Results from last 7 days   Lab Units 06/12/24  0211 06/11/24  0133 06/10/24  1755 06/10/24  0312 06/09/24  0324 06/08/24  2123 06/08/24  0508 06/07/24  0412 06/06/24  2220 06/06/24  0305 06/05/24  1632   SODIUM mmol/L 134* 136 136 139 140 137 142 137 136 137 138   POTASSIUM mmol/L 4.3 4.0 4.3 4.2 3.9 3.5 3.9 4.5 4.4 3.6 3.5   CHLORIDE mmol/L 97* 100 99 106 105 100 106 101 98 101 99   CO2 mmol/L 25 25 25 24 25 21 24 27 25 27 27   ANION GAP mmol/L 16 15 16 13 14 20 16 14 17 13 16   BUN mg/dL 22 16 14 12 12 14 16 13 14 14 14   CREATININE mg/dL 1.16 1.15 1.13 1.07 1.28 1.11 1.00 0.91 1.13 0.90 1.21   EGFR mL/min/1.73m*2 74 75 76 81 66 78 88 >90 76 >90 70   MAGNESIUM mg/dL 2.31 2.14  --  2.27 2.47*  --  2.00 2.32 1.75 2.29 1.89   ALBUMIN g/dL 4.0 3.5 4.0 3.4 3.4 3.6 3.0* 3.2* 3.4 3.2* 3.5   ALT U/L  --   --   --   --   --  77*  --   --  40  --   --    AST U/L  " "--   --   --   --   --  57*  --   --  188*  --   --    BILIRUBIN TOTAL mg/dL  --   --   --   --   --  0.6  --   --  0.9  --   --      CBC:  Results from last 7 days   Lab Units 06/12/24  0211 06/11/24  0133 06/10/24  0312 06/09/24  0324 06/08/24  2123 06/08/24  0508 06/07/24  0412 06/06/24  2220   WBC AUTO x10*3/uL 17.7* 15.2* 14.3* 14.8* 17.2* 15.5* 14.0* 17.7*   HEMOGLOBIN g/dL 12.8* 11.6* 11.5* 10.6* 11.4* 11.1* 11.7* 12.8*   HEMATOCRIT % 36.7* 33.7* 33.9* 30.3* 33.0* 32.6* 33.0* 38.6*   PLATELETS AUTO x10*3/uL 309 245 229 212 198 242 293 312   MCV fL 92 93 94 92 93 94 92 96     COAG:   Results from last 7 days   Lab Units 06/07/24  0918 06/06/24  2220   INR  1.3* 1.5*     ABO: No results found for: \"ABO\"  HEME/ENDO:     CARDIAC:   Results from last 7 days   Lab Units 06/07/24  0412 06/06/24  2220   LD U/L 408* 467*   No results for input(s): \"CHOL\", \"LDLF\", \"LDL\", \"LDLCALC\", \"HDL\", \"TRIG\" in the last 59526 hours.     Scheduled medications  amiodarone, 400 mg, oral, Daily  apixaban, 5 mg, oral, q12h  aspirin, 81 mg, oral, Daily  bisoprolol, 2.5 mg, oral, Daily  dapagliflozin propanediol, 10 mg, oral, Daily  furosemide, 40 mg, oral, Daily  insulin lispro, 0-10 Units, subcutaneous, q4h  LORazepam, 0.5 mg, oral, Nightly  losartan, 25 mg, oral, Daily  nicotine, 1 patch, transdermal, Daily   Followed by  [START ON 7/13/2024] nicotine, 1 patch, transdermal, Daily  pantoprazole, 40 mg, intravenous, Daily  polyethylene glycol, 17 g, oral, BID  ranolazine, 500 mg, oral, BID  rosuvastatin, 20 mg, oral, Nightly  sennosides-docusate sodium, 2 tablet, oral, BID  spironolactone, 25 mg, oral, Daily      Continuous medications     PRN medications  PRN medications: acetaminophen **OR** [DISCONTINUED] acetaminophen **OR** [DISCONTINUED] acetaminophen, dextrose, glucagon, hydrOXYzine HCL, LORazepam, oxygen, simethicone, traZODone, trimethobenzamide      Assessment/Plan   Principal Problem:    Acute on chronic systolic (congestive) " heart failure (Multi)  Active Problems:    Flash pulmonary edema (Multi)    Ischemic cardiomyopathy    Ventricular tachycardia (Multi)    58M PMHx CAD s/p multiple PCI, ICM/HFrEF (EF 15% 5/24) s/p CRT-D, VT storm s/p VT ablation, infrarenal AAA s/p R iliac stent, nephrolithiasis s/p recent ureteral stent. Presented to OSH on 5/23 with multiple ICD shocks for VT. Noted to be in incessant, slow VT for which EP is following.   Patient remained in slow, hemodynamically stable VT, likely scar mediated iso known ICM and unrevascularized CAD. Patient was started on amiodarone and lidocaine. Given inability to pace terminate this, patient underwent VT RFA (VT 1-critical isthmus involving the mid inferior / infero - septal left ventricular segment, VT 2 - mid lateral left ventriclar segment) with Dr. Flores on 5/30.  Despite this, on 6/2, patient was noted to have slow VT with rates ~ 90-110s. Tracking rate was increased to 95bpm but patient continued to have episodes of VT (rates ~ 118 on 6/3) requiring cardioversion. Noted to go into slow VT again on 6/4 s/p successful overdrive pacing. Morphology of VT ws similar to prior VT  which was ablated on 5/30.  Patient underwent a 2nd VT ablation with Dr. Wooten on 6/6 - extensive scar modification was done. Multiple Vts were induced, coming from the lateral scar as well as the septal scar s/p extensive ablation. Patient then went into a different wide VT suggestive of metabolic derangements. pH was noted to be 7.15. Patient subsequently underwent IABP placement (removed 6/8).   Had multiple runs of VT (rates 110s-130s) since VT ablation between 6/8-/10 that self-terminated without any therapies delivered. Patient has not had any more VT episodes in the last 48h. Stellate ganglion block repeated on 6/11        #ICM/HFrEF (EF 15%) s/p CRT-D  #Prior VT storm s/p VT ablation 5/30, stellate ganglion candy 6/4 and 6/11, s/p repeat ablation 6/6  #Incessant VT s/p multiple ICD  shocks  -Amiodarone drip switched to PO amiodarone 400mg daily (patient has completed 10g load)  -Lidocaine drip stopped today  -Recommend aggressive electrolyte repletion: K>4 and Mg>2  -Currently being V-paced at 95bpm -> if he does not have more VT, can consider decreasing lower limit   -Patient is not a candidate for OHT and does not want an LVAD. GOC discussions underway - primary team discussing if ICD shocks should be turned off before discharge   -Continue eliquis 5mg BID     Thank you for the consult. Recommendations are preliminary unless signed by attending     I spent 35 minutes in the professional and overall care of this patient.    Concepción Padilla MD

## 2024-06-12 NOTE — PROGRESS NOTES
58M PMHx CAD s/p multiple PCI, ICM/HFrEF (EF 15% 5/24) s/p CRT-D, VT storm s/p VT ablation, infrarenal AAA s/p R iliac stent, nephrolithiasis s/p recent ureteral stent. Presented to OSH on 5/23 with multiple ICD shocks for VT  Acute pain consulted for stellate  ganglion block for Vtach     Compline of 6-7/10 pain     Scheduled medications  apixaban, 5 mg, oral, q12h  aspirin, 81 mg, oral, Daily  dapagliflozin propanediol, 10 mg, oral, Daily  insulin lispro, 0-10 Units, subcutaneous, q4h  LORazepam, 0.5 mg, oral, Nightly  losartan, 12.5 mg, oral, Daily  nicotine, 1 patch, transdermal, Daily   Followed by  [START ON 7/13/2024] nicotine, 1 patch, transdermal, Daily  pantoprazole, 40 mg, intravenous, Daily  polyethylene glycol, 17 g, oral, BID  ranolazine, 500 mg, oral, BID  rosuvastatin, 20 mg, oral, Nightly  sennosides-docusate sodium, 2 tablet, oral, BID  spironolactone, 25 mg, oral, Daily      Continuous medications  amiodarone, 0.5 mg/min, Last Rate: 0.5 mg/min (06/12/24 0205)  lidocaine, 0.5 mg/min, Last Rate: 0.5 mg/min (06/11/24 1600)      PRN medications  PRN medications: acetaminophen **OR** [DISCONTINUED] acetaminophen **OR** [DISCONTINUED] acetaminophen, dextrose, glucagon, hydrOXYzine HCL, LORazepam, oxygen, simethicone, traZODone, trimethobenzamide     Physical Exam:  Constitutional:  no distress, alert and cooperative  Eyes: clear sclera  Head/Neck: No apparent injury, trachea midline  Respiratory/Thorax: Patent airways, thorax symmetric, breathing comfortably  Cardiovascular: no pitting edema  Gastrointestinal: Nondistended  Musculoskeletal: ROM intact  Extremities: no clubbing  Neurological: alert, lopez x4  Psychological: Appropriate affect    Results for orders placed or performed during the hospital encounter of 05/28/24 (from the past 24 hour(s))   POCT GLUCOSE   Result Value Ref Range    POCT Glucose 115 (H) 74 - 99 mg/dL   POCT GLUCOSE   Result Value Ref Range    POCT Glucose 106 (H) 74 - 99 mg/dL    POCT GLUCOSE   Result Value Ref Range    POCT Glucose 144 (H) 74 - 99 mg/dL   POCT GLUCOSE   Result Value Ref Range    POCT Glucose 178 (H) 74 - 99 mg/dL   POCT GLUCOSE   Result Value Ref Range    POCT Glucose 134 (H) 74 - 99 mg/dL   CBC and Auto Differential   Result Value Ref Range    WBC 17.7 (H) 4.4 - 11.3 x10*3/uL    nRBC 0.0 0.0 - 0.0 /100 WBCs    RBC 3.97 (L) 4.50 - 5.90 x10*6/uL    Hemoglobin 12.8 (L) 13.5 - 17.5 g/dL    Hematocrit 36.7 (L) 41.0 - 52.0 %    MCV 92 80 - 100 fL    MCH 32.2 26.0 - 34.0 pg    MCHC 34.9 32.0 - 36.0 g/dL    RDW 13.9 11.5 - 14.5 %    Platelets 309 150 - 450 x10*3/uL    Neutrophils % 74.5 40.0 - 80.0 %    Immature Granulocytes %, Automated 2.6 (H) 0.0 - 0.9 %    Lymphocytes % 11.2 13.0 - 44.0 %    Monocytes % 9.8 2.0 - 10.0 %    Eosinophils % 1.2 0.0 - 6.0 %    Basophils % 0.7 0.0 - 2.0 %    Neutrophils Absolute 13.20 (H) 1.20 - 7.70 x10*3/uL    Immature Granulocytes Absolute, Automated 0.46 0.00 - 0.70 x10*3/uL    Lymphocytes Absolute 1.98 1.20 - 4.80 x10*3/uL    Monocytes Absolute 1.74 (H) 0.10 - 1.00 x10*3/uL    Eosinophils Absolute 0.22 0.00 - 0.70 x10*3/uL    Basophils Absolute 0.12 (H) 0.00 - 0.10 x10*3/uL   Renal Function Panel   Result Value Ref Range    Glucose 121 (H) 74 - 99 mg/dL    Sodium 134 (L) 136 - 145 mmol/L    Potassium 4.3 3.5 - 5.3 mmol/L    Chloride 97 (L) 98 - 107 mmol/L    Bicarbonate 25 21 - 32 mmol/L    Anion Gap 16 10 - 20 mmol/L    Urea Nitrogen 22 6 - 23 mg/dL    Creatinine 1.16 0.50 - 1.30 mg/dL    eGFR 74 >60 mL/min/1.73m*2    Calcium 9.8 8.6 - 10.6 mg/dL    Phosphorus 4.3 2.5 - 4.9 mg/dL    Albumin 4.0 3.4 - 5.0 g/dL   Magnesium   Result Value Ref Range    Magnesium 2.31 1.60 - 2.40 mg/dL   Lidocaine   Result Value Ref Range    Lidocaine  3.2 1.0 - 5.0 ug/mL   POCT GLUCOSE   Result Value Ref Range    POCT Glucose 145 (H) 74 - 99 mg/dL       58M PMHx CAD s/p multiple PCI, ICM/HFrEF (EF 15% 5/24) s/p CRT-D, VT storm s/p VT ablation, infrarenal AAA s/p  R iliac stent, nephrolithiasis s/p recent ureteral stent. Presented to OSH on 5/23 with multiple ICD shocks for VT  Acute pain consulted for stellate  ganglion block for Vtach         - L stellate ganglion block done in [facility]ICU on 6/11. 10 ml of 0.5 % ropivacaine with 2 mg dexamethazone  , 50 mcg of epinephrine and 2 ml of 2% lidocaine injected   - Consider starting robaxin , and PRN oxycodone   -patient tolerated the block well   - Acute pain team with follow sign off   - Rest of  management per primary team     Acute Pain Resident  pg [phone] ph [phone]

## 2024-06-12 NOTE — PROGRESS NOTES
[facility]ICU Attending Note     Patient continues to feel well overall we discontinued amiodarone and lidocaine completely today initiated low-dose bisoprolol which she was on previously will discuss mexiletine with the EP service.  Continue to have a day-to-day discussion about his goals of care and whether or not to leave the ICD active    Volume status improved at this point we can transition to oral diuretics    Later in the day he developed sustained ventricular tachycardia but this spontaneously resolved without treatment will attempt to not intervene and determine if he is able to self resolve sustained VT and less severely symptomatic or hypotensive    This critically ill patient continues to be at-risk for clinically significant deterioration / failure due to the above mentioned dysfunctional, unstable organ systems.  I have personally identified and managed all complex critical care issues to prevent aforementioned clinical deterioration.  Critical care time is spent at bedside and/or the immediate area and has included, but is not limited to, the review of diagnostic tests, labs, radiographs, serial assessments of hemodynamics, respiratory status, ventilatory management, and family updates.  Time spent in procedures and teaching are reported separately.     Critical care time: __44__ minutes

## 2024-06-12 NOTE — CARE PLAN
Problem: Fall/Injury  Goal: Not fall by end of shift  Outcome: Progressing  Goal: Be free from injury by end of the shift  Outcome: Progressing  Goal: Verbalize understanding of personal risk factors for fall in the hospital  Outcome: Progressing  Goal: Verbalize understanding of risk factor reduction measures to prevent injury from fall in the home  Outcome: Progressing  Goal: Use assistive devices by end of the shift  Outcome: Progressing  Goal: Pace activities to prevent fatigue by end of the shift  Outcome: Progressing     Problem: Pain  Goal: My pain/discomfort is manageable  Outcome: Progressing     Problem: Daily Care  Goal: Daily care needs are met  Outcome: Progressing     Problem: Psychosocial Needs  Goal: Demonstrates ability to cope with hospitalization/illness  Outcome: Progressing  Goal: Collaborate with me, my family, and caregiver to identify my specific goals  Outcome: Progressing     Problem: Pain - Adult  Goal: Verbalizes/displays adequate comfort level or baseline comfort level  Outcome: Progressing     Problem: Safety - Adult  Goal: Free from fall injury  Outcome: Progressing     Problem: Discharge Planning  Goal: Discharge to home or other facility with appropriate resources  Outcome: Progressing     Problem: Chronic Conditions and Co-morbidities  Goal: Patient's chronic conditions and co-morbidity symptoms are monitored and maintained or improved  Outcome: Progressing     Problem: Skin  Goal: Decreased wound size/increased tissue granulation at next dressing change  Outcome: Progressing  Goal: Participates in plan/prevention/treatment measures  Outcome: Progressing  Goal: Prevent/manage excess moisture  Outcome: Progressing  Goal: Prevent/minimize sheer/friction injuries  Outcome: Progressing  Goal: Promote/optimize nutrition  Outcome: Progressing  Goal: Promote skin healing  Outcome: Progressing     Problem: Heart Failure  Goal: Improved gas exchange this shift  Outcome: Progressing  Goal:  Improved urinary output this shift  Outcome: Progressing  Goal: Reduction in peripheral edema within 24 hours  Outcome: Progressing  Goal: Report improvement of dyspnea/breathlessness this shift  Outcome: Progressing  Goal: Weight from fluid excess reduced over 2-3 days, then stabilize  Outcome: Progressing  Goal: Increase self care and/or family involvement in 24 hours  Outcome: Progressing     Problem: Safety - Medical Restraint  Goal: Remains free of injury from restraints (Restraint for Interference with Medical Device)  Outcome: Progressing  Goal: Free from restraint(s) (Restraint for Interference with Medical Device)  Outcome: Progressing     Problem: Knowledge Deficit  Goal: Patient/family/caregiver demonstrates understanding of disease process, treatment plan, medications, and discharge instructions  Outcome: Progressing     Problem: Mechanical Ventilation  Goal: Patient Will Maintain Patent Airway  Outcome: Progressing  Goal: Oral health is maintained or improved  Outcome: Progressing  Goal: Tracheostomy will be managed safely  Outcome: Progressing  Goal: ET tube will be managed safely  Outcome: Progressing  Goal: Ability to express needs and understand communication  Outcome: Progressing  Goal: Mobility/activity is maintained at optimum level for patient  Outcome: Progressing   The patient's goals for the shift include      The clinical goals for the shift include pt will be free of VT during shift    Over the shift, the patient did not make progress toward the following goals. Barriers to progression include . Recommendations to address these barriers include .

## 2024-06-13 ENCOUNTER — APPOINTMENT (OUTPATIENT)
Dept: RADIOLOGY | Facility: HOSPITAL | Age: 59
End: 2024-06-13
Payer: COMMERCIAL

## 2024-06-13 ENCOUNTER — APPOINTMENT (OUTPATIENT)
Dept: CARDIOLOGY | Facility: HOSPITAL | Age: 59
DRG: 270 | End: 2024-06-13
Payer: MEDICARE

## 2024-06-13 ENCOUNTER — APPOINTMENT (OUTPATIENT)
Dept: UROLOGY | Facility: CLINIC | Age: 59
End: 2024-06-13
Payer: COMMERCIAL

## 2024-06-13 LAB
ALBUMIN SERPL BCP-MCNC: 3.6 G/DL (ref 3.4–5)
ALBUMIN SERPL BCP-MCNC: 3.6 G/DL (ref 3.4–5)
ALBUMIN SERPL BCP-MCNC: 3.9 G/DL (ref 3.4–5)
ANION GAP SERPL CALC-SCNC: 15 MMOL/L (ref 10–20)
ATRIAL RATE: 59 BPM
ATRIAL RATE: 65 BPM
ATRIAL RATE: 70 BPM
ATRIAL RATE: 72 BPM
ATRIAL RATE: 91 BPM
ATRIAL RATE: 98 BPM
BASOPHILS # BLD AUTO: 0.16 X10*3/UL (ref 0–0.1)
BASOPHILS NFR BLD AUTO: 1.1 %
BUN SERPL-MCNC: 33 MG/DL (ref 6–23)
BUN SERPL-MCNC: 33 MG/DL (ref 6–23)
BUN SERPL-MCNC: 35 MG/DL (ref 6–23)
CALCIUM SERPL-MCNC: 9.4 MG/DL (ref 8.6–10.6)
CALCIUM SERPL-MCNC: 9.5 MG/DL (ref 8.6–10.6)
CALCIUM SERPL-MCNC: 9.5 MG/DL (ref 8.6–10.6)
CHLORIDE SERPL-SCNC: 100 MMOL/L (ref 98–107)
CHLORIDE SERPL-SCNC: 99 MMOL/L (ref 98–107)
CHLORIDE SERPL-SCNC: 99 MMOL/L (ref 98–107)
CO2 SERPL-SCNC: 25 MMOL/L (ref 21–32)
CREAT SERPL-MCNC: 1.28 MG/DL (ref 0.5–1.3)
CREAT SERPL-MCNC: 1.29 MG/DL (ref 0.5–1.3)
CREAT SERPL-MCNC: 1.31 MG/DL (ref 0.5–1.3)
EGFRCR SERPLBLD CKD-EPI 2021: 64 ML/MIN/1.73M*2
EGFRCR SERPLBLD CKD-EPI 2021: 65 ML/MIN/1.73M*2
EGFRCR SERPLBLD CKD-EPI 2021: 66 ML/MIN/1.73M*2
EOSINOPHIL # BLD AUTO: 0.46 X10*3/UL (ref 0–0.7)
EOSINOPHIL NFR BLD AUTO: 3.1 %
ERYTHROCYTE [DISTWIDTH] IN BLOOD BY AUTOMATED COUNT: 13.5 % (ref 11.5–14.5)
GLUCOSE BLD MANUAL STRIP-MCNC: 104 MG/DL (ref 74–99)
GLUCOSE BLD MANUAL STRIP-MCNC: 123 MG/DL (ref 74–99)
GLUCOSE BLD MANUAL STRIP-MCNC: 138 MG/DL (ref 74–99)
GLUCOSE BLD MANUAL STRIP-MCNC: 171 MG/DL (ref 74–99)
GLUCOSE BLD MANUAL STRIP-MCNC: 186 MG/DL (ref 74–99)
GLUCOSE SERPL-MCNC: 130 MG/DL (ref 74–99)
GLUCOSE SERPL-MCNC: 94 MG/DL (ref 74–99)
GLUCOSE SERPL-MCNC: 97 MG/DL (ref 74–99)
HCT VFR BLD AUTO: 32 % (ref 41–52)
HGB BLD-MCNC: 11.7 G/DL (ref 13.5–17.5)
IMM GRANULOCYTES # BLD AUTO: 0.62 X10*3/UL (ref 0–0.7)
IMM GRANULOCYTES NFR BLD AUTO: 4.1 % (ref 0–0.9)
LYMPHOCYTES # BLD AUTO: 1.88 X10*3/UL (ref 1.2–4.8)
LYMPHOCYTES NFR BLD AUTO: 12.6 %
MAGNESIUM SERPL-MCNC: 2.34 MG/DL (ref 1.6–2.4)
MCH RBC QN AUTO: 31.6 PG (ref 26–34)
MCHC RBC AUTO-ENTMCNC: 36.6 G/DL (ref 32–36)
MCV RBC AUTO: 87 FL (ref 80–100)
MONOCYTES # BLD AUTO: 1.47 X10*3/UL (ref 0.1–1)
MONOCYTES NFR BLD AUTO: 9.8 %
NEUTROPHILS # BLD AUTO: 10.35 X10*3/UL (ref 1.2–7.7)
NEUTROPHILS NFR BLD AUTO: 69.3 %
NRBC BLD-RTO: 0 /100 WBCS (ref 0–0)
P AXIS: 86 DEGREES
P OFFSET: 166 MS
P OFFSET: 83 MS
P ONSET: 131 MS
P ONSET: 19 MS
PHOSPHATE SERPL-MCNC: 4.2 MG/DL (ref 2.5–4.9)
PHOSPHATE SERPL-MCNC: 4.5 MG/DL (ref 2.5–4.9)
PHOSPHATE SERPL-MCNC: 5.3 MG/DL (ref 2.5–4.9)
PLATELET # BLD AUTO: 320 X10*3/UL (ref 150–450)
POTASSIUM SERPL-SCNC: 4.1 MMOL/L (ref 3.5–5.3)
POTASSIUM SERPL-SCNC: 4.4 MMOL/L (ref 3.5–5.3)
POTASSIUM SERPL-SCNC: 5 MMOL/L (ref 3.5–5.3)
PR INTERVAL: 368 MS
Q ONSET: 180 MS
Q ONSET: 203 MS
Q ONSET: 216 MS
Q ONSET: 216 MS
Q ONSET: 219 MS
Q ONSET: 223 MS
QRS COUNT: 12 BEATS
QRS COUNT: 15 BEATS
QRS COUNT: 16 BEATS
QRS COUNT: 20 BEATS
QRS COUNT: 20 BEATS
QRS COUNT: 22 BEATS
QRS DURATION: 156 MS
QRS DURATION: 156 MS
QRS DURATION: 162 MS
QRS DURATION: 178 MS
QRS DURATION: 192 MS
QRS DURATION: 208 MS
QT INTERVAL: 416 MS
QT INTERVAL: 424 MS
QT INTERVAL: 430 MS
QT INTERVAL: 454 MS
QT INTERVAL: 476 MS
QT INTERVAL: 482 MS
QTC CALCULATION(BAZETT): 520 MS
QTC CALCULATION(BAZETT): 558 MS
QTC CALCULATION(BAZETT): 602 MS
QTC CALCULATION(BAZETT): 607 MS
QTC CALCULATION(BAZETT): 612 MS
QTC CALCULATION(BAZETT): 614 MS
QTC FREDERICIA: 507 MS
QTC FREDERICIA: 522 MS
QTC FREDERICIA: 535 MS
QTC FREDERICIA: 538 MS
QTC FREDERICIA: 541 MS
QTC FREDERICIA: 564 MS
R AXIS: -54 DEGREES
R AXIS: -86 DEGREES
R AXIS: 102 DEGREES
R AXIS: 120 DEGREES
R AXIS: 127 DEGREES
R AXIS: 131 DEGREES
RBC # BLD AUTO: 3.7 X10*6/UL (ref 4.5–5.9)
SODIUM SERPL-SCNC: 134 MMOL/L (ref 136–145)
SODIUM SERPL-SCNC: 135 MMOL/L (ref 136–145)
SODIUM SERPL-SCNC: 136 MMOL/L (ref 136–145)
T AXIS: -50 DEGREES
T AXIS: -56 DEGREES
T AXIS: -59 DEGREES
T AXIS: -62 DEGREES
T AXIS: 117 DEGREES
T AXIS: 74 DEGREES
T OFFSET: 421 MS
T OFFSET: 427 MS
T OFFSET: 428 MS
T OFFSET: 431 MS
T OFFSET: 441 MS
T OFFSET: 450 MS
VENTRICULAR RATE: 100 BPM
VENTRICULAR RATE: 120 BPM
VENTRICULAR RATE: 121 BPM
VENTRICULAR RATE: 130 BPM
VENTRICULAR RATE: 70 BPM
VENTRICULAR RATE: 91 BPM
WBC # BLD AUTO: 14.9 X10*3/UL (ref 4.4–11.3)

## 2024-06-13 PROCEDURE — 85025 COMPLETE CBC W/AUTO DIFF WBC: CPT | Performed by: STUDENT IN AN ORGANIZED HEALTH CARE EDUCATION/TRAINING PROGRAM

## 2024-06-13 PROCEDURE — 2500000002 HC RX 250 W HCPCS SELF ADMINISTERED DRUGS (ALT 637 FOR MEDICARE OP, ALT 636 FOR OP/ED)

## 2024-06-13 PROCEDURE — 99498 ADVNCD CARE PLAN ADDL 30 MIN: CPT | Performed by: NURSE PRACTITIONER

## 2024-06-13 PROCEDURE — 2500000004 HC RX 250 GENERAL PHARMACY W/ HCPCS (ALT 636 FOR OP/ED): Performed by: STUDENT IN AN ORGANIZED HEALTH CARE EDUCATION/TRAINING PROGRAM

## 2024-06-13 PROCEDURE — 84100 ASSAY OF PHOSPHORUS: CPT | Performed by: STUDENT IN AN ORGANIZED HEALTH CARE EDUCATION/TRAINING PROGRAM

## 2024-06-13 PROCEDURE — 93010 ELECTROCARDIOGRAM REPORT: CPT | Performed by: INTERNAL MEDICINE

## 2024-06-13 PROCEDURE — 37799 UNLISTED PX VASCULAR SURGERY: CPT | Performed by: STUDENT IN AN ORGANIZED HEALTH CARE EDUCATION/TRAINING PROGRAM

## 2024-06-13 PROCEDURE — C9113 INJ PANTOPRAZOLE SODIUM, VIA: HCPCS | Performed by: STUDENT IN AN ORGANIZED HEALTH CARE EDUCATION/TRAINING PROGRAM

## 2024-06-13 PROCEDURE — 80069 RENAL FUNCTION PANEL: CPT | Mod: 91 | Performed by: STUDENT IN AN ORGANIZED HEALTH CARE EDUCATION/TRAINING PROGRAM

## 2024-06-13 PROCEDURE — 2500000001 HC RX 250 WO HCPCS SELF ADMINISTERED DRUGS (ALT 637 FOR MEDICARE OP): Performed by: NURSE PRACTITIONER

## 2024-06-13 PROCEDURE — 2500000001 HC RX 250 WO HCPCS SELF ADMINISTERED DRUGS (ALT 637 FOR MEDICARE OP): Performed by: STUDENT IN AN ORGANIZED HEALTH CARE EDUCATION/TRAINING PROGRAM

## 2024-06-13 PROCEDURE — 99233 SBSQ HOSP IP/OBS HIGH 50: CPT | Performed by: NURSE PRACTITIONER

## 2024-06-13 PROCEDURE — 2500000001 HC RX 250 WO HCPCS SELF ADMINISTERED DRUGS (ALT 637 FOR MEDICARE OP)

## 2024-06-13 PROCEDURE — 82947 ASSAY GLUCOSE BLOOD QUANT: CPT | Mod: 91

## 2024-06-13 PROCEDURE — 93005 ELECTROCARDIOGRAM TRACING: CPT

## 2024-06-13 PROCEDURE — 99291 CRITICAL CARE FIRST HOUR: CPT

## 2024-06-13 PROCEDURE — 99497 ADVNCD CARE PLAN 30 MIN: CPT | Performed by: NURSE PRACTITIONER

## 2024-06-13 PROCEDURE — S4991 NICOTINE PATCH NONLEGEND: HCPCS | Performed by: STUDENT IN AN ORGANIZED HEALTH CARE EDUCATION/TRAINING PROGRAM

## 2024-06-13 PROCEDURE — 2500000002 HC RX 250 W HCPCS SELF ADMINISTERED DRUGS (ALT 637 FOR MEDICARE OP, ALT 636 FOR OP/ED): Performed by: STUDENT IN AN ORGANIZED HEALTH CARE EDUCATION/TRAINING PROGRAM

## 2024-06-13 PROCEDURE — 99291 CRITICAL CARE FIRST HOUR: CPT | Performed by: INTERNAL MEDICINE

## 2024-06-13 PROCEDURE — 83735 ASSAY OF MAGNESIUM: CPT | Performed by: STUDENT IN AN ORGANIZED HEALTH CARE EDUCATION/TRAINING PROGRAM

## 2024-06-13 PROCEDURE — 71045 X-RAY EXAM CHEST 1 VIEW: CPT

## 2024-06-13 PROCEDURE — 2020000001 HC ICU ROOM DAILY

## 2024-06-13 PROCEDURE — 84100 ASSAY OF PHOSPHORUS: CPT

## 2024-06-13 PROCEDURE — 2500000002 HC RX 250 W HCPCS SELF ADMINISTERED DRUGS (ALT 637 FOR MEDICARE OP, ALT 636 FOR OP/ED): Performed by: NURSE PRACTITIONER

## 2024-06-13 PROCEDURE — 71045 X-RAY EXAM CHEST 1 VIEW: CPT | Performed by: RADIOLOGY

## 2024-06-13 RX ORDER — LORAZEPAM 0.5 MG/1
1 TABLET ORAL
Status: DISCONTINUED | OUTPATIENT
Start: 2024-06-13 | End: 2024-06-19 | Stop reason: HOSPADM

## 2024-06-13 RX ORDER — MEXILETINE HYDROCHLORIDE 250 MG/1
250 CAPSULE ORAL EVERY 8 HOURS SCHEDULED
Status: DISCONTINUED | OUTPATIENT
Start: 2024-06-13 | End: 2024-06-19 | Stop reason: HOSPADM

## 2024-06-13 RX ORDER — CYCLOBENZAPRINE HCL 5 MG
5 TABLET ORAL ONCE
Status: COMPLETED | OUTPATIENT
Start: 2024-06-13 | End: 2024-06-13

## 2024-06-13 RX ORDER — PAROXETINE HYDROCHLORIDE 20 MG/1
20 TABLET, FILM COATED ORAL DAILY
Status: DISCONTINUED | OUTPATIENT
Start: 2024-06-13 | End: 2024-06-19 | Stop reason: HOSPADM

## 2024-06-13 RX ORDER — OXYCODONE HYDROCHLORIDE 5 MG/1
5 TABLET ORAL EVERY 4 HOURS PRN
Status: DISCONTINUED | OUTPATIENT
Start: 2024-06-13 | End: 2024-06-19 | Stop reason: HOSPADM

## 2024-06-13 RX ADMIN — FUROSEMIDE 40 MG: 40 TABLET ORAL at 08:39

## 2024-06-13 RX ADMIN — BISOPROLOL FUMARATE 2.5 MG: 5 TABLET ORAL at 08:40

## 2024-06-13 RX ADMIN — DAPAGLIFLOZIN 10 MG: 10 TABLET, FILM COATED ORAL at 08:39

## 2024-06-13 RX ADMIN — AMIODARONE HYDROCHLORIDE 400 MG: 200 TABLET ORAL at 08:40

## 2024-06-13 RX ADMIN — SACUBITRIL AND VALSARTAN 0.5 TABLET: 24; 26 TABLET, FILM COATED ORAL at 20:28

## 2024-06-13 RX ADMIN — ASPIRIN 81 MG CHEWABLE TABLET 81 MG: 81 TABLET CHEWABLE at 08:40

## 2024-06-13 RX ADMIN — OXYCODONE HYDROCHLORIDE 5 MG: 5 TABLET ORAL at 14:13

## 2024-06-13 RX ADMIN — PAROXETINE 20 MG: 20 TABLET, FILM COATED ORAL at 14:40

## 2024-06-13 RX ADMIN — TRAZODONE HYDROCHLORIDE 50 MG: 50 TABLET ORAL at 20:28

## 2024-06-13 RX ADMIN — RANOLAZINE 500 MG: 500 TABLET, EXTENDED RELEASE ORAL at 20:28

## 2024-06-13 RX ADMIN — APIXABAN 5 MG: 5 TABLET, FILM COATED ORAL at 08:39

## 2024-06-13 RX ADMIN — SPIRONOLACTONE 25 MG: 25 TABLET ORAL at 08:40

## 2024-06-13 RX ADMIN — SACUBITRIL AND VALSARTAN 0.5 TABLET: 24; 26 TABLET, FILM COATED ORAL at 08:39

## 2024-06-13 RX ADMIN — PANTOPRAZOLE SODIUM 40 MG: 40 INJECTION, POWDER, FOR SOLUTION INTRAVENOUS at 08:40

## 2024-06-13 RX ADMIN — RANOLAZINE 500 MG: 500 TABLET, EXTENDED RELEASE ORAL at 08:41

## 2024-06-13 RX ADMIN — Medication 1 PATCH: at 05:00

## 2024-06-13 RX ADMIN — LORAZEPAM 0.5 MG: 0.5 TABLET ORAL at 01:06

## 2024-06-13 RX ADMIN — MEXILETINE HYDROCHLORIDE 250 MG: 250 CAPSULE ORAL at 21:49

## 2024-06-13 RX ADMIN — MEXILETINE HYDROCHLORIDE 150 MG: 150 CAPSULE ORAL at 05:00

## 2024-06-13 RX ADMIN — MEXILETINE HYDROCHLORIDE 150 MG: 150 CAPSULE ORAL at 14:13

## 2024-06-13 RX ADMIN — SIMETHICONE 40 MG: 80 TABLET, CHEWABLE ORAL at 00:14

## 2024-06-13 RX ADMIN — ROSUVASTATIN 20 MG: 40 TABLET, FILM COATED ORAL at 20:28

## 2024-06-13 RX ADMIN — APIXABAN 5 MG: 5 TABLET, FILM COATED ORAL at 20:28

## 2024-06-13 RX ADMIN — OXYCODONE HYDROCHLORIDE 5 MG: 5 TABLET ORAL at 20:41

## 2024-06-13 RX ADMIN — CYCLOBENZAPRINE HYDROCHLORIDE 5 MG: 5 TABLET, FILM COATED ORAL at 02:30

## 2024-06-13 ASSESSMENT — PAIN SCALES - GENERAL
PAINLEVEL_OUTOF10: 8
PAINLEVEL_OUTOF10: 0 - NO PAIN
PAINLEVEL_OUTOF10: 4
PAINLEVEL_OUTOF10: 5 - MODERATE PAIN
PAINLEVEL_OUTOF10: 3
PAINLEVEL_OUTOF10: 0 - NO PAIN
PAINLEVEL_OUTOF10: 0 - NO PAIN
PAINLEVEL_OUTOF10: 5 - MODERATE PAIN
PAINLEVEL_OUTOF10: 0 - NO PAIN
PAINLEVEL_OUTOF10: 7

## 2024-06-13 ASSESSMENT — COGNITIVE AND FUNCTIONAL STATUS - GENERAL
STANDING UP FROM CHAIR USING ARMS: A LITTLE
CLIMB 3 TO 5 STEPS WITH RAILING: A LITTLE
MOBILITY SCORE: 19
DAILY ACTIVITIY SCORE: 24
MOVING TO AND FROM BED TO CHAIR: A LITTLE
WALKING IN HOSPITAL ROOM: A LITTLE
TURNING FROM BACK TO SIDE WHILE IN FLAT BAD: A LITTLE

## 2024-06-13 ASSESSMENT — PAIN - FUNCTIONAL ASSESSMENT
PAIN_FUNCTIONAL_ASSESSMENT: 0-10

## 2024-06-13 ASSESSMENT — PAIN DESCRIPTION - DESCRIPTORS: DESCRIPTORS: TENDER;SORE

## 2024-06-13 ASSESSMENT — PAIN DESCRIPTION - LOCATION: LOCATION: ABDOMEN

## 2024-06-13 ASSESSMENT — PAIN DESCRIPTION - ORIENTATION: ORIENTATION: LOWER

## 2024-06-13 NOTE — CARE PLAN
Problem: Fall/Injury  Goal: Not fall by end of shift  6/12/2024 2156 by Jennifer Pratt RN  Outcome: Progressing  6/12/2024 2155 by Jennifer Pratt RN  Outcome: Progressing  Goal: Be free from injury by end of the shift  6/12/2024 2156 by Jennifer Pratt RN  Outcome: Progressing  6/12/2024 2155 by Jennifer Pratt RN  Outcome: Progressing  Goal: Verbalize understanding of personal risk factors for fall in the hospital  6/12/2024 2156 by Jennifer Pratt RN  Outcome: Progressing  6/12/2024 2155 by Jennifer Pratt RN  Outcome: Progressing  Goal: Verbalize understanding of risk factor reduction measures to prevent injury from fall in the home  6/12/2024 2156 by Jennifer Pratt RN  Outcome: Progressing  6/12/2024 2155 by Jennifer Pratt RN  Outcome: Progressing  Goal: Use assistive devices by end of the shift  6/12/2024 2156 by Jennifer Pratt RN  Outcome: Progressing  6/12/2024 2155 by Jennifer Pratt RN  Outcome: Progressing  Goal: Pace activities to prevent fatigue by end of the shift  6/12/2024 2156 by Jennifer Pratt RN  Outcome: Progressing  6/12/2024 2155 by Jennifer Pratt RN  Outcome: Progressing     Problem: Pain  Goal: My pain/discomfort is manageable  6/12/2024 2156 by Jennifer Pratt RN  Outcome: Progressing  6/12/2024 2155 by Jennifer Pratt RN  Outcome: Progressing     Problem: Daily Care  Goal: Daily care needs are met  6/12/2024 2156 by Jennifer Pratt RN  Outcome: Progressing  6/12/2024 2155 by Jennifer Pratt RN  Outcome: Progressing     Problem: Psychosocial Needs  Goal: Demonstrates ability to cope with hospitalization/illness  6/12/2024 2156 by Jennifer Pratt RN  Outcome: Progressing  6/12/2024 2155 by Jennifer Pratt RN  Outcome: Progressing  Goal: Collaborate with me, my family, and caregiver to identify my specific goals  6/12/2024 2156 by Jennifer Pratt RN  Outcome: Progressing  6/12/2024 2155 by Jennifer Pratt RN  Outcome: Progressing     Problem: Pain - Adult  Goal: Verbalizes/displays adequate comfort level  or baseline comfort level  6/12/2024 2156 by Jennifer Pratt RN  Outcome: Progressing  6/12/2024 2155 by Jennifer Pratt RN  Outcome: Progressing     Problem: Safety - Adult  Goal: Free from fall injury  6/12/2024 2156 by Jennifer Pratt RN  Outcome: Progressing  6/12/2024 2155 by Jennifer Pratt RN  Outcome: Progressing     Problem: Discharge Planning  Goal: Discharge to home or other facility with appropriate resources  6/12/2024 2156 by Jennifer Pratt RN  Outcome: Progressing  6/12/2024 2155 by Jennifer Pratt RN  Outcome: Progressing     Problem: Chronic Conditions and Co-morbidities  Goal: Patient's chronic conditions and co-morbidity symptoms are monitored and maintained or improved  6/12/2024 2156 by Jennifer Pratt RN  Outcome: Progressing  6/12/2024 2155 by Jennifer Pratt RN  Outcome: Progressing     Problem: Skin  Goal: Decreased wound size/increased tissue granulation at next dressing change  6/12/2024 2156 by Jennifer Pratt RN  Outcome: Progressing  6/12/2024 2156 by Jennifer Pratt RN  Outcome: Progressing  6/12/2024 2155 by Jennifer Pratt RN  Outcome: Progressing  Goal: Participates in plan/prevention/treatment measures  6/12/2024 2156 by Jennifer Pratt RN  Outcome: Progressing  6/12/2024 2156 by Jennifer Pratt RN  Outcome: Progressing  6/12/2024 2155 by Jennifer Pratt RN  Outcome: Progressing  Goal: Prevent/manage excess moisture  6/12/2024 2156 by Jennifer Pratt RN  Outcome: Progressing  6/12/2024 2156 by Jennifer Pratt RN  Outcome: Progressing  6/12/2024 2155 by Jennifer Pratt RN  Outcome: Progressing  Goal: Prevent/minimize sheer/friction injuries  6/12/2024 2156 by Jennifer Pratt RN  Outcome: Progressing  6/12/2024 2156 by Jennifer Pratt RN  Outcome: Progressing  6/12/2024 2155 by Jennifer Pratt RN  Outcome: Progressing  Goal: Promote/optimize nutrition  6/12/2024 2156 by Jennifer Pratt RN  Outcome: Progressing  6/12/2024 2156 by Jennifer Pratt RN  Outcome: Progressing  6/12/2024 2155 by Jennifer Pratt  RN  Outcome: Progressing  Goal: Promote skin healing  6/12/2024 2156 by Jennifer Pratt RN  Outcome: Progressing  6/12/2024 2156 by Jennifer Pratt RN  Outcome: Progressing  6/12/2024 2155 by Jennifer Pratt RN  Outcome: Progressing     Problem: Heart Failure  Goal: Improved gas exchange this shift  6/12/2024 2156 by Jennifer Pratt RN  Outcome: Progressing  6/12/2024 2155 by Jennifer Pratt RN  Outcome: Progressing  Goal: Improved urinary output this shift  6/12/2024 2156 by Jennifer Pratt RN  Outcome: Progressing  6/12/2024 2155 by Jennifer Pratt RN  Outcome: Progressing  Goal: Reduction in peripheral edema within 24 hours  6/12/2024 2156 by Jennifer Pratt RN  Outcome: Progressing  6/12/2024 2155 by Jennifer Pratt RN  Outcome: Progressing  Goal: Report improvement of dyspnea/breathlessness this shift  6/12/2024 2156 by Jennifer Pratt RN  Outcome: Progressing  6/12/2024 2155 by Jennifer Pratt RN  Outcome: Progressing  Goal: Weight from fluid excess reduced over 2-3 days, then stabilize  6/12/2024 2156 by Jennifer Pratt RN  Outcome: Progressing  6/12/2024 2155 by Jennifer Pratt RN  Outcome: Progressing  Goal: Increase self care and/or family involvement in 24 hours  6/12/2024 2156 by Jennifer Pratt RN  Outcome: Progressing  6/12/2024 2155 by Jennifer Pratt RN  Outcome: Progressing     Problem: Safety - Medical Restraint  Goal: Remains free of injury from restraints (Restraint for Interference with Medical Device)  6/12/2024 2156 by Jennifer Pratt RN  Outcome: Progressing  6/12/2024 2155 by Jennifer Pratt RN  Outcome: Progressing  Goal: Free from restraint(s) (Restraint for Interference with Medical Device)  6/12/2024 2156 by Jennifer Pratt RN  Outcome: Progressing  6/12/2024 2155 by Jennifer Pratt RN  Outcome: Progressing     Problem: Knowledge Deficit  Goal: Patient/family/caregiver demonstrates understanding of disease process, treatment plan, medications, and discharge instructions  6/12/2024 2156 by Jennifer Pratt  RN  Outcome: Progressing  6/12/2024 2155 by Jennifer Pratt RN  Outcome: Progressing     Problem: Mechanical Ventilation  Goal: Patient Will Maintain Patent Airway  6/12/2024 2156 by Jennifer Pratt RN  Outcome: Progressing  6/12/2024 2155 by Jennifer Pratt RN  Outcome: Progressing  Goal: Oral health is maintained or improved  6/12/2024 2156 by Jennifer Pratt RN  Outcome: Progressing  6/12/2024 2155 by Jennifer Pratt RN  Outcome: Progressing  Goal: Tracheostomy will be managed safely  6/12/2024 2156 by Jennifer Pratt RN  Outcome: Progressing  6/12/2024 2155 by Jennifer Pratt RN  Outcome: Progressing  Goal: ET tube will be managed safely  6/12/2024 2156 by Jennifer Pratt RN  Outcome: Progressing  6/12/2024 2155 by Jennifer Pratt RN  Outcome: Progressing  Goal: Ability to express needs and understand communication  6/12/2024 2156 by Jennifer Pratt RN  Outcome: Progressing  6/12/2024 2155 by Jennifer Pratt RN  Outcome: Progressing  Goal: Mobility/activity is maintained at optimum level for patient  6/12/2024 2156 by Jennifer Pratt RN  Outcome: Progressing  6/12/2024 2155 by Jennifer Pratt RN  Outcome: Progressing

## 2024-06-13 NOTE — PROGRESS NOTES
[facility]  HFICU PROGRESS NOTE    Cristian Sapp/[id number]    Admit Date: 5/28/2024  Hospital Length of Stay: 16   ICU Length of Stay: 10d   Primary Service: HFICU  Referring: Dr. Brown     INTERVAL EVENTS / PERTINENT ROS:     Overnight, patient experienced anxiety related fear of ICD shocks. Also, he c/o right sided thoracic discomfort in which a chest Xray was obtained that demonstrated improvement in overall congestion and small right sided pleural effusion (present in previous recent imaging). He states he did not sleep well at all. Telemetry reviewed showing 5 separate episodes of ventricular tachycardia lasting ~ 1 hour; however, he remained hemodynamically stable throughout with MAPs in the 70's - 80's. He is euvolemic on exam.  A long discussion occurred at the bedside with palliative care and Dr. Hernandez. His ICD function will remain enabled for now until he has time to have a discussion with his wife.      Plan:    - Palliative care consult for symptom management  - Repeat labs this afternoon   - Restart Paxil at half home dose (20 mg)  - Per Palliative care: Order Oxycodone 5 mg Q 4 hours PRN  - Per Palliative care: Ativan 0.5 mg PO PRN  Q 8 hours + Ativan 1 mg PO PRN Q 24 hours for severe anxiety    MEDICATIONS  Infusions:       Scheduled:  amiodarone, 400 mg, Daily  apixaban, 5 mg, q12h  aspirin, 81 mg, Daily  bisoprolol, 2.5 mg, Daily  dapagliflozin propanediol, 10 mg, Daily  furosemide, 40 mg, Daily  insulin lispro, 0-10 Units, q4h  mexiletine, 150 mg, q8h ANNIKA  nicotine, 1 patch, Daily   Followed by  [START ON 7/13/2024] nicotine, 1 patch, Daily  pantoprazole, 40 mg, Daily  PARoxetine, 20 mg, Daily  polyethylene glycol, 17 g, BID  ranolazine, 500 mg, BID  rosuvastatin, 20 mg, Nightly  sacubitriL-valsartan, 0.5 tablet, BID  sennosides-docusate sodium, 2 tablet, BID  spironolactone, 25 mg, Daily      PRN:  acetaminophen, 650 mg, q4h PRN  dextrose, 12.5 g, q15 min  "PRN  glucagon, 1 mg, q15 min PRN  hydrOXYzine HCL, 25 mg, q6h PRN  LORazepam, 0.5 mg, q8h PRN  LORazepam, 1 mg, q24h PRN  oxyCODONE, 5 mg, q4h PRN  oxygen, , Continuous PRN - O2/gases  simethicone, 40 mg, 4x daily PRN  traZODone, 50 mg, Nightly PRN  trimethobenzamide, 200 mg, q6h PRN      PHYSICAL EXAM:   Visit Vitals  /75   Pulse (!) 119   Temp 35.3 °C (95.5 °F) (Temporal)   Resp 20   Ht 1.702 m (5' 7\")   Wt 72 kg (158 lb 11.7 oz)   SpO2 97%   BMI 24.86 kg/m²   Smoking Status Former   BSA 1.84 m²     Wt Readings from Last 5 Encounters:   06/12/24 72 kg (158 lb 11.7 oz)   05/27/24 78.8 kg (173 lb 11.6 oz)     INTAKE/OUTPUT:  I/O last 3 completed shifts:  In: 744.8 (10.3 mL/kg) [P.O.:400; I.V.:344.8 (4.8 mL/kg)]  Out: 2250 (31.3 mL/kg) [Urine:2250 (0.9 mL/kg/hr)]  Weight: 72 kg      Physical Exam  Vitals reviewed.   Constitutional:       General: He is not in acute distress.     Appearance: He is ill-appearing.   HENT:      Mouth/Throat:      Mouth: Mucous membranes are moist.   Eyes:      Extraocular Movements: Extraocular movements intact.      Pupils: Pupils are equal, round, and reactive to light.   Neck:      Vascular: No JVD.   Cardiovascular:      Rate and Rhythm: Normal rate and regular rhythm.      Pulses: Normal pulses.      Heart sounds: Normal heart sounds.   Pulmonary:      Effort: Pulmonary effort is normal.      Breath sounds: Normal breath sounds. No wheezing or rhonchi.   Abdominal:      General: Abdomen is flat. Bowel sounds are normal. There is no distension.      Tenderness: There is no abdominal tenderness.   Musculoskeletal:         General: Normal range of motion.      Cervical back: Full passive range of motion without pain.      Right lower leg: No edema.      Left lower leg: No edema.   Skin:     General: Skin is warm.      Capillary Refill: Capillary refill takes 2 to 3 seconds.   Neurological:      General: No focal deficit present.      Mental Status: He is alert and oriented to " "person, place, and time. Mental status is at baseline.   Psychiatric:         Mood and Affect: Mood normal.         Behavior: Behavior normal. Behavior is cooperative.       DATA:  CMP:  Results from last 7 days   Lab Units 06/13/24  0719 06/13/24  0509 06/12/24  0211 06/11/24  0133 06/10/24  1755 06/10/24  0312 06/09/24  0324 06/08/24  2123 06/08/24  0508 06/07/24  0412 06/06/24  2220   SODIUM mmol/L 136 134* 134* 136 136 139 140 137 142 137 136   POTASSIUM mmol/L 4.4 5.0 4.3 4.0 4.3 4.2 3.9 3.5 3.9 4.5 4.4   CHLORIDE mmol/L 100 99 97* 100 99 106 105 100 106 101 98   CO2 mmol/L 25 25 25 25 25 24 25 21 24 27 25   ANION GAP mmol/L 15 15 16 15 16 13 14 20 16 14 17   BUN mg/dL 33* 35* 22 16 14 12 12 14 16 13 14   CREATININE mg/dL 1.31* 1.28 1.16 1.15 1.13 1.07 1.28 1.11 1.00 0.91 1.13   EGFR mL/min/1.73m*2 64 66 74 75 76 81 66 78 88 >90 76   MAGNESIUM mg/dL  --  2.34 2.31 2.14  --  2.27 2.47*  --  2.00 2.32 1.75   ALBUMIN g/dL 3.6 3.6 4.0 3.5 4.0 3.4 3.4 3.6 3.0* 3.2* 3.4   ALT U/L  --   --   --   --   --   --   --  77*  --   --  40   AST U/L  --   --   --   --   --   --   --  57*  --   --  188*   BILIRUBIN TOTAL mg/dL  --   --   --   --   --   --   --  0.6  --   --  0.9     CBC:  Results from last 7 days   Lab Units 06/13/24  0509 06/12/24  0211 06/11/24  0133 06/10/24  0312 06/09/24  0324 06/08/24  2123 06/08/24  0508 06/07/24  0412   WBC AUTO x10*3/uL 14.9* 17.7* 15.2* 14.3* 14.8* 17.2* 15.5* 14.0*   HEMOGLOBIN g/dL 11.7* 12.8* 11.6* 11.5* 10.6* 11.4* 11.1* 11.7*   HEMATOCRIT % 32.0* 36.7* 33.7* 33.9* 30.3* 33.0* 32.6* 33.0*   PLATELETS AUTO x10*3/uL 320 309 245 229 212 198 242 293   MCV fL 87 92 93 94 92 93 94 92     COAG:   Results from last 7 days   Lab Units 06/07/24  0918 06/06/24  2220   INR  1.3* 1.5*     ABO:   No results found for: \"ABO\"    HEME/ENDO:         CARDIAC:   Results from last 7 days   Lab Units 06/07/24  0412 06/06/24  2220   LD U/L 408* 467*     ASSESSMENT AND PLAN:   Crisitan Sapp is a 59 y/o M " with PMHx of CAD s/p multiple PCI, ICM/HFrEF (EF 15% 5/24) s/p CRT-D, VT storm s/p VT ablation (2019), infrarenal AAA s/p R iliac stent, nephrolithiasis s/p recent ureteral stent. Presented to OSH on 5/23 with multiple ICD shocks for VT. Transferred to CICU at [facility] [place] 5/28 for continued management. EP was consulted on arrival and patient was in slow VT, he was started on amiodarone gtt. Underwent VT ablation 5/30, but continued to have intermittent slow VT. On 6/3 VT was noted again with rates ~118 and patient was cardioverted. Maintained on amiodarone + lidocaine gtt, and is s/p stellate ganglion block 6/4. He was transferred to HFICU 6/3 after he signed consent for LVAD/OHT evaluation. After further discussions and being a current smoker, it was decided that OHT would not be an option, and the patient does not want an LVAD at this time, so advanced therapies evaluation was stopped 6/5. Palliative medicine saw the patient 6/5 and he decided to change his code status to DNR-DNI. Redo endocardial ablation with Dr. Wooten 6/6 c/b lactic acidosis requiring intubation and low CO requiring IABP support. He was extubated 6/7 and IABP was removed on 6/8. SGC removed 6/9 with closing numbers: /57 (71), CVP 5, PA 44/26 (34), PCWP 11, Malcolm CO/CI:  5.5/2.9, Thermo: 5.3/2.8, , SVO2 67% on dapa 10 mg daily, brigido 25 mg daily.     6/11: Ganglion block bedside per EP + decrease 1/2 Amiodarone and lidocaine gtt  6/12: Amio / Lido gtts discontinued. Low dose bisoprolol and Mexiletine initiated   6/13: Palliative care goals of care discussions occurring. Low dose Entresto (24/26) restarted.      Neuro:  #Anxiety  #Depression   #Insomnia   - C/w atarax 25 mg q6 PRN for anxiety   - Restart paroxetine @ 20 mg    - C/w trazodone 50 mg nightly   - With Palliative care, adjusted ativan to 0.5 mg q8 PRN for anxiety + 1 mg ativan PRN for severe anxiety   - Serial neuro and pain assessments   - PO Tylenol PRN for pain  - PT/OT  Consult, OOB to chair  - CAM ICU score every shift  - Sleep/wake cycle normalization     #Substance abuse  - Tobacco use: active smoker  - C/w nicotine patches      Cardiovascular:  #Stage C/D HFrEF/ICM s/p CRT-D (11/2019)  #Acute on Chronic Decompensated HF  #Cardiogenic shock s/p IABP 6/6, removed 6/8 (resolved)   TTE (5/28/2024): severely decreased LV systolic function, EF 15%, LVIDd 6.32. Moderate-severe MVR, mild TR    Home medications: ASA, entresto 24-26 mg BID, rosuvastatin 20 mg daily, torsemide 20 mg daily  Opening SGC #s (6/7): /57 (89), CVP 8, PAP 48/25 (33), fuad CO/CI 7.4/4, , SVO2 81% on IABP 1:1   Closing SGC #s (6/9): /57 (71), CVP 5, PA 44/26 (34), PCWP 11, Fuad CO/CI:  5.5/2.9, Thermo: 5.3/2.8, , SVO2 67% on dapa 10 mg daily, brigido 25 mg daily  - Restarting entresto 12-13 mg BID   - C/w spironolactone 25 mg daily   - C/w dapagliflozin 10 mg daily   - Signed consent for OHT/LVAD workup, patient currently is not a candidate for OHT given active tobacco use, and he does not want an LVAD at this time, will stop workup for now per Dr. Doyle 6/5  - Diuresis as needed   - Daily standing weights, 2gm sodium diet, 2L fluid restriction, strict I&Os     #CAD s/p multiple PCI   [facility] (5/23/24): Distal Left Main: 10-30% stenosis; Proximal and mid LAD Lesion: The percent stenosis is 10-30%; Proximal CX Lesion: The percent stenosis is 100%; Right Coronary Artery: fills via collaterals; Proximal RCA Lesion: The percent stenosis is 100%; The Left Ventricular Ejection Fraction is 10%,by echo.  - C/w ASA + rosuvastatin 20 mg daily      #Prior VT storm s/p VT ablation 5/30, stellate ganglion block 6/4, VT ablation 6/6, stellate ganglion block 6/11  #Incessant VT s/p ICD shock   Device interrogation (6/4): paced  for 102 min then stepping down, LR back to 95, patient converted to AP   - C/w ranexa 500 mg BID   - S/p VT ablation 5/30 and 6/6   - S/p stellate ganglion block 6/4 and  6/11  - Heparin gtt  for extensive ventricular ablation per EP (5/31)---> D/C heparin gtt, then switch to Eliquis to 5 mg BID for one month per EP Dr. Shepard  - Discontinued lidocaine gtt + amiodarone gtt (6/12)  - NO Lidocaine or Amiodarone bolus during the Vts per Dr. Blood (6/11)  - Resumed PO amiodarone 400 mg po daily (6/12)  - Resumed home Bisoprolol 2.5 mg PO daily (6/12)  - EP following, appreciate assistance      Pulmonary:   #Acute Hypoxic Respiratory Failure (following VT ablation 6/6) (resolved)   #?PNA   #Pulmonary Edema   #?COPD   - Active smoker  - CT chest (6/4): interval worsening now moderate right and small left pleural effusions with associated atelectasis, findings suggestive of mild pulmonary interstitial and alveolar edema  Blood cultures (6/3): NGTD   Blood cultures (6/7): NGTD  MRSA (6/3): negative   Strep PNA, legionella negative (6/5)   - S/p zosyn + vancomycin (6/2-6/5)   - Stop zosyn + vancomycin (6/7-6/10)  - ID recommended stopping antibiotics 6/5, were temporarily restarted 6/7 given leukocytosis/ hypotension   - Monitor and maintain SpO2 > 92%     GI:  #No active issues   - Bowel regimen: facundo-colace BID + miralax BID   - PPI simethicone, 40 mg, 4x daily PRN    :  #EITAN (resolved)  #Nephrolithiasis s/p recent bilateral ureteral stent placement to left ureter   sCr (6/10): 1.07  - Consulted urology 5/31 to discuss removal--> will follow outpatient for STENT removal   - Trend RFP daily   - I/Os  - Spot Diuresis as above   - Avoid hypotension and nephrotoxic agents     Heme:  #Anemia in the setting of Chronic Disease  #Iron Deficiency Anemia   Labs (6/5): iron 51, TIBC 285, %sat 18, folate 9.8, ferritin 99  - S/p venofer x3 days (6/6-6/8)     Endo:  #No DM  - Euglycemic, HgbA1c 6%      #Hypothyroidism with unclear etiology  - TSH (6/4): 10.34  - T3 (6/4): 65  - T4 (6/4): 8.3  - Consulted endocrine 6/5--> 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 No need for synthroid now.  - Repeat TSH and free T4 after 2 to 4 weeks     ID:  #Leukocytosis 2/2 ?PNA versus stress induced (improving)  - See pulmonary plan above  - Trend temps q4h  - Afebrile, nontoxic, no s/s infx  - Closely monitoring  - Daily CBC     PHYSICAL AND OCCUPATIONAL THERAPY: ordered and following)      LINES:  PIVs   Left radial a-line 6/2-6/10  Femoral cordis 6/6-6/10  Femoral IABP 6/6-6/8  Midline 6/10--currently      DVT: heparin gtt--> Eliquis PO  VAP BUNDLE: NA  CENTRAL LINE BUNDLE: NA  ULCER PPX: PPI  GLYCEMIC CONTROL: NA  BOWEL CARE: scheduled facundo-colace BID and Miralax BID   INDWELLING CATHETER: NA  NUTRITION: Adult diet Cardiac; 70 gm fat; 2 - 3 grams Sodium; 1800 mL fluid      EMERGENCY CONTACT: Extended Emergency Contact Information  Primary Emergency Contact: Judie Sapp  Address: [address] [place] [address] [facility]  Home Phone: [phone]  Mobile Phone: [phone]  Relation: Mother  Preferred language: English   needed? No  Secondary Emergency Contact: Keiry Greene  Address: [address] [place], [place]  Mobile Phone: [phone]  Relation: Sibling  Preferred language: English   needed? No  FAMILY UPDATE: Given at bedside daily   CODE STATUS: DNR and No Intubation  DISPO: Remain in HFICU     Patient seen and assessed with Dr. Gunderson     I personally spent 60 minutes of critical care time directly and personally managing the patient exclusive of separately billable procedures.  _________________________________________________  YARITZA Payne-CNP

## 2024-06-13 NOTE — CARE PLAN
The clinical goals for the shift include Pt will have reduced anxiety throughout the shift      Problem: Fall/Injury  Goal: Not fall by end of shift  Outcome: Progressing  Goal: Be free from injury by end of the shift  Outcome: Progressing  Goal: Verbalize understanding of personal risk factors for fall in the hospital  Outcome: Progressing  Goal: Verbalize understanding of risk factor reduction measures to prevent injury from fall in the home  Outcome: Progressing  Goal: Use assistive devices by end of the shift  Outcome: Progressing  Goal: Pace activities to prevent fatigue by end of the shift  Outcome: Progressing     Problem: Pain  Goal: My pain/discomfort is manageable  Outcome: Progressing     Problem: Daily Care  Goal: Daily care needs are met  Outcome: Progressing     Problem: Psychosocial Needs  Goal: Demonstrates ability to cope with hospitalization/illness  Outcome: Progressing  Goal: Collaborate with me, my family, and caregiver to identify my specific goals  Outcome: Progressing     Problem: Pain - Adult  Goal: Verbalizes/displays adequate comfort level or baseline comfort level  Outcome: Progressing     Problem: Safety - Adult  Goal: Free from fall injury  Outcome: Progressing     Problem: Discharge Planning  Goal: Discharge to home or other facility with appropriate resources  Outcome: Progressing     Problem: Chronic Conditions and Co-morbidities  Goal: Patient's chronic conditions and co-morbidity symptoms are monitored and maintained or improved  Outcome: Progressing     Problem: Skin  Goal: Decreased wound size/increased tissue granulation at next dressing change  Outcome: Progressing  Goal: Participates in plan/prevention/treatment measures  Outcome: Progressing  Goal: Prevent/manage excess moisture  Outcome: Progressing  Goal: Prevent/minimize sheer/friction injuries  Outcome: Progressing  Goal: Promote/optimize nutrition  Outcome: Progressing  Goal: Promote skin healing  Outcome: Progressing      Problem: Heart Failure  Goal: Improved gas exchange this shift  Outcome: Progressing  Goal: Improved urinary output this shift  Outcome: Progressing  Goal: Reduction in peripheral edema within 24 hours  Outcome: Progressing  Goal: Report improvement of dyspnea/breathlessness this shift  Outcome: Progressing  Goal: Weight from fluid excess reduced over 2-3 days, then stabilize  Outcome: Progressing  Goal: Increase self care and/or family involvement in 24 hours  Outcome: Progressing     Problem: Safety - Medical Restraint  Goal: Remains free of injury from restraints (Restraint for Interference with Medical Device)  Outcome: Progressing  Goal: Free from restraint(s) (Restraint for Interference with Medical Device)  Outcome: Progressing     Problem: Knowledge Deficit  Goal: Patient/family/caregiver demonstrates understanding of disease process, treatment plan, medications, and discharge instructions  Outcome: Progressing     Problem: Mechanical Ventilation  Goal: Patient Will Maintain Patent Airway  Outcome: Progressing  Goal: Oral health is maintained or improved  Outcome: Progressing  Goal: Tracheostomy will be managed safely  Outcome: Progressing  Goal: ET tube will be managed safely  Outcome: Progressing  Goal: Ability to express needs and understand communication  Outcome: Progressing  Goal: Mobility/activity is maintained at optimum level for patient  Outcome: Progressing

## 2024-06-13 NOTE — PROGRESS NOTES
Follow-Up Palliative Medicine Progress Note  Palliative Medicine following for:  Complex medical decision making, symptom management, patient/family support    History obtained from chart review including ED note, H&P, patient's daily progress notes, review of lab/test results, and discussion with primary team and bedside RN.    Subjective    History of Present Illness  Cristian Sapp is a 58 y.o. male on day 16 of admission presenting with Acute on chronic systolic (congestive) heart failure (Multi). Past Medical History of  CAD s/p multiple PCI, ICM/HFrEF (EF 15% 5/2024) s/p ICD, VT storm s/p ablation, infrarenal AAA s/p R iliac stent, nephrolithiasis s/p ureteral stent is presenting from [place] for ICD firing; ongoing VT management requiring multiple DCCV and amiodarone. Transferred to [facility] on 05/28/24 for advanced therapies evaluation, VT ablation workup and urology follow up for ureteral stent removal. Course c/b ongoing VT requiring DCCV and amio and lido; EP consulted for setllate ganglion block for VT and possible repeat VT ablation. Pt is not an advanced therapies candidate r/t recent tobacco use per primary team, not an LVAD candidate per pt's preferences and r/t emphysema per primary team. Palliative care consulted for medical decision making. Palliative Care continues to follow for pain and symptom management, patient and family support, GOC discussion, and medical decision making. Pt initially reports frustration and upset and out uncontrolled pain and anxiety affecting his quality of life. Pt reports he is hearing a lot of what can't be done or offered and is requesting what can be done and solutions and treatments moving forward for options. Pt reports he wishes to know his prognosis and its important to him for prioritization and planning purposes.   Introduction to Palliative Care  Met with patient and his mother Judie Sapp at bedside.   Patient alert and oriented, has capacity to make their own  medical decisions at this time.   Staff present: Dr. Gunnar Gunderson HFICU Attending, Bear Carrasco HFICU CNP, Shanna Avalos Palliative Care CNP  Palliative Medicine was introduced as a specialty service for patients with serious illness to help with symptom management, improve quality of life, assist with goals of care conversations, navigate complex decision making, and provide support to patients and families. Support and empathy was provided throughout the encounter. Provided reflective listening and presence.     Symptoms  [place] Symptom Assessment System  Pain Score: 3  Pain: Pt reports severe pain and discomfort overnight with frustration about PRN Acetaminophen being offered. Pt reports pain not controlled at times and intermittent.   Dyspnea: Pt reports.  Fatigue: Pt reports.  Insomnia: Pt reports due to uncontrolled pain and anxiety symptoms.   Drowsiness: Pt denies.   Constipation: Pt denies and reports moving his bowels regularly.  Nausea: Pt denies.  Appetite: Pt reports fair to good.  Anxiety: Pt reports uncontrolled due to not being on his Paxil 40mg daily which helped previously control his anxiety at home well for many year. Pt reports PRN Ativan 0.5mg rarely used at home. Pt reports significant PTSD from defibrillator and fear/panic related to it going off again.   Depression: Pt denies.    Palliative Medicine Social History:  The patient is  to spouse for many years but she recently moved to [place] and left. They have 2 children (twins- one son and one daughter Faviola) who are in their 20s now and both graduated college. The patient previously worked in [employer], served as a . Pt denies use of alcohol, tobacco or drugs. The patient spends most of the day doing his laundry, jobs around the house, mowing the grass, paying the bills and handling all the finances for the household. Pt shared story of fixing the backyard fence. Pt sees their PCP and other specialists  "regularly. Hobbies were making homemade wheat bread, building and fixing things, home repairs, but since illness has progressed, pt is no longer able. For enjoyment, the pt loves reading the Bible, fixing things around the house, and providing for his family. Joys: Family, dogs, yardwork, fishing, reading, trucks, Anglican. Coping methods are reading the Bible. Denies any safety concerns. Pt reports being raised Non-Buddhist and is now Hindu and his tony is very important to him.    Objective    Last Recorded Vitals  BP 86/58 (Patient Position: Sitting)   Pulse 95   Temp 35.7 °C (96.3 °F)   Resp 18   Ht 1.702 m (5' 7\")   Wt 71.5 kg (157 lb 10.1 oz)   SpO2 95%   BMI 24.69 kg/m²    Intake/Output last 3 Shifts:  I/O last 3 completed shifts:  In: 744.8 (10.3 mL/kg) [P.O.:400; I.V.:344.8 (4.8 mL/kg)]  Out: 2250 (31.3 mL/kg) [Urine:2250 (0.9 mL/kg/hr)]  Weight: 72 kg     Physical Exam  Vitals and nursing note reviewed.   Constitutional:       General: He is not in acute distress.     Appearance: He is not ill-appearing or toxic-appearing.   HENT:      Head: Normocephalic and atraumatic.      Nose: Nose normal.      Mouth/Throat:      Mouth: Mucous membranes are moist.   Eyes:      General: No scleral icterus.     Conjunctiva/sclera: Conjunctivae normal.   Cardiovascular:      Rate and Rhythm: Tachycardia present. Rhythm irregular.   Pulmonary:      Effort: No respiratory distress.   Abdominal:      General: There is no distension.      Palpations: Abdomen is soft.      Tenderness: There is no abdominal tenderness.   Musculoskeletal:      Right lower leg: No edema.      Left lower leg: No edema.   Skin:     General: Skin is warm and dry.   Neurological:      Mental Status: He is alert and oriented to person, place, and time.   Psychiatric:         Mood and Affect: Mood normal.         Behavior: Behavior normal.         Thought Content: Thought content normal.         Judgment: Judgment normal.     Relevant " Results  Results for orders placed or performed during the hospital encounter of 05/28/24 (from the past 24 hour(s))   POCT GLUCOSE   Result Value Ref Range    POCT Glucose 158 (H) 74 - 99 mg/dL   POCT GLUCOSE   Result Value Ref Range    POCT Glucose 103 (H) 74 - 99 mg/dL   POCT GLUCOSE   Result Value Ref Range    POCT Glucose 123 (H) 74 - 99 mg/dL   CBC and Auto Differential   Result Value Ref Range    WBC 14.9 (H) 4.4 - 11.3 x10*3/uL    nRBC 0.0 0.0 - 0.0 /100 WBCs    RBC 3.70 (L) 4.50 - 5.90 x10*6/uL    Hemoglobin 11.7 (L) 13.5 - 17.5 g/dL    Hematocrit 32.0 (L) 41.0 - 52.0 %    MCV 87 80 - 100 fL    MCH 31.6 26.0 - 34.0 pg    MCHC 36.6 (H) 32.0 - 36.0 g/dL    RDW 13.5 11.5 - 14.5 %    Platelets 320 150 - 450 x10*3/uL    Neutrophils % 69.3 40.0 - 80.0 %    Immature Granulocytes %, Automated 4.1 (H) 0.0 - 0.9 %    Lymphocytes % 12.6 13.0 - 44.0 %    Monocytes % 9.8 2.0 - 10.0 %    Eosinophils % 3.1 0.0 - 6.0 %    Basophils % 1.1 0.0 - 2.0 %    Neutrophils Absolute 10.35 (H) 1.20 - 7.70 x10*3/uL    Immature Granulocytes Absolute, Automated 0.62 0.00 - 0.70 x10*3/uL    Lymphocytes Absolute 1.88 1.20 - 4.80 x10*3/uL    Monocytes Absolute 1.47 (H) 0.10 - 1.00 x10*3/uL    Eosinophils Absolute 0.46 0.00 - 0.70 x10*3/uL    Basophils Absolute 0.16 (H) 0.00 - 0.10 x10*3/uL   Renal Function Panel   Result Value Ref Range    Glucose 94 74 - 99 mg/dL    Sodium 134 (L) 136 - 145 mmol/L    Potassium 5.0 3.5 - 5.3 mmol/L    Chloride 99 98 - 107 mmol/L    Bicarbonate 25 21 - 32 mmol/L    Anion Gap 15 10 - 20 mmol/L    Urea Nitrogen 35 (H) 6 - 23 mg/dL    Creatinine 1.28 0.50 - 1.30 mg/dL    eGFR 66 >60 mL/min/1.73m*2    Calcium 9.5 8.6 - 10.6 mg/dL    Phosphorus 5.3 (H) 2.5 - 4.9 mg/dL    Albumin 3.6 3.4 - 5.0 g/dL   Magnesium   Result Value Ref Range    Magnesium 2.34 1.60 - 2.40 mg/dL   Renal function panel   Result Value Ref Range    Glucose 97 74 - 99 mg/dL    Sodium 136 136 - 145 mmol/L    Potassium 4.4 3.5 - 5.3 mmol/L     Chloride 100 98 - 107 mmol/L    Bicarbonate 25 21 - 32 mmol/L    Anion Gap 15 10 - 20 mmol/L    Urea Nitrogen 33 (H) 6 - 23 mg/dL    Creatinine 1.31 (H) 0.50 - 1.30 mg/dL    eGFR 64 >60 mL/min/1.73m*2    Calcium 9.5 8.6 - 10.6 mg/dL    Phosphorus 4.5 2.5 - 4.9 mg/dL    Albumin 3.6 3.4 - 5.0 g/dL   POCT GLUCOSE   Result Value Ref Range    POCT Glucose 104 (H) 74 - 99 mg/dL   POCT GLUCOSE   Result Value Ref Range    POCT Glucose 186 (H) 74 - 99 mg/dL   Renal function panel   Result Value Ref Range    Glucose 130 (H) 74 - 99 mg/dL    Sodium 135 (L) 136 - 145 mmol/L    Potassium 4.1 3.5 - 5.3 mmol/L    Chloride 99 98 - 107 mmol/L    Bicarbonate 25 21 - 32 mmol/L    Anion Gap 15 10 - 20 mmol/L    Urea Nitrogen 33 (H) 6 - 23 mg/dL    Creatinine 1.29 0.50 - 1.30 mg/dL    eGFR 65 >60 mL/min/1.73m*2    Calcium 9.4 8.6 - 10.6 mg/dL    Phosphorus 4.2 2.5 - 4.9 mg/dL    Albumin 3.9 3.4 - 5.0 g/dL   POCT GLUCOSE   Result Value Ref Range    POCT Glucose 171 (H) 74 - 99 mg/dL      XR chest 1 view  Narrative: Interpreted By:  Ashli Pena and MacBeth RaeLynne   STUDY:  XR CHEST 1 VIEW;  6/13/2024 4:33 am      INDICATION:  Signs/Symptoms:SOB.      COMPARISON:  Chest radiograph 06/08/2024  CT chest 06/04/2020      ACCESSION NUMBER(S):  [id number]      ORDERING CLINICIAN:  MEHDI NICOLAS      FINDINGS:  AP radiograph of the chest was provided.      Stable positioning of a right chest wall AICD/pacemaker with leads  overlying the right atrium, right ventricle, and coronary sinus.  Interval removal of an inferior approach Dallas-Billy catheter.      CARDIOMEDIASTINAL SILHOUETTE:  Cardiomediastinal silhouette is stable in size and configuration.      LUNGS:  Interval improvement in prominent interstitial markings, particularly  in the right lung base. There is blunting of the right costophrenic  angle. No pneumothorax.      ABDOMEN:  No remarkable upper abdominal findings.      BONES:  No acute osseous changes.      Impression: 1.   Interval improvement in prominent interstitial markings  suggestive of improving edema.  2. Small right pleural effusion.  3. Medical devices as described above.      I personally reviewed the images/study and I agree with the findings  as stated by resident physician Dr. Ania Barrera. This study was  interpreted at [facility],  [place], [place].      MACRO:  None      Signed by: Ashli Pena 6/13/2024 11:21 AM  Dictation workstation:   [id number]  Electrocardiogram, 12-lead PRN ACS symptoms  Undetermined rhythm  Right bundle branch block  Marked T wave abnormality, consider inferior ischemia  Abnormal ECG  When compared with ECG of 03-JUN-2024 14:46,  Sinus rhythm has replaced Electronic ventricular pacemaker  Confirmed by Margarette Valentin ([id number]) on 6/13/2024 8:51:57 AM  Electrocardiogram, 12-lead PRN ACS symptoms  Suspect unspecified pacemaker failure  AV sequential or dual chamber electronic pacemaker  Abnormal ECG  When compared with ECG of 29-MAY-2024 14:53,  Vent. rate has decreased BY  20 BPM  Confirmed by Margarette Valentin ([id number]) on 6/13/2024 8:50:09 AM  Electrocardiogram, 12-lead PRN ACS symptoms  Suspect unspecified pacemaker failure  Undetermined rhythm  Right bundle branch block  Possible Lateral infarct , age undetermined  Inferior infarct , age undetermined  Abnormal ECG  When compared with ECG of 29-MAY-2024 09:02,  Current undetermined rhythm precludes rhythm comparison, needs review  Confirmed by Margarette Valentin ([id number]) on 6/13/2024 8:45:57 AM  Electrocardiogram, 12-lead PRN ACS symptoms  Wide QRS rhythm  Right bundle branch block  Possible Lateral infarct , age undetermined  Inferior infarct , age undetermined  Abnormal ECG  When compared with ECG of 28-MAY-2024 23:57,  Wide QRS rhythm has replaced Electronic ventricular pacemaker  Confirmed by Margarette Valentin ([id number]) on 6/13/2024 8:45:23 AM  Electrocardiogram, 12-lead PRN ACS symptoms  Wide QRS tachycardia  Right bundle  branch block  Possible Lateral infarct , age undetermined  Inferior infarct , age undetermined  Abnormal ECG  When compared with ECG of 28-MAY-2024 22:04,  Wide QRS tachycardia has replaced Wide QRS rhythm  Confirmed by Valentin Pfeiffer ([id number]) on 6/13/2024 8:44:54 AM  Electrocardiogram, 12-lead PRN ACS symptoms  Suspect unspecified pacemaker failure  Undetermined rhythm with evidence of device undersensing  Left axis deviation  Right bundle branch block  Abnormal ECG  When compared with ECG of 28-MAY-2024 16:11,  Wide QRS rhythm has replaced Electronic ventricular pacemaker  Confirmed by Valentin Pfeiffer ([id number]) on 6/13/2024 8:44:46 AM     Encounter Date: 05/28/24   Electrocardiogram, 12-lead PRN ACS symptoms   Result Value    Ventricular Rate 100    Atrial Rate 98    DE Interval 368    QRS Duration 208    QT Interval 476    QTC Calculation(Bazett) 614    R Axis 102    T Axis -62    QRS Count 16    Q Onset 203    P Onset 19    P Offset 83    T Offset 441    QTC Fredericia 564    Narrative    Undetermined rhythm  Right bundle branch block  Marked T wave abnormality, consider inferior ischemia  Abnormal ECG  When compared with ECG of 03-JUN-2024 14:46,  Sinus rhythm has replaced Electronic ventricular pacemaker  Confirmed by Valentin Pfeiffer ([id number]) on 6/13/2024 8:51:57 AM      Allergies  Chantix [varenicline]    Scheduled medications  amiodarone, 400 mg, oral, Daily  apixaban, 5 mg, oral, q12h  aspirin, 81 mg, oral, Daily  bisoprolol, 2.5 mg, oral, Daily  dapagliflozin propanediol, 10 mg, oral, Daily  furosemide, 40 mg, oral, Daily  insulin lispro, 0-10 Units, subcutaneous, q4h  mexiletine, 150 mg, oral, q8h ANNIKA  nicotine, 1 patch, transdermal, Daily   Followed by  [START ON 7/13/2024] nicotine, 1 patch, transdermal, Daily  pantoprazole, 40 mg, intravenous, Daily  polyethylene glycol, 17 g, oral, BID  ranolazine, 500 mg, oral, BID  rosuvastatin, 20 mg, oral, Nightly  sacubitriL-valsartan, 0.5 tablet, oral, BID  sennosides-docusate  sodium, 2 tablet, oral, BID  spironolactone, 25 mg, oral, Daily    PRN medications  PRN medications: acetaminophen **OR** [DISCONTINUED] acetaminophen **OR** [DISCONTINUED] acetaminophen, dextrose, glucagon, hydrOXYzine HCL, LORazepam, oxygen, simethicone, traZODone, trimethobenzamide     Assessment/Plan    Cristian Sapp is a 58 y.o. male on day 16 of admission presenting with Acute on chronic systolic (congestive) heart failure (Multi). Past Medical History of  CAD s/p multiple PCI, ICM/HFrEF (EF 15% 5/2024) s/p ICD, VT storm s/p ablation, infrarenal AAA s/p R iliac stent, nephrolithiasis s/p ureteral stent is presenting from [place] for ICD firing; ongoing VT management requiring multiple DCCV and amiodarone. Transferred to [facility] on 05/28/24 for advanced therapies evaluation, VT ablation workup and urology follow up for ureteral stent removal. Course c/b ongoing VT requiring DCCV and amio and lido; EP consulted for setllate ganglion block for VT and possible repeat VT ablation. Pt is not an advanced therapies candidate r/t recent tobacco use per primary team, not an LVAD candidate per pt's preferences and r/t emphysema per primary team. Palliative care consulted for medical decision making. Palliative Care continues to follow for pain and symptom management, patient and family support, GOC discussion, and medical decision making. Pt initially reports frustration and upset and out uncontrolled pain and anxiety affecting his quality of life. Pt reports he is hearing a lot of what can't be done or offered and is requesting what can be done and solutions and treatments moving forward for options. Pt reports he wishes to know his prognosis and its important to him for prioritization and planning purposes.     Palliative Performance Scale (PPS):  50%    ----------------------------------------------------------------------------------------------------------------------------------------------------------------------------------------------------------------------------------------------------------------------------------------------------------------------------------------------------------------------  Advanced Care Planning  Patient and family consented to a voluntary Advanced Care Planning meeting.   Serious Illness Assessment and Counseling: Heart Failure and Intermittent VT episodes  Life Limiting Disease: Acute on Chronic Heart Failure, slow, recurrent, intermittent episodes of VT posing threat to life or function.     Disease Specific Information Provided/Prognosis Discussed: Patient's current clinical condition, including diagnosis, prognosis, and management plan were discussed.   Counseling provided on goals of care, discharge expectations, and outpatient Palliative Care services.   Counseling provided on guarded prognosis and what to expect with disease progression of persistent VT.   Counseling provided on the irreversible and progressive nature of patient's diseases including heart failure.     Understanding/Overall Impression: Patient expressing clear understanding of overall health status and severity of illness.     Goals/Hopes: Discussion ensued about patient's goals for their medical care going forward. Allowed patient time to talk about his/her current quality of life, disease course/progression, and symptom and treatment burden. Discussed care plan to continue with aggressive hospital care despite symptom and treatment burden versus choosing to transition to comfort based plan of care that focuses on symptom management and quality of life. 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 Pt reports his goal today is to have his pain and anxiety managed and improved QOL with symptom control.  Pt reports he is learning his new normal and recalibrating his parameters with his health and managing his anxiety and stress levels.     Fears/Worries/Concerns: Pt is worried about having a face to face discussion with his wife before he decides to turn off his defibrillator. Pt reports he is not afraid to die or go to the place he saw in his recent near death experience. Pt is worried about providing for his family.     Prognosis: Pt wishes to be fully informed with medical information and pt wishes to know his prognosis. Pt reports he would like to know so he can make future decisions and plan accordingly. Pt aware time is short. Pt knows his prognosis is days to months and very unlikely he will live one year. Pt aware he has very poor prognosis.     Minimal Acceptable Quality of Life/Maximal Alna Tolerable for the Possibility of More Time: Counseling provided on the concept of MAO/Maximal Alna. Patient expressing that they would never want to be in a health state where they were dependent on other's for ADLs/toileting needs, bed bound, intubated or placed on a ventilator, have a permanent feeding tube if they could not eat, have a tracheostomy placed. Patient deems that this would not be an acceptable quality of life for the patient.     Resuscitation Assessment: Counseling provided on the benefit versus burden of CPR in the setting of patient's overall health status and pt wishes are DNR/DNI.     Advanced Directives:  Counseling provided on the importance of not crisis planning as disease burden progresses but to establish treatment limitations now so in the future medical team will be clear on what patient feels is an acceptable quality of life for the patient and what treatment limitations' patient would like set into place based on that.       Surrogate Health Care Decision Maker: pt's mother, Judie Sapp: [phone] HCPOA  HPOA: Yes, on file.     Code Status: Decision to keep code status DNR/DNI at  this time. Briefly discussed comfort measures and focus if pt wanted to go home asap.     All questions and concerns were addressed during encounter.     I spent 75 minutes in providing separately identifiable ACP services with the patient and/or surrogate decision maker in a voluntary conversation discussing the patient's wishes and goals as detailed in the above note.   ----------------------------------------------------------------------------------------------------------------------------------------------------------------------------------------------------------------------------------------------------------------------------------------------------------------------------------------------------------------------    #Complex Medical Decision Making  #Goals of Care  #Advanced Care Planning  - Code status: DNR/DNI  - Surrogate decision maker: pt's mother, Judie Sapp: [phone]   - Goals are mix of survival and time and improved quality of life, pt wishes to have face to face conversation with his wife (Saturday) before transition to comfort care only and being discharged home from hospital  - Advanced Directives on file     #Acute Pain   #Acute on Chronic Heart Failure  #Intermittent Recurrent VT episodes  #PTSD  #Uncontrolled Anxiety  #Insomnia  #Dyspnea    -Palliative Medicine Recommendations:  1) Start Paroxetine 20mg PO daily x1-2 weeks, then increase to 40mg PO daily (previous home dose).  2) Start Oxycodone 5mg PO [id number] PRN pain.  3) Start Ativan 1mg PO [id number] PRN severe anxiety or panic attack.  4) Continue Ativan 0.5mg at bedtime routine and Ativan 0.5mg [id number] PRN anxiety.  5) Continue Miralax 17g PO BID routine and hold for any loose stools.   6) Continue Senna (Agnieszka-Colace) 8.6-50mg 2 tablets PO BID routine and hold for any loose stools.   7) Please allow pt to have ice and water in basin at bedside with washcloth and towel for pt to use as needed for anxiety (around his neck).  8)  "Plan ongoing GOC discussion and discussion of deactivation of defibrillator when pt ready to assist in alleviating anxiety and fear of future shocks.    Provided reflective listening and presence.   Discussed Jennilson Xie's \"4 things that matter most\"- please forgive me, I forgive you, thank you, I love you. Encouraged patient to have these conversations with family and friends. Patient and his mother Judie both shared appreciation for the support.     #Psychosocial Support  - Consult Music Therapy  - Spiritual Care Support  - Consult Art Therapy  - Palliative Care SW Support     Plan of Care discussed with: Updated MD Dr. Gunnar Gunderson and Bear Carrasco HFICU CNP and bedside RN Mervat Rangel on goals of care decision, medication adjustments, and code status.     Medical Decision Making was high level due to high complexity of problems, extensive data review, and high risk of management/treatment.     - Acute on Chronic Heart Failure, slow, recurrent, intermittent episodes of VT posing threat to life or function.   - Reviewed external notes from [facility] Hospitalization April 14th, 2024 - April 16th, 2024, [facility] Hospitalization 05/22/2024-05/28/2024  - Reviews results from labs, continuous heart monitor, CXR imaging, pt progress notes which were used in decision making for GOC and medical decision making discussion and support.    - Discussion of management with primary team/HF ICU team.  - Drug therapy requiring intensive monitoring for toxicity: Pacerone and Eliquis      Thank you for allowing us to participate in the care of this patient. Palliative will continue to follow as needed. Palliative medicine is available Monday-Friday, 8a-6p. Please contact team with any questions or concerns.  Team pager [phone]  Shanna Avalos CNP (on EPIC secure chat)  Palliative Medicine Nurse Practitioner   [email or web address]      YARITZA Bolaños-CNP  "

## 2024-06-13 NOTE — PROGRESS NOTES
"Cristian Sapp is a 58 y.o. male on day 16 of admission presenting with Acute on chronic systolic (congestive) heart failure (Multi).    Subjective   Tele reviewed and showing patient back in recurrent intermittent episodes of slow VT with HR 120s. Asymptomatic. Terminating spontaneously          Objective     Physical Exam  Gen: well appearing  Neuro: AAOx3, CN 2-12 intact, no FND  HEENT: PEERL, EOMI, sclera anicteric, no conjunctival injection, MMM, no oropharyngeal lesions  Neck: no elevated JVD  Resp: CTAB, normal breath sounds, no wheezing/crackles/ rales  CV: RRR, normal S1/S2, no murmurs/ rubs/gallops  GI: non-tender, non-distended, normal BSs in all 4 quadrants  MSK: warm and well perfused, no edema  Skin: warm and dry, no lesions, no rashes     Last Recorded Vitals  Blood pressure 86/58, pulse 95, temperature 35.7 °C (96.3 °F), resp. rate 18, height 1.702 m (5' 7\"), weight 71.5 kg (157 lb 10.1 oz), SpO2 95%.  Intake/Output last 3 Shifts:  I/O last 3 completed shifts:  In: 744.8 (10.3 mL/kg) [P.O.:400; I.V.:344.8 (4.8 mL/kg)]  Out: 2250 (31.3 mL/kg) [Urine:2250 (0.9 mL/kg/hr)]  Weight: 72 kg     Relevant Results  LABS:  CMP:  Results from last 7 days   Lab Units 06/13/24  1446 06/13/24  0719 06/13/24  0509 06/12/24  0211 06/11/24  0133 06/10/24  1755 06/10/24  0312 06/09/24  0324 06/08/24  2123 06/08/24  0508 06/07/24  0412 06/06/24  2220   SODIUM mmol/L 135* 136 134* 134* 136 136 139 140 137 142 137 136   POTASSIUM mmol/L 4.1 4.4 5.0 4.3 4.0 4.3 4.2 3.9 3.5 3.9 4.5 4.4   CHLORIDE mmol/L 99 100 99 97* 100 99 106 105 100 106 101 98   CO2 mmol/L 25 25 25 25 25 25 24 25 21 24 27 25   ANION GAP mmol/L 15 15 15 16 15 16 13 14 20 16 14 17   BUN mg/dL 33* 33* 35* 22 16 14 12 12 14 16 13 14   CREATININE mg/dL 1.29 1.31* 1.28 1.16 1.15 1.13 1.07 1.28 1.11 1.00 0.91 1.13   EGFR mL/min/1.73m*2 65 64 66 74 75 76 81 66 78 88 >90 76   MAGNESIUM mg/dL  --   --  2.34 2.31 2.14  --  2.27 2.47*  --  2.00 2.32 1.75   ALBUMIN " "g/dL 3.9 3.6 3.6 4.0 3.5 4.0 3.4 3.4 3.6 3.0* 3.2* 3.4   ALT U/L  --   --   --   --   --   --   --   --  77*  --   --  40   AST U/L  --   --   --   --   --   --   --   --  57*  --   --  188*   BILIRUBIN TOTAL mg/dL  --   --   --   --   --   --   --   --  0.6  --   --  0.9     CBC:  Results from last 7 days   Lab Units 06/13/24  0509 06/12/24  0211 06/11/24  0133 06/10/24  0312 06/09/24  0324 06/08/24  2123 06/08/24  0508 06/07/24  0412   WBC AUTO x10*3/uL 14.9* 17.7* 15.2* 14.3* 14.8* 17.2* 15.5* 14.0*   HEMOGLOBIN g/dL 11.7* 12.8* 11.6* 11.5* 10.6* 11.4* 11.1* 11.7*   HEMATOCRIT % 32.0* 36.7* 33.7* 33.9* 30.3* 33.0* 32.6* 33.0*   PLATELETS AUTO x10*3/uL 320 309 245 229 212 198 242 293   MCV fL 87 92 93 94 92 93 94 92     COAG:   Results from last 7 days   Lab Units 06/07/24  0918 06/06/24  2220   INR  1.3* 1.5*     ABO: No results found for: \"ABO\"  HEME/ENDO:     CARDIAC:   Results from last 7 days   Lab Units 06/07/24  0412 06/06/24  2220   LD U/L 408* 467*   No results for input(s): \"CHOL\", \"LDLF\", \"LDL\", \"LDLCALC\", \"HDL\", \"TRIG\" in the last 19892 hours.     Scheduled medications  amiodarone, 400 mg, oral, Daily  apixaban, 5 mg, oral, q12h  aspirin, 81 mg, oral, Daily  bisoprolol, 2.5 mg, oral, Daily  dapagliflozin propanediol, 10 mg, oral, Daily  furosemide, 40 mg, oral, Daily  insulin lispro, 0-10 Units, subcutaneous, q4h  mexiletine, 150 mg, oral, q8h ANNIKA  nicotine, 1 patch, transdermal, Daily   Followed by  [START ON 7/13/2024] nicotine, 1 patch, transdermal, Daily  pantoprazole, 40 mg, intravenous, Daily  PARoxetine, 20 mg, oral, Daily  polyethylene glycol, 17 g, oral, BID  ranolazine, 500 mg, oral, BID  rosuvastatin, 20 mg, oral, Nightly  sacubitriL-valsartan, 0.5 tablet, oral, BID  sennosides-docusate sodium, 2 tablet, oral, BID  spironolactone, 25 mg, oral, Daily      Continuous medications     PRN medications  PRN medications: acetaminophen **OR** [DISCONTINUED] acetaminophen **OR** [DISCONTINUED] " acetaminophen, dextrose, glucagon, hydrOXYzine HCL, LORazepam, LORazepam, oxyCODONE, oxygen, simethicone, traZODone, trimethobenzamide      Assessment/Plan   Principal Problem:    Acute on chronic systolic (congestive) heart failure (Multi)  Active Problems:    Flash pulmonary edema (Multi)    Ischemic cardiomyopathy    Ventricular tachycardia (Multi)    58M PMHx CAD s/p multiple PCI, ICM/HFrEF (EF 15% 5/24) s/p CRT-D, VT storm s/p VT ablation, infrarenal AAA s/p R iliac stent, nephrolithiasis s/p recent ureteral stent. Presented to OSH on 5/23 with multiple ICD shocks for VT. Noted to be in incessant, slow VT for which EP is following.   Patient remained in slow, hemodynamically stable VT, likely scar mediated iso known ICM and unrevascularized CAD. Patient was started on amiodarone and lidocaine. Given inability to pace terminate this, patient underwent VT RFA (VT 1-critical isthmus involving the mid inferior / infero - septal left ventricular segment, VT 2 - mid lateral left ventriclar segment) with Dr. Flores on 5/30.  Despite this, on 6/2, patient was noted to have slow VT with rates ~ 90-110s. Tracking rate was increased to 95bpm but patient continued to have episodes of VT (rates ~ 118 on 6/3) requiring cardioversion. Noted to go into slow VT again on 6/4 s/p successful overdrive pacing. Morphology of VT ws similar to prior VT  which was ablated on 5/30.  Patient underwent a 2nd VT ablation with Dr. Wooten on 6/6 - extensive scar modification was done. Multiple Vts were induced, coming from the lateral scar as well as the septal scar s/p extensive ablation. Patient then went into a different wide VT suggestive of metabolic derangements. pH was noted to be 7.15. Patient subsequently underwent IABP placement (removed 6/8).   Had multiple runs of VT (rates 110s-130s) since VT ablation between 6/8-/10 that self-terminated without any therapies delivered. Patient has not had any more VT episodes in the last 48h.  Stellate ganglion block repeated on 6/11        #ICM/HFrEF (EF 15%) s/p CRT-D  #Prior VT storm s/p VT ablation 5/30, stellate ganglion candy 6/4 and 6/11, s/p repeat ablation 6/6  #Incessant VT s/p multiple ICD shocks  -Continue PO amiodarone 400mg daily (patient has completed 10g load; switched on 6/12)  -Continue Mexilitine 150 TID (Lidocaine drip stopped on 6/12)  - May consider adding quinidine gluconate 324 mg TID for further suppression on VT (however may exacerbate episodes of HF and will need to discuss risks and benefits, but very limited options at this point).   -Aggressive electrolyte repletion: K>4 and Mg>2  -Currently being V-paced at 95bpm   -Patient is not a candidate for OHT and does not want an LVAD. [facility] discussions underway - primary team discussing if ICD shocks should be turned off before discharge   -Continue eliquis 5mg BID       Case discussed with Dr. Wooten.  Cheng Barreto MD

## 2024-06-13 NOTE — PROGRESS NOTES
Physical Therapy                 Therapy Communication Note    Patient Name: Cristian Sapp  MRN: [id number]  Today's Date: 6/13/2024       Discipline: Physical Therapy    Missed Visit Reason: PT attempted to see him 2x today. first attempt, pt with palliative care, 2nd attempt, HF team going into pts room to have GOC discussion. Spoke with RN, agreed this was not a good time for PT. She will get pt up to chair and ambulate him later in day if pt would like to.     Missed Time: Attempt

## 2024-06-13 NOTE — CARE PLAN
The patient's goals for the shift include      The clinical goals for the shift include no symptoms from vtach during shift today    Over the shift, the patient did not make progress toward the following goals. Barriers to progression include . Recommendations to address these barriers include .      Problem: Fall/Injury  Goal: Not fall by end of shift  6/13/2024 1218 by Mervat Rangel RN  Outcome: Progressing  6/13/2024 1218 by Mervat Rangel RN  Outcome: Progressing  Goal: Be free from injury by end of the shift  6/13/2024 1218 by Mervat Rangel RN  Outcome: Progressing  6/13/2024 1218 by Mervat Rangel RN  Outcome: Progressing  Goal: Verbalize understanding of personal risk factors for fall in the hospital  6/13/2024 1218 by Mervat Rangel RN  Outcome: Progressing  6/13/2024 1218 by Mervat Rangel RN  Outcome: Progressing  Goal: Verbalize understanding of risk factor reduction measures to prevent injury from fall in the home  6/13/2024 1218 by Mervat Rangel RN  Outcome: Progressing  6/13/2024 1218 by Mervat Rangel RN  Outcome: Progressing  Goal: Use assistive devices by end of the shift  6/13/2024 1218 by Mervat Rangel RN  Outcome: Progressing  6/13/2024 1218 by Mervat Rangel RN  Outcome: Progressing  Goal: Pace activities to prevent fatigue by end of the shift  6/13/2024 1218 by Mervat Rangel RN  Outcome: Progressing  6/13/2024 1218 by Mervat Rangel RN  Outcome: Progressing     Problem: Pain  Goal: My pain/discomfort is manageable  6/13/2024 1218 by Mervat Rangel RN  Outcome: Progressing  6/13/2024 1218 by Mervat Rangel RN  Outcome: Progressing     Problem: Daily Care  Goal: Daily care needs are met  6/13/2024 1218 by Mervat Rangel RN  Outcome: Progressing  6/13/2024 1218 by Mervat Rangel RN  Outcome: Progressing     Problem: Psychosocial Needs  Goal: Demonstrates ability to cope with hospitalization/illness  6/13/2024 1218 by Mervat Rangel RN  Outcome: Progressing  6/13/2024 1218 by Mervat Rangel  RN  Outcome: Progressing  Goal: Collaborate with me, my family, and caregiver to identify my specific goals  6/13/2024 1218 by Mervat Rangel RN  Outcome: Progressing  6/13/2024 1218 by Mervat Rangel RN  Outcome: Progressing     Problem: Pain - Adult  Goal: Verbalizes/displays adequate comfort level or baseline comfort level  6/13/2024 1218 by Mervat Rangel RN  Outcome: Progressing  6/13/2024 1218 by Mervat Rangel RN  Outcome: Progressing     Problem: Safety - Adult  Goal: Free from fall injury  6/13/2024 1218 by Mervat Rangel RN  Outcome: Progressing  6/13/2024 1218 by Mervat Rangel RN  Outcome: Progressing     Problem: Discharge Planning  Goal: Discharge to home or other facility with appropriate resources  6/13/2024 1218 by Mervat Rangel RN  Outcome: Progressing  6/13/2024 1218 by Mervat Rangel RN  Outcome: Progressing     Problem: Chronic Conditions and Co-morbidities  Goal: Patient's chronic conditions and co-morbidity symptoms are monitored and maintained or improved  6/13/2024 1218 by Mervat Rangel RN  Outcome: Progressing  6/13/2024 1218 by Mervat Rangel RN  Outcome: Progressing     Problem: Skin  Goal: Decreased wound size/increased tissue granulation at next dressing change  6/13/2024 1218 by Mervat Rangel RN  Outcome: Progressing  6/13/2024 1218 by Mervat Rangel RN  Outcome: Progressing  Goal: Participates in plan/prevention/treatment measures  6/13/2024 1218 by Mervat Rangel RN  Outcome: Progressing  6/13/2024 1218 by Mervat Rangel RN  Outcome: Progressing  Goal: Prevent/manage excess moisture  6/13/2024 1218 by Mervat Rangel RN  Outcome: Progressing  6/13/2024 1218 by Mervat Rangel RN  Outcome: Progressing  Goal: Prevent/minimize sheer/friction injuries  6/13/2024 1218 by Mervat Rangel RN  Outcome: Progressing  6/13/2024 1218 by Mervat Rangel RN  Outcome: Progressing  Goal: Promote/optimize nutrition  6/13/2024 1218 by Mervat Rangel RN  Outcome: Progressing  6/13/2024 1218 by Mervat  MAIN Rangel  Outcome: Progressing  Goal: Promote skin healing  6/13/2024 1218 by Mervat Rangel RN  Outcome: Progressing  6/13/2024 1218 by Mervat Rangel RN  Outcome: Progressing     Problem: Heart Failure  Goal: Improved gas exchange this shift  6/13/2024 1218 by Mervat Rangel RN  Outcome: Progressing  6/13/2024 1218 by Mervat Rangel RN  Outcome: Progressing  Goal: Improved urinary output this shift  6/13/2024 1218 by Mervat Rangel RN  Outcome: Progressing  6/13/2024 1218 by Mervat Rangel RN  Outcome: Progressing  Goal: Reduction in peripheral edema within 24 hours  6/13/2024 1218 by Mervat Rangel RN  Outcome: Progressing  6/13/2024 1218 by Mervat Rangel RN  Outcome: Progressing  Goal: Report improvement of dyspnea/breathlessness this shift  6/13/2024 1218 by Mervat Rangel RN  Outcome: Progressing  6/13/2024 1218 by Mervat Rangel RN  Outcome: Progressing  Goal: Weight from fluid excess reduced over 2-3 days, then stabilize  6/13/2024 1218 by Mervat Rangel RN  Outcome: Progressing  6/13/2024 1218 by Mervat Rangel RN  Outcome: Progressing  Goal: Increase self care and/or family involvement in 24 hours  6/13/2024 1218 by Mervat Rangel RN  Outcome: Progressing  6/13/2024 1218 by Mervat Rangel RN  Outcome: Progressing     Problem: Safety - Medical Restraint  Goal: Remains free of injury from restraints (Restraint for Interference with Medical Device)  6/13/2024 1218 by Mervat Rangel RN  Outcome: Progressing  6/13/2024 1218 by Mervat Rangel RN  Outcome: Progressing  Goal: Free from restraint(s) (Restraint for Interference with Medical Device)  6/13/2024 1218 by Mervat Rangel RN  Outcome: Progressing  6/13/2024 1218 by Mervat Rangel RN  Outcome: Progressing     Problem: Knowledge Deficit  Goal: Patient/family/caregiver demonstrates understanding of disease process, treatment plan, medications, and discharge instructions  6/13/2024 1218 by Mervat Rangel RN  Outcome: Progressing  6/13/2024 1218 by Mervat  MAIN Rangel  Outcome: Progressing     Problem: Mechanical Ventilation  Goal: Patient Will Maintain Patent Airway  6/13/2024 1218 by Mervat Rangel RN  Outcome: Progressing  6/13/2024 1218 by Mervat Rangel RN  Outcome: Progressing  Goal: Oral health is maintained or improved  6/13/2024 1218 by Mervat Rangel RN  Outcome: Progressing  6/13/2024 1218 by Mervat Rangel RN  Outcome: Progressing  Goal: Tracheostomy will be managed safely  6/13/2024 1218 by Mervat Rangel RN  Outcome: Progressing  6/13/2024 1218 by Mervat Rangel RN  Outcome: Progressing  Goal: ET tube will be managed safely  6/13/2024 1218 by Mervat Rangel RN  Outcome: Progressing  6/13/2024 1218 by Mervat Rangel RN  Outcome: Progressing  Goal: Ability to express needs and understand communication  6/13/2024 1218 by Mervat Rangel RN  Outcome: Progressing  6/13/2024 1218 by Mervat Rangel RN  Outcome: Progressing  Goal: Mobility/activity is maintained at optimum level for patient  6/13/2024 1218 by Mervat Rangel RN  Outcome: Progressing  6/13/2024 1218 by Mervat Rangel RN  Outcome: Progressing

## 2024-06-13 NOTE — PROGRESS NOTES
HFICU Attending Note     Overnight patient had persistent ventricular tachycardia at a slow rate around 115 bpm however this was only mildly symptomatic and he continued to convert out of it on his own.  He did receive multiple doses of benzodiazepines overnight and reports extreme distress that he will be shocked by his defibrillator.  This morning we had a long discussion about ICD deactivation at this point he would like to wait for his wife to arrive understands his prognosis but not quite ready for deactivation if she does not arrive by Sunday he is ready to make the decision on his own.  He seems a bit more tenuous from a cardiac standpoint.  Will also restart paroxetine which she was on previously may be helpful for treatment of ICD shock related PTSD not particularly high risk of QT prolongation nor is he having torsade so the risk-benefit is reasonable      This critically ill patient continues to be at-risk for clinically significant deterioration / failure due to the above mentioned dysfunctional, unstable organ systems.  I have personally identified and managed all complex critical care issues to prevent aforementioned clinical deterio overnight patient had ration.  Critical care time is spent at bedside and/or the immediate area and has included, but is not limited to, the review of diagnostic tests, labs, radiographs, serial assessments of hemodynamics, respiratory status, ventilatory management, and family updates.  Time spent in procedures and teaching are reported separately.     Critical care time: __47__ minutes

## 2024-06-14 ENCOUNTER — APPOINTMENT (OUTPATIENT)
Dept: CARDIOLOGY | Facility: CLINIC | Age: 59
End: 2024-06-14
Payer: COMMERCIAL

## 2024-06-14 LAB
ALBUMIN SERPL BCP-MCNC: 3.7 G/DL (ref 3.4–5)
ANION GAP SERPL CALC-SCNC: 14 MMOL/L (ref 10–20)
ATRIAL RATE: 104 BPM
ATRIAL RATE: 96 BPM
BASOPHILS # BLD AUTO: 0.19 X10*3/UL (ref 0–0.1)
BASOPHILS NFR BLD AUTO: 1.5 %
BUN SERPL-MCNC: 33 MG/DL (ref 6–23)
CALCIUM SERPL-MCNC: 9.2 MG/DL (ref 8.6–10.6)
CHLORIDE SERPL-SCNC: 101 MMOL/L (ref 98–107)
CO2 SERPL-SCNC: 27 MMOL/L (ref 21–32)
CREAT SERPL-MCNC: 1.26 MG/DL (ref 0.5–1.3)
EGFRCR SERPLBLD CKD-EPI 2021: 67 ML/MIN/1.73M*2
EOSINOPHIL # BLD AUTO: 0.59 X10*3/UL (ref 0–0.7)
EOSINOPHIL NFR BLD AUTO: 4.5 %
ERYTHROCYTE [DISTWIDTH] IN BLOOD BY AUTOMATED COUNT: 14.1 % (ref 11.5–14.5)
GLUCOSE BLD MANUAL STRIP-MCNC: 105 MG/DL (ref 74–99)
GLUCOSE BLD MANUAL STRIP-MCNC: 110 MG/DL (ref 74–99)
GLUCOSE BLD MANUAL STRIP-MCNC: 113 MG/DL (ref 74–99)
GLUCOSE BLD MANUAL STRIP-MCNC: 120 MG/DL (ref 74–99)
GLUCOSE BLD MANUAL STRIP-MCNC: 126 MG/DL (ref 74–99)
GLUCOSE BLD MANUAL STRIP-MCNC: 146 MG/DL (ref 74–99)
GLUCOSE BLD MANUAL STRIP-MCNC: 98 MG/DL (ref 74–99)
GLUCOSE SERPL-MCNC: 111 MG/DL (ref 74–99)
HCT VFR BLD AUTO: 34.7 % (ref 41–52)
HGB BLD-MCNC: 12.2 G/DL (ref 13.5–17.5)
IMM GRANULOCYTES # BLD AUTO: 0.54 X10*3/UL (ref 0–0.7)
IMM GRANULOCYTES NFR BLD AUTO: 4.1 % (ref 0–0.9)
LYMPHOCYTES # BLD AUTO: 1.93 X10*3/UL (ref 1.2–4.8)
LYMPHOCYTES NFR BLD AUTO: 14.7 %
MAGNESIUM SERPL-MCNC: 2.21 MG/DL (ref 1.6–2.4)
MCH RBC QN AUTO: 31.7 PG (ref 26–34)
MCHC RBC AUTO-ENTMCNC: 35.2 G/DL (ref 32–36)
MCV RBC AUTO: 90 FL (ref 80–100)
MONOCYTES # BLD AUTO: 1.46 X10*3/UL (ref 0.1–1)
MONOCYTES NFR BLD AUTO: 11.2 %
NEUTROPHILS # BLD AUTO: 8.38 X10*3/UL (ref 1.2–7.7)
NEUTROPHILS NFR BLD AUTO: 64 %
NRBC BLD-RTO: 0 /100 WBCS (ref 0–0)
P AXIS: 64 DEGREES
P OFFSET: 167 MS
P ONSET: 137 MS
PHOSPHATE SERPL-MCNC: 4.3 MG/DL (ref 2.5–4.9)
PLATELET # BLD AUTO: 384 X10*3/UL (ref 150–450)
POTASSIUM SERPL-SCNC: 4.5 MMOL/L (ref 3.5–5.3)
PR INTERVAL: 88 MS
Q ONSET: 181 MS
Q ONSET: 213 MS
QRS COUNT: 16 BEATS
QRS COUNT: 18 BEATS
QRS DURATION: 174 MS
QRS DURATION: 186 MS
QT INTERVAL: 442 MS
QT INTERVAL: 448 MS
QTC CALCULATION(BAZETT): 565 MS
QTC CALCULATION(BAZETT): 603 MS
QTC FREDERICIA: 524 MS
QTC FREDERICIA: 544 MS
R AXIS: -53 DEGREES
R AXIS: 128 DEGREES
RBC # BLD AUTO: 3.85 X10*6/UL (ref 4.5–5.9)
SODIUM SERPL-SCNC: 137 MMOL/L (ref 136–145)
T AXIS: -54 DEGREES
T AXIS: 141 DEGREES
T OFFSET: 405 MS
T OFFSET: 434 MS
VENTRICULAR RATE: 112 BPM
VENTRICULAR RATE: 96 BPM
WBC # BLD AUTO: 13.1 X10*3/UL (ref 4.4–11.3)

## 2024-06-14 PROCEDURE — S4991 NICOTINE PATCH NONLEGEND: HCPCS | Performed by: STUDENT IN AN ORGANIZED HEALTH CARE EDUCATION/TRAINING PROGRAM

## 2024-06-14 PROCEDURE — 97535 SELF CARE MNGMENT TRAINING: CPT | Mod: GO

## 2024-06-14 PROCEDURE — 2500000002 HC RX 250 W HCPCS SELF ADMINISTERED DRUGS (ALT 637 FOR MEDICARE OP, ALT 636 FOR OP/ED): Performed by: STUDENT IN AN ORGANIZED HEALTH CARE EDUCATION/TRAINING PROGRAM

## 2024-06-14 PROCEDURE — 82947 ASSAY GLUCOSE BLOOD QUANT: CPT | Mod: 91

## 2024-06-14 PROCEDURE — 2500000001 HC RX 250 WO HCPCS SELF ADMINISTERED DRUGS (ALT 637 FOR MEDICARE OP): Performed by: NURSE PRACTITIONER

## 2024-06-14 PROCEDURE — 2020000001 HC ICU ROOM DAILY

## 2024-06-14 PROCEDURE — 83735 ASSAY OF MAGNESIUM: CPT | Performed by: STUDENT IN AN ORGANIZED HEALTH CARE EDUCATION/TRAINING PROGRAM

## 2024-06-14 PROCEDURE — 80069 RENAL FUNCTION PANEL: CPT | Performed by: STUDENT IN AN ORGANIZED HEALTH CARE EDUCATION/TRAINING PROGRAM

## 2024-06-14 PROCEDURE — 2500000001 HC RX 250 WO HCPCS SELF ADMINISTERED DRUGS (ALT 637 FOR MEDICARE OP): Performed by: STUDENT IN AN ORGANIZED HEALTH CARE EDUCATION/TRAINING PROGRAM

## 2024-06-14 PROCEDURE — 85025 COMPLETE CBC W/AUTO DIFF WBC: CPT | Performed by: STUDENT IN AN ORGANIZED HEALTH CARE EDUCATION/TRAINING PROGRAM

## 2024-06-14 PROCEDURE — 97530 THERAPEUTIC ACTIVITIES: CPT | Mod: GO

## 2024-06-14 PROCEDURE — 2500000001 HC RX 250 WO HCPCS SELF ADMINISTERED DRUGS (ALT 637 FOR MEDICARE OP)

## 2024-06-14 PROCEDURE — 37799 UNLISTED PX VASCULAR SURGERY: CPT | Performed by: STUDENT IN AN ORGANIZED HEALTH CARE EDUCATION/TRAINING PROGRAM

## 2024-06-14 PROCEDURE — 99291 CRITICAL CARE FIRST HOUR: CPT

## 2024-06-14 PROCEDURE — 2500000004 HC RX 250 GENERAL PHARMACY W/ HCPCS (ALT 636 FOR OP/ED): Performed by: STUDENT IN AN ORGANIZED HEALTH CARE EDUCATION/TRAINING PROGRAM

## 2024-06-14 PROCEDURE — C9113 INJ PANTOPRAZOLE SODIUM, VIA: HCPCS | Performed by: STUDENT IN AN ORGANIZED HEALTH CARE EDUCATION/TRAINING PROGRAM

## 2024-06-14 PROCEDURE — 2500000002 HC RX 250 W HCPCS SELF ADMINISTERED DRUGS (ALT 637 FOR MEDICARE OP, ALT 636 FOR OP/ED)

## 2024-06-14 PROCEDURE — 2500000002 HC RX 250 W HCPCS SELF ADMINISTERED DRUGS (ALT 637 FOR MEDICARE OP, ALT 636 FOR OP/ED): Performed by: NURSE PRACTITIONER

## 2024-06-14 RX ORDER — FUROSEMIDE 40 MG/1
20 TABLET ORAL DAILY
Status: DISCONTINUED | OUTPATIENT
Start: 2024-06-15 | End: 2024-06-19 | Stop reason: HOSPADM

## 2024-06-14 RX ADMIN — ASPIRIN 81 MG CHEWABLE TABLET 81 MG: 81 TABLET CHEWABLE at 08:17

## 2024-06-14 RX ADMIN — DAPAGLIFLOZIN 10 MG: 10 TABLET, FILM COATED ORAL at 08:18

## 2024-06-14 RX ADMIN — PANTOPRAZOLE SODIUM 40 MG: 40 INJECTION, POWDER, FOR SOLUTION INTRAVENOUS at 08:23

## 2024-06-14 RX ADMIN — MEXILETINE HYDROCHLORIDE 250 MG: 250 CAPSULE ORAL at 21:42

## 2024-06-14 RX ADMIN — SACUBITRIL AND VALSARTAN 0.5 TABLET: 24; 26 TABLET, FILM COATED ORAL at 20:19

## 2024-06-14 RX ADMIN — RANOLAZINE 500 MG: 500 TABLET, EXTENDED RELEASE ORAL at 20:20

## 2024-06-14 RX ADMIN — APIXABAN 5 MG: 5 TABLET, FILM COATED ORAL at 20:19

## 2024-06-14 RX ADMIN — TRAZODONE HYDROCHLORIDE 50 MG: 50 TABLET ORAL at 21:42

## 2024-06-14 RX ADMIN — Medication 1 PATCH: at 06:09

## 2024-06-14 RX ADMIN — OXYCODONE HYDROCHLORIDE 5 MG: 5 TABLET ORAL at 04:36

## 2024-06-14 RX ADMIN — PAROXETINE 20 MG: 20 TABLET, FILM COATED ORAL at 08:20

## 2024-06-14 RX ADMIN — RANOLAZINE 500 MG: 500 TABLET, EXTENDED RELEASE ORAL at 08:20

## 2024-06-14 RX ADMIN — FUROSEMIDE 40 MG: 40 TABLET ORAL at 08:17

## 2024-06-14 RX ADMIN — LORAZEPAM 0.5 MG: 0.5 TABLET ORAL at 08:17

## 2024-06-14 RX ADMIN — SPIRONOLACTONE 25 MG: 25 TABLET ORAL at 08:17

## 2024-06-14 RX ADMIN — AMIODARONE HYDROCHLORIDE 400 MG: 200 TABLET ORAL at 08:20

## 2024-06-14 RX ADMIN — ROSUVASTATIN 20 MG: 40 TABLET, FILM COATED ORAL at 21:42

## 2024-06-14 RX ADMIN — APIXABAN 5 MG: 5 TABLET, FILM COATED ORAL at 08:17

## 2024-06-14 RX ADMIN — SACUBITRIL AND VALSARTAN 0.5 TABLET: 24; 26 TABLET, FILM COATED ORAL at 08:18

## 2024-06-14 RX ADMIN — OXYCODONE HYDROCHLORIDE 5 MG: 5 TABLET ORAL at 16:11

## 2024-06-14 RX ADMIN — MEXILETINE HYDROCHLORIDE 250 MG: 250 CAPSULE ORAL at 06:09

## 2024-06-14 RX ADMIN — MEXILETINE HYDROCHLORIDE 250 MG: 250 CAPSULE ORAL at 14:27

## 2024-06-14 RX ADMIN — BISOPROLOL FUMARATE 2.5 MG: 5 TABLET ORAL at 08:20

## 2024-06-14 ASSESSMENT — PAIN SCALES - GENERAL
PAINLEVEL_OUTOF10: 0 - NO PAIN
PAINLEVEL_OUTOF10: 5 - MODERATE PAIN
PAINLEVEL_OUTOF10: 3
PAINLEVEL_OUTOF10: 0 - NO PAIN
PAINLEVEL_OUTOF10: 6

## 2024-06-14 ASSESSMENT — COGNITIVE AND FUNCTIONAL STATUS - GENERAL
DAILY ACTIVITIY SCORE: 19
DRESSING REGULAR UPPER BODY CLOTHING: A LITTLE
TOILETING: A LITTLE
DAILY ACTIVITIY SCORE: 24
CLIMB 3 TO 5 STEPS WITH RAILING: A LITTLE
DRESSING REGULAR LOWER BODY CLOTHING: A LITTLE
HELP NEEDED FOR BATHING: A LITTLE
PERSONAL GROOMING: A LITTLE
MOBILITY SCORE: 23

## 2024-06-14 ASSESSMENT — PAIN - FUNCTIONAL ASSESSMENT
PAIN_FUNCTIONAL_ASSESSMENT: 0-10

## 2024-06-14 ASSESSMENT — ACTIVITIES OF DAILY LIVING (ADL): HOME_MANAGEMENT_TIME_ENTRY: 10

## 2024-06-14 NOTE — CARE PLAN
Problem: Fall/Injury  Goal: Not fall by end of shift  Outcome: Progressing  Goal: Be free from injury by end of the shift  Outcome: Progressing  Goal: Verbalize understanding of personal risk factors for fall in the hospital  Outcome: Progressing  Goal: Verbalize understanding of risk factor reduction measures to prevent injury from fall in the home  Outcome: Progressing  Goal: Use assistive devices by end of the shift  Outcome: Progressing  Goal: Pace activities to prevent fatigue by end of the shift  Outcome: Progressing     Problem: Pain  Goal: My pain/discomfort is manageable  Outcome: Progressing     Problem: Daily Care  Goal: Daily care needs are met  Outcome: Progressing     Problem: Psychosocial Needs  Goal: Demonstrates ability to cope with hospitalization/illness  Outcome: Progressing  Goal: Collaborate with me, my family, and caregiver to identify my specific goals  Outcome: Progressing     Problem: Pain - Adult  Goal: Verbalizes/displays adequate comfort level or baseline comfort level  Outcome: Progressing     Problem: Safety - Adult  Goal: Free from fall injury  Outcome: Progressing     Problem: Discharge Planning  Goal: Discharge to home or other facility with appropriate resources  Outcome: Progressing     Problem: Chronic Conditions and Co-morbidities  Goal: Patient's chronic conditions and co-morbidity symptoms are monitored and maintained or improved  Outcome: Progressing     Problem: Skin  Goal: Decreased wound size/increased tissue granulation at next dressing change  Outcome: Progressing  Goal: Participates in plan/prevention/treatment measures  Outcome: Progressing  Goal: Prevent/manage excess moisture  Outcome: Progressing  Goal: Prevent/minimize sheer/friction injuries  Outcome: Progressing  Goal: Promote/optimize nutrition  Outcome: Progressing  Goal: Promote skin healing  Outcome: Progressing     Problem: Heart Failure  Goal: Improved gas exchange this shift  Outcome: Progressing  Goal:  Improved urinary output this shift  Outcome: Progressing  Goal: Reduction in peripheral edema within 24 hours  Outcome: Progressing  Goal: Report improvement of dyspnea/breathlessness this shift  Outcome: Progressing  Goal: Weight from fluid excess reduced over 2-3 days, then stabilize  Outcome: Progressing  Goal: Increase self care and/or family involvement in 24 hours  Outcome: Progressing     Problem: Safety - Medical Restraint  Goal: Remains free of injury from restraints (Restraint for Interference with Medical Device)  Outcome: Progressing  Goal: Free from restraint(s) (Restraint for Interference with Medical Device)  Outcome: Progressing     Problem: Knowledge Deficit  Goal: Patient/family/caregiver demonstrates understanding of disease process, treatment plan, medications, and discharge instructions  Outcome: Progressing     Problem: Mechanical Ventilation  Goal: Patient Will Maintain Patent Airway  Outcome: Progressing  Goal: Oral health is maintained or improved  Outcome: Progressing  Goal: Tracheostomy will be managed safely  Outcome: Progressing  Goal: ET tube will be managed safely  Outcome: Progressing  Goal: Ability to express needs and understand communication  Outcome: Progressing  Goal: Mobility/activity is maintained at optimum level for patient  Outcome: Progressing

## 2024-06-14 NOTE — CARE PLAN
The patient's goals for the shift include      The clinical goals for the shift include pain and anxiety controlled during shift    Over the shift, the patient did not make progress toward the following goals. Barriers to progression include . Recommendations to address these barriers include .      Problem: Fall/Injury  Goal: Not fall by end of shift  6/14/2024 1354 by Mervat Rangel RN  Outcome: Progressing  6/14/2024 1354 by Mervat Rangel RN  Outcome: Progressing  Goal: Be free from injury by end of the shift  6/14/2024 1354 by Mervat Rangel RN  Outcome: Progressing  6/14/2024 1354 by Mervat Rangel RN  Outcome: Progressing  Goal: Verbalize understanding of personal risk factors for fall in the hospital  6/14/2024 1354 by Mervat Rangel RN  Outcome: Progressing  6/14/2024 1354 by Mervat Rangel RN  Outcome: Progressing  Goal: Verbalize understanding of risk factor reduction measures to prevent injury from fall in the home  6/14/2024 1354 by Mervat Rangel RN  Outcome: Progressing  6/14/2024 1354 by Mervat Rangel RN  Outcome: Progressing  Goal: Use assistive devices by end of the shift  6/14/2024 1354 by Mervat Rangel RN  Outcome: Progressing  6/14/2024 1354 by Mervat Rangel RN  Outcome: Progressing  Goal: Pace activities to prevent fatigue by end of the shift  6/14/2024 1354 by Mervat Rangel RN  Outcome: Progressing  6/14/2024 1354 by Mervat Rangel RN  Outcome: Progressing     Problem: Pain  Goal: My pain/discomfort is manageable  6/14/2024 1354 by Mervat Rangel RN  Outcome: Progressing  6/14/2024 1354 by Mervat Rangel RN  Outcome: Progressing     Problem: Daily Care  Goal: Daily care needs are met  6/14/2024 1354 by Mervat Rangel RN  Outcome: Progressing  6/14/2024 1354 by Mervat Rangel RN  Outcome: Progressing     Problem: Psychosocial Needs  Goal: Demonstrates ability to cope with hospitalization/illness  6/14/2024 1354 by Mervat Rangel RN  Outcome: Progressing  6/14/2024 1354 by Mervat Rangel  RN  Outcome: Progressing  Goal: Collaborate with me, my family, and caregiver to identify my specific goals  6/14/2024 1354 by Mervat Rangel RN  Outcome: Progressing  6/14/2024 1354 by Mervat Rangel RN  Outcome: Progressing     Problem: Pain - Adult  Goal: Verbalizes/displays adequate comfort level or baseline comfort level  6/14/2024 1354 by Mervat Rangel RN  Outcome: Progressing  6/14/2024 1354 by Mervat Rangel RN  Outcome: Progressing     Problem: Safety - Adult  Goal: Free from fall injury  6/14/2024 1354 by Mervat Rangel RN  Outcome: Progressing  6/14/2024 1354 by Mervat Rangel RN  Outcome: Progressing     Problem: Discharge Planning  Goal: Discharge to home or other facility with appropriate resources  6/14/2024 1354 by Mervat Rangel RN  Outcome: Progressing  6/14/2024 1354 by Mervat Rangel RN  Outcome: Progressing     Problem: Chronic Conditions and Co-morbidities  Goal: Patient's chronic conditions and co-morbidity symptoms are monitored and maintained or improved  6/14/2024 1354 by Mervat Rangel RN  Outcome: Progressing  6/14/2024 1354 by Mervat Rangel RN  Outcome: Progressing     Problem: Skin  Goal: Decreased wound size/increased tissue granulation at next dressing change  6/14/2024 1354 by Mervat Rangel RN  Outcome: Progressing  6/14/2024 1354 by Mervat Rangel RN  Outcome: Progressing  Goal: Participates in plan/prevention/treatment measures  6/14/2024 1354 by Mervat Rangel RN  Outcome: Progressing  6/14/2024 1354 by Mervat Rangel RN  Outcome: Progressing  Goal: Prevent/manage excess moisture  6/14/2024 1354 by Mervat Rangel RN  Outcome: Progressing  6/14/2024 1354 by Mervat Rangel RN  Outcome: Progressing  Goal: Prevent/minimize sheer/friction injuries  6/14/2024 1354 by Mervat Rangel RN  Outcome: Progressing  6/14/2024 1354 by Mervat Rangel RN  Outcome: Progressing  Goal: Promote/optimize nutrition  6/14/2024 1354 by Mervat Rangel RN  Outcome: Progressing  6/14/2024 1354 by Mervat  MAIN Rangel  Outcome: Progressing  Goal: Promote skin healing  6/14/2024 1354 by Mervat Rangel RN  Outcome: Progressing  6/14/2024 1354 by Mervat Rangel RN  Outcome: Progressing     Problem: Heart Failure  Goal: Improved gas exchange this shift  6/14/2024 1354 by Mervat Rangel RN  Outcome: Progressing  6/14/2024 1354 by Mervat Rangel RN  Outcome: Progressing  Goal: Improved urinary output this shift  6/14/2024 1354 by Mervat Rangel RN  Outcome: Progressing  6/14/2024 1354 by Mervat Rangel RN  Outcome: Progressing  Goal: Reduction in peripheral edema within 24 hours  6/14/2024 1354 by Mervat Rangel RN  Outcome: Progressing  6/14/2024 1354 by Mervat Rangel RN  Outcome: Progressing  Goal: Report improvement of dyspnea/breathlessness this shift  6/14/2024 1354 by Mervat Rangel RN  Outcome: Progressing  6/14/2024 1354 by Mervat Rangel RN  Outcome: Progressing  Goal: Weight from fluid excess reduced over 2-3 days, then stabilize  6/14/2024 1354 by Mervat Rangel RN  Outcome: Progressing  6/14/2024 1354 by Mervat Rangel RN  Outcome: Progressing  Goal: Increase self care and/or family involvement in 24 hours  6/14/2024 1354 by Mervat Rangel RN  Outcome: Progressing  6/14/2024 1354 by Mervat Rangel RN  Outcome: Progressing     Problem: Safety - Medical Restraint  Goal: Remains free of injury from restraints (Restraint for Interference with Medical Device)  6/14/2024 1354 by Mervat Rangel RN  Outcome: Progressing  6/14/2024 1354 by Mervat Rangel RN  Outcome: Progressing  Goal: Free from restraint(s) (Restraint for Interference with Medical Device)  6/14/2024 1354 by Mervat Rangel RN  Outcome: Progressing  6/14/2024 1354 by Mervat Rangel RN  Outcome: Progressing     Problem: Knowledge Deficit  Goal: Patient/family/caregiver demonstrates understanding of disease process, treatment plan, medications, and discharge instructions  6/14/2024 1354 by Mervat Rangel RN  Outcome: Progressing  6/14/2024 1354 by Mervat  MAIN Rangel  Outcome: Progressing

## 2024-06-14 NOTE — PROGRESS NOTES
[facility]  HFICU PROGRESS NOTE    Cristian Sapp/[id number]    Admit Date: 5/28/2024  Hospital Length of Stay: 17   ICU Length of Stay: 10d 22h   Primary Service: HFICU  Referring: Dr. Brown     INTERVAL EVENTS / PERTINENT ROS:     No acute events overnight. Telemetry reviewed and he continues to go in and out of slow ventricular tachycardia; although, less often overnight than prior and remains hemodynamically stable throughout these episodes. He states he slept much better last night and is not experiencing as much anxiety as prior with adjustment of medications. He denies further right sided thoracic discomfort at this time. He is euvolemic to mildly hypervolemic on physical exam. His ICD function will remain enabled for now until he has time to have a discussion with his wife and this is planned for Saturday.      Plan:    - Decrease furosemide to 20 mg daily   - Continue all other meds at current dose        MEDICATIONS  Infusions:       Scheduled:  amiodarone, 400 mg, Daily  apixaban, 5 mg, q12h  aspirin, 81 mg, Daily  bisoprolol, 2.5 mg, Daily  dapagliflozin propanediol, 10 mg, Daily  [START ON 6/15/2024] furosemide, 20 mg, Daily  insulin lispro, 0-10 Units, q4h  mexiletine, 250 mg, q8h ANNIKA  nicotine, 1 patch, Daily   Followed by  [START ON 7/13/2024] nicotine, 1 patch, Daily  pantoprazole, 40 mg, Daily  PARoxetine, 20 mg, Daily  polyethylene glycol, 17 g, BID  ranolazine, 500 mg, BID  rosuvastatin, 20 mg, Nightly  sacubitriL-valsartan, 0.5 tablet, BID  sennosides-docusate sodium, 2 tablet, BID  spironolactone, 25 mg, Daily      PRN:  acetaminophen, 650 mg, q4h PRN  dextrose, 12.5 g, q15 min PRN  glucagon, 1 mg, q15 min PRN  hydrOXYzine HCL, 25 mg, q6h PRN  LORazepam, 0.5 mg, q8h PRN  LORazepam, 1 mg, q24h PRN  oxyCODONE, 5 mg, q4h PRN  oxygen, , Continuous PRN - O2/gases  simethicone, 40 mg, 4x daily PRN  traZODone, 50 mg, Nightly PRN  trimethobenzamide, 200 mg, q6h  "PRN      PHYSICAL EXAM:   Visit Vitals  BP 84/61   Pulse 95   Temp 36.1 °C (97 °F)   Resp 16   Ht 1.702 m (5' 7\")   Wt 72.5 kg (159 lb 13.3 oz)   SpO2 96%   BMI 25.03 kg/m²   Smoking Status Former   BSA 1.85 m²     Wt Readings from Last 5 Encounters:   06/14/24 72.5 kg (159 lb 13.3 oz)   05/27/24 78.8 kg (173 lb 11.6 oz)     INTAKE/OUTPUT:  I/O last 3 completed shifts:  In: 1340 (18.6 mL/kg) [P.O.:1340]  Out: 2725 (37.8 mL/kg) [Urine:2725 (1 mL/kg/hr)]  Weight: 72.1 kg      Physical Exam  Vitals reviewed.   Constitutional:       General: He is not in acute distress.     Appearance: He is ill-appearing.   HENT:      Mouth/Throat:      Mouth: Mucous membranes are moist.   Eyes:      Extraocular Movements: Extraocular movements intact.      Pupils: Pupils are equal, round, and reactive to light.   Neck:      Vascular: No JVD.   Cardiovascular:      Rate and Rhythm: Normal rate and regular rhythm.      Pulses: Normal pulses.      Heart sounds: Normal heart sounds.   Pulmonary:      Effort: Pulmonary effort is normal.      Breath sounds: Normal breath sounds. No wheezing or rhonchi.   Abdominal:      General: Abdomen is flat. Bowel sounds are normal. There is no distension.      Tenderness: There is no abdominal tenderness.   Musculoskeletal:         General: Normal range of motion.      Cervical back: Full passive range of motion without pain.      Right lower leg: No edema.      Left lower leg: No edema.   Skin:     General: Skin is warm.      Capillary Refill: Capillary refill takes 2 to 3 seconds.   Neurological:      General: No focal deficit present.      Mental Status: He is alert and oriented to person, place, and time. Mental status is at baseline.   Psychiatric:         Mood and Affect: Mood normal.         Behavior: Behavior normal. Behavior is cooperative.       DATA:  CMP:  Results from last 7 days   Lab Units 06/14/24  0446 06/13/24  1446 06/13/24  0719 06/13/24  0509 06/12/24  0211 06/11/24  0133 " "06/10/24  1755 06/10/24  0312 06/09/24  0324 06/08/24  2123 06/08/24  0508   SODIUM mmol/L 137 135* 136 134* 134* 136 136 139 140 137 142   POTASSIUM mmol/L 4.5 4.1 4.4 5.0 4.3 4.0 4.3 4.2 3.9 3.5 3.9   CHLORIDE mmol/L 101 99 100 99 97* 100 99 106 105 100 106   CO2 mmol/L 27 25 25 25 25 25 25 24 25 21 24   ANION GAP mmol/L 14 15 15 15 16 15 16 13 14 20 16   BUN mg/dL 33* 33* 33* 35* 22 16 14 12 12 14 16   CREATININE mg/dL 1.26 1.29 1.31* 1.28 1.16 1.15 1.13 1.07 1.28 1.11 1.00   EGFR mL/min/1.73m*2 67 65 64 66 74 75 76 81 66 78 88   MAGNESIUM mg/dL 2.21  --   --  2.34 2.31 2.14  --  2.27 2.47*  --  2.00   ALBUMIN g/dL 3.7 3.9 3.6 3.6 4.0 3.5 4.0 3.4 3.4 3.6 3.0*   ALT U/L  --   --   --   --   --   --   --   --   --  77*  --    AST U/L  --   --   --   --   --   --   --   --   --  57*  --    BILIRUBIN TOTAL mg/dL  --   --   --   --   --   --   --   --   --  0.6  --      CBC:  Results from last 7 days   Lab Units 06/14/24  0446 06/13/24  0509 06/12/24 0211 06/11/24  0133 06/10/24  0312 06/09/24  0324 06/08/24  2123 06/08/24  0508   WBC AUTO x10*3/uL 13.1* 14.9* 17.7* 15.2* 14.3* 14.8* 17.2* 15.5*   HEMOGLOBIN g/dL 12.2* 11.7* 12.8* 11.6* 11.5* 10.6* 11.4* 11.1*   HEMATOCRIT % 34.7* 32.0* 36.7* 33.7* 33.9* 30.3* 33.0* 32.6*   PLATELETS AUTO x10*3/uL 384 320 309 245 229 212 198 242   MCV fL 90 87 92 93 94 92 93 94     COAG:         ABO:   No results found for: \"ABO\"    HEME/ENDO:         CARDIAC:         No lab exists for component: \"CK\", \"CKMBP\"    ASSESSMENT AND PLAN:   Cristian Sapp is a 59 y/o M with PMHx of CAD s/p multiple PCI, ICM/HFrEF (EF 15% 5/24) s/p CRT-D, VT storm s/p VT ablation (2019), infrarenal AAA s/p R iliac stent, nephrolithiasis s/p recent ureteral stent. Presented to OSH on 5/23 with multiple ICD shocks for VT. Transferred to CICU at [facility] 5/28 for continued management. EP was consulted on arrival and patient was in slow VT, he was started on amiodarone gtt. Underwent VT ablation 5/30, but continued " to have intermittent slow VT. On 6/3 VT was noted again with rates ~118 and patient was cardioverted. Maintained on amiodarone + lidocaine gtt, and is s/p stellate ganglion block 6/4. He was transferred to HFICU 6/3 after he signed consent for LVAD/OHT evaluation. After further discussions and being a current smoker, it was decided that OHT would not be an option, and the patient does not want an LVAD at this time, so advanced therapies evaluation was stopped 6/5. Palliative medicine saw the patient 6/5 and he decided to change his code status to DNR-DNI. Redo endocardial ablation with Dr. Wooten 6/6 c/b lactic acidosis requiring intubation and low CO requiring IABP support. He was extubated 6/7 and IABP was removed on 6/8. SGC removed 6/9 with closing numbers: /57 (71), CVP 5, PA 44/26 (34), PCWP 11, Malcolm CO/CI:  5.5/2.9, Thermo: 5.3/2.8, , SVO2 67% on dapa 10 mg daily, brigido 25 mg daily. 6/11: Ganglion block bedside per EP + decrease 1/2 Amiodarone and lidocaine gtt. 6/12: Amio / Lido gtts discontinued. Low dose bisoprolol and Mexiletine initiated. 6/13: Palliative care goals of care discussions occurring. Low dose Entresto (12/13 mg) restarted. Mexiletine increased to 250 mg Q 8 hours. 6/14: Decreasing furosemide to 20 mg daily given hypotensive episodes.      Neuro:  #Anxiety  #Depression   #Insomnia   - C/w atarax 25 mg q6 PRN for anxiety   - Restart paroxetine @ 20 mg    - C/w trazodone 50 mg nightly  - C/w Oxycodone 5 mg Q 4 hours   - C/w ativan 0.5 mg q8 PRN for anxiety + 1 mg Q 24 hours ativan PRN for severe anxiety   - Serial neuro and pain assessments   - PO Tylenol PRN for pain  - PT/OT Consult, OOB to chair  - CAM ICU score every shift  - Sleep/wake cycle normalization     #Substance abuse  - Tobacco use: active smoker  - C/w nicotine patches      Cardiovascular:  #Stage D HFrEF/ICM s/p CRT-D (11/2019)   #Acute on Chronic Decompensated HF  #Cardiogenic shock s/p IABP 6/6, removed 6/8  (resolved)   TTE (5/28/2024): severely decreased LV systolic function, EF 15%, LVIDd 6.32. Moderate-severe MVR, mild TR    Home medications: ASA, entresto 24-26 mg BID, rosuvastatin 20 mg daily, torsemide 20 mg daily  Opening SGC #s (6/7): /57 (89), CVP 8, PAP 48/25 (33), fuad CO/CI 7.4/4, , SVO2 81% on IABP 1:1   Closing SGC #s (6/9): /57 (71), CVP 5, PA 44/26 (34), PCWP 11, Fuad CO/CI:  5.5/2.9, Thermo: 5.3/2.8, , SVO2 67% on dapa 10 mg daily, brigido 25 mg daily  - C/w entresto 12-13 mg BID   - C/w spironolactone 25 mg daily   - C/w dapagliflozin 10 mg daily   - Signed consent for OHT/LVAD workup, patient currently is not a candidate for OHT given active tobacco use, and he does not want an LVAD at this time, will stop workup for now per Dr. Doyle 6/5  - Decrease standing furosemide to 20 mg daily  - Daily standing weights, 2gm sodium diet, 2L fluid restriction, strict I&Os     #CAD s/p multiple PCI   [facility] (5/23/24): Distal Left Main: 10-30% stenosis; Proximal and mid LAD Lesion: The percent stenosis is 10-30%; Proximal CX Lesion: The percent stenosis is 100%; Right Coronary Artery: fills via collaterals; Proximal RCA Lesion: The percent stenosis is 100%; The Left Ventricular Ejection Fraction is 10%,by echo.  - C/w ASA + rosuvastatin 20 mg daily      #Prior VT storm s/p VT ablation 5/30, stellate ganglion block 6/4, VT ablation 6/6, stellate ganglion block 6/11  #Incessant VT s/p ICD shock   Device interrogation (6/4): paced  for 102 min then stepping down, LR back to 95, patient converted to AP   - C/w ranexa 500 mg BID   - S/p VT ablation 5/30 and 6/6   - S/p stellate ganglion block 6/4 and 6/11  - Heparin gtt  for extensive ventricular ablation per EP (5/31)---> D/C heparin gtt, then switch to Eliquis to 5 mg BID for one month per EP Dr. Shepard  - Discontinued lidocaine gtt + amiodarone gtt (6/12)  - NO Lidocaine or Amiodarone bolus during the Vts per Dr. Blood   - C/w PO  amiodarone 400 mg po daily (resumed 6/12)  - C/w Bisoprolol 2.5 mg PO daily (resumed 6/12)  - EP following, appreciate assistance      Pulmonary:   #Acute Hypoxic Respiratory Failure (following VT ablation 6/6) (resolved)   #?PNA (Concern resolved)  #Pulmonary Edema (Improving)  #?COPD   - Active smoker  - CT chest (6/4): interval worsening now moderate right and small left pleural effusions with associated atelectasis, findings suggestive of mild pulmonary interstitial and alveolar edema  - Chest Xray (6/13): Interval improvement in prominent interstitial markings suggestive of improving edema.  Blood cultures (6/3): NGTD   Blood cultures (6/7): NGTD  MRSA (6/3): negative   Strep PNA, legionella negative (6/5)   - S/p zosyn + vancomycin (6/2-6/5)   - Stop zosyn + vancomycin (6/7-6/10)  - ID recommended stopping antibiotics 6/5, were temporarily restarted 6/7 given leukocytosis/ hypotension   - Monitor and maintain SpO2 > 92%     GI:  #No active issues   - Bowel regimen: facundo-colace BID + miralax BID   - C/w PPI Pantoprazole 40 mg daily   - C/w Simethicone 40 mg 4 x daily PRN  - C/w trimethobenzamide 200 mg IM Q 6 hours PRN    :  #EITAN (resolved)  #Nephrolithiasis s/p recent bilateral ureteral stent placement to left ureter   sCr (6/14): 1.26   - Consulted urology 5/31 to discuss removal--> will follow outpatient for STENT removal   - Trend RFP daily   - I/Os  - Decrease scheduled furosemide to 20 mg daily   - Avoid hypotension and nephrotoxic agents     Heme:  #Anemia in the setting of Chronic Disease  #Iron Deficiency Anemia   Labs (6/5): iron 51, TIBC 285, %sat 18, folate 9.8, ferritin 99  - S/p venofer x3 days (6/6-6/8)     Endo:  #No DM  - Euglycemic, HgbA1c 6%      #Hypothyroidism with unclear etiology  - TSH (6/4): 10.34  - T3 (6/4): 65  - T4 (6/4): 8.3  - Consulted endocrine 6/5--> 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 No need for synthroid now.  - Repeat TSH  and free T4 after 2 to 4 weeks     ID:  #Leukocytosis 2/2 ?PNA versus stress induced (improving)  - See pulmonary plan above  - Trend temps q4h  - Afebrile, nontoxic, no s/s infx  - Closely monitoring  - Daily CBC     PHYSICAL AND OCCUPATIONAL THERAPY: ordered and following    LINES:  PIVs   Left radial a-line 6/2-6/10  Femoral cordis 6/6-6/10  Femoral IABP 6/6-6/8  Midline 6/10--currently      DVT: heparin gtt--> Eliquis PO  VAP BUNDLE: NA  CENTRAL LINE BUNDLE: NA  ULCER PPX: Pantoprazole 40 mg daily   GLYCEMIC CONTROL: NA  BOWEL CARE: scheduled facundo-colace BID and Miralax BID   INDWELLING CATHETER: NA  NUTRITION: Adult diet Cardiac; 70 gm fat; 2 - 3 grams Sodium; 1800 mL fluid      EMERGENCY CONTACT: Extended Emergency Contact Information  Primary Emergency Contact: Jduie Sapp  Address: [address] [place] [address] [facility]  Home Phone: [phone]  Mobile Phone: [phone]  Relation: Mother  Preferred language: English   needed? No  Secondary Emergency Contact: Keiry Greene  Address: [address] [place], [place]  Mobile Phone: [phone]  Relation: Sibling  Preferred language: English   needed? No  FAMILY UPDATE: Given at bedside daily   CODE STATUS: DNR and No Intubation  DISPO: Remain in HFICU     Patient seen and assessed with Dr. Gunderson     I personally spent 60 minutes of critical care time directly and personally managing the patient exclusive of separately billable procedures.  _________________________________________________  Bear Carrasco, APRN-CNP

## 2024-06-14 NOTE — PROGRESS NOTES
SW met with Patient at length yesterday afternoon for supportive visit. SW allowed pt to vent his feelings about his illness, speak about scripture and speak about his disappointment in his relationship with his wife.  He is saddened that his wife has not been supportive.  He is hoping that she will come to visit him Saturday (tomorrow) to help help make some end of life decisions. He voiced he is fearful for his mothers feelings about his prognosis and wants support for her. At the end of the conversation both patient and his mother were able to voice the desire to speak to Hospice. Patient wants to wait o see if his wife shows up Saturday before making any decisions. SW will assist as needed with Hospice planning.    GABRIELA MARINELLI

## 2024-06-14 NOTE — PROGRESS NOTES
Cristian Sapp is a 58 y.o. male on day 17 of admission presenting with Acute on chronic systolic (congestive) heart failure (Multi).  Patient is not a candidate for OHT and does not want an LVAD. GOC discussions underway - primary team discussing if ICD shocks should be turned off before discharge. Palliative care is involved. Sw will continue to follow.           GABRIELA Patel

## 2024-06-14 NOTE — PROGRESS NOTES
"Occupational Therapy    Occupational Therapy Treatment    Name: Cristian Sapp  MRN: [id number]  : 1965  Date: 24  Time Calculation  Start Time: 1031  Stop Time: 1111  Time Calculation (min): 40 min    Assessment:  End of Session Communication: Bedside nurse  End of Session Patient Position: Up in chair, Alarm off, not on at start of session  Plan:  Treatment Interventions: ADL retraining, Functional transfer training, Endurance training, Patient/family training, Equipment evaluation/education, Neuromuscular reeducation, Compensatory technique education  OT Frequency: 4 times per week  OT Discharge Recommendations: High intensity level of continued care  Equipment Recommended upon Discharge:  (None)  OT Recommended Transfer Status:  (CGA)  OT - OK to Discharge: Yes    Subjective   Previous Visit Info:  OT Last Visit  OT Received On: 24  General:  General  Family/Caregiver Present: Yes  Caregiver Feedback: family present and supportive  Prior to Session Communication: Bedside nurse  Patient Position Received: Bed, 3 rail up, Alarm off, not on at start of session  General Comment: patient pleasant and agreeable to OT; tele  Precautions:  Medical Precautions: Cardiac precautions, Fall precautions  Precautions Comment: MAP >/= 65 per RN  Vitals:  Vital Signs  Heart Rate:  (pre 95, post 95)  SpO2:  (pre 94%, post 93%)  BP:  (pre 80/63 (68), EOB 92/65 (74), sitting in chair post ambulation- MAP 63 -> 61 -> 67, RN aware; post 88/70 (77); patient denied dizziness/lightheadedness)  Pain Assessment:  Pain Assessment  Pain Assessment: 0-10  Pain Score:  (patient did not c/o pain)     Objective   Cognition:  Overall Cognitive Status: Within Functional Limits  Arousal/Alertness: Appropriate responses to stimuli  Orientation Level:  (oriented x3)  Following Commands: Follows one step commands consistently  Cognition Comments: patient reports feeling \"off\" and \"like I don't know if what I'm seeing is actually " "there\"  Activities of Daily Living: UE Dressing  UE Dressing Comments: SBA to manage gown    LE Dressing  LE Dressing Comments: SBA for [facility] pants and slippers     Bed Mobility/Transfers: Bed Mobility  Bed Mobility: Yes  Bed Mobility 1  Bed Mobility 1: Supine to sitting  Level of Assistance 1: Close supervision  Bed Mobility Comments 1: HOB min elevated    Transfers  Transfer: Yes  Transfer 1  Transfer From 1: Sit to, Stand to  Transfer to 1: Sit, Stand  Technique 1: Sit to stand, Stand to sit  Transfer Device 1: Walker  Transfer Level of Assistance 1: Close supervision, Minimal verbal cues  Trials/Comments 1: functional mobility household distances with FWW and SBA-CGA     Therapy/Activity: Therapeutic Activity  Therapeutic Activity Performed: Yes  Therapeutic Activity 1: Patient performed ankle pumps, LE kicks, (B) digit flexion/extension and (B) elbow/shoulder flexion/extension AROM prior to mobilizing to EOB, while sitting EOB and while sitting in chair for improving BP and BP recovery due to labile BP    Outcome Measures:  [facility] Daily Activity  Putting on and taking off regular lower body clothing: A little  Bathing (including washing, rinsing, drying): A little  Putting on and taking off regular upper body clothing: A little  Toileting, which includes using toilet, bedpan or urinal: A little  Taking care of personal grooming such as brushing teeth: A little  Eating Meals: None  Daily Activity - Total Score: 19     and E = Exercise and Early Mobility  ICU Mobility Scale: Walking with assistance of 1 person    Education Documentation  Body Mechanics, taught by Marjorie Foster OT at 6/14/2024  1:13 PM.  Learner: Patient  Readiness: Acceptance  Method: Explanation  Response: Verbalizes Understanding, Needs Reinforcement    ADL Training, taught by Marjorie Foster OT at 6/14/2024  1:13 PM.  Learner: Patient  Readiness: Acceptance  Method: Explanation  Response: Verbalizes Understanding, Needs " Reinforcement    Education Comments  No comments found.    Goals:  Encounter Problems       Encounter Problems (Active)       ADLs       Patient with complete upper body dressing with independent level of assistance donning and doffing all UE clothes with no adaptive equipment. (Progressing)       Start:  06/11/24    Expected End:  06/25/24            Patient with complete lower body dressing with independent level of assistance donning and doffing all LE clothes  with PRN adaptive equipment. (Progressing)       Start:  06/11/24    Expected End:  06/25/24            Patient will complete daily grooming tasks with independent level of assistance and PRN adaptive equipment while standing. (Not Progressing)       Start:  06/11/24    Expected End:  06/25/24            Patient will complete toileting including hygiene clothing management/hygiene with independent level of assistance. (Not Progressing)       Start:  06/11/24    Expected End:  06/25/24            Patient will perform basic IADLs/simulated household tasks with mod (I). (Not Progressing)       Start:  06/11/24    Expected End:  06/25/24               ADLs          BALANCE       Pt will maintain dynamic standing balance during ADL task with modified independent level of assistance in order to demonstrate decreased risk of falling and improved postural control. (Progressing)       Start:  06/11/24    Expected End:  06/25/24               COGNITION/SAFETY          EXERCISE/STRENGTHENING       Patient will participate in >10 minutes of activity with <2 rest breaks utilizing EC/WS principles as needed. (Progressing)       Start:  06/11/24    Expected End:  06/25/24               EXERCISE/STRENGTHENING          MOBILITY       Patient will perform Functional mobility Household distances/Community Distances with modified independent level of assistance and least restrictive device in order to improve safety and functional mobility. (Progressing)       Start:   06/11/24    Expected End:  06/25/24               MOBILITY          TRANSFERS       Patient will perform bed mobility independent level of assistance in order to improve safety and independence with mobility (Progressing)       Start:  06/11/24    Expected End:  06/25/24            Patient will complete functional transfers with least restrictive device with modified independent level of assistance. (Progressing)       Start:  06/11/24    Expected End:  06/25/24               Marjorie Foster OTR/L  Inpatient Occupational Therapist   Rehab Office: [phone]

## 2024-06-14 NOTE — PROGRESS NOTES
HFICU Attending Note    Principal Problem:    Acute on chronic systolic (congestive) heart failure (Multi)  Active Problems:    Flash pulmonary edema (Multi)    Ischemic cardiomyopathy    Ventricular tachycardia (Multi)    He feels much better today, despite continued in/out of slow ventricular tachycardia, somewhat cooler on exam today but walked in the halls, cvp around 8-9cm so I don't think dry, we may need to cut back vasodilators, I have expressed concern he will slip back into HF but he seems reluctant to make any final decisions about ICD and does not want to leave hospital until middle of next week- will try to discuss with patient/family tomorrow and transfer to floor if no clinical change once ICD decision is made      This critically ill patient continues to be at-risk for clinically significant deterioration / failure due to the above mentioned dysfunctional, unstable organ systems.  I have personally identified and managed all complex critical care issues to prevent aforementioned clinical deterioration.  Critical care time is spent at bedside and/or the immediate area and has included, but is not limited to, the review of diagnostic tests, labs, radiographs, serial assessments of hemodynamics, respiratory status, ventilatory management, and family updates.  Time spent in procedures and teaching are reported separately.    Critical care time: _41___ minutes

## 2024-06-15 LAB
ALBUMIN SERPL BCP-MCNC: 3.3 G/DL (ref 3.4–5)
ANION GAP SERPL CALC-SCNC: 11 MMOL/L (ref 10–20)
BASOPHILS # BLD AUTO: 0.23 X10*3/UL (ref 0–0.1)
BASOPHILS NFR BLD AUTO: 2.1 %
BUN SERPL-MCNC: 30 MG/DL (ref 6–23)
CALCIUM SERPL-MCNC: 8.8 MG/DL (ref 8.6–10.6)
CHLORIDE SERPL-SCNC: 104 MMOL/L (ref 98–107)
CO2 SERPL-SCNC: 27 MMOL/L (ref 21–32)
CREAT SERPL-MCNC: 1.23 MG/DL (ref 0.5–1.3)
EGFRCR SERPLBLD CKD-EPI 2021: 69 ML/MIN/1.73M*2
EOSINOPHIL # BLD AUTO: 0.57 X10*3/UL (ref 0–0.7)
EOSINOPHIL NFR BLD AUTO: 5.2 %
ERYTHROCYTE [DISTWIDTH] IN BLOOD BY AUTOMATED COUNT: 13.4 % (ref 11.5–14.5)
GLUCOSE BLD MANUAL STRIP-MCNC: 104 MG/DL (ref 74–99)
GLUCOSE BLD MANUAL STRIP-MCNC: 115 MG/DL (ref 74–99)
GLUCOSE BLD MANUAL STRIP-MCNC: 155 MG/DL (ref 74–99)
GLUCOSE BLD MANUAL STRIP-MCNC: 158 MG/DL (ref 74–99)
GLUCOSE BLD MANUAL STRIP-MCNC: 164 MG/DL (ref 74–99)
GLUCOSE SERPL-MCNC: 108 MG/DL (ref 74–99)
HCT VFR BLD AUTO: 31.8 % (ref 41–52)
HGB BLD-MCNC: 11.7 G/DL (ref 13.5–17.5)
IMM GRANULOCYTES # BLD AUTO: 0.52 X10*3/UL (ref 0–0.7)
IMM GRANULOCYTES NFR BLD AUTO: 4.7 % (ref 0–0.9)
LYMPHOCYTES # BLD AUTO: 1.66 X10*3/UL (ref 1.2–4.8)
LYMPHOCYTES NFR BLD AUTO: 15.1 %
MAGNESIUM SERPL-MCNC: 2.22 MG/DL (ref 1.6–2.4)
MCH RBC QN AUTO: 32.1 PG (ref 26–34)
MCHC RBC AUTO-ENTMCNC: 36.8 G/DL (ref 32–36)
MCV RBC AUTO: 87 FL (ref 80–100)
MONOCYTES # BLD AUTO: 1.2 X10*3/UL (ref 0.1–1)
MONOCYTES NFR BLD AUTO: 10.9 %
NEUTROPHILS # BLD AUTO: 6.84 X10*3/UL (ref 1.2–7.7)
NEUTROPHILS NFR BLD AUTO: 62 %
NRBC BLD-RTO: 0 /100 WBCS (ref 0–0)
PHOSPHATE SERPL-MCNC: 2.9 MG/DL (ref 2.5–4.9)
PLATELET # BLD AUTO: 325 X10*3/UL (ref 150–450)
POTASSIUM SERPL-SCNC: 3.7 MMOL/L (ref 3.5–5.3)
RBC # BLD AUTO: 3.65 X10*6/UL (ref 4.5–5.9)
SODIUM SERPL-SCNC: 138 MMOL/L (ref 136–145)
WBC # BLD AUTO: 11 X10*3/UL (ref 4.4–11.3)

## 2024-06-15 PROCEDURE — 2500000001 HC RX 250 WO HCPCS SELF ADMINISTERED DRUGS (ALT 637 FOR MEDICARE OP): Performed by: STUDENT IN AN ORGANIZED HEALTH CARE EDUCATION/TRAINING PROGRAM

## 2024-06-15 PROCEDURE — 2500000002 HC RX 250 W HCPCS SELF ADMINISTERED DRUGS (ALT 637 FOR MEDICARE OP, ALT 636 FOR OP/ED): Performed by: STUDENT IN AN ORGANIZED HEALTH CARE EDUCATION/TRAINING PROGRAM

## 2024-06-15 PROCEDURE — 83735 ASSAY OF MAGNESIUM: CPT | Performed by: STUDENT IN AN ORGANIZED HEALTH CARE EDUCATION/TRAINING PROGRAM

## 2024-06-15 PROCEDURE — 2500000002 HC RX 250 W HCPCS SELF ADMINISTERED DRUGS (ALT 637 FOR MEDICARE OP, ALT 636 FOR OP/ED): Performed by: NURSE PRACTITIONER

## 2024-06-15 PROCEDURE — 85025 COMPLETE CBC W/AUTO DIFF WBC: CPT | Performed by: STUDENT IN AN ORGANIZED HEALTH CARE EDUCATION/TRAINING PROGRAM

## 2024-06-15 PROCEDURE — 80069 RENAL FUNCTION PANEL: CPT | Performed by: STUDENT IN AN ORGANIZED HEALTH CARE EDUCATION/TRAINING PROGRAM

## 2024-06-15 PROCEDURE — C9113 INJ PANTOPRAZOLE SODIUM, VIA: HCPCS | Performed by: STUDENT IN AN ORGANIZED HEALTH CARE EDUCATION/TRAINING PROGRAM

## 2024-06-15 PROCEDURE — S4991 NICOTINE PATCH NONLEGEND: HCPCS | Performed by: STUDENT IN AN ORGANIZED HEALTH CARE EDUCATION/TRAINING PROGRAM

## 2024-06-15 PROCEDURE — 82947 ASSAY GLUCOSE BLOOD QUANT: CPT | Mod: 91

## 2024-06-15 PROCEDURE — 2500000004 HC RX 250 GENERAL PHARMACY W/ HCPCS (ALT 636 FOR OP/ED): Performed by: STUDENT IN AN ORGANIZED HEALTH CARE EDUCATION/TRAINING PROGRAM

## 2024-06-15 PROCEDURE — 2500000001 HC RX 250 WO HCPCS SELF ADMINISTERED DRUGS (ALT 637 FOR MEDICARE OP): Performed by: NURSE PRACTITIONER

## 2024-06-15 PROCEDURE — 2500000001 HC RX 250 WO HCPCS SELF ADMINISTERED DRUGS (ALT 637 FOR MEDICARE OP)

## 2024-06-15 PROCEDURE — 2500000002 HC RX 250 W HCPCS SELF ADMINISTERED DRUGS (ALT 637 FOR MEDICARE OP, ALT 636 FOR OP/ED)

## 2024-06-15 PROCEDURE — 37799 UNLISTED PX VASCULAR SURGERY: CPT | Performed by: STUDENT IN AN ORGANIZED HEALTH CARE EDUCATION/TRAINING PROGRAM

## 2024-06-15 PROCEDURE — 1100000001 HC PRIVATE ROOM DAILY

## 2024-06-15 RX ORDER — POTASSIUM CHLORIDE 20 MEQ/1
40 TABLET, EXTENDED RELEASE ORAL ONCE
Status: COMPLETED | OUTPATIENT
Start: 2024-06-15 | End: 2024-06-15

## 2024-06-15 RX ADMIN — PANTOPRAZOLE SODIUM 40 MG: 40 INJECTION, POWDER, FOR SOLUTION INTRAVENOUS at 08:30

## 2024-06-15 RX ADMIN — INSULIN LISPRO 2 UNITS: 100 INJECTION, SOLUTION INTRAVENOUS; SUBCUTANEOUS at 04:29

## 2024-06-15 RX ADMIN — SENNOSIDES AND DOCUSATE SODIUM 2 TABLET: 8.6; 5 TABLET ORAL at 20:43

## 2024-06-15 RX ADMIN — FUROSEMIDE 20 MG: 40 TABLET ORAL at 08:30

## 2024-06-15 RX ADMIN — POLYETHYLENE GLYCOL 3350 17 G: 17 POWDER, FOR SOLUTION ORAL at 08:31

## 2024-06-15 RX ADMIN — APIXABAN 5 MG: 5 TABLET, FILM COATED ORAL at 08:30

## 2024-06-15 RX ADMIN — OXYCODONE HYDROCHLORIDE 5 MG: 5 TABLET ORAL at 14:53

## 2024-06-15 RX ADMIN — BISOPROLOL FUMARATE 2.5 MG: 5 TABLET ORAL at 08:30

## 2024-06-15 RX ADMIN — PAROXETINE 20 MG: 20 TABLET, FILM COATED ORAL at 08:30

## 2024-06-15 RX ADMIN — OXYCODONE HYDROCHLORIDE 5 MG: 5 TABLET ORAL at 20:45

## 2024-06-15 RX ADMIN — DAPAGLIFLOZIN 10 MG: 10 TABLET, FILM COATED ORAL at 08:29

## 2024-06-15 RX ADMIN — APIXABAN 5 MG: 5 TABLET, FILM COATED ORAL at 20:44

## 2024-06-15 RX ADMIN — SACUBITRIL AND VALSARTAN 0.5 TABLET: 24; 26 TABLET, FILM COATED ORAL at 20:45

## 2024-06-15 RX ADMIN — Medication 1 PATCH: at 06:01

## 2024-06-15 RX ADMIN — MEXILETINE HYDROCHLORIDE 250 MG: 250 CAPSULE ORAL at 22:32

## 2024-06-15 RX ADMIN — RANOLAZINE 500 MG: 500 TABLET, EXTENDED RELEASE ORAL at 20:44

## 2024-06-15 RX ADMIN — SACUBITRIL AND VALSARTAN 0.5 TABLET: 24; 26 TABLET, FILM COATED ORAL at 08:29

## 2024-06-15 RX ADMIN — POTASSIUM CHLORIDE 40 MEQ: 1500 TABLET, EXTENDED RELEASE ORAL at 08:47

## 2024-06-15 RX ADMIN — MEXILETINE HYDROCHLORIDE 250 MG: 250 CAPSULE ORAL at 06:02

## 2024-06-15 RX ADMIN — TRAZODONE HYDROCHLORIDE 50 MG: 50 TABLET ORAL at 20:44

## 2024-06-15 RX ADMIN — AMIODARONE HYDROCHLORIDE 400 MG: 200 TABLET ORAL at 08:31

## 2024-06-15 RX ADMIN — ASPIRIN 81 MG CHEWABLE TABLET 81 MG: 81 TABLET CHEWABLE at 08:30

## 2024-06-15 RX ADMIN — RANOLAZINE 500 MG: 500 TABLET, EXTENDED RELEASE ORAL at 08:30

## 2024-06-15 RX ADMIN — HYDROXYZINE HYDROCHLORIDE 25 MG: 25 TABLET ORAL at 22:37

## 2024-06-15 RX ADMIN — SPIRONOLACTONE 25 MG: 25 TABLET ORAL at 08:47

## 2024-06-15 RX ADMIN — ROSUVASTATIN 20 MG: 40 TABLET, FILM COATED ORAL at 20:43

## 2024-06-15 ASSESSMENT — COGNITIVE AND FUNCTIONAL STATUS - GENERAL
DRESSING REGULAR LOWER BODY CLOTHING: A LITTLE
CLIMB 3 TO 5 STEPS WITH RAILING: A LITTLE
DRESSING REGULAR LOWER BODY CLOTHING: A LITTLE
HELP NEEDED FOR BATHING: A LITTLE
DAILY ACTIVITIY SCORE: 19
DAILY ACTIVITIY SCORE: 19
PERSONAL GROOMING: A LITTLE
DRESSING REGULAR UPPER BODY CLOTHING: A LITTLE
MOBILITY SCORE: 23
MOBILITY SCORE: 23
PERSONAL GROOMING: A LITTLE
CLIMB 3 TO 5 STEPS WITH RAILING: A LITTLE
DRESSING REGULAR UPPER BODY CLOTHING: A LITTLE
HELP NEEDED FOR BATHING: A LITTLE
TOILETING: A LITTLE
TOILETING: A LITTLE

## 2024-06-15 ASSESSMENT — PAIN - FUNCTIONAL ASSESSMENT
PAIN_FUNCTIONAL_ASSESSMENT: 0-10

## 2024-06-15 ASSESSMENT — PAIN SCALES - GENERAL
PAINLEVEL_OUTOF10: 0 - NO PAIN
PAINLEVEL_OUTOF10: 8
PAINLEVEL_OUTOF10: 0 - NO PAIN
PAINLEVEL_OUTOF10: 4
PAINLEVEL_OUTOF10: 7
PAINLEVEL_OUTOF10: 0 - NO PAIN
PAINLEVEL_OUTOF10: 2

## 2024-06-15 ASSESSMENT — PAIN DESCRIPTION - LOCATION: LOCATION: BACK

## 2024-06-15 NOTE — CARE PLAN
The clinical goals for the shift include pain and anxiety will be controlled throughout the shift      Problem: Fall/Injury  Goal: Not fall by end of shift  Outcome: Progressing  Goal: Be free from injury by end of the shift  Outcome: Progressing  Goal: Verbalize understanding of personal risk factors for fall in the hospital  Outcome: Progressing  Goal: Verbalize understanding of risk factor reduction measures to prevent injury from fall in the home  Outcome: Progressing     Problem: Pain  Goal: My pain/discomfort is manageable  Outcome: Progressing     Problem: Daily Care  Goal: Daily care needs are met  Outcome: Progressing     Problem: Skin  Goal: Participates in plan/prevention/treatment measures  Outcome: Progressing  Goal: Prevent/manage excess moisture  Outcome: Progressing  Goal: Prevent/minimize sheer/friction injuries  Outcome: Progressing  Goal: Promote/optimize nutrition  Outcome: Progressing  Goal: Promote skin healing  Outcome: Progressing     Problem: Heart Failure  Goal: Increase self care and/or family involvement in 24 hours  Outcome: Progressing

## 2024-06-15 NOTE — PROGRESS NOTES
[facility]  HFICU PROGRESS NOTE    Cristian Sapp/[id number]    Admit Date: 5/28/2024  Hospital Length of Stay: 18   ICU Length of Stay: 11d 21h   Primary Service: HFICU  Referring: Dr. Brown     INTERVAL EVENTS / PERTINENT ROS:     No acute events overnight. He endorses a good nights rest as his anxiety is now well controlled. Telemetry reviewed and he has been free of VTACH episodes > 24 hours. His blood pressure is marginal with MAP's between 65 - 74 mmHg overnight.  Over the past 24 hours he is net negative ~ 400 mL.  He is euvolemic to mildly hypervolemic on physical exam. He has decided to leave his ICD function enabled in order to go home and sort out financial personal affairs. He no longer has any further ICU needs and is ready for transfer to a lower level of care with telemetry.       Plan:    - C/w furosemide at current dose (20 mg daily)   - Continue all other meds at current dose  - NO Lidocaine or Amiodarone bolus during the Vts per Dr. Blood         MEDICATIONS  Infusions:       Scheduled:  amiodarone, 400 mg, Daily  apixaban, 5 mg, q12h  aspirin, 81 mg, Daily  bisoprolol, 2.5 mg, Daily  dapagliflozin propanediol, 10 mg, Daily  furosemide, 20 mg, Daily  insulin lispro, 0-10 Units, q4h  mexiletine, 250 mg, q8h ANNIKA  nicotine, 1 patch, Daily   Followed by  [START ON 7/13/2024] nicotine, 1 patch, Daily  pantoprazole, 40 mg, Daily  PARoxetine, 20 mg, Daily  polyethylene glycol, 17 g, BID  ranolazine, 500 mg, BID  rosuvastatin, 20 mg, Nightly  sacubitriL-valsartan, 0.5 tablet, BID  sennosides-docusate sodium, 2 tablet, BID  spironolactone, 25 mg, Daily      PRN:  acetaminophen, 650 mg, q4h PRN  dextrose, 12.5 g, q15 min PRN  glucagon, 1 mg, q15 min PRN  hydrOXYzine HCL, 25 mg, q6h PRN  LORazepam, 0.5 mg, q8h PRN  LORazepam, 1 mg, q24h PRN  oxyCODONE, 5 mg, q4h PRN  oxygen, , Continuous PRN - O2/gases  simethicone, 40 mg, 4x daily PRN  traZODone, 50 mg, Nightly  "PRN  trimethobenzamide, 200 mg, q6h PRN      PHYSICAL EXAM:   Visit Vitals  BP 89/60 (BP Location: Right arm, Patient Position: Lying)   Pulse 95   Temp 36.4 °C (97.5 °F) (Temporal)   Resp 18   Ht 1.702 m (5' 7\")   Wt 72.5 kg (159 lb 13.3 oz)   SpO2 96%   BMI 25.03 kg/m²   Smoking Status Former   BSA 1.85 m²     Wt Readings from Last 5 Encounters:   06/14/24 72.5 kg (159 lb 13.3 oz)   05/27/24 78.8 kg (173 lb 11.6 oz)     INTAKE/OUTPUT:  I/O last 3 completed shifts:  In: 1160 (16 mL/kg) [P.O.:1160]  Out: 2225 (30.7 mL/kg) [Urine:2225 (0.9 mL/kg/hr)]  Weight: 72.5 kg      Physical Exam  Vitals reviewed.   Constitutional:       General: He is not in acute distress.     Appearance: He is ill-appearing.   HENT:      Mouth/Throat:      Mouth: Mucous membranes are moist.   Eyes:      Extraocular Movements: Extraocular movements intact.      Pupils: Pupils are equal, round, and reactive to light.   Neck:      Vascular: No JVD.   Cardiovascular:      Rate and Rhythm: Normal rate and regular rhythm.      Pulses: Normal pulses.      Heart sounds: Normal heart sounds.   Pulmonary:      Effort: Pulmonary effort is normal.      Breath sounds: Normal breath sounds. No wheezing or rhonchi.   Abdominal:      General: Abdomen is flat. Bowel sounds are normal. There is no distension.      Tenderness: There is no abdominal tenderness.   Musculoskeletal:         General: Normal range of motion.      Cervical back: Full passive range of motion without pain.      Right lower leg: No edema.      Left lower leg: No edema.   Skin:     General: Skin is warm.      Capillary Refill: Capillary refill takes 2 to 3 seconds.   Neurological:      General: No focal deficit present.      Mental Status: He is alert and oriented to person, place, and time. Mental status is at baseline.   Psychiatric:         Mood and Affect: Mood normal.         Behavior: Behavior normal. Behavior is cooperative.       DATA:  CMP:  Results from last 7 days   Lab Units " 06/15/24  0428 06/14/24  0446 06/13/24  1446 06/13/24  0719 06/13/24  0509 06/12/24  0211 06/11/24  0133 06/10/24  1755 06/10/24  0312 06/09/24  0324 06/08/24 2123   SODIUM mmol/L 138 137 135* 136 134* 134* 136 136 139 140 137   POTASSIUM mmol/L 3.7 4.5 4.1 4.4 5.0 4.3 4.0 4.3 4.2 3.9 3.5   CHLORIDE mmol/L 104 101 99 100 99 97* 100 99 106 105 100   CO2 mmol/L 27 27 25 25 25 25 25 25 24 25 21   ANION GAP mmol/L 11 14 15 15 15 16 15 16 13 14 20   BUN mg/dL 30* 33* 33* 33* 35* 22 16 14 12 12 14   CREATININE mg/dL 1.23 1.26 1.29 1.31* 1.28 1.16 1.15 1.13 1.07 1.28 1.11   EGFR mL/min/1.73m*2 69 67 65 64 66 74 75 76 81 66 78   MAGNESIUM mg/dL 2.22 2.21  --   --  2.34 2.31 2.14  --  2.27 2.47*  --    ALBUMIN g/dL 3.3* 3.7 3.9 3.6 3.6 4.0 3.5 4.0 3.4 3.4 3.6   ALT U/L  --   --   --   --   --   --   --   --   --   --  77*   AST U/L  --   --   --   --   --   --   --   --   --   --  57*   BILIRUBIN TOTAL mg/dL  --   --   --   --   --   --   --   --   --   --  0.6     CBC:  Results from last 7 days   Lab Units 06/15/24  0428 06/14/24  0446 06/13/24  0509 06/12/24  0211 06/11/24  0133 06/10/24  0312 06/09/24  0324 06/08/24  2123   WBC AUTO x10*3/uL 11.0 13.1* 14.9* 17.7* 15.2* 14.3* 14.8* 17.2*   HEMOGLOBIN g/dL 11.7* 12.2* 11.7* 12.8* 11.6* 11.5* 10.6* 11.4*   HEMATOCRIT % 31.8* 34.7* 32.0* 36.7* 33.7* 33.9* 30.3* 33.0*   PLATELETS AUTO x10*3/uL 325 384 320 309 245 229 212 198   MCV fL 87 90 87 92 93 94 92 93         ASSESSMENT AND PLAN:   Cristian Sapp is a 59 y/o M with PMHx of CAD s/p multiple PCI, ICM/HFrEF (EF 15% 5/24) s/p CRT-D, VT storm s/p VT ablation (2019), infrarenal AAA s/p R iliac stent, nephrolithiasis s/p recent ureteral stent. Presented to OSH on 5/23 with multiple ICD shocks for VT. [place] [facility] [place]. EP was consulted on arrival and patient was in slow VT, he was started on amiodarone gtt. Underwent VT ablation 5/30, but continued to have intermittent slow VT. On 6/3 VT  was noted again with rates ~118 and patient was cardioverted. Maintained on amiodarone + lidocaine gtt, and is s/p stellate ganglion block 6/4. He was transferred to HFICU 6/3 after he signed consent for LVAD/OHT evaluation. After further discussions and being a current smoker, it was decided that OHT would not be an option, and the patient does not want an LVAD at this time, so advanced therapies evaluation was stopped 6/5. Palliative medicine saw the patient 6/5 and he decided to change his code status to DNR-DNI. Redo endocardial ablation with Dr. Wooten 6/6 c/b lactic acidosis requiring intubation and low CO requiring IABP support. He was extubated 6/7 and IABP was removed on 6/8. SGC removed 6/9 with closing numbers: /57 (71), CVP 5, PA 44/26 (34), PCWP 11, Malcolm CO/CI:  5.5/2.9, Thermo: 5.3/2.8, , SVO2 67% on dapa 10 mg daily, brigido 25 mg daily. 6/11: Ganglion block bedside per EP + decrease 1/2 Amiodarone and lidocaine gtt. 6/12: Amio / Lido gtts discontinued. Low dose bisoprolol and Mexiletine initiated. 6/13: Palliative care goals of care discussions occurring. Low dose Entresto (12/13 mg) restarted. Mexiletine increased to 250 mg Q 8 hours. 6/14: Decreasing furosemide to 20 mg daily given hypotensive episodes. 6/15: No episodes of VTACH > 24 hours. No further ICU needs and ready to transfer to a lower level of care.      Neuro:  #Anxiety (improved)  #Depression (improved)    #Insomnia (improved)  - C/w atarax 25 mg q6 PRN for anxiety   - C/w paroxetine @ 20 mg  - may consider increasing to home dose of 40 mg  - C/w trazodone 50 mg nightly  - C/w Oxycodone 5 mg Q 4 hours   - C/w ativan 0.5 mg q8 PRN for anxiety + 1 mg Q 24 hours ativan PRN for severe anxiety   - Serial neuro and pain assessments   - PO Tylenol PRN for pain  - PT/OT Consult, OOB to chair  - CAM ICU score every shift  - Sleep/wake cycle normalization     #Substance abuse  - Tobacco use: active smoker  - C/w nicotine patches       Cardiovascular:  #Stage D HFrEF/ICM s/p CRT-D (11/2019)   #Acute on Chronic Decompensated HF  #Cardiogenic shock s/p IABP 6/6, removed 6/8 (resolved)   TTE (5/28/2024): severely decreased LV systolic function, EF 15%, LVIDd 6.32. Moderate-severe MVR, mild TR    Home medications: ASA, entresto 24-26 mg BID, rosuvastatin 20 mg daily, torsemide 20 mg daily  Opening SGC #s (6/7): /57 (89), CVP 8, PAP 48/25 (33), fuad CO/CI 7.4/4, , SVO2 81% on IABP 1:1   Closing SGC #s (6/9): /57 (71), CVP 5, PA 44/26 (34), PCWP 11, Fuad CO/CI:  5.5/2.9, Thermo: 5.3/2.8, , SVO2 67% on dapa 10 mg daily, brigido 25 mg daily  - C/w entresto 12-13 mg BID   - C/w spironolactone 25 mg daily   - C/w dapagliflozin 10 mg daily   - Signed consent for OHT/LVAD workup, patient currently is not a candidate for OHT given active tobacco use, and he does not want an LVAD at this time, will stop workup for now per Dr. Doyle 6/5  - Decrease standing furosemide to 20 mg daily  - Daily standing weights, 2gm sodium diet, 2L fluid restriction, strict I&Os     #CAD s/p multiple PCI   [facility] (5/23/24): Distal Left Main: 10-30% stenosis; Proximal and mid LAD Lesion: The percent stenosis is 10-30%; Proximal CX Lesion: The percent stenosis is 100%; Right Coronary Artery: fills via collaterals; Proximal RCA Lesion: The percent stenosis is 100%; The Left Ventricular Ejection Fraction is 10%,by echo.  - C/w ASA + rosuvastatin 20 mg daily      #Prior VT storm s/p VT ablation 5/30, stellate ganglion block 6/4, VT ablation 6/6, stellate ganglion block 6/11  #Incessant VT s/p ICD shock   Device interrogation (6/4): paced  for 102 min then stepping down, LR back to 95, patient converted to AP   - C/w ranexa 500 mg BID   - S/p VT ablation 5/30 and 6/6   - S/p stellate ganglion block 6/4 and 6/11  - Heparin gtt  for extensive ventricular ablation per EP (5/31)---> D/C heparin gtt, then switch to Eliquis to 5 mg BID for one month per EP  Dr. Shepard  - Discontinued lidocaine gtt + amiodarone gtt (6/12)  - NO Lidocaine or Amiodarone bolus during the Vts per Dr. Blood   - C/w PO amiodarone 400 mg po daily (resumed 6/12)  - C/w Bisoprolol 2.5 mg PO daily (resumed 6/12)  - EP following, appreciate assistance      Pulmonary:   #Acute Hypoxic Respiratory Failure (following VT ablation 6/6) (resolved)   #?PNA (Concern resolved)  #Pulmonary Edema (Improving)  #?COPD   - Active smoker  - CT chest (6/4): interval worsening now moderate right and small left pleural effusions with associated atelectasis, findings suggestive of mild pulmonary interstitial and alveolar edema  - Chest Xray (6/13): Interval improvement in prominent interstitial markings suggestive of improving edema.  Blood cultures (6/3): NGTD   Blood cultures (6/7): NGTD  MRSA (6/3): negative   Strep PNA, legionella negative (6/5)   - S/p zosyn + vancomycin (6/2-6/5)   - Stop zosyn + vancomycin (6/7-6/10)  - ID recommended stopping antibiotics 6/5, were temporarily restarted 6/7 given leukocytosis/ hypotension   - Monitor and maintain SpO2 > 92%     GI:  #No active issues   - Bowel regimen: facundo-colace BID + miralax BID   - C/w PPI Pantoprazole 40 mg daily   - C/w Simethicone 40 mg 4 x daily PRN  - C/w trimethobenzamide 200 mg IM Q 6 hours PRN    :  #EITAN (resolved)  #Nephrolithiasis s/p recent bilateral ureteral stent placement to left ureter   sCr (6/14): 1.26   - Consulted urology 5/31 to discuss removal--> will follow outpatient for STENT removal   - Trend RFP daily   - I/Os  - Decrease scheduled furosemide to 20 mg daily   - Avoid hypotension and nephrotoxic agents  - Discuss with urology on Monday 6/17 a plan for stent removal (leave in place 2/2 hospice vs. Removal prior to discharge vs. 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 No need for synthroid now.  - Repeat TSH and free T4 after 2 to 4 weeks     ID:  #Leukocytosis 2/2 ?PNA versus stress induced (improving)  - See pulmonary plan above  - Trend temps q4h  - Afebrile, nontoxic, no s/s infx  - Closely monitoring  - Daily CBC     PHYSICAL AND OCCUPATIONAL THERAPY: ordered and following    LINES:  PIVs   Left radial a-line 6/2-6/10  Femoral cordis 6/6-6/10  Femoral IABP 6/6-6/8  Midline 6/10--currently      DVT: heparin gtt--> Eliquis PO  VAP BUNDLE: NA  CENTRAL LINE BUNDLE: NA  ULCER PPX: Pantoprazole 40 mg daily   GLYCEMIC CONTROL: NA  BOWEL CARE: scheduled facundo-colace BID and Miralax BID   INDWELLING CATHETER: NA  NUTRITION: Adult diet Cardiac; 70 gm fat; 2 - 3 grams Sodium; 1800 mL fluid      EMERGENCY CONTACT: Extended Emergency Contact Information  Primary Emergency Contact: Judie Sapp  Address: [address] [place] [address] [facility]  Home Phone: [phone]  Mobile Phone: [phone]  Relation: Mother  Preferred language: English   needed? No  Secondary Emergency Contact: JcKeiry  Address: [address] [place]  Mobile Phone: [phone]  Relation: Sibling  Preferred language: English   needed? No  FAMILY UPDATE: Given at bedside daily   CODE STATUS: DNR and No Intubation  DISPO: Remain in HFICU     Patient seen and assessed with Dr. Gunderson     I personally spent 60 minutes of critical care time directly and personally managing the patient exclusive of separately billable procedures.  _________________________________________________  YARITZA Payne-CNP

## 2024-06-15 NOTE — PROGRESS NOTES
HFICU Attending Note     He feels well today, no further VT overnight, it does not appear that his wife will be coming to hospital, he has elected to have ICD remain on but request some programming changes prior to discharge, we can talk with EP next week. His weight is up slightly so will need to watch that, not sure he will be able to tolerate Entresto as BP is marginal but will try to continue. He is ok to transfer to the floor as we continue to work out plan for discharge home later this week (Wednesday?)     This critically ill patient continues to be at-risk for clinically significant deterioration / failure due to the above mentioned dysfunctional, unstable organ systems.  I have personally identified and managed all complex critical care issues to prevent aforementioned clinical deterioration.  Critical care time is spent at bedside and/or the immediate area and has included, but is not limited to, the review of diagnostic tests, labs, radiographs, serial assessments of hemodynamics, respiratory status, ventilatory management, and family updates.  Time spent in procedures and teaching are reported separately.     Critical care time: _41___ minutes

## 2024-06-15 NOTE — PROGRESS NOTES
Cristian Sapp is a 58 y.o. male on day 18 of admission presenting with Acute on chronic systolic (congestive) heart failure (Multi).    Subjective     On arrival to floor patient talkative, anxious in no apparent distress. Patient reports that he is anxious about receiving additional shocks from ICD. Denies chest pain, palpitations, dyspnea.      Objective   59 y/o M with PMHx of CAD s/p multiple PCI, ICM/HFrEF (EF 15% 5/24) s/p CRT-D, VT storm s/p VT ablation (2019), infrarenal AAA s/p R iliac stent, nephrolithiasis s/p recent ureteral stent. Presented to OSH on 5/23 with multiple ICD shocks for VT. Transferred to CICU at [facility] [place] 5/28 for continued management. EP was consulted on arrival and patient was in slow VT, he was started on amiodarone gtt. Underwent VT ablation 5/30, but continued to have intermittent slow VT. On 6/3 VT was noted again with rates ~118 and patient was cardioverted. Maintained on amiodarone + lidocaine gtt, and is s/p stellate ganglion block 6/4. He was transferred to HFICU 6/3 after he signed consent for LVAD/OHT evaluation. After further discussions and being a current smoker, it was decided that OHT would not be an option, and the patient does not want an LVAD at this time, so advanced therapies evaluation was stopped 6/5. Palliative medicine saw the patient 6/5 and he decided to change his code status to DNR-DNI. Redo endocardial ablation with Dr. Wooten 6/6 c/b lactic acidosis requiring intubation and low CO requiring IABP support. He was extubated 6/7 and IABP was removed on 6/8. SGC removed 6/9 with closing numbers: /57 (71), CVP 5, PA 44/26 (34), PCWP 11, Malcolm CO/CI:  5.5/2.9, Thermo: 5.3/2.8, , SVO2 67% on dapa 10 mg daily, brigido 25 mg daily. 6/11: Ganglion block bedside per EP + decrease 1/2 Amiodarone and lidocaine gtt. 6/12: Amio / Lido gtts discontinued. Low dose bisoprolol and Mexiletine initiated. 6/13: Palliative care goals of care discussions occurring. Low dose  "Entresto (12/13 mg) restarted. Mexiletine increased to 250 mg Q 8 hours. 6/14: Decreasing furosemide to 20 mg daily given hypotensive episodes. 6/15: No episodes of VTACH > 24 hours. No further ICU needs and ready to transfer to a lower level of care.       Physical Exam  Constitutional:       Appearance: Normal appearance.   HENT:      Head: Normocephalic.      Nose: Nose normal.      Mouth/Throat:      Mouth: Mucous membranes are moist.   Eyes:      Pupils: Pupils are equal, round, and reactive to light.   Cardiovascular:      Rate and Rhythm: Normal rate and regular rhythm.   Pulmonary:      Effort: Pulmonary effort is normal.   Abdominal:      General: Abdomen is flat.   Musculoskeletal:         General: Normal range of motion.      Cervical back: Normal range of motion.   Skin:     General: Skin is warm.   Neurological:      General: No focal deficit present.      Mental Status: He is alert.   Psychiatric:         Mood and Affect: Mood normal.         Last Recorded Vitals  Blood pressure 118/77, pulse 95, temperature 36.3 °C (97.3 °F), temperature source Temporal, resp. rate 18, height 1.702 m (5' 7\"), weight 72.5 kg (159 lb 13.3 oz), SpO2 98%.  Intake/Output last 3 Shifts:  I/O last 3 completed shifts:  In: 1910 (26.3 mL/kg) [P.O.:1910]  Out: 2375 (32.8 mL/kg) [Urine:2375 (0.9 mL/kg/hr)]  Weight: 72.5 kg     Relevant Results  amiodarone, 400 mg, oral, Daily  apixaban, 5 mg, oral, q12h  aspirin, 81 mg, oral, Daily  bisoprolol, 2.5 mg, oral, Daily  dapagliflozin propanediol, 10 mg, oral, Daily  furosemide, 20 mg, oral, Daily  insulin lispro, 0-10 Units, subcutaneous, q4h  mexiletine, 250 mg, oral, q8h ANNIKA  [Transfer Hold] nicotine, 1 patch, transdermal, Daily   Followed by  [Transfer Hold] nicotine, 1 patch, transdermal, Daily  pantoprazole, 40 mg, intravenous, Daily  PARoxetine, 20 mg, oral, Daily  polyethylene glycol, 17 g, oral, BID  ranolazine, 500 mg, oral, BID  rosuvastatin, 20 mg, oral, " Nightly  sacubitriL-valsartan, 0.5 tablet, oral, BID  sennosides-docusate sodium, 2 tablet, oral, BID  spironolactone, 25 mg, oral, Daily       Results for orders placed or performed during the hospital encounter of 05/28/24 (from the past 24 hour(s))   POCT GLUCOSE   Result Value Ref Range    POCT Glucose 98 74 - 99 mg/dL   POCT GLUCOSE   Result Value Ref Range    POCT Glucose 158 (H) 74 - 99 mg/dL   CBC and Auto Differential   Result Value Ref Range    WBC 11.0 4.4 - 11.3 x10*3/uL    nRBC 0.0 0.0 - 0.0 /100 WBCs    RBC 3.65 (L) 4.50 - 5.90 x10*6/uL    Hemoglobin 11.7 (L) 13.5 - 17.5 g/dL    Hematocrit 31.8 (L) 41.0 - 52.0 %    MCV 87 80 - 100 fL    MCH 32.1 26.0 - 34.0 pg    MCHC 36.8 (H) 32.0 - 36.0 g/dL    RDW 13.4 11.5 - 14.5 %    Platelets 325 150 - 450 x10*3/uL    Neutrophils % 62.0 40.0 - 80.0 %    Immature Granulocytes %, Automated 4.7 (H) 0.0 - 0.9 %    Lymphocytes % 15.1 13.0 - 44.0 %    Monocytes % 10.9 2.0 - 10.0 %    Eosinophils % 5.2 0.0 - 6.0 %    Basophils % 2.1 0.0 - 2.0 %    Neutrophils Absolute 6.84 1.20 - 7.70 x10*3/uL    Immature Granulocytes Absolute, Automated 0.52 0.00 - 0.70 x10*3/uL    Lymphocytes Absolute 1.66 1.20 - 4.80 x10*3/uL    Monocytes Absolute 1.20 (H) 0.10 - 1.00 x10*3/uL    Eosinophils Absolute 0.57 0.00 - 0.70 x10*3/uL    Basophils Absolute 0.23 (H) 0.00 - 0.10 x10*3/uL   Renal Function Panel   Result Value Ref Range    Glucose 108 (H) 74 - 99 mg/dL    Sodium 138 136 - 145 mmol/L    Potassium 3.7 3.5 - 5.3 mmol/L    Chloride 104 98 - 107 mmol/L    Bicarbonate 27 21 - 32 mmol/L    Anion Gap 11 10 - 20 mmol/L    Urea Nitrogen 30 (H) 6 - 23 mg/dL    Creatinine 1.23 0.50 - 1.30 mg/dL    eGFR 69 >60 mL/min/1.73m*2    Calcium 8.8 8.6 - 10.6 mg/dL    Phosphorus 2.9 2.5 - 4.9 mg/dL    Albumin 3.3 (L) 3.4 - 5.0 g/dL   Magnesium   Result Value Ref Range    Magnesium 2.22 1.60 - 2.40 mg/dL   POCT GLUCOSE   Result Value Ref Range    POCT Glucose 104 (H) 74 - 99 mg/dL   POCT GLUCOSE    Result Value Ref Range    POCT Glucose 115 (H) 74 - 99 mg/dL   POCT GLUCOSE   Result Value Ref Range    POCT Glucose 155 (H) 74 - 99 mg/dL      No results found.       Assessment/Plan   Principal Problem:    Acute on chronic systolic (congestive) heart failure (Multi)  Active Problems:    Flash pulmonary edema (Multi)    Ischemic cardiomyopathy    Ventricular tachycardia (Multi)    59 y/o M with PMHx of CAD s/p multiple PCI, ICM/HFrEF (EF 15% 5/24) s/p CRT-D, VT storm s/p VT ablation (2019), infrarenal AAA s/p R iliac stent, nephrolithiasis s/p recent ureteral stent. Presented to OSH on 5/23 with multiple ICD shocks for VT. Transferred [place] CICU [place] [facility] [place] for continued management. EP consulted and for patient being in slow VT. Patient started on amiodorone gtt and underwent VT ablation 5/30 however continued to have intermittent slow VT. Patient cardioverted 6/3. Patient maintained on amiodarone + lidocaine gtt and s/p ganglion block 6/4. Tansfereed to HFICU 6/3 after conset for LVAD/OHT however, patient not candidate as he is a current smoker. Patient momentarily intubated in setting of lactic acidosis, subsequently extubated. Patient started on advanced heart failure medications. Patient stable and transferred out of ICU on 6/15      #Stage D HFrEF/ICM s/p CRT-D (11/2019)   #Acute on Chronic Decompensated HF  #Cardiogenic shock s/p IABP 6/6, removed 6/8 (resolved)   ::TTE (5/28/2024): severely decreased LV systolic function, EF 15%, LVIDd 6.32. ::Moderate-severe MVR, mild TR    ::Signed consent for OHT/LVAD workup, patient currently is not a candidate for OHT given active tobacco use, and he does not want an LVAD at this time, will stop workup for now per Dr. Doyle 6/5  ::Home medications: ASA, entresto 24-26 mg BID, rosuvastatin 20 mg daily, torsemide 20 mg daily  - C/w entresto 12-13 mg BID   - C/w spironolactone 25 mg daily   - C/w dapagliflozin 10 mg daily   - continue furosemide to 20 mg daily  - Daily  standing weights, 2gm sodium diet, 2L fluid restriction, strict I&Os    #CAD s/p multiple PCI  [facility] (5/23/24): Distal Left Main: 10-30% stenosis; Proximal and mid LAD Lesion: The percent stenosis is 10-30%; Proximal CX Lesion: The percent stenosis is 100%; Right Coronary Artery: fills via collaterals; Proximal RCA Lesion: The percent stenosis is 100%; The Left Ventricular Ejection Fraction is 10%,by echo.  - C/w ASA + rosuvastatin 20 mg daily      #Prior VT storm s/p VT ablation 5/30, stellate ganglion block 6/4, VT ablation 6/6, stellate ganglion block 6/11  #Incessant VT s/p ICD shock   Device interrogation (6/4): paced  for 102 min then stepping down, LR back to 95, patient converted to AP   - C/w ranexa 500 mg BID   - S/p VT ablation 5/30 and 6/6   - S/p stellate ganglion block 6/4 and 6/11  - Heparin gtt  for extensive ventricular ablation per EP (5/31)---> D/C heparin gtt, then switch to Eliquis to 5 mg BID for one month per EP Dr. Shepard  - Discontinued lidocaine gtt + amiodarone gtt (6/12)  - NO Lidocaine or Amiodarone bolus during the Vts per Dr. Blood   - C/w PO amiodarone 400 mg po daily (resumed 6/12)  - C/w Bisoprolol 2.5 mg PO daily (resumed 6/12)  - EP following, appreciate assistance     #Acute Hypoxic Respiratory Failure (following VT ablation 6/6) (resolved)   #?PNA (Concern resolved)  #Pulmonary Edema (Improving)  #?COPD   ::CT chest (6/4): interval worsening now moderate right and small left pleural effusions with associated atelectasis, findings suggestive of mild pulmonary interstitial and alveolar edema  - Chest Xray (6/13): Interval improvement in prominent interstitial markings suggestive of improving edema.  Blood cultures (6/3): NGTD   Blood cultures (6/7): NGTD  MRSA (6/3): negative   Strep PNA, legionella negative (6/5)   - S/p zosyn + vancomycin (6/2-6/5)   - Stop zosyn + vancomycin (6/7-6/10)  - ID recommended stopping antibiotics 6/5, were temporarily restarted 6/7 given  leukocytosis/ hypotension   - Monitor and maintain SpO2 > 92%    #Anxiety (improved)  #Depression (improved)    #Insomnia (improved)  - C/w atarax 25 mg q6 PRN for anxiety   - C/w paroxetine @ 20 mg  - may consider increasing to home dose of 40 mg  - C/w trazodone 50 mg nightly  - C/w Oxycodone 5 mg Q 4 hours   - C/w ativan 0.5 mg q8 PRN for anxiety + 1 mg Q 24 hours ativan PRN for severe anxiety   - Serial neuro and pain assessments   - PO Tylenol PRN for pain    #AOCD  #Iron Deficiency Anemia   Labs (6/5): iron 51, TIBC 285, %sat 18, folate 9.8, ferritin 99  - S/p venofer x3 days (6/6-6/8)    #Hypothyroidism   ::  endocrine consulted 6/5--> 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   -No need for synthroid now.  - Repeat TSH and free T4 after 2 to 4 weeks    F-PRN  E-PRN  N-Adult cardiac diet  A-PIV    DVT: Lovenox  NOK: Judie Sapp  [phone]    DNR/DNI    Kimberlee Monte MD

## 2024-06-16 VITALS
DIASTOLIC BLOOD PRESSURE: 54 MMHG | TEMPERATURE: 96.6 F | HEART RATE: 96 BPM | BODY MASS INDEX: 25.09 KG/M2 | WEIGHT: 159.83 LBS | OXYGEN SATURATION: 96 % | RESPIRATION RATE: 18 BRPM | SYSTOLIC BLOOD PRESSURE: 94 MMHG | HEIGHT: 67 IN

## 2024-06-16 LAB
GLUCOSE BLD MANUAL STRIP-MCNC: 102 MG/DL (ref 74–99)
GLUCOSE BLD MANUAL STRIP-MCNC: 104 MG/DL (ref 74–99)
GLUCOSE BLD MANUAL STRIP-MCNC: 111 MG/DL (ref 74–99)
GLUCOSE BLD MANUAL STRIP-MCNC: 151 MG/DL (ref 74–99)
GLUCOSE BLD MANUAL STRIP-MCNC: 152 MG/DL (ref 74–99)

## 2024-06-16 PROCEDURE — 2500000001 HC RX 250 WO HCPCS SELF ADMINISTERED DRUGS (ALT 637 FOR MEDICARE OP): Performed by: STUDENT IN AN ORGANIZED HEALTH CARE EDUCATION/TRAINING PROGRAM

## 2024-06-16 PROCEDURE — 99232 SBSQ HOSP IP/OBS MODERATE 35: CPT | Performed by: INTERNAL MEDICINE

## 2024-06-16 PROCEDURE — 2500000002 HC RX 250 W HCPCS SELF ADMINISTERED DRUGS (ALT 637 FOR MEDICARE OP, ALT 636 FOR OP/ED): Performed by: STUDENT IN AN ORGANIZED HEALTH CARE EDUCATION/TRAINING PROGRAM

## 2024-06-16 PROCEDURE — 82947 ASSAY GLUCOSE BLOOD QUANT: CPT

## 2024-06-16 PROCEDURE — C9113 INJ PANTOPRAZOLE SODIUM, VIA: HCPCS | Performed by: STUDENT IN AN ORGANIZED HEALTH CARE EDUCATION/TRAINING PROGRAM

## 2024-06-16 PROCEDURE — 2500000004 HC RX 250 GENERAL PHARMACY W/ HCPCS (ALT 636 FOR OP/ED): Performed by: STUDENT IN AN ORGANIZED HEALTH CARE EDUCATION/TRAINING PROGRAM

## 2024-06-16 PROCEDURE — 99233 SBSQ HOSP IP/OBS HIGH 50: CPT | Performed by: STUDENT IN AN ORGANIZED HEALTH CARE EDUCATION/TRAINING PROGRAM

## 2024-06-16 PROCEDURE — 1100000001 HC PRIVATE ROOM DAILY

## 2024-06-16 RX ADMIN — MEXILETINE HYDROCHLORIDE 250 MG: 250 CAPSULE ORAL at 21:30

## 2024-06-16 RX ADMIN — LORAZEPAM 0.5 MG: 0.5 TABLET ORAL at 13:15

## 2024-06-16 RX ADMIN — DAPAGLIFLOZIN 10 MG: 10 TABLET, FILM COATED ORAL at 09:15

## 2024-06-16 RX ADMIN — TRAZODONE HYDROCHLORIDE 50 MG: 50 TABLET ORAL at 21:28

## 2024-06-16 RX ADMIN — SACUBITRIL AND VALSARTAN 0.5 TABLET: 24; 26 TABLET, FILM COATED ORAL at 21:29

## 2024-06-16 RX ADMIN — MEXILETINE HYDROCHLORIDE 250 MG: 250 CAPSULE ORAL at 05:54

## 2024-06-16 RX ADMIN — APIXABAN 5 MG: 5 TABLET, FILM COATED ORAL at 21:29

## 2024-06-16 RX ADMIN — LORAZEPAM 0.5 MG: 0.5 TABLET ORAL at 21:27

## 2024-06-16 RX ADMIN — PAROXETINE 20 MG: 20 TABLET, FILM COATED ORAL at 09:16

## 2024-06-16 RX ADMIN — SPIRONOLACTONE 25 MG: 25 TABLET ORAL at 09:15

## 2024-06-16 RX ADMIN — ROSUVASTATIN 20 MG: 40 TABLET, FILM COATED ORAL at 21:30

## 2024-06-16 RX ADMIN — POLYETHYLENE GLYCOL 3350 17 G: 17 POWDER, FOR SOLUTION ORAL at 18:28

## 2024-06-16 RX ADMIN — SENNOSIDES AND DOCUSATE SODIUM 2 TABLET: 8.6; 5 TABLET ORAL at 21:29

## 2024-06-16 RX ADMIN — PANTOPRAZOLE SODIUM 40 MG: 40 INJECTION, POWDER, FOR SOLUTION INTRAVENOUS at 09:14

## 2024-06-16 RX ADMIN — RANOLAZINE 500 MG: 500 TABLET, EXTENDED RELEASE ORAL at 09:17

## 2024-06-16 RX ADMIN — SACUBITRIL AND VALSARTAN 0.5 TABLET: 24; 26 TABLET, FILM COATED ORAL at 09:15

## 2024-06-16 RX ADMIN — OXYCODONE HYDROCHLORIDE 5 MG: 5 TABLET ORAL at 21:28

## 2024-06-16 RX ADMIN — APIXABAN 5 MG: 5 TABLET, FILM COATED ORAL at 09:17

## 2024-06-16 RX ADMIN — AMIODARONE HYDROCHLORIDE 400 MG: 200 TABLET ORAL at 11:39

## 2024-06-16 RX ADMIN — SENNOSIDES AND DOCUSATE SODIUM 2 TABLET: 8.6; 5 TABLET ORAL at 09:14

## 2024-06-16 RX ADMIN — BISOPROLOL FUMARATE 2.5 MG: 5 TABLET ORAL at 09:16

## 2024-06-16 RX ADMIN — ASPIRIN 81 MG CHEWABLE TABLET 81 MG: 81 TABLET CHEWABLE at 09:15

## 2024-06-16 RX ADMIN — MEXILETINE HYDROCHLORIDE 250 MG: 250 CAPSULE ORAL at 13:15

## 2024-06-16 RX ADMIN — RANOLAZINE 500 MG: 500 TABLET, EXTENDED RELEASE ORAL at 21:30

## 2024-06-16 RX ADMIN — FUROSEMIDE 20 MG: 40 TABLET ORAL at 11:35

## 2024-06-16 ASSESSMENT — COGNITIVE AND FUNCTIONAL STATUS - GENERAL
DRESSING REGULAR UPPER BODY CLOTHING: A LITTLE
PERSONAL GROOMING: A LITTLE
HELP NEEDED FOR BATHING: A LITTLE
DRESSING REGULAR LOWER BODY CLOTHING: A LITTLE
TOILETING: A LITTLE
DAILY ACTIVITIY SCORE: 19
CLIMB 3 TO 5 STEPS WITH RAILING: A LITTLE
MOBILITY SCORE: 23

## 2024-06-16 ASSESSMENT — PAIN - FUNCTIONAL ASSESSMENT
PAIN_FUNCTIONAL_ASSESSMENT: 0-10
PAIN_FUNCTIONAL_ASSESSMENT: 0-10

## 2024-06-16 ASSESSMENT — PAIN SCALES - GENERAL
PAINLEVEL_OUTOF10: 4
PAINLEVEL_OUTOF10: 8
PAINLEVEL_OUTOF10: 0 - NO PAIN

## 2024-06-16 NOTE — PROGRESS NOTES
"Cristian Sapp is a 58 y.o. male on day 19 of admission presenting with Acute on chronic systolic (congestive) heart failure (Multi).    Subjective   No acute events overnight, patient observed laying in bed comfortably in no apparent distress. Patient chest pain, palpitations, dyspnea       Objective     Physical Exam  HENT:      Head: Normocephalic.      Nose: Nose normal.   Eyes:      Pupils: Pupils are equal, round, and reactive to light.   Cardiovascular:      Rate and Rhythm: Normal rate.   Pulmonary:      Effort: Pulmonary effort is normal.   Abdominal:      General: Abdomen is flat.   Musculoskeletal:         General: Normal range of motion.      Cervical back: Normal range of motion.   Skin:     General: Skin is warm.   Neurological:      General: No focal deficit present.      Mental Status: He is alert.         Last Recorded Vitals  Blood pressure 92/62, pulse 94, temperature 36.3 °C (97.3 °F), temperature source Temporal, resp. rate 18, height 1.702 m (5' 7\"), weight 72.5 kg (159 lb 13.3 oz), SpO2 98%.  Intake/Output last 3 Shifts:  I/O last 3 completed shifts:  In: 1050 (14.5 mL/kg) [P.O.:1050]  Out: 2275 (31.4 mL/kg) [Urine:2275 (0.9 mL/kg/hr)]  Weight: 72.5 kg     Relevant Results  amiodarone, 400 mg, oral, Daily  apixaban, 5 mg, oral, q12h  aspirin, 81 mg, oral, Daily  bisoprolol, 2.5 mg, oral, Daily  dapagliflozin propanediol, 10 mg, oral, Daily  furosemide, 20 mg, oral, Daily  insulin lispro, 0-10 Units, subcutaneous, q4h  mexiletine, 250 mg, oral, q8h ANNIKA  [Transfer Hold] nicotine, 1 patch, transdermal, Daily   Followed by  [Transfer Hold] nicotine, 1 patch, transdermal, Daily  pantoprazole, 40 mg, intravenous, Daily  PARoxetine, 20 mg, oral, Daily  polyethylene glycol, 17 g, oral, BID  ranolazine, 500 mg, oral, BID  rosuvastatin, 20 mg, oral, Nightly  sacubitriL-valsartan, 0.5 tablet, oral, BID  sennosides-docusate sodium, 2 tablet, oral, BID  spironolactone, 25 mg, oral, Daily       Results for " orders placed or performed during the hospital encounter of 05/28/24 (from the past 24 hour(s))   POCT GLUCOSE   Result Value Ref Range    POCT Glucose 115 (H) 74 - 99 mg/dL   POCT GLUCOSE   Result Value Ref Range    POCT Glucose 155 (H) 74 - 99 mg/dL   POCT GLUCOSE   Result Value Ref Range    POCT Glucose 164 (H) 74 - 99 mg/dL   POCT GLUCOSE   Result Value Ref Range    POCT Glucose 111 (H) 74 - 99 mg/dL      No results found.       Assessment/Plan   Principal Problem:    Acute on chronic systolic (congestive) heart failure (Multi)  Active Problems:    Flash pulmonary edema (Multi)    Ischemic cardiomyopathy    Ventricular tachycardia (Multi)    59 y/o M with PMHx of CAD s/p multiple PCI, ICM/HFrEF (EF 15% 5/24) s/p CRT-D, VT storm s/p VT ablation (2019), infrarenal AAA s/p R iliac stent, nephrolithiasis s/p recent ureteral stent. Presented to OSH on 5/23 with multiple ICD shocks for VT. Transferred to CICU at [facility] [place] 5/28 for continued management. EP consulted and for patient being in slow VT. Patient started on amiodorone gtt and underwent VT ablation 5/30 however continued to have intermittent slow VT. Patient cardioverted 6/3. Patient maintained on amiodarone + lidocaine gtt and s/p ganglion block 6/4. Tansfereed to HFICU 6/3 after conset for LVAD/OHT however, patient not candidate as he is a current smoker. Patient momentarily intubated in setting of lactic acidosis, subsequently extubated. Patient started on advanced heart failure medications. Patient stable and transferred out of ICU on 6/15      Updates 6/16  -Continue to monitor on tele, appreciate HF recs      #Stage D HFrEF/ICM s/p CRT-D (11/2019)   #Acute on Chronic Decompensated HF  #Cardiogenic shock s/p IABP 6/6, removed 6/8 (resolved)   ::TTE (5/28/2024): severely decreased LV systolic function, EF 15%, LVIDd 6.32. ::Moderate-severe MVR, mild TR    ::Signed consent for OHT/LVAD workup, patient currently is not a candidate for OHT given active tobacco  use, and he does not want an LVAD at this time, will stop workup for now per Dr. Doyle 6/5  ::Home medications: ASA, entresto 24-26 mg BID, rosuvastatin 20 mg daily, torsemide 20 mg daily  - C/w entresto 12-13 mg BID   - C/w spironolactone 25 mg daily   - C/w dapagliflozin 10 mg daily   - continue furosemide to 20 mg daily  - Daily standing weights, 2gm sodium diet, 2L fluid restriction, strict I&Os     #CAD s/p multiple PCI  [facility] (5/23/24): Distal Left Main: 10-30% stenosis; Proximal and mid LAD Lesion: The percent stenosis is 10-30%; Proximal CX Lesion: The percent stenosis is 100%; Right Coronary Artery: fills via collaterals; Proximal RCA Lesion: The percent stenosis is 100%; The Left Ventricular Ejection Fraction is 10%,by echo.  - C/w ASA + rosuvastatin 20 mg daily      #Prior VT storm s/p VT ablation 5/30, stellate ganglion block 6/4, VT ablation 6/6, stellate ganglion block 6/11  #Incessant VT s/p ICD shock   Device interrogation (6/4): paced  for 102 min then stepping down, LR back to 95, patient converted to AP   - C/w ranexa 500 mg BID   - S/p VT ablation 5/30 and 6/6   - S/p stellate ganglion block 6/4 and 6/11  - Heparin gtt  for extensive ventricular ablation per EP (5/31)---> D/C heparin gtt, then switch to Eliquis to 5 mg BID for one month per EP Dr. Shepard  - Discontinued lidocaine gtt + amiodarone gtt (6/12)  - NO Lidocaine or Amiodarone bolus during the Vts per Dr. Blood   - C/w PO amiodarone 400 mg po daily (resumed 6/12)  - C/w Bisoprolol 2.5 mg PO daily (resumed 6/12)  - EP following, appreciate assistance      #Acute Hypoxic Respiratory Failure (following VT ablation 6/6) (resolved)   #?PNA (Concern resolved)  #Pulmonary Edema (Improving)  #?COPD   ::CT chest (6/4): interval worsening now moderate right and small left pleural effusions with associated atelectasis, findings suggestive of mild pulmonary interstitial and alveolar edema  - Chest Xray (6/13): Interval improvement in  prominent interstitial markings suggestive of improving edema.  Blood cultures (6/3): NGTD   Blood cultures (6/7): NGTD  MRSA (6/3): negative   Strep PNA, legionella negative (6/5)   - S/p zosyn + vancomycin (6/2-6/5)   - Stop zosyn + vancomycin (6/7-6/10)  - ID recommended stopping antibiotics 6/5, were temporarily restarted 6/7 given leukocytosis/ hypotension   - Monitor and maintain SpO2 > 92%     #Anxiety (improved)  #Depression (improved)    #Insomnia (improved)  - C/w atarax 25 mg q6 PRN for anxiety   - C/w paroxetine @ 20 mg  - may consider increasing to home dose of 40 mg  - C/w trazodone 50 mg nightly  - C/w Oxycodone 5 mg Q 4 hours   - C/w ativan 0.5 mg q8 PRN for anxiety + 1 mg Q 24 hours ativan PRN for severe anxiety   - Serial neuro and pain assessments   - PO Tylenol PRN for pain     #AOCD  #Iron Deficiency Anemia   Labs (6/5): iron 51, TIBC 285, %sat 18, folate 9.8, ferritin 99  - S/p venofer x3 days (6/6-6/8)     #Hypothyroidism   ::  endocrine consulted 6/5--> 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   -No need for synthroid now.  - Repeat TSH and free T4 after 2 to 4 weeks     F-PRN  E-PRN  N-Adult cardiac diet  A-PIV     DVT: Lovenox  NOK: Judie Nolenon  [phone]     DNR/DNI      Kimberlee Monte MD

## 2024-06-16 NOTE — PROGRESS NOTES
Subjective Data:  No major overnight events. Transferred from [facility] yesterday and has not had further VT on telemetry since arrival. Pt has no new complaints today but wishes to discuss with EP regarding changing ICD settings. No new symptoms today.      Objective Data:  Last Recorded Vitals:  Vitals:    06/16/24 0830 06/16/24 1135 06/16/24 1149 06/16/24 1554   BP: 102/69 99/66  104/68   BP Location:  Left arm     Patient Position:  Lying     Pulse: 96  95 95   Resp:       Temp: 36.2 °C (97.2 °F)  35.6 °C (96.1 °F) 35.7 °C (96.3 °F)   TempSrc:   Temporal Temporal   SpO2: 98%  92% 97%   Weight:       Height:           Last Labs:  CBC - 6/15/2024:  4:28 AM  11.0 11.7 325    31.8      CMP - 6/15/2024:  4:28 AM  8.8 6.3 57 --- 0.6   2.9 3.3 77 67      PTT - 6/7/2024:  4:12 AM  1.3   14.6 27     TROPHS   Date/Time Value Ref Range Status   05/31/2024 05:42 PM 2,261 0 - 53 ng/L Final     Comment:     Previous result verified on 5/31/2024 1634 on specimen/case [id number] called with component [employer] for procedure Troponin I, High Sensitivity with value 2,395 ng/L.   05/31/2024 03:23 PM 2,395 0 - 53 ng/L Final   05/23/2024 07:11  0 - 20 ng/L Final     Comment:     Previous result verified on 5/23/2024 0008 on specimen/case [id number] called with component TRPHS for procedure Troponin, High Sensitivity, 1 Hour with value 160 ng/L.     BNP   Date/Time Value Ref Range Status   06/04/2024 12:55  0 - 99 pg/mL Final   06/03/2024 05:41  0 - 99 pg/mL Final     HGBA1C   Date/Time Value Ref Range Status   06/04/2024 03:07 AM 6.0 see below % Final     Comment:     Hemoglobin variant detected which does not interfere with determination of Hemoglobin A1c. Hemoglobin identification can be ordered to characterize the variant if clinically indicated.      Last I/O:  I/O last 3 completed shifts:  In: 1050 (14.5 mL/kg) [P.O.:1050]  Out: 2275 (31.4 mL/kg) [Urine:2275 (0.9 mL/kg/hr)]  Weight: 72.5 kg       Inpatient  Medications:  Scheduled medications   Medication Dose Route Frequency    amiodarone  400 mg oral Daily    apixaban  5 mg oral q12h    aspirin  81 mg oral Daily    bisoprolol  2.5 mg oral Daily    dapagliflozin propanediol  10 mg oral Daily    furosemide  20 mg oral Daily    insulin lispro  0-10 Units subcutaneous q4h    mexiletine  250 mg oral q8h ANNIKA    [Transfer Hold] nicotine  1 patch transdermal Daily    Followed by    [Transfer Hold] nicotine  1 patch transdermal Daily    pantoprazole  40 mg intravenous Daily    PARoxetine  20 mg oral Daily    polyethylene glycol  17 g oral BID    ranolazine  500 mg oral BID    rosuvastatin  20 mg oral Nightly    sacubitriL-valsartan  0.5 tablet oral BID    sennosides-docusate sodium  2 tablet oral BID    spironolactone  25 mg oral Daily     PRN medications   Medication    acetaminophen    dextrose    glucagon    hydrOXYzine HCL    LORazepam    LORazepam    oxyCODONE    oxygen    simethicone    traZODone    trimethobenzamide     Continuous Medications   Medication Dose Last Rate       Physical Exam:  Constitutional:       General: He is not in acute distress.     Appearance: He is ill-appearing.   HENT:      Mouth/Throat:      Mouth: Mucous membranes are moist.   Eyes:      Extraocular Movements: Extraocular movements intact.      Pupils: Pupils are equal, round, and reactive to light.   Neck:      Vascular: No JVD.   Cardiovascular:      Rate and Rhythm: Normal rate and regular rhythm.      Pulses: Normal pulses.      Heart sounds: Normal heart sounds.   Pulmonary:      Effort: Pulmonary effort is normal.      Breath sounds: Normal breath sounds. No wheezing or rhonchi.   Abdominal:      General: Abdomen is flat. Bowel sounds are normal. There is no distension.      Tenderness: There is no abdominal tenderness.   Musculoskeletal:         General: Normal range of motion.      Cervical back: Full passive range of motion without pain.      Right lower leg: No edema.      Left  lower leg: No edema.   Skin:     General: Skin is warm.      Capillary Refill: Capillary refill takes <2 seconds.  Neurological:      General: No focal deficit present.      Mental Status: He is alert and oriented to person, place, and time. Mental status is at baseline.   Psychiatric:         Mood and Affect: Mood normal.         Behavior: Behavior normal. Behavior is cooperative.      Assessment/Plan   57 y/o M with PMHx of CAD s/p multiple PCI, ICM/HFrEF (EF 15% 5/24) s/p CRT-D, VT storm s/p VT ablation (2019), infrarenal AAA s/p R iliac stent, nephrolithiasis s/p recent ureteral stent. Presented to OSH on 5/23 with multiple ICD shocks for VT. Transferred to CICU at [facility] 5/28 for continued management. EP was consulted on arrival and patient was in slow VT, he was started on amiodarone gtt. Underwent VT ablation 5/30, but continued to have intermittent slow VT. On 6/3 VT was noted again with rates ~118 and patient was cardioverted. Maintained on amiodarone + lidocaine gtt, and is s/p stellate ganglion block 6/4. He was transferred to HFICU 6/3 after he signed consent for LVAD/OHT evaluation. After further discussions and being a current smoker, it was decided that OHT would not be an option, and the patient does not want an LVAD at this time, so advanced therapies evaluation was stopped 6/5. Palliative medicine saw the patient 6/5 and he decided to change his code status to DNR-DNI. Redo endocardial ablation with Dr. Wooten 6/6 c/b lactic acidosis requiring intubation and low CO requiring IABP support. He was extubated 6/7 and IABP was removed on 6/8. SGC removed 6/9 with closing numbers: /57 (71), CVP 5, PA 44/26 (34), PCWP 11, Malcolm CO/CI:  5.5/2.9, Thermo: 5.3/2.8, , SVO2 67% on dapa 10 mg daily, brigido 25 mg daily. 6/11: Ganglion block bedside per EP + decrease 1/2 Amiodarone and lidocaine gtt. 6/12: Amio / Lido gtts discontinued. Low dose bisoprolol and Mexiletine initiated. 6/13: Palliative care  goals of care discussions occurring. Low dose Entresto (12/13 mg) restarted. Mexiletine increased to 250 mg Q 8 hours. 6/14: Decreasing furosemide to 20 mg daily given hypotensive episodes. 6/15: No episodes of VTACH > 24 hours day before transfer out of ICU. Now on general cardiology service and without further VT on the floor.    Recommendations:     - Agree with current HF GDMT and antiarrhythmics ordered. Monitor daily weights.  - Pt requesting to discuss the EP regarding changing ICD settings/VT zone; defer to their management.  - HF service is available as needed. Please reach out if any concerns arise.     Pt seen and evaluated with HF attending, Dr. Gunderson.    Peripheral IV 06/09/24 18 G Right;Ventral Arm (Active)   Site Assessment Clean;Dry;Intact 06/16/24 0900   Dressing Type Transparent 06/16/24 0900   Line Status Flushed 06/16/24 0900   Dressing Status Clean;Dry;Occlusive 06/16/24 0900   Number of days: 7       Peripheral IV 06/09/24 20 G Right Forearm (Active)   Site Assessment Clean;Dry;Intact 06/16/24 0900   Dressing Type Transparent 06/16/24 0900   Line Status Flushed 06/16/24 0900   Dressing Status Clean;Dry;Occlusive 06/16/24 0900   Number of days: 7       Midline 06/10/24 Single lumen Left Basilic vein (Active)   Site Assessment Clean;Dry;Intact 06/16/24 0900   Proximal Lumen Status Flushed;Capped 06/16/24 0900   Dressing Type Transparent 06/16/24 0900   Dressing Status Clean;Dry;Occlusive 06/16/24 0900   Dressing Change Due 06/17/24 06/16/24 0900   Number of days: 6       Code Status:  DNR and No Intubation    I spent 25 minutes in the professional and overall care of this patient.    Maria Luisa Ogden, DO    He reports no complains today, feeling well enough that he is now considering going home Tuesday he would still like to talk with EP about making changes to his device settings particularly to make sure that he is not conscious for any ICD shocks.  Volume status remained stable but weight is  up a bit continue to monitor to make sure that he is not becoming congested    We will continue to follow the patient but may not see daily unless specifically requested

## 2024-06-16 NOTE — CARE PLAN
The patient's goals for the shift include      The clinical goals for the shift include pt will remain HDS.

## 2024-06-16 NOTE — CARE PLAN
Pt is refusing to let me turn him, I told him I needed to turn him to shift his weight off his bottom. He said no, he can manage to move it a little bit to take pressure off of it. He said it hurts too much and he can manage enough turning on his own.   The patient's goals for the shift include  rest    The clinical goals for the shift include pt will remain HDS.    Over the shift, the patient made progress toward the following goals.       Problem: Fall/Injury  Goal: Not fall by end of shift  Outcome: Progressing  Goal: Be free from injury by end of the shift  Outcome: Progressing  Goal: Verbalize understanding of personal risk factors for fall in the hospital  Outcome: Progressing  Goal: Verbalize understanding of risk factor reduction measures to prevent injury from fall in the home  Outcome: Progressing  Goal: Use assistive devices by end of the shift  Outcome: Progressing  Goal: Pace activities to prevent fatigue by end of the shift  Outcome: Progressing     Problem: Pain  Goal: My pain/discomfort is manageable  Outcome: Progressing     Problem: Daily Care  Goal: Daily care needs are met  Outcome: Progressing     Problem: Psychosocial Needs  Goal: Demonstrates ability to cope with hospitalization/illness  Outcome: Progressing  Goal: Collaborate with me, my family, and caregiver to identify my specific goals  Outcome: Progressing     Problem: Pain - Adult  Goal: Verbalizes/displays adequate comfort level or baseline comfort level  Outcome: Progressing     Problem: Safety - Adult  Goal: Free from fall injury  Outcome: Progressing     Problem: Discharge Planning  Goal: Discharge to home or other facility with appropriate resources  Outcome: Progressing     Problem: Chronic Conditions and Co-morbidities  Goal: Patient's chronic conditions and co-morbidity symptoms are monitored and maintained or improved  Outcome: Progressing     Problem: Skin  Goal: Decreased wound size/increased tissue granulation at next dressing change  Outcome: Progressing  Goal: Participates in plan/prevention/treatment measures  Outcome: Progressing  Goal: Prevent/manage excess moisture  Outcome: Progressing  Goal: Prevent/minimize sheer/friction injuries  Outcome: Progressing  Goal:  Promote/optimize nutrition  Outcome: Progressing  Goal: Promote skin healing  Outcome: Progressing     Problem: Heart Failure  Goal: Improved gas exchange this shift  Outcome: Progressing  Goal: Improved urinary output this shift  Outcome: Progressing  Goal: Reduction in peripheral edema within 24 hours  Outcome: Progressing  Goal: Report improvement of dyspnea/breathlessness this shift  Outcome: Progressing  Goal: Weight from fluid excess reduced over 2-3 days, then stabilize  Outcome: Progressing  Goal: Increase self care and/or family involvement in 24 hours  Outcome: Progressing     Problem: Safety - Medical Restraint  Goal: Remains free of injury from restraints (Restraint for Interference with Medical Device)  Outcome: Progressing  Goal: Free from restraint(s) (Restraint for Interference with Medical Device)  Outcome: Progressing     Problem: Knowledge Deficit  Goal: Patient/family/caregiver demonstrates understanding of disease process, treatment plan, medications, and discharge instructions  Outcome: Progressing

## 2024-06-16 NOTE — CARE PLAN
The patient's goals for the shift include  remain free from VT    The clinical goals for the shift include pain and anxiety will be controlled throughout the shift    Over the shift, the patient made progress toward the following goals.       Problem: Fall/Injury  Goal: Not fall by end of shift  Outcome: Progressing  Goal: Be free from injury by end of the shift  Outcome: Progressing  Goal: Verbalize understanding of personal risk factors for fall in the hospital  Outcome: Progressing  Goal: Verbalize understanding of risk factor reduction measures to prevent injury from fall in the home  Outcome: Progressing  Goal: Use assistive devices by end of the shift  Outcome: Progressing  Goal: Pace activities to prevent fatigue by end of the shift  Outcome: Progressing     Problem: Pain  Goal: My pain/discomfort is manageable  Outcome: Progressing     Problem: Daily Care  Goal: Daily care needs are met  Outcome: Progressing     Problem: Psychosocial Needs  Goal: Demonstrates ability to cope with hospitalization/illness  Outcome: Progressing  Goal: Collaborate with me, my family, and caregiver to identify my specific goals  Outcome: Progressing     Problem: Pain - Adult  Goal: Verbalizes/displays adequate comfort level or baseline comfort level  Outcome: Progressing     Problem: Safety - Adult  Goal: Free from fall injury  Outcome: Progressing     Problem: Discharge Planning  Goal: Discharge to home or other facility with appropriate resources  Outcome: Progressing     Problem: Chronic Conditions and Co-morbidities  Goal: Patient's chronic conditions and co-morbidity symptoms are monitored and maintained or improved  Outcome: Progressing     Problem: Skin  Goal: Decreased wound size/increased tissue granulation at next dressing change  Outcome: Progressing  Goal: Participates in plan/prevention/treatment measures  Outcome: Progressing  Goal: Prevent/manage excess moisture  Outcome: Progressing  Goal: Prevent/minimize  sheer/friction injuries  Outcome: Progressing  Goal: Promote/optimize nutrition  Outcome: Progressing  Goal: Promote skin healing  Outcome: Progressing     Problem: Heart Failure  Goal: Improved gas exchange this shift  Outcome: Progressing  Goal: Improved urinary output this shift  Outcome: Progressing  Goal: Reduction in peripheral edema within 24 hours  Outcome: Progressing  Goal: Report improvement of dyspnea/breathlessness this shift  Outcome: Progressing  Goal: Weight from fluid excess reduced over 2-3 days, then stabilize  Outcome: Progressing  Goal: Increase self care and/or family involvement in 24 hours  Outcome: Progressing     Problem: Safety - Medical Restraint  Goal: Remains free of injury from restraints (Restraint for Interference with Medical Device)  Outcome: Progressing  Goal: Free from restraint(s) (Restraint for Interference with Medical Device)  Outcome: Progressing     Problem: Knowledge Deficit  Goal: Patient/family/caregiver demonstrates understanding of disease process, treatment plan, medications, and discharge instructions  Outcome: Progressing

## 2024-06-17 ENCOUNTER — APPOINTMENT (OUTPATIENT)
Dept: CARDIOLOGY | Facility: HOSPITAL | Age: 59
DRG: 270 | End: 2024-06-17
Payer: MEDICARE

## 2024-06-17 LAB
ALBUMIN SERPL BCP-MCNC: 3.7 G/DL (ref 3.4–5)
ANION GAP SERPL CALC-SCNC: 13 MMOL/L (ref 10–20)
ATRIAL RATE: 95 BPM
BUN SERPL-MCNC: 24 MG/DL (ref 6–23)
CALCIUM SERPL-MCNC: 9.1 MG/DL (ref 8.6–10.6)
CHLORIDE SERPL-SCNC: 103 MMOL/L (ref 98–107)
CO2 SERPL-SCNC: 25 MMOL/L (ref 21–32)
CREAT SERPL-MCNC: 1.17 MG/DL (ref 0.5–1.3)
EGFRCR SERPLBLD CKD-EPI 2021: 73 ML/MIN/1.73M*2
ERYTHROCYTE [DISTWIDTH] IN BLOOD BY AUTOMATED COUNT: 14.1 % (ref 11.5–14.5)
GLUCOSE BLD MANUAL STRIP-MCNC: 100 MG/DL (ref 74–99)
GLUCOSE BLD MANUAL STRIP-MCNC: 119 MG/DL (ref 74–99)
GLUCOSE BLD MANUAL STRIP-MCNC: 131 MG/DL (ref 74–99)
GLUCOSE BLD MANUAL STRIP-MCNC: 135 MG/DL (ref 74–99)
GLUCOSE SERPL-MCNC: 106 MG/DL (ref 74–99)
HCT VFR BLD AUTO: 35 % (ref 41–52)
HGB BLD-MCNC: 12.4 G/DL (ref 13.5–17.5)
MCH RBC QN AUTO: 32.4 PG (ref 26–34)
MCHC RBC AUTO-ENTMCNC: 35.4 G/DL (ref 32–36)
MCV RBC AUTO: 91 FL (ref 80–100)
NRBC BLD-RTO: 0 /100 WBCS (ref 0–0)
P AXIS: 104 DEGREES
P OFFSET: 179 MS
P ONSET: 156 MS
PHOSPHATE SERPL-MCNC: 3.6 MG/DL (ref 2.5–4.9)
PLATELET # BLD AUTO: 395 X10*3/UL (ref 150–450)
POTASSIUM SERPL-SCNC: 4.5 MMOL/L (ref 3.5–5.3)
Q ONSET: 196 MS
QRS COUNT: 16 BEATS
QRS DURATION: 184 MS
QT INTERVAL: 446 MS
QTC CALCULATION(BAZETT): 560 MS
QTC FREDERICIA: 519 MS
R AXIS: -62 DEGREES
RBC # BLD AUTO: 3.83 X10*6/UL (ref 4.5–5.9)
SODIUM SERPL-SCNC: 136 MMOL/L (ref 136–145)
T AXIS: 145 DEGREES
T OFFSET: 419 MS
VENTRICULAR RATE: 95 BPM
WBC # BLD AUTO: 13.3 X10*3/UL (ref 4.4–11.3)

## 2024-06-17 PROCEDURE — 36415 COLL VENOUS BLD VENIPUNCTURE: CPT | Performed by: STUDENT IN AN ORGANIZED HEALTH CARE EDUCATION/TRAINING PROGRAM

## 2024-06-17 PROCEDURE — 93005 ELECTROCARDIOGRAM TRACING: CPT

## 2024-06-17 PROCEDURE — 99233 SBSQ HOSP IP/OBS HIGH 50: CPT

## 2024-06-17 PROCEDURE — 85027 COMPLETE CBC AUTOMATED: CPT | Performed by: STUDENT IN AN ORGANIZED HEALTH CARE EDUCATION/TRAINING PROGRAM

## 2024-06-17 PROCEDURE — 99497 ADVNCD CARE PLAN 30 MIN: CPT

## 2024-06-17 PROCEDURE — 82947 ASSAY GLUCOSE BLOOD QUANT: CPT | Mod: 91

## 2024-06-17 PROCEDURE — 97116 GAIT TRAINING THERAPY: CPT | Mod: GP

## 2024-06-17 PROCEDURE — 99233 SBSQ HOSP IP/OBS HIGH 50: CPT | Performed by: INTERNAL MEDICINE

## 2024-06-17 PROCEDURE — 93010 ELECTROCARDIOGRAM REPORT: CPT | Performed by: STUDENT IN AN ORGANIZED HEALTH CARE EDUCATION/TRAINING PROGRAM

## 2024-06-17 PROCEDURE — 2500000001 HC RX 250 WO HCPCS SELF ADMINISTERED DRUGS (ALT 637 FOR MEDICARE OP): Performed by: STUDENT IN AN ORGANIZED HEALTH CARE EDUCATION/TRAINING PROGRAM

## 2024-06-17 PROCEDURE — 1200000002 HC GENERAL ROOM WITH TELEMETRY DAILY

## 2024-06-17 PROCEDURE — S4991 NICOTINE PATCH NONLEGEND: HCPCS

## 2024-06-17 PROCEDURE — 2500000004 HC RX 250 GENERAL PHARMACY W/ HCPCS (ALT 636 FOR OP/ED): Performed by: STUDENT IN AN ORGANIZED HEALTH CARE EDUCATION/TRAINING PROGRAM

## 2024-06-17 PROCEDURE — 80069 RENAL FUNCTION PANEL: CPT | Performed by: STUDENT IN AN ORGANIZED HEALTH CARE EDUCATION/TRAINING PROGRAM

## 2024-06-17 PROCEDURE — 97535 SELF CARE MNGMENT TRAINING: CPT | Mod: GO

## 2024-06-17 PROCEDURE — 99498 ADVNCD CARE PLAN ADDL 30 MIN: CPT

## 2024-06-17 PROCEDURE — 2500000002 HC RX 250 W HCPCS SELF ADMINISTERED DRUGS (ALT 637 FOR MEDICARE OP, ALT 636 FOR OP/ED): Performed by: STUDENT IN AN ORGANIZED HEALTH CARE EDUCATION/TRAINING PROGRAM

## 2024-06-17 PROCEDURE — 2500000002 HC RX 250 W HCPCS SELF ADMINISTERED DRUGS (ALT 637 FOR MEDICARE OP, ALT 636 FOR OP/ED)

## 2024-06-17 PROCEDURE — C9113 INJ PANTOPRAZOLE SODIUM, VIA: HCPCS | Performed by: STUDENT IN AN ORGANIZED HEALTH CARE EDUCATION/TRAINING PROGRAM

## 2024-06-17 ASSESSMENT — PAIN - FUNCTIONAL ASSESSMENT
PAIN_FUNCTIONAL_ASSESSMENT: 0-10

## 2024-06-17 ASSESSMENT — COGNITIVE AND FUNCTIONAL STATUS - GENERAL
MOBILITY SCORE: 22
TOILETING: A LITTLE
CLIMB 3 TO 5 STEPS WITH RAILING: A LITTLE
DRESSING REGULAR UPPER BODY CLOTHING: A LITTLE
DAILY ACTIVITIY SCORE: 19
DRESSING REGULAR LOWER BODY CLOTHING: A LITTLE
DRESSING REGULAR LOWER BODY CLOTHING: A LITTLE
MOBILITY SCORE: 22
DAILY ACTIVITIY SCORE: 22
HELP NEEDED FOR BATHING: A LITTLE
CLIMB 3 TO 5 STEPS WITH RAILING: A LITTLE
MOBILITY SCORE: 23
CLIMB 3 TO 5 STEPS WITH RAILING: A LITTLE
HELP NEEDED FOR BATHING: A LITTLE
HELP NEEDED FOR BATHING: A LITTLE
DAILY ACTIVITIY SCORE: 22
WALKING IN HOSPITAL ROOM: A LITTLE
PERSONAL GROOMING: A LITTLE
DRESSING REGULAR LOWER BODY CLOTHING: A LITTLE
WALKING IN HOSPITAL ROOM: A LITTLE

## 2024-06-17 ASSESSMENT — PAIN SCALES - WONG BAKER: WONGBAKER_NUMERICALRESPONSE: NO HURT

## 2024-06-17 ASSESSMENT — PAIN SCALES - GENERAL
PAINLEVEL_OUTOF10: 6
PAINLEVEL_OUTOF10: 8
PAINLEVEL_OUTOF10: 7
PAINLEVEL_OUTOF10: 0 - NO PAIN
PAINLEVEL_OUTOF10: 6
PAINLEVEL_OUTOF10: 8
PAINLEVEL_OUTOF10: 8

## 2024-06-17 ASSESSMENT — ACTIVITIES OF DAILY LIVING (ADL): HOME_MANAGEMENT_TIME_ENTRY: 38

## 2024-06-17 ASSESSMENT — PAIN DESCRIPTION - ORIENTATION: ORIENTATION: LEFT

## 2024-06-17 ASSESSMENT — PAIN DESCRIPTION - LOCATION: LOCATION: BACK

## 2024-06-17 NOTE — PROGRESS NOTES
Occupational Therapy    Occupational Therapy Treatment    Name: Cristian Sapp  MRN: [id number]  : 1965  Date: 24  Time Calculation  Start Time: 1201  Stop Time: 1239  Time Calculation (min): 38 min    Assessment:  OT Assessment: difficulty with I/ADLs, endurance, fxnl mob  Prognosis: Good  Barriers to Discharge: None  Evaluation/Treatment Tolerance: Patient tolerated treatment well  Medical Staff Made Aware: Yes  End of Session Communication: Bedside nurse  End of Session Patient Position: Up in chair, Alarm off, not on at start of session  Plan:  Treatment Interventions: ADL retraining, Functional transfer training, UE strengthening/ROM, Endurance training, Equipment evaluation/education, Compensatory technique education  OT Frequency: 2 times per week  OT Discharge Recommendations: Low intensity level of continued care  Equipment Recommended upon Discharge: Wheeled walker  OT Recommended Transfer Status: Stand by assist (WhW)  OT - OK to Discharge: Yes    Subjective   Previous Visit Info:  OT Last Visit  OT Received On: 24  General:  General  Reason for Referral: Presented to OSH on  with multiple ICD shocks for VT. Transferred to CICU at [facility]  for continued management. EP consulted and for patient being in slow VT. Patient started on amiodorone gtt and underwent VT ablation  however continued to have intermittent slow VT. Patient cardioverted 6/3. Patient maintained on amiodarone + lidocaine gtt and s/p ganglion block . Tansfereed to HFICU 6/3 after conset for LVAD/OHT however, patient not candidate as he is a current smoker. Patient momentarily intubated in setting of lactic acidosis, subsequently extubated. Patient started on advanced heart failure medications. Patient stable and transferred out of ICU on 6/15  Past Medical History Relevant to Rehab: PMHx of CAD s/p multiple PCI, ICM/HFrEF (EF 15% ) s/p CRT-D, VT storm s/p VT ablation (), infrarenal AAA s/p R iliac stent,  nephrolithiasis s/p recent ureteral stent  Family/Caregiver Present: Yes  Caregiver Feedback: supportive mother present  Prior to Session Communication: Bedside nurse  Patient Position Received: Bed, 3 rail up, Alarm off, not on at start of session  General Comment: pt pleasant and motivated during session. discussed POC with pt. pt reported good support at home and needs minimal assistance with ADLs.  Precautions:  Medical Precautions: Cardiac precautions, Fall precautions  Vitals:  Vital Signs  Heart Rate: 95  Heart Rate Source: Monitor  Pain Assessment:  Pain Assessment  Pain Assessment: 0-10  Pain Score: 8     Objective   Cognition:  Overall Cognitive Status: Within Functional Limits  Orientation Level: Oriented X4  Following Commands: Follows all commands and directions without difficulty  Safety Judgment: Good awareness of safety precautions  Problem Solving: Able to problem solve independently  Insight: Within function limits  Impulsive: Within functional limits  Activities of Daily Living:      UE Bathing  UE Bathing Where Assessed:  (pt I washed anterior UB, max A required to wash posterior back seated EOB)    LE Bathing  LE Bathing Where Assessed:  (pt I washed LB seated in chair, pt bringing feet to self to wash)    UE Dressing  UE Dressing Where Assessed:  (pt I doffed/donned gown seated in chair)    LE Dressing  LE Dressing Where Assessed:  (pt I doffed/donned socks and slip on shoes seated)    Toileting  Where Assessed:  (pt I performed facundo care seated on toilet)    Bed Mobility/Transfers: Bed Mobility  Bed Mobility: Yes  Bed Mobility 1  Level of Assistance 1:  (supine to sit S)    Transfers  Transfer: Yes  Transfer 1  Transfer Level of Assistance 1:  (sit/stand 4X S WhW)    Functional Mobility:  Functional Mobility  Functional Mobility Performed: Yes  Functional Mobility 1  Assistance 1:  (pt performed fxnl mob in room and bathroom, and from bed to chair S WhW.)  Sitting Balance:  Static Sitting  Balance  Static Sitting-Level of Assistance: Independent  Dynamic Sitting Balance  Dynamic Sitting-Balance:  (pt sat EOB/chair for ~30 minutes to complete ADLs including bathing and dressing, pt tolerated sitting well)  Standing Balance:  Static Standing Balance  Static Standing-Level of Assistance:  (S WhW)  Dynamic Standing Balance  Dynamic Standing-Balance:  (pt briefly able to stand with no AD with S to perform bathing)    Other Activity:     RUE   RUE : Within Functional Limits and LUE   LUE: Within Functional Limits    Outcome Measures:  [facility] Daily Activity  Putting on and taking off regular lower body clothing: A little  Bathing (including washing, rinsing, drying): A little  Putting on and taking off regular upper body clothing: None  Toileting, which includes using toilet, bedpan or urinal: None  Taking care of personal grooming such as brushing teeth: None  Eating Meals: None  Daily Activity - Total Score: 22     and OT Adult Other Outcome Measures  4AT: -    Education Documentation  Body Mechanics, taught by VERONICA Bergeron at 6/17/2024  2:13 PM.  Learner: Family, Patient  Readiness: Acceptance  Method: Explanation, Demonstration  Response: Verbalizes Understanding, Demonstrated Understanding    ADL Training, taught by VERONICA Bergeron at 6/17/2024  2:13 PM.  Learner: Family, Patient  Readiness: Acceptance  Method: Explanation, Demonstration  Response: Verbalizes Understanding, Demonstrated Understanding    Education Comments  No comments found.      Goals:  Encounter Problems       Encounter Problems (Active)       ADLs       Patient with complete upper body dressing with independent level of assistance donning and doffing all UE clothes with no adaptive equipment. (Progressing)       Start:  06/11/24    Expected End:  06/25/24            Patient with complete lower body dressing with independent level of assistance donning and doffing all LE clothes  with PRN adaptive equipment. (Progressing)        Start:  06/11/24    Expected End:  06/25/24            Patient will complete daily grooming tasks with independent level of assistance and PRN adaptive equipment while standing. (Progressing)       Start:  06/11/24    Expected End:  06/25/24            Patient will complete toileting including hygiene clothing management/hygiene with independent level of assistance. (Progressing)       Start:  06/11/24    Expected End:  06/25/24            Patient will perform basic IADLs/simulated household tasks with mod (I). (Progressing)       Start:  06/11/24    Expected End:  06/25/24               ADLs          BALANCE       Pt will maintain dynamic standing balance during ADL task with modified independent level of assistance in order to demonstrate decreased risk of falling and improved postural control. (Progressing)       Start:  06/11/24    Expected End:  06/25/24               COGNITION/SAFETY          EXERCISE/STRENGTHENING       Patient will participate in >10 minutes of activity with <2 rest breaks utilizing EC/WS principles as needed. (Progressing)       Start:  06/11/24    Expected End:  06/25/24               EXERCISE/STRENGTHENING          MOBILITY       Patient will perform Functional mobility Household distances/Community Distances with modified independent level of assistance and least restrictive device in order to improve safety and functional mobility. (Progressing)       Start:  06/11/24    Expected End:  06/25/24               MOBILITY          TRANSFERS       Patient will perform bed mobility independent level of assistance in order to improve safety and independence with mobility (Progressing)       Start:  06/11/24    Expected End:  06/25/24            Patient will complete functional transfers with least restrictive device with modified independent level of assistance. (Progressing)       Start:  06/11/24    Expected End:  06/25/24

## 2024-06-17 NOTE — PROGRESS NOTES
Daily Progress Note      Subjective   Patient is a 58 y.o. male admitted on 5/28/2024  2:08 PM for heart failure and VT.     KATIUSKA  Pt reports feeling well. Denies any palpitations or SOB/CP. Wants his nicotine patch. Wants to talk to EP about not being conscious during his ICD shocks if possible.         Objective         Vitals: I/O:   Vitals:    06/17/24 1320   BP:    Pulse: 95   Resp:    Temp:    SpO2:         24hr Min/Max:  Temp  Min: 35.6 °C (96.1 °F)  Max: 36.2 °C (97.1 °F)  Pulse  Min: 95  Max: 96  BP  Min: 92/55  Max: 121/63  Resp  Min: 18  Max: 18  SpO2  Min: 94 %  Max: 98 %   Intake/Output Summary (Last 24 hours) at 6/17/2024 1421  Last data filed at 6/17/2024 1110  Gross per 24 hour   Intake --   Output 2600 ml   Net -2600 ml        Net IO Since Admission: -29,294.09 mL [06/17/24 1421]      Scheduled Medications:  PRN Medications:    amiodarone, 400 mg, oral, Daily  apixaban, 5 mg, oral, q12h  aspirin, 81 mg, oral, Daily  bisoprolol, 2.5 mg, oral, Daily  dapagliflozin propanediol, 10 mg, oral, Daily  furosemide, 20 mg, oral, Daily  insulin lispro, 0-10 Units, subcutaneous, q4h  mexiletine, 250 mg, oral, q8h ANNIKA  nicotine, 1 patch, transdermal, Daily   Followed by  [START ON 7/13/2024] nicotine, 1 patch, transdermal, Daily  pantoprazole, 40 mg, intravenous, Daily  PARoxetine, 20 mg, oral, Daily  polyethylene glycol, 17 g, oral, BID  ranolazine, 500 mg, oral, BID  rosuvastatin, 20 mg, oral, Nightly  sacubitriL-valsartan, 0.5 tablet, oral, BID  sennosides-docusate sodium, 2 tablet, oral, BID  spironolactone, 25 mg, oral, Daily     PRN medications: acetaminophen **OR** [DISCONTINUED] acetaminophen **OR** [DISCONTINUED] acetaminophen, dextrose, glucagon, hydrOXYzine HCL, LORazepam, LORazepam, oxyCODONE, oxygen, simethicone, traZODone, trimethobenzamide       Physical Exam:  Physical Exam  Constitutional:       General: He is not in acute distress.     Appearance: Normal appearance. He is not ill-appearing.  "  Cardiovascular:      Rate and Rhythm: Normal rate and regular rhythm.      Heart sounds: No murmur heard.     No friction rub. No gallop.   Pulmonary:      Effort: No respiratory distress.      Breath sounds: No stridor. No wheezing.   Abdominal:      General: Abdomen is flat. There is no distension.      Palpations: Abdomen is soft. There is no mass.      Tenderness: There is no abdominal tenderness.   Neurological:      Mental Status: He is alert.                LABS:    CBC RFP   Lab Results   Component Value Date    WBC 13.3 (H) 06/17/2024    HGB 12.4 (L) 06/17/2024    HCT 35.0 (L) 06/17/2024    MCV 91 06/17/2024     06/17/2024    NEUTROABS 6.84 06/15/2024    Lab Results   Component Value Date     06/17/2024    K 4.5 06/17/2024     06/17/2024    CO2 25 06/17/2024    BUN 24 (H) 06/17/2024    CREATININE 1.17 06/17/2024    CREATININE 1.23 06/15/2024     Lab Results   Component Value Date    MG 2.22 06/15/2024    PHOS 3.6 06/17/2024    CALCIUM 9.1 06/17/2024         Hepatic Function ABG/VBG   Lab Results   Component Value Date    ALT 77 (H) 06/08/2024    AST 57 (H) 06/08/2024    ALKPHOS 67 06/08/2024     No results found for: \"TBILIHCVFS\", \"BILIDIR\"   Lab Results   Component Value Date    PROTIME 14.6 (H) 06/07/2024    APTT 27 06/07/2024    INR 1.3 (H) 06/07/2024      Lab Results   Component Value Date    LACTATE 1.1 06/03/2024                IMAGING:    === Results for orders placed during the hospital encounter of 05/28/24 ===    Cardiac Device Check - Surgery [GLS903] 06/06/2024    Status: Normal    ___________________________________________________________________________    Cardiac Device Check - Inpatient [NKC163] 06/06/2024 (Preliminary)    Status: Normal           Assessment/Plan    59 y/o M with PMHx of CAD s/p multiple PCI, ICM/HFrEF (EF 15% 5/24) s/p CRT-D, VT storm s/p VT ablation (2019), infrarenal AAA s/p R iliac stent, nephrolithiasis s/p recent ureteral stent. Presented to OSH " on 5/23 with multiple ICD shocks for VT. Transferred to CICU at [facility] 5/28 for continued management. EP consulted and for patient being in slow VT. Patient started on amiodorone gtt and underwent VT ablation 5/30 however continued to have intermittent slow VT. Patient cardioverted 6/3. Patient maintained on amiodarone + lidocaine gtt and s/p ganglion block 6/4. Tansfereed to HFICU 6/3 after conset for LVAD/OHT however, patient not candidate as he is a current smoker. Patient momentarily intubated in setting of lactic acidosis, subsequently extubated. Patient started on advanced heart failure medications. Patient stable and transferred out of ICU on 6/15     Updates 6/17:   -Continue to monitor on tele, no episodes of VT   - HF recs for GDMT in place, no changes anticipated   - weight has slowly been increasing, continue to monitor with daily weights   - EP contacted, no changes recommended for ICD or antiarrythmic   - palliative saw pt, confirmed DNR/DNI. Wants to go home on ICD and antiarrythmics. Will consider hospice (with no ICD) in the future. Wants to discuss with EP about not being conscious during ICD shocks if possible   -         #Stage D HFrEF/ICM s/p CRT-D (11/2019)   #Acute on Chronic Decompensated HF  #Cardiogenic shock s/p IABP 6/6, removed 6/8 (resolved)   ::TTE (5/28/2024): severely decreased LV systolic function, EF 15%, LVIDd 6.32. ::Moderate-severe MVR, mild TR    ::Signed consent for OHT/LVAD workup, patient currently is not a candidate for OHT given active tobacco use, and he does not want an LVAD at this time, will stop workup for now per Dr. Doyle 6/5  ::Home medications: ASA, entresto 24-26 mg BID, rosuvastatin 20 mg daily, torsemide 20 mg daily  Plan:   - C/w entresto 12-13 mg BID   - C/w spironolactone 25 mg daily   - C/w dapagliflozin 10 mg daily   - continue furosemide 20 mg daily  - Daily standing weights, 2gm sodium diet, 2L fluid restriction, strict I&Os     #CAD s/p multiple  PCI  [facility] (5/23/24): Distal Left Main: 10-30% stenosis; Proximal and mid LAD Lesion: The percent stenosis is 10-30%; Proximal CX Lesion: The percent stenosis is 100%; Right Coronary Artery: fills via collaterals; Proximal RCA Lesion: The percent stenosis is 100%; The Left Ventricular Ejection Fraction is 10%,by echo.  Plan:   - C/w ASA + rosuvastatin 20 mg daily      #Prior VT storm s/p VT ablation 5/30, stellate ganglion block 6/4, VT ablation 6/6, stellate ganglion block 6/11  #Incessant VT s/p ICD shock   ::Device interrogation (6/4): paced  for 102 min then stepping down, LR back to 95, patient converted to AP   ::S/p VT ablation 5/30 and 6/6  ::S/p stellate ganglion block 6/4 and 6/11  ::Heparin gtt  for extensive ventricular ablation per EP (5/31)---> D/C heparin gtt  ::Discontinued lidocaine gtt + amiodarone gtt (6/12)  Plan:   - C/w ranexa 500 mg BID   - Eliquis to 5 mg BID for one month per EP Dr. Shepard  - NO Lidocaine or Amiodarone bolus during the Vts per Dr. Blood   - C/w PO amiodarone 400 mg po daily (resumed 6/12)  - C/w Bisoprolol 2.5 mg PO daily (resumed 6/12)  - EP following, no new recs   - contact EP immediately if VT detected, no need for immediate transfer to ICU if hemodynamically stable      #Acute Hypoxic Respiratory Failure (following VT ablation 6/6) (resolved)   #?PNA (Concern resolved)  #Pulmonary Edema (Improving)  #?COPD   ::CT chest (6/4): interval worsening now moderate right and small left pleural effusions with associated atelectasis, findings suggestive of mild pulmonary interstitial and alveolar edema  - Chest Xray (6/13): Interval improvement in prominent interstitial markings suggestive of improving edema.  Blood cultures (6/3): NGTD   Blood cultures (6/7): NGTD  MRSA (6/3): negative   Strep PNA, legionella negative (6/5)   - S/p zosyn + vancomycin (6/2-6/5)   - Stop zosyn + vancomycin (6/7-6/10)  - ID recommended stopping antibiotics 6/5, were temporarily restarted  6/7 given leukocytosis/ hypotension   - Monitor and maintain SpO2 > 92%     #Anxiety (improved)  #Depression (improved)    #Insomnia (improved)  - C/w atarax 25 mg q6 PRN for anxiety   - C/w paroxetine @ 20 mg  - may consider increasing to home dose of 40 mg  - C/w trazodone 50 mg nightly  - C/w Oxycodone 5 mg Q 4 hours   - C/w ativan 0.5 mg q8 PRN for anxiety + 1 mg Q 24 hours ativan PRN for severe anxiety   - Serial neuro and pain assessments   - PO Tylenol PRN for pain     #AOCD  #Iron Deficiency Anemia   Labs (6/5): iron 51, TIBC 285, %sat 18, folate 9.8, ferritin 99  - S/p venofer x3 days (6/6-6/8)     #Hypothyroidism   ::endocrine consulted 6/5--> 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   Plan:  -No need for synthroid now.    - Repeat TSH and free T4 after 2 to 4 weeks     F-PRN  E-PRN  N-Adult cardiac diet  A-PIV     DVT: Lovenox  NOK: Judie Sapp  [phone]     DNR/DNI                 GREG LAMA, MS4

## 2024-06-17 NOTE — PROGRESS NOTES
Physical Therapy    Physical Therapy Treatment    Patient Name: Cristian Sapp  MRN: [id number]  Today's Date: 6/17/2024  Time Calculation  Start Time: 1320  Stop Time: 1341  Time Calculation (min): 21 min    Assessment/Plan   PT Assessment  PT Assessment Results: Decreased strength, Decreased endurance, Impaired balance, Decreased mobility, Orthopedic restrictions, Pain  Rehab Prognosis: Good  Barriers to Discharge: none  Evaluation/Treatment Tolerance: Patient limited by fatigue, Patient limited by pain  Medical Staff Made Aware: Yes  Strengths: Attitude of self, Coping skills, Physical health  Barriers to Participation:  (none)  End of Session Communication: Bedside nurse  Assessment Comment: The pt demonstrated gradual functional progression with decreased assistance with transfers, amb, and increased amb distance with and without a wheeled walker.  End of Session Patient Position: Up in chair, Alarm off, caregiver present  PT Plan  Inpatient/Swing Bed or Outpatient: Inpatient  PT Plan  Treatment/Interventions: Bed mobility, Transfer training, Gait training, Balance training, Strengthening, Endurance training, Therapeutic exercise, Therapeutic activity, Home exercise program  PT Plan: Ongoing PT  PT Frequency: 4 times per week  PT Discharge Recommendations: Low intensity level of continued care  Equipment Recommended upon Discharge: Other (comment) (Rollator)  PT Recommended Transfer Status: Stand by assist  PT - OK to Discharge: Yes      General Visit Information:   PT  Visit  PT Received On: 06/17/24  Response to Previous Treatment: Patient with no complaints from previous session.  General  Family/Caregiver Present: Yes  Caregiver Feedback: Mother present with good support.  Prior to Session Communication: Bedside nurse  Patient Position Received: Up in room, Alarm off, caregiver present  Preferred Learning Style: verbal, visual, written  General Comment: The pt was pleasant, cooperative and willing to  participate in therapy.    Subjective   Precautions:  Precautions  Hearing/Visual Limitations: Hearing and vision WFL  Medical Precautions: Cardiac precautions, Fall precautions, Oxygen therapy device and L/min  Precautions Comment: Pt in compliance with precautions throughout PT session.  Vital Signs:  Vital Signs  Heart Rate: 95  Heart Rate Source: Monitor  Patient Position: Sitting    Objective   Pain:  Pain Assessment  Pain Assessment: 0-10  Pain Score: 6  Pain Location: Abdomen  Pain Orientation: Left, Lower, Outer  Pain Interventions: Ambulation/increased activity  Response to Interventions: Pain stable  Cognition:  Cognition  Overall Cognitive Status: Within Functional Limits  Orientation Level: Oriented X4  Coordination:  Movements are Fluid and Coordinated: Yes  Postural Control:  Postural Control  Postural Control: Within Functional Limits  Posture Comment: The pt presented with good sitting and standing posture with and without an assistive device.  Static Sitting Balance  Static Sitting-Balance Support: Bilateral upper extremity supported, Feet supported  Static Sitting-Level of Assistance: Independent  Dynamic Sitting Balance  Dynamic Sitting-Balance Support: Bilateral upper extremity supported, Feet supported  Dynamic Sitting-Comments: Independent  Static Standing Balance  Static Standing-Balance Support: Bilateral upper extremity supported, No upper extremity supported  Static Standing-Level of Assistance: Close supervision  Static Standing-Comment/Number of Minutes: with and without a wheeled walker.  Dynamic Standing Balance  Dynamic Standing-Balance Support: Bilateral upper extremity supported, No upper extremity supported  Dynamic Standing-Comments: SBA with and without a wheeled walker.  Extremity/Trunk Assessments:  RUE   RUE : Within Functional Limits  LUE   LUE: Within Functional Limits  RLE   RLE : Within Functional Limits  LLE   LLE : Within Functional Limits  Activity Tolerance:  Activity  Tolerance  Endurance: Decreased tolerance for upright activites (slightly)  Treatments:  Balance/Neuromuscular Re-Education  Balance/Neuromuscular Re-Education Activity Performed: Yes  Balance/Neuromuscular Re-Education Activity 1: Independent static sitting balance using Hugh UE and LE support.  Balance/Neuromuscular Re-Education Activity 2: Independent dynamic sitting balance using Hugh UE and LE support.  Balance/Neuromuscular Re-Education Activity 3: SBA static standing balance with and without a wheeled walker.  Balance/Neuromuscular Re-Education Activity 4: SBA dynamic standing balance with and without a wheeled walker.    Ambulation/Gait Training  Ambulation/Gait Training Performed: Yes  Ambulation/Gait Training 1  Surface 1: Level tile, Carpet  Device 1: Rolling walker  Assistance 1: Close supervision  Quality of Gait 1: Decreased step length (slightly unsteady, decreased nelda, slightly decreased endurance)  Comments/Distance (ft) 1: 75ft  Ambulation/Gait Training 2  Surface 2: Level tile, Carpet  Device 2: No device  Assistance 2: Close supervision  Quality of Gait 2: Decreased step length (slightly unsteady, decreased nelda, slightly decreased endurance)  Comments/Distance (ft) 2: 125ft  Transfers  Transfer: Yes  Transfer 1  Transfer From 1: Sit to  Transfer to 1: Stand  Transfer Device 1: Walker  Transfer Level of Assistance 1: Distant supervision  Transfers 2  Transfer From 2: Stand to  Transfer to 2: Sit  Transfer Device 2:  (no device)  Transfer Level of Assistance 2: Distant supervision    Outcome Measures:  [facility] Basic Mobility  Turning from your back to your side while in a flat bed without using bedrails: None  Moving from lying on your back to sitting on the side of a flat bed without using bedrails: None  Moving to and from bed to chair (including a wheelchair): None  Standing up from a chair using your arms (e.g. wheelchair or bedside chair): None  To walk in hospital room: A little  Climbing  3-5 steps with railing: A little  Basic Mobility - Total Score: 22    OP EDUCATION:  Outpatient Education  Individual(s) Educated: Patient, Parent (Mother)  Education Provided: Body Mechanics, Fall Risk, Home Safety, Post-Op Precautions, Posture  Patient Response to Education: Patient/Caregiver Verbalized Understanding of Information, Patient/Caregiver Performed Return Demonstration of Exercises/Activities    Encounter Problems       Encounter Problems (Active)       Balance       Patient to demonstrate Leila static and dynamic standing balance without LOB with change of directions, no hesitancy, appropriate ANEL and sequencing to complete functional task.  (Progressing)       Start:  05/31/24    Expected End:  06/25/24            Pt will score >= 24/28 on Tinetti balance assessment to indicate low falls risk (Progressing)       Start:  05/31/24    Expected End:  06/25/24                   Mobility       STG - Patient will ambulate >/= 200 ft Leila with LRAD (Progressing)       Start:  05/31/24    Expected End:  06/25/24            Patient will tolerate >/=30 minutes continuous activity with stable vital signs, RPE </=13/20, RPD </=3/10  (Progressing)       Start:  05/31/24    Expected End:  06/25/24               PT Transfers       STG - Patient will perform bed mobility IND with HOB flat and no rails (Progressing)       Start:  05/31/24    Expected End:  06/25/24            STG - Patient will transfer sit to and from stand Leila with LRAD (Progressing)       Start:  05/31/24    Expected End:  06/25/24               Pain - Adult

## 2024-06-17 NOTE — PROGRESS NOTES
Spiritual Care Visit     People who were present: Patient, patient's mother, Judie and Palliative CNP    Topics discussed: Patient's condition, discharges hopes and plans, coping, systems of support    Interventions: Provided non-anxious, supportive presence, reflective listening    Plan of Care: Palliative  to provide ongoing spiritual care

## 2024-06-17 NOTE — PROGRESS NOTES
SW met with pt and family for supportive visit. He has plans to return to his parents home in [place] and would like out patient Pall care.  SW made referral to [facility] Navigator program.   NATHANAEL Johnson

## 2024-06-17 NOTE — PROGRESS NOTES
"Cristian Sapp is a 58 y.o. male on day 20 of admission presenting with Acute on chronic systolic (congestive) heart failure (Multi).    Subjective   Pt resting comfortably in bed. Denies pain, SOB, n/v, fatigue. Reporting fractured sleep r/t hospitalization. Pt agreeable to outpatient palliative care at time of discharge. Pt states he wishes to keep ICD on at this time, and understands when he signs on with hospice that this will be turned off.    Objective     Physical Exam  Vitals reviewed. Exam conducted with a chaperone present.   Constitutional:       General: He is not in acute distress.     Appearance: Normal appearance. He is normal weight. He is ill-appearing.   HENT:      Head: Normocephalic and atraumatic.      Nose: Nose normal.      Mouth/Throat:      Mouth: Mucous membranes are moist.   Eyes:      Extraocular Movements: Extraocular movements intact.      Pupils: Pupils are equal, round, and reactive to light.   Cardiovascular:      Rate and Rhythm: Normal rate and regular rhythm.   Pulmonary:      Effort: Pulmonary effort is normal.      Breath sounds: Normal breath sounds.   Abdominal:      General: Abdomen is flat. There is distension.      Palpations: Abdomen is soft.   Skin:     General: Skin is warm and dry.   Neurological:      General: No focal deficit present.      Mental Status: He is alert and oriented to person, place, and time. Mental status is at baseline.   Psychiatric:         Mood and Affect: Mood normal.         Behavior: Behavior normal.         Thought Content: Thought content normal.         Judgment: Judgment normal.         Last Recorded Vitals  Blood pressure 92/55, pulse 95, temperature 36 °C (96.8 °F), temperature source Temporal, resp. rate 18, height 1.702 m (5' 7\"), weight 73.2 kg (161 lb 6 oz), SpO2 96%.  Intake/Output last 3 Shifts:  I/O last 3 completed shifts:  In: 240 (3.3 mL/kg) [P.O.:240]  Out: 2900 (39.6 mL/kg) [Urine:2900 (1.1 mL/kg/hr)]  Weight: 73.2 kg     Relevant " Results  Scheduled medications  amiodarone, 400 mg, oral, Daily  apixaban, 5 mg, oral, q12h  aspirin, 81 mg, oral, Daily  bisoprolol, 2.5 mg, oral, Daily  dapagliflozin propanediol, 10 mg, oral, Daily  furosemide, 20 mg, oral, Daily  insulin lispro, 0-10 Units, subcutaneous, q4h  mexiletine, 250 mg, oral, q8h ANNIKA  nicotine, 1 patch, transdermal, Daily   Followed by  [START ON 7/13/2024] nicotine, 1 patch, transdermal, Daily  pantoprazole, 40 mg, intravenous, Daily  PARoxetine, 20 mg, oral, Daily  polyethylene glycol, 17 g, oral, BID  ranolazine, 500 mg, oral, BID  rosuvastatin, 20 mg, oral, Nightly  sacubitriL-valsartan, 0.5 tablet, oral, BID  sennosides-docusate sodium, 2 tablet, oral, BID  spironolactone, 25 mg, oral, Daily      Continuous medications     PRN medications  PRN medications: acetaminophen **OR** [DISCONTINUED] acetaminophen **OR** [DISCONTINUED] acetaminophen, dextrose, glucagon, hydrOXYzine HCL, LORazepam, LORazepam, oxyCODONE, oxygen, simethicone, traZODone, trimethobenzamide    Results for orders placed or performed during the hospital encounter of 05/28/24 (from the past 24 hour(s))   POCT GLUCOSE   Result Value Ref Range    POCT Glucose 102 (H) 74 - 99 mg/dL   POCT GLUCOSE   Result Value Ref Range    POCT Glucose 151 (H) 74 - 99 mg/dL   POCT GLUCOSE   Result Value Ref Range    POCT Glucose 100 (H) 74 - 99 mg/dL   CBC   Result Value Ref Range    WBC 13.3 (H) 4.4 - 11.3 x10*3/uL    nRBC 0.0 0.0 - 0.0 /100 WBCs    RBC 3.83 (L) 4.50 - 5.90 x10*6/uL    Hemoglobin 12.4 (L) 13.5 - 17.5 g/dL    Hematocrit 35.0 (L) 41.0 - 52.0 %    MCV 91 80 - 100 fL    MCH 32.4 26.0 - 34.0 pg    MCHC 35.4 32.0 - 36.0 g/dL    RDW 14.1 11.5 - 14.5 %    Platelets 395 150 - 450 x10*3/uL   Renal Function Panel   Result Value Ref Range    Glucose 106 (H) 74 - 99 mg/dL    Sodium 136 136 - 145 mmol/L    Potassium 4.5 3.5 - 5.3 mmol/L    Chloride 103 98 - 107 mmol/L    Bicarbonate 25 21 - 32 mmol/L    Anion Gap 13 10 - 20 mmol/L     Urea Nitrogen 24 (H) 6 - 23 mg/dL    Creatinine 1.17 0.50 - 1.30 mg/dL    eGFR 73 >60 mL/min/1.73m*2    Calcium 9.1 8.6 - 10.6 mg/dL    Phosphorus 3.6 2.5 - 4.9 mg/dL    Albumin 3.7 3.4 - 5.0 g/dL   POCT GLUCOSE   Result Value Ref Range    POCT Glucose 135 (H) 74 - 99 mg/dL     Electrocardiogram, 12-lead PRN ACS symptoms    Result Date: 6/14/2024  AV sequential or dual chamber electronic pacemaker When compared with ECG of 02-JUN-2024 10:33, Electronic ventricular pacemaker has replaced Wide QRS rhythm    Electrocardiogram, 12-lead PRN ACS symptoms    Result Date: 6/14/2024  Wide QRS rhythm Right bundle branch block Left posterior fascicular block  Bifascicular block  Inferior infarct , age undetermined Abnormal ECG When compared with ECG of 04-JUN-2024 08:47, Wide QRS rhythm has replaced Sinus rhythm    XR chest 1 view    Result Date: 6/13/2024  Interpreted By:  Ashli Pena and MacBeth RaeLynne STUDY: XR CHEST 1 VIEW;  6/13/2024 4:33 am   INDICATION: Signs/Symptoms:SOB.   COMPARISON: Chest radiograph 06/08/2024 CT chest 06/04/2020   ACCESSION NUMBER(S): [id number]   ORDERING CLINICIAN: MEHDI NICOLAS   FINDINGS: AP radiograph of the chest was provided.   Stable positioning of a right chest wall AICD/pacemaker with leads overlying the right atrium, right ventricle, and coronary sinus. Interval removal of an inferior approach [place]-Billy catheter.   CARDIOMEDIASTINAL SILHOUETTE: Cardiomediastinal silhouette is stable in size and configuration.   LUNGS: Interval improvement in prominent interstitial markings, particularly in the right lung base. There is blunting of the right costophrenic angle. No pneumothorax.   ABDOMEN: No remarkable upper abdominal findings.   BONES: No acute osseous changes.       1.  Interval improvement in prominent interstitial markings suggestive of improving edema. 2. Small right pleural effusion. 3. Medical devices as described above.   I personally reviewed the images/study and  I agree with the findings as stated by resident physician Dr. Ania Barrera. This study was interpreted at [facility], [place], [place].   MACRO: None   Signed by: Ashli Pena 6/13/2024 11:21 AM Dictation workstation:   [id number]    Electrocardiogram, 12-lead PRN ACS symptoms    Result Date: 6/13/2024  Undetermined rhythm Right bundle branch block Marked T wave abnormality, consider inferior ischemia Abnormal ECG When compared with ECG of 03-JUN-2024 14:46, Sinus rhythm has replaced Electronic ventricular pacemaker Confirmed by Wale Pfeiffero ([id number]) on 6/13/2024 8:51:57 AM    Electrocardiogram, 12-lead PRN ACS symptoms    Result Date: 6/13/2024   Suspect unspecified pacemaker failure AV sequential or dual chamber electronic pacemaker Abnormal ECG When compared with ECG of 29-MAY-2024 14:53, Vent. rate has decreased BY  20 BPM Confirmed by Margarette Valentin ([id number]) on 6/13/2024 8:50:09 AM    Electrocardiogram, 12-lead PRN ACS symptoms    Result Date: 6/13/2024   Suspect unspecified pacemaker failure Undetermined rhythm Right bundle branch block Possible Lateral infarct , age undetermined Inferior infarct , age undetermined Abnormal ECG When compared with ECG of 29-MAY-2024 09:02, Current undetermined rhythm precludes rhythm comparison, needs review Confirmed by Margarette Valentin ([id number]) on 6/13/2024 8:45:57 AM    Electrocardiogram, 12-lead PRN ACS symptoms    Result Date: 6/13/2024  Wide QRS rhythm Right bundle branch block Possible Lateral infarct , age undetermined Inferior infarct , age undetermined Abnormal ECG When compared with ECG of 28-MAY-2024 23:57, Wide QRS rhythm has replaced Electronic ventricular pacemaker Confirmed by Margarette Valentin ([id number]) on 6/13/2024 8:45:23 AM    Electrocardiogram, 12-lead PRN ACS symptoms    Result Date: 6/13/2024  Wide QRS tachycardia Right bundle branch block Possible Lateral infarct , age undetermined Inferior infarct , age undetermined Abnormal ECG  When compared with ECG of 28-MAY-2024 22:04, Wide QRS tachycardia has replaced Wide QRS rhythm Confirmed by Margarette Valentin ([id number]) on 6/13/2024 8:44:54 AM    Electrocardiogram, 12-lead PRN ACS symptoms    Result Date: 6/13/2024   Suspect unspecified pacemaker failure Undetermined rhythm with evidence of device undersensing Left axis deviation Right bundle branch block Abnormal ECG When compared with ECG of 28-MAY-2024 16:11, Wide QRS rhythm has replaced Electronic ventricular pacemaker Confirmed by Margarette Valentin ([id number]) on 6/13/2024 8:44:46 AM    Electrocardiogram, 12-lead PRN ACS symptoms    Result Date: 6/10/2024  AV dual-paced rhythm Abnormal ECG When compared with ECG of 29-MAY-2024 12:49, Significant changes have occurred Confirmed by Zach Ellington ([id number]) on 6/10/2024 10:10:33 PM    Electrocardiogram, 12-lead PRN ACS symptoms    Result Date: 6/10/2024   Suspect unspecified pacemaker failure Ventricular-paced rhythm Abnormal ECG When compared with ECG of 28-MAY-2024 23:40, Significant changes have occurred Confirmed by Zach Ellington ([id number]) on 6/10/2024 10:09:14 PM    Bedside Midline Imaging    Result Date: 6/10/2024  These images are not reportable by radiology and will not be interpreted by  Radiologists.    XR chest 1 view    Result Date: 6/9/2024  Interpreted By:  Alonzo Garcia and Weaver Jakob STUDY: XR CHEST 1 VIEW; 6/8/2024 8:21 pm   INDICATION: Signs/Symptoms:SOB.   COMPARISON: Chest radiograph 06/08/2024, CT 06/04/2024   ACCESSION NUMBER(S): [id number]   ORDERING CLINICIAN: TIARA VALENCIA   FINDINGS: Chest radiograph 06/08/2024, CT 06/04/2024   Stable appearance of arightchest wall AICD/pacemakerwith leads projecting over the cardiac chambers. Inferior approach [place]-Billy catheter with tip terminating over the left main pulmonary artery.   CARDIOMEDIASTINAL SILHOUETTE: Cardiomediastinal silhouette is stable in size and configuration.   LUNGS: Diffusely increased pulmonary interstitial markings, further  increased from prior radiograph. Right costophrenic angle is excluded on this examination. No obvious pleural effusion or pneumothorax.   ABDOMEN: No remarkable upper abdominal findings.   BONES: No acute osseous changes.       1. Interval increase in right-greater-than-left interstitial markings likely represents worsening edema. Infection or aspiration are not excluded given the basilar predominance of the right lung opacities. 2. Medical appliances as above. Pulmonary artery catheter tip projects over the left pulmonary artery and correlate with desired positioning.   I personally reviewed the images/study and I agree with the findings as stated. This study was interpreted at [place], [place].   Signed by: Alonzo Garcia 6/9/2024 9:47 AM Dictation workstation:   [id number]    XR chest 1 view    Result Date: 6/8/2024  Interpreted By:  Alonzo Garcia, STUDY: XR CHEST 1 VIEW;  6/8/2024 8:47 am   INDICATION: Signs/Symptoms:swan and IABP in Situ.   COMPARISON: Exam dated 06/07/2024   ACCESSION NUMBER(S): [id number]   ORDERING CLINICIAN: SHONNA SMITH   FINDINGS: AP radiograph of the chest was provided.   Right chest wall pacer/ICD with lead tips in device in similar position. Inferior approach pulmonary artery catheter with the tip projecting over the left pulmonary artery. Radiopaque marker of the intra-aortic balloon pump projects over the AP window approximately 1.5 cm below the aortic arch.   CARDIOMEDIASTINAL SILHOUETTE: Cardiomediastinal silhouette is stable in size and configuration.   LUNGS: Diffusely increased interstitial markings, not significantly changed from prior exam. No focal consolidation or pneumothorax. Trace blunting of the right costophrenic angle.   ABDOMEN: No remarkable upper abdominal findings.   BONES: No acute osseous changes.       1.  Stable findings of mild pulmonary edema with trace right pleural effusion. 2. Medical devices as above.  Pulmonary artery catheter tip projects over the left pulmonary artery and repositioning is recommended.       MACRO: None   Signed by: Alonzo Garcia 6/8/2024 1:47 PM Dictation workstation:   [id number]    XR chest 1 view    Result Date: 6/8/2024  Interpreted By:  Alonzo Garcia, STUDY: XR CHEST 1 VIEW;  6/7/2024 7:35 am   INDICATION: Signs/Symptoms:IABP + SGC.   COMPARISON: Exam dated 06/06/2024   ACCESSION NUMBER(S): [id number]   ORDERING CLINICIAN: MEHDI NICOLAS   FINDINGS: AP radiograph of the chest was provided.   Endotracheal tube tip projects 6.8 cm above the aide. Enteric tube projects over the esophagus and left upper quadrant, tip not visualized. Right chest wall pacer/ICD with device and lead tips in similar position. Radiopaque marker of an intra-aortic balloon pump projects just below the aortic arch in similar position. Inferior approach pulmonary artery catheter tip projects over the pulmonary artery bifurcation.   CARDIOMEDIASTINAL SILHOUETTE: Cardiomediastinal silhouette is stable in size and configuration.   LUNGS: Left costophrenic angle is excluded from field of view. Blunting of the right costophrenic angle. Improvement in right basilar opacities. No evidence of pneumothorax.   ABDOMEN: No remarkable upper abdominal findings.   BONES: No acute osseous changes.       1.  Interval improvement in right basilar airspace opacities and pulmonary edema with trace residual right pleural effusion. Left costophrenic angle is excluded from field of view which limits evaluation. 2. Medical devices as above.       MACRO: None   Signed by: Alonzo Garcia 6/8/2024 10:26 AM Dictation workstation:   [id number]    Electrophysiology procedure    Result Date: 6/8/2024    Severe ischemic cardiomyopathy with large scar extending throughout the majority of the left ventricle, sparing the apex   Extensive substrate modification was performed in the septal, anterior, and lateral walls of the left ventricle within  these large areas of scar   Apical septal, Basal septal, and Lateral VTs were induced and ablated at best pacemap sites (>90% pacemap)   VT was inducible at the end of the case, but possibly attributed to patient's severe metabolic derangements (acidosis, hypercarbia, hyperkalemia)   [place] placement via right femoral vein   IABP placement via RFA, performed by the interventional cardiology team, for cardiogenic shock Ventricular Tachycardia Ablation Procedures VT Ablation (56934), 3D Mapping (58196),  Ultrasound Guided vascular access (33722), Intracardiac Echocardiogram (29177) EP testing with attempted arrhythmia induction (80454), LV Pacing and Recording (09861), Transeptal Catheterization (27417), and Arterial Line Placement (31974) Patient history: As per HPI Procedure narrative: The risks, benefits, and alternatives to the procedure were explained to the patient and informed consent was obtained. After informed written consent was obtained the patient was transported to the electrophysiology laboratory in the post absorptive, non-sedated state. The team verification process was completed prior to the start of the procedure. Written consent and a completed procedural sedation form were confirmed. Allergies/Adverse reactions were noted and patient teaching was performed. The patient was positioned on table and bilateral arm supports with soft cushions and all pressure points were padded. A large diameter grounding pad was applied to the patient's thigh. ECG electrodes and a set of hands-free defibrillator pads were applied to the patient. A MCL/12 lead ECG was continuously monitored throughout the procedure. Noninvasive blood pressure, oxygen saturation, and capnography were monitored throughout the procedure. Supplemental oxygen was delivered via nasal cannula or facemask. General anesthesia was administered throughout the procedure by our staff anesthesiology service.  Please see their notations for intubation  and sedation.  Throughout the procedure the patient's comfort (pain scale) and level of sedation (sedation scale) were noted every 5 minutes. PROGRAMMING AND INTERROGATION OF ICD: The Pt's ICD was turned off and programmed to a nontracking mode before the procedure, then evaluated and reprogrammed ( DDD 95 , therapy on ) post procedure. VASCULAR ACCESS AND CATHETER PLACEMENT: After GA anesthesia, venous access was achieved utilizing the Seldinger technique with a cook needle under ultrasound guidance. Two 8 FR sheaths in the right femoral vein and one 8Fr sheath was placed in the left femoral vein. Two 8Fr sheaths were placed in each the right and left femoral veins. A 5Fr sheath was placed in the right femoral artery with a micropuncture needle under ultrasound guidance. Using 50/50 contrast, a femoral arterial angiogram was performed to confirm satisfactory placement of the arterial sheath. Via a left femoral vein 8Fr short sheath a decapolar electrode catheter was advanced under fluoroscopic guidance and advanced into the CS for pacing and recording. Via left femoral vein 8Fr short sheath an ICE catheter was advanced into the mid RA for continuous ultrasound guidance and into the RVOT to evaluate for an effusion.  After access was obtained, a bolus injection of heparin 64817 units was administered. The Activated Clotting Time maintained between 300-350 secs via the iSTAT machine with additional boluses q 30 minutes as necessary. DIAGNOSTIC EP STUDY Sinus rhythm was present on arrival. Programmed ventricular stimulation at baseline revealed: Multiple VTs as described below  Sustained Monomorphic VTs: VT1:  Cycle length 340ms, Morphology  apical  in origin , Induced with V stim 600/390/350, Nonclinical, Not Hemodynamically tolerated, terminated with shock VT2:  Cycle length 325ms, Morphology septal basal , Induced with V stim 600/390/350,  , Clinical Not Hemodynamically tolerated, Terminated with Shock.       VT3:   Cycle length 350ms, Morphology septal basal , Induced with V stim 600/390/350,  , Clinical Not   Hemodynamically tolerated, Terminated with Shock.     VT4:  Cycle length 325ms, Morphology lateal  basal , Induced with V stim 600/390/350,  , Clinical Not Hemodynamically tolerated, Terminated with over drive pacing.  TRANSSEPTAL ACCESS: Access to the left atrium and subsequently left ventricle was achieved using a transseptal approach. The location of the trans-septal needle in the fossa was confirmed radiographically using standard anatomic landmarks as well as with intracardiac echo. A long J-tip wire was passed through the superior right femoral vein 8 Fr sheath into the SVC, the 8Fr sheath was subsequently removed. A Vizigo sheath was advanced over wire into the SVC. The wire was removed and the BRK needle was advanced into the sheath. The Vizigo with the baylis needle were dragged from the SVC along the septum until engagement of the Fossa Ovalis was confirmed by interatrial tenting seen under intracardiac ultrasound guidance. The baylis needle was advanced into the left atrium  the needle position confirmed with ICE. A SafeSept wire was advanced into the LSPV. The sheath with dilator were advanced carefully over the needle into the body of the left atrium. Once the Vizigo sheath was confirmed in the left atrium via intracardiac ultrasound, the wire and dilator were removed.  The sheath was attached to pressure tubing and a 25cc/hr drip of heparinized saline maintained. RETROGRADE ACCESS: We did not use it during the case , but at the end of the case the patient was acidotic and hyper K,   [place] was placed with low index , so that access was used for IABP placement IMAGING: Using the mapping catheter and intra cardiac echo with the CARTO / CARTOsound mapping system, an anatomic shell of the right ventricle/left ventricle/Aortic root/Pulmonic valve was created. The Optrell catheter was advanced into the left atrium  and then into the left ventricle. This was exchanged, when necessary, for the ablation catheter, which was advanced through the steerable sheath into the left ventricle. After septal puncture heparin was administered and ACTs were checked regularly to maintain an ACT of greater than 350 sec. The long LA sheath and Optrell  were continuously irrigated with heparinized saline at 20 - 25 ml/hr. MAPPING:  Substrate mapping of the left ventricle endocardium was performed during Sinus rhythm.  Areas of low voltage (less than 1.5 mv) were identified. Areas of late potentials in the scar were tagged. Pace-mapping was performed at selected sites and areas of long S-QRS delays and where pace-mapping matched QRS morphology of an induced VT were tagged. Areas of electrically unexcitable scar (pacing threshold greater than 10 mA at 2 ms pulse width) were tagged.             Mapping  showed scar on the Inferior, septal lateal  wall, with some late activation  spots on the lateal basal , septal basal, and apical area. ABLATION:  At first we ablated the apical scar after pace mapping proved the the first induced VT is originating from there, after ablation that VT could not be induced. Then VT2 was induced , pace map conformed septal origin which was ablated and could not be induced.  VT3 was induced pace map prove that it is septal, it was ablated and could not be induced. Then VT4 was also induced and ablated in the patient's lateral scar. At the end of the case he had a very wide VT  which is likely metabolic so it was terminated by shock.   POST-ABLATION:   Intracardiac ultrasound after ablation showed no pericardial effusion. POST-ABLATION: The ICE catheter was maneuvered into the RVOT. The epicardial space of the heart, including around the RV and LV, was evaluated and no effusion was visualized. 6F on the rt was removed and  vascade ,  8.5 F was used to place a SWAN  that showed low index, Arterial access on the Rt fem was  used for IABP placement. Lt fem venous sheaths was removed and vascaded. Complications: No complications occurred. EBL <20mL ________________________________________ CONCLUSIONS Severe ischemic cardiomyopathy with large scar extending throughout the majority of the left ventricle, sparing the apex Extensive substrate modification was performed in the septal, anterior, and lateral walls of the left ventricle within these large areas of scar Apical septal, Basal septal, and Lateral VTs were induced and ablated at best pacemap sites (>90% pacemap) VT was inducible at the end of the case, but possibly attributed to patient's severe metabolic derangements (acidosis, hypercarbia, hyperkalemia) [place] placement via right femoral vein IABP placement via RFA, performed by the intervention cardiology team, for cardiogenic shock RECOMMENDATIONS Admited to HFICU form SWAN guided therapy with the support of IABP Resume IV amiodarone Start IV heparin in 4 hours  ATTESTATION: As the responsible attending physician, I was present and directly participating in all critical portions of the procedure and immediately available during the non-critical portions.     XR chest 1 view    Result Date: 6/7/2024  Interpreted By:  Ashli Pena and Weaver Jakob STUDY: XR CHEST 1 VIEW; XR ABDOMEN 1 VIEW; 6/6/2024 9:24 pm; 6/6/2024 9:26 pm   INDICATION: Signs/Symptoms:IABP SGC; Signs/Symptoms:OG.   COMPARISON: Chest radiograph 06/02/2024, CT 06/04/2024   ACCESSION NUMBER(S): [id number]; [id number]   ORDERING CLINICIAN: MEHDI NICOLAS   FINDINGS: Single AP radiograph of the chest was obtained.   Stable appearance of arightchest wall AICD/pacemakerwith leads projecting over the cardiac chambers. An endotracheal tube is seen terminating 5.6 cm above the aide. An enteric tube is seen coursing with the esophagus, below the left hemidiaphragm, and terminating at the expected location of the gastric body. The distal side hole is seen at the  level of the gastroesophageal junction. Consider slight advancement. Intra-aortic balloon pump marker projecting immediately inferior to the aortic knob.   CARDIOMEDIASTINAL SILHOUETTE: Cardiomediastinal silhouette is stable in size and configuration. Atherosclerotic calcification of the aortic knob.   LUNGS: Prominence of the pulmonary vasculature and perihilar interstitial markings with interval improvement in aeration of the right lower lung. Blunting of the bilateral costophrenic angles similar to prior radiograph. No pneumothorax.   ABDOMEN: No remarkable upper abdominal findings.   BONES: No acute osseous changes.       1. Intra-aortic balloon pump marker projecting inferior to the aortic knob. Enteric tube with distal side port at the level of the gastroesophageal junction; consider slight advancement. Multiple additional medical appliances as above. 2. Persistent pulmonary edema and bilateral pleural effusions with interval improvement in aeration of the right lung base. Superimposed infectious process can not be definitively excluded. Recommend correlation with patient volume status.   I personally reviewed the images/study and I agree with the findings as stated. This study was interpreted at [place], [place].   Signed by: Ashli Pena 6/7/2024 8:25 AM Dictation workstation:   [id number]    XR abdomen 1 view    Result Date: 6/7/2024  Interpreted By:  Ashli Pena and Weaver Jakob STUDY: XR CHEST 1 VIEW; XR ABDOMEN 1 VIEW; 6/6/2024 9:24 pm; 6/6/2024 9:26 pm   INDICATION: Signs/Symptoms:IABP SGC; Signs/Symptoms:OG.   COMPARISON: Chest radiograph 06/02/2024, CT 06/04/2024   ACCESSION NUMBER(S): [id number]; [id number]   ORDERING CLINICIAN: MEHDI NICOLAS   FINDINGS: Single AP radiograph of the chest was obtained.   Stable appearance of arightchest wall AICD/pacemakerwith leads projecting over the cardiac chambers. An endotracheal tube is seen  terminating 5.6 cm above the aide. An enteric tube is seen coursing with the esophagus, below the left hemidiaphragm, and terminating at the expected location of the gastric body. The distal side hole is seen at the level of the gastroesophageal junction. Consider slight advancement. Intra-aortic balloon pump marker projecting immediately inferior to the aortic knob.   CARDIOMEDIASTINAL SILHOUETTE: Cardiomediastinal silhouette is stable in size and configuration. Atherosclerotic calcification of the aortic knob.   LUNGS: Prominence of the pulmonary vasculature and perihilar interstitial markings with interval improvement in aeration of the right lower lung. Blunting of the bilateral costophrenic angles similar to prior radiograph. No pneumothorax.   ABDOMEN: No remarkable upper abdominal findings.   BONES: No acute osseous changes.       1. Intra-aortic balloon pump marker projecting inferior to the aortic knob. Enteric tube with distal side port at the level of the gastroesophageal junction; consider slight advancement. Multiple additional medical appliances as above. 2. Persistent pulmonary edema and bilateral pleural effusions with interval improvement in aeration of the right lung base. Superimposed infectious process can not be definitively excluded. Recommend correlation with patient volume status.   I personally reviewed the images/study and I agree with the findings as stated. This study was interpreted at [place], [place].   Signed by: Ashli Pena 6/7/2024 8:25 AM Dictation workstation:   [id number]    CT chest wo IV contrast    Result Date: 6/5/2024  Interpreted By:  Clayton Rodas and Calo Sean-Matthew STUDY: CT CHEST WO IV CONTRAST;  6/4/2024 1:34 pm   INDICATION: Signs/Symptoms:VAD workup. 58-year-old male with history of recurrent ICD shocks secondary to ventricular tachycardia status post DC cardioversion 05/30/2024, VT ablation 05/03/2024 with  continued intermittent VT. LVAD evaluation.   COMPARISON: CT abdomen pelvis 05/24/2024   ACCESSION NUMBER(S): [id number]   ORDERING CLINICIAN: SANA PHILLIPS   TECHNIQUE: Helical data acquisition of the chest was obtained  administration.. Images were reformatted in axial, coronal, and sagittal planes.   FINDINGS: LUNGS AND AIRWAYS: The trachea and central airways are patent. No endobronchial lesion.   Interval worsening now moderate right and small left pleural effusions with associated atelectasis. Diffuse severe bilateral upper lobe predominant centrilobular emphysematous changes are noted. Bilateral smooth interlobular septal thickening is noted. Posterior predominant ground-glass opacities are noted throughout the right lung. No pneumothorax.   No suspicious pulmonary nodule.   MEDIASTINUM AND MARIO, LOWER NECK AND AXILLA: The visualized thyroid gland is within normal limits.   No evidence of thoracic lymphadenopathy by CT criteria.   Esophagus appears within normal limits as seen.   HEART AND VESSELS: The thoracic aorta is of normal course and caliber with moderate vascular calcifications.   Main pulmonary artery and its branches are normal in caliber. Few tiny foci of gas are noted within the proximal left brachiocephalic vein and main pulmonary artery, likely related to recent intravenous injection.   Severe coronary artery calcifications status post stenting. The study is not optimized for evaluation of coronary arteries.   The cardiac chambers are not enlarged. A right subclavian approach pacemaker/AICD is noted with the leads within the right atrium, right ventricle, and coronary sinus.   No significant pericardial effusion.   UPPER ABDOMEN: Multiple nonobstructing calculi are noted within the right kidney measuring up to 5 mm in the upper pole. Additional hilar calcifications are favored to be vascular in nature. Layering hyperdense material is noted within the gallbladder which may relate to biliary  sludge.   CHEST WALL AND OSSEOUS STRUCTURES: There are no suspicious osseous lesions. Multilevel degenerative changes of the thoracic spine are noted.       1. Interval worsening now moderate right and small left pleural effusions with associated atelectasis. 2. Findings suggestive of mild pulmonary interstitial and alveolar edema. There is severe emphysematous changes. 3. There is severe atherosclerotic changes of the coronary arteries with coronary artery stent placement.   I personally reviewed the images/study and I agree with the findings as stated by Elise Osman MD. This study was interpreted at [place], [place].   MACRO: None   Signed by: Clayton Rodas 6/5/2024 9:27 AM Dictation workstation:   [id number]    XR chest 1 view    Result Date: 6/3/2024  Interpreted By:  Ashli Pena and Weaver Jakob STUDY: XR CHEST 1 VIEW; 6/2/2024 9:54 pm   INDICATION: Signs/Symptoms:sob.   COMPARISON: Chest radiograph 05/31/2024, CT 05/24/2024   ACCESSION NUMBER(S): [id number]   ORDERING CLINICIAN: DIRK DIETRICH   FINDINGS: Single AP radiograph of the chest was obtained.   Stable appearance of arightchest wall AICD/pacemakerwith leads projecting over the cardiac chambers.   CARDIOMEDIASTINAL SILHOUETTE: Cardiomediastinal silhouette is enlarged, stable in size and configuration.   LUNGS: Increasing hazy opacities in the right-greater-than-left bases with blunting of the right-greater-than-left costophrenic angles. Prominence of the perihilar vascular markings. No focal consolidation. No pneumothorax.   ABDOMEN: No remarkable upper abdominal findings.   BONES: No acute osseous changes.       Prominent perihilar vascular markings, hazy right-greater-than-left bibasilar opacities, and small to moderate right-greater-than-left pleural effusions new/increased from prior radiograph. In the setting of cardiomegaly, findings may relate to volume overload. However an infectious  or inflammatory process can not be excluded.   I personally reviewed the images/study and I agree with the findings as stated by Mino Resendez MD. This study was interpreted at [facility], [place], [place].   Signed by: Ashli Pena 6/3/2024 8:37 AM Dictation workstation:   [id number]    Electrocardiogram, 12-lead PRN ACS symptoms    Result Date: 6/3/2024  Wide QRS rhythm Right bundle branch block Left posterior fascicular block  Bifascicular block  Inferior infarct , age undetermined Abnormal ECG When compared with ECG of 02-JUN-2024 10:32, No significant change was found    Electrocardiogram, 12-lead PRN ACS symptoms    Result Date: 6/3/2024  Wide QRS rhythm with frequent Premature ventricular complexes Right bundle branch block Possible Lateral infarct , age undetermined Inferior infarct , age undetermined Abnormal ECG When compared with ECG of 02-JUN-2024 08:45, Premature ventricular complexes are now Present    Electrocardiogram, 12-lead PRN ACS symptoms    Result Date: 6/2/2024  AV dual-paced rhythm Abnormal ECG When compared with ECG of 28-MAY-2024 14:18, Significant changes have occurred Confirmed by Zach Ellington ([id number]) on 6/2/2024 8:56:37 AM    XR abdomen 1 view    Result Date: 5/31/2024  Interpreted By:  Milan Dewitt and Ohs Zachary STUDY: XR ABDOMEN 1 VIEW;  5/31/2024 3:49 pm   INDICATION: Signs/Symptoms:abdominal pain.   COMPARISON: Abdominal radiograph 05/24/2024, CT abdomen/pelvis 05/24/2024.   ACCESSION NUMBER(S): [id number]   ORDERING CLINICIAN: DIRK DIETRICH   FINDINGS: Left nephroureteral stent with superior tip overlying the expected location of the left kidney and distal tip overlying the right lower pelvis. Multiple vascular stents overlying the right pelvis and right upper leg. Triple chamber AICD with leads in appropriate position. Radiopaque density overlying the expected location of the GE junction, not seen on prior abdominal radiograph 05/24/2024.    There is a nonobstructive bowel gas pattern. There is a  moderate amount of scattered retained stool throughout the colon and rectum. No extraluminal/portal venous gas.   Visualized soft tissues and osseous structures are unremarkable.   The lung bases are clear.       1. Nonobstructive bowel gas pattern. 2. Radiopaque density overlying the expected location of the GE junction, not seen on prior abdominal radiograph 05/24/2024.   I personally reviewed the images/study and I agree with the findings as stated by Dr. Hubert Peñaloza. This study was interpreted at [place], [place].   MACRO: none   Signed by: Milan Dewitt 5/31/2024 4:17 PM Dictation workstation:   [id number]    XR chest 1 view    Result Date: 5/31/2024  Interpreted By:  Milan Dewitt, STUDY: XR CHEST 1 VIEW;  5/31/2024 3:39 pm   INDICATION: Signs/Symptoms:CP/SOB.   COMPARISON: Chest radiograph dated 5/30/2024   ACCESSION NUMBER(S): [id number]   ORDERING CLINICIAN: DIRK DIETRICH   FINDINGS: AP radiograph of the chest The AICD electrodes appear in adequate position. The heart is enlarged. Atheromatous disease of the aortic arch is demonstrated. Interstitial opacities and peribronchial thickening is noted within right lower lobe. Small bilateral pleural effusions and subsegmental atelectasis is noted adjacent to the diaphragms greater within the right lower lobe than left.       1. Right lower lobe interstitial infiltrate peribronchial thickening and small right pleural effusion and subsegmental atelectasis 2. Trace left pleural effusion and subsegmental atelectasis adjacent to the left diaphragm laterally       Signed by: Milan Dewitt 5/31/2024 3:51 PM Dictation workstation:   [id number]    XR chest 1 view    Result Date: 5/31/2024  Interpreted By:  Milan Dewitt and Ohs Zachary STUDY: XR CHEST 1 VIEW;  5/31/2024 8:18 am   INDICATION: Signs/Symptoms:SOB.   COMPARISON: Chest radiographs since 10/02/2003, CT  abdomen/pelvis 05/24/2024.   ACCESSION NUMBER(S): [id number]   ORDERING CLINICIAN: DIRK DIETRICH   FINDINGS: AP radiograph of the chest was provided.   MEDICAL DEVICES: Right chest wall triple chamber AICD.   CARDIOMEDIASTINAL SILHOUETTE: Cardiomediastinal silhouette is normal in size and configuration.   LUNGS: Hazy/streaky right lower lung opacity, new from prior. Mild pulmonary vascular congestion and prominent interstitial lung markings, similar to prior exam. No pneumothorax or pleural effusion.   ABDOMEN: No remarkable upper abdominal findings.   BONES: No acute osseous changes.       1.  New right lower lung consolidation and/or atelectasis. 2. Similar interstitial pulmonary edema.   I personally reviewed the images/study and I agree with the findings as stated by Dr. Hubert Peñaloza. This study was interpreted at [place], [place].   MACRO: None   Signed by: Milan Dewitt 5/31/2024 9:52 AM Dictation workstation:   [id number]    Electrophysiology procedure    Result Date: 5/30/2024  Images from the original result were not included. Ventricular Tachycardia Ablation Procedures VT Ablation (60543), 3D Mapping (16304), His Bundle Recording (58455), Ultrasound Guided vascular access (69903), Intracardiac Echocardiogram (41857), Transseptal Catheterization (41065) Patient history: As documented in EMR Procedure narrative: The risks, benefits, and alternatives to the procedure were explained to the patient and informed consent was obtained. After informed written consent was obtained the patient was transported to the electrophysiology laboratory in the post absorptive, non-sedated state. The team verification process was completed prior to the start of the procedure. Written consent and a completed procedural sedation form were confirmed. Allergies/Adverse reactions were noted and patient teaching was performed. The patient was positioned on table and bilateral arm  supports with soft cushions and all pressure points were padded. A large diameter grounding pad was applied to the patient's thigh. ECG electrodes and a set of hands-free defibrillator pads were applied to the patient. A MCL/12 lead ECG was continuously monitored throughout the procedure. Noninvasive blood pressure, oxygen saturation, and capnography were monitored throughout the procedure. Supplemental oxygen was delivered via nasal cannula or facemask. Monitored anesthesia care was administered throughout the procedure by our staff anesthesiology service.  Please see their notations for intubation and sedation.  Throughout the procedure the patient's comfort (pain scale) and level of sedation (sedation scale) were noted every 5 minutes. PROGRAMMING AND INTERROGATION OF ICD: The Pt's ICD was turned off and programmed to a nontracking mode before the procedure, then evaluated and reprogrammed post procedure. The patient was in clinical ventricular tachycardia at the start of the procedure. ( Morphology described below). VASCULAR ACCESS AND CATHETER PLACEMENT: After local anesthesia, venous access was achieved utilizing the Seldinger technique with a cook needle under ultrasound guidance. Two 8.5 FR sheaths in the right femoral vein and one 9Fr sheath was placed in the right femoral vein. Two 8Fr sheaths were placed in each the right and left femoral veins. Via a right femoral vein 9Fr short sheath a quadripolar electrode catheter was advanced under fluoroscopic guidance and advanced into the RV for pacing and recording. Via right femoral vein 8.5 Fr short sheath an ICE catheter was advanced into the mid RA for continuous ultrasound guidance and into the RVOT to evaluate for an effusion.  After access was obtained, a bolus injection of heparin 9000 units was administered. The Activated Clotting Time maintained between 300-350 secs via the iSTAT machine with additional boluses q 30 minutes as necessary. DIAGNOSTIC EP  STUDY Biventricular Pacing was present on arrival. Sustained Monomorphic VTs: VT1:  Cycle length 710 ms, Morphology RBBB with mid precordial transition + superior and left arriaga axis, spontaneous, Clinical, Hemodynamically tolerated, Terminated with overdrive pacing/ablation. Entrainment mapping suggestive of a mid inferior exit site adjacent the septal aspect of the LV VT2:  Cycle length 530 ms, Morphology RBBB with late precordial transition + inferior and rightward axis, Induced with programmed extra - stimulation, Clinical Hemodynamically tolerated, Termination with catheter manipulation. Pace mapping was suggestive of a mid lateral exit site.  TRANSSEPTAL ACCESS: Access to the left atrium and subsequently left ventricle was achieved using a transseptal approach. The location of the trans-septal needle in the fossa was confirmed radiographically using standard anatomic landmarks as well as with intracardiac echo. A long J-tip wire was passed through the superior right femoral vein 8 Fr sheath into the SVC, the 8.5Fr sheath was subsequently removed. A [place] sheath was advanced over wire into the SVC. The wire was removed and the RF needle was advanced into the sheath. The assembly was dragged from the SVC along the septum until engagement of the Fossa Ovalis was confirmed by interatrial tenting seen under intracardiac ultrasound guidance. The RF needle was advanced into the left atrium  the needle position confirmed with ICE. The sheath with dilator were advanced carefully over the needle into the body of the left atrium. Once the sheath was confirmed in the left atrium via intracardiac ultrasound, the wire and dilator were removed.  The sheath was attached to pressure tubing and a 25cc/hr drip of heparinized saline maintained. IMAGING: Using the mapping catheter and intra cardiac echo with the CARTO / CARTOsound mapping system, an anatomic shell of the right ventricle/left ventricle/Aortic root/Pulmonic valve  was created.The Optrell catheter was advanced into the left atrium and then into the left ventricle. This was exchanged, when necessary, for the ablation catheter, which was advanced through the steerable sheath into the left ventricle. After septal puncture heparin was administered and ACTs were checked regularly to maintain an ACT of greater than 350 sec. The long LA sheath and Optrell  were continuously irrigated with heparinized saline at 20 - 25 ml/hr. MAPPING AND ABLATION:  Substrate mapping of the right ventricle endocardium was performed during Biventricular Pacing.  Areas of low voltage (less than 1.5 mv) were identified. Areas of late potentials in the scar were tagged. Pace-mapping was performed at selected sites and areas of long S-QRS delays and where pace-mapping matched QRS morphology of an induced VT were tagged. Areas of electrically unexcitable scar (pacing threshold greater than 10 mA at 2 ms pulse width) were tagged. Substrate mapping was performed which essentially showed a large region of scar involving the basal  - mid left ventricular segments with some sparing of the anterior wall. Though the patient was in clinical VT1, catheter manipulation caused frequent termination of the arrhythmia precluding a complete activation map. We proceeded to pace map from the border zones to identify exit sites for the clinical Vts. Pacing from the mid lateral site yielded good results for the VT1. RF ablation was performed in this region.  Lesions were monitored for contact force and impedance drop.  Lesions rendered each targeted region electrically unexcitable. As we were consolidating our lesions in this segments, the patient was noted to go into clinical VT1. Entrainment mapping was suggestive of the mid inferior / infero - septal exit site. Again, the VT though incessant terminated with catheter manipulation and thus could not perform an activation map completely. We proceeded to apply RF lesions in  this region, lesions were monitored for contact force and impedance drop.  Lesions rendered each targeted region electrically unexcitable. The clinical VT1 was no longer incessant. Programmed stimulation a this time with triple extra - stimuli induced pulseless VT requiring defibrillation. We then proceeded to further consolidate our lesions. At this time, given long procedure time, patient discomfort, good results with the RF ablation of clinical VT, and pulseless VT requiring defibrillation we decided to end the case.  POST-ABLATION PROVOCATION: Programmed stimulation performed at the end of the case showed no inducible sustained clinical VT.  Intracardiac ultrasound after ablation showed no pericardial effusion. POST-ABLATION: The ICE catheter was maneuvered into the RVOT. The epicardial space of the heart, including around the RV and LV, was evaluated and no effusion was visualized. Sheaths were removed and hemostasis was obtained at the right femoral venous access sites with Vascade MVP .Five minutes of manual compression was applied to maintain hemostasis. Summary: Successful RFA for clinical VT 1 with a critical isthmus involving the mid inferior / infero - septal left ventricular segment Successful RFA for clinical VT 2 pace mapped to the mid lateral left ventriclar segment Recommendation: Transfer to CICU Continue current AADT - Amiodarone and IV Lidocine Device was reprogrammed to DDD 80, please have the device formally interrogated. Would recommend to slowly transition to DDD 60 over the next 48 hours In case of recurrent VT, more extensive substrate modification under GA may be considered       Electrophysiology procedure    Result Date: 5/29/2024  Table formatting from the original result was not included. Procedure Details: Procedure Details:  Cardioversion Summary: ·   Successful termination of ventricular tachycardia using ATP from biventricular ICD system Recommendations: 1. A 12 lead ECG should be  performed prior to discharge from the hospital. 2. The patient should continue with the present medications. Discharge: 1. The patient recovered uneventfully from the effects of conscious sedation. The patient left the EP laboratory hemodynamically stable and without neurological deficits. Follow up: 1. The patient will return to telemetry-intensive care unit for drug load with high risk medication, following bed rest and subsequent ambulation, provided the recovery parameters are appropriate. The patient should call the electrophysiologist immediately if symptoms recur, or for any problems. The patient has been instructed accordingly. ________________________________________ Procedures: Noninvasive programmed stimulation.  Pre and post biventricular ICD interrogation reprogramming ________________________________________ Patient history: Please refer to the detailed history and physical on the patient's medical chart.  History of ventricular tachycardia.  Patient was loaded with amiodarone.  Patient had recurrence of ventricular arrhythmias overnight.  Patient is a scheduled for NIPS and possible cardioversion ________________________________________ Procedure narrative: The device was interrogated and reprogrammed prior to the procedure.  The risks, benefits, and alternatives to the procedure and sedation were explained to the patient, and informed consent was obtained. The patient was in the fasting state. A grounding pad was placed. Self-adhesive anterior-posterior defibrillation pads were applied. A ZOLL defibrillator was used for monitoring and the defibrillator waveform was set to biphasic. The patient was set up for continuous monitoring of surface 12 lead ECG and pulse oximetry. Blood pressure was monitored with automatic cuff measurements. The procedure was performed under IV conscious sedation performed by anesthesiology service. 1. The device was interrogated.  Patient was in ventricular tachycardia at a  rate of 100 bpm. Antitachycardia pacing was delivered from the right ventricular lead at 500 ms, 400 ms, 350 ms with finally successful termination of ventricular tachycardia into atrial paced-ventricular paced rhythm.  The ventricular tachycardia zone was very prominent as low was 125 bpm.  ATP burst and ICD therapies were performed.  The pacing mode was At DDD 80 bpm. ________________________________________ Complications: The patient tolerated the procedure without any complications or incident. ________________________________________ Prepared and signed by Tolerance: good Complications: None     XR chest 1 view    Result Date: 5/29/2024  Interpreted By:  Milan Dewitt and Ohs Zachary STUDY: XR CHEST 1 VIEW;  5/28/2024 5:38 pm   INDICATION: Signs/Symptoms:SOB.   COMPARISON: Chest radiograph 05/22/2024, CT abdomen/pelvis 05/24/2024.   ACCESSION NUMBER(S): [id number]   ORDERING CLINICIAN: DIRK DIETRICH   FINDINGS: AP radiograph of the chest was provided.   MEDICAL DEVICES: Right chest wall triple chamber AICD.   CARDIOMEDIASTINAL SILHOUETTE: Cardiomediastinal silhouette is normal in size and configuration.   LUNGS: No pneumothorax, pleural effusion or focal consolidation.   ABDOMEN: No remarkable upper abdominal findings.   BONES: No acute osseous changes.       1.  No evidence of acute cardiopulmonary process.   I personally reviewed the images/study and I agree with the findings as stated by Dr. Hubert Peñaloza. This study was interpreted at [place], [place].   MACRO: None   Signed by: Milan Dewitt 5/29/2024 7:16 AM Dictation workstation:   [id number]    ECG 12 Lead    Result Date: 5/28/2024  Ventricular tachycardia Confirmed by Zachary Sparks ([id number]) on 5/28/2024 3:18:52 PM    ECG 12 lead    Result Date: 5/28/2024  AV dual-paced rhythm Abnormal ECG When compared with ECG of 28-MAY-2024 07:26, (unconfirmed) Electronic ventricular pacemaker has replaced Atrial  fibrillation Confirmed by Zachary Sparks ([id number]) on 5/28/2024 3:18:28 PM    Transthoracic Echo (TTE) Complete    Result Date: 5/28/2024          [place] [address]  Tel [phone] Fax [phone] TRANSTHORACIC ECHOCARDIOGRAM REPORT  Patient Name:      FRANCIA PAULINO          Antoine Physician:    [id number] Kd Rodrigues DO Study Date:        5/28/2024             Ordering Provider:    Isaias RODRIGUES MRN/PID:           [id number]              Fellow: Accession#:        [id number]          Nurse: Date of Birth/Age: 1965 / 58 years Sonographer:          Kathya Stallings RDCS Gender:            M                     Additional Staff: Height:            170.18 cm             Admit Date:           5/22/2024 Weight:            73.48 kg              Admission Status:     Inpatient -                                                                Routine BSA / BMI:         1.85 m2 / 25.37 kg/m2 Department Location:  [facility] Blood Pressure: 85 /58 mmHg Study Type:    TRANSTHORACIC ECHO (TTE) COMPLETE Diagnosis/ICD: Unspecified systolic (congestive) heart failure (CHF)-I50.20 Indication:    Congestive Heart Failure CPT Codes:     Echo Limited-92237; Doppler Limited-98442; Color Doppler-55564 Patient History: Pacer/Defib:       AICD/Perment pacemaker Pertinent History: Chest Pain, CAD, Cardiomyopathy, HTN and Hyperlipidemia. Study Detail: The following Echo studies were performed: 2D, M-Mode, Doppler and               color flow. Technically challenging study due to patient lying in               supine position. Definity used as a contrast agent for endocardial               border definition. Total contrast used for this  procedure was 2 mL               via IV push.  PHYSICIAN INTERPRETATION: Left Ventricle: Left ventricular systolic function is severely decreased, with an estimated ejection fraction of 15%. There is global hypokinesis of the left ventricle with minor regional variations. The left ventricular cavity size is moderately dilated. There is mild concentric left ventricular hypertrophy. Left ventricular diastolic filling was not assessed. Left Atrium: The left atrium is mildly dilated. Right Ventricle: The right ventricle is normal in size. There is normal right ventricular global systolic function. A device is visualized in the right ventricle. Right Atrium: The right atrium is normal in size. There is a device visualized in the right atrium. Aortic Valve: The aortic valve appears structurally normal. The aortic valve appears tricuspid. There is mild aortic valve cusp calcification. There is no evidence of aortic valve stenosis. There is no evidence of aortic valve regurgitation. Mitral Valve: The mitral valve is normal in structure. There is no evidence of mitral valve stenosis. There is normal mitral valve leaflet mobility. There is mild mitral annular calcification. There is moderate to severe mitral valve regurgitation. Tricuspid Valve: The tricuspid valve is structurally normal. There is normal tricuspid valve leaflet mobility. There is mild tricuspid regurgitation. Pulmonic Valve: The pulmonic valve is not well visualized. There is no indication of pulmonic valve regurgitation. Pericardium: There is no pericardial effusion noted. Aorta: The aortic root is normal. Pulmonary Artery: The pulmonary artery is not well visualized. The tricuspid regurgitant velocity is 2.70 m/s, and with an estimated right atrial pressure of 3 mmHg, the estimated pulmonary artery pressure is normal with the RVSP at 32.2 mmHg. Systemic Veins: The inferior vena cava appears mildly dilated. In comparison to the previous echocardiogram(s):  Prior examinations are available and were reviewed for comparison purposes. Current study appears to be remarkably similar to the May 23, 2024 study done here.  CONCLUSIONS:  1. Left ventricular systolic function is severely decreased with a 15% estimated ejection fraction.  2. There is no evidence of mitral valve stenosis.  3. Moderate to severe mitral valve regurgitation.  4. Mild tricuspid regurgitation is visualized.  5. Aortic valve stenosis is not present.  6. Left ventricular cavity size is moderately dilated.  7. The pulmonary artery is not well visualized.  8. Current study appears to be remarkably similar to the May 23, 2024 study done here.  9. There is global hypokinesis of the left ventricle with minor regional variations. RECOMMENDATIONS: Technically suboptimal and limited study, therefore accuracy of above interpretation could be substantially diminished. Clinical correlation is advised. Consider additional imaging modalities if clinically indicated.  QUANTITATIVE DATA SUMMARY: 2D MEASUREMENTS:                           Normal Ranges: LAs:           4.00 cm    (2.7-4.0cm) RVIDd:         3.00 cm    (0.9-3.6cm) IVSd:          1.15 cm    (0.6-1.1cm) LVPWd:         1.12 cm    (0.6-1.1cm) LVIDd:         6.32 cm    (3.9-5.9cm) LVIDs:         5.72 cm LV Mass Index: 172.0 g/m2 LV % FS        9.5 % LV SYSTOLIC FUNCTION BY 2D PLANIMETRY (MOD):                     Normal Ranges: EF-A4C View: 24.0 % (>=55%) EF-A2C View: 18.3 % EF-Biplane:  20.2 % AORTIC VALVE:                        Normal Ranges: LVOT Diameter: 2.10 cm (1.8-2.4cm) TRICUSPID VALVE/RVSP:                             Normal Ranges: Peak TR Velocity: 2.70 m/s RV Syst Pressure: 32.2 mmHg (< 30mmHg) IVC Diam:         2.06 cm  [id number] Kd Rodrigues DO Electronically signed on 5/28/2024 at 10:05:34 AM  ** Final **     CT abdomen pelvis wo IV contrast    Result Date: 5/28/2024  Interpreted By:  Gage Lopez, STUDY: CT ABDOMEN PELVIS WO IV CONTRAST;   5/24/2024 4:54 pm   INDICATION: Signs/Symptoms:Follow-up of bilateral ureteral lithotripsy with left-sided indwelling double-J stent, check for residual stones.   COMPARISON: KUB 24 May 2024; no prior ultrasound, CT or MRI of the abdomen/pelvis   ACCESSION NUMBER(S): [id number]   ORDERING CLINICIAN: LOUIS D'AMICO   TECHNIQUE: CT of the abdomen and pelvis from the lung bases through the symphysis pubis without oral or IV contrast   FINDINGS: LOWER CHEST: Interstitial edema. Trace right pleural effusion too small for thoracocentesis with adjacent atelectasis. Other atelectatic changes in both lung bases. At least one or two nodules in the middle lobe.   BONES: No acute skeletal findings.   RIGHT KIDNEY AND URETER: Radiodense stone: Five stones or stone fragments measuring 3-4 mm all too small to measure attenuation. Other calcifications close to the hilus I suspect are vascular none in the ureter Hydronephrosis: Negative Congenital variant anatomy: Negative. No duplicated ureter or other variant anatomy. Other: Amorphous high attenuation in the parenchyma between the poles posterolaterally. Perhaps minimal periureteric inflammatory changes proximally   LEFT KIDNEY AND URETER: Radiodense stone: Just one, less than 3 mm (too small to accurately measure attenuation) stone or stone fragment towards the lower pole. Other calcifications close to the hilus I suspect are vascular. No stone or stone fragment in the stented ureter. Hydronephrosis: Negative Congenital variant anatomy: Negative. No duplicated ureter or other variant anatomy. Other: Tiny region of high attenuation medial upper pole   URINARY BLADDER: Radiodense stone: Negative Wall thickening / other inflammatory change: Negative Diverticula: Negative Congenital variant anatomy: Negative. No variant anatomy such as urachal remnant. Other: n/a   LIVER: Normal. No enlargement or evidence of cirrhosis or fatty change. No gross evidence of a mass, noting low  sensitivity and specificity without IV contrast.   SPLEEN: Normal. No enlargement.   PANCREAS: Normal. No CT evidence of acute or chronic pancreatitis. No obvious  duct dilation. No gross evidence of a mass, noting low sensitivity and specificity without IV contrast.   GALLBLADDER: Normal CT appearance. No dilation, calcified, or gas-containing stones.  Other types of gallstones could be occult on CT and detectable only by ultrasound.   BILE DUCTS: Normal. No biliary duct dilation.   ADRENAL GLANDS: Normal. No nodule or mass.   LYMPH NODES: No adenopathy, intraperitoneal, retroperitoneal, pelvic, inguinal or otherwise.   APPENDIX: Normal.  Not dilated, thick walled or in any other way inflamed in appearance.  No inflammatory change about the appendix.   COLON: Normal. No sign of acute diverticulitis or other colitis. No annular constricting mass.   SMALL BOWEL: Normal. No small bowel dilation or any other sign of small bowel obstruction.   STOMACH / DUODENUM: Grossly normal by CT which has limited sensitivity and specificity for the stomach and duodenum.   RETROPERITONEUM: Normal.  No acute hemorrhage or inflammatory change. Lymph nodes in a separate dedicated section.   OMENTUM, MESENTERY AND PERITONEAL SPACES: Free intraperitoneal air: Negative Free intraperitoneal fluid: Negative Abscess: Negative Other: n/a   PELVIS: Mild enlargement of the prostate. No pelvic mass, adenopathy or free fluid.   VASCULATURE: Aortic and iliac atherosclerotic calcifications without aneurysm or other acute finding.   ABDOMINAL WALL: Hernia: Negative Other: No acute or contributory abnormality.       Amorphous high attenuation within substantial volumes of the right kidney suggesting intraparenchymal right renal hemorrhage, but there is no subcapsular or perinephric or other retroperitoneal hemorrhage/hematoma about the right kidney   Solitary, much smaller region of high attenuation at upper pole left kidney may be a small  intraparenchymal hemorrhage of left kidney. As on the right, no subcapsular, perinephric or other left retroperitoneal hemorrhage/hematoma otherwise   No stone or stone fragment in either ureter   Nonobstructing stones or stone fragments in both kidneys as detailed above   The included lung bases are abnormal. See above   I communicated the findings regarding the probable bilateral right-greater-than-left renal hemorrhages with Dr. D'amico through the secure electronic medical records transition before signing off this report, 28 May 2024 at 0945 hours   MACRO: Gage Lopez discussed the significance and urgency of this critical finding by telephone with  LOUIS D'AMICO on 5/28/2024 at 9:46 am. (**-RCF-**) Findings:  See findings.   Signed by: Gage Lopez 5/28/2024 9:46 AM Dictation workstation:   [id number]    ECG 12 Lead    Result Date: 5/27/2024   Suspect unspecified pacemaker failure Wide QRS rhythm Left axis deviation Right bundle branch block Abnormal ECG When compared with ECG of 26-MAY-2024 07:13, (unconfirmed) Wide QRS rhythm has replaced Electronic ventricular pacemaker Confirmed by Zachary Sparks ([id number]) on 5/27/2024 3:57:18 PM    ECG 12 Lead    Result Date: 5/27/2024  AV dual-paced rhythm Abnormal ECG When compared with ECG of 25-MAY-2024 08:21, No significant change was found Confirmed by Clayton Angulo ([id number]) on 5/27/2024 8:03:31 AM    ECG 12 Lead    Result Date: 5/25/2024  AV dual-paced rhythm Abnormal ECG When compared with ECG of 24-MAY-2024 07:05, No significant change was found Confirmed by Zachary Sparks ([id number]) on 5/25/2024 10:19:40 AM    XR abdomen 1 view    Result Date: 5/24/2024  Interpreted By:  Gage Lopez, STUDY: XR ABDOMEN 1 VIEW;  5/24/2024 2:47 pm   INDICATION: Signs/Symptoms:Follow-up of bilateral ureteral lithotripsy with indwelling double-J stent on the left.   COMPARISON: Portable chest 22 May 2024   No imaging of the kidneys or other portions of the urinary tract are in the local  PACS archive   ACCESSION NUMBER(S): [id number]   ORDERING CLINICIAN: LOUIS D'AMICO   TECHNIQUE: Frontal supine view of the abdomen   FINDINGS: A left ureteral stent is present   Urinary bladder stone versus a phlebolith projects just superior to the distal pigtail end of the stent   Projecting within the proximal pigtail end and just inferior to the pigtail end, question of two tiny stones or stone fragments in the left renal pelvis   No stone or stone fragment projects over the stented left ureter   Few right upper quadrant calcifications of which some all may be right renal including one as large as 6 mm but which is thin and linear   No dilated gas-filled small bowel   Stool is in the right colon and gas in the left. Nondilated   The liver may be mildly enlarged       No prior imaging of the urinary tract in the local PACS archive   Questionable tiny stones or stone fragments x2 adjacent to the proximal pigtail coil of the ureteral stent in the left renal pelvis   No stone or stone fragment projects over stented left ureter   Tiny urinary bladder stone versus phlebolith projecting near the distal end of the stent   Probability of right nephrolithiasis as detailed above   MACRO: None   Signed by: Gage Lopez 5/24/2024 2:54 PM Dictation workstation:   [id number]    ECG 12 Lead    Result Date: 5/24/2024  AV dual-paced rhythm Abnormal ECG When compared with ECG of 23-MAY-2024 08:05, Electronic ventricular pacemaker has replaced Wide QRS tachycardia Vent. rate has decreased BY  46 BPM Confirmed by Zachary Sparks ([id number]) on 5/24/2024 8:56:30 AM    ECG 12 lead    Result Date: 5/24/2024  Ventricular tachycardia Left axis deviation Right bundle branch block Inferior infarct (cited on or before 21-JAN-2002) T wave abnormality, consider lateral ischemia Abnormal ECG See ED provider note for full interpretation and clinical correlation Confirmed by Zachary Sparks ([id number]) on 5/24/2024 8:56:24 AM    ECG 12 lead    Result Date:  5/24/2024  Wide QRS tachycardia with frequent atrial-paced complexes Left axis deviation Right bundle branch block Inferior infarct (cited on or before 21-JAN-2002) Abnormal ECG When compared with ECG of 09-JUL-2008 06:28, Electronic atrial pacemaker has replaced Sinus rhythm Vent. rate has increased BY  72 BPM Right bundle branch block is now Present See ED provider note for full interpretation and clinical correlation Confirmed by Zachary Sparks ([id number]) on 5/24/2024 8:55:57 AM    Electrophysiology procedure    Result Date: 5/23/2024  Table formatting from the original result was not included. Procedure Details: Procedure Details:  Cardioversion Summary: ·   External electric cardioversion of ventricular tachycardia to normal sinus rhythm was achieved using 200 J synchronized biphasic Antitachycardia pacing was delivered through the device at 450 ms, 300 ms, 250 ms with no termination of ventricular arrhythmias. . Recommendations: 1. A 12 lead ECG should be performed prior to discharge from the hospital. 2. The patient should continue with the present medications. Discharge: 1. The patient recovered uneventfully from the effects of conscious sedation. The patient left the EP laboratory hemodynamically stable and without neurological deficits. Follow up: 1. The patient will stay on telemetry intensive care unit for drug load with high risk medication, following bed rest and subsequent ambulation, provided the recovery parameters are appropriate. The patient should call the electrophysiologist immediately if symptoms recur, or for any problems. The patient has been instructed accordingly. ________________________________________ Procedures: Cardioversion.  Noninvasive primary stimulation through the device (biventricular ICD) ________________________________________ Patient history: Please refer to the detailed history and physical on the patient's medical chart.  History of ventricular tachycardia status post ICD  shocks.  Patient is scheduled for antitachycardia pacing-NIPS and cardioversion ________________________________________ Procedure narrative: The device was interrogated and reprogrammed prior to the procedure.  The risks, benefits, and alternatives to the procedure and sedation were explained to the patient, and informed consent was obtained. The patient was in the fasting state. A grounding pad was placed. Self-adhesive anterior-posterior defibrillation pads were applied. A ZOLL defibrillator was used for monitoring and the defibrillator waveform was set to biphasic. The patient was set up for continuous monitoring of surface 12 lead ECG and pulse oximetry. Blood pressure was monitored with automatic cuff measurements. The procedure was performed under IV conscious sedation performed by anesthesiology service. 1. External electric cardioversion of ventricular tachycardia to normal sinus rhythm was achieved using 200 J synchronized biphasic Antitachycardia pacing was delivered through the device at 450 ms, 300 ms, 250 ms with no termination of ventricular arrhythmias. The device was reprogrammed to DDDR  bpm.  AV delay was decreased to 150 ms. ________________________________________ Complications: The patient tolerated the procedure without any complications or incident. ________________________________________ Prepared and signed by Tolerance: good Complications: None     Cardiac Catheterization Procedure    Result Date: 5/23/2024   [facility], Cath Lab        [address] [place] [address] Cardiovascular Catheterization Report Patient Name:      FRANCIA PAULINO          Performing Physician:  [id number] Menon MD Study Date:        5/23/2024             Verifying Physician:   Lakia Menon MD MRN/PID:           [id number]               Cardiologist/Co-Scrub: Accession#:        [id number]          Ordering Provider:     [id number] EVONNE WALTERS Date of Birth/Age: 1965 / 58 years Cardiologist: Gender:            M                     Fellow: Encounter#:        [id number]            Surgeon:  Study: Left Heart Cath  Indications: FRANCIA PAULINO is a 59 year old male who presents with dyslipidemia, hypertension, chronic pulmonary disease, prior myocardial infarction, prior percutaneous coronary intervention, smoker chf, coronary artery disease and an asymptomatic chest pain assessment. Left ventricular dysfunction, cardiomyopathy and cardiac arrhythmia.  Procedure Description: After infiltration with 2% Lidocaine, the right femoral artery was cannulated with a modified Seldinger technique. Subsequently a 5 Albanian sheath was placed in the right femoral artery. Selective coronary catheterization was performed using a 5 Fr catheter(s) exchanged over a guide wire to cannulate the coronary arteries. A JL 4 tip catheter was used for left coronary injections. A 3drc tip catheter was used for right coronary injections. Multiple injections of contrast were made into the left and right coronary arteries with angiograms recorded in multiple projections. After completion of the procedure, femoral artery angiography was performed. This demonstrated a common femoral artery puncture appropriate for closure. A Vascade 5F vascular closure Device was placed per protocol.  Coronary Angiography: The coronary circulation is right dominant.  Left Main Coronary Artery: There is 10-30% stenosis in the distal left main coronary artery.  Left Anterior Descending Coronary Artery Distribution: There is 10-30% stenosis in the proximal and mid left anterior descending artery.  Circumflex Coronary Artery Distribution: There is 100% stenosis in the the proximal Circumflex artery.  Right Coronary Artery Distribution:  Fills via collaterals. There is 100% stenosis in the the proximal Right Coronary Artery.  Left Ventriculography: The estimated left ventricular ejection fraction is abnormal at 10%.  Hemo Personnel: +---------------------------+---------+ Name                       Duty      +---------------------------+---------+ Babatunde Tan MD 1 +---------------------------+---------+  Hemodynamic Pressures:  +----+---------------------+----------+-------------+--------------+---------+ Site      Date Time      Phase NameSystolic mmHgDiastolic mmHgMean mmHg +----+---------------------+----------+-------------+--------------+---------+   AO5/23/2024 10:02:22 AM  AIR REST           70            57       64 +----+---------------------+----------+-------------+--------------+---------+   AO5/23/2024 10:03:03 AM  AIR REST           87            56       75 +----+---------------------+----------+-------------+--------------+---------+   AO5/23/2024 10:08:12 AM  AIR REST           98            59       73 +----+---------------------+----------+-------------+--------------+---------+  Cardiac Cath Post Procedure Notes: Post Procedure Diagnosis: Double vessel disease. Blood Loss:               Estimated blood loss during the procedure was 0 mls. Specimens Removed:        Number of specimen(s) removed: none. ____________________________________________________________________________________ CONCLUSIONS:  1. Distal Left Main: 10-30% stenosis.  2. Proximal and mid LAD Lesion: The percent stenosis is 10-30%.  3. Proximal CX Lesion: The percent stenosis is 100%.  4. Right Coronary Artery: fills via collaterals.  5. Proximal RCA Lesion: The percent stenosis is 100%.  6. The Left Ventricular Ejection Fraction is 10%,by echo. ICD 10 Codes: Ventricular tachycardia, unspecified-I47.20  CPT Codes: Coronary Angiography S&I only ([facility])([facility])-05337; Moderate Sedation Services initial 15 minutes  patient >5 years-66822  [id number] Babatunde Menon MD Performing Physician Electronically signed by [id number] Babatunde Menon MD on 5/23/2024 at 10:42:57 AM  ** Final (Updated) **     ECG 12 Lead    Result Date: 5/23/2024  Wide QRS tachycardia - possibly ventricular tachycardia Abnormal right superior axis deviation Abnormal ECG When compared with ECG of 22-MAY-2024 23:59, (unconfirmed) No significant change was found Confirmed by Clayton Angulo ([id number]) on 5/23/2024 10:40:02 AM    Transthoracic Echo (TTE) Complete    Result Date: 5/23/2024          [place] [address]  Tel [phone] Fax [phone] TRANSTHORACIC ECHOCARDIOGRAM REPORT  Patient Name:      FRANCIA Schultz Physician:    [id number] Kd Rodrigues DO Study Date:        5/23/2024             Ordering Provider:    [id number] SHIN MRN/PID:           [id number]              Fellow: Accession#:        [id number]          Nurse: Date of Birth/Age: 1965 / 58 years Sonographer:          Cathy Sanders RDCS Gender:            M                     Additional Staff: Height:            170.18 cm             Admit Date: Weight:            78.47 kg              Admission Status:     Inpatient -                                                                Routine BSA / BMI:         1.90 m2 / 27.10 kg/m2 Department Location:  [facility] Blood Pressure: 97 /71 mmHg Study Type:    TRANSTHORACIC ECHO (TTE) COMPLETE Diagnosis/ICD: Presence of automatic (implantable) cardiac                defibrillator-Z95.810; Chronic systolic (congestive) heart                failure (CHF)-I50.22; Ischemic cardiomyopathy-I25.5 Indication:    Congestive Heart Failure, ICM, ICD Firing (multiple shocks) CPT Codes:     Echo Complete w  Full Doppler-21514 Patient History: MI Location/Type:  Unknown MI Pacer/Defib:       AICD Pertinent History: CAD, CHF, Cardiomyopathy, HTN and ICD Firing. Study Detail: The following Echo studies were performed: 2D, M-Mode, Doppler and               color flow. Technically challenging study due to patient lying in               supine position, prominent lung artifact and poor acoustic               windows. Patient has a defibrillator. The patient was asleep.  PHYSICIAN INTERPRETATION: Left Ventricle: Left ventricular systolic function is severely decreased, with an estimated ejection fraction of 10%. There is global hypokinesis of the left ventricle with minor regional variations. The left ventricular cavity size is moderately dilated. Left ventricular diastolic filling was not assessed. Left Atrium: The left atrium is mildly dilated. Right Ventricle: The right ventricle is normal in size. There is normal right ventricular global systolic function. A device is visualized in the right ventricle. Right Atrium: The right atrium is normal in size. There is a device visualized in the right atrium. Aortic Valve: The aortic valve appears structurally normal. The aortic valve appears tricuspid. There is mild aortic valve cusp calcification. There is no evidence of aortic valve stenosis. There is no evidence of aortic valve regurgitation. The peak instantaneous gradient of the aortic valve is 3.6 mmHg. The mean gradient of the aortic valve is 2.0 mmHg. Mitral Valve: The mitral valve is normal in structure. There is mild thickening of the anterior and posterior mitral valve leaflets. There is no evidence of mitral valve stenosis. There is normal mitral valve leaflet mobility. There is mild mitral annular calcification. There is moderate to severe mitral valve regurgitation. Tricuspid Valve: The tricuspid valve is structurally normal. There is normal tricuspid valve leaflet mobility. There is moderate tricuspid  regurgitation. Pulmonic Valve: The pulmonic valve is structurally normal. There is trace pulmonic valve regurgitation. Pericardium: There is no pericardial effusion noted. Aorta: The aortic root is normal. Pulmonary Artery: Pulmonary hypertension is present. The tricuspid regurgitant velocity is 3.03 m/s, and with an estimated right atrial pressure of 15 mmHg, the estimated pulmonary artery pressure is moderate to severely elevated with the RVSP at 51.7 mmHg. Systemic Veins: The inferior vena cava appears moderately dilated. In comparison to the previous echocardiogram(s): There are no prior studies on this patient for comparison purposes.  CONCLUSIONS:  1. Left ventricular systolic function is severely decreased with a 10% estimated ejection fraction.  2. There is no evidence of mitral valve stenosis.  3. Moderate to severe mitral valve regurgitation.  4. Moderate tricuspid regurgitation.  5. Aortic valve stenosis is not present.  6. Left ventricular cavity size is moderately dilated.  7. Moderate to severely elevated pulmonary artery pressure.  8. Pulmonary hypertension is present.  9. The inferior vena cava appears moderately dilated. 10. There is global hypokinesis of the left ventricle with minor regional variations. RECOMMENDATIONS: Technically suboptimal and limited study, therefore accuracy of above interpretation could be substantially diminished. Clinical correlation is advised. Consider additional imaging modalities if clinically indicated.  QUANTITATIVE DATA SUMMARY: 2D MEASUREMENTS:                           Normal Ranges: Ao Root d:     2.93 cm    (2.0-3.7cm) LAs:           4.06 cm    (2.7-4.0cm) IVSd:          1.07 cm    (0.6-1.1cm) LVPWd:         0.53 cm    (0.6-1.1cm) LVIDd:         6.55 cm    (3.9-5.9cm) LVIDs:         6.27 cm LV Mass Index: 114.6 g/m2 LV % FS        4.3 % LA VOLUME:                               Normal Ranges: LA Vol A4C:        85.1 ml    (22+/-6mL/m2) LA Vol A2C:        79.1 ml  LA Vol BP:         82.7 ml LA Vol Index A4C:  44.7ml/m2 LA Vol Index A2C:  41.6 ml/m2 LA Vol Index BP:   43.5 ml/m2 LA Area A4C:       25.7 cm2 LA Area A2C:       24.6 cm2 LA Major Axis A4C: 6.6 cm LA Major Axis A2C: 6.5 cm LA Volume Index:   41.2 ml/m2 LA Vol A4C:        80.4 ml LA Vol A2C:        75.8 ml RA VOLUME BY A/L METHOD:                               Normal Ranges: RA Vol A4C:        56.3 ml    (8.3-19.5ml) RA Vol Index A4C:  29.6 ml/m2 RA Area A4C:       18.2 cm2 RA Major Axis A4C: 5.0 cm LV SYSTOLIC FUNCTION BY 2D PLANIMETRY (MOD):                    Normal Ranges: EF-A4C View: 8.5 % (>=55%) EF-A2C View: 8.9 % EF-Biplane:  9.3 % MITRAL INSUFFICIENCY:                           Normal Ranges: PISA Radius:  1.1 cm MR VTI:       106.00 cm MR Vmax:      424.00 cm/s MR Alias Dayne: 40.9 cm/s MR Volume:    73.55 ml MR Flow Rt:   294.22 ml/s MR EROA:      0.69 cm2 AORTIC VALVE:                                   Normal Ranges: AoV Vmax:                0.95 m/s (<=1.7m/s) AoV Peak PG:             3.6 mmHg (<20mmHg) AoV Mean P.0 mmHg (1.7-11.5mmHg) LVOT Max Dayne:            0.62 m/s (<=1.1m/s) AoV VTI:                 12.90 cm (18-25cm) LVOT VTI:                7.99 cm LVOT Diameter:           2.06 cm  (1.8-2.4cm) AoV Area, VTI:           2.06 cm2 (2.5-5.5cm2) AoV Area,Vmax:           2.16 cm2 (2.5-4.5cm2) AoV Dimensionless Index: 0.62  RIGHT VENTRICLE: TAPSE: 15.3 mm RV s'  0.11 m/s TRICUSPID VALVE/RVSP:                             Normal Ranges: Peak TR Velocity: 3.03 m/s RV Syst Pressure: 51.7 mmHg (< 30mmHg) IVC Diam:         2.43 cm PULMONIC VALVE:                         Normal Ranges: PV Accel Time: 79 msec  (>120ms) PV Max Dayne:    0.7 m/s  (0.6-0.9m/s) PV Max P.9 mmHg PV Mean P.0 mmHg PV VTI:        10.90 cm  [id number] Kd Rodrigues DO Electronically signed on 2024 at 9:42:06 AM  ** Final **     XR chest 1 view    Result Date: 2024  STUDY: Chest Radiograph;  2024  9:52 PM INDICATION: Chest pain. COMPARISON: None Available ACCESSION NUMBER(S): [id number] ORDERING CLINICIAN: ALLI DENNISON TECHNIQUE:  Frontal chest was obtained at 21:52 hours. FINDINGS: CARDIOMEDIASTINAL SILHOUETTE: Cardiomediastinal silhouette is normal in size and configuration. Right subclavian pacer/ICD device is demonstrated.  LUNGS: Lungs are clear.  There is no pneumothorax or pleural effusion.  ABDOMEN: No remarkable upper abdominal findings.  BONES: No acute osseous changes.    No acute cardiopulmonary disease. Signed by Rio Painting, DO         Assessment/Plan     Supportive Interventions:  Music therapy as needed  Spiritual care     Medical decision making/ Goals of care:   Patient's current clinical condition, including diagnosis, prognosis, management plan, and goals of care were discussed.   Life limiting disease: heart failure, cardiogenic shock   Family: Supportive   Performance status: Major limitations due to fatigue and disease process  Joys/meaning/strength: Family, dogs, yardwork, fishing, reading, trucks, Judaism  Understanding of health: Demonstrates good prognostic understanding of disease process  Information: Wants full disclosure of daily updates and prognosis   Goals: Symptom control, going fishing on his boat, writing a bible   Worries and fears now and future: pt states he does not have any at this time   Minimum acceptable outcome/QOL: cognition, independence with toileting, feeding, ambulation   Code status discussion: completed 6/5/24 with pt, pt's mother and pt's father; pt's ongoing hospitalization reviewed. Ongoing VT management per EP; pt being evaluated for possible interventions to assist. Per primary, pt is not an advanced therapies candidate given tobacco use and emphysema. Pt states he has been living with heart failure since 2002, and he acknowledges the progressive decline in functionality has he has become more symptomatic over the past several months. Pt  states he does not want to pursue LVAD, stating he enjoys fishing and does not want to compromise his quality of life for this. Pt's tony is important to him, and he believes God will call him home when he is ready. Pt understands that if he does not pursue advanced therapies, that he will die soon, given the severity of his heart failure. Pt is agreeable to DNR/DNI at this time, and also is agreeable to ongoing EP evaluation for VT management. Primary notified.    Subsequent discussion held today (6/17/24) with pt and pt's mother; pt wishing to be discharged with ICD on, agreeable to outpatient palliative care at time of discharge. Pt understands when he becomes more sick that he would then be appropriate for hospice services and discontinuation of ICD.     Advance Care Planning:   Advance Directives on file.   HCPOA is: pt's motherJudie: [phone]  Code status: DNR/DNI     Disposition:  Plan pending hospital course     Case discussed with primary team.     Thank you for allowing us to participate in the care of this patient. Palliative Team will continue to follow as needed.  Please contact team with any questions or concerns.     YARITZA Horn-CNP   Palliative Care (weekdays - pager [phone])     I spent 60 minutes in the care of this patient which included chart review, interviewing patient/family, discussion with primary team, coordination of care, and documentation.     Medical Decision Making was high level due to high complexity of problems, extensive data review, and high risk of management/treatment.

## 2024-06-18 ENCOUNTER — PHARMACY VISIT (OUTPATIENT)
Dept: PHARMACY | Facility: CLINIC | Age: 59
End: 2024-06-18
Payer: COMMERCIAL

## 2024-06-18 LAB
ALBUMIN SERPL BCP-MCNC: 3.6 G/DL (ref 3.4–5)
ANION GAP SERPL CALC-SCNC: 13 MMOL/L (ref 10–20)
APPEARANCE UR: ABNORMAL
ATRIAL RATE: 24 BPM
ATRIAL RATE: 70 BPM
ATRIAL RATE: 95 BPM
BASOPHILS # BLD AUTO: 0.19 X10*3/UL (ref 0–0.1)
BASOPHILS NFR BLD AUTO: 1.4 %
BILIRUB UR STRIP.AUTO-MCNC: NEGATIVE MG/DL
BUN SERPL-MCNC: 22 MG/DL (ref 6–23)
CALCIUM SERPL-MCNC: 8.9 MG/DL (ref 8.6–10.6)
CHLORIDE SERPL-SCNC: 102 MMOL/L (ref 98–107)
CO2 SERPL-SCNC: 26 MMOL/L (ref 21–32)
COLOR UR: YELLOW
CREAT SERPL-MCNC: 1.21 MG/DL (ref 0.5–1.3)
EGFRCR SERPLBLD CKD-EPI 2021: 70 ML/MIN/1.73M*2
EOSINOPHIL # BLD AUTO: 0.61 X10*3/UL (ref 0–0.7)
EOSINOPHIL NFR BLD AUTO: 4.5 %
ERYTHROCYTE [DISTWIDTH] IN BLOOD BY AUTOMATED COUNT: 14.6 % (ref 11.5–14.5)
GLUCOSE BLD MANUAL STRIP-MCNC: 109 MG/DL (ref 74–99)
GLUCOSE BLD MANUAL STRIP-MCNC: 110 MG/DL (ref 74–99)
GLUCOSE BLD MANUAL STRIP-MCNC: 134 MG/DL (ref 74–99)
GLUCOSE BLD MANUAL STRIP-MCNC: 162 MG/DL (ref 74–99)
GLUCOSE SERPL-MCNC: 97 MG/DL (ref 74–99)
GLUCOSE UR STRIP.AUTO-MCNC: ABNORMAL MG/DL
HCT VFR BLD AUTO: 34.8 % (ref 41–52)
HGB BLD-MCNC: 11.9 G/DL (ref 13.5–17.5)
IMM GRANULOCYTES # BLD AUTO: 0.42 X10*3/UL (ref 0–0.7)
IMM GRANULOCYTES NFR BLD AUTO: 3.1 % (ref 0–0.9)
KETONES UR STRIP.AUTO-MCNC: NEGATIVE MG/DL
LEUKOCYTE ESTERASE UR QL STRIP.AUTO: ABNORMAL
LYMPHOCYTES # BLD AUTO: 1.92 X10*3/UL (ref 1.2–4.8)
LYMPHOCYTES NFR BLD AUTO: 14 %
MAGNESIUM SERPL-MCNC: 2.24 MG/DL (ref 1.6–2.4)
MCH RBC QN AUTO: 32 PG (ref 26–34)
MCHC RBC AUTO-ENTMCNC: 34.2 G/DL (ref 32–36)
MCV RBC AUTO: 94 FL (ref 80–100)
MONOCYTES # BLD AUTO: 1.14 X10*3/UL (ref 0.1–1)
MONOCYTES NFR BLD AUTO: 8.3 %
MUCOUS THREADS #/AREA URNS AUTO: ABNORMAL /LPF
NEUTROPHILS # BLD AUTO: 9.39 X10*3/UL (ref 1.2–7.7)
NEUTROPHILS NFR BLD AUTO: 68.7 %
NITRITE UR QL STRIP.AUTO: NEGATIVE
NRBC BLD-RTO: 0 /100 WBCS (ref 0–0)
P AXIS: -24 DEGREES
P OFFSET: 181 MS
P ONSET: 149 MS
PH UR STRIP.AUTO: 6 [PH]
PHOSPHATE SERPL-MCNC: 3.5 MG/DL (ref 2.5–4.9)
PLATELET # BLD AUTO: 368 X10*3/UL (ref 150–450)
POTASSIUM SERPL-SCNC: 4.6 MMOL/L (ref 3.5–5.3)
PR INTERVAL: 80 MS
PROT UR STRIP.AUTO-MCNC: ABNORMAL MG/DL
Q ONSET: 183 MS
Q ONSET: 189 MS
Q ONSET: 206 MS
QRS COUNT: 16 BEATS
QRS COUNT: 18 BEATS
QRS COUNT: 19 BEATS
QRS DURATION: 132 MS
QRS DURATION: 174 MS
QRS DURATION: 186 MS
QT INTERVAL: 416 MS
QT INTERVAL: 442 MS
QT INTERVAL: 450 MS
QTC CALCULATION(BAZETT): 522 MS
QTC CALCULATION(BAZETT): 601 MS
QTC CALCULATION(BAZETT): 622 MS
QTC FREDERICIA: 484 MS
QTC FREDERICIA: 542 MS
QTC FREDERICIA: 558 MS
R AXIS: -62 DEGREES
R AXIS: 122 DEGREES
R AXIS: 124 DEGREES
RBC # BLD AUTO: 3.72 X10*6/UL (ref 4.5–5.9)
RBC # UR STRIP.AUTO: ABNORMAL /UL
RBC #/AREA URNS AUTO: >20 /HPF
SODIUM SERPL-SCNC: 136 MMOL/L (ref 136–145)
SP GR UR STRIP.AUTO: 1.01
T AXIS: -46 DEGREES
T AXIS: -59 DEGREES
T AXIS: 124 DEGREES
T OFFSET: 397 MS
T OFFSET: 404 MS
T OFFSET: 431 MS
UROBILINOGEN UR STRIP.AUTO-MCNC: ABNORMAL MG/DL
VENTRICULAR RATE: 111 BPM
VENTRICULAR RATE: 115 BPM
VENTRICULAR RATE: 95 BPM
WBC # BLD AUTO: 13.7 X10*3/UL (ref 4.4–11.3)
WBC #/AREA URNS AUTO: >50 /HPF

## 2024-06-18 PROCEDURE — 80069 RENAL FUNCTION PANEL: CPT

## 2024-06-18 PROCEDURE — 85025 COMPLETE CBC W/AUTO DIFF WBC: CPT

## 2024-06-18 PROCEDURE — 2500000001 HC RX 250 WO HCPCS SELF ADMINISTERED DRUGS (ALT 637 FOR MEDICARE OP): Performed by: STUDENT IN AN ORGANIZED HEALTH CARE EDUCATION/TRAINING PROGRAM

## 2024-06-18 PROCEDURE — 82947 ASSAY GLUCOSE BLOOD QUANT: CPT | Mod: 91

## 2024-06-18 PROCEDURE — 83735 ASSAY OF MAGNESIUM: CPT

## 2024-06-18 PROCEDURE — 36415 COLL VENOUS BLD VENIPUNCTURE: CPT

## 2024-06-18 PROCEDURE — 1200000002 HC GENERAL ROOM WITH TELEMETRY DAILY

## 2024-06-18 PROCEDURE — 2500000004 HC RX 250 GENERAL PHARMACY W/ HCPCS (ALT 636 FOR OP/ED): Performed by: STUDENT IN AN ORGANIZED HEALTH CARE EDUCATION/TRAINING PROGRAM

## 2024-06-18 PROCEDURE — 2500000002 HC RX 250 W HCPCS SELF ADMINISTERED DRUGS (ALT 637 FOR MEDICARE OP, ALT 636 FOR OP/ED): Performed by: STUDENT IN AN ORGANIZED HEALTH CARE EDUCATION/TRAINING PROGRAM

## 2024-06-18 PROCEDURE — C9113 INJ PANTOPRAZOLE SODIUM, VIA: HCPCS | Performed by: STUDENT IN AN ORGANIZED HEALTH CARE EDUCATION/TRAINING PROGRAM

## 2024-06-18 PROCEDURE — 2500000002 HC RX 250 W HCPCS SELF ADMINISTERED DRUGS (ALT 637 FOR MEDICARE OP, ALT 636 FOR OP/ED)

## 2024-06-18 PROCEDURE — 99239 HOSP IP/OBS DSCHRG MGMT >30: CPT | Performed by: INTERNAL MEDICINE

## 2024-06-18 PROCEDURE — RXMED WILLOW AMBULATORY MEDICATION CHARGE

## 2024-06-18 PROCEDURE — 99233 SBSQ HOSP IP/OBS HIGH 50: CPT | Performed by: STUDENT IN AN ORGANIZED HEALTH CARE EDUCATION/TRAINING PROGRAM

## 2024-06-18 PROCEDURE — S4991 NICOTINE PATCH NONLEGEND: HCPCS

## 2024-06-18 PROCEDURE — 81001 URINALYSIS AUTO W/SCOPE: CPT

## 2024-06-18 RX ORDER — AMOXICILLIN AND CLAVULANATE POTASSIUM 875; 125 MG/1; MG/1
1 TABLET, FILM COATED ORAL ONCE
Status: COMPLETED | OUTPATIENT
Start: 2024-06-18 | End: 2024-06-19

## 2024-06-18 RX ORDER — FUROSEMIDE 20 MG/1
20 TABLET ORAL DAILY
Status: CANCELLED | OUTPATIENT
Start: 2024-06-19

## 2024-06-18 RX ORDER — MEXILETINE HYDROCHLORIDE 250 MG/1
250 CAPSULE ORAL EVERY 8 HOURS SCHEDULED
Qty: 90 CAPSULE | Refills: 1 | Status: SHIPPED | OUTPATIENT
Start: 2024-06-18 | End: 2024-07-14 | Stop reason: HOSPADM

## 2024-06-18 RX ORDER — SPIRONOLACTONE 25 MG/1
25 TABLET ORAL DAILY
Qty: 30 TABLET | Refills: 0 | Status: SHIPPED | OUTPATIENT
Start: 2024-06-19 | End: 2024-07-11 | Stop reason: SDUPTHER

## 2024-06-18 RX ORDER — TORSEMIDE 20 MG/1
20 TABLET ORAL 2 TIMES DAILY
Qty: 60 TABLET | Refills: 0 | Status: SHIPPED | OUTPATIENT
Start: 2024-06-18 | End: 2024-07-11 | Stop reason: DRUGHIGH

## 2024-06-18 RX ORDER — AMIODARONE HYDROCHLORIDE 200 MG/1
400 TABLET ORAL DAILY
Qty: 60 TABLET | Refills: 0 | Status: SHIPPED | OUTPATIENT
Start: 2024-06-19 | End: 2024-07-11 | Stop reason: SDUPTHER

## 2024-06-18 RX ORDER — DAPAGLIFLOZIN 10 MG/1
10 TABLET, FILM COATED ORAL DAILY
Qty: 30 TABLET | Refills: 0 | Status: SHIPPED | OUTPATIENT
Start: 2024-06-18

## 2024-06-18 RX ORDER — TORSEMIDE 20 MG/1
20 TABLET ORAL 2 TIMES DAILY
Qty: 60 TABLET | Refills: 0 | Status: CANCELLED | OUTPATIENT
Start: 2024-06-18 | End: 2024-07-18

## 2024-06-18 RX ORDER — HYDROXYZINE HYDROCHLORIDE 25 MG/1
25 TABLET, FILM COATED ORAL EVERY 6 HOURS PRN
Qty: 30 TABLET | Refills: 0 | Status: SHIPPED | OUTPATIENT
Start: 2024-06-18 | End: 2024-07-14 | Stop reason: HOSPADM

## 2024-06-18 RX ORDER — AMOXICILLIN AND CLAVULANATE POTASSIUM 875; 125 MG/1; MG/1
1 TABLET, FILM COATED ORAL 2 TIMES DAILY
Qty: 20 TABLET | Refills: 0 | Status: SHIPPED | OUTPATIENT
Start: 2024-06-18 | End: 2024-06-18

## 2024-06-18 RX ORDER — BISOPROLOL FUMARATE 5 MG/1
2.5 TABLET, FILM COATED ORAL DAILY
Qty: 15 TABLET | Refills: 0 | Status: SHIPPED | OUTPATIENT
Start: 2024-06-19 | End: 2024-06-28 | Stop reason: DRUGHIGH

## 2024-06-18 RX ORDER — AMOXICILLIN AND CLAVULANATE POTASSIUM 875; 125 MG/1; MG/1
1 TABLET, FILM COATED ORAL 2 TIMES DAILY
Qty: 28 TABLET | Refills: 0 | Status: SHIPPED | OUTPATIENT
Start: 2024-06-18 | End: 2024-07-02

## 2024-06-18 RX ORDER — MEXILETINE HYDROCHLORIDE 250 MG/1
250 CAPSULE ORAL EVERY 8 HOURS SCHEDULED
Qty: 90 CAPSULE | Refills: 1 | Status: SHIPPED | OUTPATIENT
Start: 2024-06-18 | End: 2024-06-18

## 2024-06-18 RX ORDER — MEXILETINE HYDROCHLORIDE 250 MG/1
250 CAPSULE ORAL EVERY 8 HOURS SCHEDULED
Qty: 90 CAPSULE | Refills: 0 | Status: SHIPPED | OUTPATIENT
Start: 2024-06-18 | End: 2024-06-18

## 2024-06-18 ASSESSMENT — COGNITIVE AND FUNCTIONAL STATUS - GENERAL
DRESSING REGULAR LOWER BODY CLOTHING: A LITTLE
DAILY ACTIVITIY SCORE: 22
MOBILITY SCORE: 22
WALKING IN HOSPITAL ROOM: A LITTLE
HELP NEEDED FOR BATHING: A LITTLE
CLIMB 3 TO 5 STEPS WITH RAILING: A LITTLE

## 2024-06-18 ASSESSMENT — PAIN SCALES - GENERAL
PAINLEVEL_OUTOF10: 4
PAINLEVEL_OUTOF10: 8
PAINLEVEL_OUTOF10: 8
PAINLEVEL_OUTOF10: 0 - NO PAIN
PAINLEVEL_OUTOF10: 7

## 2024-06-18 ASSESSMENT — PAIN - FUNCTIONAL ASSESSMENT
PAIN_FUNCTIONAL_ASSESSMENT: 0-10

## 2024-06-18 ASSESSMENT — PAIN SCALES - WONG BAKER: WONGBAKER_NUMERICALRESPONSE: HURTS EVEN MORE

## 2024-06-18 ASSESSMENT — PAIN DESCRIPTION - LOCATION: LOCATION: BACK

## 2024-06-18 ASSESSMENT — PAIN DESCRIPTION - ORIENTATION: ORIENTATION: LEFT

## 2024-06-18 NOTE — PROGRESS NOTES
Cristian Sapp is a 58 y.o. male on day 21 of admission presenting with Acute on chronic systolic (congestive) heart failure (Multi).    Patient will dc today, going home with mother. Patient has [facility] Palliative Care consult following at time of discharge. [facility].

## 2024-06-18 NOTE — CARE PLAN
The patient's goals for the shift include      The clinical goals for the shift include PT this shift    Over the shift, the patient did not make progress toward the following goals. Barriers to progression include n/a. Recommendations to address these barriers include n/a.

## 2024-06-18 NOTE — DISCHARGE SUMMARY
[facility] Emergency Department    [address] [place] [address]    Phone:  [phone]    Fax:  [phone]                                       Justice Person   MRN: [id number]    Department:  [facility] Emergency Department   Date of Visit:  9/18/2018           Patient Information     Date Of Birth          1966        Your diagnoses for this visit were:     Cellulitis and abscess of foot excluding toe        You were seen by Kenny Peres MD.      Follow-up Information     Follow up with Teagan Mitchell MD In 1 day.    Specialty:  Colon and Rectal Surgery    Contact information:    COLON RECTAL SURGERY  [address]  [place] [address] [phone]          Discharge Instructions       Have Dr Mitchell check wound tomorrow at your appointment.  Keep dressing on until then and continue the antibiotics the ER doctor started you on yesterday.    24 Hour Appointment Hotline       To make an appointment at any [facility] clinic, call [phone] ([phone]). If you don't have a family doctor or clinic, we will help you find one. [facility] clinics are conveniently located to serve the needs of you and your family.             Review of your medicines      START taking        Dose / Directions Last dose taken    HYDROcodone-acetaminophen 5-325 MG per tablet   Commonly known as:  NORCO   Dose:  1 tablet   Quantity:  18 tablet        Take 1 tablet by mouth every 4 hours as needed for pain   Refills:  0          Our records show that you are taking the medicines listed below. If these are incorrect, please call your family doctor or clinic.        Dose / Directions Last dose taken    ALLEGRA 180 MG tablet   Quantity:  90 Tab   Generic drug:  fexofenadine        1 tablet qd  MD appointment needed   Refills:  0        amoxicillin-clavulanate 875-125 MG per tablet   Commonly known as:  AUGMENTIN   Dose:  1 tablet   Quantity:  14 tablet        Take 1  Discharge Diagnosis  Acute on chronic systolic (congestive) heart failure   Cardiogenic shock   Acute hypoxic respiratory failure   VT storm and incessant VT despite ICD shock   Anxiety/PTSD/Depression       Issues Requiring Follow-Up  Followup up heart failure management  Followup VT management   Followup UTI treatment with augmentin   Followup urine cultures/sensitivities     Test Results Pending At Discharge  Pending Labs       No current pending labs.            Hospital Course  59 y/o M with PMHx of CAD s/p multiple PCI, ICM/HFrEF (EF 15% 5/24) s/p CRT-D, VT storm s/p VT ablation (2019), infrarenal AAA s/p R iliac stent, nephrolithiasis s/p recent ureteral stent. Presented to OSH on 5/23 with multiple ICD shocks for VT. [place] [facility] [place] continued management. EP was consulted on arrival and patient was in slow VT, he was started on amiodarone gtt. Underwent VT ablation 5/30, but continued to have intermittent slow VT. On 6/3 VT was noted again with rates ~118 and patient was cardioverted. Maintained on amiodarone + lidocaine gtt, and is s/p stellate ganglion block 6/4. He was transferred to HFICU 6/3 after he signed consent for LVAD/OHT evaluation. After further discussions and being a current smoker, it was decided that OHT would not be an option, and the patient does not want an LVAD at this time, so advanced therapies evaluation was stopped 6/5. Palliative medicine saw the patient 6/5 and he decided to change his code status to DNR-DNI. Redo endocardial ablation with Dr. Wooten 6/6 c/b lactic acidosis requiring intubation and low CO requiring IABP support. He was extubated 6/7 and IABP was removed on 6/8. SGC removed 6/9 with closing numbers: /57 (71), CVP 5, PA 44/26 (34), PCWP 11, Malcolm CO/CI:  5.5/2.9, Thermo: 5.3/2.8, , SVO2 67% on dapa 10 mg daily, brigido 25 mg daily. 6/11: Ganglion block bedside per EP + decrease 1/2 Amiodarone and lidocaine gtt. 6/12: Amio / Lido gtts  discontinued. Low dose bisoprolol and Mexiletine initiated. 6/13: Palliative care goals of care discussions occurring. Low dose Entresto (12/13 mg) restarted. Mexiletine increased to 250 mg Q 8 hours. 6/14: Decreasing furosemide to 20 mg daily given hypotensive episodes. 6/15: No episodes of VTACH > 24 hours day before transfer out of ICU to general cardiology service and without further VT on the floor. Transferred to general medicine on 6/17 on tele. Pt remained stable with no episodes of VT. Had slight white count to 13, UA showed positive leukocyte esterase and he was started on 14 day course of augmentin for potential infection     Pertinent Physical Exam At Time of Discharge  Physical Exam  Constitutional:       General: He is not in acute distress.     Appearance: He is not ill-appearing.   Cardiovascular:      Rate and Rhythm: Normal rate and regular rhythm.      Heart sounds: No murmur heard.     No friction rub. No gallop.   Pulmonary:      Effort: Pulmonary effort is normal. No respiratory distress.      Breath sounds: Normal breath sounds. No stridor. No wheezing.   Abdominal:      General: Abdomen is flat. There is no distension.      Palpations: Abdomen is soft. There is no mass.      Tenderness: There is no abdominal tenderness.   Skin:     General: Skin is warm and dry.      Capillary Refill: Capillary refill takes less than 2 seconds.   Neurological:      Mental Status: He is alert.         Home Medications     Medication List      START taking these medications     amiodarone 200 mg tablet; Commonly known as: Pacerone; Take 2 tablets   (400 mg) by mouth once daily. Do not fill before June 19, 2024.; Start   taking on: June 19, 2024   amoxicillin-pot clavulanate 875-125 mg tablet; Commonly known as:   Augmentin; Take 1 tablet by mouth 2 times a day for 10 days.   apixaban 5 mg tablet; Commonly known as: Eliquis; Take 1 tablet (5 mg)   by mouth every 12 hours.   bisoprolol 5 mg tablet; Commonly known  tablet by mouth 2 times daily for 7 days   Refills:  0        hydrocortisone 2.5 % cream   Commonly known as:  ANUSOL-HC   Quantity:  30 g        Place rectally 3 times daily for 7 days   Refills:  1        NASONEX 50 MCG/ACT spray   Quantity:  3 months   Generic drug:  mometasone        2 sprays each nostril Once daily for allergies   Refills:  1                Information about OPIOIDS     PRESCRIPTION OPIOIDS: WHAT YOU NEED TO KNOW   We gave you an opioid (narcotic) pain medicine. It is important to manage your pain, but opioids are not always the best choice. You should first try all the other options your care team gave you. Take this medicine for as short a time (and as few doses) as possible.    Some activities can increase your pain, such as bandage changes or therapy sessions. It may help to take your pain medicine 30 to 60 minutes before these activities. Reduce your stress by getting enough sleep, working on hobbies you enjoy and practicing relaxation or meditation. Talk to your care team about ways to manage your pain beyond prescription opioids.    These medicines have risks:    DO NOT drive when on new or higher doses of pain medicine. These medicines can affect your alertness and reaction times, and you could be arrested for driving under the influence (DUI). If you need to use opioids long-term, talk to your care team about driving.    DO NOT operate heavy machinery    DO NOT do any other dangerous activities while taking these medicines.    DO NOT drink any alcohol while taking these medicines.     If the opioid prescribed includes acetaminophen, DO NOT take with any other medicines that contain acetaminophen. Read all labels carefully. Look for the word  acetaminophen  or  Tylenol.  Ask your pharmacist if you have questions or are unsure.    You can get addicted to pain medicines, especially if you have a history of addiction (chemical, alcohol or substance dependence). Talk to your care team about  as: Zebeta; Take 0.5 tablets (2.5   mg) by mouth once daily.; Start taking on: June 19, 2024   dapagliflozin propanediol 10 mg; Commonly known as: Farxiga; Take 1   tablet (10 mg) by mouth once daily.   hydrOXYzine HCL 25 mg tablet; Commonly known as: Atarax; Take 1 tablet   (25 mg) by mouth every 6 hours if needed for anxiety for up to 10 days.   mexiletine 250 mg capsule; Commonly known as: Mexitil; Take 1 capsule   (250 mg) by mouth every 8 hours.   spironolactone 25 mg tablet; Commonly known as: Aldactone; Take 1 tablet   (25 mg) by mouth once daily.; Start taking on: June 19, 2024     CONTINUE taking these medications     acetaminophen 500 mg tablet; Commonly known as: Tylenol   albuterol sulfate 90 mcg/actuation aero powdr breath act w/sensor   inhaler; Commonly known as: Proair Digihaler   aspirin 81 mg EC tablet   b complex 0.4 mg tablet   cholecalciferol 5,000 Units tablet; Commonly known as: Vitamin D-3   co-enzyme Q-10 50 mg capsule   cyanocobalamin 1,000 mcg tablet; Commonly known as: Vitamin B-12   Entresto 24-26 mg tablet; Generic drug: sacubitriL-valsartan   Fiber (calcium polycarbophil) 625 mg tablet; Generic drug: calcium   polycarbophiL   LORazepam 0.5 mg tablet; Commonly known as: Ativan   nicotine 21 mg/24 hr patch; Commonly known as: Nicoderm CQ   PARoxetine 40 mg tablet; Commonly known as: Paxil   potassium chloride 20 mEq packet; Commonly known as: Klor-Con   ranolazine 500 mg 12 hr tablet; Commonly known as: Ranexa   rosuvastatin 20 mg tablet; Commonly known as: Crestor   tamsulosin 0.4 mg 24 hr capsule; Commonly known as: Flomax   torsemide 20 mg tablet; Commonly known as: Demadex   traZODone 50 mg tablet; Commonly known as: Desyrel   VITAMIN A ORAL   VITAMIN E ORAL     STOP taking these medications     prasugrel 10 mg tablet; Commonly known as: Effient       Outpatient Follow-Up  Future Appointments   Date Time Provider Department Center   8/8/2024  2:00 PM Arya Brown MD [id number] [place]  ways to reduce this risk.    All opioids tend to cause constipation. Drink plenty of water and eat foods that have a lot of fiber, such as fruits, vegetables, prune juice, apple juice and high-fiber cereal. Take a laxative (Miralax, milk of magnesia, Colace, Senna) if you don t move your bowels at least every other day. Other side effects include upset stomach, sleepiness, dizziness, throwing up, tolerance (needing more of the medicine to have the same effect), physical dependence and slowed breathing.    Store your pills in a secure place, locked if possible. We will not replace any lost or stolen medicine. If you don t finish your medicine, please throw away (dispose) as directed by your pharmacist. The [place] [employer] has more information about safe disposal: https://www.pca.[facility].[place]/living-green/managing-unwanted-medications        Prescriptions were sent or printed at these locations (1 Prescription)                   Other Prescriptions                Printed at Department/Unit printer (1 of 1)         HYDROcodone-acetaminophen (NORCO) 5-325 MG per tablet                Orders Needing Specimen Collection     None      Pending Results     No orders found from 9/16/2018 to 9/19/2018.            Pending Culture Results     No orders found from 9/16/2018 to 9/19/2018.            Pending Results Instructions     If you had any lab results that were not finalized at the time of your Discharge, you can call the ED Lab Result RN at [phone]. You will be contacted by this team for any positive Lab results or changes in treatment. The nurses are available 7 days a week from 10A to 6:30P.  You can leave a message 24 hours per day and they will return your call.        Test Results From Your Hospital Stay               Clinical Quality Measure: Blood Pressure Screening     Your blood pressure was checked while you were in the emergency department today. The last reading we obtained was  BP:        GREG LAMA, MS4    132/80 . Please read the guidelines below about what these numbers mean and what you should do about them.  If your systolic blood pressure (the top number) is less than 120 and your diastolic blood pressure (the bottom number) is less than 80, then your blood pressure is normal. There is nothing more that you need to do about it.  If your systolic blood pressure (the top number) is 120-139 or your diastolic blood pressure (the bottom number) is 80-89, your blood pressure may be higher than it should be. You should have your blood pressure rechecked within a year by a primary care provider.  If your systolic blood pressure (the top number) is 140 or greater or your diastolic blood pressure (the bottom number) is 90 or greater, you may have high blood pressure. High blood pressure is treatable, but if left untreated over time it can put you at risk for heart attack, stroke, or kidney failure. You should have your blood pressure rechecked by a primary care provider within the next 4 weeks.  If your provider in the emergency department today gave you specific instructions to follow-up with your doctor or provider even sooner than that, you should follow that instruction and not wait for up to 4 weeks for your follow-up visit.        Thank you for choosing [facility]       Thank you for choosing [facility] for your care. Our goal is always to provide you with excellent care. Hearing back from our patients is one way we can continue to improve our services. Please take a few minutes to complete the written survey that you may receive in the mail after you visit with us. Thank you!        Care EveryWhere ID     This is your Care EveryWhere ID. This could be used by other organizations to access your [facility] medical records  [id number]        Equal Access to Services     ISELA WATERS : Kellen Arvizu, naveed coleman, qayblauro kaalnoe rowland. So [facility]  [phone].    ATENCIÓN: Si habla español, tiene a orellana disposición servicios gratuitos de asistencia lingüística. Llame al [phone].    We comply with applicable federal civil rights laws and [place] laws. We do not discriminate on the basis of race, color, national origin, age, disability, sex, sexual orientation, or gender identity.            After Visit Summary       This is your record. Keep this with you and show to your community pharmacist(s) and doctor(s) at your next visit.

## 2024-06-19 ENCOUNTER — PHARMACY VISIT (OUTPATIENT)
Dept: PHARMACY | Facility: CLINIC | Age: 59
End: 2024-06-19
Payer: COMMERCIAL

## 2024-06-19 VITALS
HEART RATE: 95 BPM | RESPIRATION RATE: 17 BRPM | BODY MASS INDEX: 25.33 KG/M2 | HEIGHT: 67 IN | DIASTOLIC BLOOD PRESSURE: 63 MMHG | WEIGHT: 161.38 LBS | TEMPERATURE: 96.8 F | OXYGEN SATURATION: 92 % | SYSTOLIC BLOOD PRESSURE: 96 MMHG

## 2024-06-19 LAB
ALBUMIN SERPL BCP-MCNC: 3.8 G/DL (ref 3.4–5)
ANION GAP SERPL CALC-SCNC: 16 MMOL/L (ref 10–20)
APPEARANCE UR: CLEAR
ATRIAL RATE: 57 BPM
BASOPHILS # BLD AUTO: 0.12 X10*3/UL (ref 0–0.1)
BASOPHILS NFR BLD AUTO: 0.9 %
BILIRUB UR STRIP.AUTO-MCNC: NEGATIVE MG/DL
BUN SERPL-MCNC: 24 MG/DL (ref 6–23)
CALCIUM SERPL-MCNC: 9.1 MG/DL (ref 8.6–10.6)
CHLORIDE SERPL-SCNC: 102 MMOL/L (ref 98–107)
CO2 SERPL-SCNC: 22 MMOL/L (ref 21–32)
COLOR UR: COLORLESS
CREAT SERPL-MCNC: 1.28 MG/DL (ref 0.5–1.3)
EGFRCR SERPLBLD CKD-EPI 2021: 66 ML/MIN/1.73M*2
EOSINOPHIL # BLD AUTO: 0.51 X10*3/UL (ref 0–0.7)
EOSINOPHIL NFR BLD AUTO: 4 %
ERYTHROCYTE [DISTWIDTH] IN BLOOD BY AUTOMATED COUNT: 14.4 % (ref 11.5–14.5)
GLUCOSE BLD MANUAL STRIP-MCNC: 115 MG/DL (ref 74–99)
GLUCOSE SERPL-MCNC: 98 MG/DL (ref 74–99)
GLUCOSE UR STRIP.AUTO-MCNC: ABNORMAL MG/DL
HCT VFR BLD AUTO: 36.7 % (ref 41–52)
HGB BLD-MCNC: 12.3 G/DL (ref 13.5–17.5)
IMM GRANULOCYTES # BLD AUTO: 0.28 X10*3/UL (ref 0–0.7)
IMM GRANULOCYTES NFR BLD AUTO: 2.2 % (ref 0–0.9)
KETONES UR STRIP.AUTO-MCNC: NEGATIVE MG/DL
LEUKOCYTE ESTERASE UR QL STRIP.AUTO: NEGATIVE
LYMPHOCYTES # BLD AUTO: 1.43 X10*3/UL (ref 1.2–4.8)
LYMPHOCYTES NFR BLD AUTO: 11.2 %
MAGNESIUM SERPL-MCNC: 2.24 MG/DL (ref 1.6–2.4)
MCH RBC QN AUTO: 31.5 PG (ref 26–34)
MCHC RBC AUTO-ENTMCNC: 33.5 G/DL (ref 32–36)
MCV RBC AUTO: 94 FL (ref 80–100)
MONOCYTES # BLD AUTO: 0.9 X10*3/UL (ref 0.1–1)
MONOCYTES NFR BLD AUTO: 7.1 %
NEUTROPHILS # BLD AUTO: 9.5 X10*3/UL (ref 1.2–7.7)
NEUTROPHILS NFR BLD AUTO: 74.6 %
NITRITE UR QL STRIP.AUTO: NEGATIVE
NRBC BLD-RTO: 0 /100 WBCS (ref 0–0)
PH UR STRIP.AUTO: 6.5 [PH]
PHOSPHATE SERPL-MCNC: 3.6 MG/DL (ref 2.5–4.9)
PLATELET # BLD AUTO: 367 X10*3/UL (ref 150–450)
POTASSIUM SERPL-SCNC: 4.5 MMOL/L (ref 3.5–5.3)
PROT UR STRIP.AUTO-MCNC: NEGATIVE MG/DL
Q ONSET: 196 MS
QRS COUNT: 19 BEATS
QRS DURATION: 190 MS
QT INTERVAL: 434 MS
QTC CALCULATION(BAZETT): 600 MS
QTC FREDERICIA: 538 MS
R AXIS: 128 DEGREES
RBC # BLD AUTO: 3.9 X10*6/UL (ref 4.5–5.9)
RBC # UR STRIP.AUTO: ABNORMAL /UL
RBC #/AREA URNS AUTO: >20 /HPF
SODIUM SERPL-SCNC: 135 MMOL/L (ref 136–145)
SP GR UR STRIP.AUTO: 1.01
T AXIS: -60 DEGREES
T OFFSET: 413 MS
UROBILINOGEN UR STRIP.AUTO-MCNC: NORMAL MG/DL
VENTRICULAR RATE: 115 BPM
WBC # BLD AUTO: 12.7 X10*3/UL (ref 4.4–11.3)
WBC #/AREA URNS AUTO: ABNORMAL /HPF

## 2024-06-19 PROCEDURE — S4991 NICOTINE PATCH NONLEGEND: HCPCS

## 2024-06-19 PROCEDURE — 2500000001 HC RX 250 WO HCPCS SELF ADMINISTERED DRUGS (ALT 637 FOR MEDICARE OP)

## 2024-06-19 PROCEDURE — 2500000002 HC RX 250 W HCPCS SELF ADMINISTERED DRUGS (ALT 637 FOR MEDICARE OP, ALT 636 FOR OP/ED)

## 2024-06-19 PROCEDURE — 2500000004 HC RX 250 GENERAL PHARMACY W/ HCPCS (ALT 636 FOR OP/ED): Performed by: STUDENT IN AN ORGANIZED HEALTH CARE EDUCATION/TRAINING PROGRAM

## 2024-06-19 PROCEDURE — 2500000002 HC RX 250 W HCPCS SELF ADMINISTERED DRUGS (ALT 637 FOR MEDICARE OP, ALT 636 FOR OP/ED): Performed by: STUDENT IN AN ORGANIZED HEALTH CARE EDUCATION/TRAINING PROGRAM

## 2024-06-19 PROCEDURE — 2500000001 HC RX 250 WO HCPCS SELF ADMINISTERED DRUGS (ALT 637 FOR MEDICARE OP): Performed by: STUDENT IN AN ORGANIZED HEALTH CARE EDUCATION/TRAINING PROGRAM

## 2024-06-19 PROCEDURE — C9113 INJ PANTOPRAZOLE SODIUM, VIA: HCPCS | Performed by: STUDENT IN AN ORGANIZED HEALTH CARE EDUCATION/TRAINING PROGRAM

## 2024-06-19 PROCEDURE — 99239 HOSP IP/OBS DSCHRG MGMT >30: CPT | Performed by: INTERNAL MEDICINE

## 2024-06-19 PROCEDURE — 83735 ASSAY OF MAGNESIUM: CPT

## 2024-06-19 PROCEDURE — 85025 COMPLETE CBC W/AUTO DIFF WBC: CPT

## 2024-06-19 PROCEDURE — RXMED WILLOW AMBULATORY MEDICATION CHARGE

## 2024-06-19 PROCEDURE — 36415 COLL VENOUS BLD VENIPUNCTURE: CPT

## 2024-06-19 PROCEDURE — 82947 ASSAY GLUCOSE BLOOD QUANT: CPT

## 2024-06-19 PROCEDURE — 81001 URINALYSIS AUTO W/SCOPE: CPT

## 2024-06-19 PROCEDURE — 80069 RENAL FUNCTION PANEL: CPT

## 2024-06-19 PROCEDURE — 87086 URINE CULTURE/COLONY COUNT: CPT

## 2024-06-19 ASSESSMENT — PAIN SCALES - GENERAL: PAINLEVEL_OUTOF10: 0 - NO PAIN

## 2024-06-19 ASSESSMENT — PAIN - FUNCTIONAL ASSESSMENT: PAIN_FUNCTIONAL_ASSESSMENT: 0-10

## 2024-06-19 NOTE — CARE PLAN
The patient's goals for the shift include  meds ready in [place]    The clinical goals for the shift include pt to remain hds throighout entire shift    Over the shift, the patient did not make progress toward the following goals.      Problem: Fall/Injury  Goal: Not fall by end of shift  Outcome: Adequate for Discharge  Goal: Be free from injury by end of the shift  Outcome: Adequate for Discharge  Goal: Verbalize understanding of personal risk factors for fall in the hospital  Outcome: Adequate for Discharge  Goal: Verbalize understanding of risk factor reduction measures to prevent injury from fall in the home  Outcome: Adequate for Discharge  Goal: Use assistive devices by end of the shift  Outcome: Adequate for Discharge  Goal: Pace activities to prevent fatigue by end of the shift  Outcome: Adequate for Discharge     Problem: Pain  Goal: My pain/discomfort is manageable  Outcome: Adequate for Discharge     Problem: Daily Care  Goal: Daily care needs are met  Outcome: Adequate for Discharge     Problem: Psychosocial Needs  Goal: Demonstrates ability to cope with hospitalization/illness  Outcome: Adequate for Discharge  Goal: Collaborate with me, my family, and caregiver to identify my specific goals  Outcome: Adequate for Discharge     Problem: Pain - Adult  Goal: Verbalizes/displays adequate comfort level or baseline comfort level  Outcome: Adequate for Discharge     Problem: Safety - Adult  Goal: Free from fall injury  Outcome: Adequate for Discharge     Problem: Discharge Planning  Goal: Discharge to home or other facility with appropriate resources  Outcome: Adequate for Discharge     Problem: Chronic Conditions and Co-morbidities  Goal: Patient's chronic conditions and co-morbidity symptoms are monitored and maintained or improved  Outcome: Adequate for Discharge     Problem: Skin  Goal: Decreased wound size/increased tissue granulation at next dressing change  Outcome: Adequate for Discharge  Goal:  Participates in plan/prevention/treatment measures  Outcome: Adequate for Discharge  Goal: Prevent/manage excess moisture  Outcome: Adequate for Discharge  Goal: Prevent/minimize sheer/friction injuries  Outcome: Adequate for Discharge  Goal: Promote/optimize nutrition  Outcome: Adequate for Discharge  Goal: Promote skin healing  Outcome: Adequate for Discharge     Problem: Heart Failure  Goal: Improved gas exchange this shift  Outcome: Adequate for Discharge  Goal: Improved urinary output this shift  Outcome: Adequate for Discharge  Goal: Reduction in peripheral edema within 24 hours  Outcome: Adequate for Discharge  Goal: Report improvement of dyspnea/breathlessness this shift  Outcome: Adequate for Discharge  Goal: Weight from fluid excess reduced over 2-3 days, then stabilize  Outcome: Adequate for Discharge  Goal: Increase self care and/or family involvement in 24 hours  Outcome: Adequate for Discharge     Problem: Safety - Medical Restraint  Goal: Remains free of injury from restraints (Restraint for Interference with Medical Device)  Outcome: Adequate for Discharge  Goal: Free from restraint(s) (Restraint for Interference with Medical Device)  Outcome: Adequate for Discharge     Problem: Knowledge Deficit  Goal: Patient/family/caregiver demonstrates understanding of disease process, treatment plan, medications, and discharge instructions  Outcome: Adequate for Discharge

## 2024-06-19 NOTE — CARE PLAN
The patient's goals for the shift include      The clinical goals for the shift include patient to remain free of falls during this shift

## 2024-06-19 NOTE — SIGNIFICANT EVENT
RAPID RESPONSE RN NOTE - RADAR 6    VS: 36, 95, 17, 96/63, 92%.  Reviewed VS with [facility] RN - VS within current trend for patient, No acute concerns from Nursing at this time.  RN to contact Rapid Response Team with any further issues or patient deterioration.

## 2024-06-19 NOTE — DISCHARGE SUMMARY
Discharge Diagnosis  Acute on chronic systolic (congestive) heart failure   Cardiogenic shock   Acute hypoxic respiratory failure   VT storm and incessant VT despite ICD shock   Anxiety/PTSD/Depression       Issues Requiring Follow-Up  Followup up heart failure management  Followup VT management   Followup UTI treatment with augmentin   Followup urine cultures/sensitivities   Followup TSH levels after being on amiodarone     Test Results Pending At Discharge  Pending Labs       Order Current Status    Extra Urine Snell Tube Collected (06/19/24 1000)    Urinalysis with Reflex Culture and Microscopic Collected (06/19/24 1000)    Urinalysis with Reflex Culture and Microscopic Collected (06/19/24 1000)    Urine culture Collected (06/19/24 0959)            Hospital Course  59 y/o M with PMHx of CAD s/p multiple PCI, ICM/HFrEF (EF 15% 5/24) s/p CRT-D, VT storm s/p VT ablation (2019), infrarenal AAA s/p R iliac stent, nephrolithiasis s/p recent ureteral stent. Presented to OSH on 5/23 with multiple ICD shocks for VT. Transferred to CICU at [facility] 5/28 for continued management. EP was consulted on arrival and patient was in slow VT, he was started on amiodarone gtt. Underwent VT ablation 5/30, but continued to have intermittent slow VT. On 6/3 VT was noted again with rates ~118 and patient was cardioverted. Maintained on amiodarone + lidocaine gtt, and is s/p stellate ganglion block 6/4. He was transferred to HFICU 6/3 after he signed consent for LVAD/OHT evaluation. After further discussions and being a current smoker, it was decided that OHT would not be an option, and the patient does not want an LVAD at this time, so advanced therapies evaluation was stopped 6/5. Palliative medicine saw the patient 6/5 and he decided to change his code status to DNR-DNI. Redo endocardial ablation with Dr. Wooten 6/6 c/b lactic acidosis requiring intubation and low CO requiring IABP support. He was extubated 6/7 and IABP was removed on 6/8.  SGC removed 6/9 with closing numbers: /57 (71), CVP 5, PA 44/26 (34), PCWP 11, Malcolm CO/CI:  5.5/2.9, Thermo: 5.3/2.8, , SVO2 67% on dapa 10 mg daily, brigido 25 mg daily. 6/11: Ganglion block bedside per EP + decrease 1/2 Amiodarone and lidocaine gtt. 6/12: Amio / Lido gtts discontinued. Low dose bisoprolol and Mexiletine initiated. 6/13: Palliative care goals of care discussions occurring. Low dose Entresto (12/13 mg) restarted. Mexiletine increased to 250 mg Q 8 hours. 6/14: Decreasing furosemide to 20 mg daily given hypotensive episodes. 6/15: No episodes of VTACH > 24 hours day before transfer out of ICU to general cardiology service and without further VT on the floor. Transferred to general medicine on 6/17 on tele. Pt remained stable with no episodes of VT. Had slight white count to 13, UA showed positive leukocyte esterase and he was started on 14 day course of augmentin for potential infection     Pertinent Physical Exam At Time of Discharge  Physical Exam  Constitutional:       General: He is not in acute distress.     Appearance: He is not ill-appearing.   Cardiovascular:      Rate and Rhythm: Normal rate and regular rhythm.      Heart sounds: No murmur heard.     No friction rub. No gallop.   Pulmonary:      Effort: Pulmonary effort is normal. No respiratory distress.      Breath sounds: Normal breath sounds. No stridor. No wheezing.   Abdominal:      General: Abdomen is flat. There is no distension.      Palpations: Abdomen is soft. There is no mass.      Tenderness: There is no abdominal tenderness.   Skin:     General: Skin is warm and dry.      Capillary Refill: Capillary refill takes less than 2 seconds.   Neurological:      Mental Status: He is alert.         Home Medications     Medication List      START taking these medications     amiodarone 200 mg tablet; Commonly known as: Pacerone; Take 2 tablets   (400 mg) by mouth once daily. Do not fill before June 19, 2024.   amoxicillin-pot  clavulanate 875-125 mg tablet; Commonly known as:   Augmentin; Take 1 tablet by mouth 2 times a day for 14 days.   bisoprolol 5 mg tablet; Commonly known as: Zebeta; Take 0.5 tablets (2.5   mg) by mouth once daily.   Eliquis 5 mg tablet; Generic drug: apixaban; Take 1 tablet (5 mg) by   mouth every 12 hours.   Farxiga 10 mg; Generic drug: dapagliflozin propanediol; Take 1 tablet   (10 mg) by mouth once daily.   hydrOXYzine HCL 25 mg tablet; Commonly known as: Atarax; Take 1 tablet   (25 mg) by mouth every 6 hours if needed for anxiety for up to 10 days.   mexiletine 250 mg capsule; Commonly known as: Mexitil; Take 1 capsule   (250 mg) by mouth every 8 hours.   spironolactone 25 mg tablet; Commonly known as: Aldactone; Take 1 tablet   (25 mg) by mouth once daily.     CONTINUE taking these medications     acetaminophen 500 mg tablet; Commonly known as: Tylenol   albuterol sulfate 90 mcg/actuation aero powdr breath act w/sensor   inhaler; Commonly known as: Proair Digihaler   aspirin 81 mg EC tablet   b complex 0.4 mg tablet   cholecalciferol 5,000 Units tablet; Commonly known as: Vitamin D-3   co-enzyme Q-10 50 mg capsule   cyanocobalamin 1,000 mcg tablet; Commonly known as: Vitamin B-12   Entresto 24-26 mg tablet; Generic drug: sacubitriL-valsartan   Fiber (calcium polycarbophil) 625 mg tablet; Generic drug: calcium   polycarbophiL   LORazepam 0.5 mg tablet; Commonly known as: Ativan   nicotine 21 mg/24 hr patch; Commonly known as: Nicoderm CQ   PARoxetine 40 mg tablet; Commonly known as: Paxil   potassium chloride 20 mEq packet; Commonly known as: Klor-Con   ranolazine 500 mg 12 hr tablet; Commonly known as: Ranexa   rosuvastatin 20 mg tablet; Commonly known as: Crestor   tamsulosin 0.4 mg 24 hr capsule; Commonly known as: Flomax   torsemide 20 mg tablet; Commonly known as: Demadex; Take 1 tablet (20   mg) by mouth 2 times a day.   traZODone 50 mg tablet; Commonly known as: Desyrel   VITAMIN A ORAL   VITAMIN E  ORAL     STOP taking these medications     prasugrel 10 mg tablet; Commonly known as: Effient       Outpatient Follow-Up  Future Appointments   Date Time Provider Department Center   8/8/2024  2:00 PM Arya Brown MD [id number] [place]       GREG LAMA MS4

## 2024-06-20 ENCOUNTER — PATIENT OUTREACH (OUTPATIENT)
Dept: PRIMARY CARE | Facility: CLINIC | Age: 59
End: 2024-06-20
Payer: COMMERCIAL

## 2024-06-20 LAB
BACTERIA UR CULT: NO GROWTH
HOLD SPECIMEN: NORMAL

## 2024-06-20 NOTE — PROGRESS NOTES
Discharge Facility:  [facility]    Discharge Diagnosis:  Acute on chronic systolic (congestive) heart failure   Cardiogenic shock   Acute hypoxic respiratory failure   VT storm and incessant VT despite ICD shock   Anxiety/PTSD/Depression   UTI     Admission Date:5/28/24  Discharge Date: 6/19/24    PCP Appointment Date:TBD     Specialist Appointment Date: TBD-I am unable to make an appt due to no openings . Message sent to office staff requesting assistance. Patient had already called today to schedule, He is waiting on a call back about approval to get him in soon.       8/8/2024  2:00 PM Arya Brown MD [id number]     Hospital Encounter and Summary: Linked   See discharge assessment below for further details  Medications  Medications reviewed with patient/caregiver?: Yes (6/20/2024 12:21 PM)  Is the patient having any side effects they believe may be caused by any medication additions or changes?: No (6/20/2024 12:21 PM)  Does the patient have all medications ordered at discharge?: Yes (6/20/2024 12:21 PM)  Care Management Interventions: Provided patient education (6/20/2024 12:21 PM)  Prescription Comments: START taking these medications     amiodarone 200 mg tablet; Commonly known as: Pacerone; Take 2 tablets   (400 mg) by mouth once daily. Do not fill before June 19, 2024.   amoxicillin-pot clavulanate 875-125 mg tablet; Commonly known as:   Augmentin; Take 1 tablet by mouth 2 times a day for 14 days.   bisoprolol 5 mg tablet; Commonly known as: Zebeta; Take 0.5 tablets (2.5   mg) by mouth once daily.   Eliquis 5 mg tablet; Generic drug: apixaban; Take 1 tablet (5 mg) by   mouth every 12 hours.   Farxiga 10 mg; Generic drug: dapagliflozin propanediol; Take 1 tablet   (10 mg) by mouth once daily.   hydrOXYzine HCL 25 mg tablet; Commonly known as: Atarax; Take 1 tablet   (25 mg) by mouth every 6 hours if needed for anxiety for up to 10 days.   mexiletine 250 mg capsule; Commonly  known as: Mexitil; Take 1 capsule   (250 mg) by mouth every 8 hours.   spironolactone 25 mg tablet; Commonly known as: Aldactone; Take 1 tablet   (25 mg) by mouth once daily.    STOP taking these medications     prasugrel 10 mg tablet; Commonly known as: Effient (6/20/2024 12:21 PM)  Is the patient taking all medications as directed (includes completed medication regime)?: Yes (6/20/2024 12:21 PM)  Medication Comments: CM discussed referencing discharge paperwork to follow detailed daily medication schedule. (6/20/2024 12:21 PM)    Appointments  Does the patient have a primary care provider?: Yes (6/20/2024 12:21 PM)  Has the patient kept scheduled appointments due by today?: Yes (6/20/2024 12:21 PM)  Care Management Interventions: Advised to schedule with specialist (pt has reached out to [facility] office and I also send message to request appt for hosipital follow up.) (6/20/2024 12:21 PM)    Self Management  Has home health visited the patient within 72 hours of discharge?: Not applicable (6/20/2024 12:21 PM)    Patient Teaching  Does the patient have access to their discharge instructions?: Yes (6/20/2024 12:21 PM)  Care Management Interventions: Reviewed instructions with patient (6/20/2024 12:21 PM)  What is the patient's perception of their health status since discharge?: Same (6/20/2024 12:21 PM)  Is the patient/caregiver able to teach back the hierarchy of who to call/visit for symptoms/problems? PCP, Specialist, Home Health nurse, Urgent Care, ED, 911: Yes (6/20/2024 12:21 PM)  Patient/Caregiver Education Comments: I introduced myself and the TCM program. Reviewed hospital stay and answered any questions. I gave my contact information and encouraged to call me if needing assistance or has any further questions prior to my next outreach. Pt indentified for [facility] program. CM sent message to [facility] staff for handoff and requested notification when pt has completed program. (6/20/2024 12:21 PM)    Wrap Up  Call End  Time: 1227 (6/20/2024 12:21 PM)

## 2024-06-25 ENCOUNTER — PATIENT OUTREACH (OUTPATIENT)
Dept: HOME HEALTH SERVICES | Age: 59
End: 2024-06-25
Payer: COMMERCIAL

## 2024-06-25 DIAGNOSIS — Z01.30 BLOOD PRESSURE CHECK: Primary | ICD-10-CM

## 2024-06-25 LAB
ATRIAL RATE: 96 BPM
P AXIS: 64 DEGREES
P OFFSET: 167 MS
P ONSET: 137 MS
PR INTERVAL: 88 MS
Q ONSET: 181 MS
QRS COUNT: 16 BEATS
QRS DURATION: 174 MS
QT INTERVAL: 448 MS
QTC CALCULATION(BAZETT): 565 MS
QTC FREDERICIA: 524 MS
R AXIS: -53 DEGREES
T AXIS: 141 DEGREES
T OFFSET: 405 MS
VENTRICULAR RATE: 96 BPM

## 2024-06-25 RX ORDER — ACETAMINOPHEN 500 MG
TABLET ORAL
Start: 2024-06-25

## 2024-06-27 ENCOUNTER — TELEMEDICINE CLINICAL SUPPORT (OUTPATIENT)
Dept: CARE COORDINATION | Age: 59
End: 2024-06-27
Payer: COMMERCIAL

## 2024-06-27 ENCOUNTER — PATIENT OUTREACH (OUTPATIENT)
Dept: CARE COORDINATION | Age: 59
End: 2024-06-27

## 2024-06-27 VITALS — SYSTOLIC BLOOD PRESSURE: 82 MMHG | DIASTOLIC BLOOD PRESSURE: 64 MMHG | BODY MASS INDEX: 25.84 KG/M2 | WEIGHT: 165 LBS

## 2024-06-27 DIAGNOSIS — I25.5 ISCHEMIC CARDIOMYOPATHY: Primary | ICD-10-CM

## 2024-06-27 DIAGNOSIS — I47.20 VENTRICULAR TACHYCARDIA (MULTI): ICD-10-CM

## 2024-06-27 DIAGNOSIS — I50.23 ACUTE ON CHRONIC SYSTOLIC (CONGESTIVE) HEART FAILURE (MULTI): ICD-10-CM

## 2024-06-27 NOTE — PROGRESS NOTES
Daily Call Note: [facility] initial call with patient and Bryanna Slater Np.  Pt states he has been feeling shaky and jittery since he was home from the hospital and that he took a shower today and he was short of breath from the activity.  Patient's weight today and yesterday 165 lbs but states he feels like he is holding water in his abdomen not in his lower extremities.  Discussed his medications with PARISH Lee NP and will address possible parameters for his BP tomorrow at his Cardiology appointment.  Pt has money on his medical spending card and is going to purchase a home BP cuff at the drug store on the way to his appointment.  Next [facility] call scheduled for 7/5 @ 1300 and confirmed with pt so that the pharmacist can be on the call.  No further questions at this time.     Pt Education: Daily weights  Barriers: none  Topics for Daily Review: weights and BP  Pt demonstrates clear understanding: Yes    Daily Weight:  Vitals:    06/27/24 1900   Weight: 74.8 kg (165 lb)      Last 3 Weights:  Wt Readings from Last 7 Encounters:   06/27/24 74.8 kg (165 lb)   06/19/24 73.2 kg (161 lb 6 oz)   05/27/24 78.8 kg (173 lb 11.6 oz)       Masimo Device: No   Masimo Clinical Impression: n/a    Virtual Visits--Scheduled (Most Recent Date at Top)  Follow up Appointments  Recent Visits  No visits were found meeting these conditions.  Showing recent visits within past 30 days and meeting all other requirements  Today's Visits  Date Type Provider Dept   06/27/24 Appointment [facility]   Showing today's visits and meeting all other requirements  Future Appointments  Date Type Provider Dept   07/05/24 Appointment [facility]   Showing future appointments within next 90 days and meeting all other requirements       Frequency of RN Calls & Virtual Visits per Team Agreement: [facility] Frequency: Daily    Medication issues Addressed (what was done): BP is low will discuss at  Cardiology appointment 6/28    Follow up appointments scheduled by [facility] Staff: none  Referrals made by [facility] staff: none

## 2024-06-28 ENCOUNTER — APPOINTMENT (OUTPATIENT)
Dept: CARDIOLOGY | Facility: CLINIC | Age: 59
End: 2024-06-28
Payer: COMMERCIAL

## 2024-06-28 VITALS
WEIGHT: 168.6 LBS | HEART RATE: 79 BPM | HEIGHT: 67 IN | BODY MASS INDEX: 26.46 KG/M2 | DIASTOLIC BLOOD PRESSURE: 50 MMHG | SYSTOLIC BLOOD PRESSURE: 82 MMHG

## 2024-06-28 DIAGNOSIS — R94.31 ABNORMAL EKG: ICD-10-CM

## 2024-06-28 DIAGNOSIS — I25.5 ISCHEMIC CARDIOMYOPATHY: ICD-10-CM

## 2024-06-28 DIAGNOSIS — I47.20 VENTRICULAR TACHYCARDIA (MULTI): ICD-10-CM

## 2024-06-28 DIAGNOSIS — Z95.810 BIVENTRICULAR ICD (IMPLANTABLE CARDIOVERTER-DEFIBRILLATOR) IN PLACE: ICD-10-CM

## 2024-06-28 DIAGNOSIS — I50.20 SYSTOLIC CONGESTIVE HEART FAILURE, UNSPECIFIED HF CHRONICITY (MULTI): Primary | ICD-10-CM

## 2024-06-28 DIAGNOSIS — Z79.899 HIGH RISK MEDICATION USE: ICD-10-CM

## 2024-06-28 DIAGNOSIS — Z87.891 FORMER SMOKER: ICD-10-CM

## 2024-06-28 RX ORDER — BISOPROLOL FUMARATE 5 MG/1
5 TABLET, FILM COATED ORAL DAILY
COMMUNITY
End: 2024-06-28 | Stop reason: DRUGHIGH

## 2024-06-28 RX ORDER — BISOPROLOL FUMARATE 5 MG/1
2.5 TABLET, FILM COATED ORAL DAILY
Qty: 45 TABLET | Refills: 3 | Status: SHIPPED | OUTPATIENT
Start: 2024-06-28 | End: 2025-06-28

## 2024-06-28 NOTE — PATIENT INSTRUCTIONS
You advised us that you have been taking Bisoprolol 5 mg daily. Please cut this tablet in half and take 2.5 mg daily.    Dr. [facility] would like you to follow up with Dr. Rosa in 1-2 weeks and see him also on same day.      -Please bring all medicines, vitamins, and herbal supplements with you in original bottles to every appointment!!!!    -Prescriptions will not be filled unless you are compliant with your follow up appointments or have a follow up appointment scheduled as per instruction of your physician. Refills should be requested at the time of your visit.

## 2024-06-28 NOTE — PROGRESS NOTES
CARDIOLOGY OFFICE VISIT      CHIEF COMPLAINT  Chief Complaint   Patient presents with    Hospital Follow-up       HISTORY OF PRESENT ILLNESS  HPI   58-year-old male with past medical history significant for myocardial infarction (first in 2003), CAD, s/p remote multivessel PCI's, ischemic cardiomyopathy with an LVEF reported at 35% back in 2006 and again in 2008, most recent echocardiogram August, 2022 with LVEF 25 to 30%, chronic systolic heart failure, s/p biventricular ICD (11/6/2019), initial ICD implant in 2014, abdominal aortic aneurysm without rupture, HTN, HLD, PTSD, tobacco use, renal stones, s/p recent cystoscopy with urethral stent placement who presented to [facility]'s emergency department last evening after his ICD fired approximately 10 times around 8 PM.  The patient was sitting talking with his mother when this occurred.  Patient developed chest pain and shortness of breath at that time.  Patient has not felt well for several weeks with complaints of fatigue, dizziness, dyspnea.     Work-up in the ED revealed in E[facility] showing a WCT with RBBB at a rate of 125, Mg+ 1.87, K+ 3.3, Lactate 2.5, BNP 2144, troponins 31, 160, 305, 342, WBC 17.9.  Potassium and magnesium were repleted     Device interrogation on arrival shows ventricular tachycardia rates between 125-130 bpm.  Some of the rate during ventricular tachycardia under detection.  Patient has been complaining of tiredness and fatigue lately.  Looking at his record he was evaluated in April 29, 2024 at [facility].  At time patient was complaining of back pain and he was found to be in wide-complex rhythm.  He received ATP therapy with successful restoration to sinus rhythm  Since then patient has not had any follow-ups with cardiology service.     Cardiac catheterization performed during this admission showed severe triple-vessel coronary disease.  Right coronary artery and circumflex artery occluded and right  coronary fills via collaterals.  LAD with 10 to 30% stenosis.  No lesion amenable for revascularization.     Patient underwent noninvasive primary stimulation and also cardioversion during this evaluation with successful restoration to sinus rhythm.  Device interrogation was performed with adequate sensing, capture and impedances of all leads.  Patient has a biventricular ICD system ([employer]).  The device was reprogrammed to DDD R  bpm.  AV delay was decreased to 150 ms.  Current rhythm a paced-ventricular paced rhythm.     Continue with amiodarone 1 mg/min patient had recurrence of ventricular tachycardia for which she underwent ATP-noninvasive primary stimulation through the device with successful restoration to sinus rhythm.  Patient was transferred to [facility] for part of therapy.    Patient remained in slow, hemodynamically stable VT, likely scar mediated iso known ICM and unrevascularized CAD. Patient was started on amiodarone and lidocaine. Given inability to pace terminate this, patient underwent VT RFA (VT 1-critical isthmus involving the mid inferior / infero - septal left ventricular segment, VT 2 - mid lateral left ventriclar segment) with Dr. Flores on 5/30.  Despite this, on 6/2, patient was noted to have slow VT with rates ~ 90-110s. Tracking rate was increased to 95bpm but patient continued to have episodes of VT (rates ~ 118 on 6/3) requiring cardioversion. Noted to go into slow VT again on 6/4 s/p successful overdrive pacing. Morphology of VT ws similar to prior VT  which was ablated on 5/30.  Patient underwent a 2nd VT ablation with Dr. Wooten on 6/6 - extensive scar modification was done. Multiple Vts were induced, coming from the lateral scar as well as the septal scar s/p extensive ablation. Patient then went into a different wide VT suggestive of metabolic derangements. pH was noted to be 7.15. Patient subsequently underwent IABP placement (removed 6/8).    Had multiple runs of VT (rates 110s-130s) since VT ablation between 6/8-/10 that self-terminated without any therapies delivered. Patient has not had any more VT episodes in the last 48h. Stellate ganglion block repeated on 6/11    Since the discharge from the hospital, he has been doing well.  He states that he has been noticing episodes of dizziness especially because blood pressure systolic has been in the mid 80 mmHg.  Also at the time of the discharge from the hospital the device was prominent as at lower rate 95 bpm.  He feels very fatigued and tired doing physical activities.    EKG performed today shows ventricular paced rhythm at a rate of 95 bpm QRS duration 142 ms QT corrected 492 ms.  Rhythm strip shows the same pattern.    Patient is still using amiodarone 400 mg daily and mexiletine 200 mg 3 times a day.    Patient had a device interrogation performed today during this evaluation and it shows adequate sensing, capture and impedance of all leads.  No ventricular events were noted.  The device was reprogrammed decreasing the lower rate from 95 to 85 bpm.                Past Medical History  Past Medical History:   Diagnosis Date    Atherosclerosis of native artery of both lower extremities with intermittent claudication (CMS-HCC)     CHB (complete heart block) (Multi)     per device check    Chronic systolic CHF (congestive heart failure), NYHA class 3 (Multi)     COPD (chronic obstructive pulmonary disease) (Multi)     Coronary artery disease     History of tobacco abuse     HLD (hyperlipidemia)     Hypertension     Infrarenal abdominal aortic aneurysm (AAA) without rupture (CMS-HCC)     Ischemic cardiomyopathy with implantable cardioverter-defibrillator (ICD)     MI (myocardial infarction) (Multi)     multiple    Nephrolithiasis     PTSD (post-traumatic stress disorder)        Social History  Social History     Tobacco Use    Smoking status: Former     Types: Cigarettes    Smokeless tobacco: Not on  file   Vaping Use    Vaping status: Never Used   Substance Use Topics    Alcohol use: Yes     Comment: social    Drug use: Defer       Family History     Family History   Problem Relation Name Age of Onset    Hypertension Mother      Diabetes Father      Hypertension Sister      Diabetes Sister      Colon cancer Maternal Grandmother      Hypertension Maternal Grandmother      Heart disease Maternal Grandfather      Hypertension Maternal Grandfather      Cancer Paternal Grandfather      Hypertension Paternal Grandfather          Allergies:  Allergies   Allergen Reactions    Chantix [Varenicline] Unknown        Outpatient Medications:  Current Outpatient Medications   Medication Instructions    acetaminophen (TYLENOL) 650 mg, oral, Every 6 hours PRN    albuterol sulfate (Proair Digihaler) 90 mcg/actuation aero powdr breath act w/sensor inhaler 2 puffs, inhalation, Every 4 hours PRN    amiodarone (Pacerone) 200 mg tablet Take 2 tablets (400 mg) by mouth once daily. Do not fill before June 19, 2024.    amoxicillin-pot clavulanate (Augmentin) 875-125 mg tablet 1 tablet, oral, 2 times daily    aspirin 81 mg, oral, Daily    b complex 0.4 mg tablet 1 tablet, oral, Daily    bisoprolol (ZEBETA) 2.5 mg, oral, Daily    blood pressure monitor kit Use as directed    calcium polycarbophiL (FIBER (CALCIUM POLYCARBOPHIL)) 625 mg, oral, Daily    cholecalciferol (VITAMIN D-3) 5,000 Units, oral, Every 3 days    co-enzyme Q-10 50 mg, oral, Once Weekly    cyanocobalamin (VITAMIN B-12) 1,000 mcg, oral, Daily    Eliquis 5 mg, oral, Every 12 hours    Farxiga 10 mg, oral, Daily    hydrOXYzine HCL (ATARAX) 25 mg, oral, Every 6 hours PRN    LORazepam (ATIVAN) 0.5 mg, oral, Nightly    mexiletine (MEXITIL) 250 mg, oral, Every 8 hours scheduled    nicotine (Nicoderm CQ) 21 mg/24 hr patch 1 patch, transdermal, Every 24 hours    PARoxetine (PAXIL) 40 mg, oral, Every morning    potassium chloride (Klor-Con) 20 mEq packet 20 mEq, oral, Daily     ranolazine (RANEXA) 500 mg, oral, 2 times daily, Do not crush, chew, or split.    rosuvastatin (CRESTOR) 20 mg, oral, Nightly    sacubitriL-valsartan (Entresto) 24-26 mg tablet 1 tablet, oral, 2 times daily    spironolactone (ALDACTONE) 25 mg, oral, Daily    tamsulosin (FLOMAX) 0.4 mg, oral, Daily    torsemide (DEMADEX) 20 mg, oral, 2 times daily    traZODone (DESYREL) 50 mg, oral, Nightly    VITAMIN A ORAL 1 capsule, oral, Every other day    vitamin E acetate (VITAMIN E ORAL) 1 capsule, oral, Daily          REVIEW OF SYSTEMS  Review of Systems   All other systems reviewed and are negative.        VITALS  Vitals:    06/28/24 1129   BP: 82/50   Pulse: 79       PHYSICAL EXAM  Constitutional:       Appearance: Healthy appearance. Not in distress.   Neck:      Vascular: No JVR. JVD normal.   Pulmonary:      Effort: Pulmonary effort is normal.      Breath sounds: Normal breath sounds. No wheezing. No rhonchi. No rales.   Chest:      Chest wall: Not tender to palpatation.   Cardiovascular:      PMI at left midclavicular line. Normal rate. Regular rhythm. Normal S1. Normal S2.       Murmurs: There is no murmur.      No gallop.  No click. No rub.      Comments: Device right  pectoral area. No hematoma or infection noted.     Pulses:     Intact distal pulses.   Edema:     Peripheral edema absent.   Abdominal:      General: Bowel sounds are normal.      Palpations: Abdomen is soft.      Tenderness: There is no abdominal tenderness.   Musculoskeletal: Normal range of motion.         General: No tenderness. Skin:     General: Skin is warm and dry.   Neurological:      General: No focal deficit present.      Mental Status: Alert and oriented to person, place and time.           ASSESSMENT AND PLAN  1.    Wide-complex tachycardia/ventricular tachycardia/Slow VT/AICD shocks             -Device check ([employer] momentum BiV ICD) revealed 15 ATP's and 14 high-voltage therapies delivered on 5/22/2024.  EGM showed VT with  rates in the 200's with therapies occasionally slowing below the detection rate to the 120s.             -Patient was maintaining a slow VT with rates in the 120s             -On amiodarone and mexiletine therapy             -LHC  revealed  of the proximal circumflex artery,  of the proximal RCA with distal collateral filling and mild disease of the left main and LAD.  Medical management is advised.  Status post ventricular tachycardia ablation at [facility] x 2 in May - June 2024 with unsuccessful results             -Cardioversion during admission at [facility] in May 2024.  Currently on amiodarone and mexiletine therapy  2.    Ischemic cardiomyopathy/acute on chronic systolic heart failure             -LVEF 10% on echocardiogram 5/23/2024             -Moderately dilated left ventricle  3.    Valvular heart disease             -Moderate to severe MR/moderate TR             -Moderate to severely elevated pulmonary artery pressures  4.    Coronary artery disease/multiple remote myocardial infarctions             -Remote multivessel PCI's (last in 2008)             -See report above for LHC performed today  5.    Benign essential hypertension             -Currently hypotensive  6.    Hyperlipidemia             -On statin therapy  7.    Current tobacco use    Plan-recommendations    Patient is doing well from the electrophysiology standpoint.  No ventricular arrhythmia seen on device interrogation.  Patient had to fill ventricular tachycardia ablations.  Will continue with amiodarone and mexiletine.    We will decrease the dose of Bystolic to 2.5 mg daily.  Apparently this indication was in his discharge paper.  Patient was taking Bystolic 5 mg daily.    Follow my office in 2 to 3 weeks or sooner needed to decrease the lower rate to 75 or 70 bpm.    Patient is to have a close follow-up with general cardiology service for adjustment of heart failure therapy.    Follow device  clinic as scheduled.    Continue with high risk medication (amiodarone and mexiletine).    Risk factor modification and lifestyle modification discussed with patient. Diet , exercise and hydration discussed with patient.    I have personally review with patient during this office visit, laboratory data, echocardiogram results, stress test results, Holter-event monitor results prior and after the last electrophysiology visit. All questions has been answered.    Please excuse any errors in grammar or translation related to this dictation.  Voice recognition software was utilized to prepare this document.      Scribe Attestation  By signing my name below, I, Bryanna Cunningham LPN , Scribe   attest that this documentation has been prepared under the direction and in the presence of Zachary Sparks MD.

## 2024-07-01 ENCOUNTER — TELEPHONE (OUTPATIENT)
Dept: INTERNAL MEDICINE | Facility: HOSPITAL | Age: 59
End: 2024-07-01
Payer: COMMERCIAL

## 2024-07-01 ENCOUNTER — PATIENT OUTREACH (OUTPATIENT)
Dept: CARE COORDINATION | Age: 59
End: 2024-07-01
Payer: COMMERCIAL

## 2024-07-01 ENCOUNTER — TELEPHONE (OUTPATIENT)
Dept: CARDIOLOGY | Facility: CLINIC | Age: 59
End: 2024-07-01
Payer: COMMERCIAL

## 2024-07-01 VITALS — SYSTOLIC BLOOD PRESSURE: 80 MMHG | DIASTOLIC BLOOD PRESSURE: 74 MMHG

## 2024-07-01 LAB
ATRIAL RATE: 104 BPM
Q ONSET: 213 MS
QRS COUNT: 18 BEATS
QRS DURATION: 186 MS
QT INTERVAL: 442 MS
QTC CALCULATION(BAZETT): 603 MS
QTC FREDERICIA: 544 MS
R AXIS: 128 DEGREES
T AXIS: -54 DEGREES
T OFFSET: 434 MS
VENTRICULAR RATE: 112 BPM

## 2024-07-01 NOTE — PROGRESS NOTES
"Daily Call Note: Daily call completed with pt today. Pt reports that he is \"not too bad, but had an issue with my BP.\" This morning, BP read 80/74. He has been drinking increased fluids in an attempt to bring his BP up - it is currently 104/74. He endorses feeling \"foggy,\" denies dizziness, but reports that if he turns his head too fast, he sees \"streamers.\"  Secure chat to Dr. Lopez - requested that his meds be held today and to check BP in the am before meds.  Called back to patient with these instructions - he will hold his bisoprolol this evening and his torsemide and sprionolactone in the morning. Pt verbalized understanding. Pt aware that this RN will call him in the am to obtain his BP.  No other questions or concerns at this time.    Pt Education: per POC  Barriers: none  Topics for Daily Review: BP  Pt demonstrates clear understanding: Yes    Daily Weight:  There were no vitals filed for this visit.   Last 3 Weights:  Wt Readings from Last 7 Encounters:   06/28/24 76.5 kg (168 lb 9.6 oz)   06/27/24 74.8 kg (165 lb)   06/19/24 73.2 kg (161 lb 6 oz)   05/27/24 78.8 kg (173 lb 11.6 oz)       Masimo Device: No   Masimo Clinical Impression: n/a    Virtual Visits--Scheduled (Most Recent Date at Top)  Follow up Appointments  Recent Visits  No visits were found meeting these conditions.  Showing recent visits within past 30 days and meeting all other requirements  Future Appointments  Date Type Provider Dept   07/05/24 Appointment [facility]   Showing future appointments within next 90 days and meeting all other requirements       Frequency of RN Calls & Virtual Visits per Team Agreement: [facility] Frequency: Daily    Medication issues Addressed (what was done): BP meds    Follow up appointments scheduled by [facility] Staff: none  Referrals made by [facility] staff: none        "

## 2024-07-01 NOTE — TELEPHONE ENCOUNTER
Patient's mother called and LM stating patient's BP was really low. This nurse called the patient back to obtain more information. Patient stated when his mother called his BP was 80/64. Patient has been drinking water. I advised the patient to take his BP, 84/58. Lightheaded and dizzy. Denies chest pain and SOB.  Patient did take bisoprolol last night. Patient stated he thinks the Entresto is causing it. Per Korin Clarke, APRN-CNP patient is to decrease spironolactone to 12.5 mg once daily and decrease torsemide to once daily. Monitor his BP and call us on Wednesday with the results. Advised patient of this and he verbalized understanding.

## 2024-07-02 ENCOUNTER — PATIENT OUTREACH (OUTPATIENT)
Dept: CARE COORDINATION | Age: 59
End: 2024-07-02
Payer: COMMERCIAL

## 2024-07-02 VITALS — HEART RATE: 85 BPM | DIASTOLIC BLOOD PRESSURE: 74 MMHG | SYSTOLIC BLOOD PRESSURE: 114 MMHG

## 2024-07-02 NOTE — PROGRESS NOTES
"Incoming call from patient ( 07.02.2024 at 1015) reporting he'd been sitting outside with his mother and began not feeling well. Began \"seeing streamers again, like the [employer] Million Dollar Man.\" Took his BP and it is 84/64. Feels it may be related to his current Entresto order (24/26mg BID - this was too much in the past for him and had to be decreased to once daily).  Secure chat to Dr. Carmichael re: same.  Dr. Carmichael reply: I see Entresto listed as a historical med, wasn't aware he was taking. He should hold the night dose of that.   Hold his aldactone dose is 25 mg daily as well, if that's to continue the dose could also be halved   This RN: OK - to clarify: hold the Entresto tonight.  Should he DC that dose altogether & only take it once daily?  AND we are decreasing the spironolactone to 12.5mg once daily?   Dr. Carmichael: hold Entresto tonight and probably aldactone as well for the time being   This RN: Hold Entresto tonight. He held his spironolactone this am. We'll call him tomorrow to re-check BP and update provider.   Updated pt re: above. He verbalized understanding to hold his Entresto tonight and to take nothing in the am until he is called by [facility] to re-check his BP and receive new orders. Reviewed his upcoming cardiology appt - scheduled for 7/11 - this was the first available. Encouraged him to continue to call [facility] for with any concerns and we will continue to address. He verbalized understanding.  "

## 2024-07-02 NOTE — PROGRESS NOTES
"Daily Call Note: Daily call completed with pt this morning - recheck of BP this am is 114/74, pulse 85. Pt states taht he feels \"normal.\" Denies dizziness, brain fog or seeing \"streamers\" when he turns his head.   Secure chat to Dr. Carmichael - orders to hold spironolactone and torsemide this morning, ok to take everything else as ordered and re-check BP in the morning.   Tasks updated.  Pt verbalized understanding that next [facility] is scheduled for 7/5 at 1300 AND that he would receive a call again in the morning to re-check his BP BEFORE he takes his am meds.    Pt Education: per POC  Barriers: none  Topics for Daily Review: BP  Pt demonstrates clear understanding: Yes    Daily Weight:  There were no vitals filed for this visit.   Last 3 Weights:  Wt Readings from Last 7 Encounters:   06/28/24 76.5 kg (168 lb 9.6 oz)   06/27/24 74.8 kg (165 lb)   06/19/24 73.2 kg (161 lb 6 oz)   05/27/24 78.8 kg (173 lb 11.6 oz)       Masimo Device: No   Masimo Clinical Impression: n/a    Virtual Visits--Scheduled (Most Recent Date at Top)  Follow up Appointments  Recent Visits  No visits were found meeting these conditions.  Showing recent visits within past 30 days and meeting all other requirements  Future Appointments  Date Type Provider Dept   07/05/24 Appointment [facility]   Showing future appointments within next 90 days and meeting all other requirements       Frequency of RN Calls & Virtual Visits per Team Agreement: [facility] Frequency: Daily    Medication issues Addressed (what was done): antihypertensives    Follow up appointments scheduled by [facility] Staff: none  Referrals made by [facility] staff: none        "

## 2024-07-03 ENCOUNTER — PATIENT OUTREACH (OUTPATIENT)
Dept: HOME HEALTH SERVICES | Age: 59
End: 2024-07-03
Payer: COMMERCIAL

## 2024-07-03 VITALS
HEART RATE: 85 BPM | WEIGHT: 162 LBS | BODY MASS INDEX: 25.37 KG/M2 | SYSTOLIC BLOOD PRESSURE: 91 MMHG | DIASTOLIC BLOOD PRESSURE: 70 MMHG

## 2024-07-03 NOTE — PROGRESS NOTES
Daily Call Note: Daily call complete. Patient is feeling better today. BP at 0730 98/64 and on this call at 0925 BP 91/70. Patient is asymptomatic, he denies vision changes, light headedness and dizziness. Patient also reports he felt so bloated last night that he took a torsemide. I explained that this can also have effect on his BP and cause it to be lower. He reports weight today as 162 lbs. Dr Carmichael updated on patient status via secure chat, awaiting orders. [facility] 7/5/24 at 1300. No other questions at this time.      BP 91/70   Pulse 85   Wt 73.5 kg (162 lb)   BMI 25.37 kg/m²     Per Dr. Carmichael -Hold aldactone/torsemide/entresto today and re-evaluate tomorrow morning. Outreach to patient, instructed on above, verbalized understanding and notified nurse will call tomorrow morning..     Pt Education: per POC  Barriers: none  Topics for Daily Review: BP  Pt demonstrates clear understanding: Yes    Daily Weight:  Vitals:    07/03/24 0932   Weight: 73.5 kg (162 lb)      Last 3 Weights:  Wt Readings from Last 7 Encounters:   07/03/24 73.5 kg (162 lb)   06/28/24 76.5 kg (168 lb 9.6 oz)   06/27/24 74.8 kg (165 lb)   06/19/24 73.2 kg (161 lb 6 oz)   05/27/24 78.8 kg (173 lb 11.6 oz)       Masimo Device: No   Masimo Clinical Impression: n/a    Virtual Visits--Scheduled (Most Recent Date at Top)  Follow up Appointments  Recent Visits  No visits were found meeting these conditions.  Showing recent visits within past 30 days and meeting all other requirements  Future Appointments  No visits were found meeting these conditions.  Showing future appointments within next 90 days and meeting all other requirements       Frequency of RN Calls & Virtual Visits per Team Agreement: [facility] Frequency: Daily    Medication issues Addressed (what was done): see note    Follow up appointments scheduled by [facility] Staff: none  Referrals made by [facility] staff: none

## 2024-07-04 ENCOUNTER — PATIENT OUTREACH (OUTPATIENT)
Dept: HOME HEALTH SERVICES | Age: 59
End: 2024-07-04
Payer: COMMERCIAL

## 2024-07-04 VITALS
HEART RATE: 85 BPM | SYSTOLIC BLOOD PRESSURE: 111 MMHG | DIASTOLIC BLOOD PRESSURE: 74 MMHG | WEIGHT: 166 LBS | BODY MASS INDEX: 26 KG/M2

## 2024-07-04 NOTE — PROGRESS NOTES
"Daily Call Note: Follow up phone call regarding low BP from yesterday where the patient was instructed to hold the aldactone, torsemide and entresto. His BP this morning was 117/74 - HR-85 - weight 166 which is up from yesterday's measurement at 162 - MD made aware. He reports having slight edema of toes and feet - no SOB or respiratory issue of any kind. He was also concerned that he has not had a BM for two days and asked MD for OTC recommendation. A Secure Chat was sent to Dr. John, who replied the patient can resume taking the aldactone and torsemide today, and tomorrow resume the Entresto.  From the chat with Dr. John: (\"Have him take aldactone and torsemide today. Can restart entresto tmrw\"). Regarding the laxative, he recommended Dulcolax and to avoid Miralax due to fluid volume.   This was communicated to the patient who verbalized understanding.     He was also reminded of the [facility] appointment scheduled for tomorrow at 1300.    Pt Education: medications and blood pressure effects, symptoms of low blood pressure to report.  Barriers: none    Pt demonstrates clear understanding: Yes    /74   Pulse 85   Wt 75.3 kg (166 lb)   BMI 26.00 kg/m²     Daily Weight:  Vitals:    07/04/24 0900   Weight: 75.3 kg (166 lb)      Last 3 Weights:  Wt Readings from Last 7 Encounters:   07/04/24 75.3 kg (166 lb)   07/03/24 73.5 kg (162 lb)   06/28/24 76.5 kg (168 lb 9.6 oz)   06/27/24 74.8 kg (165 lb)   06/19/24 73.2 kg (161 lb 6 oz)   05/27/24 78.8 kg (173 lb 11.6 oz)           Virtual Visits--Scheduled (Most Recent Date at Top)  Follow up Appointments  Recent Visits  No visits were found meeting these conditions.  Showing recent visits within past 30 days and meeting all other requirements  Future Appointments  No visits were found meeting these conditions.  Showing future appointments within next 90 days and meeting all other requirements       Frequency of RN Calls & Virtual Visits per Team Agreement: [facility] " Home Frequency: Daily    Medication issues Addressed (what was done): resumption of medications    Follow up appointments scheduled by [facility] Staff:   Referrals made by [facility] staff:

## 2024-07-05 ENCOUNTER — APPOINTMENT (OUTPATIENT)
Dept: CARE COORDINATION | Age: 59
End: 2024-07-05
Payer: COMMERCIAL

## 2024-07-05 ENCOUNTER — PATIENT OUTREACH (OUTPATIENT)
Dept: HOME HEALTH SERVICES | Age: 59
End: 2024-07-05

## 2024-07-05 VITALS
BODY MASS INDEX: 25.69 KG/M2 | SYSTOLIC BLOOD PRESSURE: 111 MMHG | WEIGHT: 164 LBS | HEART RATE: 85 BPM | DIASTOLIC BLOOD PRESSURE: 74 MMHG

## 2024-07-05 DIAGNOSIS — I50.23 ACUTE ON CHRONIC SYSTOLIC (CONGESTIVE) HEART FAILURE (MULTI): ICD-10-CM

## 2024-07-05 DIAGNOSIS — I47.20 VENTRICULAR TACHYCARDIA (MULTI): Primary | ICD-10-CM

## 2024-07-05 NOTE — PROGRESS NOTES
Daily Call Note:   [facility] COMPLETED WITH DR. Baldwin    Blood Pressure  111/74@ 0830  HR 85  Wt 164 lbs  Verified with Mr. Sapp  Blood Pressure and HR with Mr. Sapp due to vitals are the same as 7/3/24.    Patient denies dizziness.  Patient states he still has swelling but it has improved.    Mr. Sapp has 2 more doses of Augmentin.        Mr. Nixon was advised hold aldactone, torsemide and entresto just on 7/3 and re-evaluate on 7/4.    Mr. Sapp was advised to take aldactone and torsemide on 7/4/24  and restart entresto on 7/5/24.      Mr. Sapp stated he made a judgement call to also HOLD BISOPROLOL on 7/3/24.      Patient states he will RESUME Bisoprolol today and RESUME Entresto today BUT only take 1 tablet (instead of ordered dose,  Entresto 24-26 BID) at night.     Mr. Sapp states we can re-evaluate in a few days if he feels better taking medication as HE suggested.     Dr. Baldwni was okay with the plan.    The [facility] will monitor vitals closely over the next few days, including this Sunday, July 7th and notify the provider to see if they want to update medication list.      Mr. Sapp was advised to contact [facility] with any concerns 24/7.      [facility] July 12, 2024 @ 11:00          Thu 10:17 AM

## 2024-07-06 ENCOUNTER — PATIENT OUTREACH (OUTPATIENT)
Dept: CARE COORDINATION | Age: 59
End: 2024-07-06
Payer: COMMERCIAL

## 2024-07-06 VITALS
SYSTOLIC BLOOD PRESSURE: 103 MMHG | DIASTOLIC BLOOD PRESSURE: 70 MMHG | HEART RATE: 85 BPM | WEIGHT: 163.4 LBS | BODY MASS INDEX: 25.59 KG/M2

## 2024-07-07 ENCOUNTER — PATIENT OUTREACH (OUTPATIENT)
Dept: HOME HEALTH SERVICES | Age: 59
End: 2024-07-07
Payer: COMMERCIAL

## 2024-07-07 NOTE — PROGRESS NOTES
Daily Call Note: Follow up call was placed regarding Blood Pressure readings and patient status. The call was not answered - voice mail message was left requesting call back.        Daily Weight:  There were no vitals filed for this visit.   Last 3 Weights:  Wt Readings from Last 7 Encounters:   07/06/24 74.1 kg (163 lb 6.4 oz)   07/05/24 74.4 kg (164 lb)   07/04/24 75.3 kg (166 lb)   07/03/24 73.5 kg (162 lb)   06/28/24 76.5 kg (168 lb 9.6 oz)   06/27/24 74.8 kg (165 lb)   06/19/24 73.2 kg (161 lb 6 oz)           Virtual Visits--Scheduled (Most Recent Date at Top)  Follow up Appointments  Recent Visits  No visits were found meeting these conditions.  Showing recent visits within past 30 days and meeting all other requirements  Future Appointments  No visits were found meeting these conditions.  Showing future appointments within next 90 days and meeting all other requirements       Frequency of RN Calls & Virtual Visits per Team Agreement: [facility] Frequency: Daily

## 2024-07-08 ENCOUNTER — PATIENT OUTREACH (OUTPATIENT)
Dept: HOME HEALTH SERVICES | Age: 59
End: 2024-07-08
Payer: COMMERCIAL

## 2024-07-08 VITALS — BODY MASS INDEX: 25.37 KG/M2 | DIASTOLIC BLOOD PRESSURE: 64 MMHG | WEIGHT: 162 LBS | SYSTOLIC BLOOD PRESSURE: 99 MMHG

## 2024-07-08 NOTE — PROGRESS NOTES
Daily Call Note: Daily call completed - the patient is doing well and has had no recent episode of dizziness, lightheadedness, weakness or visual changes since resuming the Entresto at the reduced dose. He is without SOB except when he lies on his left side - does not feel it when lying on right side - he will discuss with cardiologist on 7/11.    Pt Education: taking blood pressures at least tid upon rising, mid-day and before bed - logging readings as well as how he feels physically with each reading and activity and to present to MD so that he does not have to rely on memory. He will also measure vital signs when he feels odd to determine if it may be related to medications and hypotension. Also to be aware of position changes affecting BP potentially.   Barriers: no  Topics for Daily Review: VS weights  Pt demonstrates clear understanding: Yes  BP 99/64   Wt 73.5 kg (162 lb)   BMI 25.37 kg/m²     Daily Weight:  There were no vitals filed for this visit.   Last 3 Weights:  Wt Readings from Last 7 Encounters:   07/06/24 74.1 kg (163 lb 6.4 oz)   07/05/24 74.4 kg (164 lb)   07/04/24 75.3 kg (166 lb)   07/03/24 73.5 kg (162 lb)   06/28/24 76.5 kg (168 lb 9.6 oz)   06/27/24 74.8 kg (165 lb)   06/19/24 73.2 kg (161 lb 6 oz)       Masimo Device: No   Masimo Clinical Impression:     Virtual Visits--Scheduled (Most Recent Date at Top)  Follow up Appointments  Recent Visits  No visits were found meeting these conditions.  Showing recent visits within past 30 days and meeting all other requirements  Future Appointments  No visits were found meeting these conditions.  Showing future appointments within next 90 days and meeting all other requirements       Frequency of RN Calls & Virtual Visits per Team Agreement: [facility] Frequency: Daily    Medication issues Addressed (what was done): entresto dose    Follow up appointments scheduled by [facility] Staff:   Referrals made by [facility] staff:

## 2024-07-09 ENCOUNTER — PATIENT OUTREACH (OUTPATIENT)
Dept: CARE COORDINATION | Age: 59
End: 2024-07-09
Payer: COMMERCIAL

## 2024-07-09 ENCOUNTER — PATIENT OUTREACH (OUTPATIENT)
Dept: PRIMARY CARE | Facility: CLINIC | Age: 59
End: 2024-07-09
Payer: COMMERCIAL

## 2024-07-09 DIAGNOSIS — K59.04 CHRONIC IDIOPATHIC CONSTIPATION: ICD-10-CM

## 2024-07-09 DIAGNOSIS — I50.22 CHRONIC SYSTOLIC CONGESTIVE HEART FAILURE (MULTI): Primary | ICD-10-CM

## 2024-07-09 RX ORDER — LACTULOSE 10 G/15ML
20 SOLUTION ORAL DAILY
Qty: 900 ML | Refills: 0 | Status: SHIPPED | OUTPATIENT
Start: 2024-07-09 | End: 2024-08-08

## 2024-07-09 NOTE — PROGRESS NOTES
-Per Nursing, notified pt continues to have constipation and has not had a BM in 2 days. Prescription sent for Lactulose.   -BP 82/56, reviewed chart, pt had advance heart failure and declined LVAD evaluation, advised holding torsemide and Aldactone and rechecking BP in the afternoon and if his BP improves, to resume his meds.

## 2024-07-09 NOTE — PROGRESS NOTES
"Daily Call Note: Daily call completed with pt today. He states he is feeling \"OK\" today. BP this morning 89/54, pulse 85. Provider updated via secure chat.     Dr. Art replied: \"hold his Aldactone and torsemide.\"  TC back to pt with the above instructions. He verbalized understanding. Then went on to explain that his hands and feet/calves are quite swollen; he is c/o constipation and poor po intake due to bloating and gas.    Second secure chat sent to provider re: same.  Dr. Art replied: \"I'll give him a prescription for a laxative. I reviewed his case, he has end-stage CHF and has declined being a valuated for an LVAD, unfortunately, with such poor cardiac output, his blood pressure will always be low. He should hold his torsemide and aldactone for now. He can recheck his blood pressure in the afternoon and if it had improved, he can take his torsemide.\"  TC to pt - he verbalized understanding.    Next [facility] scheduled for 7/12 oz0382.  No further questions or concerns at this time.     Pt Education: per POC  Barriers: none  Topics for Daily Review: BP, meds  Pt demonstrates clear understanding: Yes    Daily Weight:  There were no vitals filed for this visit.   Last 3 Weights:  Wt Readings from Last 7 Encounters:   07/08/24 73.5 kg (162 lb)   07/06/24 74.1 kg (163 lb 6.4 oz)   07/05/24 74.4 kg (164 lb)   07/04/24 75.3 kg (166 lb)   07/03/24 73.5 kg (162 lb)   06/28/24 76.5 kg (168 lb 9.6 oz)   06/27/24 74.8 kg (165 lb)       Masimo Device: No   Masimo Clinical Impression: n/a    Virtual Visits--Scheduled (Most Recent Date at Top)  Follow up Appointments  Recent Visits  No visits were found meeting these conditions.  Showing recent visits within past 30 days and meeting all other requirements  Future Appointments  Date Type Provider Dept   07/12/24 Appointment [facility]   Showing future appointments within next 90 days and meeting all other requirements       Frequency of RN Calls & " Virtual Visits per Team Agreement: [facility] Frequency: Daily    Medication issues Addressed (what was done): antihypertensives    Follow up appointments scheduled by [facility] Staff: none  Referrals made by [facility] staff: none

## 2024-07-09 NOTE — PROGRESS NOTES
Outreach due for patient and CM confirmed that patient is still enrolled in  [facility] program. Next outreach will be set for 1 mo forward.

## 2024-07-10 ENCOUNTER — PATIENT OUTREACH (OUTPATIENT)
Dept: HOME HEALTH SERVICES | Age: 59
End: 2024-07-10
Payer: COMMERCIAL

## 2024-07-10 VITALS
DIASTOLIC BLOOD PRESSURE: 66 MMHG | SYSTOLIC BLOOD PRESSURE: 92 MMHG | WEIGHT: 166 LBS | HEART RATE: 85 BPM | BODY MASS INDEX: 26 KG/M2

## 2024-07-10 NOTE — PROGRESS NOTES
"     Daily Call Note: pA    Patient was advised to hold Toresmide and Aldactone yesterday.  Patient states he took took Torsemide (/66) last night due to shortness of breath and distended abdomen and pain.      BP 92/66 HR 85   Wt 166 (\"fully clothed\")       Patient denies shortness of breath at this time.       The [facility] Team will notify Dr. Art and verify when to resume Spirolactone and if it is okay to resume regular schedule of Torsemide 20 mg BID    UPDATE: From Dr. Art  \"Yes, he can resume the torsemide today, I would just recommend him checking his blood pressure after the dose  I would recheck the blood pressure after the torsemide, if it is above 100 he could take the aldactone, but if it is below 100 he should continue to hold\"    Notified Mr. Sapp.     Patient checks his BP before taking Toresmide 20 mg  then about 1 hour later he checks blood pressure again, if BP is > 100, he will take Spironolactone 25 mg daily.          Pt Education:   Barriers:   Topics for Daily Review:   Pt demonstrates clear understanding: Daily Call Note:     Pt Education:   Barriers:   Topics for Daily Review:   Pt demonstrates clear understanding:     Daily Weight:  Vitals:    07/10/24 0900   Weight: 75.3 kg (166 lb)      Last 3 Weights:  Wt Readings from Last 7 Encounters:   07/10/24 75.3 kg (166 lb)   07/08/24 73.5 kg (162 lb)   07/06/24 74.1 kg (163 lb 6.4 oz)   07/05/24 74.4 kg (164 lb)   07/04/24 75.3 kg (166 lb)   07/03/24 73.5 kg (162 lb)   06/28/24 76.5 kg (168 lb 9.6 oz)       Masimo Device:    Masimo Clinical Impression:     Virtual Visits--Scheduled (Most Recent Date at Top)  Follow up Appointments  Recent Visits  No visits were found meeting these conditions.  Showing recent visits within past 30 days and meeting all other requirements  Future Appointments  No visits were found meeting these conditions.  Showing future appointments within next 90 days and meeting all other requirements   "     Frequency of RN Calls & Virtual Visits per Team Agreement:     Medication issues Addressed (what was done):   Follow up appointments scheduled by [facility] Staff:   Referrals made by [facility] staff:

## 2024-07-11 ENCOUNTER — PATIENT OUTREACH (OUTPATIENT)
Dept: HOME HEALTH SERVICES | Age: 59
End: 2024-07-11

## 2024-07-11 ENCOUNTER — APPOINTMENT (OUTPATIENT)
Dept: CARDIOLOGY | Facility: CLINIC | Age: 59
End: 2024-07-11
Payer: COMMERCIAL

## 2024-07-11 VITALS
WEIGHT: 162 LBS | BODY MASS INDEX: 25.37 KG/M2 | DIASTOLIC BLOOD PRESSURE: 72 MMHG | HEART RATE: 85 BPM | SYSTOLIC BLOOD PRESSURE: 80 MMHG

## 2024-07-11 VITALS
HEIGHT: 67 IN | BODY MASS INDEX: 26.21 KG/M2 | DIASTOLIC BLOOD PRESSURE: 64 MMHG | HEART RATE: 85 BPM | WEIGHT: 167 LBS | SYSTOLIC BLOOD PRESSURE: 112 MMHG

## 2024-07-11 VITALS
WEIGHT: 167 LBS | HEART RATE: 85 BPM | HEIGHT: 67 IN | SYSTOLIC BLOOD PRESSURE: 112 MMHG | BODY MASS INDEX: 26.21 KG/M2 | DIASTOLIC BLOOD PRESSURE: 64 MMHG

## 2024-07-11 DIAGNOSIS — I10 PRIMARY HYPERTENSION: ICD-10-CM

## 2024-07-11 DIAGNOSIS — F17.200 CURRENT SMOKER: ICD-10-CM

## 2024-07-11 DIAGNOSIS — Z95.810 ICD (IMPLANTABLE CARDIOVERTER-DEFIBRILLATOR) IN PLACE: ICD-10-CM

## 2024-07-11 DIAGNOSIS — I34.0 NONRHEUMATIC MITRAL VALVE REGURGITATION: ICD-10-CM

## 2024-07-11 DIAGNOSIS — Z87.891 FORMER SMOKER: ICD-10-CM

## 2024-07-11 DIAGNOSIS — I36.1 NONRHEUMATIC TRICUSPID VALVE REGURGITATION: ICD-10-CM

## 2024-07-11 DIAGNOSIS — Z79.899 HIGH RISK MEDICATION USE: ICD-10-CM

## 2024-07-11 DIAGNOSIS — E78.5 DYSLIPIDEMIA: ICD-10-CM

## 2024-07-11 DIAGNOSIS — I50.20 SYSTOLIC CONGESTIVE HEART FAILURE, UNSPECIFIED HF CHRONICITY (MULTI): ICD-10-CM

## 2024-07-11 DIAGNOSIS — Z98.61 HX OF PERCUTANEOUS TRANSLUMINAL CORONARY ANGIOPLASTY: ICD-10-CM

## 2024-07-11 DIAGNOSIS — I50.20 HFREF (HEART FAILURE WITH REDUCED EJECTION FRACTION) (MULTI): ICD-10-CM

## 2024-07-11 DIAGNOSIS — I47.20 VT (VENTRICULAR TACHYCARDIA) (MULTI): Primary | ICD-10-CM

## 2024-07-11 DIAGNOSIS — I25.10 ATHEROSCLEROSIS OF NATIVE CORONARY ARTERY OF NATIVE HEART WITHOUT ANGINA PECTORIS: ICD-10-CM

## 2024-07-11 DIAGNOSIS — I47.20 VENTRICULAR TACHYCARDIA (MULTI): ICD-10-CM

## 2024-07-11 DIAGNOSIS — Z95.810 BIVENTRICULAR ICD (IMPLANTABLE CARDIOVERTER-DEFIBRILLATOR) IN PLACE: ICD-10-CM

## 2024-07-11 DIAGNOSIS — I50.22 CHRONIC SYSTOLIC HEART FAILURE (MULTI): ICD-10-CM

## 2024-07-11 DIAGNOSIS — I25.5 ISCHEMIC CARDIOMYOPATHY: ICD-10-CM

## 2024-07-11 DIAGNOSIS — I25.5 ISCHEMIC CARDIOMYOPATHY: Primary | ICD-10-CM

## 2024-07-11 PROCEDURE — 3008F BODY MASS INDEX DOCD: CPT | Performed by: INTERNAL MEDICINE

## 2024-07-11 PROCEDURE — 3078F DIAST BP <80 MM HG: CPT | Performed by: INTERNAL MEDICINE

## 2024-07-11 PROCEDURE — 93284 PRGRMG EVAL IMPLANTABLE DFB: CPT | Performed by: INTERNAL MEDICINE

## 2024-07-11 PROCEDURE — 3074F SYST BP LT 130 MM HG: CPT | Performed by: INTERNAL MEDICINE

## 2024-07-11 PROCEDURE — 99214 OFFICE O/P EST MOD 30 MIN: CPT | Performed by: INTERNAL MEDICINE

## 2024-07-11 PROCEDURE — 99215 OFFICE O/P EST HI 40 MIN: CPT | Performed by: INTERNAL MEDICINE

## 2024-07-11 RX ORDER — SACUBITRIL AND VALSARTAN 24; 26 MG/1; MG/1
1 TABLET, FILM COATED ORAL DAILY
Qty: 90 TABLET | Refills: 3 | Status: SHIPPED | OUTPATIENT
Start: 2024-07-11 | End: 2024-07-14 | Stop reason: HOSPADM

## 2024-07-11 RX ORDER — HYDROGEN PEROXIDE 3 %
20 SOLUTION, NON-ORAL MISCELLANEOUS
COMMUNITY

## 2024-07-11 RX ORDER — POTASSIUM CHLORIDE 1.5 G/1.58G
20 POWDER, FOR SOLUTION ORAL DAILY
Qty: 90 PACKET | Refills: 3 | Status: SHIPPED | OUTPATIENT
Start: 2024-07-11

## 2024-07-11 RX ORDER — ROSUVASTATIN CALCIUM 20 MG/1
20 TABLET, COATED ORAL NIGHTLY
Qty: 90 TABLET | Refills: 3 | Status: SHIPPED | OUTPATIENT
Start: 2024-07-11

## 2024-07-11 RX ORDER — RANOLAZINE 500 MG/1
500 TABLET, EXTENDED RELEASE ORAL 2 TIMES DAILY
Qty: 180 TABLET | Refills: 3 | Status: SHIPPED | OUTPATIENT
Start: 2024-07-11

## 2024-07-11 RX ORDER — BISOPROLOL FUMARATE 5 MG/1
2.5 TABLET, FILM COATED ORAL DAILY
Qty: 45 TABLET | Refills: 3 | Status: SHIPPED | OUTPATIENT
Start: 2024-07-11 | End: 2024-07-14 | Stop reason: HOSPADM

## 2024-07-11 RX ORDER — SPIRONOLACTONE 25 MG/1
25 TABLET ORAL DAILY
Qty: 90 TABLET | Refills: 3 | Status: ON HOLD | OUTPATIENT
Start: 2024-07-11 | End: 2024-07-14

## 2024-07-11 RX ORDER — AMIODARONE HYDROCHLORIDE 200 MG/1
400 TABLET ORAL DAILY
Qty: 180 TABLET | Refills: 3 | Status: SHIPPED | OUTPATIENT
Start: 2024-07-11 | End: 2025-07-06

## 2024-07-11 RX ORDER — TORSEMIDE 20 MG/1
20 TABLET ORAL DAILY
Qty: 90 TABLET | Refills: 3 | Status: ON HOLD | OUTPATIENT
Start: 2024-07-11 | End: 2024-07-14

## 2024-07-11 NOTE — PROGRESS NOTES
CARDIOLOGY OFFICE VISIT      CHIEF COMPLAINT  Chief Complaint   Patient presents with    Follow-up       HISTORY OF PRESENT ILLNESS  HPI  58-year-old male with past medical history significant for myocardial infarction (first in 2003), CAD, s/p remote multivessel PCI's, ischemic cardiomyopathy with an LVEF reported at 35% back in 2006 and again in 2008, most recent echocardiogram August, 2022 with LVEF 25 to 30%, chronic systolic heart failure, s/p biventricular ICD (11/6/2019), initial ICD implant in 2014, abdominal aortic aneurysm without rupture, HTN, HLD, PTSD, tobacco use, renal stones, s/p recent cystoscopy with urethral stent placement who presented to [facility]'s emergency department last evening after his ICD fired approximately 10 times around 8 PM.  The patient was sitting talking with his mother when this occurred.  Patient developed chest pain and shortness of breath at that time.  Patient has not felt well for several weeks with complaints of fatigue, dizziness, dyspnea.     Work-up in the ED revealed in E[facility] showing a WCT with RBBB at a rate of 125, Mg+ 1.87, K+ 3.3, Lactate 2.5, BNP 2144, troponins 31, 160, 305, 342, WBC 17.9.  Potassium and magnesium were repleted      Device interrogation on arrival shows ventricular tachycardia rates between 125-130 bpm.  Some of the rate during ventricular tachycardia under detection.  Patient has been complaining of tiredness and fatigue lately.  Looking at his record he was evaluated in April 29, 2024 at [facility].  At time patient was complaining of back pain and he was found to be in wide-complex rhythm.  He received ATP therapy with successful restoration to sinus rhythm  Since then patient has not had any follow-ups with cardiology service.      Cardiac catheterization performed during this admission showed severe triple-vessel coronary disease.  Right coronary artery and circumflex artery occluded and right coronary fills  via collaterals.  LAD with 10 to 30% stenosis.  No lesion amenable for revascularization.     Patient underwent noninvasive primary stimulation and also cardioversion during this evaluation with successful restoration to sinus rhythm.  Device interrogation was performed with adequate sensing, capture and impedances of all leads.  Patient has a biventricular ICD system ([employer]).  The device was reprogrammed to DDD R  bpm.  AV delay was decreased to 150 ms.  Current rhythm a paced-ventricular paced rhythm.     Continue with amiodarone 1 mg/min patient had recurrence of ventricular tachycardia for which she underwent ATP-noninvasive primary stimulation through the device with successful restoration to sinus rhythm.  Patient was transferred to [facility] for part of therapy.     Patient remained in slow, hemodynamically stable VT, likely scar mediated iso known ICM and unrevascularized CAD. Patient was started on amiodarone and lidocaine. Given inability to pace terminate this, patient underwent VT RFA (VT 1-critical isthmus involving the mid inferior / infero - septal left ventricular segment, VT 2 - mid lateral left ventriclar segment) with Dr. Flores on 5/30.  Despite this, on 6/2, patient was noted to have slow VT with rates ~ 90-110s. Tracking rate was increased to 95bpm but patient continued to have episodes of VT (rates ~ 118 on 6/3) requiring cardioversion. Noted to go into slow VT again on 6/4 s/p successful overdrive pacing. Morphology of VT ws similar to prior VT  which was ablated on 5/30.  Patient underwent a 2nd VT ablation with Dr. Wooten on 6/6 - extensive scar modification was done. Multiple Vts were induced, coming from the lateral scar as well as the septal scar s/p extensive ablation. Patient then went into a different wide VT suggestive of metabolic derangements. pH was noted to be 7.15. Patient subsequently underwent IABP placement (removed 6/8).   Had multiple  runs of VT (rates 110s-130s) since VT ablation between 6/8-/10 that self-terminated without any therapies delivered. Patient has not had any more VT episodes in the last 48h. Stellate ganglion block repeated on 6/11     Since the discharge from the hospital, he has been doing well.  He states that he has been noticing episodes of dizziness especially because blood pressure systolic has been in the mid 80 mmHg.  Also at the time of the discharge from the hospital the device was reprogrammed at as at lower rate 95 bpm.  He feels very fatigued and tired doing physical activities.    During the last office visit, the device was reprogrammed mid decrease in the lower rate to 85 bpm.    Patient states that since the last office visit he continues having some tiredness and fatigue at home but also he has been noticing his blood pressure systolic sometimes in the mid 80s.    The device was interrogated today in our office and it shows no evidence of ventricular arrhythmias.  The lower rate was decreased from 85 to 75 bpm.  Otherwise device functioning well.    Patient is still using amiodarone 400 mg twice a day and mexiletine 200 mg 3 times a day.      Past Medical History  Past Medical History:   Diagnosis Date    Atherosclerosis of native artery of both lower extremities with intermittent claudication (CMS-HCC)     CHB (complete heart block) (Multi)     per device check    Chronic systolic CHF (congestive heart failure), NYHA class 3 (Multi)     COPD (chronic obstructive pulmonary disease) (Multi)     Coronary artery disease     History of tobacco abuse     HLD (hyperlipidemia)     Hypertension     Infrarenal abdominal aortic aneurysm (AAA) without rupture (CMS-HCC)     Ischemic cardiomyopathy with implantable cardioverter-defibrillator (ICD)     MI (myocardial infarction) (Multi)     multiple    Nephrolithiasis     PTSD (post-traumatic stress disorder)        Social History  Social History     Tobacco Use    Smoking  status: Former     Types: Cigarettes    Smokeless tobacco: Not on file   Vaping Use    Vaping status: Never Used   Substance Use Topics    Alcohol use: Yes     Comment: social    Drug use: Defer       Family History     Family History   Problem Relation Name Age of Onset    Hypertension Mother      Diabetes Father      Hypertension Sister      Diabetes Sister      Colon cancer Maternal Grandmother      Hypertension Maternal Grandmother      Heart disease Maternal Grandfather      Hypertension Maternal Grandfather      Cancer Paternal Grandfather      Hypertension Paternal Grandfather          Allergies:  Allergies   Allergen Reactions    Chantix [Varenicline] Unknown        Outpatient Medications:  Current Outpatient Medications   Medication Instructions    acetaminophen (TYLENOL) 650 mg, oral, Every 6 hours PRN    albuterol sulfate (Proair Digihaler) 90 mcg/actuation aero powdr breath act w/sensor inhaler 2 puffs, inhalation, Every 4 hours PRN    amiodarone (Pacerone) 200 mg tablet Take 2 tablets (400 mg) by mouth once daily. Do not fill before June 19, 2024.    aspirin 81 mg, oral, Daily    b complex 0.4 mg tablet 1 tablet, oral, Daily    bisoprolol (ZEBETA) 2.5 mg, oral, Daily    blood pressure monitor kit Use as directed    calcium polycarbophiL (FIBER (CALCIUM POLYCARBOPHIL)) 625 mg, oral, Daily    cholecalciferol (VITAMIN D-3) 5,000 Units, oral, Every 3 days    co-enzyme Q-10 50 mg, oral, Once Weekly    cyanocobalamin (VITAMIN B-12) 1,000 mcg, oral, Daily    Eliquis 5 mg, oral, Every 12 hours    Farxiga 10 mg, oral, Daily    hydrOXYzine HCL (ATARAX) 25 mg, oral, Every 6 hours PRN    lactulose 20 g, oral, Daily    LORazepam (ATIVAN) 0.5 mg, oral, Nightly    mexiletine (MEXITIL) 250 mg, oral, Every 8 hours scheduled    nicotine (Nicoderm CQ) 21 mg/24 hr patch 1 patch, transdermal, Every 24 hours    PARoxetine (PAXIL) 40 mg, oral, Every morning    potassium chloride (Klor-Con) 20 mEq packet 20 mEq, oral, Daily     ranolazine (RANEXA) 500 mg, oral, 2 times daily, Do not crush, chew, or split.    rosuvastatin (CRESTOR) 20 mg, oral, Nightly    sacubitriL-valsartan (Entresto) 24-26 mg tablet 1 tablet, oral, Daily    spironolactone (ALDACTONE) 25 mg, oral, Daily    tamsulosin (FLOMAX) 0.4 mg, oral, Daily    torsemide (DEMADEX) 20 mg, oral, 2 times daily    traZODone (DESYREL) 50 mg, oral, Nightly    VITAMIN A ORAL 1 capsule, oral, Every other day    vitamin E acetate (VITAMIN E ORAL) 1 capsule, oral, Daily          REVIEW OF SYSTEMS  Review of Systems   All other systems reviewed and are negative.        VITALS  Vitals:    07/11/24 0954   BP: 112/64   Pulse: 85       PHYSICAL EXAM  Constitutional:       Appearance: Healthy appearance. Not in distress.   Neck:      Vascular: No JVR. JVD normal.   Pulmonary:      Effort: Pulmonary effort is normal.      Breath sounds: Normal breath sounds. No wheezing. No rhonchi. No rales.   Chest:      Chest wall: Not tender to palpatation.   Cardiovascular:      PMI at left midclavicular line. Normal rate. Regular rhythm. Normal S1. Normal S2.       Murmurs: There is no murmur.      No gallop.  No click. No rub.      Comments: Device left pectoral area. No hematoma or infection noted.     Pulses:     Intact distal pulses.   Edema:     Peripheral edema absent.   Abdominal:      General: Bowel sounds are normal.      Palpations: Abdomen is soft.      Tenderness: There is no abdominal tenderness.   Musculoskeletal: Normal range of motion.         General: No tenderness. Skin:     General: Skin is warm and dry.   Neurological:      General: No focal deficit present.      Mental Status: Alert and oriented to person, place and time.           ASSESSMENT AND PLAN  1.    Wide-complex tachycardia/ventricular tachycardia/Slow VT/AICD shocks             -Device check ([employer] momentum BiV ICD) revealed 15 ATP's and 14 high-voltage therapies delivered on 5/22/2024.  EGM showed VT with rates in the  200's with therapies occasionally slowing below the detection rate to the 120s.             -Patient was maintaining a slow VT with rates in the 120s             -On amiodarone and mexiletine therapy             -LHC  revealed  of the proximal circumflex artery,  of the proximal RCA with distal collateral filling and mild disease of the left main and LAD.  Medical management is advised.  Status post ventricular tachycardia ablation at [facility] x 2 in May - June 2024 with unsuccessful results             -Cardioversion during admission at [facility] in May 2024.  Currently on amiodarone and mexiletine therapy  2.    Ischemic cardiomyopathy/acute on chronic systolic heart failure             -LVEF 10% on echocardiogram 5/23/2024             -Moderately dilated left ventricle  3.    Valvular heart disease             -Moderate to severe MR/moderate TR             -Moderate to severely elevated pulmonary artery pressures  4.    Coronary artery disease/multiple remote myocardial infarctions             -Remote multivessel PCI's (last in 2008)               5.    Benign essential hypertension  6.    Hyperlipidemia             -On statin therapy  7.    Current tobacco use    Plan-recommendations    So far patient is doing well from the electrophysiology standpoint.  No recurrence of ventricular arrhythmias.  Long conversation with patient we will plan to follow.  He should continue with amiodarone 400 mg daily and also mexiletine 200 mg 3 times a day.    If he has recurrence of ventricular arrhythmias, we will discuss the option of redo ventricular tachycardia ablation.  Patient will High risk person for complications based on results from prior studies.    Patient is to have a close follow-up with general cardiology service for adjustment of heart failure therapy if needed.  Patient has been noticing blood pressure systolic in the mid 80s associated with symptoms.    Follow device  clinic as scheduled.    Follow my office in 3 to 4 months or sooner if needed.    Get CMP and TSH every 6 months for high risk medication (amiodarone).    Get complete PFTs annually for high risk medication.    Risk factor modification and lifestyle modification discussed with patient. Diet , exercise and hydration discussed with patient.      I have personally review with patient during this office visit, laboratory data, echocardiogram results, stress test results, Holter-event monitor results prior and after the last electrophysiology visit. All questions has been answered.    Gram          Scribe Attestation  By signing my name below, I, Bryanna Cunningham LPN , Scribe   attest that this documentation has been prepared under the direction and in the presence of Zachary Sparks MD.

## 2024-07-11 NOTE — PATIENT INSTRUCTIONS
Decrease Torsemide to 20 mg daily  Continue Amiodarone 200 mg - 2 tablets (400 mg) daily. It is ok to take 1 tablet twice daily if this is easier on your stomach.    Dr. Sparks is switching you to [facility].      Did you know we have a retail pharmacy in the building and we can send your prescriptions there for  if you like! We typically can have prescriptions ready in 10 to 15 minutes!       -Please bring all medicines, vitamins, and herbal supplements with you in original bottles to every appointment!!!!    -Prescriptions will not be filled unless you are compliant with your follow up appointments or have a follow up appointment scheduled as per instruction of your physician. Refills should be requested at the time of your visit.

## 2024-07-11 NOTE — PROGRESS NOTES
Daily Call Note:     Daily call completed with the patient.  He states he is headed to his PCP.  His BP 80/72, HR 85, and wt. 162 lbs.  His BP has been running low, and will address this with his provider today. No assistance needed from us today. No questions or concerns.  Reminded pt of [facility] tomorrow at 1100.    Pt Education:   Barriers:   Topics for Daily Review:   Pt demonstrates clear understanding:     Daily Weight:  There were no vitals filed for this visit.   Last 3 Weights:  Wt Readings from Last 7 Encounters:   07/10/24 75.3 kg (166 lb)   07/08/24 73.5 kg (162 lb)   07/06/24 74.1 kg (163 lb 6.4 oz)   07/05/24 74.4 kg (164 lb)   07/04/24 75.3 kg (166 lb)   07/03/24 73.5 kg (162 lb)   06/28/24 76.5 kg (168 lb 9.6 oz)       Masimo Device:    Masimo Clinical Impression:     Virtual Visits--Scheduled (Most Recent Date at Top)  Follow up Appointments  Recent Visits  No visits were found meeting these conditions.  Showing recent visits within past 30 days and meeting all other requirements  Future Appointments  No visits were found meeting these conditions.  Showing future appointments within next 90 days and meeting all other requirements       Frequency of RN Calls & Virtual Visits per Team Agreement:     Medication issues Addressed (what was done):    Follow up appointments scheduled by [facility] Staff:   Referrals made by [facility] staff:

## 2024-07-11 NOTE — PROGRESS NOTES
CARDIOLOGY NEW PATIENT OFFICE VISIT    Patient:  Cristian Sapp  YOB: 1965  MRN: [id number]       Chief Complaint:      Chief Complaint   Patient presents with    Follow-up    Congestive Heart Failure       History of Present Illness:     Cristian Sapp is a 58 y.o. male who is being seen today at the request of Dr. Zachary Sparks for evaluation of congestive heart failure.  He has a history of atherosclerotic heart disease with previous multivessel angioplasty and stenting procedures.  Initial myocardial infarction was in 2003.  He has underlying ischemic cardiomyopathy with severe LV dysfunction.  Estimated LV ejection fraction in May 2024 was 15%.  He is status post CRT-D implant.  He previously had VT storm and underwent ventricular tachycardia ablation in 2019.      He was hospitalized in May 23, 2024 after receiving multiple ICD shocks for recurrent VT.  He was initially hospitalized at [facility] and was transferred to [facility] May 28, 2024 for continued management.  He was in slow VT and was started on an amiodarone drip.  He had VT ablation May 30, 2024 but continued to have intermittent episodes of slow ventricular tachycardia.  He was cardioverted on Elle 3, 2024.  He was maintained on amiodarone and lidocaine drips.  He underwent stellate ganglion block June 4, 2024.  It was felt that heart transplant was not going to be an option for him.  He opted against an LVAD.  He was seen by palliative medicine and he changed his CODE STATUS to DNR-DNI on June 5, 2024.  He had redo endocardial ablation by Dr. Wooten on June 6, 2024.  He was extubated June 7, 2024 and intra-aortic balloon pump was removed.  He was initiated on low-dose Entresto.  He was changed to oral mexiletine.  Cardiac cath during his recent admission showed severe triple-vessel coronary artery disease.  Right coronary artery and circumflex were totally occluded and filled via collaterals.  LAD had mild stenosis.  No lesions were minimal to  revascularization.  He has a history of abdominal aortic aneurysm without rupture.  He has hypertension, hyperlipidemia, tobacco use, nephrolithiasis, and PTSD.    He was seen in follow-up by Dr. Sparks on June 28, 2024.  Since his most recent discharge he has been doing quite well.  He has noted some episodes of lightheadedness with transient systolic blood pressure readings in the 80s.  He notes ongoing fatigue/tiredness.  He is currently is being maintained on amiodarone 400 mg daily and mexiletine 200 mg 3 times daily.  His Bystolic dose was decreased to 2.5 mg daily.  He denies any chest pain or shortness of breath.  He denies any orthopnea, PND, or increasing peripheral edema.  He denies any palpitations, lightheadedness, near-syncope, or syncope.  He denies any fever, chills, or cough.  He denies any nausea, vomiting, or diaphoresis.  He denies any hemoptysis, hematemesis, melena, or hematochezia.    Most recent lab studies June 19, 2024 showed a hemoglobin 12.3 and WBC 12.7.  Sodium 135 potassium 4.5, BUN 24, and creatinine 1.28.  Magnesium was 2.24.  He currently is free of any angina pectoris or overt CHF symptoms.  He will be continued on his current medications.  He states his preference to have most of his follow-up in [place] as that is closer to home.  He would like to follow-up with Dr. Wooten in the [place] office at least once to recent procedures at [facility].  Other details as noted below.    The above portion of this note was dictated by me using voice recognition software.  I personally performed the services described in the documentation.  The scribe entering the documentation below was in my presence.  I affirm that the information is both accurate and complete.      Allergies:     Allergies   Allergen Reactions    Chantix [Varenicline] Unknown        Outpatient Medications:     Current Outpatient Medications   Medication Instructions    acetaminophen (TYLENOL) 650 mg, oral, Every 6 hours PRN     albuterol sulfate (Proair Digihaler) 90 mcg/actuation aero powdr breath act w/sensor inhaler 2 puffs, inhalation, Every 4 hours PRN    amiodarone (Pacerone) 200 mg tablet Take 2 tablets (400 mg) by mouth once daily. Do not fill before June 19, 2024.    apixaban (ELIQUIS) 5 mg, oral, Every 12 hours    aspirin 81 mg, oral, Daily    b complex 0.4 mg tablet 1 tablet, oral, Daily    bisoprolol (ZEBETA) 2.5 mg, oral, Daily    blood pressure monitor kit Use as directed    calcium polycarbophiL (FIBER (CALCIUM POLYCARBOPHIL)) 625 mg, oral, Daily    cholecalciferol (VITAMIN D-3) 5,000 Units, oral, Every 3 days    co-enzyme Q-10 50 mg, oral, Once Weekly    cyanocobalamin (VITAMIN B-12) 1,000 mcg, oral, Daily    Farxiga 10 mg, oral, Daily    hydrOXYzine HCL (ATARAX) 25 mg, oral, Every 6 hours PRN    lactulose 20 g, oral, Daily    LORazepam (ATIVAN) 0.5 mg, oral, Nightly    mexiletine (MEXITIL) 250 mg, oral, Every 8 hours scheduled    PARoxetine (PAXIL) 40 mg, oral, Every morning    potassium chloride (Klor-Con) 20 mEq packet 20 mEq, oral, Daily    ranolazine (RANEXA) 500 mg, oral, 2 times daily, Do not crush, chew, or split.    rosuvastatin (CRESTOR) 20 mg, oral, Nightly    sacubitriL-valsartan (Entresto) 24-26 mg tablet 1 tablet, oral, Daily    spironolactone (ALDACTONE) 25 mg, oral, Daily    tamsulosin (FLOMAX) 0.4 mg, oral, Daily    torsemide (DEMADEX) 20 mg, oral, Daily    traZODone (DESYREL) 50 mg, oral, Nightly    VITAMIN A ORAL 1 capsule, oral, Every other day    vitamin E acetate (VITAMIN E ORAL) 1 capsule, oral, Daily        Past Medical History:     Past Medical History:   Diagnosis Date    Atherosclerosis of native artery of both lower extremities with intermittent claudication (CMS-[facility])     CHB (complete heart block) (Multi)     per device check    Chronic systolic CHF (congestive heart failure), NYHA class 3 (Multi)     COPD (chronic obstructive pulmonary disease) (Multi)     Coronary artery disease     History  of tobacco abuse     HLD (hyperlipidemia)     Hypertension     Infrarenal abdominal aortic aneurysm (AAA) without rupture (CMS-HCC)     Ischemic cardiomyopathy with implantable cardioverter-defibrillator (ICD)     MI (myocardial infarction) (Multi)     multiple    Nephrolithiasis     PTSD (post-traumatic stress disorder)        Social History:     Social History     Tobacco Use    Smoking status: Former     Types: Cigarettes   Vaping Use    Vaping status: Never Used   Substance Use Topics    Alcohol use: Yes     Comment: social    Drug use: Defer       Family History:     Family History   Problem Relation Name Age of Onset    Hypertension Mother      Diabetes Father      Hypertension Sister      Diabetes Sister      Colon cancer Maternal Grandmother      Hypertension Maternal Grandmother      Heart disease Maternal Grandfather      Hypertension Maternal Grandfather      Cancer Paternal Grandfather      Hypertension Paternal Grandfather         Review of Systems:     12 point review of systems completed and is negative other than that detailed above.       Objective:     Vitals:    07/11/24 1101   BP: 112/64   Pulse: 85       Wt Readings from Last 4 Encounters:   07/11/24 75.8 kg (167 lb)   07/11/24 75.8 kg (167 lb)   07/11/24 73.5 kg (162 lb)   07/10/24 75.3 kg (166 lb)       Physical Examination:   GENERAL:  Well developed, well nourished, in no acute distress.  CHEST:  Symmetric and nontender.  ICD device noted.  NEURO/PSYCH:  Alert and oriented times three with approppriate behavior and responses.  NECK:  Supple, no JVD, no bruit.  LUNGS:  Clear to auscultation bilaterally, normal respiratory effort.  HEART:  Rate and rhythm regular with no evident murmur, no gallop appreciated.        There are no rubs, clicks or heaves.  EXTREMITIES:  Warm with good color, no clubbing or cyanosis.  There is no edema noted.  PERIPHERAL VASCULAR:  Pulses present and equally palpable; 2+ throughout.      Lab:     CBC:   Lab  Results   Component Value Date    WBC 12.7 (H) 06/19/2024    RBC 3.90 (L) 06/19/2024    HGB 12.3 (L) 06/19/2024    HCT 36.7 (L) 06/19/2024     06/19/2024        CMP:    Lab Results   Component Value Date     (L) 06/19/2024    K 4.5 06/19/2024     06/19/2024    CO2 22 06/19/2024    BUN 24 (H) 06/19/2024    CREATININE 1.28 06/19/2024    GLUCOSE 98 06/19/2024    CALCIUM 9.1 06/19/2024       BMP:  Lab Results   Component Value Date     (L) 06/19/2024     06/18/2024     06/17/2024    K 4.5 06/19/2024    K 4.6 06/18/2024    K 4.5 06/17/2024     06/19/2024     06/18/2024     06/17/2024    CO2 22 06/19/2024    CO2 26 06/18/2024    CO2 25 06/17/2024    BUN 24 (H) 06/19/2024    BUN 22 06/18/2024    BUN 24 (H) 06/17/2024    CREATININE 1.28 06/19/2024    CREATININE 1.21 06/18/2024    CREATININE 1.17 06/17/2024       CBC:  Lab Results   Component Value Date    WBC 12.7 (H) 06/19/2024    WBC 13.7 (H) 06/18/2024    WBC 13.3 (H) 06/17/2024    RBC 3.90 (L) 06/19/2024    RBC 3.72 (L) 06/18/2024    RBC 3.83 (L) 06/17/2024    HGB 12.3 (L) 06/19/2024    HGB 11.9 (L) 06/18/2024    HGB 12.4 (L) 06/17/2024    HCT 36.7 (L) 06/19/2024    HCT 34.8 (L) 06/18/2024    HCT 35.0 (L) 06/17/2024    MCV 94 06/19/2024    MCV 94 06/18/2024    MCV 91 06/17/2024    MCH 31.5 06/19/2024    MCH 32.0 06/18/2024    MCH 32.4 06/17/2024    MCHC 33.5 06/19/2024    MCHC 34.2 06/18/2024    MCHC 35.4 06/17/2024    RDW 14.4 06/19/2024    RDW 14.6 (H) 06/18/2024    RDW 14.1 06/17/2024     06/19/2024     06/18/2024     06/17/2024       Cardiac Enzymes:    Lab Results   Component Value Date    TROPHS 2,261 (HH) 05/31/2024    TROPHS 2,395 (HH) 05/31/2024    TROPHS 342 (HH) 05/23/2024       Hepatic Function Panel:    Lab Results   Component Value Date    ALKPHOS 67 06/08/2024    ALT 77 (H) 06/08/2024    AST 57 (H) 06/08/2024    PROT 6.3 (L) 06/08/2024    BILITOT 0.6 06/08/2024       Magnesium:     Lab Results   Component Value Date    MG 2.24 06/19/2024       TSH:    Lab Results   Component Value Date    TSH 10.35 (H) 06/04/2024       BNP:   Lab Results   Component Value Date     (H) 06/04/2024        PT/INR:    Lab Results   Component Value Date    PROTIME 14.6 (H) 06/07/2024    INR 1.3 (H) 06/07/2024       HgBA1c:    Lab Results   Component Value Date    HGBA1C 6.0 (H) 06/04/2024       Diagnostic Studies:       Electrocardiogram, 12-lead PRN ACS symptoms  Result Date: 6/13/2024    Wide QRS rhythm Right bundle branch block Possible Lateral infarct , age undetermined Inferior infarct , age undetermined Abnormal ECG When compared with ECG of 28-MAY-2024 23:57, Wide QRS rhythm has replaced Electronic ventricular pacemaker Confirmed by Valentin Pfeiffer ([id number]) on 6/13/2024 8:45:23 AM    Electrocardiogram, 12-lead PRN ACS symptoms  Result Date: 6/13/2024    Wide QRS tachycardia Right bundle branch block Possible Lateral infarct , age undetermined Inferior infarct , age undetermined Abnormal ECG When compared with ECG of 28-MAY-2024 22:04, Wide QRS tachycardia has replaced Wide QRS rhythm Confirmed by Valentin Pfeiffer ([id number]) on 6/13/2024 8:44:54 AM      Electrophysiology procedure    Result Date: 5/29/2024  Table formatting from the original result was not included. Procedure Details: Procedure Details:  Cardioversion Summary: ·   Successful termination of ventricular tachycardia using ATP from biventricular ICD system Recommendations: 1. A 12 lead ECG should be performed prior to discharge from the hospital. 2. The patient should continue with the present medications. Discharge: 1. The patient recovered uneventfully from the effects of conscious sedation. The patient left the EP laboratory hemodynamically stable and without neurological deficits. Follow up: 1. The patient will return to telemetry-intensive care unit for drug load with high risk medication, following bed rest and subsequent ambulation, provided  the recovery parameters are appropriate. The patient should call the electrophysiologist immediately if symptoms recur, or for any problems. The patient has been instructed accordingly. ________________________________________ Procedures: Noninvasive programmed stimulation.  Pre and post biventricular ICD interrogation reprogramming ________________________________________ Patient history: Please refer to the detailed history and physical on the patient's medical chart.  History of ventricular tachycardia.  Patient was loaded with amiodarone.  Patient had recurrence of ventricular arrhythmias overnight.  Patient is a scheduled for NIPS and possible cardioversion ________________________________________ Procedure narrative: The device was interrogated and reprogrammed prior to the procedure.  The risks, benefits, and alternatives to the procedure and sedation were explained to the patient, and informed consent was obtained. The patient was in the fasting state. A grounding pad was placed. Self-adhesive anterior-posterior defibrillation pads were applied. A ZOLL defibrillator was used for monitoring and the defibrillator waveform was set to biphasic. The patient was set up for continuous monitoring of surface 12 lead ECG and pulse oximetry. Blood pressure was monitored with automatic cuff measurements. The procedure was performed under IV conscious sedation performed by anesthesiology service. 1. The device was interrogated.  Patient was in ventricular tachycardia at a rate of 100 bpm. Antitachycardia pacing was delivered from the right ventricular lead at 500 ms, 400 ms, 350 ms with finally successful termination of ventricular tachycardia into atrial paced-ventricular paced rhythm.  The ventricular tachycardia zone was very prominent as low was 125 bpm.  ATP burst and ICD therapies were performed.  The pacing mode was At DDD 80 bpm. ________________________________________ Complications: The patient tolerated the  procedure without any complications or incident. ________________________________________ Prepared and signed by Tolerance: good Complications: None     XR chest 1 view  Result Date: 5/29/2024    1.  No evidence of acute cardiopulmonary process.   I personally reviewed the images/study and I agree with the findings as stated by Dr. Hubert Peñaloza. This study was interpreted at [place], [place].   MACRO: None   Signed by: Milan Dewitt 5/29/2024 7:16 AM Dictation workstation:   [id number]      Transthoracic Echo (TTE) Complete  Result Date: 5/28/2024  [facility]    CONCLUSIONS:  1. Left ventricular systolic function is severely decreased with a 15% estimated ejection fraction.  2. There is no evidence of mitral valve stenosis.  3. Moderate to severe mitral valve regurgitation.  4. Mild tricuspid regurgitation is visualized.  5. Aortic valve stenosis is not present.  6. Left ventricular cavity size is moderately dilated.  7. The pulmonary artery is not well visualized.  8. Current study appears to be remarkably similar to the May 23, 2024 study done here.  9. There is global hypokinesis of the left ventricle with minor regional variations.       Problem List:     Patient Active Problem List   Diagnosis    Ischemic cardiomyopathy    Elevated troponin    Ventricular tachycardia (Multi)    CHF (congestive heart failure) (Multi)    Acute on chronic systolic (congestive) heart failure (Multi)    Flash pulmonary edema (Multi)       Assessment:     Problem List Items Addressed This Visit             ICD-10-CM    RESOLVED: Ventricular tachycardia (Multi) I47.20    Relevant Orders    Follow Up In Cardiology    Referral to Cardiac Electrophysiology    Lipid Panel    CBC    Comprehensive Metabolic Panel    CHF (congestive heart failure) (Multi) I50.9    Relevant Orders    Follow Up In Cardiology    Referral to Cardiac Electrophysiology    Lipid Panel    CBC    Comprehensive  Metabolic Panel     1.    Wide-complex tachycardia/ventricular tachycardia/Slow VT/AICD shocks             -Device check ([employer] momentum BiV ICD) revealed 15 ATP's and 14 high-voltage therapies delivered on 5/22/2024.  EGM showed VT with rates in the 200's with therapies occasionally slowing below the detection rate to the 120s.             -Patient was maintaining a slow VT with rates in the 120s             -On amiodarone and mexiletine therapy             -LHC  revealed  of the proximal circumflex artery,  of the proximal RCA with distal collateral filling and mild disease of the left main and LAD.  Medical management is advised.  Status post ventricular tachycardia ablation at [facility] x 2 in May - June 2024 with unsuccessful results             -Cardioversion during admission at [facility] in May 2024.  Currently on amiodarone and mexiletine therapy  2.    Ischemic cardiomyopathy/acute on chronic systolic heart failure             -LVEF 10% on echocardiogram 5/23/2024             -Moderately dilated left ventricle  3.    Valvular heart disease             -Moderate to severe MR/moderate TR             -Moderate to severely elevated pulmonary artery pressures  4.    Coronary artery disease/multiple remote myocardial infarctions             -Remote multivessel PCI's (last in 2008)             -See report above for LHC performed today  5.    Benign essential hypertension             -Currently hypotensive  6.    Hyperlipidemia             -On statin therapy  7.    Current tobacco use    Plan:     -He will be continued on his same medications.     -He was advised to restrict sodium with an aim of no more than 2 grams per day.     -He was advised on the need for regular exercise with an aim to walk at least 20-30 minutes on at least 5 days per week.    -He was advised on the need to refrain from smoking.    -He was advised on the need to follow a low  carbohydrate/low saturated fat/weight reducing diet.     -Follow-up as scheduled.

## 2024-07-12 ENCOUNTER — APPOINTMENT (OUTPATIENT)
Dept: RADIOLOGY | Facility: HOSPITAL | Age: 59
DRG: 308 | End: 2024-07-12
Payer: COMMERCIAL

## 2024-07-12 ENCOUNTER — APPOINTMENT (OUTPATIENT)
Dept: CARE COORDINATION | Age: 59
End: 2024-07-12
Payer: COMMERCIAL

## 2024-07-12 ENCOUNTER — HOSPITAL ENCOUNTER (INPATIENT)
Facility: HOSPITAL | Age: 59
LOS: 2 days | Discharge: HOME | DRG: 308 | End: 2024-07-14
Attending: STUDENT IN AN ORGANIZED HEALTH CARE EDUCATION/TRAINING PROGRAM
Payer: COMMERCIAL

## 2024-07-12 ENCOUNTER — APPOINTMENT (OUTPATIENT)
Dept: CARDIOLOGY | Facility: HOSPITAL | Age: 59
DRG: 308 | End: 2024-07-12
Payer: COMMERCIAL

## 2024-07-12 DIAGNOSIS — I50.20 HFREF (HEART FAILURE WITH REDUCED EJECTION FRACTION) (MULTI): ICD-10-CM

## 2024-07-12 DIAGNOSIS — I47.20 VENTRICULAR TACHYCARDIA (MULTI): Primary | ICD-10-CM

## 2024-07-12 DIAGNOSIS — I50.20 SYSTOLIC CONGESTIVE HEART FAILURE, UNSPECIFIED HF CHRONICITY (MULTI): ICD-10-CM

## 2024-07-12 LAB
ALBUMIN SERPL BCP-MCNC: 3.8 G/DL (ref 3.4–5)
ALP SERPL-CCNC: 104 U/L (ref 33–120)
ALT SERPL W P-5'-P-CCNC: 119 U/L (ref 10–52)
ANION GAP SERPL CALC-SCNC: 14 MMOL/L (ref 10–20)
AST SERPL W P-5'-P-CCNC: 57 U/L (ref 9–39)
ATRIAL RATE: 75 BPM
ATRIAL RATE: 75 BPM
ATRIAL RATE: 81 BPM
BASOPHILS # BLD AUTO: 0.14 X10*3/UL (ref 0–0.1)
BASOPHILS NFR BLD AUTO: 1.2 %
BILIRUB SERPL-MCNC: 0.5 MG/DL (ref 0–1.2)
BNP SERPL-MCNC: 774 PG/ML (ref 0–99)
BUN SERPL-MCNC: 30 MG/DL (ref 6–23)
CALCIUM SERPL-MCNC: 9.1 MG/DL (ref 8.6–10.3)
CARDIAC TROPONIN I PNL SERPL HS: 17 NG/L (ref 0–20)
CARDIAC TROPONIN I PNL SERPL HS: 17 NG/L (ref 0–20)
CHLORIDE SERPL-SCNC: 97 MMOL/L (ref 98–107)
CO2 SERPL-SCNC: 28 MMOL/L (ref 21–32)
CREAT SERPL-MCNC: 1.44 MG/DL (ref 0.5–1.3)
EGFRCR SERPLBLD CKD-EPI 2021: 57 ML/MIN/1.73M*2
EOSINOPHIL # BLD AUTO: 0.53 X10*3/UL (ref 0–0.7)
EOSINOPHIL NFR BLD AUTO: 4.4 %
ERYTHROCYTE [DISTWIDTH] IN BLOOD BY AUTOMATED COUNT: 14 % (ref 11.5–14.5)
GLUCOSE SERPL-MCNC: 141 MG/DL (ref 74–99)
HCT VFR BLD AUTO: 38.1 % (ref 41–52)
HGB BLD-MCNC: 13 G/DL (ref 13.5–17.5)
IMM GRANULOCYTES # BLD AUTO: 0.23 X10*3/UL (ref 0–0.7)
IMM GRANULOCYTES NFR BLD AUTO: 1.9 % (ref 0–0.9)
LIPASE SERPL-CCNC: 21 U/L (ref 9–82)
LYMPHOCYTES # BLD AUTO: 2.29 X10*3/UL (ref 1.2–4.8)
LYMPHOCYTES NFR BLD AUTO: 19 %
MAGNESIUM SERPL-MCNC: 1.97 MG/DL (ref 1.6–2.4)
MCH RBC QN AUTO: 30.6 PG (ref 26–34)
MCHC RBC AUTO-ENTMCNC: 34.1 G/DL (ref 32–36)
MCV RBC AUTO: 90 FL (ref 80–100)
MONOCYTES # BLD AUTO: 1.16 X10*3/UL (ref 0.1–1)
MONOCYTES NFR BLD AUTO: 9.6 %
NEUTROPHILS # BLD AUTO: 7.69 X10*3/UL (ref 1.2–7.7)
NEUTROPHILS NFR BLD AUTO: 63.9 %
NRBC BLD-RTO: 0 /100 WBCS (ref 0–0)
P AXIS: 73 DEGREES
P AXIS: 83 DEGREES
P AXIS: 84 DEGREES
P OFFSET: 170 MS
P OFFSET: 173 MS
P OFFSET: 189 MS
P ONSET: 137 MS
P ONSET: 139 MS
P ONSET: 157 MS
PLATELET # BLD AUTO: 396 X10*3/UL (ref 150–450)
POTASSIUM SERPL-SCNC: 3.7 MMOL/L (ref 3.5–5.3)
PR INTERVAL: 130 MS
PR INTERVAL: 144 MS
PROT SERPL-MCNC: 7.3 G/DL (ref 6.4–8.2)
Q ONSET: 181 MS
Q ONSET: 197 MS
Q ONSET: 202 MS
QRS COUNT: 12 BEATS
QRS COUNT: 12 BEATS
QRS COUNT: 13 BEATS
QRS DURATION: 170 MS
QRS DURATION: 182 MS
QRS DURATION: 212 MS
QT INTERVAL: 492 MS
QT INTERVAL: 504 MS
QT INTERVAL: 508 MS
QTC CALCULATION(BAZETT): 549 MS
QTC CALCULATION(BAZETT): 562 MS
QTC CALCULATION(BAZETT): 590 MS
QTC FREDERICIA: 529 MS
QTC FREDERICIA: 542 MS
QTC FREDERICIA: 561 MS
R AXIS: -46 DEGREES
R AXIS: -68 DEGREES
R AXIS: 254 DEGREES
RBC # BLD AUTO: 4.25 X10*6/UL (ref 4.5–5.9)
SODIUM SERPL-SCNC: 135 MMOL/L (ref 136–145)
T AXIS: 124 DEGREES
T AXIS: 131 DEGREES
T AXIS: 97 DEGREES
T OFFSET: 433 MS
T OFFSET: 443 MS
T OFFSET: 456 MS
VENTRICULAR RATE: 75 BPM
VENTRICULAR RATE: 75 BPM
VENTRICULAR RATE: 81 BPM
WBC # BLD AUTO: 12 X10*3/UL (ref 4.4–11.3)

## 2024-07-12 PROCEDURE — 99223 1ST HOSP IP/OBS HIGH 75: CPT | Performed by: INTERNAL MEDICINE

## 2024-07-12 PROCEDURE — 71045 X-RAY EXAM CHEST 1 VIEW: CPT

## 2024-07-12 PROCEDURE — 83690 ASSAY OF LIPASE: CPT | Performed by: STUDENT IN AN ORGANIZED HEALTH CARE EDUCATION/TRAINING PROGRAM

## 2024-07-12 PROCEDURE — 93005 ELECTROCARDIOGRAM TRACING: CPT

## 2024-07-12 PROCEDURE — 93010 ELECTROCARDIOGRAM REPORT: CPT | Performed by: INTERNAL MEDICINE

## 2024-07-12 PROCEDURE — 2500000001 HC RX 250 WO HCPCS SELF ADMINISTERED DRUGS (ALT 637 FOR MEDICARE OP)

## 2024-07-12 PROCEDURE — 2500000002 HC RX 250 W HCPCS SELF ADMINISTERED DRUGS (ALT 637 FOR MEDICARE OP, ALT 636 FOR OP/ED)

## 2024-07-12 PROCEDURE — 2500000004 HC RX 250 GENERAL PHARMACY W/ HCPCS (ALT 636 FOR OP/ED): Performed by: NURSE PRACTITIONER

## 2024-07-12 PROCEDURE — 93284 PRGRMG EVAL IMPLANTABLE DFB: CPT | Performed by: INTERNAL MEDICINE

## 2024-07-12 PROCEDURE — 36415 COLL VENOUS BLD VENIPUNCTURE: CPT | Performed by: STUDENT IN AN ORGANIZED HEALTH CARE EDUCATION/TRAINING PROGRAM

## 2024-07-12 PROCEDURE — 84484 ASSAY OF TROPONIN QUANT: CPT | Performed by: STUDENT IN AN ORGANIZED HEALTH CARE EDUCATION/TRAINING PROGRAM

## 2024-07-12 PROCEDURE — 99291 CRITICAL CARE FIRST HOUR: CPT

## 2024-07-12 PROCEDURE — 80053 COMPREHEN METABOLIC PANEL: CPT | Performed by: STUDENT IN AN ORGANIZED HEALTH CARE EDUCATION/TRAINING PROGRAM

## 2024-07-12 PROCEDURE — 2500000004 HC RX 250 GENERAL PHARMACY W/ HCPCS (ALT 636 FOR OP/ED): Performed by: STUDENT IN AN ORGANIZED HEALTH CARE EDUCATION/TRAINING PROGRAM

## 2024-07-12 PROCEDURE — 96375 TX/PRO/DX INJ NEW DRUG ADDON: CPT

## 2024-07-12 PROCEDURE — 2500000004 HC RX 250 GENERAL PHARMACY W/ HCPCS (ALT 636 FOR OP/ED)

## 2024-07-12 PROCEDURE — 2020000001 HC ICU ROOM DAILY

## 2024-07-12 PROCEDURE — 83880 ASSAY OF NATRIURETIC PEPTIDE: CPT | Performed by: STUDENT IN AN ORGANIZED HEALTH CARE EDUCATION/TRAINING PROGRAM

## 2024-07-12 PROCEDURE — 71045 X-RAY EXAM CHEST 1 VIEW: CPT | Performed by: RADIOLOGY

## 2024-07-12 PROCEDURE — 83735 ASSAY OF MAGNESIUM: CPT | Performed by: STUDENT IN AN ORGANIZED HEALTH CARE EDUCATION/TRAINING PROGRAM

## 2024-07-12 PROCEDURE — 96374 THER/PROPH/DIAG INJ IV PUSH: CPT

## 2024-07-12 PROCEDURE — 99232 SBSQ HOSP IP/OBS MODERATE 35: CPT

## 2024-07-12 PROCEDURE — 85025 COMPLETE CBC W/AUTO DIFF WBC: CPT | Performed by: STUDENT IN AN ORGANIZED HEALTH CARE EDUCATION/TRAINING PROGRAM

## 2024-07-12 PROCEDURE — 99222 1ST HOSP IP/OBS MODERATE 55: CPT | Performed by: NURSE PRACTITIONER

## 2024-07-12 PROCEDURE — 2500000005 HC RX 250 GENERAL PHARMACY W/O HCPCS: Performed by: STUDENT IN AN ORGANIZED HEALTH CARE EDUCATION/TRAINING PROGRAM

## 2024-07-12 PROCEDURE — 5A2204Z RESTORATION OF CARDIAC RHYTHM, SINGLE: ICD-10-PCS | Performed by: STUDENT IN AN ORGANIZED HEALTH CARE EDUCATION/TRAINING PROGRAM

## 2024-07-12 RX ORDER — ALBUTEROL SULFATE 0.83 MG/ML
2.5 SOLUTION RESPIRATORY (INHALATION) EVERY 4 HOURS PRN
Status: DISCONTINUED | OUTPATIENT
Start: 2024-07-12 | End: 2024-07-14 | Stop reason: HOSPADM

## 2024-07-12 RX ORDER — UBIDECARENONE 75 MG
1000 CAPSULE ORAL DAILY
Status: DISCONTINUED | OUTPATIENT
Start: 2024-07-12 | End: 2024-07-14 | Stop reason: HOSPADM

## 2024-07-12 RX ORDER — TORSEMIDE 20 MG/1
20 TABLET ORAL DAILY
Status: DISCONTINUED | OUTPATIENT
Start: 2024-07-12 | End: 2024-07-14 | Stop reason: HOSPADM

## 2024-07-12 RX ORDER — HYDROXYZINE HYDROCHLORIDE 25 MG/1
25 TABLET, FILM COATED ORAL EVERY 6 HOURS PRN
Status: DISCONTINUED | OUTPATIENT
Start: 2024-07-12 | End: 2024-07-13

## 2024-07-12 RX ORDER — ETOMIDATE 2 MG/ML
0.15 INJECTION INTRAVENOUS ONCE
Status: COMPLETED | OUTPATIENT
Start: 2024-07-12 | End: 2024-07-12

## 2024-07-12 RX ORDER — ACETAMINOPHEN 325 MG/1
650 TABLET ORAL EVERY 6 HOURS PRN
Status: DISCONTINUED | OUTPATIENT
Start: 2024-07-12 | End: 2024-07-14 | Stop reason: HOSPADM

## 2024-07-12 RX ORDER — ASPIRIN 81 MG/1
81 TABLET ORAL DAILY
Status: DISCONTINUED | OUTPATIENT
Start: 2024-07-12 | End: 2024-07-14 | Stop reason: HOSPADM

## 2024-07-12 RX ORDER — LACTULOSE 10 G/15ML
20 SOLUTION ORAL DAILY
Status: DISCONTINUED | OUTPATIENT
Start: 2024-07-12 | End: 2024-07-14 | Stop reason: HOSPADM

## 2024-07-12 RX ORDER — SPIRONOLACTONE 25 MG/1
12.5 TABLET ORAL DAILY
Status: DISCONTINUED | OUTPATIENT
Start: 2024-07-12 | End: 2024-07-14 | Stop reason: HOSPADM

## 2024-07-12 RX ORDER — PANTOPRAZOLE SODIUM 20 MG/1
20 TABLET, DELAYED RELEASE ORAL
Status: DISCONTINUED | OUTPATIENT
Start: 2024-07-12 | End: 2024-07-14 | Stop reason: HOSPADM

## 2024-07-12 RX ORDER — ROSUVASTATIN CALCIUM 20 MG/1
20 TABLET, COATED ORAL NIGHTLY
Status: DISCONTINUED | OUTPATIENT
Start: 2024-07-12 | End: 2024-07-14 | Stop reason: HOSPADM

## 2024-07-12 RX ORDER — METOPROLOL SUCCINATE 25 MG/1
12.5 TABLET, EXTENDED RELEASE ORAL DAILY
Status: DISCONTINUED | OUTPATIENT
Start: 2024-07-12 | End: 2024-07-14 | Stop reason: HOSPADM

## 2024-07-12 RX ORDER — PAROXETINE 30 MG/1
60 TABLET, FILM COATED ORAL EVERY MORNING
COMMUNITY
End: 2024-07-14 | Stop reason: HOSPADM

## 2024-07-12 RX ORDER — ETOMIDATE 2 MG/ML
0.1 INJECTION INTRAVENOUS AS NEEDED
Status: DISCONTINUED | OUTPATIENT
Start: 2024-07-12 | End: 2024-07-12

## 2024-07-12 RX ORDER — TRAZODONE HYDROCHLORIDE 50 MG/1
50 TABLET ORAL NIGHTLY
Status: DISCONTINUED | OUTPATIENT
Start: 2024-07-12 | End: 2024-07-12

## 2024-07-12 RX ORDER — ASPIRIN 325 MG
50 TABLET, DELAYED RELEASE (ENTERIC COATED) ORAL
Status: DISCONTINUED | OUTPATIENT
Start: 2024-07-14 | End: 2024-07-12

## 2024-07-12 RX ORDER — RANOLAZINE 500 MG/1
500 TABLET, EXTENDED RELEASE ORAL 2 TIMES DAILY
Status: DISCONTINUED | OUTPATIENT
Start: 2024-07-12 | End: 2024-07-14 | Stop reason: HOSPADM

## 2024-07-12 RX ORDER — POTASSIUM CHLORIDE 14.9 MG/ML
20 INJECTION INTRAVENOUS ONCE
Status: COMPLETED | OUTPATIENT
Start: 2024-07-12 | End: 2024-07-12

## 2024-07-12 RX ORDER — POTASSIUM CHLORIDE 1.5 G/1.58G
20 POWDER, FOR SOLUTION ORAL DAILY
Status: DISCONTINUED | OUTPATIENT
Start: 2024-07-12 | End: 2024-07-14 | Stop reason: HOSPADM

## 2024-07-12 RX ORDER — PAROXETINE HYDROCHLORIDE 20 MG/1
40 TABLET, FILM COATED ORAL EVERY MORNING
Status: DISCONTINUED | OUTPATIENT
Start: 2024-07-12 | End: 2024-07-13

## 2024-07-12 RX ORDER — SPIRONOLACTONE 25 MG/1
25 TABLET ORAL DAILY
Status: DISCONTINUED | OUTPATIENT
Start: 2024-07-12 | End: 2024-07-12

## 2024-07-12 RX ORDER — LORAZEPAM 0.5 MG/1
0.5 TABLET ORAL NIGHTLY
Status: DISCONTINUED | OUTPATIENT
Start: 2024-07-12 | End: 2024-07-13

## 2024-07-12 RX ORDER — HYDROXYZINE HYDROCHLORIDE 25 MG/1
50 TABLET, FILM COATED ORAL NIGHTLY PRN
Status: DISCONTINUED | OUTPATIENT
Start: 2024-07-12 | End: 2024-07-13

## 2024-07-12 RX ORDER — MEXILETINE HYDROCHLORIDE 250 MG/1
250 CAPSULE ORAL EVERY 8 HOURS SCHEDULED
Status: DISCONTINUED | OUTPATIENT
Start: 2024-07-12 | End: 2024-07-12

## 2024-07-12 RX ORDER — VITAMIN E MIXED 400 UNIT
400 CAPSULE ORAL DAILY
Status: DISCONTINUED | OUTPATIENT
Start: 2024-07-12 | End: 2024-07-14 | Stop reason: HOSPADM

## 2024-07-12 RX ORDER — DAPAGLIFLOZIN 5 MG/1
10 TABLET, FILM COATED ORAL DAILY
Status: DISCONTINUED | OUTPATIENT
Start: 2024-07-12 | End: 2024-07-13

## 2024-07-12 RX ORDER — AMIODARONE HYDROCHLORIDE 200 MG/1
200 TABLET ORAL 2 TIMES DAILY
Status: DISCONTINUED | OUTPATIENT
Start: 2024-07-12 | End: 2024-07-14 | Stop reason: HOSPADM

## 2024-07-12 RX ORDER — PLANT STANOL ESTER 450 MG
2400 TABLET ORAL EVERY OTHER DAY
Status: DISCONTINUED | OUTPATIENT
Start: 2024-07-12 | End: 2024-07-12

## 2024-07-12 RX ORDER — POTASSIUM CHLORIDE 14.9 MG/ML
20 INJECTION INTRAVENOUS ONCE
Status: DISCONTINUED | OUTPATIENT
Start: 2024-07-12 | End: 2024-07-12

## 2024-07-12 RX ORDER — LORAZEPAM 2 MG/ML
0.5 INJECTION INTRAMUSCULAR ONCE
Status: COMPLETED | OUTPATIENT
Start: 2024-07-12 | End: 2024-07-12

## 2024-07-12 RX ORDER — MULTIVITAMIN/IRON/FOLIC ACID 18MG-0.4MG
1 TABLET ORAL DAILY
Status: DISCONTINUED | OUTPATIENT
Start: 2024-07-12 | End: 2024-07-12

## 2024-07-12 RX ORDER — MAGNESIUM SULFATE HEPTAHYDRATE 40 MG/ML
2 INJECTION, SOLUTION INTRAVENOUS ONCE
Status: COMPLETED | OUTPATIENT
Start: 2024-07-12 | End: 2024-07-12

## 2024-07-12 RX ORDER — ACETAMINOPHEN 500 MG
5 TABLET ORAL NIGHTLY PRN
Status: DISCONTINUED | OUTPATIENT
Start: 2024-07-12 | End: 2024-07-14 | Stop reason: HOSPADM

## 2024-07-12 RX ORDER — CHOLECALCIFEROL (VITAMIN D3) 25 MCG
5000 TABLET ORAL
Status: DISCONTINUED | OUTPATIENT
Start: 2024-07-12 | End: 2024-07-14 | Stop reason: HOSPADM

## 2024-07-12 RX ORDER — MEXILETINE HYDROCHLORIDE 150 MG/1
300 CAPSULE ORAL EVERY 8 HOURS SCHEDULED
Status: DISCONTINUED | OUTPATIENT
Start: 2024-07-12 | End: 2024-07-14 | Stop reason: HOSPADM

## 2024-07-12 RX ORDER — TAMSULOSIN HYDROCHLORIDE 0.4 MG/1
0.4 CAPSULE ORAL DAILY
Status: DISCONTINUED | OUTPATIENT
Start: 2024-07-12 | End: 2024-07-14 | Stop reason: HOSPADM

## 2024-07-12 RX ORDER — LORAZEPAM 0.5 MG/1
0.5 TABLET ORAL ONCE
Status: COMPLETED | OUTPATIENT
Start: 2024-07-12 | End: 2024-07-12

## 2024-07-12 RX ORDER — BISOPROLOL FUMARATE 5 MG/1
2.5 TABLET, FILM COATED ORAL DAILY
Status: DISCONTINUED | OUTPATIENT
Start: 2024-07-12 | End: 2024-07-12

## 2024-07-12 SDOH — SOCIAL STABILITY: SOCIAL INSECURITY: HAVE YOU HAD THOUGHTS OF HARMING ANYONE ELSE?: NO

## 2024-07-12 SDOH — SOCIAL STABILITY: SOCIAL INSECURITY: ABUSE: ADULT

## 2024-07-12 SDOH — SOCIAL STABILITY: SOCIAL INSECURITY: DOES ANYONE TRY TO KEEP YOU FROM HAVING/CONTACTING OTHER FRIENDS OR DOING THINGS OUTSIDE YOUR HOME?: NO

## 2024-07-12 SDOH — SOCIAL STABILITY: SOCIAL INSECURITY: DO YOU FEEL ANYONE HAS EXPLOITED OR TAKEN ADVANTAGE OF YOU FINANCIALLY OR OF YOUR PERSONAL PROPERTY?: NO

## 2024-07-12 SDOH — SOCIAL STABILITY: SOCIAL INSECURITY: DO YOU FEEL UNSAFE GOING BACK TO THE PLACE WHERE YOU ARE LIVING?: NO

## 2024-07-12 SDOH — SOCIAL STABILITY: SOCIAL INSECURITY: ARE YOU OR HAVE YOU BEEN THREATENED OR ABUSED PHYSICALLY, EMOTIONALLY, OR SEXUALLY BY ANYONE?: NO

## 2024-07-12 SDOH — SOCIAL STABILITY: SOCIAL INSECURITY: HAVE YOU HAD ANY THOUGHTS OF HARMING ANYONE ELSE?: NO

## 2024-07-12 SDOH — SOCIAL STABILITY: SOCIAL INSECURITY: HAS ANYONE EVER THREATENED TO HURT YOUR FAMILY OR YOUR PETS?: NO

## 2024-07-12 SDOH — SOCIAL STABILITY: SOCIAL INSECURITY: ARE THERE ANY APPARENT SIGNS OF INJURIES/BEHAVIORS THAT COULD BE RELATED TO ABUSE/NEGLECT?: NO

## 2024-07-12 ASSESSMENT — ENCOUNTER SYMPTOMS
ENDOCRINE NEGATIVE: 1
GASTROINTESTINAL NEGATIVE: 1
LIGHT-HEADEDNESS: 0
EYES NEGATIVE: 1
DIZZINESS: 0
ALLERGIC/IMMUNOLOGIC NEGATIVE: 1
PALPITATIONS: 1
RESPIRATORY NEGATIVE: 1
SHORTNESS OF BREATH: 0
CHEST TIGHTNESS: 0
NEUROLOGICAL NEGATIVE: 1
PSYCHIATRIC NEGATIVE: 1
FATIGUE: 1

## 2024-07-12 ASSESSMENT — COGNITIVE AND FUNCTIONAL STATUS - GENERAL
PATIENT BASELINE BEDBOUND: NO
MOBILITY SCORE: 24
DAILY ACTIVITIY SCORE: 24
MOBILITY SCORE: 24
DAILY ACTIVITIY SCORE: 24

## 2024-07-12 ASSESSMENT — ACTIVITIES OF DAILY LIVING (ADL)
HEARING - RIGHT EAR: FUNCTIONAL
DRESSING YOURSELF: INDEPENDENT
PATIENT'S MEMORY ADEQUATE TO SAFELY COMPLETE DAILY ACTIVITIES?: YES
PATIENT'S MEMORY ADEQUATE TO SAFELY COMPLETE DAILY ACTIVITIES?: YES
WALKS IN HOME: INDEPENDENT
GROOMING: INDEPENDENT
FEEDING YOURSELF: INDEPENDENT
BATHING: INDEPENDENT
TOILETING: INDEPENDENT
ASSISTIVE_DEVICE: DENTURES LOWER;DENTURES UPPER
HEARING - RIGHT EAR: FUNCTIONAL
DRESSING YOURSELF: INDEPENDENT
HEARING - LEFT EAR: FUNCTIONAL
GROOMING: INDEPENDENT
ADEQUATE_TO_COMPLETE_ADL: YES
JUDGMENT_ADEQUATE_SAFELY_COMPLETE_DAILY_ACTIVITIES: YES
HEARING - LEFT EAR: FUNCTIONAL
ASSISTIVE_DEVICE: DENTURES UPPER;DENTURES LOWER;EYEGLASSES
JUDGMENT_ADEQUATE_SAFELY_COMPLETE_DAILY_ACTIVITIES: YES
ADEQUATE_TO_COMPLETE_ADL: YES
TOILETING: INDEPENDENT
LACK_OF_TRANSPORTATION: NO
FEEDING YOURSELF: INDEPENDENT
WALKS IN HOME: INDEPENDENT

## 2024-07-12 ASSESSMENT — PAIN SCALES - GENERAL
PAINLEVEL_OUTOF10: 0 - NO PAIN

## 2024-07-12 ASSESSMENT — PAIN - FUNCTIONAL ASSESSMENT
PAIN_FUNCTIONAL_ASSESSMENT: 0-10
PAIN_FUNCTIONAL_ASSESSMENT: 0-10

## 2024-07-12 ASSESSMENT — PATIENT HEALTH QUESTIONNAIRE - PHQ9
SUM OF ALL RESPONSES TO PHQ9 QUESTIONS 1 & 2: 0
1. LITTLE INTEREST OR PLEASURE IN DOING THINGS: NOT AT ALL
2. FEELING DOWN, DEPRESSED OR HOPELESS: NOT AT ALL

## 2024-07-12 ASSESSMENT — LIFESTYLE VARIABLES
PRESCIPTION_ABUSE_PAST_12_MONTHS: NO
HOW MANY STANDARD DRINKS CONTAINING ALCOHOL DO YOU HAVE ON A TYPICAL DAY: PATIENT DECLINED
AUDIT-C TOTAL SCORE: -1
SUBSTANCE_ABUSE_PAST_12_MONTHS: NO
SKIP TO QUESTIONS 9-10: 0
HOW OFTEN DO YOU HAVE A DRINK CONTAINING ALCOHOL: PATIENT DECLINED
HOW OFTEN DO YOU HAVE 6 OR MORE DRINKS ON ONE OCCASION: PATIENT DECLINED
AUDIT-C TOTAL SCORE: -1

## 2024-07-12 NOTE — CARE PLAN
Problem: Pain - Adult  Goal: Verbalizes/displays adequate comfort level or baseline comfort level  Outcome: Progressing     Problem: Safety - Adult  Goal: Free from fall injury  Outcome: Progressing     Problem: Discharge Planning  Goal: Discharge to home or other facility with appropriate resources  Outcome: Progressing     Problem: Chronic Conditions and Co-morbidities  Goal: Patient's chronic conditions and co-morbidity symptoms are monitored and maintained or improved  Outcome: Progressing   The patient's goals for the shift include      The clinical goals for the shift include Patient will be hemodynamically stable throughout shift

## 2024-07-12 NOTE — CONSULTS
Consults    Reason For Consult  Goals of care     History Of Present Illness  Cristian Sapp is a 58 y.o. male with past medical history of multivessel angioplasty and stenting procedures.  Experienced MI in 2003 and was noted to have underlying ischemic cardiomyopathy with severe LV dysfunction.  EF 5/2024 15%.  S/P CRT-D implant.  Had VT storm and underwent ablation in 2019.  He was hospitalized in May 23, 2024 after receiving multiple ICD shocks for recurrent VT. He was initially hospitalized at [facility] and was transferred to [facility] May 28, 2024 for continued management.  He was in slow VT and was started on an amiodarone drip. He had VT ablation May 30, 2024 but continued to have intermittent episodes of slow ventricular tachycardia. He was cardioverted on Elle 3, 2024. He was maintained on amiodarone and lidocaine drips. He underwent stellate ganglion block June 4, 2024. It was felt that heart transplant was not going to be an option for him. He opted against an LVAD.  He was evaluated by [facility] and code status was changed.  He had redo endocardial ablation by Dr. Wooten.  Cardiac cath during his recent admission showed severe triple-vessel coronary artery disease. Right coronary artery and circumflex were totally occluded and filled via collaterals. LAD had mild stenosis. No lesions were minimal to revascularization. He did not wish to have his defibrillator deactivated yet and opted for outpatient [facility] referral.  Understood on hospice would be deactivated.  He was at home and AICD was firing and having palpitation.  He was cardioverted in ED and returned to [facility] and placed on amiodarone infusion.  Cardiology recommended continue palliative conversations.  Personal/Social History  He reports that he has quit smoking. His smoking use included cigarettes. He does not have any smokeless tobacco history on file. He reports current alcohol use. Drug use questions deferred to the physician.    Past Medical  "History  He has a past medical history of Atherosclerosis of native artery of both lower extremities with intermittent claudication (CMS-[facility]), CHB (complete heart block) (Multi), Chronic systolic CHF (congestive heart failure), NYHA class 3 (Multi), COPD (chronic obstructive pulmonary disease) (Multi), Coronary artery disease, History of tobacco abuse, HLD (hyperlipidemia), Hypertension, Infrarenal abdominal aortic aneurysm (AAA) without rupture (CMS-[facility]), Ischemic cardiomyopathy with implantable cardioverter-defibrillator (ICD), MI (myocardial infarction) (Multi), Nephrolithiasis, and PTSD (post-traumatic stress disorder).    Surgical History  He has a past surgical history that includes Cardiac defibrillator placement; Coronary angioplasty with stent (2006); Coronary angioplasty with stent (04/2003); Coronary angioplasty with stent (06/2008); Cystoscopy w/ ureteral stent placement (04/01/2024); Cystoscopy w/ ureteral stent placement (04/30/2024); Iliac artery stent (Right); Knee surgery; Femoral bypass; Cardiac electrophysiology procedure (N/A, 5/23/2024); Cardiac catheterization (N/A, 5/23/2024); Cardiac electrophysiology procedure (N/A, 5/28/2024); Cardiac electrophysiology procedure (Right, 5/30/2024); and Cardiac electrophysiology procedure (N/A, 6/6/2024).     Family History  Family History   Problem Relation Name Age of Onset    Hypertension Mother      Diabetes Father      Hypertension Sister      Diabetes Sister      Colon cancer Maternal Grandmother      Hypertension Maternal Grandmother      Heart disease Maternal Grandfather      Hypertension Maternal Grandfather      Cancer Paternal Grandfather      Hypertension Paternal Grandfather       Allergies  Chantix [varenicline]    Review of Systems     Physical Exam    Last Recorded Vitals  Blood pressure 77/52, pulse 75, temperature 35.3 °C (95.5 °F), temperature source Temporal, resp. rate 18, height 1.676 m (5' 6\"), weight 73.4 kg (161 lb 13.1 oz), SpO2 " 99%.    Relevant Results  Scheduled medications  [Held by provider] amiodarone, 200 mg, oral, BID  apixaban, 5 mg, oral, q12h  aspirin, 81 mg, oral, Daily  calcium polycarbophiL, 625 mg, oral, Daily  cholecalciferol, 5,000 Units, oral, q3 days  cyanocobalamin, 1,000 mcg, oral, Daily  dapagliflozin propanediol, 10 mg, oral, Daily  lactulose, 20 g, oral, Daily  [Held by provider] LORazepam, 0.5 mg, oral, Nightly  metoprolol succinate XL, 12.5 mg, oral, Daily  mexiletine, 300 mg, oral, q8h ANNIKA  pantoprazole, 20 mg, oral, Daily before breakfast  PARoxetine, 40 mg, oral, q AM  potassium chloride, 20 mEq, oral, Daily  ranolazine, 500 mg, oral, BID  rosuvastatin, 20 mg, oral, Nightly  spironolactone, 12.5 mg, oral, Daily  tamsulosin, 0.4 mg, oral, Daily  torsemide, 20 mg, oral, Daily  vitamin E, 400 Units, oral, Daily      Continuous medications  amiodarone, 1 mg/min, Last Rate: 1 mg/min (07/12/24 1315)  oxygen, , Last Rate: 2 L/min (07/12/24 0800)      PRN medications  PRN medications: acetaminophen, albuterol, [COMPLETED] etomidate **FOLLOWED BY** etomidate, hydrOXYzine HCL, melatonin     Results for orders placed or performed during the hospital encounter of 07/12/24 (from the past 24 hour(s))   ECG 12 lead   Result Value Ref Range    Ventricular Rate 75 BPM    Atrial Rate 75 BPM    QRS Duration 170 ms    QT Interval 492 ms    QTC Calculation(Bazett) 549 ms    P Axis 84 degrees    R Axis -46 degrees    T Axis 124 degrees    QRS Count 12 beats    Q Onset 197 ms    P Onset 157 ms    P Offset 189 ms    T Offset 443 ms    QTC Fredericia 529 ms   CBC and Auto Differential   Result Value Ref Range    WBC 12.0 (H) 4.4 - 11.3 x10*3/uL    nRBC 0.0 0.0 - 0.0 /100 WBCs    RBC 4.25 (L) 4.50 - 5.90 x10*6/uL    Hemoglobin 13.0 (L) 13.5 - 17.5 g/dL    Hematocrit 38.1 (L) 41.0 - 52.0 %    MCV 90 80 - 100 fL    MCH 30.6 26.0 - 34.0 pg    MCHC 34.1 32.0 - 36.0 g/dL    RDW 14.0 11.5 - 14.5 %    Platelets 396 150 - 450 x10*3/uL     Neutrophils % 63.9 40.0 - 80.0 %    Immature Granulocytes %, Automated 1.9 (H) 0.0 - 0.9 %    Lymphocytes % 19.0 13.0 - 44.0 %    Monocytes % 9.6 2.0 - 10.0 %    Eosinophils % 4.4 0.0 - 6.0 %    Basophils % 1.2 0.0 - 2.0 %    Neutrophils Absolute 7.69 1.20 - 7.70 x10*3/uL    Immature Granulocytes Absolute, Automated 0.23 0.00 - 0.70 x10*3/uL    Lymphocytes Absolute 2.29 1.20 - 4.80 x10*3/uL    Monocytes Absolute 1.16 (H) 0.10 - 1.00 x10*3/uL    Eosinophils Absolute 0.53 0.00 - 0.70 x10*3/uL    Basophils Absolute 0.14 (H) 0.00 - 0.10 x10*3/uL   Comprehensive Metabolic Panel   Result Value Ref Range    Glucose 141 (H) 74 - 99 mg/dL    Sodium 135 (L) 136 - 145 mmol/L    Potassium 3.7 3.5 - 5.3 mmol/L    Chloride 97 (L) 98 - 107 mmol/L    Bicarbonate 28 21 - 32 mmol/L    Anion Gap 14 10 - 20 mmol/L    Urea Nitrogen 30 (H) 6 - 23 mg/dL    Creatinine 1.44 (H) 0.50 - 1.30 mg/dL    eGFR 57 (L) >60 mL/min/1.73m*2    Calcium 9.1 8.6 - 10.3 mg/dL    Albumin 3.8 3.4 - 5.0 g/dL    Alkaline Phosphatase 104 33 - 120 U/L    Total Protein 7.3 6.4 - 8.2 g/dL    AST 57 (H) 9 - 39 U/L    Bilirubin, Total 0.5 0.0 - 1.2 mg/dL     (H) 10 - 52 U/L   Magnesium   Result Value Ref Range    Magnesium 1.97 1.60 - 2.40 mg/dL   B-Type Natriuretic Peptide   Result Value Ref Range     (H) 0 - 99 pg/mL   Lipase   Result Value Ref Range    Lipase 21 9 - 82 U/L   Troponin I, High Sensitivity, Initial   Result Value Ref Range    Troponin I, High Sensitivity 17 0 - 20 ng/L   ECG 12 lead   Result Value Ref Range    Ventricular Rate 81 BPM    Atrial Rate 81 BPM    NC Interval 130 ms    QRS Duration 212 ms    QT Interval 508 ms    QTC Calculation(Bazett) 590 ms    P Axis 73 degrees    R Axis 254 degrees    T Axis 97 degrees    QRS Count 13 beats    Q Onset 202 ms    P Onset 137 ms    P Offset 173 ms    T Offset 456 ms    QTC Fredericia 561 ms   Troponin, High Sensitivity, 1 Hour   Result Value Ref Range    Troponin I, High Sensitivity 17 0 -  20 ng/L   ECG 12 lead   Result Value Ref Range    Ventricular Rate 75 BPM    Atrial Rate 75 BPM    OK Interval 144 ms    QRS Duration 182 ms    QT Interval 504 ms    QTC Calculation(Bazett) 562 ms    P Axis 83 degrees    R Axis -68 degrees    T Axis 131 degrees    QRS Count 12 beats    Q Onset 181 ms    P Onset 139 ms    P Offset 170 ms    T Offset 433 ms    QTC Fredericia 542 ms          Assessment/Plan   -Met with patient and mother at bedside.  They appear to be very well informed on diagnosis and prognosis.  Admits to severe anxiety during AICD firing and even anticipatory firing.  His PCP recently increased Paxil to 60mg but had not started new dose yet d/t admission.  He was just given atarax for anxiety but reports he's tried that when at [facility] and does not find it helpful.  He is requesting to be restarted on his home ativan.  He does take it at home but not daily.  He is goal oriented in completion of advance care planning documents  (ie-[employer]).  He does not need resources for .  Given the severity of his heart failure and not a candidate for a heart transplant and does not wish to pursue advanced therapies with LVAD that his life expectancy is limited.  He is arranged already to meet with palliative care on Monday.  He does not feel ready for ICD deactivation or hospice yet.  Identified his mother as HCPOA, confirmed documents on file.      CODE STATUS: DNR/DNI    -discussed with primary service, rec increasing paxil and restarting home ativan.  Supportive visit in nature.      Thank you for allowing me to participate in the care of this patient.  Will follow along as needed but do not hesitate to reach out if needed sooner.      I spent 60 minutes in the professional and overall care of this patient which included chart review, interviewing patient/family, discussion with primary team, coordination of care and documentation.      Nahun Kapoor, APRN-CNP

## 2024-07-12 NOTE — ED PROVIDER NOTES
HPI: The patient is a 58-year-old man with history of CHF with EF of 15%, CAD status post stents and multiple episodes of VT storm including 1 last month.  Ablations and was intubated for respiratory failure with recommendation for LVAD but the patient declined LVAD and the patient was subsequently discharged to home who presents to the Emergency Department with a chief complaint of palpitations and feeling like his AICD is not functioning.  Patient reports that he saw his cardiologist and electrophysiologist yesterday and had some titrations to his medications but has otherwise been doing quite well.  He reports has been taking all of his medications as prescribed and has had no episodes where he was shocked but felt palpitations this evening with associated nausea and sweatiness which concerned him so he came in for assessment.  Patient denies any weight gain or any shortness of breath or chest pain.     PAST MEDICAL HISTORY:  as per HPI  ALLERGIES:  as per HPI  MEDICATIONS:  as per HPI  FAMILY HISTORY: as per HPI  SURGICAL HISTORY: as per HPI  SOCIAL HISTORY: as per HPI     PHYSICAL EXAM:  VITAL SIGNS: Nursing notes reviewed.  GENERAL:  Alert and interactive  EYES:   Eyes track.  ENT:  Airway patent.  RESPIRATORY:  Nonlabored breathing.  CARDIOVASCULAR:  [Regular rate.] [Regular rhythm.]  GASTROINTESTINAL:  No distension.  MUSCULOSKELETAL:  No deformity.   NEUROLOGICAL:  Awake.  SKIN:  Dry.        MEDICAL DECISION MAKING (MDM):    Critical Care Time  Authorized and Performed by: Michael Arcos MD  Total critical care time: [32] minutes  Due to a high probability of clinically significant, life threatening deterioration, the patient required my highest level of preparedness to intervene emergently and I personally spent this critical care time directly and personally managing the patient. This critical care time included obtaining a history; examining the patient; pulse oximetry; ordering and review of studies;  arranging urgent treatment with development of a management plan; evaluation of patient's response to treatment; frequent reassessment; and, discussions with other providers and patient/family.  This critical care time was performed to assess and manage the high probability of imminent, life-threatening deterioration that could result in multi-organ failure. It was exclusive of separately billable procedures and treating other patients and teaching time.  Please see MDM section and the rest of the note for further information on patient assessment and treatment.    Procedure: Procedural / moderate sedation  Performed by: Michael Arcos MD  Risks, benefits, alternatives were discussed - patient provided informed verbal and written consent to proceed.  Risks including (not limited to): allergic reaction, hypoxia, aspiration, hypotension, oversedation requiring ventilation assistance.  Written consent was signed and placed in the patient´s chart.  Total sedation time: 15 minutes  ASA Classification: 4  Mallampati Score: 3  Agents Used: Etomidate 11 mg  Patient was connected to all cardiac and respiratory monitors including continuous capnography.   Nurse and myself were present during the procedure.   Airway equipment was available at the bedside.   Patient was sedated without issues.   Patient did not require ventilatory support and had no episodes of hypoxia.   Patient awoke and reverted to baseline mental status.   There were no immediate complications.    Procedure: cardioversion  Performed by: Michael Arcos MD  Indication: dysrhythmia  Risks, benefits, alternatives were discussed - patient provided informed verbal consent to proceed.  Patient [was] sedated for this procedure. (See sedation procedure note if applicable)   Initial rhythm: Ventricular tachycardia  Cardioversion was performed, including [1] total attempts with 200 J .  Post-procedure rhythm: Paced rhythm  Procedure was well tolerated.  There were no  immediate complications.    EKG 0213  Per my interpretation:  Electrocardiogram ECG  RATE:  [Normal]  RHYTHM: Ventricularly paced rhythm  AXIS: Left axis  INTERVALS: Wide QRS  ST-T WAVE CHANGES: Discordant changes  ABNORMALITIES/COMPARISON: Ventricularly paced rhythm    EKG 0347  Per my interpretation:  Electrocardiogram ECG  RATE: 80  RHYTHM: Regular escape rhythm  AXIS: Right axis  INTERVALS: Prolonged QRS  ST-T WAVE CHANGES: Discordant changes  ABNORMALITIES/COMPARISON: QRS morphologies are now different and the rhythm is paced intermittently concerning for ventricular escape rhythm    EKG 0413  Per my interpretation:  Electrocardiogram ECG  RATE:  [Normal]  RHYTHM: Ventricularly paced rhythm  AXIS: Left axis  INTERVALS: Wide QRS  ST-T WAVE CHANGES: Discordant changes  ABNORMALITIES/COMPARISON: Ventricularly paced rhythm.  Similar to first EKG from today       SUMMARY:  The patient is admitted to the Emergency Department for evaluation of above. Complete history and physical examination was performed by me.  Patient presents with palpitations that seem consistent with overdrive pacing in the setting of ventricular tachycardia considering the whole clinical picture.  Patient is hemodynamically stable.  Initial EKG showed a ventricular paced rhythm that was not Sgarbossa positive pointing away from occlusive MI.  I did get electrolytes and troponins and we attempted to interrogate the patient's device but the device we normally use was not functioning so I called the manufacture so I can send a wrap in order to interrogate the device.  Chest x-ray was obtained which did not show any evidence of acute pathology to explain his presentation.  Blood work showed a slight leukocytosis which was stable and showed a slight EITAN but I do not want to give him fluids due to not want to make him fluid overloaded.  His potassium was 3.7 so I gave him some replacement to try to get over for to stabilize his heart and his  magnesium was 1.97 so I gave him some additional magnesium to get over 2.0 to try and stabilize her further.  While waiting for the interrogation, patient reported that he felt more nauseous and unwell.  We got another EKG and he had changes in the morphologies of his QRS complexes concerning for slow ventricular tachycardia.  I reached out to our on-call electrophysiologist spoke with Dr. MCGEE who reviewed the EKGs and agreed and recommended cardioversion but did not recommend any changes to his antiarrhythmics.  Patient was sedated and cardioverted as described above and we were able to get his rhythm to convert back to a paced rhythm with similar QRS morphologies to when he first arrived.  Patient will be admitted to our ICU for close monitoring and care.     DIAGNOSIS:    Ventricular tachycardia     DISPOSITION:    1) ICU       Michael Arcos MD  07/12/24 8148

## 2024-07-12 NOTE — H&P
[facility] Critical Care Medicine       Date:  7/12/2024  Patient:  Cristian Sapp  YOB: 1965  MRN:  [id number]   Admit Date:  7/12/2024  ========================================================================================================    Chief Complaint   Patient presents with    Pacemaker Problem     My pacemaker/defibrillator isn't acting right, it's been up to 100 at times         History of Present Illness:  Cristian Sapp is a 58 y.o. year old male patient with Past Medical History of extensive cardiac history s/p AICD with frequent episodes of slow VT was recently worked up [place] for VT storm.  Patient received VT ablation x 2, recommended for LVAD which patient refused at that time.  Patient presented to the emergency department overnight with complaints of AICD firing.  Patient was noted to be in slow VT and was cardioverted in the ED. patient returned to normal rhythm after cardioversion.  ICU was consulted and patient will be transferred for further management.       Interval ICU Events:  7/12:  Admitted for heart failure with AICD firing in the setting slow VT.     Medical History:  Past Medical History:   Diagnosis Date    Atherosclerosis of native artery of both lower extremities with intermittent claudication (CMS-HCC)     CHB (complete heart block) (Multi)     per device check    Chronic systolic CHF (congestive heart failure), NYHA class 3 (Multi)     COPD (chronic obstructive pulmonary disease) (Multi)     Coronary artery disease     History of tobacco abuse     HLD (hyperlipidemia)     Hypertension     Infrarenal abdominal aortic aneurysm (AAA) without rupture (CMS-HCC)     Ischemic cardiomyopathy with implantable cardioverter-defibrillator (ICD)     MI (myocardial infarction) (Multi)     multiple    Nephrolithiasis     PTSD (post-traumatic stress disorder)      Past Surgical History:   Procedure Laterality Date    CARDIAC CATHETERIZATION N/A 5/23/2024    Procedure: Left Heart  Cath;  Surgeon: Babatunde Tan MD;  Location: [facility];  Service: Cardiovascular;  Laterality: N/A;    CARDIAC DEFIBRILLATOR PLACEMENT      CARDIAC ELECTROPHYSIOLOGY PROCEDURE N/A 5/23/2024    Procedure: Cardioversion;  Surgeon: Zachary Sparks MD;  Location: [facility];  Service: Electrophysiology;  Laterality: N/A;    CARDIAC ELECTROPHYSIOLOGY PROCEDURE N/A 5/28/2024    Procedure: NIPS (NONINVASIVE PROGRAMMED STIMULATION AND DEFIBRILLATOR THRESHOLD TESTING);  Surgeon: Zachary Sparks MD;  Location: [facility];  Service: Electrophysiology;  Laterality: N/A;    CARDIAC ELECTROPHYSIOLOGY PROCEDURE Right 5/30/2024    Procedure: Ablation VT Endocardial (36478);  Surgeon: Sonny Flores MD;  Location: [place] [address] Cardiac Cath Lab;  Service: Electrophysiology;  Laterality: Right;  CARTO, 2PM    CARDIAC ELECTROPHYSIOLOGY PROCEDURE N/A 6/6/2024    Procedure: Ablation VT;  Surgeon: Eliot Wooten MD;  Location: [place] [address] Cardiac Cath Lab;  Service: Electrophysiology;  Laterality: N/A;  endocardial vt ablation  carto V8    CORONARY ANGIOPLASTY WITH STENT PLACEMENT  2006    JIM to LAD    CORONARY ANGIOPLASTY WITH STENT PLACEMENT  04/2003    CORONARY ANGIOPLASTY WITH STENT PLACEMENT  06/2008    CYSTOSCOPY W/ URETERAL STENT PLACEMENT  04/01/2024    CYSTOSCOPY W/ URETERAL STENT PLACEMENT  04/30/2024    FEMORAL BYPASS      femoral artery    ILIAC ARTERY STENT Right     KNEE SURGERY       (Not in a hospital admission)    Chantix [varenicline]  Social History     Tobacco Use    Smoking status: Former     Types: Cigarettes   Vaping Use    Vaping status: Never Used   Substance Use Topics    Alcohol use: Yes     Comment: social    Drug use: Defer     Family History   Problem Relation Name Age of Onset    Hypertension Mother      Diabetes Father      Hypertension Sister      Diabetes Sister      Colon cancer Maternal Grandmother      Hypertension Maternal Grandmother      Heart disease  "Maternal Grandfather      Hypertension Maternal Grandfather      Cancer Paternal Grandfather      Hypertension Paternal Grandfather         Review of Systems:  14 point review of systems was completed and negative except for those specially mention in my HPI    Physical Exam:    Heart Rate:  []   Temperature:  [36.2 °C (97.2 °F)]   Respirations:  [16-24]   BP: ()/(40-96)   Height:  [167.6 cm (5' 6\")-170.2 cm (5' 7\")]   Weight:  [73.5 kg (162 lb)-75.8 kg (167 lb)]   Pulse Ox:  [94 %-100 %]     Physical Exam  Vitals reviewed.   Constitutional:       General: He is not in acute distress.     Appearance: Normal appearance. He is normal weight. He is not ill-appearing, toxic-appearing or diaphoretic.   HENT:      Head: Normocephalic.      Nose: Nose normal.      Mouth/Throat:      Mouth: Mucous membranes are dry.      Pharynx: Oropharynx is clear.   Eyes:      Extraocular Movements: Extraocular movements intact.      Conjunctiva/sclera: Conjunctivae normal.      Pupils: Pupils are equal, round, and reactive to light.   Cardiovascular:      Rate and Rhythm: Normal rate and regular rhythm.      Pulses: Normal pulses.      Heart sounds: Normal heart sounds.   Pulmonary:      Effort: Pulmonary effort is normal.   Abdominal:      General: Abdomen is flat. Bowel sounds are normal.      Palpations: Abdomen is soft.   Musculoskeletal:         General: No swelling. Normal range of motion.      Right lower leg: No edema.      Left lower leg: No edema.   Skin:     General: Skin is warm.      Capillary Refill: Capillary refill takes less than 2 seconds.      Coloration: Skin is pale.   Neurological:      General: No focal deficit present.      Mental Status: He is alert and oriented to person, place, and time. Mental status is at baseline.   Psychiatric:         Mood and Affect: Mood normal.         Behavior: Behavior normal.         Thought Content: Thought content normal.         Judgment: Judgment normal. "         Objective:    I have reviewed all medications, laboratory results, and imaging pertinent for today's encounter    Assessment/Plan:    I am currently managing this critically ill patient for the following problems:    Neuro/Psych/Pain Ctrl/Sedation:  # Anxiety  -Pain: Tylenol  -Home ativan, Atarax and paxil  -HOLD Trazodone- ? R/t QTC prolongation and frequent VT     Respiratory/ENT:  COPD, hx tobacco use  -Supplemental oxygen as needed to maintain SpO2 greater than 92%  -DuoNebs as needed     Cardiovascular:  Slow ventricular tachycardia s/p cardioversion and ICD Discharges  S/p multiple VT ablations  Acute on chronic HFrEF, Multiple PCI  hx ICM, CAD, HTN, MI, moderate to severe MR, mod TR, moderate to severe   Hx refusal of LVAD  --Echo today- 15% EF  -Keep K>4, Mg>2  -EP consult  -Continue amio 200 BID (switched out patient)  -Continue Mexiletine 250TID  -Entresto at HS  -ASA, eliquis, crestor  -spot diuresis, home torsemide and spironolactone  -Daily EKG     GI:  -NPO  -Home lactulose     Renal/Volume Status (Intra & Extravascular):  #hx ureteral stents   -Continue Flomax  -Strict intake and output  -monitor renal function and electrolytes    Endocrine  -monitor for s/s hypo and hyperglycemia     Infectious Disease:  Leukocytosis,  Suspect reactive from ICD firing  -Monitor SIRS     Heme/Onc:  -Monitor CBC     OBGYN/MSK:  -Activity as tolerated     Ethics/Code Status:  Full code  -Heart failure navigator consult, nutrition consult for outpatient follow-up placed     :  DVT Prophylaxis: Eliquis  GI Prophylaxis: N/A  Bowel Regimen: Lactulose  Diet: N.p.o.  CVC: N/A  Clio: N/A  Wen: N/A  Restraints: N/A  Dispo: ICU    Critical Care Time: 45 minutes    Bladimir Sam, APRN-CNP

## 2024-07-12 NOTE — CARE PLAN
The patient's goals for the shift include      The clinical goals for the shift include Patient will be hemodynamically stable throughout shift

## 2024-07-12 NOTE — CONSULTS
Inpatient consult to Cardiology  Consult performed by: CLAUDIA Luu  Consult ordered by: CLAUDIA Menjivra  Reason for consult: Slow VT  Assessment/Recommendations: Patient was seen, chart reviewed.    Patient was readmitted to [facility] after having episodes of palpitations  He was found to be in SVT.  He underwent cardioversion with successful restoration to sinus rhythm in the emergency department  Currently on IV amiodarone therapy  Long conversation with patient and family members about plan to follow.  Patient is very well-known for electrophysiology service for VT storm's in the past.  Status post ablation with significant complication during procedure  Will continue with medical therapy for now with IV amiodarone and mexiletine current dose  Tomorrow, we can change to oral amiodarone again.  Prognosis reserved  Appreciate cardio recommendations regarding heart failure management  Continue telemetry  EKG daily  Risk factor modification and lifestyle modification discussed with patient. Diet , exercise and hydration discussed with patient.    Please excuse any errors in grammar or translation related to this dictation.  Voice recognition software was utilized to prepare this document.        Electrophysiology Consult Note  Patient: Cristian Sapp  Unit/Bed: 06/06-A  YOB: 1965  MRN: [id number]  Acct: [id number]   Admitting Diagnosis: Ventricular tachycardia (Multi) [I47.20]  Date:  7/12/2024  Hospital Day: 0  Attending: Andreas Martinez MD    Rounding Cardiologist:  CLAUDIA Luu, Zachary Sparks MD     Complaint:  Chief Complaint   Patient presents with    Pacemaker Problem     My pacemaker/defibrillator isn't acting right, it's been up to 100 at times      History of Present Illness:  58-year-old male with past medical history significant for myocardial infarction (first in 2003), CAD, s/p remote multivessel PCI's, ischemic cardiomyopathy with an  LVEF reported at 35% back in 2006 and again in 2008, most recent echocardiogram May, 2024 with LVEF 15%, chronic systolic heart failure, s/p biventricular ICD (11/6/2019), initial ICD implant in 2014, VT storm, s/p remote VT ablation in 2019 and again in 5/30/2024 with continued intermittent episodes of slow VT, recommendations for heart transplant, deemed not a candidate, recommendations for LVAD, patient has declined, hx of abdominal aortic aneurysm without rupture, HTN, HLD, PTSD, tobacco use, renal stones, s/p recent cystoscopy with urethral stent placement who presented to [facility] emergency department late last evening with complaints of AICD firing and palpitations.  Patient was noted to be in slow VT and was cardioverted.  He returned to a NSR and was placed on amiodarone infusion and subsequently admitted to the SICU with consult to cardiology and electrophysiology service for management of heart failure and and VT.      Patient has been maintained on Amiodarone 400 mg PO daily and max dose mexiletine 250 mg PO TID at home.  He was just seen by Dr. Sparks in the office on 7/11/2024 with no complaints and device check indicated no evidence of ventricular arrhythmias since previous device check.    Allergies:  Allergies   Allergen Reactions    Chantix [Varenicline] Unknown      PMHx:  Past Medical History:   Diagnosis Date    Atherosclerosis of native artery of both lower extremities with intermittent claudication (CMS-HCC)     CHB (complete heart block) (Multi)     per device check    Chronic systolic CHF (congestive heart failure), NYHA class 3 (Multi)     COPD (chronic obstructive pulmonary disease) (Multi)     Coronary artery disease     History of tobacco abuse     HLD (hyperlipidemia)     Hypertension     Infrarenal abdominal aortic aneurysm (AAA) without rupture (CMS-HCC)     Ischemic cardiomyopathy with implantable cardioverter-defibrillator (ICD)     MI (myocardial infarction) (Multi)      multiple    Nephrolithiasis     PTSD (post-traumatic stress disorder)      PSHx:  Past Surgical History:   Procedure Laterality Date    CARDIAC CATHETERIZATION N/A 5/23/2024    Procedure: Left Heart Cath;  Surgeon: Babatunde Tan MD;  Location: [facility];  Service: Cardiovascular;  Laterality: N/A;    CARDIAC DEFIBRILLATOR PLACEMENT      CARDIAC ELECTROPHYSIOLOGY PROCEDURE N/A 5/23/2024    Procedure: Cardioversion;  Surgeon: Zachary Sparks MD;  Location: [facility];  Service: Electrophysiology;  Laterality: N/A;    CARDIAC ELECTROPHYSIOLOGY PROCEDURE N/A 5/28/2024    Procedure: NIPS (NONINVASIVE PROGRAMMED STIMULATION AND DEFIBRILLATOR THRESHOLD TESTING);  Surgeon: Zachary Sparks MD;  Location: [facility];  Service: Electrophysiology;  Laterality: N/A;    CARDIAC ELECTROPHYSIOLOGY PROCEDURE Right 5/30/2024    Procedure: Ablation VT Endocardial ([id number]);  Surgeon: Sonny Flores MD;  Location: [place] [address] Cardiac Cath Lab;  Service: Electrophysiology;  Laterality: Right;  CARTO, 2PM    CARDIAC ELECTROPHYSIOLOGY PROCEDURE N/A 6/6/2024    Procedure: Ablation VT;  Surgeon: Eliot Wooten MD;  Location: [place] [address] Cardiac Cath Lab;  Service: Electrophysiology;  Laterality: N/A;  endocardial vt ablation  carto V8    CORONARY ANGIOPLASTY WITH STENT PLACEMENT  2006    JIM to LAD    CORONARY ANGIOPLASTY WITH STENT PLACEMENT  04/2003    CORONARY ANGIOPLASTY WITH STENT PLACEMENT  06/2008    CYSTOSCOPY W/ URETERAL STENT PLACEMENT  04/01/2024    CYSTOSCOPY W/ URETERAL STENT PLACEMENT  04/30/2024    FEMORAL BYPASS      femoral artery    ILIAC ARTERY STENT Right     KNEE SURGERY       Social Hx:  Tobacco use  Denies alcohol use  Denies drug use    Family Hx:  Family History   Problem Relation Name Age of Onset    Hypertension Mother      Diabetes Father      Hypertension Sister      Diabetes Sister      Colon cancer Maternal Grandmother      Hypertension Maternal Grandmother       Heart disease Maternal Grandfather      Hypertension Maternal Grandfather      Cancer Paternal Grandfather      Hypertension Paternal Grandfather       Review of Systems:   Review of Systems   Constitutional:  Positive for fatigue.   HENT: Negative.     Eyes: Negative.    Respiratory: Negative.  Negative for chest tightness and shortness of breath.    Cardiovascular:  Positive for chest pain and palpitations.   Gastrointestinal: Negative.    Endocrine: Negative.    Genitourinary: Negative.    Skin: Negative.    Allergic/Immunologic: Negative.    Neurological: Negative.  Negative for dizziness and light-headedness.   Psychiatric/Behavioral: Negative.     All other systems reviewed and are negative.    Physical Examination:    Visit Vitals  BP 77/52 (BP Location: Right arm, Patient Position: Lying)   Pulse 75   Temp 35.3 °C (95.5 °F) (Temporal)   Resp 18      Physical Exam  Vitals reviewed.   Constitutional:       Appearance: Normal appearance.   HENT:      Head: Normocephalic and atraumatic.      Nose: Nose normal.      Mouth/Throat:      Mouth: Mucous membranes are moist.      Pharynx: Oropharynx is clear.   Eyes:      Pupils: Pupils are equal, round, and reactive to light.   Cardiovascular:      Rate and Rhythm: Normal rate and regular rhythm.      Pulses: Normal pulses.      Heart sounds: Normal heart sounds.   Pulmonary:      Effort: Pulmonary effort is normal.      Breath sounds: Normal breath sounds.   Abdominal:      General: Bowel sounds are normal.      Palpations: Abdomen is soft.   Musculoskeletal:         General: Normal range of motion.      Cervical back: Normal range of motion and neck supple.   Skin:     General: Skin is warm and dry.   Neurological:      General: No focal deficit present.      Mental Status: He is alert and oriented to person, place, and time.   Psychiatric:         Mood and Affect: Mood normal.         Behavior: Behavior normal.       LABS:  CBC:   Results from last 7 days   Lab  Units 07/12/24  0231   WBC AUTO x10*3/uL 12.0*   RBC AUTO x10*6/uL 4.25*   HEMOGLOBIN g/dL 13.0*   HEMATOCRIT % 38.1*   MCV fL 90   MCH pg 30.6   MCHC g/dL 34.1   RDW % 14.0   PLATELETS AUTO x10*3/uL 396     CMP:    Results from last 7 days   Lab Units 07/12/24  0231   SODIUM mmol/L 135*   POTASSIUM mmol/L 3.7   CHLORIDE mmol/L 97*   CO2 mmol/L 28   BUN mg/dL 30*   CREATININE mg/dL 1.44*   GLUCOSE mg/dL 141*   PROTEIN TOTAL g/dL 7.3   CALCIUM mg/dL 9.1   BILIRUBIN TOTAL mg/dL 0.5   ALK PHOS U/L 104   AST U/L 57*   ALT U/L 119*     Magnesium:  Results from last 7 days   Lab Units 07/12/24  0231   MAGNESIUM mg/dL 1.97     Troponin:    Results from last 7 days   Lab Units 07/12/24  0559 07/12/24  0231   TROPHS ng/L 17 17     BNP:   Results from last 7 days   Lab Units 07/12/24  0231   BNP pg/mL 774*     Current Medications:    Current Facility-Administered Medications:     acetaminophen (Tylenol) tablet 650 mg, 650 mg, oral, q6h PRN, CLAUDIA Menjivar    albuterol 2.5 mg /3 mL (0.083 %) nebulizer solution 2.5 mg, 2.5 mg, nebulization, q4h PRN, CLAUDIA Menjivar    amiodarone (Pacerone) tablet 200 mg, 200 mg, oral, BID, CLAUDIA Menjivar    apixaban (Eliquis) tablet 5 mg, 5 mg, oral, q12h, CLAUDIA Menjivar    aspirin EC tablet 81 mg, 81 mg, oral, Daily, CLAUDIA Menjivar    b complex tablet 1 tablet, 1 tablet, oral, Daily, CLAUDIA Menjivar    bisoprolol (Zebeta) tablet 2.5 mg, 2.5 mg, oral, Daily, CLAUDIA Menjivar    calcium polycarbophiL (Fibercon) tablet 625 mg, 625 mg, oral, Daily, CLAUDIA Menjivar    cholecalciferol (Vitamin D-3) tablet 5,000 Units, 5,000 Units, oral, q3 days, CLAUDIA Menjivar    [Held by provider] co-enzyme Q-10 capsule 50 mg, 50 mg, oral, Every Sunday, CLAUDIA Menjivar    cyanocobalamin (Vitamin B-12) tablet 1,000 mcg, 1,000 mcg, oral, Daily, CLAUDIA Menjivar    dapagliflozin  propanediol (Farxiga) tablet 10 mg, 10 mg, oral, Daily, CLAUDIA Menjivar    [COMPLETED] etomidate (Amidate) injection 11.2 mg, 0.15 mg/kg, intravenous, Once, 11.2 mg at 07/12/24 0405 **FOLLOWED BY** etomidate (Amidate) injection 7.4 mg, 0.1 mg/kg, intravenous, PRN, Michael Arcos MD    hydrOXYzine HCL (Atarax) tablet 25 mg, 25 mg, oral, q6h PRN, CLAUDIA Menjivar    lactulose 20 gram/30 mL oral solution 20 g, 20 g, oral, Daily, CLAUDIA Menjivar    LORazepam (Ativan) tablet 0.5 mg, 0.5 mg, oral, Nightly, CLAUDIA Menjivar    mexiletine (Mexitil) capsule 250 mg, 250 mg, oral, q8h ANNIKA, CLAUDIA Menjivar    oxygen (O2) therapy, , inhalation, Continuous, Michael Arcos MD, Last Rate: 120,000 mL/hr at 07/12/24 0438, 2 L/min at 07/12/24 0438    pantoprazole (ProtoNix) EC tablet 20 mg, 20 mg, oral, Daily before breakfast, CLAUDIA Menjivar    PARoxetine (Paxil) tablet 40 mg, 40 mg, oral, q AM, CLAUDIA Menjivar    potassium chloride (Klor-Con) packet 20 mEq, 20 mEq, oral, Daily, CLAUDIA Menjivar    potassium chloride 20 mEq in sterile water for injection 100 mL, 20 mEq, intravenous, Once, CLAUDIA Menjivar    ranolazine (Ranexa) 12 hr tablet 500 mg, 500 mg, oral, BID, CLAUDIA Menjivar    rosuvastatin (Crestor) tablet 20 mg, 20 mg, oral, Nightly, CLAUDIA Menjivar    sacubitriL-valsartan (Entresto) 24-26 mg per tablet 1 tablet, 1 tablet, oral, Daily, CLAUDIA Holder    spironolactone (Aldactone) tablet 12.5 mg, 12.5 mg, oral, Daily, CLAUDIA Holder    tamsulosin (Flomax) 24 hr capsule 0.4 mg, 0.4 mg, oral, Daily, CLAUDIA Menjivar    torsemide (Demadex) tablet 20 mg, 20 mg, oral, Daily, CLAUDIA Holder    [Held by provider] vitamin A capsule 2.4 mg, 2,400 mcg, oral, Every other day, CLAUDIA Menjivar    vitamin E capsule 400 Units, 400 Units, oral, Daily,  Bladimir Sam, APRN-CNP    ECG 12 lead  Result Date: 7/12/2024  Ventricular-paced rhythm Abnormal ECG When compared with ECG of 17-JUN-2024 14:05, Vent. rate has decreased BY  20 BPM    ECG 12 lead  Result Date: 7/12/2024  Sinus rhythm with occasional ventricular-paced complexes and Fusion complexes Right bundle branch block , plus right ventricular hypertrophy Inferior infarct , age undetermined Anterolateral infarct , age undetermined Abnormal ECG When compared with ECG of 12-JUL-2024 04:00, (unconfirmed) Fusion complexes are now Present Vent. rate has increased BY   4 BPM    XR chest 1 view  Result Date: 7/12/2024  Interpreted By:  Cesario Salmeron, STUDY: XR CHEST 1 VIEW;  7/12/2024 2:46 am   INDICATION: Signs/Symptoms:Chest Pain.   COMPARISON: 06/13/2024   ACCESSION NUMBER(S): [id number]   ORDERING CLINICIAN: KATHARINE LAMA   FINDINGS: Unchanged right pacemaker/AICD. Coronary stent in place.   There is persistent mild blunting of the right costophrenic sulcus, likely trace pleural effusion. Streaky right basilar opacities, likely subsegmental atelectasis. No consolidation or pneumothorax. Cardiac silhouette is within normal limits. No acute osseous abnormality.       1. Trace right pleural effusion, similar to 06/13/2024. 2. Streaky right basilar opacities, likely subsegmental atelectasis.   Signed by: Cesario Salmeron 7/12/2024 3:19 AM Dictation workstation:   [id number]     Encounter Date: 07/12/24   ECG 12 lead   Result Value    Ventricular Rate 81    Atrial Rate 81    LA Interval 130    QRS Duration 212    QT Interval 508    QTC Calculation(Bazett) 590    P Axis 73    R Axis 254    T Axis 97    QRS Count 13    Q Onset 202    P Onset 137    P Offset 173    T Offset 456    QTC Fredericia 561    Narrative    Sinus rhythm with occasional ventricular-paced complexes and Fusion complexes  Right bundle branch block , plus right ventricular hypertrophy  Inferior infarct , age  undetermined  Anterolateral infarct , age undetermined  Abnormal ECG  When compared with ECG of 12-JUL-2024 04:00, (unconfirmed)  Fusion complexes are now Present  Vent. rate has increased BY   4 BPM      Tele monitoring: AV-paced at a rate of 75    Assessment/Plan:   Wide-complex tachycardia/ventricular tachycardia/Slow VT/AICD shocks   -s/p VT Ablation 5/30/2024 per Dr. Wooten with recurrent slow VT             -Patient is currently maintaining an AV-Paced rhythm at a rate of 75             -Continue IV amiodarone drip at 1mg/min   -Continue mexiletine 250mg PO TID             -Recent LHC performed 5/23/2024 revealed  of the proximal circumflex artery,  of the proximal RCA with distal collateral filling and mild disease of the left main and LAD.  Medical management is advised.   -Patient deemed not a candidate for heart transplant.   -Patient has declined LVAD  2.    Ischemic cardiomyopathy/acute on chronic systolic heart failure             -LVEF 15% on echocardiogram 5/23/2024             -Moderately dilated left ventricle  3.    Valvular heart disease             -Moderate to severe MR/moderate TR             -Moderate to severely elevated pulmonary artery pressures  4.    Coronary artery disease/multiple remote myocardial infarctions             -Remote multivessel PCI's (last in 2008)             -See report above for recent LHC performed 5/23/2024  5.    Benign essential hypertension             -Currently hypotensive  6.    Hyperlipidemia             -On statin therapy  7.    Current tobacco use     Further recommendations per Dr. Sparks.      Electronically signed by CLAUDIA Luu on 7/12/2024 at 9:14 AM

## 2024-07-12 NOTE — CONSULTS
Inpatient consult to Cardiology  Consult performed by: YARITZA Ibrahim-CNP  Consult ordered by: YARITZA Holder-CNP  Reason for consult: chf                            Date:   7/12/2024  Patient name:  Cristian Sapp  Date of admission:  7/12/2024  2:14 AM  MRN:   [id number]  YOB: 1965  Time of Consult:  12:29 PM    Consulting Cardiologist: YARITZA Caraballo, CNP  Primary Cardiologist:  Dr. Eamon Rosa    Referring Provider: Dr. Martinez      Admission Diagnosis:     Ventricular tachycardia (Multi)      History of Present Illness:     58 year old male with history of atherosclerotic heart disease with previous multivessel angioplasty and stenting procedures.  Initial myocardial infarction was in 2003.  He has underlying ischemic cardiomyopathy with severe LV dysfunction.  Estimated LV ejection fraction in May 2024 was 15%.  He is status post CRT-D implant.  He previously had VT storm and underwent ventricular tachycardia ablation in 2019.  He was hospitalized in May 23, 2024 after receiving multiple ICD shocks for recurrent VT.  He was initially hospitalized at [facility] and was transferred to [facility] May 28, 2024 for continued management.  He was in slow VT and was started on an amiodarone drip.  He had VT ablation May 30, 2024 but continued to have intermittent episodes of slow ventricular tachycardia.  He was cardioverted on Elle 3, 2024.  He was maintained on amiodarone and lidocaine drips.  He underwent stellate ganglion block June 4, 2024.  It was felt that heart transplant was not going to be an option for him.  He opted against an LVAD.  He was seen by palliative medicine and he changed his CODE STATUS to DNR-DNI on June 5, 2024.  He had redo endocardial ablation by Dr. Wooten June 6, 2024.  He was extubated June 7, 2024 and intra-aortic balloon pump was removed.  He was initiated on low-dose Entresto.  He was changed to oral mexiletine.  Cardiac cath during his  recent admission showed severe triple-vessel coronary artery disease.  Right coronary artery and circumflex were totally occluded and filled via collaterals.  LAD had mild stenosis.  No lesions were minimal to revascularization.     He was seen in follow-up by Dr. Sparks June 28, 2024.  Since his most recent discharge he has been doing quite well.  He has noted some episodes of lightheadedness with transient systolic blood pressure readings in the 80s.  He notes ongoing fatigue/tiredness.  He is currently maintained on amiodarone 40 mg daily and mexiletine 200 mg 3 times daily.  His Bystolic dose was decreased to 2.5 mg daily recently.      He has a history of abdominal aortic aneurysm without rupture.  He has hypertension, hyperlipidemia, tobacco use, nephrolithiasis, and PTSD.    Yesterday he presented to [facility] emergency department with complaints of his AICD firing and palpitations.  In the emergency department he was noted to be in slow VT and was cardioverted.  He returned to normal sinus rhythm after cardioversion and was placed on amiodarone infusion.  General cardiology and EP were asked to evaluate the patient for his known ventricular tachycardia and congestive heart failure with an EF of 10 to 15%.            Allergies:     Allergies   Allergen Reactions    Chantix [Varenicline] Unknown         Past Medical History:     Past Medical History:   Diagnosis Date    Atherosclerosis of native artery of both lower extremities with intermittent claudication (CMS-HCC)     CHB (complete heart block) (Multi)     per device check    Chronic systolic CHF (congestive heart failure), NYHA class 3 (Multi)     COPD (chronic obstructive pulmonary disease) (Multi)     Coronary artery disease     History of tobacco abuse     HLD (hyperlipidemia)     Hypertension     Infrarenal abdominal aortic aneurysm (AAA) without rupture (CMS-HCC)     Ischemic cardiomyopathy with implantable  cardioverter-defibrillator (ICD)     MI (myocardial infarction) (Multi)     multiple    Nephrolithiasis     PTSD (post-traumatic stress disorder)        Past Surgical History:     Past Surgical History:   Procedure Laterality Date    CARDIAC CATHETERIZATION N/A 5/23/2024    Procedure: Left Heart Cath;  Surgeon: Babatunde Tan MD;  Location: [facility]iac [facility];  Service: Cardiovascular;  Laterality: N/A;    CARDIAC DEFIBRILLATOR PLACEMENT      CARDIAC ELECTROPHYSIOLOGY PROCEDURE N/A 5/23/2024    Procedure: Cardioversion;  Surgeon: Zachary Sparks MD;  Location: [facility];  Service: Electrophysiology;  Laterality: N/A;    CARDIAC ELECTROPHYSIOLOGY PROCEDURE N/A 5/28/2024    Procedure: NIPS (NONINVASIVE PROGRAMMED STIMULATION AND DEFIBRILLATOR THRESHOLD TESTING);  Surgeon: Zachary Sparks MD;  Location: [facility];  Service: Electrophysiology;  Laterality: N/A;    CARDIAC ELECTROPHYSIOLOGY PROCEDURE Right 5/30/2024    Procedure: Ablation VT Endocardial (01076);  Surgeon: Sonny Flores MD;  Location: [place] [address] Cardiac Cath Lab;  Service: Electrophysiology;  Laterality: Right;  CARTO, 2PM    CARDIAC ELECTROPHYSIOLOGY PROCEDURE N/A 6/6/2024    Procedure: Ablation VT;  Surgeon: Eliot Wooten MD;  Location: [place] [address] Cardiac Cath Lab;  Service: Electrophysiology;  Laterality: N/A;  endocardial vt ablation  carto V8    CORONARY ANGIOPLASTY WITH STENT PLACEMENT  2006    JIM to LAD    CORONARY ANGIOPLASTY WITH STENT PLACEMENT  04/2003    CORONARY ANGIOPLASTY WITH STENT PLACEMENT  06/2008    CYSTOSCOPY W/ URETERAL STENT PLACEMENT  04/01/2024    CYSTOSCOPY W/ URETERAL STENT PLACEMENT  04/30/2024    FEMORAL BYPASS      femoral artery    ILIAC ARTERY STENT Right     KNEE SURGERY         Family History:     Family History   Problem Relation Name Age of Onset    Hypertension Mother      Diabetes Father      Hypertension Sister      Diabetes Sister      Colon cancer Maternal Grandmother       Hypertension Maternal Grandmother      Heart disease Maternal Grandfather      Hypertension Maternal Grandfather      Cancer Paternal Grandfather      Hypertension Paternal Grandfather         Social History:     Social History     Tobacco Use    Smoking status: Former     Types: Cigarettes   Vaping Use    Vaping status: Never Used   Substance Use Topics    Alcohol use: Yes     Comment: social    Drug use: Defer       CURRENT INPATIENT MEDICATIONS    [Held by provider] amiodarone, 200 mg, oral, BID  apixaban, 5 mg, oral, q12h  aspirin, 81 mg, oral, Daily  bisoprolol, 2.5 mg, oral, Daily  calcium polycarbophiL, 625 mg, oral, Daily  cholecalciferol, 5,000 Units, oral, q3 days  cyanocobalamin, 1,000 mcg, oral, Daily  dapagliflozin propanediol, 10 mg, oral, Daily  lactulose, 20 g, oral, Daily  [Held by provider] LORazepam, 0.5 mg, oral, Nightly  mexiletine, 250 mg, oral, q8h ANNIKA  pantoprazole, 20 mg, oral, Daily before breakfast  PARoxetine, 40 mg, oral, q AM  potassium chloride, 20 mEq, oral, Daily  ranolazine, 500 mg, oral, BID  rosuvastatin, 20 mg, oral, Nightly  sacubitriL-valsartan, 1 tablet, oral, Daily  spironolactone, 12.5 mg, oral, Daily  tamsulosin, 0.4 mg, oral, Daily  torsemide, 20 mg, oral, Daily  vitamin E, 400 Units, oral, Daily      amiodarone, 0.5-1 mg/min, Last Rate: 1 mg/min (07/12/24 1006)  oxygen, , Last Rate: 2 L/min (07/12/24 0800)      Current Outpatient Medications   Medication Instructions    acetaminophen (TYLENOL) 650 mg, oral, Every 6 hours PRN    albuterol sulfate (Proair Digihaler) 90 mcg/actuation aero powdr breath act w/sensor inhaler 2 puffs, inhalation, Every 4 hours PRN    amiodarone (Pacerone) 200 mg tablet Take 2 tablets (400 mg) by mouth once daily. Do not fill before June 19, 2024.    apixaban (ELIQUIS) 5 mg, oral, Every 12 hours    aspirin 81 mg, oral, Daily    b complex 0.4 mg tablet 1 tablet, oral, Daily    bisoprolol (ZEBETA) 2.5 mg, oral, Daily    blood pressure monitor kit  Use as directed    calcium polycarbophiL (FIBER (CALCIUM POLYCARBOPHIL)) 625 mg, oral, Daily    cholecalciferol (VITAMIN D-3) 5,000 Units, oral, Every 3 days    co-enzyme Q-10 50 mg, oral, Once Weekly    cyanocobalamin (VITAMIN B-12) 1,000 mcg, oral, Daily    esomeprazole (NEXIUM) 20 mg, oral, Daily before breakfast, Do not open capsule.    Farxiga 10 mg, oral, Daily    hydrOXYzine HCL (ATARAX) 25 mg, oral, Every 6 hours PRN    lactulose 20 g, oral, Daily    LORazepam (ATIVAN) 0.5 mg, oral, Nightly    mexiletine (MEXITIL) 250 mg, oral, Every 8 hours scheduled    PARoxetine (PAXIL) 60 mg, oral, Every morning    potassium chloride (Klor-Con) 20 mEq packet 20 mEq, oral, Daily    ranolazine (RANEXA) 500 mg, oral, 2 times daily, Do not crush, chew, or split.    rosuvastatin (CRESTOR) 20 mg, oral, Nightly    sacubitriL-valsartan (Entresto) 24-26 mg tablet 1 tablet, oral, Daily    spironolactone (ALDACTONE) 25 mg, oral, Daily    tamsulosin (FLOMAX) 0.4 mg, oral, Daily    torsemide (DEMADEX) 20 mg, oral, Daily    traZODone (DESYREL) 50 mg, oral, Nightly    VITAMIN A ORAL 1 capsule, oral, Every other day    vitamin E acetate (VITAMIN E ORAL) 1 capsule, oral, Daily        Review of Systems:      12 point review of systems was obtained in detail and is negative other than that detailed above.    Vital Signs:     Vitals:    07/12/24 0500 07/12/24 0515 07/12/24 0545 07/12/24 0702   BP: 87/64 83/60 77/52    BP Location: Right arm Right arm Right arm    Patient Position: Lying Lying Lying    Pulse: 75 75 75    Resp: 19 20 18    Temp:    35.3 °C (95.5 °F)   TempSrc:    Temporal   SpO2: 100% 98% 99%    Weight:    73.4 kg (161 lb 13.1 oz)   Height:           Intake/Output Summary (Last 24 hours) at 7/12/2024 1229  Last data filed at 7/12/2024 1029  Gross per 24 hour   Intake 260 ml   Output 700 ml   Net -440 ml       Wt Readings from Last 4 Encounters:   07/12/24 73.4 kg (161 lb 13.1 oz)   07/11/24 75.8 kg (167 lb)   07/11/24 75.8 kg  (167 lb)   07/11/24 73.5 kg (162 lb)       Physical Examination:     Physical Exam  Vitals and nursing note reviewed.   Constitutional:       Appearance: Normal appearance. He is well-developed.   HENT:      Head: Normocephalic.      Mouth/Throat:      Mouth: Mucous membranes are moist.   Eyes:      Pupils: Pupils are equal, round, and reactive to light.   Cardiovascular:      Rate and Rhythm: Normal rate and regular rhythm.   Pulmonary:      Effort: Pulmonary effort is normal.      Breath sounds: Decreased air movement present.   Abdominal:      Palpations: Abdomen is soft.   Musculoskeletal:         General: Normal range of motion.      Cervical back: Normal range of motion.   Skin:     General: Skin is warm and dry.      Capillary Refill: Capillary refill takes less than 2 seconds.   Neurological:      General: No focal deficit present.      Mental Status: He is alert and oriented to person, place, and time.   Psychiatric:         Mood and Affect: Mood normal.           Lab:     CBC:   Results from last 7 days   Lab Units 07/12/24  0231   WBC AUTO x10*3/uL 12.0*   RBC AUTO x10*6/uL 4.25*   HEMOGLOBIN g/dL 13.0*   HEMATOCRIT % 38.1*   MCV fL 90   MCH pg 30.6   MCHC g/dL 34.1   RDW % 14.0   PLATELETS AUTO x10*3/uL 396     CMP:    Results from last 7 days   Lab Units 07/12/24  0231   SODIUM mmol/L 135*   POTASSIUM mmol/L 3.7   CHLORIDE mmol/L 97*   CO2 mmol/L 28   BUN mg/dL 30*   CREATININE mg/dL 1.44*   GLUCOSE mg/dL 141*   PROTEIN TOTAL g/dL 7.3   CALCIUM mg/dL 9.1   BILIRUBIN TOTAL mg/dL 0.5   ALK PHOS U/L 104   AST U/L 57*   ALT U/L 119*     BMP:    Results from last 7 days   Lab Units 07/12/24  0231   SODIUM mmol/L 135*   POTASSIUM mmol/L 3.7   CHLORIDE mmol/L 97*   CO2 mmol/L 28   BUN mg/dL 30*   CREATININE mg/dL 1.44*   CALCIUM mg/dL 9.1   GLUCOSE mg/dL 141*     Magnesium:  Results from last 7 days   Lab Units 07/12/24  0231   MAGNESIUM mg/dL 1.97     Troponin:    Results from last 7 days   Lab Units  07/12/24  0559 07/12/24  0231   TROPHS ng/L 17 17     BNP:   Results from last 7 days   Lab Units 07/12/24  0231   BNP pg/mL 774*     Lipid Panel:         Diagnostic Studies:   [place] [address]   Tel [phone] Fax [phone]     TRANSTHORACIC ECHOCARDIOGRAM REPORT        Patient Name:      FRANCIA PAULINO          Reading Physician:    [id number] Kd Rodrigues DO  Study Date:        5/23/2024             Ordering Provider:    [id number] SHIN  MRN/PID:           [id number]              Fellow:  Accession#:        [id number]          Nurse:  Date of Birth/Age: 1965 / 58 years Sonographer:          Cathy Sanders RDCS  Gender:            M                     Additional Staff:  Height:            170.18 cm             Admit Date:  Weight:            78.47 kg              Admission Status:     Inpatient -                                                                 Routine  BSA / BMI:         1.90 m2 / 27.10 kg/m2 [facility]  Blood Pressure: 97 /71 mmHg     Study Type:    TRANSTHORACIC ECHO (TTE) COMPLETE  Diagnosis/ICD: Presence of automatic (implantable) cardiac                 defibrillator-Z95.810; Chronic systolic (congestive) heart                 failure (CHF)-I50.22; Ischemic cardiomyopathy-I25.5  Indication:    Congestive Heart Failure, ICM, ICD Firing (multiple shocks)  CPT Codes:     Echo Complete w Full Doppler-10258     Patient History:  MI Location/Type:  Unknown MI  Pacer/Defib:       AICD  Pertinent History: CAD, CHF, Cardiomyopathy, HTN and ICD Firing.     Study Detail: The following Echo studies were performed: 2D, M-Mode, Doppler and                color flow. Technically challenging study due to patient lying in                supine  position, prominent lung artifact and poor acoustic                windows. Patient has a defibrillator. The patient was asleep.        PHYSICIAN INTERPRETATION:  Left Ventricle: Left ventricular systolic function is severely decreased, with an estimated ejection fraction of 10%. There is global hypokinesis of the left ventricle with minor regional variations. The left ventricular cavity size is moderately dilated. Left ventricular diastolic filling was not assessed.  Left Atrium: The left atrium is mildly dilated.  Right Ventricle: The right ventricle is normal in size. There is normal right ventricular global systolic function. A device is visualized in the right ventricle.  Right Atrium: The right atrium is normal in size. There is a device visualized in the right atrium.  Aortic Valve: The aortic valve appears structurally normal. The aortic valve appears tricuspid. There is mild aortic valve cusp calcification. There is no evidence of aortic valve stenosis.  There is no evidence of aortic valve regurgitation. The peak instantaneous gradient of the aortic valve is 3.6 mmHg. The mean gradient of the aortic valve is 2.0 mmHg.  Mitral Valve: The mitral valve is normal in structure. There is mild thickening of the anterior and posterior mitral valve leaflets. There is no evidence of mitral valve stenosis. There is normal mitral valve leaflet mobility. There is mild mitral annular calcification. There is moderate to severe mitral valve regurgitation.  Tricuspid Valve: The tricuspid valve is structurally normal. There is normal tricuspid valve leaflet mobility. There is moderate tricuspid regurgitation.  Pulmonic Valve: The pulmonic valve is structurally normal. There is trace pulmonic valve regurgitation.  Pericardium: There is no pericardial effusion noted.  Aorta: The aortic root is normal.  Pulmonary Artery: Pulmonary hypertension is present. The tricuspid regurgitant velocity is 3.03 m/s, and with an estimated  right atrial pressure of 15 mmHg, the estimated pulmonary artery pressure is moderate to severely elevated with the RVSP at 51.7 mmHg.  Systemic Veins: The inferior vena cava appears moderately dilated.  In comparison to the previous echocardiogram(s): There are no prior studies on this patient for comparison purposes.        CONCLUSIONS:   1. Left ventricular systolic function is severely decreased with a 10% estimated ejection fraction.   2. There is no evidence of mitral valve stenosis.   3. Moderate to severe mitral valve regurgitation.   4. Moderate tricuspid regurgitation.   5. Aortic valve stenosis is not present.   6. Left ventricular cavity size is moderately dilated.   7. Moderate to severely elevated pulmonary artery pressure.   8. Pulmonary hypertension is present.   9. The inferior vena cava appears moderately dilated.  10. There is global hypokinesis of the left ventricle with minor regional variations.    [facility], Cath Lab         [address] [place] [address]     Cardiovascular Catheterization Report     Patient Name:      FRANCIA PAULINO          Performing Physician:  [id number] Menon MD  Study Date:        5/23/2024             Verifying Physician:   Lakia Menon MD  MRN/PID:           [id number]              Cardiologist/Co-Scrub:  Accession#:        [id number]          Ordering Provider:     [id number] EVONNE WALTERS  Date of Birth/Age: 1965 / 58 years Cardiologist:  Gender:            M                     Fellow:  Encounter#:        [id number]            Surgeon:        Study: Left Heart Cath        Indications:  FRANCIA PAULINO is a 59 year old male who presents with dyslipidemia, hypertension, chronic pulmonary disease, prior myocardial infarction, prior  percutaneous coronary intervention, smoker chf, coronary artery disease and an asymptomatic chest pain assessment. Left ventricular dysfunction, cardiomyopathy and cardiac arrhythmia.     Procedure Description:  After infiltration with 2% Lidocaine, the right femoral artery was cannulated with a modified Seldinger technique. Subsequently a 5 Telugu sheath was placed in the right femoral artery. Selective coronary catheterization was performed using a 5 Fr catheter(s) exchanged over a guide wire to cannulate the coronary arteries. A JL 4 tip catheter was used for left coronary injections. A 3drc tip catheter was used for right coronary injections.  Multiple injections of contrast were made into the left and right coronary arteries with angiograms recorded in multiple projections. After completion of the procedure, femoral artery angiography was performed. This demonstrated a common femoral artery puncture appropriate for closure. A Vascade 5F vascular closure Device was placed per protocol.     Coronary Angiography:  The coronary circulation is right dominant.     Left Main Coronary Artery:  There is 10-30% stenosis in the distal left main coronary artery.     Left Anterior Descending Coronary Artery Distribution:  There is 10-30% stenosis in the proximal and mid left anterior descending artery.     Circumflex Coronary Artery Distribution:  There is 100% stenosis in the the proximal Circumflex artery.     Right Coronary Artery Distribution:     Fills via collaterals. There is 100% stenosis in the the proximal Right Coronary Artery.        Left Ventriculography:  The estimated left ventricular ejection fraction is abnormal at 10%.        Cardiac Cath Post Procedure Notes:  Post Procedure Diagnosis: Double vessel disease.  Blood Loss:               Estimated blood loss during the procedure was 0 mls.  Specimens Removed:        Number of specimen(s) removed: none.      ____________________________________________________________________________________  CONCLUSIONS:   1. Distal Left Main: 10-30% stenosis.   2. Proximal and mid LAD Lesion: The percent stenosis is 10-30%.   3. Proximal CX Lesion: The percent stenosis is 100%.   4. Right Coronary Artery: fills via collaterals.   5. Proximal RCA Lesion: The percent stenosis is 100%.   6. The Left Ventricular Ejection Fraction is 10%,by echo.     ICD 10 Codes:    ECG 12 lead    Result Date: 7/12/2024  AV dual-paced rhythm Abnormal ECG When compared with ECG of 12-JUL-2024 06:02, (unconfirmed) No significant change was found Confirmed by Zachary Sparks ([id number]) on 7/12/2024 12:01:12 PM    ECG 12 lead    Result Date: 7/12/2024  Ventricular-paced rhythm Abnormal ECG When compared with ECG of 17-JUN-2024 14:05, Vent. rate has decreased BY  20 BPM Confirmed by Zachary Sparks ([id number]) on 7/12/2024 12:01:04 PM    ECG 12 lead    Result Date: 7/12/2024  Sinus rhythm with occasional ventricular-paced complexes and Fusion complexes Right bundle branch block , plus right ventricular hypertrophy Inferior infarct , age undetermined Anterolateral infarct , age undetermined Abnormal ECG When compared with ECG of 12-JUL-2024 04:00, (unconfirmed) Fusion complexes are now Present Vent. rate has increased BY   4 BPM Confirmed by Zachary Sparks ([id number]) on 7/12/2024 12:01:08 PM    XR chest 1 view    Result Date: 7/12/2024  Interpreted By:  Cesario Salmeron, STUDY: XR CHEST 1 VIEW;  7/12/2024 2:46 am   INDICATION: Signs/Symptoms:Chest Pain.   COMPARISON: 06/13/2024   ACCESSION NUMBER(S): [id number]   ORDERING CLINICIAN: KATHARINE LAMA   FINDINGS: Unchanged right pacemaker/AICD. Coronary stent in place.   There is persistent mild blunting of the right costophrenic sulcus, likely trace pleural effusion. Streaky right basilar opacities, likely subsegmental atelectasis. No consolidation or pneumothorax. Cardiac silhouette is within normal limits. No acute osseous  abnormality.       1. Trace right pleural effusion, similar to 06/13/2024. 2. Streaky right basilar opacities, likely subsegmental atelectasis.   Signed by: Cesario Salmeron 7/12/2024 3:19 AM Dictation workstation:   [id number]        Radiology:     XR chest 1 view   Final Result   1. Trace right pleural effusion, similar to 06/13/2024.   2. Streaky right basilar opacities, likely subsegmental atelectasis.        Signed by: Cesario Salmeron 7/12/2024 3:19 AM   Dictation workstation:   [id number]          Problem List:     Patient Active Problem List   Diagnosis    Ischemic cardiomyopathy    Elevated troponin    Ventricular tachycardia (Multi)    CHF (congestive heart failure) (Multi)    Acute on chronic systolic (congestive) heart failure (Multi)    Flash pulmonary edema (Multi)       Assessment:   Wide-complex tachycardia/ventricular tachycardia, slow VT  Ischemic cardiomyopathy with EF of 10 to 15%  Acute on chronic systolic congestive heart failure  Valvular heart disease  CAD  Hypertension  Hyperlipidemia  Nicotine abuse      Plan:   Admitted to ICU.  Remains in ICU on telemetry.  Telemetry shows normal sinus rhythm currently.  Monitor electrolytes, keep potassium greater than 4 and magnesium greater than 2  Supplemental O2  Device check ([employer] momentum BiV ICD) revealed 15 ATP's and 14 high-voltage therapies delivered on 5/22/2024.  EGM showed VT with rates in the 200's with therapies occasionally slowing below the detection rate to the 120s.  -Patient was maintaining a slow VT with rates in the 120s  -LHC revealed  of the proximal circumflex artery,  of the proximal RCA with distal collateral filling and mild disease of the left main and LAD.  Medical management is advised.  Status post ventricular tachycardia ablation at [facility] x 2 in May - June 2024 with unsuccessful results  Significant ischemic cardiomyopathy with LVEF of 10 to 15% on echocardiogram.   Will attempt GDMT for heart failure however patient's blood pressure does not allow much room.  Patient refused LVAD.  Echo also shows severe MR/moderate TR with severely elevated pulmonary artery pressures  Remains hypotensive, unable to increase GDMT for heart failure  Continue aspirin, statin and Eliquis  Stop bystolic and switch to 12.5mg toprol XL  Continue aldactone  Spoke with the EP, will increase mexiletine to 300 every 8 hours  Continue amiodarone infusion for now, plan to transition to p.o. amiodarone  Nicotine patch for smoking cessation  Will benefit from continued palliative care conversations  Further recommendations made by Dr. Rosemary Espinoza [facility]  Adult Gerontology Acute Care Nurse Practitioner  [facility] [place]   [facility]  [phone]      CARDIOLOGIST NOTE  I have personally seen and examined patient as well as personally formulated treatment plan.  I have reviewed this note as created by YARITZA Izquierdo, CNP and agree with his findings and physical exam.    58-year-old gentleman with severe end-stage dilated ischemic cardiomyopathy.  Presented with recurrent VT which has been problematic over the last several months.  Unfortunately he is not a candidate for transplantation secondary to pulmonary disease and tobacco abuse.  He has not wish to have LVAD.  Currently free of dyspnea while lying supine.  Remains in sinus rhythm.  Relatively low blood pressure however.  Has had no chest tightness or pressure    He wonders if he could have more sedative type medicine to have at home when he has what he describes as panic attacks which is actually very appropriate anxiety when VT occurs.  He does not have these episodes when he does not have VT    Exam: Appears older than his stated age.  Comfortable not diaphoretic lungs clear cardiac exam regular rhythm and rate abdomen is soft extremities show no  edema    Impression  Stage IV end-stage dilated cardiomyopathy with recurrent sustained VT previously multiple shocks  Not candidate for transplant does not wish LVAD  Currently GDMT cannot be maximized secondary to hypotension  Will add Aldactone keeping at 12.5 for now.  Bisoprolol changed to Toprol 12.5 perhaps would have a more potent effect on his VT.  Options to make major difference for him quite limited.  He is currently DNR.    Would suggest palliative care discussed with him use of sedatives at home when he has the VT as in the long-term it cannot be further approached from a procedure standpoint and he is nearing maximal medication.    Rio Riley MD      Of note, this documentation is completed using the Dragon Dictation system (voice recognition software). There may be spelling and/or grammatical errors that were not corrected prior to final submission.      Electronically signed by CLAUDIA Ibrahim, on 7/12/2024 at 12:29 PM

## 2024-07-12 NOTE — PROGRESS NOTES
[facility] Critical Care Medicine       Date:  7/12/2024  Patient:  Cristian Sapp  YOB: 1965  MRN:  [id number]   Admit Date:  7/12/2024  ========================================================================================================    Chief Complaint   Patient presents with    Pacemaker Problem     My pacemaker/defibrillator isn't acting right, it's been up to 100 at times         History of Present Illness:  Cristian Sapp is a 58 y.o. year old male patient with Past Medical History of extensive cardiac history s/p AICD with frequent episodes of slow VT was recently worked up [place] for VT storm.  Patient received VT ablation x 2, recommended for LVAD which patient refused at that time.  Patient presented to the emergency department overnight with complaints of AICD firing.  Patient was noted to be in slow VT and was cardioverted in the ED. patient returned to normal rhythm after cardioversion.  ICU was consulted and patient will be transferred for further management.         Interval ICU Events:  7/12:  Admitted for heart failure with AICD firing in the setting slow VT. AM: Seen by EP, initiated on Amio gtt. Hypotensive ?sedation, adding parameters to BP meds. Cardiology/EP work up. Palliative consulted.    Medical History:  Past Medical History:   Diagnosis Date    Atherosclerosis of native artery of both lower extremities with intermittent claudication (CMS-HCC)     CHB (complete heart block) (Multi)     per device check    Chronic systolic CHF (congestive heart failure), NYHA class 3 (Multi)     COPD (chronic obstructive pulmonary disease) (Multi)     Coronary artery disease     History of tobacco abuse     HLD (hyperlipidemia)     Hypertension     Infrarenal abdominal aortic aneurysm (AAA) without rupture (CMS-HCC)     Ischemic cardiomyopathy with implantable cardioverter-defibrillator (ICD)     MI (myocardial infarction) (Multi)     multiple    Nephrolithiasis     PTSD (post-traumatic  stress disorder)      Past Surgical History:   Procedure Laterality Date    CARDIAC CATHETERIZATION N/A 5/23/2024    Procedure: Left Heart Cath;  Surgeon: Babatunde Tan MD;  Location: [facility];  Service: Cardiovascular;  Laterality: N/A;    CARDIAC DEFIBRILLATOR PLACEMENT      CARDIAC ELECTROPHYSIOLOGY PROCEDURE N/A 5/23/2024    Procedure: Cardioversion;  Surgeon: Zachary Sparks MD;  Location: [facility];  Service: Electrophysiology;  Laterality: N/A;    CARDIAC ELECTROPHYSIOLOGY PROCEDURE N/A 5/28/2024    Procedure: NIPS (NONINVASIVE PROGRAMMED STIMULATION AND DEFIBRILLATOR THRESHOLD TESTING);  Surgeon: Zachary Sparks MD;  Location: [facility];  Service: Electrophysiology;  Laterality: N/A;    CARDIAC ELECTROPHYSIOLOGY PROCEDURE Right 5/30/2024    Procedure: Ablation VT Endocardial (18298);  Surgeon: Sonny Flores MD;  Location: [place] [address] [facility] Lab;  Service: Electrophysiology;  Laterality: Right;  CARTO, 2PM    CARDIAC ELECTROPHYSIOLOGY PROCEDURE N/A 6/6/2024    Procedure: Ablation VT;  Surgeon: Eliot Wooten MD;  Location: [place] [address] [facility]iac Cath Lab;  Service: Electrophysiology;  Laterality: N/A;  endocardial vt ablation  carto V8    CORONARY ANGIOPLASTY WITH STENT PLACEMENT  2006    JIM to LAD    CORONARY ANGIOPLASTY WITH STENT PLACEMENT  04/2003    CORONARY ANGIOPLASTY WITH STENT PLACEMENT  06/2008    CYSTOSCOPY W/ URETERAL STENT PLACEMENT  04/01/2024    CYSTOSCOPY W/ URETERAL STENT PLACEMENT  04/30/2024    FEMORAL BYPASS      femoral artery    ILIAC ARTERY STENT Right     KNEE SURGERY       Medications Prior to Admission   Medication Sig Dispense Refill Last Dose    albuterol sulfate (Proair Digihaler) 90 mcg/actuation aero powdr breath act w/sensor inhaler Inhale 2 puffs every 4 hours if needed for wheezing.   Past Week    amiodarone (Pacerone) 200 mg tablet Take 2 tablets (400 mg) by mouth once daily. Do not fill before June 19, 2024. 180 tablet 3  7/11/2024    apixaban (Eliquis) 5 mg tablet Take 1 tablet (5 mg) by mouth every 12 hours. 180 tablet 3 7/11/2024    aspirin 81 mg EC tablet Take 1 tablet (81 mg) by mouth once daily.   7/11/2024    b complex 0.4 mg tablet Take 1 tablet by mouth once daily.   7/11/2024    bisoprolol (Zebeta) 5 mg tablet Take 0.5 tablets (2.5 mg) by mouth once daily. 45 tablet 3 7/11/2024    calcium polycarbophiL (Fiber, calcium polycarbophil,) 625 mg tablet Take 1 tablet (625 mg) by mouth once daily.   7/11/2024    cholecalciferol (Vitamin D-3) 5,000 Units tablet Take 1 tablet (5,000 Units) by mouth every 3 days.   7/11/2024    co-enzyme Q-10 50 mg capsule Take 1 capsule (50 mg) by mouth 1 (one) time per week.   7/11/2024    cyanocobalamin (Vitamin B-12) 1,000 mcg tablet Take 1 tablet (1,000 mcg) by mouth once daily.   7/11/2024    dapagliflozin propanediol (Farxiga) 10 mg Take 1 tablet (10 mg) by mouth once daily. 30 tablet 0 7/11/2024    esomeprazole (NexIUM) 20 mg DR capsule Take 1 capsule (20 mg) by mouth once daily in the morning. Take before meals. Do not open capsule.   7/11/2024    lactulose 20 gram/30 mL oral solution Take 30 mL (20 g) by mouth once daily. 900 mL 0 Past Week    LORazepam (Ativan) 0.5 mg tablet Take 1 tablet (0.5 mg) by mouth once daily at bedtime.   7/11/2024    mexiletine (Mexitil) 250 mg capsule Take 1 capsule (250 mg) by mouth every 8 hours. 90 capsule 1 7/11/2024    PARoxetine (Paxil) 30 mg tablet Take 2 tablets (60 mg) by mouth once daily in the morning.   7/11/2024    potassium chloride (Klor-Con) 20 mEq packet Take 20 mEq by mouth once daily. 90 packet 3 7/11/2024    ranolazine (Ranexa) 500 mg 12 hr tablet Take 1 tablet (500 mg) by mouth 2 times a day. Do not crush, chew, or split. 180 tablet 3 7/11/2024    rosuvastatin (Crestor) 20 mg tablet Take 1 tablet (20 mg) by mouth once daily at bedtime. 90 tablet 3 7/11/2024    sacubitriL-valsartan (Entresto) 24-26 mg tablet Take 1 tablet by mouth once daily.  "90 tablet 3 7/11/2024    spironolactone (Aldactone) 25 mg tablet Take 1 tablet (25 mg) by mouth once daily. 90 tablet 3 7/11/2024    tamsulosin (Flomax) 0.4 mg 24 hr capsule Take 1 capsule (0.4 mg) by mouth once daily.   7/11/2024    torsemide (Demadex) 20 mg tablet Take 1 tablet (20 mg) by mouth once daily. 90 tablet 3 7/11/2024    traZODone (Desyrel) 50 mg tablet Take 1 tablet (50 mg) by mouth once daily at bedtime.   7/11/2024    VITAMIN A ORAL Take 1 capsule by mouth every other day.   7/11/2024    vitamin E acetate (VITAMIN E ORAL) Take 1 capsule by mouth once daily.   7/11/2024    acetaminophen (Tylenol) 500 mg tablet Take 650 mg by mouth every 6 hours if needed for mild pain (1 - 3).       blood pressure monitor kit Use as directed       hydrOXYzine HCL (Atarax) 25 mg tablet Take 1 tablet (25 mg) by mouth every 6 hours if needed for anxiety for up to 10 days. 30 tablet 0      Chantix [varenicline]  Social History     Tobacco Use    Smoking status: Former     Types: Cigarettes   Vaping Use    Vaping status: Never Used   Substance Use Topics    Alcohol use: Yes     Comment: social    Drug use: Defer     Family History   Problem Relation Name Age of Onset    Hypertension Mother      Diabetes Father      Hypertension Sister      Diabetes Sister      Colon cancer Maternal Grandmother      Hypertension Maternal Grandmother      Heart disease Maternal Grandfather      Hypertension Maternal Grandfather      Cancer Paternal Grandfather      Hypertension Paternal Grandfather         Review of Systems:  14 point review of systems was completed and negative except for those specially mention in my HPI    Physical Exam:    Heart Rate:  []   Temp:  [35.3 °C (95.5 °F)-36.2 °C (97.2 °F)]   Resp:  [16-24]   BP: ()/(40-96)   Height:  [167.6 cm (5' 6\")]   Weight:  [73.4 kg (161 lb 13.1 oz)-74.4 kg (164 lb)]   SpO2:  [94 %-100 %]     Physical Exam  Vitals and nursing note reviewed.   Constitutional:       General: He " is not in acute distress.     Interventions: Nasal cannula in place.   HENT:      Mouth/Throat:      Mouth: Mucous membranes are dry.      Pharynx: Oropharynx is clear.   Eyes:      Pupils: Pupils are equal, round, and reactive to light.   Cardiovascular:      Rate and Rhythm: Normal rate and regular rhythm.      Pulses: Normal pulses.      Heart sounds: Normal heart sounds.   Pulmonary:      Effort: Pulmonary effort is normal.      Breath sounds: Normal breath sounds.   Abdominal:      General: Abdomen is flat.      Palpations: Abdomen is soft.      Tenderness: There is no abdominal tenderness.   Musculoskeletal:         General: Normal range of motion.      Right lower leg: No edema.      Left lower leg: No edema.   Skin:     General: Skin is warm.      Capillary Refill: Capillary refill takes less than 2 seconds.   Neurological:      General: No focal deficit present.      Mental Status: He is alert.         Objective:    I have reviewed all medications, laboratory results, and imaging pertinent for today's encounter    Assessment/Plan:    I am currently managing this critically ill patient for the following problems:    Neuro/Psych/Pain Ctrl/Sedation:  Anxiety  Holding home Ativan, Trazodone  Continue Paxil, PRN Atarax  Melatonin PRN HS    Respiratory/ENT:  COPD  Hx tobacco use  -Supplemental oxygen as needed to maintain SpO2 greater than 92%  -DuoNebs as needed    Cardiovascular:  Slow ventricular tachycardia s/p cardioversion and ICD Discharges  S/p multiple VT ablations  Acute on chronic HFrEF, Multiple PCI  hx ICM, CAD, HTN, MI, moderate to severe MR, mod TR, moderate to severe   Hx refusal of LVAD  Previous Echo 5/2024, EF 15%  -EP consult  -Increased Mexiletine 300TID  -Amiodarone infusion with bolus  -Entresto with parameters  -ASA, eliquis, crestor  -spot diuresis, home torsemide and spironolactone  -Daily EKG    GI:  Cardiac diet  Lactulose resumed    Renal/Volume Status (Intra & Extravascular):  hx  ureteral stents   -Continue Flomax  -Strict intake and output  -monitor renal function and electrolytes    Endocrine  No active issues  POCT PRN    Infectious Disease:  Leukocytosis,  Suspect reactive from ICD firing  -Monitor SIRS    Heme/Onc:  No issues  Daily CBC    MSK:  Activity as tolerated    Ethics/Code Status:  DNR/DNI  Palliative consult: GOC  -Heart failure navigator consult, nutrition consult for outpatient follow-up placed    :  DVT Prophylaxis: Eliquis  GI Prophylaxis: None  Bowel Regimen: Lactulose, home med  Diet: Cardiac  CVC: None  [place]: None  Wen: None  Restraints: None  Dispo: ICU, Amio gtt    Critical Care Time:  40 minutes spent in preparing to see patient (I.e. review of medical records), evaluation of diagnostics (I.e. labs, imaging, etc.), documentation, discussing plan of care with patient/ family/ caregiver, and/ or coordination of care with multidisciplinary team. Time does not include completion of procedure time.      Bryanna Costa, APRN-CNP

## 2024-07-12 NOTE — CONSULTS
Social Work Note  The LSW participated in the SICU interdisciplinary. The pt recently received care in [facility] on 5/28/24 to 6/19/24. A referral was sent to [facility] on 6/18/24. The LSW contacted [facility] Palliative care this date [phone].  The R Palliative is NOT active with the patient, but has accepted the patient with a meeting arranged for 7/15/24. The R Palliative meeting is not permitted to occur within the hospital setting. R Palliative Care will F/U with the pt re: the Palliative meeting arrangements.

## 2024-07-12 NOTE — NURSING NOTE
Pt admitted to [facility] from ED. PT drowsy but arousable. Oriented x4. A Igor NP and Dr Martinez aware of vital signs. Will continue to monitor closely,

## 2024-07-13 ENCOUNTER — APPOINTMENT (OUTPATIENT)
Dept: CARDIOLOGY | Facility: HOSPITAL | Age: 59
DRG: 308 | End: 2024-07-13
Payer: COMMERCIAL

## 2024-07-13 LAB
ANION GAP SERPL CALC-SCNC: 11 MMOL/L (ref 10–20)
BASOPHILS # BLD AUTO: 0.12 X10*3/UL (ref 0–0.1)
BASOPHILS NFR BLD AUTO: 1 %
BUN SERPL-MCNC: 21 MG/DL (ref 6–23)
CALCIUM SERPL-MCNC: 8.8 MG/DL (ref 8.6–10.3)
CHLORIDE SERPL-SCNC: 99 MMOL/L (ref 98–107)
CO2 SERPL-SCNC: 26 MMOL/L (ref 21–32)
CREAT SERPL-MCNC: 1.27 MG/DL (ref 0.5–1.3)
EGFRCR SERPLBLD CKD-EPI 2021: 66 ML/MIN/1.73M*2
EOSINOPHIL # BLD AUTO: 0.5 X10*3/UL (ref 0–0.7)
EOSINOPHIL NFR BLD AUTO: 4 %
ERYTHROCYTE [DISTWIDTH] IN BLOOD BY AUTOMATED COUNT: 14.2 % (ref 11.5–14.5)
GLUCOSE SERPL-MCNC: 123 MG/DL (ref 74–99)
HCT VFR BLD AUTO: 35.8 % (ref 41–52)
HGB BLD-MCNC: 12 G/DL (ref 13.5–17.5)
HOLD SPECIMEN: NORMAL
IMM GRANULOCYTES # BLD AUTO: 0.22 X10*3/UL (ref 0–0.7)
IMM GRANULOCYTES NFR BLD AUTO: 1.8 % (ref 0–0.9)
LYMPHOCYTES # BLD AUTO: 1.7 X10*3/UL (ref 1.2–4.8)
LYMPHOCYTES NFR BLD AUTO: 13.6 %
MAGNESIUM SERPL-MCNC: 2.3 MG/DL (ref 1.6–2.4)
MCH RBC QN AUTO: 29.7 PG (ref 26–34)
MCHC RBC AUTO-ENTMCNC: 33.5 G/DL (ref 32–36)
MCV RBC AUTO: 89 FL (ref 80–100)
MONOCYTES # BLD AUTO: 1.11 X10*3/UL (ref 0.1–1)
MONOCYTES NFR BLD AUTO: 8.9 %
NEUTROPHILS # BLD AUTO: 8.84 X10*3/UL (ref 1.2–7.7)
NEUTROPHILS NFR BLD AUTO: 70.7 %
NRBC BLD-RTO: 0 /100 WBCS (ref 0–0)
PHOSPHATE SERPL-MCNC: 3.5 MG/DL (ref 2.5–4.9)
PLATELET # BLD AUTO: 359 X10*3/UL (ref 150–450)
POTASSIUM SERPL-SCNC: 4.2 MMOL/L (ref 3.5–5.3)
RBC # BLD AUTO: 4.04 X10*6/UL (ref 4.5–5.9)
SODIUM SERPL-SCNC: 132 MMOL/L (ref 136–145)
WBC # BLD AUTO: 12.5 X10*3/UL (ref 4.4–11.3)

## 2024-07-13 PROCEDURE — 83735 ASSAY OF MAGNESIUM: CPT

## 2024-07-13 PROCEDURE — 99233 SBSQ HOSP IP/OBS HIGH 50: CPT | Performed by: INTERNAL MEDICINE

## 2024-07-13 PROCEDURE — 2500000004 HC RX 250 GENERAL PHARMACY W/ HCPCS (ALT 636 FOR OP/ED): Performed by: NURSE PRACTITIONER

## 2024-07-13 PROCEDURE — 94640 AIRWAY INHALATION TREATMENT: CPT

## 2024-07-13 PROCEDURE — 80048 BASIC METABOLIC PNL TOTAL CA: CPT

## 2024-07-13 PROCEDURE — 2500000002 HC RX 250 W HCPCS SELF ADMINISTERED DRUGS (ALT 637 FOR MEDICARE OP, ALT 636 FOR OP/ED)

## 2024-07-13 PROCEDURE — 84100 ASSAY OF PHOSPHORUS: CPT

## 2024-07-13 PROCEDURE — 2500000002 HC RX 250 W HCPCS SELF ADMINISTERED DRUGS (ALT 637 FOR MEDICARE OP, ALT 636 FOR OP/ED): Performed by: INTERNAL MEDICINE

## 2024-07-13 PROCEDURE — 2020000001 HC ICU ROOM DAILY

## 2024-07-13 PROCEDURE — 99291 CRITICAL CARE FIRST HOUR: CPT

## 2024-07-13 PROCEDURE — 2500000004 HC RX 250 GENERAL PHARMACY W/ HCPCS (ALT 636 FOR OP/ED)

## 2024-07-13 PROCEDURE — 85025 COMPLETE CBC W/AUTO DIFF WBC: CPT

## 2024-07-13 PROCEDURE — 2500000005 HC RX 250 GENERAL PHARMACY W/O HCPCS: Performed by: STUDENT IN AN ORGANIZED HEALTH CARE EDUCATION/TRAINING PROGRAM

## 2024-07-13 PROCEDURE — 93005 ELECTROCARDIOGRAM TRACING: CPT

## 2024-07-13 PROCEDURE — 2500000001 HC RX 250 WO HCPCS SELF ADMINISTERED DRUGS (ALT 637 FOR MEDICARE OP)

## 2024-07-13 PROCEDURE — 36415 COLL VENOUS BLD VENIPUNCTURE: CPT

## 2024-07-13 PROCEDURE — 93010 ELECTROCARDIOGRAM REPORT: CPT | Performed by: INTERNAL MEDICINE

## 2024-07-13 RX ORDER — PAROXETINE HYDROCHLORIDE 20 MG/1
60 TABLET, FILM COATED ORAL EVERY MORNING
Status: DISCONTINUED | OUTPATIENT
Start: 2024-07-13 | End: 2024-07-13

## 2024-07-13 RX ORDER — HYDROXYZINE HYDROCHLORIDE 25 MG/1
50 TABLET, FILM COATED ORAL EVERY 6 HOURS PRN
Status: DISCONTINUED | OUTPATIENT
Start: 2024-07-13 | End: 2024-07-14 | Stop reason: HOSPADM

## 2024-07-13 RX ORDER — LORAZEPAM 0.5 MG/1
0.5 TABLET ORAL NIGHTLY
Status: DISCONTINUED | OUTPATIENT
Start: 2024-07-13 | End: 2024-07-13

## 2024-07-13 RX ORDER — PAROXETINE HYDROCHLORIDE 20 MG/1
60 TABLET, FILM COATED ORAL NIGHTLY
Status: DISCONTINUED | OUTPATIENT
Start: 2024-07-13 | End: 2024-07-14 | Stop reason: HOSPADM

## 2024-07-13 RX ORDER — DAPAGLIFLOZIN 5 MG/1
10 TABLET, FILM COATED ORAL DAILY
Status: DISCONTINUED | OUTPATIENT
Start: 2024-07-14 | End: 2024-07-14 | Stop reason: HOSPADM

## 2024-07-13 RX ORDER — LORAZEPAM 0.5 MG/1
0.5 TABLET ORAL EVERY 8 HOURS PRN
Status: DISCONTINUED | OUTPATIENT
Start: 2024-07-13 | End: 2024-07-14 | Stop reason: HOSPADM

## 2024-07-13 RX ORDER — LORAZEPAM 0.5 MG/1
0.5 TABLET ORAL EVERY 12 HOURS PRN
Status: DISCONTINUED | OUTPATIENT
Start: 2024-07-13 | End: 2024-07-13

## 2024-07-13 RX ORDER — PAROXETINE HYDROCHLORIDE 20 MG/1
60 TABLET, FILM COATED ORAL DAILY
Status: DISCONTINUED | OUTPATIENT
Start: 2024-07-14 | End: 2024-07-13

## 2024-07-13 RX ORDER — LORAZEPAM 0.5 MG/1
0.5 TABLET ORAL EVERY 12 HOURS
Status: DISCONTINUED | OUTPATIENT
Start: 2024-07-13 | End: 2024-07-13

## 2024-07-13 RX ORDER — AMIODARONE HYDROCHLORIDE 200 MG/1
200 TABLET ORAL ONCE
Status: COMPLETED | OUTPATIENT
Start: 2024-07-13 | End: 2024-07-13

## 2024-07-13 ASSESSMENT — PAIN SCALES - GENERAL
PAINLEVEL_OUTOF10: 0 - NO PAIN
PAINLEVEL_OUTOF10: 0 - NO PAIN

## 2024-07-13 ASSESSMENT — COGNITIVE AND FUNCTIONAL STATUS - GENERAL
MOBILITY SCORE: 24
DAILY ACTIVITIY SCORE: 24

## 2024-07-13 ASSESSMENT — PAIN - FUNCTIONAL ASSESSMENT
PAIN_FUNCTIONAL_ASSESSMENT: 0-10
PAIN_FUNCTIONAL_ASSESSMENT: 0-10

## 2024-07-13 NOTE — PROGRESS NOTES
[facility] Critical Care Medicine       Date:  7/13/2024  Patient:  Cristian Sapp  YOB: 1965  MRN:  [id number]   Admit Date:  7/12/2024  ========================================================================================================    Chief Complaint   Patient presents with    Pacemaker Problem     My pacemaker/defibrillator isn't acting right, it's been up to 100 at times         History of Present Illness:  Cristian Sapp is a 58 y.o. year old male patient with Past Medical History of extensive cardiac history s/p AICD with frequent episodes of slow VT was recently worked up [place] for VT storm.  Patient received VT ablation x 2, recommended for LVAD which patient refused at that time.  Patient presented to the emergency department overnight with complaints of AICD firing.  Patient was noted to be in slow VT and was cardioverted in the ED. patient returned to normal rhythm after cardioversion.  ICU was consulted and patient will be transferred for further management.         Interval ICU Events:  7/12:  Admitted for heart failure with AICD firing in the setting slow VT. AM: Seen by EP, initiated on Amio gtt. Hypotensive ?sedation, adding parameters to BP meds. Cardiology/EP work up. Palliative consulted.  7/13: Overnight experiencing increasing anxiety, s/p Hydroxyzine HS, will increase home Ativan to BID. Palliative consulted yesterday, patient maintains DNRCCA/DNI, does not want AICD deactivated at this time, would like to go home and follow with OP Palliative. Remains on Amio gtt per EP.    Medical History:  Past Medical History:   Diagnosis Date    Atherosclerosis of native artery of both lower extremities with intermittent claudication (CMS-HCC)     CHB (complete heart block) (Multi)     per device check    Chronic systolic CHF (congestive heart failure), NYHA class 3 (Multi)     COPD (chronic obstructive pulmonary disease) (Multi)     Coronary artery disease     History of tobacco abuse      HLD (hyperlipidemia)     Hypertension     Infrarenal abdominal aortic aneurysm (AAA) without rupture (CMS-HCC)     Ischemic cardiomyopathy with implantable cardioverter-defibrillator (ICD)     MI (myocardial infarction) (Multi)     multiple    Nephrolithiasis     PTSD (post-traumatic stress disorder)      Past Surgical History:   Procedure Laterality Date    CARDIAC CATHETERIZATION N/A 5/23/2024    Procedure: Left Heart Cath;  Surgeon: Babatunde Tan MD;  Location: [facility];  Service: Cardiovascular;  Laterality: N/A;    CARDIAC DEFIBRILLATOR PLACEMENT      CARDIAC ELECTROPHYSIOLOGY PROCEDURE N/A 5/23/2024    Procedure: Cardioversion;  Surgeon: Zachary Sparks MD;  Location: [facility];  Service: Electrophysiology;  Laterality: N/A;    CARDIAC ELECTROPHYSIOLOGY PROCEDURE N/A 5/28/2024    Procedure: NIPS (NONINVASIVE PROGRAMMED STIMULATION AND DEFIBRILLATOR THRESHOLD TESTING);  Surgeon: Zachary Sparks MD;  Location: [facility];  Service: Electrophysiology;  Laterality: N/A;    CARDIAC ELECTROPHYSIOLOGY PROCEDURE Right 5/30/2024    Procedure: Ablation VT Endocardial (20000);  Surgeon: Sonny Flores MD;  Location: [place] [address] Cardiac Cath Lab;  Service: Electrophysiology;  Laterality: Right;  CARTO, 2PM    CARDIAC ELECTROPHYSIOLOGY PROCEDURE N/A 6/6/2024    Procedure: Ablation VT;  Surgeon: Eliot Wooten MD;  Location: [place] [address] Cardiac Cath Lab;  Service: Electrophysiology;  Laterality: N/A;  endocardial vt ablation  carto V8    CORONARY ANGIOPLASTY WITH STENT PLACEMENT  2006    JIM to LAD    CORONARY ANGIOPLASTY WITH STENT PLACEMENT  04/2003    CORONARY ANGIOPLASTY WITH STENT PLACEMENT  06/2008    CYSTOSCOPY W/ URETERAL STENT PLACEMENT  04/01/2024    CYSTOSCOPY W/ URETERAL STENT PLACEMENT  04/30/2024    FEMORAL BYPASS      femoral artery    ILIAC ARTERY STENT Right     KNEE SURGERY       Medications Prior to Admission   Medication Sig Dispense Refill Last Dose     albuterol sulfate (Proair Digihaler) 90 mcg/actuation aero powdr breath act w/sensor inhaler Inhale 2 puffs every 4 hours if needed for wheezing.   Past Week    amiodarone (Pacerone) 200 mg tablet Take 2 tablets (400 mg) by mouth once daily. Do not fill before June 19, 2024. 180 tablet 3 7/11/2024    apixaban (Eliquis) 5 mg tablet Take 1 tablet (5 mg) by mouth every 12 hours. 180 tablet 3 7/11/2024    aspirin 81 mg EC tablet Take 1 tablet (81 mg) by mouth once daily.   7/11/2024    b complex 0.4 mg tablet Take 1 tablet by mouth once daily.   7/11/2024    bisoprolol (Zebeta) 5 mg tablet Take 0.5 tablets (2.5 mg) by mouth once daily. 45 tablet 3 7/11/2024    calcium polycarbophiL (Fiber, calcium polycarbophil,) 625 mg tablet Take 1 tablet (625 mg) by mouth once daily.   7/11/2024    cholecalciferol (Vitamin D-3) 5,000 Units tablet Take 1 tablet (5,000 Units) by mouth every 3 days.   7/11/2024    co-enzyme Q-10 50 mg capsule Take 1 capsule (50 mg) by mouth 1 (one) time per week.   7/11/2024    cyanocobalamin (Vitamin B-12) 1,000 mcg tablet Take 1 tablet (1,000 mcg) by mouth once daily.   7/11/2024    dapagliflozin propanediol (Farxiga) 10 mg Take 1 tablet (10 mg) by mouth once daily. 30 tablet 0 7/11/2024    esomeprazole (NexIUM) 20 mg DR capsule Take 1 capsule (20 mg) by mouth once daily in the morning. Take before meals. Do not open capsule.   7/11/2024    lactulose 20 gram/30 mL oral solution Take 30 mL (20 g) by mouth once daily. 900 mL 0 Past Week    LORazepam (Ativan) 0.5 mg tablet Take 1 tablet (0.5 mg) by mouth once daily at bedtime.   7/11/2024    mexiletine (Mexitil) 250 mg capsule Take 1 capsule (250 mg) by mouth every 8 hours. 90 capsule 1 7/11/2024    PARoxetine (Paxil) 30 mg tablet Take 2 tablets (60 mg) by mouth once daily in the morning.   7/11/2024    potassium chloride (Klor-Con) 20 mEq packet Take 20 mEq by mouth once daily. 90 packet 3 7/11/2024    ranolazine (Ranexa) 500 mg 12 hr tablet Take 1  tablet (500 mg) by mouth 2 times a day. Do not crush, chew, or split. 180 tablet 3 7/11/2024    rosuvastatin (Crestor) 20 mg tablet Take 1 tablet (20 mg) by mouth once daily at bedtime. 90 tablet 3 7/11/2024    sacubitriL-valsartan (Entresto) 24-26 mg tablet Take 1 tablet by mouth once daily. 90 tablet 3 7/11/2024    spironolactone (Aldactone) 25 mg tablet Take 1 tablet (25 mg) by mouth once daily. 90 tablet 3 7/11/2024    tamsulosin (Flomax) 0.4 mg 24 hr capsule Take 1 capsule (0.4 mg) by mouth once daily.   7/11/2024    torsemide (Demadex) 20 mg tablet Take 1 tablet (20 mg) by mouth once daily. 90 tablet 3 7/11/2024    traZODone (Desyrel) 50 mg tablet Take 1 tablet (50 mg) by mouth once daily at bedtime.   7/11/2024    VITAMIN A ORAL Take 1 capsule by mouth every other day.   7/11/2024    vitamin E acetate (VITAMIN E ORAL) Take 1 capsule by mouth once daily.   7/11/2024    acetaminophen (Tylenol) 500 mg tablet Take 650 mg by mouth every 6 hours if needed for mild pain (1 - 3).       blood pressure monitor kit Use as directed       hydrOXYzine HCL (Atarax) 25 mg tablet Take 1 tablet (25 mg) by mouth every 6 hours if needed for anxiety for up to 10 days. 30 tablet 0      Chantix [varenicline]  Social History     Tobacco Use    Smoking status: Former     Types: Cigarettes   Vaping Use    Vaping status: Never Used   Substance Use Topics    Alcohol use: Yes     Comment: social    Drug use: Defer     Family History   Problem Relation Name Age of Onset    Hypertension Mother      Diabetes Father      Hypertension Sister      Diabetes Sister      Colon cancer Maternal Grandmother      Hypertension Maternal Grandmother      Heart disease Maternal Grandfather      Hypertension Maternal Grandfather      Cancer Paternal Grandfather      Hypertension Paternal Grandfather         Review of Systems:  14 point review of systems was completed and negative except for those specially mention in my HPI    Physical Exam:    Heart  Rate:  [74-75]   Temp:  [35.3 °C (95.5 °F)-36 °C (96.8 °F)]   Resp:  [18-34]   BP: ()/(51-78)   Weight:  [72.7 kg (160 lb 4.4 oz)]   SpO2:  [92 %-97 %]     Physical Exam  Vitals and nursing note reviewed.   Constitutional:       General: He is not in acute distress.     Interventions: Nasal cannula in place.   HENT:      Mouth/Throat:      Mouth: Mucous membranes are dry.      Pharynx: Oropharynx is clear.   Eyes:      Pupils: Pupils are equal, round, and reactive to light.   Cardiovascular:      Rate and Rhythm: Normal rate and regular rhythm.      Pulses: Normal pulses.      Heart sounds: Normal heart sounds.   Pulmonary:      Effort: Pulmonary effort is normal.      Breath sounds: Normal breath sounds.   Abdominal:      General: Abdomen is flat.      Palpations: Abdomen is soft.      Tenderness: There is no abdominal tenderness.   Musculoskeletal:         General: Normal range of motion.      Right lower leg: No edema.      Left lower leg: No edema.   Skin:     General: Skin is warm.      Capillary Refill: Capillary refill takes less than 2 seconds.   Neurological:      General: No focal deficit present.      Mental Status: He is alert.         Objective:    I have reviewed all medications, laboratory results, and imaging pertinent for today's encounter    Assessment/Plan:    I am currently managing this critically ill patient for the following problems:    Neuro/Psych/Pain Ctrl/Sedation:  Anxiety  Holding home Trazodone due Qtc concerns  Increased Paxil to 60mg daily per PCP documentation new dosage  Continue 0.5mg PO Ativan HS, adding PRN BID dosing   Melatonin PRN HS    Respiratory/ENT:  COPD  Hx tobacco use  -Supplemental oxygen as needed to maintain SpO2 greater than 92%  -DuoNebs as needed    Cardiovascular:  Slow ventricular tachycardia s/p cardioversion and ICD Discharges  S/p multiple VT ablations  Acute on chronic HFrEF, Multiple PCI  Hx ICM, CAD, HTN, MI, moderate to severe MR, mod TR, moderate  to severe   Hx refusal of LVAD, not transplant candidate 2/2 emphysema  Previous Echo 5/2024, EF 15%  -Labile hypotension limiting maximization of GDMT medications  -Increased Mexiletine 300TID  -Continue Farxiga, Metoprolol XL, Ranolazine, Torsemide, Spirinolactone with parameters SBP <90  -Amiodarone infusion with bolus, convert to PO per EP  -ASA, eliquis, crestor  -Daily EKG  -EP consult: Amio, VT  -Cardiology consult: GDMT    GI:  Cardiac diet  Home Lactulose    Renal/Volume Status (Intra & Extravascular):  Hx ureteral stents   -Continue Flomax  -Monitor UOP, maintain >0.5mL/kg/hr  -monitor renal function and electrolytes    Endocrine  No active issues  POCT PRN    Infectious Disease:  Leukocytosis likely reactive 2/2 AICD firing  -Left shift today  -Afebrile  -Monitor for SIRS    Heme/Onc:  No issues  Daily CBC    MSK:  Activity as tolerated    Ethics/Code Status:  DNR/DNI  Palliative consult: GOC, OP set up with [facility] palliative tentative meeting 7/15 if DC, cannot meet with patient will hospitalized  SW following    :  DVT Prophylaxis: Eliquis  GI Prophylaxis: None  Bowel Regimen: Lactulose, home med  Diet: Cardiac  CVC: None  [place]: None  Wen: None  Restraints: None  Dispo: ICU, Can likely transfer later once conversion of IV to PO amio. Will monitor until these evening    Critical Care Time:  40 minutes spent in preparing to see patient (I.e. review of medical records), evaluation of diagnostics (I.e. labs, imaging, etc.), documentation, discussing plan of care with patient/ family/ caregiver, and/ or coordination of care with multidisciplinary team. Time does not include completion of procedure time.      Bryanna Costa, APRN-CNP

## 2024-07-13 NOTE — PROGRESS NOTES
[facility] Progress Note               Rounding Cardiologist:  Zachary Sparks MD, MD   Primary Cardiologist: Dr. Zachary Sparks    Date:  7/13/2024  Patient:  Cristian Sapp  YOB: 1965  MRN:  [id number]   Admit Date:  7/12/2024      SUBJECTIVE    Cristian Sapp was seen and examined today at bedside.    Patient is doing well.  No chest pain or shortness right  Telemetry shows ventricular paced rhythm.  No ventricular arrhythmias overnight  On IV amiodarone  On mexiletine 300 mg 3 times daily  EKG performed yesterday at 9 AM, atrial ventricular paced rhythm at a rate of 75 bpm rotation 182 ms QT corrected 562 ms  Laboratory today sodium 132 potassium 4.2 chloride 99 BUN 21 creatinine 1.27 GFR 66 magnesium 2.3.  WBC 12.5.  Hemoglobin 12.0.  Hematocrit 35.8.  Platelet count 359.    VITALS     Vitals:    07/13/24 0404 07/13/24 0509 07/13/24 0600 07/13/24 0607   BP: 80/53 79/51 85/70 85/70   Pulse: 74 74 74 75   Resp: 25 18 25    Temp:    35.3 °C (95.5 °F)   TempSrc:    Temporal   SpO2: 93% 93% 92%    Weight:   72.7 kg (160 lb 4.4 oz)    Height:           Intake/Output Summary (Last 24 hours) at 7/13/2024 0815  Last data filed at 7/13/2024 0620  Gross per 24 hour   Intake 1179.16 ml   Output 1930 ml   Net -750.84 ml       [unfilled]    PHYSICAL EXAM   Vitals and nursing note reviewed.   Constitutional:       General: He is not in acute distress.     Interventions: Nasal cannula in place.   HENT:      Mouth/Throat:      Mouth: Mucous membranes are dry.      Pharynx: Oropharynx is clear.   Eyes:      Pupils: Pupils are equal, round, and reactive to light.   Cardiovascular:      Rate and Rhythm: Normal rate and regular rhythm.      Pulses: Normal pulses.      Heart sounds: Normal heart sounds.   Pulmonary:      Effort: Pulmonary effort is normal.      Breath sounds: Normal breath sounds.   Abdominal:      General: Abdomen is flat.      Palpations: Abdomen is soft.      Tenderness: There is no abdominal tenderness.  "  Musculoskeletal:         General: Normal range of motion.      Right lower leg: No edema.      Left lower leg: No edema.   Skin:     General: Skin is warm.      Capillary Refill: Capillary refill takes less than 2 seconds.   Neurological:      General: No focal deficit present.      Mental Status: He is alert.          DIAGNOSTIC RESULTS   EKG:     Telemetry: Ventricular paced rhythm.      LAB DATA   BMP:  @LABRCNT(Na:3,K:3,CL:3,CO2:3,Bun:3,Creatinine:3,Glu:3, CA:3,LABGLOM)@    Cardiac Enzymes:  @LABRCNT(CKTOTAL:3,CKMB:3,CKMBINDEX:3,TROPONINI:3)@    CBC:   Lab Results   Component Value Date    WBC 12.5 (H) 07/13/2024    RBC 4.04 (L) 07/13/2024    HGB 12.0 (L) 07/13/2024    HCT 35.8 (L) 07/13/2024     07/13/2024       CMP:    Lab Results   Component Value Date     (L) 07/13/2024    K 4.2 07/13/2024    CL 99 07/13/2024    CO2 26 07/13/2024    BUN 21 07/13/2024    CREATININE 1.27 07/13/2024    GLUCOSE 123 (H) 07/13/2024    CALCIUM 8.8 07/13/2024       Hepatic Function Panel:    Lab Results   Component Value Date    ALKPHOS 104 07/12/2024     (H) 07/12/2024    AST 57 (H) 07/12/2024    PROT 7.3 07/12/2024    BILITOT 0.5 07/12/2024       Magnesium:    Lab Results   Component Value Date    MG 2.30 07/13/2024       PT/INR:  No results found for: \"PROTIME\", \"INR\"  @LABRCNT(inr:3)@     HgBA1c:  No components found for: \"LABA1C\"    Lipid Profile:  No results found for: \"CHLPL\", \"TRIG\", \"HDL\", \"LDLCALC\", \"LDLDIRECT\"    TSH:  No results found for: \"TSH\"    ABG:  No results found for: \"PH\"    PRO-BNP: No results found for: \"PROBNP\"       RADIOLOGY     XR chest 1 view   Final Result   1. Trace right pleural effusion, similar to 06/13/2024.   2. Streaky right basilar opacities, likely subsegmental atelectasis.        Signed by: Cesario Salmeron 7/12/2024 3:19 AM   Dictation workstation:   [id number]          [unfilled]      CURRENT MEDICATIONS    [Held by provider] amiodarone, 200 mg, oral, BID  apixaban, " 5 mg, oral, q12h  aspirin, 81 mg, oral, Daily  calcium polycarbophiL, 625 mg, oral, Daily  cholecalciferol, 5,000 Units, oral, q3 days  cyanocobalamin, 1,000 mcg, oral, Daily  dapagliflozin propanediol, 10 mg, oral, Daily  lactulose, 20 g, oral, Daily  LORazepam, 0.5 mg, oral, q12h  metoprolol succinate XL, 12.5 mg, oral, Daily  mexiletine, 300 mg, oral, q8h ANNIKA  pantoprazole, 20 mg, oral, Daily before breakfast  PARoxetine, 40 mg, oral, q AM  potassium chloride, 20 mEq, oral, Daily  ranolazine, 500 mg, oral, BID  rosuvastatin, 20 mg, oral, Nightly  spironolactone, 12.5 mg, oral, Daily  tamsulosin, 0.4 mg, oral, Daily  torsemide, 20 mg, oral, Daily  vitamin E, 400 Units, oral, Daily      amiodarone, 1 mg/min, Last Rate: 1 mg/min (07/13/24 0703)  oxygen, , Last Rate: 2 L/min (07/12/24 0800)          ASSESSMENT   Principal Problem:    Ventricular tachycardia (Multi)        Patient Active Problem List   Diagnosis    Ischemic cardiomyopathy    Elevated troponin    Ventricular tachycardia (Multi)    CHF (congestive heart failure) (Multi)    Acute on chronic systolic (congestive) heart failure (Multi)    Flash pulmonary edema (Multi)      Wide-complex tachycardia/ventricular tachycardia/Slow VT/AICD shocks             -s/p VT Ablation 5/30/2024 per Dr. Wooten with recurrent slow VT             -Patient is currently maintaining an AV-Paced rhythm at a rate of 75             -Continue IV amiodarone drip at 1mg/min             -Continue mexiletine 250mg PO TID             -Recent LHC performed 5/23/2024 revealed  of the proximal circumflex artery,  of the proximal RCA with distal collateral filling and mild disease of the left main and LAD.  Medical management is advised.             -Patient deemed not a candidate for heart transplant.             -Patient has declined LVAD  2.    Ischemic cardiomyopathy/acute on chronic systolic heart failure             -LVEF 15% on echocardiogram 5/23/2024             -Moderately  dilated left ventricle  3.    Valvular heart disease             -Moderate to severe MR/moderate TR             -Moderate to severely elevated pulmonary artery pressures  4.    Coronary artery disease/multiple remote myocardial infarctions             -Remote multivessel PCI's (last in 2008)             -See report above for recent [facility] performed 5/23/2024  5.    Benign essential hypertension             -Currently hypotensive  6.    Hyperlipidemia             -On statin therapy  7.    Current tobacco use    PLAN     We will discontinue IV amiodarone.  Start 400 mg amiodarone daily  Continue mexiletine  Continue with EKG daily on telemetry.  Possibility of discharge home in a.m. tomorrow  Risk factor modification and lifestyle modification discussed with patient. Diet , exercise and hydration discussed with patient.    Please excuse any errors in grammar or translation related to this dictation.  Voice recognition software was utilized to prepare this document.    Zachary Sparks MD      Please do not hesitate to call with questions.  Electronically signed by Zachary Sparks MD, FACC on 7/13/2024 at 8:15 AM

## 2024-07-14 VITALS
TEMPERATURE: 96.6 F | WEIGHT: 159.17 LBS | SYSTOLIC BLOOD PRESSURE: 95 MMHG | HEIGHT: 66 IN | RESPIRATION RATE: 20 BRPM | BODY MASS INDEX: 25.58 KG/M2 | HEART RATE: 74 BPM | OXYGEN SATURATION: 94 % | DIASTOLIC BLOOD PRESSURE: 60 MMHG

## 2024-07-14 PROBLEM — I47.20 VENTRICULAR TACHYCARDIA (MULTI): Status: RESOLVED | Noted: 2024-05-22 | Resolved: 2024-07-14

## 2024-07-14 LAB
ANION GAP SERPL CALC-SCNC: 15 MMOL/L (ref 10–20)
ATRIAL RATE: 75 BPM
BASOPHILS # BLD AUTO: 0.13 X10*3/UL (ref 0–0.1)
BASOPHILS NFR BLD AUTO: 0.8 %
BUN SERPL-MCNC: 24 MG/DL (ref 6–23)
CALCIUM SERPL-MCNC: 9.3 MG/DL (ref 8.6–10.3)
CHLORIDE SERPL-SCNC: 96 MMOL/L (ref 98–107)
CO2 SERPL-SCNC: 24 MMOL/L (ref 21–32)
CREAT SERPL-MCNC: 1.49 MG/DL (ref 0.5–1.3)
EGFRCR SERPLBLD CKD-EPI 2021: 55 ML/MIN/1.73M*2
EOSINOPHIL # BLD AUTO: 0.23 X10*3/UL (ref 0–0.7)
EOSINOPHIL NFR BLD AUTO: 1.4 %
ERYTHROCYTE [DISTWIDTH] IN BLOOD BY AUTOMATED COUNT: 14.1 % (ref 11.5–14.5)
GLUCOSE SERPL-MCNC: 125 MG/DL (ref 74–99)
HCT VFR BLD AUTO: 36.7 % (ref 41–52)
HGB BLD-MCNC: 12.2 G/DL (ref 13.5–17.5)
IMM GRANULOCYTES # BLD AUTO: 0.17 X10*3/UL (ref 0–0.7)
IMM GRANULOCYTES NFR BLD AUTO: 1 % (ref 0–0.9)
LYMPHOCYTES # BLD AUTO: 1.87 X10*3/UL (ref 1.2–4.8)
LYMPHOCYTES NFR BLD AUTO: 11.5 %
MAGNESIUM SERPL-MCNC: 2.02 MG/DL (ref 1.6–2.4)
MCH RBC QN AUTO: 30 PG (ref 26–34)
MCHC RBC AUTO-ENTMCNC: 33.2 G/DL (ref 32–36)
MCV RBC AUTO: 90 FL (ref 80–100)
MONOCYTES # BLD AUTO: 1.43 X10*3/UL (ref 0.1–1)
MONOCYTES NFR BLD AUTO: 8.8 %
NEUTROPHILS # BLD AUTO: 12.46 X10*3/UL (ref 1.2–7.7)
NEUTROPHILS NFR BLD AUTO: 76.5 %
NRBC BLD-RTO: 0 /100 WBCS (ref 0–0)
P AXIS: 28 DEGREES
P OFFSET: 148 MS
P ONSET: 128 MS
PLATELET # BLD AUTO: 371 X10*3/UL (ref 150–450)
POTASSIUM SERPL-SCNC: 4.4 MMOL/L (ref 3.5–5.3)
PR INTERVAL: 158 MS
Q ONSET: 170 MS
QRS COUNT: 12 BEATS
QRS DURATION: 162 MS
QT INTERVAL: 470 MS
QTC CALCULATION(BAZETT): 524 MS
QTC FREDERICIA: 506 MS
R AXIS: -35 DEGREES
RBC # BLD AUTO: 4.06 X10*6/UL (ref 4.5–5.9)
SODIUM SERPL-SCNC: 131 MMOL/L (ref 136–145)
T AXIS: 103 DEGREES
T OFFSET: 405 MS
VENTRICULAR RATE: 75 BPM
WBC # BLD AUTO: 16.3 X10*3/UL (ref 4.4–11.3)

## 2024-07-14 PROCEDURE — 2500000004 HC RX 250 GENERAL PHARMACY W/ HCPCS (ALT 636 FOR OP/ED)

## 2024-07-14 PROCEDURE — 2500000002 HC RX 250 W HCPCS SELF ADMINISTERED DRUGS (ALT 637 FOR MEDICARE OP, ALT 636 FOR OP/ED): Performed by: INTERNAL MEDICINE

## 2024-07-14 PROCEDURE — 83735 ASSAY OF MAGNESIUM: CPT

## 2024-07-14 PROCEDURE — 2500000001 HC RX 250 WO HCPCS SELF ADMINISTERED DRUGS (ALT 637 FOR MEDICARE OP)

## 2024-07-14 PROCEDURE — 80048 BASIC METABOLIC PNL TOTAL CA: CPT

## 2024-07-14 PROCEDURE — 2500000002 HC RX 250 W HCPCS SELF ADMINISTERED DRUGS (ALT 637 FOR MEDICARE OP, ALT 636 FOR OP/ED)

## 2024-07-14 PROCEDURE — 99233 SBSQ HOSP IP/OBS HIGH 50: CPT | Performed by: INTERNAL MEDICINE

## 2024-07-14 PROCEDURE — 85025 COMPLETE CBC W/AUTO DIFF WBC: CPT

## 2024-07-14 PROCEDURE — 94640 AIRWAY INHALATION TREATMENT: CPT

## 2024-07-14 PROCEDURE — 36415 COLL VENOUS BLD VENIPUNCTURE: CPT

## 2024-07-14 PROCEDURE — 99239 HOSP IP/OBS DSCHRG MGMT >30: CPT

## 2024-07-14 RX ORDER — METOPROLOL SUCCINATE 25 MG/1
12.5 TABLET, EXTENDED RELEASE ORAL DAILY
Qty: 1 TABLET | Refills: 0 | Status: SHIPPED | OUTPATIENT
Start: 2024-07-15

## 2024-07-14 RX ORDER — SPIRONOLACTONE 25 MG/1
12.5 TABLET ORAL DAILY
Qty: 1 TABLET | Refills: 0 | Status: SHIPPED | OUTPATIENT
Start: 2024-07-14

## 2024-07-14 RX ORDER — PAROXETINE 30 MG/1
60 TABLET, FILM COATED ORAL NIGHTLY
Qty: 60 TABLET | Refills: 0 | Status: SHIPPED | OUTPATIENT
Start: 2024-07-14

## 2024-07-14 RX ORDER — MEXILETINE HYDROCHLORIDE 150 MG/1
300 CAPSULE ORAL EVERY 8 HOURS SCHEDULED
Qty: 85 CAPSULE | Refills: 0 | Status: SHIPPED | OUTPATIENT
Start: 2024-07-14

## 2024-07-14 RX ORDER — TORSEMIDE 20 MG/1
20 TABLET ORAL DAILY
Qty: 30 TABLET | Refills: 0 | Status: SHIPPED | OUTPATIENT
Start: 2024-07-14

## 2024-07-14 ASSESSMENT — PAIN - FUNCTIONAL ASSESSMENT
PAIN_FUNCTIONAL_ASSESSMENT: 0-10
PAIN_FUNCTIONAL_ASSESSMENT: 0-10

## 2024-07-14 ASSESSMENT — PAIN SCALES - GENERAL
PAINLEVEL_OUTOF10: 0 - NO PAIN
PAINLEVEL_OUTOF10: 0 - NO PAIN

## 2024-07-14 NOTE — DISCHARGE SUMMARY
Discharge Diagnosis  Ventricular tachycardia (Multi)    Issues Requiring Follow-Up  -Medication changes, take BP before taking any antihypertensives  -Hold medications if SBP <90 consistently, notify cardiologist  -Follow up with Dr Rosa  -Follow up with Dr Sparks  - Take Torsemide 20mg THIS AFTERNOON if SBP >90  - Begin taking Toprol XL at bedtime tomorrow 7/15  -[facility] Palliative meeting tomorrow at home    Test Results Pending At Discharge  Pending Labs       No current pending labs.            Hospital Course   Cristian Sapp is a 58 y.o. year old male patient with Past Medical History of extensive cardiac history s/p AICD with frequent episodes of slow VT was recently [place] up [place] for VT storm.  Patient received VT ablation x 2, recommended for LVAD which patient refused at that time.  Patient presented to the emergency department overnight with complaints of AICD firing.  Patient was noted to be in slow VT and was cardioverted in the ED. patient returned to normal rhythm after cardioversion.  ICU was consulted and patient will be transferred for further management.   Neuro/Psych/Pain Ctrl/Sedation:  Anxiety  Holding home Trazodone due Qtc concerns  Increased Paxil to 60mg daily per PCP documentation new dosage  Continue 0.5mg PO Ativan HS, adding PRN BID dosing   Melatonin PRN HS     Respiratory/ENT:  COPD  Hx tobacco use  -Supplemental oxygen as needed to maintain SpO2 greater than 92%  -DuoNebs as needed     Cardiovascular:  Slow ventricular tachycardia s/p cardioversion and ICD Discharges  S/p multiple VT ablations  Acute on chronic HFrEF, Multiple PCI  Hx ICM, CAD, HTN, MI, moderate to severe MR, mod TR, moderate to severe   Hx refusal of LVAD, not transplant candidate 2/2 emphysema  Previous Echo 5/2024, EF 15%  -Labile hypotension limiting maximization of GDMT medications  -Increased Mexiletine 300TID  -Continue Farxiga, Metoprolol XL, Ranolazine, Torsemide, Spirinolactone with parameters SBP  <90  -Amiodarone PO per EP  -ASA, eliquis, crestor  -Daily EKG  -EP consult: TERE Sweet  -Cardiology consult: GDMT     GI:  Cardiac diet  Home Lactulose     Renal/Volume Status (Intra & Extravascular):  Hx ureteral stents   -Continue Flomax  -Monitor UOP, maintain >0.5mL/kg/hr  -monitor renal function and electrolytes    Endocrine  No active issues  POCT PRN     Infectious Disease:  Leukocytosis likely reactive 2/2 AICD firing  -Left shift today  -Afebrile  -Monitor for SIRS     Heme/Onc:  No issues  Daily CBC     MSK:  Activity as tolerated     Ethics/Code Status:  DNR/DNI  Palliative consult: GOC, OP set up with [facility] palliative tentative meeting 7/15 if DC, cannot meet with patient will hospitalized  SW following    Pertinent Physical Exam At Time of Discharge  Physical Exam  Vitals and nursing note reviewed.   Constitutional:       General: He is not in acute distress.     Appearance: He is not toxic-appearing.   HENT:      Mouth/Throat:      Pharynx: Oropharynx is clear.   Eyes:      Conjunctiva/sclera: Conjunctivae normal.   Cardiovascular:      Rate and Rhythm: Normal rate and regular rhythm.      Heart sounds: No murmur heard.     Comments: V-paced  Pulmonary:      Effort: Pulmonary effort is normal. No respiratory distress.      Breath sounds: Normal breath sounds. No wheezing or rhonchi.   Abdominal:      General: Abdomen is flat. Bowel sounds are normal.      Palpations: Abdomen is soft.   Musculoskeletal:         General: Normal range of motion.   Skin:     Capillary Refill: Capillary refill takes less than 2 seconds.   Neurological:      General: No focal deficit present.      Mental Status: He is alert and oriented to person, place, and time. Mental status is at baseline.         Home Medications     Medication List      START taking these medications     metoprolol succinate XL 25 mg 24 hr tablet; Commonly known as:   Toprol-XL; Take 0.5 tablets (12.5 mg) by mouth once daily. Do not crush or   chew.;  Start taking on: July 15, 2024     CHANGE how you take these medications     mexiletine 150 mg capsule; Commonly known as: Mexitil; Take 2 capsules   (300 mg) by mouth every 8 hours.; What changed: medication strength, how   much to take   PARoxetine 30 mg tablet; Commonly known as: Paxil; Take 2 tablets (60   mg) by mouth once daily at bedtime.; What changed: medication strength,   how much to take, when to take this, Another medication with the same name   was removed. Continue taking this medication, and follow the directions   you see here.   spironolactone 25 mg tablet; Commonly known as: Aldactone; Take 0.5   tablets (12.5 mg) by mouth once daily.; What changed: how much to take   torsemide 20 mg tablet; Commonly known as: Demadex; Take 1 tablet (20   mg) by mouth once daily. DO NOT TAKE IF SBP <90; What changed: additional   instructions     CONTINUE taking these medications     albuterol sulfate 90 mcg/actuation aero powdr breath act w/sensor   inhaler; Commonly known as: Proair Digihaler   amiodarone 200 mg tablet; Commonly known as: Pacerone; Take 2 tablets   (400 mg) by mouth once daily. Do not fill before June 19, 2024.   apixaban 5 mg tablet; Commonly known as: Eliquis; Take 1 tablet (5 mg)   by mouth every 12 hours.   aspirin 81 mg EC tablet   b complex 0.4 mg tablet   blood pressure monitor kit; Use as directed   cholecalciferol 5,000 Units tablet; Commonly known as: Vitamin D-3   co-enzyme Q-10 50 mg capsule   cyanocobalamin 1,000 mcg tablet; Commonly known as: Vitamin B-12   esomeprazole 20 mg DR capsule; Commonly known as: NexIUM   Farxiga 10 mg; Generic drug: dapagliflozin propanediol; Take 1 tablet   (10 mg) by mouth once daily.   Fiber (calcium polycarbophil) 625 mg tablet; Generic drug: calcium   polycarbophiL   lactulose 20 gram/30 mL oral solution; Take 30 mL (20 g) by mouth once   daily.   LORazepam 0.5 mg tablet; Commonly known as: Ativan   potassium chloride 20 mEq packet; Commonly known  as: Klor-Con; Take 20   mEq by mouth once daily.   ranolazine 500 mg 12 hr tablet; Commonly known as: Ranexa; Take 1 tablet   (500 mg) by mouth 2 times a day. Do not crush, chew, or split.   rosuvastatin 20 mg tablet; Commonly known as: Crestor; Take 1 tablet (20   mg) by mouth once daily at bedtime.   tamsulosin 0.4 mg 24 hr capsule; Commonly known as: Flomax   traZODone 50 mg tablet; Commonly known as: Desyrel   VITAMIN A ORAL   VITAMIN E ORAL     STOP taking these medications     acetaminophen 500 mg tablet; Commonly known as: Tylenol   bisoprolol 5 mg tablet; Commonly known as: Zebeta   Entresto 24-26 mg tablet; Generic drug: sacubitriL-valsartan   hydrOXYzine HCL 25 mg tablet; Commonly known as: Atarax       Outpatient Follow-Up  Future Appointments   Date Time Provider Department Center   8/8/2024  2:00 PM Arya Brown MD [id number] [place]   9/18/2024 12:40 PM [facility] [id number] [place]   9/18/2024  1:20 PM Eliot Wooten MD [id number] [place]   9/18/2024  2:30 PM Zachary Sparks MD [id number] [place]   10/15/2024 10:00 AM Eamon Rosa MD [id number] [place]       Bryanna Costa, APRN-CNP

## 2024-07-14 NOTE — PROGRESS NOTES
[facility] Progress Note               Rounding Cardiologist:  Zachary Sparks MD, MD   Primary Cardiologist: Dr. Zachary Sparks    Date:  7/14/2024  Patient:  Cristian Sapp  YOB: 1965  MRN:  [id number]   Admit Date:  7/12/2024      SUBJECTIVE    Cristian Sapp was seen and examined today at bedside.    No new events  No chest pain or shortness of breath  Telemetry shows ventricular paced rhythm.  No ventricular arrhythmias    ECG performed July 13, 2024 shows atrial ventricular paced rhythm at a rate of 75 bpm QRS duration 162 ms QT corrected 524 ms.  Laboratory data shows potassium 4.2 BUN 21 creatinine 1.27 magnesium 2.3 WBC 16.3 hemoglobin 12.2 hematocrit 36.7 platelet count 371        VITALS     Vitals:    07/14/24 0100 07/14/24 0200 07/14/24 0300 07/14/24 0400   BP: 95/70 98/66 105/78 101/65   BP Location:    Right arm   Patient Position:    Lying   Pulse: 74 74 74 74   Resp: (!) 28 (!) 30 24 (!) 31   Temp:  35.8 °C (96.4 °F)     TempSrc:  Temporal     SpO2: 94% 93% 92% 95%   Weight:       Height:           Intake/Output Summary (Last 24 hours) at 7/14/2024 0503  Last data filed at 7/14/2024 0200  Gross per 24 hour   Intake 646.94 ml   Output 2320 ml   Net -1673.06 ml       [unfilled]    PHYSICAL EXAM   Vitals and nursing note reviewed.   Constitutional:       General: He is not in acute distress.     Interventions: Nasal cannula in place.   HENT:      Mouth/Throat:      Mouth: Mucous membranes are dry.      Pharynx: Oropharynx is clear.   Eyes:      Pupils: Pupils are equal, round, and reactive to light.   Cardiovascular:      Rate and Rhythm: Normal rate and regular rhythm.      Pulses: Normal pulses.      Heart sounds: Normal heart sounds.   Pulmonary:      Effort: Pulmonary effort is normal.      Breath sounds: Normal breath sounds.   Abdominal:      General: Abdomen is flat.      Palpations: Abdomen is soft.      Tenderness: There is no abdominal tenderness.   Musculoskeletal:         General:  "Normal range of motion.      Right lower leg: No edema.      Left lower leg: No edema.   Skin:     General: Skin is warm.      Capillary Refill: Capillary refill takes less than 2 seconds.   Neurological:      General: No focal deficit present.      Mental Status: He is alert.       DIAGNOSTIC RESULTS   EKG:     Telemetry: Ventricular paced rhythm.  No ventricular arrhythmias.      LAB DATA   BMP:  @LABRCNT(Na:3,K:3,CL:3,CO2:3,Bun:3,Creatinine:3,Glu:3, CA:3,LABGLOM)@    Cardiac Enzymes:  @LABRCNT(CKTOTAL:3,CKMB:3,CKMBINDEX:3,TROPONINI:3)@    CBC:   Lab Results   Component Value Date    WBC 16.3 (H) 07/14/2024    RBC 4.06 (L) 07/14/2024    HGB 12.2 (L) 07/14/2024    HCT 36.7 (L) 07/14/2024     07/14/2024       CMP:    Lab Results   Component Value Date     (L) 07/13/2024    K 4.2 07/13/2024    CL 99 07/13/2024    CO2 26 07/13/2024    BUN 21 07/13/2024    CREATININE 1.27 07/13/2024    GLUCOSE 123 (H) 07/13/2024    CALCIUM 8.8 07/13/2024       Hepatic Function Panel:    Lab Results   Component Value Date    ALKPHOS 104 07/12/2024     (H) 07/12/2024    AST 57 (H) 07/12/2024    PROT 7.3 07/12/2024    BILITOT 0.5 07/12/2024       Magnesium:    Lab Results   Component Value Date    MG 2.30 07/13/2024       PT/INR:  No results found for: \"PROTIME\", \"INR\"  @LABRCNT(inr:3)@     HgBA1c:  No components found for: \"LABA1C\"    Lipid Profile:  No results found for: \"CHLPL\", \"TRIG\", \"HDL\", \"LDLCALC\", \"LDLDIRECT\"    TSH:  No results found for: \"TSH\"    ABG:  No results found for: \"PH\"    PRO-BNP: No results found for: \"PROBNP\"       RADIOLOGY     XR chest 1 view   Final Result   1. Trace right pleural effusion, similar to 06/13/2024.   2. Streaky right basilar opacities, likely subsegmental atelectasis.        Signed by: Cesario Salmeron 7/12/2024 3:19 AM   Dictation workstation:   [id number]          [unfilled]      CURRENT MEDICATIONS    amiodarone, 200 mg, oral, BID  apixaban, 5 mg, oral, q12h  aspirin, 81 " mg, oral, Daily  calcium polycarbophiL, 625 mg, oral, Daily  cholecalciferol, 5,000 Units, oral, q3 days  cyanocobalamin, 1,000 mcg, oral, Daily  dapagliflozin propanediol, 10 mg, oral, Daily  lactulose, 20 g, oral, Daily  metoprolol succinate XL, 12.5 mg, oral, Daily  mexiletine, 300 mg, oral, q8h ANNIKA  pantoprazole, 20 mg, oral, Daily before breakfast  PARoxetine, 60 mg, oral, Nightly  potassium chloride, 20 mEq, oral, Daily  ranolazine, 500 mg, oral, BID  rosuvastatin, 20 mg, oral, Nightly  spironolactone, 12.5 mg, oral, Daily  tamsulosin, 0.4 mg, oral, Daily  torsemide, 20 mg, oral, Daily  vitamin E, 400 Units, oral, Daily      oxygen, , Last Rate: 2 L/min (07/13/24 1541)          ASSESSMENT   Principal Problem:    Ventricular tachycardia (Multi)        Patient Active Problem List   Diagnosis    Ischemic cardiomyopathy    Elevated troponin    Ventricular tachycardia (Multi)    CHF (congestive heart failure) (Multi)    Acute on chronic systolic (congestive) heart failure (Multi)    Flash pulmonary edema (Multi)      Wide-complex tachycardia/ventricular tachycardia/Slow VT/AICD shocks             -s/p VT Ablation 5/30/2024 per Dr. Wooten with recurrent slow VT             -Patient is currently maintaining an AV-Paced rhythm at a rate of 75             -Continue IV amiodarone drip at 1mg/min             -Continue mexiletine 250mg PO TID             -Recent LHC performed 5/23/2024 revealed  of the proximal circumflex artery,  of the proximal RCA with distal collateral filling and mild disease of the left main and LAD.  Medical management is advised.             -Patient deemed not a candidate for heart transplant.             -Patient has declined LVAD  2.    Ischemic cardiomyopathy/acute on chronic systolic heart failure             -LVEF 15% on echocardiogram 5/23/2024             -Moderately dilated left ventricle  3.    Valvular heart disease             -Moderate to severe MR/moderate TR              -Moderate to severely elevated pulmonary artery pressures  4.    Coronary artery disease/multiple remote myocardial infarctions             -Remote multivessel PCI's (last in 2008)             -See report above for recent LHC performed 5/23/2024  5.    Benign essential hypertension             -Currently hypotensive  6.    Hyperlipidemia             -On statin therapy  7.    Current tobacco use      PLAN     From the electrophysiology standpoint he is doing well.  Will continue with oral amiodarone and also continue with mexiletine.  From the cardiology and electrophysiologist on point patient can be discharged home.  Patient will be follow my office in next 2 to 4 weeks for evaluation for long-term treatment for ventricular arrhythmias  Risk factor modification and lifestyle modification discussed with patient. Diet , exercise and hydration discussed with patient.    Please excuse any errors in grammar or translation related to this dictation.  Voice recognition software was utilized to prepare this document.    Zachary Sparks MD      Please do not hesitate to call with questions.  Electronically signed by Zachary Sparks MD, [facility] on 7/14/2024 at 5:03 AM\

## 2024-07-14 NOTE — NURSING NOTE
A Igor NP spoke with patient and family at bedside and discussed discharge instructions. Papers given to patient and patient verbalized understanding. Pulled patient's iv's and transport here with wheelchair to take patient down to discharge home.

## 2024-07-15 ENCOUNTER — PATIENT OUTREACH (OUTPATIENT)
Dept: PRIMARY CARE | Facility: CLINIC | Age: 59
End: 2024-07-15
Payer: COMMERCIAL

## 2024-07-15 PROBLEM — F17.200 CURRENT SMOKER: Status: ACTIVE | Noted: 2024-07-15

## 2024-07-15 PROBLEM — Z98.61 HX OF PERCUTANEOUS TRANSLUMINAL CORONARY ANGIOPLASTY: Status: ACTIVE | Noted: 2024-07-15

## 2024-07-15 PROBLEM — Z95.810 ICD (IMPLANTABLE CARDIOVERTER-DEFIBRILLATOR) IN PLACE: Status: ACTIVE | Noted: 2024-07-15

## 2024-07-15 PROBLEM — I34.0 NONRHEUMATIC MITRAL VALVE REGURGITATION: Status: ACTIVE | Noted: 2024-07-15

## 2024-07-15 PROBLEM — I25.10 ATHEROSCLEROSIS OF NATIVE CORONARY ARTERY OF NATIVE HEART WITHOUT ANGINA PECTORIS: Status: ACTIVE | Noted: 2024-07-15

## 2024-07-15 PROBLEM — I50.22 CHRONIC SYSTOLIC HEART FAILURE (MULTI): Status: ACTIVE | Noted: 2024-05-28

## 2024-07-15 PROBLEM — I36.1 NONRHEUMATIC TRICUSPID VALVE REGURGITATION: Status: ACTIVE | Noted: 2024-07-15

## 2024-07-15 PROBLEM — I10 PRIMARY HYPERTENSION: Status: ACTIVE | Noted: 2024-07-15

## 2024-07-15 PROBLEM — E78.5 DYSLIPIDEMIA: Status: ACTIVE | Noted: 2024-07-15

## 2024-07-15 NOTE — PROGRESS NOTES
Discharge Facility:  [facility]    Discharge Diagnosis:  Ventricular tachycardia     Admission Date:7/12/24  Discharge Date: 7/14/24    PCP Appointment Date:TBD    Specialist Appointment Date:   7/23/24 Cardio-Moe  8/7/24 Endo  8/8/24-Cardio-Stephanie  9/18/2451-Zzrlnu-Egeyyd  9/18/24-Cardio-Sparks    Hospital Encounter and Summary Linked: Yes  See discharge assessment below for further details   Medications  Medications reviewed with patient/caregiver?: Yes (7/15/2024  3:56 PM)  Is the patient having any side effects they believe may be caused by any medication additions or changes?: No (7/15/2024  3:56 PM)  Does the patient have all medications ordered at discharge?: Yes (7/15/2024  3:56 PM)  Care Management Interventions: Provided patient education (7/15/2024  3:56 PM)  Prescription Comments: START taking:  metoprolol succinate XL (Toprol-XL)   Start taking on: July 15, 2024   CHANGE how you take:  mexiletine (Mexitil)   PARoxetine (Paxil)   spironolactone (Aldactone)   torsemide (Demadex)    STOP taking:  acetaminophen 500 mg tablet (Tylenol)   bisoprolol 5 mg tablet (Zebeta)   Entresto 24-26 mg tablet (sacubitriL-valsartan)   hydrOXYzine HCL 25 mg tablet (Atarax) (7/15/2024  3:56 PM)  Is the patient taking all medications as directed (includes completed medication regime)?: Yes (7/15/2024  3:56 PM)  Medication Comments: CM discussed referencing discharge paperwork to follow detailed daily medication schedule. Patient is having issues with the cost of all his medications. He has checked out savings programs through the [employer] but does not met income guidelines. I sent him link to  Pharm cost savings program to see if that would help at all. He plans to discuss with Dr Rosa at follow up next tuesday. (7/15/2024  3:56 PM)    Appointments  Does the patient have a primary care provider?: Yes (7/15/2024  3:56 PM)  Has the patient kept scheduled appointments due by today?: Yes  (7/15/2024  3:56 PM)  Care Management Interventions: Advised to schedule with specialist; Educated on importance of keeping appointment (Scheduled appt with Dr Rosa for hospital follow up) (7/15/2024  3:56 PM)    Self Management  What is the home health agency?: met with Pallative care today abd leaving case opening (7/15/2024  3:56 PM)    Patient Teaching  Does the patient have access to their discharge instructions?: Yes (7/15/2024  3:56 PM)  Care Management Interventions: Reviewed instructions with patient; Educated on MyChart (Active on MyChart) (7/15/2024  3:56 PM)  What is the patient's perception of their health status since discharge?: Same (patient stated meds make it hard for him to sleep and feeling a little overwhelmed with everything. He is confused with all the extra doctor appts,was happy I could get him into see Dr Rosa next week,encouraged Cristian to ask about other providers) (7/15/2024  3:56 PM)  Is the patient/caregiver able to teach back the hierarchy of who to call/visit for symptoms/problems? PCP, Specialist, Home Health nurse, Urgent Care, ED, 911: Yes (7/15/2024  3:56 PM)  Patient/Caregiver Education Comments: I re-introduced myself and the TCM program to Cristian Sapp. Reviewed hospital stay and answered any questions. I gave my contact information and encouraged to call me if needing assistance or has any further non-emergent questions prior to my next outreach. (7/15/2024  3:56 PM)

## 2024-07-22 PROBLEM — I71.43 INFRARENAL ABDOMINAL AORTIC ANEURYSM (AAA) WITHOUT RUPTURE (CMS-HCC): Status: ACTIVE | Noted: 2024-05-06

## 2024-07-22 PROBLEM — F43.10 PTSD (POST-TRAUMATIC STRESS DISORDER): Status: ACTIVE | Noted: 2021-08-02

## 2024-07-22 PROBLEM — T82.198A MALFUNCTION OF IMPLANTABLE DEFIBRILLATOR VENTRICULAR (ICD) LEAD: Status: ACTIVE | Noted: 2024-03-08

## 2024-07-22 PROBLEM — I73.9 PVD (PERIPHERAL VASCULAR DISEASE) (CMS-HCC): Status: ACTIVE | Noted: 2017-12-12

## 2024-07-22 PROBLEM — I44.2 CHB (COMPLETE HEART BLOCK) (MULTI): Status: ACTIVE | Noted: 2019-11-06

## 2024-07-22 PROBLEM — E78.5 DYSLIPIDEMIA: Status: RESOLVED | Noted: 2024-07-15 | Resolved: 2024-07-22

## 2024-07-22 PROBLEM — J44.9 CHRONIC OBSTRUCTIVE PULMONARY DISEASE (MULTI): Status: ACTIVE | Noted: 2020-01-14

## 2024-07-22 PROBLEM — Z95.810 S/P ICD (INTERNAL CARDIAC DEFIBRILLATOR) PROCEDURE: Status: ACTIVE | Noted: 2019-11-06

## 2024-07-22 PROBLEM — D57.3 SICKLE CELL TRAIT (CMS-HCC): Status: ACTIVE | Noted: 2020-01-14

## 2024-07-22 PROBLEM — I20.2 REFRACTORY ANGINA (CMS-HCC): Status: ACTIVE | Noted: 2019-04-08

## 2024-07-23 ENCOUNTER — APPOINTMENT (OUTPATIENT)
Dept: CARDIOLOGY | Facility: CLINIC | Age: 59
End: 2024-07-23
Payer: COMMERCIAL

## 2024-07-23 VITALS
HEART RATE: 75 BPM | DIASTOLIC BLOOD PRESSURE: 60 MMHG | WEIGHT: 167 LBS | HEIGHT: 67 IN | SYSTOLIC BLOOD PRESSURE: 104 MMHG | BODY MASS INDEX: 26.21 KG/M2

## 2024-07-23 DIAGNOSIS — E78.2 MIXED HYPERLIPIDEMIA: ICD-10-CM

## 2024-07-23 DIAGNOSIS — I47.20 VENTRICULAR TACHYCARDIA (MULTI): ICD-10-CM

## 2024-07-23 DIAGNOSIS — Z98.61 CAD S/P PERCUTANEOUS CORONARY ANGIOPLASTY: ICD-10-CM

## 2024-07-23 DIAGNOSIS — I25.2 OLD MI (MYOCARDIAL INFARCTION): ICD-10-CM

## 2024-07-23 DIAGNOSIS — I20.2 REFRACTORY ANGINA (CMS-HCC): ICD-10-CM

## 2024-07-23 DIAGNOSIS — I34.0 NONRHEUMATIC MITRAL VALVE REGURGITATION: ICD-10-CM

## 2024-07-23 DIAGNOSIS — I25.5 ISCHEMIC CARDIOMYOPATHY: ICD-10-CM

## 2024-07-23 DIAGNOSIS — I10 PRIMARY HYPERTENSION: ICD-10-CM

## 2024-07-23 DIAGNOSIS — Z95.810 ICD (IMPLANTABLE CARDIOVERTER-DEFIBRILLATOR) IN PLACE: ICD-10-CM

## 2024-07-23 DIAGNOSIS — I47.29 NSVT (NONSUSTAINED VENTRICULAR TACHYCARDIA) (MULTI): ICD-10-CM

## 2024-07-23 DIAGNOSIS — I50.22 CHRONIC SYSTOLIC HEART FAILURE (MULTI): Primary | ICD-10-CM

## 2024-07-23 DIAGNOSIS — I71.43 INFRARENAL ABDOMINAL AORTIC ANEURYSM (AAA) WITHOUT RUPTURE (CMS-HCC): ICD-10-CM

## 2024-07-23 DIAGNOSIS — J44.9 CHRONIC OBSTRUCTIVE PULMONARY DISEASE, UNSPECIFIED COPD TYPE (MULTI): ICD-10-CM

## 2024-07-23 DIAGNOSIS — I47.20 VT (VENTRICULAR TACHYCARDIA) (MULTI): ICD-10-CM

## 2024-07-23 DIAGNOSIS — I25.10 CAD S/P PERCUTANEOUS CORONARY ANGIOPLASTY: ICD-10-CM

## 2024-07-23 DIAGNOSIS — I73.9 PVD (PERIPHERAL VASCULAR DISEASE) (CMS-HCC): ICD-10-CM

## 2024-07-23 DIAGNOSIS — F17.200 CURRENT SMOKER: ICD-10-CM

## 2024-07-23 PROCEDURE — 93000 ELECTROCARDIOGRAM COMPLETE: CPT | Performed by: INTERNAL MEDICINE

## 2024-07-23 PROCEDURE — 99214 OFFICE O/P EST MOD 30 MIN: CPT | Performed by: INTERNAL MEDICINE

## 2024-07-23 PROCEDURE — 3008F BODY MASS INDEX DOCD: CPT | Performed by: INTERNAL MEDICINE

## 2024-07-23 PROCEDURE — 3078F DIAST BP <80 MM HG: CPT | Performed by: INTERNAL MEDICINE

## 2024-07-23 PROCEDURE — 3074F SYST BP LT 130 MM HG: CPT | Performed by: INTERNAL MEDICINE

## 2024-07-23 RX ORDER — LANOLIN ALCOHOL/MO/W.PET/CERES
400 CREAM (GRAM) TOPICAL DAILY
Qty: 90 TABLET | Refills: 3 | Status: SHIPPED | OUTPATIENT
Start: 2024-07-23 | End: 2025-07-23

## 2024-07-23 RX ORDER — AMIODARONE HYDROCHLORIDE 200 MG/1
200 TABLET ORAL 2 TIMES DAILY
Qty: 180 TABLET | Refills: 3 | Status: SHIPPED | OUTPATIENT
Start: 2024-07-23 | End: 2025-07-23

## 2024-07-23 RX ORDER — AMIODARONE HYDROCHLORIDE 200 MG/1
200 TABLET ORAL 2 TIMES DAILY
Qty: 180 TABLET | Refills: 3 | Status: CANCELLED | OUTPATIENT
Start: 2024-07-23 | End: 2025-07-23

## 2024-07-23 NOTE — PROGRESS NOTES
Patient:  Cristian Sapp  YOB: 1965  MRN: [id number]       HPI:       Critsian Sapp is a 58 y.o. male who returns today for cardiac follow-up.  He has a history of atherosclerotic heart disease with previous multivessel angioplasty and stenting procedures.  Initial myocardial infarction was in 2003.  He has underlying ischemic cardiomyopathy with severe LV dysfunction.  Estimated LV ejection fraction in May 2024 was 15%.  He is status post CRT-D implant.  He previously had VT storm and underwent ventricular tachycardia ablation in 2019.       He was hospitalized on May 23, 2024 after receiving multiple ICD shocks for recurrent VT.  He was initially hospitalized at [facility] and was transferred to [facility] May 28, 2024 for continued management.  He was in slow VT and was started on an amiodarone drip.  He had VT ablation May 30, 2024 but continued to have intermittent episodes of slow ventricular tachycardia.  He was cardioverted on Elle 3, 2024.  He was maintained on amiodarone and lidocaine drips.  He underwent stellate ganglion block June 4, 2024.  It was felt that heart transplant was not going to be an option for him.  He opted against an LVAD.  He was seen by palliative medicine and he changed his CODE STATUS to DNR-DNI on June 5, 2024.  He had redo endocardial ablation by Dr. Wooten on June 6, 2024.  He was extubated June 7, 2024 and intra-aortic balloon pump was removed.  He was initiated on low-dose Entresto.  He was changed to oral mexiletine.  Cardiac cath during his recent admission showed severe triple-vessel coronary artery disease.  Right coronary artery and circumflex were totally occluded and filled via collaterals.  LAD had mild stenosis.  No lesions were amanable to revascularization.  He has a history of abdominal aortic aneurysm without rupture.  He has hypertension, hyperlipidemia, tobacco use, nephrolithiasis, and PTSD.     He was seen in follow-up by Dr. Sparks on June 28, 2024.  Since  his most recent discharge he has been doing quite well.  He noted some episodes of lightheadedness with transient systolic blood pressure readings in the 80s.  He also complained of ongoing fatigue/tiredness.  He is currently is being maintained on amiodarone 400 mg daily and mexiletine 200 mg 3 times daily.  His Bystolic dose was decreased to 2.5 mg daily.      He presented to the emergency department on the evening of July 11, 2024 after his ICD fired.  He was noted to be in slow VT and was cardioverted in the ED to normal sinus rhythm.  He was admitted to the ICU.  Mexiletine was increased to 300 mg 3 times daily.  He was continued on Toprol-XL at a dose of 12.5 mg daily and spironolactone at a dose of 12.5 mg daily.  His discharge dose of torsemide was 20 mg daily.  He was continued on amiodarone 400 mg daily and Eliquis 5 mg twice daily.  He is also on Farxiga, with normalization, and rosuvastatin.  Bisoprolol and Entresto were discontinued due to hypotension.  He notes he had several systolic readings in the 80s.    He has been doing well since his discharge.  He denies any chest pain or shortness of breath.  He denies any orthopnea, PND, or increasing peripheral edema.  He denies any palpitations, lightheadedness, near-syncope, or syncope.  He denies any fever, chills, or cough.  He denies any nausea, vomiting, or diaphoresis.  He denies any hemoptysis, hematemesis, melena, or hematochezia.   Most recent lab studies July 18, 2024 showed a normal CBC with exception of WBC 13.90.  Comprehensive metabolic profile was normal with exception of creatinine 1.45, alkaline phosphatase 136, AST 93, and .  Cholesterol 139 with triglycerides 155, HDL 27, and LDL 81.  He currently is free of any angina pectoris or overt CHF symptoms.  He will start on magnesium oxide 4 mg daily.  He will otherwise be continued on his same medications.  Other details as noted below.     The above portion of this note was dictated by  me using voice recognition software.  I personally performed the services described in the documentation.  The scribe entering the documentation below was in my presence.  I affirm that the information is both accurate and complete.      Objective:     Vitals:    07/23/24 0859   BP: 104/60   Pulse: 75       Wt Readings from Last 4 Encounters:   07/23/24 75.8 kg (167 lb)   07/14/24 72.2 kg (159 lb 2.8 oz)   07/11/24 75.8 kg (167 lb)   07/11/24 75.8 kg (167 lb)       Allergies:     Allergies   Allergen Reactions    Chantix [Varenicline] Unknown        Medications:     Current Outpatient Medications   Medication Instructions    albuterol sulfate (Proair Digihaler) 90 mcg/actuation aero powdr breath act w/sensor inhaler 2 puffs, inhalation, Every 4 hours PRN    amiodarone (Pacerone) 200 mg tablet Take 2 tablets (400 mg) by mouth once daily. Do not fill before June 19, 2024.    apixaban (ELIQUIS) 5 mg, oral, Every 12 hours    aspirin 81 mg, oral, Daily    blood pressure monitor kit Use as directed    esomeprazole (NEXIUM) 20 mg, oral, Daily before breakfast, Do not open capsule.    Farxiga 10 mg, oral, Daily    lactulose 20 g, oral, Daily    LORazepam (ATIVAN) 0.5 mg, oral, Nightly    metoprolol succinate XL (TOPROL-XL) 12.5 mg, oral, Daily, Do not crush or chew.    mexiletine (MEXITIL) 300 mg, oral, Every 8 hours scheduled    PARoxetine (PAXIL) 60 mg, oral, Nightly    potassium chloride (Klor-Con) 20 mEq packet 20 mEq, oral, Daily    ranolazine (RANEXA) 500 mg, oral, 2 times daily, Do not crush, chew, or split.    rosuvastatin (CRESTOR) 20 mg, oral, Nightly    spironolactone (ALDACTONE) 12.5 mg, oral, Daily    tamsulosin (FLOMAX) 0.4 mg, oral, Daily    torsemide (DEMADEX) 20 mg, oral, Daily, DO NOT TAKE IF SBP <90    traZODone (DESYREL) 50 mg, oral, Nightly       Physical Examination:     Constitutional:       Appearance: Healthy appearance. Not in distress.   Neck:      Vascular: No JVR. JVD normal.   Pulmonary:       Effort: Pulmonary effort is normal.      Breath sounds: Normal breath sounds. No wheezing. No rhonchi. No rales.   Chest:      Chest wall: Not tender to palpatation.   Cardiovascular:      PMI at left midclavicular line. Normal rate. Regular rhythm. Normal S1. Normal S2.       Murmurs: There is no murmur.      No gallop.  No click. No rub.   Pulses:     Intact distal pulses.   Edema:     Peripheral edema absent.   Abdominal:      General: Bowel sounds are normal.      Palpations: Abdomen is soft.      Tenderness: There is no abdominal tenderness.   Musculoskeletal: Normal range of motion.         General: No tenderness. Skin:     General: Skin is warm and dry.   Neurological:      General: No focal deficit present.      Mental Status: Alert and oriented to person, place and time.        Lab:     CBC:   Lab Results   Component Value Date    WBC 16.3 (H) 07/14/2024    RBC 4.06 (L) 07/14/2024    HGB 12.2 (L) 07/14/2024    HCT 36.7 (L) 07/14/2024     07/14/2024        CMP:    Lab Results   Component Value Date     (L) 07/14/2024    K 4.4 07/14/2024    CL 96 (L) 07/14/2024    CO2 24 07/14/2024    BUN 24 (H) 07/14/2024    CREATININE 1.49 (H) 07/14/2024    GLUCOSE 125 (H) 07/14/2024    CALCIUM 9.3 07/14/2024       Magnesium:    Lab Results   Component Value Date    MG 2.02 07/14/2024       TSH:    Lab Results   Component Value Date    TSH 10.35 (H) 06/04/2024       BNP:   Lab Results   Component Value Date     (H) 07/12/2024        PT/INR:    Lab Results   Component Value Date    PROTIME 14.6 (H) 06/07/2024    INR 1.3 (H) 06/07/2024       HgBA1c:    Lab Results   Component Value Date    HGBA1C 6.0 (H) 06/04/2024       BMP:  Lab Results   Component Value Date     (L) 07/14/2024     (L) 07/13/2024     (L) 07/12/2024    K 4.4 07/14/2024    K 4.2 07/13/2024    K 3.7 07/12/2024    CL 96 (L) 07/14/2024    CL 99 07/13/2024    CL 97 (L) 07/12/2024    CO2 24 07/14/2024    CO2 26 07/13/2024     CO2 28 07/12/2024    BUN 24 (H) 07/14/2024    BUN 21 07/13/2024    BUN 30 (H) 07/12/2024    CREATININE 1.49 (H) 07/14/2024    CREATININE 1.27 07/13/2024    CREATININE 1.44 (H) 07/12/2024       CBC:  Lab Results   Component Value Date    WBC 16.3 (H) 07/14/2024    WBC 12.5 (H) 07/13/2024    WBC 12.0 (H) 07/12/2024    RBC 4.06 (L) 07/14/2024    RBC 4.04 (L) 07/13/2024    RBC 4.25 (L) 07/12/2024    HGB 12.2 (L) 07/14/2024    HGB 12.0 (L) 07/13/2024    HGB 13.0 (L) 07/12/2024    HCT 36.7 (L) 07/14/2024    HCT 35.8 (L) 07/13/2024    HCT 38.1 (L) 07/12/2024    MCV 90 07/14/2024    MCV 89 07/13/2024    MCV 90 07/12/2024    MCH 30.0 07/14/2024    MCH 29.7 07/13/2024    MCH 30.6 07/12/2024    MCHC 33.2 07/14/2024    MCHC 33.5 07/13/2024    MCHC 34.1 07/12/2024    RDW 14.1 07/14/2024    RDW 14.2 07/13/2024    RDW 14.0 07/12/2024     07/14/2024     07/13/2024     07/12/2024       Cardiac Enzymes:    Lab Results   Component Value Date    TROPHS 17 07/12/2024    TROPHS 17 07/12/2024    TROPHS 2,261 (HH) 05/31/2024       Hepatic Function Panel:    Lab Results   Component Value Date    ALKPHOS 104 07/12/2024     (H) 07/12/2024    AST 57 (H) 07/12/2024    PROT 7.3 07/12/2024    BILITOT 0.5 07/12/2024       Diagnostic Studies:     ECG 12 Lead  Result Date: 7/14/2024    AV dual-paced rhythm Abnormal ECG When compared with ECG of 12-JUL-2024 09:15, No significant change was found Confirmed by Zachary Sparks ([id number]) on 7/14/2024 5:05:13 AM      XR chest 1 view  Result Date: 7/12/2024    1. Trace right pleural effusion, similar to 06/13/2024. 2. Streaky right basilar opacities, likely subsegmental atelectasis.   Signed by: Cesario Salmeron 7/12/2024 3:19 AM Dictation workstation:   [id number]      Problem List:     Patient Active Problem List   Diagnosis    Ischemic cardiomyopathy    Elevated troponin    NSVT (nonsustained ventricular tachycardia) (Multi)    CHF (congestive heart failure) (Multi)     Chronic systolic heart failure (Multi)    Flash pulmonary edema (Multi)    Arteriosclerosis of coronary artery    ICD (implantable cardioverter-defibrillator) in place    Nonrheumatic mitral valve regurgitation    Nonrheumatic tricuspid valve regurgitation    Hx of percutaneous transluminal coronary angioplasty    Primary hypertension    Current smoker    CHB (complete heart block) (Multi)    Chronic obstructive pulmonary disease (Multi)    Infrarenal abdominal aortic aneurysm (AAA) without rupture (CMS-HCC)    CAD S/P percutaneous coronary angioplasty    Refractory angina (CMS-HCC)    Intermittent claudication (CMS-HCC)    Malfunction of implantable defibrillator ventricular (ICD) lead    Mixed hyperlipidemia    Old MI (myocardial infarction)    PVD (peripheral vascular disease) (CMS-HCC)    PAT (paroxysmal atrial tachycardia) (CMS-HCC)    Postsurgical percutaneous transluminal coronary angioplasty status    PTSD (post-traumatic stress disorder)    PVC (premature ventricular contraction)    Rapid palpitations    S/P ICD (internal cardiac defibrillator) procedure    Sickle cell trait (CMS-HCC)       Asessment:     Problem List Items Addressed This Visit             ICD-10-CM    Ischemic cardiomyopathy I25.5    VT (ventricular tachycardia) (Multi) I47.20    Relevant Medications    magnesium oxide (Mag-Ox) 400 mg (241.3 mg magnesium) tablet    amiodarone (Pacerone) 200 mg tablet    Chronic systolic heart failure (Multi) - Primary I50.22    ICD (implantable cardioverter-defibrillator) in place Z95.810    Nonrheumatic mitral valve regurgitation I34.0    Primary hypertension I10    Current smoker F17.200    Chronic obstructive pulmonary disease (Multi) J44.9    Infrarenal abdominal aortic aneurysm (AAA) without rupture (CMS-HCC) I71.43    CAD S/P percutaneous coronary angioplasty I25.10, Z98.61    Refractory angina (CMS-HCC) I20.2    Mixed hyperlipidemia E78.2    Old MI (myocardial infarction) I25.2    PVD (peripheral  vascular disease) (CMS-[facility]) I73.9     Other Visit Diagnoses         Codes    Ventricular tachycardia (Multi)     I47.20          1. Wide-complex tachycardia/ventricular tachycardia/Slow VT/AICD shocks             -s/p VT Ablation 5/30/2024 per Dr. Wooten with recurrent slow VT             -Patient is currently maintaining an AV-Paced rhythm at a rate of 75             -Continue IV amiodarone drip at 1mg/min             -Continue mexiletine 250mg PO TID             -Recent LHC performed 5/23/2024 revealed  of the proximal circumflex artery,  of the proximal RCA with distal collateral filling and mild disease of the left main and LAD.  Medical management is advised.             -Patient deemed not a candidate for heart transplant.             -Patient has declined LVAD  2.    Ischemic cardiomyopathy/acute on chronic systolic heart failure             -LVEF 15% on echocardiogram 5/23/2024             -Moderately dilated left ventricle  3.    Valvular heart disease             -Moderate to severe MR/moderate TR             -Moderate to severely elevated pulmonary artery pressures  4.    Coronary artery disease/multiple remote myocardial infarctions             -Remote multivessel PCI's (last in 2008)             -See report above for recent LHC performed 5/23/2024  5.    Benign essential hypertension             -Currently hypotensive  6.    Hyperlipidemia             -On statin therapy  7.    Current tobacco use    Plan:     -He will start on magnesium oxide 40 mg daily.    -He will otherwise be continued on his same medications.     -He was advised to restrict fluids to less than 2 liters per day.     -He was advised to restrict sodium with an aim of no more than 2 grams per day.     -He was advised on the need for regular exercise with an aim to walk at least 20-30 minutes on at least 5 days per week.    -He was advised on the need to refrain from smoking.    -He was advised on the need to follow a  Mediterranean style diet.    -Follow-up in approximately 1 month or sooner if problems.

## 2024-07-23 NOTE — PATIENT INSTRUCTIONS
Continue same medications/treatment.  Patient educated on proper medication use.  Patient educated on risk factor modification.  Please bring any lab results from other providers/physicians to your next appointment.    Please bring all medicines, vitamins, and herbal supplements with you when you come to the office.    Prescriptions will not be filled unless you are compliant with your follow up appointments or have a follow up appointment scheduled as per instruction of your physician. Refills should be requested at the time of your visit.    Follow up in 4 weeks  START Magnesium Oxide 400 mg daily  REFERRAL to Dr. Yanes for PAD/leg pain    ICHAKA RN, AM SCRIBING FOR AND IN THE PRESENCE OF DR. LEODAN PALOMO M.D., FACC

## 2024-07-24 ENCOUNTER — TELEPHONE (OUTPATIENT)
Dept: CARDIOLOGY | Facility: CLINIC | Age: 59
End: 2024-07-24
Payer: COMMERCIAL

## 2024-07-24 LAB
ATRIAL RATE: 75 BPM
P AXIS: 89 DEGREES
P OFFSET: 179 MS
P ONSET: 154 MS
PR INTERVAL: 156 MS
Q ONSET: 195 MS
QRS COUNT: 12 BEATS
QRS DURATION: 178 MS
QT INTERVAL: 464 MS
QTC CALCULATION(BAZETT): 518 MS
QTC FREDERICIA: 499 MS
R AXIS: -64 DEGREES
T AXIS: 127 DEGREES
T OFFSET: 427 MS
VENTRICULAR RATE: 75 BPM

## 2024-07-24 NOTE — TELEPHONE ENCOUNTER
Called pt and left a vm abouthis referral appt with zulma for leg pain on 8/26 @ 11:15 am advised if it doesn't fit with his schedule to call back to reschedule.

## 2024-07-29 ENCOUNTER — TELEPHONE (OUTPATIENT)
Dept: CARDIOLOGY | Facility: CLINIC | Age: 59
End: 2024-07-29
Payer: COMMERCIAL

## 2024-07-29 DIAGNOSIS — I47.20 VENTRICULAR TACHYCARDIA (MULTI): ICD-10-CM

## 2024-07-29 DIAGNOSIS — I50.20 HFREF (HEART FAILURE WITH REDUCED EJECTION FRACTION) (MULTI): ICD-10-CM

## 2024-07-29 NOTE — TELEPHONE ENCOUNTER
Patient called and stated he does not take Eliquis and Entresto because it is too expensive. Patient also needs refills.    Received request for prescription refill for patient.  Patient follows with Dr. Zachary Sparks MD     Request is for Mexitil and metoprolol  Is patient currently on medication- yes    Last OV- 7/11/24  Next OV- 9/18/24    Pended for signing and sent to provider.     Also routed to Angie Garcia RN as patient stated he is not taking his blood thinner because it is too expensive.

## 2024-07-29 NOTE — TELEPHONE ENCOUNTER
Returned call to patient. Asked him to call his insurance company and see if pradaxa or xarelto would be a cheaper option. Patient asked why he couldn't take Prasugrel. Routed to Dr. Sparks to see if the Prasugrel would be an option.

## 2024-07-30 ENCOUNTER — PATIENT OUTREACH (OUTPATIENT)
Dept: PRIMARY CARE | Facility: CLINIC | Age: 59
End: 2024-07-30
Payer: COMMERCIAL

## 2024-07-30 RX ORDER — METOPROLOL SUCCINATE 25 MG/1
12.5 TABLET, EXTENDED RELEASE ORAL DAILY
Qty: 45 TABLET | Refills: 0 | Status: SHIPPED | OUTPATIENT
Start: 2024-07-30 | End: 2024-10-28

## 2024-07-30 RX ORDER — MEXILETINE HYDROCHLORIDE 150 MG/1
300 CAPSULE ORAL 3 TIMES DAILY
Qty: 540 CAPSULE | Refills: 0 | Status: SHIPPED | OUTPATIENT
Start: 2024-07-30 | End: 2024-10-28

## 2024-07-30 NOTE — PROGRESS NOTES
Unable to reach patient for call back after patient's follow up appointment with cardio on 7/23/24.      Left voicemail:  CM noted that card office still working with pt for high cost of meds. I reminded him also about the link I had sent him through [employer] back on the 15th that may be able to help him. I just left the number for [facility] [phone] as well and suggested he could call them and ask if they knew if he could be considered for the [employer] discharge program .   Left my call back number for patient to call if needed to assist with any questions or concerns patient may have.

## 2024-07-30 NOTE — TELEPHONE ENCOUNTER
Returned call to patient. Let him know that prasugrel is not an option. Patient has no called his insurance yet about the other listed options.

## 2024-08-05 NOTE — PROGRESS NOTES
Referred by Dr. Mcmillan ref. provider found for Follow-up     History Of Present Illness:    Cristian Sapp is a 58 y.o. male with history of CAD s/p multiple PCI's. initial myocardial infarction  in 2003,  ischemic cardiomyopathy with ejection fraction in May 2024 of 15%, status post CRT-D implant,   h/o VT storm s/p ventricular tachycardia ablation in 2019.    Most recently, he was admitted with VT storm on 5/23/2024, and then transferred to [facility] on 5/28/2024.  He was noted to be in slow VT, despite amiodarone and lidocaine.  He underwent VT ablation on May 30, 2024, but continued to have slow VT, requiring cardioversion again, along with amiodarone and lidocaine.  Cardiac catheterization showed severe triple-vessel disease, with occlusion of the RCA and circumflex, with collaterals, not amenable to vascularization.  He underwent stellate ganglion block on 6/4/2024.  He was evaluated for cardiac transplantation, and given significant emphysema and ongoing smoking he was not considered to be a candidate for cardiac transplantation.  He did not want an LVAD.  Given recurrent slow VT, he underwent redo endocardial VT ablation on June 6, 2024, with findings of multiple VT's that were inducible coming from the lateral scar, as well as septal scar, and he underwent extensive scar modification.  Course complicated by low output and lactic acidosis requiring balloon pump support.  His closing [place] numbers after balloon pump removal with a CVP of 5, pulmonary artery pressure of 4426/min 34.  Pulmonary capital wedge pressure 11, cardiac index of 2.9, with maps of 71.  He was weaned off his amiodarone and lidocaine drips, and initiated on bisoprolol and mexiletine.  GDMT was optimized.  He was eventually discharged home.  In follow-up with electrophysiology on June 28, he is noted to be doing reasonably well.  Given hypotensive spell, his bisoprolol was decreased.  He represented to the ED on June 11, 2024 with ICD fires, with  episodes of slow VT. during that hospitalization, his mexiletine was increased to 300 mg p.o. 3 times daily, he was changed from Bystolic to Toprol-XL.  His Entresto was stopped due to hypotension.  In follow-up with Dr. Rosa he was noted to be doing well.  He was started on magnesium.  He now comes to see us.    Past Medical History:  He has a past medical history of Atherosclerosis of native artery of both lower extremities with intermittent claudication (CMS-[facility]), CHB (complete heart block) (Multi), Chronic systolic CHF (congestive heart failure), NYHA class 3 (Multi), COPD (chronic obstructive pulmonary disease) (Multi), Coronary artery disease, History of tobacco abuse, HLD (hyperlipidemia), Hypertension, Infrarenal abdominal aortic aneurysm (AAA) without rupture (CMS-[facility]), Ischemic cardiomyopathy with implantable cardioverter-defibrillator (ICD), MI (myocardial infarction) (Multi), Nephrolithiasis, and PTSD (post-traumatic stress disorder).    Past Surgical History:  He has a past surgical history that includes Cardiac defibrillator placement; Coronary angioplasty with stent (2006); Coronary angioplasty with stent (04/2003); Coronary angioplasty with stent (06/2008); Cystoscopy w/ ureteral stent placement (04/01/2024); Cystoscopy w/ ureteral stent placement (04/30/2024); Iliac artery stent (Right); Knee surgery; Femoral bypass; Cardiac electrophysiology procedure (N/A, 5/23/2024); Cardiac catheterization (N/A, 5/23/2024); Cardiac electrophysiology procedure (N/A, 5/28/2024); Cardiac electrophysiology procedure (Right, 5/30/2024); and Cardiac electrophysiology procedure (N/A, 6/6/2024).      Social History:  He reports that he has quit smoking. His smoking use included cigarettes. He does not have any smokeless tobacco history on file. He reports that he does not currently use alcohol. He reports that he does not currently use drugs.    Family History:  Family History   Problem Relation Name Age of Onset     "Hypertension Mother      Diabetes Father      Hypertension Sister      Diabetes Sister      Colon cancer Maternal Grandmother      Hypertension Maternal Grandmother      Heart disease Maternal Grandfather      Hypertension Maternal Grandfather      Cancer Paternal Grandfather      Hypertension Paternal Grandfather          Allergies:  Chantix [varenicline]    Outpatient Medications:  Current Outpatient Medications   Medication Instructions    albuterol sulfate (Proair Digihaler) 90 mcg/actuation aero powdr breath act w/sensor inhaler 2 puffs, inhalation, Every 4 hours PRN    aspirin 81 mg, oral, Daily    blood pressure monitor kit Use as directed    esomeprazole (NEXIUM) 20 mg, oral, Daily before breakfast, Do not open capsule.    Farxiga 10 mg, oral, Daily    lactulose 20 g, oral, Daily    LORazepam (ATIVAN) 0.5 mg, oral, Nightly    magnesium oxide (MAG-OX) 400 mg, oral, Daily    metoprolol succinate XL (TOPROL-XL) 12.5 mg, oral, Daily, Do not crush or chew.    mexiletine (MEXITIL) 300 mg, oral, 3 times daily    PARoxetine (PAXIL) 60 mg, oral, Nightly    potassium chloride (Klor-Con) 20 mEq packet 20 mEq, oral, Daily    ranolazine (RANEXA) 500 mg, oral, 2 times daily, Do not crush, chew, or split.    rosuvastatin (CRESTOR) 20 mg, oral, Nightly    sacubitriL-valsartan (Entresto) 24-26 mg tablet 1 tablet, oral, 2 times daily    spironolactone (ALDACTONE) 12.5 mg, oral, Daily    tamsulosin (FLOMAX) 0.4 mg, oral, Daily    torsemide (DEMADEX) 20 mg, oral, Daily, DO NOT TAKE IF SBP <90    traZODone (DESYREL) 50 mg, oral, Nightly        Last Recorded Vitals:  Vitals:    08/08/24 1340   BP: 113/70   BP Location: Right arm   Patient Position: Sitting   Pulse: 76   Temp: 36.4 °C (97.6 °F)   TempSrc: Temporal   SpO2: 92%   Weight: 80.7 kg (178 lb)   Height: 1.689 m (5' 6.5\")       Physical Exam:  Neck:      Vascular: JVD elevated with 12 cm of water.   Cardiovascular:      PMI at left midclavicular line. Normal rate. Regular " "rhythm. Normal S1. Normal S2.       Murmurs: There is a grade 2/6 systolic murmur.      No gallop.  No click. No rub.   Pulses:     Intact distal pulses.   Edema:     Thigh: bilateral trace edema of the thigh.     Pretibial: bilateral trace edema of the pretibial area.     Ankle: bilateral trace edema of the ankle.     Feet: bilateral trace edema of the feet.  Abdominal:      General: There is distension.            Last Labs:  CBC -  Lab Results   Component Value Date    WBC 16.3 (H) 07/14/2024    HGB 12.2 (L) 07/14/2024    HCT 36.7 (L) 07/14/2024    MCV 90 07/14/2024     07/14/2024       CMP -  Lab Results   Component Value Date    CALCIUM 9.3 07/14/2024    PHOS 3.5 07/13/2024    PROT 7.3 07/12/2024    ALBUMIN 3.8 07/12/2024    AST 57 (H) 07/12/2024     (H) 07/12/2024    ALKPHOS 104 07/12/2024    BILITOT 0.5 07/12/2024       LIPID PANEL -   No results found for: \"CHOL\", \"TRIG\", \"HDL\", \"CHHDL\", \"LDLF\", \"VLDL\", \"NHDL\"    RENAL FUNCTION PANEL -   Lab Results   Component Value Date    GLUCOSE 125 (H) 07/14/2024     (L) 07/14/2024    K 4.4 07/14/2024    CL 96 (L) 07/14/2024    CO2 24 07/14/2024    ANIONGAP 15 07/14/2024    BUN 24 (H) 07/14/2024    CREATININE 1.49 (H) 07/14/2024    CALCIUM 9.3 07/14/2024    PHOS 3.5 07/13/2024    ALBUMIN 3.8 07/12/2024        Lab Results   Component Value Date     (H) 07/12/2024    HGBA1C 6.0 (H) 06/04/2024       Last Cardiology Tests:  ECG:  ECG 12 lead (Clinic Performed) 07/23/2024    AV sequential electronic pacemaker.   Echo:  Transthoracic Echo (TTE) Complete 05/28/2024   1. Left ventricular systolic function is severely decreased with a 15% estimated ejection fraction.   2. There is no evidence of mitral valve stenosis.   3. Moderate to severe mitral valve regurgitation.   4. Mild tricuspid regurgitation is visualized.   5. Aortic valve stenosis is not present.   6. Left ventricular cavity size is moderately dilated.   7. The pulmonary artery is not well " visualized.   8. Current study appears to be remarkably similar to the May 23, 2024 study done here.   9. There is global hypokinesis of the left ventricle with minor regional variations.    Cath:  Cardiac Catheterization Procedure 05/23/2024   1. Distal Left Main: 10-30% stenosis.   2. Proximal and mid LAD Lesion: The percent stenosis is 10-30%.   3. Proximal CX Lesion: The percent stenosis is 100%.   4. Right Coronary Artery: fills via collaterals.   5. Proximal RCA Lesion: The percent stenosis is 100%.   6. The Left Ventricular Ejection Fraction is 10%,by echo.  Stress Test:  No results found for this or any previous visit from the past 1095 days.    Cardiac Imaging:  No results found for this or any previous visit from the past 1095 days.    Cardiac EP Procedure 6/6/2024    Severe ischemic cardiomyopathy with large scar extending throughout the majority of the left ventricle, sparing the apex    Extensive substrate modification was performed in the septal, anterior, and lateral walls of the left ventricle within these large areas of scar    Apical septal, Basal septal, and Lateral VTs were induced and ablated at best pacemap sites (>90% pacemap)    VT was inducible at the end of the case, but possibly attributed to patient's severe metabolic derangements (acidosis, hypercarbia, hyperkalemia)    Haskell placement via right femoral vein    IABP placement via RFA, performed by the interventional cardiology team, for cardiogenic shock         Assessment/Plan   Problem 1.  Heart failure with reduced action fraction.  He is NYHA class III, clinically appears to be warm and slightly wet.  We will check blood work, and likely diuresing.  Coming to [facility], there is considerable confusion about what he is taking.  For example, he is taking spironolactone only as needed, and Entresto, which he was supposed to have stopped during this most recent hospitalization 1 tablet p.o. nightly.  His blood pressure is reasonable today, and  we will have him take spironolactone daily 12.5 mg, and take half a tablet of Entresto 24/26 p.o. twice daily.  He will record his blood pressures and symptoms for a week, and call us back.  I strongly emphasized that he run his home medications against what we have provided him as his medication list officially.  He is to call us back with any discrepancies.  Please see below for advanced heart failure therapy options discussion.  Problem 2.  Ventricular tachycardia.  This is a significant issue.  Here to, he has stopped his amiodarone completely.  We have resumed this at 2 mg p.o. twice daily, until he sees Dr. Sparks next month.  We will get amiodarone related labs, particularly given some recent thyroid issues.  Problem 3.  Anticoagulation.  He was on heparin, and then Eliquis for 1 month post VT ablation per electrophysiology.  He is not taking Eliquis.  We have stopped this.  Problem 4.  Peripheral arterial disease.  He used to be on prasugrel.  He is no longer taking this.  He is due to see endovascular interventional in the next month, and we will defer to them.  He does not have any acute limb issues at this time.  Problem 5.  Advanced heart failure therapy options.  He has significant emphysema on chest CT, but has quit smoking.  Other issues include his peripheral arterial disease.  Unfortunately his emphysema is probably prohibitive against heart transplant alone.  We will explore heart lung transplantation, but my sense is that he would be felt to be too high risk for this, given his significant peripheral arterial disease.  He continues to not want a ventricular assist device.  He has seen palliative care while in house, and is reasonably at peace with his decisions and our recommendations.  We will follow-up with blood work, and a phone call in 1 week's time.  We will see him back in his clinic in 6 weeks time.  I remain concerned about his overall condition.        Arya Brown MD

## 2024-08-08 ENCOUNTER — OFFICE VISIT (OUTPATIENT)
Dept: CARDIOLOGY | Facility: CLINIC | Age: 59
End: 2024-08-08
Payer: COMMERCIAL

## 2024-08-08 VITALS
OXYGEN SATURATION: 92 % | HEART RATE: 76 BPM | TEMPERATURE: 97.6 F | BODY MASS INDEX: 27.94 KG/M2 | DIASTOLIC BLOOD PRESSURE: 70 MMHG | SYSTOLIC BLOOD PRESSURE: 113 MMHG | WEIGHT: 178 LBS | HEIGHT: 67 IN

## 2024-08-08 DIAGNOSIS — I50.22 CHRONIC SYSTOLIC HEART FAILURE (MULTI): ICD-10-CM

## 2024-08-08 DIAGNOSIS — Z79.899 ON AMIODARONE THERAPY: ICD-10-CM

## 2024-08-08 DIAGNOSIS — I50.20 HFREF (HEART FAILURE WITH REDUCED EJECTION FRACTION) (MULTI): ICD-10-CM

## 2024-08-08 DIAGNOSIS — I49.3 PVC (PREMATURE VENTRICULAR CONTRACTION): ICD-10-CM

## 2024-08-08 DIAGNOSIS — I47.20 VENTRICULAR TACHYCARDIA (MULTI): ICD-10-CM

## 2024-08-08 DIAGNOSIS — I47.20 VT (VENTRICULAR TACHYCARDIA) (MULTI): Primary | ICD-10-CM

## 2024-08-08 LAB
ALBUMIN SERPL BCP-MCNC: 3.6 G/DL (ref 3.4–5)
ALP SERPL-CCNC: 157 U/L (ref 33–120)
ALT SERPL W P-5'-P-CCNC: 73 U/L (ref 10–52)
ANION GAP SERPL CALC-SCNC: 13 MMOL/L (ref 10–20)
AST SERPL W P-5'-P-CCNC: 44 U/L (ref 9–39)
BILIRUB SERPL-MCNC: 0.8 MG/DL (ref 0–1.2)
BNP SERPL-MCNC: 2032 PG/ML (ref 0–99)
BUN SERPL-MCNC: 24 MG/DL (ref 6–23)
CALCIUM SERPL-MCNC: 9.1 MG/DL (ref 8.6–10.3)
CHLORIDE SERPL-SCNC: 101 MMOL/L (ref 98–107)
CHOLEST SERPL-MCNC: 114 MG/DL (ref 0–199)
CHOLESTEROL/HDL RATIO: 4.4
CO2 SERPL-SCNC: 28 MMOL/L (ref 21–32)
CREAT SERPL-MCNC: 1.39 MG/DL (ref 0.5–1.3)
EGFRCR SERPLBLD CKD-EPI 2021: 59 ML/MIN/1.73M*2
ERYTHROCYTE [DISTWIDTH] IN BLOOD BY AUTOMATED COUNT: 19.1 % (ref 11.5–14.5)
GLUCOSE SERPL-MCNC: 74 MG/DL (ref 74–99)
HCT VFR BLD AUTO: 39.5 % (ref 41–52)
HDLC SERPL-MCNC: 26.1 MG/DL
HGB BLD-MCNC: 12.1 G/DL (ref 13.5–17.5)
LDLC SERPL CALC-MCNC: 56 MG/DL
MAGNESIUM SERPL-MCNC: 2.18 MG/DL (ref 1.6–2.4)
MCH RBC QN AUTO: 27.7 PG (ref 26–34)
MCHC RBC AUTO-ENTMCNC: 30.6 G/DL (ref 32–36)
MCV RBC AUTO: 90 FL (ref 80–100)
NON HDL CHOLESTEROL: 88 MG/DL (ref 0–149)
NRBC BLD-RTO: 1.5 /100 WBCS (ref 0–0)
PLATELET # BLD AUTO: 350 X10*3/UL (ref 150–450)
POTASSIUM SERPL-SCNC: 3.9 MMOL/L (ref 3.5–5.3)
PROT SERPL-MCNC: 7 G/DL (ref 6.4–8.2)
RBC # BLD AUTO: 4.37 X10*6/UL (ref 4.5–5.9)
SODIUM SERPL-SCNC: 138 MMOL/L (ref 136–145)
T4 FREE SERPL-MCNC: 0.96 NG/DL (ref 0.61–1.12)
TRIGL SERPL-MCNC: 162 MG/DL (ref 0–149)
TSH SERPL-ACNC: 6.75 MIU/L (ref 0.44–3.98)
VLDL: 32 MG/DL (ref 0–40)
WBC # BLD AUTO: 12.2 X10*3/UL (ref 4.4–11.3)

## 2024-08-08 PROCEDURE — 3074F SYST BP LT 130 MM HG: CPT | Performed by: SPECIALIST

## 2024-08-08 PROCEDURE — G2211 COMPLEX E/M VISIT ADD ON: HCPCS | Performed by: SPECIALIST

## 2024-08-08 PROCEDURE — 99215 OFFICE O/P EST HI 40 MIN: CPT | Performed by: SPECIALIST

## 2024-08-08 PROCEDURE — 3008F BODY MASS INDEX DOCD: CPT | Performed by: SPECIALIST

## 2024-08-08 PROCEDURE — 36415 COLL VENOUS BLD VENIPUNCTURE: CPT

## 2024-08-08 PROCEDURE — 85027 COMPLETE CBC AUTOMATED: CPT

## 2024-08-08 PROCEDURE — 3078F DIAST BP <80 MM HG: CPT | Performed by: SPECIALIST

## 2024-08-08 RX ORDER — AMIODARONE HYDROCHLORIDE 200 MG/1
200 TABLET ORAL 2 TIMES DAILY
Qty: 180 TABLET | Refills: 3 | Status: SHIPPED | OUTPATIENT
Start: 2024-08-08 | End: 2025-08-08

## 2024-08-08 RX ORDER — SPIRONOLACTONE 25 MG/1
12.5 TABLET ORAL DAILY
Qty: 45 TABLET | Refills: 3 | Status: SHIPPED | OUTPATIENT
Start: 2024-08-08 | End: 2024-08-09 | Stop reason: SDUPTHER

## 2024-08-08 ASSESSMENT — PATIENT HEALTH QUESTIONNAIRE - PHQ9
SUM OF ALL RESPONSES TO PHQ9 QUESTIONS 1 AND 2: 0
2. FEELING DOWN, DEPRESSED OR HOPELESS: NOT AT ALL
1. LITTLE INTEREST OR PLEASURE IN DOING THINGS: NOT AT ALL

## 2024-08-08 NOTE — PATIENT INSTRUCTIONS
You have some excess fluid in your body.  Before we get rid of it, please get some blood work so that we can make sure your electrolytes and kidneys are doing okay.  We will call you with instructions regarding your water pill once we get the blood work results back.  Regarding your medications, resume the amiodarone at 200 mg twice a day, the spironolactone at 12.5 mg that is half a tablet once a day, and changed the Entresto to half a tablet twice a day.  Record your blood pressure daily, and do this for a week.  Please call us at the number below to tell us what the blood pressures are and how you are feeling after 1 week.  I have given you an updated medication list.  Please verify that this is what you are taking at home.  If there are any discrepancies, call us at the number below.  Call us at [phone], or message us in [place] with any questions or concerns regarding your heart, for medication refills, and to tell us her blood pressure and your symptoms in 1 week's time.  Come back and see us in 6 weeks time.

## 2024-08-09 DIAGNOSIS — I50.22 CHRONIC SYSTOLIC HEART FAILURE (MULTI): Primary | ICD-10-CM

## 2024-08-09 DIAGNOSIS — I50.20 HFREF (HEART FAILURE WITH REDUCED EJECTION FRACTION) (MULTI): ICD-10-CM

## 2024-08-09 LAB — T3 SERPL-MCNC: 82 NG/DL (ref 60–200)

## 2024-08-09 RX ORDER — SPIRONOLACTONE 25 MG/1
12.5 TABLET ORAL DAILY
Qty: 45 TABLET | Refills: 3 | Status: SHIPPED | OUTPATIENT
Start: 2024-08-09 | End: 2025-08-09

## 2024-08-12 ENCOUNTER — TELEPHONE (OUTPATIENT)
Dept: CARDIOLOGY | Facility: CLINIC | Age: 59
End: 2024-08-12
Payer: COMMERCIAL

## 2024-08-12 NOTE — TELEPHONE ENCOUNTER
----- Message from Eamon Rosa sent at 8/9/2024  4:26 PM EDT -----  Labs reviewed and look fine with exception of mild anemia and mildly elevated liver enzymes.  Will defer evaluation of this to his primary care physician.  Follow-up as scheduled.  Thanks.

## 2024-08-16 ENCOUNTER — TELEPHONE (OUTPATIENT)
Dept: CARDIOLOGY | Facility: HOSPITAL | Age: 59
End: 2024-08-16
Payer: MEDICARE

## 2024-08-19 ENCOUNTER — HOSPITAL ENCOUNTER (OUTPATIENT)
Dept: CARDIOLOGY | Facility: HOSPITAL | Age: 59
Setting detail: OBSERVATION
Discharge: HOME | DRG: 291 | End: 2024-08-19
Payer: MEDICARE

## 2024-08-19 ENCOUNTER — HOSPITAL ENCOUNTER (INPATIENT)
Facility: HOSPITAL | Age: 59
LOS: 1 days | Discharge: ANOTHER HOSPITAL | DRG: 291 | End: 2024-08-19
Attending: STUDENT IN AN ORGANIZED HEALTH CARE EDUCATION/TRAINING PROGRAM | Admitting: HOSPITALIST
Payer: MEDICARE

## 2024-08-19 ENCOUNTER — APPOINTMENT (OUTPATIENT)
Dept: CARDIOLOGY | Facility: HOSPITAL | Age: 59
DRG: 291 | End: 2024-08-19
Payer: MEDICARE

## 2024-08-19 ENCOUNTER — APPOINTMENT (OUTPATIENT)
Dept: RADIOLOGY | Facility: HOSPITAL | Age: 59
DRG: 291 | End: 2024-08-19
Payer: MEDICARE

## 2024-08-19 ENCOUNTER — HOSPITAL ENCOUNTER (INPATIENT)
Facility: HOSPITAL | Age: 59
End: 2024-08-19
Attending: INTERNAL MEDICINE | Admitting: STUDENT IN AN ORGANIZED HEALTH CARE EDUCATION/TRAINING PROGRAM
Payer: MEDICARE

## 2024-08-19 VITALS
HEIGHT: 67 IN | SYSTOLIC BLOOD PRESSURE: 112 MMHG | OXYGEN SATURATION: 91 % | RESPIRATION RATE: 18 BRPM | HEART RATE: 76 BPM | BODY MASS INDEX: 27.99 KG/M2 | TEMPERATURE: 97 F | WEIGHT: 178.35 LBS | DIASTOLIC BLOOD PRESSURE: 63 MMHG

## 2024-08-19 VITALS
OXYGEN SATURATION: 87 % | TEMPERATURE: 97 F | HEART RATE: 74 BPM | DIASTOLIC BLOOD PRESSURE: 70 MMHG | SYSTOLIC BLOOD PRESSURE: 101 MMHG

## 2024-08-19 DIAGNOSIS — N18.9 ANEMIA DUE TO CHRONIC KIDNEY DISEASE, UNSPECIFIED CKD STAGE: ICD-10-CM

## 2024-08-19 DIAGNOSIS — I50.9 ACUTE DECOMPENSATED HEART FAILURE (MULTI): Primary | ICD-10-CM

## 2024-08-19 DIAGNOSIS — I25.5 ISCHEMIC CARDIOMYOPATHY: ICD-10-CM

## 2024-08-19 DIAGNOSIS — R09.89 OTHER SPECIFIED SYMPTOMS AND SIGNS INVOLVING THE CIRCULATORY AND RESPIRATORY SYSTEMS: ICD-10-CM

## 2024-08-19 DIAGNOSIS — J43.2 CENTRILOBULAR EMPHYSEMA (MULTI): ICD-10-CM

## 2024-08-19 DIAGNOSIS — I73.9 INTERMITTENT CLAUDICATION (CMS-HCC): ICD-10-CM

## 2024-08-19 DIAGNOSIS — R74.01 TRANSAMINITIS: ICD-10-CM

## 2024-08-19 DIAGNOSIS — Z95.810 S/P ICD (INTERNAL CARDIAC DEFIBRILLATOR) PROCEDURE: ICD-10-CM

## 2024-08-19 DIAGNOSIS — I50.9 ACUTE ON CHRONIC CONGESTIVE HEART FAILURE, UNSPECIFIED HEART FAILURE TYPE (MULTI): ICD-10-CM

## 2024-08-19 DIAGNOSIS — I47.20 VT (VENTRICULAR TACHYCARDIA) (MULTI): ICD-10-CM

## 2024-08-19 DIAGNOSIS — I47.20 VENTRICULAR TACHYCARDIA (MULTI): ICD-10-CM

## 2024-08-19 DIAGNOSIS — R79.89 ELEVATED TSH: ICD-10-CM

## 2024-08-19 DIAGNOSIS — Z01.810 ENCOUNTER FOR PREPROCEDURAL CARDIOVASCULAR EXAMINATION: ICD-10-CM

## 2024-08-19 DIAGNOSIS — I50.20 HFREF (HEART FAILURE WITH REDUCED EJECTION FRACTION) (MULTI): ICD-10-CM

## 2024-08-19 DIAGNOSIS — Z98.61 CAD S/P PERCUTANEOUS CORONARY ANGIOPLASTY: ICD-10-CM

## 2024-08-19 DIAGNOSIS — R00.2 RAPID PALPITATIONS: ICD-10-CM

## 2024-08-19 DIAGNOSIS — E03.8 SUBCLINICAL HYPOTHYROIDISM: ICD-10-CM

## 2024-08-19 DIAGNOSIS — R06.02 SHORTNESS OF BREATH: Primary | ICD-10-CM

## 2024-08-19 DIAGNOSIS — D63.1 ANEMIA DUE TO CHRONIC KIDNEY DISEASE, UNSPECIFIED CKD STAGE: ICD-10-CM

## 2024-08-19 DIAGNOSIS — Z95.810 BIVENTRICULAR ICD (IMPLANTABLE CARDIOVERTER-DEFIBRILLATOR) IN PLACE: ICD-10-CM

## 2024-08-19 DIAGNOSIS — I50.22 SYSTOLIC HEART FAILURE, CHRONIC (MULTI): ICD-10-CM

## 2024-08-19 DIAGNOSIS — I25.10 CAD S/P PERCUTANEOUS CORONARY ANGIOPLASTY: ICD-10-CM

## 2024-08-19 DIAGNOSIS — I50.43 ACUTE ON CHRONIC COMBINED SYSTOLIC AND DIASTOLIC HEART FAILURE (MULTI): ICD-10-CM

## 2024-08-19 DIAGNOSIS — I50.23 ACUTE ON CHRONIC SYSTOLIC (CONGESTIVE) HEART FAILURE (MULTI): ICD-10-CM

## 2024-08-19 DIAGNOSIS — R79.89 ABNORMAL TSH: ICD-10-CM

## 2024-08-19 DIAGNOSIS — J96.01 ACUTE HYPOXIC RESPIRATORY FAILURE (MULTI): ICD-10-CM

## 2024-08-19 PROBLEM — N17.9 ACUTE KIDNEY INJURY SUPERIMPOSED ON CHRONIC KIDNEY DISEASE (CMS-HCC): Status: ACTIVE | Noted: 2024-08-19

## 2024-08-19 PROBLEM — Z79.01 CURRENT USE OF LONG TERM ANTICOAGULATION: Status: ACTIVE | Noted: 2024-08-19

## 2024-08-19 LAB
ALBUMIN SERPL BCP-MCNC: 3.7 G/DL (ref 3.4–5)
ALP SERPL-CCNC: 171 U/L (ref 33–120)
ALT SERPL W P-5'-P-CCNC: 83 U/L (ref 10–52)
ANION GAP SERPL CALC-SCNC: 14 MMOL/L (ref 10–20)
APTT PPP: 28 SECONDS (ref 27–38)
AST SERPL W P-5'-P-CCNC: 62 U/L (ref 9–39)
BASOPHILS # BLD AUTO: 0.11 X10*3/UL (ref 0–0.1)
BASOPHILS NFR BLD AUTO: 1 %
BILIRUB SERPL-MCNC: 1 MG/DL (ref 0–1.2)
BNP SERPL-MCNC: 1980 PG/ML (ref 0–99)
BUN SERPL-MCNC: 23 MG/DL (ref 6–23)
CALCIUM SERPL-MCNC: 9.3 MG/DL (ref 8.6–10.3)
CARDIAC TROPONIN I PNL SERPL HS: 23 NG/L (ref 0–20)
CARDIAC TROPONIN I PNL SERPL HS: 26 NG/L (ref 0–20)
CHLORIDE SERPL-SCNC: 98 MMOL/L (ref 98–107)
CO2 SERPL-SCNC: 27 MMOL/L (ref 21–32)
CREAT SERPL-MCNC: 1.65 MG/DL (ref 0.5–1.3)
DACRYOCYTES BLD QL SMEAR: NORMAL
EGFRCR SERPLBLD CKD-EPI 2021: 48 ML/MIN/1.73M*2
EOSINOPHIL # BLD AUTO: 0.25 X10*3/UL (ref 0–0.7)
EOSINOPHIL NFR BLD AUTO: 2.4 %
ERYTHROCYTE [DISTWIDTH] IN BLOOD BY AUTOMATED COUNT: 22 % (ref 11.5–14.5)
GIANT PLATELETS BLD QL SMEAR: NORMAL
GLUCOSE SERPL-MCNC: 123 MG/DL (ref 74–99)
HCT VFR BLD AUTO: 41.4 % (ref 41–52)
HGB BLD-MCNC: 13.1 G/DL (ref 13.5–17.5)
HOLD SPECIMEN: NORMAL
IMM GRANULOCYTES # BLD AUTO: 0.07 X10*3/UL (ref 0–0.7)
IMM GRANULOCYTES NFR BLD AUTO: 0.7 % (ref 0–0.9)
INR PPP: 1.2 (ref 0.9–1.1)
LYMPHOCYTES # BLD AUTO: 1.69 X10*3/UL (ref 1.2–4.8)
LYMPHOCYTES NFR BLD AUTO: 16 %
MAGNESIUM SERPL-MCNC: 2 MG/DL (ref 1.6–2.4)
MCH RBC QN AUTO: 28.7 PG (ref 26–34)
MCHC RBC AUTO-ENTMCNC: 31.6 G/DL (ref 32–36)
MCV RBC AUTO: 91 FL (ref 80–100)
MONOCYTES # BLD AUTO: 0.96 X10*3/UL (ref 0.1–1)
MONOCYTES NFR BLD AUTO: 9.1 %
NEUTROPHILS # BLD AUTO: 7.5 X10*3/UL (ref 1.2–7.7)
NEUTROPHILS NFR BLD AUTO: 70.8 %
NRBC BLD-RTO: 0 /100 WBCS (ref 0–0)
OVALOCYTES BLD QL SMEAR: NORMAL
PLATELET # BLD AUTO: 318 X10*3/UL (ref 150–450)
PLATELET CLUMP BLD QL SMEAR: PRESENT
POLYCHROMASIA BLD QL SMEAR: NORMAL
POTASSIUM SERPL-SCNC: 4.1 MMOL/L (ref 3.5–5.3)
PROT SERPL-MCNC: 7.3 G/DL (ref 6.4–8.2)
PROTHROMBIN TIME: 13.9 SECONDS (ref 9.8–12.8)
RBC # BLD AUTO: 4.57 X10*6/UL (ref 4.5–5.9)
RBC MORPH BLD: NORMAL
SODIUM SERPL-SCNC: 135 MMOL/L (ref 136–145)
TARGETS BLD QL SMEAR: NORMAL
WBC # BLD AUTO: 10.6 X10*3/UL (ref 4.4–11.3)

## 2024-08-19 PROCEDURE — 2500000001 HC RX 250 WO HCPCS SELF ADMINISTERED DRUGS (ALT 637 FOR MEDICARE OP)

## 2024-08-19 PROCEDURE — 85025 COMPLETE CBC W/AUTO DIFF WBC: CPT | Performed by: STUDENT IN AN ORGANIZED HEALTH CARE EDUCATION/TRAINING PROGRAM

## 2024-08-19 PROCEDURE — 1200000002 HC GENERAL ROOM WITH TELEMETRY DAILY

## 2024-08-19 PROCEDURE — 84484 ASSAY OF TROPONIN QUANT: CPT | Performed by: STUDENT IN AN ORGANIZED HEALTH CARE EDUCATION/TRAINING PROGRAM

## 2024-08-19 PROCEDURE — 93010 ELECTROCARDIOGRAM REPORT: CPT | Performed by: INTERNAL MEDICINE

## 2024-08-19 PROCEDURE — 4B02XTZ MEASUREMENT OF CARDIAC DEFIBRILLATOR, EXTERNAL APPROACH: ICD-10-PCS | Performed by: NURSE PRACTITIONER

## 2024-08-19 PROCEDURE — 93284 PRGRMG EVAL IMPLANTABLE DFB: CPT | Performed by: INTERNAL MEDICINE

## 2024-08-19 PROCEDURE — 99285 EMERGENCY DEPT VISIT HI MDM: CPT | Mod: 25

## 2024-08-19 PROCEDURE — 1100000001 HC PRIVATE ROOM DAILY

## 2024-08-19 PROCEDURE — 36415 COLL VENOUS BLD VENIPUNCTURE: CPT | Performed by: STUDENT IN AN ORGANIZED HEALTH CARE EDUCATION/TRAINING PROGRAM

## 2024-08-19 PROCEDURE — 99223 1ST HOSP IP/OBS HIGH 75: CPT | Performed by: STUDENT IN AN ORGANIZED HEALTH CARE EDUCATION/TRAINING PROGRAM

## 2024-08-19 PROCEDURE — 85730 THROMBOPLASTIN TIME PARTIAL: CPT | Performed by: STUDENT IN AN ORGANIZED HEALTH CARE EDUCATION/TRAINING PROGRAM

## 2024-08-19 PROCEDURE — 83880 ASSAY OF NATRIURETIC PEPTIDE: CPT | Performed by: STUDENT IN AN ORGANIZED HEALTH CARE EDUCATION/TRAINING PROGRAM

## 2024-08-19 PROCEDURE — 2500000005 HC RX 250 GENERAL PHARMACY W/O HCPCS: Performed by: STUDENT IN AN ORGANIZED HEALTH CARE EDUCATION/TRAINING PROGRAM

## 2024-08-19 PROCEDURE — 2500000002 HC RX 250 W HCPCS SELF ADMINISTERED DRUGS (ALT 637 FOR MEDICARE OP, ALT 636 FOR OP/ED)

## 2024-08-19 PROCEDURE — 71045 X-RAY EXAM CHEST 1 VIEW: CPT

## 2024-08-19 PROCEDURE — 2500000001 HC RX 250 WO HCPCS SELF ADMINISTERED DRUGS (ALT 637 FOR MEDICARE OP): Performed by: STUDENT IN AN ORGANIZED HEALTH CARE EDUCATION/TRAINING PROGRAM

## 2024-08-19 PROCEDURE — 93284 PRGRMG EVAL IMPLANTABLE DFB: CPT

## 2024-08-19 PROCEDURE — 2500000002 HC RX 250 W HCPCS SELF ADMINISTERED DRUGS (ALT 637 FOR MEDICARE OP, ALT 636 FOR OP/ED): Performed by: STUDENT IN AN ORGANIZED HEALTH CARE EDUCATION/TRAINING PROGRAM

## 2024-08-19 PROCEDURE — 80053 COMPREHEN METABOLIC PANEL: CPT | Performed by: STUDENT IN AN ORGANIZED HEALTH CARE EDUCATION/TRAINING PROGRAM

## 2024-08-19 PROCEDURE — 96372 THER/PROPH/DIAG INJ SC/IM: CPT | Performed by: STUDENT IN AN ORGANIZED HEALTH CARE EDUCATION/TRAINING PROGRAM

## 2024-08-19 PROCEDURE — 96375 TX/PRO/DX INJ NEW DRUG ADDON: CPT

## 2024-08-19 PROCEDURE — 93005 ELECTROCARDIOGRAM TRACING: CPT

## 2024-08-19 PROCEDURE — 99233 SBSQ HOSP IP/OBS HIGH 50: CPT

## 2024-08-19 PROCEDURE — 99223 1ST HOSP IP/OBS HIGH 75: CPT | Performed by: INTERNAL MEDICINE

## 2024-08-19 PROCEDURE — 71045 X-RAY EXAM CHEST 1 VIEW: CPT | Performed by: RADIOLOGY

## 2024-08-19 PROCEDURE — 4B02XTZ MEASUREMENT OF CARDIAC DEFIBRILLATOR, EXTERNAL APPROACH: ICD-10-PCS | Performed by: INTERNAL MEDICINE

## 2024-08-19 PROCEDURE — 85610 PROTHROMBIN TIME: CPT | Performed by: STUDENT IN AN ORGANIZED HEALTH CARE EDUCATION/TRAINING PROGRAM

## 2024-08-19 PROCEDURE — 2500000004 HC RX 250 GENERAL PHARMACY W/ HCPCS (ALT 636 FOR OP/ED): Performed by: STUDENT IN AN ORGANIZED HEALTH CARE EDUCATION/TRAINING PROGRAM

## 2024-08-19 PROCEDURE — 83735 ASSAY OF MAGNESIUM: CPT | Performed by: STUDENT IN AN ORGANIZED HEALTH CARE EDUCATION/TRAINING PROGRAM

## 2024-08-19 PROCEDURE — 96374 THER/PROPH/DIAG INJ IV PUSH: CPT | Mod: 59

## 2024-08-19 RX ORDER — DAPAGLIFLOZIN 10 MG/1
10 TABLET, FILM COATED ORAL DAILY
Status: DISCONTINUED | OUTPATIENT
Start: 2024-08-20 | End: 2024-08-30 | Stop reason: HOSPADM

## 2024-08-19 RX ORDER — FUROSEMIDE 10 MG/ML
40 INJECTION INTRAMUSCULAR; INTRAVENOUS 2 TIMES DAILY
Status: DISCONTINUED | OUTPATIENT
Start: 2024-08-19 | End: 2024-08-19 | Stop reason: HOSPADM

## 2024-08-19 RX ORDER — LANOLIN ALCOHOL/MO/W.PET/CERES
400 CREAM (GRAM) TOPICAL DAILY
Status: DISCONTINUED | OUTPATIENT
Start: 2024-08-19 | End: 2024-08-19 | Stop reason: HOSPADM

## 2024-08-19 RX ORDER — AMIODARONE HYDROCHLORIDE 200 MG/1
200 TABLET ORAL 2 TIMES DAILY
Status: DISCONTINUED | OUTPATIENT
Start: 2024-08-19 | End: 2024-08-30 | Stop reason: HOSPADM

## 2024-08-19 RX ORDER — ACETAMINOPHEN 160 MG/5ML
650 SOLUTION ORAL EVERY 4 HOURS PRN
Status: DISCONTINUED | OUTPATIENT
Start: 2024-08-19 | End: 2024-08-19 | Stop reason: HOSPADM

## 2024-08-19 RX ORDER — TAMSULOSIN HYDROCHLORIDE 0.4 MG/1
0.4 CAPSULE ORAL DAILY
Status: DISCONTINUED | OUTPATIENT
Start: 2024-08-19 | End: 2024-08-19 | Stop reason: HOSPADM

## 2024-08-19 RX ORDER — POTASSIUM CHLORIDE 1.5 G/1.58G
20 POWDER, FOR SOLUTION ORAL DAILY
Status: DISCONTINUED | OUTPATIENT
Start: 2024-08-19 | End: 2024-08-19 | Stop reason: HOSPADM

## 2024-08-19 RX ORDER — PAROXETINE 30 MG/1
60 TABLET, FILM COATED ORAL NIGHTLY
Status: DISCONTINUED | OUTPATIENT
Start: 2024-08-20 | End: 2024-08-30 | Stop reason: HOSPADM

## 2024-08-19 RX ORDER — TAMSULOSIN HYDROCHLORIDE 0.4 MG/1
0.4 CAPSULE ORAL DAILY
Status: DISCONTINUED | OUTPATIENT
Start: 2024-08-20 | End: 2024-08-30 | Stop reason: HOSPADM

## 2024-08-19 RX ORDER — ACETAMINOPHEN 650 MG/1
650 SUPPOSITORY RECTAL EVERY 4 HOURS PRN
Status: DISCONTINUED | OUTPATIENT
Start: 2024-08-19 | End: 2024-08-19 | Stop reason: HOSPADM

## 2024-08-19 RX ORDER — ONDANSETRON HYDROCHLORIDE 2 MG/ML
4 INJECTION, SOLUTION INTRAVENOUS EVERY 8 HOURS PRN
Status: DISCONTINUED | OUTPATIENT
Start: 2024-08-19 | End: 2024-08-19 | Stop reason: HOSPADM

## 2024-08-19 RX ORDER — METOPROLOL SUCCINATE 25 MG/1
12.5 TABLET, EXTENDED RELEASE ORAL DAILY
Status: DISCONTINUED | OUTPATIENT
Start: 2024-08-20 | End: 2024-08-27

## 2024-08-19 RX ORDER — PANTOPRAZOLE SODIUM 20 MG/1
20 TABLET, DELAYED RELEASE ORAL
Status: DISCONTINUED | OUTPATIENT
Start: 2024-08-19 | End: 2024-08-19 | Stop reason: HOSPADM

## 2024-08-19 RX ORDER — AMIODARONE HYDROCHLORIDE 200 MG/1
200 TABLET ORAL 2 TIMES DAILY
Status: DISCONTINUED | OUTPATIENT
Start: 2024-08-19 | End: 2024-08-19 | Stop reason: HOSPADM

## 2024-08-19 RX ORDER — ALPRAZOLAM 0.25 MG/1
0.25 TABLET ORAL 2 TIMES DAILY PRN
Status: DISCONTINUED | OUTPATIENT
Start: 2024-08-19 | End: 2024-08-19 | Stop reason: HOSPADM

## 2024-08-19 RX ORDER — DAPAGLIFLOZIN 5 MG/1
10 TABLET, FILM COATED ORAL DAILY
Status: DISCONTINUED | OUTPATIENT
Start: 2024-08-19 | End: 2024-08-19 | Stop reason: HOSPADM

## 2024-08-19 RX ORDER — LORAZEPAM 0.5 MG/1
0.5 TABLET ORAL NIGHTLY
Status: DISCONTINUED | OUTPATIENT
Start: 2024-08-19 | End: 2024-08-19

## 2024-08-19 RX ORDER — MEXILETINE HYDROCHLORIDE 150 MG/1
300 CAPSULE ORAL 3 TIMES DAILY
Status: DISCONTINUED | OUTPATIENT
Start: 2024-08-19 | End: 2024-08-19 | Stop reason: HOSPADM

## 2024-08-19 RX ORDER — FUROSEMIDE 10 MG/ML
40 INJECTION INTRAMUSCULAR; INTRAVENOUS 2 TIMES DAILY
Status: DISCONTINUED | OUTPATIENT
Start: 2024-08-19 | End: 2024-08-20

## 2024-08-19 RX ORDER — SPIRONOLACTONE 25 MG/1
12.5 TABLET ORAL DAILY
Status: DISCONTINUED | OUTPATIENT
Start: 2024-08-20 | End: 2024-08-28

## 2024-08-19 RX ORDER — METOPROLOL SUCCINATE 25 MG/1
12.5 TABLET, EXTENDED RELEASE ORAL DAILY
Status: DISCONTINUED | OUTPATIENT
Start: 2024-08-19 | End: 2024-08-19

## 2024-08-19 RX ORDER — TRAZODONE HYDROCHLORIDE 50 MG/1
50 TABLET ORAL NIGHTLY
Status: DISCONTINUED | OUTPATIENT
Start: 2024-08-19 | End: 2024-08-30 | Stop reason: HOSPADM

## 2024-08-19 RX ORDER — LANOLIN ALCOHOL/MO/W.PET/CERES
400 CREAM (GRAM) TOPICAL DAILY
Status: DISCONTINUED | OUTPATIENT
Start: 2024-08-20 | End: 2024-08-20

## 2024-08-19 RX ORDER — FUROSEMIDE 10 MG/ML
40 INJECTION INTRAMUSCULAR; INTRAVENOUS 2 TIMES DAILY
Qty: 240 ML | Refills: 0 | Status: SHIPPED | OUTPATIENT
Start: 2024-08-20 | End: 2024-08-30 | Stop reason: HOSPADM

## 2024-08-19 RX ORDER — ACETAMINOPHEN 325 MG/1
650 TABLET ORAL EVERY 4 HOURS PRN
Status: DISCONTINUED | OUTPATIENT
Start: 2024-08-19 | End: 2024-08-19 | Stop reason: HOSPADM

## 2024-08-19 RX ORDER — FUROSEMIDE 10 MG/ML
40 INJECTION INTRAMUSCULAR; INTRAVENOUS ONCE
Status: COMPLETED | OUTPATIENT
Start: 2024-08-19 | End: 2024-08-19

## 2024-08-19 RX ORDER — POTASSIUM CHLORIDE 1.5 G/1.58G
20 POWDER, FOR SOLUTION ORAL 2 TIMES DAILY
Status: DISCONTINUED | OUTPATIENT
Start: 2024-08-19 | End: 2024-08-20

## 2024-08-19 RX ORDER — ENOXAPARIN SODIUM 100 MG/ML
40 INJECTION SUBCUTANEOUS DAILY
Status: DISCONTINUED | OUTPATIENT
Start: 2024-08-19 | End: 2024-08-19 | Stop reason: HOSPADM

## 2024-08-19 RX ORDER — ALBUTEROL SULFATE 90 UG/1
2 INHALANT RESPIRATORY (INHALATION) EVERY 6 HOURS PRN
Status: DISCONTINUED | OUTPATIENT
Start: 2024-08-19 | End: 2024-08-30 | Stop reason: HOSPADM

## 2024-08-19 RX ORDER — ROSUVASTATIN CALCIUM 20 MG/1
20 TABLET, COATED ORAL NIGHTLY
Status: DISCONTINUED | OUTPATIENT
Start: 2024-08-19 | End: 2024-08-19 | Stop reason: HOSPADM

## 2024-08-19 RX ORDER — ASPIRIN 81 MG/1
81 TABLET ORAL DAILY
Status: DISCONTINUED | OUTPATIENT
Start: 2024-08-20 | End: 2024-08-30 | Stop reason: HOSPADM

## 2024-08-19 RX ORDER — PAROXETINE HYDROCHLORIDE 20 MG/1
60 TABLET, FILM COATED ORAL NIGHTLY
Status: DISCONTINUED | OUTPATIENT
Start: 2024-08-19 | End: 2024-08-19 | Stop reason: HOSPADM

## 2024-08-19 RX ORDER — SPIRONOLACTONE 25 MG/1
12.5 TABLET ORAL DAILY
Status: DISCONTINUED | OUTPATIENT
Start: 2024-08-19 | End: 2024-08-19

## 2024-08-19 RX ORDER — ROSUVASTATIN CALCIUM 40 MG/1
20 TABLET, COATED ORAL NIGHTLY
Status: DISCONTINUED | OUTPATIENT
Start: 2024-08-19 | End: 2024-08-30 | Stop reason: HOSPADM

## 2024-08-19 RX ORDER — ASPIRIN 81 MG/1
81 TABLET ORAL DAILY
Status: DISCONTINUED | OUTPATIENT
Start: 2024-08-19 | End: 2024-08-19 | Stop reason: HOSPADM

## 2024-08-19 RX ORDER — LORAZEPAM 0.5 MG/1
0.5 TABLET ORAL NIGHTLY
Status: DISCONTINUED | OUTPATIENT
Start: 2024-08-19 | End: 2024-08-20

## 2024-08-19 RX ORDER — MEXILETINE HYDROCHLORIDE 150 MG/1
300 CAPSULE ORAL 3 TIMES DAILY
Status: DISCONTINUED | OUTPATIENT
Start: 2024-08-20 | End: 2024-08-30 | Stop reason: HOSPADM

## 2024-08-19 RX ORDER — ONDANSETRON 4 MG/1
4 TABLET, FILM COATED ORAL EVERY 8 HOURS PRN
Status: DISCONTINUED | OUTPATIENT
Start: 2024-08-19 | End: 2024-08-19 | Stop reason: HOSPADM

## 2024-08-19 RX ORDER — LORAZEPAM 2 MG/ML
0.5 INJECTION INTRAMUSCULAR ONCE
Status: COMPLETED | OUTPATIENT
Start: 2024-08-19 | End: 2024-08-19

## 2024-08-19 RX ORDER — POLYETHYLENE GLYCOL 3350 17 G/17G
17 POWDER, FOR SOLUTION ORAL DAILY
Status: DISCONTINUED | OUTPATIENT
Start: 2024-08-19 | End: 2024-08-19 | Stop reason: HOSPADM

## 2024-08-19 RX ORDER — RANOLAZINE 500 MG/1
500 TABLET, EXTENDED RELEASE ORAL 2 TIMES DAILY
Status: DISCONTINUED | OUTPATIENT
Start: 2024-08-19 | End: 2024-08-19 | Stop reason: HOSPADM

## 2024-08-19 RX ORDER — PANTOPRAZOLE SODIUM 40 MG/1
40 TABLET, DELAYED RELEASE ORAL
Status: DISCONTINUED | OUTPATIENT
Start: 2024-08-20 | End: 2024-08-30 | Stop reason: HOSPADM

## 2024-08-19 RX ORDER — RANOLAZINE 500 MG/1
500 TABLET, EXTENDED RELEASE ORAL 2 TIMES DAILY
Status: DISCONTINUED | OUTPATIENT
Start: 2024-08-19 | End: 2024-08-30 | Stop reason: HOSPADM

## 2024-08-19 RX ORDER — PANTOPRAZOLE SODIUM 20 MG/1
20 TABLET, DELAYED RELEASE ORAL
Qty: 30 TABLET | Refills: 0 | Status: ON HOLD | OUTPATIENT
Start: 2024-08-20 | End: 2024-08-19

## 2024-08-19 SDOH — ECONOMIC STABILITY: HOUSING INSECURITY: IN THE PAST 12 MONTHS, HOW MANY TIMES HAVE YOU MOVED WHERE YOU WERE LIVING?: 2

## 2024-08-19 SDOH — SOCIAL STABILITY: SOCIAL INSECURITY: ARE YOU OR HAVE YOU BEEN THREATENED OR ABUSED PHYSICALLY, EMOTIONALLY, OR SEXUALLY BY ANYONE?: NO

## 2024-08-19 SDOH — SOCIAL STABILITY: SOCIAL INSECURITY: ABUSE: ADULT

## 2024-08-19 SDOH — ECONOMIC STABILITY: INCOME INSECURITY: HOW HARD IS IT FOR YOU TO PAY FOR THE VERY BASICS LIKE FOOD, HOUSING, MEDICAL CARE, AND HEATING?: NOT HARD AT ALL

## 2024-08-19 SDOH — HEALTH STABILITY: PHYSICAL HEALTH: ON AVERAGE, HOW MANY MINUTES DO YOU ENGAGE IN EXERCISE AT THIS LEVEL?: 0 MIN

## 2024-08-19 SDOH — SOCIAL STABILITY: SOCIAL INSECURITY: HAS ANYONE EVER THREATENED TO HURT YOUR FAMILY OR YOUR PETS?: NO

## 2024-08-19 SDOH — SOCIAL STABILITY: SOCIAL INSECURITY: DO YOU FEEL UNSAFE GOING BACK TO THE PLACE WHERE YOU ARE LIVING?: NO

## 2024-08-19 SDOH — HEALTH STABILITY: MENTAL HEALTH: HOW MANY STANDARD DRINKS CONTAINING ALCOHOL DO YOU HAVE ON A TYPICAL DAY?: 1 OR 2

## 2024-08-19 SDOH — ECONOMIC STABILITY: INCOME INSECURITY: IN THE LAST 12 MONTHS, WAS THERE A TIME WHEN YOU WERE NOT ABLE TO PAY THE MORTGAGE OR RENT ON TIME?: NO

## 2024-08-19 SDOH — SOCIAL STABILITY: SOCIAL INSECURITY: DOES ANYONE TRY TO KEEP YOU FROM HAVING/CONTACTING OTHER FRIENDS OR DOING THINGS OUTSIDE YOUR HOME?: NO

## 2024-08-19 SDOH — HEALTH STABILITY: MENTAL HEALTH: HOW OFTEN DO YOU HAVE A DRINK CONTAINING ALCOHOL?: 2-4 TIMES A MONTH

## 2024-08-19 SDOH — HEALTH STABILITY: MENTAL HEALTH: HOW OFTEN DO YOU HAVE 6 OR MORE DRINKS ON ONE OCCASION?: NEVER

## 2024-08-19 SDOH — SOCIAL STABILITY: SOCIAL INSECURITY: HAVE YOU HAD THOUGHTS OF HARMING ANYONE ELSE?: NO

## 2024-08-19 SDOH — SOCIAL STABILITY: SOCIAL INSECURITY: HAVE YOU HAD ANY THOUGHTS OF HARMING ANYONE ELSE?: NO

## 2024-08-19 SDOH — SOCIAL STABILITY: SOCIAL INSECURITY: WERE YOU ABLE TO COMPLETE ALL THE BEHAVIORAL HEALTH SCREENINGS?: YES

## 2024-08-19 SDOH — SOCIAL STABILITY: SOCIAL INSECURITY: DO YOU FEEL ANYONE HAS EXPLOITED OR TAKEN ADVANTAGE OF YOU FINANCIALLY OR OF YOUR PERSONAL PROPERTY?: NO

## 2024-08-19 SDOH — ECONOMIC STABILITY: HOUSING INSECURITY: AT ANY TIME IN THE PAST 12 MONTHS, WERE YOU HOMELESS OR LIVING IN A SHELTER (INCLUDING NOW)?: NO

## 2024-08-19 SDOH — HEALTH STABILITY: PHYSICAL HEALTH: ON AVERAGE, HOW MANY DAYS PER WEEK DO YOU ENGAGE IN MODERATE TO STRENUOUS EXERCISE (LIKE A BRISK WALK)?: 0 DAYS

## 2024-08-19 SDOH — ECONOMIC STABILITY: INCOME INSECURITY: HOW HARD IS IT FOR YOU TO PAY FOR THE VERY BASICS LIKE FOOD, HOUSING, MEDICAL CARE, AND HEATING?: NOT VERY HARD

## 2024-08-19 SDOH — SOCIAL STABILITY: SOCIAL INSECURITY: ARE THERE ANY APPARENT SIGNS OF INJURIES/BEHAVIORS THAT COULD BE RELATED TO ABUSE/NEGLECT?: NO

## 2024-08-19 SDOH — ECONOMIC STABILITY: HOUSING INSECURITY: IN THE PAST 12 MONTHS, HOW MANY TIMES HAVE YOU MOVED WHERE YOU WERE LIVING?: 1

## 2024-08-19 ASSESSMENT — ACTIVITIES OF DAILY LIVING (ADL)
JUDGMENT_ADEQUATE_SAFELY_COMPLETE_DAILY_ACTIVITIES: YES
PATIENT'S MEMORY ADEQUATE TO SAFELY COMPLETE DAILY ACTIVITIES?: YES
DRESSING YOURSELF: INDEPENDENT
TOILETING: INDEPENDENT
LACK_OF_TRANSPORTATION: NO
WALKS IN HOME: INDEPENDENT
PATIENT'S MEMORY ADEQUATE TO SAFELY COMPLETE DAILY ACTIVITIES?: YES
HEARING - LEFT EAR: FUNCTIONAL
GROOMING: INDEPENDENT
WALKS IN HOME: INDEPENDENT
BATHING: NEEDS ASSISTANCE
ASSISTIVE_DEVICE: CANE;WALKER
JUDGMENT_ADEQUATE_SAFELY_COMPLETE_DAILY_ACTIVITIES: YES
ADEQUATE_TO_COMPLETE_ADL: YES
HEARING - RIGHT EAR: FUNCTIONAL
TOILETING: INDEPENDENT
ASSISTIVE_DEVICE: DENTURES LOWER;DENTURES UPPER
ADEQUATE_TO_COMPLETE_ADL: YES
DRESSING YOURSELF: INDEPENDENT
BATHING: INDEPENDENT
GROOMING: INDEPENDENT
FEEDING YOURSELF: INDEPENDENT
HEARING - RIGHT EAR: FUNCTIONAL
FEEDING YOURSELF: INDEPENDENT
HEARING - LEFT EAR: FUNCTIONAL
LACK_OF_TRANSPORTATION: NO

## 2024-08-19 ASSESSMENT — LIFESTYLE VARIABLES
AUDIT-C TOTAL SCORE: 2
HOW OFTEN DO YOU HAVE A DRINK CONTAINING ALCOHOL: 2-4 TIMES A MONTH
EVER HAD A DRINK FIRST THING IN THE MORNING TO STEADY YOUR NERVES TO GET RID OF A HANGOVER: NO
EVER FELT BAD OR GUILTY ABOUT YOUR DRINKING: NO
TOTAL SCORE: 0
HOW MANY STANDARD DRINKS CONTAINING ALCOHOL DO YOU HAVE ON A TYPICAL DAY: PATIENT DECLINED
SKIP TO QUESTIONS 9-10: 1
AUDIT-C TOTAL SCORE: -1
SKIP TO QUESTIONS 9-10: 0
AUDIT-C TOTAL SCORE: 2
HOW OFTEN DO YOU HAVE 6 OR MORE DRINKS ON ONE OCCASION: NEVER
HOW MANY STANDARD DRINKS CONTAINING ALCOHOL DO YOU HAVE ON A TYPICAL DAY: 1 OR 2
AUDIT-C TOTAL SCORE: 2
AUDIT-C TOTAL SCORE: -1
HOW OFTEN DO YOU HAVE A DRINK CONTAINING ALCOHOL: PATIENT DECLINED
HOW OFTEN DO YOU HAVE 6 OR MORE DRINKS ON ONE OCCASION: PATIENT DECLINED
HAVE PEOPLE ANNOYED YOU BY CRITICIZING YOUR DRINKING: NO
SKIP TO QUESTIONS 9-10: 1
HAVE YOU EVER FELT YOU SHOULD CUT DOWN ON YOUR DRINKING: NO

## 2024-08-19 ASSESSMENT — PATIENT HEALTH QUESTIONNAIRE - PHQ9
1. LITTLE INTEREST OR PLEASURE IN DOING THINGS: NOT AT ALL
SUM OF ALL RESPONSES TO PHQ9 QUESTIONS 1 & 2: 0
SUM OF ALL RESPONSES TO PHQ9 QUESTIONS 1 & 2: 0
2. FEELING DOWN, DEPRESSED OR HOPELESS: NOT AT ALL
1. LITTLE INTEREST OR PLEASURE IN DOING THINGS: NOT AT ALL
2. FEELING DOWN, DEPRESSED OR HOPELESS: NOT AT ALL

## 2024-08-19 ASSESSMENT — COGNITIVE AND FUNCTIONAL STATUS - GENERAL
MOBILITY SCORE: 24
PATIENT BASELINE BEDBOUND: NO
DAILY ACTIVITIY SCORE: 24
MOBILITY SCORE: 24
PATIENT BASELINE BEDBOUND: NO
DAILY ACTIVITIY SCORE: 24
MOBILITY SCORE: 24
DAILY ACTIVITIY SCORE: 24

## 2024-08-19 ASSESSMENT — ENCOUNTER SYMPTOMS
SHORTNESS OF BREATH: 1
PALPITATIONS: 1

## 2024-08-19 ASSESSMENT — PAIN SCALES - GENERAL
PAINLEVEL_OUTOF10: 0 - NO PAIN

## 2024-08-19 ASSESSMENT — COLUMBIA-SUICIDE SEVERITY RATING SCALE - C-SSRS
6. HAVE YOU EVER DONE ANYTHING, STARTED TO DO ANYTHING, OR PREPARED TO DO ANYTHING TO END YOUR LIFE?: NO
1. IN THE PAST MONTH, HAVE YOU WISHED YOU WERE DEAD OR WISHED YOU COULD GO TO SLEEP AND NOT WAKE UP?: NO
6. HAVE YOU EVER DONE ANYTHING, STARTED TO DO ANYTHING, OR PREPARED TO DO ANYTHING TO END YOUR LIFE?: NO
2. HAVE YOU ACTUALLY HAD ANY THOUGHTS OF KILLING YOURSELF?: NO
2. HAVE YOU ACTUALLY HAD ANY THOUGHTS OF KILLING YOURSELF?: NO
1. IN THE PAST MONTH, HAVE YOU WISHED YOU WERE DEAD OR WISHED YOU COULD GO TO SLEEP AND NOT WAKE UP?: NO

## 2024-08-19 ASSESSMENT — PAIN - FUNCTIONAL ASSESSMENT
PAIN_FUNCTIONAL_ASSESSMENT: 0-10

## 2024-08-19 NOTE — ED PROVIDER NOTES
HPI   Chief Complaint   Patient presents with    Shortness of Breath       58-year-old male with a history of ischemic cardiomyopathy status post AICD placement with history of prior episodes of VT on amiodarone presenting for evaluation of shortness of breath and concerns for VT episode.  Patient states he was awoken from sleep due to shortness of breath, feeling of anxiety.  States that it feels similar to prior episodes of slow VT. denies any shocks.      History provided by:  Patient          Patient History   Past Medical History:   Diagnosis Date    Atherosclerosis of native artery of both lower extremities with intermittent claudication (CMS-[facility])     CHB (complete heart block) (Multi)     per device check    Chronic systolic CHF (congestive heart failure), NYHA class 3 (Multi)     COPD (chronic obstructive pulmonary disease) (Multi)     Coronary artery disease     History of tobacco abuse     HLD (hyperlipidemia)     Hypertension     Infrarenal abdominal aortic aneurysm (AAA) without rupture (CMS-[facility])     Ischemic cardiomyopathy with implantable cardioverter-defibrillator (ICD)     MI (myocardial infarction) (Multi)     multiple    Nephrolithiasis     PTSD (post-traumatic stress disorder)      Past Surgical History:   Procedure Laterality Date    CARDIAC CATHETERIZATION N/A 5/23/2024    Procedure: Left Heart Cath;  Surgeon: Babatunde Tan MD;  Location: [facility];  Service: Cardiovascular;  Laterality: N/A;    CARDIAC DEFIBRILLATOR PLACEMENT      CARDIAC ELECTROPHYSIOLOGY PROCEDURE N/A 5/23/2024    Procedure: Cardioversion;  Surgeon: Zachary Sparks MD;  Location: [facility];  Service: Electrophysiology;  Laterality: N/A;    CARDIAC ELECTROPHYSIOLOGY PROCEDURE N/A 5/28/2024    Procedure: NIPS (NONINVASIVE PROGRAMMED STIMULATION AND DEFIBRILLATOR THRESHOLD TESTING);  Surgeon: Zachary Sparks MD;  Location: [facility];  Service: Electrophysiology;  Laterality: N/A;     CARDIAC ELECTROPHYSIOLOGY PROCEDURE Right 5/30/2024    Procedure: Ablation VT Endocardial (68853);  Surgeon: Sonny Flores MD;  Location: [place] [address] [facility];  Service: Electrophysiology;  Laterality: Right;  CARTO, 2PM    CARDIAC ELECTROPHYSIOLOGY PROCEDURE N/A 6/6/2024    Procedure: Ablation VT;  Surgeon: Eliot Wooten MD;  Location: [place] [address] [facility];  Service: Electrophysiology;  Laterality: N/A;  endocardial vt ablation  carto V8    CORONARY ANGIOPLASTY WITH STENT PLACEMENT  2006    JIM to LAD    CORONARY ANGIOPLASTY WITH STENT PLACEMENT  04/2003    CORONARY ANGIOPLASTY WITH STENT PLACEMENT  06/2008    CYSTOSCOPY W/ URETERAL STENT PLACEMENT  04/01/2024    CYSTOSCOPY W/ URETERAL STENT PLACEMENT  04/30/2024    FEMORAL BYPASS      femoral artery    ILIAC ARTERY STENT Right     KNEE SURGERY       Family History   Problem Relation Name Age of Onset    Hypertension Mother      Diabetes Father      Hypertension Sister      Diabetes Sister      Colon cancer Maternal Grandmother      Hypertension Maternal Grandmother      Heart disease Maternal Grandfather      Hypertension Maternal Grandfather      Cancer Paternal Grandfather      Hypertension Paternal Grandfather       Social History     Tobacco Use    Smoking status: Former     Types: Cigarettes    Smokeless tobacco: Not on file   Vaping Use    Vaping status: Never Used   Substance Use Topics    Alcohol use: Not Currently     Comment: social    Drug use: Not Currently       Physical Exam   ED Triage Vitals [08/19/24 0119]   Temp Heart Rate Respirations BP   -- 84 20 129/77      Pulse Ox Temp Source Heart Rate Source Patient Position   (!) 90 % Temporal Monitor Lying      BP Location FiO2 (%)     Left arm --       Physical Exam  Vitals and nursing note reviewed.   Constitutional:       General: He is not in acute distress.  HENT:      Head: Atraumatic.      Mouth/Throat:      Mouth: Mucous membranes are moist.      Pharynx: Oropharynx is clear.    Eyes:      Conjunctiva/sclera: Conjunctivae normal.   Cardiovascular:      Rate and Rhythm: Normal rate and regular rhythm.      Pulses: Normal pulses.   Pulmonary:      Effort: Pulmonary effort is normal. No respiratory distress.      Breath sounds: Normal breath sounds.   Abdominal:      General: There is no distension.      Palpations: Abdomen is soft.      Tenderness: There is no abdominal tenderness. There is no guarding or rebound.   Musculoskeletal:         General: No deformity.      Cervical back: Neck supple.   Skin:     General: Skin is warm and dry.   Neurological:      Mental Status: He is alert and oriented to person, place, and time. Mental status is at baseline.      Cranial Nerves: No cranial nerve deficit.      Sensory: No sensory deficit.      Motor: No weakness.   Psychiatric:         Mood and Affect: Mood normal.         Behavior: Behavior normal.           ED Course & MDM   Diagnoses as of 08/19/24 0426   Shortness of breath   Acute on chronic congestive heart failure, unspecified heart failure type (Multi)   Acute hypoxic respiratory failure (Multi)                 No data recorded     [place] Coma Scale Score: 15 (08/19/24 0122 : Katherin Stevens RN)                   Labs Reviewed   CBC WITH AUTO DIFFERENTIAL - Abnormal       Result Value    WBC 10.6      nRBC 0.0      RBC 4.57      Hemoglobin 13.1 (*)     Hematocrit 41.4      MCV 91      MCH 28.7      MCHC 31.6 (*)     RDW 22.0 (*)     Platelets 318      Neutrophils % 70.8      Immature Granulocytes %, Automated 0.7      Lymphocytes % 16.0      Monocytes % 9.1      Eosinophils % 2.4      Basophils % 1.0      Neutrophils Absolute 7.50      Immature Granulocytes Absolute, Automated 0.07      Lymphocytes Absolute 1.69      Monocytes Absolute 0.96      Eosinophils Absolute 0.25      Basophils Absolute 0.11 (*)    COMPREHENSIVE METABOLIC PANEL - Abnormal    Glucose 123 (*)     Sodium 135 (*)     Potassium 4.1      Chloride 98      Bicarbonate  27      Anion Gap 14      Urea Nitrogen 23      Creatinine 1.65 (*)     eGFR 48 (*)     Calcium 9.3      Albumin 3.7      Alkaline Phosphatase 171 (*)     Total Protein 7.3      AST 62 (*)     Bilirubin, Total 1.0      ALT 83 (*)    PROTIME-INR - Abnormal    Protime 13.9 (*)     INR 1.2 (*)    B-TYPE NATRIURETIC PEPTIDE - Abnormal    BNP 1,980 (*)     Narrative:        <100 pg/mL - Heart failure unlikely  100-299 pg/mL - Intermediate probability of acute heart                  failure exacerbation. Correlate with clinical                  context and patient history.    >=300 pg/mL - Heart Failure likely. Correlate with clinical                  context and patient history.    BNP testing is performed using different testing methodology at [facility] than at other [facility]. Direct result comparisons should only be made within the same method.      SERIAL TROPONIN-INITIAL - Abnormal    Troponin I, High Sensitivity 23 (*)     Narrative:     Less than 99th percentile of normal range cutoff-  Female and children under 18 years old <14 ng/L; Male <21 ng/L: Negative  Repeat testing should be performed if clinically indicated.     Female and children under 18 years old 14-50 ng/L; Male 21-50 ng/L:  Consistent with possible cardiac damage and possible increased clinical   risk. Serial measurements may help to assess extent of myocardial damage.     >50 ng/L: Consistent with cardiac damage, increased clinical risk and  myocardial infarction. Serial measurements may help assess extent of   myocardial damage.      NOTE: Children less than 1 year old may have higher baseline troponin   levels and results should be interpreted in conjunction with the overall   clinical context.     NOTE: Troponin I testing is performed using a different   testing methodology at [facility] than at other   [facility]. Direct result comparisons should only   be made within the same method.   SERIAL  TROPONIN, 1 HOUR - Abnormal    Troponin I, High Sensitivity 26 (*)     Narrative:     Less than 99th percentile of normal range cutoff-  Female and children under 18 years old <14 ng/L; Male <21 ng/L: Negative  Repeat testing should be performed if clinically indicated.     Female and children under 18 years old 14-50 ng/L; Male 21-50 ng/L:  Consistent with possible cardiac damage and possible increased clinical   risk. Serial measurements may help to assess extent of myocardial damage.     >50 ng/L: Consistent with cardiac damage, increased clinical risk and  myocardial infarction. Serial measurements may help assess extent of   myocardial damage.      NOTE: Children less than 1 year old may have higher baseline troponin   levels and results should be interpreted in conjunction with the overall   clinical context.     NOTE: Troponin I testing is performed using a different   testing methodology at [facility] than at other   [facility]. Direct result comparisons should only   be made within the same method.   MAGNESIUM - Normal    Magnesium 2.00     APTT - Normal    aPTT 28      Narrative:     The APTT is no longer used for monitoring Unfractionated Heparin Therapy. For monitoring Heparin Therapy, use the Heparin Assay.   TROPONIN SERIES- (INITIAL, 1 HR)    Narrative:     The following orders were created for panel order Troponin I Series, High Sensitivity (0, 1 HR).  Procedure                               Abnormality         Status                     ---------                               -----------         ------                     Troponin I, High Sensiti...[[id number]]  Abnormal            Final result               Troponin, High Sensitivi...[[id number]]  Abnormal            Final result                 Please view results for these tests on the individual orders.   GRAY TOP    Extra Tube Hold for add-ons.     MORPHOLOGY    RBC Morphology See Below      Polychromasia Mild      Target  Cells Few      Ovalocytes Few      Teardrop Cells Few      Giant Platelets Few      Clumped Platelets Present       XR chest 1 view   Final Result   Enlarged cardiac silhouette and prominent interstitial markings   suggesting pulmonary interstitial edema. Small to moderate right   pleural effusion and superimposed right basilar atelectasis.        MACRO:   None.        Signed by: Evan Finkelstein 8/19/2024 2:34 AM   Dictation workstation:   [id number]                Medical Decision Making  58-year-old male presenting with shortness of breath that woke him from his sleep, states it feels similar to prior episodes of slow VT.  On evaluation patient awake and alert, no acute distress.  Normal heart rate on monitor, appears to be paced rhythm.  AICD interrogated and does not demonstrate any episodes to explain patient's symptoms.  Lab work reviewed.  CBC without leukocytosis, baseline H&H.  Metabolic panel with slightly worse renal function from baseline with creatinine of 1.6.  BNP significantly elevated at 1980.  Troponin mildly elevated 23.  Normal potassium and magnesium.  Chest x-ray with enlarged cardiac silhouette and interstitial markings suggestive of pulmonary edema.  Patient's overall workup demonstrates findings consistent with CHF exacerbation.  Patient requiring supplemental O2 via nasal cannula due to low pulse ox.  Plan to admit for further management of acute on chronic CHF exacerbation and mild hypoxia.  Case discussed with hospitalist for admission.  Patient given Lasix 40 mg IV.    Amount and/or Complexity of Data Reviewed  ECG/medicine tests: ordered and independent interpretation performed. Decision-making details documented in ED Course.     Details: Twelve-lead ECG obtained at 0118 by my interpretation demonstrates atrial sensed ventricular paced rhythm with a rate 85, no acute ischemic changes.  Similar morphology to prior ECGs.        Procedure  Procedures     Kd Bruner,  MD  08/19/24 0426

## 2024-08-19 NOTE — CONSULTS
Inpatient consult to Cardiology  Consult performed by: YARITZA Ibrahim-CNP  Consult ordered by: Clayton Santos MD  Reason for consult: severe decompensated CHF                                                          Date:   8/19/2024  Patient name:  Cristian Sapp  Date of admission:  8/19/2024  1:14 AM  MRN:   [id number]  YOB: 1965  Time of Consult:  8:53 AM    Consulting Cardiologist: YARITZA Purdy, CNP  Primary Cardiologist:  Dr. Eamon Rosa  /Dr. Brown  Referring Provider: Dr. Wade      Admission Diagnosis:     Acute on chronic systolic (congestive) heart failure (Multi)      History of Present Illness:      58 year old male with history of atherosclerotic heart disease with previous multivessel angioplasty and stenting procedures.  Initial myocardial infarction was in 2003.  He has underlying ischemic cardiomyopathy with severe LV dysfunction.  Estimated LV ejection fraction in May 2024 was 15%.  He is status post CRT-D implant.  He previously had VT storm and underwent ventricular tachycardia ablation in 2019.  He was hospitalized in May 23, 2024 after receiving multiple ICD shocks for recurrent VT.  He was initially hospitalized at [facility] and was transferred to [facility] May 28, 2024 for continued management.  He was in slow VT and was started on an amiodarone drip.  He had VT ablation May 30, 2024 but continued to have intermittent episodes of slow ventricular tachycardia.  He was cardioverted on Elle 3, 2024.  He was maintained on amiodarone and lidocaine drips.  He underwent stellate ganglion block June 4, 2024.  It was felt that heart transplant was not going to be an option for him.  He opted against an LVAD.  He was seen by palliative medicine and he changed his CODE STATUS to DNR-DNI on June 5, 2024.  He had redo endocardial ablation by Dr. Wooten June 6, 2024.  He was extubated June 7, 2024 and intra-aortic balloon pump was removed.  He was  initiated on low-dose Entresto.  He was changed to oral mexiletine.  Cardiac cath during his recent admission showed severe triple-vessel coronary artery disease.  Right coronary artery and circumflex were totally occluded and filled via collaterals.  LAD had mild stenosis.  No lesions were minimal to revascularization.      He was seen in follow-up by Dr. Sparks June 28, 2024.  Since his most recent discharge he has been doing quite well.  He has noted some episodes of lightheadedness with transient systolic blood pressure readings in the 80s.  He notes ongoing fatigue/tiredness.  He is currently maintained on amiodarone 40 mg daily and mexiletine 200 mg 3 times daily.  His Bystolic dose was decreased to 2.5 mg daily recently.      He has a history of abdominal aortic aneurysm without rupture.  He has hypertension, hyperlipidemia, tobacco use, nephrolithiasis, and PTSD.    He was just discharged from [facility] in July of this year after he reported his AICD was firing.  He was noted to be in slow VT and then was cardioverted in the emergency department.  He returned to normal sinus rhythm after cardioversion and was placed on IV amiodarone.  He was discharged home in stable condition.    Yesterday he presented to the [facility] emergency department with complaints of increased shortness of breath.  The patient reports that he woke up and felt similar to his prior episodes of slow ventricular tachycardia.  His defibrillator did not fire although he felt like it was trying to pace him out of the rhythm.  He does endorse abdominal swelling and increased shortness of breath along with orthopnea.  Denies any cough fever chills.  Reports that he has been compliant with his home torsemide dose.  In the emergency department his blood pressure noted to be 129/77, heart rate 84, respiratory 20, afebrile.  He was placed on supplemental O2.  Lab work showed a  creatinine of 1.65, ALT 83, AST 62, BNP of 1900 and troponin of 23 but flat.  Hemoglobin is noted to be 13.1.  Chest x-ray showed enlarged cardiac silhouette and prominent interstitial markings suggesting pulmonary interstitial edema and small to moderate right pleural effusion.  AICD was interrogated in the emergency department which showed slow VT in the 100s following below detection rate.  The patient was given Lasix and admitted to telemetry.  General cardiology and electrophysiology was consulted.      Allergies:     Allergies   Allergen Reactions    Chantix [Varenicline] Unknown         Past Medical History:     Past Medical History:   Diagnosis Date    Atherosclerosis of native artery of both lower extremities with intermittent claudication (CMS-HCC)     CHB (complete heart block) (Multi)     per device check    Chronic systolic CHF (congestive heart failure), NYHA class 3 (Multi)     COPD (chronic obstructive pulmonary disease) (Multi)     Coronary artery disease     History of tobacco abuse     HLD (hyperlipidemia)     Hypertension     Infrarenal abdominal aortic aneurysm (AAA) without rupture (CMS-[facility])     Ischemic cardiomyopathy with implantable cardioverter-defibrillator (ICD)     MI (myocardial infarction) (Multi)     multiple    Nephrolithiasis     PTSD (post-traumatic stress disorder)        Past Surgical History:     Past Surgical History:   Procedure Laterality Date    CARDIAC CATHETERIZATION N/A 5/23/2024    Procedure: Left Heart Cath;  Surgeon: Babatunde Tan MD;  Location: [facility];  Service: Cardiovascular;  Laterality: N/A;    CARDIAC DEFIBRILLATOR PLACEMENT      CARDIAC ELECTROPHYSIOLOGY PROCEDURE N/A 5/23/2024    Procedure: Cardioversion;  Surgeon: Zachary Sparks MD;  Location: [facility];  Service: Electrophysiology;  Laterality: N/A;    CARDIAC ELECTROPHYSIOLOGY PROCEDURE N/A 5/28/2024    Procedure: NIPS (NONINVASIVE PROGRAMMED STIMULATION AND DEFIBRILLATOR  THRESHOLD TESTING);  Surgeon: Zachary Sparks MD;  Location: [facility];  Service: Electrophysiology;  Laterality: N/A;    CARDIAC ELECTROPHYSIOLOGY PROCEDURE Right 5/30/2024    Procedure: Ablation VT Endocardial (15934);  Surgeon: Sonny Flores MD;  Location: [place] [address] Cardiac Cath Lab;  Service: Electrophysiology;  Laterality: Right;  CARTO, 2PM    CARDIAC ELECTROPHYSIOLOGY PROCEDURE N/A 6/6/2024    Procedure: Ablation VT;  Surgeon: Eliot Wooten MD;  Location: [place] [address] Cardiac Cath Lab;  Service: Electrophysiology;  Laterality: N/A;  endocardial vt ablation  carto V8    CORONARY ANGIOPLASTY WITH STENT PLACEMENT  2006    JIM to LAD    CORONARY ANGIOPLASTY WITH STENT PLACEMENT  04/2003    CORONARY ANGIOPLASTY WITH STENT PLACEMENT  06/2008    CYSTOSCOPY W/ URETERAL STENT PLACEMENT  04/01/2024    CYSTOSCOPY W/ URETERAL STENT PLACEMENT  04/30/2024    FEMORAL BYPASS      femoral artery    ILIAC ARTERY STENT Right     KNEE SURGERY         Family History:     Family History   Problem Relation Name Age of Onset    Hypertension Mother      Diabetes Father      Hypertension Sister      Diabetes Sister      Colon cancer Maternal Grandmother      Hypertension Maternal Grandmother      Heart disease Maternal Grandfather      Hypertension Maternal Grandfather      Cancer Paternal Grandfather      Hypertension Paternal Grandfather         Social History:     Social History     Tobacco Use    Smoking status: Former     Types: Cigarettes   Vaping Use    Vaping status: Never Used   Substance Use Topics    Alcohol use: Not Currently     Comment: social    Drug use: Not Currently       CURRENT INPATIENT MEDICATIONS    amiodarone, 200 mg, oral, BID  aspirin, 81 mg, oral, Daily  dapagliflozin propanediol, 10 mg, oral, Daily  enoxaparin, 40 mg, subcutaneous, Daily  furosemide, 40 mg, intravenous, BID  magnesium oxide, 400 mg, oral, Daily  mexiletine, 300 mg, oral, TID  oxygen, , inhalation, Continuous -  Inhalation  pantoprazole, 20 mg, oral, Daily before breakfast  polyethylene glycol, 17 g, oral, Daily  potassium chloride, 20 mEq, oral, Daily  ranolazine, 500 mg, oral, BID  rosuvastatin, 20 mg, oral, Nightly  sacubitriL-valsartan, 0.5 tablet, oral, BID         Current Outpatient Medications   Medication Instructions    albuterol sulfate (Proair Digihaler) 90 mcg/actuation aero powdr breath act w/sensor inhaler 2 puffs, inhalation, Every 4 hours PRN    amiodarone (PACERONE) 200 mg, oral, 2 times daily    aspirin 81 mg, oral, Daily    blood pressure monitor kit Use as directed    esomeprazole (NEXIUM) 20 mg, oral, Daily before breakfast, Do not open capsule.    Farxiga 10 mg, oral, Daily    LORazepam (ATIVAN) 0.5 mg, oral, Nightly    magnesium oxide (MAG-OX) 400 mg, oral, Daily    metoprolol succinate XL (TOPROL-XL) 12.5 mg, oral, Daily, Do not crush or chew.    mexiletine (MEXITIL) 300 mg, oral, 3 times daily    PARoxetine (PAXIL) 60 mg, oral, Nightly    potassium chloride (Klor-Con) 20 mEq packet 20 mEq, oral, Daily    ranolazine (RANEXA) 500 mg, oral, 2 times daily, Do not crush, chew, or split.    rosuvastatin (CRESTOR) 20 mg, oral, Nightly    sacubitriL-valsartan (Entresto) 24-26 mg tablet 1 tablet, oral, 2 times daily    spironolactone (ALDACTONE) 12.5 mg, oral, Daily    tamsulosin (FLOMAX) 0.4 mg, oral, Daily    torsemide (DEMADEX) 20 mg, oral, Daily, DO NOT TAKE IF SBP <90    traZODone (DESYREL) 50 mg, oral, Nightly        Review of Systems:      12 point review of systems was obtained in detail and is negative other than that detailed above.      Vital Signs:     Vitals:    08/19/24 0445 08/19/24 0608 08/19/24 0625 08/19/24 0729   BP: 102/71 114/77  116/82   BP Location:    Left arm   Patient Position:    Sitting   Pulse: (!) 102 104  107   Resp: (!) 21   19   Temp:  35.7 °C (96.3 °F)  36.1 °C (97 °F)   TempSrc:    Temporal   SpO2: (!) 92% 95%  95%   Weight:   80.9 kg (178 lb 5.6 oz)    Height:            Intake/Output Summary (Last 24 hours) at 2024 0853  Last data filed at 2024 0509  Gross per 24 hour   Intake --   Output 600 ml   Net -600 ml       Wt Readings from Last 4 Encounters:   24 80.9 kg (178 lb 5.6 oz)   24 80.7 kg (178 lb)   24 75.8 kg (167 lb)   24 72.2 kg (159 lb 2.8 oz)       Physical Examination:     Physical Exam  Vitals and nursing note reviewed.   HENT:      Head: Normocephalic.      Mouth/Throat:      Mouth: Mucous membranes are moist.   Eyes:      Pupils: Pupils are equal, round, and reactive to light.   Cardiovascular:      Rate and Rhythm: Normal rate.      Heart sounds: Murmur heard.      Systolic murmur is present with a grade of 2/6.      Comments: Wide-complex  Pulmonary:      Effort: Pulmonary effort is normal.      Breath sounds: Decreased air movement present.   Abdominal:      General: Abdomen is flat.   Musculoskeletal:         General: Normal range of motion.      Right lower le+ Edema present.      Left lower le+ Edema present.   Skin:     General: Skin is warm and dry.      Capillary Refill: Capillary refill takes less than 2 seconds.   Neurological:      General: No focal deficit present.      Mental Status: He is alert and oriented to person, place, and time.   Psychiatric:         Mood and Affect: Mood normal.           Lab:     CBC:   Results from last 7 days   Lab Units 24  0150   WBC AUTO x10*3/uL 10.6   RBC AUTO x10*6/uL 4.57   HEMOGLOBIN g/dL 13.1*   HEMATOCRIT % 41.4   MCV fL 91   MCH pg 28.7   MCHC g/dL 31.6*   RDW % 22.0*   PLATELETS AUTO x10*3/uL 318     CMP:    Results from last 7 days   Lab Units 24  0150   SODIUM mmol/L 135*   POTASSIUM mmol/L 4.1   CHLORIDE mmol/L 98   CO2 mmol/L 27   BUN mg/dL 23   CREATININE mg/dL 1.65*   GLUCOSE mg/dL 123*   PROTEIN TOTAL g/dL 7.3   CALCIUM mg/dL 9.3   BILIRUBIN TOTAL mg/dL 1.0   ALK PHOS U/L 171*   AST U/L 62*   ALT U/L 83*     BMP:    Results from last 7 days   Lab  Units 08/19/24  0150   SODIUM mmol/L 135*   POTASSIUM mmol/L 4.1   CHLORIDE mmol/L 98   CO2 mmol/L 27   BUN mg/dL 23   CREATININE mg/dL 1.65*   CALCIUM mg/dL 9.3   GLUCOSE mg/dL 123*     Magnesium:  Results from last 7 days   Lab Units 08/19/24  0150   MAGNESIUM mg/dL 2.00     Troponin:    Results from last 7 days   Lab Units 08/19/24  0239 08/19/24  0150   TROPHS ng/L 26* 23*     BNP:   Results from last 7 days   Lab Units 08/19/24  0150   BNP pg/mL 1,980*       Diagnostic Studies:   ECG:  ECG 12 lead (Clinic Performed) 07/23/2024     AV sequential electronic pacemaker.   Echo:  Transthoracic Echo (TTE) Complete 05/28/2024   1. Left ventricular systolic function is severely decreased with a 15% estimated ejection fraction.   2. There is no evidence of mitral valve stenosis.   3. Moderate to severe mitral valve regurgitation.   4. Mild tricuspid regurgitation is visualized.   5. Aortic valve stenosis is not present.   6. Left ventricular cavity size is moderately dilated.   7. The pulmonary artery is not well visualized.   8. Current study appears to be remarkably similar to the May 23, 2024 study done here.   9. There is global hypokinesis of the left ventricle with minor regional variations.     Cath:  Cardiac Catheterization Procedure 05/23/2024   1. Distal Left Main: 10-30% stenosis.   2. Proximal and mid LAD Lesion: The percent stenosis is 10-30%.   3. Proximal CX Lesion: The percent stenosis is 100%.   4. Right Coronary Artery: fills via collaterals.   5. Proximal RCA Lesion: The percent stenosis is 100%.   6. The Left Ventricular Ejection Fraction is 10%,by echo.    Cardiac EP Procedure 6/6/2024    Severe ischemic cardiomyopathy with large scar extending throughout the majority of the left ventricle, sparing the apex    Extensive substrate modification was performed in the septal, anterior, and lateral walls of the left ventricle within these large areas of scar    Apical septal, Basal septal, and Lateral VTs  were induced and ablated at best pacemap sites (>90% pacemap)    VT was inducible at the end of the case, but possibly attributed to patient's severe metabolic derangements (acidosis, hypercarbia, hyperkalemia)    [place] placement via right femoral vein    IABP placement via RFA, performed by the interventional cardiology team, for cardiogenic shock    ECG 12 lead    Result Date: 8/19/2024  Atrial-sensed ventricular-paced rhythm Abnormal ECG When compared with ECG of 13-JUL-2024 17:49, Vent. rate has increased BY  10 BPM      Radiology:     XR chest 1 view   Final Result   Enlarged cardiac silhouette and prominent interstitial markings   suggesting pulmonary interstitial edema. Small to moderate right   pleural effusion and superimposed right basilar atelectasis.        MACRO:   None.        Signed by: Evan Finkelstein 8/19/2024 2:34 AM   Dictation workstation:   [id number]          Problem List:     Patient Active Problem List   Diagnosis    Ischemic cardiomyopathy    Elevated troponin    VT (ventricular tachycardia) (Multi)    CHF (congestive heart failure) (Multi)    Chronic systolic heart failure (Multi)    Flash pulmonary edema (Multi)    Arteriosclerosis of coronary artery    ICD (implantable cardioverter-defibrillator) in place    Nonrheumatic mitral valve regurgitation    Nonrheumatic tricuspid valve regurgitation    Hx of percutaneous transluminal coronary angioplasty    Primary hypertension    Current smoker    CHB (complete heart block) (Multi)    Chronic obstructive pulmonary disease (Multi)    Infrarenal abdominal aortic aneurysm (AAA) without rupture (CMS-[facility])    CAD S/P percutaneous coronary angioplasty    Refractory angina (CMS-[facility])    Intermittent claudication (CMS-[facility])    Malfunction of implantable defibrillator ventricular (ICD) lead    Mixed hyperlipidemia    Old MI (myocardial infarction)    PVD (peripheral vascular disease) (CMS-[facility])    PAT (paroxysmal atrial tachycardia) (CMS-HCC)     Postsurgical percutaneous transluminal coronary angioplasty status    PTSD (post-traumatic stress disorder)    PVC (premature ventricular contraction)    Rapid palpitations    S/P ICD (internal cardiac defibrillator) procedure    Sickle cell trait (CMS-HCC)    Acute on chronic systolic (congestive) heart failure (Multi)    Acute hypoxic respiratory failure (Multi)    Acute kidney injury superimposed on chronic kidney disease (CMS-HCC)    Transaminitis       Assessment:   Acute on chronic systolic congestive heart failure NYHA class III  CAD  Recurrent ventricular tachycardia  Peripheral artery disease  Hyperlipidemia  Previous nicotine abuse, quit in April 2024  Cardiomyopathy with AICD      Plan:   Admit to medicine.  Remains on telemetry.  Telemetry shows wide-complex however looks to be ventricular paced.  EKG this morning looks to be atrial sensed ventricular paced rhythm with rates in the 80s.  Appreciate EP evaluation as patient had slow V. tach with rates in the 100s and unable to detect by device.  Monitor electrolytes, keep potassium greater than 4 and magnesium greater than 2  Supplemental O2, keep SpO2 greater than 92%  Would continue amiodarone and meiletine, adjustments will be made by EP service.  Continue diuretics, monitor strict I's and O's and daily weights.  Avoid nephrotoxic agents.  Continue Ranexa  Continue 12.5 mg of spironolactone along with reintroduction of Entresto at half dose.  Does have significant decompensated heart failure and was evaluated for heart transplantation in the past along with LVAD.  He initially deferred against LVAD as he was not a transplant candidate due to emphysema and current nicotine abuse.  Recently saw Dr. Brown in the office and discussed about transplantation in the near future as he stopped smoking in April 2024.  Unclear whether or not he is a transplant candidate currently.  I am going to reach out to Dr. Brown to get his input on this.  Patient also may  want second opinion from [facility] in regards to heart transplantation if we have nothing to offer him here.  He is entertaining the idea of an LVAD if he is offered an option for heart transplantation as bridge therapy.  Will continue to follow along with you      Samm Espinoza [facility]  Adult Gerontology Acute Care Nurse Practitioner  [facility] [place]   [facility]  [phone]    Of note, this documentation is completed using the Dragon Dictation system (voice recognition software). There may be spelling and/or grammatical errors that were not corrected prior to final submission.    Electronically signed by Samm Espinoza, YARITZA-CNP, on 8/19/2024 at 8:53 AM     I have personally interviewed and examined the patient.   I have personally and independently reviewed labs and diagnostic testing.  I have personally verified the elements of the history and physical listed above and changes, if any, are noted.   I have personally reviewed the assessment and plan as documented by ZHANG Izquierdo, CNP and concur.    He has a history of atherosclerotic heart disease with previous multivessel angioplasty and stenting procedures.  Initial myocardial infarction was in 2003.  He has underlying ischemic cardiomyopathy with severe LV dysfunction.  Estimated LV ejection fraction on echocardiogram May 28, 2024 was 15%.  3+ mitral regurgitation.  He is status post CRT-D implant.  He previously had VT storm and underwent ventricular tachycardia ablation in 2019.       He was hospitalized on May 23, 2024 after receiving multiple ICD shocks for recurrent VT.  He was initially hospitalized at [facility] and was transferred to [facility] May 28, 2024 for continued management.  He was in slow VT and was started on an amiodarone drip.  He had VT ablation May 30, 2024 but continued to have intermittent episodes of slow ventricular tachycardia.  He was cardioverted on Elle 3,  2024.  He was maintained on amiodarone and lidocaine drips.  He underwent stellate ganglion block June 4, 2024.  It was felt that heart transplant was not going to be an option for him.  He opted against an LVAD at that time.  He was seen by palliative medicine and he changed his CODE STATUS to DNR-DNI on June 5, 2024.  He had redo endocardial ablation by Dr. Wooten on June 6, 2024.   He was initiated on low-dose Entresto.  He was changed to oral mexiletine.  He also takes amiodarone 200 mg twice daily.  Cardiac cath showed severe triple-vessel coronary artery disease.  Right coronary artery and circumflex were totally occluded and filled via collaterals.  LAD had mild stenosis.  No lesions were amanable to revascularization.  He has a history of abdominal aortic aneurysm without rupture.  He has hypertension, hyperlipidemia, tobacco use, nephrolithiasis, and PTSD.     He presented to the emergency department on the evening of July 11, 2024 after his ICD fired.  He was noted to be in slow VT and was cardioverted in the ED to normal sinus rhythm.  He was admitted to the ICU.  Mexiletine was increased to 300 mg 3 times daily.  He was continued on Toprol-XL at a dose of 12.5 mg daily and spironolactone at a dose of 12.5 mg daily.  His discharge dose of torsemide was 20 mg daily.  He was continued on amiodarone 400 mg daily and Eliquis 5 mg twice daily.  He is also on Farxiga, aldactone, and rosuvastatin.  Bisoprolol and Entresto were discontinued due to hypotension.had several systolic readings in the 80s. Entresto was eventually restarted at low dose.      He presented to the emergency department yesterday and noted worsening exertional shortness of breath as well as abdominal distention and weight gain.  He felt like he was having recurrent ventricular arrhythmia.  He was noted to have slow ventricular tachycardia.  Initial vital signs stable with heart rates in the 80s to low 100s.  Hemoglobin 13.1.  CMP normal with  exception of creatinine 1.65, ALT 83, and AST 69.  BNP level 1900.  High-sensitivity prior level 23.  Chest x-ray showed vascular congestion and small to moderate right pleural effusion.  ICD interrogation shows slow VT in the 100s.  He was admitted to telemetry.  He was started on IV furosemide 40 twice daily.    He states he currently feels about the same.  He is frustrated as he has been compliant with taking his medications and he also quit smoking several months ago.  He admittedly is not very compliant with a low-sodium diet but states he is trying to change that.  Current pacemaker shows pacemaker mediated tachycardia.  Likely underlying slow ventricular tachycardia.  Await further EP recommendations.  Continue IV diuretics.  Low-dose Entresto, low-dose per lactone, and dapagliflozin.  The patient states he feels that he has made significant changes in his lifestyle and would be amenable to pursuing more advanced heart failure treatment options, including the possibility of LVAD placement.  Dr. Brown recommend transfer to [facility] for further evaluation.  Patient is currently considering his options after lengthy discussions and he will let us know.

## 2024-08-19 NOTE — CONSULTS
Inpatient consult to Cardiology  Consult performed by: CLAUDIA Francois  Consult ordered by: Clayton Santos MD  Reason for consult: history of VT            Cardiology Consult Note  Patient: Cristian Sapp  Unit/Bed: 816/816-A  YOB: 1965  MRN: [id number]  Acct: [id number]   Admitting Diagnosis: Shortness of breath [R06.02]  Acute on chronic systolic (congestive) heart failure (Multi) [I50.23]  Acute on chronic congestive heart failure, unspecified heart failure type (Multi) [I50.9]  Acute hypoxic respiratory failure (Multi) [J96.01]  Date:  8/19/2024  Hospital Day: 0  Attending: Satish Wade MD    Consultant: Dr. Zachary Sparks  / Michelle Erazo CNP  Primary Cardiologist: Dr. Zachary Sparks      Complaint:  Chief Complaint   Patient presents with    Shortness of Breath        History of Present Illness:  58-year-old male evaluated in [facility] emergency room secondary to shortness of breath and elevated heart rate.  Patient states that at home he noticed his heart rate started to increase into the 90s to the low 100s all the way up to the 120s and he knew at that time he was in VT.  Initial EKG in the emergency room shows sinus rhythm a sensed V paced heart rate 85 bpm QTc 528 ms.  Remote device interrogation in the emergency room showed that the patient was in sustained ventricular tachycardia with a heart rate in the low 100s.  Patient was noted to have a potassium of 4.1, magnesium of 2, BNP of 1980, troponin initially of 23 and then increased to 26, AST 62, ALT 83, and creatinine 1.6.  Chest x-ray showed small right pleural effusion and interstitial edema.  Patient was treated with IV Lasix and oxygen.  He was admitted to medicine service.  Telemetry review showed patient with slow ventricular tachycardia heart rate low 100s to 120s and brief episodes of sinus rhythm in the 80s since admit.  Patient states his shortness of breath is improved now but he has not  tried to lie down.  Last evening while laying in bed he was very restless and kept having to sit up.  He did convert to sinus rhythm at approximately 11 AM and has maintained sinus rhythm in the 80s since that time.  Past medical history includes wide-complex tachycardia, ventricular tachycardia, slow ventricular tachycardia, and ICD shocks status post VT ablation May 2024 and maintenance on mexiletine 300 mg every 8 hours and amiodarone 200 mg twice daily, remote BiV ICD implant with a "[employer] momentum device, ischemic cardiomyopathy with LVEF 10% by echocardiogram 5/23/2024, chronic systolic heart failure, moderately dilated dilated LV, valvular heart disease with moderate to severe MR and moderate TR, moderately elevated pulmonary artery pressures, and coronary artery disease with multiple remote MIs and multiple PCI's last 1 in 2008, benign essential hypertension, hyperlipidemia, and history of tobacco use quit April 2024.    Allergies:  Allergies   Allergen Reactions    Chantix [Varenicline] Unknown        PMHx:  Past Medical History:   Diagnosis Date    Atherosclerosis of native artery of both lower extremities with intermittent claudication (CMS-HCC)     CHB (complete heart block) (Multi)     per device check    Chronic systolic CHF (congestive heart failure), NYHA class 3 (Multi)     COPD (chronic obstructive pulmonary disease) (Multi)     Coronary artery disease     History of tobacco abuse     HLD (hyperlipidemia)     Hypertension     Infrarenal abdominal aortic aneurysm (AAA) without rupture (CMS-HCC)     Ischemic cardiomyopathy with implantable cardioverter-defibrillator (ICD)     MI (myocardial infarction) (Multi)     multiple    Nephrolithiasis     PTSD (post-traumatic stress disorder)        PSHx:  Past Surgical History:   Procedure Laterality Date    CARDIAC CATHETERIZATION N/A 5/23/2024    Procedure: Left Heart Cath;  Surgeon: Babatunde Tan MD;  Location: [facility];   Service: Cardiovascular;  Laterality: N/A;    CARDIAC DEFIBRILLATOR PLACEMENT      CARDIAC ELECTROPHYSIOLOGY PROCEDURE N/A 5/23/2024    Procedure: Cardioversion;  Surgeon: Zachary Sparks MD;  Location: [facility];  Service: Electrophysiology;  Laterality: N/A;    CARDIAC ELECTROPHYSIOLOGY PROCEDURE N/A 5/28/2024    Procedure: NIPS (NONINVASIVE PROGRAMMED STIMULATION AND DEFIBRILLATOR THRESHOLD TESTING);  Surgeon: Zachary Sparks MD;  Location: [facility];  Service: Electrophysiology;  Laterality: N/A;    CARDIAC ELECTROPHYSIOLOGY PROCEDURE Right 5/30/2024    Procedure: Ablation VT Endocardial (17751);  Surgeon: Sonny Flores MD;  Location: [place] [address] [facility];  Service: Electrophysiology;  Laterality: Right;  CARTO, 2PM    CARDIAC ELECTROPHYSIOLOGY PROCEDURE N/A 6/6/2024    Procedure: Ablation VT;  Surgeon: Eliot Wooten MD;  Location: [place] [address] [facility];  Service: Electrophysiology;  Laterality: N/A;  endocardial vt ablation  carto V8    CORONARY ANGIOPLASTY WITH STENT PLACEMENT  2006    JIM to LAD    CORONARY ANGIOPLASTY WITH STENT PLACEMENT  04/2003    CORONARY ANGIOPLASTY WITH STENT PLACEMENT  06/2008    CYSTOSCOPY W/ URETERAL STENT PLACEMENT  04/01/2024    CYSTOSCOPY W/ URETERAL STENT PLACEMENT  04/30/2024    FEMORAL BYPASS      femoral artery    ILIAC ARTERY STENT Right     KNEE SURGERY         Social Hx:  Social History     Socioeconomic History    Marital status:    Tobacco Use    Smoking status: Former     Types: Cigarettes   Vaping Use    Vaping status: Never Used   Substance and Sexual Activity    Alcohol use: Not Currently     Comment: social    Drug use: Not Currently    Sexual activity: Defer     Social Determinants of Health     Financial Resource Strain: Low Risk  (8/19/2024)    Overall Financial Resource Strain (CARDIA)     Difficulty of Paying Living Expenses: Not hard at all   Recent Concern: Financial Resource Strain - Medium Risk (5/30/2024)     Overall Financial Resource Strain (CARDIA)     Difficulty of Paying Living Expenses: Somewhat hard   Food Insecurity: No Food Insecurity (5/30/2024)    Hunger Vital Sign     Worried About Running Out of Food in the Last Year: Never true     Ran Out of Food in the Last Year: Never true   Transportation Needs: No Transportation Needs (8/19/2024)    PRAPARE - Transportation     Lack of Transportation (Medical): No     Lack of Transportation (Non-Medical): No   Physical Activity: Inactive (8/19/2024)    Exercise Vital Sign     Days of Exercise per Week: 0 days     Minutes of Exercise per Session: 0 min   Stress: Stress Concern Present (5/30/2024)    [employer] [place] [employer] - Occupational Stress Questionnaire     Feeling of Stress : To some extent   Social Connections: Moderately Isolated (5/30/2024)    Social Connection and Isolation Panel [NHANES]     Frequency of Communication with Friends and Family: Three times a week     Frequency of Social Gatherings with Friends and Family: Three times a week     Attends [employer] Services: More than 4 times per year     Active Member of Clubs or Organizations: No     Marital Status:    Intimate Partner Violence: Not At Risk (5/30/2024)    Humiliation, Afraid, Rape, and Kick questionnaire     Fear of Current or Ex-Partner: No     Emotionally Abused: No     Physically Abused: No     Sexually Abused: No   Housing Stability: Low Risk  (8/19/2024)    Housing Stability Vital Sign     Unable to Pay for Housing in the Last Year: No     Number of Times Moved in the Last Year: 2     Homeless in the Last Year: No       Family Hx:  Family History   Problem Relation Name Age of Onset    Hypertension Mother      Diabetes Father      Hypertension Sister      Diabetes Sister      Colon cancer Maternal Grandmother      Hypertension Maternal Grandmother      Heart disease Maternal Grandfather      Hypertension Maternal Grandfather      Cancer Paternal Grandfather       Hypertension Paternal Grandfather         Review of Systems:   Review of Systems   Respiratory:  Positive for shortness of breath.    Cardiovascular:  Positive for palpitations.         Physical Examination:    Visit Vitals  /82 (BP Location: Left arm, Patient Position: Sitting)   Pulse 107   Temp 36.1 °C (97 °F) (Temporal)   Resp 19      Physical Exam  Constitutional:       Appearance: Normal appearance.   HENT:      Head: Normocephalic.      Nose: Nose normal.      Mouth/Throat:      Mouth: Mucous membranes are moist.   Eyes:      Conjunctiva/sclera: Conjunctivae normal.   Cardiovascular:      Rate and Rhythm: Normal rate and regular rhythm.      Pulses: Normal pulses.      Heart sounds: Normal heart sounds.   Pulmonary:      Effort: Pulmonary effort is normal.      Breath sounds: Rales present.   Musculoskeletal:         General: Normal range of motion.   Skin:     General: Skin is warm and dry.   Neurological:      General: No focal deficit present.      Mental Status: He is alert and oriented to person, place, and time.   Psychiatric:         Mood and Affect: Mood normal.         Behavior: Behavior normal.         LABS:  Results for orders placed or performed during the hospital encounter of 08/19/24 (from the past 96 hour(s))   Gray Top   Result Value Ref Range    Extra Tube Hold for add-ons.    ECG 12 lead   Result Value Ref Range    Ventricular Rate 85 BPM    Atrial Rate 85 BPM    NH Interval 154 ms    QRS Duration 166 ms    QT Interval 444 ms    QTC Calculation(Bazett) 528 ms    P Axis 27 degrees    R Axis -51 degrees    T Axis 84 degrees    QRS Count 14 beats    Q Onset 191 ms    P Onset 114 ms    P Offset 163 ms    T Offset 413 ms    QTC Fredericia 498 ms   CBC and Auto Differential   Result Value Ref Range    WBC 10.6 4.4 - 11.3 x10*3/uL    nRBC 0.0 0.0 - 0.0 /100 WBCs    RBC 4.57 4.50 - 5.90 x10*6/uL    Hemoglobin 13.1 (L) 13.5 - 17.5 g/dL    Hematocrit 41.4 41.0 - 52.0 %    MCV 91 80 - 100 fL     MCH 28.7 26.0 - 34.0 pg    MCHC 31.6 (L) 32.0 - 36.0 g/dL    RDW 22.0 (H) 11.5 - 14.5 %    Platelets 318 150 - 450 x10*3/uL    Neutrophils % 70.8 40.0 - 80.0 %    Immature Granulocytes %, Automated 0.7 0.0 - 0.9 %    Lymphocytes % 16.0 13.0 - 44.0 %    Monocytes % 9.1 2.0 - 10.0 %    Eosinophils % 2.4 0.0 - 6.0 %    Basophils % 1.0 0.0 - 2.0 %    Neutrophils Absolute 7.50 1.20 - 7.70 x10*3/uL    Immature Granulocytes Absolute, Automated 0.07 0.00 - 0.70 x10*3/uL    Lymphocytes Absolute 1.69 1.20 - 4.80 x10*3/uL    Monocytes Absolute 0.96 0.10 - 1.00 x10*3/uL    Eosinophils Absolute 0.25 0.00 - 0.70 x10*3/uL    Basophils Absolute 0.11 (H) 0.00 - 0.10 x10*3/uL   Comprehensive metabolic panel   Result Value Ref Range    Glucose 123 (H) 74 - 99 mg/dL    Sodium 135 (L) 136 - 145 mmol/L    Potassium 4.1 3.5 - 5.3 mmol/L    Chloride 98 98 - 107 mmol/L    Bicarbonate 27 21 - 32 mmol/L    Anion Gap 14 10 - 20 mmol/L    Urea Nitrogen 23 6 - 23 mg/dL    Creatinine 1.65 (H) 0.50 - 1.30 mg/dL    eGFR 48 (L) >60 mL/min/1.73m*2    Calcium 9.3 8.6 - 10.3 mg/dL    Albumin 3.7 3.4 - 5.0 g/dL    Alkaline Phosphatase 171 (H) 33 - 120 U/L    Total Protein 7.3 6.4 - 8.2 g/dL    AST 62 (H) 9 - 39 U/L    Bilirubin, Total 1.0 0.0 - 1.2 mg/dL    ALT 83 (H) 10 - 52 U/L   Magnesium   Result Value Ref Range    Magnesium 2.00 1.60 - 2.40 mg/dL   Protime-INR   Result Value Ref Range    Protime 13.9 (H) 9.8 - 12.8 seconds    INR 1.2 (H) 0.9 - 1.1   aPTT   Result Value Ref Range    aPTT 28 27 - 38 seconds   B-Type Natriuretic Peptide   Result Value Ref Range    BNP 1,980 (H) 0 - 99 pg/mL   Troponin I, High Sensitivity, Initial   Result Value Ref Range    Troponin I, High Sensitivity 23 (H) 0 - 20 ng/L   Morphology   Result Value Ref Range    RBC Morphology See Below     Polychromasia Mild     Target Cells Few     Ovalocytes Few     Teardrop Cells Few     Giant Platelets Few     Clumped Platelets Present    Troponin, High Sensitivity, 1 Hour    Result Value Ref Range    Troponin I, High Sensitivity 26 (H) 0 - 20 ng/L   ECG 12 Lead   Result Value Ref Range    Ventricular Rate 80 BPM    Atrial Rate 80 BPM    IL Interval 170 ms    QRS Duration 166 ms    QT Interval 476 ms    QTC Calculation(Bazett) 548 ms    P Axis 46 degrees    R Axis -61 degrees    T Axis 87 degrees    QRS Count 13 beats    Q Onset 181 ms    P Onset 96 ms    P Offset 155 ms    T Offset 419 ms    QTC Fredericia 524 ms          Current Medications:  Scheduled medications  amiodarone, 200 mg, oral, BID  aspirin, 81 mg, oral, Daily  dapagliflozin propanediol, 10 mg, oral, Daily  enoxaparin, 40 mg, subcutaneous, Daily  furosemide, 40 mg, intravenous, BID  LORazepam, 0.5 mg, oral, Nightly  magnesium oxide, 400 mg, oral, Daily  mexiletine, 300 mg, oral, TID  oxygen, , inhalation, Continuous - Inhalation  pantoprazole, 20 mg, oral, Daily before breakfast  PARoxetine, 60 mg, oral, Nightly  polyethylene glycol, 17 g, oral, Daily  potassium chloride, 20 mEq, oral, Daily  ranolazine, 500 mg, oral, BID  rosuvastatin, 20 mg, oral, Nightly  sacubitriL-valsartan, 0.5 tablet, oral, BID  tamsulosin, 0.4 mg, oral, Daily      Continuous medications     PRN medications       CT head wo IV contrast    Result Date: 10/18/2023  Interpreted By:  Gage Lopez, STUDY: CT HEAD WO IV CONTRAST;  10/18/2023 7:02 am   INDICATION: Signs/Symptoms:AMS.   COMPARISON: CT Brain, 4 March 2020   ACCESSION NUMBER(S): [id number]   ORDERING CLINICIAN: SUSIE CASSIDY   TECHNIQUE: CT of the brain from the skull vertex to the skull base, without intravenous contrast   FINDINGS: ACUTE INTRA-AXIAL HEMORRHAGE:  Negative   ACUTE EXTRA-AXIAL/SUBDURAL HEMORRHAGE:  Negative   ACUTE INTRACRANIAL MASS EFFECT:  Negative   CT EVIDENCE OF ACUTE / SUBACUTE TERRITORIAL ISCHEMIA:  Negative   VENTRICLES:  Unchanged diffuse dilation. No acute obstructive hydrocephalus by CT   OTHER BRAIN FINDINGS:  No significant interval change to the CT appearance  of the brain including the degree of atrophy and deep white matter changes from chronic microvascular disease and encephalomalacia in the left occipital lobe from old infarct and another in or near the left thalamus   INCLUDED PARANASAL SINUSES: All clear   INCLUDED MASTOID AIR CELLS: All clear   SKULL:  No lytic or blastic lesion   EXTRACRANIAL SOFT TISSUES:  Scalp and occular globes grossly normal by CT       NO ACUTE INTRACRANIAL PROCESS   NO SIGNIFICANT INTERVAL CHANGE TO THE CT APPEARANCE OF THE BRAIN WHEN COMPARED TO 4 MARCH 2020   MACRO: None   Signed by: Gage Lopez 10/18/2023 7:59 AM Dictation workstation:   [id number]    CT abdomen pelvis w IV contrast    Result Date: 10/18/2023  Interpreted By:  Debbie Kaplan, STUDY: CT ABDOMEN PELVIS W IV CONTRAST;  10/18/2023 12:46 am   INDICATION: Signs/Symptoms:vomiting.   COMPARISON: None.   ACCESSION NUMBER(S): [id number]   ORDERING CLINICIAN: SUZAN GRAY   TECHNIQUE: Axial CT images of the abdomen and pelvis with coronal and sagittal reconstructed images obtained after intravenous administration of contrast   FINDINGS: LOWER CHEST: Mild bibasilar dependent atelectasis. Pacemaker/AICD leads noted the right atrium and right ventricle. Mitral annulus calcifications noted. BONES: No acute osseous abnormality. Mild multilevel degenerative disc changes. ABDOMINAL WALL: Within normal limits.   ABDOMEN:   LIVER: Within normal limits. BILE DUCTS: No biliary dilatation. GALLBLADDER: No calcified gallstones. No pericholecystic inflammatory changes. PANCREAS: Within normal limits. SPLEEN: Within normal limits. ADRENALS: Within normal limits. KIDNEYS and URETERS: Partial staghorn calculus versus numerous layering calculi in the right kidney. Mild right hydronephrosis. Mild right urothelial thickening, particularly in the distal ureter. No calculus along the course of the ureter. No parenchymal abnormality to suggest pyelonephritis. No hydronephrosis on the left.      VESSELS: No aortic aneurysm. RETROPERITONEUM: No pathologically enlarged retroperitoneal lymph nodes.   PELVIS:   REPRODUCTIVE ORGANS: No pelvic masses. BLADDER: Diffuse urinary bladder wall thickening and mucosal hyperenhancement.   BOWEL: The stomach is moderately distended. No dilated small bowel. Large amount of stool in the rectum. There is significant narrowing of the ascending colon and cecum, with the small bowel loops extending lateral to the cecum and ascending colon (series 301, image 128). The appendix is not seen with certainty. There are no pericecal inflammatory changes to suggest acute appendicitis. PERITONEUM: No ascites or free air, no fluid collection.       Findings suspicious for cystitis with possible right ascending UTI/pyelitis.   Large amount of stool in the rectum suggesting fecal impaction.   Suspected internal pericecal hernia. No evidence of bowel obstruction.   MACRO: None   Signed by: Debbie Kaplan 10/18/2023 1:42 AM Dictation workstation:   [id number]    XR chest 1 view    Result Date: 10/18/2023  Interpreted By:  Mohan German, STUDY: XR CHEST 1 VIEW;  10/17/2023 11:16 pm   INDICATION: Signs/Symptoms:nausea and vomiting.   COMPARISON: None.   ACCESSION NUMBER(S): [id number]   ORDERING CLINICIAN: SUZAN GRAY   FINDINGS: Dual lead left-sided pacemaker.   The cardiomediastinal silhouette and pulmonary vasculature are within normal limits. No consolidation, pleural effusion or pneumothorax.   Chronic right mid clavicle fracture.       No acute cardiopulmonary process.     MACRO: None.   Signed by: Mohan German 10/18/2023 12:15 AM Dictation workstation:   [id number]     .No echocardiogram results found for the past 14 days             I/O:    Intake/Output Summary (Last 24 hours) at 8/19/2024 1300  Last data filed at 8/19/2024 0509  Gross per 24 hour   Intake --   Output 600 ml   Net -600 ml        Assessment:    Patient Active Problem List   Diagnosis    Ischemic  cardiomyopathy    Elevated troponin    VT (ventricular tachycardia) (Multi)    CHF (congestive heart failure) (Multi)    Chronic systolic heart failure (Multi)    Flash pulmonary edema (Multi)    Arteriosclerosis of coronary artery    ICD (implantable cardioverter-defibrillator) in place    Nonrheumatic mitral valve regurgitation    Nonrheumatic tricuspid valve regurgitation    Hx of percutaneous transluminal coronary angioplasty    Primary hypertension    Current smoker    CHB (complete heart block) (Multi)    Chronic obstructive pulmonary disease (Multi)    Infrarenal abdominal aortic aneurysm (AAA) without rupture (CMS-HCC)    CAD S/P percutaneous coronary angioplasty    Refractory angina (CMS-HCC)    Intermittent claudication (CMS-HCC)    Malfunction of implantable defibrillator ventricular (ICD) lead    Mixed hyperlipidemia    Old MI (myocardial infarction)    PVD (peripheral vascular disease) (CMS-HCC)    PAT (paroxysmal atrial tachycardia) (CMS-HCC)    Postsurgical percutaneous transluminal coronary angioplasty status    PTSD (post-traumatic stress disorder)    PVC (premature ventricular contraction)    Rapid palpitations    S/P ICD (internal cardiac defibrillator) procedure    Sickle cell trait (CMS-HCC)    Acute on chronic systolic (congestive) heart failure (Multi)    Acute hypoxic respiratory failure (Multi)    Acute kidney injury superimposed on chronic kidney disease (CMS-[facility])    Transaminitis    Shortness of breath   Clinical impression:  1.  Acute on chronic systolic heart failure-seen by general cardiology maintained on IV diuretics  2.  Sustained slow ventricular tachycardia with heart rates 100 to 110 bpm-below the ICD cut off -maintained on amiodarone 200 mg twice daily and mexiletine 300 mg every 8 hours  3.  Ischemic cardiomyopathy with LV EF 10% per echocardiogram 5/23/2024-follows with Dr. Brown with recommendations for LVAD placement  4.  Coronary artery disease with multiple remote MIs and  remote multivessel PCI  5.  COPD/tobacco use-quit April 2024  6.  Benign essential hypertension  7.  Valvular heart disease with moderate to severe MR/moderate TR  8.  Moderately elevated pulmonary artery pressures  9.  Remote [employer] momentum BiV ICD    Plan:  Monitor on telemetry  Device interrogation  Continue amiodarone 200 mg twice daily  Continue mexiletine 300 mg every 8 hours  Daily labs and EKG  Supplemental potassium to keep potassium over 4 and supplemental magnesium to keep magnesium over 2  Further recommendations per Dr. Sparks      Electronically signed by CLAUDIA Francois on 8/19/2024 at 1:00 PM       Patient was seen, chart reviewed.    Patient was admitted for palpitations associated with shortness of breath.  Emergency department he was found to be in a slow ventricular tachycardia.  VT below the therapy zone for ICD.  In the emergency department, patient was placed on amiodarone drip.  He subsequently converted to sinus rhythm.  Currently he is maintaining sinus rhythm-atrial/ventricular paced rhythm  Patient is evaluated by cardiology service.  Now patient is agreeable with LVAD therapy.  [facility] to transfer to [facility] for further therapy for ventricular tachycardia-congestive heart failure  Continue with amiodarone at a dose of 200 mg twice a day as well as mexiletine 300 mg every 8 hours.  Patient is maximized on 100 therapy  We also discussed the option of redo ventricular tachycardia ablation but patient is at high risk for procedures.  This was discussed during the last admission with Dr. Wooten.  Continue telemetry    Zachary Sparks MD

## 2024-08-19 NOTE — PROGRESS NOTES
"Daily Progress Note    Cristian Sapp is a 58 y.o. male on day 0 of admission presenting with Acute on chronic systolic (congestive) heart failure (Multi).    Subjective   Patient seen sitting on side of the bed with wife at bedside.  Patient states cardiology was in to see patient earlier and patient is leaning towards going to [facility] for LVAD workup.  Patient denies shortness of breath or chest pain, remains on room air.  Patient also requesting to change CODE STATUS to full code.  Appreciate cardiology and EP recommendations       Objective     Physical Exam    Physical Exam  Constitutional:       Appearance: Normal appearance.   HENT:      Head: Normocephalic.      Mouth/Throat:      Mouth: Mucous membranes are moist.   Eyes:      Pupils: Pupils are equal, round, and reactive to light.   Cardiovascular:      Rate and Rhythm: Normal rate and regular rhythm.      Heart sounds: Normal heart sounds, S1 normal and S2 normal.   Pulmonary:      Effort: Pulmonary effort is normal.      Breath sounds: Normal breath sounds.   Abdominal:      General: Bowel sounds are normal.      Palpations: Abdomen is soft.   Musculoskeletal:         General: Normal range of motion.      Cervical back: Neck supple.   Skin:     General: Skin is warm.   Neurological:      Mental Status: He is alert and oriented to person, place, and time.      Motor: Weakness present.   Psychiatric:         Mood and Affect: Mood normal.         Behavior: Behavior normal.         Last Recorded Vitals  Blood pressure 116/82, pulse 107, temperature 36.1 °C (97 °F), temperature source Temporal, resp. rate 19, height 1.702 m (5' 7\"), weight 80.9 kg (178 lb 5.6 oz), SpO2 95%.  Intake/Output last 3 Shifts:  I/O last 3 completed shifts:  In: - (0 mL/kg)   Out: 600 (7.4 mL/kg) [Urine:600 (0.2 mL/kg/hr)]  Weight: 80.9 kg     Medications  Scheduled medications  amiodarone, 200 mg, oral, BID  aspirin, 81 mg, oral, Daily  dapagliflozin propanediol, 10 mg, oral, " Daily  enoxaparin, 40 mg, subcutaneous, Daily  furosemide, 40 mg, intravenous, BID  LORazepam, 0.5 mg, oral, Nightly  magnesium oxide, 400 mg, oral, Daily  mexiletine, 300 mg, oral, TID  oxygen, , inhalation, Continuous - Inhalation  pantoprazole, 20 mg, oral, Daily before breakfast  PARoxetine, 60 mg, oral, Nightly  polyethylene glycol, 17 g, oral, Daily  potassium chloride, 20 mEq, oral, Daily  ranolazine, 500 mg, oral, BID  rosuvastatin, 20 mg, oral, Nightly  sacubitriL-valsartan, 0.5 tablet, oral, BID  tamsulosin, 0.4 mg, oral, Daily      Continuous medications     PRN medications      Labs  CBC:   Results from last 7 days   Lab Units 08/19/24  0150   WBC AUTO x10*3/uL 10.6   RBC AUTO x10*6/uL 4.57   HEMOGLOBIN g/dL 13.1*   HEMATOCRIT % 41.4   MCV fL 91   MCH pg 28.7   MCHC g/dL 31.6*   RDW % 22.0*   PLATELETS AUTO x10*3/uL 318     CMP:    Results from last 7 days   Lab Units 08/19/24  0150   SODIUM mmol/L 135*   POTASSIUM mmol/L 4.1   CHLORIDE mmol/L 98   CO2 mmol/L 27   BUN mg/dL 23   CREATININE mg/dL 1.65*   GLUCOSE mg/dL 123*   PROTEIN TOTAL g/dL 7.3   CALCIUM mg/dL 9.3   BILIRUBIN TOTAL mg/dL 1.0   ALK PHOS U/L 171*   AST U/L 62*   ALT U/L 83*     BMP:    Results from last 7 days   Lab Units 08/19/24  0150   SODIUM mmol/L 135*   POTASSIUM mmol/L 4.1   CHLORIDE mmol/L 98   CO2 mmol/L 27   BUN mg/dL 23   CREATININE mg/dL 1.65*   CALCIUM mg/dL 9.3   GLUCOSE mg/dL 123*     Magnesium:  Results from last 7 days   Lab Units 08/19/24  0150   MAGNESIUM mg/dL 2.00     Troponin:    Results from last 7 days   Lab Units 08/19/24  0239 08/19/24  0150   TROPHS ng/L 26* 23*     BNP:   Results from last 7 days   Lab Units 08/19/24  0150   BNP pg/mL 1,980*     Lipid Panel:  Results from last 7 days   Lab Units 08/19/24  0150   INR  1.2*   PROTIME seconds 13.9*        Nutrition             Relevant Results  Results from last 7 days   Lab Units 08/19/24  0150   GLUCOSE mg/dL 123*     Lab Results   Component Value Date     HGBA1C 6.0 (H) 06/04/2024        Assessment/Plan    Acute on chronic systolic heart failure  Acute hypoxic respiratory failure  EITAN on CKD  History of V. Tach s/p AICD  CAD s/p PTCA  Elevated troponin  COPD - not in acute exacerbation  Transaminitis    -CXR with evidence of pulmonary interstitial edema and small to moderate right pleural effusion  -AICD interrogated by ER  -Elevated troponin likely secondary to demand  -IV Lasix 40 mg twice daily  -Strict intake and output, daily weights  -Continue home meds: Entresto, spironolactone, Farxiga  -Hold metoprolol for now given acute decompensated heart failure  -Cardiology consult, continue home meds recommend transfer to [facility] [place]  -Heart failure navigator consult  -Telemetry monitoring  -EP consult  -Continue home amiodarone, mexiletine  -Goal Mg > 2, K > 4, trend while diuresing, continue home supplements  -Previously was on Eliquis for 1 month post VT ablation, this appears to have been discontinued earlier this month by cardiac surgery  -Continue home aspirin, statin, Ranexa  -Wean supplemental oxygen as tolerated      DVTp: Lovenox subcu     PLAN: Home with wife    Cristin MORENO YARITZA Hart-CNP    Plan of care was discussed extensively with patient.  Patient verbalized understanding through teach back method.  All question and concerns addressed upon examination.    Of note, this documentation is completed using the Dragon Dictation system (voice recognition software). There may be spelling and/or grammatical errors that were not corrected prior to final submission.

## 2024-08-19 NOTE — CARE PLAN
The patient's goals for the shift include      The clinical goals for the shift include patient will have decreased shortness of breeath

## 2024-08-19 NOTE — DISCHARGE INSTRUCTIONS
HEART FAILURE EDUCATION:  1. Weigh yourself daily and record on your weight log.  2. If you gain more than 2 or 3 pounds overnight, call your cardiologist.  3. Follow a low sodium diet. No more than 2000 mg in one day, or more than 650 mg per meal.  4. Limit total fluids to no more than 8 cups (or 2 liters) per day - this includes all fluids (water, coffee, juice, milk, tea, etc.)  5. Monitor your blood pressure daily and record on your weight log.  6. Call to schedule your follow-up appointments when you get home if they were not already scheduled for you.  7. Keep your follow-up appointments! Bring your weight log with you so the doctors can see your weight trend and blood pressure readings.  8. Be sure to  any new prescriptions and take them as directed. If unsure of the medications, be sure to call your cardiologist.  9. Stay as active as you can tolerate.   10. If you notice subtle change of symptoms (slight increase in swelling, slight shortness of breath, a new intolerance to laying flat, a new cough), be sure to call your cardiologist.  11. If you have any questions or concerns or you have not heard back from the cardiologist, feel free to call Bryanna Garcia heart failure navigator at [phone].

## 2024-08-19 NOTE — PROGRESS NOTES
08/19/24 1328   Discharge Planning   Living Arrangements Parent   Support Systems Parent;Family members   Type of Residence Private residence   Who is requesting discharge planning? Provider   Home or Post Acute Services None   Expected Discharge Disposition Home   Does the patient need discharge transport arranged? No   Transportation Needs   In the past 12 months, has lack of transportation kept you from medical appointments or from getting medications? no   In the past 12 months, has lack of transportation kept you from meetings, work, or from getting things needed for daily living? No   Patient Choice   Patient / Family choosing to utilize agency / facility established prior to hospitalization No     Pt admitted from home with Acute on chronic systolic CHF.   Met with patient and his mother, Mary at the bedside.  Pt resides at home with parents.  He states he is independent , uses cane and walker at home as needed.  Drives.   Pt does not have home o2 but stated that he feels he needs it.    Follows with PCP Dr Alba URBINA. [facility].  Cardiology and EP  consulted.   Device clinic is planning to evaluate device.   Plan is home at discharge however possible transfer to [facility] [place] [place] .

## 2024-08-19 NOTE — CONSULTS
"Nutrition Initial Assessment:   Nutrition Assessment         Patient is a 58 y.o. male presenting with acute on chronic systolic (congestive) heart failure      Nutrition History:  Energy Intake: Fair 50-75 %, Poor < 50 %  Food and Nutrient History: RD consulted for MST = 1, gaining wt without trying and hard time eating. Mother at bedside. Usually eats 1-2 meals/day, which is eggs or a sandwich, sometimes half of a sandwich. Snacks on pudding, applesauce, or banana. Drinks water, iced tea, coffee, electrolyte drinks, milk or chocolate milk, and pop. Discussed limiting electrolyte drinks to reduce sodium consumption. Endorses abdominal tightness and bloating after meals limiting intakes for months, unable to pinpoint which food is causing symptoms. Denies lactose intolerance. Encouraged small, freuqent meals. Discussed trying to reduce caffeine and carbonated beverages. Encouraged pt to discuss concerns with MD if symptoms continue.  Vitamin/Herbal Supplement Use: Mag-Ox, potassium chloride as needed  Food Allergies/Intolerances:  None  GI Symptoms:  abdominal distention, constipation per pt  Oral Problems: None       Anthropometrics:  Height: 170.2 cm (5' 7\")   Weight: 80.9 kg (178 lb 5.6 oz)   BMI (Calculated): 27.93             Weight History:     Wt Readings from Last 35 Encounters:   08/19/24 80.9 kg (178 lb 5.6 oz)   08/08/24 80.7 kg (178 lb)   07/23/24 75.8 kg (167 lb)   07/14/24 72.2 kg (159 lb 2.8 oz)   07/11/24 75.8 kg (167 lb)   07/11/24 75.8 kg (167 lb)   07/11/24 73.5 kg (162 lb)   07/10/24 75.3 kg (166 lb)   07/08/24 73.5 kg (162 lb)   07/06/24 74.1 kg (163 lb 6.4 oz)   07/05/24 74.4 kg (164 lb)   07/04/24 75.3 kg (166 lb)   07/03/24 73.5 kg (162 lb)   06/28/24 76.5 kg (168 lb 9.6 oz)   06/27/24 74.8 kg (165 lb)   06/19/24 73.2 kg (161 lb 6 oz)   05/27/24 78.8 kg (173 lb 11.6 oz)         Weight Change %:  Weight History / % Weight Change: Difficult to assess for wt changes given history of CHF and " current wt is above baseline, likely d/t fluid status.    Nutrition Focused Physical Exam Findings:    Subcutaneous Fat Loss:   Orbital Fat Pads: Well nourished (slightly bulging fat pads)  Buccal Fat Pads: Well nourished (full, rounded cheeks)  Triceps: Well nourished (ample fat tissue)  Ribs: Defer  Muscle Wasting:  Temporalis: Mild-Moderate (slight depression)  Pectoralis (Clavicular Region): Well nourished (clavicle not visible)  Deltoid/Trapezius: Well nourished (rounded appearance at arm, shoulder, neck)  Interosseous: Well nourished (muscle bulges)  Trapezius/Infraspinatus/Supraspinatus (Scapular Region): Defer  Quadriceps: Defer  Gastrocnemius: Defer  Edema:  Edema: +1 trace  Edema Location: BLE per nursing assessment  Physical Findings:  Skin: Negative (for pressure injuries)    Nutrition Significant Labs:  BMP Trend:   Results from last 7 days   Lab Units 08/19/24  0150   GLUCOSE mg/dL 123*   CALCIUM mg/dL 9.3   SODIUM mmol/L 135*   POTASSIUM mmol/L 4.1   CO2 mmol/L 27   CHLORIDE mmol/L 98   BUN mg/dL 23   CREATININE mg/dL 1.65*        Nutrition Specific Medications:  Reviewed    I/O:   Last BM Date: 08/17/24;      Dietary Orders (From admission, onward)       Start     Ordered    08/19/24 0624  Adult diet Cardiac; 70 gm fat; 2 - 3 grams Sodium  Diet effective now        Question Answer Comment   Diet type Cardiac    Fat restriction: 70 gm fat    Sodium restriction: 2 - 3 grams Sodium        08/19/24 0623                     Estimated Needs:   Total Energy Estimated Needs (kCal): 2165 kCal  Method for Estimating Needs: 30 kcal/kg (using wt of 72.2 kg from 7/14/24)  Total Protein Estimated Needs (g): 72 g  Method for Estimating Needs: 1 g/kg (using wt of 72.2 kg from 7/14/24)  Total Fluid Estimated Needs (mL):  (per MD)          Nutrition Diagnosis   Malnutrition Diagnosis  Patient has Malnutrition Diagnosis: No (insufficient data to diagnose)    Nutrition Diagnosis  Patient has Nutrition Diagnosis:  Yes  Diagnosis Status (1): New  Nutrition Diagnosis 1: Decreased nutrient needs  Related to (1): cardiac dysfunction  As Evidenced by (1): need for cardiac diet       Nutrition Interventions/Recommendations         Nutrition Prescription:  Individualized Nutrition Prescription Provided for : Cardiac Diet. Magic Cup daily        Nutrition Interventions:   Interventions: Meals and snacks, Medical food supplement  Goal: Consumes 3 meals per day  Medical Food Supplement: Commercial beverage  Goal: Magic cup daily (290 kcal, 9 g protein per serving)    Collaboration and Referral of Nutrition Care: Other (Comment)  Goal: mother at bedside    Nutrition Education:      Declined. Of note, previously received cardiac diet education by a RD on 5/24/24.    Nutrition Monitoring and Evaluation   Food/Nutrient Related History Monitoring  Monitoring and Evaluation Plan: Energy intake, Amount of food, Fluid intake  Energy Intake: Estimated energy intake  Criteria: Pt meets >75% of estimated energy needs  Fluid Intake: Estimated fluid intake  Criteria: Meets >75% of estimated fluid needs  Amount of Food: Estimated amout of food, Medical food intake  Criteria: Pt consumes >50% of meals and supplements    Body Composition/Growth/Weight History  Monitoring and Evaluation Plan: Weight  Weight: Measured weight  Criteria: Reduce wt from edema then maintain stable wt    Biochemical Data, Medical Tests and Procedures  Monitoring and Evaluation Plan: Glucose/endocrine profile, Electrolyte/renal panel  Electrolyte and Renal Panel: Sodium, Potassium, Phosphorus  Criteria: Electrolytes WNL  Glucose/Endocrine Profile: Glucose, casual  Criteria: BG within desirable range              Time Spent (min): 45 minutes

## 2024-08-19 NOTE — NURSING NOTE
CHF Clinical Nurse Navigator Documentation  Congestive Heart Failure disease education was performed by the Clinical Nurse Navigator with a good understanding: yes  CHF signs and symptoms discussed and when to call cardiologist?  yes  Living With Heart Failure Education booklet?  no  Controlling Heart Failure at Home Education? yes  Home medication usage?  yes  Nutrition Education? Yes-low sodium   Fluid Restriction Education? yes  Daily Weight Education? Yes-daily weight log provided   Follow-up with Cardiologist after discharge education? yes  Comments: Met with patient and his mother at bedside for heart failure education. Both verbalized understanding. Patient lives at home with his parents. His cardiologist is Dr. Rosa and he also sees Dr. Brown. His PCP is Dr. Willis at [facility]. He is compliant with all his medications and follow up appointments. He has no barriers in getting to his follow up appointments and his mom or dad take him. He recently is having some issues affording some of his medications. He said his out of pocket costs have recently increased. He states he is paying as much as $500 for some of them. He said he currently has everything he needs and is taking everything. Possible transfer to [facility] for further evaluation and possible LVAD. Had lengthy discussion with patient about this and he is considering his options. If patient does not transfer to [facility] will involve RN Navigator for medication assistance. He currently does not use oxygen at home but thinks he needs it. He quit smoking at the end of April. He has not been 100% compliant with his low sodium diet but is trying. Spoke with patient about Healthy at Home program as he has used it in the past. He is agreeable. Referral placed. Answered all questions. Provided my contact information should any other questions or concerns arise.                Heart Failure GDMT Medication Optimization    Medications: Yes No (Why?)   BB Yes-metoprolol XL     ARNi/ARB/ACE Yes-Entresto    MRA Yes-spirnolactone    SGLT2i Yes-Farxiga        Lab Results   Component Value Date    BNP 1,980 (H) 08/19/2024       Lab Results   Component Value Date     (L) 08/19/2024    K 4.1 08/19/2024    CL 98 08/19/2024    CO2 27 08/19/2024    BUN 23 08/19/2024    CREATININE 1.65 (H) 08/19/2024       Wt Readings from Last 4 Encounters:   08/19/24 80.9 kg (178 lb 5.6 oz)   08/08/24 80.7 kg (178 lb)   07/23/24 75.8 kg (167 lb)   07/14/24 72.2 kg (159 lb 2.8 oz)       amiodarone, 200 mg, oral, BID  aspirin, 81 mg, oral, Daily  dapagliflozin propanediol, 10 mg, oral, Daily  enoxaparin, 40 mg, subcutaneous, Daily  furosemide, 40 mg, intravenous, BID  LORazepam, 0.5 mg, oral, Nightly  magnesium oxide, 400 mg, oral, Daily  mexiletine, 300 mg, oral, TID  oxygen, , inhalation, Continuous - Inhalation  pantoprazole, 20 mg, oral, Daily before breakfast  PARoxetine, 60 mg, oral, Nightly  polyethylene glycol, 17 g, oral, Daily  potassium chloride, 20 mEq, oral, Daily  ranolazine, 500 mg, oral, BID  rosuvastatin, 20 mg, oral, Nightly  sacubitriL-valsartan, 0.5 tablet, oral, BID  tamsulosin, 0.4 mg, oral, Daily        Current Outpatient Medications   Medication Instructions    albuterol sulfate (Proair Digihaler) 90 mcg/actuation aero powdr breath act w/sensor inhaler 2 puffs, inhalation, Every 4 hours PRN    amiodarone (PACERONE) 200 mg, oral, 2 times daily    aspirin 81 mg, oral, Daily    blood pressure monitor kit Use as directed    esomeprazole (NEXIUM) 20 mg, oral, Daily before breakfast, Do not open capsule.    Farxiga 10 mg, oral, Daily    LORazepam (ATIVAN) 0.5 mg, oral, Nightly    magnesium oxide (MAG-OX) 400 mg, oral, Daily    metoprolol succinate XL (TOPROL-XL) 12.5 mg, oral, Daily, Do not crush or chew.    mexiletine (MEXITIL) 300 mg, oral, 3 times daily    PARoxetine (PAXIL) 60 mg, oral, Nightly    potassium chloride (Klor-Con) 20 mEq packet 20 mEq, oral, Daily    ranolazine (RANEXA) 500  mg, oral, 2 times daily, Do not crush, chew, or split.    rosuvastatin (CRESTOR) 20 mg, oral, Nightly    sacubitriL-valsartan (Entresto) 24-26 mg tablet 1 tablet, oral, 2 times daily    spironolactone (ALDACTONE) 12.5 mg, oral, Daily    tamsulosin (FLOMAX) 0.4 mg, oral, Daily    torsemide (DEMADEX) 20 mg, oral, Daily, DO NOT TAKE IF SBP <90    traZODone (DESYREL) 50 mg, oral, Nightly

## 2024-08-19 NOTE — H&P
Medical Group History and Physical      ASSESSMENT & PLAN:     #.  Acute on chronic systolic heart failure  #.  Acute hypoxic respiratory failure  #.  EITAN on CKD  #.  History of V. Tach s/p AICD  #.  CAD s/p PTCA  #.  Elevated troponin  #.  COPD - not in acute exacerbation  #.  Transaminitis  -P/w shortness of breath, BNP markedly elevated, CXR with evidence of pulmonary interstitial edema and small to moderate right pleural effusion all consistent with acute decompensated systolic heart failure  -Acute hypoxic respiratory failure likely secondary to pulmonary edema in the setting of above  -EITAN likely secondary to cardiorenal syndrome  -AICD interrogated by ER provider and does not demonstrate any episodes of V. Tach  -Elevated troponin likely secondary to demand ischemia in the setting of decompensated heart failure, no ischemic changes noted on EKG  -Transaminitis likely related to congestive hepatopathy given presentation    Plan:  -IV Lasix 40 mg twice daily  -Strict intake and output, daily weights  -Continue home GDMT: Entresto, spironolactone, Farxiga  -Hold metoprolol for now given acute decompensated heart failure  -Cardiology consult  -Heart failure navigator consult  -Telemetry monitoring  -EP consult  -Continue home amiodarone, mexiletine  -Goal Mg > 2, K > 4, trend while diuresing, continue home supplements  -Previously was on Eliquis for 1 month post VT ablation, this appears to have been discontinued earlier this month by cardiac surgery  -Continue home aspirin, statin, Ranexa  -Wean supplemental oxygen as tolerated    VTE PPX: Lovenox and SCDs      Clayton Santos MD    --Of note, this documentation is completed using the Dragon Dictation system (voice recognition software). There may be spelling and/or grammatical errors that were not corrected prior to final submission.--    HISTORY OF PRESENT ILLNESS:   Chief Complaint: Shortness of breath    Cristian Sapp is a 58 y.o. male with a history  of CAD s/p multiple PCI, ICM/HFrEF (EF 15%) s/p CRT-D implant, V. Tach s/p multiple ablations and AICD, anxiety, COPD, infrarenal AAA s/p R iliac stent, nephrolithiasis status post ureteral stent presenting with shortness of breath.  Patient has an extensive cardiac history and states that he woke up with shortness of breath and felt similar 20s had prior episodes of slow V. tach.  He denies any chest pain.  His legs have progressively been getting more swollen.  He also feels short of breath and endorses orthopnea.  Denies any productive cough, fever, chills.  He has been compliant with his home torsemide.    ER Course: /77, HR 84, RR 20, T35.9.  Placed on supplemental oxygen for hypoxia.  Labs notable for sodium 135, creatinine 1.65, ALT 83, AST 62, BNP 1980, troponin 23, INR 1.2, hemoglobin 13.1.  CXR showed enlarged cardiac silhouette and prominent interstitial markings suggesting pulmonary interstitial edema and small to moderate right pleural effusion.  AICD was interrogated in the ER and showed no episodes of V. tach.  Patient was given 40 mg IV Lasix.       ROS  10 point review of systems negative except per HPI     PAST HISTORIES:     Past Medical History  He has a past medical history of Atherosclerosis of native artery of both lower extremities with intermittent claudication (CMS-[facility]), CHB (complete heart block) (Multi), Chronic systolic CHF (congestive heart failure), NYHA class 3 (Multi), COPD (chronic obstructive pulmonary disease) (Multi), Coronary artery disease, History of tobacco abuse, HLD (hyperlipidemia), Hypertension, Infrarenal abdominal aortic aneurysm (AAA) without rupture (CMS-[facility]), Ischemic cardiomyopathy with implantable cardioverter-defibrillator (ICD), MI (myocardial infarction) (Multi), Nephrolithiasis, and PTSD (post-traumatic stress disorder).    Surgical History  He has a past surgical history that includes Cardiac defibrillator placement; Coronary angioplasty with stent  (2006); Coronary angioplasty with stent (04/2003); Coronary angioplasty with stent (06/2008); Cystoscopy w/ ureteral stent placement (04/01/2024); Cystoscopy w/ ureteral stent placement (04/30/2024); Iliac artery stent (Right); Knee surgery; Femoral bypass; Cardiac electrophysiology procedure (N/A, 5/23/2024); Cardiac catheterization (N/A, 5/23/2024); Cardiac electrophysiology procedure (N/A, 5/28/2024); Cardiac electrophysiology procedure (Right, 5/30/2024); and Cardiac electrophysiology procedure (N/A, 6/6/2024).     Social History  He reports that he has quit smoking. His smoking use included cigarettes. He does not have any smokeless tobacco history on file. He reports that he does not currently use alcohol. He reports that he does not currently use drugs.    Family History  Family History   Problem Relation Name Age of Onset    Hypertension Mother      Diabetes Father      Hypertension Sister      Diabetes Sister      Colon cancer Maternal Grandmother      Hypertension Maternal Grandmother      Heart disease Maternal Grandfather      Hypertension Maternal Grandfather      Cancer Paternal Grandfather      Hypertension Paternal Grandfather         Allergies:  Chantix [varenicline]      OBJECTIVE:      Last Recorded Vitals  /75   Pulse (!) 102   Temp 35.9 °C (96.6 °F)   Resp 18   Wt 82.6 kg (182 lb)   SpO2 (!) 93%     Last I/O:  No intake/output data recorded.    Physical Exam   Gen: NAD, appears stated age  HEENT: EOM, MMM  CV: RRR, no murmurs rubs or gallops  Resp: Bibasilar crackles noted, normal effort  Abdomen: soft, NT,+BS  LE: 2+ pitting edema bilaterally, no deformity  Neuro: A&Ox4, moving all extremities    LABS AND IMAGING:       Relevant Results  Labs Reviewed   CBC WITH AUTO DIFFERENTIAL - Abnormal       Result Value    WBC 10.6      nRBC 0.0      RBC 4.57      Hemoglobin 13.1 (*)     Hematocrit 41.4      MCV 91      MCH 28.7      MCHC 31.6 (*)     RDW 22.0 (*)     Platelets 318       Neutrophils % 70.8      Immature Granulocytes %, Automated 0.7      Lymphocytes % 16.0      Monocytes % 9.1      Eosinophils % 2.4      Basophils % 1.0      Neutrophils Absolute 7.50      Immature Granulocytes Absolute, Automated 0.07      Lymphocytes Absolute 1.69      Monocytes Absolute 0.96      Eosinophils Absolute 0.25      Basophils Absolute 0.11 (*)    COMPREHENSIVE METABOLIC PANEL - Abnormal    Glucose 123 (*)     Sodium 135 (*)     Potassium 4.1      Chloride 98      Bicarbonate 27      Anion Gap 14      Urea Nitrogen 23      Creatinine 1.65 (*)     eGFR 48 (*)     Calcium 9.3      Albumin 3.7      Alkaline Phosphatase 171 (*)     Total Protein 7.3      AST 62 (*)     Bilirubin, Total 1.0      ALT 83 (*)    PROTIME-INR - Abnormal    Protime 13.9 (*)     INR 1.2 (*)    B-TYPE NATRIURETIC PEPTIDE - Abnormal    BNP 1,980 (*)     Narrative:        <100 pg/mL - Heart failure unlikely  100-299 pg/mL - Intermediate probability of acute heart                  failure exacerbation. Correlate with clinical                  context and patient history.    >=300 pg/mL - Heart Failure likely. Correlate with clinical                  context and patient history.    BNP testing is performed using different testing methodology at [facility] than at other [facility]. Direct result comparisons should only be made within the same method.      SERIAL TROPONIN-INITIAL - Abnormal    Troponin I, High Sensitivity 23 (*)     Narrative:     Less than 99th percentile of normal range cutoff-  Female and children under 18 years old <14 ng/L; Male <21 ng/L: Negative  Repeat testing should be performed if clinically indicated.     Female and children under 18 years old 14-50 ng/L; Male 21-50 ng/L:  Consistent with possible cardiac damage and possible increased clinical   risk. Serial measurements may help to assess extent of myocardial damage.     >50 ng/L: Consistent with cardiac damage, increased clinical risk  and  myocardial infarction. Serial measurements may help assess extent of   myocardial damage.      NOTE: Children less than 1 year old may have higher baseline troponin   levels and results should be interpreted in conjunction with the overall   clinical context.     NOTE: Troponin I testing is performed using a different   testing methodology at [facility] than at other   [facility]. Direct result comparisons should only   be made within the same method.   SERIAL TROPONIN, 1 HOUR - Abnormal    Troponin I, High Sensitivity 26 (*)     Narrative:     Less than 99th percentile of normal range cutoff-  Female and children under 18 years old <14 ng/L; Male <21 ng/L: Negative  Repeat testing should be performed if clinically indicated.     Female and children under 18 years old 14-50 ng/L; Male 21-50 ng/L:  Consistent with possible cardiac damage and possible increased clinical   risk. Serial measurements may help to assess extent of myocardial damage.     >50 ng/L: Consistent with cardiac damage, increased clinical risk and  myocardial infarction. Serial measurements may help assess extent of   myocardial damage.      NOTE: Children less than 1 year old may have higher baseline troponin   levels and results should be interpreted in conjunction with the overall   clinical context.     NOTE: Troponin I testing is performed using a different   testing methodology at [facility] than at other   [facility]. Direct result comparisons should only   be made within the same method.   MAGNESIUM - Normal    Magnesium 2.00     APTT - Normal    aPTT 28      Narrative:     The APTT is no longer used for monitoring Unfractionated Heparin Therapy. For monitoring Heparin Therapy, use the Heparin Assay.   TROPONIN SERIES- (INITIAL, 1 HR)    Narrative:     The following orders were created for panel order Troponin I Series, High Sensitivity (0, 1 HR).  Procedure                                Abnormality         Status                     ---------                               -----------         ------                     Troponin I, High Sensiti...[[id number]]  Abnormal            Final result               Troponin, High Sensitivi...[[id number]]  Abnormal            Final result                 Please view results for these tests on the individual orders.   GRAY TOP    Extra Tube Hold for add-ons.     MORPHOLOGY    RBC Morphology See Below      Polychromasia Mild      Target Cells Few      Ovalocytes Few      Teardrop Cells Few      Giant Platelets Few      Clumped Platelets Present       XR chest 1 view   Final Result   Enlarged cardiac silhouette and prominent interstitial markings   suggesting pulmonary interstitial edema. Small to moderate right   pleural effusion and superimposed right basilar atelectasis.        MACRO:   None.        Signed by: Evan Finkelstein 8/19/2024 2:34 AM   Dictation workstation:   [id number]

## 2024-08-20 ENCOUNTER — APPOINTMENT (OUTPATIENT)
Dept: CARDIOLOGY | Facility: HOSPITAL | Age: 59
End: 2024-08-20
Payer: MEDICARE

## 2024-08-20 ENCOUNTER — APPOINTMENT (OUTPATIENT)
Dept: CARDIOLOGY | Facility: CLINIC | Age: 59
End: 2024-08-20
Payer: COMMERCIAL

## 2024-08-20 ENCOUNTER — DOCUMENTATION (OUTPATIENT)
Dept: TRANSPLANT | Facility: HOSPITAL | Age: 59
End: 2024-08-20

## 2024-08-20 DIAGNOSIS — I25.5 ISCHEMIC CARDIOMYOPATHY: ICD-10-CM

## 2024-08-20 DIAGNOSIS — I47.20 VT (VENTRICULAR TACHYCARDIA) (MULTI): ICD-10-CM

## 2024-08-20 DIAGNOSIS — I50.43 ACUTE ON CHRONIC COMBINED SYSTOLIC AND DIASTOLIC HEART FAILURE (MULTI): ICD-10-CM

## 2024-08-20 PROBLEM — I50.9 ACUTE DECOMPENSATED HEART FAILURE (MULTI): Status: ACTIVE | Noted: 2024-08-20

## 2024-08-20 PROBLEM — J43.2 CENTRILOBULAR EMPHYSEMA (MULTI): Status: ACTIVE | Noted: 2024-08-20

## 2024-08-20 LAB
ALBUMIN SERPL BCP-MCNC: 3.4 G/DL (ref 3.4–5)
ALBUMIN SERPL BCP-MCNC: 3.8 G/DL (ref 3.4–5)
ANION GAP SERPL CALC-SCNC: 14 MMOL/L (ref 10–20)
ANION GAP SERPL CALC-SCNC: 14 MMOL/L (ref 10–20)
ANION GAP SERPL CALC-SCNC: 17 MMOL/L (ref 10–20)
ATRIAL RATE: 80 BPM
BASOPHILS # BLD MANUAL: 0.14 X10*3/UL (ref 0–0.1)
BASOPHILS NFR BLD MANUAL: 1.7 %
BNP SERPL-MCNC: 1873 PG/ML (ref 0–99)
BUN SERPL-MCNC: 20 MG/DL (ref 6–23)
BUN SERPL-MCNC: 22 MG/DL (ref 6–23)
BUN SERPL-MCNC: 22 MG/DL (ref 6–23)
CALCIUM SERPL-MCNC: 9.1 MG/DL (ref 8.6–10.6)
CALCIUM SERPL-MCNC: 9.1 MG/DL (ref 8.6–10.6)
CALCIUM SERPL-MCNC: 9.3 MG/DL (ref 8.6–10.6)
CHLORIDE SERPL-SCNC: 100 MMOL/L (ref 98–107)
CHLORIDE SERPL-SCNC: 100 MMOL/L (ref 98–107)
CHLORIDE SERPL-SCNC: 99 MMOL/L (ref 98–107)
CO2 SERPL-SCNC: 26 MMOL/L (ref 21–32)
CO2 SERPL-SCNC: 29 MMOL/L (ref 21–32)
CO2 SERPL-SCNC: 29 MMOL/L (ref 21–32)
CREAT SERPL-MCNC: 1.42 MG/DL (ref 0.5–1.3)
CREAT SERPL-MCNC: 1.42 MG/DL (ref 0.5–1.3)
CREAT SERPL-MCNC: 1.47 MG/DL (ref 0.5–1.3)
DACRYOCYTES BLD QL SMEAR: ABNORMAL
EGFRCR SERPLBLD CKD-EPI 2021: 56 ML/MIN/1.73M*2
EGFRCR SERPLBLD CKD-EPI 2021: 58 ML/MIN/1.73M*2
EGFRCR SERPLBLD CKD-EPI 2021: 58 ML/MIN/1.73M*2
EOSINOPHIL # BLD MANUAL: 0.29 X10*3/UL (ref 0–0.7)
EOSINOPHIL NFR BLD MANUAL: 3.5 %
ERYTHROCYTE [DISTWIDTH] IN BLOOD BY AUTOMATED COUNT: 21.1 % (ref 11.5–14.5)
ERYTHROCYTE [DISTWIDTH] IN BLOOD BY AUTOMATED COUNT: 21.7 % (ref 11.5–14.5)
GLUCOSE SERPL-MCNC: 116 MG/DL (ref 74–99)
GLUCOSE SERPL-MCNC: 89 MG/DL (ref 74–99)
GLUCOSE SERPL-MCNC: 89 MG/DL (ref 74–99)
HCT VFR BLD AUTO: 39.3 % (ref 41–52)
HCT VFR BLD AUTO: 39.5 % (ref 41–52)
HGB BLD-MCNC: 12.1 G/DL (ref 13.5–17.5)
HGB BLD-MCNC: 12.7 G/DL (ref 13.5–17.5)
IMM GRANULOCYTES # BLD AUTO: 0.09 X10*3/UL (ref 0–0.7)
IMM GRANULOCYTES NFR BLD AUTO: 1.1 % (ref 0–0.9)
LYMPHOCYTES # BLD MANUAL: 0.86 X10*3/UL (ref 1.2–4.8)
LYMPHOCYTES NFR BLD MANUAL: 10.4 %
MAGNESIUM SERPL-MCNC: 2.24 MG/DL (ref 1.6–2.4)
MAGNESIUM SERPL-MCNC: 2.26 MG/DL (ref 1.6–2.4)
MCH RBC QN AUTO: 28.1 PG (ref 26–34)
MCH RBC QN AUTO: 28.2 PG (ref 26–34)
MCHC RBC AUTO-ENTMCNC: 30.8 G/DL (ref 32–36)
MCHC RBC AUTO-ENTMCNC: 32.2 G/DL (ref 32–36)
MCV RBC AUTO: 88 FL (ref 80–100)
MCV RBC AUTO: 91 FL (ref 80–100)
MONOCYTES # BLD MANUAL: 0.29 X10*3/UL (ref 0.1–1)
MONOCYTES NFR BLD MANUAL: 3.5 %
NEUTS SEG # BLD MANUAL: 6.71 X10*3/UL (ref 1.2–7)
NEUTS SEG NFR BLD MANUAL: 80.9 %
NRBC BLD-RTO: 0 /100 WBCS (ref 0–0)
NRBC BLD-RTO: 0 /100 WBCS (ref 0–0)
OVALOCYTES BLD QL SMEAR: ABNORMAL
P AXIS: 46 DEGREES
P OFFSET: 155 MS
P ONSET: 96 MS
PHOSPHATE SERPL-MCNC: 3.1 MG/DL (ref 2.5–4.9)
PHOSPHATE SERPL-MCNC: 3.3 MG/DL (ref 2.5–4.9)
PLATELET # BLD AUTO: 297 X10*3/UL (ref 150–450)
PLATELET # BLD AUTO: 352 X10*3/UL (ref 150–450)
POTASSIUM SERPL-SCNC: 4 MMOL/L (ref 3.5–5.3)
POTASSIUM SERPL-SCNC: 4 MMOL/L (ref 3.5–5.3)
POTASSIUM SERPL-SCNC: 4.6 MMOL/L (ref 3.5–5.3)
PR INTERVAL: 170 MS
Q ONSET: 181 MS
QRS COUNT: 13 BEATS
QRS DURATION: 166 MS
QT INTERVAL: 476 MS
QTC CALCULATION(BAZETT): 548 MS
QTC FREDERICIA: 524 MS
R AXIS: -61 DEGREES
RBC # BLD AUTO: 4.3 X10*6/UL (ref 4.5–5.9)
RBC # BLD AUTO: 4.5 X10*6/UL (ref 4.5–5.9)
RBC MORPH BLD: ABNORMAL
SODIUM SERPL-SCNC: 137 MMOL/L (ref 136–145)
SODIUM SERPL-SCNC: 139 MMOL/L (ref 136–145)
SODIUM SERPL-SCNC: 139 MMOL/L (ref 136–145)
T AXIS: 87 DEGREES
T OFFSET: 419 MS
TOTAL CELLS COUNTED BLD: 115
VENTRICULAR RATE: 80 BPM
WBC # BLD AUTO: 11.7 X10*3/UL (ref 4.4–11.3)
WBC # BLD AUTO: 8.3 X10*3/UL (ref 4.4–11.3)

## 2024-08-20 PROCEDURE — 83735 ASSAY OF MAGNESIUM: CPT

## 2024-08-20 PROCEDURE — 82728 ASSAY OF FERRITIN: CPT

## 2024-08-20 PROCEDURE — 80069 RENAL FUNCTION PANEL: CPT

## 2024-08-20 PROCEDURE — 99221 1ST HOSP IP/OBS SF/LOW 40: CPT | Performed by: STUDENT IN AN ORGANIZED HEALTH CARE EDUCATION/TRAINING PROGRAM

## 2024-08-20 PROCEDURE — 2500000004 HC RX 250 GENERAL PHARMACY W/ HCPCS (ALT 636 FOR OP/ED)

## 2024-08-20 PROCEDURE — 85007 BL SMEAR W/DIFF WBC COUNT: CPT

## 2024-08-20 PROCEDURE — 36415 COLL VENOUS BLD VENIPUNCTURE: CPT

## 2024-08-20 PROCEDURE — 85027 COMPLETE CBC AUTOMATED: CPT

## 2024-08-20 PROCEDURE — 2020000001 HC ICU ROOM DAILY

## 2024-08-20 PROCEDURE — 80048 BASIC METABOLIC PNL TOTAL CA: CPT | Mod: CCI

## 2024-08-20 PROCEDURE — 99223 1ST HOSP IP/OBS HIGH 75: CPT | Performed by: INTERNAL MEDICINE

## 2024-08-20 PROCEDURE — 2500000002 HC RX 250 W HCPCS SELF ADMINISTERED DRUGS (ALT 637 FOR MEDICARE OP, ALT 636 FOR OP/ED)

## 2024-08-20 PROCEDURE — 2500000001 HC RX 250 WO HCPCS SELF ADMINISTERED DRUGS (ALT 637 FOR MEDICARE OP)

## 2024-08-20 PROCEDURE — 84443 ASSAY THYROID STIM HORMONE: CPT

## 2024-08-20 PROCEDURE — 84439 ASSAY OF FREE THYROXINE: CPT

## 2024-08-20 PROCEDURE — 83880 ASSAY OF NATRIURETIC PEPTIDE: CPT

## 2024-08-20 PROCEDURE — 83540 ASSAY OF IRON: CPT

## 2024-08-20 RX ORDER — FERROUS SULFATE 325(65) MG
325 TABLET ORAL
Status: ON HOLD | COMMUNITY
End: 2024-08-30

## 2024-08-20 RX ORDER — ACETAMINOPHEN 325 MG/1
650 TABLET ORAL EVERY 6 HOURS PRN
Status: DISCONTINUED | OUTPATIENT
Start: 2024-08-20 | End: 2024-08-30 | Stop reason: HOSPADM

## 2024-08-20 RX ORDER — FUROSEMIDE 10 MG/ML
40 INJECTION INTRAMUSCULAR; INTRAVENOUS ONCE
Status: COMPLETED | OUTPATIENT
Start: 2024-08-20 | End: 2024-08-20

## 2024-08-20 RX ORDER — POLYETHYLENE GLYCOL 3350 17 G/17G
17 POWDER, FOR SOLUTION ORAL DAILY PRN
Status: DISCONTINUED | OUTPATIENT
Start: 2024-08-20 | End: 2024-08-25

## 2024-08-20 RX ORDER — LORAZEPAM 0.5 MG/1
0.5 TABLET ORAL DAILY PRN
Status: DISCONTINUED | OUTPATIENT
Start: 2024-08-20 | End: 2024-08-21

## 2024-08-20 RX ORDER — HYDROXYZINE HYDROCHLORIDE 25 MG/1
25 TABLET, FILM COATED ORAL EVERY 6 HOURS PRN
Status: DISCONTINUED | OUTPATIENT
Start: 2024-08-20 | End: 2024-08-30 | Stop reason: HOSPADM

## 2024-08-20 RX ORDER — NITROGLYCERIN 0.4 MG/1
0.4 TABLET SUBLINGUAL EVERY 5 MIN PRN
COMMUNITY

## 2024-08-20 ASSESSMENT — COGNITIVE AND FUNCTIONAL STATUS - GENERAL
MOBILITY SCORE: 24
DAILY ACTIVITIY SCORE: 24
MOBILITY SCORE: 24
DAILY ACTIVITIY SCORE: 24

## 2024-08-20 ASSESSMENT — PAIN SCALES - GENERAL
PAINLEVEL_OUTOF10: 4
PAINLEVEL_OUTOF10: 0 - NO PAIN

## 2024-08-20 ASSESSMENT — ACTIVITIES OF DAILY LIVING (ADL): LACK_OF_TRANSPORTATION: NO

## 2024-08-20 ASSESSMENT — PAIN - FUNCTIONAL ASSESSMENT
PAIN_FUNCTIONAL_ASSESSMENT: 0-10
PAIN_FUNCTIONAL_ASSESSMENT: 0-10

## 2024-08-20 NOTE — PROGRESS NOTES
Subjective Data:  Patient seen and assessed this morning, lying in bed, NAD. Denies CP, reports ongoing SOB, and dyspnea with minimal exertion. During rounds, patient went into slow VT, symptomatic, HR ~105, self- converted without intervention. Appears warm and dry, well compensated. Updated on plan of care, agreeable to plan.     Overnight Events:    Admitted to [facility] from [facility]. Otherwise, no acute events overnight.    Today's plan:  - EP consulted for recurring slow VT, rec ENT consult for possible stellate ganglion removal/denervation   - patient now agreeable to advanced therapy evaluation, initiated today  - pending PFT's and pulmonology consultation to assess Emphysema and lung function  - appears warm and compensated, no further diuresis, did received 40 mg IV Lasix this morning     Objective Data:  Last Recorded Vitals:  Vitals:    08/20/24 0337 08/20/24 0741 08/20/24 1131 08/20/24 1536   BP: 96/66 115/78 96/68 109/75   BP Location: Left arm Right arm Left arm Left arm   Patient Position: Lying Lying Lying Lying   Pulse: 76 75 101 77   Resp: 19 19 19 19   Temp: 36.4 °C (97.5 °F) 35.8 °C (96.4 °F) 36.4 °C (97.5 °F) 36.5 °C (97.7 °F)   TempSrc: Temporal Temporal Temporal Temporal   SpO2: 91% 95% 93% 95%   Weight: 81.3 kg (179 lb 3.7 oz)      Height:           Last Labs:  Results for orders placed or performed during the hospital encounter of 08/19/24 (from the past 24 hour(s))   CBC and Auto Differential   Result Value Ref Range    WBC 8.3 4.4 - 11.3 x10*3/uL    nRBC 0.0 0.0 - 0.0 /100 WBCs    RBC 4.30 (L) 4.50 - 5.90 x10*6/uL    Hemoglobin 12.1 (L) 13.5 - 17.5 g/dL    Hematocrit 39.3 (L) 41.0 - 52.0 %    MCV 91 80 - 100 fL    MCH 28.1 26.0 - 34.0 pg    MCHC 30.8 (L) 32.0 - 36.0 g/dL    RDW 21.7 (H) 11.5 - 14.5 %    Platelets 297 150 - 450 x10*3/uL    Immature Granulocytes %, Automated 1.1 (H) 0.0 - 0.9 %    Immature Granulocytes Absolute, Automated 0.09 0.00 - 0.70 x10*3/uL   Basic metabolic panel    Result Value Ref Range    Glucose 89 74 - 99 mg/dL    Sodium 139 136 - 145 mmol/L    Potassium 4.0 3.5 - 5.3 mmol/L    Chloride 100 98 - 107 mmol/L    Bicarbonate 29 21 - 32 mmol/L    Anion Gap 14 10 - 20 mmol/L    Urea Nitrogen 22 6 - 23 mg/dL    Creatinine 1.42 (H) 0.50 - 1.30 mg/dL    eGFR 58 (L) >60 mL/min/1.73m*2    Calcium 9.1 8.6 - 10.6 mg/dL   B-type natriuretic peptide   Result Value Ref Range    BNP 1,873 (H) 0 - 99 pg/mL   Renal Function Panel   Result Value Ref Range    Glucose 89 74 - 99 mg/dL    Sodium 139 136 - 145 mmol/L    Potassium 4.0 3.5 - 5.3 mmol/L    Chloride 100 98 - 107 mmol/L    Bicarbonate 29 21 - 32 mmol/L    Anion Gap 14 10 - 20 mmol/L    Urea Nitrogen 22 6 - 23 mg/dL    Creatinine 1.42 (H) 0.50 - 1.30 mg/dL    eGFR 58 (L) >60 mL/min/1.73m*2    Calcium 9.1 8.6 - 10.6 mg/dL    Phosphorus 3.1 2.5 - 4.9 mg/dL    Albumin 3.4 3.4 - 5.0 g/dL   Magnesium   Result Value Ref Range    Magnesium 2.24 1.60 - 2.40 mg/dL   Manual Differential   Result Value Ref Range    Neutrophils %, Manual 80.9 40.0 - 80.0 %    Lymphocytes %, Manual 10.4 13.0 - 44.0 %    Monocytes %, Manual 3.5 2.0 - 10.0 %    Eosinophils %, Manual 3.5 0.0 - 6.0 %    Basophils %, Manual 1.7 0.0 - 2.0 %    Seg Neutrophils Absolute, Manual 6.71 1.20 - 7.00 x10*3/uL    Lymphocytes Absolute, Manual 0.86 (L) 1.20 - 4.80 x10*3/uL    Monocytes Absolute, Manual 0.29 0.10 - 1.00 x10*3/uL    Eosinophils Absolute, Manual 0.29 0.00 - 0.70 x10*3/uL    Basophils Absolute, Manual 0.14 (H) 0.00 - 0.10 x10*3/uL    Total Cells Counted 115     RBC Morphology See Below     Ovalocytes Few     Teardrop Cells Few         TROPHS   Date/Time Value Ref Range Status   08/19/2024 02:39 AM 26 0 - 20 ng/L Final   08/19/2024 01:50 AM 23 0 - 20 ng/L Final   07/12/2024 05:59 AM 17 0 - 20 ng/L Final     BNP   Date/Time Value Ref Range Status   08/20/2024 06:36 AM 1,873 0 - 99 pg/mL Final   08/19/2024 01:50 AM 1,980 0 - 99 pg/mL Final     HGBA1C   Date/Time Value  Ref Range Status   06/04/2024 03:07 AM 6.0 see below % Final     Comment:     Hemoglobin variant detected which does not interfere with determination of Hemoglobin A1c. Hemoglobin identification can be ordered to characterize the variant if clinically indicated.     LDLCALC   Date/Time Value Ref Range Status   08/08/2024 02:39 PM 56 <=99 mg/dL Final     Comment:                                 Near   Borderline      AGE      Desirable  Optimal    High     High     Very High     0-19 Y     0 - 109     ---    110-129   >/= 130     ----    20-24 Y     0 - 119     ---    120-159   >/= 160     ----      >24 Y     0 -  99   100-129  130-159   160-189     >/=190       VLDL   Date/Time Value Ref Range Status   08/08/2024 02:39 PM 32 0 - 40 mg/dL Final      Last I/O:  I/O last 3 completed shifts:  In: - (0 mL/kg)   Out: 500 (6.2 mL/kg) [Urine:500 (0.2 mL/kg/hr)]  Weight: 81.3 kg       Echo:  Transthoracic Echo (TTE) Limited 05/28/2024  CONCLUSIONS:   1. Left ventricular systolic function is severely decreased with a 15% estimated ejection fraction.   2. There is no evidence of mitral valve stenosis.   3. Moderate to severe mitral valve regurgitation.   4. Mild tricuspid regurgitation is visualized.   5. Aortic valve stenosis is not present.   6. Left ventricular cavity size is moderately dilated.   7. The pulmonary artery is not well visualized.   8. Current study appears to be remarkably similar to the May 23, 2024 study done here.   9. There is global hypokinesis of the left ventricle with minor regional variations.    Transthoracic Echo (TTE) Complete 05/23/2024  CONCLUSIONS:   1. Left ventricular systolic function is severely decreased with a 10% estimated ejection fraction.   2. There is no evidence of mitral valve stenosis.   3. Moderate to severe mitral valve regurgitation.   4. Moderate tricuspid regurgitation.   5. Aortic valve stenosis is not present.   6. Left ventricular cavity size is moderately dilated.   7.  "Moderate to severely elevated pulmonary artery pressure.   8. Pulmonary hypertension is present.   9. The inferior vena cava appears moderately dilated.  10. There is global hypokinesis of the left ventricle with minor regional variations.    Ejection Fractions:  No results found for: \"EF\"  Cath:  Cardiac Catheterization Procedure 05/23/2024  CONCLUSIONS:   1. Distal Left Main: 10-30% stenosis.   2. Proximal and mid LAD Lesion: The percent stenosis is 10-30%.   3. Proximal CX Lesion: The percent stenosis is 100%.   4. Right Coronary Artery: fills via collaterals.   5. Proximal RCA Lesion: The percent stenosis is 100%.   6. The Left Ventricular Ejection Fraction is 10%,by echo.      Inpatient Medications:  Scheduled medications   Medication Dose Route Frequency    amiodarone  200 mg oral BID    aspirin  81 mg oral Daily    dapagliflozin propanediol  10 mg oral Daily    metoprolol succinate XL  12.5 mg oral Daily    mexiletine  300 mg oral TID    pantoprazole  40 mg oral Daily before breakfast    PARoxetine  60 mg oral Nightly    ranolazine  500 mg oral BID    rosuvastatin  20 mg oral Nightly    sacubitriL-valsartan  0.5 tablet oral BID    spironolactone  12.5 mg oral Daily    tamsulosin  0.4 mg oral Daily    traZODone  50 mg oral Nightly     PRN medications   Medication    albuterol    hydrOXYzine HCL    LORazepam     Continuous Medications   Medication Dose Last Rate       Physical Exam:  Physical Exam  Vitals and nursing note reviewed.   Constitutional:       General: He is not in acute distress.     Appearance: Normal appearance.   HENT:      Head: Atraumatic.      Mouth/Throat:      Mouth: Mucous membranes are moist.   Eyes:      General: No scleral icterus.     Extraocular Movements: Extraocular movements intact.      Conjunctiva/sclera: Conjunctivae normal.   Neck:      Comments: + JVD to mid-neck  Cardiovascular:      Rate and Rhythm: Normal rate and regular rhythm.      Pulses: Normal pulses.      Heart " sounds: Murmur heard.      Comments: Systolic mitral murmur 3/6,   Pulmonary:      Effort: Pulmonary effort is normal. No respiratory distress.      Breath sounds: Normal breath sounds.      Comments: On 3L via NC  Abdominal:      General: Bowel sounds are normal.      Palpations: Abdomen is soft.   Musculoskeletal:         General: Normal range of motion.      Cervical back: Normal range of motion.      Right lower leg: No edema.      Left lower leg: No edema.   Skin:     General: Skin is warm and dry.      Capillary Refill: Capillary refill takes less than 2 seconds.   Neurological:      General: No focal deficit present.      Mental Status: He is alert and oriented to person, place, and time.   Psychiatric:         Mood and Affect: Mood normal.         Behavior: Behavior normal.           Assessment/Plan   59 y/o M with PMHx of CAD s/p multiple PCI, ICM/HFrEF (EF 15% 5/24) s/p CRT-D, VT storm s/p VT ablation (2019), infrarenal AAA s/p R iliac stent, nephrolithiasis s/p recent ureteral stent. Presented to OSH on 8/19 progressive SOB since last admission 5/2024. Transferred to [facility] for advanced therapy evaluation and further optimization.      Acute on chronic Systolic and Diastolic heart failure  Stage D HFrEF/ICM s/p CRT-D (11/2019)   - hx of Cardiogenic shock s/p IABP 6/6, removed 6/8  - follows with Dr. Brown  - TTE (5/28/2024): severely decreased LV systolic function, EF 15%, LVIDd 6.32, moderate-severe MVR, mild TR    - previously signed consent for OHT/LVAD workup,  found not to be a candidate for OHT given active tobacco use, also was not interested in LVAD at that time, workup stopped per Dr. Doyle 6/5  - admit BNP: 1873   - admit CXR: prominent interstitial markings suggesting pulmonary interstitial edema. Small to moderate right pleural effusion and superimposed right basilar atelectasis.  - admit wt: 81.3 kg  - per pt, has not smoked since 5/2024, now agreeable to LVAD  - patient now agreeable to  advanced therapy evaluation, initiated today, consented for LVAD today  - appears warm and compensated, no further diuresis, did received 40 mg IV Lasix this morning  - cont home : ASA, entresto 12-13 mg BID, dapagliflozin 10 mg daily, spironolactone 12.5 mg daily, Toprol XL 12.5 mg daily  - home diuretic: torsemide 20 mg daily  - Daily standing weights, 2gm sodium diet, 2L fluid restriction, strict I&Os    CAD  - s/p multiple PCI  - C (5/23/24): Distal Left Main: 10-30% stenosis; Proximal and mid LAD Lesion: The percent stenosis is 10-30%; Proximal CX Lesion: The percent stenosis is 100%; Right Coronary Artery: fills via collaterals; Proximal RCA Lesion: The percent stenosis is 100%; The Left Ventricular Ejection Fraction is 10%,by echo.  - C/w ASA 81, rosuvastatin 20 mg nightly     Prior VT storm   - s/p VT ablation 5/30, stellate ganglion block 6/4, VT ablation 6/6, stellate ganglion block 6/11  - Device interrogation completed 8/20  - went into slow VT this morning, self-resolved during device interrogation, consider transfer to HFICU if recurs for Lidocaine drip +/- pacing   - EP consulted for recurring slow VT, rec ENT consult for possible stellate ganglion removal/denervation   - cont home ranexa 500 mg BID, amiodarone 200 mg BID, Toprol XL 12.5 mg daily  -keep K  >4 and Mag >2    Severe emphysema  ?COPD   - CT chest (6/4): severe emphysematous changes.   - admit CXR: prominent interstitial markings suggesting pulmonary interstitial edema. Small to moderate right pleural effusion and superimposed right basilar atelectasis.  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 Vivek Seth, APRN-CNP

## 2024-08-20 NOTE — PROGRESS NOTES
08/20/24 1102   Discharge Planning   Living Arrangements Parent   Support Systems Family members   Assistance Needed Needs assist   Type of Residence Private residence   Number of Stairs to Enter Residence 2   Number of Stairs Within Residence 0   Do you have animals or pets at home? No   Who is requesting discharge planning? Provider   Expected Discharge Disposition Home   Financial Resource Strain   How hard is it for you to pay for the very basics like food, housing, medical care, and heating? Not very   Housing Stability   In the last 12 months, was there a time when you were not able to pay the mortgage or rent on time? N   In the past 12 months, how many times have you moved where you were living? 1   At any time in the past 12 months, were you homeless or living in a shelter (including now)? N   Transportation Needs   In the past 12 months, has lack of transportation kept you from medical appointments or from getting medications? no   In the past 12 months, has lack of transportation kept you from meetings, work, or from getting things needed for daily living? No   Patient Choice   Provider Choice list and CMS website (https://medicare.gov/care-compare#search) for post-acute Quality and Resource Measure Data were provided and reviewed with: Patient   Patient / Family choosing to utilize agency / facility established prior to hospitalization No     Transitional Care Coordinator Note:  8/20/2024@9:00 Met with patient to introduce myself, role and discuss discharge planning s/p admission.  Patient lives with  [facility] in all ADL's. Does not   require assist devices for ambulation. Demographics and contact information confirmed.  Will continue to monitor patient for all home going needs.  Vikash Castellanos RN [facility] [phone]              Previous Home Care  None   DME None   Pharmacy [facility]   Falls: none   PCP  Dr Willis   O2/Cpap-3L   Dialysis None   Transportation at discharge- Has ride home

## 2024-08-20 NOTE — H&P
"History Of Present Illness    Cristian Sapp is a 58 y.o. male who presented to the [facility] emergency department with complaints of increased shortness of breath that have been progressing on last 5 months since his last admission for VT.   \"He patient reports that he woke up and felt similar to his prior episodes of slow ventricular tachycardia. His defibrillator did not fire although he felt like it was trying to pace him out of the rhythm.\"  He endorses been gaining weight, abdominal swelling and increased shortness of breath along with orthopnea. Fell SOB to walking around the house and to do daily activities. Denies any cough fever chills. Reports that he has been compliant with his home torsemide dose. At the moment NYHA class IV.  AICD was interrogated in the emergency department which showed slow VT in the 100s following below detection rate. The patient was given Lasix and admitted to telemetry. General cardiology and electrophysiology was consulted.    For chart review reference:    \"ER Course: /77, HR 84, RR 20, T35.9.  Placed on supplemental oxygen for hypoxia.  Labs notable for sodium 135, creatinine 1.65, ALT 83, AST 62, BNP 1980, troponin 23, INR 1.2, hemoglobin 13.1.  CXR showed enlarged cardiac silhouette and prominent interstitial markings suggesting pulmonary interstitial edema and small to moderate right pleural effusion.  AICD was interrogated in the ER and showed no episodes of V. tach.  Patient was given 40 mg IV Lasix. \"    Past Medical History:  He was hospitalized on May 23, 2024 after receiving multiple ICD shocks for recurrent VT.  He was in slow VT and was started on an amiodarone drip.  He had VT ablation May 30, 2024 but continued to have intermittent episodes of slow ventricular tachycardia.  He was cardioverted on Elle 3, 2024.  He was maintained on amiodarone and lidocaine drips.  He underwent stellate ganglion block June 4, 2024.  It was felt " that heart transplant was not going to be an option for him.  He opted against an LVAD.  He was seen by palliative medicine and he changed his CODE STATUS to DNR-DNI on June 5, 2024.  He had redo endocardial ablation by Dr. Wooten on June 6, 2024.  He was extubated June 7, 2024 and intra-aortic balloon pump was removed.  He was initiated on low-dose Entresto.  He was changed to oral mexiletine.  Cardiac cath during his recent admission showed severe triple-vessel coronary artery disease.  Right coronary artery and circumflex were totally occluded and filled via collaterals.  LAD had mild stenosis.  No lesions were amanable to revascularization.  He has a history of abdominal aortic aneurysm without rupture.  He has hypertension, hyperlipidemia, tobacco use, nephrolithiasis, and PTSD.    He has a past medical history of Atherosclerosis of native artery of both lower extremities with intermittent claudication (CMS-[facility]), CHB (complete heart block) (Multi), Chronic systolic CHF (congestive heart failure), NYHA class 3 (Multi), COPD (chronic obstructive pulmonary disease) (Multi), Coronary artery disease, History of tobacco abuse, HLD (hyperlipidemia), Hypertension, Infrarenal abdominal aortic aneurysm (AAA) without rupture (CMS-[facility]), Ischemic cardiomyopathy with implantable cardioverter-defibrillator (ICD), MI (myocardial infarction) (Multi), Nephrolithiasis, and PTSD (post-traumatic stress disorder).     Past Medical History:   Diagnosis Date    Atherosclerosis of native artery of both lower extremities with intermittent claudication (CMS-HCC)     CHB (complete heart block) (Multi)     per device check    Chronic systolic CHF (congestive heart failure), NYHA class 3 (Multi)     COPD (chronic obstructive pulmonary disease) (Multi)     Coronary artery disease     History of tobacco abuse     HLD (hyperlipidemia)     Hypertension     Infrarenal abdominal aortic aneurysm (AAA) without rupture (CMS-[facility])     Ischemic  cardiomyopathy with implantable cardioverter-defibrillator (ICD)     MI (myocardial infarction) (Multi)     multiple    Nephrolithiasis     PTSD (post-traumatic stress disorder)        Surgical History    Past Surgical History:  He has a past surgical history that includes Cardiac defibrillator placement; Coronary angioplasty with stent (2006); Coronary angioplasty with stent (04/2003); Coronary angioplasty with stent (06/2008); Cystoscopy w/ ureteral stent placement (04/01/2024); Cystoscopy w/ ureteral stent placement (04/30/2024); Iliac artery stent (Right); Knee surgery; Femoral bypass; Cardiac electrophysiology procedure (N/A, 5/23/2024); Cardiac catheterization (N/A, 5/23/2024); Cardiac electrophysiology procedure (N/A, 5/28/2024); Cardiac electrophysiology procedure (Right, 5/30/2024); and Cardiac electrophysiology procedure (N/A, 6/6/2024).    Past Surgical History:   Procedure Laterality Date    CARDIAC CATHETERIZATION N/A 5/23/2024    Procedure: Left Heart Cath;  Surgeon: Bbaatunde Tan MD;  Location: [facility];  Service: Cardiovascular;  Laterality: N/A;    CARDIAC DEFIBRILLATOR PLACEMENT      CARDIAC ELECTROPHYSIOLOGY PROCEDURE N/A 5/23/2024    Procedure: Cardioversion;  Surgeon: Zachary Saprks MD;  Location: [facility];  Service: Electrophysiology;  Laterality: N/A;    CARDIAC ELECTROPHYSIOLOGY PROCEDURE N/A 5/28/2024    Procedure: NIPS (NONINVASIVE PROGRAMMED STIMULATION AND DEFIBRILLATOR THRESHOLD TESTING);  Surgeon: Zachary Sparks MD;  Location: [facility];  Service: Electrophysiology;  Laterality: N/A;    CARDIAC ELECTROPHYSIOLOGY PROCEDURE Right 5/30/2024    Procedure: Ablation VT Endocardial (46989);  Surgeon: Sonny Flores MD;  Location: [place] [address] Cardiac Cath Lab;  Service: Electrophysiology;  Laterality: Right;  CARTO, 2PM    CARDIAC ELECTROPHYSIOLOGY PROCEDURE N/A 6/6/2024    Procedure: Ablation VT;  Surgeon: Eliot Wooten MD;  Location: [facility]  [facility] Cardiac Cath Lab;  Service: Electrophysiology;  Laterality: N/A;  endocardial vt ablation  carto V8    CORONARY ANGIOPLASTY WITH STENT PLACEMENT  2006    JIM to LAD    CORONARY ANGIOPLASTY WITH STENT PLACEMENT  04/2003    CORONARY ANGIOPLASTY WITH STENT PLACEMENT  06/2008    CYSTOSCOPY W/ URETERAL STENT PLACEMENT  04/01/2024    CYSTOSCOPY W/ URETERAL STENT PLACEMENT  04/30/2024    FEMORAL BYPASS      femoral artery    ILIAC ARTERY STENT Right     KNEE SURGERY          Social History  He reports that he has quit smoking. His smoking use included cigarettes. He does not have any smokeless tobacco history on file. He reports that he does not currently use alcohol. He reports that he does not currently use drugs.    Family History  Family History   Problem Relation Name Age of Onset    Hypertension Mother      Diabetes Father      Hypertension Sister      Diabetes Sister      Colon cancer Maternal Grandmother      Hypertension Maternal Grandmother      Heart disease Maternal Grandfather      Hypertension Maternal Grandfather      Cancer Paternal Grandfather      Hypertension Paternal Grandfather          Allergies  Chantix [varenicline]    Review of Systems   ROS   12 point review of systems was obtained in detail and is negative other than that detailed above.    Physical Exam    General:  Alert, calm, well-developed   HEENT:  Pupils equal, round, reactive to light and accommodation. Extraocular movements   Neck:  Supple, without lymphadenopathy or thyromegaly. No carotid bruits.  Lymph:  No axillary, cervical, supraclavicular, pre-auricular, submental, or occipital lymphadenopathy,  Cardiovascular:  Regular rate and rhythm, with normal S1 and S2. Systolic mitral murmur 3/6, rubs or gallops. JVD. Hepato-jugular reflux +. 2+ pulses bilaterally - dorsalis pedis and radial.  Lungs:  No wheezes. No accessory muscle use or cyanosis. No tenderness to palpation. Crackles bi basal   Abdomen:  Normoactive bowel sounds.  "Soft, distended, non-tender, and non-distended. hepatomegaly; liver span approximately 20 cm.  Skin:  Warm, dry, well-perfused. No rashes or other lesions.  Extremities:  2+ pulses in upper and lower extremities. No lower extremity pain or edema; legs are symmetric in appearance.  Neuro:  Alert and oriented to person, place, and time. Able to communicate well. 5/5 strength in all extremities bilaterally. Sensation intact in all extremities. Normal gait.    Last Recorded Vitals  Blood pressure 101/66, pulse 75, temperature 36.2 °C (97.2 °F), temperature source Temporal, resp. rate 18, height 1.702 m (5' 7\"), weight 81.4 kg (179 lb 7.3 oz), SpO2 95%.    Relevant Results  BNP 1,980, WBC 10, Hb 13, Ht 41, Plt 318, trop 25, Na 135, K 4.1, cr 1.65,        Assesment plan    -IV Lasix 40 mg twice daily  -Strict intake and output, daily weights  -Continue Entresto, spironolactone, Forxiga  -Reduce dose of metoprolol for now given acute decompensated heart failure  -EP consult  -Telemetry monitoring  -Continue home amiodarone, mexiletine  -Goal Mg > 2, K > 4, trend while diuresing, continue home supplements  -Previously was on Eliquis for 1 month post VT ablation, this appears to have been discontinued earlier this month by cardiac surgery  -Wean supplemental oxygen as tolerated     I spent 60 minutes in the professional and overall care of this patient.      Rajendra Bone MD    "

## 2024-08-20 NOTE — CONSULTS
Consults  History Of Present Illness:      Cristian Sapp is a 58 y.o. male with a history of CAD s/p multiple PCI, ICM/HFrEF (EF 15%) s/p CRT-D implant, V. Tach s/p multiple ablations and AICD, anxiety, COPD, HTN, DM, DLD, infrarenal AAA s/p R iliac stent, and nephrolithiasis status post ureteral stent       Yesterday he presented to the [facility] emergency department with complaints of increased shortness of breath.  The patient reports that he woke up and felt similar to his prior episodes of slow ventricular tachycardia.  His defibrillator did not fire although he felt like it was trying to pace him out of the rhythm.  He does endorse abdominal swelling and increased shortness of breath along with orthopnea.  Denies any cough fever chills.  Reports that he has been compliant with his home torsemide dose.  In the emergency department his blood pressure noted to be 129/77, heart rate 84, respiratory 20, afebrile.  He was placed on supplemental O2.  Chest x-ray showed enlarged cardiac silhouette and prominent interstitial markings suggesting pulmonary interstitial edema and small to moderate right pleural effusion.  AICD was interrogated in the emergency department which showed slow VT in the 100s following below detection rate.  The patient was given Lasix and admitted to telemetry.  He was then transferred to [facility] for further management.     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 He was hospitalized in 5/2024 after receiving multiple ICD shocks for recurrent VT. He was in incessant, slow VT and was started on an amiodarone drip. He had VT ablation 5/30/2024 but continued to have intermittent episodes of slow ventricular tachycardia. He was cardioverted on 6/32024 and maintained on amiodarone and lidocaine drips. He underwent stellate ganglion block 6/4/2024. He had redo endocardial ablation by Dr. Wooten June 6, 2024 w  extensive scar modification. Multiple Vts were induced, coming from the lateral scar as well as the septal scar s/p extensive ablation. Patient then went into a different wide VT suggestive of metabolic derangements. pH was noted to be 7.15. Patient subsequently underwent IABP placement (removed 6/8). Had multiple runs of VT (rates 110s-130s) since VT ablation between 6/8-/10 that self-terminated without any therapies delivered. Stellate ganglion block repeated on 6/11. He was initiated on low-dose Entresto. He was changed to oral mexiletine. Cardiac cath during his recent admission showed severe triple-vessel coronary artery disease. Right coronary artery and circumflex were totally occluded and filled via collaterals. LAD had mild stenosis. No lesions were minimal to revascularization. He was seen in follow-up by Dr. Sparks June 28, 2024.  Since his most recent discharge he has been doing quite well.  He has noted some episodes of lightheadedness with transient systolic blood pressure readings in the 80s.  He notes ongoing fatigue/tiredness.  He is currently maintained on amiodarone 200 mg daily and mexiletine 300 mg 3 times daily and metoprolol XL 12.5mg daily.        Past Medical History:  He has a past medical history of Atherosclerosis of native artery of both lower extremities with intermittent claudication (CMS-HCC), CHB (complete heart block) (Multi), Chronic systolic CHF (congestive heart failure), NYHA class 3 (Multi), COPD (chronic obstructive pulmonary disease) (Multi), Coronary artery disease, History of tobacco abuse, HLD (hyperlipidemia), Hypertension, Infrarenal abdominal aortic aneurysm (AAA) without rupture (CMS-HCC), Ischemic cardiomyopathy with implantable cardioverter-defibrillator (ICD), MI (myocardial infarction) (Multi), Nephrolithiasis, and PTSD (post-traumatic stress disorder).    Past Surgical History:  He has a past surgical history that includes Cardiac defibrillator placement; Coronary  angioplasty with stent (2006); Coronary angioplasty with stent (04/2003); Coronary angioplasty with stent (06/2008); Cystoscopy w/ ureteral stent placement (04/01/2024); Cystoscopy w/ ureteral stent placement (04/30/2024); Iliac artery stent (Right); Knee surgery; Femoral bypass; Cardiac electrophysiology procedure (N/A, 5/23/2024); Cardiac catheterization (N/A, 5/23/2024); Cardiac electrophysiology procedure (N/A, 5/28/2024); Cardiac electrophysiology procedure (Right, 5/30/2024); and Cardiac electrophysiology procedure (N/A, 6/6/2024).      Social History:  He reports that he has quit smoking. His smoking use included cigarettes. He does not have any smokeless tobacco history on file. He reports that he does not currently use alcohol. He reports that he does not currently use drugs.    Family History:  Family History   Problem Relation Name Age of Onset    Hypertension Mother      Diabetes Father      Hypertension Sister      Diabetes Sister      Colon cancer Maternal Grandmother      Hypertension Maternal Grandmother      Heart disease Maternal Grandfather      Hypertension Maternal Grandfather      Cancer Paternal Grandfather      Hypertension Paternal Grandfather          Allergies:  Chantix [varenicline]    Inpatient Medications:  Scheduled medications   Medication Dose Route Frequency    amiodarone  200 mg oral BID    aspirin  81 mg oral Daily    dapagliflozin propanediol  10 mg oral Daily    furosemide  40 mg intravenous BID    LORazepam  0.5 mg oral Nightly    metoprolol succinate XL  12.5 mg oral Daily    mexiletine  300 mg oral TID    pantoprazole  40 mg oral Daily before breakfast    PARoxetine  60 mg oral Nightly    ranolazine  500 mg oral BID    rosuvastatin  20 mg oral Nightly    sacubitriL-valsartan  0.5 tablet oral BID    spironolactone  12.5 mg oral Daily    tamsulosin  0.4 mg oral Daily    traZODone  50 mg oral Nightly     PRN medications   Medication    albuterol     Continuous Medications  "  Medication Dose Last Rate     Outpatient Medications:  Current Outpatient Medications   Medication Instructions    albuterol sulfate (Proair Digihaler) 90 mcg/actuation aero powdr breath act w/sensor inhaler 2 puffs, inhalation, Every 4 hours PRN    amiodarone (PACERONE) 200 mg, oral, 2 times daily    aspirin 81 mg, oral, Daily    blood pressure monitor kit Use as directed    esomeprazole (NEXIUM) 20 mg, oral, Daily before breakfast, Do not open capsule.    Farxiga 10 mg, oral, Daily    furosemide (LASIX) 40 mg, intravenous, 2 times daily    LORazepam (ATIVAN) 0.5 mg, oral, Nightly    magnesium oxide (MAG-OX) 400 mg, oral, Daily    metoprolol succinate XL (TOPROL-XL) 12.5 mg, oral, Daily, Do not crush or chew.    mexiletine (MEXITIL) 300 mg, oral, 3 times daily    PARoxetine (PAXIL) 60 mg, oral, Nightly    potassium chloride (Klor-Con) 20 mEq packet 20 mEq, oral, Daily    ranolazine (RANEXA) 500 mg, oral, 2 times daily, Do not crush, chew, or split.    rosuvastatin (CRESTOR) 20 mg, oral, Nightly    sacubitriL-valsartan (Entresto) 24-26 mg tablet 0.5 tablets, oral, 2 times daily    spironolactone (ALDACTONE) 12.5 mg, oral, Daily    tamsulosin (FLOMAX) 0.4 mg, oral, Daily    traZODone (DESYREL) 50 mg, oral, Nightly       Physical Exam:          Last Recorded Vitals:  Vitals:    08/19/24 2115 08/19/24 2335 08/20/24 0337 08/20/24 0741   BP: 101/66 114/77 96/66 115/78   BP Location: Left arm Left arm Left arm Right arm   Patient Position: Sitting Sitting Lying Lying   Pulse: 75 77 76 75   Resp: 18 18 19 19   Temp: 36.2 °C (97.2 °F) 36.6 °C (97.9 °F) 36.4 °C (97.5 °F) 35.8 °C (96.4 °F)   TempSrc: Temporal Temporal Temporal Temporal   SpO2: 95% 95% 91% 95%   Weight: 81.4 kg (179 lb 7.3 oz)  81.3 kg (179 lb 3.7 oz)    Height: 1.702 m (5' 7\")            Gen: NAD, appears stated age  HEENT: EOM, MMM  CV: RRR, no murmurs rubs or gallops  Resp: Bibasilar crackles noted, normal effort  Abdomen: soft, NT,+BS  LE: 2+ pitting " edema bilaterally, no deformity  Neuro: A&Ox4, moving all extremities            Last Labs:  CBC - 8/20/2024:  6:36 AM  8.3 12.1 297    39.3      CMP - 8/20/2024:  6:36 AM;  6:36 AM  9.1; 9.1 7.3 62 --- 1.0   3.1 3.4 83 171      PTT - 8/19/2024:  1:50 AM  1.2   13.9 28     Troponin I, High Sensitivity   Date/Time Value Ref Range Status   08/19/2024 02:39 AM 26 (H) 0 - 20 ng/L Final   08/19/2024 01:50 AM 23 (H) 0 - 20 ng/L Final   07/12/2024 05:59 AM 17 0 - 20 ng/L Final            Assessment/Plan         Cristian aSpp is a 58 y.o. male with a history of CAD s/p multiple PCI, ICM/HFrEF (EF 15%) s/p CRT-D implant, V. Tach s/p multiple ablations and AICD, anxiety, COPD, HTN, DM, DLD, infrarenal AAA s/p R iliac stent, and nephrolithiasis status post ureteral stent . EP consulted for slow VT.     AICD was interrogated showed slow VT in the 100s, pt however self terminated and now in NSR.   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 He was hospitalized in 5/2024 after receiving multiple ICD shocks for recurrent VT. He was in incessant, slow VT and was started on an amiodarone drip. He had VT ablation 5/30/2024 but continued to have intermittent episodes of slow ventricular tachycardia. He was cardioverted on 6/32024 and maintained on amiodarone and lidocaine drips. He underwent stellate ganglion block 6/4/2024. He had redo endocardial ablation by Dr. Wooten June 6, 2024 w extensive scar modification. Multiple Vts were induced, coming from the lateral scar as well as the septal scar s/p extensive ablation. Patient then went into a different wide VT suggestive of metabolic derangements. pH was noted to be 7.15. Patient subsequently underwent IABP placement (removed 6/8). Had multiple runs of VT (rates 110s-130s) since VT ablation between 6/8-/10 that self-terminated without any therapies delivered. Stellate ganglion block repeated on 6/11. He was initiated on low-dose  Entresto. He was changed to oral mexiletine. Cardiac cath during his recent admission showed severe triple-vessel coronary artery disease. Right coronary artery and circumflex were totally occluded and filled via collaterals. LAD had mild stenosis. No lesions were minimal to revascularization. He was seen in follow-up by Dr. Sparks June 28, 2024.  Since his most recent discharge he has been doing quite well.  He has noted some episodes of lightheadedness with transient systolic blood pressure readings in the 80s.  He notes ongoing fatigue/tiredness.  He is currently maintained on amiodarone 200 mg daily and mexiletine 300 mg 3 times daily and metoprolol XL 12.5mg daily.     #Slow stable VT  #ICM/HFrEF (EF 15%) s/p CRT-D  #Prior VT storm s/p VT ablation 5/30, stellate ganglion candy 6/4, s/p repeat ablation 6/6 and repeat and stellate ganglion candy 6/11    While the ECG shows VT that could be originating from epicardial scar, patient have undergone multiple prior ablation with VT recurrence and would be unreasonable to proceed with another ablation at this time as he is having stable slow VT which spontaneously terminates.      -Continuous telemetry   -Continue amiodarone, Mexiletine and metoprolol. Maximize metoprolol dose as tolerated  -Recommend aggressive electrolyte repletion: K>4 and Mg>2  -Advance heart failure/transplant team to discuss advanced therapies going forward   -Recommend discussing with ENT service re possibility of stellate ganglion removal/denervation?     D/W Dr Pfeiffer     Thank you for involving us in this patient care. Recommendations not final until signed by attending.     General Cardiology Consult Pager: [phone] (weekday 7AM-6PM and weekend 7AM-2PM) and other: [phone]  EP Consult Pager: [phone] (weekday 7AM-6PM and weekend 7AM-2PM) and other: [phone]  CICU Fellow Pager: [phone] anytime  EP Device Nurse Pager: [phone] (weekday 7AM-4PM)  Advanced Heart Failure Consult Pager: [phone] anytime               Andreas Mishra MD  Cardiology Fellow PGY-6

## 2024-08-20 NOTE — PROGRESS NOTES
Transplant/LVAD Education Consent:   Topic: Pre Advanced Therapy Patient Education   Attendees: Cristian Sapp (patient), Judie Sapp (wife), Michelle Sapp (daughter in law), Martha Dickerson (VAD coordinator)     Patient given the [facility] Pre Advanced Therapy Education binder. Patient received education regarding the following topics for their pre Heart Transplant and pre Left Ventricular Assist Device evaluation:    The evaluation process including advanced therapy team members and roles, required testing and consults, selection criteria and suitability for OHT and LVAD, OHT listing process and organ offer, hands on LVAD equipment review, psychological and financial considerations for a successful outcome with advanced therapies, and patient responsibilities including adherence to a strict medical regimen.    An overview of the surgical procedure for OHT and LVAD.    The potential for certain risk factors including infection, pneumonia, blood clot formation, organ rejection, failure, and possibility of re transplantation, lifetime immunosuppression and associated risks with OHT, arrhythmias and cardiovascular collapse, multi-organ system failure, depression, post- traumatic stress disorder, anxiety, issues of dependence or feelings of guilt, right ventricular failure with LVAD, and death.   National and Transplant [facility] outcomes for one year patient and graft survival from the most recent SRTR program specific report.    Donor risk factors that could affect the success of the transplant and health of the patient including donor age, donor medical and social history, condition of the organ, and the risk of jace cancer, HIV, Hepatitis B or C, and/or malaria if the infection is not detectable at the time of donation.    Transplants not performed in a Medicare-approved transplant center could affect the patient's ability to have immunosuppression medication paid for under Medicare part B.    STS  Intermacs and  LVAD implant outcomes for one year patient survival.    The medical necessity of electricity and a working telephone with implantation of the LVAD.    Available alternatives to OHT and LVAD.    The patient's right to withdraw consent for the evaluations at any time during the process.      Patient was given the opportunity to have questions answered.   Consent signed for Heart Transplant and LVAD? :No   If no, which consent was signed? : VAD  Current barriers: Nicotine in the last 2 months  Consent obtained by: Martha Dickerson

## 2024-08-20 NOTE — PROGRESS NOTES
Pharmacy Medication History Review    Cristian Sapp is a 58 y.o. male admitted for Acute on chronic combined systolic and diastolic heart failure (Multi). Pharmacy reviewed the patient's nrxas-ju-rvxnqiini medications and allergies for accuracy.    Medications ADDED:  Nitroglycerin PRN  Ferrous sulfate  Medications CHANGED:  Lorazepam, paroxetine, potassium chloride, magnesium oxide  Medications REMOVED:   Esomeprazole    The list below reflects the updated PTA list.   Prior to Admission Medications   Prescriptions Last Dose Informant   LORazepam (Ativan) 0.5 mg tablet  Self   Sig: Take 1 tablet (0.5 mg) by mouth once daily as needed for anxiety. Take 1 tablet daily as needed up to 1.5mg   PARoxetine (Paxil) 30 mg tablet 8/19/2024 Self   Sig: Take 2 tablets (60 mg) by mouth once daily at bedtime.   Patient taking differently: Take 1 tablet (30 mg) by mouth 2 times a day.   albuterol sulfate (Proair Digihaler) 90 mcg/actuation aero powdr breath act w/sensor inhaler 8/16/2024 Self   Sig: Inhale 2 puffs every 4 hours if needed for wheezing.   amiodarone (Pacerone) 200 mg tablet 8/19/2024 Self   Sig: Take 1 tablet (200 mg) by mouth 2 times a day.   aspirin 81 mg EC tablet 8/19/2024 Self   Sig: Take 1 tablet (81 mg) by mouth once daily.   dapagliflozin propanediol (Farxiga) 10 mg 8/19/2024 Self   Sig: Take 1 tablet (10 mg) by mouth once daily.   ferrous sulfate, 325 mg ferrous sulfate, tablet 8/17/2024 Self   Sig: Take 1 tablet by mouth once daily with breakfast.   furosemide (Lasix) 10 mg/mL injection Not Taking Self   Sig: Infuse 4 mL (40 mg) into a venous catheter 2 times a day.   Patient not taking: Reported on 8/20/2024   magnesium oxide (Mag-Ox) 400 mg (241.3 mg magnesium) tablet Past Week Self   Sig: Take 1 tablet (400 mg) by mouth once daily.   Patient taking differently: Take 1 tablet (400 mg) by mouth 2 times a day.   metoprolol succinate XL (Toprol-XL) 25 mg 24 hr tablet 8/19/2024 Self   Sig: Take 0.5 tablets  (12.5 mg) by mouth once daily. Do not crush or chew.   mexiletine (Mexitil) 150 mg capsule 8/19/2024 Self   Sig: Take 2 capsules (300 mg) by mouth 3 times a day.   nitroglycerin (Nitrostat) 0.4 mg SL tablet More than a month Self   Sig: Place 1 tablet (0.4 mg) under the tongue every 5 minutes if needed for chest pain.   potassium chloride (Klor-Con) 20 mEq packet Past Week Self   Sig: Take 20 mEq by mouth once daily.   Patient taking differently: Take 20 mEq by mouth if needed.   ranolazine (Ranexa) 500 mg 12 hr tablet 8/19/2024 Self   Sig: Take 1 tablet (500 mg) by mouth 2 times a day. Do not crush, chew, or split.   rosuvastatin (Crestor) 20 mg tablet 8/19/2024 Self   Sig: Take 1 tablet (20 mg) by mouth once daily at bedtime.   sacubitriL-valsartan (Entresto) 24-26 mg tablet 8/19/2024 Self   Sig: Take 0.5 tablets by mouth 2 times a day.   spironolactone (Aldactone) 25 mg tablet 8/19/2024 Self   Sig: Take 0.5 tablets (12.5 mg) by mouth once daily.   tamsulosin (Flomax) 0.4 mg 24 hr capsule 8/19/2024 Self   Sig: Take 1 capsule (0.4 mg) by mouth once daily.   traZODone (Desyrel) 50 mg tablet 8/19/2024 Self   Sig: Take 1 tablet (50 mg) by mouth once daily at bedtime.      Facility-Administered Medications: None        The list below reflects the updated allergy list. Please review each documented allergy for additional clarification and justification.  Allergies  Reviewed by Lisa Durham on 8/20/2024        Severity Reactions Comments    Chantix [varenicline] Not Specified Other Kidney stones            Patient accepts M2B at discharge.     Sources:   Last fill through pharmacy  Last office visit 08/08 confirmed med list  [facility]  Patient reported    Additional Comments:  Patient is a good historian, able to recall most meds without prompting  Patient uses medications different from prescribed a majority of the time, however is compliant with taking them  Patients allergies updated      LISA DURHAM  PGY-1 Pharmacy  "Resident, [facility]   08/20/24     Secure Chat preferred   If no response call [phone] or Vocera \"Med Rec\"    "

## 2024-08-21 ENCOUNTER — DOCUMENTATION (OUTPATIENT)
Dept: TRANSPLANT | Facility: HOSPITAL | Age: 59
End: 2024-08-21
Payer: MEDICARE

## 2024-08-21 ENCOUNTER — APPOINTMENT (OUTPATIENT)
Dept: RESPIRATORY THERAPY | Facility: HOSPITAL | Age: 59
DRG: 291 | End: 2024-08-21
Payer: MEDICARE

## 2024-08-21 LAB
BASE EXCESS BLDA CALC-SCNC: 4.2 MMOL/L (ref -2–3)
BODY TEMPERATURE: 37 DEGREES CELSIUS
COHGB MFR BLDA: 2.2 %
DO-HGB MFR BLDA: 14.1 % (ref 0–5)
FERRITIN SERPL-MCNC: 132 NG/ML (ref 20–300)
HCO3 BLDA-SCNC: 28.4 MMOL/L (ref 22–26)
HGB BLDA-MCNC: 13.1 G/DL (ref 13.5–17.5)
IRON SATN MFR SERPL: 13 % (ref 25–45)
IRON SERPL-MCNC: 52 UG/DL (ref 35–150)
METHGB MFR BLDA: 0.7 % (ref 0–1.5)
OXYHGB MFR BLDA: 83 % (ref 94–98)
PCO2 BLDA: 40 MM HG (ref 38–42)
PH BLDA: 7.46 PH (ref 7.38–7.42)
PO2 BLDA: 55 MM HG (ref 85–95)
SAO2 % BLDA: 86 % (ref 94–100)
T4 FREE SERPL-MCNC: 1.55 NG/DL (ref 0.78–1.48)
TIBC SERPL-MCNC: 415 UG/DL (ref 240–445)
TSH SERPL-ACNC: 6.4 MIU/L (ref 0.44–3.98)
UIBC SERPL-MCNC: 363 UG/DL (ref 110–370)

## 2024-08-21 PROCEDURE — 2500000001 HC RX 250 WO HCPCS SELF ADMINISTERED DRUGS (ALT 637 FOR MEDICARE OP)

## 2024-08-21 PROCEDURE — 94060 EVALUATION OF WHEEZING: CPT | Performed by: STUDENT IN AN ORGANIZED HEALTH CARE EDUCATION/TRAINING PROGRAM

## 2024-08-21 PROCEDURE — 2500000002 HC RX 250 W HCPCS SELF ADMINISTERED DRUGS (ALT 637 FOR MEDICARE OP, ALT 636 FOR OP/ED)

## 2024-08-21 PROCEDURE — 2500000004 HC RX 250 GENERAL PHARMACY W/ HCPCS (ALT 636 FOR OP/ED)

## 2024-08-21 PROCEDURE — 82375 ASSAY CARBOXYHB QUANT: CPT

## 2024-08-21 PROCEDURE — 97161 PT EVAL LOW COMPLEX 20 MIN: CPT | Mod: GP

## 2024-08-21 PROCEDURE — 94729 DIFFUSING CAPACITY: CPT | Performed by: STUDENT IN AN ORGANIZED HEALTH CARE EDUCATION/TRAINING PROGRAM

## 2024-08-21 PROCEDURE — 36600 WITHDRAWAL OF ARTERIAL BLOOD: CPT

## 2024-08-21 PROCEDURE — 94726 PLETHYSMOGRAPHY LUNG VOLUMES: CPT

## 2024-08-21 PROCEDURE — 2500000001 HC RX 250 WO HCPCS SELF ADMINISTERED DRUGS (ALT 637 FOR MEDICARE OP): Performed by: INTERNAL MEDICINE

## 2024-08-21 PROCEDURE — 94726 PLETHYSMOGRAPHY LUNG VOLUMES: CPT | Performed by: STUDENT IN AN ORGANIZED HEALTH CARE EDUCATION/TRAINING PROGRAM

## 2024-08-21 PROCEDURE — 99233 SBSQ HOSP IP/OBS HIGH 50: CPT | Performed by: INTERNAL MEDICINE

## 2024-08-21 PROCEDURE — 2020000001 HC ICU ROOM DAILY

## 2024-08-21 RX ORDER — FERROUS SULFATE 325(65) MG
65 TABLET ORAL
Status: DISCONTINUED | OUTPATIENT
Start: 2024-08-22 | End: 2024-08-30 | Stop reason: HOSPADM

## 2024-08-21 RX ORDER — FUROSEMIDE 10 MG/ML
40 INJECTION INTRAMUSCULAR; INTRAVENOUS
Status: DISCONTINUED | OUTPATIENT
Start: 2024-08-21 | End: 2024-08-21

## 2024-08-21 RX ORDER — LORAZEPAM 0.5 MG/1
0.5 TABLET ORAL ONCE
Status: COMPLETED | OUTPATIENT
Start: 2024-08-21 | End: 2024-08-21

## 2024-08-21 RX ORDER — FUROSEMIDE 10 MG/ML
40 INJECTION INTRAMUSCULAR; INTRAVENOUS ONCE
Status: COMPLETED | OUTPATIENT
Start: 2024-08-21 | End: 2024-08-21

## 2024-08-21 ASSESSMENT — COGNITIVE AND FUNCTIONAL STATUS - GENERAL
MOBILITY SCORE: 24
DAILY ACTIVITIY SCORE: 24
DAILY ACTIVITIY SCORE: 24

## 2024-08-21 ASSESSMENT — PAIN SCALES - GENERAL
PAINLEVEL_OUTOF10: 0 - NO PAIN

## 2024-08-21 ASSESSMENT — PAIN - FUNCTIONAL ASSESSMENT
PAIN_FUNCTIONAL_ASSESSMENT: 0-10
PAIN_FUNCTIONAL_ASSESSMENT: 0-10

## 2024-08-21 NOTE — PROGRESS NOTES
ENCOUNTER     Visit Type Initial Visit  Location: T5 room 58    Barriers to Communication / Understanding:   [] Language [] Vision [] Hearing [] Other      []  Present     Accompanied By: mom Judie  and wife  Michelle father Callum    Organ For Transplant: n/a    Device For Implant: VAD    Ethnicity:  White    ADLs Partially Independent some challenges washing and dressing self, out of breath, gets into a panic attack bc can't breath, someone typically helps, usually father callum       Instrumental ADLs Partially Independent walks a little, hard time walking far, can't usually do much, watches shows with mom , goes to [employer]     Typically lives in [place] but staying with parents since they are closer       Level of Activity Sedentary due to shortness of breath, walking a little bit to try and improve, legs gets weak when walking PT came by to check on him today     DME  not at home but currenlty on oxygen 3 lts walker/cane every now and then, sometimes room to room     Pt taken down for pulm testing     Knowledge of Health Fair  Copd no treatment per pt knowledge     Why Do You Have End Stage Organ Disease cigarette smoking     Knowledge of Transplant / VAD:  Yes Patient Is Able To Make An Informed Decision    Patient Understands the Risks of Transplant / VAD  Yes Rejection  Yes Infection Yes Complications  Yes Death  Infection, death    Patient Understands Recovery and Follow-Up from Transplant / VAD  Yes Length Of Stay Yes Appointments  Yes Labs  Yes Rehabilitation  Get up and walk around out of bed pretty quick, pain management   Cardiac rehab, few sessions 8-9 years ago     Patient Has Identified Goals of Transplant / VAD Yes  Some sort of quality of life,    Any Potential Donors? N/a    Overall Compliance  fair       Compliance With Medications  poor Pretty coni on the spot with them    Managed By Patient would use both a pill box and the bottle   Mom has been helping since been closer      Understanding Of Medication poor  Compliance With Appointments Fair make it to all appts, call ahead     How Does Patient Handle Health Problems typically get water and eat something light, lay down and go back to sleep call pcp by the end of the day     Organ Heart    Heart    Cardiac Rehab: went to a few sessions several years ago       IOP:     Fluid Restriction:   Yes [x] Compliant as long as he knew what he was supposed to have, has a special bottle at the house     Anticoagulation Service:   Yes [x] Compliant   [] INR  Effient 10 mg  Medical And Clinic Appointment Compliant       SOCIAL HISTORY  Yes [employer]  [employer] bad conduct dischsarge no injuries, no benefits though them     Education:  education: Some College 2 years of college     Literate Yes   Computer literate Yes  Internet access Yes   Uh my chart     Sources of Income:  Patient's Current Employment    [] Full-Time [] Part-Time [] Unemployed [] Retired     []  None [] Not working by choice [x] Not working disabled ssd due to heart condition 2016      [] Short Term Leave   [] Other   Employment History wrote data bases, software development, networking development, computer based, worked for a lot of different companies, [employer], other place 8 years     Will patient have paid status from employment during recovery   None    Spouse / SO Current Employment Full Time  for an ambulance dispatch     Will spouse / SO have paid status from employment during recovery Yes  PTO   and FMLA     Other Sources of Income SSD    Does patient have financial concerns Yes covering the meds      Is patient able to meet current monthly expenses Yes    Resources:   disability     Patient was provided information on transplant fundraising No    Insurance:  Primary Insurance Self    Secondary Insurance     Prescription Coverage     Medicare coverage due to Disabled    Medicare Part A Yes Effective date    Medicare Part B Yes Effective  date    Medicare Part C No Deductable  Out of Pocket Max    Medicare Part D No Extra Help?    Medicaid Yes Waiver No Redetermination Date    HMO       FAMILY SYSTEM    Single No   Yes How long 23 years    Describe Relationship adequate    No How long   Describe Relationship   No When   No When  In a Relationship Yes  How Long  Describe Relationship    Spouse / SO Rosa Sapp  Age 57  Health  good  Other Caregiver Responsibilities none   Spouse / Significant others reaction to donation     Children:  Yes # Biological 5 kids   No # Adopted   No # Step Children     Hubert     Child #1 Name Josefina   [employer] [facility]   How Much Contact Daily for spouse     Child #2 Name Dilia  [employer]  [place]   How Much Contact Weekly 3-4 x a week surgical tech     Child #3 Name Mike  op5 lives at home   How Much Contact Daily Faviola's twin    Child #4 Name Faviola   [employer] [facility] stays with pt   How Much Contact Daily ivett twin     Parents:  Raised By Both Biological Parents    Did the patient have contact with the other parent     Mother  No Age   Cause of Death   Father  No Age   Cause of Death    Living Parent #1 Judie  Living Parent #2 Ramon      Siblings:  [x] # 2 siblings Biological   [] # Half Siblings [] # Step Siblings   Sibling #1 Deborah  Sibling #2 Keiry    Support & Recovery Plan:  Yes Adequate    Primary Support:  Name Judie Sapp Phone [phone]  Age 77  Relationship to Patient mom  If employed, can they take time off work No retired, worked for a nursing home   If so, is it paid time off No   If not, will this impact your finances No   Did they attend education classes Yes   Do they have other caregiver responsibilities (child or eldercare) No   Do they have their own conditions which may prevent them from providing care for you No  (Medical, psychological, physical limitations)  Are  they available on short notice Yes   Are they reliable Yes   Are they responsible Yes   Are they able to understand and process new information Yes   Do they have reliable transportation or will you allow them to use your vehicle Yes   Are they currently involved in your care Yes   Comments    Secondary Support:  Name: Keiry Greene Phone [phone]  Age   Relationship to Patient sister  If employed, 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  If so, is it paid time off Yes fmla,   If not, will this impact your finances No   Did they attend education classes No   Do they have other caregiver responsibilities (child or eldercare) No  and dog shitzu  Do they have their own conditions which may prevent them from providing care for you No  (Medical, psychological, physical limitations)  Are they available on short notice Yes   Are they reliable Yes   Are they responsible Yes   Are they able to understand and process new information Yes   Do they have reliable transportation or will you allow them to use your vehicle Yes   Are they currently involved in your care No   Comments called on 8/23 requested a call back on Monday 8/26  [email or web address]  Alternate Support  Alternate Support    Housing:  Yes Adequate Lives in someone else's home  Type of Home House 1 level 3-4 months since easter   Distance to [facility] [address] 30 miles   Pets no  Does Patient Feel Safe in Home Yes  Home is in [facility]     Transportation:  Yes Adequate  # Licensed Drivers in the Home 3  Does Patient Drive No If not, why due to health   # Reliable Vehicles 2  Does Patient use Public Transportation No  Does Patient use Medical Transportation No  Comments    MENTAL HEALTH  The patient reports their mood as good days and bad days, ptsd from when defibulator went off      Reported Mental Health Diagnosis anxiety diagnosed and put on meds for it 2016 defibulator went off 20x in 20 mins, + 40 min drive off of a [employer]  to get o where ambulance could reach  Anxiety  Family History of Mental Health Concerns none  What are patient psychosocial stressors defibulator going off, went off a few months ago, slow rythym vtech the other night cause it woke him up, sleddily climbing     Cognition:  No impairment observed / reported     Current Medications:  Yes  Mental Health Meds  Lorazepam for panic attacks, paxal  Rx'd by PCP  Sleep Meds trazadone  Rx'd by  [facility] dr chew   Pain Meds None   Rx'd by     OTC Meds vitamins c, b complex,   Past Medications chhantax- kidney stones     Counseling Previously initially when defibulator went off but they went thru touch therapy but didn't want to     IOP  Has patient ever been hospitalized for mental health reasons No     Referral to Transplant Psych Yes  Mental Health Follow Up Required Yes     Suicide Assessment:  History of Suicide Ideation No    History of Suicide Attempt No     History of Suicidal Ideation in the past 3 months No     Patient's Reported Trauma History defibulator, grandparents passing away and step son passing away, these are in the same category, difficult in own right     What are patient's coping behaviors sleep, playing with dogs that are in [employer] and lab mix, yard work and gardening, pet pals, reads scripture    Jainism / Spirituality Protestant, the way    Attitude toward interviewer Cooperative, Guarded, Apathetic, and Indifferent    Eye Contact Avoided, Fleeting, and Patient kept eyes closed    Appearance The patient was neatly groomed, appropriately dressed and adequately nourished    Affect Flat    Thought Process Appropriate, Rambling, and Circumstantial    Substance Use /Abuse History:  Current Tobacco User No  Patient uses   Tobacco Frequency   For How Long  Is Patient Required to Quit   Former Tobacco User Yes  Describe past tobacco use and date quit  46 years of cigarettes, worked up to a 2 packs a day, depending on stress wise what he  was going through, memorial day of this year, hard stop used ptaches for a week     Would have coffee and smoke, weaned off of coffee and now he has 1-2 cups every other day, will have tea sometimes instead     Current Alcohol User No  Type of Alcohol Used   Amount  Frequency   Pattern of Alcohol Use  Is Patient Required to Quit   Continued to use the substance despite being told the substance is affecting their health  History of problems at work, school or home due to substance use  Former Alcohol User Yes  Describe past alcohol use and date quit  Has been about 5 years since his last drink, social in nature, occasional 1-3 shots, depends on how long he would be there, the shorter time he was there the less he wouldhave   Has patient ever gone to CD treatment No  If yes, When, Where and What type of Program  Attends AA meetings Never  No Sponsor  Do support people drink alcohol No  If yes, describe support people's use  Is alcohol kept in the home No  Does Patient need to sign a CD contract No    Current Illegal / Unprescribed Drug User No  Type of Illegal Drug Used   Frequency  Pattern of Drug Use  Is Patient Required to Quit   Continue to use the substance despite being told the substance is affecting their health  History of problems at work, school or home due to substance use    Former Illegal / Unprescribed Drug User No  Describe past illegal drug use and date quit marijuana in high school  Has patient ever gone to CD treatment No  If yes, When, Where and What type of Program   Attends AA/NA  meetings    Is patient on a Methadone / Suboxone regiment No  Do support people use illegal drugs No  If yes, describe support people's use  Are illegal drugs kept in the home No  Does Patient need to sign a CD contract No    Prescription Drug Abuse:  No Has patient experienced feelings of addiction  No Has patient experienced symptoms of withdrawal  No Has patient experienced any side effects? e.g.  hallucinations or  delusions    Does Patient Meet the Criteria for Alcohol Use Disorder No Diagnosis  Does Patient Meet OSOTC guidelines Yes  Does Patient Meet the Criteria for Illegal Drug Use Disorder No Diagnosis  Does Patient Meet OSOTC guidelines Yes        LEGAL ISSUES  Yes Arrests younger days disorderly conduct  young days no trouble in quite the time   No Currently probation or parole    intermediate Yes  When   How long   Where  Weekend, in [place]     Citizenship:  Yes [place] Citizen  No Green Card No Visa    Advance Directives: No  HCPOA       Guardian:    TERESA  SW met with pt and pt family, wife, mom and dad, at bedside on T5 room 58 for initial evaluation for potential lvad placement. Pt and pt family engaged during visit, pt mostly kept eyes closed during visit and room was dark with lights off. Pt shared he is partially independent at home. Pt shared for the past three months he has been staying with his parents who live in the [facility] when his wife and his home is in [place]. Pt shared he made the switch due to his health concerns and trying to be closer to [facility]. Pt shared he has some challenges washing and dressing self, gets out of breath, and his father typically helps him with getting dressed. Pt shared he walks a little bit around the house, hard time walking further distances, unable to do much around the house. Pt shared he spends a lot of his time watching shows with his mom. Pt shared he has a walker and cane he would use every so often at home to get from room to room. Pt shared he believes he is diagnosed with COPD and believes he has heart disease due to cigarette smoking. Pt demonstrated a fair understanding for risks and recovery expectations for LVAD, would benefit from additional education as team moves thru eval process. Pt shared his primary goal is some sort of quality of life. Pt shared that when it comes to his medications he is pretty “Od[place] on the spot“ with them. Pt stated he has  a poor understanding of his meds and would miss them often. Pt shared that since being at his parents' house, his mom helps him with his medications more often and has been trying to use a pill box but mostly the bottles. Pt shared he makes all of his dr appts and calls ahead if needs to cancel. Pt shared if he has a new health problem, he would typically get water and eat something light, lay down, maybe go back to sleep but by the end of the day he would reach out to his pcp if still unwell. Pt shared he attended a few sessions of cardiac rehab several years ago but did not complete the program. Pt stated he was on a fluid restriction and stated he was compliant with it, had a special water bottle at home that he would to help him keep track. Pt shared he was taking Effient the blood thinner. Pt confirmed he served in the  but received a bad conduct discharge, no injuries or benefits. Pt stated he complete 2 years of college, is literate and has access to the [facility]. Pt confirmed he is not working at this time, is receiving disability since 2016 due to health. 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 Pt wife works full time as a  for an ambulance dispatch. Pt shared he is potentially concerned about being able to cover his medications. SW provided encouragement and support sharing that pt is to meet with  who can help better understand potential costs with his insurance. Pt shared he is able to meet all monthly expenses at this time. Pt named [employer] as his insurance. Pt shared he is  to wife Michelle for 23 years, described their relationship as adequate. Pt shared he has 5 children, one has passed away, the two youngest live at home with pt wife. Pt shared he was raised by both parents, they are still living and taking care of pt at this time. Pt  stated he has two siblings, one that he has more contact with than the other. Pt named his mom Judie and sister Keiry as his primary and secondary support people. SW was able to confirm these individuals were able to provide support to pt. Pt mom worked as a nurse for her entire career and pt sister works as a dispatcher for a local police station. Pt wife will be available when she can. Pt is currently staying with his parents in a ranch style home that is about [place] away from [facility]. Pt confirmed he feels safe at home with his parents. Pt confirmed there are three licensed drivers in the household with two reliable vehicles. Pt stated his mood has been okay, good and bad days, has some PTSD symptoms from when his defibulator went off. Pt stated he was diagnosed with anxiety in 2016. Pt denied any family hx of mental health concerns. Pt denied any cognitive concerns. Pt stated he is prescribed lorazepam and trazadone from his pcp. Pt stated he previously went through counseling services when his defibulator went off. Pt denied any hx of mental health concerns, SI or suicide attempts in the past. Pt stated he enjoys sleeping, playing with his dogs, doing yard work and gardening. Pt shared he identifies with Buddhist. Pt denied any current substance use including tobacco, alcohol, or illegal drug use. Pt share he previously used tobacco, totally 46 years of cigarettes, up to 2ppd would depend on how stressed he was. Pt stated he stopped smoking memorial day this year May 2024. Pt shared he previously would use alcohol, last drink was 5 years ago, was social in nature, never daily. Pt stated would have maybe 1-3 shots depending on how long he was at his event. Pt denied any former illegal drug use, used marijuana in high school but nothing came from it. Pt denied any feelings of addiction, withdrawal or side effects from any prescribed medication. Pt shred he was arrested in his younger years, served a weekend in  assisted. Pt is not currently on probation or parole at this time. Pt confirmed he is a [place] citizen and does not yet have advanced directives at this time. SW confirmed pt and family had no additional questions or concerns at this time. SW provided contact information.    PLAN  SW to follow up with pt as evaluation continues. SW to present pt to committee when appropriate. SIPAT score is 23 meaning minimally acceptable candidate. Pt is a low psychosocial risk at this time.     Nicole OCHOA

## 2024-08-21 NOTE — HOSPITAL COURSE
Cristian Sapp is a 58 y.o. male with a hisory of CAD s/p PCIs with RCA and CMx CTOs with collaterals, Bilateral lower extremity peripheral arterial disease, COPD, tobacco use, CKD, anemia of chronic disease/DEBBIE, HLD, HTN, infrarenal abdominal aortic aneurysm, ischemic cardiomyopathy s/p CRT-D, nephrolithiasis, and PTSD.     He who presented to the [facility] emergency department with complaints of increased shortness of breath that have been progressing on last 5 months since his last admission for VT ( see italicized below)      Last Admission:  Hospitalized 5/2024 after receiving multiple ICD shocks for recurrent VT.  In slow VT and started on an amiodarone drip.  S/p VT ablation 5/30/24 but continued to have intermittent episodes of slow ventricular tachycardia.  Cardioverted 6/3/24.  Maintained on amiodarone and lidocaine drips.  Underwent stellate ganglion block 6/4  It was felt that heart transplant was not going to be an option for him.  He opted against an LVAD.  Seen by palliative medicine and he changed his CODE STATUS to DNR-DNI on June 5, 2024.  Underwent redo endocardial ablation by Dr. Wooten 6/6.  Low-dose Entresto initiated.  Changed to oral mexiletine.  Cardiac cath showed severe triple-vessel coronary artery disease.  Right coronary artery and circumflex were totally occluded and filled via collaterals.  LAD had mild stenosis.  No lesions were amanable to revascularization.        AICD was interrogated in the emergency department which showed slow VT in the 100s following below detection rate. The patient reported compliance with home torsemide but was hypervolemic. Given Lasix.  Transferred to [facility] for advanced therapy evaluation and further optimization.       Floor course:  - EP consulted for recurring slow VT, recommended possible stellate ganglion removal/denervation. Thoracic surgery deemed patient extremely high risk for ganglionectomy under general anesthesia.  Per Pulmonology, patient at high risk of post op pulm complications based on aristat score. Recommended LAMA/LABA initiation. Patient declined repeat nerve block per anesthesia (as completed last June) give lack of persistent success.  Patient now agreeable to advanced therapy evaluation. Consented for LVAD. Work up initiated   Endocrine consulted for elevated TSH and elevated T4. Recommended monitoring only with repeat TFTs including T3 in 4wks time.  Palliative medicine consulted and followed patient.   S/p IV diuresis with lasix boluses and switched back to torsemide 40 mg daily with stability in volume.    Transferred to [facility]ICU 8/22 for sustained slow VT requiring ATP. Hypokalemic at the time; repleted. Rhythm remained stable. Thoracic signed off since no surgical intervention planned. Discussed at advanced heart therapies evaluation 8/27 and deemed not to be a LVAD or OHT candidate. To have pulmonary transplant team weigh tin n regards to possible dual heart and lung transplant.    Transferred back to the floor:  Rhythm and volume remained stable. K levels repleted intermittently. Metoprolol and spironolactone doses escalated. Ultimately not deemed a candidate for dual heart lung transplant in light of his hx of tobacco use, PAD, and recurrent VT despite high dose amiodarone and mexiletine. Discussed possible referral for additional evaluation with alternative medical institution ([facility], [facility], [facility]). Patient and his mother expressed understanding of his prognosis and medical conditions along with the options available to him. Patient deemed stable for discharge on new, [place] oxygen and advised to follow closely with palliative care and his advanced heart failure physician, Dr. Arya Brown      Discharge weight: 79.6 kg  After all labs and VS were reviewed the decision was made that the patient was medically stable for discharge.  The patient was discharged in satisfactory condition.  More than 60 minutes were spent in  coordinating patient discharge.  __________________________________________________________________________    Telemetry 8/30/2024 (personally reviewed): As vs Ap with  75     Physical exam:  General: NAD  Head/ neck: atraumatic. NC O2 in place  Cardiac: RRR, regular S1 S2 , 1/6 syst murmur, no rub, no gallop  Pulm: CTA bilaterally, no wheezes, rales or rhonchi.    Vascular: Radial 2+ bilaterally  GI: soft, non distended  Extremities: no LE edema   Neuro: no focal neuro deficits   Psych: appropriate mood and behavior   Skin: warm and dry

## 2024-08-21 NOTE — CARE PLAN
Problem: Pain - Adult  Goal: Verbalizes/displays adequate comfort level or baseline comfort level  Outcome: Progressing     Problem: Safety - Adult  Goal: Free from fall injury  Outcome: Progressing     Problem: Discharge Planning  Goal: Discharge to home or other facility with appropriate resources  Outcome: Progressing     Problem: Chronic Conditions and Co-morbidities  Goal: Patient's chronic conditions and co-morbidity symptoms are monitored and maintained or improved  Outcome: Progressing   The patient's goals for the shift include      The clinical goals for the shift include Pt will remain HDS throughout this shift

## 2024-08-21 NOTE — SIGNIFICANT EVENT
Rapid Response RN Note     08/21/24 0429   Onset Documentation   Rapid Response Initiated By Radar auto page   Location/Room [facility]  (LT [id number])   Pager Time [phone]   Arrival Time 0429   Event End Time 0434   Primary Reason for Call Radar auto page  (score 6)     RADAR score 6 due to the following VS: T 36.0 °C; HR 74; RR 18; /65; SPO2 93%.     Reviewed above VS with bedside RN via phone.  Vital signs within patient's current trends.  Staff to page rapid response for any concerns or acute change in condition/VS.

## 2024-08-21 NOTE — CARE PLAN
Patient has remained alert and oriented x4, on 2L NC, throughout the shift. Patient denied pain/discomfort throughout the shift, did receive PRN ativan and atarax for anxiety with good relief. Patient and patients family involved with and aware of plan of care. Patient has remained free of falls throughout the shift, call bell within reach, bed locked in the lowest position with nonskid socks on. Purposeful rounding performed. Patient calls appropriately for assistance. See chart for further details.     Problem: Pain - Adult  Goal: Verbalizes/displays adequate comfort level or baseline comfort level  Outcome: Progressing     Problem: Safety - Adult  Goal: Free from fall injury  Outcome: Progressing     Problem: Discharge Planning  Goal: Discharge to home or other facility with appropriate resources  Outcome: Progressing     Problem: Chronic Conditions and Co-morbidities  Goal: Patient's chronic conditions and co-morbidity symptoms are monitored and maintained or improved  Outcome: Progressing     Clinical goals for the shift include Patient will remain HD stable throughout the shift

## 2024-08-21 NOTE — PROGRESS NOTES
Pharmacy Admission Order Reconciliation Review    Cristian Sapp is a 58 y.o. male admitted for Acute on chronic combined systolic and diastolic heart failure (Multi). Pharmacy reviewed the patient's unreconciled admission medications.    Prior to admission medications that were reviewed and acted on by the pharmacist include:  Ferrous sulfate  These medications have been reconciled.     Any other unreconcilied medications have been addressed and will be ordered or held by the patient's medical team. Medications addressed by the pharmacist may be added or changed by the patient's medical team at any time.    Tati Winston, PharmD  Transitions of Care Pharmacist  [facility]  Please reach out via Secure Chat for questions

## 2024-08-21 NOTE — PROGRESS NOTES
Occupational Therapy                 Therapy Communication Note    Patient Name: Cristian Sapp  MRN: [id number]  Today's Date: 8/21/2024     Discipline: Occupational Therapy    Missed Visit Reason:  (Pt speaking w/ transplant team and per RN pending transport for additional testing. Will re-att as schedule allows.)    Missed Time: Attempt

## 2024-08-21 NOTE — PROGRESS NOTES
08/21/24        Transitional Care Coordination Progress Note:   Patient discussed during interdisciplinary rounds.   Team members present: RN CARIDAD HANCOCK MD   Plan per Medical/Surgical team: further optimization and workup   Discharge disposition: Home no anticipated needs   Status-Inpatient   Payer- [employer]    Potential Barriers: None   ADOD: 8-   Vikash Castellanos RN [facility] [address] [phone]

## 2024-08-21 NOTE — CONSULTS
History Of Present Illness  Cristian Sapp is a 58 y.o. male with a history of CAD s/p multiple PCI, ICM/HFrEF (EF 15%) s/p CRT-D implant, V. Tach s/p multiple ablations and AICD, anxiety, COPD, infrarenal AAA s/p R iliac stent, nephrolithiasis status post ureteral stent admitted for shortness of breath.     During this hospitalization he has been diuresed. BNP is elevated, noted to have EITAN believed to be related to cardiorenal. Currently on 2 L of oxygen. Has history of smoking but no prior PFTs.  He is undergoing LVAD evaluation so these were obtained during this hospitalization.     He denies shortness of breath other than what he attributes to his heart failure symptoms. He does have a cough at times. Stopped smoking in May.     Past Medical History  He has a past medical history of Atherosclerosis of native artery of both lower extremities with intermittent claudication (CMS-[facility]), CHB (complete heart block) (Multi), Chronic systolic CHF (congestive heart failure), NYHA class 3 (Multi), COPD (chronic obstructive pulmonary disease) (Multi), Coronary artery disease, History of tobacco abuse, HLD (hyperlipidemia), Hypertension, Infrarenal abdominal aortic aneurysm (AAA) without rupture (CMS-HCC), Ischemic cardiomyopathy with implantable cardioverter-defibrillator (ICD), MI (myocardial infarction) (Multi), Nephrolithiasis, and PTSD (post-traumatic stress disorder).    Surgical History  He has a past surgical history that includes Cardiac defibrillator placement; Coronary angioplasty with stent (2006); Coronary angioplasty with stent (04/2003); Coronary angioplasty with stent (06/2008); Cystoscopy w/ ureteral stent placement (04/01/2024); Cystoscopy w/ ureteral stent placement (04/30/2024); Iliac artery stent (Right); Knee surgery; Femoral bypass; Cardiac electrophysiology procedure (N/A, 5/23/2024); Cardiac catheterization (N/A, 5/23/2024); Cardiac electrophysiology procedure (N/A, 5/28/2024); Cardiac  electrophysiology procedure (Right, 5/30/2024); and Cardiac electrophysiology procedure (N/A, 6/6/2024).     Social History  He reports that he has quit smoking. His smoking use included cigarettes. He does not have any smokeless tobacco history on file. He reports that he does not currently use alcohol. He reports that he does not currently use drugs.        Family History  Family History   Problem Relation Name Age of Onset    Hypertension Mother      Diabetes Father      Hypertension Sister      Diabetes Sister      Colon cancer Maternal Grandmother      Hypertension Maternal Grandmother      Heart disease Maternal Grandfather      Hypertension Maternal Grandfather      Cancer Paternal Grandfather      Hypertension Paternal Grandfather          Allergies  Chantix [varenicline]     Physical Exam  Temp:  [36 °C (96.8 °F)-36.6 °C (97.9 °F)] 36.5 °C (97.7 °F)  Heart Rate:  [74-81] 79  Resp:  [18-20] 18  BP: (102-116)/(65-81) 102/68  General appearance: NAD  Mouth: MMM  Eyes/Pupils: PERRL, EOMI  Chest: Clear to auscultation bilaterally  CV:  Normal rate, regular rhythm  Abdomen: Soft, nontender, nondistended, normoactive bowel sounds  Extremities:  No edema  Skin:  No rash  Neurological: AAO x3    PFTs: Show severe COPD without a bronchodilator response, a reduced TLC consistent with restrictive lung disease, and a reduced DLCO    Assessment/Plan   Cristian Sapp is a 58 y.o. male with a history of CAD s/p multiple PCI, ICM/HFrEF (EF 15%) s/p CRT-D implant, V. Tach s/p multiple ablations and AICD, anxiety, COPD, infrarenal AAA s/p R iliac stent, nephrolithiasis status post ureteral stent admitted for shortness of breath. Pulmonology is being consulted for surgical risk stratification and evaluation of COPD.     #Surgical evaluation   # Severe COPD:   # Restrictive lung disease  #Reduced DLCO  #History of tobacco use    High risk of post op pulmonary complication based on Ariscat score. Does have COPD but no baseline  oxygen. Overall his PFTs may appear worse than reality as he still is fluid up.    Recommendations:   - Please start a combination LAMA/LABA such as Stiolto for maintenance therapy, he should be discharged on this as well  - Continue albuterol as needed, agree with spacer which is at bedside  - Titrate oxygen levels to between 88-92%  - Please provide and acapella for improved sputum clearance  - He is high risk for a hospital post operative pulmonary complication, recommend aggressive pulmonary hygiene, PT/ OT, sitting up as much as possible before and after surgery  - encouraged increased acapella us post surgery to help with mucous clearance  - Plan to schedule albuterol TID post surgery for a few days      Patient was seen and examined with Dr. Almaraz.  Pulmonary team will sign off, please call or page for any questions or concerns.    Jaden Cohen MD    ======  I saw and evaluated the patient. I personally obtained the key and critical portions of the history and physical exam or was physically present for key and critical portions performed by the resident/fellow. I reviewed the resident/fellow's documentation and discussed the patient with the resident/fellow. I agree with the resident/fellow's medical decision making as documented in the note.    Davion Almaraz MD

## 2024-08-21 NOTE — PROGRESS NOTES
Subjective Data:  Patient seen and assessed this morning, sitting up at edge of bed, NAD. Denies CP, reports ongoing SOB, and dyspnea with minimal exertion, but improved after additional Lasix yesterday evening. No further episodes of VT on telemetry overnight VT. No edema, warm minimal JVD, will cont diuresis given ongoing O2 requirement/SOB, 2L  well compensated. Updated on plan of care, agreeable to plan.     Overnight Events:    No acute events overnight.    Today's plan:  - cont diuresis, 40 mg IV Lasix BID, wean O2 as tolerated  - Acute Pain consulted for stellate ganglion block for recurring VT, appreciate recs   - Pulmonology consultation for Emphysema assessment and surgical risk stratification, pending PFT's today  - ongoing LVAD workup, Palliative Med to see patient tomorrow     Objective Data:  Last Recorded Vitals:  Vitals:    08/21/24 0425 08/21/24 0809 08/21/24 0837 08/21/24 1158   BP: 104/65 112/71 113/76 116/81   BP Location: Left arm Left arm Right arm Left arm   Patient Position: Sitting Sitting Lying Sitting   Pulse: 74 75 75 75   Resp: 18  18 18   Temp: 36 °C (96.8 °F)  36.6 °C (97.9 °F) 36.5 °C (97.7 °F)   TempSrc: Temporal  Temporal Temporal   SpO2: 93% 94% 95% 92%   Weight: 80.5 kg (177 lb 7.5 oz)      Height:           Last Labs:  Results for orders placed or performed during the hospital encounter of 08/19/24 (from the past 24 hour(s))   CBC   Result Value Ref Range    WBC 11.7 (H) 4.4 - 11.3 x10*3/uL    nRBC 0.0 0.0 - 0.0 /100 WBCs    RBC 4.50 4.50 - 5.90 x10*6/uL    Hemoglobin 12.7 (L) 13.5 - 17.5 g/dL    Hematocrit 39.5 (L) 41.0 - 52.0 %    MCV 88 80 - 100 fL    MCH 28.2 26.0 - 34.0 pg    MCHC 32.2 32.0 - 36.0 g/dL    RDW 21.1 (H) 11.5 - 14.5 %    Platelets 352 150 - 450 x10*3/uL   Renal Function Panel   Result Value Ref Range    Glucose 116 (H) 74 - 99 mg/dL    Sodium 137 136 - 145 mmol/L    Potassium 4.6 3.5 - 5.3 mmol/L    Chloride 99 98 - 107 mmol/L    Bicarbonate 26 21 - 32 mmol/L     Anion Gap 17 10 - 20 mmol/L    Urea Nitrogen 20 6 - 23 mg/dL    Creatinine 1.47 (H) 0.50 - 1.30 mg/dL    eGFR 56 (L) >60 mL/min/1.73m*2    Calcium 9.3 8.6 - 10.6 mg/dL    Phosphorus 3.3 2.5 - 4.9 mg/dL    Albumin 3.8 3.4 - 5.0 g/dL   Magnesium   Result Value Ref Range    Magnesium 2.26 1.60 - 2.40 mg/dL   TSH   Result Value Ref Range    Thyroid Stimulating Hormone 6.40 (H) 0.44 - 3.98 mIU/L   T4, free   Result Value Ref Range    Thyroxine, Free 1.55 (H) 0.78 - 1.48 ng/dL        TROPHS   Date/Time Value Ref Range Status   08/19/2024 02:39 AM 26 0 - 20 ng/L Final   08/19/2024 01:50 AM 23 0 - 20 ng/L Final   07/12/2024 05:59 AM 17 0 - 20 ng/L Final     BNP   Date/Time Value Ref Range Status   08/20/2024 06:36 AM 1,873 0 - 99 pg/mL Final   08/19/2024 01:50 AM 1,980 0 - 99 pg/mL Final     HGBA1C   Date/Time Value Ref Range Status   06/04/2024 03:07 AM 6.0 see below % Final     Comment:     Hemoglobin variant detected which does not interfere with determination of Hemoglobin A1c. Hemoglobin identification can be ordered to characterize the variant if clinically indicated.     LDLCALC   Date/Time Value Ref Range Status   08/08/2024 02:39 PM 56 <=99 mg/dL Final     Comment:                                 Near   Borderline      AGE      Desirable  Optimal    High     High     Very High     0-19 Y     0 - 109     ---    110-129   >/= 130     ----    20-24 Y     0 - 119     ---    120-159   >/= 160     ----      >24 Y     0 -  99   100-129  130-159   160-189     >/=190       VLDL   Date/Time Value Ref Range Status   08/08/2024 02:39 PM 32 0 - 40 mg/dL Final      Last I/O:  I/O last 3 completed shifts:  In: 240 (3 mL/kg) [P.O.:240]  Out: 3725 (46.3 mL/kg) [Urine:3725 (1.3 mL/kg/hr)]  Weight: 80.5 kg       Echo:  Transthoracic Echo (TTE) Limited 05/28/2024  CONCLUSIONS:   1. Left ventricular systolic function is severely decreased with a 15% estimated ejection fraction.   2. There is no evidence of mitral valve stenosis.    "3. Moderate to severe mitral valve regurgitation.   4. Mild tricuspid regurgitation is visualized.   5. Aortic valve stenosis is not present.   6. Left ventricular cavity size is moderately dilated.   7. The pulmonary artery is not well visualized.   8. Current study appears to be remarkably similar to the May 23, 2024 study done here.   9. There is global hypokinesis of the left ventricle with minor regional variations.    Transthoracic Echo (TTE) Complete 05/23/2024  CONCLUSIONS:   1. Left ventricular systolic function is severely decreased with a 10% estimated ejection fraction.   2. There is no evidence of mitral valve stenosis.   3. Moderate to severe mitral valve regurgitation.   4. Moderate tricuspid regurgitation.   5. Aortic valve stenosis is not present.   6. Left ventricular cavity size is moderately dilated.   7. Moderate to severely elevated pulmonary artery pressure.   8. Pulmonary hypertension is present.   9. The inferior vena cava appears moderately dilated.  10. There is global hypokinesis of the left ventricle with minor regional variations.    Ejection Fractions:  No results found for: \"EF\"  Cath:  Cardiac Catheterization Procedure 05/23/2024  CONCLUSIONS:   1. Distal Left Main: 10-30% stenosis.   2. Proximal and mid LAD Lesion: The percent stenosis is 10-30%.   3. Proximal CX Lesion: The percent stenosis is 100%.   4. Right Coronary Artery: fills via collaterals.   5. Proximal RCA Lesion: The percent stenosis is 100%.   6. The Left Ventricular Ejection Fraction is 10%,by echo.    Inpatient Medications:  Scheduled medications   Medication Dose Route Frequency    amiodarone  200 mg oral BID    aspirin  81 mg oral Daily    dapagliflozin propanediol  10 mg oral Daily    [START ON 8/22/2024] ferrous sulfate (325 mg ferrous sulfate)  65 mg of iron oral Daily with breakfast    furosemide  40 mg intravenous BID    metoprolol succinate XL  12.5 mg oral Daily    mexiletine  300 mg oral TID    pantoprazole "  40 mg oral Daily before breakfast    PARoxetine  60 mg oral Nightly    ranolazine  500 mg oral BID    rosuvastatin  20 mg oral Nightly    sacubitriL-valsartan  0.5 tablet oral BID    spironolactone  12.5 mg oral Daily    tamsulosin  0.4 mg oral Daily    traZODone  50 mg oral Nightly     PRN medications   Medication    acetaminophen    albuterol    hydrOXYzine HCL    LORazepam    polyethylene glycol     Continuous Medications   Medication Dose Last Rate       Physical Exam:  Physical Exam  Vitals and nursing note reviewed.   Constitutional:       General: He is not in acute distress.     Appearance: Normal appearance.   HENT:      Head: Atraumatic.      Mouth/Throat:      Mouth: Mucous membranes are moist.   Eyes:      General: No scleral icterus.     Extraocular Movements: Extraocular movements intact.      Conjunctiva/sclera: Conjunctivae normal.   Neck:      Comments: + JVD to mid-neck  Cardiovascular:      Rate and Rhythm: Normal rate and regular rhythm.      Pulses: Normal pulses.      Heart sounds: Murmur heard.      Comments: Systolic mitral murmur 3/6,   Pulmonary:      Effort: Pulmonary effort is normal. No respiratory distress.      Breath sounds: Normal breath sounds.      Comments: On 3L via NC  Abdominal:      General: Bowel sounds are normal.      Palpations: Abdomen is soft.   Musculoskeletal:         General: Normal range of motion.      Cervical back: Normal range of motion.      Right lower leg: No edema.      Left lower leg: No edema.   Skin:     General: Skin is warm and dry.      Capillary Refill: Capillary refill takes less than 2 seconds.   Neurological:      General: No focal deficit present.      Mental Status: He is alert and oriented to person, place, and time.   Psychiatric:         Mood and Affect: Mood normal.         Behavior: Behavior normal.      Comments: Anxious at times           Assessment/Plan   59 y/o M with PMHx of CAD s/p multiple PCI, ICM/HFrEF (EF 15% 5/24) s/p CRT-D, VT  storm s/p VT ablation (2019), infrarenal AAA s/p R iliac stent, nephrolithiasis s/p recent ureteral stent. Presented to OSH on 8/19 progressive SOB since last admission 5/2024. [place] [facility] further [place].      Acute on chronic Systolic and Diastolic heart failure  Stage D HFrEF/ICM s/p CRT-D (11/2019)   - hx of Cardiogenic shock s/p IABP 6/6, removed 6/8  - follows with Dr. Brown  - TTE (5/28/2024): severely decreased LV systolic function, EF 15%, LVIDd 6.32, moderate-severe MVR, mild TR    - previously signed consent for OHT/LVAD workup, found not to be a candidate for OHT given active tobacco use, also was not interested in LVAD at that time, workup stopped per Dr. Doyle 6/5  - admit BNP: 1873   - admit CXR: prominent interstitial markings suggesting pulmonary interstitial edema. Small to moderate right pleural effusion and superimposed right basilar atelectasis.  - admit wt: 81.3 kg, per pt dry wt ~73 kg (160 lb)  - daily wt: 80.5 kg  - per pt, has not smoked since 6/2024, now agreeable to LVAD  - agreeable to advanced therapy evaluation, initiated 8/20, consented for LVAD only  - cont diuresis, 40 mg IV Lasix BID, wean O2 as tolerated  - ongoing LVAD workup, Palliative Med/Pulmonology consulted, appreciate recs  - cont home: Entresto 12-13 mg BID, dapagliflozin 10 mg daily, spironolactone 12.5 mg daily, Toprol XL 12.5 mg daily  - home diuretic: torsemide 20 mg daily  - Daily standing weights, 2gm sodium diet, 2L fluid restriction, strict I&Os    CAD  - s/p multiple PCI  - C (5/23/24): Distal Left Main: 10-30% stenosis; Proximal and mid LAD Lesion: The percent stenosis is 10-30%; Proximal CX Lesion: The percent stenosis is 100%; Right Coronary Artery: fills via collaterals; Proximal RCA Lesion: The percent stenosis is 100%; The Left Ventricular Ejection Fraction is 10%,by echo.  - cont ASA 81, rosuvastatin 20 mg nightly     Prior VT storm   Recurrent slow  VT  - s/p VT ablation 5/30, stellate ganglion block 6/4, VT ablation 6/6, stellate ganglion block 6/11  - Device interrogation completed 8/20  - (8/20) went into prolonged slow VT, self-resolved during device interrogation  - consider transfer to HFICU if recurs for Lidocaine drip +/- ATP pacing   - EP consulted, rec consult for possible stellate ganglion removal/ denervation (blocks not done by ENT but Acute Pain)  - Acute Pain consulted, willing to perform repeat block, but need further evaluation/ clarification from EP for removal/ denervation, appreciate recs   - cont home ranexa 500 mg BID, amiodarone 200 mg BID, Toprol XL 12.5 mg daily  - keep K >4 and Mag >2    Severe emphysema  ?COPD   - CT chest (6/4): severe emphysematous changes.   - admit CXR: prominent interstitial markings suggesting pulmonary interstitial edema. Small to moderate right pleural effusion and superimposed right basilar atelectasis.  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 Vivek Seth, APRN-CNP

## 2024-08-21 NOTE — PROGRESS NOTES
Physical Therapy    Physical Therapy Evaluation    Patient Name: Cristian Sapp  MRN: [id number]  Today's Date: 8/21/2024   Time Calculation  Start Time: 1202  Stop Time: 1215  Time Calculation (min): 13 min    Assessment/Plan   PT Assessment  PT Assessment Results:  (No current impairments.)  Rehab Prognosis:  (No current rehab needs.)  Barriers to Discharge: none  End of Session Communication: Bedside nurse  Assessment Comment: 58 y.o. admitted for treatment of SOB though otherwise demonstrating steady gait without need for assistive device. Pt without acute PT needs at this time. Will DC PT.  End of Session Patient Position: Bed, 3 rail up, Alarm off, not on at start of session  IP OR SWING BED PT PLAN  Inpatient or Swing Bed: Inpatient  PT Plan  Treatment/Interventions:  (No current PT needs.)  PT Plan: Ongoing PT  PT Frequency: PT eval only  PT Discharge Recommendations: No further acute PT, No PT needed after discharge  PT Recommended Transfer Status: Independent  PT - OK to Discharge: Yes      Subjective   General Visit Information:  General  Reason for Referral: SOB  Past Medical History Relevant to Rehab: 58 y.o. male with a history of CAD s/p multiple PCI, ICM/HFrEF s/p CRT-D implant, V. Tach s/p multiple ablations and AICD, anxiety, COPD, HTN, DM, DLD, infrarenal AAA s/p R iliac stent, and nephrolithiasis status post ureteral stent. Admitted with SOB.  Family/Caregiver Present: Yes  Prior to Session Communication: Bedside nurse  Patient Position Received: Bed, 3 rail up, Alarm off, not on at start of session  Preferred Learning Style: verbal, auditory  Home Living:  Home Living  Type of Home: House  Lives With:  (family)  Home Adaptive Equipment: None  Home Layout: One level  Home Access: No concerns  Prior Level of Function:  Prior Function Per Pt/Caregiver Report  Level of [place]: Independent with ADLs and functional transfers, Independent with homemaking with ambulation  Prior Function Comments: No  concerns at baseline.  Precautions:  Precautions  Medical Precautions: Fall precautions    Objective   Pain:  Pain Assessment  Pain Assessment: 0-10  0-10 (Numeric) Pain Score: 0 - No pain  Cognition:  Cognition  Overall Cognitive Status: Within Functional Limits  Orientation Level: Oriented X4  Attention: Within Functional Limits  Insight: Within function limits  Impulsive: Within functional limits  Processing Speed: Within funtional limits    General Assessments:  General Observation  General Observation: 3L 02 NC     Activity Tolerance  Endurance: Tolerates 10 - 20 min exercise with multiple rests    Sensation  Light Touch: No apparent deficits    Strength  Strength Comments: Grossly 4+/5 throughout.  Strength  Strength Comments: Grossly 4+/5 throughout.    Coordination  Movements are Fluid and Coordinated: Yes    Postural Control  Postural Control: Within Functional Limits    Static Sitting Balance  Static Sitting-Balance Support: Feet supported  Static Sitting-Level of Assistance: Independent    Static Standing Balance  Static Standing-Balance Support: No upper extremity supported  Static Standing-Level of Assistance: Independent  Functional Assessments:       Bed Mobility  Bed Mobility: Yes  Bed Mobility 1  Bed Mobility 1: Supine to sitting, Sitting to supine  Level of Assistance 1: Independent    Transfers  Transfer: Yes  Transfer 1  Transfer From 1: Sit to, Stand to  Transfer to 1: Stand, Sit  Transfer Device 1:  (No AD)  Transfer Level of Assistance 1: Distant supervision    Ambulation/Gait Training  Ambulation/Gait Training Performed: Yes  Ambulation/Gait Training 1  Surface 1: Level tile  Device 1: No device  Assistance 1: Distant supervision  Quality of Gait 1:  (Steady gait with no acute LOB. Mild SOB requiring 1 standing rest period. Otherwise ambulating without difficulty.)  Comments/Distance (ft) 1: 200ft w 1 standing rest period atapproximately 150ft.    Outcome Measures:  [facility] Basic  Mobility  Turning from your back to your side while in a flat bed without using bedrails: None  Moving from lying on your back to sitting on the side of a flat bed without using bedrails: None  Moving to and from bed to chair (including a wheelchair): None  Standing up from a chair using your arms (e.g. wheelchair or bedside chair): None  To walk in hospital room: None  Climbing 3-5 steps with railing: None  Basic Mobility - Total Score: 24    Education Documentation  Precautions, taught by Turner Watkins PT at 8/21/2024 12:56 PM.  Learner: Patient  Readiness: Acceptance  Method: Explanation  Response: Verbalizes Understanding    Mobility Training, taught by Turner Watkins PT at 8/21/2024 12:56 PM.  Learner: Patient  Readiness: Acceptance  Method: Explanation  Response: Verbalizes Understanding    Education Comments  No comments found.

## 2024-08-22 ENCOUNTER — DOCUMENTATION (OUTPATIENT)
Dept: TRANSPLANT | Facility: HOSPITAL | Age: 59
End: 2024-08-22
Payer: MEDICARE

## 2024-08-22 LAB
ABO GROUP (TYPE) IN BLOOD: NORMAL
ALBUMIN SERPL BCP-MCNC: 3.7 G/DL (ref 3.4–5)
ALBUMIN SERPL BCP-MCNC: 3.7 G/DL (ref 3.4–5)
ANION GAP SERPL CALC-SCNC: 15 MMOL/L (ref 10–20)
ANION GAP SERPL CALC-SCNC: 15 MMOL/L (ref 10–20)
APTT PPP: 28 SECONDS (ref 27–38)
BUN SERPL-MCNC: 22 MG/DL (ref 6–23)
BUN SERPL-MCNC: 25 MG/DL (ref 6–23)
CALCIUM SERPL-MCNC: 9 MG/DL (ref 8.6–10.6)
CALCIUM SERPL-MCNC: 9.1 MG/DL (ref 8.6–10.6)
CARDIOLIPIN IGA SERPL-ACNC: 1.9 APL U/ML
CARDIOLIPIN IGG SER IA-ACNC: <1.6 GPL U/ML
CARDIOLIPIN IGM SER IA-ACNC: 5.4 MPL U/ML
CHLORIDE SERPL-SCNC: 97 MMOL/L (ref 98–107)
CHLORIDE SERPL-SCNC: 98 MMOL/L (ref 98–107)
CO2 SERPL-SCNC: 30 MMOL/L (ref 21–32)
CO2 SERPL-SCNC: 30 MMOL/L (ref 21–32)
CREAT SERPL-MCNC: 1.28 MG/DL (ref 0.5–1.3)
CREAT SERPL-MCNC: 1.55 MG/DL (ref 0.5–1.3)
EGFRCR SERPLBLD CKD-EPI 2021: 52 ML/MIN/1.73M*2
EGFRCR SERPLBLD CKD-EPI 2021: 66 ML/MIN/1.73M*2
ERYTHROCYTE [DISTWIDTH] IN BLOOD BY AUTOMATED COUNT: 20.9 % (ref 11.5–14.5)
ERYTHROCYTE [DISTWIDTH] IN BLOOD BY AUTOMATED COUNT: 21.1 % (ref 11.5–14.5)
GLUCOSE SERPL-MCNC: 150 MG/DL (ref 74–99)
GLUCOSE SERPL-MCNC: 96 MG/DL (ref 74–99)
HAV AB SER QL IA: REACTIVE
HCT VFR BLD AUTO: 39.9 % (ref 41–52)
HCT VFR BLD AUTO: 41 % (ref 41–52)
HCV AB SER QL: NONREACTIVE
HGB BLD-MCNC: 12.8 G/DL (ref 13.5–17.5)
HGB BLD-MCNC: 12.8 G/DL (ref 13.5–17.5)
INR PPP: 1.2 (ref 0.9–1.1)
MAGNESIUM SERPL-MCNC: 2.15 MG/DL (ref 1.6–2.4)
MAGNESIUM SERPL-MCNC: 2.21 MG/DL (ref 1.6–2.4)
MCH RBC QN AUTO: 28.4 PG (ref 26–34)
MCH RBC QN AUTO: 28.9 PG (ref 26–34)
MCHC RBC AUTO-ENTMCNC: 31.2 G/DL (ref 32–36)
MCHC RBC AUTO-ENTMCNC: 32.1 G/DL (ref 32–36)
MCV RBC AUTO: 90 FL (ref 80–100)
MCV RBC AUTO: 91 FL (ref 80–100)
NRBC BLD-RTO: 0 /100 WBCS (ref 0–0)
NRBC BLD-RTO: 0 /100 WBCS (ref 0–0)
PHOSPHATE SERPL-MCNC: 3.2 MG/DL (ref 2.5–4.9)
PHOSPHATE SERPL-MCNC: 3.3 MG/DL (ref 2.5–4.9)
PLATELET # BLD AUTO: 342 X10*3/UL (ref 150–450)
PLATELET # BLD AUTO: 362 X10*3/UL (ref 150–450)
POTASSIUM SERPL-SCNC: 3.6 MMOL/L (ref 3.5–5.3)
POTASSIUM SERPL-SCNC: 4.1 MMOL/L (ref 3.5–5.3)
PROTHROMBIN TIME: 14 SECONDS (ref 9.8–12.8)
RBC # BLD AUTO: 4.43 X10*6/UL (ref 4.5–5.9)
RBC # BLD AUTO: 4.51 X10*6/UL (ref 4.5–5.9)
RH FACTOR (ANTIGEN D): NORMAL
SODIUM SERPL-SCNC: 138 MMOL/L (ref 136–145)
SODIUM SERPL-SCNC: 139 MMOL/L (ref 136–145)
WBC # BLD AUTO: 8.9 X10*3/UL (ref 4.4–11.3)
WBC # BLD AUTO: 9.7 X10*3/UL (ref 4.4–11.3)

## 2024-08-22 PROCEDURE — 86901 BLOOD TYPING SEROLOGIC RH(D): CPT

## 2024-08-22 PROCEDURE — 83735 ASSAY OF MAGNESIUM: CPT

## 2024-08-22 PROCEDURE — 99221 1ST HOSP IP/OBS SF/LOW 40: CPT | Performed by: STUDENT IN AN ORGANIZED HEALTH CARE EDUCATION/TRAINING PROGRAM

## 2024-08-22 PROCEDURE — 86708 HEPATITIS A ANTIBODY: CPT

## 2024-08-22 PROCEDURE — 97129 THER IVNTJ 1ST 15 MIN: CPT | Mod: GO

## 2024-08-22 PROCEDURE — 85027 COMPLETE CBC AUTOMATED: CPT

## 2024-08-22 PROCEDURE — 85610 PROTHROMBIN TIME: CPT

## 2024-08-22 PROCEDURE — 86803 HEPATITIS C AB TEST: CPT

## 2024-08-22 PROCEDURE — 99223 1ST HOSP IP/OBS HIGH 75: CPT | Performed by: INTERNAL MEDICINE

## 2024-08-22 PROCEDURE — 2020000001 HC ICU ROOM DAILY

## 2024-08-22 PROCEDURE — 36415 COLL VENOUS BLD VENIPUNCTURE: CPT

## 2024-08-22 PROCEDURE — 2500000002 HC RX 250 W HCPCS SELF ADMINISTERED DRUGS (ALT 637 FOR MEDICARE OP, ALT 636 FOR OP/ED)

## 2024-08-22 PROCEDURE — 2500000004 HC RX 250 GENERAL PHARMACY W/ HCPCS (ALT 636 FOR OP/ED)

## 2024-08-22 PROCEDURE — 2500000001 HC RX 250 WO HCPCS SELF ADMINISTERED DRUGS (ALT 637 FOR MEDICARE OP)

## 2024-08-22 PROCEDURE — 97165 OT EVAL LOW COMPLEX 30 MIN: CPT | Mod: GO

## 2024-08-22 PROCEDURE — 99232 SBSQ HOSP IP/OBS MODERATE 35: CPT | Performed by: INTERNAL MEDICINE

## 2024-08-22 PROCEDURE — 80069 RENAL FUNCTION PANEL: CPT

## 2024-08-22 PROCEDURE — 80323 ALKALOIDS NOS: CPT

## 2024-08-22 PROCEDURE — 86147 CARDIOLIPIN ANTIBODY EA IG: CPT

## 2024-08-22 PROCEDURE — 94640 AIRWAY INHALATION TREATMENT: CPT

## 2024-08-22 RX ORDER — TORSEMIDE 20 MG/1
40 TABLET ORAL DAILY
Status: DISCONTINUED | OUTPATIENT
Start: 2024-08-22 | End: 2024-08-30 | Stop reason: HOSPADM

## 2024-08-22 RX ORDER — ENOXAPARIN SODIUM 100 MG/ML
40 INJECTION SUBCUTANEOUS EVERY 24 HOURS
Status: DISCONTINUED | OUTPATIENT
Start: 2024-08-22 | End: 2024-08-30 | Stop reason: HOSPADM

## 2024-08-22 RX ORDER — POTASSIUM CHLORIDE 20 MEQ/1
40 TABLET, EXTENDED RELEASE ORAL ONCE
Status: COMPLETED | OUTPATIENT
Start: 2024-08-22 | End: 2024-08-22

## 2024-08-22 RX ORDER — FORMOTEROL FUMARATE DIHYDRATE 20 UG/2ML
20 SOLUTION RESPIRATORY (INHALATION)
Status: DISCONTINUED | OUTPATIENT
Start: 2024-08-22 | End: 2024-08-30 | Stop reason: HOSPADM

## 2024-08-22 RX ORDER — LORAZEPAM 0.5 MG/1
0.5 TABLET ORAL EVERY 8 HOURS PRN
Status: DISCONTINUED | OUTPATIENT
Start: 2024-08-22 | End: 2024-08-30 | Stop reason: HOSPADM

## 2024-08-22 RX ORDER — AMOXICILLIN 250 MG
1 CAPSULE ORAL 2 TIMES DAILY
Status: DISCONTINUED | OUTPATIENT
Start: 2024-08-22 | End: 2024-08-25

## 2024-08-22 ASSESSMENT — COGNITIVE AND FUNCTIONAL STATUS - GENERAL
MOBILITY SCORE: 24
DAILY ACTIVITIY SCORE: 24
MOBILITY SCORE: 24

## 2024-08-22 ASSESSMENT — PAIN SCALES - GENERAL
PAINLEVEL_OUTOF10: 0 - NO PAIN

## 2024-08-22 ASSESSMENT — ANXIETY QUESTIONNAIRES
6. BECOMING EASILY ANNOYED OR IRRITABLE: NOT AT ALL
1. FEELING NERVOUS, ANXIOUS, OR ON EDGE: NOT AT ALL
7. FEELING AFRAID AS IF SOMETHING AWFUL MIGHT HAPPEN: MORE THAN HALF THE DAYS
4. TROUBLE RELAXING: NOT AT ALL
5. BEING SO RESTLESS THAT IT IS HARD TO SIT STILL: NOT AT ALL
GAD7 TOTAL SCORE: 2
3. WORRYING TOO MUCH ABOUT DIFFERENT THINGS: NOT AT ALL
2. NOT BEING ABLE TO STOP OR CONTROL WORRYING: NOT AT ALL

## 2024-08-22 ASSESSMENT — PATIENT HEALTH QUESTIONNAIRE - PHQ9
1. LITTLE INTEREST OR PLEASURE IN DOING THINGS: NOT AT ALL
7. TROUBLE CONCENTRATING ON THINGS, SUCH AS READING THE NEWSPAPER OR WATCHING TELEVISION: NOT AT ALL
4. FEELING TIRED OR HAVING LITTLE ENERGY: SEVERAL DAYS
SUM OF ALL RESPONSES TO PHQ9 QUESTIONS 1 & 2: 0
9. THOUGHTS THAT YOU WOULD BE BETTER OFF DEAD, OR OF HURTING YOURSELF: NOT AT ALL
8. MOVING OR SPEAKING SO SLOWLY THAT OTHER PEOPLE COULD HAVE NOTICED. OR THE OPPOSITE, BEING SO FIGETY OR RESTLESS THAT YOU HAVE BEEN MOVING AROUND A LOT MORE THAN USUAL: NOT AT ALL
SUM OF ALL RESPONSES TO PHQ QUESTIONS 1-9: 2
2. FEELING DOWN, DEPRESSED OR HOPELESS: NOT AT ALL
6. FEELING BAD ABOUT YOURSELF - OR THAT YOU ARE A FAILURE OR HAVE LET YOURSELF OR YOUR FAMILY DOWN: NOT AT ALL
5. POOR APPETITE OR OVEREATING: SEVERAL DAYS
3. TROUBLE FALLING OR STAYING ASLEEP OR SLEEPING TOO MUCH: NOT AT ALL

## 2024-08-22 ASSESSMENT — ACTIVITIES OF DAILY LIVING (ADL)
ADL_ASSISTANCE: INDEPENDENT
BATHING_ASSISTANCE: STAND BY

## 2024-08-22 ASSESSMENT — PAIN - FUNCTIONAL ASSESSMENT
PAIN_FUNCTIONAL_ASSESSMENT: 0-10
PAIN_FUNCTIONAL_ASSESSMENT: 0-10

## 2024-08-22 NOTE — PROGRESS NOTES
Occupational Therapy    Evaluation and Treatment    Patient Name: Cristian Sapp  MRN: [id number]  Today's Date: 8/22/2024  Room: [address] [place] [address]-A  Time Calculation  Start Time: 0943  Stop Time: 1013  Time Calculation (min): 30 min    Assessment  IP OT Assessment  OT Assessment: Pt presents to OT services for advanced therapy workup. Pt w/ a MoCA of 24/30 indicating mild cognitive impairment, AMPAC-ADL of 24/24, and gross  strength 85 lbs for both L and R. At [facility] pt does not demonstrate the need for continued skilled OT services upon d/c, however OT will continue to follow during stay and recommendations may change should pt receive LVAD.  Prognosis: Good  Barriers to Discharge: None  Evaluation/Treatment Tolerance: Patient tolerated treatment well  Medical Staff Made Aware: Yes  End of Session Communication: Bedside nurse  End of Session Patient Position: Up in chair, Alarm off, not on at start of session  Plan:  Inpatient Plan  Treatment Interventions: ADL retraining, Functional transfer training, UE strengthening/ROM, Endurance training, Patient/family training, Equipment evaluation/education, Neuromuscular reeducation, Fine motor coordination activities, Compensatory technique education  OT Frequency: 1 time per week  OT Discharge Recommendations: No OT needed after discharge  OT Recommended Transfer Status: Independent  OT - OK to Discharge: Yes  OT Assessment  OT Assessment Results: Decreased endurance, Decreased IADLs  Prognosis: Good  Barriers to Discharge: None  Evaluation/Treatment Tolerance: Patient tolerated treatment well  Medical Staff Made Aware: Yes  Strengths: Attitude of self, Ability to acquire knowledge, Coping skills, Living arrangement secure, Housing layout, Premorbid level of function, Support and attitude of living partners, Support of extended family/friends, Support of pets  Barriers to Participation: Comorbidities    Subjective   Current Problem:  1. Acute decompensated heart failure (Multi)   "Cardiac device check - Inpatient    Cardiac device check - Inpatient      2. S/P ICD (internal cardiac defibrillator) procedure  Cardiac device check - Inpatient    Cardiac device check - Inpatient      3. Rapid palpitations  Cardiac device check - Inpatient    Cardiac device check - Inpatient      4. VT (ventricular tachycardia) (Multi)  Cardiac device check - Inpatient    Cardiac device check - Inpatient        General:  Reason for Referral: 57 y/o male presented to ED 8/19 w/ SOB and c/f VT episode. Pt found to be in acute on chronic systolic/diastolic HF. Currently referred to OT for advanced therapy work up for possible LVAD. S/p pulmonary function test 8/21 stating pt is at \"high risk for post op pulmonary complications.\"  Past Medical History Relevant to Rehab: CAD s/p multiple PCI, ICM/HFrEF s/p CRT-D implant, V. Tach s/p multiple ablations and AICD, anxiety, COPD, HTN, DM, DLD, infrarenal AAA s/p R iliac stent, and nephrolithiasis status post ureteral stent.  Prior to Session Communication: Bedside nurse  Patient Position Received: Up in chair, Alarm off, not on at start of session  Family/Caregiver Present: Yes  Caregiver Feedback: family present and supportive throughout session  General Comment: Pt pleasant and agreeable to OT eval   Precautions:  Medical Precautions: Fall precautions, Cardiac precautions, Oxygen therapy device and L/min  Vital Signs:  Heart Rate: 75  Heart Rate Source: Monitor  Pain:  Pain Assessment  Pain Assessment: 0-10  0-10 (Numeric) Pain Score: 0 - No pain    Objective   Cognition:  Orientation Level: Oriented X4  Insight: Within function limits  Impulsive: Within functional limits  Home Living:  Type of Home: House  Lives With: Parent(s)  Home Adaptive Equipment: None  Home Layout: One level, Laundry main level  Home Access: Level entry  Bathroom Shower/Tub: Tub/shower unit  Bathroom Toilet: Standard  Bathroom Equipment: Shower chair without back  Home Living Comments: Pt lives " with parents, reports they all take care of eachother. Pt states he manages his own medications, however his mother always double checks for him.   Prior Function:  Level of [place]: Independent with ADLs and functional transfers, Independent with homemaking with ambulation  Receives Help From: Parent(s)  ADL Assistance: Independent  Homemaking Assistance: Independent  Ambulatory Assistance: Independent  Vocational: On disability ()  Leisure: Playing with his dogs, gardening, yard work, watching TV shoes with mom  Hand Dominance: Left  IADL History:  Current License: Yes  Mode of Transportation: Car, Family  Education: 2 years of college  ADL:  Eating Assistance: Independent  Grooming Assistance: Independent  Bathing Assistance: Stand by  Bathing Deficit: Setup  UE Dressing Assistance: Independent  LE Dressing Assistance: Modified independent (Device)  Toileting Assistance with Device: Independent  Activity Tolerance:  Endurance: Tolerates 10 - 20 min exercise with multiple rests  Balance:  Dynamic Sitting Balance  Dynamic Sitting-Balance Support: No upper extremity supported  Dynamic Sitting-Level of Assistance: Independent  Dynamic Standing Balance  Dynamic Standing-Balance Support: No upper extremity supported  Dynamic Standing-Level of Assistance: Distant supervision  Static Sitting Balance  Static Sitting-Balance Support: No upper extremity supported  Static Sitting-Level of Assistance: Independent  Static Standing Balance  Static Standing-Balance Support: No upper extremity supported  Static Standing-Level of Assistance: Independent  Bed Mobility/Transfers: Bed Mobility/Transfers: Bed Mobility  Bed Mobility: No  Functional Mobility  Functional Mobility Performed: Yes  Functional Mobility 1  Surface 1: Level tile  Device 1: No device  Assistance 1: Independent   and Transfers  Transfer: Yes  Transfer 1  Transfer From 1: Sit to, Stand to  Transfer to 1: Stand, Sit  Technique 1: Sit to  stand, Stand to sit  Transfer Level of Assistance 1: Independent  IADL's:   Current License: Yes  Mode of Transportation: Car, Family  Education: 2 years of college  Vision: Vision - Basic Assessment  Current Vision: Wears glasses only for reading   Sensation:  Sensation Comment: No apparent deficits  Coordination:  Movements are Fluid and Coordinated: Yes   Hand Function:  Hand Function  Gross Grasp: Functional (RUE 85 lbs, LUE 85 lbs)  Coordination: Functional  Extremities:   RUE   RUE : Within Functional Limits, LUE   LUE: Within Functional Limits  Outcome Measures: [facility] Daily Activity  Putting on and taking off regular lower body clothing: None  Bathing (including washing, rinsing, drying): None  Putting on and taking off regular upper body clothing: None  Toileting, which includes using toilet, bedpan or urinal: None  Taking care of personal grooming such as brushing teeth: None  Eating Meals: None  Daily Activity - Total Score: 24         , MoCA  Visuospatial/Executive: 5  Naming: 3  Memory (Score '0' as this is an Unscored Section): 0  Attention: Read List of Digits: 2  Attention: Read List of Letters: 1  Attention: Serial Sevens: 2  Language: Repeat: 1  Language: Fluency: 1  Abstraction: 2  Delayed Recall: 1  Orientation: 6  Add 1 Point if </=12 yr Education: 0  MOCA Total Score: 24   OT Adult Other Outcome Measures  MOCA Total Score: 24    Education Documentation  Precautions, taught by Ching Busch OT at 8/22/2024 10:49 AM.  Learner: Family, Patient  Readiness: Acceptance  Method: Explanation  Response: Verbalizes Understanding    ADL Training, taught by Ching Busch OT at 8/22/2024 10:49 AM.  Learner: Family, Patient  Readiness: Acceptance  Method: Explanation  Response: Verbalizes Understanding    Education Comments  No comments found.        Goals:   Encounter Problems       Encounter Problems (Active)       ADLs       Patient will perform UB and LB bathing in shower with modified independent level  of assistance and shower chair. (Progressing)       Start:  08/22/24    Expected End:  09/05/24               BALANCE       Patient will tolerate standing for 20 minutes to modified independent level of assistance with least restrictive device in order to improve functional activity tolerance for ADL tasks. (Progressing)       Start:  08/22/24    Expected End:  09/05/24               COGNITION/SAFETY       Patient will score WFL on standardized cognitive assessment with minimal cues and within reasonable time frame (Progressing)       Start:  08/22/24    Expected End:  09/05/24               MOBILITY              Treatment Completed on Evaluation  Cognitive Skill Development:  Cognitive Skill Development Activity 1: MoCA completed w/ pt scoring 24/30 indicating mild cognitive impairment w/ memory index score of 7/15.      08/22/24 at 10:50 AM   TIKA DUMONT, OT   Rehab Office: [phone]

## 2024-08-22 NOTE — ASSESSMENT & PLAN NOTE
Cristian Sapp is a 58 y.o. male with  past medical history of multiple prior MIs, CAD-multiple JIM, HFrEF (EF 15% 5/24) ), s/p CRT-D, VT storm s/p VT ablation , infrarenal AAA s/p right ileac stent,  Presented to OSH on 8/19 progressive SOB since last admission 5/2024. Device interrogation on 8/20 with prolonged slow VT. Transferred to [facility] [place] for acute on chronic Systolic and diastolic heart failure, advanced therapy evaluation and further optimization and LVAD workup .     Endocrine consulted for abnormal TFTs in setting of amiodarone use, currently on amiodarone 200 mg BID.   Per chart review: Amiodarone was started on 5/23/2024 for recurrent VT. No recent IV contrast use with CAT scans.  No known thyroid disease in the past.   Patient was seen by endo inpatient during last admission on 6/6/24 for elevated TSH.  Labs with elevated TSH at 10.3, normal free T4, and borderline free T3.  looks like the patient had amiodarone induced thyroiditis, (as it blocks conversion from T4-T3 ), vs permanent disease. Recs were to repeat TSH and free T4 after 2 to 4 weeks and follow-up with PCP as outpatient with no need for levothyroxine at that time.  Labs during this admission: TSH 6.4, FT4 1.5.    #Amiodarone induced thyroiditis  Pt is clinically euthyroid at this time.  [ ] please repeat TFTs including TSH, FT4, FT3 in 4 weeks.   No need for initiating treatment at this time  We will sign off today, please refer the patient to endocrine clinic within 4-6 weeks upon discharge  please reach out to endocrine if your have further questions.    Thank you for your consult.    Pt seen, examined and discussed with Dr. Garcia

## 2024-08-22 NOTE — CARE PLAN
Patient has remained alert and oriented x4, on 2L NC, throughout the shift. Patient denied pain/discomfort throughout the shift. Did receive PRN ativan and atarax for anxiety with some relief. Walking pulse ox performed; maintained 88% on room air while ambulating, lowest O2 86% on room air, total ambulation about 100ft. 2L NC for O2 >92%. Patient and patients family involved with and aware of plan of care. Patient has remained free of falls throughout the shift, call bell within reach, bed locked in the lowest position with nonskid socks on. Purposeful rounding performed. Patient calls appropriately for assistance. See chart for further details.     Problem: Pain - Adult  Goal: Verbalizes/displays adequate comfort level or baseline comfort level  Outcome: Progressing     Problem: Safety - Adult  Goal: Free from fall injury  Outcome: Progressing     Problem: Discharge Planning  Goal: Discharge to home or other facility with appropriate resources  Outcome: Progressing     Problem: Chronic Conditions and Co-morbidities  Goal: Patient's chronic conditions and co-morbidity symptoms are monitored and maintained or improved  Outcome: Progressing     Problem: Skin  Goal: Participates in plan/prevention/treatment measures  Outcome: Progressing  Goal: Prevent/manage excess moisture  Outcome: Progressing  Goal: Prevent/minimize sheer/friction injuries  Outcome: Progressing  Goal: Promote/optimize nutrition  Outcome: Progressing     The clinical goals for the shift include Patient will remain HD stable throughout the shift       Detail Level: Zone General Sunscreen Counseling: I recommended a broad spectrum sunscreen with a SPF of 30 or higher.  I explained that SPF 30 sunscreens block approximately 97 percent of the sun's harmful rays.  Sunscreens should be applied at least 15 minutes prior to expected sun exposure and then every 2 hours after that as long as sun exposure continues. If swimming or exercising sunscreen should be reapplied every 45 minutes to an hour after getting wet or sweating.  One ounce, or the equivalent of a shot glass full of sunscreen, is adequate to protect the skin not covered by a bathing suit. I also recommended a lip balm with a sunscreen as well. Sun protective clothing can be used in lieu of sunscreen but must be worn the entire time you are exposed to the sun's rays. Products Recommended: Suggested  using Enrich daily with the Zinc in the product or any other brand with Zinc or Titanium Dioxide

## 2024-08-22 NOTE — CONSULTS
Inpatient consult to Endocrinology  Consult performed by: Diana Sawass Najjar, MD  Consult ordered by: YARITZA Weaver-CNP  Reason for consult: abnormal TFTs in setting of amiodarin use          History Of Present Illness  Cristian Sapp is a 58 y.o. male with  past medical history of multiple prior MIs, CAD-multiple JIM, HFrEF (EF 15% 5/24) ), s/p CRT-D, VT storm s/p VT ablation , infrarenal AAA s/p right ileac stent,  Presented to OSH on 8/19 progressive SOB since last admission 5/2024. Device interrogation on 8/20 with prolonged slow VT. Transferred to [facility] for acute on chronic Systolic and diastolic heart failure, advanced therapy evaluation and further optimization and LVAD workup .       Endocrine consulted for abnormal TFTs in setting of amiodarone use, currently on amiodarone 200 mg BID.   Per chart review: Amiodarone was started on 5/23/2024 for recurrent VT. No recent IV contrast use with CAT scans.  No known thyroid disease in the past    TSH:  5/23/2024: 5.55  6/4/2024: 10.35  8/8/24:  6.75  8/20/24: 6.40    Free thyroxine:  5/23/2024: 0.94  6/6/2024: 1.39  8/8/24:  1.39  8/20/24:  1.55     Free T3:  6/4/2024: 65  8/8/24: 82     Patient was seen by endo inpatient during last admission on 6/6/24 for elevated TSH.  Labs with elevated TSH at 10.3, normal free T4, and borderline free T3.  looks like the patient had amiodarone induced thyroiditis, (as it blocks conversion from T4-T3 ), vs permanent disease. Recs were to repeat TSH and free T4 after 2 to 4 weeks and follow-up with PCP as outpatient with no need for levothyroxine at that time.    Today pt states he feels ok at this time.  Reports being chronically fatigued. Reports frequent heat intolerance and hot flashes. Reports constipation while in hospital. No changes in hair/skin. No tremors  No sleep disturbance   Stable appetite and increased weight likely 2/2 fluid overload.  No ocular sx- tearing, gritty sensation, itching  No hoarseness, trouble  swallowing, or compressive sx   Denies any chest pain, shortness of breath, palpitations.  No changes in bowel habits  Denies polydipsia polyuria nocturia   No prox muscle weakness, cramps, tingling.  Denies any, change in libido, night sweats  No nausea/vomit or abdominal pain        Past Medical History  He has a past medical history of Atherosclerosis of native artery of both lower extremities with intermittent claudication (CMS-[facility]), CHB (complete heart block) (Multi), Chronic systolic CHF (congestive heart failure), NYHA class 3 (Multi), COPD (chronic obstructive pulmonary disease) (Multi), Coronary artery disease, History of tobacco abuse, HLD (hyperlipidemia), Hypertension, Infrarenal abdominal aortic aneurysm (AAA) without rupture (CMS-HCC), Ischemic cardiomyopathy with implantable cardioverter-defibrillator (ICD), MI (myocardial infarction) (Multi), Nephrolithiasis, and PTSD (post-traumatic stress disorder).    Surgical History  He has a past surgical history that includes Cardiac defibrillator placement; Coronary angioplasty with stent (2006); Coronary angioplasty with stent (04/2003); Coronary angioplasty with stent (06/2008); Cystoscopy w/ ureteral stent placement (04/01/2024); Cystoscopy w/ ureteral stent placement (04/30/2024); Iliac artery stent (Right); Knee surgery; Femoral bypass; Cardiac electrophysiology procedure (N/A, 5/23/2024); Cardiac catheterization (N/A, 5/23/2024); Cardiac electrophysiology procedure (N/A, 5/28/2024); Cardiac electrophysiology procedure (Right, 5/30/2024); and Cardiac electrophysiology procedure (N/A, 6/6/2024).     Social History  He reports that he has quit smoking. His smoking use included cigarettes. He does not have any smokeless tobacco history on file. He reports that he does not currently use alcohol. He reports that he does not currently use drugs.    Family History  Family History   Problem Relation Name Age of Onset    Hypertension Mother      Diabetes Father    "   Hypertension Sister      Diabetes Sister      Colon cancer Maternal Grandmother      Hypertension Maternal Grandmother      Heart disease Maternal Grandfather      Hypertension Maternal Grandfather      Cancer Paternal Grandfather      Hypertension Paternal Grandfather          Allergies  Chantix [varenicline]    Review of Systems  As above     Physical Exam  Constitutional: NAD, well groomed. AOx3. Cooperative  Skin/Hair: Warm, dry skin.  HEENT: EOMI, Anicteric scleras, No lid lag or lid retraction, No TTP of ocular globes. Dry oral mucosa. Chvostek sign   Neck: Soft, supple. Non tender, full ROM, no lymphadenopathy. Thyroid gland palpation: non nodular, soft consistency, non tender, no bruit.   Cardiovascular: RRR, no rub or murmurs.  Respiratory: CTAB, no accessory muscle use.  Abdomen: Soft, nontender to palpation.  Extremities: Preserved peripheral pulses, No peripheral edema.  Neuro: Moving all extremities spontaneously. CN's grossly intact. No balance or gait disturbances. DTRs: no delay in relaxation phase.       Last Recorded Vitals  Blood pressure 113/79, pulse 75, temperature 36.1 °C (97 °F), temperature source Temporal, resp. rate 18, height 1.702 m (5' 7\"), weight 80.3 kg (177 lb 0.5 oz), SpO2 95%.      Relevant Results  Component  Ref Range & Units 2 d ago 2 wk ago 2 mo ago 3 mo ago   Thyroid Stimulating Hormone  0.44 - 3.98 mIU/L 6.40 High  6.75 High  10.35 High  5.55 High      Component  Ref Range & Units 2 d ago 2 wk ago 2 mo ago 3 mo ago   Thyroxine, Free  0.78 - 1.48 ng/dL 1.55 High  0.96 R 1.39 0.94 R        Assessment/Plan   Assessment & Plan  Acute on chronic combined systolic and diastolic heart failure (Multi)    Centrilobular emphysema (Multi)    VT (ventricular tachycardia) (Multi)      Cristian Sapp is a 58 y.o. male with  past medical history of multiple prior MIs, CAD-multiple JIM, HFrEF (EF 15% 5/24) ), s/p CRT-D, VT storm s/p VT ablation , infrarenal AAA s/p right ileac stent,  " Presented to OSH on 8/19 progressive SOB since last admission 5/2024. Device interrogation on 8/20 with prolonged slow VT. Transferred to [facility] for acute on chronic Systolic and diastolic heart failure, advanced therapy evaluation and further optimization and LVAD workup .     Endocrine consulted for abnormal TFTs in setting of amiodarone use, currently on amiodarone 200 mg BID.   Per chart review: Amiodarone was started on 5/23/2024 for recurrent VT. No recent IV contrast use with CAT scans.  No known thyroid disease in the past.   Patient was seen by endo inpatient during last admission on 6/6/24 for elevated TSH.  Labs with elevated TSH at 10.3, normal free T4, and borderline free T3.  looks like the patient had amiodarone induced thyroiditis, (as it blocks conversion from T4-T3 ), vs permanent disease. Recs were to repeat TSH and free T4 after 2 to 4 weeks and follow-up with PCP as outpatient with no need for levothyroxine at that time.  Labs during this admission: TSH 6.4, FT4 1.5.    #Amiodarone induced thyroiditis  Pt is clinically euthyroid at this time.  [ ] please repeat TFTs including TSH, FT4, FT3 in 4 weeks.   No need for initiating treatment at this time  We will sign off today, please refer the patient to endocrine clinic within 4-6 weeks upon discharge  please reach out to endocrine if your have further questions.    Thank you for your consult.    Pt seen, examined and discussed with Dr. Barreiro Diana Sawass Najjar, MD  Endocrine fellow PGY-4

## 2024-08-22 NOTE — PROGRESS NOTES
Subjective Data:  Patient seen and assessed this morning, sitting up in the chair. He raises concerns over his anti- anxiety medication regimen. Ativan resumed q8 hour PRN for anxiety.     - switched diuresis to PO torsemide 40 mg daily   - ongoing LVAD workup   - PFTs completed, pending pulmonary formal recommendations  - pending acute pain recommendations for stellate ganglion block for recurring VT -> acute pain wants clarification from EP the procedure required for stellate ganglion, patient vocalizes he wants more than just a block   - endocrine consulted for elevated TSH and elevated T4, appreciate recommendations  - attempted to wean down oxygen today, desats to 88% on room air with ambulation-> likely will need home O2   - no VT noted overnight     Overnight Events:    No acute events overnight     Objective Data:  Last Recorded Vitals:  Vitals:    08/22/24 0800 08/22/24 0943 08/22/24 1039 08/22/24 1056   BP: 113/79      BP Location: Right arm      Patient Position: Sitting      Pulse: 75 75     Resp: 18      Temp: 36.1 °C (97 °F)      TempSrc: Temporal      SpO2: 95%  92% (!) 88%   Weight:       Height:           Last Labs:  Results for orders placed or performed during the hospital encounter of 08/19/24 (from the past 24 hour(s))   Blood Gas Arterial, COOX Unsolicited   Result Value Ref Range    POCT pH, Arterial 7.46 (H) 7.38 - 7.42 pH    POCT pCO2, Arterial 40 38 - 42 mm Hg    POCT pO2, Arterial 55 (L) 85 - 95 mm Hg    POCT SO2, Arterial 86 (L) 94 - 100 %    POCT Oxy Hemoglobin, Arterial 83.0 (L) 94.0 - 98.0 %    POCT Base Excess, Arterial 4.2 (H) -2.0 - 3.0 mmol/L    POCT HCO3 Calculated, Arterial 28.4 (H) 22.0 - 26.0 mmol/L    POCT Hemoglobin, Arterial 13.1 (L) 13.5 - 17.5 g/dL    POCT Carboxyhemoglobin, Arterial 2.2 %    POCT Methemoglobin, Arterial 0.7 0.0 - 1.5 %    POCT Deoxy Hemoglobin, Arterial 14.1 (H) 0.0 - 5.0 %    Patient Temperature 37.0 degrees Celsius   Coagulation Screen   Result Value  Ref Range    Protime 14.0 (H) 9.8 - 12.8 seconds    INR 1.2 (H) 0.9 - 1.1    aPTT 28 27 - 38 seconds   Hepatitis A Antibody, Total   Result Value Ref Range    Hepatitis A  AB-Total Reactive (A) Nonreactive   Hepatitis C Antibody   Result Value Ref Range    Hepatitis C AB Nonreactive Nonreactive   CBC   Result Value Ref Range    WBC 9.7 4.4 - 11.3 x10*3/uL    nRBC 0.0 0.0 - 0.0 /100 WBCs    RBC 4.43 (L) 4.50 - 5.90 x10*6/uL    Hemoglobin 12.8 (L) 13.5 - 17.5 g/dL    Hematocrit 39.9 (L) 41.0 - 52.0 %    MCV 90 80 - 100 fL    MCH 28.9 26.0 - 34.0 pg    MCHC 32.1 32.0 - 36.0 g/dL    RDW 20.9 (H) 11.5 - 14.5 %    Platelets 342 150 - 450 x10*3/uL   Renal Function Panel   Result Value Ref Range    Glucose 96 74 - 99 mg/dL    Sodium 138 136 - 145 mmol/L    Potassium 3.6 3.5 - 5.3 mmol/L    Chloride 97 (L) 98 - 107 mmol/L    Bicarbonate 30 21 - 32 mmol/L    Anion Gap 15 10 - 20 mmol/L    Urea Nitrogen 22 6 - 23 mg/dL    Creatinine 1.28 0.50 - 1.30 mg/dL    eGFR 66 >60 mL/min/1.73m*2    Calcium 9.1 8.6 - 10.6 mg/dL    Phosphorus 3.2 2.5 - 4.9 mg/dL    Albumin 3.7 3.4 - 5.0 g/dL   Magnesium   Result Value Ref Range    Magnesium 2.15 1.60 - 2.40 mg/dL   ABO/Rh   Result Value Ref Range    ABO TYPE A     Rh TYPE POS         TROPHS   Date/Time Value Ref Range Status   08/19/2024 02:39 AM 26 0 - 20 ng/L Final   08/19/2024 01:50 AM 23 0 - 20 ng/L Final   07/12/2024 05:59 AM 17 0 - 20 ng/L Final     BNP   Date/Time Value Ref Range Status   08/20/2024 06:36 AM 1,873 0 - 99 pg/mL Final   08/19/2024 01:50 AM 1,980 0 - 99 pg/mL Final     HGBA1C   Date/Time Value Ref Range Status   06/04/2024 03:07 AM 6.0 see below % Final     Comment:     Hemoglobin variant detected which does not interfere with determination of Hemoglobin A1c. Hemoglobin identification can be ordered to characterize the variant if clinically indicated.     LDLCALC   Date/Time Value Ref Range Status   08/08/2024 02:39 PM 56 <=99 mg/dL Final     Comment:                    "              Near   Borderline      AGE      Desirable  Optimal    High     High     Very High     0-19 Y     0 - 109     ---    110-129   >/= 130     ----    20-24 Y     0 - 119     ---    120-159   >/= 160     ----      >24 Y     0 -  99   100-129  130-159   160-189     >/=190       VLDL   Date/Time Value Ref Range Status   08/08/2024 02:39 PM 32 0 - 40 mg/dL Final      Last I/O:  I/O last 3 completed shifts:  In: 740 (9.2 mL/kg) [P.O.:740]  Out: 3225 (40.2 mL/kg) [Urine:3225 (1.1 mL/kg/hr)]  Weight: 80.3 kg       Echo:  Transthoracic Echo (TTE) Limited 05/28/2024  CONCLUSIONS:   1. Left ventricular systolic function is severely decreased with a 15% estimated ejection fraction.   2. There is no evidence of mitral valve stenosis.   3. Moderate to severe mitral valve regurgitation.   4. Mild tricuspid regurgitation is visualized.   5. Aortic valve stenosis is not present.   6. Left ventricular cavity size is moderately dilated.   7. The pulmonary artery is not well visualized.   8. Current study appears to be remarkably similar to the May 23, 2024 study done here.   9. There is global hypokinesis of the left ventricle with minor regional variations.    Transthoracic Echo (TTE) Complete 05/23/2024  CONCLUSIONS:   1. Left ventricular systolic function is severely decreased with a 10% estimated ejection fraction.   2. There is no evidence of mitral valve stenosis.   3. Moderate to severe mitral valve regurgitation.   4. Moderate tricuspid regurgitation.   5. Aortic valve stenosis is not present.   6. Left ventricular cavity size is moderately dilated.   7. Moderate to severely elevated pulmonary artery pressure.   8. Pulmonary hypertension is present.   9. The inferior vena cava appears moderately dilated.  10. There is global hypokinesis of the left ventricle with minor regional variations.    Ejection Fractions:  No results found for: \"EF\"  Cath:  Cardiac Catheterization Procedure 05/23/2024  CONCLUSIONS:   1. " Distal Left Main: 10-30% stenosis.   2. Proximal and mid LAD Lesion: The percent stenosis is 10-30%.   3. Proximal CX Lesion: The percent stenosis is 100%.   4. Right Coronary Artery: fills via collaterals.   5. Proximal RCA Lesion: The percent stenosis is 100%.   6. The Left Ventricular Ejection Fraction is 10%,by echo.    Inpatient Medications:  Scheduled medications   Medication Dose Route Frequency    amiodarone  200 mg oral BID    aspirin  81 mg oral Daily    dapagliflozin propanediol  10 mg oral Daily    ferrous sulfate (325 mg ferrous sulfate)  65 mg of iron oral Daily with breakfast    metoprolol succinate XL  12.5 mg oral Daily    mexiletine  300 mg oral TID    pantoprazole  40 mg oral Daily before breakfast    PARoxetine  60 mg oral Nightly    ranolazine  500 mg oral BID    rosuvastatin  20 mg oral Nightly    sacubitriL-valsartan  0.5 tablet oral BID    sennosides-docusate sodium  1 tablet oral BID    spironolactone  12.5 mg oral Daily    tamsulosin  0.4 mg oral Daily    torsemide  40 mg oral Daily    traZODone  50 mg oral Nightly     PRN medications   Medication    acetaminophen    albuterol    hydrOXYzine HCL    LORazepam    polyethylene glycol     Continuous Medications   Medication Dose Last Rate       Physical Exam:  Physical Exam  Vitals and nursing note reviewed.   Constitutional:       General: He is not in acute distress.     Appearance: Normal appearance.   HENT:      Head: Atraumatic.      Mouth/Throat:      Mouth: Mucous membranes are moist.   Eyes:      General: No scleral icterus.     Extraocular Movements: Extraocular movements intact.      Conjunctiva/sclera: Conjunctivae normal.   Neck:      Comments: + JVD to mid-neck  Cardiovascular:      Rate and Rhythm: Normal rate and regular rhythm.      Pulses: Normal pulses.      Heart sounds: Murmur heard.      Comments: Systolic mitral murmur 3/6, AV paced on tele  Pulmonary:      Effort: Pulmonary effort is normal. No respiratory distress.       Breath sounds: Normal breath sounds.      Comments: On 3L via NC  Abdominal:      General: Bowel sounds are normal. There is distension.      Palpations: Abdomen is soft.      Tenderness: There is no abdominal tenderness.   Musculoskeletal:         General: Normal range of motion.      Cervical back: Normal range of motion.      Right lower leg: No edema.      Left lower leg: No edema.   Skin:     General: Skin is warm and dry.      Capillary Refill: Capillary refill takes less than 2 seconds.   Neurological:      General: No focal deficit present.      Mental Status: He is alert and oriented to person, place, and time.   Psychiatric:         Mood and Affect: Mood normal.         Behavior: Behavior normal.      Comments: Anxious at times        Assessment/Plan   Cristian Sapp is a 57 y/o M with PMHx of CAD s/p multiple PCI, ICM/HFrEF (EF 15% 5/24) s/p CRT-D, VT storm s/p VT ablation (2019), infrarenal AAA s/p R iliac stent, nephrolithiasis s/p recent ureteral stent. Presented to OSH on 8/19 progressive SOB since last admission 5/2024. Transferred to [facility] for advanced therapy evaluation and further optimization.      Acute on chronic Systolic and Diastolic heart failure  Stage D HFrEF/ICM s/p CRT-D (11/2019)   - hx of Cardiogenic shock last admission requiring IABP support  - follows with Dr. Brown  - TTE (5/28/2024): severely decreased LV systolic function, EF 15%, LVIDd 6.32, moderate-severe MVR, mild TR    - previously signed consent for OHT/LVAD workup, found not to be a candidate for OHT given active tobacco use, also was not interested in LVAD at that time, workup stopped per Dr. Doyle 6/5  - admit BNP: 1873   - admit CXR: prominent interstitial markings suggesting pulmonary interstitial edema. Small to moderate right pleural effusion and superimposed right basilar atelectasis.  - admit wt: 81.3 kg, per pt dry wt ~73 kg (160 lb)  - daily wt: 80.3 kg (80.5 kg)  - per pt, has not smoked since 6/2024, now  agreeable to LVAD  - agreeable to advanced therapy evaluation, initiated 8/20, consented for LVAD only  - s/p diuresis with IV lasix boluses--> switched to 40 mg torsemide daily  - ongoing LVAD workup, Palliative Med/Pulmonology consulted, appreciate recs  - cont home: Entresto 12-13 mg BID, dapagliflozin 10 mg daily, spironolactone 12.5 mg daily, Toprol XL 12.5 mg daily  - home diuretic: torsemide 20 mg daily  - Daily standing weights, 2gm sodium diet, 2L fluid restriction, strict I&Os    CAD  - s/p multiple PCIs  - [facility] (5/23/24): Distal Left Main: 10-30% stenosis; Proximal and mid LAD Lesion: The percent stenosis is 10-30%; Proximal CX Lesion: The percent stenosis is 100%; Right Coronary Artery: fills via collaterals; Proximal RCA Lesion: The percent stenosis is 100%; The Left Ventricular Ejection Fraction is 10%,by echo.  - cont ASA 81, rosuvastatin 20 mg nightly     Prior VT storm   Recurrent slow VT  - s/p VT ablation 5/30, stellate ganglion block 6/4, VT ablation 6/6, stellate ganglion block 6/11  - Device interrogation completed 8/20  - (8/20) went into prolonged slow VT, self-resolved during device interrogation  - consider transfer to HFICU if recurs for Lidocaine drip +/- ATP pacing   - EP consulted, rec consult for possible stellate ganglion removal/ denervation (blocks not done by ENT but Acute Pain)  - Acute Pain consulted, willing to perform repeat block but further conversations taking place with EP about block vs. Removal of stellate ganglion removal-> EP to touch base today with plan   - cont home ranexa 500 mg BID, amiodarone 200 mg BID, Toprol XL 12.5 mg daily  - keep K >4 and Mag >2    Severe emphysema  ?COPD   - CT chest (6/4): severe emphysematous changes.   - admit CXR: prominent interstitial markings suggesting pulmonary interstitial edema. Small to moderate right pleural effusion and superimposed right basilar atelectasis.  - diuresis as stated above  - Pulmonology consulted for Emphysema  assessment and surgical risk stratification, appreciate recommendations  - PFT's completed 8/21: FEV1 58% predicted, FEV1/FVC 67% predicted  - Monitor and maintain SpO2 > 92%  - attempting to wean supplemental O2-> Spo2 dropped to 88% while completing ambulating pulse ox today     Anxiety/Depression  Insomnia  - atarax 25 mg q6 PRN for anxiety   - cont home paroxetine 60 mg nightly, trazodone 50 mg nightly  - cont ativan 0.5 mg q8 hours, OARRS reviewed     AOCD  Iron Deficiency Anemia   - admit hgb 12.1, stable  - daily Hgb 12.8  - no current s/s of bleeding  - Cont home PO iron supplement     Hx of likely amiodarone induced thyroiditis  - not currently on  synthroid   - TSH 6.40 and free T4 1.55  - endocrine consulted today for elevated TSH and free T4, appreciate recs    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 Vivek Cole, APRN-CNP

## 2024-08-22 NOTE — CARE PLAN
The patient's goals for the shift include      The clinical goals for the shift include Patient will remain HD stable throughout the shift    Over the shift, the patient did  make progress toward the following goals.

## 2024-08-22 NOTE — PROGRESS NOTES
Per T5 TREY, patient requested VAD team to answer post VAD questions. Spent 30 minutes with patient answering all questions regarding post VAD and post VAD medications. Patient verbalized concerns about financial burden medications can cost. Informed patient that many of the medications he is currently on will continue even after the VAD. These medications are typically covered by insurance and gave him alternative options like [facility] or [facility] should there be issues. Patient verbalized understanding and appreciation of information. Patient does not have any more questions at this time.

## 2024-08-22 NOTE — NURSING NOTE
Walking pulse ox:  Maintained 88% on room air while ambulating.  Lowest O2 86% on room air.  Total ambulation about 100ft.   2L NC for O2 >92%

## 2024-08-23 LAB
ALBUMIN SERPL BCP-MCNC: 3.8 G/DL (ref 3.4–5)
ANION GAP SERPL CALC-SCNC: 16 MMOL/L (ref 10–20)
ATRIAL RATE: 85 BPM
BUN SERPL-MCNC: 27 MG/DL (ref 6–23)
CALCIUM SERPL-MCNC: 9.2 MG/DL (ref 8.6–10.6)
CHLORIDE SERPL-SCNC: 97 MMOL/L (ref 98–107)
CO2 SERPL-SCNC: 27 MMOL/L (ref 21–32)
CREAT SERPL-MCNC: 1.44 MG/DL (ref 0.5–1.3)
EGFRCR SERPLBLD CKD-EPI 2021: 57 ML/MIN/1.73M*2
ERYTHROCYTE [DISTWIDTH] IN BLOOD BY AUTOMATED COUNT: 21.1 % (ref 11.5–14.5)
GLUCOSE SERPL-MCNC: 148 MG/DL (ref 74–99)
HCT VFR BLD AUTO: 41 % (ref 41–52)
HGB BLD-MCNC: 13 G/DL (ref 13.5–17.5)
MAGNESIUM SERPL-MCNC: 2.15 MG/DL (ref 1.6–2.4)
MCH RBC QN AUTO: 28.5 PG (ref 26–34)
MCHC RBC AUTO-ENTMCNC: 31.7 G/DL (ref 32–36)
MCV RBC AUTO: 90 FL (ref 80–100)
NRBC BLD-RTO: 0 /100 WBCS (ref 0–0)
P AXIS: 27 DEGREES
P OFFSET: 163 MS
P ONSET: 114 MS
PHOSPHATE SERPL-MCNC: 3.1 MG/DL (ref 2.5–4.9)
PLATELET # BLD AUTO: 348 X10*3/UL (ref 150–450)
POTASSIUM SERPL-SCNC: 3.3 MMOL/L (ref 3.5–5.3)
PR INTERVAL: 154 MS
Q ONSET: 191 MS
QRS COUNT: 14 BEATS
QRS DURATION: 166 MS
QT INTERVAL: 444 MS
QTC CALCULATION(BAZETT): 528 MS
QTC FREDERICIA: 498 MS
R AXIS: -51 DEGREES
RBC # BLD AUTO: 4.56 X10*6/UL (ref 4.5–5.9)
SODIUM SERPL-SCNC: 137 MMOL/L (ref 136–145)
T AXIS: 84 DEGREES
T OFFSET: 413 MS
VENTRICULAR RATE: 85 BPM
WBC # BLD AUTO: 8.4 X10*3/UL (ref 4.4–11.3)

## 2024-08-23 PROCEDURE — 85027 COMPLETE CBC AUTOMATED: CPT

## 2024-08-23 PROCEDURE — 2020000001 HC ICU ROOM DAILY

## 2024-08-23 PROCEDURE — 2500000001 HC RX 250 WO HCPCS SELF ADMINISTERED DRUGS (ALT 637 FOR MEDICARE OP)

## 2024-08-23 PROCEDURE — 80069 RENAL FUNCTION PANEL: CPT

## 2024-08-23 PROCEDURE — 83735 ASSAY OF MAGNESIUM: CPT

## 2024-08-23 PROCEDURE — 94640 AIRWAY INHALATION TREATMENT: CPT

## 2024-08-23 PROCEDURE — 2500000002 HC RX 250 W HCPCS SELF ADMINISTERED DRUGS (ALT 637 FOR MEDICARE OP, ALT 636 FOR OP/ED)

## 2024-08-23 PROCEDURE — 2500000004 HC RX 250 GENERAL PHARMACY W/ HCPCS (ALT 636 FOR OP/ED): Performed by: STUDENT IN AN ORGANIZED HEALTH CARE EDUCATION/TRAINING PROGRAM

## 2024-08-23 PROCEDURE — 99291 CRITICAL CARE FIRST HOUR: CPT | Performed by: NURSE PRACTITIONER

## 2024-08-23 PROCEDURE — 2500000002 HC RX 250 W HCPCS SELF ADMINISTERED DRUGS (ALT 637 FOR MEDICARE OP, ALT 636 FOR OP/ED): Performed by: STUDENT IN AN ORGANIZED HEALTH CARE EDUCATION/TRAINING PROGRAM

## 2024-08-23 PROCEDURE — 2500000004 HC RX 250 GENERAL PHARMACY W/ HCPCS (ALT 636 FOR OP/ED)

## 2024-08-23 PROCEDURE — 99223 1ST HOSP IP/OBS HIGH 75: CPT | Performed by: THORACIC SURGERY (CARDIOTHORACIC VASCULAR SURGERY)

## 2024-08-23 PROCEDURE — 99233 SBSQ HOSP IP/OBS HIGH 50: CPT | Performed by: INTERNAL MEDICINE

## 2024-08-23 PROCEDURE — 36415 COLL VENOUS BLD VENIPUNCTURE: CPT

## 2024-08-23 RX ORDER — ADHESIVE BANDAGE
30 BANDAGE TOPICAL DAILY PRN
Status: DISCONTINUED | OUTPATIENT
Start: 2024-08-23 | End: 2024-08-30 | Stop reason: HOSPADM

## 2024-08-23 RX ORDER — POTASSIUM CHLORIDE 20 MEQ/1
40 TABLET, EXTENDED RELEASE ORAL ONCE
Status: COMPLETED | OUTPATIENT
Start: 2024-08-23 | End: 2024-08-23

## 2024-08-23 RX ORDER — POTASSIUM CHLORIDE 14.9 MG/ML
20 INJECTION INTRAVENOUS ONCE
Status: COMPLETED | OUTPATIENT
Start: 2024-08-23 | End: 2024-08-23

## 2024-08-23 ASSESSMENT — COGNITIVE AND FUNCTIONAL STATUS - GENERAL
MOBILITY SCORE: 24
MOBILITY SCORE: 24
DAILY ACTIVITIY SCORE: 24
DAILY ACTIVITIY SCORE: 24

## 2024-08-23 ASSESSMENT — ENCOUNTER SYMPTOMS
FATIGUE: 1
NAUSEA: 1
ADENOPATHY: 0
VOMITING: 1
SHORTNESS OF BREATH: 1
WEAKNESS: 1
LIGHT-HEADEDNESS: 1

## 2024-08-23 ASSESSMENT — PAIN SCALES - GENERAL
PAINLEVEL_OUTOF10: 0 - NO PAIN
PAINLEVEL_OUTOF10: 0 - NO PAIN

## 2024-08-23 ASSESSMENT — PAIN - FUNCTIONAL ASSESSMENT
PAIN_FUNCTIONAL_ASSESSMENT: 0-10
PAIN_FUNCTIONAL_ASSESSMENT: 0-10

## 2024-08-23 NOTE — CONSULTS
Date of Service:  8/22/2024 Attending Provider:  Alexandro Doyle MD MPH     Reason for Consultation: Possible Stellate Ganglionectomy    Subjective   History of Present Illness:  Cristian is a 58 y.o. male with h/o recurrent VT, s/p ablationx2 (2019,5/30, 6/6), s/p stellate ganglion block x2 (6/4, 6/11), CAD s/p multiple PCI, ICM/HFrEF (EF 15% 5/24) s/p CRT-D, infrarenal AAA s/p R iliac stent, COPD, emphysema, nephrolithiasis s/p recent ureteral stent, anxiety, depression, CKD Stage 2, p/w SOB and VT    Patient states that he woke up with Vtach that felt similar to prior episodes of slow Vtach. His defibrillator did not fire. Patient states he has been having persistent slow Vtach that lasts for minutes to days. He has tried stellate ganglion blocks which have not helped. He is anxious about his condition and his defibrillator firing. He would like to seek possible progressive management of his VTach other than stellate ganglion blocks which have failed in the past. Patient currently denies SOB, chest pain, vision changes, weakness, numbness, paresthesias, nausea, vomiting, fever, chills. Patient endorses taking prasugrel and ASA.     Review of Systems   Hematological:  Negative for adenopathy.      10 point ROS is obtained and negative except the ones mentioned in the HPI    Social History  He reports that he has quit smoking. His smoking use included cigarettes. He does not have any smokeless tobacco history on file. He reports that he does not currently use alcohol. He reports that he does not currently use drugs.    Medical History  Past Medical History:   Diagnosis Date    Atherosclerosis of native artery of both lower extremities with intermittent claudication (CMS-[facility])     CHB (complete heart block) (Multi)     per device check    Chronic systolic CHF (congestive heart failure), NYHA class 3 (Multi)     COPD (chronic obstructive pulmonary disease) (Multi)     Coronary artery disease     History of tobacco  abuse     HLD (hyperlipidemia)     Hypertension     Infrarenal abdominal aortic aneurysm (AAA) without rupture (CMS-HCC)     Ischemic cardiomyopathy with implantable cardioverter-defibrillator (ICD)     MI (myocardial infarction) (Multi)     multiple    Nephrolithiasis     PTSD (post-traumatic stress disorder)        Surgical History  Past Surgical History:   Procedure Laterality Date    CARDIAC CATHETERIZATION N/A 5/23/2024    Procedure: Left Heart Cath;  Surgeon: Babatunde Tan MD;  Location: [facility];  Service: Cardiovascular;  Laterality: N/A;    CARDIAC DEFIBRILLATOR PLACEMENT      CARDIAC ELECTROPHYSIOLOGY PROCEDURE N/A 5/23/2024    Procedure: Cardioversion;  Surgeon: Zachary Sparks MD;  Location: [facility];  Service: Electrophysiology;  Laterality: N/A;    CARDIAC ELECTROPHYSIOLOGY PROCEDURE N/A 5/28/2024    Procedure: NIPS (NONINVASIVE PROGRAMMED STIMULATION AND DEFIBRILLATOR THRESHOLD TESTING);  Surgeon: Zachary Sparks MD;  Location: [facility];  Service: Electrophysiology;  Laterality: N/A;    CARDIAC ELECTROPHYSIOLOGY PROCEDURE Right 5/30/2024    Procedure: Ablation VT Endocardial ([id number]);  Surgeon: Sonny Flores MD;  Location: [place] [address] [facility];  Service: Electrophysiology;  Laterality: Right;  CARTO, 2PM    CARDIAC ELECTROPHYSIOLOGY PROCEDURE N/A 6/6/2024    Procedure: Ablation VT;  Surgeon: Eliot Wooten MD;  Location: [place] [address] [facility];  Service: Electrophysiology;  Laterality: N/A;  endocardial vt ablation  carto V8    CORONARY ANGIOPLASTY WITH STENT PLACEMENT  2006    JIM to LAD    CORONARY ANGIOPLASTY WITH STENT PLACEMENT  04/2003    CORONARY ANGIOPLASTY WITH STENT PLACEMENT  06/2008    CYSTOSCOPY W/ URETERAL STENT PLACEMENT  04/01/2024    CYSTOSCOPY W/ URETERAL STENT PLACEMENT  04/30/2024    FEMORAL BYPASS      femoral artery    ILIAC ARTERY STENT Right     KNEE SURGERY          Objective     Vitals:  Vitals:    08/22/24 2005    BP: 102/78   Pulse: 75   Resp: 20   Temp: 36.1 °C (97 °F)   SpO2: 93%         Exam:  Constitutional: No acute distress  Resp: breathing comfortably  Cardio: well perfused  GI: nondistended  MSK: full range of motion  Neuro: Awake, A&Ox3  Cranial Nerves II-XII: PERRL, EOMI, Face symmetric, Facial SILT, Palate/Tongue midline and symmetric, shoulder shrugs symmetric, hearing intact to finger rubs bilaterally  Motor: 5/5, no dysmetria on finger to nose, no pronator drift  Sensation: SILT throughout all extremities  DTRS: 2+ Throughout, No Hoffmans or Clonus  Psych: appropriate  Skin: no obvious lesions      GCS:   - Motor: 6 - Follows simple motor commands  - Verbal: 5 - Alert and oriented  - Eyes: 4 - Opens eyes on own  - TOTAL: 15      Medications  Current Outpatient Medications   Medication Instructions    albuterol sulfate (Proair Digihaler) 90 mcg/actuation aero powdr breath act w/sensor inhaler 2 puffs, inhalation, Every 4 hours PRN    amiodarone (PACERONE) 200 mg, oral, 2 times daily    aspirin 81 mg, oral, Daily    Farxiga 10 mg, oral, Daily    ferrous sulfate (325 mg ferrous sulfate) 325 mg, oral, Daily with breakfast    furosemide (LASIX) 40 mg, intravenous, 2 times daily    LORazepam (ATIVAN) 0.5 mg, oral, Daily PRN, Take 1 tablet daily as needed up to 1.5mg    magnesium oxide (MAG-OX) 400 mg, oral, Daily    metoprolol succinate XL (TOPROL-XL) 12.5 mg, oral, Daily, Do not crush or chew.    mexiletine (MEXITIL) 300 mg, oral, 3 times daily    nitroglycerin (NITROSTAT) 0.4 mg, sublingual, Every 5 min PRN    PARoxetine (PAXIL) 60 mg, oral, Nightly    potassium chloride (Klor-Con) 20 mEq packet 20 mEq, oral, Daily    ranolazine (RANEXA) 500 mg, oral, 2 times daily, Do not crush, chew, or split.    rosuvastatin (CRESTOR) 20 mg, oral, Nightly    sacubitriL-valsartan (Entresto) 24-26 mg tablet 0.5 tablets, oral, 2 times daily    spironolactone (ALDACTONE) 12.5 mg, oral, Daily    tamsulosin (FLOMAX) 0.4 mg, oral,  Daily    traZODone (DESYREL) 50 mg, oral, Nightly        Diagnostic Results:    Lab Results   Component Value Date    WBC 8.9 08/22/2024    HGB 12.8 (L) 08/22/2024    HCT 41.0 08/22/2024    MCV 91 08/22/2024     08/22/2024     Lab Results   Component Value Date    CREATININE 1.28 08/22/2024    BUN 22 08/22/2024     08/22/2024    K 3.6 08/22/2024    CL 97 (L) 08/22/2024    CO2 30 08/22/2024     Lab Results   Component Value Date    INR 1.2 (H) 08/22/2024    INR 1.2 (H) 08/19/2024    INR 1.3 (H) 06/07/2024    PROTIME 14.0 (H) 08/22/2024    PROTIME 13.9 (H) 08/19/2024    PROTIME 14.6 (H) 06/07/2024       Imaging Results:    Cardiac device check - Inpatient                   Assessment/Plan   Assessment:  Cristian is a 58 y.o. male with h/o recurrent VT, s/p ablationx2 (2019,5/30, 6/6), s/p stellate ganglion block x2 (6/4, 6/11), CAD s/p multiple PCI, ICM/HFrEF (EF 15% 5/24) s/p CRT-D, infrarenal AAA s/p R iliac stent, COPD, emphysema, nephrolithiasis s/p recent ureteral stent, anxiety, depression, CKD Stage 2, p/w SOB and VT    Patient with persistent VT and anxiety. Previous stellate ganglion blocks failed and patient seeking progressive management of his VT.     Plan:  - recommend Thoracic Surgery consult for Stellate Ganglionectomy   - No acute neurosurgical intervention needed at this time. Will sign off. Please don't hesitate to reach out with any additional questions     Sarkis Garcia MD    Note authored by resident on neurosurgery team, with all questions or to contact team please page at [phone]  Plan not finalized until note signed by attending

## 2024-08-23 NOTE — PROGRESS NOTES
Interval events:    No acute events overnight    Subjective:  No SOB. Does not want another nerve block.    Today in brief:  - consult thoracic surgery to discuss option of ganglion surgical removal options  - Appreciate EP recommendations regarding VT management.    Objective:  Vitals:    08/23/24 0723   BP: 108/77   Pulse: 77   Resp: 18   Temp: 36.1 °C (97 °F)   SpO2: 92%     Weight         8/19/2024  2115 8/20/2024  0337 8/21/2024  0425 8/22/2024  0500 8/23/2024  0550    Weight: 81.4 kg (179 lb 7.3 oz) 81.3 kg (179 lb 3.7 oz) 80.5 kg (177 lb 7.5 oz) 80.3 kg (177 lb 0.5 oz) 79.7 kg (175 lb 11.3 oz)            Intake/Output Summary (Last 24 hours) at 8/23/2024 0803  Last data filed at 8/23/2024 0550  Gross per 24 hour   Intake 470 ml   Output 900 ml   Net -430 ml     Recent Results (from the past 24 hour(s))   CBC    Collection Time: 08/22/24  7:08 PM   Result Value Ref Range    WBC 8.9 4.4 - 11.3 x10*3/uL    nRBC 0.0 0.0 - 0.0 /100 WBCs    RBC 4.51 4.50 - 5.90 x10*6/uL    Hemoglobin 12.8 (L) 13.5 - 17.5 g/dL    Hematocrit 41.0 41.0 - 52.0 %    MCV 91 80 - 100 fL    MCH 28.4 26.0 - 34.0 pg    MCHC 31.2 (L) 32.0 - 36.0 g/dL    RDW 21.1 (H) 11.5 - 14.5 %    Platelets 362 150 - 450 x10*3/uL   Renal Function Panel    Collection Time: 08/22/24  7:08 PM   Result Value Ref Range    Glucose 150 (H) 74 - 99 mg/dL    Sodium 139 136 - 145 mmol/L    Potassium 4.1 3.5 - 5.3 mmol/L    Chloride 98 98 - 107 mmol/L    Bicarbonate 30 21 - 32 mmol/L    Anion Gap 15 10 - 20 mmol/L    Urea Nitrogen 25 (H) 6 - 23 mg/dL    Creatinine 1.55 (H) 0.50 - 1.30 mg/dL    eGFR 52 (L) >60 mL/min/1.73m*2    Calcium 9.0 8.6 - 10.6 mg/dL    Phosphorus 3.3 2.5 - 4.9 mg/dL    Albumin 3.7 3.4 - 5.0 g/dL   Magnesium    Collection Time: 08/22/24  7:08 PM   Result Value Ref Range    Magnesium 2.21 1.60 - 2.40 mg/dL     Inpatient Medications:  Scheduled medications   Medication Dose Route Frequency    amiodarone  200 mg oral BID    aspirin  81 mg oral Daily     dapagliflozin propanediol  10 mg oral Daily    enoxaparin  40 mg subcutaneous q24h    ferrous sulfate (325 mg ferrous sulfate)  65 mg of iron oral Daily with breakfast    tiotropium  2 puff inhalation Daily    And    formoterol  20 mcg nebulization q12h    metoprolol succinate XL  12.5 mg oral Daily    mexiletine  300 mg oral TID    pantoprazole  40 mg oral Daily before breakfast    PARoxetine  60 mg oral Nightly    ranolazine  500 mg oral BID    rosuvastatin  20 mg oral Nightly    sacubitriL-valsartan  0.5 tablet oral BID    sennosides-docusate sodium  1 tablet oral BID    spironolactone  12.5 mg oral Daily    tamsulosin  0.4 mg oral Daily    torsemide  40 mg oral Daily    traZODone  50 mg oral Nightly     PRN medications   Medication    acetaminophen    albuterol    hydrOXYzine HCL    LORazepam    polyethylene glycol     Continuous Medications   Medication Dose Last Rate       Telemetry 8/23/2024 (personally reviewed):  ApVp 70s    Physical exam:  General: NAD  Head/ neck: no JVD, negative HJR  Cardiac: RRR, regular S1 S2 , no murmur, no rub, no gallop  Pulm: CTA bilaterally, no wheezes, rales or rhonchi.    Vascular: Radial 2+ bilaterally  GI: Non distended  Extremities: no LE edema   Neuro: no focal neuro deficits   Psych: appropriate mood and behavior   Skin: warm and dry     Assessment/Plan   Cristian Sapp is a 59 y/o M with PMHx of CAD s/p multiple PCI, ICM/HFrEF (EF 15% 5/24) s/p CRT-D, VT storm s/p VT ablation (2019), infrarenal AAA s/p R iliac stent, nephrolithiasis s/p recent ureteral stent. Presented to OSH on 8/19 progressive SOB since last admission 5/2024. Transferred to [facility] for advanced therapy evaluation and further optimization.       Acute on chronic Systolic and Diastolic heart failure  Stage D HFrEF/ICM s/p CRT-D (11/2019)   - hx of Cardiogenic shock last admission requiring IABP support  - follows with Dr. Brown  - TTE (5/28/2024): severely decreased LV systolic function, EF 15%, LVIDd 6.32,  moderate-severe MVR, mild TR    - previously signed consent for OHT/LVAD workup, found not to be a candidate for OHT given active tobacco use, also was not interested in LVAD at that time, workup stopped per Dr. Doyle 6/5  - admit BNP: 1873   - admit CXR: prominent interstitial markings suggesting pulmonary interstitial edema. Small to moderate right pleural effusion and superimposed right basilar atelectasis.  - admit wt: 81.3 kg, per pt dry wt ~73 kg (160 lb)  - daily wt: 79.7 (80.3, 80.5 kg)  - per pt, has not smoked since 6/2024, now agreeable to LVAD  - agreeable to advanced therapy evaluation, initiated 8/20, consented for LVAD only  - - ongoing LVAD workup, Palliative Med/Pulmonology consulted, appreciate recs  - s/p diuresis with IV lasix boluses--> switched to 40 mg torsemide daily 8/22  - cont home: Entresto 12-13 mg BID, dapagliflozin 10 mg daily, spironolactone 12.5 mg daily, Toprol XL 12.5 mg daily  - Home diuretic: was torsemide 20 mg daily, now on torsemide 40mg daily here   - Daily standing weights, 2gm sodium diet, 2L fluid restriction, strict I&Os     CAD  s/p multiple PCIs  - [facility] (5/23/24): dLM: 10-30% stenosis; Prox and mid LAD [id number]%; Prox CX Lesion 100%; Right Coronary Artery: fills via collaterals; Proximal %; LVEF 10%  - Cont ASA 81, rosuvastatin 20 mg nightly     Prior VT storm   Recurrent slow VT  - s/p VT ablation 5/30, stellate ganglion block 6/4, VT ablation 6/6, stellate ganglion block 6/11  - Device interrogation completed 8/20 went into prolonged slow VT, self-resolved during device interrogation  - consider transfer to HFICU if recurs for Lidocaine drip +/- ATP pacing   - EP consulted  - rec possible stellate ganglion removal/ denervation ( block would be via anesthesiology)  - thoracic surgery consulted regarding removal(neurosurgery, acute care, and ENT do not do blocks or removals)  - cont home ranexa 500 mg BID, amiodarone 200 mg BID, Toprol XL 12.5 mg daily,  mexiletine 300 TID  - keep K >4 and Mag >2      Severe emphysema  ?COPD   - CT chest (6/4): severe emphysematous changes.   - admit CXR: prominent interstitial markings suggesting pulmonary interstitial edema. Small to mod R pleural effusion and superimposed  basilar atelectasis.  - Pulmonology consult: start LAMA/ LABA for maintenance therapy   - continue PRN albuterol  - PFT's completed 8/21: FEV1 58% predicted, FEV1/FVC 67% predicted  - Monitor and maintain SpO2 > 92%  - attempting to wean supplemental O2> Spo2 dropped to 88% while ambulating 8/22. Will need home going oxygen on discharge.     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 Monitor via repeat TFTs (TSH, FT4, FT3 in 4wks).      CKD Stage 2-3a  - baseline GFR ~50-60  - admit Cr 1.65  - daily Cr 1.55 stable (1.28)  - diuresis as above        DVT ppx: subq Lovenox  DISPO: Pending VT plans  Code status: Full Code    Patient seen and discussed with Dr. Alexandro Doyle.  Keysha Callaway PA-C

## 2024-08-23 NOTE — PROGRESS NOTES
Brief Cardiology-EP Update Note:    Patient has been largely stable but again had episode of slow VT around 1200 today, symptomatic with nausea. Primary team has reached out to cardiothoracic surgery for consideration of stellate ganglionectomy, and we appreciate their evaluation. Unfortunately with having failed two recent stellate ganglion blocks and two recent ablation attempts, already on amiodarone, mexiletine, and metoprolol, electrolytes appropriate, and ICD in place, we do not have other avenues to pursue for controlling the VT. At this point it will be a risk-benefit discussion with the surgery, anesthesiology, and cardiology teams given that he has high surgical risk.    Thank you for involving us in the care of this patient. Above recommendations discussed with Dr. Pfeiffer. If further questions arise, please page the EP consult pager at [phone] on weekdays 7AM - 6PM and weekends 7AM - 2PM, or at [phone] at all other times. The EP device nurse can be reached at pager [phone] during regular business hours M-FMarifer Garcia MD  Cardiology Fellow, PGY-5

## 2024-08-23 NOTE — CONSULTS
Reason For Consult  Recurrent slow VT after repeated stellate ganglion blocks    History Of Present Illness  Cristian Sapp is a 58 y.o. male with past medical history of recurrent slow VT (rates 90s to 120s) with VT storm s/p ablation (2019, 5/20/2024, 6/6/20240 and stellate ganglion block x 2 (6/4/24, 6/11/24), CAD status post multiple PCI's, AICD placement, HFrEF (echo 5/28/24 with EF 15%), infrarenal AAA status post stenting, PVD status post femoral bypass, COPD, emphysema, nephrolithiasis status post recent ureteral stent, anxiety, depression, CKD 2 who originally presented to OSH with recurrent slow VT and shortness of breath, transferred to [facility] for additional management.    Most recent episode of VT storm was May 2024 for which she underwent telemetry ganglion block x 2 and ablation.  He continues to have intermittent episodes of slow VT.  He can feel when he flips into VT however it is often not fast enough for his AICD to fire.  He currently takes amiodarone, mexiletine, metoprolol and Entresto to control his rate.  He is being seen by the advanced heart failure team who is discussing a possible LVAD for him.  The cardiac team feels would be unreasonable to proceed with additional ablation as he is having stable slow VT which spontaneously terminates.  Had discussed possible repeat stellate ganglion block however patient refused as he would prefer more definitive management of his slow VT.  Respiratory consulted for additional recommendations.     Past Medical History  He has a past medical history of Atherosclerosis of native artery of both lower extremities with intermittent claudication (CMS-HCC), CHB (complete heart block) (Multi), Chronic systolic CHF (congestive heart failure), NYHA class 3 (Multi), COPD (chronic obstructive pulmonary disease) (Multi), Coronary artery disease, History of tobacco abuse, HLD (hyperlipidemia), Hypertension, Infrarenal abdominal aortic aneurysm (AAA) without rupture  (CMS-[facility]), Ischemic cardiomyopathy with implantable cardioverter-defibrillator (ICD), MI (myocardial infarction) (Multi), Nephrolithiasis, and PTSD (post-traumatic stress disorder).    Surgical History  He has a past surgical history that includes Cardiac defibrillator placement; Coronary angioplasty with stent (2006); Coronary angioplasty with stent (04/2003); Coronary angioplasty with stent (06/2008); Cystoscopy w/ ureteral stent placement (04/01/2024); Cystoscopy w/ ureteral stent placement (04/30/2024); Iliac artery stent (Right); Knee surgery; Femoral bypass; Cardiac electrophysiology procedure (N/A, 5/23/2024); Cardiac catheterization (N/A, 5/23/2024); Cardiac electrophysiology procedure (N/A, 5/28/2024); Cardiac electrophysiology procedure (Right, 5/30/2024); and Cardiac electrophysiology procedure (N/A, 6/6/2024).     Social History  He reports that he has quit smoking. His smoking use included cigarettes. He does not have any smokeless tobacco history on file. He reports that he does not currently use alcohol. He reports that he does not currently use drugs.    Family History  Family History   Problem Relation Name Age of Onset    Hypertension Mother      Diabetes Father      Hypertension Sister      Diabetes Sister      Colon cancer Maternal Grandmother      Hypertension Maternal Grandmother      Heart disease Maternal Grandfather      Hypertension Maternal Grandfather      Cancer Paternal Grandfather      Hypertension Paternal Grandfather          Allergies  Chantix [varenicline]    Review of Systems   A 10 point ROS was conducted, pertinent positives as per HPI.     Physical Exam  Neurological: Awake, alert, conversive  Respiratory/Thorax: even, unlabored on NC  Genitourinary: voiding  Gastrointestinal: soft, NT, ND  Skin: warm, dry  Musculoskeletal: SORIANO  Eyes: non-icteric  Extremities: no edema   Psychological: appropriate mood/affect       Last Recorded Vitals  Blood pressure 108/77, pulse 77,  "temperature 36.1 °C (97 °F), temperature source Temporal, resp. rate 18, height 1.702 m (5' 7\"), weight 79.7 kg (175 lb 11.3 oz), SpO2 92%.    Relevant Results  XR chest 1 view 08/19/2024    Narrative  Interpreted By:  Finkelstein, Evan,  STUDY:  XR CHEST 1 VIEW;  8/19/2024 2:19 am    INDICATION:  Signs/Symptoms:sob.    COMPARISON:  Chest radiograph 07/12/2024    ACCESSION NUMBER(S):  [id number]    ORDERING CLINICIAN:  BERNARDO MONGE    FINDINGS:  Right chest wall AICD.    CARDIOMEDIASTINAL SILHOUETTE:  Cardiomediastinal silhouette is stable in size and configuration.    LUNGS:  Prominent interstitial markings. No pneumothorax. Small to moderate  right pleural effusion. Right basilar atelectasis.    ABDOMEN:  No remarkable upper abdominal findings.    BONES:  No acute osseous abnormality.    Impression  Enlarged cardiac silhouette and prominent interstitial markings  suggesting pulmonary interstitial edema. Small to moderate right  pleural effusion and superimposed right basilar atelectasis.    MACRO:  None.    Signed by: Evan Finkelstein 8/19/2024 2:34 AM  Dictation workstation:   [id number]  Results for orders placed or performed during the hospital encounter of 08/19/24 (from the past 24 hour(s))   CBC   Result Value Ref Range    WBC 8.9 4.4 - 11.3 x10*3/uL    nRBC 0.0 0.0 - 0.0 /100 WBCs    RBC 4.51 4.50 - 5.90 x10*6/uL    Hemoglobin 12.8 (L) 13.5 - 17.5 g/dL    Hematocrit 41.0 41.0 - 52.0 %    MCV 91 80 - 100 fL    MCH 28.4 26.0 - 34.0 pg    MCHC 31.2 (L) 32.0 - 36.0 g/dL    RDW 21.1 (H) 11.5 - 14.5 %    Platelets 362 150 - 450 x10*3/uL   Renal Function Panel   Result Value Ref Range    Glucose 150 (H) 74 - 99 mg/dL    Sodium 139 136 - 145 mmol/L    Potassium 4.1 3.5 - 5.3 mmol/L    Chloride 98 98 - 107 mmol/L    Bicarbonate 30 21 - 32 mmol/L    Anion Gap 15 10 - 20 mmol/L    Urea Nitrogen 25 (H) 6 - 23 mg/dL    Creatinine 1.55 (H) 0.50 - 1.30 mg/dL    eGFR 52 (L) >60 mL/min/1.73m*2    Calcium 9.0 8.6 - " 10.6 mg/dL    Phosphorus 3.3 2.5 - 4.9 mg/dL    Albumin 3.7 3.4 - 5.0 g/dL   Magnesium   Result Value Ref Range    Magnesium 2.21 1.60 - 2.40 mg/dL           Assessment/Plan     Cristian Sapp is a 58 y.o. male with past medical history of recurrent slow VT (rates 90s to 120s) with VT storm s/p ablation (2019, 5/20/2024, 6/6/20240 and stellate ganglion block x 2 (6/4/24, 6/11/24) who was readmitted for recurrent slow VT. Given that he has had multiple ablations and blocks previously, patient would like more definitive intervention. Thoracic surgery consulted for possible surgical intervention. Of note, patient does have an EF of 15% on most recent echo in May 2024 and on cardiac cath had 100% occlusion of RCA and circumflex arteries with collaterals. Currently undergoing LVAD w/u.     Recommendations:  - could consider T2-T4 sympathectomy which may reduce number of episodes of recurrence of tachyarrhythmias  - patient has multiple risk factors that make him a very high risk surgical candidate  - would exhaust all possible interventions by IR and anesthesia prior to considering surgical interventions    Thoracic surgery will sign off at this time. Please reach out with additional questions or concerns.    Plans discussed with attending physician Dr. Majano.    Otilia Bahena MD, PGY2  Thoracic Surgery [id number]

## 2024-08-23 NOTE — CARE PLAN
Patient has remained alert and oriented x4, on room air, throughout the shift. Patient denied pain/discomfort throughout the shift. Patient received PRN ativan ativan and atarax for anxiety with some relief. Patient and patients family involved with and aware of plan of care. Patient has remained free of falls throughout the shift, call bell within reach, bed locked in the lowest position with nonskid socks on. Purposeful rounding performed. Patient calls appropriately for assistance. See chart for further details.      Problem: Pain - Adult  Goal: Verbalizes/displays adequate comfort level or baseline comfort level  Outcome: Progressing     Problem: Safety - Adult  Goal: Free from fall injury  Outcome: Progressing     Problem: Discharge Planning  Goal: Discharge to home or other facility with appropriate resources  Outcome: Progressing     Problem: Chronic Conditions and Co-morbidities  Goal: Patient's chronic conditions and co-morbidity symptoms are monitored and maintained or improved  Outcome: Progressing     Problem: Skin  Goal: Participates in plan/prevention/treatment measures  Outcome: Progressing  Goal: Prevent/manage excess moisture  Outcome: Progressing  Goal: Prevent/minimize sheer/friction injuries  Outcome: Progressing  Goal: Promote/optimize nutrition  Outcome: Progressing     The clinical goals for the shift include Patient will remain HD stable throughout the shift

## 2024-08-24 LAB
ALBUMIN SERPL BCP-MCNC: 3.5 G/DL (ref 3.4–5)
ALBUMIN SERPL BCP-MCNC: 3.6 G/DL (ref 3.4–5)
ALP SERPL-CCNC: 132 U/L (ref 33–120)
ALT SERPL W P-5'-P-CCNC: 44 U/L (ref 10–52)
ANION GAP SERPL CALC-SCNC: 14 MMOL/L (ref 10–20)
ANION GAP SERPL CALC-SCNC: 15 MMOL/L (ref 10–20)
AST SERPL W P-5'-P-CCNC: 26 U/L (ref 9–39)
BASOPHILS # BLD AUTO: 0.07 X10*3/UL (ref 0–0.1)
BASOPHILS NFR BLD AUTO: 0.9 %
BILIRUB SERPL-MCNC: 0.9 MG/DL (ref 0–1.2)
BNP SERPL-MCNC: 2047 PG/ML (ref 0–99)
BUN SERPL-MCNC: 23 MG/DL (ref 6–23)
BUN SERPL-MCNC: 27 MG/DL (ref 6–23)
CALCIUM SERPL-MCNC: 8.6 MG/DL (ref 8.6–10.6)
CALCIUM SERPL-MCNC: 8.9 MG/DL (ref 8.6–10.6)
CARDIAC TROPONIN I PNL SERPL HS: 25 NG/L (ref 0–53)
CHLORIDE SERPL-SCNC: 100 MMOL/L (ref 98–107)
CHLORIDE SERPL-SCNC: 98 MMOL/L (ref 98–107)
CO2 SERPL-SCNC: 26 MMOL/L (ref 21–32)
CO2 SERPL-SCNC: 28 MMOL/L (ref 21–32)
CREAT SERPL-MCNC: 1.32 MG/DL (ref 0.5–1.3)
CREAT SERPL-MCNC: 1.45 MG/DL (ref 0.5–1.3)
EGFRCR SERPLBLD CKD-EPI 2021: 57 ML/MIN/1.73M*2
EGFRCR SERPLBLD CKD-EPI 2021: 63 ML/MIN/1.73M*2
EOSINOPHIL # BLD AUTO: 0.18 X10*3/UL (ref 0–0.7)
EOSINOPHIL NFR BLD AUTO: 2.3 %
ERYTHROCYTE [DISTWIDTH] IN BLOOD BY AUTOMATED COUNT: 20.2 % (ref 11.5–14.5)
GLUCOSE SERPL-MCNC: 113 MG/DL (ref 74–99)
GLUCOSE SERPL-MCNC: 163 MG/DL (ref 74–99)
HCT VFR BLD AUTO: 34.9 % (ref 41–52)
HGB BLD-MCNC: 11.8 G/DL (ref 13.5–17.5)
IMM GRANULOCYTES # BLD AUTO: 0.04 X10*3/UL (ref 0–0.7)
IMM GRANULOCYTES NFR BLD AUTO: 0.5 % (ref 0–0.9)
LACTATE SERPL-SCNC: 0.8 MMOL/L (ref 0.4–2)
LYMPHOCYTES # BLD AUTO: 1.26 X10*3/UL (ref 1.2–4.8)
LYMPHOCYTES NFR BLD AUTO: 15.9 %
MAGNESIUM SERPL-MCNC: 2.71 MG/DL (ref 1.6–2.4)
MCH RBC QN AUTO: 28.7 PG (ref 26–34)
MCHC RBC AUTO-ENTMCNC: 33.8 G/DL (ref 32–36)
MCV RBC AUTO: 85 FL (ref 80–100)
MONOCYTES # BLD AUTO: 0.74 X10*3/UL (ref 0.1–1)
MONOCYTES NFR BLD AUTO: 9.4 %
NEUTROPHILS # BLD AUTO: 5.62 X10*3/UL (ref 1.2–7.7)
NEUTROPHILS NFR BLD AUTO: 71 %
NRBC BLD-RTO: 0 /100 WBCS (ref 0–0)
PHOSPHATE SERPL-MCNC: 3.3 MG/DL (ref 2.5–4.9)
PHOSPHATE SERPL-MCNC: 3.4 MG/DL (ref 2.5–4.9)
PLATELET # BLD AUTO: 318 X10*3/UL (ref 150–450)
POTASSIUM SERPL-SCNC: 4.1 MMOL/L (ref 3.5–5.3)
POTASSIUM SERPL-SCNC: 4.1 MMOL/L (ref 3.5–5.3)
PROT SERPL-MCNC: 6.4 G/DL (ref 6.4–8.2)
RBC # BLD AUTO: 4.11 X10*6/UL (ref 4.5–5.9)
RBC MORPH BLD: NORMAL
SODIUM SERPL-SCNC: 135 MMOL/L (ref 136–145)
SODIUM SERPL-SCNC: 138 MMOL/L (ref 136–145)
WBC # BLD AUTO: 7.9 X10*3/UL (ref 4.4–11.3)

## 2024-08-24 PROCEDURE — 80053 COMPREHEN METABOLIC PANEL: CPT | Performed by: NURSE PRACTITIONER

## 2024-08-24 PROCEDURE — 2500000001 HC RX 250 WO HCPCS SELF ADMINISTERED DRUGS (ALT 637 FOR MEDICARE OP): Performed by: NURSE PRACTITIONER

## 2024-08-24 PROCEDURE — 84100 ASSAY OF PHOSPHORUS: CPT | Performed by: NURSE PRACTITIONER

## 2024-08-24 PROCEDURE — 99291 CRITICAL CARE FIRST HOUR: CPT | Performed by: INTERNAL MEDICINE

## 2024-08-24 PROCEDURE — 2500000002 HC RX 250 W HCPCS SELF ADMINISTERED DRUGS (ALT 637 FOR MEDICARE OP, ALT 636 FOR OP/ED): Performed by: NURSE PRACTITIONER

## 2024-08-24 PROCEDURE — 83880 ASSAY OF NATRIURETIC PEPTIDE: CPT | Performed by: NURSE PRACTITIONER

## 2024-08-24 PROCEDURE — 99291 CRITICAL CARE FIRST HOUR: CPT

## 2024-08-24 PROCEDURE — 94640 AIRWAY INHALATION TREATMENT: CPT

## 2024-08-24 PROCEDURE — 83735 ASSAY OF MAGNESIUM: CPT | Performed by: NURSE PRACTITIONER

## 2024-08-24 PROCEDURE — 2500000004 HC RX 250 GENERAL PHARMACY W/ HCPCS (ALT 636 FOR OP/ED): Performed by: NURSE PRACTITIONER

## 2024-08-24 PROCEDURE — 85025 COMPLETE CBC W/AUTO DIFF WBC: CPT | Performed by: NURSE PRACTITIONER

## 2024-08-24 PROCEDURE — 36415 COLL VENOUS BLD VENIPUNCTURE: CPT | Performed by: NURSE PRACTITIONER

## 2024-08-24 PROCEDURE — 2020000001 HC ICU ROOM DAILY

## 2024-08-24 PROCEDURE — 83605 ASSAY OF LACTIC ACID: CPT | Performed by: NURSE PRACTITIONER

## 2024-08-24 PROCEDURE — 84484 ASSAY OF TROPONIN QUANT: CPT | Performed by: NURSE PRACTITIONER

## 2024-08-24 PROCEDURE — 2500000004 HC RX 250 GENERAL PHARMACY W/ HCPCS (ALT 636 FOR OP/ED)

## 2024-08-24 RX ORDER — MAGNESIUM SULFATE HEPTAHYDRATE 40 MG/ML
2 INJECTION, SOLUTION INTRAVENOUS ONCE
Status: COMPLETED | OUTPATIENT
Start: 2024-08-24 | End: 2024-08-24

## 2024-08-24 RX ORDER — DIPHENHYDRAMINE HYDROCHLORIDE 50 MG/ML
50 INJECTION INTRAMUSCULAR; INTRAVENOUS EVERY 5 MIN PRN
Status: DISCONTINUED | OUTPATIENT
Start: 2024-08-24 | End: 2024-08-30 | Stop reason: HOSPADM

## 2024-08-24 ASSESSMENT — PAIN SCALES - GENERAL

## 2024-08-24 ASSESSMENT — PAIN - FUNCTIONAL ASSESSMENT
PAIN_FUNCTIONAL_ASSESSMENT: 0-10

## 2024-08-24 ASSESSMENT — ENCOUNTER SYMPTOMS
ACTIVITY CHANGE: 1
ABDOMINAL DISTENTION: 1
DIZZINESS: 1

## 2024-08-24 NOTE — H&P
"[facility]  HFICU HISTORY AND PHYSICAL     Cristian Sapp/[id number]    Admit Date: 8/19/2024  Hospital Length of Stay: 4   ICU Length of Stay: Patient does not have an ICU stay during this admission.   Primary Service: HF ICU  Primary HF Cardiologist: Dr. Brown   Referring: Dr. Doyle     HPI:     Cristian Sapp is a 58 y.o. male with past medical history significant for myocardial infarction (first in 2003), CAD, s/p remote multivessel PCI's, ischemic cardiomyopathy with an LVEF reported at 35% back in 2006 and again in 2008, most recent echocardiogram August, 2022 with LVEF 25 to 30%, chronic systolic heart failure, s/p biventricular ICD (11/6/2019), initial ICD implant in 2014, abdominal aortic aneurysm without rupture, HTN, HLD, PTSD, tobacco use, renal stones, s/p recent cystoscopy with urethral stent placement, who is transfer from floor to HF ICU due to sustained slow VT around rates 120-130 and feeling nauseous. Earlier in the night, pt was having intermittent 5s runs of NSVT that were asymptomatic and 60mEq K ordered. Then pt C/O nausea and Tele confirmed with slow VT at rate of ~130. Pt reports feeling \"sick to his stomach\". BP taken in both arms, highest in RUE and measuring SBP 97. Given pt was symptomatic (nausea), using Flitto interrogator, manual burst ATP x1 was delivered which broke rhythm to AV paced at rate of 75. Pt initially felt more nauseous as the rhythm broke and then he returned to baseline.     Off note, pt was originally presented to the [facility] emergency department with complaints of increased shortness of breath that have been progressing on last 5 months on 8/19/2024. \"He patient reports that he woke up and felt similar to his prior episodes of slow ventricular tachycardia. His defibrillator did not fire although he felt like it was trying to pace him out of the rhythm.\" He endorses been gaining weight, abdominal " swelling and increased shortness of breath along with orthopnea. Fell SOB to walking around the house and to do daily activities. Denies any cough fever chills. Reports that he has been compliant with his home torsemide dose. At the moment NYHA class IV. AICD was interrogated in the emergency department which showed slow VT in the 100s following below detection rate. Transferred to [facility] for advanced therapy evaluation and further optimization.       Upon arrival to HF ICU, patient was found to be alert, oriented x3, moves all extremities, and warm to touch. Patient malathi chest pain or chest discomfort. Patient malathi SOB. Denies abd pain or abd. Discomfort. Denies N/V/D.     Cardiac Tests:  EKG (8/19/24): Atrial-sensed ventricular-paced rhythm, Abnormal ECG  ICD Check (8/19/2024): slow VT with 100s  Chest Radiograph  CT (6/5/24):    1. Interval worsening now moderate right and small left pleural effusions with associated atelectasis.  2. Findings suggestive of mild pulmonary interstitial and alveolar edema. There is severe emphysematous changes.  3. There is severe atherosclerotic changes of the coronary arteries with coronary artery stent placement.     Echocardiogram:    Transthoracic Echo (TTE) Limited 05/28/2024   1. Left ventricular systolic function is severely decreased with a 15% estimated ejection fraction.   2. There is no evidence of mitral valve stenosis.   3. Moderate to severe mitral valve regurgitation.   4. Mild tricuspid regurgitation is visualized.   5. Aortic valve stenosis is not present.  Transthoracic Echo (TTE) Complete 05/23/2024   1. Left ventricular systolic function is severely decreased with a 10% estimated ejection fraction.   2. There is no evidence of mitral valve stenosis.   3. Moderate to severe mitral valve regurgitation.   4. Moderate tricuspid regurgitation.   5. Aortic valve stenosis is not present.  Cardiac Catheterization:  05/23/2024:    1. Distal Left Main: 10-30% stenosis.    2. Proximal and mid LAD Lesion: The percent stenosis is 10-30%.   3. Proximal CX Lesion: The percent stenosis is 100%.   4. Right Coronary Artery: fills via collaterals.   5. Proximal RCA Lesion: The percent stenosis is 100%.   6. The Left Ventricular Ejection Fraction is 10%,by echo.    Past Medical History:  Past Medical History:   Diagnosis Date    Atherosclerosis of native artery of both lower extremities with intermittent claudication (CMS-[facility])     CHB (complete heart block) (Multi)     per device check    Chronic systolic CHF (congestive heart failure), NYHA class 3 (Multi)     COPD (chronic obstructive pulmonary disease) (Multi)     Coronary artery disease     History of tobacco abuse     HLD (hyperlipidemia)     Hypertension     Infrarenal abdominal aortic aneurysm (AAA) without rupture (CMS-[facility])     Ischemic cardiomyopathy with implantable cardioverter-defibrillator (ICD)     MI (myocardial infarction) (Multi)     multiple    Nephrolithiasis     PTSD (post-traumatic stress disorder)        Past Surgical History:  Past Surgical History:   Procedure Laterality Date    CARDIAC CATHETERIZATION N/A 5/23/2024    Procedure: Left Heart Cath;  Surgeon: Babatunde Tan MD;  Location: [facility]iac [facility];  Service: Cardiovascular;  Laterality: N/A;    CARDIAC DEFIBRILLATOR PLACEMENT      CARDIAC ELECTROPHYSIOLOGY PROCEDURE N/A 5/23/2024    Procedure: Cardioversion;  Surgeon: Zachary Sparks MD;  Location: [facility];  Service: Electrophysiology;  Laterality: N/A;    CARDIAC ELECTROPHYSIOLOGY PROCEDURE N/A 5/28/2024    Procedure: NIPS (NONINVASIVE PROGRAMMED STIMULATION AND DEFIBRILLATOR THRESHOLD TESTING);  Surgeon: Zachary Sparks MD;  Location: [facility];  Service: Electrophysiology;  Laterality: N/A;    CARDIAC ELECTROPHYSIOLOGY PROCEDURE Right 5/30/2024    Procedure: Ablation VT Endocardial (23752);  Surgeon: Sonny Flores MD;  Location: [place] [address] Cardiac Cath Lab;  Service:  Electrophysiology;  Laterality: Right;  CARTO, 2PM    CARDIAC ELECTROPHYSIOLOGY PROCEDURE N/A 6/6/2024    Procedure: Ablation VT;  Surgeon: Eliot Wooten MD;  Location: [place] [address] Cardiac Cath Lab;  Service: Electrophysiology;  Laterality: N/A;  endocardial vt ablation  carto V8    CORONARY ANGIOPLASTY WITH STENT PLACEMENT  2006    JIM to LAD    CORONARY ANGIOPLASTY WITH STENT PLACEMENT  04/2003    CORONARY ANGIOPLASTY WITH STENT PLACEMENT  06/2008    CYSTOSCOPY W/ URETERAL STENT PLACEMENT  04/01/2024    CYSTOSCOPY W/ URETERAL STENT PLACEMENT  04/30/2024    FEMORAL BYPASS      femoral artery    ILIAC ARTERY STENT Right     KNEE SURGERY         Family History:  Family History   Problem Relation Name Age of Onset    Hypertension Mother      Diabetes Father      Hypertension Sister      Diabetes Sister      Colon cancer Maternal Grandmother      Hypertension Maternal Grandmother      Heart disease Maternal Grandfather      Hypertension Maternal Grandfather      Cancer Paternal Grandfather      Hypertension Paternal Grandfather         Social History:  Social History     Socioeconomic History    Marital status:      Spouse name: Not on file    Number of children: Not on file    Years of education: Not on file    Highest education level: Not on file   Occupational History    Not on file   Tobacco Use    Smoking status: Former     Types: Cigarettes    Smokeless tobacco: Not on file   Vaping Use    Vaping status: Never Used   Substance and Sexual Activity    Alcohol use: Not Currently     Comment: social    Drug use: Not Currently    Sexual activity: Defer   Other Topics Concern    Not on file   Social History Narrative    Not on file     Social Determinants of Health     Financial Resource Strain: Low Risk  (8/20/2024)    Overall Financial Resource Strain (CARDIA)     Difficulty of Paying Living Expenses: Not very hard   Recent Concern: Financial Resource Strain - Medium Risk (5/30/2024)    Overall Financial  Resource Strain (CARDIA)     Difficulty of Paying Living Expenses: Somewhat hard   Food Insecurity: No Food Insecurity (5/30/2024)    Hunger Vital Sign     Worried About Running Out of Food in the Last Year: Never true     Ran Out of Food in the Last Year: Never true   Transportation Needs: No Transportation Needs (8/20/2024)    PRAPARE - Transportation     Lack of Transportation (Medical): No     Lack of Transportation (Non-Medical): No   Physical Activity: Inactive (8/19/2024)    Exercise Vital Sign     Days of Exercise per Week: 0 days     Minutes of Exercise per Session: 0 min   Stress: Stress Concern Present (5/30/2024)    [employer] [place] [employer] - Occupational Stress Questionnaire     Feeling of Stress : To some extent   Social Connections: Moderately Isolated (5/30/2024)    Social Connection and Isolation Panel [NHANES]     Frequency of Communication with Friends and Family: Three times a week     Frequency of Social Gatherings with Friends and Family: Three times a week     Attends Druze Services: More than 4 times per year     Active Member of Clubs or Organizations: No     Attends Club or Organization Meetings: Not on file     Marital Status:    Intimate Partner Violence: Not At Risk (5/30/2024)    Humiliation, Afraid, Rape, and Kick questionnaire     Fear of Current or Ex-Partner: No     Emotionally Abused: No     Physically Abused: No     Sexually Abused: No   Housing Stability: Low Risk  (8/20/2024)    Housing Stability Vital Sign     Unable to Pay for Housing in the Last Year: No     Number of Times Moved in the Last Year: 1     Homeless in the Last Year: No       Allergies:  Allergies   Allergen Reactions    Chantix [Varenicline] Other     Kidney stones       Prior to Admission Meds:  Medications Prior to Admission   Medication Sig Dispense Refill Last Dose    amiodarone (Pacerone) 200 mg tablet Take 1 tablet (200 mg) by mouth 2 times a day. 180 tablet 3 8/19/2024     aspirin 81 mg EC tablet Take 1 tablet (81 mg) by mouth once daily.   8/19/2024    dapagliflozin propanediol (Farxiga) 10 mg Take 1 tablet (10 mg) by mouth once daily. 30 tablet 0 8/19/2024    magnesium oxide (Mag-Ox) 400 mg (241.3 mg magnesium) tablet Take 1 tablet (400 mg) by mouth once daily. (Patient taking differently: Take 1 tablet (400 mg) by mouth 2 times a day.) 90 tablet 3 Past Week    metoprolol succinate XL (Toprol-XL) 25 mg 24 hr tablet Take 0.5 tablets (12.5 mg) by mouth once daily. Do not crush or chew. 45 tablet 0 8/19/2024    mexiletine (Mexitil) 150 mg capsule Take 2 capsules (300 mg) by mouth 3 times a day. 540 capsule 0 8/19/2024    PARoxetine (Paxil) 30 mg tablet Take 2 tablets (60 mg) by mouth once daily at bedtime. (Patient taking differently: Take 1 tablet (30 mg) by mouth 2 times a day.) 60 tablet 0 8/19/2024    potassium chloride (Klor-Con) 20 mEq packet Take 20 mEq by mouth once daily. (Patient taking differently: Take 20 mEq by mouth if needed.) 90 packet 3 Past Week    ranolazine (Ranexa) 500 mg 12 hr tablet Take 1 tablet (500 mg) by mouth 2 times a day. Do not crush, chew, or split. 180 tablet 3 8/19/2024    rosuvastatin (Crestor) 20 mg tablet Take 1 tablet (20 mg) by mouth once daily at bedtime. 90 tablet 3 8/19/2024    sacubitriL-valsartan (Entresto) 24-26 mg tablet Take 0.5 tablets by mouth 2 times a day. 30 tablet 0 8/19/2024    spironolactone (Aldactone) 25 mg tablet Take 0.5 tablets (12.5 mg) by mouth once daily. 45 tablet 3 8/19/2024    tamsulosin (Flomax) 0.4 mg 24 hr capsule Take 1 capsule (0.4 mg) by mouth once daily.   8/19/2024    traZODone (Desyrel) 50 mg tablet Take 1 tablet (50 mg) by mouth once daily at bedtime.   8/19/2024    albuterol sulfate (Proair Digihaler) 90 mcg/actuation aero powdr breath act w/sensor inhaler Inhale 2 puffs every 4 hours if needed for wheezing.   8/16/2024    ferrous sulfate, 325 mg ferrous sulfate, tablet Take 1 tablet by mouth once daily with  "breakfast.   8/17/2024    furosemide (Lasix) 10 mg/mL injection Infuse 4 mL (40 mg) into a venous catheter 2 times a day. (Patient not taking: Reported on 8/20/2024) 240 mL 0 Not Taking    LORazepam (Ativan) 0.5 mg tablet Take 1 tablet (0.5 mg) by mouth once daily as needed for anxiety. Take 1 tablet daily as needed up to 1.5mg       nitroglycerin (Nitrostat) 0.4 mg SL tablet Place 1 tablet (0.4 mg) under the tongue every 5 minutes if needed for chest pain.   More than a month       Current Medications:  Infusions:     Scheduled:  amiodarone, 200 mg, BID  aspirin, 81 mg, Daily  dapagliflozin propanediol, 10 mg, Daily  enoxaparin, 40 mg, q24h  ferrous sulfate (325 mg ferrous sulfate), 65 mg of iron, Daily with breakfast  tiotropium, 2 puff, Daily   And  formoterol, 20 mcg, q12h  metoprolol succinate XL, 12.5 mg, Daily  mexiletine, 300 mg, TID  pantoprazole, 40 mg, Daily before breakfast  PARoxetine, 60 mg, Nightly  potassium chloride, 20 mEq, Once  ranolazine, 500 mg, BID  rosuvastatin, 20 mg, Nightly  sacubitriL-valsartan, 0.5 tablet, BID  sennosides-docusate sodium, 1 tablet, BID  spironolactone, 12.5 mg, Daily  tamsulosin, 0.4 mg, Daily  torsemide, 40 mg, Daily  traZODone, 50 mg, Nightly      PRN:  acetaminophen, 650 mg, q6h PRN  albuterol, 2 puff, q6h PRN  hydrOXYzine HCL, 25 mg, q6h PRN  LORazepam, 0.5 mg, q8h PRN  magnesium hydroxide, 30 mL, Daily PRN  polyethylene glycol, 17 g, Daily PRN        Visit Vitals  BP 97/65 (BP Location: Right arm, Patient Position: Sitting)   Pulse (!) 129   Temp 36 °C (96.8 °F) (Temporal)   Resp 18   Ht 1.702 m (5' 7\")   Wt 79.7 kg (175 lb 11.3 oz)   SpO2 95%   BMI 27.52 kg/m²   Smoking Status Former   BSA 1.94 m²       Wt Readings from Last 5 Encounters:   08/23/24 79.7 kg (175 lb 11.3 oz)   08/19/24 80.9 kg (178 lb 5.6 oz)   08/08/24 80.7 kg (178 lb)   07/23/24 75.8 kg (167 lb)   07/14/24 72.2 kg (159 lb 2.8 oz)       INTAKE/OUTPUT:  I/O last 3 completed shifts:  In: 1510 (18.9 " mL/kg) [P.O.:1510]  Out: 2200 (27.6 mL/kg) [Urine:2200 (0.8 mL/kg/hr)]  Weight: 79.7 kg      Physical Exam  Constitutional:       Appearance: He is ill-appearing.   HENT:      Head: Normocephalic.      Nose: Nose normal.      Mouth/Throat:      Mouth: Mucous membranes are moist.   Eyes:      Pupils: Pupils are equal, round, and reactive to light.   Cardiovascular:      Rate and Rhythm: Tachycardia present. Rhythm irregular.      Comments: VT   Pulmonary:      Effort: Pulmonary effort is normal.   Abdominal:      General: There is distension.      Palpations: Abdomen is soft.   Musculoskeletal:         General: Normal range of motion.      Right lower leg: Edema present.      Left lower leg: Edema present.   Skin:     General: Skin is warm and dry.      Capillary Refill: Capillary refill takes less than 2 seconds.   Neurological:      Mental Status: He is alert and oriented to person, place, and time.   Psychiatric:         Mood and Affect: Mood is anxious.         Review of Systems   Constitutional:  Positive for activity change and fatigue.   Respiratory:  Positive for shortness of breath.    Cardiovascular:  Positive for leg swelling.   Gastrointestinal:  Positive for abdominal distention, nausea and vomiting.   Skin:  Positive for pallor.   Neurological:  Positive for dizziness, weakness and light-headedness.       DATA:  CMP:  Recent Labs     08/23/24  1808 08/22/24  1908 08/22/24  0638 08/20/24  2004 08/20/24  0636 08/19/24  0150 08/08/24  1439 07/14/24  0412 07/13/24  0658 07/12/24  0231    139 138 137 139  139 135* 138 131* 132* 135*   K 3.3* 4.1 3.6 4.6 4.0  4.0 4.1 3.9 4.4 4.2 3.7   CL 97* 98 97* 99 100  100 98 101 96* 99 97*   CO2 27 30 30 26 29  29 27 28 24 26 28   ANIONGAP 16 15 15 17 14  14 14 13 15 11 14   BUN 27* 25* 22 20 22  22 23 24* 24* 21 30*   CREATININE 1.44* 1.55* 1.28 1.47* 1.42*  1.42* 1.65* 1.39* 1.49* 1.27 1.44*   EGFR 57* 52* 66 56* 58*  58* 48* 59* 55* 66 57*   MG 2.15 2.21  2.15 2.26 2.24 2.00 2.18 2.02 2.30 1.97     Recent Labs     08/23/24 1808 08/22/24 1908 08/22/24 0638 08/20/24 2004 08/20/24 0636 08/19/24  0150 08/08/24  1439 07/12/24  0231 06/09/24  0324 06/08/24  2123 06/07/24  0412 06/06/24  2220 06/05/24  0223 06/04/24  1255 06/04/24  0307 06/03/24  1741 06/03/24  0205 05/28/24  1503 05/22/24 2128   ALBUMIN 3.8 3.7 3.7 3.8 3.4 3.7 3.6 3.8   < > 3.6   < > 3.4   < > 3.4 3.6   < > 3.5   < > 4.2   ALT  --   --   --   --   --  83* 73* 119*  --  77*  --  40  --  11 9*  --  12   < > 26   AST  --   --   --   --   --  62* 44* 57*  --  57*  --  188*  --  11 13  --  14   < > 15   BILITOT  --   --   --   --   --  1.0 0.8 0.5  --  0.6  --  0.9  --  0.7 0.7  --  0.6   < > 1.1   LIPASE  --   --   --   --   --   --   --  21  --   --   --   --   --   --   --   --   --   --  19    < > = values in this interval not displayed.     CBC:  Recent Labs     08/23/24 1808 08/22/24 1908 08/22/24 0638 08/20/24 2004 08/20/24 0636 08/19/24 0150 08/08/24 1439 07/14/24 0412   WBC 8.4 8.9 9.7 11.7* 8.3 10.6 12.2* 16.3*   HGB 13.0* 12.8* 12.8* 12.7* 12.1* 13.1* 12.1* 12.2*   HCT 41.0 41.0 39.9* 39.5* 39.3* 41.4 39.5* 36.7*    362 342 352 297 318 350 371   MCV 90 91 90 88 91 91 90 90     COAG:   Recent Labs     08/22/24  0637 08/19/24  0150 06/07/24  0918 06/06/24  2220 06/04/24  1255 05/29/24  0518 05/28/24  1503 05/22/24  2128   INR 1.2* 1.2* 1.3* 1.5* 1.3* 1.1 1.2* 1.1     ABO:   Recent Labs     08/22/24  0638   ABO A     HEME/ENDO:  Recent Labs     08/20/24  2004 08/20/24  0636 08/08/24  1439 06/05/24  0223 06/04/24  1255 06/04/24  0307   FERRITIN  --  132  --  99  --   --    IRONSAT  --  13*  --  18*  --   --    TSH 6.40*  --  6.75*  --    < >  --    HGBA1C  --   --   --   --   --  6.0*    < > = values in this interval not displayed.      CARDIAC:   Recent Labs     08/20/24  0636 08/19/24  0239 08/19/24  0150 08/08/24  1439 07/12/24  0559 07/12/24  0231 06/07/24  0412 06/06/24  2220  "06/04/24  1255 06/03/24  1741 05/31/24  1742 05/31/24  1523 05/28/24  1503 05/23/24  0711 05/23/24  0139 05/22/24  2336 05/22/24 2128   LDH  --   --   --   --   --   --  408* 467* 145  --   --   --   --   --   --   --   --    TROPHS  --  26* 23*  --  17 17  --   --   --   --  2,261* 2,395*  --  342* 305*   < > 31*   BNP 1,873*  --  1,980* 2,032*  --  774*  --   --  674* 655*  --   --  320*  --   --   --  2,144*    < > = values in this interval not displayed.     Recent Labs     06/09/24  0515 06/08/24 2127 06/08/24  1837 06/08/24  1136 06/08/24  0556 06/07/24  0915 06/07/24  0852 06/07/24  0539 06/07/24  0539 06/07/24  0421 06/07/24  0206   LACMX 0.7  --  0.8  --  0.6 2.0  --   --   --   --   --    LACTATEART  --  3.1*  --  1.1  --   --  2.0  --   --  1.7 2.0   SO2MV 67  --  53  --  68 63  --    < > 71  --   --    O2CMX 66.1  --  52.7  --  66.8 62.0  --   --  69.5  --   --     < > = values in this interval not displayed.     No results for input(s): \"TACROLIMUS\", \"SIROLIMUS\", \"CYCLOSPORINE\" in the last 89784 hours.      ASSESSMENT AND PLAN:   Neuro:  # Anxiety/Depression  # Insomnia  # PTSD  - Serial neuro and pain assessments   - PO Tylenol PRN for pain  - atarax 25 mg q6 PRN for anxiety   - cont home paroxetine 60 mg nightly, trazodone 50 mg nightly  - cont ativan 0.5 mg q8 hours, OARRS reviewed  - PT/OT Consult, OOB to chair  - CAM ICU score every shift  - Sleep/wake cycle normalization    # Physical Status  - BMI 27  -Age-related debility/Reduced Mobility due to HF     #Substance abuse  -Alcohol abuse/Alcohol denied  -Tobacco use/Nicotine: ex-smoker and quick for 6 month (per pt)    Cardiovascular:    # Acute on chronic Systolic and Diastolic heart failure  # Stage D HFrEF/ICM s/p CRT-D (11/2019)   - Hx of Cardiogenic shock last admission requiring IABP support  - TTE (5/28/2024): severely decreased LV systolic function, EF 15%, LVIDd 6.32, moderate-severe MVR, mild TR    - previously signed consent for " OHT/LVAD workup, found not to be a candidate for OHT given active tobacco use, also was not interested in LVAD at that time, workup stopped per Dr. Doyle 6/5--> Now open up again  - admit BNP: 1873   - admit CXR: prominent interstitial markings suggesting pulmonary interstitial edema. Small to moderate right pleural effusion and superimposed right basilar atelectasis.  - admit wt: 81.3 kg, per pt dry wt ~73 kg (160 lb)  - daily wt: 79.7 (80.3, 80.5 kg)  - per pt, has not smoked since 6/2024, now agreeable to LVAD  - agreeable to advanced therapy evaluation, initiated 8/20, consented for LVAD only  - - ongoing LVAD workup, Palliative Med/Pulmonology consulted, appreciate recs  - s/p diuresis with IV lasix boluses--> switched to 40 mg torsemide daily 8/22  - cont home: Entresto 12-13 mg BID, dapagliflozin 10 mg daily, spironolactone 12.5 mg daily, Toprol XL 12.5 mg daily  - Home diuretic: was torsemide 20 mg daily, now on torsemide 40mg daily here   - Daily standing weights, 2gm sodium diet, 2L fluid restriction, strict I&Os     CAD  s/p multiple PCIs  - [facility] (5/23/24): dLM: 10-30% stenosis; Prox and mid LAD [id number]%; Prox CX Lesion 100%; Right Coronary Artery: fills via collaterals; Proximal %; LVEF 10%  - Cont ASA 81, rosuvastatin 20 mg nightly     #Prior VT storm   # Recurrent slow VT  - s/p VT ablation 5/30, stellate ganglion block 6/4, VT ablation 6/6, stellate ganglion block 6/11  - Device interrogation completed 8/20 went into prolonged slow VT, self-resolved during device interrogation  - consider transfer to HFICU if recurs for Lidocaine drip +/- ATP pacing   - EP consulted  - rec possible stellate ganglion removal/ denervation ( block would be via anesthesiology)  - thoracic surgery consulted regarding removal(neurosurgery, acute care, and ENT do not do blocks or removals)  - cont home ranexa 500 mg BID, amiodarone 200 mg BID, Toprol XL 12.5 mg daily, mexiletine 300 TID    #Electrolyte  Disturbances  - Keep Mg >2 and K >4    Pulmonary:   # Severe emphysema  #COPD   # ex-smoker  - CT chest (6/4): severe emphysematous changes.   - admit CXR: prominent interstitial markings suggesting pulmonary interstitial edema. Small to mod R pleural effusion and superimposed  basilar atelectasis.  - Pulmonology consult: start LAMA/ LABA for maintenance therapy              - continue PRN albuterol  - PFT's completed 8/21: FEV1 58% predicted, FEV1/FVC 67% predicted  - Monitor and maintain SpO2 > 92%  - attempting to wean supplemental O2> Spo2 dropped to 88% while ambulating 8/22. Will need home going oxygen on discharge.    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 Monitor via repeat TFTs (TSH, FT4, FT3 in 4wks).      ID:  -afebrile, nontoxic   -no s/s infx  -trend temps q4h  -Driveline infn LVAD?      PHYSICAL AND OCCUPATIONAL THERAPY:    LINES:  PIVs     DVT:   VAP BUNDLE: NA  CENTRAL LINE BUNDLE: NA  ULCER PPX: PPI  GLYCEMIC CONTROL: Closely monitoring  BOWEL CARE:Senna and Miralax  INDWELLING CATHETER: NA  NUTRITION: Adult diet Cardiac; 70 gm fat; 2 - 3 grams Sodium; 2000 mL fluid      EMERGENCY CONTACT: Extended Emergency Contact Information  Primary Emergency Contact: SenaitJudie  Address: [address] [place] [address] [facility] Phone: [phone]  Mobile Phone: [phone]  Relation: Mother  Preferred language: English   needed? No  Secondary Emergency Contact: Keiry Greene  Address: [address] [place], [place]  Mobile Phone: [phone]  Relation: Sibling  Preferred language: English   needed? No  FAMILY UPDATE: Updated bedside  CODE STATUS: Full Code  DISPO: IUCU    Patient seen and assessed with Dr. Peguero    I personally spent 75 minutes of critical care time directly and personally managing the patient exclusive of separately billable procedures   _________________________________________________  YARITZA Varma-CNP

## 2024-08-24 NOTE — PROGRESS NOTES
HFICU Attending Note    Principal Problem:    Acute on chronic combined systolic and diastolic heart failure (Multi)  Active Problems:    Centrilobular emphysema (Multi)    VT (ventricular tachycardia) (Multi)  Mr Sapp has ischemic advanced HF   Complicated by recurrent episodes of VT ( including VT storm ) that failed ablation , ganglion blocks in the past   Transferred back to the HFICU from the floor after another episode of slow VT yesterday that required ATP   - Will inform Thoracic and EP about the recurrence for any further recommendations   - Will be presented for advanced therapies evaluation on Tuesday   - K level was low at the time of the VT , suggest we monitor the levels closely and replete aggressively         This critically ill patient continues to be at-risk for clinically significant deterioration / failure due to the above mentioned dysfunctional, unstable organ systems.  I have personally identified and managed all complex critical care issues to prevent aforementioned clinical deterioration.  Critical care time is spent at bedside and/or the immediate area and has included, but is not limited to, the review of diagnostic tests, labs, radiographs, serial assessments of hemodynamics, respiratory status, ventilatory management, and family updates.  Time spent in procedures and teaching are reported separately.    Critical care time: 40____ minutes

## 2024-08-24 NOTE — SIGNIFICANT EVENT
Rapid Response RN Note    Rapid response RN at bedside for RADAR score 6 due to the recent VS listed below:     Vitals:    08/23/24 1150 08/23/24 2032 08/23/24 2037 08/23/24 2038   BP: 118/84 95/60     BP Location: Right arm Right arm     Patient Position: Sitting Lying     Pulse: 75 93     Resp: 18 16     Temp: 36 °C (96.8 °F)      TempSrc: Temporal      SpO2: 95% (!) 76% (!) 82% 93%   Weight:       Height:            Reviewed above VS with bedside RN.  Per RN, took his oxygen off when pox was obtained.  Pt also has been having arrhythmia that was reported to MD.  Pt was placed back on oxygen with improvement in pox.  Plan to replete electrolytes.  No further interventions by rapid response team indicated at this time.  Pt resting comfortably.  No distress.  Remains on tele.  Staff to page rapid response for any concerns or acute change in condition/VS.

## 2024-08-24 NOTE — CARE PLAN
Problem: Pain - Adult  Goal: Verbalizes/displays adequate comfort level or baseline comfort level  Outcome: Progressing     Problem: Safety - Adult  Goal: Free from fall injury  Outcome: Progressing     Problem: Discharge Planning  Goal: Discharge to home or other facility with appropriate resources  Outcome: Progressing     Problem: Chronic Conditions and Co-morbidities  Goal: Patient's chronic conditions and co-morbidity symptoms are monitored and maintained or improved  Outcome: Progressing     Problem: Skin  Goal: Participates in plan/prevention/treatment measures  Outcome: Progressing  Goal: Prevent/manage excess moisture  Outcome: Progressing  Goal: Prevent/minimize sheer/friction injuries  Outcome: Progressing  Goal: Promote/optimize nutrition  Outcome: Progressing   The patient's goals for the shift include      The clinical goals for the shift include patient will maintain oxygen saturation greater than 88% throughout the shift

## 2024-08-24 NOTE — SIGNIFICANT EVENT
"Cardiology fellow paged to bedside given pt in sustained slow VT around rates 120-130 and feeling nauseous. Earlier in the night also was paged that pt was having intermittent 5s runs of NSVT that were asymptomatic. K noted to be 3.3 on PM draw so 60mEq K ordered.    On arrival to bedside pts tele confirms slow VT at rate of ~130. Pt reports feeling \"sick to his stomach\". BP taken in both arms, highest in RUE and measuring SBP 97. Pt felt nauseous but not like he was going to pass out. Pt has had longstanding issues w VT and was seen by EP who could not offer him anything else given already on amio/mexilletine and has had multiple prior stellate ganglion blocks. Thoracic surgery is on consult for surgical eval for sympathetic ganglion removal    Given pt was symptomatic (nausea), using [employer] interrogator, manual burst ATP x1 was delivered which broke rhythm to AV paced at rate of 75. Pt initially felt more nauseous as the rhythm broke and then he returned to baseline.    Pt also dry on exam, giving 250cc LR as well as potassium.    Discussed possible ICU transfer w pt for closer monitoring to which he is open.  Pads/defibrillator and interrogator kept nearby in the meantime.    Discussed case with [facility] attending Dr. Doyle who recommended HFICU transfer for closer monitoring.  HFICU attending Dr. Norm Wade accepted pt for transfer.  "

## 2024-08-24 NOTE — PROGRESS NOTES
"[facility]  HFICU PROGRESS NOTE    Cristian Sapp/[id number]    Admit Date: 8/19/2024  Hospital Length of Stay: 5   ICU Length of Stay: 15h   Primary Service: Advanced Heart Failure - HFICU  Primary HF Cardiologist: Dr. Brown  Referring: Dr. Doyle     INTERVAL EVENTS / PERTINENT ROS:     No acute events overnight after admission and ATP x 1 conversion to paced rhythm without noted ectopy on telemetry review. He denies headache, dizziness, vision changes, shortness of breath, cough, ABD pain, diarrhea, constipation, or voiding difficulties. He endorses BM x 2 today. He is in good spirits.     Plan:  - Contact thoracic surgery to update on overnight events to determine if planning needs adjusted  - EP consulted - appreciate recs   - Most likely able to transfer to the floor on Sunday,    MEDICATIONS  Infusions:     Scheduled:  amiodarone, 200 mg, BID  aspirin, 81 mg, Daily  dapagliflozin propanediol, 10 mg, Daily  enoxaparin, 40 mg, q24h  ferrous sulfate (325 mg ferrous sulfate), 65 mg of iron, Daily with breakfast  tiotropium, 2 puff, Daily   And  formoterol, 20 mcg, q12h  metoprolol succinate XL, 12.5 mg, Daily  mexiletine, 300 mg, TID  pantoprazole, 40 mg, Daily before breakfast  PARoxetine, 60 mg, Nightly  ranolazine, 500 mg, BID  rosuvastatin, 20 mg, Nightly  sacubitriL-valsartan, 0.5 tablet, BID  sennosides-docusate sodium, 1 tablet, BID  spironolactone, 12.5 mg, Daily  tamsulosin, 0.4 mg, Daily  torsemide, 40 mg, Daily  traZODone, 50 mg, Nightly      PRN:  acetaminophen, 650 mg, q6h PRN  albuterol, 2 puff, q6h PRN  hydrOXYzine HCL, 25 mg, q6h PRN  LORazepam, 0.5 mg, q8h PRN  magnesium hydroxide, 30 mL, Daily PRN  polyethylene glycol, 17 g, Daily PRN          PHYSICAL EXAM:   Visit Vitals  /69   Pulse 75   Temp 36.1 °C (97 °F)   Resp 21   Ht 1.702 m (5' 7\")   Wt 81.3 kg (179 lb 3.7 oz)   SpO2 92%   BMI 28.07 kg/m²   Smoking Status Former   BSA 1.96 m²       Wt Readings from " Last 5 Encounters:   08/24/24 81.3 kg (179 lb 3.7 oz)   08/19/24 80.9 kg (178 lb 5.6 oz)   08/08/24 80.7 kg (178 lb)   07/23/24 75.8 kg (167 lb)   07/14/24 72.2 kg (159 lb 2.8 oz)       INTAKE/OUTPUT:  I/O last 3 completed shifts:  In: 1300 (16 mL/kg) [P.O.:1250; I.V.:50 (0.6 mL/kg)]  Out: 2625 (32.3 mL/kg) [Urine:2625 (0.9 mL/kg/hr)]  Weight: 81.3 kg      Physical Exam  Constitutional:       Appearance: Normal appearance.   HENT:      Head: Normocephalic.      Nose: Nose normal.      Mouth/Throat:      Mouth: Mucous membranes are moist.   Eyes:      Pupils: Pupils are equal, round, and reactive to light.   Cardiovascular:      Rate and Rhythm: Normal rate and regular rhythm.      Pulses: Normal pulses.      Comments: Paced rhythm   Musculoskeletal:      Cervical back: Normal range of motion and neck supple.   Neurological:      Mental Status: He is alert.         DATA:  CMP:  Results from last 7 days   Lab Units 08/24/24  0454 08/23/24  1808 08/22/24  1908 08/22/24  0638 08/20/24  2004 08/20/24  0636 08/19/24  0150   SODIUM mmol/L 138 137 139 138 137 139  139 135*   POTASSIUM mmol/L 4.1 3.3* 4.1 3.6 4.6 4.0  4.0 4.1   CHLORIDE mmol/L 100 97* 98 97* 99 100  100 98   CO2 mmol/L 28 27 30 30 26 29  29 27   ANION GAP mmol/L 14 16 15 15 17 14  14 14   BUN mg/dL 27* 27* 25* 22 20 22  22 23   CREATININE mg/dL 1.32* 1.44* 1.55* 1.28 1.47* 1.42*  1.42* 1.65*   EGFR mL/min/1.73m*2 63 57* 52* 66 56* 58*  58* 48*   MAGNESIUM mg/dL 2.71* 2.15 2.21 2.15 2.26 2.24 2.00   ALBUMIN g/dL 3.5 3.8 3.7 3.7 3.8 3.4 3.7   ALT U/L 44  --   --   --   --   --  83*   AST U/L 26  --   --   --   --   --  62*   BILIRUBIN TOTAL mg/dL 0.9  --   --   --   --   --  1.0     CBC:  Results from last 7 days   Lab Units 08/24/24  0454 08/23/24  1808 08/22/24  1908 08/22/24  0638 08/20/24 2004 08/20/24  0636 08/19/24  0150   WBC AUTO x10*3/uL 7.9 8.4 8.9 9.7 11.7* 8.3 10.6   HEMOGLOBIN g/dL 11.8* 13.0* 12.8* 12.8* 12.7* 12.1* 13.1*   HEMATOCRIT %  34.9* 41.0 41.0 39.9* 39.5* 39.3* 41.4   PLATELETS AUTO x10*3/uL 318 348 362 342 352 297 318   MCV fL 85 90 91 90 88 91 91     COAG:   Results from last 7 days   Lab Units 08/22/24  0637 08/19/24  0150   INR  1.2* 1.2*     ABO:   ABO TYPE   Date Value Ref Range Status   08/22/2024 A  Final     HEME/ENDO:  Results from last 7 days   Lab Units 08/20/24 2004 08/20/24  0636   FERRITIN ng/mL  --  132   IRON SATURATION %  --  13*   TSH mIU/L 6.40*  --       CARDIAC:   Results from last 7 days   Lab Units 08/24/24  0454 08/20/24  0636 08/19/24  0239 08/19/24  0150   TROPHS ng/L  --   --  26* 23*   TROPHSCMC ng/L 25  --   --   --    BNP pg/mL 2,047* 1,873*  --  1,980*       ASSESSMENT AND PLAN:   Neuro:  # Anxiety/Depression  # Insomnia  # PTSD  - Serial neuro and pain assessments   - PO Tylenol PRN for pain  - atarax 25 mg q6 PRN for anxiety   - cont home paroxetine 60 mg nightly, trazodone 50 mg nightly  - cont ativan 0.5 mg q8 hours, OARRS reviewed  - PT/OT Consult, OOB to chair  - CAM ICU score every shift  - Sleep/wake cycle normalization     # Physical Status  - BMI 27  -Age-related debility/Reduced Mobility due to HF     #Substance abuse  -Alcohol abuse/Alcohol denied  -Tobacco use/Nicotine: ex-smoker and quick for 6 month (per pt)     Cardiovascular:     # Acute on chronic Systolic and Diastolic heart failure  # Stage D HFrEF/ICM s/p CRT-D (11/2019)   - Hx of Cardiogenic shock last admission requiring IABP support  - TTE (5/28/2024): severely decreased LV systolic function, EF 15%, LVIDd 6.32, moderate-severe MVR, mild TR    - previously signed consent for OHT/LVAD workup, found not to be a candidate for OHT given active tobacco use, also was not interested in LVAD at that time, workup stopped per Dr. Doyle 6/5--> Now open up again  - admit BNP: 1873   - admit CXR: prominent interstitial markings suggesting pulmonary interstitial edema. Small to moderate right pleural effusion and superimposed right basilar  atelectasis.  - admit wt: 81.3 kg, per pt dry wt ~73 kg (160 lb)  - daily wt: 79.7 (80.3, 80.5 kg)  - per pt, has not smoked since 6/2024, now agreeable to LVAD  - agreeable to advanced therapy evaluation, initiated 8/20, consented for LVAD only  - ongoing LVAD workup, Palliative Med/Pulmonology consulted, appreciate recs  - s/p diuresis with IV lasix boluses--> switched to 40 mg torsemide daily 8/22  - cont home: Entresto 12-13 mg BID, dapagliflozin 10 mg daily, spironolactone 12.5 mg daily, Toprol XL 12.5 mg daily  - Home diuretic: was torsemide 20 mg daily, now on torsemide 40mg daily here   - Daily standing weights, 2gm sodium diet, 2L fluid restriction, strict I&Os     CAD  s/p multiple PCIs  - [facility] (5/23/24): dLM: 10-30% stenosis; Prox and mid LAD [id number]%; Prox CX Lesion 100%; Right Coronary Artery: fills via collaterals; Proximal %; LVEF 10%  - Cont ASA 81, rosuvastatin 20 mg nightly     #Prior VT storm   # Recurrent slow VT  - s/p VT ablation 5/30, stellate ganglion block 6/4, VT ablation 6/6, stellate ganglion block 6/11  - Device interrogation completed 8/20 went into prolonged slow VT, self-resolved during device interrogation  - consider transfer to HFICU if recurs for Lidocaine drip +/- ATP pacing   - EP consulted  - rec possible stellate ganglion removal/ denervation ( block would be via anesthesiology)  - thoracic surgery consulted regarding removal (neurosurgery, acute care, and ENT do not do blocks or removals)  - cont home ranexa 500 mg BID, amiodarone 200 mg BID, Toprol XL 12.5 mg daily, mexiletine 300 TID     #Electrolyte Disturbances  - Keep Mg >2 and K >4     Pulmonary:   #Severe emphysema  #COPD   #ex-smoker  - CT chest (6/4): severe emphysematous changes.   - admit CXR: prominent interstitial markings suggesting pulmonary interstitial edema. Small to mod R pleural effusion and superimposed  basilar atelectasis.  - Pulmonology consult: start LAMA/ LABA for maintenance therapy               - continue PRN albuterol  - PFT's completed 8/21: FEV1 58% predicted, FEV1/FVC 67% predicted  - Monitor and maintain SpO2 > 92%  - attempting to wean supplemental O2> Spo2 dropped to 88% while ambulating 8/22. Will need home going oxygen on discharge.     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  - baseline GFR ~50-60  - admit Cr 1.65  - daily Cr 1.55 stable (1.28)  - diuresis as above  -I/Os  -avoid hypotension and nephrotoxic agents     Heme:  # Iron Deficiency Anemia in the setting with chronic HF and   - admit hgb 12.1, stable  - daily CBC  - no current s/s of bleeding  - Cont home PO iron supplement  - Labs: Hgb: 11.8, TIBC: 415, ferritin: 132, serum Fe: 52, %sat: 13%  - Ferous sulfate started on the floor - holding while receiving venofer  - Initiated IV Venofer 200 mg x 5 days      Endo:  #DM  - Euglycemic: Controlled blood glucose  - hgbA1c: 6/04/2024: 6.0     # 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 Monitor via repeat TFTs (TSH, FT4, FT3 in 4wks).       ID:  -afebrile, nontoxic   -no s/s infx  -trend temps q4h     PHYSICAL AND OCCUPATIONAL THERAPY: Ordered    LINES:  PIVs    DVT: Enoxaparin injections  VAP BUNDLE: NA  CENTRAL LINE BUNDLE: NA  ULCER PPX: Pantoprazole PO 40 mg daily  GLYCEMIC CONTROL: NA  BOWEL CARE: Agnieszka-Colace 1 tablet twice daily + Miralax PRN  INDWELLING CATHETER: NA  NUTRITION: Adult diet Cardiac; 70 gm fat; 2 - 3 grams Sodium; 1500 mL fluid      EMERGENCY CONTACT: Extended Emergency Contact Information  Primary Emergency Contact: Judie Sapp  Address: [address] [place] [address] [facility]  Home Phone: [phone]  Mobile Phone: [phone]  Relation: Mother  Preferred language: English   needed? No  Secondary Emergency Contact: Keiry Greene  Address: [address]            [place]  Mobile Phone: [phone]  Relation: Sibling  Preferred language: English   needed? No  FAMILY UPDATE: Patient, significant other, and parents at the bedside   CODE STATUS: Full Code  DISPO: Maintain in the HFICU for today, possible transfer Sunday    Patient seen and assessed with Dr. Peguero    I personally spent 60 minutes of critical care time directly and personally managing the patient exclusive of separately billable procedures   _________________________________________________  Bear Carrasco, APRN-CNP

## 2024-08-25 ENCOUNTER — APPOINTMENT (OUTPATIENT)
Dept: RADIOLOGY | Facility: HOSPITAL | Age: 59
End: 2024-08-25
Payer: MEDICARE

## 2024-08-25 VITALS
BODY MASS INDEX: 28.13 KG/M2 | WEIGHT: 179.23 LBS | SYSTOLIC BLOOD PRESSURE: 87 MMHG | HEIGHT: 67 IN | RESPIRATION RATE: 15 BRPM | DIASTOLIC BLOOD PRESSURE: 56 MMHG | TEMPERATURE: 96.8 F | HEART RATE: 75 BPM | OXYGEN SATURATION: 92 %

## 2024-08-25 LAB
ALBUMIN SERPL BCP-MCNC: 3.6 G/DL (ref 3.4–5)
ALBUMIN SERPL BCP-MCNC: 3.6 G/DL (ref 3.4–5)
ALP SERPL-CCNC: 129 U/L (ref 33–120)
ALT SERPL W P-5'-P-CCNC: 43 U/L (ref 10–52)
ANION GAP SERPL CALC-SCNC: 15 MMOL/L (ref 10–20)
ANION GAP SERPL CALC-SCNC: 15 MMOL/L (ref 10–20)
AST SERPL W P-5'-P-CCNC: 35 U/L (ref 9–39)
BASOPHILS # BLD MANUAL: 0.16 X10*3/UL (ref 0–0.1)
BASOPHILS NFR BLD MANUAL: 1.7 %
BILIRUB SERPL-MCNC: 0.9 MG/DL (ref 0–1.2)
BUN SERPL-MCNC: 20 MG/DL (ref 6–23)
BUN SERPL-MCNC: 23 MG/DL (ref 6–23)
CALCIUM SERPL-MCNC: 8.7 MG/DL (ref 8.6–10.6)
CALCIUM SERPL-MCNC: 8.9 MG/DL (ref 8.6–10.6)
CHLORIDE SERPL-SCNC: 98 MMOL/L (ref 98–107)
CHLORIDE SERPL-SCNC: 99 MMOL/L (ref 98–107)
CO2 SERPL-SCNC: 27 MMOL/L (ref 21–32)
CO2 SERPL-SCNC: 27 MMOL/L (ref 21–32)
CREAT SERPL-MCNC: 1.38 MG/DL (ref 0.5–1.3)
CREAT SERPL-MCNC: 1.42 MG/DL (ref 0.5–1.3)
EGFRCR SERPLBLD CKD-EPI 2021: 58 ML/MIN/1.73M*2
EGFRCR SERPLBLD CKD-EPI 2021: 60 ML/MIN/1.73M*2
EOSINOPHIL # BLD MANUAL: 0.24 X10*3/UL (ref 0–0.7)
EOSINOPHIL NFR BLD MANUAL: 2.6 %
ERYTHROCYTE [DISTWIDTH] IN BLOOD BY AUTOMATED COUNT: 20.2 % (ref 11.5–14.5)
GLUCOSE SERPL-MCNC: 104 MG/DL (ref 74–99)
GLUCOSE SERPL-MCNC: 119 MG/DL (ref 74–99)
HCT VFR BLD AUTO: 36.4 % (ref 41–52)
HGB BLD-MCNC: 12 G/DL (ref 13.5–17.5)
IMM GRANULOCYTES # BLD AUTO: 0.06 X10*3/UL (ref 0–0.7)
IMM GRANULOCYTES NFR BLD AUTO: 0.7 % (ref 0–0.9)
LYMPHOCYTES # BLD MANUAL: 1.44 X10*3/UL (ref 1.2–4.8)
LYMPHOCYTES NFR BLD MANUAL: 15.7 %
MAGNESIUM SERPL-MCNC: 2.34 MG/DL (ref 1.6–2.4)
MCH RBC QN AUTO: 28.5 PG (ref 26–34)
MCHC RBC AUTO-ENTMCNC: 33 G/DL (ref 32–36)
MCV RBC AUTO: 87 FL (ref 80–100)
MGC ASCENT PFT - FEV1 - POST: 1.25
MGC ASCENT PFT - FEV1 - PRE: 1.22
MGC ASCENT PFT - FEV1 - PREDICTED: 3.14
MGC ASCENT PFT - FVC - POST: 2.43
MGC ASCENT PFT - FVC - PRE: 2.29
MGC ASCENT PFT - FVC - PREDICTED: 3.95
MONOCYTES # BLD MANUAL: 0.64 X10*3/UL (ref 0.1–1)
MONOCYTES NFR BLD MANUAL: 7 %
NEUTS SEG # BLD MANUAL: 6.72 X10*3/UL (ref 1.2–7)
NEUTS SEG NFR BLD MANUAL: 73 %
NRBC BLD-RTO: 0 /100 WBCS (ref 0–0)
PHOSPHATE SERPL-MCNC: 3.3 MG/DL (ref 2.5–4.9)
PHOSPHATE SERPL-MCNC: 3.5 MG/DL (ref 2.5–4.9)
PLATELET # BLD AUTO: 311 X10*3/UL (ref 150–450)
POTASSIUM SERPL-SCNC: 3.7 MMOL/L (ref 3.5–5.3)
POTASSIUM SERPL-SCNC: 4.1 MMOL/L (ref 3.5–5.3)
PROT SERPL-MCNC: 6.8 G/DL (ref 6.4–8.2)
RBC # BLD AUTO: 4.21 X10*6/UL (ref 4.5–5.9)
RBC MORPH BLD: ABNORMAL
SODIUM SERPL-SCNC: 136 MMOL/L (ref 136–145)
SODIUM SERPL-SCNC: 137 MMOL/L (ref 136–145)
TOTAL CELLS COUNTED BLD: 115
WBC # BLD AUTO: 9.2 X10*3/UL (ref 4.4–11.3)

## 2024-08-25 PROCEDURE — 84100 ASSAY OF PHOSPHORUS: CPT | Performed by: NURSE PRACTITIONER

## 2024-08-25 PROCEDURE — 2500000002 HC RX 250 W HCPCS SELF ADMINISTERED DRUGS (ALT 637 FOR MEDICARE OP, ALT 636 FOR OP/ED): Performed by: NURSE PRACTITIONER

## 2024-08-25 PROCEDURE — 83735 ASSAY OF MAGNESIUM: CPT | Performed by: NURSE PRACTITIONER

## 2024-08-25 PROCEDURE — 99291 CRITICAL CARE FIRST HOUR: CPT | Performed by: INTERNAL MEDICINE

## 2024-08-25 PROCEDURE — 94640 AIRWAY INHALATION TREATMENT: CPT

## 2024-08-25 PROCEDURE — 2500000001 HC RX 250 WO HCPCS SELF ADMINISTERED DRUGS (ALT 637 FOR MEDICARE OP): Performed by: NURSE PRACTITIONER

## 2024-08-25 PROCEDURE — 80053 COMPREHEN METABOLIC PANEL: CPT | Performed by: NURSE PRACTITIONER

## 2024-08-25 PROCEDURE — 2500000004 HC RX 250 GENERAL PHARMACY W/ HCPCS (ALT 636 FOR OP/ED)

## 2024-08-25 PROCEDURE — 2500000004 HC RX 250 GENERAL PHARMACY W/ HCPCS (ALT 636 FOR OP/ED): Performed by: NURSE PRACTITIONER

## 2024-08-25 PROCEDURE — 80069 RENAL FUNCTION PANEL: CPT | Mod: CCI | Performed by: NURSE PRACTITIONER

## 2024-08-25 PROCEDURE — 36415 COLL VENOUS BLD VENIPUNCTURE: CPT | Performed by: NURSE PRACTITIONER

## 2024-08-25 PROCEDURE — 2500000002 HC RX 250 W HCPCS SELF ADMINISTERED DRUGS (ALT 637 FOR MEDICARE OP, ALT 636 FOR OP/ED)

## 2024-08-25 PROCEDURE — 2500000005 HC RX 250 GENERAL PHARMACY W/O HCPCS: Performed by: NURSE PRACTITIONER

## 2024-08-25 PROCEDURE — 2020000001 HC ICU ROOM DAILY

## 2024-08-25 PROCEDURE — 74018 RADEX ABDOMEN 1 VIEW: CPT

## 2024-08-25 PROCEDURE — 99291 CRITICAL CARE FIRST HOUR: CPT | Performed by: NURSE PRACTITIONER

## 2024-08-25 PROCEDURE — 85027 COMPLETE CBC AUTOMATED: CPT | Performed by: NURSE PRACTITIONER

## 2024-08-25 PROCEDURE — 85007 BL SMEAR W/DIFF WBC COUNT: CPT | Performed by: NURSE PRACTITIONER

## 2024-08-25 PROCEDURE — 74018 RADEX ABDOMEN 1 VIEW: CPT | Performed by: RADIOLOGY

## 2024-08-25 RX ORDER — POTASSIUM CHLORIDE 20 MEQ/1
20 TABLET, EXTENDED RELEASE ORAL ONCE
Status: COMPLETED | OUTPATIENT
Start: 2024-08-25 | End: 2024-08-25

## 2024-08-25 RX ORDER — AMOXICILLIN 250 MG
2 CAPSULE ORAL 2 TIMES DAILY
Status: DISCONTINUED | OUTPATIENT
Start: 2024-08-25 | End: 2024-08-30 | Stop reason: HOSPADM

## 2024-08-25 RX ORDER — POTASSIUM CHLORIDE 20 MEQ/1
40 TABLET, EXTENDED RELEASE ORAL ONCE
Status: COMPLETED | OUTPATIENT
Start: 2024-08-25 | End: 2024-08-25

## 2024-08-25 RX ORDER — POLYETHYLENE GLYCOL 3350 17 G/17G
17 POWDER, FOR SOLUTION ORAL 3 TIMES DAILY
Status: DISCONTINUED | OUTPATIENT
Start: 2024-08-25 | End: 2024-08-30 | Stop reason: HOSPADM

## 2024-08-25 ASSESSMENT — COGNITIVE AND FUNCTIONAL STATUS - GENERAL
MOBILITY SCORE: 24
DAILY ACTIVITIY SCORE: 24

## 2024-08-25 ASSESSMENT — PAIN SCALES - GENERAL
PAINLEVEL_OUTOF10: 0 - NO PAIN
PAINLEVEL_OUTOF10: 2
PAINLEVEL_OUTOF10: 0 - NO PAIN
PAINLEVEL_OUTOF10: 0 - NO PAIN

## 2024-08-25 ASSESSMENT — PAIN - FUNCTIONAL ASSESSMENT
PAIN_FUNCTIONAL_ASSESSMENT: 0-10

## 2024-08-25 NOTE — CARE PLAN
The patient's goals for the shift include      The clinical goals for the shift include patient will maintain oxygen saturation greater than 88% throughout the shift      Problem: Pain - Adult  Goal: Verbalizes/displays adequate comfort level or baseline comfort level  Outcome: Progressing     Problem: Safety - Adult  Goal: Free from fall injury  Outcome: Progressing     Problem: Discharge Planning  Goal: Discharge to home or other facility with appropriate resources  Outcome: Progressing     Problem: Chronic Conditions and Co-morbidities  Goal: Patient's chronic conditions and co-morbidity symptoms are monitored and maintained or improved  Outcome: Progressing     Problem: Skin  Goal: Participates in plan/prevention/treatment measures  Outcome: Progressing  Goal: Prevent/manage excess moisture  Outcome: Progressing  Goal: Prevent/minimize sheer/friction injuries  Outcome: Progressing  Goal: Promote/optimize nutrition  Outcome: Progressing

## 2024-08-25 NOTE — PROGRESS NOTES
[facility]  HFICU PROGRESS NOTE    Cristian Sapp/[id number]    Admit Date: 8/19/2024  Hospital Length of Stay: 6   ICU Length of Stay: 1d 7h   Primary Service: Advanced Heart Failure - HFICU  Primary HF Cardiologist: Dr. Brown  Referring: Dr. Doyle     INTERVAL EVENTS / PERTINENT ROS:     Overnight, no further arrhythmia noted on telemetry review. He denies headache, dizziness, vision changes, shortness of breath, cough, ABD pain, diarrhea, constipation, or voiding difficulties.   C/o stomach bloated later evening, had BM in the morning.       Plan:  -  Thoracic sign OFF due to high risk for surgical procedure (surgical removing of ganglion blocks). Will re-engage on Monday if required   -  EP consulted --> do not have other avenues to pursue for controlling the VT   -  Will be presented for advanced therapies evaluation on Tuesday.  -  KUB done, up bowel regime     MEDICATIONS  Infusions:     Scheduled:  amiodarone, 200 mg, BID  aspirin, 81 mg, Daily  dapagliflozin propanediol, 10 mg, Daily  enoxaparin, 40 mg, q24h  [Held by provider] ferrous sulfate (325 mg ferrous sulfate), 65 mg of iron, Daily with breakfast  tiotropium, 2 puff, Daily   And  formoterol, 20 mcg, q12h  iron sucrose, 200 mg, Daily  metoprolol succinate XL, 12.5 mg, Daily  mexiletine, 300 mg, TID  pantoprazole, 40 mg, Daily before breakfast  PARoxetine, 60 mg, Nightly  potassium chloride CR, 20 mEq, Once  ranolazine, 500 mg, BID  rosuvastatin, 20 mg, Nightly  sacubitriL-valsartan, 0.5 tablet, BID  sennosides-docusate sodium, 1 tablet, BID  spironolactone, 12.5 mg, Daily  tamsulosin, 0.4 mg, Daily  torsemide, 40 mg, Daily  traZODone, 50 mg, Nightly      PRN:  acetaminophen, 650 mg, q6h PRN  albuterol, 2 puff, q6h PRN  diphenhydrAMINE, 50 mg, q5 min PRN  hydrOXYzine HCL, 25 mg, q6h PRN  LORazepam, 0.5 mg, q8h PRN  magnesium hydroxide, 30 mL, Daily PRN  polyethylene glycol, 17 g, Daily PRN          PHYSICAL EXAM:   Visit  "Vitals  BP 99/75   Pulse 75   Temp 35.9 °C (96.6 °F)   Resp 18   Ht 1.702 m (5' 7\")   Wt 81.3 kg (179 lb 3.7 oz)   SpO2 95%   BMI 28.07 kg/m²   Smoking Status Former   BSA 1.96 m²       Wt Readings from Last 5 Encounters:   08/24/24 81.3 kg (179 lb 3.7 oz)   08/19/24 80.9 kg (178 lb 5.6 oz)   08/08/24 80.7 kg (178 lb)   07/23/24 75.8 kg (167 lb)   07/14/24 72.2 kg (159 lb 2.8 oz)       INTAKE/OUTPUT:  I/O last 3 completed shifts:  In: 1040 (12.8 mL/kg) [P.O.:990; I.V.:50 (0.6 mL/kg)]  Out: 2200 (27.1 mL/kg) [Urine:2200 (0.8 mL/kg/hr)]  Weight: 81.3 kg      Physical Exam  Constitutional:       Appearance: Normal appearance.   HENT:      Head: Normocephalic.      Nose: Nose normal.      Mouth/Throat:      Mouth: Mucous membranes are moist.   Eyes:      Extraocular Movements: Extraocular movements intact.      Pupils: Pupils are equal, round, and reactive to light.   Cardiovascular:      Rate and Rhythm: Normal rate and regular rhythm.      Pulses: Normal pulses.      Comments: Paced rhythm   Abdominal:      General: Bowel sounds are normal. There is distension.      Comments: Obese   Musculoskeletal:      Cervical back: Normal range of motion and neck supple.   Skin:     General: Skin is warm.      Capillary Refill: Capillary refill takes more than 3 seconds.   Neurological:      General: No focal deficit present.      Mental Status: He is alert and oriented to person, place, and time.   Psychiatric:         Mood and Affect: Mood normal.         Behavior: Behavior normal.         DATA:  CMP:  Results from last 7 days   Lab Units 08/25/24  0456 08/24/24 2027 08/24/24  0454 08/23/24  1808 08/22/24  1908 08/22/24  0638 08/20/24  2004 08/20/24  0636 08/19/24  0150   SODIUM mmol/L 136 135* 138 137 139 138 137 139  139 135*   POTASSIUM mmol/L 4.1 4.1 4.1 3.3* 4.1 3.6 4.6 4.0  4.0 4.1   CHLORIDE mmol/L 98 98 100 97* 98 97* 99 100  100 98   CO2 mmol/L 27 26 28 27 30 30 26 29  29 27   ANION GAP mmol/L 15 15 14 16 15 15 17 " "14  14 14   BUN mg/dL 23 23 27* 27* 25* 22 20 22  22 23   CREATININE mg/dL 1.38* 1.45* 1.32* 1.44* 1.55* 1.28 1.47* 1.42*  1.42* 1.65*   EGFR mL/min/1.73m*2 60* 57* 63 57* 52* 66 56* 58*  58* 48*   MAGNESIUM mg/dL 2.34  --  2.71* 2.15 2.21 2.15 2.26 2.24 2.00   ALBUMIN g/dL 3.6 3.6 3.5 3.8 3.7 3.7 3.8 3.4 3.7   ALT U/L 43  --  44  --   --   --   --   --  83*   AST U/L 35  --  26  --   --   --   --   --  62*   BILIRUBIN TOTAL mg/dL 0.9  --  0.9  --   --   --   --   --  1.0     CBC:  Results from last 7 days   Lab Units 08/25/24  0456 08/24/24  0454 08/23/24  1808 08/22/24  1908 08/22/24  0638 08/20/24 2004 08/20/24  0636 08/19/24  0150   WBC AUTO x10*3/uL 9.2 7.9 8.4 8.9 9.7 11.7* 8.3 10.6   HEMOGLOBIN g/dL 12.0* 11.8* 13.0* 12.8* 12.8* 12.7* 12.1* 13.1*   HEMATOCRIT % 36.4* 34.9* 41.0 41.0 39.9* 39.5* 39.3* 41.4   PLATELETS AUTO x10*3/uL 311 318 348 362 342 352 297 318   MCV fL 87 85 90 91 90 88 91 91     COAG:   Results from last 7 days   Lab Units 08/22/24  0637 08/19/24  0150   INR  1.2* 1.2*     ABO:   No results found for: \"ABO\"    HEME/ENDO:  Results from last 7 days   Lab Units 08/20/24 2004 08/20/24  0636   FERRITIN ng/mL  --  132   IRON SATURATION %  --  13*   TSH mIU/L 6.40*  --       CARDIAC:   Results from last 7 days   Lab Units 08/24/24  0454 08/20/24  0636 08/19/24  0239 08/19/24  0150   TROPHS ng/L  --   --  26* 23*   TROPHSCMC ng/L 25  --   --   --    BNP pg/mL 2,047* 1,873*  --  1,980*       ASSESSMENT AND PLAN:     58 y.o. male with PMH of ICM, s/p remote multivessel PCI's, LVEF reported at 35% back in 2006, 2008, 25 to 30% in 8/2022, s/p biventricular ICD, abdominal aortic aneurysm without rupture, HTN, HLD, PTSD, tobacco use, renal stones, s/p recent cystoscopy with urethral stent placement, recurrent episodes of VT ( including VT storm ) that failed ablation , ganglion blocks in the past who is transfer from floor to HF ICU on 8/22 overnight  due to sustained slow VT that required ATP. K " level was low at the time of the VT. No further episodes of arrhythmia since then. EP consulted - no further options. Thoracic sign OFF due to high risk for surgical procedure (surgical removing of ganglion blocks). Will re-engage on Monday if required   Will be presented for advanced therapies evaluation on Tuesday 8/27.    Neuro:  # Anxiety/Depression  # Insomnia  # PTSD  - Serial neuro and pain assessments   - PO Tylenol PRN for pain  - atarax 25 mg q6 PRN for anxiety   - cont home paroxetine 60 mg nightly, trazodone 50 mg nightly  - cont ativan 0.5 mg q8 hours, OARRS reviewed  - PT/OT Consult, OOB to chair  - CAM ICU score every shift  - Sleep/wake cycle normalization     # Physical Status  - BMI 27  -Age-related debility/Reduced Mobility due to HF     #Substance abuse  -Alcohol abuse/Alcohol denied  -Tobacco use/Nicotine: ex-smoker and quick for 6 month (per pt)  - Nicotine level (8/22): Pending      Cardiovascular:     # Acute on chronic Systolic and Diastolic heart failure  # Stage D HFrEF/ICM s/p CRT-D (11/2019)   - Hx of Cardiogenic shock last admission requiring IABP support  - TTE (5/28/2024): severely decreased LV systolic function, EF 15%, LVIDd 6.32, moderate-severe MVR, mild TR    - previously signed consent for OHT/LVAD workup, found not to be a candidate for OHT given active tobacco use, also was not interested in LVAD at that time, workup stopped per Dr. Doyle 6/5--> Now open up again  - admit BNP: 1873   - admit CXR: prominent interstitial markings suggesting pulmonary interstitial edema. Small to moderate right pleural effusion and superimposed right basilar atelectasis.  - admit wt: 81.3 kg, per pt dry wt ~73 kg (160 lb)  - daily wt (8/25): Pending  (81.3 Kg)  - per pt, has not smoked since 6/2024  - To present on Tuesday 8/27 for advanced therapies work up , including LVAD and transplant both   - Palliative Med/Pulmonology consulted, appreciate recs  - s/p diuresis with IV lasix boluses-->  switched to torsemide 40 mg daily 8/22 (Home torsemide 20 mg daily)  - C/w  home meds: Entresto 12-13 mg BID, dapagliflozin 10 mg daily, spironolactone 12.5 mg daily, Toprol XL 12.5 mg daily  - Daily standing weights, 2gm sodium diet, 2L fluid restriction, strict I&Os     CAD  s/p multiple PCIs  - [facility] (5/23/24): dLM: 10-30% stenosis; Prox and mid LAD [id number]%; Prox CX Lesion 100%; Right Coronary Artery: fills via collaterals; Proximal %; LVEF 10%  - Cont ASA 81, rosuvastatin 20 mg nightly     #Prior VT storm   # Recurrent slow VT  - s/p VT ablation 5/30, stellate ganglion block 6/4, VT ablation 6/6, stellate ganglion block 6/11  - Device interrogation completed 8/20 went into prolonged slow VT, self-resolved during device interrogation  - EP consulted  rec possible stellate ganglion removal/ denervation ( block would be via anesthesiology)  - Thoracic surgery consulted  (8/23): signed OFF due to high risk for surgical procedure (surgical removing of ganglion blocks). Will re-engage  if required   - Cont home ranexa 500 mg BID, amiodarone 200 mg BID, mexiletine 300 TID     #Electrolyte Disturbances  - Keep Mg >2 and K >4     Pulmonary:   #Severe emphysema  #COPD   #ex-smoker  - CT chest (6/4): severe emphysematous changes.   - admit CXR: prominent interstitial markings suggesting pulmonary interstitial edema. Small to mod R pleural effusion and superimposed  basilar atelectasis.  - Pulmonology consult: start LAMA/ LABA for maintenance therapy   - continue PRN albuterol  - PFT's completed 8/21: FEV1 58% predicted, FEV1/FVC 67% predicted  - Monitor and maintain SpO2 > 92%  - attempting to wean supplemental O2> Spo2 dropped to 88% while ambulating 8/22. Will need home going oxygen on discharge.     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  - baseline GFR ~50-60  - admit Cr 1.65  - daily Cr (8/25):  1.38, stable   - diuresis as above  -I/Os  -avoid hypotension and  nephrotoxic agents     Heme:  # Iron Deficiency Anemia in the setting with chronic HF and  CKD   - admit hgb 12.1, stable  - Cont home PO iron supplement  - Labs: Hgb: 11.8, TIBC: 415, ferritin: 132, serum Fe: 52, %sat: 13%  - Ferous sulfate started on the floor - holding while receiving venofer  - S/p IV Venofer 200 mg x 5 days (8/23-8/27)       Endo:  #DM  - Euglycemic: Controlled blood glucose  - hgbA1c (6/04/2024): 6.0     #  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  - not currently on synthroid   - TSH ( 8/20): 6.40 and free T4 1.55H  - Endocrine consult follows, monitor via repeat TFTs (TSH, FT4, FT3 in 4wks).       ID:  -afebrile, nontoxic   -no s/s infx  -trend temps q4h     PHYSICAL AND OCCUPATIONAL THERAPY: Ordered    LINES:  PIVs    DVT: Enoxaparin injections  VAP BUNDLE: NA  CENTRAL LINE BUNDLE: NA  ULCER PPX: Pantoprazole PO 40 mg daily  GLYCEMIC CONTROL: NA  BOWEL CARE: Agnieszka-Colace 2 tablet twice daily + Miralax TID  INDWELLING CATHETER: NA  NUTRITION: Adult diet Cardiac; 70 gm fat; 2 - 3 grams Sodium; 1500 mL fluid      EMERGENCY CONTACT: Extended Emergency Contact Information  Primary Emergency Contact: Judie Sapp  Address[address] [place] [address] [facility]  Home Phone: [phone]  Mobile Phone: [phone]  Relation: Mother  Preferred language: English   needed? No  Secondary Emergency Contact: Keiry Greene  Address: [address] [place], [place]  Mobile Phone: [phone]  Relation: Sibling  Preferred language: English   needed? No  FAMILY UPDATE: Patient, significant other, and parents at the bedside   CODE STATUS: Full Code    DISPO:  HF ICU     Patient seen and assessed with Dr. Marielena WHALEY personally spent 60 minutes of critical care time directly and personally managing the patient exclusive of separately billable procedures   _________________________________________________  Jamila L Bertin,  APRN-CNP

## 2024-08-25 NOTE — PROGRESS NOTES
HFICU Attending Note    Principal Problem:    Acute on chronic combined systolic and diastolic heart failure (Multi)  Active Problems:    Chronic systolic heart failure (Multi)    ICD (implantable cardioverter-defibrillator) in place    Primary hypertension    Chronic obstructive pulmonary disease (Multi)    CAD S/P percutaneous coronary angioplasty    PTSD (post-traumatic stress disorder)    Centrilobular emphysema (Multi)    VT (ventricular tachycardia) (Multi)  Mr Sapp has ischemic advanced HF   Complicated by recurrent episodes of VT ( including VT storm ) that failed ablation , ganglion blocks in the past   Transferred back to the HFICU from the floor after another episode of slow VT \ that required ATP , of note K was low at the time of the event . No recurrence noted since he has been in the HFICU   - Will inform Thoracic and EP about the recurrence for any further recommendations   - Will be presented for advanced therapies evaluation on Tuesday           This critically ill patient continues to be at-risk for clinically significant deterioration / failure due to the above mentioned dysfunctional, unstable organ systems.  I have personally identified and managed all complex critical care issues to prevent aforementioned clinical deterioration.  Critical care time is spent at bedside and/or the immediate area and has included, but is not limited to, the review of diagnostic tests, labs, radiographs, serial assessments of hemodynamics, respiratory status, ventilatory management, and family updates.  Time spent in procedures and teaching are reported separately.    Critical care time: 40____ minutes

## 2024-08-26 ENCOUNTER — APPOINTMENT (OUTPATIENT)
Dept: RADIOLOGY | Facility: HOSPITAL | Age: 59
End: 2024-08-26
Payer: MEDICARE

## 2024-08-26 ENCOUNTER — APPOINTMENT (OUTPATIENT)
Dept: CARDIOLOGY | Facility: CLINIC | Age: 59
End: 2024-08-26
Payer: COMMERCIAL

## 2024-08-26 LAB
ALBUMIN SERPL BCP-MCNC: 3.5 G/DL (ref 3.4–5)
ALBUMIN SERPL BCP-MCNC: 3.7 G/DL (ref 3.4–5)
ALP SERPL-CCNC: 131 U/L (ref 33–120)
ALT SERPL W P-5'-P-CCNC: 39 U/L (ref 10–52)
ANION GAP SERPL CALC-SCNC: 14 MMOL/L (ref 10–20)
ANION GAP SERPL CALC-SCNC: 17 MMOL/L (ref 10–20)
AST SERPL W P-5'-P-CCNC: 26 U/L (ref 9–39)
BASOPHILS # BLD AUTO: 0.11 X10*3/UL (ref 0–0.1)
BASOPHILS NFR BLD AUTO: 1.3 %
BILIRUB SERPL-MCNC: 0.9 MG/DL (ref 0–1.2)
BUN SERPL-MCNC: 19 MG/DL (ref 6–23)
BUN SERPL-MCNC: 20 MG/DL (ref 6–23)
CALCIUM SERPL-MCNC: 8.8 MG/DL (ref 8.6–10.6)
CALCIUM SERPL-MCNC: 8.9 MG/DL (ref 8.6–10.6)
CHLORIDE SERPL-SCNC: 100 MMOL/L (ref 98–107)
CHLORIDE SERPL-SCNC: 97 MMOL/L (ref 98–107)
CO2 SERPL-SCNC: 26 MMOL/L (ref 21–32)
CO2 SERPL-SCNC: 27 MMOL/L (ref 21–32)
COTININE SERPL-MCNC: <5 NG/ML
CREAT SERPL-MCNC: 1.35 MG/DL (ref 0.5–1.3)
CREAT SERPL-MCNC: 1.44 MG/DL (ref 0.5–1.3)
EGFRCR SERPLBLD CKD-EPI 2021: 57 ML/MIN/1.73M*2
EGFRCR SERPLBLD CKD-EPI 2021: 62 ML/MIN/1.73M*2
EOSINOPHIL # BLD AUTO: 0.33 X10*3/UL (ref 0–0.7)
EOSINOPHIL NFR BLD AUTO: 3.8 %
ERYTHROCYTE [DISTWIDTH] IN BLOOD BY AUTOMATED COUNT: 19.9 % (ref 11.5–14.5)
GLUCOSE SERPL-MCNC: 135 MG/DL (ref 74–99)
GLUCOSE SERPL-MCNC: 96 MG/DL (ref 74–99)
HCT VFR BLD AUTO: 35.6 % (ref 41–52)
HGB BLD-MCNC: 11.8 G/DL (ref 13.5–17.5)
IMM GRANULOCYTES # BLD AUTO: 0.05 X10*3/UL (ref 0–0.7)
IMM GRANULOCYTES NFR BLD AUTO: 0.6 % (ref 0–0.9)
LYMPHOCYTES # BLD AUTO: 1.1 X10*3/UL (ref 1.2–4.8)
LYMPHOCYTES NFR BLD AUTO: 12.6 %
MAGNESIUM SERPL-MCNC: 2.15 MG/DL (ref 1.6–2.4)
MCH RBC QN AUTO: 28 PG (ref 26–34)
MCHC RBC AUTO-ENTMCNC: 33.1 G/DL (ref 32–36)
MCV RBC AUTO: 85 FL (ref 80–100)
MONOCYTES # BLD AUTO: 0.97 X10*3/UL (ref 0.1–1)
MONOCYTES NFR BLD AUTO: 11.1 %
NEUTROPHILS # BLD AUTO: 6.2 X10*3/UL (ref 1.2–7.7)
NEUTROPHILS NFR BLD AUTO: 70.6 %
NICOTINE SERPL-MCNC: <5 NG/ML
NRBC BLD-RTO: 0 /100 WBCS (ref 0–0)
PHOSPHATE SERPL-MCNC: 3.6 MG/DL (ref 2.5–4.9)
PHOSPHATE SERPL-MCNC: 3.8 MG/DL (ref 2.5–4.9)
PLATELET # BLD AUTO: 309 X10*3/UL (ref 150–450)
POTASSIUM SERPL-SCNC: 4 MMOL/L (ref 3.5–5.3)
POTASSIUM SERPL-SCNC: 4.1 MMOL/L (ref 3.5–5.3)
PROT SERPL-MCNC: 6.3 G/DL (ref 6.4–8.2)
RBC # BLD AUTO: 4.21 X10*6/UL (ref 4.5–5.9)
SODIUM SERPL-SCNC: 136 MMOL/L (ref 136–145)
SODIUM SERPL-SCNC: 137 MMOL/L (ref 136–145)
WBC # BLD AUTO: 8.8 X10*3/UL (ref 4.4–11.3)

## 2024-08-26 PROCEDURE — 84100 ASSAY OF PHOSPHORUS: CPT | Performed by: NURSE PRACTITIONER

## 2024-08-26 PROCEDURE — 94640 AIRWAY INHALATION TREATMENT: CPT

## 2024-08-26 PROCEDURE — 2500000002 HC RX 250 W HCPCS SELF ADMINISTERED DRUGS (ALT 637 FOR MEDICARE OP, ALT 636 FOR OP/ED): Performed by: NURSE PRACTITIONER

## 2024-08-26 PROCEDURE — 2500000001 HC RX 250 WO HCPCS SELF ADMINISTERED DRUGS (ALT 637 FOR MEDICARE OP): Performed by: NURSE PRACTITIONER

## 2024-08-26 PROCEDURE — 80069 RENAL FUNCTION PANEL: CPT | Mod: CCI | Performed by: NURSE PRACTITIONER

## 2024-08-26 PROCEDURE — 99291 CRITICAL CARE FIRST HOUR: CPT | Performed by: STUDENT IN AN ORGANIZED HEALTH CARE EDUCATION/TRAINING PROGRAM

## 2024-08-26 PROCEDURE — 83735 ASSAY OF MAGNESIUM: CPT | Performed by: NURSE PRACTITIONER

## 2024-08-26 PROCEDURE — 2500000004 HC RX 250 GENERAL PHARMACY W/ HCPCS (ALT 636 FOR OP/ED): Performed by: NURSE PRACTITIONER

## 2024-08-26 PROCEDURE — 80053 COMPREHEN METABOLIC PANEL: CPT | Performed by: NURSE PRACTITIONER

## 2024-08-26 PROCEDURE — 71046 X-RAY EXAM CHEST 2 VIEWS: CPT

## 2024-08-26 PROCEDURE — 2020000001 HC ICU ROOM DAILY

## 2024-08-26 PROCEDURE — 36415 COLL VENOUS BLD VENIPUNCTURE: CPT | Performed by: NURSE PRACTITIONER

## 2024-08-26 PROCEDURE — 99223 1ST HOSP IP/OBS HIGH 75: CPT | Performed by: NURSE PRACTITIONER

## 2024-08-26 PROCEDURE — 70355 PANORAMIC X-RAY OF JAWS: CPT | Performed by: STUDENT IN AN ORGANIZED HEALTH CARE EDUCATION/TRAINING PROGRAM

## 2024-08-26 PROCEDURE — 70355 PANORAMIC X-RAY OF JAWS: CPT

## 2024-08-26 PROCEDURE — 85025 COMPLETE CBC W/AUTO DIFF WBC: CPT | Performed by: NURSE PRACTITIONER

## 2024-08-26 PROCEDURE — 2500000005 HC RX 250 GENERAL PHARMACY W/O HCPCS: Performed by: NURSE PRACTITIONER

## 2024-08-26 PROCEDURE — 71046 X-RAY EXAM CHEST 2 VIEWS: CPT | Performed by: RADIOLOGY

## 2024-08-26 ASSESSMENT — PAIN SCALES - GENERAL
PAINLEVEL_OUTOF10: 0 - NO PAIN

## 2024-08-26 ASSESSMENT — PAIN - FUNCTIONAL ASSESSMENT
PAIN_FUNCTIONAL_ASSESSMENT: 0-10

## 2024-08-26 NOTE — PROGRESS NOTES
"[facility]  HFICU PROGRESS NOTE    Cristian Sapp/[id number]    Admit Date: 8/19/2024  Hospital Length of Stay: 7   ICU Length of Stay: 2d 7h   Primary Service: Advanced Heart Failure - HFICU  Primary HF Cardiologist: Dr. Brown  Referring: Dr. Doyle     INTERVAL EVENTS / PERTINENT ROS:     Overnight, no further arrhythmia noted on telemetry review.  Feels well this AM. No chest discomfort, presyncopal symptoms, worsening SOB. Does report some \"wheezing\" when he was working with PT/OT overnight. Tolerating diet.     Plan:  - Updated Vascular studies/abdominal US/CXR ordered for advanced therapies evaluation.   - Will be presented for advanced therapies evaluation on Tuesday.    MEDICATIONS  Infusions:     Scheduled:  amiodarone, 200 mg, BID  aspirin, 81 mg, Daily  dapagliflozin propanediol, 10 mg, Daily  enoxaparin, 40 mg, q24h  [Held by provider] ferrous sulfate (325 mg ferrous sulfate), 65 mg of iron, Daily with breakfast  tiotropium, 2 puff, Daily   And  formoterol, 20 mcg, q12h  [START ON 8/27/2024] iron sucrose, 200 mg, Daily  metoprolol succinate XL, 12.5 mg, Daily  mexiletine, 300 mg, TID  pantoprazole, 40 mg, Daily before breakfast  PARoxetine, 60 mg, Nightly  polyethylene glycol, 17 g, TID  ranolazine, 500 mg, BID  rosuvastatin, 20 mg, Nightly  sacubitriL-valsartan, 0.5 tablet, BID  sennosides-docusate sodium, 2 tablet, BID  spironolactone, 12.5 mg, Daily  tamsulosin, 0.4 mg, Daily  torsemide, 40 mg, Daily  traZODone, 50 mg, Nightly      PRN:  acetaminophen, 650 mg, q6h PRN  albuterol, 2 puff, q6h PRN  benzocaine-menthol, 1 lozenge, q2h PRN  diphenhydrAMINE, 50 mg, q5 min PRN  hydrOXYzine HCL, 25 mg, q6h PRN  LORazepam, 0.5 mg, q8h PRN  magnesium hydroxide, 30 mL, Daily PRN  oxygen, , Continuous PRN - O2/gases          PHYSICAL EXAM:   Visit Vitals  BP 93/56   Pulse 75   Temp 36 °C (96.8 °F) (Temporal)   Resp 15   Ht 1.702 m (5' 7\")   Wt 73.5 kg (162 lb 0.6 oz)   SpO2 94%   BMI " 25.38 kg/m²   Smoking Status Former   BSA 1.86 m²       Wt Readings from Last 5 Encounters:   08/26/24 73.5 kg (162 lb 0.6 oz)   08/19/24 80.9 kg (178 lb 5.6 oz)   08/08/24 80.7 kg (178 lb)   07/23/24 75.8 kg (167 lb)   07/14/24 72.2 kg (159 lb 2.8 oz)       INTAKE/OUTPUT:  I/O last 3 completed shifts:  In: 1440 (19.6 mL/kg) [P.O.:960; I.V.:480 (6.5 mL/kg)]  Out: 3225 (43.9 mL/kg) [Urine:3225 (1.2 mL/kg/hr)]  Weight: 73.5 kg      Physical Exam  Constitutional:       Appearance: Normal appearance.   HENT:      Head: Normocephalic.      Nose: Nose normal.      Mouth/Throat:      Mouth: Mucous membranes are moist.   Eyes:      Extraocular Movements: Extraocular movements intact.      Pupils: Pupils are equal, round, and reactive to light.   Cardiovascular:      Rate and Rhythm: Normal rate and regular rhythm.      Pulses: Normal pulses.      Comments: Paced rhythm   Pulmonary:      Effort: Pulmonary effort is normal. No respiratory distress.      Comments: Decreased breath sounds throughout.  Abdominal:      Palpations: Abdomen is soft.      Comments: Obese   Musculoskeletal:         General: No swelling.      Cervical back: Normal range of motion and neck supple.   Skin:     General: Skin is warm.   Neurological:      General: No focal deficit present.      Mental Status: He is alert and oriented to person, place, and time.   Psychiatric:         Mood and Affect: Mood normal.         Behavior: Behavior normal.         DATA:  CMP:  Results from last 7 days   Lab Units 08/26/24  0336 08/25/24  1735 08/25/24  0456 08/24/24  2027 08/24/24  0454 08/23/24  1808 08/22/24  1908 08/22/24  0638 08/20/24  2004 08/20/24  0636   SODIUM mmol/L 137 137 136 135* 138 137 139 138 137 139  139   POTASSIUM mmol/L 4.1 3.7 4.1 4.1 4.1 3.3* 4.1 3.6 4.6 4.0  4.0   CHLORIDE mmol/L 100 99 98 98 100 97* 98 97* 99 100  100   CO2 mmol/L 27 27 27 26 28 27 30 30 26 29  29   ANION GAP mmol/L 14 15 15 15 14 16 15 15 17 14  14   BUN mg/dL 20 20  "23 23 27* 27* 25* 22 20 22 22   CREATININE mg/dL 1.35* 1.42* 1.38* 1.45* 1.32* 1.44* 1.55* 1.28 1.47* 1.42*  1.42*   EGFR mL/min/1.73m*2 62 58* 60* 57* 63 57* 52* 66 56* 58*  58*   MAGNESIUM mg/dL 2.15  --  2.34  --  2.71* 2.15 2.21 2.15 2.26 2.24   ALBUMIN g/dL 3.5 3.6 3.6 3.6 3.5 3.8 3.7 3.7 3.8 3.4   ALT U/L 39  --  43  --  44  --   --   --   --   --    AST U/L 26  --  35  --  26  --   --   --   --   --    BILIRUBIN TOTAL mg/dL 0.9  --  0.9  --  0.9  --   --   --   --   --      CBC:  Results from last 7 days   Lab Units 08/26/24  0336 08/25/24  0456 08/24/24  0454 08/23/24  1808 08/22/24  1908 08/22/24  0638 08/20/24 2004 08/20/24  0636   WBC AUTO x10*3/uL 8.8 9.2 7.9 8.4 8.9 9.7 11.7* 8.3   HEMOGLOBIN g/dL 11.8* 12.0* 11.8* 13.0* 12.8* 12.8* 12.7* 12.1*   HEMATOCRIT % 35.6* 36.4* 34.9* 41.0 41.0 39.9* 39.5* 39.3*   PLATELETS AUTO x10*3/uL 309 311 318 348 362 342 352 297   MCV fL 85 87 85 90 91 90 88 91     COAG:   Results from last 7 days   Lab Units 08/22/24  0637   INR  1.2*     ABO:   No results found for: \"ABO\"    HEME/ENDO:  Results from last 7 days   Lab Units 08/20/24 2004 08/20/24  0636   FERRITIN ng/mL  --  132   IRON SATURATION %  --  13*   TSH mIU/L 6.40*  --       CARDIAC:   Results from last 7 days   Lab Units 08/24/24  0454 08/20/24  0636   [facility] ng/L 25  --    BNP pg/mL 2,047* 1,873*       ASSESSMENT AND PLAN:   58 y.o. male with PMH of ICM, s/p remote multivessel PCI's, LVEF reported at 35% back in 2006, 2008, 25 to 30% in 8/2022, s/p biventricular ICD, abdominal aortic aneurysm without rupture, HTN, HLD, PTSD, tobacco use, renal stones, s/p recent cystoscopy with urethral stent placement, recurrent episodes of VT ( including VT storm ) that failed ablation , ganglion blocks in the past who is transfer from floor to HF ICU on 8/22 overnight  due to sustained slow VT that required ATP; K level was low at the time of the VT. No further episodes of arrhythmia since then. EP consulted - no " further options. Thoracic signed off due to high risk for surgical procedure (surgical removing of ganglion blocks).  Will be presented for advanced therapies evaluation on Tuesday 8/27.    Neuro:  # Anxiety/Depression  # Insomnia  # PTSD  - Serial neuro and pain assessments   - PO Tylenol PRN for pain  - atarax 25 mg q6 PRN for anxiety   - cont home paroxetine 60 mg nightly, trazodone 50 mg nightly  - cont ativan 0.5 mg q8 hours, OARRS reviewed  - PT/OT Consult, OOB to chair  - CAM ICU score every shift  - Sleep/wake cycle normalization     # Physical Status  - BMI 27  -Age-related debility/Reduced Mobility due to HF     #Substance abuse  -Alcohol abuse/Alcohol denied  -Tobacco use/Nicotine: ex-smoker and quit for 6 month (per pt)     Cardiovascular:  # Acute on chronic Systolic and Diastolic heart failure  # Stage D HFrEF/ICM s/p CRT-D (11/2019)   - Hx of Cardiogenic shock last admission requiring IABP support  - TTE (5/28/2024): severely decreased LV systolic function, EF 15%, LVIDd 6.32, moderate-severe MVR, mild TR    - previously signed consent for OHT/LVAD workup, found not to be a candidate for OHT given active tobacco use and severe emphysema at the time, also was not interested in LVAD at that time, workup stopped per Dr. Doyle 6/5--> Now open up again for LVAD only.   - admit BNP: 1873   - admit CXR: prominent interstitial markings suggesting pulmonary interstitial edema. Small to moderate right pleural effusion and superimposed right basilar atelectasis.  - admit wt: 81.3 kg, per pt dry wt ~73 kg (160 lb)  - daily wt (8/26): 73.5kg   - per pt, has not smoked since 6/2024  - To present on Tuesday 8/27 for advanced therapies (LVAD) evaluation   - Palliative Med/Pulmonology consulted, appreciate recs  - s/p diuresis with IV lasix boluses--> switched to torsemide 40 mg daily 8/22 (Home torsemide 20 mg daily)  - C/w  home meds: Entresto 12-13 mg BID, dapagliflozin 10 mg daily, spironolactone 12.5 mg  daily, Toprol XL 12.5 mg daily  - Daily standing weights, 2gm sodium diet, 2L fluid restriction, strict I&Os     CAD  s/p multiple PCIs  - [facility] (5/23/24): dLM: 10-30% stenosis; Prox and mid LAD [id number]%; Prox CX Lesion 100%; Right Coronary Artery: fills via collaterals; Proximal %; LVEF 10%  - Cont ASA 81, rosuvastatin 20 mg nightly     #Prior VT storm   # Recurrent slow VT  - s/p VT ablation 5/30, stellate ganglion block 6/4, VT ablation 6/6, stellate ganglion block 6/11  - Device interrogation completed 8/20 went into prolonged slow VT, self-resolved during device interrogation  - EP consulted  rec possible stellate ganglion removal/ denervation ( block would be via anesthesiology)  - Thoracic surgery consulted  (8/23): signed OFF due to high risk for surgical procedure (surgical removing of ganglion blocks). Will re-engage  if required.  - Cont home ranexa 500 mg BID, amiodarone 200 mg BID, mexiletine 300 TID     #Electrolyte Disturbances  - Keep Mg >2 and K >4     Pulmonary:   #Severe emphysema  #COPD   #ex-smoker  - CT chest (6/4): severe emphysematous changes.   - admit CXR: prominent interstitial markings suggesting pulmonary interstitial edema. Small to mod R pleural effusion and superimposed  basilar atelectasis.  - Pulmonology consult: start LAMA/ LABA for maintenance therapy   - continue PRN albuterol  - PFT's completed 8/21: FEV1 58% predicted, FEV1/FVC 67% predicted  - Monitor and maintain SpO2 > 92%  - attempting to wean supplemental O2> Spo2 dropped to 88% while ambulating 8/22. Will need home going oxygen on discharge.     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  - baseline GFR ~50-60  - admit Cr 1.65  - daily Cr (8/26):  1.35, stable   - diuresis as above  -I/Os  -avoid hypotension and nephrotoxic agents     Heme:  # Iron Deficiency Anemia in the setting with chronic HF and  CKD   - admit hgb 12.1, stable  - Cont home PO iron supplement  - Labs: Hgb:  11.8, TIBC: 415, ferritin: 132, serum Fe: 52, %sat: 13%  - Ferous sulfate started on the floor - holding while receiving venofer  - S/p IV Venofer 200 mg x 5 days (8/23-8/27)       Endo:  #DM  - Euglycemic: Controlled blood glucose  - hgbA1c (6/04/2024): 6.0     #  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  - not currently on synthroid   - TSH ( 8/20): 6.40 and free T4 1.55H  - Endocrine consulted 8/22, monitor via repeat TFTs (TSH, FT4, FT3 in 4wks).       ID:  -afebrile, nontoxic   -no s/s infx  -trend temps q4h     PHYSICAL AND OCCUPATIONAL THERAPY: Ordered    LINES:  PIVs    DVT: Enoxaparin injections  VAP BUNDLE: NA  CENTRAL LINE BUNDLE: NA  ULCER PPX: Pantoprazole PO 40 mg daily  GLYCEMIC CONTROL: NA  BOWEL CARE: Agnieszka-Colace 2 tablet twice daily + Miralax TID  INDWELLING CATHETER: NA  NUTRITION: Adult diet Cardiac; 70 gm fat; 2 - 3 grams Sodium; 1500 mL fluid      EMERGENCY CONTACT: Extended Emergency Contact Information  Primary Emergency Contact: Judie Sapp  Address: [address] [place] [address] [place]  Home Phone: [phone]  Mobile Phone: [phone]  Relation: Mother  Preferred language: English   needed? No  Secondary Emergency Contact: Keiry Greene  Address: [address] [place]  Mobile Phone: [phone]  Relation: Sibling  Preferred language: English   needed? No  FAMILY UPDATE: Patient, significant other, and parents at the bedside   CODE STATUS: Full Code    DISPO:  HF ICU     Patient seen and assessed with Dr. Peguero    I personally spent 60 minutes of critical care time directly and personally managing the patient exclusive of separately billable procedures   _________________________________________________  Pina Morrow DO

## 2024-08-26 NOTE — PROGRESS NOTES
Social Work Progress note:   -HF ICU Treatment Plan: On 8/23 the patient was transferred back to the HFICU due to another episode of slow VT; Will be presented for advance therapy evaluation on 8/27  -Additional information: none   - Support: motherKristy is listed as NOK  - Payer: [employer] Medicare  -Planned Disposition: Rehab eval - No needs   Sw reviewed the SDOH and the Social Work discharge planning assessment that was completed. Per the assessment there are no SDOH that were identified.     - Barriers to discharge:  None noted at this time. Sw will continue to follow for discharge planning needs.  Jamila Cosby, KEMIA, LSW

## 2024-08-26 NOTE — CARE PLAN
The clinical goals for the shift include pt will remain hds throughout the shift      Problem: Pain - Adult  Goal: Verbalizes/displays adequate comfort level or baseline comfort level  Outcome: Progressing     Problem: Safety - Adult  Goal: Free from fall injury  Outcome: Progressing     Problem: Chronic Conditions and Co-morbidities  Goal: Patient's chronic conditions and co-morbidity symptoms are monitored and maintained or improved  Outcome: Progressing     Problem: Heart Failure  Goal: Improved gas exchange this shift  Outcome: Progressing  Goal: Weight from fluid excess reduced over 2-3 days, then stabilize  Outcome: Progressing     Problem: Fall/Injury  Goal: Not fall by end of shift  Outcome: Progressing

## 2024-08-26 NOTE — PROGRESS NOTES
Cristian Sapp is a 58 y.o. male on day 7 of admission presenting with Acute on chronic combined systolic and diastolic heart failure (Multi).  KARISSA met with pt and his mother and father along with Shanna Avalos CNP today to discuss GOC.  This pt is known to SW from his last hospitalization.  Pt is being worked up for LVAD and will be presented  for advanced therapy 8/27, tomorrow. SW to follow for continued assessment, support and encouragement throughout hospitalization.       GERDA MARINELLI

## 2024-08-26 NOTE — PROGRESS NOTES
HFICU Attending Note    Principal Problem:    Acute on chronic combined systolic and diastolic heart failure (Multi)  Active Problems:    Chronic systolic heart failure (Multi)    ICD (implantable cardioverter-defibrillator) in place    Primary hypertension    Chronic obstructive pulmonary disease (Multi)    CAD S/P percutaneous coronary angioplasty    PTSD (post-traumatic stress disorder)    Centrilobular emphysema (Multi)    VT (ventricular tachycardia) (Multi)      58M f/b Dr. Brown for iCM/HFrEF s/p multiple PCIs and BiV-ICD, recent tobacco use (now quit) c/b COPD/emphysema admitted with current VT despite ablation/ganglion block now undergoing advanced therapies evaluation.     Complete workup  Presentation Tuesday 8/27      This critically ill patient continues to be at-risk for clinically significant deterioration / failure due to the above mentioned dysfunctional, unstable organ systems.  I have personally identified and managed all complex critical care issues to prevent aforementioned clinical deterioration.  Critical care time is spent at bedside and/or the immediate area and has included, but is not limited to, the review of diagnostic tests, labs, radiographs, serial assessments of hemodynamics, respiratory status, ventilatory management, and family updates.  Time spent in procedures and teaching are reported separately.    Critical care time: __40__ minutes     Objective    Cristian Sapp/[id number]  Admit Date: 8/19/2024  Hospital Length of Stay: 7   ICU Length of Stay: 2d 8h     MEDICATIONS  Infusions:     Scheduled:  amiodarone, 200 mg, BID  aspirin, 81 mg, Daily  dapagliflozin propanediol, 10 mg, Daily  enoxaparin, 40 mg, q24h  [Held by provider] ferrous sulfate (325 mg ferrous sulfate), 65 mg of iron, Daily with breakfast  tiotropium, 2 puff, Daily   And  formoterol, 20 mcg, q12h  [START ON 8/27/2024] iron sucrose, 200 mg, Daily  metoprolol succinate XL, 12.5 mg, Daily  mexiletine, 300 mg,  TID  pantoprazole, 40 mg, Daily before breakfast  PARoxetine, 60 mg, Nightly  polyethylene glycol, 17 g, TID  ranolazine, 500 mg, BID  rosuvastatin, 20 mg, Nightly  sacubitriL-valsartan, 0.5 tablet, BID  sennosides-docusate sodium, 2 tablet, BID  spironolactone, 12.5 mg, Daily  tamsulosin, 0.4 mg, Daily  torsemide, 40 mg, Daily  traZODone, 50 mg, Nightly      PRN:  acetaminophen, 650 mg, q6h PRN  albuterol, 2 puff, q6h PRN  benzocaine-menthol, 1 lozenge, q2h PRN  diphenhydrAMINE, 50 mg, q5 min PRN  hydrOXYzine HCL, 25 mg, q6h PRN  LORazepam, 0.5 mg, q8h PRN  magnesium hydroxide, 30 mL, Daily PRN  oxygen, , Continuous PRN - O2/gases        Prior to Admission Meds:  Medications Prior to Admission   Medication Sig Dispense Refill Last Dose    amiodarone (Pacerone) 200 mg tablet Take 1 tablet (200 mg) by mouth 2 times a day. 180 tablet 3 8/19/2024    aspirin 81 mg EC tablet Take 1 tablet (81 mg) by mouth once daily.   8/19/2024    dapagliflozin propanediol (Farxiga) 10 mg Take 1 tablet (10 mg) by mouth once daily. 30 tablet 0 8/19/2024    magnesium oxide (Mag-Ox) 400 mg (241.3 mg magnesium) tablet Take 1 tablet (400 mg) by mouth once daily. (Patient taking differently: Take 1 tablet (400 mg) by mouth 2 times a day.) 90 tablet 3 Past Week    metoprolol succinate XL (Toprol-XL) 25 mg 24 hr tablet Take 0.5 tablets (12.5 mg) by mouth once daily. Do not crush or chew. 45 tablet 0 8/19/2024    mexiletine (Mexitil) 150 mg capsule Take 2 capsules (300 mg) by mouth 3 times a day. 540 capsule 0 8/19/2024    PARoxetine (Paxil) 30 mg tablet Take 2 tablets (60 mg) by mouth once daily at bedtime. (Patient taking differently: Take 1 tablet (30 mg) by mouth 2 times a day.) 60 tablet 0 8/19/2024    potassium chloride (Klor-Con) 20 mEq packet Take 20 mEq by mouth once daily. (Patient taking differently: Take 20 mEq by mouth if needed.) 90 packet 3 Past Week    ranolazine (Ranexa) 500 mg 12 hr tablet Take 1 tablet (500 mg) by mouth 2  times a day. Do not crush, chew, or split. 180 tablet 3 8/19/2024    rosuvastatin (Crestor) 20 mg tablet Take 1 tablet (20 mg) by mouth once daily at bedtime. 90 tablet 3 8/19/2024    sacubitriL-valsartan (Entresto) 24-26 mg tablet Take 0.5 tablets by mouth 2 times a day. 30 tablet 0 8/19/2024    spironolactone (Aldactone) 25 mg tablet Take 0.5 tablets (12.5 mg) by mouth once daily. 45 tablet 3 8/19/2024    tamsulosin (Flomax) 0.4 mg 24 hr capsule Take 1 capsule (0.4 mg) by mouth once daily.   8/19/2024    traZODone (Desyrel) 50 mg tablet Take 1 tablet (50 mg) by mouth once daily at bedtime.   8/19/2024    albuterol sulfate (Proair Digihaler) 90 mcg/actuation aero powdr breath act w/sensor inhaler Inhale 2 puffs every 4 hours if needed for wheezing.   8/16/2024    ferrous sulfate, 325 mg ferrous sulfate, tablet Take 1 tablet by mouth once daily with breakfast.   8/17/2024    furosemide (Lasix) 10 mg/mL injection Infuse 4 mL (40 mg) into a venous catheter 2 times a day. (Patient not taking: Reported on 8/20/2024) 240 mL 0 Not Taking    LORazepam (Ativan) 0.5 mg tablet Take 1 tablet (0.5 mg) by mouth once daily as needed for anxiety. Take 1 tablet daily as needed up to 1.5mg       nitroglycerin (Nitrostat) 0.4 mg SL tablet Place 1 tablet (0.4 mg) under the tongue every 5 minutes if needed for chest pain.   More than a month       Invasive Hemodynamics:    Most Recent Range Past 24hrs   BP (Art)   No data recorded   MAP(Art)   No data recorded   RA/CVP   No data recorded   PA   No data recorded   PA(mean)   No data recorded   PCWP   No data recorded   CO   No data recorded   CI   No data recorded   Mixed Venous   No data recorded   SVR    No data recorded   PVR   No data recorded     MCS:   Heart Mate III:     Most Recent Range Past 24hrs   Flow   No data recorded   Speed   No data recorded   Power   No data recorded   PI   No data recorded     ECMO:     Most Recent Range Past 24hrs   Flow   No data recorded   Speed    "No data recorded   Sweep   No data recorded     Impella:      Most Recent Range Past 24hrs   Performance Level   No data recorded   Flow (L/min)   No data recorded   Motor Current   No data recorded   Placement Signal    Placement OK could not be evaluated. This SmartLink does not work with rows of the type: Custom List   Purge (mmHg)   No data recorded   Purge rate (mL/hr)   No data recorded     VENT:    Most Recent Range Past 24hrs   Mode      FiO2 28 % FiO2 (%)  Min: 28 %   Min taken time: 08/26/24 0000  Max: 28 %   Max taken time: 08/26/24 0000   Rate   No data recorded   Vt    No data recorded   PEEP   No data recorded         8/26/2024     7:00 AM 8/26/2024     6:00 AM 8/26/2024     5:00 AM 8/26/2024     4:00 AM 8/26/2024     3:00 AM 8/26/2024     2:00 AM 8/26/2024     1:00 AM   Vitals   Systolic 93 100 93 90 95 90 92   Diastolic 56 70 68 65 64 69 66   Heart Rate 75 75 75 75 75 75 75   Temp    36 °C (96.8 °F)      Resp 15 17 14 14 18 14 19   Weight (lb)  162.04        BMI  25.38 kg/m2        BSA (m2)  1.86 m2          Visit Vitals  BP 93/56   Pulse 75   Temp 36 °C (96.8 °F) (Temporal)   Resp 15   Ht 1.702 m (5' 7\")   Wt 73.5 kg (162 lb 0.6 oz)   SpO2 94%   BMI 25.38 kg/m²   Smoking Status Former   BSA 1.86 m²     Wt Readings from Last 5 Encounters:   08/26/24 73.5 kg (162 lb 0.6 oz)   08/19/24 80.9 kg (178 lb 5.6 oz)   08/08/24 80.7 kg (178 lb)   07/23/24 75.8 kg (167 lb)   07/14/24 72.2 kg (159 lb 2.8 oz)       Intake/Output Summary (Last 24 hours) at 8/26/2024 0804  Last data filed at 8/26/2024 0700  Gross per 24 hour   Intake 960 ml   Output 2440 ml   Net -1480 ml     CHEST: Unlabored, Diminished  CV:  A-V Sequential paced  ABD:  Distended Nontender Bowel sounds: All quadrants, Flatus: Yes  EXT:   RLE:  ,   DP: Moderate  PT: Moderate  LLE:  ,   DP: Moderate  PT: Moderate  NEURO:   RASS: Alert and calm  CAM: Negative  LOC: Alert  Cognition: Follows commands  GCS: 15    DATA:  CMP:  Recent Labs     " 08/26/24  0336 08/25/24  1735 08/25/24  0456 08/24/24 2027 08/24/24  0454 08/23/24 1808 08/22/24  1908 08/22/24  0638 08/20/24 2004 08/20/24  0636 08/19/24  0150 08/08/24  1439    137 136 135* 138 137 139 138 137 139  139 135* 138   K 4.1 3.7 4.1 4.1 4.1 3.3* 4.1 3.6 4.6 4.0  4.0 4.1 3.9    99 98 98 100 97* 98 97* 99 100  100 98 101   CO2 27 27 27 26 28 27 30 30 26 29  29 27 28   ANIONGAP 14 15 15 15 14 16 15 15 17 14  14 14 13   BUN 20 20 23 23 27* 27* 25* 22 20 22  22 23 24*   CREATININE 1.35* 1.42* 1.38* 1.45* 1.32* 1.44* 1.55* 1.28 1.47* 1.42*  1.42* 1.65* 1.39*   EGFR 62 58* 60* 57* 63 57* 52* 66 56* 58*  58* 48* 59*   MG 2.15  --  2.34  --  2.71* 2.15 2.21 2.15 2.26 2.24 2.00 2.18     Recent Labs     08/26/24  0336 08/25/24 1735 08/25/24 0456 08/24/24 2027 08/24/24 0454 08/23/24 1808 08/22/24 1908 08/22/24  0638 08/20/24  0636 08/19/24  0150 08/08/24  1439 07/12/24  0231 06/09/24  0324 06/08/24 2123 06/07/24  0412 06/06/24 2220 05/28/24  1503 05/22/24 2128   ALBUMIN 3.5 3.6 3.6 3.6 3.5 3.8 3.7 3.7   < > 3.7 3.6 3.8   < > 3.6   < > 3.4   < > 4.2   ALT 39  --  43  --  44  --   --   --   --  83* 73* 119*  --  77*  --  40   < > 26   AST 26  --  35  --  26  --   --   --   --  62* 44* 57*  --  57*  --  188*   < > 15   BILITOT 0.9  --  0.9  --  0.9  --   --   --   --  1.0 0.8 0.5  --  0.6  --  0.9   < > 1.1   LIPASE  --   --   --   --   --   --   --   --   --   --   --  21  --   --   --   --   --  19    < > = values in this interval not displayed.     CBC:  Recent Labs     08/26/24  0336 08/25/24  0456 08/24/24  0454 08/23/24  1808 08/22/24  1908 08/22/24  0638 08/20/24  2004 08/20/24  0636   WBC 8.8 9.2 7.9 8.4 8.9 9.7 11.7* 8.3   HGB 11.8* 12.0* 11.8* 13.0* 12.8* 12.8* 12.7* 12.1*   HCT 35.6* 36.4* 34.9* 41.0 41.0 39.9* 39.5* 39.3*    311 318 348 362 342 352 297   MCV 85 87 85 90 91 90 88 91     COAG:   Recent Labs     08/22/24  0637 08/19/24  0150 06/10/24  0312  "06/09/24  1131 06/09/24  0324 06/08/24 2120 06/08/24  0508 06/07/24 1957 06/07/24  0918 06/06/24  2330 06/06/24 2220 06/04/24 2013 06/04/24  1255 05/31/24  1345 05/29/24  0518 05/28/24  1503 05/22/24 2128   INR 1.2* 1.2*  --   --   --   --   --   --  1.3*  --  1.5*  --  1.3*  --  1.1 1.2* 1.1   HAUF  --   --  0.3 0.3 0.3 0.1 0.4   < >  --    < >  --    < >  --    < >  --   --   --     < > = values in this interval not displayed.     ABO:   Recent Labs     08/22/24  0638   ABO A     HEME/ENDO:   Recent Labs     08/20/24 2004 08/20/24 0636 08/08/24 1439 06/05/24  0223 06/04/24  1255 06/04/24  0307 05/23/24  0711   FERRITIN  --  132  --  99  --   --   --    IRONSAT  --  13*  --  18*  --   --   --    TSH 6.40*  --  6.75*  --  10.35*  --  5.55*   HGBA1C  --   --   --   --   --  6.0*  --      CARDIAC:   Recent Labs     08/24/24 0454 08/20/24 0636 08/19/24  0239 08/19/24  0150 08/08/24  1439 07/12/24  0559 07/12/24  0231 06/07/24  0412 06/06/24 2220 06/04/24  1255 06/03/24 1741 05/31/24 1742 05/31/24  1523 05/28/24  1503 05/23/24  0711   LDH  --   --   --   --   --   --   --  408* 467* 145  --   --   --   --   --    TROPHS 25  --  26* 23*  --  17 17  --   --   --   --  2,261* 2,395*  --  342*   BNP 2,047* 1,873*  --  1,980* 2,032*  --  774*  --   --  674* 655*  --   --  320*  --      Recent Labs     06/09/24  0515 06/08/24  2127 06/08/24  1837 06/08/24  1136 06/08/24  0556 06/07/24  0915 06/07/24  0852 06/07/24  0539 06/07/24  0421 06/07/24  0206   LACMX 0.7  --  0.8  --  0.6 2.0  --   --   --   --    LACTATEART  --  3.1*  --  1.1  --   --  2.0  --  1.7 2.0   SO2MV 67  --  53  --  68 63  --    < >  --   --     < > = values in this interval not displayed.     No results for input(s): \"TACROLIMUS\", \"SIROLIMUS\", \"CYCLOSPORINE\" in the last 15262 hours.  Recent Labs     08/08/24  1439   CHOL 114   LDLCALC 56   HDL 26.1   TRIG 162*     MICRO:   Recent Labs     06/05/24  0756 06/03/24  0205 05/23/24  0711   PROCAL " 0.06 0.06 0.06     No results found for the last 90 days.      LDA:   NUTRITION: Adult diet Cardiac; 70 gm fat; 2 - 3 grams Sodium; 1500 mL fluid  EMERGENCY CONTACT: Extended Emergency Contact Information  Primary Emergency Contact: Judie Sapp  Address: [address] [place] [address] [facility]  Home Phone: [phone]  Mobile Phone: [phone]  Relation: Mother  Preferred language: English   needed? No  Secondary Emergency Contact: Keiry Greene  Address: [address] [place], [place]  Mobile Phone: [phone]  Relation: Sibling  Preferred language: English   needed? No  CODE STATUS: Full Code  DISPO: Discharge Planning  Living Arrangements: Parent  Support Systems: Family members  Assistance Needed: Needs assist  Type of Residence: Private residence  Number of Stairs to Enter Residence: 2  Number of Stairs Within Residence: 0  Do you have animals or pets at home?: No  Who is requesting discharge planning?: Provider  Home or Post Acute Services: Other (Comment) (unknown at this time)  Expected Discharge Disposition: Home or Self Care  FOLLOWUP:   Future Appointments   Date Time Provider Department Center   8/26/2024 11:15 AM Austin Neves MD [id number] [facility]   9/11/2024  8:30 AM Zachary Sparks MD [id number] [facility]   9/18/2024 12:40 PM [facility] [id number] [place] [address]   9/18/2024  1:15 PM Eliot Wooten MD [id number] [facility]   10/15/2024 10:00 AM Eamon Rosa MD [id number] [facility]   10/31/2024  1:20 PM Arya Brown MD [id number] [facility]

## 2024-08-27 ENCOUNTER — COMMITTEE REVIEW (OUTPATIENT)
Dept: TRANSPLANT | Facility: HOSPITAL | Age: 59
End: 2024-08-27
Payer: MEDICARE

## 2024-08-27 ENCOUNTER — APPOINTMENT (OUTPATIENT)
Dept: VASCULAR MEDICINE | Facility: HOSPITAL | Age: 59
End: 2024-08-27
Payer: MEDICARE

## 2024-08-27 ENCOUNTER — HOSPITAL ENCOUNTER (INPATIENT)
Age: 59
End: 2024-08-27
Attending: THORACIC SURGERY (CARDIOTHORACIC VASCULAR SURGERY) | Admitting: THORACIC SURGERY (CARDIOTHORACIC VASCULAR SURGERY)
Payer: MEDICARE

## 2024-08-27 ENCOUNTER — APPOINTMENT (OUTPATIENT)
Dept: RADIOLOGY | Facility: HOSPITAL | Age: 59
End: 2024-08-27
Payer: MEDICARE

## 2024-08-27 LAB
ALBUMIN SERPL BCP-MCNC: 3.5 G/DL (ref 3.4–5)
ALP SERPL-CCNC: 125 U/L (ref 33–120)
ALT SERPL W P-5'-P-CCNC: 37 U/L (ref 10–52)
ANION GAP SERPL CALC-SCNC: 15 MMOL/L (ref 10–20)
AST SERPL W P-5'-P-CCNC: 24 U/L (ref 9–39)
BASOPHILS # BLD AUTO: 0.12 X10*3/UL (ref 0–0.1)
BASOPHILS NFR BLD AUTO: 1.3 %
BILIRUB SERPL-MCNC: 1.1 MG/DL (ref 0–1.2)
BUN SERPL-MCNC: 21 MG/DL (ref 6–23)
CALCIUM SERPL-MCNC: 9 MG/DL (ref 8.6–10.6)
CHLORIDE SERPL-SCNC: 98 MMOL/L (ref 98–107)
CO2 SERPL-SCNC: 28 MMOL/L (ref 21–32)
CREAT SERPL-MCNC: 1.35 MG/DL (ref 0.5–1.3)
EGFRCR SERPLBLD CKD-EPI 2021: 62 ML/MIN/1.73M*2
EOSINOPHIL # BLD AUTO: 0.34 X10*3/UL (ref 0–0.7)
EOSINOPHIL NFR BLD AUTO: 3.7 %
ERYTHROCYTE [DISTWIDTH] IN BLOOD BY AUTOMATED COUNT: 19.6 % (ref 11.5–14.5)
GLUCOSE SERPL-MCNC: 102 MG/DL (ref 74–99)
HCT VFR BLD AUTO: 34.7 % (ref 41–52)
HGB BLD-MCNC: 11.8 G/DL (ref 13.5–17.5)
IMM GRANULOCYTES # BLD AUTO: 0.06 X10*3/UL (ref 0–0.7)
IMM GRANULOCYTES NFR BLD AUTO: 0.7 % (ref 0–0.9)
LYMPHOCYTES # BLD AUTO: 1.42 X10*3/UL (ref 1.2–4.8)
LYMPHOCYTES NFR BLD AUTO: 15.4 %
MAGNESIUM SERPL-MCNC: 2.08 MG/DL (ref 1.6–2.4)
MCH RBC QN AUTO: 28.3 PG (ref 26–34)
MCHC RBC AUTO-ENTMCNC: 34 G/DL (ref 32–36)
MCV RBC AUTO: 83 FL (ref 80–100)
MONOCYTES # BLD AUTO: 1.08 X10*3/UL (ref 0.1–1)
MONOCYTES NFR BLD AUTO: 11.7 %
NEUTROPHILS # BLD AUTO: 6.19 X10*3/UL (ref 1.2–7.7)
NEUTROPHILS NFR BLD AUTO: 67.2 %
NRBC BLD-RTO: 0.2 /100 WBCS (ref 0–0)
PHOSPHATE SERPL-MCNC: 3.7 MG/DL (ref 2.5–4.9)
PLATELET # BLD AUTO: 312 X10*3/UL (ref 150–450)
POTASSIUM SERPL-SCNC: 4 MMOL/L (ref 3.5–5.3)
PROT SERPL-MCNC: 6.3 G/DL (ref 6.4–8.2)
RBC # BLD AUTO: 4.17 X10*6/UL (ref 4.5–5.9)
SODIUM SERPL-SCNC: 137 MMOL/L (ref 136–145)
WBC # BLD AUTO: 9.2 X10*3/UL (ref 4.4–11.3)

## 2024-08-27 PROCEDURE — 2500000001 HC RX 250 WO HCPCS SELF ADMINISTERED DRUGS (ALT 637 FOR MEDICARE OP): Performed by: NURSE PRACTITIONER

## 2024-08-27 PROCEDURE — 99233 SBSQ HOSP IP/OBS HIGH 50: CPT | Performed by: NURSE PRACTITIONER

## 2024-08-27 PROCEDURE — 2500000005 HC RX 250 GENERAL PHARMACY W/O HCPCS: Performed by: NURSE PRACTITIONER

## 2024-08-27 PROCEDURE — 93922 UPR/L XTREMITY ART 2 LEVELS: CPT | Performed by: SURGERY

## 2024-08-27 PROCEDURE — 84100 ASSAY OF PHOSPHORUS: CPT | Performed by: NURSE PRACTITIONER

## 2024-08-27 PROCEDURE — 93979 VASCULAR STUDY: CPT | Performed by: SURGERY

## 2024-08-27 PROCEDURE — 99291 CRITICAL CARE FIRST HOUR: CPT | Performed by: STUDENT IN AN ORGANIZED HEALTH CARE EDUCATION/TRAINING PROGRAM

## 2024-08-27 PROCEDURE — 2500000002 HC RX 250 W HCPCS SELF ADMINISTERED DRUGS (ALT 637 FOR MEDICARE OP, ALT 636 FOR OP/ED): Performed by: NURSE PRACTITIONER

## 2024-08-27 PROCEDURE — 2500000001 HC RX 250 WO HCPCS SELF ADMINISTERED DRUGS (ALT 637 FOR MEDICARE OP): Performed by: STUDENT IN AN ORGANIZED HEALTH CARE EDUCATION/TRAINING PROGRAM

## 2024-08-27 PROCEDURE — 94640 AIRWAY INHALATION TREATMENT: CPT

## 2024-08-27 PROCEDURE — 93922 UPR/L XTREMITY ART 2 LEVELS: CPT

## 2024-08-27 PROCEDURE — 83735 ASSAY OF MAGNESIUM: CPT | Performed by: NURSE PRACTITIONER

## 2024-08-27 PROCEDURE — 85025 COMPLETE CBC W/AUTO DIFF WBC: CPT | Performed by: NURSE PRACTITIONER

## 2024-08-27 PROCEDURE — 80053 COMPREHEN METABOLIC PANEL: CPT | Performed by: NURSE PRACTITIONER

## 2024-08-27 PROCEDURE — 93880 EXTRACRANIAL BILAT STUDY: CPT

## 2024-08-27 PROCEDURE — 2500000004 HC RX 250 GENERAL PHARMACY W/ HCPCS (ALT 636 FOR OP/ED): Performed by: NURSE PRACTITIONER

## 2024-08-27 PROCEDURE — 76700 US EXAM ABDOM COMPLETE: CPT

## 2024-08-27 PROCEDURE — 36415 COLL VENOUS BLD VENIPUNCTURE: CPT | Performed by: NURSE PRACTITIONER

## 2024-08-27 PROCEDURE — 2500000004 HC RX 250 GENERAL PHARMACY W/ HCPCS (ALT 636 FOR OP/ED)

## 2024-08-27 PROCEDURE — 93880 EXTRACRANIAL BILAT STUDY: CPT | Performed by: SURGERY

## 2024-08-27 PROCEDURE — 93979 VASCULAR STUDY: CPT

## 2024-08-27 PROCEDURE — 2020000001 HC ICU ROOM DAILY

## 2024-08-27 RX ORDER — METOPROLOL SUCCINATE 25 MG/1
25 TABLET, EXTENDED RELEASE ORAL DAILY
Status: DISCONTINUED | OUTPATIENT
Start: 2024-08-27 | End: 2024-08-30 | Stop reason: HOSPADM

## 2024-08-27 ASSESSMENT — COGNITIVE AND FUNCTIONAL STATUS - GENERAL
MOBILITY SCORE: 24
DAILY ACTIVITIY SCORE: 24

## 2024-08-27 ASSESSMENT — PAIN SCALES - GENERAL
PAINLEVEL_OUTOF10: 0 - NO PAIN

## 2024-08-27 ASSESSMENT — PAIN - FUNCTIONAL ASSESSMENT
PAIN_FUNCTIONAL_ASSESSMENT: 0-10

## 2024-08-27 NOTE — PROGRESS NOTES
[facility]  HFICU PROGRESS NOTE    Cristian Sapp/[id number]    Admit Date: 8/19/2024  Hospital Length of Stay: 8   ICU Length of Stay: 3d 12h   Primary Service: Advanced Heart Failure - HFICU  Primary HF Cardiologist: Dr. Brown  Referring: Dr. Doyle     INTERVAL EVENTS / PERTINENT ROS:     Overnight, no further arrhythmia noted on telemetry review.  Feels well this AM. No chest discomfort, presyncopal symptoms, worsening SOB. Tolerating diet.   Discussed at advance therapies evaluation. Not a candidate for LVAD or OHT. Consider dual heart and lung transplant and further evaluation of this.     Plan:  - Vascular studies/abdominal US ordered for advanced therapies evaluation.   - Will be presented for advanced therapies evaluation on Tuesday.  - Consult pulmonology for consideration of dual heart/lung transplant evaluation.     MEDICATIONS  Infusions:     Scheduled:  amiodarone, 200 mg, BID  aspirin, 81 mg, Daily  dapagliflozin propanediol, 10 mg, Daily  enoxaparin, 40 mg, q24h  [Held by provider] ferrous sulfate (325 mg ferrous sulfate), 65 mg of iron, Daily with breakfast  tiotropium, 2 puff, Daily   And  formoterol, 20 mcg, q12h  iron sucrose, 200 mg, Daily  metoprolol succinate XL, 12.5 mg, Daily  mexiletine, 300 mg, TID  pantoprazole, 40 mg, Daily before breakfast  PARoxetine, 60 mg, Nightly  polyethylene glycol, 17 g, TID  ranolazine, 500 mg, BID  rosuvastatin, 20 mg, Nightly  sacubitriL-valsartan, 0.5 tablet, BID  sennosides-docusate sodium, 2 tablet, BID  spironolactone, 12.5 mg, Daily  tamsulosin, 0.4 mg, Daily  torsemide, 40 mg, Daily  traZODone, 50 mg, Nightly      PRN:  acetaminophen, 650 mg, q6h PRN  albuterol, 2 puff, q6h PRN  benzocaine-menthol, 1 lozenge, q2h PRN  diphenhydrAMINE, 50 mg, q5 min PRN  hydrOXYzine HCL, 25 mg, q6h PRN  LORazepam, 0.5 mg, q8h PRN  magnesium hydroxide, 30 mL, Daily PRN  oxygen, , Continuous PRN - O2/gases          PHYSICAL EXAM:   Visit  "Vitals  BP (!) 95/43   Pulse 75   Temp 36.1 °C (97 °F)   Resp 23   Ht 1.702 m (5' 7\")   Wt 73 kg (160 lb 15 oz)   SpO2 (!) 89%   BMI 25.21 kg/m²   Smoking Status Former   BSA 1.86 m²       Wt Readings from Last 5 Encounters:   08/27/24 73 kg (160 lb 15 oz)   08/19/24 80.9 kg (178 lb 5.6 oz)   08/08/24 80.7 kg (178 lb)   07/23/24 75.8 kg (167 lb)   07/14/24 72.2 kg (159 lb 2.8 oz)       INTAKE/OUTPUT:  I/O last 3 completed shifts:  In: 1191 (16.3 mL/kg) [P.O.:1191]  Out: 3485 (47.7 mL/kg) [Urine:3485 (1.3 mL/kg/hr)]  Weight: 73 kg      Physical Exam  Constitutional:       Appearance: Normal appearance.   HENT:      Head: Normocephalic.      Nose: Nose normal.      Mouth/Throat:      Mouth: Mucous membranes are moist.   Eyes:      Extraocular Movements: Extraocular movements intact.      Pupils: Pupils are equal, round, and reactive to light.   Cardiovascular:      Rate and Rhythm: Normal rate and regular rhythm.      Pulses: Normal pulses.      Comments: Paced rhythm   Pulmonary:      Effort: Pulmonary effort is normal. No respiratory distress.      Comments: Decreased breath sounds throughout.  Abdominal:      Palpations: Abdomen is soft.      Comments: Obese   Musculoskeletal:         General: No swelling.      Cervical back: Normal range of motion and neck supple.   Skin:     General: Skin is warm.   Neurological:      General: No focal deficit present.      Mental Status: He is alert and oriented to person, place, and time.   Psychiatric:         Mood and Affect: Mood normal.         Behavior: Behavior normal.         DATA:  CMP:  Results from last 7 days   Lab Units 08/27/24  0509 08/26/24  1744 08/26/24  0336 08/25/24  1735 08/25/24  0456 08/24/24  2027 08/24/24  0454 08/23/24  1808 08/22/24  1908 08/22/24  0638 08/20/24 2004   SODIUM mmol/L 137 136 137 137 136 135* 138 137 139 138 137   POTASSIUM mmol/L 4.0 4.0 4.1 3.7 4.1 4.1 4.1 3.3* 4.1 3.6 4.6   CHLORIDE mmol/L 98 97* 100 99 98 98 100 97* 98 97* 99   CO2 " "mmol/L 28 26 27 27 27 26 28 27 30 30 26   ANION GAP mmol/L 15 17 14 15 15 15 14 16 15 15 17   BUN mg/dL 21 19 20 20 23 23 27* 27* 25* 22 20   CREATININE mg/dL 1.35* 1.44* 1.35* 1.42* 1.38* 1.45* 1.32* 1.44* 1.55* 1.28 1.47*   EGFR mL/min/1.73m*2 62 57* 62 58* 60* 57* 63 57* 52* 66 56*   MAGNESIUM mg/dL 2.08  --  2.15  --  2.34  --  2.71* 2.15 2.21 2.15 2.26   ALBUMIN g/dL 3.5 3.7 3.5 3.6 3.6 3.6 3.5 3.8 3.7 3.7 3.8   ALT U/L 37  --  39  --  43  --  44  --   --   --   --    AST U/L 24  --  26  --  35  --  26  --   --   --   --    BILIRUBIN TOTAL mg/dL 1.1  --  0.9  --  0.9  --  0.9  --   --   --   --      CBC:  Results from last 7 days   Lab Units 08/27/24  0509 08/26/24  0336 08/25/24  0456 08/24/24  0454 08/23/24  1808 08/22/24  1908 08/22/24  0638 08/20/24 2004   WBC AUTO x10*3/uL 9.2 8.8 9.2 7.9 8.4 8.9 9.7 11.7*   HEMOGLOBIN g/dL 11.8* 11.8* 12.0* 11.8* 13.0* 12.8* 12.8* 12.7*   HEMATOCRIT % 34.7* 35.6* 36.4* 34.9* 41.0 41.0 39.9* 39.5*   PLATELETS AUTO x10*3/uL 312 309 311 318 348 362 342 352   MCV fL 83 85 87 85 90 91 90 88     COAG:   Results from last 7 days   Lab Units 08/22/24  0637   INR  1.2*     ABO:   No results found for: \"ABO\"    HEME/ENDO:  Results from last 7 days   Lab Units 08/20/24 2004   TSH mIU/L 6.40*      CARDIAC:   Results from last 7 days   Lab Units 08/24/24  0454   [facility] ng/L 25   BNP pg/mL 2,047*       ASSESSMENT AND PLAN:   58 y.o. male with PMH of ICM, s/p remote multivessel PCI's, LVEF reported at 35% back in 2006, 2008, 25 to 30% in 8/2022, s/p biventricular ICD, abdominal aortic aneurysm without rupture, HTN, HLD, PTSD, tobacco use, renal stones, s/p recent cystoscopy with urethral stent placement, recurrent episodes of VT ( including VT storm ) that failed ablation , ganglion blocks in the past who is transfer from floor to HF ICU on 8/22 overnight  due to sustained slow VT that required ATP; K level was low at the time of the VT. No further episodes of arrhythmia since " then. EP consulted - no further options. Thoracic signed off due to high risk for surgical procedure (surgical removing of ganglion blocks).  Discussed at advance therapies evaluation 8/27. Not a candidate for LVAD or OHT. Consider dual heart and lung transplant and further evaluation of this.     Neuro:  # Anxiety/Depression  # Insomnia  # PTSD  - Serial neuro and pain assessments   - PO Tylenol PRN for pain  - atarax 25 mg q6 PRN for anxiety   - cont home paroxetine 60 mg nightly, trazodone 50 mg nightly  - cont ativan 0.5 mg q8 hours, OARRS reviewed  - PT/OT Consult, OOB to chair  - CAM ICU score every shift  - Sleep/wake cycle normalization     # Physical Status  - BMI 27  -Age-related debility/Reduced Mobility due to HF     #Substance abuse  -Alcohol abuse/Alcohol denied  -Tobacco use/Nicotine: ex-smoker and quit for 6 month (per pt)     Cardiovascular:  # Acute on chronic Systolic and Diastolic heart failure  # Stage D HFrEF/ICM s/p CRT-D (11/2019)   - Hx of Cardiogenic shock last admission requiring IABP support  - TTE (5/28/2024): severely decreased LV systolic function, EF 15%, LVIDd 6.32, moderate-severe MVR, mild TR    - previously signed consent for OHT/LVAD workup, found not to be a candidate for OHT given active tobacco use and severe emphysema at the time, also was not interested in LVAD at that time, workup stopped per Dr. Doyle 6/5--> Now open up again for LVAD only.   - admit BNP: 1873   - admit CXR: prominent interstitial markings suggesting pulmonary interstitial edema. Small to moderate right pleural effusion and superimposed right basilar atelectasis.  - admit wt: 81.3 kg, per pt dry wt ~73 kg (160 lb)  - daily wt (8/26): 73.5kg   - per pt, has not smoked since 6/2024  - Palliative Med/Pulmonology consulted, appreciate recs  - Discussed at advance therapies evaluation 8/27. Not a candidate for LVAD or OHT. Consider dual heart and lung transplant. Pulmonology consulted for consideration.   -  s/p diuresis with IV lasix boluses--> switched to torsemide 40 mg daily 8/22 (Home torsemide 20 mg daily)  - C/w  home meds: Entresto 12-13 mg BID, dapagliflozin 10 mg daily, spironolactone 12.5 mg daily. Increase Toprol XL 12.5 mg daily to 25 mg daily.  - Daily standing weights, 2gm sodium diet, 2L fluid restriction, strict I&Os     CAD  s/p multiple PCIs  - [facility] (5/23/24): dLM: 10-30% stenosis; Prox and mid LAD [id number]%; Prox CX Lesion 100%; Right Coronary Artery: fills via collaterals; Proximal %; LVEF 10%  - Cont ASA 81, rosuvastatin 20 mg nightly     #Prior VT storm   # Recurrent slow VT  - s/p VT ablation 5/30, stellate ganglion block 6/4, VT ablation 6/6, stellate ganglion block 6/11  - Device interrogation completed 8/20 went into prolonged slow VT, self-resolved during device interrogation  - EP consulted  rec possible stellate ganglion removal/ denervation ( block would be via anesthesiology)  - Thoracic surgery consulted  (8/23): signed OFF due to high risk for surgical procedure (surgical removing of ganglion blocks). Will re-engage  if required.  - Cont home ranexa 500 mg BID, amiodarone 200 mg BID, mexiletine 300 TID  - Increase metoprolol xl 25mg daily as above.     #Electrolyte Disturbances  - Keep Mg >2 and K >4     Pulmonary:   #Severe emphysema  #COPD   #ex-smoker  - CT chest (6/4): severe emphysematous changes.   - admit CXR: prominent interstitial markings suggesting pulmonary interstitial edema. Small to mod R pleural effusion and superimposed  basilar atelectasis.  - Pulmonology consult: start LAMA/ LABA for maintenance therapy   - continue PRN albuterol  - PFT's completed 8/21: FEV1 58% predicted, FEV1/FVC 67% predicted  - Monitor and maintain SpO2 > 92%  - attempting to wean supplemental O2> Spo2 dropped to 88% while ambulating 8/22. Will need home going oxygen on discharge.     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  - baseline GFR ~50-60  -  admit Cr 1.65  - daily Cr (8/26):  1.35, stable   - diuresis as above  -I/Os  -avoid hypotension and nephrotoxic agents     Heme:  # Iron Deficiency Anemia in the setting with chronic HF and  CKD   - admit hgb 12.1, stable  - Cont home PO iron supplement  - Labs: Hgb: 11.8, TIBC: 415, ferritin: 132, serum Fe: 52, %sat: 13%  - Ferous sulfate started on the floor - holding while receiving venofer  - S/p IV Venofer 200 mg x 5 days (8/23-8/27)       Endo:  #DM  - Euglycemic: Controlled blood glucose  - hgbA1c (6/04/2024): 6.0     #  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  - not currently on synthroid   - TSH ( 8/20): 6.40 and free T4 1.55H  - Endocrine consulted 8/22, monitor via repeat TFTs (TSH, FT4, FT3 in 4wks).       ID:  -afebrile, nontoxic   -no s/s infx  -trend temps q4h     PHYSICAL AND OCCUPATIONAL THERAPY: Ordered    LINES:  PIVs    DVT: Enoxaparin injections  VAP BUNDLE: NA  CENTRAL LINE BUNDLE: NA  ULCER PPX: Pantoprazole PO 40 mg daily  GLYCEMIC CONTROL: NA  BOWEL CARE: Agnieszka-Colace 2 tablet twice daily + Miralax TID  INDWELLING CATHETER: NA  NUTRITION: Adult diet Cardiac; 70 gm fat; 2 - 3 grams Sodium; 1500 mL fluid      EMERGENCY CONTACT: Extended Emergency Contact Information  Primary Emergency Contact: Judie Sapp  Address: [address] [place] [address] [facility]  Home Phone: [phone]  Mobile Phone: [phone]  Relation: Mother  Preferred language: English   needed? No  Secondary Emergency Contact: JcKeiry  Address: [address] [place], [place]  Mobile Phone: [phone]  Relation: Sibling  Preferred language: English   needed? No  FAMILY UPDATE: Patient, significant other, and parents at the bedside   CODE STATUS: Full Code    DISPO:  HF ICU     Patient seen and assessed with Dr. Mcarthur.  ________________  Pina Morrow DO

## 2024-08-27 NOTE — CARE PLAN
The clinical goals for the shift include pt will remain hds throughout the shift      Problem: Pain - Adult  Goal: Verbalizes/displays adequate comfort level or baseline comfort level  Outcome: Progressing     Problem: Safety - Adult  Goal: Free from fall injury  Outcome: Progressing     Problem: Discharge Planning  Goal: Discharge to home or other facility with appropriate resources  Outcome: Progressing     Problem: Chronic Conditions and Co-morbidities  Goal: Patient's chronic conditions and co-morbidity symptoms are monitored and maintained or improved  Outcome: Progressing     Problem: Skin  Goal: Prevent/minimize sheer/friction injuries  Outcome: Progressing  Flowsheets (Taken 8/26/2024 2319)  Prevent/minimize sheer/friction injuries:   Increase activity/out of bed for meals   Turn/reposition every 2 hours/use positioning/transfer devices

## 2024-08-27 NOTE — PROGRESS NOTES
Cristian Sapp is a 58 y.o. male on day 8 of admission presenting with Acute on chronic combined systolic and diastolic heart failure (Multi).  Visited with pt and his mom today at bedside.  He was not approved for LVAD, however he remains strong in his tony. HE expressed he did not sleep well last night.  Shanna Avalos CNP from Palliative Care joined us later in our conversation and asked pt if he wanted to add anything to help him sleep. Pt did not want any thing more added to his medications. He remains positive and hopeful  and has a good support system through his family and Hinduism. SW to continue to follow for support and encouragement throughout the hospitalization.       GERDA MARINELLI

## 2024-08-27 NOTE — CARE PLAN
The patient's goals for the shift include      The clinical goals for the shift include pt will remain hds throughout the shift    Over the shift, the patient did not make progress toward the following goals. Barriers to progression include Recommendations to address these barriers include

## 2024-08-27 NOTE — COMMITTEE REVIEW
[facility] Advanced Heart Failure Therapeutics Committee     Patient's name: Cristian Sapp  Referring provider: Dr. Brown  Primary HF cardiologist: Dr. Brown   Primary diagnosis: ICM    Background information: 58 y.o. male with a history of CAD s/p multiple PCI, ICM/HFrEF (EF 15%) s/p CRT-D implant, V. Tach s/p multiple ablations and AICD, anxiety, COPD, HTN, DM, DLD, infrarenal AAA s/p R iliac stent, and nephrolithiasis status post ureteral stent.  Presented to the [facility] emergency department with complaints of increased shortness of breath that have been progressing on last 5 months since his last admission for VT.     Committee decision and plan of care (Please discuss modifiable/non-modifiable barriers, plan to address barriers, and any relevant plan for reassessment if applicable): Patient was discussed at our Advanced Heart Failure Therapeutics Committee for LVAD. Due to extensive lung disease it was felt that his facundo-operative risk is prohibitive and as such he was found not to be a candidate. Further, the patient is not an LVAD candidate at this time due to reoccurring VT.    Committee recommended referral for heart/lung transplant evaluation.     The following information is needed prior to re-presenting the patient: discussion of disease with lung team prior to opening formal evaluation. Continued cessation of nicotine use (recently quit May 2024). The patient is not a heart transplant candidate alone due to PAD. The patient is to be updated  by Dr. Mcarthur and the inpatient team at bedside. The patient is to be brought to the Advanced Heart Failure Therapeutics Committee once above evaluations are complete.

## 2024-08-27 NOTE — CARE PLAN
Problem: Safety - Adult  Goal: Free from fall injury  Flowsheets (Taken 8/27/2024 0833)  Free from fall injury:   Instruct family/caregiver on patient safety   Based on caregiver fall risk screen, instruct family/caregiver to ask for assistance with transferring infant if caregiver noted to have fall risk factors     Problem: Safety - Adult  Goal: Free from fall injury  8/27/2024 0834 by Quita CARROLL RN  Flowsheets (Taken 8/27/2024 0834)  Free from fall injury: Instruct family/caregiver on patient safety  8/27/2024 0833 by Quita CARROLL RN  Flowsheets (Taken 8/27/2024 0833)  Free from fall injury:   Instruct family/caregiver on patient safety   Based on caregiver fall risk screen, instruct family/caregiver to ask for assistance with transferring infant if caregiver noted to have fall risk factors     Problem: Skin  Goal: Participates in plan/prevention/treatment measures  8/27/2024 0834 by Quita CARROLL RN  Flowsheets (Taken 8/27/2024 0834)  Participates in plan/prevention/treatment measures:   Discuss with provider PT/OT consult   Elevate heels   Increase activity/out of bed for meals  8/27/2024 0833 by Quita CARROLL RN  Flowsheets (Taken 8/21/2024 2342 by Keke Hancock RN)  Participates in plan/prevention/treatment measures: Increase activity/out of bed for meals  Goal: Prevent/manage excess moisture  8/27/2024 0834 by Quita CARROLL RN  Flowsheets (Taken 8/27/2024 0834)  Prevent/manage excess moisture:   Cleanse incontinence/protect with barrier cream   Follow provider orders for dressing changes   Moisturize dry skin   Monitor for/manage infection if present  8/27/2024 0833 by Quita CARROLL RN  Flowsheets (Taken 8/21/2024 2342 by Keke Hancock RN)  Prevent/manage excess moisture: Moisturize dry skin  Goal: Prevent/minimize sheer/friction injuries  8/27/2024 0834 by Quita CARROLL RN  Flowsheets (Taken 8/27/2024 0834)  Prevent/minimize sheer/friction injuries:   Increase activity/out of bed for  meals   HOB 30 degrees or less   Turn/reposition every 2 hours/use positioning/transfer devices  8/27/2024 0833 by Quita CARROLL RN  Flowsheets (Taken 8/26/2024 2319 by Thomas Luque RN)  Prevent/minimize sheer/friction injuries:   Increase activity/out of bed for meals   Turn/reposition every 2 hours/use positioning/transfer devices  Goal: Promote/optimize nutrition  8/27/2024 0834 by Quita CARROLL RN  Flowsheets (Taken 8/27/2024 0834)  Promote/optimize nutrition:   Consume > 50% meals/supplements   Offer water/supplements/favorite foods  8/27/2024 0833 by Quita CARROLL RN  Flowsheets (Taken 8/27/2024 0833)  Promote/optimize nutrition:   Monitor/record intake including meals   Consume > 50% meals/supplements   Offer water/supplements/favorite foods     Problem: Heart Failure  Goal: Improved gas exchange this shift  Flowsheets (Taken 8/27/2024 0834)  Improved gas exchange this shift: Assist with pulmonary hygiene and secretion clearance  Goal: Improved urinary output this shift  Flowsheets (Taken 8/27/2024 0834)  Improved urinary output this shift: Monitor intake/output and daily weight  Goal: Reduction in peripheral edema within 24 hours  Flowsheets (Taken 8/27/2024 0834)  Reduction in peripheral edema within 24 hours:   Consult dietician   SCDs/elevate extremities   Frequent small meals/supplements per order  Goal: Report improvement of dyspnea/breathlessness this shift  Flowsheets (Taken 8/27/2024 0834)  Report improvement of dyspnea/breathlessness this shift:   Ambulate/OOB 3 times daily   Consider PT/OT consult  Goal: Increase self care and/or family involvement in 24 hours  Flowsheets (Taken 8/27/2024 0834)  Increase self care and/or family involvement in 24 hours:   Assess for signs/symptoms of depression   Organize tasks/allow time for rest   Therapy evaluation and treatment   Facilitate advanced care planning/discussion of treatment options

## 2024-08-27 NOTE — PROGRESS NOTES
HFICU Attending Note    Principal Problem:    Acute on chronic combined systolic and diastolic heart failure (Multi)  Active Problems:    Chronic systolic heart failure (Multi)    ICD (implantable cardioverter-defibrillator) in place    Primary hypertension    Chronic obstructive pulmonary disease (Multi)    CAD S/P percutaneous coronary angioplasty    PTSD (post-traumatic stress disorder)    Centrilobular emphysema (Multi)    VT (ventricular tachycardia) (Multi)      58M f/b Dr. Brown for iCM/HFrEF s/p multiple PCIs and BiV-ICD, PAD, recent tobacco use (now quit) c/b severe COPD/emphysema admitted with recurrent VT despite ablation/ganglion block now undergoing advanced therapies evaluation. Considered for LVAD but concern re pulmonary function and VT. Consideration for possible OHT/OLtx.      This critically ill patient continues to be at-risk for clinically significant deterioration / failure due to the above mentioned dysfunctional, unstable organ systems.  I have personally identified and managed all complex critical care issues to prevent aforementioned clinical deterioration.  Critical care time is spent at bedside and/or the immediate area and has included, but is not limited to, the review of diagnostic tests, labs, radiographs, serial assessments of hemodynamics, respiratory status, ventilatory management, and family updates.  Time spent in procedures and teaching are reported separately.    Critical care time: __40__ minutes     Objective    Cristian Sapp/[id number]  Admit Date: 8/19/2024  Hospital Length of Stay: 8   ICU Length of Stay: 3d 8h     MEDICATIONS  Infusions:     Scheduled:  amiodarone, 200 mg, BID  aspirin, 81 mg, Daily  dapagliflozin propanediol, 10 mg, Daily  enoxaparin, 40 mg, q24h  [Held by provider] ferrous sulfate (325 mg ferrous sulfate), 65 mg of iron, Daily with breakfast  tiotropium, 2 puff, Daily   And  formoterol, 20 mcg, q12h  iron sucrose, 200 mg, Daily  metoprolol succinate XL,  12.5 mg, Daily  mexiletine, 300 mg, TID  pantoprazole, 40 mg, Daily before breakfast  PARoxetine, 60 mg, Nightly  polyethylene glycol, 17 g, TID  ranolazine, 500 mg, BID  rosuvastatin, 20 mg, Nightly  sacubitriL-valsartan, 0.5 tablet, BID  sennosides-docusate sodium, 2 tablet, BID  spironolactone, 12.5 mg, Daily  tamsulosin, 0.4 mg, Daily  torsemide, 40 mg, Daily  traZODone, 50 mg, Nightly      PRN:  acetaminophen, 650 mg, q6h PRN  albuterol, 2 puff, q6h PRN  benzocaine-menthol, 1 lozenge, q2h PRN  diphenhydrAMINE, 50 mg, q5 min PRN  hydrOXYzine HCL, 25 mg, q6h PRN  LORazepam, 0.5 mg, q8h PRN  magnesium hydroxide, 30 mL, Daily PRN  oxygen, , Continuous PRN - O2/gases        Prior to Admission Meds:  Medications Prior to Admission   Medication Sig Dispense Refill Last Dose    amiodarone (Pacerone) 200 mg tablet Take 1 tablet (200 mg) by mouth 2 times a day. 180 tablet 3 8/19/2024    aspirin 81 mg EC tablet Take 1 tablet (81 mg) by mouth once daily.   8/19/2024    dapagliflozin propanediol (Farxiga) 10 mg Take 1 tablet (10 mg) by mouth once daily. 30 tablet 0 8/19/2024    magnesium oxide (Mag-Ox) 400 mg (241.3 mg magnesium) tablet Take 1 tablet (400 mg) by mouth once daily. (Patient taking differently: Take 1 tablet (400 mg) by mouth 2 times a day.) 90 tablet 3 Past Week    metoprolol succinate XL (Toprol-XL) 25 mg 24 hr tablet Take 0.5 tablets (12.5 mg) by mouth once daily. Do not crush or chew. 45 tablet 0 8/19/2024    mexiletine (Mexitil) 150 mg capsule Take 2 capsules (300 mg) by mouth 3 times a day. 540 capsule 0 8/19/2024    PARoxetine (Paxil) 30 mg tablet Take 2 tablets (60 mg) by mouth once daily at bedtime. (Patient taking differently: Take 1 tablet (30 mg) by mouth 2 times a day.) 60 tablet 0 8/19/2024    potassium chloride (Klor-Con) 20 mEq packet Take 20 mEq by mouth once daily. (Patient taking differently: Take 20 mEq by mouth if needed.) 90 packet 3 Past Week    ranolazine (Ranexa) 500 mg 12 hr tablet  Take 1 tablet (500 mg) by mouth 2 times a day. Do not crush, chew, or split. 180 tablet 3 8/19/2024    rosuvastatin (Crestor) 20 mg tablet Take 1 tablet (20 mg) by mouth once daily at bedtime. 90 tablet 3 8/19/2024    sacubitriL-valsartan (Entresto) 24-26 mg tablet Take 0.5 tablets by mouth 2 times a day. 30 tablet 0 8/19/2024    spironolactone (Aldactone) 25 mg tablet Take 0.5 tablets (12.5 mg) by mouth once daily. 45 tablet 3 8/19/2024    tamsulosin (Flomax) 0.4 mg 24 hr capsule Take 1 capsule (0.4 mg) by mouth once daily.   8/19/2024    traZODone (Desyrel) 50 mg tablet Take 1 tablet (50 mg) by mouth once daily at bedtime.   8/19/2024    albuterol sulfate (Proair Digihaler) 90 mcg/actuation aero powdr breath act w/sensor inhaler Inhale 2 puffs every 4 hours if needed for wheezing.   8/16/2024    ferrous sulfate, 325 mg ferrous sulfate, tablet Take 1 tablet by mouth once daily with breakfast.   8/17/2024    furosemide (Lasix) 10 mg/mL injection Infuse 4 mL (40 mg) into a venous catheter 2 times a day. (Patient not taking: Reported on 8/20/2024) 240 mL 0 Not Taking    LORazepam (Ativan) 0.5 mg tablet Take 1 tablet (0.5 mg) by mouth once daily as needed for anxiety. Take 1 tablet daily as needed up to 1.5mg       nitroglycerin (Nitrostat) 0.4 mg SL tablet Place 1 tablet (0.4 mg) under the tongue every 5 minutes if needed for chest pain.   More than a month       Invasive Hemodynamics:    Most Recent Range Past 24hrs   BP (Art)   No data recorded   MAP(Art)   No data recorded   RA/CVP   No data recorded   PA   No data recorded   PA(mean)   No data recorded   PCWP   No data recorded   CO   No data recorded   CI   No data recorded   Mixed Venous   No data recorded   SVR    No data recorded   PVR   No data recorded     MCS:   Heart Mate III:     Most Recent Range Past 24hrs   Flow   No data recorded   Speed   No data recorded   Power   No data recorded   PI   No data recorded     ECMO:     Most Recent Range Past 24hrs  "  Flow   No data recorded   Speed   No data recorded   Sweep   No data recorded     Impella:      Most Recent Range Past 24hrs   Performance Level   No data recorded   Flow (L/min)   No data recorded   Motor Current   No data recorded   Placement Signal    Placement OK could not be evaluated. This SmartLink does not work with rows of the type: Custom List   Purge (mmHg)   No data recorded   Purge rate (mL/hr)   No data recorded     VENT:    Most Recent Range Past 24hrs   Mode      FiO2 28 % FiO2 (%)  Min: 24 %   Min taken time: 08/26/24 1932  Max: 28 %   Max taken time: 08/27/24 0400   Rate   No data recorded   Vt    No data recorded   PEEP   No data recorded         8/27/2024     7:00 AM 8/27/2024     6:00 AM 8/27/2024     5:33 AM 8/27/2024     5:00 AM 8/27/2024     4:00 AM 8/27/2024     3:00 AM 8/27/2024     2:00 AM   Vitals   Systolic 105 90  104 100 89 90   Diastolic 76 55  78 63 65 64   Heart Rate 75 75  75 75 75 75   Temp     35.8 °C (96.4 °F)     Resp 15 18  22 15 14 20   Weight (lb)   160.94       BMI   25.21 kg/m2       BSA (m2)   1.86 m2         Visit Vitals  /76   Pulse 75   Temp 35.8 °C (96.4 °F) (Temporal)   Resp 15   Ht 1.702 m (5' 7\")   Wt 73 kg (160 lb 15 oz)   SpO2 92%   BMI 25.21 kg/m²   Smoking Status Former   BSA 1.86 m²     Wt Readings from Last 5 Encounters:   08/27/24 73 kg (160 lb 15 oz)   08/19/24 80.9 kg (178 lb 5.6 oz)   08/08/24 80.7 kg (178 lb)   07/23/24 75.8 kg (167 lb)   07/14/24 72.2 kg (159 lb 2.8 oz)       Intake/Output Summary (Last 24 hours) at 8/27/2024 0853  Last data filed at 8/27/2024 0400  Gross per 24 hour   Intake 951 ml   Output 2345 ml   Net -1394 ml     CHEST: Unlabored, Diminished  CV:  A-V Sequential paced  ABD:  Rounded, Distended, Soft Nontender Bowel sounds: All quadrants, Flatus: Yes  EXT:   RLE: Appropriate for ethnicity,Warm, Dry  DP: Moderate  PT: Moderate  LLE: Appropriate for ethnicity,Warm, Dry  DP: Moderate  PT: Moderate  NEURO:   RASS: Alert and " calm  CAM: Negative  LOC: Alert  Cognition: Follows commands  GCS: 15    DATA:  CMP:  Recent Labs     08/27/24  0509 08/26/24  1744 08/26/24  0336 08/25/24  1735 08/25/24  0456 08/24/24 2027 08/24/24  0454 08/23/24  1808 08/22/24  1908 08/22/24  0638 08/20/24 2004 08/20/24  0636 08/19/24  0150    136 137 137 136 135* 138 137 139 138 137 139  139 135*   K 4.0 4.0 4.1 3.7 4.1 4.1 4.1 3.3* 4.1 3.6 4.6 4.0  4.0 4.1   CL 98 97* 100 99 98 98 100 97* 98 97* 99 100  100 98   CO2 28 26 27 27 27 26 28 27 30 30 26 29  29 27   ANIONGAP 15 17 14 15 15 15 14 16 15 15 17 14  14 14   BUN 21 19 20 20 23 23 27* 27* 25* 22 20 22 22 23   CREATININE 1.35* 1.44* 1.35* 1.42* 1.38* 1.45* 1.32* 1.44* 1.55* 1.28 1.47* 1.42*  1.42* 1.65*   EGFR 62 57* 62 58* 60* 57* 63 57* 52* 66 56* 58*  58* 48*   MG 2.08  --  2.15  --  2.34  --  2.71* 2.15 2.21 2.15 2.26 2.24 2.00     Recent Labs     08/27/24  0509 08/26/24  1744 08/26/24  0336 08/25/24  1735 08/25/24  0456 08/24/24 2027 08/24/24  0454 08/23/24  1808 08/20/24  0636 08/19/24  0150 08/08/24  1439 07/12/24  0231 06/09/24  0324 06/08/24 2123 05/28/24  1503 05/22/24 2128   ALBUMIN 3.5 3.7 3.5 3.6 3.6 3.6 3.5 3.8   < > 3.7 3.6 3.8   < > 3.6   < > 4.2   ALT 37  --  39  --  43  --  44  --   --  83* 73* 119*  --  77*   < > 26   AST 24  --  26  --  35  --  26  --   --  62* 44* 57*  --  57*   < > 15   BILITOT 1.1  --  0.9  --  0.9  --  0.9  --   --  1.0 0.8 0.5  --  0.6   < > 1.1   LIPASE  --   --   --   --   --   --   --   --   --   --   --  21  --   --   --  19    < > = values in this interval not displayed.     CBC:  Recent Labs     08/27/24  0509 08/26/24  0336 08/25/24  0456 08/24/24  0454 08/23/24  1808 08/22/24  1908 08/22/24  0638 08/20/24 2004   WBC 9.2 8.8 9.2 7.9 8.4 8.9 9.7 11.7*   HGB 11.8* 11.8* 12.0* 11.8* 13.0* 12.8* 12.8* 12.7*   HCT 34.7* 35.6* 36.4* 34.9* 41.0 41.0 39.9* 39.5*    309 311 318 348 362 342 352   MCV 83 85 87 85 90 91 90 88     COAG:   Recent  "Labs     08/22/24  0637 08/19/24  0150 06/10/24  0312 06/09/24  1131 06/09/24  0324 06/08/24 2120 06/08/24  0508 06/07/24  1957 06/07/24  0918 06/06/24  2330 06/06/24 2220 06/04/24 2013 06/04/24  1255 05/31/24  1345 05/29/24  0518 05/28/24  1503 05/22/24 2128   INR 1.2* 1.2*  --   --   --   --   --   --  1.3*  --  1.5*  --  1.3*  --  1.1 1.2* 1.1   HAUF  --   --  0.3 0.3 0.3 0.1 0.4   < >  --    < >  --    < >  --    < >  --   --   --     < > = values in this interval not displayed.     ABO:   Recent Labs     08/22/24  0638   ABO A     HEME/ENDO:   Recent Labs     08/20/24 2004 08/20/24 0636 08/08/24 1439 06/05/24  0223 06/04/24  1255 06/04/24  0307 05/23/24  0711   FERRITIN  --  132  --  99  --   --   --    IRONSAT  --  13*  --  18*  --   --   --    TSH 6.40*  --  6.75*  --  10.35*  --  5.55*   HGBA1C  --   --   --   --   --  6.0*  --      CARDIAC:   Recent Labs     08/24/24 0454 08/20/24 0636 08/19/24 0239 08/19/24 0150 08/08/24 1439 07/12/24  0559 07/12/24  0231 06/07/24 0412 06/06/24 2220 06/04/24  1255 06/03/24  1741 05/31/24 1742 05/31/24  1523 05/28/24  1503 05/23/24  0711   LDH  --   --   --   --   --   --   --  408* 467* 145  --   --   --   --   --    TROPHS 25  --  26* 23*  --  17 17  --   --   --   --  2,261* 2,395*  --  342*   BNP 2,047* 1,873*  --  1,980* 2,032*  --  774*  --   --  674* 655*  --   --  320*  --      Recent Labs     06/09/24  0515 06/08/24  2127 06/08/24  1837 06/08/24  1136 06/08/24  0556 06/07/24  0915 06/07/24  0852 06/07/24  0539 06/07/24  0421 06/07/24  0206   LACMX 0.7  --  0.8  --  0.6 2.0  --   --   --   --    LACTATEART  --  3.1*  --  1.1  --   --  2.0  --  1.7 2.0   SO2MV 67  --  53  --  68 63  --    < >  --   --     < > = values in this interval not displayed.     No results for input(s): \"TACROLIMUS\", \"SIROLIMUS\", \"CYCLOSPORINE\" in the last [id number] hours.  Recent Labs     08/08/24  1439   CHOL 114   LDLCALC 56   HDL 26.1   TRIG 162*     MICRO:   Recent Labs     " 06/05/24  0756 06/03/24  0205 05/23/24  0711   PROCAL 0.06 0.06 0.06     No results found for the last 90 days.      LDA:   NUTRITION: Adult diet Cardiac; 70 gm fat; 2 - 3 grams Sodium; 1500 mL fluid  EMERGENCY CONTACT: Extended Emergency Contact Information  Primary Emergency Contact: Judie Sapp  Address: [address] [place] [address] [facility]  Home Phone: [phone]  Mobile Phone: [phone]  Relation: Mother  Preferred language: English   needed? No  Secondary Emergency Contact: Keiry Greene  Address: [address] [place], [place]  Mobile Phone: [phone]  Relation: Sibling  Preferred language: English   needed? No  CODE STATUS: Full Code  DISPO: Discharge Planning  Living Arrangements: Parent  Support Systems: Family members  Assistance Needed: Needs assist  Type of Residence: Private residence  Number of Stairs to Enter Residence: 2  Number of Stairs Within Residence: 0  Do you have animals or pets at home?: No  Who is requesting discharge planning?: Provider  Home or Post Acute Services: Other (Comment) (unknown at this time)  Expected Discharge Disposition: Home or Self Care  FOLLOWUP:   Future Appointments   Date Time Provider Department Center   8/27/2024 12:20 PM [facility] [id number] [facility]   8/27/2024  2:10 PM [facility] [id number] [facility]   8/27/2024  3:15 PM [facility] [id number] [facility]   9/11/2024  8:30 AM Zachary Sparks MD [id number] [place]   9/18/2024 12:40 PM [facility] [id number] [place]   9/18/2024  1:15 PM Eliot Wooten MD [id number] [place]   10/15/2024 10:00 AM Eamon Rosa MD [id number] [facility]   10/31/2024  1:20 PM Arya Brown MD [id number] [place]

## 2024-08-27 NOTE — CONSULTS
Inpatient consult to Palliative Care  Consult performed by: YARITZA Bolaños-CNP  Consult ordered by: YARITZA Varma-CNP    Palliative Medicine Consult  Complex medical decision making, symptom management, patient/family support    History obtained from chart review including ED note, H&P, patient's daily progress notes, review of lab/test results, and discussion with primary team and bedside RN.    Subjective    History of Present Illness  Cristian Sapp is a 58 y.o. male with past medical history of PMH of ICM, s/p remote multivessel PCI's, LVEF reported at 35% back in 2006, 2008, EF 25% to 30% in 08/2022, s/p biventricular ICD, abdominal aortic aneurysm without rupture, HTN, HLD, PTSD, tobacco use, renal stones, s/p recent cystoscopy with urethral stent placement, recurrent episodes of VT (including VT storm) that failed ablation, ganglion blocks in the past who is transfer from floor to HF ICU on 08/22/2024 overnight due to sustained slow VT that required ATP; K level was low at the time of the VT. No further episodes of arrhythmia since then. EP consulted - no further options. Thoracic signed off due to high risk for surgical procedure (surgical removing of ganglion blocks). Palliative Care consulted for LVAD/OHT work-up. Pt is seen today by Palliative Medicine team (CNP and KARISSA) for advanced therapies work-up. Pt is being presented tomorrow as candidate for LVAD or OHT (advance therapies evaluation 08/27/2024). Pt is very excited and hopeful to have LVAD or heart transplant if approved and have more time. Pt's goal is to live another 5-10 years at least.   Introduction to Palliative Medicine  Met with patient, his mother and HCPOA Judie Sapp and his father at bedside.   Patient alert and oriented, has capacity to make their own medical decisions at this time.   Staff present: Shanna Avalos Palliative Medicine KHURRAM and Keiry Patterson Palliative KARISSA  Palliative Medicine was introduced as a specialty service  for patients with serious illness to help with symptom management, improve quality of life, assist with goals of care conversations, navigate complex decision making, and provide support to patients and families. Support and empathy was provided throughout the encounter. Provided reflective listening and presence.     Symptoms  [place] Symptom Assessment System  0-10 (Numeric) Pain Score: 0 - No pain  Pain: Pt denies.   Dyspnea: Pt reports positive. SOB even at rest.   Fatigue: Pt reports positive.  Insomnia: Pt reports trouble sleeping in the hospital.  Drowsiness: Pt denies.  Constipation: Pt reports when taking iron infusion and supplements.  Nausea: Pt denies currently but had recently.  Appetite: Pt reports decreased.  Anxiety: Pt reports sometimes more than others.  Depression: Pt denies.    BM in last 48 hours? yes 08/24/2024    Palliative Medicine Social History:  The patient is  to spouse Michelle Sapp for many years but  since March 2024. They have 2 children (twins- one son and one daughter Faviola) who are in their 20s now and both graduated college. The patient previously worked in [employer], served as a . Pt denies use of alcohol, tobacco or drugs. The patient spends most of the day doing his laundry, jobs around the house, mowing the grass, paying the bills and handling all the finances for the household. Pt shared story of fixing the backyard fence. Pt sees their PCP and other specialists regularly. Hobbies were making homemade wheat bread, building and fixing things, home repairs, but since illness has progressed, pt is no longer able. For enjoyment, the pt loves reading the Bible, fixing things around the house, and providing for his family. Joys: Family, dogs, yardwork, fishing, reading, trucks, Mu-ism. Coping methods are reading the Bible. Denies any safety concerns. Pt reports being raised Non-Judaism and is now Episcopalian and his tony is very important to him.      Spiritual History  Are you spiritual or Cheondoism? Yes   What’s your ning background? Episcopal  During difficult times in your life, what have you relied on for strength? Ning    Music History  Do you enjoy music? Yes What type of music do you enjoy? All kinds    Personal/Social History  He reports that he has quit smoking. His smoking use included cigarettes. He does not have any smokeless tobacco history on file. He reports that he does not currently use alcohol. He reports that he does not currently use drugs.    Caregiving/Caregiver Support  Does the patient require assistance in some or all components of his care, including coordination of medical care? Yes  If Yes, which person serves that role?  mother Judie Sapp  Caregiver emotional or practical needs:  Ongoing education and support pending eval LVAD vs OHT    Past Medical History  He has a past medical history of Atherosclerosis of native artery of both lower extremities with intermittent claudication (CMS-[facility]), CHB (complete heart block) (Multi), Chronic systolic CHF (congestive heart failure), NYHA class 3 (Multi), COPD (chronic obstructive pulmonary disease) (Multi), Coronary artery disease, History of tobacco abuse, HLD (hyperlipidemia), Hypertension, Infrarenal abdominal aortic aneurysm (AAA) without rupture (CMS-HCC), Ischemic cardiomyopathy with implantable cardioverter-defibrillator (ICD), MI (myocardial infarction) (Multi), Nephrolithiasis, and PTSD (post-traumatic stress disorder).    Surgical History  He has a past surgical history that includes Cardiac defibrillator placement; Coronary angioplasty with stent (2006); Coronary angioplasty with stent (04/2003); Coronary angioplasty with stent (06/2008); Cystoscopy w/ ureteral stent placement (04/01/2024); Cystoscopy w/ ureteral stent placement (04/30/2024); Iliac artery stent (Right); Knee surgery; Femoral bypass; Cardiac electrophysiology procedure (N/A, 5/23/2024); Cardiac catheterization  "(N/A, 5/23/2024); Cardiac electrophysiology procedure (N/A, 5/28/2024); Cardiac electrophysiology procedure (Right, 5/30/2024); and Cardiac electrophysiology procedure (N/A, 6/6/2024).     Family History  Family History   Problem Relation Name Age of Onset    Hypertension Mother      Diabetes Father      Hypertension Sister      Diabetes Sister      Colon cancer Maternal Grandmother      Hypertension Maternal Grandmother      Heart disease Maternal Grandfather      Hypertension Maternal Grandfather      Cancer Paternal Grandfather      Hypertension Paternal Grandfather       Objective    Last Recorded Vitals  /78   Pulse 75   Temp 36.2 °C (97.2 °F)   Resp 19   Ht 1.702 m (5' 7\")   Wt 73 kg (160 lb 15 oz)   SpO2 92%   BMI 25.21 kg/m²      Physical Exam  Vitals and nursing note reviewed.   Constitutional:       General: He is not in acute distress.     Appearance: He is obese. He is ill-appearing. He is not toxic-appearing.      Interventions: Nasal cannula in place.   HENT:      Head: Normocephalic and atraumatic.      Nose: Nose normal.      Mouth/Throat:      Mouth: Mucous membranes are moist.   Eyes:      General: No scleral icterus.     Pupils: Pupils are equal, round, and reactive to light.   Cardiovascular:      Rate and Rhythm: Normal rate and regular rhythm.   Pulmonary:      Effort: No respiratory distress.   Abdominal:      General: There is no distension.      Palpations: Abdomen is soft.      Tenderness: There is no abdominal tenderness.   Musculoskeletal:      Right lower leg: No edema.      Left lower leg: No edema.   Skin:     General: Skin is warm and dry.   Neurological:      Mental Status: He is alert and oriented to person, place, and time.      Motor: Weakness present.   Psychiatric:         Mood and Affect: Mood normal.         Behavior: Behavior normal.         Thought Content: Thought content normal.         Judgment: Judgment normal.     Relevant Results  Results for orders placed " or performed during the hospital encounter of 08/19/24 (from the past 24 hour(s))   Phosphorus   Result Value Ref Range    Phosphorus 3.7 2.5 - 4.9 mg/dL   Magnesium   Result Value Ref Range    Magnesium 2.08 1.60 - 2.40 mg/dL   Comprehensive Metabolic Panel   Result Value Ref Range    Glucose 102 (H) 74 - 99 mg/dL    Sodium 137 136 - 145 mmol/L    Potassium 4.0 3.5 - 5.3 mmol/L    Chloride 98 98 - 107 mmol/L    Bicarbonate 28 21 - 32 mmol/L    Anion Gap 15 10 - 20 mmol/L    Urea Nitrogen 21 6 - 23 mg/dL    Creatinine 1.35 (H) 0.50 - 1.30 mg/dL    eGFR 62 >60 mL/min/1.73m*2    Calcium 9.0 8.6 - 10.6 mg/dL    Albumin 3.5 3.4 - 5.0 g/dL    Alkaline Phosphatase 125 (H) 33 - 120 U/L    Total Protein 6.3 (L) 6.4 - 8.2 g/dL    AST 24 9 - 39 U/L    Bilirubin, Total 1.1 0.0 - 1.2 mg/dL    ALT 37 10 - 52 U/L   CBC and Auto Differential   Result Value Ref Range    WBC 9.2 4.4 - 11.3 x10*3/uL    nRBC 0.2 (H) 0.0 - 0.0 /100 WBCs    RBC 4.17 (L) 4.50 - 5.90 x10*6/uL    Hemoglobin 11.8 (L) 13.5 - 17.5 g/dL    Hematocrit 34.7 (L) 41.0 - 52.0 %    MCV 83 80 - 100 fL    MCH 28.3 26.0 - 34.0 pg    MCHC 34.0 32.0 - 36.0 g/dL    RDW 19.6 (H) 11.5 - 14.5 %    Platelets 312 150 - 450 x10*3/uL    Neutrophils % 67.2 40.0 - 80.0 %    Immature Granulocytes %, Automated 0.7 0.0 - 0.9 %    Lymphocytes % 15.4 13.0 - 44.0 %    Monocytes % 11.7 2.0 - 10.0 %    Eosinophils % 3.7 0.0 - 6.0 %    Basophils % 1.3 0.0 - 2.0 %    Neutrophils Absolute 6.19 1.20 - 7.70 x10*3/uL    Immature Granulocytes Absolute, Automated 0.06 0.00 - 0.70 x10*3/uL    Lymphocytes Absolute 1.42 1.20 - 4.80 x10*3/uL    Monocytes Absolute 1.08 (H) 0.10 - 1.00 x10*3/uL    Eosinophils Absolute 0.34 0.00 - 0.70 x10*3/uL    Basophils Absolute 0.12 (H) 0.00 - 0.10 x10*3/uL      US abdomen complete  Narrative: Interpreted By:  Elaine Eastman,   STUDY:  US ABDOMEN COMPLETE;  8/27/2024 3:55 pm      INDICATION:  Signs/Symptoms:LVAD evaluation.      COMPARISON:  None.       ACCESSION NUMBER(S):  [id number]      ORDERING CLINICIAN:  SUSIE STATON      TECHNIQUE:  Multiple images of the abdomen were obtained.      FINDINGS:  LIVER:  The liver measures 19.6 cm and is mildly diffusely echogenic in  appearance, consistent with diffuse fatty infiltration. The resulting  increased beam attenuation thereby limiting evaluation of the liver  for focal lesions. Within the limitations, no focal lesions are seen.      GALLBLADDER:  The gallbladder is nondistended, and demonstrates no evidence of  gallstones, wall thickening or surrounding fluid. The gallbladder  wall thickness is 0.3 cm. Sonographic Doran's sign is negative.          BILE DUCTS:  No evidence of intra or extrahepatic biliary dilatation is  identified; the common bile duct measures 0.3 cm.      PANCREAS:  The visualized pancreas is unremarkable in appearance.      RIGHT KIDNEY:  The right kidney measures 10.1 cm in length. The renal cortical  echogenicity and thickness are within normal limit.  No  hydronephrosis.      Few nonobstructing scattered right renal calculi are visualized  demonstrating twinkle artifact.      LEFT KIDNEY:  The left kidney measures 9.5 cm in length. The renal cortical  echogenicity and thickness are within normal limits. No  hydronephrosis or renal calculi are seen.      SPLEEN:  The spleen measures 10.5 cm and is grossly unremarkable.      URINARY BLADDER:  The urinary bladder is within normal limits.      PERITONEUM:  Small volume of pelvic free fluid. Small bilateral pleural effusions.      ABDOMINAL AORTA AND IVC:  The visualized portions of the aorta and IVC are unremarkable.      Impression: 1. Mild hepatomegaly and mildly increased echogenicity of the hepatic  parenchyma suggestive of steatosis.  2. Small bilateral pleural effusions and small volume of pelvic  ascites.  3. Nonobstructing right nephrolithiasis.      I personally reviewed the image(s)/study and resident interpretation  as stated by  Dr. Elaine Eastman MD. I agree with the findings as  stated. This study was interpreted at [facility], [place], [place].      MACRO:  None          Dictation workstation:   [id number]  Vascular US Ankle Brachial Index (KIM) Without Exercise              [place] [address]    Tel [phone] and Fax [phone]       Vascular Lab Report  VASC US ANKLE BRACHIAL INDEX (KIM) WITHOUT EXERCISE       Patient Name:      FRANCIA PAULINO          Reading Physician:  [id number] Cierra Rosa MD, RPVI  Study Date:        8/27/2024             Ordering Physician: [id number] SSUIE STATON  MRN/PID:           [id number]              Technologist:       Sobia Summers RVT  Accession#:        [id number]          Technologist 2:  Date of Birth/Age: 1965 / 58 years Encounter#:         [id number]  Gender:            M  Admission Status:  Inpatient             Location Performed: [facility]       Diagnosis/ICD: Peripheral vascular disease, unspecified-I73.9; Encounter for                 preprocedural cardiovascular examination-Z01.810  CPT Codes:     48306 Peripheral artery KIM Only       CONCLUSIONS:  Right Lower PVR: No evidence of arterial occlusive disease in the right lower extremity at rest. Multiphasic flow is noted in the right common femoral artery, right posterior tibial artery and right dorsalis pedis artery.  Left Lower PVR: No evidence of arterial occlusive disease in the left lower extremity at rest. Normal digital perfusion noted. Multiphasic flow is noted in the left common femoral artery, left posterior tibial artery and left dorsalis pedis artery.     Imaging & Doppler Findings:     RIGHT Lower PVR                Pressures Ratios  Right Posterior Tibial (Ankle) 131 mmHg  1.26  Right Dorsalis Pedis (Ankle)    100 mmHg  0.96          LEFT Lower PVR                Pressures Ratios  Left Posterior Tibial (Ankle) 99 mmHg   0.95  Left Dorsalis Pedis (Ankle)   92 mmHg   0.88                          Right     Left  Brachial Pressure 104 mmHg 104 mmHg          JACKIE Desouza MD  Electronically signed by JACKIE Desouaz MD on 8/27/2024 at 2:54:47 PM       ** Final **  Vascular US aorta iliac duplex limited              [place] [address]    Tel [phone] and Fax [phone]       Vascular Lab Report  [facility] US AORTA ILIAC DUPLEX LIMITED       Patient Name:      FRANCIA PAULINO          Reading Physician:  JACKIE Desouza MD  Study Date:        8/27/2024             Ordering Physician: [id number] STATON  MRN/PID:           [id number]              Technologist:       Debbie Bower RVT                                                               [facility]  Accession#:        [id number]          Technologist 2:  Date of Birth/Age: 1965 / 58 years Encounter#:         [id number]  Gender:            M  Admission Status:  Inpatient             Location Performed: [facility]       Diagnosis/ICD: Encounter for preprocedural cardiovascular examination-Z01.810  CPT Codes:     86742 Ultrasound, abdominal aorta, real time with image                 documentation, screening study for (AAA)       CONCLUSIONS:  Aorta/Common Iliac Arteries/IVC: Limited exam due to bowel gas. The distal aorta is poorly visualized. The iliac arteries are not visualized. No evidence of AAA in the visualized segments.     Imaging & Doppler Findings:      AORTA     AP       PSV  Proximal 2.60 cm 48.0 cm/s    Mid    2.30 cm 53.1 cm/s   Distal  2.30 cm       JACKIE Desouza MD  Electronically signed by JACKIE Desouza MD on 8/27/2024 at  1:46:52 PM       ** Final **  Vascular US carotid artery duplex bilateral              [place] [address]    Tel [phone] and Fax [phone]       Vascular Lab Report  [facility] US CAROTID ARTERY DUPLEX BILATERAL       Patient Name:      FRANCIA PAULINO          Reading Physician:  [id number] Cierra Rosa MD, RPVI  Study Date:        8/27/2024             Ordering Physician: [id number] SUSIE STATNO  MRN/PID:           [id number]              Technologist:       Debbie Bower RVT,                                                               [facility]  Accession#:        [id number]          Technologist 2:  Date of Birth/Age: 1965 / 58 years Encounter#:         [id number]  Gender:            M  Admission Status:  Outpatient            Location Performed: [facility]       Diagnosis/ICD: Other specified symptoms and signs involving the circulatory and                 respiratory systems-R09.89  CPT Codes:     39796 Cerebrovascular Carotid Duplex scan complete       CONCLUSIONS:  Right Carotid: Findings are consistent with less than 50% stenosis of the right proximal internal carotid artery. Right external carotid artery appears patent with no evidence of stenosis. The right vertebral artery is patent with antegrade flow. No evidence of hemodynamically significant stenosis in the right subclavian artery. There is a thyroid nodule noted measuring .8cm x .8cm, may wish for further evaluation.  Left Carotid: Findings are consistent with less than 50% stenosis of the left proximal internal carotid artery. Left external carotid artery appears patent with no evidence of stenosis. The left vertebral artery is patent with antegrade flow. No evidence of hemodynamically significant stenosis in the left subclavian artery.     Imaging & Doppler Findings:  Right  Plaque Morph: No plaque identified in the right carotid artery.  Left Plaque Morph: No plaque identified in the left carotid artery.      Right                       Left    PSV     EDV                PSV      EDV  77 cm/s           CCA P    54 cm/s  44 cm/s           CCA D    41 cm/s  39 cm/s 16 cm/s   ICA P    35 cm/s  15 cm/s  43 cm/s 15 cm/s   ICA M    47 cm/s  19 cm/s  49 cm/s 15 cm/s   ICA D    75 cm/s  27 cm/s  51 cm/s            ECA     52 cm/s  27 cm/s 9 cm/s  Vertebral  49 cm/s  15 cm/s  95 cm/s         Subclavian 101 cm/s                  Right Left  ICA/CCA Ratio  0.9  0.8          [id number] Cierra Rosa MD, RPSATHYA  Electronically signed by [id number] Cierra Rosa MD, RPSATHYA on 8/27/2024 at 1:40:14 PM       ** Final **  XR panorex  Narrative: Interpreted By:  Zulma Rowell,  and Josue Brady   STUDY:  XR PANOREX; ;  8/26/2024 6:57 pm      INDICATION:  Signs/Symptoms:LVAD evaluation.      COMPARISON:  None      ACCESSION NUMBER(S):  [id number]      ORDERING CLINICIAN:  SUSIE STATON      TECHNIQUE:  A single panoramic view of the oral cavity was obtained.      FINDINGS:  No teeth are visualized. No mandibular or maxillary lucencies are  seen to suggest underlying abscess formation or other infective  process. The maxillary sinuses are well-aerated.      Impression: Patient is edentulous. No mandibular lucencies to suggest an osseous  erosive or infectious process.      I personally reviewed the images/study and I agree with the findings  as stated by Jose Antonio Salas MD (resident). This study was  interpreted at [place], [place].      MACRO:  None      Signed by: Zulma Rowell 8/27/2024 9:43 AM  Dictation workstation:   [id number]     Encounter Date: 08/19/24   ECG 12 Lead   Result Value    Ventricular Rate 80    Atrial Rate 80    HI Interval 170    QRS Duration 166    QT Interval 476    QTC Calculation(Bazett) 548    P Axis 46    R Axis -61    T Axis 87     QRS Count 13    Q Onset 181    P Onset 96    P Offset 155    T Offset 419    QTC Fredericia 524    Narrative    Atrial-sensed ventricular-paced rhythm  Abnormal ECG  When compared with ECG of 19-AUG-2024 01:18, (unconfirmed)  Vent. rate has decreased BY   5 BPM  Confirmed by Kennedy Rosales ([id number]) on 8/20/2024 1:36:21 PM      Allergies  Chantix [varenicline]    Scheduled medications  amiodarone, 200 mg, oral, BID  aspirin, 81 mg, oral, Daily  dapagliflozin propanediol, 10 mg, oral, Daily  enoxaparin, 40 mg, subcutaneous, q24h  [Held by provider] ferrous sulfate (325 mg ferrous sulfate), 65 mg of iron, oral, Daily with breakfast  tiotropium, 2 puff, inhalation, Daily   And  formoterol, 20 mcg, nebulization, q12h  iron sucrose, 200 mg, intravenous, Daily  metoprolol succinate XL, 25 mg, oral, Daily  mexiletine, 300 mg, oral, TID  pantoprazole, 40 mg, oral, Daily before breakfast  PARoxetine, 60 mg, oral, Nightly  polyethylene glycol, 17 g, oral, TID  ranolazine, 500 mg, oral, BID  rosuvastatin, 20 mg, oral, Nightly  sacubitriL-valsartan, 0.5 tablet, oral, BID  sennosides-docusate sodium, 2 tablet, oral, BID  spironolactone, 12.5 mg, oral, Daily  tamsulosin, 0.4 mg, oral, Daily  torsemide, 40 mg, oral, Daily  traZODone, 50 mg, oral, Nightly    PRN medications  PRN medications: acetaminophen, albuterol, benzocaine-menthol, diphenhydrAMINE, hydrOXYzine HCL, LORazepam, magnesium hydroxide, oxygen     Assessment/Plan    Cristian Sapp is a 58 y.o. male with past medical history of PMH of ICM, s/p remote multivessel PCI's, LVEF reported at 35% back in 2006, 2008, EF 25% to 30% in 08/2022, s/p biventricular ICD, abdominal aortic aneurysm without rupture, HTN, HLD, PTSD, tobacco use, renal stones, s/p recent cystoscopy with urethral stent placement, recurrent episodes of VT (including VT storm) that failed ablation, ganglion blocks in the past who is transfer from floor to HF ICU on 08/22/2024 overnight due to sustained slow  VT that required ATP; K level was low at the time of the VT. No further episodes of arrhythmia since then. EP consulted - no further options. Thoracic signed off due to high risk for surgical procedure (surgical removing of ganglion blocks). Palliative Care consulted for LVAD/OHT work-up. Pt is seen today by Palliative Medicine team (CNP and KARISSA) for advanced therapies work-up. Pt is being presented tomorrow as candidate for LVAD or OHT (advance therapies evaluation 08/27/2024). Pt is very excited and hopeful to have LVAD or heart transplant if approved and have more time. Pt's goal is to live another 5-10 years at least.     Palliative Performance Scale (PPS): 50%    ----------------------------------------------------------------------------------------------------------------------------------------------------------------------------------------------------------------------------------------------------------------------------------------------------------------------------------------------------------------------  Advanced Care Planning  Patient and family consented to a voluntary Advanced Care Planning meeting.   Serious Illness Assessment and Counseling: Heart Failure and Intermittent VT episodes  Life Limiting Disease: Acute on Chronic Heart Failure, slow, recurrent, intermittent episodes of VT posing threat to life or function.     Disease Specific Information Provided/Prognosis Discussed: Patient's current clinical condition, including diagnosis, prognosis, and management plan were discussed.   Counseling provided on goals of care, discharge expectations, and outpatient Palliative Care services.   Counseling provided on guarded prognosis and what to expect with disease progression of persistent VT.   Counseling provided on the irreversible and progressive nature of patient's diseases including heart failure.    Understanding/Overall Impression: Patient expressing clear understanding of overall health  status and severity of illness.     Goals/Hopes: Discussion ensued about patient's goals for their medical care going forward. Allowed patient time to talk about his/her current quality of life, disease course/progression, and symptom and treatment burden. Discussed care plan to continue with aggressive hospital care despite symptom and treatment burden versus choosing to transition to comfort based plan of care that focuses on symptom management and quality of life. Pt is hoping for LVAD bridge to OHT or OHT. Pt's goal is to live as long as possible and at least another 5-10 years.     Fears/Worries/Concerns: Pt denies any at this time.     Minimal Acceptable Quality of Life/Maximal Colman Tolerable for the Possibility of More Time: Counseling provided on the concept of MAO/Maximal Colman. Patient expressing that they would never want to be in a health state where they were dependent on other's for ADLs/toileting needs, bed bound, intubated or placed on a ventilator, have a permanent feeding tube if they could not eat, have a tracheostomy placed if these were permanent options with no chances or hope of recovery. Patient deems that this would not be an acceptable quality of life for the patient. Pt is agreeable to any of these interventions including hemodialysis if needed if he would receive LVAD or have OHT. Pt wishes to prioritize life and options to improve his quality of life long-term. Pt is agreeable to remaining FULL CODE and any necessary treatments including blood products, antibiotics, life-support, and dialysis.     Resuscitation Assessment: Counseling provided on the benefit versus burden of CPR in the setting of patient's overall health status and pt wishes to remain FULL CODE and pending OHT/LVAD work-up.     Advanced Directives:  Counseling provided on the importance of not crisis planning as disease burden progresses but to establish treatment limitations now so in the future medical team will be  clear on what patient feels is an acceptable quality of life for the patient and what treatment limitations' patient would like set into place based on that.       Surrogate Health Care Decision Maker: pt's mother, Judie Sapp: [phone] HCPOA  HPOA: Yes, on file.     Code Status: Decision to keep code status FULL CODE at this time.     All questions and concerns were addressed during encounter.     I spent 75 minutes in providing separately identifiable ACP services with the patient and/or surrogate decision maker in a voluntary conversation discussing the patient's wishes and goals as detailed in the above note.   ----------------------------------------------------------------------------------------------------------------------------------------------------------------------------------------------------------------------------------------------------------------------------------------------------------------------------------------------------------------------    #Complex Medical Decision Making  #Goals of Care  #Advanced Care Planning  - Code status: FULL CODE  - Surrogate decision maker: pt's mother, Judie Sapp: [phone]   - Goals are survival and time based   - Advanced Directives on file     #ICM   #Acute on Chronic Systolic and Diastolic Heart Failure  #Intermittent Recurrent VT episodes  #Dyspnea  #Severe Emphysema  #COPD  #Chronic Anxiety  #Insomnia    -Palliative Medicine Recommendations:  1) Continue Paroxetine 60mg PO daily as ordered.  2) Continue Ativan 0.5mg q1lfyhf PRN anxiety.  3) Continue Trazodone 50mg at HS routine for insomnia.   4) Continue Miralax 17g PO daily routine and hold for any loose stools.   5) Continue Senna (Agnieszka-Colace) 8.6-50mg 2 tablets PO BID routine and hold for any loose stools.   6) Palliative Medicine will continue to follow and assist with ongoing GOC, pain and symptom management, and patient and family support during hospitalization.       #Psychosocial  Support  - Consult Music Therapy  - Spiritual Care Support  - Consult Art Therapy  - Palliative Care SW     Plan of Care discussed with: Updated Primary Team Dr. Pina Morrow and bedside RN on goals of care decision, medication adjustments, and code status     Medical Decision Making was high level due to high complexity of problems, extensive data review, and high risk of management/treatment.     - Acute on Chronic Systolic and Diastolic Heart Failure, slow, recurrent, intermittent episodes of VT posing threat to life or function.   - Reviewed external notes from OSH, previous hospitalizations, outpatient cardiology notes  - Discussion of management with primary team  - Drug therapy requiring intensive monitoring for toxicity: Torsemide, Aldactone, Pacerone and Entresto      Thank you for allowing us to participate in the care of this patient. Palliative will continue to follow as needed. Palliative Medicine is available Monday-Friday, 8a-6p. Please contact team with any questions or concerns.  Team pager [phone]  Shanna Avalos CNP (on EPIC secure chat)  Palliative Medicine Nurse Practitioner   [email or web address]    YARITZA Bolaños-KHURRAM

## 2024-08-28 LAB
ALBUMIN SERPL BCP-MCNC: 3.4 G/DL (ref 3.4–5)
ALBUMIN SERPL BCP-MCNC: 3.5 G/DL (ref 3.4–5)
ALP SERPL-CCNC: 126 U/L (ref 33–120)
ALT SERPL W P-5'-P-CCNC: 37 U/L (ref 10–52)
ANION GAP SERPL CALC-SCNC: 12 MMOL/L (ref 10–20)
ANION GAP SERPL CALC-SCNC: 16 MMOL/L (ref 10–20)
AST SERPL W P-5'-P-CCNC: 25 U/L (ref 9–39)
BASOPHILS # BLD AUTO: 0.11 X10*3/UL (ref 0–0.1)
BASOPHILS # BLD MANUAL: 0.18 X10*3/UL (ref 0–0.1)
BASOPHILS NFR BLD AUTO: 1.1 %
BASOPHILS NFR BLD MANUAL: 1.7 %
BILIRUB SERPL-MCNC: 0.9 MG/DL (ref 0–1.2)
BUN SERPL-MCNC: 21 MG/DL (ref 6–23)
BUN SERPL-MCNC: 21 MG/DL (ref 6–23)
CALCIUM SERPL-MCNC: 8.7 MG/DL (ref 8.6–10.6)
CALCIUM SERPL-MCNC: 8.7 MG/DL (ref 8.6–10.6)
CHLORIDE SERPL-SCNC: 101 MMOL/L (ref 98–107)
CHLORIDE SERPL-SCNC: 99 MMOL/L (ref 98–107)
CO2 SERPL-SCNC: 24 MMOL/L (ref 21–32)
CO2 SERPL-SCNC: 30 MMOL/L (ref 21–32)
CREAT SERPL-MCNC: 1.29 MG/DL (ref 0.5–1.3)
CREAT SERPL-MCNC: 1.4 MG/DL (ref 0.5–1.3)
DACRYOCYTES BLD QL SMEAR: ABNORMAL
EGFRCR SERPLBLD CKD-EPI 2021: 59 ML/MIN/1.73M*2
EGFRCR SERPLBLD CKD-EPI 2021: 65 ML/MIN/1.73M*2
EOSINOPHIL # BLD AUTO: 0.28 X10*3/UL (ref 0–0.7)
EOSINOPHIL # BLD MANUAL: 0.1 X10*3/UL (ref 0–0.7)
EOSINOPHIL NFR BLD AUTO: 2.8 %
EOSINOPHIL NFR BLD MANUAL: 0.9 %
ERYTHROCYTE [DISTWIDTH] IN BLOOD BY AUTOMATED COUNT: 19.9 % (ref 11.5–14.5)
ERYTHROCYTE [DISTWIDTH] IN BLOOD BY AUTOMATED COUNT: 20.5 % (ref 11.5–14.5)
GLUCOSE SERPL-MCNC: 112 MG/DL (ref 74–99)
GLUCOSE SERPL-MCNC: 155 MG/DL (ref 74–99)
HCT VFR BLD AUTO: 34.8 % (ref 41–52)
HCT VFR BLD AUTO: 37.4 % (ref 41–52)
HGB BLD-MCNC: 11.6 G/DL (ref 13.5–17.5)
HGB BLD-MCNC: 12 G/DL (ref 13.5–17.5)
IMM GRANULOCYTES # BLD AUTO: 0.09 X10*3/UL (ref 0–0.7)
IMM GRANULOCYTES # BLD AUTO: 0.11 X10*3/UL (ref 0–0.7)
IMM GRANULOCYTES NFR BLD AUTO: 0.9 % (ref 0–0.9)
IMM GRANULOCYTES NFR BLD AUTO: 1 % (ref 0–0.9)
LYMPHOCYTES # BLD AUTO: 1 X10*3/UL (ref 1.2–4.8)
LYMPHOCYTES # BLD MANUAL: 1.6 X10*3/UL (ref 1.2–4.8)
LYMPHOCYTES NFR BLD AUTO: 9.9 %
LYMPHOCYTES NFR BLD MANUAL: 14.8 %
MAGNESIUM SERPL-MCNC: 2 MG/DL (ref 1.6–2.4)
MAGNESIUM SERPL-MCNC: 2.03 MG/DL (ref 1.6–2.4)
MCH RBC QN AUTO: 28.1 PG (ref 26–34)
MCH RBC QN AUTO: 28.1 PG (ref 26–34)
MCHC RBC AUTO-ENTMCNC: 32.1 G/DL (ref 32–36)
MCHC RBC AUTO-ENTMCNC: 33.3 G/DL (ref 32–36)
MCV RBC AUTO: 84 FL (ref 80–100)
MCV RBC AUTO: 88 FL (ref 80–100)
MONOCYTES # BLD AUTO: 1.04 X10*3/UL (ref 0.1–1)
MONOCYTES # BLD MANUAL: 0.18 X10*3/UL (ref 0.1–1)
MONOCYTES NFR BLD AUTO: 10.3 %
MONOCYTES NFR BLD MANUAL: 1.7 %
NEUTROPHILS # BLD AUTO: 7.55 X10*3/UL (ref 1.2–7.7)
NEUTROPHILS NFR BLD AUTO: 75 %
NEUTS SEG # BLD MANUAL: 8.74 X10*3/UL (ref 1.2–7)
NEUTS SEG NFR BLD MANUAL: 80.9 %
NRBC BLD-RTO: 0 /100 WBCS (ref 0–0)
NRBC BLD-RTO: 0 /100 WBCS (ref 0–0)
OVALOCYTES BLD QL SMEAR: ABNORMAL
PHOSPHATE SERPL-MCNC: 3.2 MG/DL (ref 2.5–4.9)
PHOSPHATE SERPL-MCNC: 3.4 MG/DL (ref 2.5–4.9)
PLATELET # BLD AUTO: 304 X10*3/UL (ref 150–450)
PLATELET # BLD AUTO: 310 X10*3/UL (ref 150–450)
POTASSIUM SERPL-SCNC: 3.4 MMOL/L (ref 3.5–5.3)
POTASSIUM SERPL-SCNC: 4 MMOL/L (ref 3.5–5.3)
PROT SERPL-MCNC: 6.2 G/DL (ref 6.4–8.2)
RBC # BLD AUTO: 4.13 X10*6/UL (ref 4.5–5.9)
RBC # BLD AUTO: 4.27 X10*6/UL (ref 4.5–5.9)
RBC MORPH BLD: ABNORMAL
SODIUM SERPL-SCNC: 137 MMOL/L (ref 136–145)
SODIUM SERPL-SCNC: 138 MMOL/L (ref 136–145)
TOTAL CELLS COUNTED BLD: 115
WBC # BLD AUTO: 10.1 X10*3/UL (ref 4.4–11.3)
WBC # BLD AUTO: 10.8 X10*3/UL (ref 4.4–11.3)

## 2024-08-28 PROCEDURE — 2500000005 HC RX 250 GENERAL PHARMACY W/O HCPCS: Performed by: NURSE PRACTITIONER

## 2024-08-28 PROCEDURE — 2500000002 HC RX 250 W HCPCS SELF ADMINISTERED DRUGS (ALT 637 FOR MEDICARE OP, ALT 636 FOR OP/ED): Performed by: STUDENT IN AN ORGANIZED HEALTH CARE EDUCATION/TRAINING PROGRAM

## 2024-08-28 PROCEDURE — 2500000001 HC RX 250 WO HCPCS SELF ADMINISTERED DRUGS (ALT 637 FOR MEDICARE OP): Performed by: PHYSICIAN ASSISTANT

## 2024-08-28 PROCEDURE — 97535 SELF CARE MNGMENT TRAINING: CPT | Mod: GO

## 2024-08-28 PROCEDURE — 2500000001 HC RX 250 WO HCPCS SELF ADMINISTERED DRUGS (ALT 637 FOR MEDICARE OP): Performed by: NURSE PRACTITIONER

## 2024-08-28 PROCEDURE — 99223 1ST HOSP IP/OBS HIGH 75: CPT | Performed by: CLINICAL NURSE SPECIALIST

## 2024-08-28 PROCEDURE — 84100 ASSAY OF PHOSPHORUS: CPT | Performed by: NURSE PRACTITIONER

## 2024-08-28 PROCEDURE — 80069 RENAL FUNCTION PANEL: CPT | Mod: CCI | Performed by: PHYSICIAN ASSISTANT

## 2024-08-28 PROCEDURE — 80053 COMPREHEN METABOLIC PANEL: CPT | Performed by: NURSE PRACTITIONER

## 2024-08-28 PROCEDURE — 36415 COLL VENOUS BLD VENIPUNCTURE: CPT | Performed by: PHYSICIAN ASSISTANT

## 2024-08-28 PROCEDURE — 83735 ASSAY OF MAGNESIUM: CPT | Performed by: PHYSICIAN ASSISTANT

## 2024-08-28 PROCEDURE — 85025 COMPLETE CBC W/AUTO DIFF WBC: CPT | Performed by: NURSE PRACTITIONER

## 2024-08-28 PROCEDURE — 2500000002 HC RX 250 W HCPCS SELF ADMINISTERED DRUGS (ALT 637 FOR MEDICARE OP, ALT 636 FOR OP/ED): Performed by: NURSE PRACTITIONER

## 2024-08-28 PROCEDURE — 85007 BL SMEAR W/DIFF WBC COUNT: CPT | Performed by: PHYSICIAN ASSISTANT

## 2024-08-28 PROCEDURE — 97530 THERAPEUTIC ACTIVITIES: CPT | Mod: GP

## 2024-08-28 PROCEDURE — 1200000002 HC GENERAL ROOM WITH TELEMETRY DAILY

## 2024-08-28 PROCEDURE — 2500000004 HC RX 250 GENERAL PHARMACY W/ HCPCS (ALT 636 FOR OP/ED): Performed by: NURSE PRACTITIONER

## 2024-08-28 PROCEDURE — 97530 THERAPEUTIC ACTIVITIES: CPT | Mod: GO

## 2024-08-28 PROCEDURE — 85027 COMPLETE CBC AUTOMATED: CPT | Performed by: PHYSICIAN ASSISTANT

## 2024-08-28 PROCEDURE — 83735 ASSAY OF MAGNESIUM: CPT | Performed by: NURSE PRACTITIONER

## 2024-08-28 PROCEDURE — 2500000002 HC RX 250 W HCPCS SELF ADMINISTERED DRUGS (ALT 637 FOR MEDICARE OP, ALT 636 FOR OP/ED): Performed by: PHYSICIAN ASSISTANT

## 2024-08-28 PROCEDURE — 97164 PT RE-EVAL EST PLAN CARE: CPT | Mod: GP

## 2024-08-28 PROCEDURE — 94640 AIRWAY INHALATION TREATMENT: CPT

## 2024-08-28 PROCEDURE — 2500000004 HC RX 250 GENERAL PHARMACY W/ HCPCS (ALT 636 FOR OP/ED)

## 2024-08-28 PROCEDURE — 36415 COLL VENOUS BLD VENIPUNCTURE: CPT | Performed by: NURSE PRACTITIONER

## 2024-08-28 PROCEDURE — 99232 SBSQ HOSP IP/OBS MODERATE 35: CPT | Performed by: STUDENT IN AN ORGANIZED HEALTH CARE EDUCATION/TRAINING PROGRAM

## 2024-08-28 RX ORDER — POTASSIUM CHLORIDE 20 MEQ/1
40 TABLET, EXTENDED RELEASE ORAL
Status: COMPLETED | OUTPATIENT
Start: 2024-08-28 | End: 2024-08-28

## 2024-08-28 RX ORDER — SPIRONOLACTONE 25 MG/1
25 TABLET ORAL DAILY
Status: DISCONTINUED | OUTPATIENT
Start: 2024-08-29 | End: 2024-08-30 | Stop reason: HOSPADM

## 2024-08-28 ASSESSMENT — COGNITIVE AND FUNCTIONAL STATUS - GENERAL
DAILY ACTIVITIY SCORE: 24
CLIMB 3 TO 5 STEPS WITH RAILING: A LITTLE
MOVING TO AND FROM BED TO CHAIR: A LITTLE
MOBILITY SCORE: 20
STANDING UP FROM CHAIR USING ARMS: A LITTLE
HELP NEEDED FOR BATHING: A LITTLE
DAILY ACTIVITIY SCORE: 24
WALKING IN HOSPITAL ROOM: A LITTLE
MOBILITY SCORE: 23
TOILETING: A LITTLE
DAILY ACTIVITIY SCORE: 21
DRESSING REGULAR LOWER BODY CLOTHING: A LITTLE
CLIMB 3 TO 5 STEPS WITH RAILING: A LITTLE
MOBILITY SCORE: 24

## 2024-08-28 ASSESSMENT — PAIN - FUNCTIONAL ASSESSMENT
PAIN_FUNCTIONAL_ASSESSMENT: 0-10

## 2024-08-28 ASSESSMENT — PAIN SCALES - GENERAL
PAINLEVEL_OUTOF10: 0 - NO PAIN

## 2024-08-28 ASSESSMENT — ACTIVITIES OF DAILY LIVING (ADL)
ADL_ASSISTANCE: INDEPENDENT
HOME_MANAGEMENT_TIME_ENTRY: 10

## 2024-08-28 NOTE — PROGRESS NOTES
"  [facility]  HFICU TRANSFER NOTE    Cristian Sapp/[id number]    Admit Date: 8/19/2024  Hospital Length of Stay: 9   ICU Length of Stay: 4d 11h   Primary Service:   Primary HF Cardiologist:   Referring:    Hospital Course:   58 y.o. male with PMH of ICM, s/p remote multivessel PCI's, LVEF reported at 35% back in 2006, 2008, 25 to 30% in 8/2022, s/p biventricular ICD, abdominal aortic aneurysm without rupture, HTN, HLD, PTSD, tobacco use, renal stones, s/p recent cystoscopy with urethral stent placement, recurrent episodes of VT ( including VT storm ) that failed ablation , ganglion blocks in the past who is transfer from floor to HF ICU on 8/22 overnight  due to sustained slow VT that required ATP; K level was low at the time of the VT. No further episodes of arrhythmia since then. EP consulted - no further options. Thoracic signed off due to high risk for surgical procedure (surgical removing of ganglion blocks).  Discussed at advance therapies evaluation 8/27. Not a candidate for LVAD or OHT. Consider dual heart and lung transplant and further evaluation of this. Patient to be evaluated by the lung transplant team prior with possible initiation of dual heart and lung transplant evaluation during this stay. He will be transferred to the medical floor for further management and dispo planning.     Items Pending:  Follow up with lung transplant consult.     PHYSICAL EXAM:   Visit Vitals  /74   Pulse 75   Temp 35.7 °C (96.3 °F)   Resp 16   Ht 1.702 m (5' 7\")   Wt 75 kg (165 lb 5.5 oz)   SpO2 95%   BMI 25.90 kg/m²   Smoking Status Former   BSA 1.88 m²       Wt Readings from Last 5 Encounters:   08/28/24 75 kg (165 lb 5.5 oz)   08/19/24 80.9 kg (178 lb 5.6 oz)   08/08/24 80.7 kg (178 lb)   07/23/24 75.8 kg (167 lb)   07/14/24 72.2 kg (159 lb 2.8 oz)       INTAKE/OUTPUT:  I/O last 3 completed shifts:  In: 1230 (16.4 mL/kg) [P.O.:1230]  Out: 3675 (49 mL/kg) [Urine:3675 (1.4 " mL/kg/hr)]  Weight: 75 kg      Physical Exam  Constitutional:       Appearance: Normal appearance.   HENT:      Head: Normocephalic.      Nose: Nose normal.      Mouth/Throat:      Mouth: Mucous membranes are moist.   Eyes:      Extraocular Movements: Extraocular movements intact.      Pupils: Pupils are equal, round, and reactive to light.   Cardiovascular:      Rate and Rhythm: Normal rate and regular rhythm.      Pulses: Normal pulses.      Comments: Paced rhythm   Pulmonary:      Effort: Pulmonary effort is normal. No respiratory distress.      Comments: Decreased breath sounds throughout. On supplemental O2.   Abdominal:      Palpations: Abdomen is soft.      Comments: Obese   Musculoskeletal:         General: No swelling.      Cervical back: Normal range of motion and neck supple.   Skin:     General: Skin is warm.   Neurological:      General: No focal deficit present.      Mental Status: He is alert and oriented to person, place, and time.   Psychiatric:         Mood and Affect: Mood normal.         Behavior: Behavior normal.     DATA:  CMP:  Results from last 7 days   Lab Units 08/28/24  0541 08/27/24  0509 08/26/24  1744 08/26/24  0336 08/25/24  1735 08/25/24  0456 08/24/24  2027 08/24/24  0454 08/23/24  1808 08/22/24  1908 08/22/24  0638   SODIUM mmol/L 138 137 136 137 137 136 135* 138 137 139 138   POTASSIUM mmol/L 3.4* 4.0 4.0 4.1 3.7 4.1 4.1 4.1 3.3* 4.1 3.6   CHLORIDE mmol/L 99 98 97* 100 99 98 98 100 97* 98 97*   CO2 mmol/L 30 28 26 27 27 27 26 28 27 30 30   ANION GAP mmol/L 12 15 17 14 15 15 15 14 16 15 15   BUN mg/dL 21 21 19 20 20 23 23 27* 27* 25* 22   CREATININE mg/dL 1.29 1.35* 1.44* 1.35* 1.42* 1.38* 1.45* 1.32* 1.44* 1.55* 1.28   EGFR mL/min/1.73m*2 65 62 57* 62 58* 60* 57* 63 57* 52* 66   MAGNESIUM mg/dL 2.00 2.08  --  2.15  --  2.34  --  2.71* 2.15 2.21 2.15   ALBUMIN g/dL 3.4 3.5 3.7 3.5 3.6 3.6 3.6 3.5 3.8 3.7 3.7   ALT U/L 37 37  --  39  --  43  --  44  --   --   --    AST U/L 25 24  --   "26  --  35  --  26  --   --   --    BILIRUBIN TOTAL mg/dL 0.9 1.1  --  0.9  --  0.9  --  0.9  --   --   --      CBC:  Results from last 7 days   Lab Units 08/28/24  0541 08/27/24  0509 08/26/24  0336 08/25/24  0456 08/24/24  0454 08/23/24  1808 08/22/24  1908 08/22/24  0638   WBC AUTO x10*3/uL 10.1 9.2 8.8 9.2 7.9 8.4 8.9 9.7   HEMOGLOBIN g/dL 11.6* 11.8* 11.8* 12.0* 11.8* 13.0* 12.8* 12.8*   HEMATOCRIT % 34.8* 34.7* 35.6* 36.4* 34.9* 41.0 41.0 39.9*   PLATELETS AUTO x10*3/uL 304 312 309 311 318 348 362 342   MCV fL 84 83 85 87 85 90 91 90     COAG:   Results from last 7 days   Lab Units 08/22/24  0637   INR  1.2*     ABO: No results found for: \"ABO\"  HEME/ENDO:     CARDIAC:   Results from last 7 days   Lab Units 08/24/24  0454   [facility] ng/L 25   BNP pg/mL 2,047*     ASSESSMENT AND PLAN:   58 y.o. male with PMH of ICM, s/p remote multivessel PCI's, LVEF reported at 35% back in 2006, 2008, 25 to 30% in 8/2022, s/p biventricular ICD, abdominal aortic aneurysm without rupture, HTN, HLD, PTSD, tobacco use, renal stones, s/p recent cystoscopy with urethral stent placement, recurrent episodes of VT ( including VT storm ) that failed ablation , ganglion blocks in the past who is transfer from floor to HF ICU on 8/22 overnight  due to sustained slow VT that required ATP; K level was low at the time of the VT. No further episodes of arrhythmia since then. EP consulted - no further options. Thoracic signed off due to high risk for surgical procedure (surgical removing of ganglion blocks).  Discussed at advance therapies evaluation 8/27. Not a candidate for LVAD or OHT. Consider dual heart and lung transplant and further evaluation of this. Patient to be evaluated by the lung transplant team prior with possible initiation of dual heart and lung transplant evaluation. He will be transferred to the medical floor for further management and dispo planning.     Neuro:  # Anxiety/Depression  # Insomnia  # PTSD  - Serial neuro and " pain assessments   - PO Tylenol PRN for pain  - atarax 25 mg q6 PRN for anxiety   - cont home paroxetine 60 mg nightly, trazodone 50 mg nightly  - cont ativan 0.5 mg q8 hours, OARRS reviewed  - PT/OT Consult, OOB to chair  - CAM ICU score every shift  - Sleep/wake cycle normalization     # Physical Status  - BMI 27  -Age-related debility/Reduced Mobility due to HF     #Substance abuse  -Alcohol abuse/Alcohol denied  -Tobacco use/Nicotine: ex-smoker and quit for 6 month (per pt)     Cardiovascular:  # Acute on chronic Systolic and Diastolic heart failure  # Stage D HFrEF/ICM s/p CRT-D (11/2019)   - Hx of Cardiogenic shock last admission requiring IABP support  - TTE (5/28/2024): severely decreased LV systolic function, EF 15%, LVIDd 6.32, moderate-severe MVR, mild TR    - previously signed consent for OHT/LVAD workup, found not to be a candidate for OHT given active tobacco use and severe emphysema at the time, also was not interested in LVAD at that time, workup stopped per Dr. Doyle 6/5--> Now open up again for LVAD only.   - admit BNP: 1873   - admit CXR: prominent interstitial markings suggesting pulmonary interstitial edema. Small to moderate right pleural effusion and superimposed right basilar atelectasis.  - admit wt: 81.3 kg, per pt dry wt ~73 kg (160 lb)  - daily wt (8/26): 73.5kg   - per pt, has not smoked since 6/2024  - Palliative Med/Pulmonology consulted, appreciate recs  - Discussed at advance therapies evaluation 8/27. Not a candidate for LVAD or OHT. Consider dual heart and lung transplant. Pulmonology consulted for consideration.   - s/p diuresis with IV lasix boluses--> switched to torsemide 40 mg daily 8/22 (Home torsemide 20 mg daily)  - C/w  home meds: Entresto 12-13 mg BID, dapagliflozin 10 mg daily. Increase spironolactone 12.5 mg to 25 mg daily. Increase Toprol XL 12.5 mg to 25 mg daily.  - Daily standing weights, 2gm sodium diet, 2L fluid restriction, strict I&Os     #CAD  s/p multiple  PCIs  - [facility] (5/23/24): dLM: 10-30% stenosis; Prox and mid LAD [id number]%; Prox CX Lesion 100%; Right Coronary Artery: fills via collaterals; Proximal %; LVEF 10%  - Cont ASA 81, rosuvastatin 20 mg nightly     #Prior VT storm   # Recurrent slow VT  - s/p VT ablation 5/30, stellate ganglion block 6/4, VT ablation 6/6, stellate ganglion block 6/11  - Device interrogation completed 8/20 went into prolonged slow VT, self-resolved during device interrogation  - EP consulted  rec possible stellate ganglion removal/ denervation ( block would be via anesthesiology)  - Thoracic surgery consulted  (8/23): signed OFF due to high risk for surgical procedure (surgical removing of ganglion blocks). Will re-engage  if required.  - Cont home ranexa 500 mg BID, amiodarone 200 mg BID, mexiletine 300 TID  - Increase metoprolol xl 25mg daily as above.     #Electrolyte Disturbances  - Keep Mg >2 and K >4     Pulmonary:   #Severe emphysema  #COPD   #ex-smoker  - CT chest (6/4): severe emphysematous changes.   - admit CXR: prominent interstitial markings suggesting pulmonary interstitial edema. Small to mod R pleural effusion and superimposed  basilar atelectasis.  - Pulmonology consult: start LAMA/ LABA for maintenance therapy   - continue PRN albuterol  - PFT's completed 8/21: FEV1 58% predicted, FEV1/FVC 67% predicted  - Monitor and maintain SpO2 > 92%  - attempting to wean supplemental O2> Spo2 dropped to 88% while ambulating 8/22. Will need home going oxygen on discharge.     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  - not currently on synthroid   - TSH ( 8/20): 6.40 and free T4 1.55H  - Endocrine consulted 8/22, monitor via repeat TFTs (TSH, FT4, FT3 in 4wks).       ID:  -afebrile, nontoxic   -no s/s infx  -trend temps q4h     PHYSICAL AND OCCUPATIONAL THERAPY: Ordered    LINES:  PIVs    DVT: Enoxaparin injections  VAP BUNDLE: NA  CENTRAL LINE BUNDLE: NA  ULCER PPX: Pantoprazole PO 40 mg daily  GLYCEMIC CONTROL: NA  BOWEL CARE: Agnieszka-Colace 2 tablet twice daily + Miralax TID  INDWELLING CATHETER: NA  NUTRITION: Adult diet Cardiac; 70 gm fat; 2 - 3 grams Sodium; 1500 mL fluid      EMERGENCY CONTACT: Extended Emergency Contact Information  Primary Emergency Contact: Judie Sapp  Address: [address] [place] [address] [facility]  Home Phone: [phone]  Mobile Phone: [phone]  Relation: Mother  Preferred language: English   needed? No  Secondary Emergency Contact: Keiry Greene  Address: [address] [place], [place]  Mobile Phone: [phone]  Relation: Sibling  Preferred language: English   needed? No  FAMILY UPDATE: Patient, significant other, and parents at the bedside   CODE STATUS: Full Code    DISPO: transfer to the medical floor.     Patient seen and assessed with Dr. Mcarthur.  ________________  Pina Morrow,

## 2024-08-28 NOTE — SIGNIFICANT EVENT
RAPID RESPONSE RN NOTE - RADAR 6   08/28/24 1630   Onset Documentation   Rapid Response Initiated By Radar auto page   Location/Room [facility]  (LT 5009)   Pager Time [phone]   Arrival Time 1635   Event End Time 1650   Level II Called No   Primary Reason for Call Radar auto page  (RADAR 6)     VS: 36.3, 89, 20, 100/66, 93%.  Reviewed VS with Veronica RN- VS consistent with trend for patient, No acute concerns from Nursing at this time,  RN to contact Rapid Response Team with any further issues or patient deterioration.

## 2024-08-28 NOTE — PROGRESS NOTES
Patient transferred to the floor in stable condition to the [facility] Cardiology service. Upon evaluation, patient is HDS and in no acute distress with no c/o pain or discomfort. Based on chart review and physical examination, patient is appropriate for the HVI/HF floor service at this time. Transfer orders reviewed and updated. Plan of care as stated in transfer note.    To be seen and discussed with staff attending Dr. Josh Hillman in the AM  ________________________________________________  S: No CP or SOB. Feels fine    Physical exam:  General: NAD  Head/ neck: atraumatic. NC 4L in place  Cardiac: RRR, regular S1 S2 , 1/6 syst murmur, no rub, no gallop  Pulm: CTA bilaterally, no wheezes, rales or rhonchi.    Vascular: Radial 2+ bilaterally  GI: soft, non distended  Extremities: no LE edema   Neuro: no focal neuro deficits   Psych: appropriate mood and behavior   Skin: warm and dry

## 2024-08-28 NOTE — PROGRESS NOTES
Physical Therapy    Physical Therapy Re-Evaluation & Treatment    Patient Name: Cristian Sapp  MRN: [id number]  Today's Date: 8/28/2024   Time Calculation  Start Time: 1521  Stop Time: 1547  Time Calculation (min): 26 min    Assessment/Plan   PT Assessment  PT Assessment Results: Decreased endurance, Decreased mobility, Decreased strength  Rehab Prognosis: Excellent  End of Session Communication: Bedside nurse  End of Session Patient Position: Up in chair, Alarm off, not on at start of session   IP OR SWING BED PT PLAN  Inpatient or Swing Bed: Inpatient  PT Plan  Treatment/Interventions: Transfer training, Gait training, Stair training, Balance training, Strengthening, Endurance training, Therapeutic exercise, Therapeutic activity  PT Plan: Ongoing PT  PT Frequency: 2 times per week  PT Discharge Recommendations: No PT needed after discharge  PT Recommended Transfer Status: Stand by assist  PT - OK to Discharge: Yes    Subjective     General Visit Information:  General  Reason for Referral: RE-EVAL as pt was transferred to ICU with ongoing/recurrent VT on floor, required ATP; considered for LVAD but c/f pulmonary function VT now being considered for possible OHT/OLtxp  Past Medical History Relevant to Rehab: CAD s/p multiple PCI, ICM/HFrEF s/p CRT-D implant, V. Tach s/p multiple ablations and AICD, anxiety, COPD, HTN, DM, DLD, infrarenal AAA s/p R iliac stent, and nephrolithiasis status post ureteral stent.  Family/Caregiver Present: Yes  Prior to Session Communication: Bedside nurse  Patient Position Received: Alarm off, not on at start of session, Up in chair  General Comment: Pt pleasant and cooperative with therapy. Telemetry, 3 L O2 NC.    Home Living:  Home Living  Type of Home: House  Lives With: Parent(s)  Home Adaptive Equipment: Walker rolling or standard, Cane (reports has access to a rollator)  Home Layout: One level  Home Access: Level entry  Bathroom Shower/Tub: Tub/shower unit    Prior Level of  Function:  Prior Function Per Pt/Caregiver Report  Level of [place]: Independent with ADLs and functional transfers, Independent with homemaking with ambulation  ADL Assistance: Independent  Homemaking Assistance: Independent  Ambulatory Assistance: Independent (Does not use assistive device; household ambulator; denies falls)  Leisure: Playing with his dogs, gardening, yard work, watching TV shoes with mom    Precautions:  Precautions  Medical Precautions: Cardiac precautions, Fall precautions, Oxygen therapy device and L/min    Objective     Pain:  Pain Assessment  Pain Assessment: 0-10  0-10 (Numeric) Pain Score: 0 - No pain    Cognition:  Cognition  Overall Cognitive Status: Within Functional Limits  Arousal/Alertness: Appropriate responses to stimuli  Orientation Level: Oriented X4  Following Commands: Follows all commands and directions without difficulty    General Assessments:   Activity Tolerance  Early Mobility/Exercise Safety Screen: Proceed with mobilization - No exclusion criteria met  Rate of Perceived Dyspnea (RPD): PRE: .5/10; DURING amb: 4/10  Rate of Perceived Exertion (RPE): PRE: 7/20, DURING AMB: 13/20    Sensation  Light Touch: No apparent deficits    Strength  Strength Comments: B LE strength 4+/5 throughout except B hip ADD 4/5  Strength  Strength Comments: B LE strength 4+/5 throughout except B hip ADD 4/5    Coordination  Movements are Fluid and Coordinated: Yes    Static Sitting Balance  Static Sitting-Balance Support: No upper extremity supported, Feet supported  Static Sitting-Level of Assistance: Independent  Dynamic Sitting Balance  Dynamic Sitting-Balance Support: No upper extremity supported, Feet supported  Dynamic Sitting-Level of Assistance: Independent    Static Standing Balance  Static Standing-Balance Support: No upper extremity supported  Static Standing-Level of Assistance: Close supervision  Dynamic Standing Balance  Dynamic Standing-Balance Support: No upper extremity  supported  Dynamic Standing-Level of Assistance: Close supervision    Functional Assessments:     Transfers  Transfer: Yes  Transfer 1  Transfer From 1: Sit to  Transfer to 1: Stand  Technique 1: Sit to stand  Transfer Level of Assistance 1: Distant supervision  Trials/Comments 1: does not require use of UEs to complete transfer  Transfers 2  Transfer From 2: Stand to  Transfer to 2: Sit  Technique 2: Stand to sit  Transfer Level of Assistance 2: Distant supervision  Trials/Comments 2: does not require use of UEs to complete transfer    Ambulation/Gait Training  Ambulation/Gait Training Performed: Yes  Ambulation/Gait Training 1  Surface 1: Level tile  Device 1: No device  Assistance 1: Distant supervision  Quality of Gait 1: Forward flexed posture, Wide base of support (small step length)  Comments/Distance (ft) 1: 150 ft    Extremity/Trunk Assessments:  RLE   RLE : Within Functional Limits  LLE   LLE : Within Functional Limits    Treatments:     Therapeutic Activity  Therapeutic Activity Performed: Yes  Therapeutic Activity 1: Pt completed sit<->stand with no UE assist with supervision x 2 trials.  Therapeutic Activity 2: Pt ambulated 10 ft total in room with no assistive device and supervision    Outcome Measures:  [facility] Basic Mobility  Turning from your back to your side while in a flat bed without using bedrails: None  Moving from lying on your back to sitting on the side of a flat bed without using bedrails: None  Moving to and from bed to chair (including a wheelchair): A little  Standing up from a chair using your arms (e.g. wheelchair or bedside chair): A little  To walk in hospital room: A little  Climbing 3-5 steps with railing: A little  Basic Mobility - Total Score: 20    Confusion Assessment Method-ICU (CAM-ICU)  Feature 1: Acute Onset or Fluctuating Course: Negative  Overall CAM-ICU: Negative    FSS-ICU  Ambulation: Walks >/ or equal to 150 feet with supervision  Rolling: Complete  independence  Sitting: Complete independence  Transfer Sit-to-Stand: Supervision or set-up only  Transfer Supine-to-Sit: Complete independence  Total Score: 31    Early Mobility/Exercise Safety Screen: Proceed with mobilization - No exclusion criteria met  ICU Mobility Scale: Walking with assistance of 1 person [8]  E = Exercise and Early Mobility  Early Mobility/Exercise Safety Screen: Proceed with mobilization - No exclusion criteria met  ICU Mobility Scale: Walking with assistance of 1 person  [place]  Sitting Balance: Steady, safe  Arises: Able without using arms  Attempts to Arise: Able to arise, one attempt  Immediate Standing Balance (First 5 Seconds): Steady without walker or other support  Standing Balance: Narrow stance without support  Nudged: Steady without walker or other support  Eyes Closed: Steady  Turned 360 Degrees: Steadiness: Steady  Turned 360 Degrees: Continuity of Steps: Continuous  Sitting Down: Safe, smooth motion  Balance Score: 16  Initiation of Gait: No hesitancy  Step Height: R Swing Foot: Right foot complete clears floor  Step Length: R Swing Foot: Passes left stance foot  Step Height: L Swing Foot: Left foot complete clears floor  Step Length: L Swing Foot: Passes right stance foot  Step Symmetry: Right and left step appear equal  Step Continuity: Steps appear continuous  Path: Straight without walking aid  Trunk: No sway, no flexion, no use of arms, no walking aid  Walking Time: Heels almost touching while walking  Gait Score: 12  Total Score: 28    Other Measures  5x Sit to Stand: Pt completed from chair without UE assist: 7.29 seconds, RPE 2/10, RPE 10/20.  6 min Walk Test: Deferred this date as pt with high levels of SOB reported on RPD with short distance amb.    Encounter Problems       Encounter Problems (Active)       Balance       Pt will score >45 on OSWALD for safe community ambulation. (Progressing)       Start:  08/28/24    Expected End:  09/11/24               Mobility        Patient will ambulate >750 ft with no assistive device with supervision with stable vitals and RPD </= 3/10 and RPE </= 13/20 for improved functional mobility.   (Progressing)       Start:  08/28/24    Expected End:  09/11/24            Patient will ascend/descend four stairs with handrail and supervision (Progressing)       Start:  08/28/24    Expected End:  09/11/24            Pt will complete 6MWT without assistive devices with supervision and amb >750 ft with stable vitals and RPD </= 3/10 and RPE </= 13/20 for improved functional mobility.   (Progressing)       Start:  08/28/24    Expected End:  09/11/24               PT Transfers       Patient will transfer sit to and from stand for 30 seconds and complete >10 transfers with stable vitals and RPD </= 3/10 and RPE </= 13/20 for improved functional mobility.   (Progressing)       Start:  08/28/24    Expected End:  09/11/24               Pain - Adult            Education Documentation  Handouts, taught by Jessica Weaver PT at 8/28/2024  4:58 PM.  Learner: Patient  Readiness: Acceptance  Method: Explanation  Response: Verbalizes Understanding  Comment: RPD/RPE scales    Mobility Training, taught by Jessica Weaver PT at 8/28/2024  4:58 PM.  Learner: Patient  Readiness: Acceptance  Method: Explanation  Response: Verbalizes Understanding    Education Comments  No comments found.    Signed by Jessica Weaver DPT

## 2024-08-28 NOTE — CONSULTS
Inpatient consult to Lung Transplant  Consult performed by: YARITZA Mendoza-CNS  Consult ordered by: Keysha Callaway PA-C        History Of Present Illness:    Cristian Sapp is a 58 y.o. male with CAD s/p multiple PCI, ICM/HFrEF (EF 15% in 5/2024) s/p CRT-D implant, V. Tach s/p multiple ablations and ganglion blocks and AICD, anxiety/PTSD, COPD, HTN, DM, DLD, infrarenal AAA s/p R iliac stent, PAD (s/p Fem-Pop) and nephrolithiasis status post ureteral stent,  and recent heavy tobacco use (quit 5/2024; 1-2ppd x 45 years) who presented to [facility] ED on 8/19/24 with c/o SOB and concern for VT episode, He was  transferred from floor to HFICU on 8/22 overnight  due to sustained slow VT that required ATP; K level was low at the time of the VT. No further episodes of arrhythmia since then. EP consulted - no further options. Thoracic signed off due to high risk for surgical procedure (surgical removing of ganglion blocks).  Discussed at advance therapies evaluation 8/27. Not a candidate for LVAD or OHT due to extensive severe lung disease, reoccuring VT and recent nicotine use.     Last Recorded Vitals:  Vitals:    08/28/24 1300 08/28/24 1400 08/28/24 1500 08/28/24 1629   BP: 103/67 102/68 106/69 100/66   Pulse: 75 75 75 89   Resp: 18 21 20 20   Temp:    36.3 °C (97.3 °F)   TempSrc:       SpO2: 96% 96% 95% 93%   Weight:       Height:           Last Labs:  CBC - 8/28/2024:  5:41 AM  10.1 11.6 304    34.8      CMP - 8/28/2024:  5:41 AM  8.7 6.2 25 --- 0.9   3.2 3.4 37 126      PTT - 8/22/2024:  6:37 AM  1.2   14.0 28     Troponin I, High Sensitivity   Date/Time Value Ref Range Status   08/19/2024 02:39 AM 26 (H) 0 - 20 ng/L Final   08/19/2024 01:50 AM 23 (H) 0 - 20 ng/L Final   07/12/2024 05:59 AM 17 0 - 20 ng/L Final     Troponin I, High Sensitivity ([facility])   Date/Time Value Ref Range Status   08/24/2024 04:54 AM 25 0 - 53 ng/L Final     BNP   Date/Time Value Ref Range Status   08/24/2024 04:54 AM 2,047 (H) 0 - 99  pg/mL Final   08/20/2024 06:36 AM 1,873 (H) 0 - 99 pg/mL Final     Hemoglobin A1C   Date/Time Value Ref Range Status   06/04/2024 03:07 AM 6.0 (H) see below % Final     Comment:     Hemoglobin variant detected which does not interfere with determination of Hemoglobin A1c. Hemoglobin identification can be ordered to characterize the variant if clinically indicated.     LDL Calculated   Date/Time Value Ref Range Status   08/08/2024 02:39 PM 56 <=99 mg/dL Final     Comment:                                 Near   Borderline      AGE      Desirable  Optimal    High     High     Very High     0-19 Y     0 - 109     ---    110-129   >/= 130     ----    20-24 Y     0 - 119     ---    120-159   >/= 160     ----      >24 Y     0 -  99   100-129  130-159   160-189     >/=190       VLDL   Date/Time Value Ref Range Status   08/08/2024 02:39 PM 32 0 - 40 mg/dL Final      Last I/O:  I/O last 3 completed shifts:  In: 1230 (16.4 mL/kg) [P.O.:1230]  Out: 3675 (49 mL/kg) [Urine:3675 (1.4 mL/kg/hr)]  Weight: 75 kg       Past Medical History:  He has a past medical history of Atherosclerosis of native artery of both lower extremities with intermittent claudication (CMS-[facility]), CHB (complete heart block) (Multi), Chronic systolic CHF (congestive heart failure), NYHA class 3 (Multi), COPD (chronic obstructive pulmonary disease) (Multi), Coronary artery disease, History of tobacco abuse, HLD (hyperlipidemia), Hypertension, Infrarenal abdominal aortic aneurysm (AAA) without rupture (CMS-HCC), Ischemic cardiomyopathy with implantable cardioverter-defibrillator (ICD), MI (myocardial infarction) (Multi), Nephrolithiasis, and PTSD (post-traumatic stress disorder).    Past Surgical History:  He has a past surgical history that includes Cardiac defibrillator placement; Coronary angioplasty with stent (2006); Coronary angioplasty with stent (04/2003); Coronary angioplasty with stent (06/2008); Cystoscopy w/ ureteral stent placement (04/01/2024);  Cystoscopy w/ ureteral stent placement (04/30/2024); Iliac artery stent (Right); Knee surgery; Femoral bypass; Cardiac electrophysiology procedure (N/A, 5/23/2024); Cardiac catheterization (N/A, 5/23/2024); Cardiac electrophysiology procedure (N/A, 5/28/2024); Cardiac electrophysiology procedure (Right, 5/30/2024); and Cardiac electrophysiology procedure (N/A, 6/6/2024).      Social History:  He reports that he has quit smoking. His smoking use included cigarettes. He does not have any smokeless tobacco history on file. He reports that he does not currently use alcohol. He reports that he does not currently use drugs.    Family History:  Family History   Problem Relation Name Age of Onset    Hypertension Mother      Diabetes Father      Hypertension Sister      Diabetes Sister      Colon cancer Maternal Grandmother      Hypertension Maternal Grandmother      Heart disease Maternal Grandfather      Hypertension Maternal Grandfather      Cancer Paternal Grandfather      Hypertension Paternal Grandfather          Allergies:  Chantix [varenicline]    Inpatient Medications:  Scheduled medications   Medication Dose Route Frequency    amiodarone  200 mg oral BID    aspirin  81 mg oral Daily    dapagliflozin propanediol  10 mg oral Daily    enoxaparin  40 mg subcutaneous q24h    ferrous sulfate (325 mg ferrous sulfate)  65 mg of iron oral Daily with breakfast    tiotropium  2 puff inhalation Daily    And    formoterol  20 mcg nebulization q12h    iron sucrose  200 mg intravenous Daily    metoprolol succinate XL  25 mg oral Daily    mexiletine  300 mg oral TID    pantoprazole  40 mg oral Daily before breakfast    PARoxetine  60 mg oral Nightly    polyethylene glycol  17 g oral TID    ranolazine  500 mg oral BID    rosuvastatin  20 mg oral Nightly    sacubitriL-valsartan  0.5 tablet oral BID    sennosides-docusate sodium  2 tablet oral BID    [START ON 8/29/2024] spironolactone  25 mg oral Daily    tamsulosin  0.4 mg oral  Daily    torsemide  40 mg oral Daily    traZODone  50 mg oral Nightly     PRN medications   Medication    acetaminophen    albuterol    benzocaine-menthol    diphenhydrAMINE    hydrOXYzine HCL    LORazepam    magnesium hydroxide    oxygen     Continuous Medications   Medication Dose Last Rate     Outpatient Medications:  Current Outpatient Medications   Medication Instructions    albuterol sulfate (Proair Digihaler) 90 mcg/actuation aero powdr breath act w/sensor inhaler 2 puffs, inhalation, Every 4 hours PRN    amiodarone (PACERONE) 200 mg, oral, 2 times daily    aspirin 81 mg, oral, Daily    Farxiga 10 mg, oral, Daily    ferrous sulfate (325 mg ferrous sulfate) 325 mg, oral, Daily with breakfast    furosemide (LASIX) 40 mg, intravenous, 2 times daily    LORazepam (ATIVAN) 0.5 mg, oral, Daily PRN, Take 1 tablet daily as needed up to 1.5mg    magnesium oxide (MAG-OX) 400 mg, oral, Daily    metoprolol succinate XL (TOPROL-XL) 12.5 mg, oral, Daily, Do not crush or chew.    mexiletine (MEXITIL) 300 mg, oral, 3 times daily    nitroglycerin (NITROSTAT) 0.4 mg, sublingual, Every 5 min PRN    PARoxetine (PAXIL) 60 mg, oral, Nightly    potassium chloride (Klor-Con) 20 mEq packet 20 mEq, oral, Daily    ranolazine (RANEXA) 500 mg, oral, 2 times daily, Do not crush, chew, or split.    rosuvastatin (CRESTOR) 20 mg, oral, Nightly    sacubitriL-valsartan (Entresto) 24-26 mg tablet 0.5 tablets, oral, 2 times daily    spironolactone (ALDACTONE) 12.5 mg, oral, Daily    tamsulosin (FLOMAX) 0.4 mg, oral, Daily    traZODone (DESYREL) 50 mg, oral, Nightly       Physical Exam:  Physical Exam  Constitutional:       Appearance: He is normal weight.   HENT:      Mouth/Throat:      Mouth: Mucous membranes are moist.      Pharynx: Oropharynx is clear.   Cardiovascular:      Rate and Rhythm: Normal rate.      Heart sounds: Normal heart sounds.   Pulmonary:      Effort: Pulmonary effort is normal.      Breath sounds: Decreased air movement  present. Decreased breath sounds present.   Abdominal:      General: There is distension.      Palpations: Abdomen is soft.   Musculoskeletal:      Right lower leg: No edema.      Left lower leg: No edema.   Skin:     General: Skin is warm and dry.   Neurological:      Mental Status: He is alert.   Psychiatric:         Mood and Affect: Mood normal.         Behavior: Behavior is cooperative.         Review of Systems   Constitutional: Negative for chills and fever.   HENT:  Negative for congestion and hoarse voice.    Cardiovascular:  Positive for dyspnea on exertion, irregular heartbeat, orthopnea and palpitations.   Respiratory:  Positive for shortness of breath.    Gastrointestinal:  Positive for bloating. Negative for nausea and vomiting.   Neurological:  Negative for weakness.        Assessment/Plan   PMH  CAD s/p multiple PCI, ICM/HFrEF (EF 15% in 5/2024) s/p CRT-D implant, V. Tach s/p multiple ablations and ganglion blocks and AICD, anxiety/PTSD, COPD, HTN, DM, DLD, infrarenal AAA s/p R iliac stent, PAD (s/p Fem-Pop) and nephrolithiasis status post ureteral stent    PREVENTATIVE CARE:  Vaccines:   Flu: needs   Covid: J &J 7/6/21   Pneumonia: Pneumovax 23 7/15/15   Shingrix: 1/20/2020; 3/22/2020   Hepatitis B: needs  Colonoscopy: needs  Mammogram: n/a  PAP: n/a  PSA: 0.44 on 6/14/24  Dental: Panorex done 8/27: Patient is edentulous. No mandibular lucencies to suggest an osseous erosive or infectious process.    SOCIAL:  Marital Status:   Children: 2; do not live with him  Occupation: disabled Retire Date: 2016  Tobacco: heavy use; 1-2 ppd x 45 years; quit 5/2024  Alcohol: denies  Substance Use: denies     Primary Support Person: Mother   Secondary Support Person: Sister     Assessment/Plan     Cristian Sapp is high risk    BARRIERS:  Cristian Sapp is a 58 y.o. year old male patient with CAD s/p multiple PCI, ICM/HFrEF (EF 15% in 5/2024) s/p CRT-D implant, V. Tach s/p multiple ablations and ganglion  blocks and AICD, anxiety/PTSD, COPD, HTN, DM, DLD, infrarenal AAA s/p R iliac stent, PAD (s/p Fem-Pop) and nephrolithiasis status post ureteral stent,  and recent heavy tobacco use (quit 5/2024; 1-2ppd x 45 years) who presented to [place] ED on 8/19/24 with c/o SOB and concern for VT episode, He was  transferred from floor to HFICU on 8/22 overnight  due to sustained slow VT that required ATP; K level was low at the time of the VT. No further episodes of arrhythmia since then. EP consulted - no further options. Thoracic signed off due to high risk for surgical procedure (surgical removing of ganglion blocks).  Discussed at advance therapies evaluation 8/27. Not a candidate for LVAD or OHT due to extensive severe lung disease, reoccuring VT and recent nicotine use.     PFTs completed 8/21/24 show FEV1 of 38% indicating moderate airflow obstruction and FVC 58% and FEV1/FVC 67%, indicating moderate restrictive defect. There is no significant bronchodilator response. The DLCO is severely reduced.     Abdominal Ultrasound done 8/27/24 shows Mild hepatomegaly and mildly increased echogenicity of the hepatic parenchyma suggestive of steatosis.    Discussed the high-risk nature of heart/lung transplantation. Due to PAD, recent tobacco use (quit 5/2024), hepatic steatosis and his age patient is deemed too high risk for heart/lung transplantation. Patient may be referred to other transplant centers ([facility], [facility], [facility]) for evaluation.     He appears to have marginal understanding of the medical condition and the transplant process.      Code Status:  Full Code    I spent 60 minutes in the professional and overall care of this patient.  Patient was seen and assessed with Dr. Solis, Lung Transplant Attending.    Consuelo Bowling, YARITZA-CNS

## 2024-08-29 ENCOUNTER — APPOINTMENT (OUTPATIENT)
Dept: RADIOLOGY | Facility: HOSPITAL | Age: 59
End: 2024-08-29
Payer: MEDICARE

## 2024-08-29 LAB
ALBUMIN SERPL BCP-MCNC: 3.8 G/DL (ref 3.4–5)
ANION GAP SERPL CALC-SCNC: 14 MMOL/L (ref 10–20)
BASOPHILS # BLD AUTO: 0.17 X10*3/UL (ref 0–0.1)
BASOPHILS NFR BLD AUTO: 1.5 %
BUN SERPL-MCNC: 21 MG/DL (ref 6–23)
CALCIUM SERPL-MCNC: 9.1 MG/DL (ref 8.6–10.6)
CHLORIDE SERPL-SCNC: 100 MMOL/L (ref 98–107)
CO2 SERPL-SCNC: 28 MMOL/L (ref 21–32)
CREAT SERPL-MCNC: 1.3 MG/DL (ref 0.5–1.3)
EGFRCR SERPLBLD CKD-EPI 2021: 64 ML/MIN/1.73M*2
EOSINOPHIL # BLD AUTO: 0.36 X10*3/UL (ref 0–0.7)
EOSINOPHIL NFR BLD AUTO: 3.2 %
ERYTHROCYTE [DISTWIDTH] IN BLOOD BY AUTOMATED COUNT: 20.7 % (ref 11.5–14.5)
GLUCOSE SERPL-MCNC: 107 MG/DL (ref 74–99)
HCT VFR BLD AUTO: 40.4 % (ref 41–52)
HGB BLD-MCNC: 12.8 G/DL (ref 13.5–17.5)
IMM GRANULOCYTES # BLD AUTO: 0.11 X10*3/UL (ref 0–0.7)
IMM GRANULOCYTES NFR BLD AUTO: 1 % (ref 0–0.9)
LYMPHOCYTES # BLD AUTO: 1.58 X10*3/UL (ref 1.2–4.8)
LYMPHOCYTES NFR BLD AUTO: 13.9 %
MAGNESIUM SERPL-MCNC: 2.08 MG/DL (ref 1.6–2.4)
MCH RBC QN AUTO: 28.5 PG (ref 26–34)
MCHC RBC AUTO-ENTMCNC: 31.7 G/DL (ref 32–36)
MCV RBC AUTO: 90 FL (ref 80–100)
MONOCYTES # BLD AUTO: 1.2 X10*3/UL (ref 0.1–1)
MONOCYTES NFR BLD AUTO: 10.6 %
NEUTROPHILS # BLD AUTO: 7.95 X10*3/UL (ref 1.2–7.7)
NEUTROPHILS NFR BLD AUTO: 69.8 %
NRBC BLD-RTO: 0 /100 WBCS (ref 0–0)
PHOSPHATE SERPL-MCNC: 3.2 MG/DL (ref 2.5–4.9)
PLATELET # BLD AUTO: 342 X10*3/UL (ref 150–450)
POTASSIUM SERPL-SCNC: 3.4 MMOL/L (ref 3.5–5.3)
RBC # BLD AUTO: 4.49 X10*6/UL (ref 4.5–5.9)
RBC MORPH BLD: NORMAL
SODIUM SERPL-SCNC: 139 MMOL/L (ref 136–145)
WBC # BLD AUTO: 11.4 X10*3/UL (ref 4.4–11.3)

## 2024-08-29 PROCEDURE — 2500000001 HC RX 250 WO HCPCS SELF ADMINISTERED DRUGS (ALT 637 FOR MEDICARE OP): Performed by: PHYSICIAN ASSISTANT

## 2024-08-29 PROCEDURE — 94640 AIRWAY INHALATION TREATMENT: CPT

## 2024-08-29 PROCEDURE — 36415 COLL VENOUS BLD VENIPUNCTURE: CPT | Performed by: PHYSICIAN ASSISTANT

## 2024-08-29 PROCEDURE — 85025 COMPLETE CBC W/AUTO DIFF WBC: CPT | Performed by: PHYSICIAN ASSISTANT

## 2024-08-29 PROCEDURE — 2500000002 HC RX 250 W HCPCS SELF ADMINISTERED DRUGS (ALT 637 FOR MEDICARE OP, ALT 636 FOR OP/ED): Performed by: PHYSICIAN ASSISTANT

## 2024-08-29 PROCEDURE — 71045 X-RAY EXAM CHEST 1 VIEW: CPT | Performed by: RADIOLOGY

## 2024-08-29 PROCEDURE — 71045 X-RAY EXAM CHEST 1 VIEW: CPT

## 2024-08-29 PROCEDURE — 2500000004 HC RX 250 GENERAL PHARMACY W/ HCPCS (ALT 636 FOR OP/ED): Performed by: PHYSICIAN ASSISTANT

## 2024-08-29 PROCEDURE — 99233 SBSQ HOSP IP/OBS HIGH 50: CPT | Performed by: INTERNAL MEDICINE

## 2024-08-29 PROCEDURE — 1200000002 HC GENERAL ROOM WITH TELEMETRY DAILY

## 2024-08-29 PROCEDURE — 80069 RENAL FUNCTION PANEL: CPT | Performed by: PHYSICIAN ASSISTANT

## 2024-08-29 PROCEDURE — 83735 ASSAY OF MAGNESIUM: CPT | Performed by: PHYSICIAN ASSISTANT

## 2024-08-29 ASSESSMENT — COGNITIVE AND FUNCTIONAL STATUS - GENERAL
MOBILITY SCORE: 24
MOBILITY SCORE: 23
DAILY ACTIVITIY SCORE: 24
DAILY ACTIVITIY SCORE: 24
CLIMB 3 TO 5 STEPS WITH RAILING: A LITTLE

## 2024-08-29 ASSESSMENT — ENCOUNTER SYMPTOMS
VOMITING: 0
ORTHOPNEA: 1
CHILLS: 0
PALPITATIONS: 1
DYSPNEA ON EXERTION: 1
NAUSEA: 0
HOARSE VOICE: 0
BLOATING: 1
IRREGULAR HEARTBEAT: 1
WEAKNESS: 0
FEVER: 0
SHORTNESS OF BREATH: 1

## 2024-08-29 ASSESSMENT — PAIN SCALES - GENERAL
PAINLEVEL_OUTOF10: 0 - NO PAIN
PAINLEVEL_OUTOF10: 0 - NO PAIN

## 2024-08-29 ASSESSMENT — PAIN - FUNCTIONAL ASSESSMENT: PAIN_FUNCTIONAL_ASSESSMENT: 0-10

## 2024-08-29 NOTE — PROGRESS NOTES
[facility] Attending Shared Visit Note    This is a shared visit. Please see Advanced Practice Provider's encounter note for additional details.        Overnight events/Subjective: feeling ok overall, suspects he is near the end of his life, would like to be home this weekend to be with his children    Exam:   Physical exam: nasal cannula in place, no distress. Normal rate, regular rhythm, non labored breathing, clear to auscultation, abdomen non distended, minimal LE edema, no focal neuro deficits.     A/P:   58 M with ischemic HFrEF (EF 15%, 5/2024) s/p CRT-D, prior PCIs, recurrent VT (s/p ablation, stellate ganglion block), severe COPD/emphysema who presented to [facility] on 8/19 for dyspnea and was transferred to [facility] for slow VT who is being considered for possible dual heart-lung transplant.    #HFrEF: considering dual heart-lung transplant. Not a candidate for OHT or LVAD alone due to pulmonary issues and PAD. GDMT including torsemide, Entresto, dapagliflozin, spironolactone and metoprolol XL.   -awaiting pulmonary consultation regarding candidacy for lung transplant  #VT: metoprolol as above, ranolazine, amiodarone, mexilitine    Follow up with Dr. Brown    Dispo: pending decision by pulmonary. If he is not a candidate, then may be able to go home in next 1-2 days    Josh Hillman MD    ________________________________________________________________________________  Problems:   Principal Problem:    Acute on chronic combined systolic and diastolic heart failure (Multi)  Active Problems:    Chronic systolic heart failure (Multi)    ICD (implantable cardioverter-defibrillator) in place    Primary hypertension    Chronic obstructive pulmonary disease (Multi)    CAD S/P percutaneous coronary angioplasty    PTSD (post-traumatic stress disorder)    Centrilobular emphysema (Multi)    VT (ventricular tachycardia) (Multi)      Objective   Admit Date: 8/19/2024  Hospital Length of Stay: 10   Home: [facility]  [id number]    Vitals:      8/29/2024     7:45 AM 8/29/2024     7:37 AM 8/29/2024     4:29 AM 8/28/2024    11:30 PM 8/28/2024     8:05 PM 8/28/2024     7:43 PM 8/28/2024     4:29 PM   Vitals   Systolic  105 95 98 106 106 100   Diastolic  69 64 65 68 68 66   Heart Rate  75 71 75 75 75 89   Temp 36.2 °C (97.2 °F) 35.6 °C (96.1 °F) 36.1 °C (97 °F) 36 °C (96.8 °F)  35.9 °C (96.7 °F) 36.3 °C (97.3 °F)   Resp  20 20 18  18 20   Weight (lb)   175.93       BMI   27.55 kg/m2       BSA (m2)   1.94 m2         Wt Readings from Last 5 Encounters:   08/29/24 79.8 kg (175 lb 14.8 oz)   08/19/24 80.9 kg (178 lb 5.6 oz)   08/08/24 80.7 kg (178 lb)   07/23/24 75.8 kg (167 lb)   07/14/24 72.2 kg (159 lb 2.8 oz)       Intake/Output Summary (Last 24 hours) at 8/29/2024 1208  Last data filed at 8/29/2024 0429  Gross per 24 hour   Intake 120 ml   Output 1600 ml   Net -1480 ml         MEDICATIONS  Infusions:     Scheduled:  amiodarone, 200 mg, BID  aspirin, 81 mg, Daily  dapagliflozin propanediol, 10 mg, Daily  enoxaparin, 40 mg, q24h  ferrous sulfate (325 mg ferrous sulfate), 65 mg of iron, Daily with breakfast  tiotropium, 2 puff, Daily   And  formoterol, 20 mcg, q12h  metoprolol succinate XL, 25 mg, Daily  mexiletine, 300 mg, TID  pantoprazole, 40 mg, Daily before breakfast  PARoxetine, 60 mg, Nightly  polyethylene glycol, 17 g, TID  ranolazine, 500 mg, BID  rosuvastatin, 20 mg, Nightly  sacubitriL-valsartan, 0.5 tablet, BID  sennosides-docusate sodium, 2 tablet, BID  spironolactone, 25 mg, Daily  tamsulosin, 0.4 mg, Daily  torsemide, 40 mg, Daily  traZODone, 50 mg, Nightly      PRN:  acetaminophen, 650 mg, q6h PRN  albuterol, 2 puff, q6h PRN  benzocaine-menthol, 1 lozenge, q2h PRN  diphenhydrAMINE, 50 mg, q5 min PRN  hydrOXYzine HCL, 25 mg, q6h PRN  LORazepam, 0.5 mg, q8h PRN  magnesium hydroxide, 30 mL, Daily PRN  oxygen, , Continuous PRN - O2/gases      Prior to Admission Meds:  Medications Prior to Admission   Medication Sig Dispense  Refill Last Dose    amiodarone (Pacerone) 200 mg tablet Take 1 tablet (200 mg) by mouth 2 times a day. 180 tablet 3 8/19/2024    aspirin 81 mg EC tablet Take 1 tablet (81 mg) by mouth once daily.   8/19/2024    dapagliflozin propanediol (Farxiga) 10 mg Take 1 tablet (10 mg) by mouth once daily. 30 tablet 0 8/19/2024    magnesium oxide (Mag-Ox) 400 mg (241.3 mg magnesium) tablet Take 1 tablet (400 mg) by mouth once daily. (Patient taking differently: Take 1 tablet (400 mg) by mouth 2 times a day.) 90 tablet 3 Past Week    metoprolol succinate XL (Toprol-XL) 25 mg 24 hr tablet Take 0.5 tablets (12.5 mg) by mouth once daily. Do not crush or chew. 45 tablet 0 8/19/2024    mexiletine (Mexitil) 150 mg capsule Take 2 capsules (300 mg) by mouth 3 times a day. 540 capsule 0 8/19/2024    PARoxetine (Paxil) 30 mg tablet Take 2 tablets (60 mg) by mouth once daily at bedtime. (Patient taking differently: Take 1 tablet (30 mg) by mouth 2 times a day.) 60 tablet 0 8/19/2024    potassium chloride (Klor-Con) 20 mEq packet Take 20 mEq by mouth once daily. (Patient taking differently: Take 20 mEq by mouth if needed.) 90 packet 3 Past Week    ranolazine (Ranexa) 500 mg 12 hr tablet Take 1 tablet (500 mg) by mouth 2 times a day. Do not crush, chew, or split. 180 tablet 3 8/19/2024    rosuvastatin (Crestor) 20 mg tablet Take 1 tablet (20 mg) by mouth once daily at bedtime. 90 tablet 3 8/19/2024    sacubitriL-valsartan (Entresto) 24-26 mg tablet Take 0.5 tablets by mouth 2 times a day. 30 tablet 0 8/19/2024    spironolactone (Aldactone) 25 mg tablet Take 0.5 tablets (12.5 mg) by mouth once daily. 45 tablet 3 8/19/2024    tamsulosin (Flomax) 0.4 mg 24 hr capsule Take 1 capsule (0.4 mg) by mouth once daily.   8/19/2024    traZODone (Desyrel) 50 mg tablet Take 1 tablet (50 mg) by mouth once daily at bedtime.   8/19/2024    albuterol sulfate (Proair Digihaler) 90 mcg/actuation aero powdr breath act w/sensor inhaler Inhale 2 puffs every 4  hours if needed for wheezing.   8/16/2024    ferrous sulfate, 325 mg ferrous sulfate, tablet Take 1 tablet by mouth once daily with breakfast.   8/17/2024    furosemide (Lasix) 10 mg/mL injection Infuse 4 mL (40 mg) into a venous catheter 2 times a day. (Patient not taking: Reported on 8/20/2024) 240 mL 0 Not Taking    LORazepam (Ativan) 0.5 mg tablet Take 1 tablet (0.5 mg) by mouth once daily as needed for anxiety. Take 1 tablet daily as needed up to 1.5mg       nitroglycerin (Nitrostat) 0.4 mg SL tablet Place 1 tablet (0.4 mg) under the tongue every 5 minutes if needed for chest pain.   More than a month       DATA:  CMP:  Recent Labs     08/28/24 1938 08/28/24  0541 08/27/24  0509 08/26/24 1744 08/26/24 0336 08/25/24 1735 08/25/24 0456 08/24/24 2027 08/24/24 0454 08/23/24  1808 08/22/24  1908    138 137 136 137 137 136 135* 138 137 139   K 4.0 3.4* 4.0 4.0 4.1 3.7 4.1 4.1 4.1 3.3* 4.1    99 98 97* 100 99 98 98 100 97* 98   CO2 24 30 28 26 27 27 27 26 28 27 30   ANIONGAP 16 12 15 17 14 15 15 15 14 16 15   BUN 21 21 21 19 20 20 23 23 27* 27* 25*   CREATININE 1.40* 1.29 1.35* 1.44* 1.35* 1.42* 1.38* 1.45* 1.32* 1.44* 1.55*   EGFR 59* 65 62 57* 62 58* 60* 57* 63 57* 52*   MG 2.03 2.00 2.08  --  2.15  --  2.34  --  2.71* 2.15 2.21     Recent Labs     08/28/24 1938 08/28/24  0541 08/27/24  0509 08/26/24 1744 08/26/24  0336 08/25/24 1735 08/25/24  0456 08/24/24 2027 08/24/24  0454 08/20/24  0636 08/19/24  0150 08/08/24  1439 07/12/24  0231 05/28/24  1503 05/22/24  2128   ALBUMIN 3.5 3.4 3.5 3.7 3.5 3.6 3.6 3.6 3.5   < > 3.7 3.6 3.8   < > 4.2   ALT  --  37 37  --  39  --  43  --  44  --  83* 73* 119*   < > 26   AST  --  25 24  --  26  --  35  --  26  --  62* 44* 57*   < > 15   BILITOT  --  0.9 1.1  --  0.9  --  0.9  --  0.9  --  1.0 0.8 0.5   < > 1.1   LIPASE  --   --   --   --   --   --   --   --   --   --   --   --  21  --  19    < > = values in this interval not displayed.     CBC:  Recent Labs      08/28/24  1938 08/28/24  0541 08/27/24  0509 08/26/24  0336 08/25/24  0456 08/24/24  0454 08/23/24  1808 08/22/24  1908   WBC 10.8 10.1 9.2 8.8 9.2 7.9 8.4 8.9   HGB 12.0* 11.6* 11.8* 11.8* 12.0* 11.8* 13.0* 12.8*   HCT 37.4* 34.8* 34.7* 35.6* 36.4* 34.9* 41.0 41.0    304 312 309 311 318 348 362   MCV 88 84 83 85 87 85 90 91     COAG:   Recent Labs     08/22/24  0637 08/19/24  0150 06/10/24  0312 06/09/24  1131 06/09/24  0324 06/08/24 2120 06/08/24  0508 06/07/24  1957 06/07/24  0918 06/06/24  2330 06/06/24  2220 06/04/24 2013 06/04/24  1255 05/31/24  1345 05/29/24  0518 05/28/24  1503 05/22/24 2128   INR 1.2* 1.2*  --   --   --   --   --   --  1.3*  --  1.5*  --  1.3*  --  1.1 1.2* 1.1   HAUF  --   --  0.3 0.3 0.3 0.1 0.4   < >  --    < >  --    < >  --    < >  --   --   --     < > = values in this interval not displayed.     ABO:   Recent Labs     08/22/24  0638   ABO A     HEME/ENDO:   Recent Labs     08/20/24 2004 08/20/24  0636 08/08/24  1439 06/06/24  0305 06/05/24  0223 06/04/24  1255 06/04/24  0307 05/23/24  0711   FERRITIN  --  132  --   --  99  --   --   --    IRONSAT  --  13*  --   --  18*  --   --   --    TSH 6.40*  --  6.75*  --   --  10.35*  --  5.55*   FREET4 1.55*  --  0.96 1.39  --   --   --  0.94   HGBA1C  --   --   --   --   --   --  6.0*  --      CARDIAC:   Recent Labs     08/24/24  0454 08/20/24  0636 08/19/24  0239 08/19/24  0150 08/08/24  1439 07/12/24  0559 07/12/24  0231 06/07/24  0412 06/06/24  2220 06/04/24  1255 06/03/24  1741 05/31/24  1742 05/31/24  1523 05/28/24  1503 05/23/24  0711   LDH  --   --   --   --   --   --   --  408* 467* 145  --   --   --   --   --    TROPHS 25  --  26* 23*  --  17 17  --   --   --   --  2,261* 2,395*  --  342*   BNP 2,047* 1,873*  --  1,980* 2,032*  --  774*  --   --  674* 655*  --   --  320*  --      Recent Labs     08/08/24  1439   CHOL 114   LDLCALC 56   HDL 26.1   TRIG 162*     TOX:  Recent Labs     06/04/24  1255 05/22/24  2130    AMPHETAMINE Negative Presumptive Negative   BENZO  --  Presumptive Negative   CANNABINOID  --  Presumptive Negative   COCAI  --  Presumptive Negative   FENTANYL  --  Presumptive Negative   OPIATE  --  Presumptive Negative   OXYCODONE  --  Presumptive Negative   PCP  --  Presumptive Negative     MICRO:   Recent Labs     06/05/24  0756 06/03/24  0205 05/23/24  0711   PROCAL 0.06 0.06 0.06     No results found for the last 90 days.      FOLLOWUP:   Future Appointments   Date Time Provider Department Center   9/11/2024  8:30 AM Zachary Sparks MD [id number] [place]   9/18/2024 12:40 PM [facility] [id number] [place]   9/18/2024  1:15 PM Eliot Wooten MD [id number] [place]   10/15/2024 10:00 AM Eamon Rosa MD [id number] [place]   10/31/2024  1:20 PM Arya Brown MD [id number] [place]

## 2024-08-29 NOTE — PROGRESS NOTES
Interval events:    Transferred out of HFICU yesterday    Subjective:  Orthopnea at night. No chest pain. Wants to go home for the weekend to see his kids.    Today in brief:  - CXR with mild edema. Continue current PO diuretic. Wean O2 as tolerated.   - Await heart/lung transplant team recommendations     Objective:  Vitals:    08/29/24 0737   BP: 105/69   Pulse: 75   Resp: 20   Temp: 35.6 °C (96.1 °F)   SpO2: 90%     Weight         8/24/2024  0600 8/26/2024  0600 8/27/2024  0533 8/28/2024  0600 8/29/2024  0429    Weight: 81.3 kg (179 lb 3.7 oz) 73.5 kg (162 lb 0.6 oz) 73 kg (160 lb 15 oz) 75 kg (165 lb 5.5 oz) 79.8 kg (175 lb 14.8 oz)            Intake/Output Summary (Last 24 hours) at 8/29/2024 0746  Last data filed at 8/29/2024 0429  Gross per 24 hour   Intake 170 ml   Output 2250 ml   Net -2080 ml     Recent Results (from the past 24 hour(s))   CBC and Auto Differential    Collection Time: 08/28/24  7:38 PM   Result Value Ref Range    WBC 10.8 4.4 - 11.3 x10*3/uL    nRBC 0.0 0.0 - 0.0 /100 WBCs    RBC 4.27 (L) 4.50 - 5.90 x10*6/uL    Hemoglobin 12.0 (L) 13.5 - 17.5 g/dL    Hematocrit 37.4 (L) 41.0 - 52.0 %    MCV 88 80 - 100 fL    MCH 28.1 26.0 - 34.0 pg    MCHC 32.1 32.0 - 36.0 g/dL    RDW 20.5 (H) 11.5 - 14.5 %    Platelets 310 150 - 450 x10*3/uL    Immature Granulocytes %, Automated 1.0 (H) 0.0 - 0.9 %    Immature Granulocytes Absolute, Automated 0.11 0.00 - 0.70 x10*3/uL   Renal Function Panel    Collection Time: 08/28/24  7:38 PM   Result Value Ref Range    Glucose 155 (H) 74 - 99 mg/dL    Sodium 137 136 - 145 mmol/L    Potassium 4.0 3.5 - 5.3 mmol/L    Chloride 101 98 - 107 mmol/L    Bicarbonate 24 21 - 32 mmol/L    Anion Gap 16 10 - 20 mmol/L    Urea Nitrogen 21 6 - 23 mg/dL    Creatinine 1.40 (H) 0.50 - 1.30 mg/dL    eGFR 59 (L) >60 mL/min/1.73m*2    Calcium 8.7 8.6 - 10.6 mg/dL    Phosphorus 3.4 2.5 - 4.9 mg/dL    Albumin 3.5 3.4 - 5.0 g/dL   Magnesium    Collection Time: 08/28/24  7:38 PM   Result  Value Ref Range    Magnesium 2.03 1.60 - 2.40 mg/dL   Manual Differential    Collection Time: 08/28/24  7:38 PM   Result Value Ref Range    Neutrophils %, Manual 80.9 40.0 - 80.0 %    Lymphocytes %, Manual 14.8 13.0 - 44.0 %    Monocytes %, Manual 1.7 2.0 - 10.0 %    Eosinophils %, Manual 0.9 0.0 - 6.0 %    Basophils %, Manual 1.7 0.0 - 2.0 %    Seg Neutrophils Absolute, Manual 8.74 (H) 1.20 - 7.00 x10*3/uL    Lymphocytes Absolute, Manual 1.60 1.20 - 4.80 x10*3/uL    Monocytes Absolute, Manual 0.18 0.10 - 1.00 x10*3/uL    Eosinophils Absolute, Manual 0.10 0.00 - 0.70 x10*3/uL    Basophils Absolute, Manual 0.18 (H) 0.00 - 0.10 x10*3/uL    Total Cells Counted 115     RBC Morphology See Below     Ovalocytes Few     Teardrop Cells Few      Inpatient Medications:  Scheduled medications   Medication Dose Route Frequency    amiodarone  200 mg oral BID    aspirin  81 mg oral Daily    dapagliflozin propanediol  10 mg oral Daily    enoxaparin  40 mg subcutaneous q24h    ferrous sulfate (325 mg ferrous sulfate)  65 mg of iron oral Daily with breakfast    tiotropium  2 puff inhalation Daily    And    formoterol  20 mcg nebulization q12h    metoprolol succinate XL  25 mg oral Daily    mexiletine  300 mg oral TID    pantoprazole  40 mg oral Daily before breakfast    PARoxetine  60 mg oral Nightly    polyethylene glycol  17 g oral TID    ranolazine  500 mg oral BID    rosuvastatin  20 mg oral Nightly    sacubitriL-valsartan  0.5 tablet oral BID    sennosides-docusate sodium  2 tablet oral BID    spironolactone  25 mg oral Daily    tamsulosin  0.4 mg oral Daily    torsemide  40 mg oral Daily    traZODone  50 mg oral Nightly     PRN medications   Medication    acetaminophen    albuterol    benzocaine-menthol    diphenhydrAMINE    hydrOXYzine HCL    LORazepam    magnesium hydroxide    oxygen     Continuous Medications   Medication Dose Last Rate       Telemetry 8/29/2024 (personally reviewed): ApVp 75    Physical exam:  General:  NAD  Head/ neck: atraumatic. NC 5L in place  Cardiac: RRR, regular S1 S2 , 1/6 syst murmur, no rub, no gallop  Pulm: CTA bilaterally, no wheezes, rales or rhonchi.    Vascular: Radial 2+ bilaterally  GI: soft, non distended  Extremities: no LE edema   Neuro: no focal neuro deficits   Psych: appropriate mood and behavior   Skin: warm and dry     Assessment/Plan   Mr. Sapp is a 58 y.o. male with PMH of ICM, s/p remote multivessel PCI's, LVEF reported at 35% back in 2006, 2008, 25 to 30% in 8/2022, s/p biventricular ICD, abdominal aortic aneurysm without rupture, HTN, HLD, PTSD, tobacco use, renal stones, s/p cystoscopy with urethral stent placement, recurrent episodes of VT ( including VT storm ) that failed ablation , ganglion block x2 who is     Transfer from floor to HF ICU on 8/22 overnight  due to sustained slow VT that required ATP; K level was low at the time of the VT. No further episodes of arrhythmia since then. EP consulted - no further options. Thoracic signed off due to high risk for surgical procedure (surgical removing of ganglion blocks).  Discussed at advance therapies evaluation 8/27. Not a candidate for LVAD or OHT. Consider dual heart and lung transplant and further evaluation of this. Patient to be evaluated by the lung transplant team prior with possible initiation of dual heart and lung transplant evaluation during this stay. He will be transferred to the medical floor for further management and dispo planning.     Acute on chronic Systolic and Diastolic heart failure  Stage D HFrEF/ICM s/p CRT-D (11/2019)   - Hx of Cardiogenic shock last admission requiring IABP support  - TTE (5/28/2024): severely decreased LV systolic function, EF 15%, LVIDd 6.32, moderate-severe MVR, mild TR    - previously signed consent for OHT/LVAD workup, found not to be a candidate for OHT given active tobacco use and severe emphysema at the time, also was not interested in LVAD at that time, workup stopped per   Vivek 6/5> This admission he is now open to advanced therapies such as LVAD  - Admit BNP: 1873   - Admit CXR: prominent interstitial markings suggesting pulmonary interstitial edema. Small to moderate right pleural effusion and superimposed right basilar atelectasis.  - admit wt: 81.3 kg, per pt dry wt ~73 kg (160 lb)  - daily wt 8/29:  79.8kg  - Per pt, has not smoked since 6/2024  - Palliative medicine following  - Advance therapies discussion 8/27: Not a candidate for LVAD or OHT given COPD and VT. Considered for dual heart and lung transplant. Await pulmonary consult. Appreciate ongoing advanced HF team recommendations.  - s/p diuresis with IV lasix boluses> transitioned o torsemide 40 mg daily 8/22 (Holding home torsemide 20 mg daily)  - Daily standing weights, 2gm sodium diet, 2L fluid restriction, strict I&Os  - CXR 8/29: prelim reviewed. With mild edema. Continue current PO diuretic. Wean O2 as tolerated.   - GDMT:   - Entresto 12-13 mg BID,   - Dapagliflozin 10 mg daily.   - Spironolactone escalated to 25 mg daily 8/29  - Toprol XL 12.5 mg to 25 mg daily 8/29    CAD  s/p multiple PCIs  - [facility] (5/23/24): dLM: 10-30% stenosis; Prox and mid LAD [id number]%; Prox CX Lesion 100%; Right Coronary Artery: fills via collaterals; Proximal %; LVEF 10%  - Cont ASA 81, rosuvastatin 20 mg nightly  - Cont home ranexa 500 mg BID     Prior VT storm   Recurrent slow VT  - s/p VT ablation 5/30, stellate ganglion block 6/4, VT ablation 6/6, stellate ganglion block 6/11  - Device interrogation completed 8/20 went into prolonged slow VT, self-resolved during device interrogation  - EP consulted & recommended possible stellate ganglion removal/ denervation ( block would be via anesthesiology)   - Given poor lasting effect of ganglion blocks x2, patient did not wish to retry additional nerve block  - Thoracic surgery consulted (8/23): Pt deemed high risk for surgical removal of ganglion  - Cont amiodarone 200 mg BID,  mexiletine 300 TID  - Metoprolol xl 25mg daily as above.  - Keep Mg >2 and K >4  - Rhythm stable. Telemetry with ApVp and no evidence of NSVT/VT     Severe emphysema  COPD   Former tobacco smoker  - CT chest (6/4): severe emphysematous changes.   - Pulmonology consult: start LAMA/ LABA for maintenance therapy   - continue PRN albuterol  - PFT's completed 8/21: FEV1 58% predicted, FEV1/FVC 67% predicted  - 8/22 Unable to wean O2 without desaturation to SpO2 88% with ambulation. Will need home going oxygen on discharge.     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      Anxiety/Depression  Insomnia  PTSD  - PO Tylenol PRN for pain  - atarax 25 mg q6 PRN for anxiety   - cont home paroxetine 60 mg nightly, trazodone 50 mg nightly  - cont ativan 0.5 mg q8 hours    DVT ppx: lovenox  DISPO: Pending advanced HF/pulmonary evaluation  Code status: Full Code    Patient seen and discussed with Dr. Josh Hillman.  Keysha Callaway PA-C

## 2024-08-29 NOTE — SIGNIFICANT EVENT
Rapid Response RN Note    Rapid response RN at bedside for RADAR score 7 due to the recent VS listed below:     Vitals:    08/28/24 2330 08/29/24 0429 08/29/24 0737 08/29/24 0745   BP: 98/65 95/64 105/69    BP Location:       Patient Position: Lying Lying     Pulse: 75 71 75    Resp: 18 20 20    Temp: 36 °C (96.8 °F) 36.1 °C (97 °F) 35.6 °C (96.1 °F) 36.2 °C (97.2 °F)   TempSrc:       SpO2: 94% 92% 90% 96%   Weight:  79.8 kg (175 lb 14.8 oz)     Height:            Reviewed above VS with bedside RN.  VS retaken and back within patient's current trends.  Per RN, pt had his oxygen off when vitals were taken. No interventions by rapid response team indicated at this time.  Patient denied pain, shortness of breath, dizziness or lightheadedness.  Staff to page rapid response for any concerns or acute change in condition/VS.

## 2024-08-29 NOTE — SIGNIFICANT EVENT
Rapid Response RN Note    Rapid response RN at bedside for RADAR score 7 due to the following VS: T 36 °C; HR 74; RR 18; BP 90/61; SPO2 94% .     Reviewed above VS with bedside Pina who reviewed the VS and confirmed that they are within patient's current trends.  Patient without complaints at this time.  No interventions by rapid response team indicated.       Staff to page rapid response for any concerns or acute change in condition/VS.

## 2024-08-29 NOTE — CARE PLAN
No acute changes overnight. VSS. Patient medicated with atarax and ativan for anxiety. No episodes of VT this shift.    Problem: Pain - Adult  Goal: Verbalizes/displays adequate comfort level or baseline comfort level  Outcome: Progressing     Problem: Safety - Adult  Goal: Free from fall injury  Outcome: Progressing     Problem: Discharge Planning  Goal: Discharge to home or other facility with appropriate resources  Outcome: Progressing     Problem: Chronic Conditions and Co-morbidities  Goal: Patient's chronic conditions and co-morbidity symptoms are monitored and maintained or improved  Outcome: Progressing     Problem: Skin  Goal: Participates in plan/prevention/treatment measures  Outcome: Progressing  Goal: Prevent/manage excess moisture  Outcome: Progressing  Goal: Prevent/minimize sheer/friction injuries  Outcome: Progressing  Goal: Promote/optimize nutrition  Outcome: Progressing     Problem: Heart Failure  Goal: Improved gas exchange this shift  Outcome: Progressing  Goal: Improved urinary output this shift  Outcome: Progressing  Goal: Reduction in peripheral edema within 24 hours  Outcome: Progressing  Goal: Report improvement of dyspnea/breathlessness this shift  Outcome: Progressing  Goal: Weight from fluid excess reduced over 2-3 days, then stabilize  Outcome: Progressing  Goal: Increase self care and/or family involvement in 24 hours  Outcome: Progressing     Problem: Fall/Injury  Goal: Not fall by end of shift  Outcome: Progressing  Goal: Be free from injury by end of the shift  Outcome: Progressing  Goal: Verbalize understanding of personal risk factors for fall in the hospital  Outcome: Progressing  Goal: Verbalize understanding of risk factor reduction measures to prevent injury from fall in the home  Outcome: Progressing  Goal: Use assistive devices by end of the shift  Outcome: Progressing  Goal: Pace activities to prevent fatigue by end of the shift  Outcome: Progressing

## 2024-08-29 NOTE — PROGRESS NOTES
Follow-Up Palliative Medicine Progress Note   Palliative Medicine following for:  Complex medical decision making, symptom management, patient/family support    History obtained from chart review including ED note, H&P, patient's daily progress notes, review of lab/test results, and discussion with primary team and bedside RN.    Subjective    History of Present Illness  Cristian Sapp is a 58 y.o. male on day 8 of admission presenting with Acute on chronic combined systolic and diastolic heart failure (Multi). Past Medical History of PMH of ICM, s/p remote multivessel PCI's, LVEF reported at 35% back in 2006, 2008, EF 25% to 30% in 08/2022, s/p biventricular ICD, abdominal aortic aneurysm without rupture, HTN, HLD, PTSD, tobacco use, renal stones, s/p recent cystoscopy with urethral stent placement, recurrent episodes of VT (including VT storm) that failed ablation, ganglion blocks in the past who is transfer from floor to HF ICU on 08/22/2024 overnight due to sustained slow VT that required ATP; K level was low at the time of the VT. No further episodes of arrhythmia since then. EP consulted - no further options. Thoracic signed off due to high risk for surgical procedure (surgical removing of ganglion blocks). Palliative Care initially consulted for LVAD/OHT work-up. Pt was so hopeful that LVAD would be bridge to OHT and he was so excited that Dr. Brown his Cardiologist encouraged/advocated and gave him another chance. Pt was presented today as candidate for LVAD or OHT (advance therapies evaluation 08/27/2024) and decision was not a candidate for LVAD or OHT. Pt will be worked up for possible dual organ transplant (heart and lung). Pt will be seen by lung transplant team when available. Pt is still hopeful to have options but knows this is a much larger procedure with potentially longer recovery course. Pt is willing and agreeable to go through workup and is remaining hopeful that this will be option. Pt's goal  is to live another 5-10 years at least. Support and empathy was provided throughout the encounter. Provided reflective listening and presence. Pt's mother was also at bedside. Pt seen with Keiry HANCOCK today for follow-up visit.     Symptoms  [place] Symptom Assessment System  0-10 (Numeric) Pain Score: 0 - No pain  Pain: Pt denies.   Dyspnea: Pt reports positive. SOB even at rest.   Fatigue: Pt reports positive.  Insomnia: Pt reports trouble sleeping in the hospital.  Drowsiness: Pt denies.  Constipation: Pt reports when taking iron infusion and supplements.  Nausea: Pt denies currently but had recently.  Appetite: Pt reports decreased.  Anxiety: Pt reports sometimes more than others.  Depression: Pt denies.    BM in last 48 hours? yes today 08/27/2024     Palliative Medicine Social History:  The patient is  to spouse Michelle Sapp for many years but  since March 2024. They have 2 children (twins- one son and one daughter Faviola) who are in their 20s now and both graduated college. The patient previously worked in [employer], served as a . Pt denies use of alcohol, tobacco or drugs. The patient spends most of the day doing his laundry, jobs around the house, mowing the grass, paying the bills and handling all the finances for the household. Pt shared story of fixing the backyard fence. Pt sees their PCP and other specialists regularly. Hobbies were making homemade wheat bread, building and fixing things, home repairs, but since illness has progressed, pt is no longer able. For enjoyment, the pt loves reading the Bible, fixing things around the house, and providing for his family. Joys: Family, dogs, yardwork, fishing, reading, trucks, Faith. Coping methods are reading the Bible. Denies any safety concerns. Pt reports being raised Non-Christianity and is now Jainism and his tony is very important to him.     Objective    Last Recorded Vitals  /69   Pulse 75   Temp 36.2  "°C (97.2 °F)   Resp 20   Ht 1.702 m (5' 7\")   Wt 79.8 kg (175 lb 14.8 oz)   SpO2 95%   BMI 27.55 kg/m²      Physical Exam  Vitals and nursing note reviewed.   Constitutional:       General: He is not in acute distress.     Appearance: He is obese. He is ill-appearing. He is not toxic-appearing.      Interventions: Nasal cannula in place.   HENT:      Head: Normocephalic and atraumatic.      Nose: Nose normal.      Mouth/Throat:      Mouth: Mucous membranes are moist.   Eyes:      General: No scleral icterus.     Pupils: Pupils are equal, round, and reactive to light.   Cardiovascular:      Rate and Rhythm: Normal rate and regular rhythm.   Pulmonary:      Effort: No respiratory distress.   Abdominal:      General: There is no distension.      Palpations: Abdomen is soft.      Tenderness: There is no abdominal tenderness.   Musculoskeletal:      Right lower leg: No edema.      Left lower leg: No edema.   Skin:     General: Skin is warm and dry.   Neurological:      Mental Status: He is alert and oriented to person, place, and time.      Motor: Weakness present.   Psychiatric:         Mood and Affect: Mood normal.         Behavior: Behavior normal.         Thought Content: Thought content normal.         Judgment: Judgment normal.     Relevant Results   Results for orders placed or performed during the hospital encounter of 08/19/24 (from the past 96 hour(s))   Renal Function Panel   Result Value Ref Range    Glucose 119 (H) 74 - 99 mg/dL    Sodium 137 136 - 145 mmol/L    Potassium 3.7 3.5 - 5.3 mmol/L    Chloride 99 98 - 107 mmol/L    Bicarbonate 27 21 - 32 mmol/L    Anion Gap 15 10 - 20 mmol/L    Urea Nitrogen 20 6 - 23 mg/dL    Creatinine 1.42 (H) 0.50 - 1.30 mg/dL    eGFR 58 (L) >60 mL/min/1.73m*2    Calcium 8.7 8.6 - 10.6 mg/dL    Phosphorus 3.3 2.5 - 4.9 mg/dL    Albumin 3.6 3.4 - 5.0 g/dL   Phosphorus   Result Value Ref Range    Phosphorus 3.8 2.5 - 4.9 mg/dL   Magnesium   Result Value Ref Range    " Magnesium 2.15 1.60 - 2.40 mg/dL   Comprehensive Metabolic Panel   Result Value Ref Range    Glucose 135 (H) 74 - 99 mg/dL    Sodium 137 136 - 145 mmol/L    Potassium 4.1 3.5 - 5.3 mmol/L    Chloride 100 98 - 107 mmol/L    Bicarbonate 27 21 - 32 mmol/L    Anion Gap 14 10 - 20 mmol/L    Urea Nitrogen 20 6 - 23 mg/dL    Creatinine 1.35 (H) 0.50 - 1.30 mg/dL    eGFR 62 >60 mL/min/1.73m*2    Calcium 8.8 8.6 - 10.6 mg/dL    Albumin 3.5 3.4 - 5.0 g/dL    Alkaline Phosphatase 131 (H) 33 - 120 U/L    Total Protein 6.3 (L) 6.4 - 8.2 g/dL    AST 26 9 - 39 U/L    Bilirubin, Total 0.9 0.0 - 1.2 mg/dL    ALT 39 10 - 52 U/L   CBC and Auto Differential   Result Value Ref Range    WBC 8.8 4.4 - 11.3 x10*3/uL    nRBC 0.0 0.0 - 0.0 /100 WBCs    RBC 4.21 (L) 4.50 - 5.90 x10*6/uL    Hemoglobin 11.8 (L) 13.5 - 17.5 g/dL    Hematocrit 35.6 (L) 41.0 - 52.0 %    MCV 85 80 - 100 fL    MCH 28.0 26.0 - 34.0 pg    MCHC 33.1 32.0 - 36.0 g/dL    RDW 19.9 (H) 11.5 - 14.5 %    Platelets 309 150 - 450 x10*3/uL    Neutrophils % 70.6 40.0 - 80.0 %    Immature Granulocytes %, Automated 0.6 0.0 - 0.9 %    Lymphocytes % 12.6 13.0 - 44.0 %    Monocytes % 11.1 2.0 - 10.0 %    Eosinophils % 3.8 0.0 - 6.0 %    Basophils % 1.3 0.0 - 2.0 %    Neutrophils Absolute 6.20 1.20 - 7.70 x10*3/uL    Immature Granulocytes Absolute, Automated 0.05 0.00 - 0.70 x10*3/uL    Lymphocytes Absolute 1.10 (L) 1.20 - 4.80 x10*3/uL    Monocytes Absolute 0.97 0.10 - 1.00 x10*3/uL    Eosinophils Absolute 0.33 0.00 - 0.70 x10*3/uL    Basophils Absolute 0.11 (H) 0.00 - 0.10 x10*3/uL   Renal Function Panel   Result Value Ref Range    Glucose 96 74 - 99 mg/dL    Sodium 136 136 - 145 mmol/L    Potassium 4.0 3.5 - 5.3 mmol/L    Chloride 97 (L) 98 - 107 mmol/L    Bicarbonate 26 21 - 32 mmol/L    Anion Gap 17 10 - 20 mmol/L    Urea Nitrogen 19 6 - 23 mg/dL    Creatinine 1.44 (H) 0.50 - 1.30 mg/dL    eGFR 57 (L) >60 mL/min/1.73m*2    Calcium 8.9 8.6 - 10.6 mg/dL    Phosphorus 3.6 2.5 - 4.9  mg/dL    Albumin 3.7 3.4 - 5.0 g/dL   Phosphorus   Result Value Ref Range    Phosphorus 3.7 2.5 - 4.9 mg/dL   Magnesium   Result Value Ref Range    Magnesium 2.08 1.60 - 2.40 mg/dL   Comprehensive Metabolic Panel   Result Value Ref Range    Glucose 102 (H) 74 - 99 mg/dL    Sodium 137 136 - 145 mmol/L    Potassium 4.0 3.5 - 5.3 mmol/L    Chloride 98 98 - 107 mmol/L    Bicarbonate 28 21 - 32 mmol/L    Anion Gap 15 10 - 20 mmol/L    Urea Nitrogen 21 6 - 23 mg/dL    Creatinine 1.35 (H) 0.50 - 1.30 mg/dL    eGFR 62 >60 mL/min/1.73m*2    Calcium 9.0 8.6 - 10.6 mg/dL    Albumin 3.5 3.4 - 5.0 g/dL    Alkaline Phosphatase 125 (H) 33 - 120 U/L    Total Protein 6.3 (L) 6.4 - 8.2 g/dL    AST 24 9 - 39 U/L    Bilirubin, Total 1.1 0.0 - 1.2 mg/dL    ALT 37 10 - 52 U/L   CBC and Auto Differential   Result Value Ref Range    WBC 9.2 4.4 - 11.3 x10*3/uL    nRBC 0.2 (H) 0.0 - 0.0 /100 WBCs    RBC 4.17 (L) 4.50 - 5.90 x10*6/uL    Hemoglobin 11.8 (L) 13.5 - 17.5 g/dL    Hematocrit 34.7 (L) 41.0 - 52.0 %    MCV 83 80 - 100 fL    MCH 28.3 26.0 - 34.0 pg    MCHC 34.0 32.0 - 36.0 g/dL    RDW 19.6 (H) 11.5 - 14.5 %    Platelets 312 150 - 450 x10*3/uL    Neutrophils % 67.2 40.0 - 80.0 %    Immature Granulocytes %, Automated 0.7 0.0 - 0.9 %    Lymphocytes % 15.4 13.0 - 44.0 %    Monocytes % 11.7 2.0 - 10.0 %    Eosinophils % 3.7 0.0 - 6.0 %    Basophils % 1.3 0.0 - 2.0 %    Neutrophils Absolute 6.19 1.20 - 7.70 x10*3/uL    Immature Granulocytes Absolute, Automated 0.06 0.00 - 0.70 x10*3/uL    Lymphocytes Absolute 1.42 1.20 - 4.80 x10*3/uL    Monocytes Absolute 1.08 (H) 0.10 - 1.00 x10*3/uL    Eosinophils Absolute 0.34 0.00 - 0.70 x10*3/uL    Basophils Absolute 0.12 (H) 0.00 - 0.10 x10*3/uL     Allergies  Chantix [varenicline]    Medications  Scheduled medications  amiodarone, 200 mg, oral, BID  aspirin, 81 mg, oral, Daily  dapagliflozin propanediol, 10 mg, oral, Daily  enoxaparin, 40 mg, subcutaneous, q24h  ferrous sulfate (325 mg ferrous  sulfate), 65 mg of iron, oral, Daily with breakfast  tiotropium, 2 puff, inhalation, Daily   And  formoterol, 20 mcg, nebulization, q12h  metoprolol succinate XL, 25 mg, oral, Daily  mexiletine, 300 mg, oral, TID  pantoprazole, 40 mg, oral, Daily before breakfast  PARoxetine, 60 mg, oral, Nightly  polyethylene glycol, 17 g, oral, TID  ranolazine, 500 mg, oral, BID  rosuvastatin, 20 mg, oral, Nightly  sacubitriL-valsartan, 0.5 tablet, oral, BID  sennosides-docusate sodium, 2 tablet, oral, BID  spironolactone, 25 mg, oral, Daily  tamsulosin, 0.4 mg, oral, Daily  torsemide, 40 mg, oral, Daily  traZODone, 50 mg, oral, Nightly    PRN medications  PRN medications: acetaminophen, albuterol, benzocaine-menthol, diphenhydrAMINE, hydrOXYzine HCL, LORazepam, magnesium hydroxide, oxygen     Assessment/Plan    Cristian Sapp is a 58 y.o. male on day 8 of admission presenting with Acute on chronic combined systolic and diastolic heart failure (Multi). Past Medical History of PMH of ICM, s/p remote multivessel PCI's, LVEF reported at 35% back in 2006, 2008, EF 25% to 30% in 08/2022, s/p biventricular ICD, abdominal aortic aneurysm without rupture, HTN, HLD, PTSD, tobacco use, renal stones, s/p recent cystoscopy with urethral stent placement, recurrent episodes of VT (including VT storm) that failed ablation, ganglion blocks in the past who is transfer from floor to HF ICU on 08/22/2024 overnight due to sustained slow VT that required ATP; K level was low at the time of the VT. No further episodes of arrhythmia since then. EP consulted - no further options. Thoracic signed off due to high risk for surgical procedure (surgical removing of ganglion blocks). Palliative Care initially consulted for LVAD/OHT work-up. Pt was so hopeful that LVAD would be bridge to OHT and he was so excited that Dr. Brown his Cardiologist encouraged/advocated and gave him another chance. Pt was presented today as candidate for LVAD or OHT (advance  therapies evaluation 08/27/2024) and decision was not a candidate for LVAD or OHT. Pt will be worked up for possible dual organ transplant (heart and lung). Pt will be seen by lung transplant team when available. Pt is still hopeful to have options but knows this is a much larger procedure with potentially longer recovery course. Pt is willing and agreeable to go through workup and is remaining hopeful that this will be option. Pt's goal is to live another 5-10 years at least. Support and empathy was provided throughout the encounter. Provided reflective listening and presence. Pt's mother was also at bedside. Pt seen with Keiry HANCOCK today for follow-up visit.     Palliative Performance Scale (PPS): 50%    #Complex Medical Decision Making  #Goals of Care  #Advanced Care Planning  - Code status: FULL CODE  - Surrogate decision maker: pt's mother, Judie Sapp: [phone]   - Goals are survival and time based   - Advanced Directives on file      #ICM   #Acute on Chronic Systolic and Diastolic Heart Failure  #Intermittent Recurrent VT episodes  #Dyspnea  #Severe Emphysema  #COPD  #Chronic Anxiety  #Insomnia     -Palliative Medicine Recommendations:  1) Continue Paroxetine 60mg PO daily as ordered.  2) Continue Ativan 0.5mg [id number] PRN anxiety.  3) Continue Trazodone 50mg at HS routine for insomnia.   4) Continue Miralax 17g PO daily routine and hold for any loose stools.   5) Continue Senna (Agnieszka-Colace) 8.6-50mg 2 tablets PO BID routine and hold for any loose stools.   6) Palliative Medicine will continue to follow and assist with ongoing GOC, pain and symptom management, and patient and family support during hospitalization.     #Psychosocial Support  - Consult Music Therapy  - Spiritual Care Support  - Consult Art Therapy  - Palliative Care SW      Plan of Care discussed with: Updated Primary Team Dr. Pina Morrow and bedside RN on goals of care decision, medication adjustments, and code  status.    Medical Decision Making was high level due to high complexity of problems, extensive data review, and high risk of management/treatment. Spend over 75 minutes at bedside with patient and his mother and Palliative SW discussion options, goals, what he enjoys daily now, and encouraging pt to continue to do what brings him regina daily.   - Acute on Chronic Systolic and Diastolic Heart Failure, slow, recurrent, intermittent episodes of VT posing threat to life or function.   - Reviewed external notes from OSH, previous hospitalizations, outpatient cardiology notes  - Discussion of management with primary team  - Drug therapy requiring intensive monitoring for toxicity: Torsemide, Aldactone, Pacerone and Entresto    Thank you for allowing us to participate in the care of this patient. Palliative will continue to follow as needed. Palliative Medicine is available Monday-Friday, 8a-6p. Please contact team with any questions or concerns.  Team pager [phone]  Shanna Avalos CNP (on EPIC secure chat)  Palliative Medicine Nurse Practitioner   [email or web address]      [email or web address]

## 2024-08-30 ENCOUNTER — TELEPHONE (OUTPATIENT)
Dept: CARDIOLOGY | Facility: HOSPITAL | Age: 59
End: 2024-08-30
Payer: MEDICARE

## 2024-08-30 ENCOUNTER — DOCUMENTATION (OUTPATIENT)
Dept: TRANSPLANT | Facility: HOSPITAL | Age: 59
End: 2024-08-30
Payer: MEDICARE

## 2024-08-30 ENCOUNTER — PHARMACY VISIT (OUTPATIENT)
Dept: PHARMACY | Facility: CLINIC | Age: 59
End: 2024-08-30
Payer: COMMERCIAL

## 2024-08-30 ENCOUNTER — TELEPHONE (OUTPATIENT)
Dept: RESPIRATORY THERAPY | Facility: HOSPITAL | Age: 59
End: 2024-08-30
Payer: MEDICARE

## 2024-08-30 ENCOUNTER — TELEPHONE (OUTPATIENT)
Dept: TRANSPLANT | Facility: HOSPITAL | Age: 59
End: 2024-08-30

## 2024-08-30 VITALS
TEMPERATURE: 97.5 F | DIASTOLIC BLOOD PRESSURE: 73 MMHG | HEART RATE: 75 BPM | OXYGEN SATURATION: 96 % | RESPIRATION RATE: 18 BRPM | BODY MASS INDEX: 27.54 KG/M2 | HEIGHT: 67 IN | WEIGHT: 175.49 LBS | SYSTOLIC BLOOD PRESSURE: 109 MMHG

## 2024-08-30 PROCEDURE — 2500000002 HC RX 250 W HCPCS SELF ADMINISTERED DRUGS (ALT 637 FOR MEDICARE OP, ALT 636 FOR OP/ED): Performed by: PHYSICIAN ASSISTANT

## 2024-08-30 PROCEDURE — 2500000001 HC RX 250 WO HCPCS SELF ADMINISTERED DRUGS (ALT 637 FOR MEDICARE OP): Performed by: PHYSICIAN ASSISTANT

## 2024-08-30 PROCEDURE — 94640 AIRWAY INHALATION TREATMENT: CPT

## 2024-08-30 PROCEDURE — RXMED WILLOW AMBULATORY MEDICATION CHARGE

## 2024-08-30 PROCEDURE — 99239 HOSP IP/OBS DSCHRG MGMT >30: CPT | Performed by: INTERNAL MEDICINE

## 2024-08-30 RX ORDER — DAPAGLIFLOZIN 10 MG/1
10 TABLET, FILM COATED ORAL DAILY
Qty: 30 TABLET | Refills: 0 | Status: SHIPPED | OUTPATIENT
Start: 2024-08-30 | End: 2024-09-29

## 2024-08-30 RX ORDER — METOPROLOL SUCCINATE 25 MG/1
25 TABLET, EXTENDED RELEASE ORAL DAILY
Qty: 30 TABLET | Refills: 0 | Status: ON HOLD | OUTPATIENT
Start: 2024-08-30 | End: 2024-09-06

## 2024-08-30 RX ORDER — ROSUVASTATIN CALCIUM 20 MG/1
20 TABLET, COATED ORAL NIGHTLY
Qty: 30 TABLET | Refills: 0 | Status: ON HOLD | OUTPATIENT
Start: 2024-08-30 | End: 2024-09-06

## 2024-08-30 RX ORDER — ASPIRIN 81 MG/1
81 TABLET ORAL DAILY
Qty: 30 TABLET | Refills: 0 | Status: ON HOLD | OUTPATIENT
Start: 2024-08-30 | End: 2024-09-06

## 2024-08-30 RX ORDER — SPIRONOLACTONE 25 MG/1
25 TABLET ORAL DAILY
Qty: 30 TABLET | Refills: 0 | Status: ON HOLD | OUTPATIENT
Start: 2024-08-31 | End: 2024-09-06

## 2024-08-30 RX ORDER — MEXILETINE HYDROCHLORIDE 150 MG/1
300 CAPSULE ORAL 3 TIMES DAILY
Qty: 180 CAPSULE | Refills: 0 | Status: SHIPPED | OUTPATIENT
Start: 2024-08-30 | End: 2024-09-29

## 2024-08-30 RX ORDER — PANTOPRAZOLE SODIUM 40 MG/1
40 TABLET, DELAYED RELEASE ORAL
Qty: 30 TABLET | Refills: 0 | Status: SHIPPED | OUTPATIENT
Start: 2024-08-31 | End: 2024-09-30

## 2024-08-30 RX ORDER — AMIODARONE HYDROCHLORIDE 200 MG/1
200 TABLET ORAL 2 TIMES DAILY
Qty: 60 TABLET | Refills: 0 | Status: SHIPPED | OUTPATIENT
Start: 2024-08-30 | End: 2024-09-29

## 2024-08-30 RX ORDER — FERROUS SULFATE 325(65) MG
325 TABLET ORAL
Qty: 30 TABLET | Refills: 0 | Status: SHIPPED | OUTPATIENT
Start: 2024-08-30 | End: 2024-09-29

## 2024-08-30 RX ORDER — POTASSIUM CHLORIDE 1.5 G/1.58G
40 POWDER, FOR SOLUTION ORAL EVERY 4 HOURS
Status: DISCONTINUED | OUTPATIENT
Start: 2024-08-30 | End: 2024-08-30 | Stop reason: HOSPADM

## 2024-08-30 RX ORDER — POTASSIUM CHLORIDE 20 MEQ/1
40 TABLET, EXTENDED RELEASE ORAL EVERY 4 HOURS
Status: DISCONTINUED | OUTPATIENT
Start: 2024-08-30 | End: 2024-08-30

## 2024-08-30 RX ORDER — RANOLAZINE 500 MG/1
500 TABLET, EXTENDED RELEASE ORAL 2 TIMES DAILY
Qty: 60 TABLET | Refills: 0 | Status: ON HOLD | OUTPATIENT
Start: 2024-08-30 | End: 2024-09-06

## 2024-08-30 RX ORDER — TORSEMIDE 20 MG/1
40 TABLET ORAL DAILY
Qty: 60 TABLET | Refills: 0 | Status: ON HOLD | OUTPATIENT
Start: 2024-08-31 | End: 2024-09-06

## 2024-08-30 ASSESSMENT — COGNITIVE AND FUNCTIONAL STATUS - GENERAL
MOBILITY SCORE: 23
CLIMB 3 TO 5 STEPS WITH RAILING: A LITTLE
DAILY ACTIVITIY SCORE: 24

## 2024-08-30 ASSESSMENT — PAIN SCALES - GENERAL: PAINLEVEL_OUTOF10: 0 - NO PAIN

## 2024-08-30 NOTE — TELEPHONE ENCOUNTER
Nurse Coordinator from [facility] called regarding Cristian's evaluation. Is requesting a call back soon. Name: Ita. Direct number:  Ext: [phone]

## 2024-08-30 NOTE — PROGRESS NOTES
Cristian Sapp is a 58 y.o. male on day 11 of admission presenting with Acute on chronic combined systolic and diastolic heart failure (Multi).  Met with pt before dc  home today .  He is receptive to outpt Palliative care.   Hospice made referral to [facility] for follow up.  Pt to return home with family.       GERDA MARINELLI

## 2024-08-30 NOTE — PROGRESS NOTES
Jaime Jean Baptiste at [facility] approved heart/lung transplant evaluation; medicare rate applies. Eff 08/27/24 to 08/27/2025. REF [id number]

## 2024-08-30 NOTE — TELEPHONE ENCOUNTER
Called [facility] line back and left patient update of transplant evaluation being closed. Left call back number for further questions.

## 2024-08-30 NOTE — PROGRESS NOTES
Spoke to Ita at [facility] txp [phone] ext [phone] reiterated case is for heart/lung evaluation and pt is in-house. CM will review clinical and bnfts and will call me back. BRAYAN MCMANUS for lung txp.

## 2024-08-30 NOTE — TELEPHONE ENCOUNTER
PT sister has questions. Wants to plan something for her brother. Upset about her brother having 11 months or less live. She want to find something like make a wish program. She is requesting direction on program.

## 2024-08-30 NOTE — DISCHARGE SUMMARY
Discharge Diagnosis  Principal Problem:    Acute on chronic combined systolic and diastolic heart failure (Multi)  Active Problems:    VT (ventricular tachycardia) (Multi)    Chronic systolic heart failure (Multi)    ICD (implantable cardioverter-defibrillator) in place    Primary hypertension    Chronic obstructive pulmonary disease (Multi)    CAD S/P percutaneous coronary angioplasty    PTSD (post-traumatic stress disorder)    Centrilobular emphysema (Multi)        Issues Requiring Follow-Up  Principal Problem:    Acute on chronic combined systolic and diastolic heart failure (Multi)  Active Problems:    VT (ventricular tachycardia) (Multi)    Chronic systolic heart failure (Multi)    ICD (implantable cardioverter-defibrillator) in place    Primary hypertension    Chronic obstructive pulmonary disease (Multi)    CAD S/P percutaneous coronary angioplasty    PTSD (post-traumatic stress disorder)    Centrilobular emphysema (Multi)        Test Results Pending At Discharge  Pending Labs       No current pending labs.            Hospital Course  Cristian Sapp is a 58 y.o. male with a hisory of CAD s/p PCIs with RCA and CMx CTOs with collaterals, Bilateral lower extremity peripheral arterial disease, COPD, tobacco use, CKD, anemia of chronic disease/DEBBIE, HLD, HTN, infrarenal abdominal aortic aneurysm, ischemic cardiomyopathy s/p CRT-D, nephrolithiasis, and PTSD.     He who presented to the [facility] emergency department with complaints of increased shortness of breath that have been progressing on last 5 months since his last admission for VT ( see italicized below)      Last Admission:  Hospitalized 5/2024 after receiving multiple ICD shocks for recurrent VT.  In slow VT and started on an amiodarone drip.  S/p VT ablation 5/30/24 but continued to have intermittent episodes of slow ventricular tachycardia.  Cardioverted 6/3/24.  Maintained on amiodarone and lidocaine drips.  Underwent  stellate ganglion block 6/4  It was felt that heart transplant was not going to be an option for him.  He opted against an LVAD.  Seen by palliative medicine and he changed his CODE STATUS to DNR-DNI on June 5, 2024.  Underwent redo endocardial ablation by Dr. Wooten 6/6.  Low-dose Entresto initiated.  Changed to oral mexiletine.  Cardiac cath showed severe triple-vessel coronary artery disease.  Right coronary artery and circumflex were totally occluded and filled via collaterals.  LAD had mild stenosis.  No lesions were amanable to revascularization.        AICD was interrogated in the emergency department which showed slow VT in the 100s following below detection rate. The patient reported compliance with home torsemide but was hypervolemic. Given Lasix.  Transferred to [facility] for advanced therapy evaluation and further optimization.       Floor course:  - EP consulted for recurring slow VT, recommended possible stellate ganglion removal/denervation. Thoracic surgery deemed patient extremely high risk for ganglionectomy under general anesthesia. Per Pulmonology, patient at high risk of post op pulm complications based on aristat score. Recommended LAMA/LABA initiation. Patient declined repeat nerve block per anesthesia (as completed last June) give lack of persistent success.  Patient now agreeable to advanced therapy evaluation. Consented for LVAD. Work up initiated   Endocrine consulted for elevated TSH and elevated T4. Recommended monitoring only with repeat TFTs including T3 in 4wks time.  Palliative medicine consulted and followed patient.   S/p IV diuresis with lasix boluses and switched back to torsemide 40 mg daily with stability in volume.    Transferred to HFICU 8/22 for sustained slow VT requiring ATP. Hypokalemic at the time; repleted. Rhythm remained stable. Thoracic signed off since no surgical intervention planned. Discussed at advanced heart therapies evaluation 8/27 and deemed not to be a LVAD or  OHT candidate. To have pulmonary transplant team weigh tin n regards to possible dual heart and lung transplant.    Transferred back to the floor:  Rhythm and volume remained stable. K levels repleted intermittently. Metoprolol and spironolactone doses escalated. Ultimately not deemed a candidate for dual heart lung transplant in light of his hx of tobacco use, PAD, and recurrent VT despite high dose amiodarone and mexiletine. Discussed possible referral for additional evaluation with alternative medical institution ([facility], [facility], [facility]). Patient and his mother expressed understanding of his prognosis and medical conditions along with the options available to him. Patient deemed stable for discharge on new, NC oxygen and advised to follow closely with palliative care and his advanced heart failure physician, Dr. Arya Brown      Discharge weight: 79.6 kg  After all labs and VS were reviewed the decision was made that the patient was medically stable for discharge.  The patient was discharged in satisfactory condition.  More than 60 minutes were spent in coordinating patient discharge.  __________________________________________________________________________    Telemetry 8/30/2024 (personally reviewed): As vs Ap with  75     Physical exam:  General: NAD  Head/ neck: atraumatic. NC O2 in place  Cardiac: RRR, regular S1 S2 , 1/6 syst murmur, no rub, no gallop  Pulm: CTA bilaterally, no wheezes, rales or rhonchi.    Vascular: Radial 2+ bilaterally  GI: soft, non distended  Extremities: no LE edema   Neuro: no focal neuro deficits   Psych: appropriate mood and behavior   Skin: warm and dry       Home Medications     Medication List      START taking these medications     torsemide 20 mg tablet; Commonly known as: Demadex; Take 2 tablets (40   mg) by mouth once daily.; Start taking on: August 31, 2024     CONTINUE taking these medications     albuterol sulfate 90 mcg/actuation aero powdr breath act w/sensor   inhaler;  Commonly known as: Proair Digihaler   amiodarone 200 mg tablet; Commonly known as: Pacerone; Take 1 tablet   (200 mg) by mouth 2 times a day.   aspirin 81 mg EC tablet; Take 1 tablet (81 mg) by mouth once daily.   dapagliflozin propanediol 10 mg; Commonly known as: Farxiga; Take 1   tablet (10 mg) by mouth once daily.   ferrous sulfate (325 mg ferrous sulfate) tablet; Take 1 tablet (325 mg)   by mouth once daily with breakfast.   LORazepam 0.5 mg tablet; Commonly known as: Ativan   metoprolol succinate XL 25 mg 24 hr tablet; Commonly known as:   Toprol-XL; Take 1 tablet (25 mg) by mouth once daily. Do not crush or   chew.   mexiletine 150 mg capsule; Commonly known as: Mexitil; Take 2 capsules   (300 mg) by mouth 3 times a day.   nitroglycerin 0.4 mg SL tablet; Commonly known as: Nitrostat   pantoprazole 40 mg EC tablet; Commonly known as: ProtoNix; Take 1 tablet   (40 mg) by mouth once daily in the morning. Take before meals. Do not   crush, chew, or split.; Start taking on: August 31, 2024   PARoxetine 30 mg tablet; Commonly known as: Paxil; Take 2 tablets (60   mg) by mouth once daily at bedtime.   ranolazine 500 mg 12 hr tablet; Commonly known as: Ranexa; Take 1 tablet   (500 mg) by mouth 2 times a day. Do not crush, chew, or split.   rosuvastatin 20 mg tablet; Commonly known as: Crestor; Take 1 tablet (20   mg) by mouth once daily at bedtime.   sacubitriL-valsartan 24-26 mg tablet; Commonly known as: Entresto; Take   0.5 tablets by mouth 2 times a day.   spironolactone 25 mg tablet; Commonly known as: Aldactone; Take 1 tablet   (25 mg) by mouth once daily.; Start taking on: August 31, 2024   tamsulosin 0.4 mg 24 hr capsule; Commonly known as: Flomax   traZODone 50 mg tablet; Commonly known as: Desyrel     STOP taking these medications     furosemide 10 mg/mL injection; Commonly known as: Lasix   magnesium oxide 400 mg (241.3 mg magnesium) tablet; Commonly known as:   Mag-Ox   potassium chloride 20 mEq packet;  Commonly known as: Klor-Con       Outpatient Follow-Up  Future Appointments   Date Time Provider Department [place]   9/11/2024  8:30 AM Zachary Sparks MD [id number] [place]   9/18/2024 12:40 PM [facility] [id number] [place]   9/18/2024  1:15 PM Eliot Wooten MD [id number] [place]   9/26/2024  3:00 PM Arya Brown MD [id number] [place]   10/14/2024  8:00 AM [place] Neves MD [id number] [place]   10/15/2024 10:00 AM Eamon Rosa MD [id number] [place]       Keysha Callaway PA-C

## 2024-08-30 NOTE — CARE PLAN
The patient's goals for the shift include      The clinical goals for the shift include Patient will not have any runs of VT during shift    Patient remained safe and free of falls during shift. No reports of pain during shift. No runs of VT during shift. Awaiting pulmonology recommendations regarding potential transplant.

## 2024-08-30 NOTE — CARE PLAN
Pt discharging home with home oxygen.       Problem: Pain - Adult  Goal: Verbalizes/displays adequate comfort level or baseline comfort level  Outcome: Met     Problem: Safety - Adult  Goal: Free from fall injury  Outcome: Met     Problem: Discharge Planning  Goal: Discharge to home or other facility with appropriate resources  Outcome: Met     Problem: Chronic Conditions and Co-morbidities  Goal: Patient's chronic conditions and co-morbidity symptoms are monitored and maintained or improved  Outcome: Met     Problem: Skin  Goal: Participates in plan/prevention/treatment measures  Outcome: Met  Goal: Prevent/manage excess moisture  Outcome: Met  Goal: Prevent/minimize sheer/friction injuries  Outcome: Met  Goal: Promote/optimize nutrition  Outcome: Met     Problem: Heart Failure  Goal: Improved gas exchange this shift  Outcome: Met  Goal: Improved urinary output this shift  Outcome: Met  Goal: Reduction in peripheral edema within 24 hours  Outcome: Met  Goal: Report improvement of dyspnea/breathlessness this shift  Outcome: Met  Goal: Weight from fluid excess reduced over 2-3 days, then stabilize  Outcome: Met  Goal: Increase self care and/or family involvement in 24 hours  Outcome: Met     Problem: Fall/Injury  Goal: Not fall by end of shift  Outcome: Met  Goal: Be free from injury by end of the shift  Outcome: Met  Goal: Verbalize understanding of personal risk factors for fall in the hospital  Outcome: Met  Goal: Verbalize understanding of risk factor reduction measures to prevent injury from fall in the home  Outcome: Met  Goal: Use assistive devices by end of the shift  Outcome: Met  Goal: Pace activities to prevent fatigue by end of the shift  Outcome: Met

## 2024-08-31 NOTE — PROGRESS NOTES
Brief progress note. Post dated    Walking pulse ox testing completed 8/28/24     Room air testing:  - at rest: 87% spO2  - with ambulation 85% spO2    Nasal cannula testing:  - at rest on 4L spO2 96%   - with ambulation on 4L NC spO2 92%    Keysha Callaway PA-C

## 2024-09-01 ENCOUNTER — PATIENT OUTREACH (OUTPATIENT)
Dept: HOME HEALTH SERVICES | Age: 59
End: 2024-09-01
Payer: MEDICARE

## 2024-09-01 SDOH — ECONOMIC STABILITY: INCOME INSECURITY: IN THE LAST 12 MONTHS, WAS THERE A TIME WHEN YOU WERE NOT ABLE TO PAY THE MORTGAGE OR RENT ON TIME?: YES

## 2024-09-01 SDOH — SOCIAL STABILITY: SOCIAL NETWORK: ARE YOU MARRIED, WIDOWED, DIVORCED, SEPARATED, NEVER MARRIED, OR LIVING WITH A PARTNER?: SEPARATED

## 2024-09-01 SDOH — SOCIAL STABILITY: SOCIAL NETWORK
DO YOU BELONG TO ANY CLUBS OR ORGANIZATIONS SUCH AS CHURCH GROUPS UNIONS, FRATERNAL OR ATHLETIC GROUPS, OR SCHOOL GROUPS?: YES

## 2024-09-01 SDOH — HEALTH STABILITY: MENTAL HEALTH
STRESS IS WHEN SOMEONE FEELS TENSE, NERVOUS, ANXIOUS, OR CAN'T SLEEP AT NIGHT BECAUSE THEIR MIND IS TROUBLED. HOW STRESSED ARE YOU?: VERY MUCH

## 2024-09-01 SDOH — ECONOMIC STABILITY: HOUSING INSECURITY: AT ANY TIME IN THE PAST 12 MONTHS, WERE YOU HOMELESS OR LIVING IN A SHELTER (INCLUDING NOW)?: NO

## 2024-09-01 SDOH — HEALTH STABILITY: MENTAL HEALTH: HOW OFTEN DO YOU HAVE 6 OR MORE DRINKS ON ONE OCCASION?: NEVER

## 2024-09-01 SDOH — ECONOMIC STABILITY: FOOD INSECURITY: WITHIN THE PAST 12 MONTHS, THE FOOD YOU BOUGHT JUST DIDN'T LAST AND YOU DIDN'T HAVE MONEY TO GET MORE.: NEVER TRUE

## 2024-09-01 SDOH — ECONOMIC STABILITY: FOOD INSECURITY: WITHIN THE PAST 12 MONTHS, YOU WORRIED THAT YOUR FOOD WOULD RUN OUT BEFORE YOU GOT MONEY TO BUY MORE.: NEVER TRUE

## 2024-09-01 SDOH — HEALTH STABILITY: PHYSICAL HEALTH: ON AVERAGE, HOW MANY DAYS PER WEEK DO YOU ENGAGE IN MODERATE TO STRENUOUS EXERCISE (LIKE A BRISK WALK)?: 0 DAYS

## 2024-09-01 SDOH — HEALTH STABILITY: MENTAL HEALTH
HOW OFTEN DO YOU NEED TO HAVE SOMEONE HELP YOU WHEN YOU READ INSTRUCTIONS, PAMPHLETS, OR OTHER WRITTEN MATERIAL FROM YOUR DOCTOR OR PHARMACY?: NEVER

## 2024-09-01 SDOH — ECONOMIC STABILITY: INCOME INSECURITY: HOW HARD IS IT FOR YOU TO PAY FOR THE VERY BASICS LIKE FOOD, HOUSING, MEDICAL CARE, AND HEATING?: NOT VERY HARD

## 2024-09-01 SDOH — HEALTH STABILITY: MENTAL HEALTH: HOW OFTEN DO YOU HAVE A DRINK CONTAINING ALCOHOL?: NEVER

## 2024-09-01 SDOH — SOCIAL STABILITY: SOCIAL INSECURITY: WITHIN THE LAST YEAR, HAVE YOU BEEN HUMILIATED OR EMOTIONALLY ABUSED IN OTHER WAYS BY YOUR PARTNER OR EX-PARTNER?: NO

## 2024-09-01 SDOH — SOCIAL STABILITY: SOCIAL NETWORK: HOW OFTEN DO YOU ATTENT MEETINGS OF THE CLUB OR ORGANIZATION YOU BELONG TO?: MORE THAN 4 TIMES PER YEAR

## 2024-09-01 SDOH — SOCIAL STABILITY: SOCIAL INSECURITY: WITHIN THE LAST YEAR, HAVE YOU BEEN AFRAID OF YOUR PARTNER OR EX-PARTNER?: NO

## 2024-09-01 SDOH — ECONOMIC STABILITY: HOUSING INSECURITY: IN THE PAST 12 MONTHS, HOW MANY TIMES HAVE YOU MOVED WHERE YOU WERE LIVING?: 1

## 2024-09-01 SDOH — ECONOMIC STABILITY: INCOME INSECURITY: IN THE PAST 12 MONTHS, HAS THE ELECTRIC, GAS, OIL, OR WATER COMPANY THREATENED TO SHUT OFF SERVICE IN YOUR HOME?: NO

## 2024-09-01 SDOH — SOCIAL STABILITY: SOCIAL NETWORK: HOW OFTEN DO YOU ATTEND CHURCH OR RELIGIOUS SERVICES?: MORE THAN 4 TIMES PER YEAR

## 2024-09-01 SDOH — HEALTH STABILITY: PHYSICAL HEALTH: ON AVERAGE, HOW MANY MINUTES DO YOU ENGAGE IN EXERCISE AT THIS LEVEL?: 0 MIN

## 2024-09-01 SDOH — SOCIAL STABILITY: SOCIAL NETWORK: HOW OFTEN DO YOU GET TOGETHER WITH FRIENDS OR RELATIVES?: THREE TIMES A WEEK

## 2024-09-01 SDOH — HEALTH STABILITY: MENTAL HEALTH: HOW MANY STANDARD DRINKS CONTAINING ALCOHOL DO YOU HAVE ON A TYPICAL DAY?: PATIENT DOES NOT DRINK

## 2024-09-01 ASSESSMENT — LIFESTYLE VARIABLES
SKIP TO QUESTIONS 9-10: 1
AUDIT-C TOTAL SCORE: 0

## 2024-09-03 ENCOUNTER — PATIENT OUTREACH (OUTPATIENT)
Dept: HOME HEALTH SERVICES | Age: 59
End: 2024-09-03
Payer: MEDICARE

## 2024-09-03 ENCOUNTER — COMMITTEE REVIEW (OUTPATIENT)
Dept: TRANSPLANT | Facility: HOSPITAL | Age: 59
End: 2024-09-03
Payer: MEDICARE

## 2024-09-03 VITALS
BODY MASS INDEX: 27.96 KG/M2 | HEART RATE: 75 BPM | SYSTOLIC BLOOD PRESSURE: 101 MMHG | OXYGEN SATURATION: 94 % | DIASTOLIC BLOOD PRESSURE: 72 MMHG | WEIGHT: 178.5 LBS

## 2024-09-03 DIAGNOSIS — R00.2 RAPID PALPITATIONS: ICD-10-CM

## 2024-09-03 DIAGNOSIS — J43.9 PULMONARY EMPHYSEMA, UNSPECIFIED EMPHYSEMA TYPE (MULTI): ICD-10-CM

## 2024-09-03 DIAGNOSIS — I50.22 CHRONIC SYSTOLIC CONGESTIVE HEART FAILURE (MULTI): Primary | ICD-10-CM

## 2024-09-03 NOTE — LETTER
September 3, 2024    Cristian Sapp  [address] [place] [address]      Dear Mr. Sapp:    Our multi-disciplinary transplant team completed a review of your medical records on 9/3/2024.  ***    Our transplant program consists of surgeons and medical doctors who provide coverage 365 days a year, 24 hours a day.     If you have any questions or concerns regarding your insurance coverage or billing issues, a  is available to speak with you.     It is important to keep us updated of any major changes in your medical condition, contact information and health insurance coverage.     Please don't hesitate to contact us at Dept: [phone] with any questions or concerns. We look forward to working with you through this process.      Sincerely,      Martha Dickerson RN          The [facility] Toll-free Patient Services Line:  Your Resource for Organ Transplant Information    If you have a question regarding your own medical care, you always should call your transplant hospital first. However, for general organ transplant-related information, you should call the [employer] Organ [employer]aring (UNOS) toll-free patient services line at 1-602.797.4305.  Anyone, including potential transplant candidates, candidates, recipients, family members, friends, living donors, and donor family members, can call this number to:    Talk about organ donation, living donation, the transplant process, the donation process, and transplant policies.  Get a free patient information kit with helpful booklets, waiting list and transplant information, and a list of all transplant hospitals.  Ask questions about the Organ Procurement and Transplantation Network (OPTN) web site (http://optn.transplant.hrsa.gov/), the UNOS Web site (http://unos.org/), or the UNOS web site for living donors and transplant recipients. (http://www.transplantliving.org/).  Learn how UNOS and the OPTN can help you.  Talk about any concerns that you may  have with a transplant hospital.    [facility] is a not-for-profit organization that provides the administrative services for the national OPTN under federal contract to the Health Resources and Services Administration (HRSA), an agency under the U.S. Department of Health and Human Services (HHS).    [facility] and the OPTN are responsible for:    Providing educational material for patients, the public, and professionals.  Raising awareness of the need for donated organs and tissue.  Writing organ transplant policy with help from transplant professionals, transplant patients, transplant candidates, donor families, living donors, and the public.  Coordinating organ procurement, matching, and placement.  Collecting information about every organ transplant and donation that occurs in the [place].    Remember, you should contact your transplant hospital directly if you have questions or concerns about your own medical care including medical records, work-up progress, and test results.    [facility] is not your transplant hospital, and staff at [facility] will not be able to transfer you to your transplant hospital, so keep your transplant hospital’s phone number handy.    However, while you research your transplant needs and learn as much as you can about transplantation and donation, we welcome your call to our toll-free patient services line at [phone].      [facility] PIL Final Rev 1-

## 2024-09-03 NOTE — COMMITTEE REVIEW
Patient briefly discussed today in committee to formally discuss that patient will not be a transplant candidate due to lung function. Patient is not a candidate for heart/lung transplant due to acuity of lung disease per lung team.

## 2024-09-03 NOTE — PROGRESS NOTES
Daily Call Note:   Spoke to patient, he reports having a rough weekend because he isnt able to sleep longer than an hour or so at a time without waking up.   Reports weight and endorses some LE swelling, he took diuretic today. Reports vitals and o2 is new, he wears ATC. Sleeps propped up 30 degrees. Advised elevation or LE when he can.   C/o feeling bloated or full after just a few bites of food. States he tried a soap fanny enema yesterday  with no relief and an oral lax today with some results.   States no new meds, but he feels no appetite and feeling full, and dry mouth.   Discussed symptoms and overall health and function of the heart and healing time.   He hasn't scheduled PCP yet, he will do tomorrow after our provider call. Specialty apts reviewed.  Next [facility] reviewed.     Pt Education: POC  Barriers: na  Topics for Daily Review: POC  Pt demonstrates clear understanding: Yes    Daily Weight:  There were no vitals filed for this visit.   Last 3 Weights:  Wt Readings from Last 7 Encounters:   08/30/24 79.6 kg (175 lb 7.8 oz)   08/19/24 80.9 kg (178 lb 5.6 oz)   08/08/24 80.7 kg (178 lb)   07/23/24 75.8 kg (167 lb)   07/14/24 72.2 kg (159 lb 2.8 oz)   07/11/24 75.8 kg (167 lb)   07/11/24 75.8 kg (167 lb)       Masimo Device: No   Masimo Clinical Impression: na    Virtual Visits--Scheduled (Most Recent Date at Top)  Follow up Appointments  Recent Visits  No visits were found meeting these conditions.  Showing recent visits within past 30 days and meeting all other requirements  Future Appointments  No visits were found meeting these conditions.  Showing future appointments within next 90 days and meeting all other requirements       Frequency of RN Calls & Virtual Visits per Team Agreement: Healthy at Home Frequency: Daily    Medication issues Addressed (what was done): na    Follow up appointments scheduled by [facility] Staff: na  Referrals made by [facility] staff: [place]

## 2024-09-04 ENCOUNTER — APPOINTMENT (OUTPATIENT)
Dept: RADIOLOGY | Facility: HOSPITAL | Age: 59
End: 2024-09-04
Payer: MEDICARE

## 2024-09-04 ENCOUNTER — APPOINTMENT (OUTPATIENT)
Dept: CARDIOLOGY | Facility: HOSPITAL | Age: 59
End: 2024-09-04
Payer: MEDICARE

## 2024-09-04 ENCOUNTER — APPOINTMENT (OUTPATIENT)
Dept: CARE COORDINATION | Age: 59
End: 2024-09-04
Payer: MEDICARE

## 2024-09-04 ENCOUNTER — HOSPITAL ENCOUNTER (INPATIENT)
Facility: HOSPITAL | Age: 59
LOS: 3 days | Discharge: HOME | End: 2024-09-07
Attending: EMERGENCY MEDICINE | Admitting: STUDENT IN AN ORGANIZED HEALTH CARE EDUCATION/TRAINING PROGRAM
Payer: MEDICARE

## 2024-09-04 ENCOUNTER — APPOINTMENT (OUTPATIENT)
Dept: PHARMACY | Facility: HOSPITAL | Age: 59
End: 2024-09-04
Payer: MEDICARE

## 2024-09-04 DIAGNOSIS — J18.9 PNEUMONIA OF BOTH UPPER LOBES DUE TO INFECTIOUS ORGANISM: ICD-10-CM

## 2024-09-04 DIAGNOSIS — I25.5 ISCHEMIC CARDIOMYOPATHY: ICD-10-CM

## 2024-09-04 DIAGNOSIS — I25.10 CAD S/P PERCUTANEOUS CORONARY ANGIOPLASTY: ICD-10-CM

## 2024-09-04 DIAGNOSIS — F43.10 PTSD (POST-TRAUMATIC STRESS DISORDER): ICD-10-CM

## 2024-09-04 DIAGNOSIS — J90 PLEURAL EFFUSION: ICD-10-CM

## 2024-09-04 DIAGNOSIS — I50.9 CHRONIC CONGESTIVE HEART FAILURE, UNSPECIFIED HEART FAILURE TYPE: Primary | ICD-10-CM

## 2024-09-04 DIAGNOSIS — I50.43 ACUTE ON CHRONIC COMBINED SYSTOLIC AND DIASTOLIC HEART FAILURE: ICD-10-CM

## 2024-09-04 DIAGNOSIS — I47.20 VT (VENTRICULAR TACHYCARDIA) (MULTI): ICD-10-CM

## 2024-09-04 DIAGNOSIS — Z98.61 CAD S/P PERCUTANEOUS CORONARY ANGIOPLASTY: ICD-10-CM

## 2024-09-04 DIAGNOSIS — R74.01 TRANSAMINITIS: ICD-10-CM

## 2024-09-04 DIAGNOSIS — I50.22 CHRONIC SYSTOLIC HEART FAILURE: ICD-10-CM

## 2024-09-04 LAB
ALBUMIN SERPL BCP-MCNC: 3.5 G/DL (ref 3.4–5)
ALP SERPL-CCNC: 334 U/L (ref 33–120)
ALT SERPL W P-5'-P-CCNC: 96 U/L (ref 10–52)
ANION GAP SERPL CALC-SCNC: 13 MMOL/L (ref 10–20)
AST SERPL W P-5'-P-CCNC: 49 U/L (ref 9–39)
ATRIAL RATE: 75 BPM
ATRIAL RATE: 75 BPM
BASOPHILS # BLD AUTO: 0.11 X10*3/UL (ref 0–0.1)
BASOPHILS NFR BLD AUTO: 0.9 %
BILIRUB SERPL-MCNC: 1.3 MG/DL (ref 0–1.2)
BNP SERPL-MCNC: 2627 PG/ML (ref 0–99)
BUN SERPL-MCNC: 26 MG/DL (ref 6–23)
CALCIUM SERPL-MCNC: 9.1 MG/DL (ref 8.6–10.3)
CARDIAC TROPONIN I PNL SERPL HS: 19 NG/L (ref 0–20)
CARDIAC TROPONIN I PNL SERPL HS: 20 NG/L (ref 0–20)
CHLORIDE SERPL-SCNC: 99 MMOL/L (ref 98–107)
CO2 SERPL-SCNC: 27 MMOL/L (ref 21–32)
CREAT SERPL-MCNC: 1.46 MG/DL (ref 0.5–1.3)
EGFRCR SERPLBLD CKD-EPI 2021: 56 ML/MIN/1.73M*2
EOSINOPHIL # BLD AUTO: 0.18 X10*3/UL (ref 0–0.7)
EOSINOPHIL NFR BLD AUTO: 1.5 %
ERYTHROCYTE [DISTWIDTH] IN BLOOD BY AUTOMATED COUNT: 20.9 % (ref 11.5–14.5)
GLUCOSE SERPL-MCNC: 142 MG/DL (ref 74–99)
HCT VFR BLD AUTO: 38.9 % (ref 41–52)
HGB BLD-MCNC: 12.8 G/DL (ref 13.5–17.5)
HYPOCHROMIA BLD QL SMEAR: NORMAL
IMM GRANULOCYTES # BLD AUTO: 0.12 X10*3/UL (ref 0–0.7)
IMM GRANULOCYTES NFR BLD AUTO: 1 % (ref 0–0.9)
INR PPP: 1.5 (ref 0.9–1.1)
LACTATE SERPL-SCNC: 1.6 MMOL/L (ref 0.4–2)
LYMPHOCYTES # BLD AUTO: 1.06 X10*3/UL (ref 1.2–4.8)
LYMPHOCYTES NFR BLD AUTO: 9 %
MAGNESIUM SERPL-MCNC: 2.08 MG/DL (ref 1.6–2.4)
MCH RBC QN AUTO: 28.6 PG (ref 26–34)
MCHC RBC AUTO-ENTMCNC: 32.9 G/DL (ref 32–36)
MCV RBC AUTO: 87 FL (ref 80–100)
MONOCYTES # BLD AUTO: 1.24 X10*3/UL (ref 0.1–1)
MONOCYTES NFR BLD AUTO: 10.6 %
NEUTROPHILS # BLD AUTO: 9.04 X10*3/UL (ref 1.2–7.7)
NEUTROPHILS NFR BLD AUTO: 77 %
NRBC BLD-RTO: 0 /100 WBCS (ref 0–0)
P AXIS: 86 DEGREES
P AXIS: 92 DEGREES
P OFFSET: 178 MS
P OFFSET: 184 MS
P ONSET: 149 MS
P ONSET: 152 MS
PLATELET # BLD AUTO: 336 X10*3/UL (ref 150–450)
POLYCHROMASIA BLD QL SMEAR: NORMAL
POTASSIUM SERPL-SCNC: 4.3 MMOL/L (ref 3.5–5.3)
PR INTERVAL: 150 MS
PR INTERVAL: 156 MS
PROT SERPL-MCNC: 7.1 G/DL (ref 6.4–8.2)
PROTHROMBIN TIME: 16.5 SECONDS (ref 9.8–12.8)
Q ONSET: 189 MS
Q ONSET: 192 MS
QRS COUNT: 12 BEATS
QRS COUNT: 13 BEATS
QRS DURATION: 186 MS
QRS DURATION: 196 MS
QT INTERVAL: 486 MS
QT INTERVAL: 506 MS
QTC CALCULATION(BAZETT): 542 MS
QTC CALCULATION(BAZETT): 565 MS
QTC FREDERICIA: 523 MS
QTC FREDERICIA: 544 MS
R AXIS: -53 DEGREES
R AXIS: -61 DEGREES
RBC # BLD AUTO: 4.47 X10*6/UL (ref 4.5–5.9)
RBC MORPH BLD: NORMAL
SARS-COV-2 RNA RESP QL NAA+PROBE: NOT DETECTED
SODIUM SERPL-SCNC: 135 MMOL/L (ref 136–145)
T AXIS: 111 DEGREES
T AXIS: 99 DEGREES
T OFFSET: 435 MS
T OFFSET: 442 MS
VANCOMYCIN SERPL-MCNC: 20 UG/ML (ref 5–20)
VENTRICULAR RATE: 75 BPM
VENTRICULAR RATE: 75 BPM
WBC # BLD AUTO: 11.8 X10*3/UL (ref 4.4–11.3)

## 2024-09-04 PROCEDURE — 85025 COMPLETE CBC W/AUTO DIFF WBC: CPT | Performed by: EMERGENCY MEDICINE

## 2024-09-04 PROCEDURE — 85610 PROTHROMBIN TIME: CPT | Performed by: EMERGENCY MEDICINE

## 2024-09-04 PROCEDURE — 2500000001 HC RX 250 WO HCPCS SELF ADMINISTERED DRUGS (ALT 637 FOR MEDICARE OP): Performed by: STUDENT IN AN ORGANIZED HEALTH CARE EDUCATION/TRAINING PROGRAM

## 2024-09-04 PROCEDURE — 71045 X-RAY EXAM CHEST 1 VIEW: CPT | Performed by: STUDENT IN AN ORGANIZED HEALTH CARE EDUCATION/TRAINING PROGRAM

## 2024-09-04 PROCEDURE — 93005 ELECTROCARDIOGRAM TRACING: CPT

## 2024-09-04 PROCEDURE — 84075 ASSAY ALKALINE PHOSPHATASE: CPT | Performed by: EMERGENCY MEDICINE

## 2024-09-04 PROCEDURE — 80202 ASSAY OF VANCOMYCIN: CPT

## 2024-09-04 PROCEDURE — 36415 COLL VENOUS BLD VENIPUNCTURE: CPT | Performed by: EMERGENCY MEDICINE

## 2024-09-04 PROCEDURE — 2500000004 HC RX 250 GENERAL PHARMACY W/ HCPCS (ALT 636 FOR OP/ED): Performed by: STUDENT IN AN ORGANIZED HEALTH CARE EDUCATION/TRAINING PROGRAM

## 2024-09-04 PROCEDURE — 84484 ASSAY OF TROPONIN QUANT: CPT | Performed by: EMERGENCY MEDICINE

## 2024-09-04 PROCEDURE — 96375 TX/PRO/DX INJ NEW DRUG ADDON: CPT

## 2024-09-04 PROCEDURE — 74177 CT ABD & PELVIS W/CONTRAST: CPT

## 2024-09-04 PROCEDURE — 2550000001 HC RX 255 CONTRASTS: Performed by: EMERGENCY MEDICINE

## 2024-09-04 PROCEDURE — 83880 ASSAY OF NATRIURETIC PEPTIDE: CPT | Performed by: EMERGENCY MEDICINE

## 2024-09-04 PROCEDURE — 99223 1ST HOSP IP/OBS HIGH 75: CPT | Performed by: STUDENT IN AN ORGANIZED HEALTH CARE EDUCATION/TRAINING PROGRAM

## 2024-09-04 PROCEDURE — 71045 X-RAY EXAM CHEST 1 VIEW: CPT

## 2024-09-04 PROCEDURE — 83605 ASSAY OF LACTIC ACID: CPT | Performed by: EMERGENCY MEDICINE

## 2024-09-04 PROCEDURE — 2500000004 HC RX 250 GENERAL PHARMACY W/ HCPCS (ALT 636 FOR OP/ED)

## 2024-09-04 PROCEDURE — 71275 CT ANGIOGRAPHY CHEST: CPT | Performed by: RADIOLOGY

## 2024-09-04 PROCEDURE — 1200000002 HC GENERAL ROOM WITH TELEMETRY DAILY

## 2024-09-04 PROCEDURE — 96366 THER/PROPH/DIAG IV INF ADDON: CPT

## 2024-09-04 PROCEDURE — 96365 THER/PROPH/DIAG IV INF INIT: CPT

## 2024-09-04 PROCEDURE — 2500000002 HC RX 250 W HCPCS SELF ADMINISTERED DRUGS (ALT 637 FOR MEDICARE OP, ALT 636 FOR OP/ED): Performed by: STUDENT IN AN ORGANIZED HEALTH CARE EDUCATION/TRAINING PROGRAM

## 2024-09-04 PROCEDURE — 2500000001 HC RX 250 WO HCPCS SELF ADMINISTERED DRUGS (ALT 637 FOR MEDICARE OP): Performed by: NURSE PRACTITIONER

## 2024-09-04 PROCEDURE — 83735 ASSAY OF MAGNESIUM: CPT | Performed by: EMERGENCY MEDICINE

## 2024-09-04 PROCEDURE — 87635 SARS-COV-2 COVID-19 AMP PRB: CPT | Performed by: EMERGENCY MEDICINE

## 2024-09-04 PROCEDURE — 71275 CT ANGIOGRAPHY CHEST: CPT

## 2024-09-04 PROCEDURE — 2500000004 HC RX 250 GENERAL PHARMACY W/ HCPCS (ALT 636 FOR OP/ED): Performed by: EMERGENCY MEDICINE

## 2024-09-04 PROCEDURE — 99285 EMERGENCY DEPT VISIT HI MDM: CPT

## 2024-09-04 PROCEDURE — 74177 CT ABD & PELVIS W/CONTRAST: CPT | Performed by: RADIOLOGY

## 2024-09-04 PROCEDURE — 4B02XTZ MEASUREMENT OF CARDIAC DEFIBRILLATOR, EXTERNAL APPROACH: ICD-10-PCS | Performed by: INTERNAL MEDICINE

## 2024-09-04 RX ORDER — SPIRONOLACTONE 25 MG/1
25 TABLET ORAL DAILY
Status: DISCONTINUED | OUTPATIENT
Start: 2024-09-04 | End: 2024-09-07 | Stop reason: HOSPADM

## 2024-09-04 RX ORDER — HYDROXYZINE HYDROCHLORIDE 10 MG/1
10 TABLET, FILM COATED ORAL ONCE
Status: COMPLETED | OUTPATIENT
Start: 2024-09-04 | End: 2024-09-05

## 2024-09-04 RX ORDER — ENOXAPARIN SODIUM 100 MG/ML
40 INJECTION SUBCUTANEOUS EVERY 24 HOURS
Status: DISCONTINUED | OUTPATIENT
Start: 2024-09-04 | End: 2024-09-07 | Stop reason: HOSPADM

## 2024-09-04 RX ORDER — NITROGLYCERIN 0.4 MG/1
0.4 TABLET SUBLINGUAL EVERY 5 MIN PRN
Status: DISCONTINUED | OUTPATIENT
Start: 2024-09-04 | End: 2024-09-07 | Stop reason: HOSPADM

## 2024-09-04 RX ORDER — VANCOMYCIN HYDROCHLORIDE 1 G/20ML
INJECTION, POWDER, LYOPHILIZED, FOR SOLUTION INTRAVENOUS DAILY PRN
Status: DISCONTINUED | OUTPATIENT
Start: 2024-09-04 | End: 2024-09-07 | Stop reason: HOSPADM

## 2024-09-04 RX ORDER — IPRATROPIUM BROMIDE AND ALBUTEROL SULFATE 2.5; .5 MG/3ML; MG/3ML
3 SOLUTION RESPIRATORY (INHALATION) EVERY 6 HOURS PRN
Status: DISCONTINUED | OUTPATIENT
Start: 2024-09-04 | End: 2024-09-07 | Stop reason: HOSPADM

## 2024-09-04 RX ORDER — FUROSEMIDE 10 MG/ML
40 INJECTION INTRAMUSCULAR; INTRAVENOUS 2 TIMES DAILY
Status: DISCONTINUED | OUTPATIENT
Start: 2024-09-05 | End: 2024-09-06

## 2024-09-04 RX ORDER — ACETAMINOPHEN 325 MG/1
650 TABLET ORAL EVERY 4 HOURS PRN
Status: DISCONTINUED | OUTPATIENT
Start: 2024-09-04 | End: 2024-09-07 | Stop reason: HOSPADM

## 2024-09-04 RX ORDER — TRAZODONE HYDROCHLORIDE 50 MG/1
50 TABLET ORAL NIGHTLY
Status: DISCONTINUED | OUTPATIENT
Start: 2024-09-04 | End: 2024-09-07 | Stop reason: HOSPADM

## 2024-09-04 RX ORDER — RANOLAZINE 500 MG/1
500 TABLET, EXTENDED RELEASE ORAL 2 TIMES DAILY
Status: DISCONTINUED | OUTPATIENT
Start: 2024-09-04 | End: 2024-09-07 | Stop reason: HOSPADM

## 2024-09-04 RX ORDER — DAPAGLIFLOZIN 5 MG/1
10 TABLET, FILM COATED ORAL DAILY
Status: DISCONTINUED | OUTPATIENT
Start: 2024-09-04 | End: 2024-09-07 | Stop reason: HOSPADM

## 2024-09-04 RX ORDER — SENNOSIDES 8.6 MG/1
2 TABLET ORAL NIGHTLY PRN
Status: DISCONTINUED | OUTPATIENT
Start: 2024-09-04 | End: 2024-09-07 | Stop reason: HOSPADM

## 2024-09-04 RX ORDER — LORAZEPAM 0.5 MG/1
0.5 TABLET ORAL NIGHTLY PRN
Status: DISCONTINUED | OUTPATIENT
Start: 2024-09-04 | End: 2024-09-05

## 2024-09-04 RX ORDER — TAMSULOSIN HYDROCHLORIDE 0.4 MG/1
0.4 CAPSULE ORAL DAILY
Status: DISCONTINUED | OUTPATIENT
Start: 2024-09-04 | End: 2024-09-07 | Stop reason: HOSPADM

## 2024-09-04 RX ORDER — ACETAMINOPHEN 650 MG/1
650 SUPPOSITORY RECTAL EVERY 4 HOURS PRN
Status: DISCONTINUED | OUTPATIENT
Start: 2024-09-04 | End: 2024-09-07 | Stop reason: HOSPADM

## 2024-09-04 RX ORDER — MEXILETINE HYDROCHLORIDE 150 MG/1
300 CAPSULE ORAL 3 TIMES DAILY
Status: DISCONTINUED | OUTPATIENT
Start: 2024-09-04 | End: 2024-09-07 | Stop reason: HOSPADM

## 2024-09-04 RX ORDER — AMIODARONE HYDROCHLORIDE 200 MG/1
200 TABLET ORAL 2 TIMES DAILY
Status: DISCONTINUED | OUTPATIENT
Start: 2024-09-04 | End: 2024-09-07 | Stop reason: HOSPADM

## 2024-09-04 RX ORDER — DOCUSATE SODIUM 100 MG/1
100 CAPSULE, LIQUID FILLED ORAL 2 TIMES DAILY
Status: DISCONTINUED | OUTPATIENT
Start: 2024-09-04 | End: 2024-09-07 | Stop reason: HOSPADM

## 2024-09-04 RX ORDER — FUROSEMIDE 10 MG/ML
40 INJECTION INTRAMUSCULAR; INTRAVENOUS ONCE
Status: COMPLETED | OUTPATIENT
Start: 2024-09-04 | End: 2024-09-04

## 2024-09-04 RX ORDER — ROSUVASTATIN CALCIUM 20 MG/1
20 TABLET, COATED ORAL NIGHTLY
Status: DISCONTINUED | OUTPATIENT
Start: 2024-09-04 | End: 2024-09-07 | Stop reason: HOSPADM

## 2024-09-04 RX ORDER — ACETAMINOPHEN 160 MG/5ML
650 SOLUTION ORAL EVERY 4 HOURS PRN
Status: DISCONTINUED | OUTPATIENT
Start: 2024-09-04 | End: 2024-09-07 | Stop reason: HOSPADM

## 2024-09-04 RX ORDER — METOPROLOL SUCCINATE 25 MG/1
25 TABLET, EXTENDED RELEASE ORAL DAILY
Status: DISCONTINUED | OUTPATIENT
Start: 2024-09-04 | End: 2024-09-07 | Stop reason: HOSPADM

## 2024-09-04 RX ORDER — POLYETHYLENE GLYCOL 3350 17 G/17G
17 POWDER, FOR SOLUTION ORAL DAILY
Status: DISCONTINUED | OUTPATIENT
Start: 2024-09-05 | End: 2024-09-07 | Stop reason: HOSPADM

## 2024-09-04 RX ORDER — PAROXETINE HYDROCHLORIDE 20 MG/1
60 TABLET, FILM COATED ORAL NIGHTLY
Status: DISCONTINUED | OUTPATIENT
Start: 2024-09-04 | End: 2024-09-04

## 2024-09-04 RX ORDER — ASPIRIN 81 MG/1
81 TABLET ORAL DAILY
Status: DISCONTINUED | OUTPATIENT
Start: 2024-09-04 | End: 2024-09-07 | Stop reason: HOSPADM

## 2024-09-04 RX ORDER — FERROUS SULFATE 325(65) MG
65 TABLET ORAL DAILY
Status: DISCONTINUED | OUTPATIENT
Start: 2024-09-04 | End: 2024-09-07 | Stop reason: HOSPADM

## 2024-09-04 RX ORDER — TRAMADOL HYDROCHLORIDE 50 MG/1
50 TABLET ORAL EVERY 6 HOURS PRN
Status: DISCONTINUED | OUTPATIENT
Start: 2024-09-04 | End: 2024-09-07 | Stop reason: HOSPADM

## 2024-09-04 RX ORDER — PAROXETINE HYDROCHLORIDE 20 MG/1
30 TABLET, FILM COATED ORAL 2 TIMES DAILY
Status: DISCONTINUED | OUTPATIENT
Start: 2024-09-05 | End: 2024-09-07 | Stop reason: HOSPADM

## 2024-09-04 SDOH — SOCIAL STABILITY: SOCIAL INSECURITY: ARE THERE ANY APPARENT SIGNS OF INJURIES/BEHAVIORS THAT COULD BE RELATED TO ABUSE/NEGLECT?: NO

## 2024-09-04 SDOH — SOCIAL STABILITY: SOCIAL INSECURITY: DOES ANYONE TRY TO KEEP YOU FROM HAVING/CONTACTING OTHER FRIENDS OR DOING THINGS OUTSIDE YOUR HOME?: NO

## 2024-09-04 SDOH — SOCIAL STABILITY: SOCIAL INSECURITY: HAVE YOU HAD THOUGHTS OF HARMING ANYONE ELSE?: NO

## 2024-09-04 SDOH — SOCIAL STABILITY: SOCIAL INSECURITY: ARE YOU OR HAVE YOU BEEN THREATENED OR ABUSED PHYSICALLY, EMOTIONALLY, OR SEXUALLY BY ANYONE?: NO

## 2024-09-04 SDOH — SOCIAL STABILITY: SOCIAL INSECURITY: DO YOU FEEL ANYONE HAS EXPLOITED OR TAKEN ADVANTAGE OF YOU FINANCIALLY OR OF YOUR PERSONAL PROPERTY?: NO

## 2024-09-04 SDOH — SOCIAL STABILITY: SOCIAL INSECURITY: HAVE YOU HAD ANY THOUGHTS OF HARMING ANYONE ELSE?: NO

## 2024-09-04 SDOH — SOCIAL STABILITY: SOCIAL INSECURITY: ABUSE: ADULT

## 2024-09-04 SDOH — SOCIAL STABILITY: SOCIAL INSECURITY: WERE YOU ABLE TO COMPLETE ALL THE BEHAVIORAL HEALTH SCREENINGS?: YES

## 2024-09-04 SDOH — SOCIAL STABILITY: SOCIAL INSECURITY: DO YOU FEEL UNSAFE GOING BACK TO THE PLACE WHERE YOU ARE LIVING?: NO

## 2024-09-04 SDOH — SOCIAL STABILITY: SOCIAL INSECURITY: HAS ANYONE EVER THREATENED TO HURT YOUR FAMILY OR YOUR PETS?: NO

## 2024-09-04 ASSESSMENT — PAIN - FUNCTIONAL ASSESSMENT
PAIN_FUNCTIONAL_ASSESSMENT: 0-10

## 2024-09-04 ASSESSMENT — LIFESTYLE VARIABLES
HAVE YOU EVER FELT YOU SHOULD CUT DOWN ON YOUR DRINKING: NO
HOW MANY STANDARD DRINKS CONTAINING ALCOHOL DO YOU HAVE ON A TYPICAL DAY: PATIENT DOES NOT DRINK
AUDIT-C TOTAL SCORE: 0
SKIP TO QUESTIONS 9-10: 1
TOTAL SCORE: 0
HOW OFTEN DO YOU HAVE A DRINK CONTAINING ALCOHOL: NEVER
HAVE PEOPLE ANNOYED YOU BY CRITICIZING YOUR DRINKING: NO
EVER HAD A DRINK FIRST THING IN THE MORNING TO STEADY YOUR NERVES TO GET RID OF A HANGOVER: NO
HOW OFTEN DO YOU HAVE 6 OR MORE DRINKS ON ONE OCCASION: NEVER
EVER FELT BAD OR GUILTY ABOUT YOUR DRINKING: NO
AUDIT-C TOTAL SCORE: 0

## 2024-09-04 ASSESSMENT — ACTIVITIES OF DAILY LIVING (ADL)
HEARING - RIGHT EAR: FUNCTIONAL
FEEDING YOURSELF: INDEPENDENT
HEARING - LEFT EAR: FUNCTIONAL
PATIENT'S MEMORY ADEQUATE TO SAFELY COMPLETE DAILY ACTIVITIES?: YES
ASSISTIVE_DEVICE: WALKER
TOILETING: INDEPENDENT
JUDGMENT_ADEQUATE_SAFELY_COMPLETE_DAILY_ACTIVITIES: YES
DRESSING YOURSELF: INDEPENDENT
ADEQUATE_TO_COMPLETE_ADL: YES
GROOMING: INDEPENDENT
BATHING: INDEPENDENT
LACK_OF_TRANSPORTATION: NO
WALKS IN HOME: INDEPENDENT

## 2024-09-04 ASSESSMENT — PAIN DESCRIPTION - DESCRIPTORS
DESCRIPTORS: DISCOMFORT
DESCRIPTORS: DISCOMFORT

## 2024-09-04 ASSESSMENT — COGNITIVE AND FUNCTIONAL STATUS - GENERAL
MOVING FROM LYING ON BACK TO SITTING ON SIDE OF FLAT BED WITH BEDRAILS: A LITTLE
STANDING UP FROM CHAIR USING ARMS: A LITTLE
WALKING IN HOSPITAL ROOM: A LOT
MOVING TO AND FROM BED TO CHAIR: A LITTLE
CLIMB 3 TO 5 STEPS WITH RAILING: A LOT
MOBILITY SCORE: 17
PATIENT BASELINE BEDBOUND: NO
DAILY ACTIVITIY SCORE: 24

## 2024-09-04 ASSESSMENT — PAIN SCALES - GENERAL
PAINLEVEL_OUTOF10: 0 - NO PAIN
PAINLEVEL_OUTOF10: 6
PAINLEVEL_OUTOF10: 0 - NO PAIN
PAINLEVEL_OUTOF10: 8
PAINLEVEL_OUTOF10: 7

## 2024-09-04 ASSESSMENT — COLUMBIA-SUICIDE SEVERITY RATING SCALE - C-SSRS
2. HAVE YOU ACTUALLY HAD ANY THOUGHTS OF KILLING YOURSELF?: NO
1. IN THE PAST MONTH, HAVE YOU WISHED YOU WERE DEAD OR WISHED YOU COULD GO TO SLEEP AND NOT WAKE UP?: NO
6. HAVE YOU EVER DONE ANYTHING, STARTED TO DO ANYTHING, OR PREPARED TO DO ANYTHING TO END YOUR LIFE?: NO

## 2024-09-04 ASSESSMENT — PATIENT HEALTH QUESTIONNAIRE - PHQ9
1. LITTLE INTEREST OR PLEASURE IN DOING THINGS: NOT AT ALL
SUM OF ALL RESPONSES TO PHQ9 QUESTIONS 1 & 2: 1
2. FEELING DOWN, DEPRESSED OR HOPELESS: SEVERAL DAYS

## 2024-09-04 NOTE — ED PROVIDER NOTES
HPI   Chief Complaint   Patient presents with    Shortness of Breath    Rapid Heart Rate       HPI: 58-year-old male history of V. tach with AICD in place states that he took a nap today and missed his doses of his antiarrhythmics by 2 hours.  He states that he was concerned that he would go into V. tach.  He states that he took his medicine when he woke up.  He states he just wanted to get checked out.  He states that the his AICD did not go off.  He states that it has gone off in the past but it has not gone off today.  He does not have any chest pain.  He states he has some swelling to his legs but that is unchanged.  He is home O2 dependent.  He states that he is having some bruising to his abdomen but he is related to his recent Lovenox injections that he received while he was at [facility].  He states that he has portable oxygen tank also ran out.    Family HX: Denies any significant/pertinent family history.  Social Hx: Denies ETOH or drug use.  Review of systems:  Gen.: No weight loss, fatigue, anorexia, insomnia, fever.   Eyes: No vision loss, double vision, drainage, eye pain.   ENT: No pharyngitis, dry mouth.   Cardiac: No chest pain, palpitations, syncope, near syncope.   Pulmonary: No  hemoptysis.   Heme/lymph: No swollen glands, fever, bleeding.   GI: No abdominal pain, change in bowel habits, melena, hematemesis, hematochezia, nausea, vomiting, diarrhea.   : No discharge, dysuria, frequency, urgency, hematuria.   Musculoskeletal: No limb pain, joint pain, joint swelling.   Skin: No rashes.   Psych: No depression, anxiety, suicidality, homicidality.   Review of systems is otherwise negative unless stated above or in history of present illness.    Physical Exam:    Appearance: Alert, oriented , cooperative,  in no acute distress. Well nourished & well hydrated.    Skin: Intact,  dry skin, no lesions, rash, petechiae or purpura.     Eyes: PERRLA, EOMs intact,  Conjunctiva pink with no redness or exudates.  Eyelids without lesions. No scleral icterus.     ENT: Hearing grossly intact. External auditory canals patent, tympanic membranes intact with visible landmarks. Nares patent, mucus membranes moist. Dentition without lesions. Pharynx clear, uvula midline.     Neck: Supple, without meningismus. Thyroid not palpable. Trachea at midline. No lymphadenopathy.    Pulmonary: Diminished no accessory muscle use or stridor.    Cardiac: Normal S1, S2 without murmur, rub, gallop or extrasystole. No JVD, Carotids without bruits.  AICD  Abdomen: Soft, nontender, active bowel sounds.  No palpable organomegaly.  No rebound or guarding.  No CVA tenderness.  Ecchymosis about the anterior abdominal wall    Genitourinary: Exam deferred.    Musculoskeletal: Full range of motion. no pain,  or deformity. Pulses full and equal. No cyanosis, clubbing, 1+ bilateral lower extremity edema  Neurological:  Cranial nerves II through XII are grossly intact, finger-nose touch is normal, normal sensation, no weakness, no focal findings identified.    Psychiatric: Appropriate mood and affect.     Medical Decision-Making:  Testing: EKG was obtained which is interpreted by me shows a paced rhythm rate of 75 without evidence of obvious ST elevations or T wave inversions to indicate acute ischemia or infarct.  Repeat EKG again shows a paced rhythm rate of 75 without evidence of obvious ST elevations or T wave inversions indicate acute ischemia or infarct.  Chest x-ray was obtained and read by radiology as cardiomegaly pulmonary edema slightly increased from 829 with possibility of superimposed multifocal pneumonia.  Patient was empirically started on antibiotics to cover for hospital-acquired pneumonia.  He was also given IV Lasix.  He has been on the monitor here.  He has been a paced rhythm.  He ended up also having a CT chest abdomen pelvis which was over radiology as no acute abnormality within the abdomen or pelvis small volume abdominal pelvis  ascites, CT chest was negative for PE but does show masslike consolidations in bilateral lung apices which appear new, pleural effusions and signs of pneumonia.  At this time patient will be admitted.  He has not had an increase in his oxygen requirement.  Treatment:   Reevaluation:   Plan: Admit  Patient and family/friend/caregiver are in agreement with this plan.   Impression:   1.  CHF  2.  Pneumonia  3. Pleural effusion  Labs Reviewed  CBC WITH AUTO DIFFERENTIAL - Abnormal     WBC                           11.8 (*)               nRBC                          0.0                    RBC                           4.47 (*)               Hemoglobin                    12.8 (*)               Hematocrit                    38.9 (*)               MCV                           87                     MCH                           28.6                   MCHC                          32.9                   RDW                           20.9 (*)               Platelets                     336                    Neutrophils %                                        Immature Granulocytes %, Automated                          Lymphocytes %                                        Monocytes %                                          Eosinophils %                                        Basophils %                                          Neutrophils Absolute                                 Lymphocytes Absolute                                 Monocytes Absolute                                   Eosinophils Absolute                                 Basophils Absolute                                COMPREHENSIVE METABOLIC PANEL - Abnormal     Glucose                       142 (*)                Sodium                        135 (*)                Potassium                     4.3                    Chloride                      99                     Bicarbonate                   27                     Anion Gap                     13                      Urea Nitrogen                 26 (*)                 Creatinine                    1.46 (*)               eGFR                          56 (*)                 Calcium                       9.1                    Albumin                       3.5                    Alkaline Phosphatase          334 (*)                Total Protein                 7.1                    AST                           49 (*)                 Bilirubin, Total              1.3 (*)                ALT                           96 (*)              PROTIME-INR - Abnormal     Protime                       16.5 (*)               INR                           1.5 (*)             B-TYPE NATRIURETIC PEPTIDE - Abnormal     BNP                           2,627 (*)                 Narrative:    <100 pg/mL - Heart failure unlikely                100-299 pg/mL - Intermediate probability of acute heart                                failure exacerbation. Correlate with clinical                                context and patient history.                  >=300 pg/mL - Heart Failure likely. Correlate with clinical                                context and patient history.                                BNP testing is performed using different testing methodology at [facility] than at other [facility]. Direct result comparisons should only be made within the same method.                   MAGNESIUM - Normal     Magnesium                     2.08                LACTATE - Normal     Lactate                       1.6                      Narrative: Venipuncture immediately after or during the administration of Metamizole may lead to falsely low results. Testing should be performed immediately                prior to Metamizole dosing.  SERIAL TROPONIN-INITIAL - Normal     Troponin I, High Sensitivity   19                       Narrative: Less than 99th percentile of normal range cutoff-                Female and children  under 18 years old <14 ng/L; Male <21 ng/L: Negative                Repeat testing should be performed if clinically indicated.                                 Female and children under 18 years old 14-50 ng/L; Male 21-50 ng/L:                Consistent with possible cardiac damage and possible increased clinical                 risk. Serial measurements may help to assess extent of myocardial damage.                                 >50 ng/L: Consistent with cardiac damage, increased clinical risk and                myocardial infarction. Serial measurements may help assess extent of                 myocardial damage.                                  NOTE: Children less than 1 year old may have higher baseline troponin                 levels and results should be interpreted in conjunction with the overall                 clinical context.                                 NOTE: Troponin I testing is performed using a different                 testing methodology at [facility] than at other                 [facility]. Direct result comparisons should only                 be made within the same method.  TROPONIN SERIES- (INITIAL, 1 HR)       Narrative: The following orders were created for panel order Troponin I Series, High Sensitivity (0, 1 HR).                Procedure                               Abnormality         Status                                   ---------                               -----------         ------                                   Troponin I, High Sensiti...[[id number]]  Normal              Final result                             Troponin, High Sensitivi...[[id number]]                                                                               Please view results for these tests on the individual orders.  SERIAL TROPONIN, 1 HOUR  SARS-COV-2 PCR  MORPHOLOGY     XR chest 1 view   Final Result    Cardiomegaly with pulmonary edema, appearing slightly increased from     08/29/2024. The possibility of superimposed multifocal pneumonia is    raised as there appear to be some asymmetric patchy peripheral    consolidations in the right upper lobe and left upper lobe that can    be seen with multiple potential organisms including COVID-19.    Integration of the clinical context, physical exam/laboratory    findings, and imaging is recommended. As          MACRO:    None.          Signed by: Mohan German 9/4/2024 3:20 PM    Dictation workstation:   [id number]     CT angio chest for pulmonary embolism    (Results Pending)  CT abdomen pelvis w IV contrast    (Results Pending)                   Patient History   Past Medical History:   Diagnosis Date    Atherosclerosis of native artery of both lower extremities with intermittent claudication (CMS-HCC)     CHB (complete heart block) (Multi)     per device check    Chronic systolic CHF (congestive heart failure), NYHA class 3 (Multi)     COPD (chronic obstructive pulmonary disease) (Multi)     Coronary artery disease     History of tobacco abuse     HLD (hyperlipidemia)     Hypertension     Infrarenal abdominal aortic aneurysm (AAA) without rupture (CMS-HCC)     Ischemic cardiomyopathy with implantable cardioverter-defibrillator (ICD)     MI (myocardial infarction) (Multi)     multiple    Nephrolithiasis     PTSD (post-traumatic stress disorder)      Past Surgical History:   Procedure Laterality Date    CARDIAC CATHETERIZATION N/A 5/23/2024    Procedure: Left Heart Cath;  Surgeon: Babatunde Tan MD;  Location: [facility];  Service: Cardiovascular;  Laterality: N/A;    CARDIAC DEFIBRILLATOR PLACEMENT      CARDIAC ELECTROPHYSIOLOGY PROCEDURE N/A 5/23/2024    Procedure: Cardioversion;  Surgeon: Zachary Sparks MD;  Location: [facility];  Service: Electrophysiology;  Laterality: N/A;    CARDIAC ELECTROPHYSIOLOGY PROCEDURE N/A 5/28/2024    Procedure: NIPS (NONINVASIVE PROGRAMMED STIMULATION AND DEFIBRILLATOR  THRESHOLD TESTING);  Surgeon: Zachary Sparks MD;  Location: [facility];  Service: Electrophysiology;  Laterality: N/A;    CARDIAC ELECTROPHYSIOLOGY PROCEDURE Right 5/30/2024    Procedure: Ablation VT Endocardial (90909);  Surgeon: Sonny Flores MD;  Location: [place] [address] [facility];  Service: Electrophysiology;  Laterality: Right;  CARTO, 2PM    CARDIAC ELECTROPHYSIOLOGY PROCEDURE N/A 6/6/2024    Procedure: Ablation VT;  Surgeon: Eliot Wooten MD;  Location: [place] [address] [facility];  Service: Electrophysiology;  Laterality: N/A;  endocardial vt ablation  carto V8    CORONARY ANGIOPLASTY WITH STENT PLACEMENT  2006    JIM to LAD    CORONARY ANGIOPLASTY WITH STENT PLACEMENT  04/2003    CORONARY ANGIOPLASTY WITH STENT PLACEMENT  06/2008    CYSTOSCOPY W/ URETERAL STENT PLACEMENT  04/01/2024    CYSTOSCOPY W/ URETERAL STENT PLACEMENT  04/30/2024    FEMORAL BYPASS      femoral artery    ILIAC ARTERY STENT Right     KNEE SURGERY       Family History   Problem Relation Name Age of Onset    Hypertension Mother      Diabetes Father      Hypertension Sister      Diabetes Sister      Colon cancer Maternal Grandmother      Hypertension Maternal Grandmother      Heart disease Maternal Grandfather      Hypertension Maternal Grandfather      Cancer Paternal Grandfather      Hypertension Paternal Grandfather       Social History     Tobacco Use    Smoking status: Former     Types: Cigarettes    Smokeless tobacco: Not on file   Vaping Use    Vaping status: Never Used   Substance Use Topics    Alcohol use: Not Currently     Comment: social    Drug use: Not Currently       Physical Exam   ED Triage Vitals   Temperature Heart Rate Respirations BP   09/04/24 1354 09/04/24 1354 09/04/24 1354 09/04/24 1354   36.2 °C (97.2 °F) 77 18 105/69      Pulse Ox Temp Source Heart Rate Source Patient Position   09/04/24 1354 09/04/24 1354 09/04/24 1415 09/04/24 1525   94 % Temporal Monitor Sitting      BP Location FiO2 (%)      09/04/24 1525 --     Left arm        Physical Exam      ED Course & MDM   Diagnoses as of 09/04/24 1708   Chronic congestive heart failure, unspecified heart failure type (Multi)   Pneumonia of both upper lobes due to infectious organism   Pleural effusion                 No data recorded     [place] Coma Scale Score: 15 (09/04/24 1357 : Nicki Soni RN)                           Medical Decision Making      Procedure  Procedures     Kat Kinsey MD  09/04/24 9555

## 2024-09-04 NOTE — CONSULTS
Cristian Sapp, Age 58 is being initiated on pharmacy to dose vancomycin for sepsis/pneumonia in the ED.  Patient is only ordered 1 dose of vancomycin therapy, as ED orders are not valid to continue when pt is transferred to a floor. Renal function is currently scr=1.46.    Patient will be given an initial dose of 1500 mg    Attending or ID Services must reorder if Vancomycin is to be continued.    Please contact the pharmacy at extension [phone] with any questions.    Reviewed and approved by FARHEEN MENDOZA on 9/4/24 at 4:50 PM.

## 2024-09-04 NOTE — H&P
Medical Group History and Physical  ASSESSMENT & PLAN:       Recurent Vtach  -ICD in place. Will interrogate  -Will continue on amiodarone,   -Will contine on amiodarone/mexilitine  -EP eval appreciated    Acute on chronic Systolic heart failure  -EF 15 %  -not a candidate for LVAD, OHT, or combined heart-lung transplant due to tobacco use   -Given 1 dose of lasix 40 IV in ED  -Cont farxiga,toprol, Entresto, Aldactone  -Will continue on lasix 40 IV BID. Strict I&Os  -Cards consulted for evaluation, recs appreciated    Concern for Gram negative bacterial pneumonia  Rt pleural effusoin  -Scatter opacites as well as Mass like consolidations on b/l apiciesnoted on imaging. Given vanc/amikacin in ED  -Will cover with vanc/zosyn and monitor. Will send procal   -Patient established with a pulmonologist already. Will refer on DC for interval re-evaluation due to concerns of neoplastic process per rads.   -Will consider thoracentesis. Will continue with diuresis for now    Hx COPD  -Appears compensated  -PRN nebs  -Home meds to be resumed as applicable pending verfication    Hx PTSD, Iron deficiency anemia, CAD, HLD, BPH  -Home meds continued as applicable    VTE Prophylaxis: lovenox/SCDs    ---Of note, this documentation is completed using the Dragon Dictation system (voice recognition software). There may be spelling and/or grammatical errors that were not corrected prior to final submission.---    Kranthi Carmichael MD    HISTORY OF PRESENT ILLNESS:   Chief Complaint: Slow V. tach    History Of Present Illness:    Cristian Sapp is a 58 y.o. male with a PMH including HFrEF, Chronic hypoxic respiratory failure on home O2, vtach, and COPD who presented to hospital due to concerns of slow vtach.  Patient reports having this morning and a dose of his mexiletine.  States when he awoke his cardiac markers were alerting him that he was in V. tach that was sustained with heart rates in the low 100s.  States he took his mexiletine  shortly after and rhythm converted to normal around the time of presentation to the ED. rRports being late for his mexiletine dose for approximately 4 hours.  Patient denied any lightheadedness, dizziness, difficulty breathing, or chest discomfort during episode.  Denied any fevers or chills.  Endorsing numbness lower extremities which is a little worse than usual.  Endorsing persistent difficulty breathing. Further ROS was unremarkable.     Review of systems: 10 point review of systems is otherwise negative except as mentioned above.    PAST HISTORIES:     Past Medical History:  He has a past medical history of Atherosclerosis of native artery of both lower extremities with intermittent claudication (CMS-[facility]), CHB (complete heart block) (Multi), Chronic systolic CHF (congestive heart failure), NYHA class 3 (Multi), COPD (chronic obstructive pulmonary disease) (Multi), Coronary artery disease, History of tobacco abuse, HLD (hyperlipidemia), Hypertension, Infrarenal abdominal aortic aneurysm (AAA) without rupture (CMS-[facility]), Ischemic cardiomyopathy with implantable cardioverter-defibrillator (ICD), MI (myocardial infarction) (Multi), Nephrolithiasis, and PTSD (post-traumatic stress disorder).    Past Surgical History:  He has a past surgical history that includes Cardiac defibrillator placement; Coronary angioplasty with stent (2006); Coronary angioplasty with stent (04/2003); Coronary angioplasty with stent (06/2008); Cystoscopy w/ ureteral stent placement (04/01/2024); Cystoscopy w/ ureteral stent placement (04/30/2024); Iliac artery stent (Right); Knee surgery; Femoral bypass; Cardiac electrophysiology procedure (N/A, 5/23/2024); Cardiac catheterization (N/A, 5/23/2024); Cardiac electrophysiology procedure (N/A, 5/28/2024); Cardiac electrophysiology procedure (Right, 5/30/2024); and Cardiac electrophysiology procedure (N/A, 6/6/2024).      Social History:  He reports that he has quit smoking. His smoking use  included cigarettes. He does not have any smokeless tobacco history on file. He reports that he does not currently use alcohol. He reports that he does not currently use drugs.    Family History:  Family History   Problem Relation Name Age of Onset    Hypertension Mother      Diabetes Father      Hypertension Sister      Diabetes Sister      Colon cancer Maternal Grandmother      Hypertension Maternal Grandmother      Heart disease Maternal Grandfather      Hypertension Maternal Grandfather      Cancer Paternal Grandfather      Hypertension Paternal Grandfather          Allergies:  Chantix [varenicline]    OBJECTIVE:     Last Recorded Vitals:  Vitals:    09/04/24 1645 09/04/24 1700 09/04/24 1715 09/04/24 1742   BP:  99/69  93/68   BP Location:    Left arm   Patient Position:    Sitting   Pulse: 74 74 74    Resp: (!) 21 (!) 27 20 (!) 24   Temp:       TempSrc:       SpO2: (!) 93% 94% 94% 96%   Weight:       Height:         Last I/O:  No intake/output data recorded.    Physical Exam  General: Ill-appearing adult male in mild distress  HEENT: Clear sclera, EOMI, trachea midline, moist mucous membranes  Respiratory: Equal chest rise, no retractions, diminished right-sided lung sounds most notable at bases  Cardiovascular: S1 and S2 auscultated, no murmurs clicks or rubs  Abdomen: Tense, nontender, distended, abdominal bruising appreciated  Extremities: No cyanosis or clubbing appreciated  Neurological: Spontaneously moves all extremities, no dysarthria, cranial nerves grossly intact  Psychiatric: Appropriate mood and affect  Skin: Warm, dry      Scheduled Medications  amikacin, 5 mg/kg, intravenous, q8h  furosemide, 40 mg, intravenous, Once  piperacillin-tazobactam, 4.5 g, intravenous, q6h  vancomycin, 1,500 mg, intravenous, Once      PRN Medications    Continuous Medications       Outpatient Medications:  Prior to Admission medications    Medication Sig Start Date End Date Taking? Authorizing Provider   albuterol  sulfate (Proair Digihaler) 90 mcg/actuation aero powdr breath act w/sensor inhaler Inhale 2 puffs every 4 hours if needed for wheezing.    Historical Provider, MD   amiodarone (Pacerone) 200 mg tablet Take 1 tablet (200 mg) by mouth 2 times a day. 8/30/24 9/29/24  Keysha Callaway PA-C   aspirin 81 mg EC tablet Take 1 tablet (81 mg) by mouth once daily. 8/30/24 9/29/24  Keysha ENRICO Callaway PA-C   dapagliflozin propanediol (Farxiga) 10 mg Take 1 tablet (10 mg) by mouth once daily. 8/30/24 9/29/24  Keyshaprisca Callaway PA-C   ferrous sulfate, 325 mg ferrous sulfate, tablet Take 1 tablet (325 mg) by mouth once daily with breakfast. 8/30/24 9/29/24  Keysha Callaway PA-C   LORazepam (Ativan) 0.5 mg tablet Take 1 tablet (0.5 mg) by mouth once daily as needed for anxiety. Take 1 tablet daily as needed up to 1.5mg    Historical Provider, MD   metoprolol succinate XL (Toprol-XL) 25 mg 24 hr tablet Take 1 tablet (25 mg) by mouth once daily. Do not crush or chew. 8/30/24 9/29/24  Keysha ENRICO Callaway PA-C   mexiletine (Mexitil) 150 mg capsule Take 2 capsules (300 mg) by mouth 3 times a day. 8/30/24 9/29/24  Keysha ENRICO Callaway PA-C   nitroglycerin (Nitrostat) 0.4 mg SL tablet Place 1 tablet (0.4 mg) under the tongue every 5 minutes if needed for chest pain.    Historical Provider, MD   pantoprazole (ProtoNix) 40 mg EC tablet Take 1 tablet (40 mg) by mouth once daily in the morning. Take before meals. Do not crush, chew, or split. 8/31/24 9/30/24  Keysha ENRICO Callaway PA-C   PARoxetine (Paxil) 30 mg tablet Take 2 tablets (60 mg) by mouth once daily at bedtime. 7/14/24   Bryanna M Igor, APRN-CNP   ranolazine (Ranexa) 500 mg 12 hr tablet Take 1 tablet (500 mg) by mouth 2 times a day. Do not crush, chew, or split. 8/30/24 9/29/24  Keysha Callaway PA-C   rosuvastatin (Crestor) 20 mg tablet Take 1 tablet (20 mg) by mouth once daily at bedtime. 8/30/24 9/29/24  Keysha Callaway PA-C   sacubitriL-valsartan  (Entresto) 24-26 mg tablet Take 0.5 tablets by mouth 2 times a day. 8/30/24 9/29/24  Keysha Callaway PA-C   spironolactone (Aldactone) 25 mg tablet Take 1 tablet (25 mg) by mouth once daily. 8/31/24 9/30/24  Keyshaprisca Callaway PA-C   tamsulosin (Flomax) 0.4 mg 24 hr capsule Take 1 capsule (0.4 mg) by mouth once daily.    Historical Provider, MD   torsemide (Demadex) 20 mg tablet Take 2 tablets (40 mg) by mouth once daily. 8/31/24   Keysha Callaway PA-C   traZODone (Desyrel) 50 mg tablet Take 1 tablet (50 mg) by mouth once daily at bedtime.    Historical Provider, MD   amiodarone (Pacerone) 200 mg tablet Take 1 tablet (200 mg) by mouth 2 times a day. 8/8/24 8/30/24  Arya Brown MD   aspirin 81 mg EC tablet Take 1 tablet (81 mg) by mouth once daily.  8/30/24  Historical Provider, MD   dapagliflozin propanediol (Farxiga) 10 mg Take 1 tablet (10 mg) by mouth once daily. 6/18/24 8/30/24  Annette John MD   ferrous sulfate, 325 mg ferrous sulfate, tablet Take 1 tablet by mouth once daily with breakfast.  8/30/24  Historical Provider, MD   furosemide (Lasix) 10 mg/mL injection Infuse 4 mL (40 mg) into a venous catheter 2 times a day.  Patient not taking: Reported on 8/20/2024 8/20/24 8/30/24  Cristin Hart, APRN-CNP   magnesium oxide (Mag-Ox) 400 mg (241.3 mg magnesium) tablet Take 1 tablet (400 mg) by mouth once daily.  Patient taking differently: Take 1 tablet (400 mg) by mouth 2 times a day. 7/23/24 8/30/24  Eamon Rosa MD   metoprolol succinate XL (Toprol-XL) 25 mg 24 hr tablet Take 0.5 tablets (12.5 mg) by mouth once daily. Do not crush or chew. 7/30/24 8/30/24  Zachary N Spakrs, MD   mexiletine (Mexitil) 150 mg capsule Take 2 capsules (300 mg) by mouth 3 times a day. 7/30/24 8/30/24  Zachary Sparks MD   potassium chloride (Klor-Con) 20 mEq packet Take 20 mEq by mouth once daily.  Patient taking differently: Take 20 mEq by mouth if needed. 7/11/24 8/30/24  Zachary Sparks MD   ranolazine  (Ranexa) 500 mg 12 hr tablet Take 1 tablet (500 mg) by mouth 2 times a day. Do not crush, chew, or split. 7/11/24 8/30/24  Zachary Sparks MD   rosuvastatin (Crestor) 20 mg tablet Take 1 tablet (20 mg) by mouth once daily at bedtime. 7/11/24 8/30/24  Zachary Sparks MD   sacubitriL-valsartan (Entresto) 24-26 mg tablet Take 0.5 tablets by mouth 2 times a day. 8/19/24 8/30/24  Cristin Hart APRN-CNP   spironolactone (Aldactone) 25 mg tablet Take 0.5 tablets (12.5 mg) by mouth once daily. 8/9/24 8/30/24  Arya Brown MD       LABS AND IMAGING:     Labs:  Results from last 7 days   Lab Units 09/04/24 1519 08/29/24 1928 08/28/24 1938   WBC AUTO x10*3/uL 11.8* 11.4* 10.8   RBC AUTO x10*6/uL 4.47* 4.49* 4.27*   HEMOGLOBIN g/dL 12.8* 12.8* 12.0*   HEMATOCRIT % 38.9* 40.4* 37.4*   MCV fL 87 90 88   MCH pg 28.6 28.5 28.1   MCHC g/dL 32.9 31.7* 32.1   RDW % 20.9* 20.7* 20.5*   PLATELETS AUTO x10*3/uL 336 342 310     Results from last 7 days   Lab Units 09/04/24 1519 08/29/24 1928 08/28/24 1938   SODIUM mmol/L 135* 139 137   POTASSIUM mmol/L 4.3 3.4* 4.0   CHLORIDE mmol/L 99 100 101   CO2 mmol/L 27 28 24   BUN mg/dL 26* 21 21   CREATININE mg/dL 1.46* 1.30 1.40*   GLUCOSE mg/dL 142* 107* 155*   PROTEIN TOTAL g/dL 7.1  --   --    CALCIUM mg/dL 9.1 9.1 8.7   BILIRUBIN TOTAL mg/dL 1.3*  --   --    ALK PHOS U/L 334*  --   --    AST U/L 49*  --   --    ALT U/L 96*  --   --      Results from last 7 days   Lab Units 09/04/24  1519 08/29/24  1928 08/28/24  1938   MAGNESIUM mg/dL 2.08 2.08 2.03     Results from last 7 days   Lab Units 09/04/24  1632 09/04/24  1519   TROPHS ng/L 20 19       Imaging:  ECG 12 lead  AV dual-paced rhythm  Abnormal ECG  When compared with ECG of 20-AUG-2024 09:55, (unconfirmed)  Electronic ventricular pacemaker has replaced Wide QRS rhythm  See ED provider note for full interpretation and clinical correlation  Confirmed by Brianna Bernal ([id number]) on 9/4/2024 5:32:49 PM  CT abdomen pelvis w IV  contrast  Narrative: Interpreted By:  Carlos Greene,   STUDY:  CT ABDOMEN PELVIS W IV CONTRAST;  9/4/2024 4:24 pm      INDICATION:  Signs/Symptoms:abd swelling, recent heparin injection sub cutaneous.          COMPARISON:  CT abdomen and pelvis 05/24/2024      ACCESSION NUMBER(S):  [id number]      ORDERING CLINICIAN:  SHANTHI RAMOS      TECHNIQUE:  Contiguous axial images of the abdomen and pelvis were obtained after  the intravenous administration of 75 mL Omnipaque 350 contrast.  Coronal and sagittal reformatted images were reconstructed from the  axial data.      FINDINGS:  LOWER CHEST: No acute abnormality.      LIVER: Probable hepatic steatosis although assessment limited by  phase of contrast enhancement.      BILE DUCTS: No significant intrahepatic or extrahepatic dilatation.      GALLBLADDER: Layering density in the gallbladder, sludge versus  vicarious contrast excretion.      PANCREAS: No significant abnormality.      SPLEEN: No significant abnormality.      ADRENALS: No significant abnormality.      KIDNEYS, URETERS, BLADDER: Kidneys enhance symmetrically. There is no  hydronephrosis. Central vascular calcifications versus nonobstructing  calculi. No appreciable ureteral calculus. The bladder is  underdistended which limits assessment.      REPRODUCTIVE ORGANS: No significant abnormality.      GI: No obstruction. No bowel wall thickening or adjacent inflammatory  change. Normal appendix. Large amount of stool within the ascending  and transverse colon.      VESSELS: Heavy calcified and noncalcified atheromatous plaques of the  aorta and iliac vasculature. Aneurysmal dilatation of the infrarenal  abdominal aorta measuring up to 3.1 cm just superior to the  bifurcation. Vascular stent within the right common iliac artery. The  portal veins and IVC are patent.      PERITONEUM/RETROPERITONEUM: No intraperitoneal free air. There is a  small amount of abdominal and pelvic ascites.      LYMPH NODES: No  enlarged lymph nodes.      ABDOMINAL WALL: Small fat containing inguinal hernias bilaterally.      OSSEOUS STRUCTURES: No acute osseous abnormality.      Impression: 1. No acute abnormality within the abdomen or pelvis.  2. No abdominal wall or intra-abdominal hematoma identified.  3. Small volume abdominal and pelvic ascites.      MACRO:  None      Signed by: Carlos Greene 9/4/2024 4:59 PM  Dictation workstation:   [id number]  CT angio chest for pulmonary embolism  Narrative: Interpreted By:  Carlos Greene,   STUDY:  CT ANGIO CHEST FOR PULMONARY EMBOLISM;  9/4/2024 4:24 pm      INDICATION:  Signs/Symptoms:sob.          COMPARISON:  CT chest 06/04/2024      ACCESSION NUMBER(S):  [id number]      ORDERING CLINICIAN:  SHANTHI RAMOS      TECHNIQUE:  Contiguous axial images of the chest were obtained after the  intravenous administration of 75 mL Omnipaque 350 contrast using  angiographic PE protocol. Coronal and sagittal reformatted images  were reconstructed from the axial data. MIP images were created on an  independent workstation and reviewed.      FINDINGS:  PULMONARY ARTERIES: Adequate opacification to the level of the  segmental arteries. Assessment of the subsegmental arteries is  limited by mixing artifact and motion. No filling defect to suggest  pulmonary embolus in the visualized pulmonary arteries. The main  pulmonary artery is normal in diameter. There is reflux of contrast  into the hepatic venous circuit suggestive of impaired right heart  function.      HEART: Heart is enlarged. Right atrial, right ventricular, and  epicardial pacing leads are noted. Moderate to heavy coronary  vascular calcifications with probable stent within the left  circumflex artery. No significant pericardial effusion.      VESSELS: Aorta is normal in caliber. Non-opacification of the aorta  limits assessment of luminal patency. Mild atherosclerotic  calcifications of the aortic arch and descending thoracic aorta.       MEDIASTINUM AND LYMPH NODES: Visualized thyroid is within normal  limits. Prominent and mildly enlarged paratracheal and prevascular  lymph nodes measuring up to 1.6 cm. No pneumomediastinum. The  esophagus appears within normal limits.      LUNG, AIRWAYS, AND PLEURA: Trachea and proximal mainstem bronchi are  patent. The lungs demonstrate a background of moderate-to-severe  emphysematous change with biapical scarring. Interval development of  masslike consolidations in the anterior aspects of the lung apices  bilaterally, measuring up to 4 cm on the right and 3.1 cm in the  left.. These are new since June 2024. Additional scattered areas of  ground-glass and reticular opacities throughout the bilateral lungs.  Moderate-to-large right pleural effusions with adjacent compressive  atelectasis.      OSSEOUS STRUCTURES: No acute osseous abnormality.      CHEST WALL SOFT TISSUES: No discernible abnormality.      UPPER ABDOMEN/OTHER: Please see separately dictated CT of the abdomen  and pelvis.      Impression: 1. No evidence of pulmonary embolus to the level of the segmental  arteries. Small subsegmental filling defects can not be entirely  excluded.  2. Masslike consolidations in the bilateral lung apices, new from  June 2024. These presumably represent multifocal airspace disease  such as pneumonia with mass/neoplasm not excluded. Recommend  correlation with clinical presentation and short interval follow-up  to ensure resolution.  3. Additional scattered ground-glass and reticular opacities are  noted throughout the bilateral lungs which could reflect edema or  infectious/inflammatory process.  4. Prominent mildly enlarged mediastinal lymph nodes. Nonspecific and  may be reactive with neoplasm not excluded.  5. Moderate-to-large right pleural effusion adjacent atelectasis.  6. Cardiomegaly and reflux of contrast into the hepatic venous  circuit suggesting impaired right heart function. Consider  echocardiographic  correlation.      MACRO:  Critical Finding:  See findings. Notification was initiated on  9/4/2024 at 4:52 pm by  Carlos Greene.  (**-YCF-**) Instructions:      Signed by: Carlos Greene 9/4/2024 4:53 PM  Dictation workstation:   [id number]  ECG 12 lead  AV dual-paced rhythm  Abnormal ECG  When compared with ECG of 04-SEP-2024 13:54, (unconfirmed)  No significant change was found  XR chest 1 view  Narrative: Interpreted By:  Mohan German,   STUDY:  XR CHEST 1 VIEW;  9/4/2024 3:05 pm      INDICATION:  Signs/Symptoms:Chest Pain.          COMPARISON:  08/29/2024      ACCESSION NUMBER(S):  [id number]      ORDERING CLINICIAN:  SHANTHI RAMOS      FINDINGS:  Multi lead right chest wall AICD noted.      Cardiomegaly. The pulmonary vasculature is congested centrally and  indistinct peripherally, with interlobular septal thickening and  indistinct mid to lower lung consolidative opacities consistent with  pulmonary edema. Small right pleural effusion, similar to prior  study. There is mild right lower lobe atelectasis. There are some  patchy asymmetric peripheral consolidations in the right upper lobe  and left upper lobe.      Impression: Cardiomegaly with pulmonary edema, appearing slightly increased from  08/29/2024. The possibility of superimposed multifocal pneumonia is  raised as there appear to be some asymmetric patchy peripheral  consolidations in the right upper lobe and left upper lobe that can  be seen with multiple potential organisms including COVID-19.  Integration of the clinical context, physical exam/laboratory  findings, and imaging is recommended. As      MACRO:  None.      Signed by: Mohan German 9/4/2024 3:20 PM  Dictation workstation:   [id number]

## 2024-09-05 ENCOUNTER — APPOINTMENT (OUTPATIENT)
Dept: CARDIOLOGY | Facility: HOSPITAL | Age: 59
End: 2024-09-05
Payer: MEDICARE

## 2024-09-05 LAB
ANION GAP SERPL CALC-SCNC: 14 MMOL/L (ref 10–20)
ATRIAL RATE: 75 BPM
BASOPHILS # BLD AUTO: 0.1 X10*3/UL (ref 0–0.1)
BASOPHILS NFR BLD AUTO: 0.9 %
BUN SERPL-MCNC: 27 MG/DL (ref 6–23)
CALCIUM SERPL-MCNC: 9 MG/DL (ref 8.6–10.3)
CHLORIDE SERPL-SCNC: 98 MMOL/L (ref 98–107)
CO2 SERPL-SCNC: 27 MMOL/L (ref 21–32)
CREAT SERPL-MCNC: 1.7 MG/DL (ref 0.5–1.3)
EGFRCR SERPLBLD CKD-EPI 2021: 47 ML/MIN/1.73M*2
EOSINOPHIL # BLD AUTO: 0.4 X10*3/UL (ref 0–0.7)
EOSINOPHIL NFR BLD AUTO: 3.7 %
ERYTHROCYTE [DISTWIDTH] IN BLOOD BY AUTOMATED COUNT: 20.9 % (ref 11.5–14.5)
GLUCOSE SERPL-MCNC: 121 MG/DL (ref 74–99)
HCT VFR BLD AUTO: 36.7 % (ref 41–52)
HGB BLD-MCNC: 12 G/DL (ref 13.5–17.5)
IMM GRANULOCYTES # BLD AUTO: 0.12 X10*3/UL (ref 0–0.7)
IMM GRANULOCYTES NFR BLD AUTO: 1.1 % (ref 0–0.9)
LYMPHOCYTES # BLD AUTO: 1.14 X10*3/UL (ref 1.2–4.8)
LYMPHOCYTES NFR BLD AUTO: 10.5 %
MAGNESIUM SERPL-MCNC: 2.08 MG/DL (ref 1.6–2.4)
MCH RBC QN AUTO: 28.6 PG (ref 26–34)
MCHC RBC AUTO-ENTMCNC: 32.7 G/DL (ref 32–36)
MCV RBC AUTO: 87 FL (ref 80–100)
MONOCYTES # BLD AUTO: 0.99 X10*3/UL (ref 0.1–1)
MONOCYTES NFR BLD AUTO: 9.1 %
MRSA DNA SPEC QL NAA+PROBE: NOT DETECTED
NEUTROPHILS # BLD AUTO: 8.07 X10*3/UL (ref 1.2–7.7)
NEUTROPHILS NFR BLD AUTO: 74.7 %
NRBC BLD-RTO: 0 /100 WBCS (ref 0–0)
PLATELET # BLD AUTO: 294 X10*3/UL (ref 150–450)
POTASSIUM SERPL-SCNC: 3.6 MMOL/L (ref 3.5–5.3)
PR INTERVAL: 152 MS
PROCALCITONIN SERPL-MCNC: 0.15 NG/ML
Q ONSET: 174 MS
QRS COUNT: 12 BEATS
QRS DURATION: 190 MS
QT INTERVAL: 508 MS
QTC CALCULATION(BAZETT): 567 MS
QTC FREDERICIA: 546 MS
R AXIS: -67 DEGREES
RBC # BLD AUTO: 4.2 X10*6/UL (ref 4.5–5.9)
SODIUM SERPL-SCNC: 135 MMOL/L (ref 136–145)
T AXIS: 103 DEGREES
T OFFSET: 428 MS
VANCOMYCIN SERPL-MCNC: 11.4 UG/ML (ref 5–20)
VENTRICULAR RATE: 75 BPM
WBC # BLD AUTO: 10.8 X10*3/UL (ref 4.4–11.3)

## 2024-09-05 PROCEDURE — 2500000002 HC RX 250 W HCPCS SELF ADMINISTERED DRUGS (ALT 637 FOR MEDICARE OP, ALT 636 FOR OP/ED): Performed by: STUDENT IN AN ORGANIZED HEALTH CARE EDUCATION/TRAINING PROGRAM

## 2024-09-05 PROCEDURE — 36415 COLL VENOUS BLD VENIPUNCTURE: CPT | Performed by: STUDENT IN AN ORGANIZED HEALTH CARE EDUCATION/TRAINING PROGRAM

## 2024-09-05 PROCEDURE — 99223 1ST HOSP IP/OBS HIGH 75: CPT | Performed by: INTERNAL MEDICINE

## 2024-09-05 PROCEDURE — 2500000002 HC RX 250 W HCPCS SELF ADMINISTERED DRUGS (ALT 637 FOR MEDICARE OP, ALT 636 FOR OP/ED): Performed by: NURSE PRACTITIONER

## 2024-09-05 PROCEDURE — 2500000001 HC RX 250 WO HCPCS SELF ADMINISTERED DRUGS (ALT 637 FOR MEDICARE OP): Performed by: STUDENT IN AN ORGANIZED HEALTH CARE EDUCATION/TRAINING PROGRAM

## 2024-09-05 PROCEDURE — 94640 AIRWAY INHALATION TREATMENT: CPT

## 2024-09-05 PROCEDURE — 2500000004 HC RX 250 GENERAL PHARMACY W/ HCPCS (ALT 636 FOR OP/ED): Mod: JZ

## 2024-09-05 PROCEDURE — 84145 PROCALCITONIN (PCT): CPT | Mod: ELYLAB | Performed by: STUDENT IN AN ORGANIZED HEALTH CARE EDUCATION/TRAINING PROGRAM

## 2024-09-05 PROCEDURE — 2500000004 HC RX 250 GENERAL PHARMACY W/ HCPCS (ALT 636 FOR OP/ED): Performed by: STUDENT IN AN ORGANIZED HEALTH CARE EDUCATION/TRAINING PROGRAM

## 2024-09-05 PROCEDURE — 80202 ASSAY OF VANCOMYCIN: CPT

## 2024-09-05 PROCEDURE — 99223 1ST HOSP IP/OBS HIGH 75: CPT | Performed by: NURSE PRACTITIONER

## 2024-09-05 PROCEDURE — 2500000001 HC RX 250 WO HCPCS SELF ADMINISTERED DRUGS (ALT 637 FOR MEDICARE OP): Performed by: NURSE PRACTITIONER

## 2024-09-05 PROCEDURE — 87641 MR-STAPH DNA AMP PROBE: CPT | Performed by: STUDENT IN AN ORGANIZED HEALTH CARE EDUCATION/TRAINING PROGRAM

## 2024-09-05 PROCEDURE — 93284 PRGRMG EVAL IMPLANTABLE DFB: CPT | Performed by: INTERNAL MEDICINE

## 2024-09-05 PROCEDURE — 82310 ASSAY OF CALCIUM: CPT | Performed by: STUDENT IN AN ORGANIZED HEALTH CARE EDUCATION/TRAINING PROGRAM

## 2024-09-05 PROCEDURE — 76937 US GUIDE VASCULAR ACCESS: CPT

## 2024-09-05 PROCEDURE — 83735 ASSAY OF MAGNESIUM: CPT | Performed by: STUDENT IN AN ORGANIZED HEALTH CARE EDUCATION/TRAINING PROGRAM

## 2024-09-05 PROCEDURE — 99232 SBSQ HOSP IP/OBS MODERATE 35: CPT | Performed by: STUDENT IN AN ORGANIZED HEALTH CARE EDUCATION/TRAINING PROGRAM

## 2024-09-05 PROCEDURE — 85025 COMPLETE CBC W/AUTO DIFF WBC: CPT | Performed by: STUDENT IN AN ORGANIZED HEALTH CARE EDUCATION/TRAINING PROGRAM

## 2024-09-05 PROCEDURE — 93284 PRGRMG EVAL IMPLANTABLE DFB: CPT

## 2024-09-05 PROCEDURE — 93005 ELECTROCARDIOGRAM TRACING: CPT

## 2024-09-05 PROCEDURE — 1200000002 HC GENERAL ROOM WITH TELEMETRY DAILY

## 2024-09-05 PROCEDURE — 93010 ELECTROCARDIOGRAM REPORT: CPT | Performed by: INTERNAL MEDICINE

## 2024-09-05 RX ORDER — ALPRAZOLAM 0.25 MG/1
0.25 TABLET ORAL 2 TIMES DAILY PRN
Status: DISCONTINUED | OUTPATIENT
Start: 2024-09-05 | End: 2024-09-05

## 2024-09-05 RX ORDER — HYDROXYZINE HYDROCHLORIDE 25 MG/1
25 TABLET, FILM COATED ORAL EVERY 6 HOURS PRN
Status: DISCONTINUED | OUTPATIENT
Start: 2024-09-05 | End: 2024-09-07 | Stop reason: HOSPADM

## 2024-09-05 RX ORDER — LANOLIN ALCOHOL/MO/W.PET/CERES
400 CREAM (GRAM) TOPICAL DAILY
Status: DISCONTINUED | OUTPATIENT
Start: 2024-09-05 | End: 2024-09-07 | Stop reason: HOSPADM

## 2024-09-05 RX ORDER — LORAZEPAM 0.5 MG/1
1 TABLET ORAL
Status: DISCONTINUED | OUTPATIENT
Start: 2024-09-05 | End: 2024-09-07 | Stop reason: HOSPADM

## 2024-09-05 RX ORDER — POTASSIUM CHLORIDE 20 MEQ/1
20 TABLET, EXTENDED RELEASE ORAL ONCE
Status: DISCONTINUED | OUTPATIENT
Start: 2024-09-05 | End: 2024-09-07 | Stop reason: HOSPADM

## 2024-09-05 RX ORDER — LORAZEPAM 0.5 MG/1
0.5 TABLET ORAL EVERY 8 HOURS PRN
Status: DISCONTINUED | OUTPATIENT
Start: 2024-09-05 | End: 2024-09-07 | Stop reason: HOSPADM

## 2024-09-05 RX ORDER — LORAZEPAM 0.5 MG/1
1.5 TABLET ORAL
Status: DISCONTINUED | OUTPATIENT
Start: 2024-09-05 | End: 2024-09-07 | Stop reason: HOSPADM

## 2024-09-05 RX ORDER — POTASSIUM CHLORIDE 20 MEQ/1
40 TABLET, EXTENDED RELEASE ORAL ONCE
Status: COMPLETED | OUTPATIENT
Start: 2024-09-05 | End: 2024-09-05

## 2024-09-05 NOTE — CONSULTS
Inpatient consult to Cardiology  Consult performed by: YARITZA Pina-CNP  Consult ordered by: Kranthi Carmichael MD  Reason for consult: Ventricular tachycardia  Assessment/Recommendations: Patient was seen, chart reviewed.  Patient presented to emergency department complaining of palpitations.  Device interrogation did not show any significant sustained ventricular arrhythmias  Telemetry shows ventricular paced rhythm  Long conversation with patient about plan to follow for management of ventricular arrhythmias.  He was recently evaluated at [facility] and apparently no further therapy was recommended for his ventricular arrhythmias.  [facility] also recommended a second opinion with a different situation including [facility].    Patient will decide to see if he is willing to have a second opinion at [facility].  Meantime continue with amiodarone and mexiletine therapy  Continue telemetry  EKG daily  Risk factor modification and lifestyle modification discussed with patient. Diet , exercise and hydration discussed with patient.    Please excuse any errors in grammar or translation related to this dictation.  Voice recognition software was utilized to prepare this document.        Electrophysiology Consult Note      Date:   9/5/2024  Patient name:  Cristian Sapp  Date of admission:  9/4/2024  1:53 PM  MRN:   [id number]  YOB: 1965  Time of Consult:  1:34 PM    Consulting Cardiologist: YARITZA Ivan, CNP  Primary Cardiologist:  Dr. Zachary Rosa/Dr. Brown   Referring Provider: Dr Meier      Admission Diagnosis:     Chronic congestive heart failure, unspecified heart failure type (Multi)      History of Present Illness:      Cristian Sapp is a 58 y.o.  male patient who is being at the request of Dr. Meier for inpatient consultation of arrhythmia. He was admitted on 9/4/2024.  Previous [facility] and [facility] records have been reviewed in  detail.      58-year-old male with ischemic HFrEF (EF 15%, 5/2024) s/p CRT-D, prior PCIs, recurrent VT (s/p VT ablation, s/p VT ablation, 5/30/2024 performed by Dr. Flores, second VT ablation on 6/ 6 with Dr. Wooten & ganglion block 6/11, coronary artery disease with occluded circumflex and right coronary arteries per recent cardiac catheterization May 2024, severe COPD/emphysema, abdominal aortic aneurysm without rupture, hypertension, hyperlipidemia, PTSD, tobacco use (states quit smoking in April 2024), renal stones with history of urethral stent, chronic kidney disease, iron deficiency anemia placement who presented to [facility] emergency department on 9/4/2024 as he thought he was in ventricular tachycardia.  He states that he took a longer nap yesterday and missed his medication doses by several hours.  [place] that his heart was racing and felt short of breath.  He denies ICD shocks.  He was deemed not a candidate for LVAD or OHT given COPD and VT had recent hospitalization at [facility].    Since presenting to emergency department evaluation has included CT angio chest for pulmonary embolism showed no PE, masslike consolidations in the bilateral lung apices new from June 2024 which presumably represent multifocal airspace disease such as pneumonia with mass/neoplasm not excluded. Scattered groundglass and reticular opacities are noted throughout the bilateral lungs which could reflect edema, moderate to large right-sided pleural effusion with adjacent atelectasis. Cardiomegaly and reflux of contrast into the hepatic venous circuit suggesting impaired right heart function.  Device check performed on 9/5/2024 shows 0% A-fib burden; 1 NSVT detection during recent hospital stay on 8/23/2024 showing NSVT and high-frequency noise 186 bpm.  During device interrogation today shows an episode of possible slow VT with possible oversensing of a couple of signal on the RV lead, no therapies were  delivered.  At the time of electrophysiology visit patient was sitting up at the bedside, states that he is relatively comfortable at this time and denied chest pain, shortness of breath.  He does feel that he has a little bit more lower extremity swelling than his baseline.  He is sore under his left rib cage.  EP service consult was placed due to history of ventricular tachycardia.    Allergies:     Allergies   Allergen Reactions    Chantix [Varenicline] Other     Kidney stones         Past Medical History:     Past Medical History:   Diagnosis Date    Atherosclerosis of native artery of both lower extremities with intermittent claudication (CMS-[facility])     CHB (complete heart block) (Multi)     per device check    Chronic systolic CHF (congestive heart failure), NYHA class 3 (Multi)     COPD (chronic obstructive pulmonary disease) (Multi)     Coronary artery disease     History of tobacco abuse     HLD (hyperlipidemia)     Hypertension     Infrarenal abdominal aortic aneurysm (AAA) without rupture (CMS-HCC)     Ischemic cardiomyopathy with implantable cardioverter-defibrillator (ICD)     MI (myocardial infarction) (Multi)     multiple    Nephrolithiasis     PTSD (post-traumatic stress disorder)        Past Surgical History:     Past Surgical History:   Procedure Laterality Date    CARDIAC CATHETERIZATION N/A 5/23/2024    Procedure: Left Heart Cath;  Surgeon: Babatunde Tan MD;  Location: [facility]iac [facility];  Service: Cardiovascular;  Laterality: N/A;    CARDIAC DEFIBRILLATOR PLACEMENT      CARDIAC ELECTROPHYSIOLOGY PROCEDURE N/A 5/23/2024    Procedure: Cardioversion;  Surgeon: Zachary Sparks MD;  Location: [facility];  Service: Electrophysiology;  Laterality: N/A;    CARDIAC ELECTROPHYSIOLOGY PROCEDURE N/A 5/28/2024    Procedure: NIPS (NONINVASIVE PROGRAMMED STIMULATION AND DEFIBRILLATOR THRESHOLD TESTING);  Surgeon: Zachary Sparks MD;  Location: [facility];  Service:  Electrophysiology;  Laterality: N/A;    CARDIAC ELECTROPHYSIOLOGY PROCEDURE Right 5/30/2024    Procedure: Ablation VT Endocardial (85618);  Surgeon: Sonny Flores MD;  Location: [place] [address] Cardiac Cath Lab;  Service: Electrophysiology;  Laterality: Right;  CARTO, 2PM    CARDIAC ELECTROPHYSIOLOGY PROCEDURE N/A 6/6/2024    Procedure: Ablation VT;  Surgeon: Eliot Wooten MD;  Location: [place] [address] Cardiac Cath Lab;  Service: Electrophysiology;  Laterality: N/A;  endocardial vt ablation  carto V8    CORONARY ANGIOPLASTY WITH STENT PLACEMENT  2006    JIM to LAD    CORONARY ANGIOPLASTY WITH STENT PLACEMENT  04/2003    CORONARY ANGIOPLASTY WITH STENT PLACEMENT  06/2008    CYSTOSCOPY W/ URETERAL STENT PLACEMENT  04/01/2024    CYSTOSCOPY W/ URETERAL STENT PLACEMENT  04/30/2024    FEMORAL BYPASS      femoral artery    ILIAC ARTERY STENT Right     KNEE SURGERY         Family History:     Family History   Problem Relation Name Age of Onset    Hypertension Mother      Diabetes Father      Hypertension Sister      Diabetes Sister      Colon cancer Maternal Grandmother      Hypertension Maternal Grandmother      Heart disease Maternal Grandfather      Hypertension Maternal Grandfather      Cancer Paternal Grandfather      Hypertension Paternal Grandfather         Social History:     Social History     Tobacco Use    Smoking status: Former     Types: Cigarettes   Vaping Use    Vaping status: Never Used   Substance Use Topics    Alcohol use: Not Currently     Comment: social    Drug use: Not Currently       CURRENT INPATIENT MEDICATIONS    amiodarone, 200 mg, oral, BID  aspirin, 81 mg, oral, Daily  dapagliflozin propanediol, 10 mg, oral, Daily  docusate sodium, 100 mg, oral, BID  enoxaparin, 40 mg, subcutaneous, q24h  ferrous sulfate (325 mg ferrous sulfate), 65 mg of iron, oral, Daily  furosemide, 40 mg, intravenous, BID  magnesium oxide, 400 mg, oral, Daily  metoprolol succinate XL, 25 mg, oral, Daily  mexiletine, 300 mg,  oral, TID  PARoxetine, 30 mg, oral, BID  piperacillin-tazobactam, 3.375 g, intravenous, q8h   And  sodium chloride, 10 mL, intravenous, q8h  polyethylene glycol, 17 g, oral, Daily  potassium chloride CR, 20 mEq, oral, Once  ranolazine, 500 mg, oral, BID  rosuvastatin, 20 mg, oral, Nightly  sacubitriL-valsartan, 0.5 tablet, oral, BID  spironolactone, 25 mg, oral, Daily  tamsulosin, 0.4 mg, oral, Daily  traZODone, 50 mg, oral, Nightly  vancomycin, 1,250 mg, intravenous, q24h         Current Outpatient Medications   Medication Instructions    albuterol sulfate (Proair Digihaler) 90 mcg/actuation aero powdr breath act w/sensor inhaler 2 puffs, inhalation, Every 4 hours PRN    amiodarone (PACERONE) 200 mg, oral, 2 times daily    aspirin 81 mg, oral, Daily    Farxiga 10 mg, oral, Daily    FeroSuL 325 mg, oral, Daily with breakfast    LORazepam (ATIVAN) 0.5 mg, oral, Daily PRN, Take 1 tablet daily as needed up to 1.5mg    metoprolol succinate XL (TOPROL-XL) 25 mg, oral, Daily, Do not crush or chew.    mexiletine (MEXITIL) 300 mg, oral, 3 times daily    nitroglycerin (NITROSTAT) 0.4 mg, sublingual, Every 5 min PRN    pantoprazole (PROTONIX) 40 mg, oral, Daily before breakfast, Do not crush, chew, or split.    PARoxetine (PAXIL) 60 mg, oral, Nightly    ranolazine (RANEXA) 500 mg, oral, 2 times daily, Do not crush, chew, or split.    rosuvastatin (CRESTOR) 20 mg, oral, Nightly    sacubitriL-valsartan (Entresto) 24-26 mg tablet 0.5 tablets, oral, 2 times daily    spironolactone (ALDACTONE) 25 mg, oral, Daily    tamsulosin (FLOMAX) 0.4 mg, oral, Daily    torsemide (DEMADEX) 40 mg, oral, Daily    traZODone (DESYREL) 50 mg, oral, Nightly        Review of Systems:      12 point review of systems was obtained in detail and is negative other than that detailed above.    Vital Signs:     Vitals:    09/04/24 2033 09/05/24 0602 09/05/24 0800 09/05/24 1134   BP: 91/58 89/62 106/68 101/67   BP Location: Right arm Right arm Right arm  Right arm   Patient Position: Sitting Lying Sitting Sitting   Pulse: 96 75 76 75   Resp: 21 20 20 18   Temp: 36 °C (96.8 °F) 35.8 °C (96.4 °F) 36 °C (96.8 °F) 36.2 °C (97.2 °F)   TempSrc: Temporal Temporal Temporal Temporal   SpO2: 94% 91% 93% 95%   Weight:       Height:           Intake/Output Summary (Last 24 hours) at 9/5/2024 1334  Last data filed at 9/5/2024 1155  Gross per 24 hour   Intake 683.5 ml   Output 1275 ml   Net -591.5 ml       Wt Readings from Last 4 Encounters:   09/04/24 82.6 kg (182 lb)   09/03/24 81 kg (178 lb 8 oz)   08/30/24 79.6 kg (175 lb 7.8 oz)   08/19/24 80.9 kg (178 lb 5.6 oz)       Physical Examination:     GENERAL APPEARANCE: Well developed, well nourished, in no acute distress.  CHEST: Symmetric and non-tender.  Device site well-healed with no swelling or skin breakdown  INTEGUMENT: Skin warm and dry, without gross excoriationis or lesions.  HEENT: No gross abnormalities of conjunctiva, teeth, gums, oral mucosa  NECK: Supple, no JVD, no bruit. Thyroid not palpable. Carotid upstrokes normal.  NEURO/PSHCY: Alert and oriented x3; appropriate behavior and responses. Moves all extremities equally.  LUNGS: Rales and decreased breath sounds on right, left lower lobe rales; normal respiratory effort.  HEART: Rate and rhythm regular with no evident murmur; no gallop appreciated. There are no rubs, clicks or heaves. PMI nondisplaced.  ABDOMEN: Soft, nontender, positive bowel sounds.   MUSCULOSKELETAL: Normal range of motion.  EXTREMITIES: Warm with good color, no clubbing or cyanois. There is 1+ bilateral lower extremity edema.  PERIPHERAL VASCULAR: 2+ radial pulse and feet warm to touch bilateral.     Lab:     CBC:   Results from last 7 days   Lab Units 09/05/24  0641 09/04/24  1519 08/29/24  1928   WBC AUTO x10*3/uL 10.8 11.8* 11.4*   RBC AUTO x10*6/uL 4.20* 4.47* 4.49*   HEMOGLOBIN g/dL 12.0* 12.8* 12.8*   HEMATOCRIT % 36.7* 38.9* 40.4*   MCV fL 87 87 90   MCH pg 28.6 28.6 28.5   MCHC g/dL  32.7 32.9 31.7*   RDW % 20.9* 20.9* 20.7*   PLATELETS AUTO x10*3/uL 294 336 342     CMP:    Results from last 7 days   Lab Units 09/05/24  0641 09/04/24  1519 08/29/24  1928   SODIUM mmol/L 135* 135* 139   POTASSIUM mmol/L 3.6 4.3 3.4*   CHLORIDE mmol/L 98 99 100   CO2 mmol/L 27 27 28   BUN mg/dL 27* 26* 21   CREATININE mg/dL 1.70* 1.46* 1.30   GLUCOSE mg/dL 121* 142* 107*   PROTEIN TOTAL g/dL  --  7.1  --    CALCIUM mg/dL 9.0 9.1 9.1   BILIRUBIN TOTAL mg/dL  --  1.3*  --    ALK PHOS U/L  --  334*  --    AST U/L  --  49*  --    ALT U/L  --  96*  --      BMP:    Results from last 7 days   Lab Units 09/05/24  0641 09/04/24  1519 08/29/24  1928   SODIUM mmol/L 135* 135* 139   POTASSIUM mmol/L 3.6 4.3 3.4*   CHLORIDE mmol/L 98 99 100   CO2 mmol/L 27 27 28   BUN mg/dL 27* 26* 21   CREATININE mg/dL 1.70* 1.46* 1.30   CALCIUM mg/dL 9.0 9.1 9.1   GLUCOSE mg/dL 121* 142* 107*     Magnesium:  Results from last 7 days   Lab Units 09/05/24  0641 09/04/24  1519 08/29/24  1928   MAGNESIUM mg/dL 2.08 2.08 2.08     Troponin:    Results from last 7 days   Lab Units 09/04/24  1632 09/04/24  1519   TROPHS ng/L 20 19     BNP:   Results from last 7 days   Lab Units 09/04/24  1519   BNP pg/mL 2,627*     Lipid Panel:         Diagnostic Studies:   @No results found for this or any previous visit.    ECG 12 Lead    Result Date: 9/5/2024  AV dual-paced rhythm Abnormal ECG When compared with ECG of 04-SEP-2024 16:29, No significant change was found    ECG 12 lead    Result Date: 9/4/2024  AV dual-paced rhythm Abnormal ECG When compared with ECG of 04-SEP-2024 13:54, (unconfirmed) No significant change was found See ED provider note for full interpretation and clinical correlation Confirmed by Brianna Bernal ([id number]) on 9/4/2024 6:00:03 PM    ECG 12 lead    Result Date: 9/4/2024  AV dual-paced rhythm Abnormal ECG When compared with ECG of 20-AUG-2024 09:55, (unconfirmed) Electronic ventricular pacemaker has replaced Wide QRS rhythm See ED  provider note for full interpretation and clinical correlation Confirmed by Brianna Bernal ([id number]) on 9/4/2024 5:32:49 PM    CT abdomen pelvis w IV contrast    Result Date: 9/4/2024  Interpreted By:  Carlos Greene, STUDY: CT ABDOMEN PELVIS W IV CONTRAST;  9/4/2024 4:24 pm   INDICATION: Signs/Symptoms:abd swelling, recent heparin injection sub cutaneous.     COMPARISON: CT abdomen and pelvis 05/24/2024   ACCESSION NUMBER(S): [id number]   ORDERING CLINICIAN: SHANTHI RAMOS   TECHNIQUE: Contiguous axial images of the abdomen and pelvis were obtained after the intravenous administration of 75 mL Omnipaque 350 contrast. Coronal and sagittal reformatted images were reconstructed from the axial data.   FINDINGS: LOWER CHEST: No acute abnormality.   LIVER: Probable hepatic steatosis although assessment limited by phase of contrast enhancement.   BILE DUCTS: No significant intrahepatic or extrahepatic dilatation.   GALLBLADDER: Layering density in the gallbladder, sludge versus vicarious contrast excretion.   PANCREAS: No significant abnormality.   SPLEEN: No significant abnormality.   ADRENALS: No significant abnormality.   KIDNEYS, URETERS, BLADDER: Kidneys enhance symmetrically. There is no hydronephrosis. Central vascular calcifications versus nonobstructing calculi. No appreciable ureteral calculus. The bladder is underdistended which limits assessment.   REPRODUCTIVE ORGANS: No significant abnormality.   GI: No obstruction. No bowel wall thickening or adjacent inflammatory change. Normal appendix. Large amount of stool within the ascending and transverse colon.   VESSELS: Heavy calcified and noncalcified atheromatous plaques of the aorta and iliac vasculature. Aneurysmal dilatation of the infrarenal abdominal aorta measuring up to 3.1 cm just superior to the bifurcation. Vascular stent within the right common iliac artery. The portal veins and IVC are patent.   PERITONEUM/RETROPERITONEUM: No intraperitoneal  free air. There is a small amount of abdominal and pelvic ascites.   LYMPH NODES: No enlarged lymph nodes.   ABDOMINAL WALL: Small fat containing inguinal hernias bilaterally.   OSSEOUS STRUCTURES: No acute osseous abnormality.       1. No acute abnormality within the abdomen or pelvis. 2. No abdominal wall or intra-abdominal hematoma identified. 3. Small volume abdominal and pelvic ascites.   MACRO: None   Signed by: Carlos Greene 9/4/2024 4:59 PM Dictation workstation:   [id number]    CT angio chest for pulmonary embolism    Result Date: 9/4/2024  Interpreted By:  Carlos Greene, STUDY: CT ANGIO CHEST FOR PULMONARY EMBOLISM;  9/4/2024 4:24 pm   INDICATION: Signs/Symptoms:sob.     COMPARISON: CT chest 06/04/2024   ACCESSION NUMBER(S): [id number]   ORDERING CLINICIAN: SHANTHI RAMOS   TECHNIQUE: Contiguous axial images of the chest were obtained after the intravenous administration of 75 mL Omnipaque 350 contrast using angiographic PE protocol. Coronal and sagittal reformatted images were reconstructed from the axial data. MIP images were created on an independent workstation and reviewed.   FINDINGS: PULMONARY ARTERIES: Adequate opacification to the level of the segmental arteries. Assessment of the subsegmental arteries is limited by mixing artifact and motion. No filling defect to suggest pulmonary embolus in the visualized pulmonary arteries. The main pulmonary artery is normal in diameter. There is reflux of contrast into the hepatic venous circuit suggestive of impaired right heart function.   HEART: Heart is enlarged. Right atrial, right ventricular, and epicardial pacing leads are noted. Moderate to heavy coronary vascular calcifications with probable stent within the left circumflex artery. No significant pericardial effusion.   VESSELS: Aorta is normal in caliber. Non-opacification of the aorta limits assessment of luminal patency. Mild atherosclerotic calcifications of the aortic arch and descending  thoracic aorta.   MEDIASTINUM AND LYMPH NODES: Visualized thyroid is within normal limits. Prominent and mildly enlarged paratracheal and prevascular lymph nodes measuring up to 1.6 cm. No pneumomediastinum. The esophagus appears within normal limits.   LUNG, AIRWAYS, AND PLEURA: Trachea and proximal mainstem bronchi are patent. The lungs demonstrate a background of moderate-to-severe emphysematous change with biapical scarring. Interval development of masslike consolidations in the anterior aspects of the lung apices bilaterally, measuring up to 4 cm on the right and 3.1 cm in the left.. These are new since June 2024. Additional scattered areas of ground-glass and reticular opacities throughout the bilateral lungs. Moderate-to-large right pleural effusions with adjacent compressive atelectasis.   OSSEOUS STRUCTURES: No acute osseous abnormality.   CHEST WALL SOFT TISSUES: No discernible abnormality.   UPPER ABDOMEN/OTHER: Please see separately dictated CT of the abdomen and pelvis.       1. No evidence of pulmonary embolus to the level of the segmental arteries. Small subsegmental filling defects can not be entirely excluded. 2. Masslike consolidations in the bilateral lung apices, new from June 2024. These presumably represent multifocal airspace disease such as pneumonia with mass/neoplasm not excluded. Recommend correlation with clinical presentation and short interval follow-up to ensure resolution. 3. Additional scattered ground-glass and reticular opacities are noted throughout the bilateral lungs which could reflect edema or infectious/inflammatory process. 4. Prominent mildly enlarged mediastinal lymph nodes. Nonspecific and may be reactive with neoplasm not excluded. 5. Moderate-to-large right pleural effusion adjacent atelectasis. 6. Cardiomegaly and reflux of contrast into the hepatic venous circuit suggesting impaired right heart function. Consider echocardiographic correlation.   MACRO: Critical  Finding:  See findings. Notification was initiated on 9/4/2024 at 4:52 pm by  Carlos Greene.  (**-YCF-**) Instructions:   Signed by: Carlos Greene 9/4/2024 4:53 PM Dictation workstation:   [id number]    XR chest 1 view    Result Date: 9/4/2024  Interpreted By:  Mohan German, STUDY: XR CHEST 1 VIEW;  9/4/2024 3:05 pm   INDICATION: Signs/Symptoms:Chest Pain.     COMPARISON: 08/29/2024   ACCESSION NUMBER(S): [id number]   ORDERING CLINICIAN: SHANTHI RAMOS   FINDINGS: Multi lead right chest wall AICD noted.   Cardiomegaly. The pulmonary vasculature is congested centrally and indistinct peripherally, with interlobular septal thickening and indistinct mid to lower lung consolidative opacities consistent with pulmonary edema. Small right pleural effusion, similar to prior study. There is mild right lower lobe atelectasis. There are some patchy asymmetric peripheral consolidations in the right upper lobe and left upper lobe.       Cardiomegaly with pulmonary edema, appearing slightly increased from 08/29/2024. The possibility of superimposed multifocal pneumonia is raised as there appear to be some asymmetric patchy peripheral consolidations in the right upper lobe and left upper lobe that can be seen with multiple potential organisms including COVID-19. Integration of the clinical context, physical exam/laboratory findings, and imaging is recommended. As   MACRO: None.   Signed by: Mohan German 9/4/2024 3:20 PM Dictation workstation:   [id number]      No echocardiogram results found for the past 14 days    Radiology:     Cardiac Device Check - Inpatient         CT angio chest for pulmonary embolism   Final Result   1. No evidence of pulmonary embolus to the level of the segmental   arteries. Small subsegmental filling defects can not be entirely   excluded.   2. Masslike consolidations in the bilateral lung apices, new from   June 2024. These presumably represent multifocal airspace disease   such as  pneumonia with mass/neoplasm not excluded. Recommend   correlation with clinical presentation and short interval follow-up   to ensure resolution.   3. Additional scattered ground-glass and reticular opacities are   noted throughout the bilateral lungs which could reflect edema or   infectious/inflammatory process.   4. Prominent mildly enlarged mediastinal lymph nodes. Nonspecific and   may be reactive with neoplasm not excluded.   5. Moderate-to-large right pleural effusion adjacent atelectasis.   6. Cardiomegaly and reflux of contrast into the hepatic venous   circuit suggesting impaired right heart function. Consider   echocardiographic correlation.        MACRO:   Critical Finding:  See findings. Notification was initiated on   9/4/2024 at 4:52 pm by  Carlos Greene.  (**-YCF-**) Instructions:        Signed by: Carlos Greene 9/4/2024 4:53 PM   Dictation workstation:   [id number]      CT abdomen pelvis w IV contrast   Final Result   1. No acute abnormality within the abdomen or pelvis.   2. No abdominal wall or intra-abdominal hematoma identified.   3. Small volume abdominal and pelvic ascites.        MACRO:   None        Signed by: Carlos Greene 9/4/2024 4:59 PM   Dictation workstation:   [id number]      XR chest 1 view   Final Result   Cardiomegaly with pulmonary edema, appearing slightly increased from   08/29/2024. The possibility of superimposed multifocal pneumonia is   raised as there appear to be some asymmetric patchy peripheral   consolidations in the right upper lobe and left upper lobe that can   be seen with multiple potential organisms including COVID-19.   Integration of the clinical context, physical exam/laboratory   findings, and imaging is recommended. As        MACRO:   None.        Signed by: Mohan German 9/4/2024 3:20 PM   Dictation workstation:   [id number]          Problem List:     Patient Active Problem List   Diagnosis    Ischemic cardiomyopathy    Elevated troponin    VT  (ventricular tachycardia) (Multi)    CHF (congestive heart failure) (Multi)    Chronic systolic heart failure (Multi)    Flash pulmonary edema (Multi)    Arteriosclerosis of coronary artery    ICD (implantable cardioverter-defibrillator) in place    Nonrheumatic mitral valve regurgitation    Nonrheumatic tricuspid valve regurgitation    Hx of percutaneous transluminal coronary angioplasty    Primary hypertension    Current smoker    CHB (complete heart block) (Multi)    Chronic obstructive pulmonary disease (Multi)    Infrarenal abdominal aortic aneurysm (AAA) without rupture (CMS-HCC)    CAD S/P percutaneous coronary angioplasty    Refractory angina (CMS-HCC)    Intermittent claudication (CMS-HCC)    Malfunction of implantable defibrillator ventricular (ICD) lead    Mixed hyperlipidemia    Old MI (myocardial infarction)    PVD (peripheral vascular disease) (CMS-HCC)    PAT (paroxysmal atrial tachycardia) (CMS-HCC)    Postsurgical percutaneous transluminal coronary angioplasty status    PTSD (post-traumatic stress disorder)    PVC (premature ventricular contraction)    Rapid palpitations    S/P ICD (internal cardiac defibrillator) procedure    Sickle cell trait (CMS-HCC)    Acute on chronic systolic (congestive) heart failure (Multi)    Acute hypoxic respiratory failure (Multi)    Acute kidney injury superimposed on chronic kidney disease (CMS-[facility])    Transaminitis    Shortness of breath    Acute on chronic combined systolic and diastolic heart failure (Multi)    Centrilobular emphysema (Multi)    Chronic congestive heart failure, unspecified heart failure type (Multi)       Assessment:     Acute on chronic combined systolic and diastolic congestive heart failure, New York Heart Association class IV Stage D HFrEF/ICM s/p CRT-D   Ventricular tachycardia on beta-blocker, amiodarone and mexiletine; s/p VT ablation, 5/30/2024 performed by Dr. Flores, second VT ablation on 6/ 6 with Dr. Wooten & ganglion block  6/11  Masslike consolidations in bilateral lung apices new from June 2024 presumably represent multifocal airspace disease such as pneumonia with mass/neoplasm not excluded, moderate to large right pleural effusion with adjacent ectasis per CT scan from 9/4  COPD with former tobacco use  Ischemic cardiomyopathy  Chronic kidney disease  Coronary artery disease with multiple remote myocardial infarction and remote multivessel PCI.  Recent cardiac catheterization 5/23/24 with occluded right coronary artery and circumflex artery, LAD with 10 to 30% stenosis.  No lesion was amenable for revascularization and medical management was recommended  Patient deemed not a candidate for LVAD or heart/lung transplant and is now pursuing palliative care and hospice options    Plan:     Continue telemetry  Continue amiodarone, mexiletine, Toprol-XL  Keep potassium greater than 4 magnesium greater than 2  Monitor renal function, intake output, daily weight  Optimize GDMT for heart failure per general cardiology  Final EP recommendation per Dr. Bridger Doran CNP  [facility]      Of note, this documentation is completed using the Dragon Dictation system (voice recognition software). There may be spelling and/or grammatical errors that were not corrected prior to final submission.      Electronically signed by YARITZA Pina-CNP, on 9/5/2024 at 1:34 PM

## 2024-09-05 NOTE — PROGRESS NOTES
Cristian Sapp is a 58 y.o. male on day 1 of admission presenting with Chronic congestive heart failure, unspecified heart failure type (Multi).      Subjective   Currently hypotensive otherwise hemodynamically stable, sitting at edge of the bed, in no acute distress  Plan for hospice meeting per patient and family request    Objective     Last Recorded Vitals  /67 (BP Location: Right arm, Patient Position: Sitting)   Pulse 75   Temp 36.2 °C (97.2 °F) (Temporal)   Resp 18   Wt 82.6 kg (182 lb)   SpO2 95%   Intake/Output last 3 Shifts:    Intake/Output Summary (Last 24 hours) at 9/5/2024 1431  Last data filed at 9/5/2024 1400  Gross per 24 hour   Intake 1008.5 ml   Output 1500 ml   Net -491.5 ml       Admission Weight  Weight: 82.6 kg (182 lb) (09/04/24 1354)    Daily Weight  09/04/24 : 82.6 kg (182 lb)    Image Results  ECG 12 Lead  AV dual-paced rhythm  Abnormal ECG  When compared with ECG of 04-SEP-2024 16:29,  No significant change was found      Physical Exam    General: Ill-appearing adult male in no acute distress  HEENT: Clear sclera, EOMI, trachea midline, moist mucous membranes  Respiratory: Equal chest rise, no retractions, diminished right-sided lung sounds most notable at bases  Cardiovascular: S1 and S2 auscultated, no murmurs clicks or rubs  Abdomen: Tense, nontender, distended, abdominal bruising appreciated  Extremities: No cyanosis or clubbing appreciated  Neurological: Spontaneously moves all extremities, no dysarthria, cranial nerves grossly intact  Psychiatric: Appropriate mood and affect  Skin: Warm, dry        Assessment & Plan  Chronic congestive heart failure, unspecified heart failure type (Multi)      Cristian Sapp is a 58 y.o. male with a PMH including HFrEF, Chronic hypoxic respiratory failure on home O2, vtach, and COPD who was admitted for management of following issues:    Recurent Vtach  Acute on chronic Systolic heart failure  -EF 15 %, ICD in place  -not a candidate for  LVAD, OHT, or combined heart-lung transplant due to tobacco use   -end-stage cardiac vascular disease end-stage cardiomyopathy   -Cardio/EP recs appreciated, continue with current adjusted cardiac meds  -Cont farxiga,toprol, Entresto, Aldactone  -Will continue on lasix 40 IV BID. Strict I&Os  -Cardiology recommended hospice eval     Concern for Gram negative bacterial pneumonia  Rt pleural effusion  -Continue vanc/zosyn and monitor, Pro-José Antonio 0.15  -Patient established with a pulmonologist already. Will refer on DC for interval re-evaluation due to concerns of neoplastic process per rads.   -Continue diuretics     Hx COPD, appears compensated  -PRN nebs  -Continue home meds     Hx PTSD, Iron deficiency anemia, CAD, HLD, BPH  -Home meds continued as applicable     VTE Prophylaxis: lovenox/SCDs  Disposition: Pending hospice meeting  Palliative following  TCC following      Anum Meier MD

## 2024-09-05 NOTE — CARE PLAN
The patient's goals for the shift include      The clinical goals for the shift include Hemodynamically stable      Problem: Pain - Adult  Goal: Verbalizes/displays adequate comfort level or baseline comfort level  Outcome: Progressing     Problem: Safety - Adult  Goal: Free from fall injury  Outcome: Progressing     Problem: Discharge Planning  Goal: Discharge to home or other facility with appropriate resources  Outcome: Progressing     Problem: Chronic Conditions and Co-morbidities  Goal: Patient's chronic conditions and co-morbidity symptoms are monitored and maintained or improved  Outcome: Progressing     Problem: Heart Failure  Goal: Improved gas exchange this shift  Outcome: Progressing  Goal: Improved urinary output this shift  Outcome: Progressing  Goal: Reduction in peripheral edema within 24 hours  Outcome: Progressing  Goal: Report improvement of dyspnea/breathlessness this shift  Outcome: Progressing  Goal: Weight from fluid excess reduced over 2-3 days, then stabilize  Outcome: Progressing  Goal: Increase self care and/or family involvement in 24 hours  Outcome: Progressing     Problem: Fall/Injury  Goal: Not fall by end of shift  Outcome: Progressing  Goal: Be free from injury by end of the shift  Outcome: Progressing  Goal: Verbalize understanding of personal risk factors for fall in the hospital  Outcome: Progressing  Goal: Verbalize understanding of risk factor reduction measures to prevent injury from fall in the home  Outcome: Progressing  Goal: Use assistive devices by end of the shift  Outcome: Progressing  Goal: Pace activities to prevent fatigue by end of the shift  Outcome: Progressing     Problem: Pain  Goal: Takes deep breaths with improved pain control throughout the shift  Outcome: Progressing  Goal: Turns in bed with improved pain control throughout the shift  Outcome: Progressing  Goal: Walks with improved pain control throughout the shift  Outcome: Progressing  Goal: Performs ADL's  with improved pain control throughout shift  Outcome: Progressing  Goal: Participates in PT with improved pain control throughout the shift  Outcome: Progressing

## 2024-09-05 NOTE — PROGRESS NOTES
Multidisciplinary team rounded this am on pt. Pt with end stage heart failure, nonischemic cardiomyopathy  Pt in room with family. Palliative care NP on consult. Plan for discharge home. Care transitions team to cont to follow for dc needs. SW following for poss Hospice meeting.

## 2024-09-05 NOTE — PROGRESS NOTES
Vancomycin Dosing by Pharmacy- FOLLOW UP    Cristian Sapp is a 58 y.o. year old male who Pharmacy has been consulted for vancomycin dosing for pneumonia. Based on the patient's indication and renal status this patient is being dosed based on a goal AUC of 400-600.     Renal function is currently declining.    Current vancomycin dose: 1250 mg given every 24 hours    Estimated vancomycin AUC on current dose: 511 mg/L.hr     Visit Vitals  BP 89/62 (BP Location: Right arm, Patient Position: Lying)   Pulse 75   Temp 35.8 °C (96.4 °F) (Temporal)   Resp 20        Lab Results   Component Value Date    CREATININE 1.70 (H) 2024    CREATININE 1.46 (H) 2024    CREATININE 1.30 2024    CREATININE 1.40 (H) 2024        Patient weight is as follows:   Vitals:    24 1354   Weight: 82.6 kg (182 lb)       Cultures:  No results found for the encounter in last 14 days.       I/O last 3 completed shifts:  In: 583.5 (7.1 mL/kg) [IV Piggyback:583.5]  Out: 700 (8.5 mL/kg) [Urine:700 (0.2 mL/kg/hr)]  Weight: 82.6 kg   I/O during current shift:  No intake/output data recorded.    Temp (24hrs), Av °C (96.8 °F), Min:35.8 °C (96.4 °F), Max:36.2 °C (97.2 °F)      Assessment/Plan    Within goal AUC range. Continue current vancomycin regimen.    This dosing regimen is predicted by [employer] to result in the following pharmacokinetic parameters:  Loading dose: N/A  Regimen: 1250 mg IV every 24 hours.  Start time: 18:00 on 2024  Exposure target: AUC24 (range)400-600 mg/L.hr   AUC24,ss: 511 mg/L.hr  Probability of AUC24 > 400: 90 %  Ctrough,ss: 14.1 mg/L  Probability of Ctrough,ss > 20: 11 %  Probability of nephrotoxicity (Lodise RAHUL ): 9 %      The next level will be obtained on 24 at 0500. May be obtained sooner if clinically indicated.   Will continue to monitor renal function daily while on vancomycin and order serum creatinine at least every 48 hours if not already ordered.  Follow for continued  vancomycin needs, clinical response, and signs/symptoms of toxicity.       Desi Key, [facility]

## 2024-09-05 NOTE — CONSULTS
Vancomycin Dosing by Pharmacy- INITIAL    Cristian Sapp is a 58 y.o. year old male who Pharmacy has been consulted for vancomycin dosing for pneumonia. Based on the patient's indication and renal status this patient will be dosed based on a goal AUC of 400-600.     Renal function is currently stable.    Visit Vitals  /66 (BP Location: Left arm, Patient Position: Sitting)   Pulse 75   Temp 36.2 °C (97.2 °F) (Temporal)   Resp 18        Lab Results   Component Value Date    CREATININE 1.46 (H) 2024    CREATININE 1.30 2024    CREATININE 1.40 (H) 2024    CREATININE 1.29 2024        Patient weight is as follows:   Vitals:    24 1354   Weight: 82.6 kg (182 lb)       Cultures:  No results found for the encounter in last 14 days.        No intake/output data recorded.  I/O during current shift:  No intake/output data recorded.    Temp (24hrs), Av.2 °C (97.2 °F), Min:36.2 °C (97.2 °F), Max:36.2 °C (97.2 °F)         Assessment/Plan     Patient has already been given a loading dose of 1500 mg.  Will initiate vancomycin maintenance,  1250 mg every 24 hours.    This dosing regimen is predicted by [employer] to result in the following pharmacokinetic parameters:  Loading dose: 1500 mg  Regimen: 1250 mg IV every 24 hours.  Start time: 17:57 on 2024  Exposure target: AUC24 (range)400-600 mg/L.hr   AUC24,ss: 440 mg/L.hr  Probability of AUC24 > 400: 61 %  Ctrough,ss: 12.8 mg/L  Probability of Ctrough,ss > 20: 14 %  Probability of nephrotoxicity (Lodise RAHUL ): 8 %      Follow-up level will be ordered on  at 2300 and  at  unless clinically indicated sooner.  Will continue to monitor renal function daily while on vancomycin and order serum creatinine at least every 48 hours if not already ordered.  Follow for continued vancomycin needs, clinical response, and signs/symptoms of toxicity.       Sara Mathew, [facility]

## 2024-09-05 NOTE — PROGRESS NOTES
09/05/24 1752   Discharge Planning   Home or Post Acute Services In home services   Type of Home Care Services Hospice  (Patient requesting informational meeting with [facility].)   Expected Discharge Disposition Home   Does the patient need discharge transport arranged? Yes   RoundTrip coordination needed? Yes   Has discharge transport been arranged? No   Patient Choice   Patient / Family choosing to utilize agency / facility established prior to hospitalization Yes     Referral placed to [facility] for informational meeting. Pt states he plans to schedule meeting for sometime next week. Care Team updated.

## 2024-09-05 NOTE — CONSULTS
Inpatient consult to Cardiology  Consult performed by: Kd Rodrigues DO  Consult ordered by: Kranthi Carmichael MD  Reason for consult: end stage heart failure, nonischemic cardiomyopathy                                                          Date:   9/5/2024  Patient name:  Cristian Sapp  Date of admission:  9/4/2024  1:53 PM  MRN:   [id number]  YOB: 1965  Time of Consult:  10:02 AM    Consulting Cardiologist: Dr. Kd Espinoza, APRN, CNP  Primary Cardiologist:  Dr. Sparks/ Dr. Brown  Referring Provider: Dr. Meier      Admission Diagnosis:     Chronic congestive heart failure, unspecified heart failure type (Multi)      History of Present Illness:     58 y.o. male with a hisory of CAD s/p PCIs with RCA and CMx CTOs with collaterals, Bilateral lower extremity peripheral arterial disease, COPD, former tobacco use, CKD, anemia of chronic disease/DEBBIE, HLD, HTN, chronic hypoxemic respiratory failure, infrarenal abdominal aortic aneurysm, ischemic cardiomyopathy s/p CRT-D, nephrolithiasis, and PTSD who presented to [facility] emerged part yesterday with complaints and concerns of being in ventricular tachycardia.  He reports that he was possibly late for his mexiletine dose by around 4 hours.  He denies any chest discomfort.  He is breathing is labored, slightly worse than normal.  CT angio chest for pulmonary embolism showed no PE, masslike consolidations in the bilateral lung apices new from June 2024 which presumably represent multifocal airspace disease such as pneumonia with mass/neoplasm not excluded.  Scattered groundglass and reticular opacities are noted throughout the bilateral lungs which could reflect edema, moderate to large right-sided pleural effusion with adjacent atelectasis.  Cardiomegaly and reflux of contrast into the hepatic venous circuit suggesting impaired right heart function.  Device check took place however results pending.   Initial lab work showed a potassium of 4.3, creatinine 1.46, BUN 26, BNP 2600, magnesium 2.08 and WBCs of 11.8.  The patient was admitted to the medicine service where general cardiology was asked to evaluate the patient for decompensated CHF and EP was consulted for his known history of ventricular tachycardia.       Hospitalized 5/2024 after receiving multiple ICD shocks for recurrent VT. In slow VT and started on an amiodarone drip. S/p VT ablation 5/30/24 but continued to have intermittent episodes of slow ventricular tachycardia. Cardioverted 6/3/24. Maintained on amiodarone and lidocaine drips. Underwent stellate ganglion block 6/4 It was felt that heart transplant was not going to be an option for him. He opted against an LVAD. Seen by palliative medicine and he changed his CODE STATUS to DNR-DNI on June 5, 2024. Underwent redo endocardial ablation by Dr. Wooten 6/6. Low-dose Entresto initiated. Changed to oral mexiletine. Cardiac cath showed severe triple-vessel coronary artery disease. Right coronary artery and circumflex were totally occluded and filled via collaterals. LAD had mild stenosis. No lesions were amanable to revascularization.     Transferred to [facility]ICU at [facility] 8/22 for sustained slow VT requiring ATP. Hypokalemic at the time; repleted. Rhythm remained stable. Thoracic signed off since no surgical intervention planned. Discussed at advanced heart therapies evaluation 8/27 and deemed not to be a LVAD or OHT candidate. To have pulmonary transplant team weigh tin n regards to possible dual heart and lung transplant. Ultimately not deemed a candidate for dual heart lung transplant in light of his hx of tobacco use, PAD, and recurrent VT despite high dose amiodarone and mexiletine.  Patient was discharged with palliative care and to follow-up with heart failure specialist Dr. Brown.    Allergies:     Allergies   Allergen Reactions    Chantix [Varenicline] Other     Kidney stones         Past Medical  History:     Past Medical History:   Diagnosis Date    Atherosclerosis of native artery of both lower extremities with intermittent claudication (CMS-HCC)     CHB (complete heart block) (Multi)     per device check    Chronic systolic CHF (congestive heart failure), NYHA class 3 (Multi)     COPD (chronic obstructive pulmonary disease) (Multi)     Coronary artery disease     History of tobacco abuse     HLD (hyperlipidemia)     Hypertension     Infrarenal abdominal aortic aneurysm (AAA) without rupture (CMS-HCC)     Ischemic cardiomyopathy with implantable cardioverter-defibrillator (ICD)     MI (myocardial infarction) (Multi)     multiple    Nephrolithiasis     PTSD (post-traumatic stress disorder)        Past Surgical History:     Past Surgical History:   Procedure Laterality Date    CARDIAC CATHETERIZATION N/A 5/23/2024    Procedure: Left Heart Cath;  Surgeon: Babatunde Tan MD;  Location: [facility];  Service: Cardiovascular;  Laterality: N/A;    CARDIAC DEFIBRILLATOR PLACEMENT      CARDIAC ELECTROPHYSIOLOGY PROCEDURE N/A 5/23/2024    Procedure: Cardioversion;  Surgeon: Zachary Sparks MD;  Location: [facility];  Service: Electrophysiology;  Laterality: N/A;    CARDIAC ELECTROPHYSIOLOGY PROCEDURE N/A 5/28/2024    Procedure: NIPS (NONINVASIVE PROGRAMMED STIMULATION AND DEFIBRILLATOR THRESHOLD TESTING);  Surgeon: Zachary Sparks MD;  Location: [facility];  Service: Electrophysiology;  Laterality: N/A;    CARDIAC ELECTROPHYSIOLOGY PROCEDURE Right 5/30/2024    Procedure: Ablation VT Endocardial (69699);  Surgeon: Sonny Flores MD;  Location: [place] [address] Cardiac Cath Lab;  Service: Electrophysiology;  Laterality: Right;  CARTO, 2PM    CARDIAC ELECTROPHYSIOLOGY PROCEDURE N/A 6/6/2024    Procedure: Ablation VT;  Surgeon: Eliot Wooten MD;  Location: [place] [address] Cardiac Cath Lab;  Service: Electrophysiology;  Laterality: N/A;  endocardial vt ablation  carto V8    CORONARY  ANGIOPLASTY WITH STENT PLACEMENT  2006    JIM to LAD    CORONARY ANGIOPLASTY WITH STENT PLACEMENT  04/2003    CORONARY ANGIOPLASTY WITH STENT PLACEMENT  06/2008    CYSTOSCOPY W/ URETERAL STENT PLACEMENT  04/01/2024    CYSTOSCOPY W/ URETERAL STENT PLACEMENT  04/30/2024    FEMORAL BYPASS      femoral artery    ILIAC ARTERY STENT Right     KNEE SURGERY         Family History:     Family History   Problem Relation Name Age of Onset    Hypertension Mother      Diabetes Father      Hypertension Sister      Diabetes Sister      Colon cancer Maternal Grandmother      Hypertension Maternal Grandmother      Heart disease Maternal Grandfather      Hypertension Maternal Grandfather      Cancer Paternal Grandfather      Hypertension Paternal Grandfather         Social History:     Social History     Tobacco Use    Smoking status: Former     Types: Cigarettes   Vaping Use    Vaping status: Never Used   Substance Use Topics    Alcohol use: Not Currently     Comment: social    Drug use: Not Currently       CURRENT INPATIENT MEDICATIONS    amiodarone, 200 mg, oral, BID  aspirin, 81 mg, oral, Daily  dapagliflozin propanediol, 10 mg, oral, Daily  docusate sodium, 100 mg, oral, BID  enoxaparin, 40 mg, subcutaneous, q24h  ferrous sulfate (325 mg ferrous sulfate), 65 mg of iron, oral, Daily  furosemide, 40 mg, intravenous, BID  magnesium oxide, 400 mg, oral, Daily  metoprolol succinate XL, 25 mg, oral, Daily  mexiletine, 300 mg, oral, TID  PARoxetine, 30 mg, oral, BID  piperacillin-tazobactam, 3.375 g, intravenous, q8h   And  sodium chloride, 10 mL, intravenous, q8h  polyethylene glycol, 17 g, oral, Daily  potassium chloride CR, 20 mEq, oral, Once  ranolazine, 500 mg, oral, BID  rosuvastatin, 20 mg, oral, Nightly  sacubitriL-valsartan, 0.5 tablet, oral, BID  spironolactone, 25 mg, oral, Daily  tamsulosin, 0.4 mg, oral, Daily  traZODone, 50 mg, oral, Nightly  vancomycin, 1,250 mg, intravenous, q24h         Current Outpatient Medications    Medication Instructions    albuterol sulfate (Proair Digihaler) 90 mcg/actuation aero powdr breath act w/sensor inhaler 2 puffs, inhalation, Every 4 hours PRN    amiodarone (PACERONE) 200 mg, oral, 2 times daily    aspirin 81 mg, oral, Daily    Farxiga 10 mg, oral, Daily    FeroSuL 325 mg, oral, Daily with breakfast    LORazepam (ATIVAN) 0.5 mg, oral, Daily PRN, Take 1 tablet daily as needed up to 1.5mg    metoprolol succinate XL (TOPROL-XL) 25 mg, oral, Daily, Do not crush or chew.    mexiletine (MEXITIL) 300 mg, oral, 3 times daily    nitroglycerin (NITROSTAT) 0.4 mg, sublingual, Every 5 min PRN    pantoprazole (PROTONIX) 40 mg, oral, Daily before breakfast, Do not crush, chew, or split.    PARoxetine (PAXIL) 60 mg, oral, Nightly    ranolazine (RANEXA) 500 mg, oral, 2 times daily, Do not crush, chew, or split.    rosuvastatin (CRESTOR) 20 mg, oral, Nightly    sacubitriL-valsartan (Entresto) 24-26 mg tablet 0.5 tablets, oral, 2 times daily    spironolactone (ALDACTONE) 25 mg, oral, Daily    tamsulosin (FLOMAX) 0.4 mg, oral, Daily    torsemide (DEMADEX) 40 mg, oral, Daily    traZODone (DESYREL) 50 mg, oral, Nightly        Review of Systems:      12 point review of systems was obtained in detail and is negative other than that detailed above.    Vital Signs:     Vitals:    09/04/24 1830 09/04/24 2033 09/05/24 0602 09/05/24 0800   BP: 100/66 91/58 89/62 106/68   BP Location: Left arm Right arm Right arm Right arm   Patient Position: Sitting Sitting Lying Sitting   Pulse: 75 96 75 76   Resp: 18 21 20 20   Temp:  36 °C (96.8 °F) 35.8 °C (96.4 °F) 36 °C (96.8 °F)   TempSrc:  Temporal Temporal Temporal   SpO2: 97% 94% 91% 93%   Weight:       Height:           Intake/Output Summary (Last 24 hours) at 9/5/2024 1002  Last data filed at 9/5/2024 0917  Gross per 24 hour   Intake 683.5 ml   Output 1050 ml   Net -366.5 ml       Wt Readings from Last 4 Encounters:   09/04/24 82.6 kg (182 lb)   09/03/24 81 kg (178 lb 8 oz)    24 79.6 kg (175 lb 7.8 oz)   24 80.9 kg (178 lb 5.6 oz)       Physical Examination:     Physical Exam  Vitals and nursing note reviewed.   Constitutional:       Appearance: He is ill-appearing.   HENT:      Head: Normocephalic.      Mouth/Throat:      Mouth: Mucous membranes are moist.   Eyes:      Pupils: Pupils are equal, round, and reactive to light.   Cardiovascular:      Rate and Rhythm: Normal rate.      Comments: AV Paced  Pulmonary:      Effort: Pulmonary effort is normal.      Breath sounds: Decreased air movement present.      Comments: Exp wheeze  Abdominal:      Palpations: Abdomen is soft.   Musculoskeletal:      Right lower le+ Edema present.      Left lower le+ Edema present.   Skin:     Capillary Refill: Capillary refill takes less than 2 seconds.   Neurological:      General: No focal deficit present.      Mental Status: He is alert and oriented to person, place, and time. Mental status is at baseline.   Psychiatric:         Mood and Affect: Mood normal.           Lab:     CBC:   Results from last 7 days   Lab Units 24  06248   WBC AUTO x10*3/uL 10.8 11.8* 11.4*   RBC AUTO x10*6/uL 4.20* 4.47* 4.49*   HEMOGLOBIN g/dL 12.0* 12.8* 12.8*   HEMATOCRIT % 36.7* 38.9* 40.4*   MCV fL 87 87 90   MCH pg 28.6 28.6 28.5   MCHC g/dL 32.7 32.9 31.7*   RDW % 20.9* 20.9* 20.7*   PLATELETS AUTO x10*3/uL 294 336 342     CMP:    Results from last 7 days   Lab Units 24  0641 248   SODIUM mmol/L 135* 135* 139   POTASSIUM mmol/L 3.6 4.3 3.4*   CHLORIDE mmol/L 98 99 100   CO2 mmol/L 27 27 28   BUN mg/dL 27* 26* 21   CREATININE mg/dL 1.70* 1.46* 1.30   GLUCOSE mg/dL 121* 142* 107*   PROTEIN TOTAL g/dL  --  7.1  --    CALCIUM mg/dL 9.0 9.1 9.1   BILIRUBIN TOTAL mg/dL  --  1.3*  --    ALK PHOS U/L  --  334*  --    AST U/L  --  49*  --    ALT U/L  --  96*  --      BMP:    Results from last 7 days   Lab Units 24  0641 24  7107  08/29/24  1928   SODIUM mmol/L 135* 135* 139   POTASSIUM mmol/L 3.6 4.3 3.4*   CHLORIDE mmol/L 98 99 100   CO2 mmol/L 27 27 28   BUN mg/dL 27* 26* 21   CREATININE mg/dL 1.70* 1.46* 1.30   CALCIUM mg/dL 9.0 9.1 9.1   GLUCOSE mg/dL 121* 142* 107*     Magnesium:  Results from last 7 days   Lab Units 09/05/24  0641 09/04/24  1519 08/29/24  1928   MAGNESIUM mg/dL 2.08 2.08 2.08     Troponin:    Results from last 7 days   Lab Units 09/04/24  1632 09/04/24  1519   TROPHS ng/L 20 19     BNP:   Results from last 7 days   Lab Units 09/04/24  1519   BNP pg/mL 2,627*     Lipid Panel:         Diagnostic Studies:   @No results found for this or any previous visit.    ECG 12 lead    Result Date: 9/4/2024  AV dual-paced rhythm Abnormal ECG When compared with ECG of 04-SEP-2024 13:54, (unconfirmed) No significant change was found See ED provider note for full interpretation and clinical correlation Confirmed by Brianna Bernal ([id number]) on 9/4/2024 6:00:03 PM    ECG 12 lead    Result Date: 9/4/2024  AV dual-paced rhythm Abnormal ECG When compared with ECG of 20-AUG-2024 09:55, (unconfirmed) Electronic ventricular pacemaker has replaced Wide QRS rhythm See ED provider note for full interpretation and clinical correlation Confirmed by Brianna Bernal ([id number]) on 9/4/2024 5:32:49 PM    CT abdomen pelvis w IV contrast    Result Date: 9/4/2024  Interpreted By:  Carlos Greene, STUDY: CT ABDOMEN PELVIS W IV CONTRAST;  9/4/2024 4:24 pm   INDICATION: Signs/Symptoms:abd swelling, recent heparin injection sub cutaneous.     COMPARISON: CT abdomen and pelvis 05/24/2024   ACCESSION NUMBER(S): [id number]   ORDERING CLINICIAN: SHANTHI RAMOS   TECHNIQUE: Contiguous axial images of the abdomen and pelvis were obtained after the intravenous administration of 75 mL Omnipaque 350 contrast. Coronal and sagittal reformatted images were reconstructed from the axial data.   FINDINGS: LOWER CHEST: No acute abnormality.   LIVER: Probable hepatic  steatosis although assessment limited by phase of contrast enhancement.   BILE DUCTS: No significant intrahepatic or extrahepatic dilatation.   GALLBLADDER: Layering density in the gallbladder, sludge versus vicarious contrast excretion.   PANCREAS: No significant abnormality.   SPLEEN: No significant abnormality.   ADRENALS: No significant abnormality.   KIDNEYS, URETERS, BLADDER: Kidneys enhance symmetrically. There is no hydronephrosis. Central vascular calcifications versus nonobstructing calculi. No appreciable ureteral calculus. The bladder is underdistended which limits assessment.   REPRODUCTIVE ORGANS: No significant abnormality.   GI: No obstruction. No bowel wall thickening or adjacent inflammatory change. Normal appendix. Large amount of stool within the ascending and transverse colon.   VESSELS: Heavy calcified and noncalcified atheromatous plaques of the aorta and iliac vasculature. Aneurysmal dilatation of the infrarenal abdominal aorta measuring up to 3.1 cm just superior to the bifurcation. Vascular stent within the right common iliac artery. The portal veins and IVC are patent.   PERITONEUM/RETROPERITONEUM: No intraperitoneal free air. There is a small amount of abdominal and pelvic ascites.   LYMPH NODES: No enlarged lymph nodes.   ABDOMINAL WALL: Small fat containing inguinal hernias bilaterally.   OSSEOUS STRUCTURES: No acute osseous abnormality.       1. No acute abnormality within the abdomen or pelvis. 2. No abdominal wall or intra-abdominal hematoma identified. 3. Small volume abdominal and pelvic ascites.   MACRO: None   Signed by: Carlos Greene 9/4/2024 4:59 PM Dictation workstation:   [id number]    CT angio chest for pulmonary embolism    Result Date: 9/4/2024  Interpreted By:  Carlos Greene, STUDY: CT ANGIO CHEST FOR PULMONARY EMBOLISM;  9/4/2024 4:24 pm   INDICATION: Signs/Symptoms:sob.     COMPARISON: CT chest 06/04/2024   ACCESSION NUMBER(S): [id number]   ORDERING CLINICIAN:  SHANTHI RAMOS   TECHNIQUE: Contiguous axial images of the chest were obtained after the intravenous administration of 75 mL Omnipaque 350 contrast using angiographic PE protocol. Coronal and sagittal reformatted images were reconstructed from the axial data. MIP images were created on an independent workstation and reviewed.   FINDINGS: PULMONARY ARTERIES: Adequate opacification to the level of the segmental arteries. Assessment of the subsegmental arteries is limited by mixing artifact and motion. No filling defect to suggest pulmonary embolus in the visualized pulmonary arteries. The main pulmonary artery is normal in diameter. There is reflux of contrast into the hepatic venous circuit suggestive of impaired right heart function.   HEART: Heart is enlarged. Right atrial, right ventricular, and epicardial pacing leads are noted. Moderate to heavy coronary vascular calcifications with probable stent within the left circumflex artery. No significant pericardial effusion.   VESSELS: Aorta is normal in caliber. Non-opacification of the aorta limits assessment of luminal patency. Mild atherosclerotic calcifications of the aortic arch and descending thoracic aorta.   MEDIASTINUM AND LYMPH NODES: Visualized thyroid is within normal limits. Prominent and mildly enlarged paratracheal and prevascular lymph nodes measuring up to 1.6 cm. No pneumomediastinum. The esophagus appears within normal limits.   LUNG, AIRWAYS, AND PLEURA: Trachea and proximal mainstem bronchi are patent. The lungs demonstrate a background of moderate-to-severe emphysematous change with biapical scarring. Interval development of masslike consolidations in the anterior aspects of the lung apices bilaterally, measuring up to 4 cm on the right and 3.1 cm in the left.. These are new since June 2024. Additional scattered areas of ground-glass and reticular opacities throughout the bilateral lungs. Moderate-to-large right pleural effusions with adjacent  compressive atelectasis.   OSSEOUS STRUCTURES: No acute osseous abnormality.   CHEST WALL SOFT TISSUES: No discernible abnormality.   UPPER ABDOMEN/OTHER: Please see separately dictated CT of the abdomen and pelvis.       1. No evidence of pulmonary embolus to the level of the segmental arteries. Small subsegmental filling defects can not be entirely excluded. 2. Masslike consolidations in the bilateral lung apices, new from June 2024. These presumably represent multifocal airspace disease such as pneumonia with mass/neoplasm not excluded. Recommend correlation with clinical presentation and short interval follow-up to ensure resolution. 3. Additional scattered ground-glass and reticular opacities are noted throughout the bilateral lungs which could reflect edema or infectious/inflammatory process. 4. Prominent mildly enlarged mediastinal lymph nodes. Nonspecific and may be reactive with neoplasm not excluded. 5. Moderate-to-large right pleural effusion adjacent atelectasis. 6. Cardiomegaly and reflux of contrast into the hepatic venous circuit suggesting impaired right heart function. Consider echocardiographic correlation.   MACRO: Critical Finding:  See findings. Notification was initiated on 9/4/2024 at 4:52 pm by  Carlos Greene.  (**-YCF-**) Instructions:   Signed by: Carlos Greene 9/4/2024 4:53 PM Dictation workstation:   [id number]    XR chest 1 view    Result Date: 9/4/2024  Interpreted By:  Mohan German, STUDY: XR CHEST 1 VIEW;  9/4/2024 3:05 pm   INDICATION: Signs/Symptoms:Chest Pain.     COMPARISON: 08/29/2024   ACCESSION NUMBER(S): [id number]   ORDERING CLINICIAN: SHANTHI RAMOS   FINDINGS: Multi lead right chest wall AICD noted.   Cardiomegaly. The pulmonary vasculature is congested centrally and indistinct peripherally, with interlobular septal thickening and indistinct mid to lower lung consolidative opacities consistent with pulmonary edema. Small right pleural effusion, similar to prior  study. There is mild right lower lobe atelectasis. There are some patchy asymmetric peripheral consolidations in the right upper lobe and left upper lobe.       Cardiomegaly with pulmonary edema, appearing slightly increased from 08/29/2024. The possibility of superimposed multifocal pneumonia is raised as there appear to be some asymmetric patchy peripheral consolidations in the right upper lobe and left upper lobe that can be seen with multiple potential organisms including COVID-19. Integration of the clinical context, physical exam/laboratory findings, and imaging is recommended. As   MACRO: None.   Signed by: Mohan German 9/4/2024 3:20 PM Dictation workstation:   [id number]        Radiology:     Cardiac Device Check - Inpatient         CT angio chest for pulmonary embolism   Final Result   1. No evidence of pulmonary embolus to the level of the segmental   arteries. Small subsegmental filling defects can not be entirely   excluded.   2. Masslike consolidations in the bilateral lung apices, new from   June 2024. These presumably represent multifocal airspace disease   such as pneumonia with mass/neoplasm not excluded. Recommend   correlation with clinical presentation and short interval follow-up   to ensure resolution.   3. Additional scattered ground-glass and reticular opacities are   noted throughout the bilateral lungs which could reflect edema or   infectious/inflammatory process.   4. Prominent mildly enlarged mediastinal lymph nodes. Nonspecific and   may be reactive with neoplasm not excluded.   5. Moderate-to-large right pleural effusion adjacent atelectasis.   6. Cardiomegaly and reflux of contrast into the hepatic venous   circuit suggesting impaired right heart function. Consider   echocardiographic correlation.        MACRO:   Critical Finding:  See findings. Notification was initiated on   9/4/2024 at 4:52 pm by  Carlos Greene.  (**-YCF-**) Instructions:        Signed by: Carlos Greene  9/4/2024 4:53 PM   Dictation workstation:   [id number]      CT abdomen pelvis w IV contrast   Final Result   1. No acute abnormality within the abdomen or pelvis.   2. No abdominal wall or intra-abdominal hematoma identified.   3. Small volume abdominal and pelvic ascites.        MACRO:   None        Signed by: Carlos Greene 9/4/2024 4:59 PM   Dictation workstation:   [id number]      XR chest 1 view   Final Result   Cardiomegaly with pulmonary edema, appearing slightly increased from   08/29/2024. The possibility of superimposed multifocal pneumonia is   raised as there appear to be some asymmetric patchy peripheral   consolidations in the right upper lobe and left upper lobe that can   be seen with multiple potential organisms including COVID-19.   Integration of the clinical context, physical exam/laboratory   findings, and imaging is recommended. As        MACRO:   None.        Signed by: Mohan German 9/4/2024 3:20 PM   Dictation workstation:   [id number]          Problem List:     Patient Active Problem List   Diagnosis    Ischemic cardiomyopathy    Elevated troponin    VT (ventricular tachycardia) (Multi)    CHF (congestive heart failure) (Multi)    Chronic systolic heart failure (Multi)    Flash pulmonary edema (Multi)    Arteriosclerosis of coronary artery    ICD (implantable cardioverter-defibrillator) in place    Nonrheumatic mitral valve regurgitation    Nonrheumatic tricuspid valve regurgitation    Hx of percutaneous transluminal coronary angioplasty    Primary hypertension    Current smoker    CHB (complete heart block) (Multi)    Chronic obstructive pulmonary disease (Multi)    Infrarenal abdominal aortic aneurysm (AAA) without rupture (CMS-HCC)    CAD S/P percutaneous coronary angioplasty    Refractory angina (CMS-HCC)    Intermittent claudication (CMS-HCC)    Malfunction of implantable defibrillator ventricular (ICD) lead    Mixed hyperlipidemia    Old MI (myocardial infarction)    PVD  (peripheral vascular disease) (CMS-[facility])    PAT (paroxysmal atrial tachycardia) (CMS-HCC)    Postsurgical percutaneous transluminal coronary angioplasty status    PTSD (post-traumatic stress disorder)    PVC (premature ventricular contraction)    Rapid palpitations    S/P ICD (internal cardiac defibrillator) procedure    Sickle cell trait (CMS-[facility])    Acute on chronic systolic (congestive) heart failure (Multi)    Acute hypoxic respiratory failure (Multi)    Acute kidney injury superimposed on chronic kidney disease (CMS-[facility])    Transaminitis    Shortness of breath    Acute on chronic combined systolic and diastolic heart failure (Multi)    Centrilobular emphysema (Multi)    Chronic congestive heart failure, unspecified heart failure type (Multi)       Assessment:   58-year-old gentleman seen evaluate the bedside in conjunction with Samm Espinoza arm, CNP in the telemetry unit.    Bedside examination and evaluation performed by me.    Chart review details cussed the patient and family.    Impression:  Acute on chronic combined systolic and diastolic congestive heart failure, NYHA class IV  Stage D HFrEF/ICM s/p CRT-D  Ischemic cardiomyopathy  Ventricular tachycardia  Hypertension  COPD  CAD status post PCI  PTSD  ICD implant    Plan:   Recommendation: Admitted to medicine remains on telemetry.  Telemetry shows AV paced rhythm.  Device check pending.  EKG looks to be AV dual placed rhythm with a rate of 75.  Supplemental O2, keep SpO2 greater than 92%  Monitor electrolytes, need to keep potassium greater than 4 and magnesium greater than 2  Continue diuresis, monitor strict I's and O's and daily weights.  Avoid nephrotoxic agents.  Goal for -1 L/day  May need thoracentesis if unable to successfully diurese patient  Unfortunately this gentleman has decompensated end-stage heart failure with an EF near 10 to 15%.  He was evaluated at [facility] last month by multiple specialists who  deemed him not a candidate for LVAD or lung/heart transplant.  He was given options to pursue a second opinion by other institutions however he has decided against this.  With nothing left to offer this gentleman he has decided to move forward with hospice/palliative care services.  He did ask me to consult hospice services so that he can get affiliated with them after he gets his affairs in order at home.  Appreciate EP recommendations in regards to use of antiarrhythmics.  Optimize GDMT for heart failure  Antibiotics per primary medicine team  Heart failure navigator consult  Social work and hospice has been consulted    Discussion:  Present situation is end-stage cardiac vascular disease end-stage cardiomyopathy not considered suitable for device therapy or any operative interventions.  Had a lengthy discussion with the patient and his family and they have decided to speak with the palliative care people.  We will maximize all medical therapies.  Continue observation for now.  EP service will apply any additional measures necessary for stabilization.  Nonmedical measures will be addressed through the primary care team.  Kd Rodrigues DO,[facility]         Samm Espinoza [facility]  Adult Gerontology Acute Care Nurse Practitioner  [facility]   [facility]  [phone]      Of note, this documentation is completed using the Dragon Dictation system (voice recognition software). There may be spelling and/or grammatical errors that were not corrected prior to final submission.      Electronically signed by Kd Rodrigues DO, on 9/5/2024 at 10:02 AM

## 2024-09-05 NOTE — CONSULTS
Consults    Reason For Consult  Goals of care     History Of Present Illness  Cristian Sapp is a 58 y.o. male with past medical history of CAD s/p PCI's with RCA and SMxCTOs with collaterals, COPD on home oxygen 2-4LNC, bilateral lower ext peripheral arterial disease, CKD, anemia of chronic disease/DEBBIE, HLD, HTN, infrarenal abdominal aoritic aneurysm, ischemic cardiomyopathy s/p CRT-D, nephrolithiasis, and PTSD who presented from home with c/o being in [place].  CT angio chest for pulm embolism showed no PE, masslike consolidations in the bilateral lungs which could relect edema, moderate to large righ sided pleural effusion with atelectasis.  He has had several recurrent prolonged admission most recently to heart failure service at [facility] on 8/22.  He was deemed not be a candidate for LVAD, heart transplant, or dula heart/long transplant.  He was discharged to follow up with Dr. Brown and palliative care.  Palliative care is engaged again to discuss goals of care.      ROS: feeling better since coming in.  Anxiety is still an ongoing issue and frustrated with medication management.  States primary service to be fixing his medication list.    Personal/Social History  He reports that he has quit smoking. His smoking use included cigarettes. He does not have any smokeless tobacco history on file. He reports that he does not currently use alcohol. He reports that he does not currently use drugs.    Past Medical History  He has a past medical history of Atherosclerosis of native artery of both lower extremities with intermittent claudication (CMS-HCC), CHB (complete heart block) (Multi), Chronic systolic CHF (congestive heart failure), NYHA class 3 (Multi), COPD (chronic obstructive pulmonary disease) (Multi), Coronary artery disease, History of tobacco abuse, HLD (hyperlipidemia), Hypertension, Infrarenal abdominal aortic aneurysm (AAA) without rupture (CMS-HCC), Ischemic cardiomyopathy with implantable  "cardioverter-defibrillator (ICD), MI (myocardial infarction) (Multi), Nephrolithiasis, and PTSD (post-traumatic stress disorder).    Surgical History  He has a past surgical history that includes Cardiac defibrillator placement; Coronary angioplasty with stent (2006); Coronary angioplasty with stent (04/2003); Coronary angioplasty with stent (06/2008); Cystoscopy w/ ureteral stent placement (04/01/2024); Cystoscopy w/ ureteral stent placement (04/30/2024); Iliac artery stent (Right); Knee surgery; Femoral bypass; Cardiac electrophysiology procedure (N/A, 5/23/2024); Cardiac catheterization (N/A, 5/23/2024); Cardiac electrophysiology procedure (N/A, 5/28/2024); Cardiac electrophysiology procedure (Right, 5/30/2024); and Cardiac electrophysiology procedure (N/A, 6/6/2024).     Family History  Family History   Problem Relation Name Age of Onset    Hypertension Mother      Diabetes Father      Hypertension Sister      Diabetes Sister      Colon cancer Maternal Grandmother      Hypertension Maternal Grandmother      Heart disease Maternal Grandfather      Hypertension Maternal Grandfather      Cancer Paternal Grandfather      Hypertension Paternal Grandfather       Allergies  Chantix [varenicline]    Review of Systems     Physical Exam  GEN: well developed male in NAD, sitting up at edge of bed.    RESP: even and regular    Last Recorded Vitals  Blood pressure 101/67, pulse 75, temperature 36.2 °C (97.2 °F), temperature source Temporal, resp. rate 18, height 1.702 m (5' 7\"), weight 82.6 kg (182 lb), SpO2 95%.    Relevant Results  Scheduled medications  amiodarone, 200 mg, oral, BID  aspirin, 81 mg, oral, Daily  dapagliflozin propanediol, 10 mg, oral, Daily  docusate sodium, 100 mg, oral, BID  enoxaparin, 40 mg, subcutaneous, q24h  ferrous sulfate (325 mg ferrous sulfate), 65 mg of iron, oral, Daily  furosemide, 40 mg, intravenous, BID  magnesium oxide, 400 mg, oral, Daily  metoprolol succinate XL, 25 mg, oral, " Daily  mexiletine, 300 mg, oral, TID  PARoxetine, 30 mg, oral, BID  piperacillin-tazobactam, 3.375 g, intravenous, q8h   And  sodium chloride, 10 mL, intravenous, q8h  polyethylene glycol, 17 g, oral, Daily  potassium chloride CR, 20 mEq, oral, Once  ranolazine, 500 mg, oral, BID  rosuvastatin, 20 mg, oral, Nightly  sacubitriL-valsartan, 0.5 tablet, oral, BID  spironolactone, 25 mg, oral, Daily  tamsulosin, 0.4 mg, oral, Daily  traZODone, 50 mg, oral, Nightly  vancomycin, 1,250 mg, intravenous, q24h      Continuous medications     PRN medications  PRN medications: acetaminophen **OR** acetaminophen **OR** acetaminophen, hydrOXYzine HCL, ipratropium-albuteroL, LORazepam **OR** LORazepam **OR** LORazepam, nitroglycerin, sennosides, traMADol, vancomycin     Results for orders placed or performed during the hospital encounter of 09/04/24 (from the past 24 hour(s))   ECG 12 lead   Result Value Ref Range    Ventricular Rate 75 BPM    Atrial Rate 75 BPM    NE Interval 156 ms    QRS Duration 186 ms    QT Interval 486 ms    QTC Calculation(Bazett) 542 ms    P Axis 92 degrees    R Axis -53 degrees    T Axis 111 degrees    QRS Count 13 beats    Q Onset 192 ms    P Onset 152 ms    P Offset 184 ms    T Offset 435 ms    QTC Fredericia 523 ms   CBC and Auto Differential   Result Value Ref Range    WBC 11.8 (H) 4.4 - 11.3 x10*3/uL    nRBC 0.0 0.0 - 0.0 /100 WBCs    RBC 4.47 (L) 4.50 - 5.90 x10*6/uL    Hemoglobin 12.8 (L) 13.5 - 17.5 g/dL    Hematocrit 38.9 (L) 41.0 - 52.0 %    MCV 87 80 - 100 fL    MCH 28.6 26.0 - 34.0 pg    MCHC 32.9 32.0 - 36.0 g/dL    RDW 20.9 (H) 11.5 - 14.5 %    Platelets 336 150 - 450 x10*3/uL    Neutrophils % 77.0 40.0 - 80.0 %    Immature Granulocytes %, Automated 1.0 (H) 0.0 - 0.9 %    Lymphocytes % 9.0 13.0 - 44.0 %    Monocytes % 10.6 2.0 - 10.0 %    Eosinophils % 1.5 0.0 - 6.0 %    Basophils % 0.9 0.0 - 2.0 %    Neutrophils Absolute 9.04 (H) 1.20 - 7.70 x10*3/uL    Immature Granulocytes Absolute,  Automated 0.12 0.00 - 0.70 x10*3/uL    Lymphocytes Absolute 1.06 (L) 1.20 - 4.80 x10*3/uL    Monocytes Absolute 1.24 (H) 0.10 - 1.00 x10*3/uL    Eosinophils Absolute 0.18 0.00 - 0.70 x10*3/uL    Basophils Absolute 0.11 (H) 0.00 - 0.10 x10*3/uL   Comprehensive Metabolic Panel   Result Value Ref Range    Glucose 142 (H) 74 - 99 mg/dL    Sodium 135 (L) 136 - 145 mmol/L    Potassium 4.3 3.5 - 5.3 mmol/L    Chloride 99 98 - 107 mmol/L    Bicarbonate 27 21 - 32 mmol/L    Anion Gap 13 10 - 20 mmol/L    Urea Nitrogen 26 (H) 6 - 23 mg/dL    Creatinine 1.46 (H) 0.50 - 1.30 mg/dL    eGFR 56 (L) >60 mL/min/1.73m*2    Calcium 9.1 8.6 - 10.3 mg/dL    Albumin 3.5 3.4 - 5.0 g/dL    Alkaline Phosphatase 334 (H) 33 - 120 U/L    Total Protein 7.1 6.4 - 8.2 g/dL    AST 49 (H) 9 - 39 U/L    Bilirubin, Total 1.3 (H) 0.0 - 1.2 mg/dL    ALT 96 (H) 10 - 52 U/L   Magnesium   Result Value Ref Range    Magnesium 2.08 1.60 - 2.40 mg/dL   Lactate   Result Value Ref Range    Lactate 1.6 0.4 - 2.0 mmol/L   Protime-INR   Result Value Ref Range    Protime 16.5 (H) 9.8 - 12.8 seconds    INR 1.5 (H) 0.9 - 1.1   B-Type Natriuretic Peptide   Result Value Ref Range    BNP 2,627 (H) 0 - 99 pg/mL   Troponin I, High Sensitivity, Initial   Result Value Ref Range    Troponin I, High Sensitivity 19 0 - 20 ng/L   Morphology   Result Value Ref Range    RBC Morphology See Below     Polychromasia Mild     Hypochromia Mild    ECG 12 lead   Result Value Ref Range    Ventricular Rate 75 BPM    Atrial Rate 75 BPM    VT Interval 150 ms    QRS Duration 196 ms    QT Interval 506 ms    QTC Calculation(Bazett) 565 ms    P Axis 86 degrees    R Axis -61 degrees    T Axis 99 degrees    QRS Count 12 beats    Q Onset 189 ms    P Onset 149 ms    P Offset 178 ms    T Offset 442 ms    QTC Fredericia 544 ms   Troponin, High Sensitivity, 1 Hour   Result Value Ref Range    Troponin I, High Sensitivity 20 0 - 20 ng/L   Sars-CoV-2 PCR   Result Value Ref Range    Coronavirus 2019, PCR  Not Detected Not Detected   Vancomycin   Result Value Ref Range    Vancomycin 20.0 5.0 - 20.0 ug/mL   MRSA Surveillance for Vancomycin De-escalation, PCR    Specimen: Anterior Nares; Swab   Result Value Ref Range    MRSA PCR Not Detected Not Detected   Magnesium   Result Value Ref Range    Magnesium 2.08 1.60 - 2.40 mg/dL   Basic Metabolic Panel   Result Value Ref Range    Glucose 121 (H) 74 - 99 mg/dL    Sodium 135 (L) 136 - 145 mmol/L    Potassium 3.6 3.5 - 5.3 mmol/L    Chloride 98 98 - 107 mmol/L    Bicarbonate 27 21 - 32 mmol/L    Anion Gap 14 10 - 20 mmol/L    Urea Nitrogen 27 (H) 6 - 23 mg/dL    Creatinine 1.70 (H) 0.50 - 1.30 mg/dL    eGFR 47 (L) >60 mL/min/1.73m*2    Calcium 9.0 8.6 - 10.3 mg/dL   CBC and Auto Differential   Result Value Ref Range    WBC 10.8 4.4 - 11.3 x10*3/uL    nRBC 0.0 0.0 - 0.0 /100 WBCs    RBC 4.20 (L) 4.50 - 5.90 x10*6/uL    Hemoglobin 12.0 (L) 13.5 - 17.5 g/dL    Hematocrit 36.7 (L) 41.0 - 52.0 %    MCV 87 80 - 100 fL    MCH 28.6 26.0 - 34.0 pg    MCHC 32.7 32.0 - 36.0 g/dL    RDW 20.9 (H) 11.5 - 14.5 %    Platelets 294 150 - 450 x10*3/uL    Neutrophils % 74.7 40.0 - 80.0 %    Immature Granulocytes %, Automated 1.1 (H) 0.0 - 0.9 %    Lymphocytes % 10.5 13.0 - 44.0 %    Monocytes % 9.1 2.0 - 10.0 %    Eosinophils % 3.7 0.0 - 6.0 %    Basophils % 0.9 0.0 - 2.0 %    Neutrophils Absolute 8.07 (H) 1.20 - 7.70 x10*3/uL    Immature Granulocytes Absolute, Automated 0.12 0.00 - 0.70 x10*3/uL    Lymphocytes Absolute 1.14 (L) 1.20 - 4.80 x10*3/uL    Monocytes Absolute 0.99 0.10 - 1.00 x10*3/uL    Eosinophils Absolute 0.40 0.00 - 0.70 x10*3/uL    Basophils Absolute 0.10 0.00 - 0.10 x10*3/uL   Vancomycin   Result Value Ref Range    Vancomycin 11.4 5.0 - 20.0 ug/mL   Cardiac Device Check - Inpatient   Result Value Ref Range    BSA 1.98 m2   ECG 12 Lead   Result Value Ref Range    Ventricular Rate 75 BPM    Atrial Rate 75 BPM    TN Interval 152 ms    QRS Duration 190 ms    QT Interval 508 ms     QTC Calculation(Bazett) 567 ms    R Axis -67 degrees    T Axis 103 degrees    QRS Count 12 beats    Q Onset 174 ms    T Offset 428 ms    QTC Fredericia 546 ms          Assessment/Plan   -Met with patient and his mother.  They tell me they were told by cardiology that he is likely more appropriate for palliative care at this time.  He met with palliative care previously but did not sign on at that time.  He did pursue an opinion from the advanced heart failure team and is not a candidate for LVAD, heart or heart/lung transplant.  His main goal is get his affairs in order and would like to remain out of the hospital for 2 weeks to get this done but overall is tired of coming to hospital and prefers to be at home.  There seems to be some misinformation on hospice vs palliative on what they will and will not provide.  He would like to proceed with palliative care at this time though he is receptive to informational meeting with them to get more information on their services.  Pt was previously a DNR/DNI but was revoked for transplant evaluation.  He wishes to remain full code at this time.  Discussed risks of cardiopulmonary resuscitation in setting of advanced heart failure.      Pt has previously completed advance directives  -FULL CODE  -surrogate: pt's mother, Judie Sapp [phone]    Plan of care discussed with primary service and cardiology.      I spent 85 minutes in the professional and overall care of this patient.      Nahun Kapoor, APRN-CNP

## 2024-09-06 ENCOUNTER — APPOINTMENT (OUTPATIENT)
Dept: RADIOLOGY | Facility: HOSPITAL | Age: 59
End: 2024-09-06
Payer: MEDICARE

## 2024-09-06 ENCOUNTER — APPOINTMENT (OUTPATIENT)
Dept: CARDIOLOGY | Facility: HOSPITAL | Age: 59
End: 2024-09-06
Payer: MEDICARE

## 2024-09-06 LAB
ANION GAP SERPL CALC-SCNC: 17 MMOL/L (ref 10–20)
ATRIAL RATE: 75 BPM
BUN SERPL-MCNC: 31 MG/DL (ref 6–23)
CALCIUM SERPL-MCNC: 9.1 MG/DL (ref 8.6–10.3)
CHLORIDE SERPL-SCNC: 96 MMOL/L (ref 98–107)
CO2 SERPL-SCNC: 22 MMOL/L (ref 21–32)
CREAT SERPL-MCNC: 1.93 MG/DL (ref 0.5–1.3)
EGFRCR SERPLBLD CKD-EPI 2021: 40 ML/MIN/1.73M*2
ERYTHROCYTE [DISTWIDTH] IN BLOOD BY AUTOMATED COUNT: 20.5 % (ref 11.5–14.5)
GLUCOSE SERPL-MCNC: 104 MG/DL (ref 74–99)
HCT VFR BLD AUTO: 38.1 % (ref 41–52)
HGB BLD-MCNC: 12.6 G/DL (ref 13.5–17.5)
HOLD SPECIMEN: NORMAL
MAGNESIUM SERPL-MCNC: 2.08 MG/DL (ref 1.6–2.4)
MCH RBC QN AUTO: 28.7 PG (ref 26–34)
MCHC RBC AUTO-ENTMCNC: 33.1 G/DL (ref 32–36)
MCV RBC AUTO: 87 FL (ref 80–100)
NRBC BLD-RTO: 0 /100 WBCS (ref 0–0)
PLATELET # BLD AUTO: 319 X10*3/UL (ref 150–450)
POTASSIUM SERPL-SCNC: 4.1 MMOL/L (ref 3.5–5.3)
PR INTERVAL: 156 MS
Q ONSET: 174 MS
QRS COUNT: 12 BEATS
QRS DURATION: 188 MS
QT INTERVAL: 502 MS
QTC CALCULATION(BAZETT): 560 MS
QTC FREDERICIA: 540 MS
R AXIS: -63 DEGREES
RBC # BLD AUTO: 4.39 X10*6/UL (ref 4.5–5.9)
SODIUM SERPL-SCNC: 131 MMOL/L (ref 136–145)
T AXIS: 131 DEGREES
T OFFSET: 425 MS
VENTRICULAR RATE: 75 BPM
WBC # BLD AUTO: 13.1 X10*3/UL (ref 4.4–11.3)

## 2024-09-06 PROCEDURE — 2500000002 HC RX 250 W HCPCS SELF ADMINISTERED DRUGS (ALT 637 FOR MEDICARE OP, ALT 636 FOR OP/ED): Performed by: STUDENT IN AN ORGANIZED HEALTH CARE EDUCATION/TRAINING PROGRAM

## 2024-09-06 PROCEDURE — 2500000001 HC RX 250 WO HCPCS SELF ADMINISTERED DRUGS (ALT 637 FOR MEDICARE OP): Performed by: INTERNAL MEDICINE

## 2024-09-06 PROCEDURE — 2500000004 HC RX 250 GENERAL PHARMACY W/ HCPCS (ALT 636 FOR OP/ED): Performed by: STUDENT IN AN ORGANIZED HEALTH CARE EDUCATION/TRAINING PROGRAM

## 2024-09-06 PROCEDURE — 99232 SBSQ HOSP IP/OBS MODERATE 35: CPT | Performed by: NURSE PRACTITIONER

## 2024-09-06 PROCEDURE — 93005 ELECTROCARDIOGRAM TRACING: CPT

## 2024-09-06 PROCEDURE — 2500000002 HC RX 250 W HCPCS SELF ADMINISTERED DRUGS (ALT 637 FOR MEDICARE OP, ALT 636 FOR OP/ED): Performed by: NURSE PRACTITIONER

## 2024-09-06 PROCEDURE — 85027 COMPLETE CBC AUTOMATED: CPT | Performed by: STUDENT IN AN ORGANIZED HEALTH CARE EDUCATION/TRAINING PROGRAM

## 2024-09-06 PROCEDURE — 80048 BASIC METABOLIC PNL TOTAL CA: CPT | Performed by: STUDENT IN AN ORGANIZED HEALTH CARE EDUCATION/TRAINING PROGRAM

## 2024-09-06 PROCEDURE — 2500000001 HC RX 250 WO HCPCS SELF ADMINISTERED DRUGS (ALT 637 FOR MEDICARE OP): Performed by: NURSE PRACTITIONER

## 2024-09-06 PROCEDURE — 2500000004 HC RX 250 GENERAL PHARMACY W/ HCPCS (ALT 636 FOR OP/ED): Performed by: NURSE PRACTITIONER

## 2024-09-06 PROCEDURE — 99232 SBSQ HOSP IP/OBS MODERATE 35: CPT | Performed by: STUDENT IN AN ORGANIZED HEALTH CARE EDUCATION/TRAINING PROGRAM

## 2024-09-06 PROCEDURE — 73030 X-RAY EXAM OF SHOULDER: CPT | Mod: RT

## 2024-09-06 PROCEDURE — 2500000004 HC RX 250 GENERAL PHARMACY W/ HCPCS (ALT 636 FOR OP/ED): Mod: JZ

## 2024-09-06 PROCEDURE — 1200000002 HC GENERAL ROOM WITH TELEMETRY DAILY

## 2024-09-06 PROCEDURE — 36415 COLL VENOUS BLD VENIPUNCTURE: CPT | Performed by: STUDENT IN AN ORGANIZED HEALTH CARE EDUCATION/TRAINING PROGRAM

## 2024-09-06 PROCEDURE — 83735 ASSAY OF MAGNESIUM: CPT | Performed by: STUDENT IN AN ORGANIZED HEALTH CARE EDUCATION/TRAINING PROGRAM

## 2024-09-06 PROCEDURE — 2500000001 HC RX 250 WO HCPCS SELF ADMINISTERED DRUGS (ALT 637 FOR MEDICARE OP): Performed by: STUDENT IN AN ORGANIZED HEALTH CARE EDUCATION/TRAINING PROGRAM

## 2024-09-06 PROCEDURE — 73030 X-RAY EXAM OF SHOULDER: CPT | Mod: RIGHT SIDE | Performed by: RADIOLOGY

## 2024-09-06 PROCEDURE — 93010 ELECTROCARDIOGRAM REPORT: CPT | Performed by: INTERNAL MEDICINE

## 2024-09-06 RX ORDER — TORSEMIDE 20 MG/1
20 TABLET ORAL DAILY
Status: DISCONTINUED | OUTPATIENT
Start: 2024-09-07 | End: 2024-09-06

## 2024-09-06 RX ORDER — TORSEMIDE 20 MG/1
40 TABLET ORAL DAILY
Qty: 60 TABLET | Refills: 3 | Status: SHIPPED | OUTPATIENT
Start: 2024-09-06 | End: 2024-09-10

## 2024-09-06 RX ORDER — SPIRONOLACTONE 25 MG/1
25 TABLET ORAL DAILY
Qty: 30 TABLET | Refills: 2 | Status: SHIPPED | OUTPATIENT
Start: 2024-09-06 | End: 2024-09-10

## 2024-09-06 RX ORDER — TORSEMIDE 20 MG/1
40 TABLET ORAL DAILY
Status: DISCONTINUED | OUTPATIENT
Start: 2024-09-07 | End: 2024-09-07 | Stop reason: HOSPADM

## 2024-09-06 RX ORDER — ASPIRIN 81 MG/1
81 TABLET ORAL DAILY
Qty: 30 TABLET | Refills: 3 | Status: SHIPPED | OUTPATIENT
Start: 2024-09-06 | End: 2024-09-10

## 2024-09-06 RX ORDER — POTASSIUM CHLORIDE 20 MEQ/1
20 TABLET, EXTENDED RELEASE ORAL DAILY
Qty: 30 TABLET | Refills: 3 | Status: SHIPPED | OUTPATIENT
Start: 2024-09-06 | End: 2024-09-10

## 2024-09-06 RX ORDER — LANOLIN ALCOHOL/MO/W.PET/CERES
400 CREAM (GRAM) TOPICAL DAILY
Qty: 30 TABLET | Refills: 3 | Status: SHIPPED | OUTPATIENT
Start: 2024-09-07 | End: 2024-09-10

## 2024-09-06 RX ORDER — METOPROLOL SUCCINATE 25 MG/1
25 TABLET, EXTENDED RELEASE ORAL DAILY
Qty: 30 TABLET | Refills: 3 | Status: SHIPPED | OUTPATIENT
Start: 2024-09-06 | End: 2024-09-10

## 2024-09-06 RX ORDER — TALC
6 POWDER (GRAM) TOPICAL DAILY
Status: DISCONTINUED | OUTPATIENT
Start: 2024-09-06 | End: 2024-09-07 | Stop reason: HOSPADM

## 2024-09-06 RX ORDER — ROSUVASTATIN CALCIUM 20 MG/1
20 TABLET, COATED ORAL NIGHTLY
Qty: 30 TABLET | Refills: 3 | Status: SHIPPED | OUTPATIENT
Start: 2024-09-06 | End: 2024-09-10

## 2024-09-06 RX ORDER — RANOLAZINE 500 MG/1
500 TABLET, EXTENDED RELEASE ORAL 2 TIMES DAILY
Qty: 60 TABLET | Refills: 3 | Status: SHIPPED | OUTPATIENT
Start: 2024-09-06 | End: 2024-09-10

## 2024-09-06 ASSESSMENT — COGNITIVE AND FUNCTIONAL STATUS - GENERAL
TOILETING: A LITTLE
DAILY ACTIVITIY SCORE: 23
WALKING IN HOSPITAL ROOM: A LITTLE
MOBILITY SCORE: 21
CLIMB 3 TO 5 STEPS WITH RAILING: A LOT
CLIMB 3 TO 5 STEPS WITH RAILING: A LOT

## 2024-09-06 ASSESSMENT — PAIN DESCRIPTION - ORIENTATION: ORIENTATION: RIGHT;LEFT

## 2024-09-06 ASSESSMENT — PAIN DESCRIPTION - LOCATION: LOCATION: HIP

## 2024-09-06 ASSESSMENT — PAIN SCALES - GENERAL
PAINLEVEL_OUTOF10: 2
PAINLEVEL_OUTOF10: 7
PAINLEVEL_OUTOF10: 6
PAINLEVEL_OUTOF10: 9

## 2024-09-06 ASSESSMENT — PAIN - FUNCTIONAL ASSESSMENT
PAIN_FUNCTIONAL_ASSESSMENT: 0-10

## 2024-09-06 ASSESSMENT — PAIN DESCRIPTION - DESCRIPTORS
DESCRIPTORS: ACHING
DESCRIPTORS: ACHING

## 2024-09-06 ASSESSMENT — PAIN SCALES - WONG BAKER: WONGBAKER_NUMERICALRESPONSE: HURTS LITTLE BIT

## 2024-09-06 NOTE — CARE PLAN
Problem: Pain - Adult  Goal: Verbalizes/displays adequate comfort level or baseline comfort level  Outcome: Progressing     Problem: Safety - Adult  Goal: Free from fall injury  Outcome: Progressing     Problem: Heart Failure  Goal: Improved gas exchange this shift  Outcome: Progressing  Goal: Improved urinary output this shift  Outcome: Progressing  Goal: Reduction in peripheral edema within 24 hours  Outcome: Progressing  Goal: Report improvement of dyspnea/breathlessness this shift  Outcome: Progressing  Goal: Weight from fluid excess reduced over 2-3 days, then stabilize  Outcome: Progressing  Goal: Increase self care and/or family involvement in 24 hours  Outcome: Progressing     Problem: Fall/Injury  Goal: Not fall by end of shift  Outcome: Progressing  Goal: Be free from injury by end of the shift  Outcome: Progressing  Goal: Verbalize understanding of personal risk factors for fall in the hospital  Outcome: Progressing  Goal: Verbalize understanding of risk factor reduction measures to prevent injury from fall in the home  Outcome: Progressing  Goal: Use assistive devices by end of the shift  Outcome: Progressing  Goal: Pace activities to prevent fatigue by end of the shift  Outcome: Progressing   The patient's goals for the shift include      The clinical goals for the shift include Patient will be stable, he won't complain of increased SOB

## 2024-09-06 NOTE — CARE PLAN
The patient's goals for the shift include  rest    The clinical goals for the shift include Patient will get adequate rest through out shift      Problem: Pain - Adult  Goal: Verbalizes/displays adequate comfort level or baseline comfort level  Flowsheets (Taken 9/6/2024 1323)  Verbalizes/displays adequate comfort level or baseline comfort level:   Assess pain using appropriate pain scale   Administer analgesics based on type and severity of pain and evaluate response       Over the shift, the patient did not make progress toward the following goals. Barriers to progression include ***. Recommendations to address these barriers include ***.

## 2024-09-06 NOTE — PROGRESS NOTES
Cristian Sapp is a 58 y.o. male on day 2 of admission presenting with Chronic congestive heart failure, unspecified heart failure type (Multi).      Subjective   Patient is complaining of difficulty sleeping and pain, meds were adjusted by pharmacist  Cardiology wanted to keep the patient another day after medication adjustment  Can be discharged tomorrow  Awaiting the R Palliative care response to the Palliative Care referral    Objective     Last Recorded Vitals  BP 93/64   Pulse 75   Temp 36.3 °C (97.3 °F)   Resp 17   Wt 82.6 kg (182 lb)   SpO2 94%   Intake/Output last 3 Shifts:    Intake/Output Summary (Last 24 hours) at 9/6/2024 1412  Last data filed at 9/6/2024 1335  Gross per 24 hour   Intake 380 ml   Output 680 ml   Net -300 ml       Admission Weight  Weight: 82.6 kg (182 lb) (09/04/24 1354)    Daily Weight  09/04/24 : 82.6 kg (182 lb)    Image Results  ECG 12 Lead  AV dual-paced rhythm  Abnormal ECG  When compared with ECG of 05-SEP-2024 09:07, (unconfirmed)  No significant change was found      Physical Exam    General: Ill-appearing adult male in no acute distress  HEENT: Clear sclera, EOMI, trachea midline, moist mucous membranes  Respiratory: Equal chest rise, no retractions, diminished right-sided lung sounds most notable at bases  Cardiovascular: S1 and S2 auscultated, no murmurs clicks or rubs  Abdomen: Tense, nontender, distended, abdominal bruising appreciated  Extremities: No cyanosis or clubbing appreciated  Neurological: Spontaneously moves all extremities, no dysarthria, cranial nerves grossly intact  Psychiatric: Appropriate mood and affect  Skin: Warm, dry          Assessment & Plan  Chronic congestive heart failure, unspecified heart failure type (Multi)    Cristian Sapp is a 58 y.o. male with a PMH including HFrEF, Chronic hypoxic respiratory failure on home O2, vtach, and COPD who was admitted for management of following issues:     Recurent Vtach  Acute on chronic Systolic heart  failure  -EF 15 %, ICD in place  -not a candidate for LVAD, OHT, or combined heart-lung transplant due to tobacco use   -end-stage cardiac vascular disease end-stage cardiomyopathy   -Cardio/EP recs appreciated, continue with current adjusted cardiac meds  -Cont farxiga,toprol, Entresto, Aldactone  -Will continue on lasix 40 IV BID. Strict I&Os  -Cardiology recommended to keep the patient for the next 24 hours     Concern for Gram negative bacterial pneumonia  Rt pleural effusion  -Continue vanc/zosyn and monitor, Pro-José Antonio 0.15  -Patient established with a pulmonologist already. Will refer on DC for interval re-evaluation due to concerns of neoplastic process per rads.   -Continue diuretics     Hx COPD, appears compensated  -PRN nebs  -Continue home meds     Hx PTSD, Iron deficiency anemia, CAD, HLD, BPH  -Home meds continued as applicable     VTE Prophylaxis: lovenox/SCDs  Disposition: Home within the next 24 hours per patient request, he wants to be discharge tomorrow on 9/7/2024  Awaiting the R Palliative care response to the Palliative Care referral  TCC following       Anum Meier MD

## 2024-09-06 NOTE — CONSULTS
Heart Failure Nurse Navigator    The role of the HF nurse navigator is to (1) characterize risk profiles of patients with heart failure transitioning from [id number] after hospitalization, (2) recommend interventions to improve care and reduce risks of worse post-hospitalization outcomes. Potential recommendations may touch base on patient/family education, additional consults that may bring value, and appointment scheduling.    Assessment    I met with Cristian Sapp at the bedside, and my impressions include: Met with patient and mother at bedside. Patient was recently admitted at [facility] and was transferred to [facility] for advanced heart failure therapy options. Patient was deemed not a candidate for an LVAD or a transplant. He was then discharged and presented back to [facility] for shortness of breath and increased heart rate. Patient lives at home with his parents. He is compliant with all his medications and follow up appointments. Patient is also now on home oxygen and has all the equipment at home. Palliative care has been consulted and patient and mother are agreeable to outpatient palliative care referral and also an informational hospice meeting.     Patients Cardiologist(s): Dr. Rosa and Dr. Brown     Patients Primary Care Provider: Dr. Willis ([facility])    1. Medical Domain  What is the patient's most recent LVEF? 15%  HFrEF (LVEF <= 40%) Quadruple therapy recommended  HFmrEF (LVEF 41-49%) Quadruple therapy recommended  HFpEF (LVEF >= 50%) Minimum recommendations: SGLT2i and MRA  Is the patient on GDMT for their condition?   ARNI/ACEI/ARB: Yes  BB: Yes  MRA: Yes  SGLT2i: Yes  Could this patient have advanced heart failure (Stage D heart failure)?: Yes (already follows with Dr. Brown)  If yes, the potential markers of advanced heart failure include:     REFERENCE: Potential markers of advanced HF   Inotrope (dobutamine or milrinone) used during this admission?   LVEF<=25%?   >=2 hospitalizations  "for ADHF in the last year?   Severe symptoms of HF (fatigue, dyspnea, confusion, edema) despite medical therapy?   Downtitration of GDMT as compared to home medications?   Discontinuation of GDMT because of hypotension or renal intolerance?   Recurrent arrhythmias (AF, VT with ICD shocks)?   Cardiac cachexia (i.e., unintentional weight loss due to HF)?   High-risk biomarker profile (e.g., hyponatremia [Na<135], very elevated BNP, worsening kidney function)   Escalating doses of diuretics or persistent edema despite escalation     2. Mind and Emotion  Does this patient have possible cognitive impairment?: N/A   Ask the patient to memorize these 3 words: banana, sunrise, chair  Ask the patient to draw a clock with hands pointing at \"20 minutes after 8\"  Ask the patient to recall the 3 words  Score: Add number of words recalled + clock drawing (0 points for any errors, 2 points if correct)  Interpretation: A score of 0-2 suggests cognitive impairment is present, a score of 3-5 suggests cognitive impairment is absent  Does this patient have major depression?: No (PH-Q2 score 0/6)  Over the last 2 weeks: Little interest or pleasure in doing things? (Not at all +0, Several days +1, More than half the days +2, Nearly every day +3)  Over the last 2 weeks: Feeling down, depressed or hopeless? (Not at all +0, Several days +1, More than half the days +2, Nearly every day +3)  Score: Add points  Interpretation: A score of 3 or more suggests that major depression is likely.     3. Physical Function  Could this patient be frail?: No   Defined by presence of all of these: slowness, weakness, shrinking, inactivity, exhaustion  Is this patient at risk for falling?: No   Defined by having experienced a fall in the last 12 months.    4. Social Determinants of Health  Transportation deficits?: No   Lack of insurance?: No   Poor financial resources?: No   Living conditions (homelessness, unstable home)?: No   Poor family/social " support?: No   Poor personal care?: No   Food insecurity?: No   Lack of HCPOA?: No    Summary of Assessment  The following linked problems were found: None at this time.     Current Medications:  Beta blocker: Metoprolol succinate 25 mg daily  ACE inhibitor/ARB/ARNI: Sacubitril-valsartan 24-26 mg 0.5 tablet BID  MRA: spironolactone 25 mg daily  SGLT2i:  dapaglifozin 10 mg daily  Diuretic: None    Device ICD    Heart Failure Education   - CHF signs and symptoms, heart failure zones and when to call cardiologist.   - Controlling Heart Failure at Home: medication adherence, following up with cardiologist at least once yearly, staying healthy and active, limiting sodium and fluid intake as directed by cardiologist.  - Daily Weight Education    Recommendations  SW and TCC following for palliative care and hospice referral.       Bryanna Garcia RN  [phone]

## 2024-09-06 NOTE — CONSULTS
Social Work Note  The LSW received a Palliative Care referral this date in addition to the Hospice Informational Meeting order that has been sent to [facility]. The LSW met with the pt and mother who was visiting at the bedside. The LSW offered freedom of choice in selectng a Palliative Care Agency. The pt has choiced with [facility]. A referral and Palliative Care order has been sent in [facility] for R Palliative Care review, insurance verification and acceptance. Awaiting the R Palliative care response to the Palliative Care referral.

## 2024-09-06 NOTE — DISCHARGE INSTRUCTIONS
HEART FAILURE EDUCATION:  1. Weigh yourself daily and record on your weight log.  2. If you gain more than 2 or 3 pounds overnight, call your cardiologist.  3. Follow a low sodium diet. No more than 2000 mg in one day, or more than 650 mg per meal.  4. Limit total fluids to no more than 8 cups (or 2 liters) per day - this includes all fluids (water, coffee, juice, milk, tea, etc.)  5. Monitor your blood pressure daily and record on your weight log.  6. Call to schedule your follow-up appointments when you get home if they were not already scheduled for you.  7. Keep your follow-up appointments! Bring your weight log with you so the doctors can see your weight trend and blood pressure readings.  8. Be sure to  any new prescriptions and take them as directed. If unsure of the medications, be sure to call your cardiologist.  9. Stay as active as you can tolerate.   10. If you notice subtle change of symptoms (slight increase in swelling, slight shortness of breath, a new intolerance to laying flat, a new cough), be sure to call your cardiologist.  11. If you have any questions or concerns or you have not heard back from the cardiologist, feel free to call Bryanna Garcia heart failure navigator at [phone].

## 2024-09-06 NOTE — PROGRESS NOTES
Rounding TREY/Cardiologist:  Samm Espinoza, APRN-CNP,  Primary Cardiologist: Dr. Eamon Rosa, Dr. Brown    Date:  9/6/2024  Patient:  Cristian Sapp  YOB: 1965  MRN:  [id number]   Admit Date:  9/4/2024      SUBJECTIVE:    Cristian Sapp was seen and examined today at bedside.     He denies any chest pain or shortness of breath.     Fluid balance -685        VITALS:     Vitals:    09/05/24 1949 09/05/24 2354 09/06/24 0445 09/06/24 0906   BP: 95/70 93/56 100/64 101/65   BP Location: Right arm  Left arm Left arm   Patient Position: Lying  Lying Lying   Pulse: 75 75 75 75   Resp: 18  18 17   Temp: 36.5 °C (97.7 °F) 36.3 °C (97.3 °F) 36.1 °C (97 °F) 36.6 °C (97.9 °F)   TempSrc: Temporal  Temporal Temporal   SpO2: 94% 92% 93% 92%   Weight:       Height:           Intake/Output Summary (Last 24 hours) at 9/6/2024 1045  Last data filed at 9/6/2024 0548  Gross per 24 hour   Intake 445 ml   Output 1130 ml   Net -685 ml       Wt Readings from Last 4 Encounters:   09/04/24 82.6 kg (182 lb)   09/03/24 81 kg (178 lb 8 oz)   08/30/24 79.6 kg (175 lb 7.8 oz)   08/19/24 80.9 kg (178 lb 5.6 oz)       CURRENT HOSPITAL MEDICATIONS:   amiodarone, 200 mg, oral, BID  aspirin, 81 mg, oral, Daily  dapagliflozin propanediol, 10 mg, oral, Daily  docusate sodium, 100 mg, oral, BID  enoxaparin, 40 mg, subcutaneous, q24h  ferrous sulfate (325 mg ferrous sulfate), 65 mg of iron, oral, Daily  magnesium oxide, 400 mg, oral, Daily  metoprolol succinate XL, 25 mg, oral, Daily  mexiletine, 300 mg, oral, TID  PARoxetine, 30 mg, oral, BID  piperacillin-tazobactam, 3.375 g, intravenous, q8h   And  sodium chloride, 10 mL, intravenous, q8h  polyethylene glycol, 17 g, oral, Daily  potassium chloride CR, 20 mEq, oral, Once  ranolazine, 500 mg, oral, BID  rosuvastatin, 20 mg, oral, Nightly  sacubitriL-valsartan, 0.5 tablet, oral, BID  spironolactone, 25 mg, oral, Daily  tamsulosin, 0.4 mg, oral, Daily  [START ON  2024] torsemide, 20 mg, oral, Daily  traZODone, 50 mg, oral, Nightly  vancomycin, 1,250 mg, intravenous, q24h         Current Outpatient Medications   Medication Instructions    albuterol sulfate (Proair Digihaler) 90 mcg/actuation aero powdr breath act w/sensor inhaler 2 puffs, inhalation, Every 4 hours PRN    amiodarone (PACERONE) 200 mg, oral, 2 times daily    aspirin 81 mg, oral, Daily    Farxiga 10 mg, oral, Daily    FeroSuL 325 mg, oral, Daily with breakfast    LORazepam (ATIVAN) 0.5 mg, oral, Daily PRN, Take 1 tablet daily as needed up to 1.5mg    metoprolol succinate XL (TOPROL-XL) 25 mg, oral, Daily, Do not crush or chew.    mexiletine (MEXITIL) 300 mg, oral, 3 times daily    nitroglycerin (NITROSTAT) 0.4 mg, sublingual, Every 5 min PRN    pantoprazole (PROTONIX) 40 mg, oral, Daily before breakfast, Do not crush, chew, or split.    PARoxetine (PAXIL) 60 mg, oral, Nightly    ranolazine (RANEXA) 500 mg, oral, 2 times daily, Do not crush, chew, or split.    rosuvastatin (CRESTOR) 20 mg, oral, Nightly    sacubitriL-valsartan (Entresto) 24-26 mg tablet 0.5 tablets, oral, 2 times daily    spironolactone (ALDACTONE) 25 mg, oral, Daily    tamsulosin (FLOMAX) 0.4 mg, oral, Daily    torsemide (DEMADEX) 40 mg, oral, Daily    traZODone (DESYREL) 50 mg, oral, Nightly        PHYSICAL EXAMINATION:     Physical Exam  Vitals and nursing note reviewed.   HENT:      Head: Normocephalic.      Mouth/Throat:      Mouth: Mucous membranes are moist.   Eyes:      Pupils: Pupils are equal, round, and reactive to light.   Cardiovascular:      Rate and Rhythm: Normal rate and regular rhythm.      Comments: AV dual paced rhythm  Pulmonary:      Effort: Pulmonary effort is normal.      Breath sounds: Decreased air movement present.      Comments: Expiratory wheeze  Abdominal:      Palpations: Abdomen is soft.   Musculoskeletal:      Right lower le+ Edema present.      Left lower le+ Edema present.   Skin:     General: Skin  is warm and dry.      Coloration: Skin is pale.   Neurological:      General: No focal deficit present.      Mental Status: He is alert and oriented to person, place, and time. Mental status is at baseline.      Comments: depressed   Psychiatric:         Mood and Affect: Mood normal.         LAB DATA:     CBC:   Results from last 7 days   Lab Units 09/06/24  0604 09/05/24  0641 09/04/24  1519   WBC AUTO x10*3/uL 13.1* 10.8 11.8*   RBC AUTO x10*6/uL 4.39* 4.20* 4.47*   HEMOGLOBIN g/dL 12.6* 12.0* 12.8*   HEMATOCRIT % 38.1* 36.7* 38.9*   MCV fL 87 87 87   MCH pg 28.7 28.6 28.6   MCHC g/dL 33.1 32.7 32.9   RDW % 20.5* 20.9* 20.9*   PLATELETS AUTO x10*3/uL 319 294 336     CMP:    Results from last 7 days   Lab Units 09/06/24  0604 09/05/24  0641 09/04/24  1519   SODIUM mmol/L 131* 135* 135*   POTASSIUM mmol/L 4.1 3.6 4.3   CHLORIDE mmol/L 96* 98 99   CO2 mmol/L 22 27 27   BUN mg/dL 31* 27* 26*   CREATININE mg/dL 1.93* 1.70* 1.46*   GLUCOSE mg/dL 104* 121* 142*   PROTEIN TOTAL g/dL  --   --  7.1   CALCIUM mg/dL 9.1 9.0 9.1   BILIRUBIN TOTAL mg/dL  --   --  1.3*   ALK PHOS U/L  --   --  334*   AST U/L  --   --  49*   ALT U/L  --   --  96*     BMP:    Results from last 7 days   Lab Units 09/06/24  0604 09/05/24  0641 09/04/24  1519   SODIUM mmol/L 131* 135* 135*   POTASSIUM mmol/L 4.1 3.6 4.3   CHLORIDE mmol/L 96* 98 99   CO2 mmol/L 22 27 27   BUN mg/dL 31* 27* 26*   CREATININE mg/dL 1.93* 1.70* 1.46*   CALCIUM mg/dL 9.1 9.0 9.1   GLUCOSE mg/dL 104* 121* 142*     Magnesium:  Results from last 7 days   Lab Units 09/06/24  0604 09/05/24  0641 09/04/24  1519   MAGNESIUM mg/dL 2.08 2.08 2.08     Troponin:    Results from last 7 days   Lab Units 09/04/24  1632 09/04/24  1519   TROPHS ng/L 20 19     BNP:   Results from last 7 days   Lab Units 09/04/24  1519   BNP pg/mL 2,627*     Lipid Panel:         DIAGNOSTIC TESTING:   @No results found for this or any previous visit.    ECG 12 Lead    Result Date: 9/5/2024  AV dual-paced  rhythm Abnormal ECG When compared with ECG of 04-SEP-2024 16:29, No significant change was found    ECG 12 lead    Result Date: 9/4/2024  AV dual-paced rhythm Abnormal ECG When compared with ECG of 04-SEP-2024 13:54, (unconfirmed) No significant change was found See ED provider note for full interpretation and clinical correlation Confirmed by Brianna Bernal ([id number]) on 9/4/2024 6:00:03 PM    ECG 12 lead    Result Date: 9/4/2024  AV dual-paced rhythm Abnormal ECG When compared with ECG of 20-AUG-2024 09:55, (unconfirmed) Electronic ventricular pacemaker has replaced Wide QRS rhythm See ED provider note for full interpretation and clinical correlation Confirmed by Brianna Bernal ([id number]) on 9/4/2024 5:32:49 PM    CT abdomen pelvis w IV contrast    Result Date: 9/4/2024  Interpreted By:  Carlos Greene, STUDY: CT ABDOMEN PELVIS W IV CONTRAST;  9/4/2024 4:24 pm   INDICATION: Signs/Symptoms:abd swelling, recent heparin injection sub cutaneous.     COMPARISON: CT abdomen and pelvis 05/24/2024   ACCESSION NUMBER(S): [id number]   ORDERING CLINICIAN: SHANTHI RAMOS   TECHNIQUE: Contiguous axial images of the abdomen and pelvis were obtained after the intravenous administration of 75 mL Omnipaque 350 contrast. Coronal and sagittal reformatted images were reconstructed from the axial data.   FINDINGS: LOWER CHEST: No acute abnormality.   LIVER: Probable hepatic steatosis although assessment limited by phase of contrast enhancement.   BILE DUCTS: No significant intrahepatic or extrahepatic dilatation.   GALLBLADDER: Layering density in the gallbladder, sludge versus vicarious contrast excretion.   PANCREAS: No significant abnormality.   SPLEEN: No significant abnormality.   ADRENALS: No significant abnormality.   KIDNEYS, URETERS, BLADDER: Kidneys enhance symmetrically. There is no hydronephrosis. Central vascular calcifications versus nonobstructing calculi. No appreciable ureteral calculus. The bladder is  underdistended which limits assessment.   REPRODUCTIVE ORGANS: No significant abnormality.   GI: No obstruction. No bowel wall thickening or adjacent inflammatory change. Normal appendix. Large amount of stool within the ascending and transverse colon.   VESSELS: Heavy calcified and noncalcified atheromatous plaques of the aorta and iliac vasculature. Aneurysmal dilatation of the infrarenal abdominal aorta measuring up to 3.1 cm just superior to the bifurcation. Vascular stent within the right common iliac artery. The portal veins and IVC are patent.   PERITONEUM/RETROPERITONEUM: No intraperitoneal free air. There is a small amount of abdominal and pelvic ascites.   LYMPH NODES: No enlarged lymph nodes.   ABDOMINAL WALL: Small fat containing inguinal hernias bilaterally.   OSSEOUS STRUCTURES: No acute osseous abnormality.       1. No acute abnormality within the abdomen or pelvis. 2. No abdominal wall or intra-abdominal hematoma identified. 3. Small volume abdominal and pelvic ascites.   MACRO: None   Signed by: Carlos Greene 9/4/2024 4:59 PM Dictation workstation:   [id number]    CT angio chest for pulmonary embolism    Result Date: 9/4/2024  Interpreted By:  Carlos Greene, STUDY: CT ANGIO CHEST FOR PULMONARY EMBOLISM;  9/4/2024 4:24 pm   INDICATION: Signs/Symptoms:sob.     COMPARISON: CT chest 06/04/2024   ACCESSION NUMBER(S): [id number]   ORDERING CLINICIAN: SHANTHI RAMOS   TECHNIQUE: Contiguous axial images of the chest were obtained after the intravenous administration of 75 mL Omnipaque 350 contrast using angiographic PE protocol. Coronal and sagittal reformatted images were reconstructed from the axial data. MIP images were created on an independent workstation and reviewed.   FINDINGS: PULMONARY ARTERIES: Adequate opacification to the level of the segmental arteries. Assessment of the subsegmental arteries is limited by mixing artifact and motion. No filling defect to suggest pulmonary embolus in the  visualized pulmonary arteries. The main pulmonary artery is normal in diameter. There is reflux of contrast into the hepatic venous circuit suggestive of impaired right heart function.   HEART: Heart is enlarged. Right atrial, right ventricular, and epicardial pacing leads are noted. Moderate to heavy coronary vascular calcifications with probable stent within the left circumflex artery. No significant pericardial effusion.   VESSELS: Aorta is normal in caliber. Non-opacification of the aorta limits assessment of luminal patency. Mild atherosclerotic calcifications of the aortic arch and descending thoracic aorta.   MEDIASTINUM AND LYMPH NODES: Visualized thyroid is within normal limits. Prominent and mildly enlarged paratracheal and prevascular lymph nodes measuring up to 1.6 cm. No pneumomediastinum. The esophagus appears within normal limits.   LUNG, AIRWAYS, AND PLEURA: Trachea and proximal mainstem bronchi are patent. The lungs demonstrate a background of moderate-to-severe emphysematous change with biapical scarring. Interval development of masslike consolidations in the anterior aspects of the lung apices bilaterally, measuring up to 4 cm on the right and 3.1 cm in the left.. These are new since June 2024. Additional scattered areas of ground-glass and reticular opacities throughout the bilateral lungs. Moderate-to-large right pleural effusions with adjacent compressive atelectasis.   OSSEOUS STRUCTURES: No acute osseous abnormality.   CHEST WALL SOFT TISSUES: No discernible abnormality.   UPPER ABDOMEN/OTHER: Please see separately dictated CT of the abdomen and pelvis.       1. No evidence of pulmonary embolus to the level of the segmental arteries. Small subsegmental filling defects can not be entirely excluded. 2. Masslike consolidations in the bilateral lung apices, new from June 2024. These presumably represent multifocal airspace disease such as pneumonia with mass/neoplasm not excluded. Recommend  correlation with clinical presentation and short interval follow-up to ensure resolution. 3. Additional scattered ground-glass and reticular opacities are noted throughout the bilateral lungs which could reflect edema or infectious/inflammatory process. 4. Prominent mildly enlarged mediastinal lymph nodes. Nonspecific and may be reactive with neoplasm not excluded. 5. Moderate-to-large right pleural effusion adjacent atelectasis. 6. Cardiomegaly and reflux of contrast into the hepatic venous circuit suggesting impaired right heart function. Consider echocardiographic correlation.   MACRO: Critical Finding:  See findings. Notification was initiated on 9/4/2024 at 4:52 pm by  Carlos Greene.  (**-YCF-**) Instructions:   Signed by: Carlos Greene 9/4/2024 4:53 PM Dictation workstation:   [id number]    XR chest 1 view    Result Date: 9/4/2024  Interpreted By:  Mohan German, STUDY: XR CHEST 1 VIEW;  9/4/2024 3:05 pm   INDICATION: Signs/Symptoms:Chest Pain.     COMPARISON: 08/29/2024   ACCESSION NUMBER(S): [id number]   ORDERING CLINICIAN: SHANTHI RAMOS   FINDINGS: Multi lead right chest wall AICD noted.   Cardiomegaly. The pulmonary vasculature is congested centrally and indistinct peripherally, with interlobular septal thickening and indistinct mid to lower lung consolidative opacities consistent with pulmonary edema. Small right pleural effusion, similar to prior study. There is mild right lower lobe atelectasis. There are some patchy asymmetric peripheral consolidations in the right upper lobe and left upper lobe.       Cardiomegaly with pulmonary edema, appearing slightly increased from 08/29/2024. The possibility of superimposed multifocal pneumonia is raised as there appear to be some asymmetric patchy peripheral consolidations in the right upper lobe and left upper lobe that can be seen with multiple potential organisms including COVID-19. Integration of the clinical context, physical exam/laboratory  findings, and imaging is recommended. As   MACRO: None.   Signed by: Mohan German 9/4/2024 3:20 PM Dictation workstation:   [id number]       Cardiac Device Check - Inpatient   Final Result      CT angio chest for pulmonary embolism   Final Result   1. No evidence of pulmonary embolus to the level of the segmental   arteries. Small subsegmental filling defects can not be entirely   excluded.   2. Masslike consolidations in the bilateral lung apices, new from   June 2024. These presumably represent multifocal airspace disease   such as pneumonia with mass/neoplasm not excluded. Recommend   correlation with clinical presentation and short interval follow-up   to ensure resolution.   3. Additional scattered ground-glass and reticular opacities are   noted throughout the bilateral lungs which could reflect edema or   infectious/inflammatory process.   4. Prominent mildly enlarged mediastinal lymph nodes. Nonspecific and   may be reactive with neoplasm not excluded.   5. Moderate-to-large right pleural effusion adjacent atelectasis.   6. Cardiomegaly and reflux of contrast into the hepatic venous   circuit suggesting impaired right heart function. Consider   echocardiographic correlation.        MACRO:   Critical Finding:  See findings. Notification was initiated on   9/4/2024 at 4:52 pm by  Carlos Greene.  (**-YCF-**) Instructions:        Signed by: Carlos Greene 9/4/2024 4:53 PM   Dictation workstation:   [id number]      CT abdomen pelvis w IV contrast   Final Result   1. No acute abnormality within the abdomen or pelvis.   2. No abdominal wall or intra-abdominal hematoma identified.   3. Small volume abdominal and pelvic ascites.        MACRO:   None        Signed by: Carlos Greene 9/4/2024 4:59 PM   Dictation workstation:   [id number]      XR chest 1 view   Final Result   Cardiomegaly with pulmonary edema, appearing slightly increased from   08/29/2024. The possibility of superimposed multifocal pneumonia is    raised as there appear to be some asymmetric patchy peripheral   consolidations in the right upper lobe and left upper lobe that can   be seen with multiple potential organisms including COVID-19.   Integration of the clinical context, physical exam/laboratory   findings, and imaging is recommended. As        MACRO:   None.        Signed by: Mohan German 9/4/2024 3:20 PM   Dictation workstation:   [id number]            RADIOLOGY:     Cardiac Device Check - Inpatient   Final Result      CT angio chest for pulmonary embolism   Final Result   1. No evidence of pulmonary embolus to the level of the segmental   arteries. Small subsegmental filling defects can not be entirely   excluded.   2. Masslike consolidations in the bilateral lung apices, new from   June 2024. These presumably represent multifocal airspace disease   such as pneumonia with mass/neoplasm not excluded. Recommend   correlation with clinical presentation and short interval follow-up   to ensure resolution.   3. Additional scattered ground-glass and reticular opacities are   noted throughout the bilateral lungs which could reflect edema or   infectious/inflammatory process.   4. Prominent mildly enlarged mediastinal lymph nodes. Nonspecific and   may be reactive with neoplasm not excluded.   5. Moderate-to-large right pleural effusion adjacent atelectasis.   6. Cardiomegaly and reflux of contrast into the hepatic venous   circuit suggesting impaired right heart function. Consider   echocardiographic correlation.        MACRO:   Critical Finding:  See findings. Notification was initiated on   9/4/2024 at 4:52 pm by  Carlos Greene.  (**-YCF-**) Instructions:        Signed by: Carlos Greene 9/4/2024 4:53 PM   Dictation workstation:   [id number]      CT abdomen pelvis w IV contrast   Final Result   1. No acute abnormality within the abdomen or pelvis.   2. No abdominal wall or intra-abdominal hematoma identified.   3. Small volume abdominal and  pelvic ascites.        MACRO:   None        Signed by: Carlos Greene 9/4/2024 4:59 PM   Dictation workstation:   [id number]      XR chest 1 view   Final Result   Cardiomegaly with pulmonary edema, appearing slightly increased from   08/29/2024. The possibility of superimposed multifocal pneumonia is   raised as there appear to be some asymmetric patchy peripheral   consolidations in the right upper lobe and left upper lobe that can   be seen with multiple potential organisms including COVID-19.   Integration of the clinical context, physical exam/laboratory   findings, and imaging is recommended. As        MACRO:   None.        Signed by: Mohan German 9/4/2024 3:20 PM   Dictation workstation:   [id number]          PROBLEM LIST     Patient Active Problem List   Diagnosis    Ischemic cardiomyopathy    Elevated troponin    VT (ventricular tachycardia) (Multi)    CHF (congestive heart failure) (Multi)    Chronic systolic heart failure (Multi)    Flash pulmonary edema (Multi)    Arteriosclerosis of coronary artery    ICD (implantable cardioverter-defibrillator) in place    Nonrheumatic mitral valve regurgitation    Nonrheumatic tricuspid valve regurgitation    Hx of percutaneous transluminal coronary angioplasty    Primary hypertension    Current smoker    CHB (complete heart block) (Multi)    Chronic obstructive pulmonary disease (Multi)    Infrarenal abdominal aortic aneurysm (AAA) without rupture (CMS-HCC)    CAD S/P percutaneous coronary angioplasty    Refractory angina (CMS-[facility])    Intermittent claudication (CMS-[facility])    Malfunction of implantable defibrillator ventricular (ICD) lead    Mixed hyperlipidemia    Old MI (myocardial infarction)    PVD (peripheral vascular disease) (CMS-HCC)    PAT (paroxysmal atrial tachycardia) (CMS-[facility])    Postsurgical percutaneous transluminal coronary angioplasty status    PTSD (post-traumatic stress disorder)    PVC (premature ventricular contraction)    Rapid palpitations     S/P ICD (internal cardiac defibrillator) procedure    Sickle cell trait (CMS-[facility])    Acute on chronic systolic (congestive) heart failure (Multi)    Acute hypoxic respiratory failure (Multi)    Acute kidney injury superimposed on chronic kidney disease (CMS-HCC)    Transaminitis    Shortness of breath    Acute on chronic combined systolic and diastolic heart failure (Multi)    Centrilobular emphysema (Multi)    Chronic congestive heart failure, unspecified heart failure type (Multi)       ASSESSMENT:   Impression:  Acute on chronic combined systolic and diastolic congestive heart failure, NYHA class IV  Stage D HFrEF/ICM s/p CRT-D  Ischemic cardiomyopathy  Ventricular tachycardia  Hypertension  COPD  CAD status post PCI  PTSD  ICD implant        PLAN:   Admitted to medicine remains on telemetry.  Telemetry shows AV paced rhythm.  EKG looks to be AV dual placed rhythm with a rate of 75.  Supplemental O2, keep SpO2 greater than 92%  Monitor electrolytes, need to keep potassium greater than 4 and magnesium greater than 2  Continue diuresis, monitor strict I's and O's and daily weights.  Avoid nephrotoxic agents.  Goal for -1 L/day  May need thoracentesis if unable to successfully diurese patient  Unfortunately this gentleman has decompensated end-stage heart failure with an EF near 10 to 15%.  He was evaluated at [facility] last month by multiple specialists who deemed him not a candidate for LVAD or lung/heart transplant.  He was given options to pursue a second opinion by other institutions however he has decided against this.  With nothing left to offer this gentleman he has decided to move forward with hospice/palliative care services.  He did ask me to consult hospice services so that he can get affiliated with them after he gets his affairs in order at home.  Appreciate EP recommendations in regards to use of antiarrhythmics.  Continue optimizing GDMT for heart failure  management  Antibiotics per primary medicine team  Heart failure navigator consult  Social work and hospice has been consulted  Resume home Demadex  BMP in 1 week  Follow-up with Dr. Rosa and Dr. Brown, appt's made  Expect discharge in next 24 hours  General Cardiology to sign off        Samm Espinoza [facility]  Adult Gerontology Acute Care Nurse Practitioner  [facility] [place]   [facility]  [phone]          Of note, this documentation is completed using the Dragon Dictation system (voice recognition software). There may be spelling and/or grammatical errors that were not corrected prior to final submission.    Please do not hesitate to call with questions.  Electronically signed by Samm Espinoza, YARITZA-CNP, on 9/6/2024 at 10:45 AM

## 2024-09-07 ENCOUNTER — APPOINTMENT (OUTPATIENT)
Dept: CARDIOLOGY | Facility: HOSPITAL | Age: 59
End: 2024-09-07
Payer: MEDICARE

## 2024-09-07 VITALS
DIASTOLIC BLOOD PRESSURE: 55 MMHG | SYSTOLIC BLOOD PRESSURE: 103 MMHG | HEART RATE: 74 BPM | HEIGHT: 67 IN | BODY MASS INDEX: 28.56 KG/M2 | RESPIRATION RATE: 14 BRPM | WEIGHT: 182 LBS | OXYGEN SATURATION: 94 % | TEMPERATURE: 97.3 F

## 2024-09-07 LAB
ANION GAP SERPL CALC-SCNC: 18 MMOL/L (ref 10–20)
ATRIAL RATE: 37 BPM
BUN SERPL-MCNC: 38 MG/DL (ref 6–23)
CALCIUM SERPL-MCNC: 9.2 MG/DL (ref 8.6–10.3)
CHLORIDE SERPL-SCNC: 92 MMOL/L (ref 98–107)
CO2 SERPL-SCNC: 22 MMOL/L (ref 21–32)
CREAT SERPL-MCNC: 2.61 MG/DL (ref 0.5–1.3)
EGFRCR SERPLBLD CKD-EPI 2021: 28 ML/MIN/1.73M*2
ERYTHROCYTE [DISTWIDTH] IN BLOOD BY AUTOMATED COUNT: 20 % (ref 11.5–14.5)
GLUCOSE SERPL-MCNC: 121 MG/DL (ref 74–99)
HCT VFR BLD AUTO: 36.1 % (ref 41–52)
HGB BLD-MCNC: 12.4 G/DL (ref 13.5–17.5)
HOLD SPECIMEN: NORMAL
MAGNESIUM SERPL-MCNC: 2.04 MG/DL (ref 1.6–2.4)
MCH RBC QN AUTO: 29.1 PG (ref 26–34)
MCHC RBC AUTO-ENTMCNC: 34.3 G/DL (ref 32–36)
MCV RBC AUTO: 85 FL (ref 80–100)
NRBC BLD-RTO: 0.1 /100 WBCS (ref 0–0)
P AXIS: 138 DEGREES
PLATELET # BLD AUTO: 327 X10*3/UL (ref 150–450)
POTASSIUM SERPL-SCNC: 4.6 MMOL/L (ref 3.5–5.3)
PR INTERVAL: 156 MS
Q ONSET: 170 MS
QRS COUNT: 12 BEATS
QRS DURATION: 162 MS
QT INTERVAL: 524 MS
QTC CALCULATION(BAZETT): 585 MS
QTC FREDERICIA: 564 MS
R AXIS: -64 DEGREES
RBC # BLD AUTO: 4.26 X10*6/UL (ref 4.5–5.9)
SODIUM SERPL-SCNC: 127 MMOL/L (ref 136–145)
T AXIS: 91 DEGREES
T OFFSET: 432 MS
VANCOMYCIN SERPL-MCNC: 21.3 UG/ML (ref 5–20)
VENTRICULAR RATE: 75 BPM
WBC # BLD AUTO: 17 X10*3/UL (ref 4.4–11.3)

## 2024-09-07 PROCEDURE — 2500000001 HC RX 250 WO HCPCS SELF ADMINISTERED DRUGS (ALT 637 FOR MEDICARE OP): Performed by: STUDENT IN AN ORGANIZED HEALTH CARE EDUCATION/TRAINING PROGRAM

## 2024-09-07 PROCEDURE — 2500000004 HC RX 250 GENERAL PHARMACY W/ HCPCS (ALT 636 FOR OP/ED): Performed by: STUDENT IN AN ORGANIZED HEALTH CARE EDUCATION/TRAINING PROGRAM

## 2024-09-07 PROCEDURE — 2500000002 HC RX 250 W HCPCS SELF ADMINISTERED DRUGS (ALT 637 FOR MEDICARE OP, ALT 636 FOR OP/ED): Performed by: NURSE PRACTITIONER

## 2024-09-07 PROCEDURE — 2500000002 HC RX 250 W HCPCS SELF ADMINISTERED DRUGS (ALT 637 FOR MEDICARE OP, ALT 636 FOR OP/ED): Performed by: STUDENT IN AN ORGANIZED HEALTH CARE EDUCATION/TRAINING PROGRAM

## 2024-09-07 PROCEDURE — 80202 ASSAY OF VANCOMYCIN: CPT | Performed by: PHARMACIST

## 2024-09-07 PROCEDURE — 36415 COLL VENOUS BLD VENIPUNCTURE: CPT | Performed by: STUDENT IN AN ORGANIZED HEALTH CARE EDUCATION/TRAINING PROGRAM

## 2024-09-07 PROCEDURE — 2500000004 HC RX 250 GENERAL PHARMACY W/ HCPCS (ALT 636 FOR OP/ED): Performed by: NURSE PRACTITIONER

## 2024-09-07 PROCEDURE — 93010 ELECTROCARDIOGRAM REPORT: CPT | Performed by: INTERNAL MEDICINE

## 2024-09-07 PROCEDURE — 85027 COMPLETE CBC AUTOMATED: CPT | Performed by: STUDENT IN AN ORGANIZED HEALTH CARE EDUCATION/TRAINING PROGRAM

## 2024-09-07 PROCEDURE — 2500000001 HC RX 250 WO HCPCS SELF ADMINISTERED DRUGS (ALT 637 FOR MEDICARE OP): Performed by: NURSE PRACTITIONER

## 2024-09-07 PROCEDURE — 99238 HOSP IP/OBS DSCHRG MGMT 30/<: CPT | Performed by: INTERNAL MEDICINE

## 2024-09-07 PROCEDURE — 93005 ELECTROCARDIOGRAM TRACING: CPT

## 2024-09-07 PROCEDURE — 80048 BASIC METABOLIC PNL TOTAL CA: CPT | Performed by: STUDENT IN AN ORGANIZED HEALTH CARE EDUCATION/TRAINING PROGRAM

## 2024-09-07 PROCEDURE — 83735 ASSAY OF MAGNESIUM: CPT | Performed by: STUDENT IN AN ORGANIZED HEALTH CARE EDUCATION/TRAINING PROGRAM

## 2024-09-07 RX ORDER — TRAZODONE HYDROCHLORIDE 100 MG/1
100 TABLET ORAL NIGHTLY
Qty: 30 TABLET | Refills: 2 | Status: SHIPPED | OUTPATIENT
Start: 2024-09-07 | End: 2024-09-10

## 2024-09-07 RX ORDER — VANCOMYCIN HYDROCHLORIDE 750 MG/150ML
750 INJECTION, SOLUTION INTRAVENOUS EVERY 24 HOURS
Status: DISCONTINUED | OUTPATIENT
Start: 2024-09-07 | End: 2024-09-07 | Stop reason: HOSPADM

## 2024-09-07 ASSESSMENT — PAIN SCALES - GENERAL
PAINLEVEL_OUTOF10: 0 - NO PAIN
PAINLEVEL_OUTOF10: 0 - NO PAIN

## 2024-09-07 ASSESSMENT — COGNITIVE AND FUNCTIONAL STATUS - GENERAL
MOBILITY SCORE: 22
CLIMB 3 TO 5 STEPS WITH RAILING: A LOT
DAILY ACTIVITIY SCORE: 24
DAILY ACTIVITIY SCORE: 24
CLIMB 3 TO 5 STEPS WITH RAILING: A LOT

## 2024-09-07 ASSESSMENT — PAIN - FUNCTIONAL ASSESSMENT: PAIN_FUNCTIONAL_ASSESSMENT: 0-10

## 2024-09-07 ASSESSMENT — PAIN SCALES - WONG BAKER: WONGBAKER_NUMERICALRESPONSE: NO HURT

## 2024-09-07 NOTE — DISCHARGE SUMMARY
Discharge Diagnosis:   Chronic congestive heart failure, unspecified heart failure type (Multi)     Discharge Date: 09/07/24   Admission Date: 9/4/2024     Discharge Physical Exam:    HEENT:  Atraumatic, PERRL. Conjunctivae clear.  Moist nasal mucous membranes.   Neck:  Supple without thyromegaly or lymphadenopathy.  Lungs:  Clear to auscultation without rales, rhonchi, or rub.  Heart:  RRR, S1, S2, without M.  Abdomen:  Soft, non tender, no organ enlargement.  Bowel sounds present . No CVA tenderness.  Extremities:  No edema. No calf swelling or tenderness.    Skin:  No rash, ecchymosis or erythema.    Hospital Course:   Cristian Sapp is a pleasant 58-year-old gentleman with history of end-stage heart failure decompensated with ejection fraction near 10 to 15%.  He came with acute exacerbation of heart failure with recurrent V. tach.  He already has ICD and on amiodarone.  He was evaluated by [facility] last month by multiple specialists who deemed him not a candidate for LVAD or lung heart transplant.  He was given options to pursue a second opinion by other institutions however he has decided against this.  We do nothing left to offer this gentleman has decided to move forward with hospice/palliative care services during this hospitalization cardiology and electrophysiologist try to uptitrate his medication.  He was at his baseline with shortness of breath and dyspnea on exertion.  Palliative care was consulted.  He will start home palliative care from Monday with [facility].    On the day of discharge patient was ambulating well, eating and drinking well. Physical exam was essentially benign and was at baseline. Blood chemistry and other lab testing results were at acceptable for discharge. Patient remained hemodynamically stable and with no further acute concern. Patient verbalized understanding of discharge medications and the expected adverse effects, if any.        The detail of the hospital course  including admission H&P, consult recommendations, biochemical lab results, imaging studies can be found in EHR of [facility]. Total 29 minutes time was spent on discharge exam, medicine reconciliation, discharge instructions and preparing discharge summary.       Home Going Medications:      Medication List      START taking these medications     magnesium oxide 400 mg (241.3 mg magnesium) tablet; Commonly known as:   Mag-Ox; Take 1 tablet (400 mg) by mouth once daily.   potassium chloride CR 20 mEq ER tablet; Commonly known as: Klor-Con M20;   Take 1 tablet (20 mEq) by mouth once daily. Do not crush or chew.     CHANGE how you take these medications     traZODone 100 mg tablet; Commonly known as: Desyrel; Take 1 tablet (100   mg) by mouth once daily at bedtime.; What changed: medication strength,   how much to take     CONTINUE taking these medications     albuterol sulfate 90 mcg/actuation aero powdr breath act w/sensor   inhaler; Commonly known as: Proair Digihaler   amiodarone 200 mg tablet; Commonly known as: Pacerone; Take 1 tablet   (200 mg) by mouth 2 times a day.   aspirin 81 mg EC tablet; Take 1 tablet (81 mg) by mouth once daily.   Farxiga 10 mg; Generic drug: dapagliflozin propanediol; Take 1 tablet   (10 mg) by mouth once daily.   FeroSuL tablet; Generic drug: ferrous sulfate (325 mg ferrous sulfate);   Take 1 tablet (325 mg) by mouth once daily with breakfast.   LORazepam 0.5 mg tablet; Commonly known as: Ativan   metoprolol succinate XL 25 mg 24 hr tablet; Commonly known as:   Toprol-XL; Take 1 tablet (25 mg) by mouth once daily. Do not crush or   chew.   mexiletine 150 mg capsule; Commonly known as: Mexitil; Take 2 capsules   (300 mg) by mouth 3 times a day.   nitroglycerin 0.4 mg SL tablet; Commonly known as: Nitrostat   pantoprazole 40 mg EC tablet; Commonly known as: ProtoNix; Take 1 tablet   (40 mg) by mouth once daily in the morning. Take  before meals. Do not   crush, chew, or split.   PARoxetine 30 mg tablet; Commonly known as: Paxil; Take 2 tablets (60   mg) by mouth once daily at bedtime.   ranolazine 500 mg 12 hr tablet; Commonly known as: Ranexa; Take 1 tablet   (500 mg) by mouth 2 times a day. Do not crush, chew, or split.   rosuvastatin 20 mg tablet; Commonly known as: Crestor; Take 1 tablet (20   mg) by mouth once daily at bedtime.   sacubitriL-valsartan 24-26 mg tablet; Commonly known as: Entresto; Take   0.5 tablets by mouth 2 times a day.   spironolactone 25 mg tablet; Commonly known as: Aldactone; Take 1 tablet   (25 mg) by mouth once daily.   tamsulosin 0.4 mg 24 hr capsule; Commonly known as: Flomax   torsemide 20 mg tablet; Commonly known as: Demadex; Take 2 tablets (40   mg) by mouth once daily.       Outpatient Follow up:   Future Appointments   Date Time Provider Department Center   9/11/2024  8:30 AM Zachary Sparks MD [id number] [place]   9/18/2024 12:40 PM [facility] [id number] [place] [address]   9/18/2024  1:15 PM Eliot Wooten MD [id number] [place]   9/26/2024  3:00 PM Arya Brown MD [id number] [place]   10/14/2024  8:00 AM Austin Neves MD [id number] [place]   10/15/2024 10:00 AM Eamon Rosa MD [id number] [place]     PCP: Ewa Willis MD   Patient to call primary care physician's office to set up a hospital discharge follow-up in 7 days from today.          PS: This note was completed using Dragon voice recognition technology and may include unintended errors with respect to translation of words, typographical or grammar errors which may not have been identified while finalizing the chart.

## 2024-09-07 NOTE — CARE PLAN
The patient's goals for the shift include no pain    The clinical goals for the shift include patient will remain free from fall/injury throughout end of shift    Over the shift, the patient did not make progress toward the following goals. Barriers to progression include; none. Recommendations to address these barriers include; continue current plan of care.      Problem: Pain - Adult  Goal: Verbalizes/displays adequate comfort level or baseline comfort level  Outcome: Progressing  Flowsheets (Taken 9/7/2024 1053)  Verbalizes/displays adequate comfort level or baseline comfort level:   Encourage patient to monitor pain and request assistance   Administer analgesics based on type and severity of pain and evaluate response   Consider cultural and social influences on pain and pain management   Assess pain using appropriate pain scale   Implement non-pharmacological measures as appropriate and evaluate response   Notify Licensed Independent Practitioner if interventions unsuccessful or patient reports new pain     Problem: Safety - Adult  Goal: Free from fall injury  Outcome: Progressing  Flowsheets (Taken 9/7/2024 1053)  Free from fall injury: Instruct family/caregiver on patient safety     Problem: Chronic Conditions and Co-morbidities  Goal: Patient's chronic conditions and co-morbidity symptoms are monitored and maintained or improved  Outcome: Progressing  Flowsheets (Taken 9/7/2024 1053)  Care Plan - Patient's Chronic Conditions and Co-Morbidity Symptoms are Monitored and Maintained or Improved:   Monitor and assess patient's chronic conditions and comorbid symptoms for stability, deterioration, or improvement   Collaborate with multidisciplinary team to address chronic and comorbid conditions and prevent exacerbation or deterioration   Update acute care plan with appropriate goals if chronic or comorbid symptoms are exacerbated and prevent overall improvement and discharge

## 2024-09-07 NOTE — CARE PLAN
Problem: Pain - Adult  Goal: Verbalizes/displays adequate comfort level or baseline comfort level  Outcome: Progressing     Problem: Safety - Adult  Goal: Free from fall injury  Outcome: Progressing     Problem: Heart Failure  Goal: Improved gas exchange this shift  Outcome: Progressing  Goal: Improved urinary output this shift  Outcome: Progressing  Goal: Reduction in peripheral edema within 24 hours  Outcome: Progressing  Goal: Report improvement of dyspnea/breathlessness this shift  Outcome: Progressing  Goal: Weight from fluid excess reduced over 2-3 days, then stabilize  Outcome: Progressing  Goal: Increase self care and/or family involvement in 24 hours  Outcome: Progressing   The patient's goals for the shift include      The clinical goals for the shift include Patient will have improved right shoulder pain by the end of this shift

## 2024-09-07 NOTE — PROGRESS NOTES
09/07/24 1521   Current Planned Discharge Disposition   Current Planned Discharge Disposition Home     Patient being discharged to home with [facility]. Updated [facility] of discharge. GABRIELA Grayson

## 2024-09-09 ENCOUNTER — PATIENT OUTREACH (OUTPATIENT)
Dept: CARDIOLOGY | Facility: CLINIC | Age: 59
End: 2024-09-09
Payer: MEDICARE

## 2024-09-09 LAB
ATRIAL RATE: 37 BPM
ATRIAL RATE: 75 BPM
ATRIAL RATE: 75 BPM
P AXIS: 138 DEGREES
PR INTERVAL: 152 MS
PR INTERVAL: 156 MS
PR INTERVAL: 156 MS
Q ONSET: 170 MS
Q ONSET: 174 MS
Q ONSET: 174 MS
QRS COUNT: 12 BEATS
QRS DURATION: 162 MS
QRS DURATION: 188 MS
QRS DURATION: 190 MS
QT INTERVAL: 502 MS
QT INTERVAL: 508 MS
QT INTERVAL: 524 MS
QTC CALCULATION(BAZETT): 560 MS
QTC CALCULATION(BAZETT): 567 MS
QTC CALCULATION(BAZETT): 585 MS
QTC FREDERICIA: 540 MS
QTC FREDERICIA: 546 MS
QTC FREDERICIA: 564 MS
R AXIS: -63 DEGREES
R AXIS: -64 DEGREES
R AXIS: -67 DEGREES
T AXIS: 103 DEGREES
T AXIS: 131 DEGREES
T AXIS: 91 DEGREES
T OFFSET: 425 MS
T OFFSET: 428 MS
T OFFSET: 432 MS
VENTRICULAR RATE: 75 BPM

## 2024-09-09 NOTE — PROGRESS NOTES
Discharge Facility:  [facility]    Discharge Diagnosis:  Chronic congestive heart failure,     Admission Date:  9/4/24  Discharge Date:   9/7/24    PCP Appointment Date:  9/12/24 -Angie Astudillo APRN.CNP     Specialist Appointment Date:   9/11/2024  8:30 AM Zachary Sparks MD     9/18/2024  1:15 PM Eliot Wooten MD     9/26/2024  3:00 PM Arya Brown MD     10/14/2024  8:00 AM Austin Neves MD     10/15/2024 10:00 AM Eamon Rosa MD     Hospital Encounter and Summary Linked: Yes    [facility]     Two attempts were made to reach patient within two business days after discharge. I left voicemail to introduce myself and the TCM program to Cristian Sapp. I encouraged patient to contact office or myself for follow up appt. I gave my contact information to return my call so we can go over discharge instructions and see if I can assist in anyway.

## 2024-09-10 ENCOUNTER — TELEPHONE (OUTPATIENT)
Dept: PRIMARY CARE | Facility: CLINIC | Age: 59
End: 2024-09-10
Payer: MEDICARE

## 2024-09-10 NOTE — PROGRESS NOTES
LUPE:  I was contacted by Jamila Karimi through Secure chat that Cristian passed away on 9/8/24 at home with family. Jamila also updated Dr Sparks office .  Jamila did not know how to report death through [facility] so I completed the steps for her.  [facility] will close TCM program.

## 2024-09-11 ENCOUNTER — APPOINTMENT (OUTPATIENT)
Dept: CARDIOLOGY | Facility: CLINIC | Age: 59
End: 2024-09-11
Payer: COMMERCIAL

## 2024-09-18 ENCOUNTER — APPOINTMENT (OUTPATIENT)
Dept: CARDIOLOGY | Facility: CLINIC | Age: 59
End: 2024-09-18
Payer: COMMERCIAL

## 2024-09-18 ENCOUNTER — APPOINTMENT (OUTPATIENT)
Dept: CARDIOLOGY | Facility: HOSPITAL | Age: 59
End: 2024-09-18
Payer: COMMERCIAL

## 2024-09-19 ENCOUNTER — APPOINTMENT (OUTPATIENT)
Dept: CARDIOLOGY | Facility: CLINIC | Age: 59
End: 2024-09-19
Payer: COMMERCIAL

## 2024-09-26 ENCOUNTER — APPOINTMENT (OUTPATIENT)
Dept: CARDIOLOGY | Facility: CLINIC | Age: 59
End: 2024-09-26
Payer: MEDICARE

## 2024-10-14 ENCOUNTER — APPOINTMENT (OUTPATIENT)
Dept: CARDIOLOGY | Facility: CLINIC | Age: 59
End: 2024-10-14

## 2024-10-15 ENCOUNTER — APPOINTMENT (OUTPATIENT)
Dept: CARDIOLOGY | Facility: CLINIC | Age: 59
End: 2024-10-15
Payer: COMMERCIAL

## 2024-10-31 ENCOUNTER — APPOINTMENT (OUTPATIENT)
Dept: CARDIOLOGY | Facility: CLINIC | Age: 59
End: 2024-10-31
Payer: MEDICARE

## (undated) DEVICE — NEEDLE, NRG TRANSSEPTAL, 98CM, CURVE C0

## (undated) DEVICE — CABLE, 34 HYP, 34 LEMO, 10FT, SMART TOUCH

## (undated) DEVICE — GUIDEWIRE, FIXED CORE, SAFE-T-J, 0.025 X 180CML

## (undated) DEVICE — CATHETER, OPTRELL, 36 ELECTRODES, D-F CURVE, SM LOOP

## (undated) DEVICE — CATHETER, THERMOCOOL SMART TOUCH, SF, D-F CURVE

## (undated) DEVICE — CABLE, OCTARAY, SPLIT HANDLE EXT CABLE, 10FT LG

## (undated) DEVICE — DEVICE, CLOSURE, PERCLOSE, PROSTYLE

## (undated) DEVICE — SHEATH, PINNACLE, W/.038 GW 10CM, 5FR INTRODUCER, 2.5 CM DIALATOR

## (undated) DEVICE — INTRODUCER KIT, HEMOSTASIS, VALVE/SIDEPORT, W/GUIDEWIRE, 0.035 IN, 8.5 FR, 10 CM, LF

## (undated) DEVICE — CATHETER, QUADRAPOLAR, 2-5-2MM, 115CM, YELLOW, W/ REDEL

## (undated) DEVICE — CATHETER, THERMODILUTION, SWAN GANZ, VIP, 5 LUMEN, 7.5 FR, 110 CM

## (undated) DEVICE — PATCHES, EXTERNAL REFERENCE, CARTO3

## (undated) DEVICE — CATHETER, THERMOCOOL SMART TOUCH, SF, F-J CURVE

## (undated) DEVICE — ELECTRODE, QUICK-COMBO, REDI PACK

## (undated) DEVICE — CABLE, CATH CONNECTING, SUPREME HEXAPOR, BLACK, 150CM

## (undated) DEVICE — SHEATH, PINNACLE, 10 CM,  9FR INTRODUCER, 9FR DIA, 2.5 CM DIALATOR

## (undated) DEVICE — CABLE, 34 HYP, 34 LEMO, 10FT, SMART TOUCH (REPROCESS)

## (undated) DEVICE — CLOSURE SYSTEM, VASCULAR, VASCADE, 5 F

## (undated) DEVICE — INTRODUCER, HEMOSTASIS, STR/J .038IN 5FR 12CM

## (undated) DEVICE — CABLE, CONNECTOR, 10FT

## (undated) DEVICE — SHEATH, GUIDING, VIZIGO, 8.5F WITH CURVE VIZ  MDC

## (undated) DEVICE — CATHETER, ACUSON ACUNAV ULTRASOUND, 8FR 90CM  (REPROCESSED)

## (undated) DEVICE — CATHETER, DIAGNOSTIC, JUDKINS, LEFT, 5 FR-JL 4.0

## (undated) DEVICE — ACCESS KIT, MINI MAK, 4 FR X 10CM, 0.018 X 40CM, NITINOL/PLATINUM, STD NEEDLE

## (undated) DEVICE — CATHETER, INFINITI DIAGNOSTIC, 5 FR 100CM 3DRC, WILLIAMS RIGHT OR NO TORQUE

## (undated) DEVICE — CLOSURE SYSTEM, VASCULAR, MVP 6-12FR, VENOUS

## (undated) DEVICE — INTRODUCER, HEMOSTASIS, STR/J .038 IN, 8.5FR 12CM

## (undated) DEVICE — INTRODUCER, SHEATH, FAST-CATH, 8FR X 12CM, C-LOCK

## (undated) DEVICE — CATHETER, DIAGNOSTIC, 5FR,  PIG-145, 110CM, 6SH ANGLED

## (undated) DEVICE — SHEATH, STEERABLE, SUREFLEX, MEDIUM CURVE

## (undated) DEVICE — Device

## (undated) DEVICE — TUBING SET, IRRIGATION, SMARTABLATE

## (undated) DEVICE — CABLE, CATH TO CARTO SYSTEM, 12 HYP/12 REDEL, YELLOW, 10FT

## (undated) DEVICE — GUIDE WIRE, 035/190CM, HI-TORGUE SUPRA CORE

## (undated) DEVICE — CATHETER, INTRA-AORTIC BALLOON, S PLUS, 8 FR 50CC FIBER -OPTIC

## (undated) DEVICE — CABLE, CATH TO CARTO SYSTEM, 12 HYP/10 REDEL (REPROCESSED)

## (undated) DEVICE — PAD, ELECTRODE DEFIB PADPRO ADULT STRL W/ADAPTER

## (undated) DEVICE — CATHETER, ULTRASOUND, SOUNDSTAR, 8F X 90CM

## (undated) DEVICE — INTRODUCER, SHEATH, FAST-CATH, 6 FR X 23 CM

## (undated) DEVICE — CATHETER, DIAGNOSTIC, SOUNDSTAR ECO SMS, 8FR